# Patient Record
Sex: MALE | Race: BLACK OR AFRICAN AMERICAN | NOT HISPANIC OR LATINO | Employment: OTHER | URBAN - METROPOLITAN AREA
[De-identification: names, ages, dates, MRNs, and addresses within clinical notes are randomized per-mention and may not be internally consistent; named-entity substitution may affect disease eponyms.]

---

## 2017-01-04 ENCOUNTER — TELEPHONE (OUTPATIENT)
Dept: NEUROLOGY | Facility: CLINIC | Age: 52
End: 2017-01-04

## 2017-01-04 NOTE — TELEPHONE ENCOUNTER
----- Message from Wilmer Dubose sent at 1/4/2017  1:17 PM CST -----  Contact: Pt  Pt would like a call back from nurse or Dr. Finnegan    Pt is requesting information on infusion    Pt can be reached at [phone]

## 2017-01-04 NOTE — TELEPHONE ENCOUNTER
Mao is currently in inpatient rehab at [facility], but according to him will be moved to another inpatient rehab after he is discharged from [facility]. He would like to come in the office to be seen while he is rehab as a follow up.     Pt also informed me his [employer] Medicare is now active. I resubmitted Rituxan auth to be worked.

## 2017-01-09 ENCOUNTER — TELEPHONE (OUTPATIENT)
Dept: NEUROLOGY | Facility: CLINIC | Age: 52
End: 2017-01-09

## 2017-01-09 NOTE — TELEPHONE ENCOUNTER
Please assist patient in applying for co-pay assistance for his Rituxan infusions. He has [employer]NA[employer]ED MEDIC[employer] MEDICARE HMO, which has approved him to receive Rituxan, but has a large co-pay.

## 2017-01-10 NOTE — TELEPHONE ENCOUNTER
Per grey koenig/ Cyndi - In regards to MRN [id number], the therapy plan is in and signed. He is approved through [employer], but he still has a co-pay. Our pre-auth team worked the auth, so no need to do another PA. It usually goes through Medical. It's a specialty infusion.    My response: We only deal with prescription benefits, so i'm unsure if there is any further help for medical billing...my understanding is the [facility] can assist but the patient would have to meet some criteria and go online/call to submit an application. I was told they do assist with medical copays if the funding is available and patient meets criteria

## 2017-01-16 NOTE — TELEPHONE ENCOUNTER
I spoke with Mao and provided him with the number to Pt account customer service to apply for Rituxan co-pay assistance. Advised he call me back after he speaks with them. Pt verbalized understanding.

## 2017-01-18 ENCOUNTER — HOSPITAL ENCOUNTER (EMERGENCY)
Facility: HOSPITAL | Age: 52
Discharge: HOME | End: 2017-01-18
Attending: EMERGENCY MEDICINE
Payer: MEDICARE

## 2017-01-18 VITALS
WEIGHT: 175 LBS | HEIGHT: 71 IN | DIASTOLIC BLOOD PRESSURE: 84 MMHG | SYSTOLIC BLOOD PRESSURE: 140 MMHG | RESPIRATION RATE: 16 BRPM | BODY MASS INDEX: 24.5 KG/M2 | HEART RATE: 77 BPM

## 2017-01-18 DIAGNOSIS — S50.312A ABRASION OF LEFT ELBOW, INITIAL ENCOUNTER: ICD-10-CM

## 2017-01-18 DIAGNOSIS — G89.29 CHRONIC PAIN OF MULTIPLE SITES: Primary | ICD-10-CM

## 2017-01-18 DIAGNOSIS — Z79.01 ANTICOAGULANT LONG-TERM USE: ICD-10-CM

## 2017-01-18 DIAGNOSIS — R52 CHRONIC PAIN OF MULTIPLE SITES: Primary | ICD-10-CM

## 2017-01-18 PROCEDURE — 99285 EMERGENCY DEPT VISIT HI MDM: CPT | Mod: 25

## 2017-01-18 PROCEDURE — 25000003 PHARM REV CODE 250: Performed by: EMERGENCY MEDICINE

## 2017-01-18 PROCEDURE — 63600175 PHARM REV CODE 636 W HCPCS: Performed by: EMERGENCY MEDICINE

## 2017-01-18 PROCEDURE — 96374 THER/PROPH/DIAG INJ IV PUSH: CPT

## 2017-01-18 RX ORDER — HYDROMORPHONE HYDROCHLORIDE 1 MG/ML
1 INJECTION, SOLUTION INTRAMUSCULAR; INTRAVENOUS; SUBCUTANEOUS
Status: COMPLETED | OUTPATIENT
Start: 2017-01-18 | End: 2017-01-18

## 2017-01-18 RX ADMIN — HYDROMORPHONE HYDROCHLORIDE 1 MG: 1 INJECTION, SOLUTION INTRAMUSCULAR; INTRAVENOUS; SUBCUTANEOUS at 05:01

## 2017-01-18 RX ADMIN — BACITRACIN, NEOMYCIN, POLYMYXIN B 1 EACH: 400; 3.5; 5 OINTMENT TOPICAL at 05:01

## 2017-01-18 NOTE — ED NOTES
Pt arrived via EMS on stretcher from NH with c/o fall where he his is head. No hematoma noted, pt does report a headache PTA but denies on now. Pt reports pain to his left elbow where has an abrasion. Pt denies N/V, dizziness.   APPEARANCE: Alert, oriented and in no acute distress.  CARDIAC: Normal rate and rhythm, no murmur heard.   PERIPHERAL VASCULAR: peripheral pulses present. Normal cap refill. No edema. Warm to touch.    RESPIRATORY:Normal rate and effort, breath sounds clear bilaterally throughout chest. Respirations are equal and unlabored no obvious signs of distress.  GASTRO: soft, bowel sounds normal, no tenderness, no abdominal distention.  MUSC: Full ROM. No bony tenderness or soft tissue tenderness. No obvious deformity.  SKIN: Skin is warm and dry, normal skin turgor, mucous membranes moist.  NEURO: 5/5 strength major flexors/extensors bilaterally. Sensory intact to light touch bilaterally. [place] coma scale: eyes open spontaneously-4, oriented & converses-5, obeys commands-6. No neurological abnormalities.   MENTAL STATUS: awake, alert and aware of environment.  EYE: PERRL, both eyes: pupils brisk and reactive to light. Normal size.  ENT: EARS: no obvious drainage. NOSE: no active bleeding.   .

## 2017-01-18 NOTE — ED AVS SNAPSHOT
[facility]  [address]  [place], [place] [address]               Mao Levin   2017  4:34 PM   ED    Description:  Male : 1965   Department:  [facility]           Your Care was Coordinated By:     Provider Role From To    Maci Plata MD Attending Provider 17 0310 --      Reason for Visit     Fall           Diagnoses this Visit        Comments    Chronic pain of multiple sites    -  Primary     Anticoagulant long-term use         Abrasion of left elbow, initial encounter           ED Disposition     None           To Do List           Follow-up Information     Follow up with [facility].    Specialty:  Family Medicine    Contact information:    [address], [address]  [facility] [address] [phone]       These Medications        Disp Refills Start End    neomycin-bacitracnZn-polymyxnB 3.5-400-5,000 mg-unit-unit OiPk 25 each 0 2017    Apply 1 each topically 2 (two) times daily. - Topical (Top)    Pharmacy: [facility] - [place], [place] - [address]  Ph #: [phone]         [facility]     [facility] Nurse Care [facility] -  Assistance  Registered nurses in the [facility] provide clinical advisement, health education, appointment booking, and other advisory services.  Call for this free service at [phone].             Medications           Message regarding Medications     Verify the changes and/or additions to your medication regime listed below are the same as discussed with your clinician today.  If any of these changes or additions are incorrect, please notify your healthcare provider.        START taking these NEW medications        Refills    neomycin-bacitracnZn-polymyxnB 3.5-400-5,000 mg-unit-unit OiPk 0    Sig: Apply 1 each topically 2 (two) times daily.    Class: Print    Route: Topical (Top)      These medications were administered today         Dose Freq    hydromorphone (PF) injection 1 mg 1 mg ED 1 Time    Sig: Inject 1 mL (1 mg total) into the vein ED 1 Time.    Class: Normal    Route: Intravenous    neomycin-bacitracnZn-polymyxnB packet 1 each 1 packet ED 1 Time    Sig: Apply 1 each topically ED 1 Time.    Class: Normal    Route: Topical (Top)           Verify that the below list of medications is an accurate representation of the medications you are currently taking.  If none reported, the list may be blank. If incorrect, please contact your healthcare provider. Carry this list with you in case of emergency.           Current Medications     acetaminophen (TYLENOL) 325 MG tablet Take 2 tablets (650 mg total) by mouth every 6 (six) hours as needed.    baclofen (LIORESAL) 20 MG tablet Take 1 tablet (20 mg total) by mouth 4 (four) times daily.    butalbital-acetaminophen-caffeine -40 mg (FIORICET, ESGIC) -40 mg per tablet Take 1 tablet by mouth every 4 (four) hours as needed for Headaches.    carbamazepine (TEGRETOL XR) 400 MG Tb12 Take 1 tablet (400 mg total) by mouth 2 (two) times daily.    citalopram (CELEXA) 20 MG tablet Take 1 tablet (20 mg total) by mouth once daily.    DALFAMPRIDINE ORAL Take 1 tablet by mouth 2 (two) times daily.    dantrolene (DANTRIUM) 50 MG Cap Take 1 capsule (50 mg total) by mouth 4 (four) times daily.    diazePAM (VALIUM) 5 MG tablet Take 1 tablet (5 mg total) by mouth every 6 (six) hours as needed (muscle spasms).    ergocalciferol (VITAMIN D2) 50,000 unit Cap Take 1 capsule (50,000 Units total) by mouth every 7 days.    gabapentin (NEURONTIN) 600 MG tablet Take 1 Q AM plus 1 Q Day in the early afternoon plus 1.5 (900mg) Q HS    neomycin-bacitracnZn-polymyxnB 3.5-400-5,000 mg-unit-unit OiPk Apply 1 each topically 2 (two) times daily.    oxybutynin (DITROPAN-XL) 5 MG TR24 Take 5 mg by mouth once daily.    oxycodone (ROXICODONE) 15 MG Tab Take 1 tablet (15 mg total) by mouth 3 (three) times daily as needed.  "   polyethylene glycol (GLYCOLAX) 17 gram/dose powder Take 17 g by mouth once daily.    rivaroxaban (XARELTO) 20 mg Tab Take 1 tablet (20 mg total) by mouth daily with dinner or evening meal.    senna-docusate 8.6-50 mg (PERICOLACE) 8.6-50 mg per tablet Take 1 tablet by mouth 2 (two) times daily.    zolpidem (AMBIEN) 5 MG Tab Take 1 tablet (5 mg total) by mouth nightly as needed.           Clinical Reference Information           Your Vitals Were     BP Pulse Resp Height Weight BMI    132/83 (BP Location: Right arm, Patient Position: Sitting) 77 16 5' 11" (1.803 m) 79.4 kg (175 lb) 24.41 kg/m2      Allergies as of 1/18/2017     No Known Allergies      Immunizations Administered on Date of Encounter - 1/18/2017     None      ED Micro, Lab, POCT     None      ED Imaging Orders     Start Ordered       Status Ordering Provider    01/18/17 1705 01/18/17 1704  CT Head Without Contrast  1 time imaging      Final result         Discharge Instructions         Abrasions  Abrasions are skin scrapes. Their treatment depends on how large and deep the abrasion is.  Home care  You may be prescribed an antibiotic cream or ointment to apply to the wound. This helps prevent infection. Follow instructions when using this medication.  General care  · To care for the abrasion, do the following each day for as long as directed by your health care provider.  ¨ If you were given a bandage, change it once a day. If your bandage sticks to the wound, soak it in warm water until it loosens.  ¨ Wash the area with soap and warm water. You may do this in a sink or under a tub faucet or shower. Rinse off the soap. Then pat the area dry with a clean towel.  ¨ If antibiotic ointment or cream was prescribed, reapply it to the wound as directed. Cover the wound with a fresh non-stick bandage. If the bandage becomes wet or dirty, change it as soon as possible.  · You may use acetaminophen or ibuprofen to control pain unless another pain medication was " prescribed. Note: If you have chronic liver or kidney disease or ever had a stomach ulcer or GI bleeding, talk with your health care provider before using these medications. Do not use ibuprofen in children under six months of age.  · Most skin wounds heal within ten days. But an infection may occur despite treatment. Therefore, monitor the wound for signs of infection as listed below.  Follow-up care  Follow up with your health care provider, or as advised.  When to seek medical advice  Call your health care provider right away if any of these occur:  · Fever of 101ºF (38.3ºC) or higher, or as directed by your health care provider  · Increasing pain, redness, swelling, or drainage from the wound  · Bleeding from the wound that does not stop after a few minutes of steady, firm pressure  · Decreased ability to move any body part near wound  © 8937-7767 [employer]. [address] [place] [address]. All rights reserved. This information is not intended as a substitute for professional medical care. Always follow your healthcare professional's instructions.          Your Scheduled Appointments     Jan 30, 2017  8:00 AM CST   New Patient with BETH Charles - Urology 4th Floor ([facility] )    [address]  [place] [address]   [phone]            Feb 06, 2017  3:00 PM CST   Audiogram with AUDIOGRAM, AUDIO   Frederic Kelley - Audiology ([facility] )    [address]  [place] [address]   [phone]            Feb 06, 2017  3:30 PM CST   New Patient with MD Frederic Abdalla - Otorhinolaryngology ([facility] )    [address]  [place] [address]   [phone]            Mar 08, 2017 10:40 AM CST   Established Patient Visit with Fausto Leung MD   [facility] ([facility])    [address] [facility] [address]   [phone]            Mar 10, 2017 10:00 AM CST   Established Patient  with MD Frederic Smith - Endo/Diab/Metab ([facility] ) [address]  [facility] [address] [phone]              Smoking Cessation     If you would like to quit smoking:   You may be eligible for free services if you are a [place] resident and started smoking cigarettes before September 1, 1988.  Call the Smoking Cessation Trust (SCT) toll free at (686) 259-1905 or (699) 777-7409.   Call 1-800-QUIT-NOW if you do not meet the above criteria.             [facility] complies with applicable Federal civil rights laws and does not discriminate on the basis of race, color, national origin, age, disability, or sex.        Language Assistance Services     ATTENTION: Language assistance services are available, free of charge. Please call [phone].      ATENCIÓN: Si habla español, tiene a mcmahon disposición servicios gratuitos de asistencia lingüística. Llame al [phone].     CHÚ Ý: N?u b?n nói Ti?ng Vi?t, có các d?ch v? h? tr? ngôn ng? mi?n phí dành cho b?n. G?i s? [phone].

## 2017-01-18 NOTE — ED PROVIDER NOTES
Encounter Date: 1/18/2017       History     Chief Complaint   Patient presents with    Fall     Unwitnessed fall to ground at nursing home. Patient states that he fell while getting out of wheelchair and hit back of head. Presents awake, alert with c/o headache. No evidence trauma. Patient is on Xeralto. Skin tear to left elbow, left knee with dressings applied.      Review of patient's allergies indicates:  No Known Allergies  HPI Comments: 51-year-old male with a history of multiple sclerosis after a recent  Flare is at a short-term facility at [facility] and presents after a fall from the bed.  Patient takes xarelto.  Patient was trying to get to the bathroom, and was waiting on staff to calm cyst.  When he started getting frustrated because he had ago to the bathroom he attempted to get up by himself, and fell back striking his head and his left elbow.  Patient has a mild posterior headache.  Denies chest pain neck pain elbow pain.  He has a small abrasion over his lateral left elbow.  Patient reports he is due for his pain medication, but was told he was unable to get his pain medication prior to coming because he was going to be getting a head CT.    Patient is a 51 y.o. male presenting with the following complaint: fall. The history is provided by the patient.   Fall   The accident occurred just prior to arrival. The fall occurred from a bed. He fell from a height of 3 to 5 ft. He landed on a hard floor. The point of impact was the head and left elbow. The pain is at a severity of 3/10. He was not ambulatory at the scene. There was no entrapment after the fall. There was no drug use involved in the accident. There was no alcohol use involved in the accident. Associated symptoms include headaches. Pertinent negatives include no neck pain.     Past Medical History   Diagnosis Date    Multiple sclerosis      Past Medical History Pertinent Negatives   Diagnosis Date Noted    Anticoagulant long-term use 10/27/2016     Arthritis 10/27/2016    Asthma 10/27/2016    Cancer 10/27/2016    CHF (congestive heart failure) 10/27/2016    COPD (chronic obstructive pulmonary disease) 10/27/2016    Coronary artery disease 10/27/2016    Diabetes mellitus 10/27/2016    Encounter for blood transfusion 10/27/2016    Hypertension 10/27/2016    Seizures 10/27/2016    Stroke 10/27/2016    Thyroid disease 10/27/2016    Transfusion reaction 10/27/2016     No past surgical history on file.  Family History   Problem Relation Age of Onset    Arthritis Mother     No Known Problems Father      Social History   Substance Use Topics    Smoking status: Former Smoker     Packs/day: 0.50     Types: Cigarettes    Smokeless tobacco: None    Alcohol use No     Review of Systems   Constitutional: Negative.    Eyes: Negative.    Respiratory: Negative.    Genitourinary: Negative.    Musculoskeletal: Negative for neck pain.   Neurological: Positive for headaches.   All other systems reviewed and are negative.      Physical Exam   Initial Vitals   BP Pulse Resp Temp SpO2   01/18/17 1558 01/18/17 1558 01/18/17 1558 -- --   132/83 77 16       Physical Exam    Nursing note and vitals reviewed.  Constitutional: He appears well-developed and well-nourished.   HENT:   Head: Normocephalic and atraumatic.   Right Ear: External ear normal.   Left Ear: External ear normal.   Nose: Nose normal.   Mouth/Throat: Oropharynx is clear and moist.   Eyes: EOM are normal. Pupils are equal, round, and reactive to light.   Neck: Normal range of motion.   Cardiovascular: Normal rate.   Pulmonary/Chest: Breath sounds normal.   Abdominal: Soft. Bowel sounds are normal.   Musculoskeletal: Normal range of motion.   Neurological: He is alert and oriented to person, place, and time.   Skin: Skin is warm and dry.   Small abrasion over lateral elbow   Psychiatric: He has a normal mood and affect. Thought content normal.         ED Course   Procedures  Labs Reviewed - No data to  display          Medical Decision Making:   Initial Assessment:   50 yo M with MS here after closed head injury from mechanical fall  Differential Diagnosis:   Closed head injury (SDH, SAH) given patient's history of Xarelto use and blunt trauma  No C-spine tenderness or chest wall tenderness  Small abrasion over elbow- no concern for fracture    Clinical Tests:   Radiological Study: Ordered and Reviewed  ED Management:  HEAD CT- normal  Patient given his home pain medications  Discharged back to facility  Bacitracin to elbow                   ED Course     Clinical Impression:   The primary encounter diagnosis was Chronic pain of multiple sites. Diagnoses of Anticoagulant long-term use and Abrasion of left elbow, initial encounter were also pertinent to this visit.          Maci Plata MD  01/18/17 8935

## 2017-01-19 ENCOUNTER — TELEPHONE (OUTPATIENT)
Dept: NEUROLOGY | Facility: CLINIC | Age: 52
End: 2017-01-19

## 2017-01-19 DIAGNOSIS — G35 MULTIPLE SCLEROSIS: Primary | ICD-10-CM

## 2017-01-19 NOTE — DISCHARGE INSTRUCTIONS
Abrasions  Abrasions are skin scrapes. Their treatment depends on how large and deep the abrasion is.  Home care  You may be prescribed an antibiotic cream or ointment to apply to the wound. This helps prevent infection. Follow instructions when using this medication.  General care  · To care for the abrasion, do the following each day for as long as directed by your health care provider.  ¨ If you were given a bandage, change it once a day. If your bandage sticks to the wound, soak it in warm water until it loosens.  ¨ Wash the area with soap and warm water. You may do this in a sink or under a tub faucet or shower. Rinse off the soap. Then pat the area dry with a clean towel.  ¨ If antibiotic ointment or cream was prescribed, reapply it to the wound as directed. Cover the wound with a fresh non-stick bandage. If the bandage becomes wet or dirty, change it as soon as possible.  · You may use acetaminophen or ibuprofen to control pain unless another pain medication was prescribed. Note: If you have chronic liver or kidney disease or ever had a stomach ulcer or GI bleeding, talk with your health care provider before using these medications. Do not use ibuprofen in children under six months of age.  · Most skin wounds heal within ten days. But an infection may occur despite treatment. Therefore, monitor the wound for signs of infection as listed below.  Follow-up care  Follow up with your health care provider, or as advised.  When to seek medical advice  Call your health care provider right away if any of these occur:  · Fever of 101ºF (38.3ºC) or higher, or as directed by your health care provider  · Increasing pain, redness, swelling, or drainage from the wound  · Bleeding from the wound that does not stop after a few minutes of steady, firm pressure  · Decreased ability to move any body part near wound  © 1098-4328 [employer]. [address] [place] [address], [place] [address]. All rights reserved. This  information is not intended as a substitute for professional medical care. Always follow your healthcare professional's instructions.

## 2017-01-19 NOTE — TELEPHONE ENCOUNTER
Faxed Hep orders to [facility] at [phone]. Informed Mao we will wait to get results back prior to scheduling. Pt verbalized understanding.

## 2017-01-26 ENCOUNTER — TELEPHONE (OUTPATIENT)
Dept: NEUROLOGY | Facility: CLINIC | Age: 52
End: 2017-01-26

## 2017-01-26 NOTE — TELEPHONE ENCOUNTER
Left VM informing him we are still waiting on one lab to result and once this happens, we will call him back with all results and schedule his Rituxan.

## 2017-01-26 NOTE — TELEPHONE ENCOUNTER
----- Message from Ryan Zapien sent at 1/25/2017  4:35 PM CST -----  Contact: PT  Would like Cyndi to call him, regarding his lab results.     Call: [phone]

## 2017-01-27 ENCOUNTER — TELEPHONE (OUTPATIENT)
Dept: NEUROLOGY | Facility: CLINIC | Age: 52
End: 2017-01-27

## 2017-01-27 NOTE — TELEPHONE ENCOUNTER
Called and reviewed Hepatitis labs with Mao. Informed him we will call him Monday to schedule Rituxan. Pt verbalized understanding.

## 2017-01-27 NOTE — TELEPHONE ENCOUNTER
----- Message from Ryan Zapien sent at 1/27/2017  2:27 PM CST -----  Contact: PT  Would like Cyndi to call him, regarding his labs.    Please call: [phone]

## 2017-01-27 NOTE — TELEPHONE ENCOUNTER
Received Hepatitis labs from [facility]. Lawanda reviewed and confirmed okay to schedule Rituxan.

## 2017-01-30 NOTE — TELEPHONE ENCOUNTER
Patient currently scheduled for his first two Rituxan infusions on 2/7 and 2/21 @ 8am. [facility] at [facility] and will schedule transportation.    Approved   Primary Insurance:  [employer] MEDICARE/[employer] MEDICARE HMO   Authorization #:NPR

## 2017-01-31 ENCOUNTER — DOCUMENTATION ONLY (OUTPATIENT)
Dept: NEUROLOGY | Facility: CLINIC | Age: 52
End: 2017-01-31

## 2017-01-31 NOTE — PROGRESS NOTES
Fax received/reviewed form [facility]   Drawn on 1/23/2017  Hep A AB, IgM--Negative  HepA Ab, Total--Negative  Hep B surface Ab--Non-reactive  Hep B Core Ab, Total-Negative  Labs will be uploaded into EPIC for future reference

## 2017-02-06 ENCOUNTER — CLINICAL SUPPORT (OUTPATIENT)
Dept: AUDIOLOGY | Facility: CLINIC | Age: 52
End: 2017-02-06
Payer: COMMERCIAL

## 2017-02-06 ENCOUNTER — OFFICE VISIT (OUTPATIENT)
Dept: OTOLARYNGOLOGY | Facility: CLINIC | Age: 52
End: 2017-02-06
Payer: MEDICARE

## 2017-02-06 DIAGNOSIS — H60.311 CHRONIC DIFFUSE OTITIS EXTERNA OF RIGHT EAR: ICD-10-CM

## 2017-02-06 DIAGNOSIS — H92.01 OTALGIA OF RIGHT EAR: Primary | ICD-10-CM

## 2017-02-06 DIAGNOSIS — H92.01 RIGHT EAR PAIN: ICD-10-CM

## 2017-02-06 DIAGNOSIS — H61.23 BILATERAL IMPACTED CERUMEN: ICD-10-CM

## 2017-02-06 DIAGNOSIS — H60.8X1 CHRONIC ACTINIC OTITIS EXTERNA OF RIGHT EAR: Primary | ICD-10-CM

## 2017-02-06 DIAGNOSIS — G35 MS (MULTIPLE SCLEROSIS): ICD-10-CM

## 2017-02-06 PROCEDURE — 92504 EAR MICROSCOPY EXAMINATION: CPT | Mod: RT,GC,S$GLB, | Performed by: OTOLARYNGOLOGY

## 2017-02-06 PROCEDURE — 99999 PR PBB SHADOW E&M-EST. PATIENT-LVL III: CPT | Mod: PBBFAC,,, | Performed by: OTOLARYNGOLOGY

## 2017-02-06 PROCEDURE — 99203 OFFICE O/P NEW LOW 30 MIN: CPT | Mod: S$GLB,,, | Performed by: OTOLARYNGOLOGY

## 2017-02-06 PROCEDURE — 92557 COMPREHENSIVE HEARING TEST: CPT | Mod: S$GLB,,, | Performed by: AUDIOLOGIST-HEARING AID FITTER

## 2017-02-06 PROCEDURE — 92567 TYMPANOMETRY: CPT | Mod: S$GLB,,, | Performed by: AUDIOLOGIST-HEARING AID FITTER

## 2017-02-06 RX ORDER — TRAZODONE HYDROCHLORIDE 100 MG/1
100 TABLET ORAL NIGHTLY
Status: ON HOLD | COMMUNITY
End: 2017-06-08

## 2017-02-06 RX ORDER — BETAMETHASONE VALERATE 0.1 %
LOTION (ML) TOPICAL 2 TIMES DAILY
Qty: 60 ML | Refills: 2 | Status: SHIPPED | OUTPATIENT
Start: 2017-02-06 | End: 2017-02-12

## 2017-02-06 NOTE — PROGRESS NOTES
Mao Levin was seen in the clinic today for a hearing evaluation. Mr. Levin reported right ear pain.      Audiological testing revealed hearing within normal limits, bilaterally. A speech reception threshold was obtained at 5 dBHL in the right ear and 10 dBHL in the left ear. Speech discrimination was 100% in the right ear and 96% in the left ear.    Tympanometry revealed normal Type A tympanograms in both ears.    Recommendations:  1. Otologic evaluation  2. Annual hearing evaluation

## 2017-02-06 NOTE — MR AVS SNAPSHOT
[facility] - Otorhinolaryngology  [address]  [place] [address]  Phone: [phone]  Fax: [phone]                  Mao Levin   2017 3:45 PM   Office Visit    Description:  Male : 1965   Provider:  Marcello Judge MD   Department:  [facility] - Otorhinolaryngology           Reason for Visit     Otalgia           Diagnoses this Visit        Comments    Chronic actinic otitis externa of right ear    -  Primary            To Do List           Future Appointments        Provider Department Dept Phone    2017 8:00 AM [facility], [facility] [phone]    2017 8:00 AM [facility], [facility] [phone]    3/8/2017 10:40 AM Fausto Leung MD [place] - Physical Med & Rehab [phone]    3/10/2017 10:00 AM Sean Bowen MD [facility] - Endo/Diab/Metab [phone]      Goals (5 Years of Data)     None       These Medications        Disp Refills Start End    betamethasone valerate 0.1% (VALISONE) 0.1 % Lotn 60 mL 2 2017    Apply topically 2 (two) times daily. qtts 3 AD Bid. - Topical    Pharmacy: [facility] - [place], [place]  [address] #: [phone]         [facility]     [facility] -  Assistance  Registered nurses in the [facility] provide clinical advisement, health education, appointment booking, and other advisory services.  Call for this free service at [phone].             Medications           Message regarding Medications     Verify the changes and/or additions to your medication regime listed below are the same as discussed with your clinician today.  If any of these changes or additions are incorrect, please notify your healthcare provider.        START taking these NEW medications        Refills    betamethasone valerate 0.1% (VALISONE) 0.1 % Lotn 2    Sig: Apply topically 2 (two) times daily. qtts 3 AD Bid.    Class: Normal    Route:  Topical           Verify that the below list of medications is an accurate representation of the medications you are currently taking.  If none reported, the list may be blank. If incorrect, please contact your healthcare provider. Carry this list with you in case of emergency.           Current Medications     acetaminophen (TYLENOL) 325 MG tablet Take 2 tablets (650 mg total) by mouth every 6 (six) hours as needed.    baclofen (LIORESAL) 20 MG tablet Take 1 tablet (20 mg total) by mouth 4 (four) times daily.    betamethasone valerate 0.1% (VALISONE) 0.1 % Lotn Apply topically 2 (two) times daily. qtts 3 AD Bid.    butalbital-acetaminophen-caffeine -40 mg (FIORICET, ESGIC) -40 mg per tablet Take 1 tablet by mouth every 4 (four) hours as needed for Headaches.    carbamazepine (TEGRETOL XR) 400 MG Tb12 Take 1 tablet (400 mg total) by mouth 2 (two) times daily.    citalopram (CELEXA) 20 MG tablet Take 1 tablet (20 mg total) by mouth once daily.    DALFAMPRIDINE ORAL Take 1 tablet by mouth 2 (two) times daily.    dantrolene (DANTRIUM) 50 MG Cap Take 1 capsule (50 mg total) by mouth 4 (four) times daily.    diazePAM (VALIUM) 5 MG tablet Take 1 tablet (5 mg total) by mouth every 6 (six) hours as needed (muscle spasms).    ergocalciferol (VITAMIN D2) 50,000 unit Cap Take 1 capsule (50,000 Units total) by mouth every 7 days.    gabapentin (NEURONTIN) 600 MG tablet Take 1 Q AM plus 1 Q Day in the early afternoon plus 1.5 (900mg) Q HS    oxybutynin (DITROPAN-XL) 5 MG TR24 Take 5 mg by mouth once daily.    oxycodone (ROXICODONE) 15 MG Tab Take 1 tablet (15 mg total) by mouth 3 (three) times daily as needed.    polyethylene glycol (GLYCOLAX) 17 gram/dose powder Take 17 g by mouth once daily.    rivaroxaban (XARELTO) 20 mg Tab Take 1 tablet (20 mg total) by mouth daily with dinner or evening meal.    senna-docusate 8.6-50 mg (PERICOLACE) 8.6-50 mg per tablet Take 1 tablet by mouth 2 (two) times daily.    trazodone  (DESYREL) 100 MG tablet Take 100 mg by mouth every evening.    zolpidem (AMBIEN) 5 MG Tab Take 1 tablet (5 mg total) by mouth nightly as needed.           Clinical Reference Information           Allergies as of 2/6/2017     No Known Allergies      Immunizations Administered on Date of Encounter - 2/6/2017     None      Language Assistance Services     ATTENTION: Language assistance services are available, free of charge. Please call [phone].      ATENCIÓN: Si habla yonas, tiene a mcmahon disposición servicios gratuitos de asistencia lingüística. Llame al [phone].     CHÚ Ý: N?u b?n nói Ti?ng Vi?t, có các d?ch v? h? tr? ngôn ng? mi?n phí dành cho b?n. G?i s? [phone].         [facility] Otorhinol[facility] complies with applicable Federal civil rights laws and does not discriminate on the basis of race, color, national origin, age, disability, or sex.

## 2017-02-06 NOTE — PROGRESS NOTES
Otolaryngology - Head and Neck Surgery  Consultation Report        Chief Complaint: right otalgia    History of Present Illness: 51 y.o. male presents with right otalgia x 6 weeks following a recent hospitalization for MS flare. Since then, he has experienced nearly nightly episodes of otalgia that has awoken him from sleep. No otorrhea, hearing loss, tinnitus, hearing change, hearing loss, loud noise exposure, prior ear surgeries, or family history of hearing loss. He denies grinding his teeth at night. He has been placed on ear drops and PO abx for this without improvement in symptoms.     Review of Systems - Negative except as mentioned in HPI.   History obtained from chart review and the patient  General ROS: negative  Psychological ROS: negative  Ophthalmic ROS: negative  ENT ROS: positive for - otalgia  negative for - hearing change, tinnitus or vertigo  Allergy and Immunology ROS: negative  Hematological and Lymphatic ROS: negative  Endocrine ROS: negative  Respiratory ROS: no cough, shortness of breath, or wheezing  Cardiovascular ROS: no chest pain or dyspnea on exertion  Gastrointestinal ROS: no abdominal pain, change in bowel habits, or black or bloody stools  Genito-Urinary ROS: no dysuria, trouble voiding, or hematuria  Musculoskeletal ROS: negative  Neurological ROS: no TIA or stroke symptoms  Dermatological ROS: negative    Past Medical History: he  has a past medical history of Multiple sclerosis.    Past Surgical History: he  has no past surgical history on file.    Social History: he  reports that he has quit smoking. His smoking use included Cigarettes. He smoked 0.50 packs per day. He does not have any smokeless tobacco history on file. He reports that he does not drink alcohol or use illicit drugs.    Family History: family history includes Arthritis in his mother; No Known Problems in his father.    Medications:   Current Outpatient Prescriptions on File Prior to Visit   Medication Sig Dispense  Refill    acetaminophen (TYLENOL) 325 MG tablet Take 2 tablets (650 mg total) by mouth every 6 (six) hours as needed.  0    baclofen (LIORESAL) 20 MG tablet Take 1 tablet (20 mg total) by mouth 4 (four) times daily. 120 tablet 11    butalbital-acetaminophen-caffeine -40 mg (FIORICET, ESGIC) -40 mg per tablet Take 1 tablet by mouth every 4 (four) hours as needed for Headaches. 60 tablet 1    carbamazepine (TEGRETOL XR) 400 MG Tb12 Take 1 tablet (400 mg total) by mouth 2 (two) times daily. 60 tablet 3    citalopram (CELEXA) 20 MG tablet Take 1 tablet (20 mg total) by mouth once daily. 30 tablet 3    dantrolene (DANTRIUM) 50 MG Cap Take 1 capsule (50 mg total) by mouth 4 (four) times daily. 120 capsule 11    diazePAM (VALIUM) 5 MG tablet Take 1 tablet (5 mg total) by mouth every 6 (six) hours as needed (muscle spasms). 60 tablet 1    ergocalciferol (VITAMIN D2) 50,000 unit Cap Take 1 capsule (50,000 Units total) by mouth every 7 days. 4 capsule 11    gabapentin (NEURONTIN) 600 MG tablet Take 1 Q AM plus 1 Q Day in the early afternoon plus 1.5 (900mg) Q HS (Patient taking differently: Take 1 tablet by mouth every morning and in the early afternoon then take 1½ tablets (900 mg) at bedtime) 105 tablet 11    oxybutynin (DITROPAN-XL) 5 MG TR24 Take 5 mg by mouth once daily.      oxycodone (ROXICODONE) 15 MG Tab Take 1 tablet (15 mg total) by mouth 3 (three) times daily as needed. 90 tablet 0    polyethylene glycol (GLYCOLAX) 17 gram/dose powder Take 17 g by mouth once daily. 510 g 6    rivaroxaban (XARELTO) 20 mg Tab Take 1 tablet (20 mg total) by mouth daily with dinner or evening meal. 60 tablet 4    senna-docusate 8.6-50 mg (PERICOLACE) 8.6-50 mg per tablet Take 1 tablet by mouth 2 (two) times daily.      DALFAMPRIDINE ORAL Take 1 tablet by mouth 2 (two) times daily.      zolpidem (AMBIEN) 5 MG Tab Take 1 tablet (5 mg total) by mouth nightly as needed. 30 tablet 1     No current  facility-administered medications on file prior to visit.          Allergies: he has No Known Allergies.    Physical Exam:  afvss  General: nad, alert, oriented, evidence of MS, wheelchair bound, weak  Head: ncat  Eyes: eomi, perrl  Ears:   AS: auricle normal. Pinna, mastoid normal. EAC with CI, cleaned. TM intact. No TASIA.   AD: auricle normal. Pinna, mastoid normal. EAC with CI, cleaned. TM intact. No TASIA. +TMJ click  Nose: septum straight, no rhinorrhea or epistaxis  Mouth: some missing/carious teeth, MMM, tongue mobile  Neck: soft, supple, no LAD  Pulmonary: normal effort  Cardiovascular: RRR          AS: The patient was brought to the minor procedure room and placed under the operating microscope. Using a combination of suction, curettes and cup forceps the patient's cerumen impaction was removed. The tympanic membrane was evaluated and was unremarkable. The patient tolerated the procedure well. There were no complications.    AD: The patient was brought to the minor procedure room and placed under the operating microscope. Using a combination of suction, curettes and cup forceps the patient's cerumen impaction was removed. The tympanic membrane was evaluated and was unremarkable. The patient tolerated the procedure well. There were no complications.      Assessment: 51M with MS, CI, otalgia, mild chronic OE    Plan:   - Valisone bid. RTC PRN.

## 2017-02-07 ENCOUNTER — TELEPHONE (OUTPATIENT)
Dept: NEUROLOGY | Facility: CLINIC | Age: 52
End: 2017-02-07

## 2017-02-07 ENCOUNTER — INFUSION (OUTPATIENT)
Dept: INFUSION THERAPY | Facility: HOSPITAL | Age: 52
End: 2017-02-07
Attending: INTERNAL MEDICINE
Payer: MEDICARE

## 2017-02-07 VITALS
HEIGHT: 71 IN | RESPIRATION RATE: 18 BRPM | DIASTOLIC BLOOD PRESSURE: 79 MMHG | SYSTOLIC BLOOD PRESSURE: 135 MMHG | WEIGHT: 174.81 LBS | BODY MASS INDEX: 24.47 KG/M2 | HEART RATE: 71 BPM | TEMPERATURE: 98 F

## 2017-02-07 DIAGNOSIS — G35 MS (MULTIPLE SCLEROSIS): Primary | ICD-10-CM

## 2017-02-07 PROCEDURE — 96367 TX/PROPH/DG ADDL SEQ IV INF: CPT

## 2017-02-07 PROCEDURE — 63600175 PHARM REV CODE 636 W HCPCS: Performed by: PHYSICIAN ASSISTANT

## 2017-02-07 PROCEDURE — 25000003 PHARM REV CODE 250: Performed by: PHYSICIAN ASSISTANT

## 2017-02-07 PROCEDURE — 96413 CHEMO IV INFUSION 1 HR: CPT

## 2017-02-07 PROCEDURE — 96415 CHEMO IV INFUSION ADDL HR: CPT

## 2017-02-07 PROCEDURE — 96376 TX/PRO/DX INJ SAME DRUG ADON: CPT

## 2017-02-07 RX ORDER — HEPARIN 100 UNIT/ML
500 SYRINGE INTRAVENOUS
Status: CANCELLED | OUTPATIENT
Start: 2017-02-20

## 2017-02-07 RX ORDER — ACETAMINOPHEN 325 MG/1
650 TABLET ORAL
Status: COMPLETED | OUTPATIENT
Start: 2017-02-07 | End: 2017-02-07

## 2017-02-07 RX ORDER — SODIUM CHLORIDE 0.9 % (FLUSH) 0.9 %
10 SYRINGE (ML) INJECTION
Status: DISCONTINUED | OUTPATIENT
Start: 2017-02-07 | End: 2017-02-07 | Stop reason: HOSPADM

## 2017-02-07 RX ORDER — FAMOTIDINE 10 MG/ML
20 INJECTION INTRAVENOUS 2 TIMES DAILY
Status: DISCONTINUED | OUTPATIENT
Start: 2017-02-07 | End: 2017-02-07 | Stop reason: HOSPADM

## 2017-02-07 RX ORDER — HEPARIN 100 UNIT/ML
500 SYRINGE INTRAVENOUS
Status: DISCONTINUED | OUTPATIENT
Start: 2017-02-07 | End: 2017-02-07 | Stop reason: HOSPADM

## 2017-02-07 RX ORDER — SODIUM CHLORIDE 0.9 % (FLUSH) 0.9 %
10 SYRINGE (ML) INJECTION
Status: CANCELLED | OUTPATIENT
Start: 2017-02-20

## 2017-02-07 RX ORDER — ACETAMINOPHEN 325 MG/1
650 TABLET ORAL
Status: CANCELLED | OUTPATIENT
Start: 2017-02-20

## 2017-02-07 RX ORDER — FAMOTIDINE 10 MG/ML
20 INJECTION INTRAVENOUS 2 TIMES DAILY
Status: CANCELLED
Start: 2017-02-20

## 2017-02-07 RX ADMIN — DEXTROSE: 50 INJECTION, SOLUTION INTRAVENOUS at 09:02

## 2017-02-07 RX ADMIN — RITUXIMAB 1000 MG: 10 INJECTION, SOLUTION INTRAVENOUS at 10:02

## 2017-02-07 RX ADMIN — DIPHENHYDRAMINE HYDROCHLORIDE 50 MG: 50 INJECTION, SOLUTION INTRAMUSCULAR; INTRAVENOUS at 09:02

## 2017-02-07 RX ADMIN — ACETAMINOPHEN 650 MG: 325 TABLET ORAL at 09:02

## 2017-02-07 RX ADMIN — FAMOTIDINE 20 MG: 10 INJECTION INTRAVENOUS at 09:02

## 2017-02-07 NOTE — NURSING
Pt comes from [facility]. Info scanned into chart.pt. Patient got disconnected several times to go to bathroom Pt kept eating non stop.

## 2017-02-12 ENCOUNTER — HOSPITAL ENCOUNTER (EMERGENCY)
Facility: HOSPITAL | Age: 52
Discharge: HOME | End: 2017-02-12
Attending: EMERGENCY MEDICINE
Payer: MEDICARE

## 2017-02-12 VITALS
WEIGHT: 174.81 LBS | HEART RATE: 78 BPM | RESPIRATION RATE: 15 BRPM | DIASTOLIC BLOOD PRESSURE: 74 MMHG | OXYGEN SATURATION: 99 % | HEIGHT: 71 IN | SYSTOLIC BLOOD PRESSURE: 135 MMHG | BODY MASS INDEX: 24.47 KG/M2

## 2017-02-12 DIAGNOSIS — R52 CHRONIC PAIN OF MULTIPLE SITES: ICD-10-CM

## 2017-02-12 DIAGNOSIS — N39.0 URINARY TRACT INFECTION WITHOUT HEMATURIA, SITE UNSPECIFIED: Primary | ICD-10-CM
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
## 2017-02-12 DIAGNOSIS — R53.1 WEAKNESS GENERALIZED: ICD-10-CM

## 2017-02-12 DIAGNOSIS — G89.29 CHRONIC PAIN OF MULTIPLE SITES: ICD-10-CM

## 2017-02-12 LAB
ALBUMIN SERPL BCP-MCNC: 4 G/DL
ALP SERPL-CCNC: 86 U/L
ALT SERPL W/O P-5'-P-CCNC: 36 U/L
AMPHET+METHAMPHET UR QL: NEGATIVE
ANION GAP SERPL CALC-SCNC: 6 MMOL/L
AST SERPL-CCNC: 28 U/L
BACTERIA #/AREA URNS AUTO: ABNORMAL /HPF
BARBITURATES UR QL SCN>200 NG/ML: NORMAL
BASOPHILS # BLD AUTO: 0.03 K/UL
BASOPHILS NFR BLD: 0.3 %
BENZODIAZ UR QL SCN>200 NG/ML: NORMAL
BILIRUB SERPL-MCNC: 0.4 MG/DL
BILIRUB UR QL STRIP: NEGATIVE
BUN SERPL-MCNC: 8 MG/DL
BZE UR QL SCN: NEGATIVE
CALCIUM SERPL-MCNC: 9.3 MG/DL
CANNABINOIDS UR QL SCN: NEGATIVE
CHLORIDE SERPL-SCNC: 104 MMOL/L
CK SERPL-CCNC: 531 U/L
CLARITY UR REFRACT.AUTO: CLEAR
CO2 SERPL-SCNC: 29 MMOL/L
COLOR UR AUTO: YELLOW
CREAT SERPL-MCNC: 0.8 MG/DL
CREAT UR-MCNC: 97 MG/DL
DIFFERENTIAL METHOD: ABNORMAL
EOSINOPHIL # BLD AUTO: 0.2 K/UL
EOSINOPHIL NFR BLD: 2.3 %
ERYTHROCYTE [DISTWIDTH] IN BLOOD BY AUTOMATED COUNT: 14.6 %
EST. GFR  (AFRICAN AMERICAN): >60 ML/MIN/1.73 M^2
EST. GFR  (NON AFRICAN AMERICAN): >60 ML/MIN/1.73 M^2
ETHANOL SERPL-MCNC: <10 MG/DL
GLUCOSE SERPL-MCNC: 80 MG/DL
GLUCOSE UR QL STRIP: NEGATIVE
HCT VFR BLD AUTO: 38.3 %
HGB BLD-MCNC: 13.2 G/DL
HGB UR QL STRIP: NEGATIVE
KETONES UR QL STRIP: NEGATIVE
LEUKOCYTE ESTERASE UR QL STRIP: ABNORMAL
LYMPHOCYTES # BLD AUTO: 2.7 K/UL
LYMPHOCYTES NFR BLD: 26.4 %
MCH RBC QN AUTO: 29.9 PG
MCHC RBC AUTO-ENTMCNC: 34.5 %
MCV RBC AUTO: 87 FL
METHADONE UR QL SCN>300 NG/ML: NEGATIVE
MICROSCOPIC COMMENT: ABNORMAL
MONOCYTES # BLD AUTO: 0.8 K/UL
MONOCYTES NFR BLD: 8.3 %
NEUTROPHILS # BLD AUTO: 6.3 K/UL
NEUTROPHILS NFR BLD: 62.6 %
NITRITE UR QL STRIP: POSITIVE
OPIATES UR QL SCN: NORMAL
PCP UR QL SCN>25 NG/ML: NEGATIVE
PH UR STRIP: 6 [PH] (ref 5–8)
PLATELET # BLD AUTO: 215 K/UL
PMV BLD AUTO: 10.9 FL
POTASSIUM SERPL-SCNC: 3.7 MMOL/L
PROT SERPL-MCNC: 7.2 G/DL
PROT UR QL STRIP: NEGATIVE
RBC # BLD AUTO: 4.42 M/UL
RBC #/AREA URNS AUTO: 1 /HPF (ref 0–4)
SODIUM SERPL-SCNC: 139 MMOL/L
SP GR UR STRIP: 1.01 (ref 1–1.03)
TOXICOLOGY INFORMATION: NORMAL
URN SPEC COLLECT METH UR: ABNORMAL
UROBILINOGEN UR STRIP-ACNC: NEGATIVE EU/DL
WBC # BLD AUTO: 10.08 K/UL
WBC #/AREA URNS AUTO: 23 /HPF (ref 0–5)

## 2017-02-12 PROCEDURE — 99284 EMERGENCY DEPT VISIT MOD MDM: CPT | Mod: 25

## 2017-02-12 PROCEDURE — 82570 ASSAY OF URINE CREATININE: CPT

## 2017-02-12 PROCEDURE — 82550 ASSAY OF CK (CPK): CPT

## 2017-02-12 PROCEDURE — 96361 HYDRATE IV INFUSION ADD-ON: CPT

## 2017-02-12 PROCEDURE — 80320 DRUG SCREEN QUANTALCOHOLS: CPT

## 2017-02-12 PROCEDURE — 87186 SC STD MICRODIL/AGAR DIL: CPT

## 2017-02-12 PROCEDURE — 99284 EMERGENCY DEPT VISIT MOD MDM: CPT | Mod: ,,, | Performed by: EMERGENCY MEDICINE

## 2017-02-12 PROCEDURE — 63600175 PHARM REV CODE 636 W HCPCS: Performed by: EMERGENCY MEDICINE

## 2017-02-12 PROCEDURE — 96374 THER/PROPH/DIAG INJ IV PUSH: CPT

## 2017-02-12 PROCEDURE — 80053 COMPREHEN METABOLIC PANEL: CPT

## 2017-02-12 PROCEDURE — 81001 URINALYSIS AUTO W/SCOPE: CPT

## 2017-02-12 PROCEDURE — 25000003 PHARM REV CODE 250: Performed by: EMERGENCY MEDICINE

## 2017-02-12 PROCEDURE — 87077 CULTURE AEROBIC IDENTIFY: CPT

## 2017-02-12 PROCEDURE — 87088 URINE BACTERIA CULTURE: CPT

## 2017-02-12 PROCEDURE — 85025 COMPLETE CBC W/AUTO DIFF WBC: CPT

## 2017-02-12 PROCEDURE — 87086 URINE CULTURE/COLONY COUNT: CPT

## 2017-02-12 RX ORDER — OXYCODONE HYDROCHLORIDE 5 MG/1
15 TABLET ORAL
Status: COMPLETED | OUTPATIENT
Start: 2017-02-12 | End: 2017-02-12

## 2017-02-12 RX ORDER — KETOROLAC TROMETHAMINE 30 MG/ML
15 INJECTION, SOLUTION INTRAMUSCULAR; INTRAVENOUS
Status: COMPLETED | OUTPATIENT
Start: 2017-02-12 | End: 2017-02-12

## 2017-02-12 RX ORDER — CEPHALEXIN 500 MG/1
500 CAPSULE ORAL 4 TIMES DAILY
Qty: 20 CAPSULE | Refills: 0 | Status: SHIPPED | OUTPATIENT
Start: 2017-02-12 | End: 2017-02-17

## 2017-02-12 RX ADMIN — KETOROLAC TROMETHAMINE 15 MG: 30 INJECTION, SOLUTION INTRAMUSCULAR at 10:02

## 2017-02-12 RX ADMIN — OXYCODONE HYDROCHLORIDE 15 MG: 5 TABLET ORAL at 10:02

## 2017-02-12 RX ADMIN — SODIUM CHLORIDE 1000 ML: 0.9 INJECTION, SOLUTION INTRAVENOUS at 10:02

## 2017-02-12 NOTE — ED AVS SNAPSHOT
[facility]  [address]  [facility] [address]               Mao Levin   2017  9:55 PM   ED    Description:  Male : 1965   Department:  [facility]           Your Care was Coordinated By:     Provider Role From To    Fausto Costa MD Attending Provider [id number] --      Reason for Visit     welfare check           Diagnoses this Visit        Comments    Urinary tract infection without hematuria, site unspecified    -  Primary     Fall         Chronic pain of multiple sites         Weakness generalized           ED Disposition     None           To Do List            These Medications        Disp Refills Start End    cephALEXin (KEFLEX) 500 MG capsule 20 capsule 0 2017    Take 1 capsule (500 mg total) by mouth 4 (four) times daily. - Oral    Pharmacy: [facility] - [place], [place] - [address] [facility] #: [phone]         [facility] On Call     [facility] -  Assistance  Registered nurses in the [facility] provide clinical advisement, health education, appointment booking, and other advisory services.  Call for this free service at [phone].             Medications           Message regarding Medications     Verify the changes and/or additions to your medication regime listed below are the same as discussed with your clinician today.  If any of these changes or additions are incorrect, please notify your healthcare provider.        START taking these NEW medications        Refills    cephALEXin (KEFLEX) 500 MG capsule 0    Sig: Take 1 capsule (500 mg total) by mouth 4 (four) times daily.    Class: Print    Route: Oral      These medications were administered today        Dose Freq    sodium chloride 0.9% bolus 1,000 mL 1,000 mL ED 1 Time    Sig: Inject 1,000 mLs into the vein ED 1 Time.    Class: Normal    Route: Intravenous    ketorolac injection 15 mg 15 mg ED 1 Time     Sig: Inject 15 mg into the vein ED 1 Time.    Class: Normal    Route: Intravenous    oxycodone immediate release tablet 15 mg 15 mg ED 1 Time    Sig: Take 3 tablets (15 mg total) by mouth ED 1 Time.    Class: Normal    Route: Oral    cefTRIAXone (ROCEPHIN) 1 g in dextrose 5 % 50 mL IVPB 1 g ED 1 Time    Sig: Inject 50 mLs (1 g total) into the vein ED 1 Time.    Class: Normal    Route: Intravenous      STOP taking these medications     betamethasone valerate 0.1% (VALISONE) 0.1 % Lotn Apply topically 2 (two) times daily. qtts 3 AD Bid.    betamethasone valerate 0.1% (VALISONE) 0.1 % Lotn Apply topically 2 (two) times daily. qtts 3 AD Bid.    acetaminophen (TYLENOL) 325 MG tablet Take 2 tablets (650 mg total) by mouth every 6 (six) hours as needed.    zolpidem (AMBIEN) 5 MG Tab Take 1 tablet (5 mg total) by mouth nightly as needed.    DALFAMPRIDINE ORAL Take 1 tablet by mouth 2 (two) times daily.           Verify that the below list of medications is an accurate representation of the medications you are currently taking.  If none reported, the list may be blank. If incorrect, please contact your healthcare provider. Carry this list with you in case of emergency.           Current Medications     baclofen (LIORESAL) 20 MG tablet Take 1 tablet (20 mg total) by mouth 4 (four) times daily.    butalbital-acetaminophen-caffeine -40 mg (FIORICET, ESGIC) -40 mg per tablet Take 1 tablet by mouth every 4 (four) hours as needed for Headaches.    carbamazepine (TEGRETOL XR) 400 MG Tb12 Take 1 tablet (400 mg total) by mouth 2 (two) times daily.    citalopram (CELEXA) 20 MG tablet Take 1 tablet (20 mg total) by mouth once daily.    dantrolene (DANTRIUM) 50 MG Cap Take 1 capsule (50 mg total) by mouth 4 (four) times daily.    diazePAM (VALIUM) 5 MG tablet Take 1 tablet (5 mg total) by mouth every 6 (six) hours as needed (muscle spasms).    ergocalciferol (VITAMIN D2) 50,000 unit Cap Take 1 capsule (50,000 Units total) by  "mouth every 7 days.    gabapentin (NEURONTIN) 600 MG tablet Take 1 Q AM plus 1 Q Day in the early afternoon plus 1.5 (900mg) Q HS    oxybutynin (DITROPAN-XL) 5 MG TR24 Take 5 mg by mouth once daily.    oxycodone (ROXICODONE) 15 MG Tab Take 1 tablet (15 mg total) by mouth 3 (three) times daily as needed.    polyethylene glycol (GLYCOLAX) 17 gram/dose powder Take 17 g by mouth once daily.    rivaroxaban (XARELTO) 20 mg Tab Take 1 tablet (20 mg total) by mouth daily with dinner or evening meal.    senna-docusate 8.6-50 mg (PERICOLACE) 8.6-50 mg per tablet Take 1 tablet by mouth 2 (two) times daily.    trazodone (DESYREL) 100 MG tablet Take 100 mg by mouth every evening.    cefTRIAXone (ROCEPHIN) 1 g in dextrose 5 % 50 mL IVPB Inject 50 mLs (1 g total) into the vein ED 1 Time.    cephALEXin (KEFLEX) 500 MG capsule Take 1 capsule (500 mg total) by mouth 4 (four) times daily.           Clinical Reference Information           Your Vitals Were     BP Pulse Resp Height Weight SpO2    171/90 77 18 5' 11" (1.803 m) 79.3 kg (174 lb 13.2 oz) 99%    BMI                24.38 kg/m2          Allergies as of 2/12/2017     No Known Allergies      Immunizations Administered on Date of Encounter - 2/12/2017     None      ED Micro, Lab, POCT     Start Ordered       Status Ordering Provider    02/12/17 2331 02/12/17 2330  Urine culture **CANNOT BE ORDERED STAT**  Add-on      Completed     02/12/17 2208 02/12/17 2208  CBC auto differential  STAT      Final result     02/12/17 2208 02/12/17 2208  Comprehensive metabolic panel  STAT      Final result     02/12/17 2208 02/12/17 2208  CPK  STAT      Final result     02/12/17 2208 02/12/17 2208  Urinalysis Clean Catch  STAT      Final result     02/12/17 2208 02/12/17 2208  Ethanol  Once      Final result     02/12/17 2208 02/12/17 2208  Drug screen panel, emergency  STAT      In process     02/12/17 2208 02/12/17 2208  Urinalysis Microscopic  Once      Final result       ED Imaging Orders     " Start Ordered       Status Ordering Provider    02/12/17 2210 02/12/17 2209  X-Ray Pelvis Routine AP  1 time imaging      Final result     02/12/17 2210 02/12/17 2209  X-Ray Chest 1 View  1 time imaging      Final result         Discharge Instructions         Chronic Pain  Pain serves an important role. It lets you know something is wrong that needs your attention. When the body heals, pain normally goes away.  When pain lasts longer than six months, it is called chronic pain. This is pain that is present even after the body has healed. Chronic pain can cause mood problems and get in the way of your relationships and your daily life.  A number of conditions can cause chronic pain. Some of the more common include:  · Previous surgery  · An old injury  · Infection  · Diseases such as diabetes  · Nerve damage  · Back injury  · Arthritis  · Migraine or other headaches  · Fibromyalgia  · Cancer  Depression and stress can make chronic pain symptoms worse. In some cases, a cause for the pain cannot be found.   Treatment  Treatment can greatly reduce pain. In many cases, pain can become less severe, occur less often, and interfere less with your daily life. Chronic pain is often treated with a combination of medicines, therapies, and lifestyle changes. You will work closely with your healthcare provider to find a treatment plan that works best for you.  · Ask your healthcare provider for a referral to a pain management specialty center. These can provide the most recent and proven pain management strategies, along with emotional support and comprehensive services.  · Several different types of medicines may be prescribed for chronic pain. Work with your healthcare provider to develop a medicine plan that helps manage your pain.  · Physical therapy can be very effective in helping reduce certain types of chronic pain.  · Occupational therapy teaches you how to do routine tasks of daily living in ways that lessen your  discomfort.  · Psychological therapy can help you cope better with stress and pain.  · Other therapies such as meditation, yoga, biofeedback, massage, and acupuncture can also help manage chronic pain.  · Changing certain habits can help reduce chronic pain. They include:  ¨ Eating healthy  ¨ Developing an exercise routine  ¨ Getting enough sleep at night  ¨ Stopping smoking and limiting alcohol use  ¨ Losing excess weight  Follow-up care  Follow up with your healthcare provider as advised. Let your healthcare provider know if your current treatment plan is working or if changes are needed.  Resources  For more information, contact:  · [employer] [place] for Headache Society www.achenet.org  · American Chronic Pain Association www.theacpa.org 284-124-2205  Date Last Reviewed: 7/26/2015 © 2000-2016 [employer]. [address] [place] [address]. All rights reserved. This information is not intended as a substitute for professional medical care. Always follow your healthcare professional's instructions.          Bladder Infection, Male (Adult)    You have a bladder infection.  Urine is normally free of bacteria. But bacteria can get into the urinary tract from the skin around the rectum or it may travel in the blood from elsewhere in the body.  This is called a urinary tract infection (UTI). An infection can occur anywhere in the urinary tract. It could be in a kidney (pyelonephritis)or in the bladder (cystitis) and urethra (urethritis). The urethra is the tube that drains the urine from the bladder through the tip of the penis.  The most common place for a UTI is in the bladder. This is called a bladder infection. Most bladder infections are easily treated. They are not serious unless the infection spreads up to the kidney.  The terms bladder infection, UTI, and cystitis are often used to describe the same thing, but they arent always the same. Cystitis is an inflammation of the bladder. The  most common cause of cystitis is an infection.   Keep in mind:  · Infections in the urine are called UTIs.  · Cystitis is usually caused by a UTI.  · Not all UTIs and cases of cystitis are bladder infections.  · Bladder infections are the most common type of cystitis.  Symptoms of a bladder infection  The infection causes inflammation in the urethra and bladder. This inflammation causes many of the symptoms. The most common symptoms of a bladder infection are:  · Pain or burning when urinating  · Having to go more often than usual  · Feeling like you need to go right away  · Only a small amount comes out  · Blood in urine  · Discomfort in your belly (abdomen), usually in the lower abdomen, above the pubic bone  · Cloudy, strong, or bad smelling urine  · Unable to urinate (retention)  · Urinary incontinence  · Fever  · Loss of appetite  Older adults may also feel confused.  Causes of a bladder infection  Bladder infections are not contagious. You can't get one from someone else, from a toilet seat, or from sharing a bath.  The most common cause of bladder infections is bacteria from the bowels. The bacteria get onto the skin around the opening of the urethra. From there they can get into the urine and travel up to the bladder. This causes inflammation and an infection. This usually happens because of:  · An enlarged prostate  · Poor cleaning of the genitals  · Procedures that put a tube in your bladder, like a Hi catheter  · Bowel incontinence  · Older age  · Not emptying your bladder (The urine stays there, giving the bacteria a chance to grow.)  · Dehydration (This allows urine to stay in the bladder longer.)  · Constipation (This can cause the bowels to push on the bladder or urethra and keep the bladder from emptying.)  Treatment  Bladder infections are treated with antibiotics. They usually clear up quickly without complications. Treatment helps prevent a more serious kidney infection.  Medicines  Medicines  can help in the treatment of a bladder infection:  · You may have been given phenazopyridine to ease burning when you urinate. It will cause your urine to be bright orange. It can stain clothing.  · You may have been prescribed antibiotics. Take this medicine until you have finished it, even if you feel better. Taking all of the medicine will make sure the infection has cleared.  You can use acetaminophen or ibuprofen for pain, fever, or discomfort, unless another medicine was prescribed. You can also alternate them, or use both together. They work differently and are a different class of medicines, so taking them together is not an overdose. If you have chronic liver or kidney disease, talk with your healthcare provider before using these medicines. Also talk with your provider if youve had a stomach ulcer or GI bleeding or are taking blood thinner medicines.  Home care  Here are some guidelines to help you care for yourself at home:  · Drink plenty of fluids, unless your healthcare provider told you not to. Fluids will prevent dehydration and flush out your bladder.  · Use good personal hygiene. Wipe from front to back after using the toilet, and clean your penis regularly. If you arent circumcised, retract the foreskin when cleaning.  · Urinate more frequently, and dont try to hold it in for long periods of time, if possible.  · Wear loose-fitting clothes and cotton underwear. Avoid tight-fitting pants. This helps keep you clean and dry.  · Change your diet to prevent constipation. This means eating more fresh foods and more fiber, and less junk and fatty foods.  · Avoid sex until your symptoms are gone.  · Avoid caffeine, alcohol, and spicy foods. These can irritate the bladder.  Follow-up care  Follow up with your healthcare provider, or as advised if all symptoms have not cleared up within 5 days. It is important to keep your follow-up appointment. You can talk with your provider to see if you need more  tests of the urinary tract. This is especially important if you have infections that keep coming back.  If a culture was done, you will be told if your treatment needs to be changed. If directed, you can call to find out the results.  If X-rays were taken, you will be told of any findings that may affect your care.  Call 911  Call 911 if any of these occur:  · Trouble breathing  · Difficulty waking up  · Feeling confused  · Fainting or loss of consciousness  · Rapid heart rate  When to seek medical advice  Call your healthcare provider right away if any of these occur:  · Fever of 100.4ºF (38ºC) or higher, or as directed by your healthcare provider  · Your symptoms dont improve after 2 days of treatment  · Back or abdominal pain that gets worse  · Repeated vomiting, or you arent able to keep medicine down  · Weakness or dizziness  Date Last Reviewed: 10/1/2016  © 6913-6669 [employer]. [address] [place] [address]. All rights reserved. This information is not intended as a substitute for professional medical care. Always follow your healthcare professional's instructions.          Your Scheduled Appointments     Feb 21, 2017  8:00 AM CST   Infusion 300 Min with [facility], CHEMO   [facility] ([facility])    [address] [facility] [id number] [phone]            Mar 08, 2017 10:40 AM CST   Established Patient Visit with Fausto Leung MD   New Site - [facility] Rehab (New Site)    [address]  [facility] [address]   [phone]            Mar 10, 2017 10:00 AM CST   Established Patient with MD Frederic Smith jayesh - Endo/Diab/Metab ([facility] )    [address]  [place] [id number] [phone]              Smoking Cessation     If you would like to quit smoking:   You may be eligible for free services if you are a [place] resident and started smoking cigarettes before September 1, 1988.  Call the  Smoking Cessation Trust ([facility]) toll free at (544) 028-4137 or (626) 012-8333.   Call 1-800-QUIT-NOW if you do not meet the above criteria.             [facility] complies with applicable Federal civil rights laws and does not discriminate on the basis of race, color, national origin, age, disability, or sex.        Language Assistance Services     ATTENTION: Language assistance services are available, free of charge. Please call [phone].      ATENCIÓN: Si habla yonas, tiene a mcmahon disposición servicios gratuitos de asistencia lingüística. Llame al [phone].     CHÚ Ý: N?u b?n nói Ti?ng Vi?t, có các d?ch v? h? tr? ngôn ng? mi?n phí dành cho b?n. G?i s? [phone].

## 2017-02-13 NOTE — ED NOTES
"Patient here via EMS and states "my girlfriend who is out of town called the ambulance because I fell in the hotel room. I fell backwards and I hurt my lower back and legs." patient states he usually walks with a walker and a brace on his left ankle. Pt with hx of MS and slurred speech. Patient states his pain is chronic. Pt denies fever/chills.   "

## 2017-02-13 NOTE — ED PROVIDER NOTES
"Encounter Date: 2/12/2017    SCRIBE #1 NOTE: I, Gladis Abby, am scribing for, and in the presence of, Dr. Costa.       History     Chief Complaint   Patient presents with    welfare check     pt was found in a motel by himself. pt is contracted from MS and can not take care of himself. wife went out of town knowing of pt's condition. pt brought to this ED for further evaluation. Pt denies complaint     Review of patient's allergies indicates:  No Known Allergies  HPI Comments: Time seen by provider: 10:04 PM    This is a 51 y.o. male with a PMHx of MS who presents to the ED via EMS after a family member called them because the patient had fallen. The patient reports he usually uses a walker to get around and this evening he fell backwards hurting his back and legs. He denies recent fevers. He does not want to be placed in a SNF. He reports he usually lives with his fiance but has been in a motel by himself recently to "get a break". He states his back pain is chronic and requests IV pain medication.      The history is provided by the patient.     Past Medical History   Diagnosis Date    Multiple sclerosis      Past Medical History Pertinent Negatives   Diagnosis Date Noted    Anticoagulant long-term use 10/27/2016    Arthritis 10/27/2016    Asthma 10/27/2016    Cancer 10/27/2016    CHF (congestive heart failure) 10/27/2016    COPD (chronic obstructive pulmonary disease) 10/27/2016    Coronary artery disease 10/27/2016    Diabetes mellitus 10/27/2016    Encounter for blood transfusion 10/27/2016    Hypertension 10/27/2016    Seizures 10/27/2016    Stroke 10/27/2016    Thyroid disease 10/27/2016    Transfusion reaction 10/27/2016     History reviewed. No pertinent past surgical history.  Family History   Problem Relation Age of Onset    Arthritis Mother     No Known Problems Father      Social History   Substance Use Topics    Smoking status: Former Smoker     Packs/day: 0.50     Types: " Cigarettes    Smokeless tobacco: None    Alcohol use No     Review of Systems   Constitutional: Negative for chills and fever.   HENT: Negative for dental problem, drooling and mouth sores.    Eyes: Negative for visual disturbance.   Respiratory: Negative for cough and shortness of breath.    Cardiovascular: Negative for chest pain and palpitations.   Gastrointestinal: Negative for abdominal distention, abdominal pain, diarrhea, nausea and vomiting.   Genitourinary: Negative for dysuria, frequency and hematuria.   Musculoskeletal: Positive for back pain.        Bilateral leg pain   Skin: Negative for rash.   Neurological: Negative for seizures, syncope and speech difficulty.     All systems were reviewed and were negative except as noted in the HPI.    Physical Exam   Initial Vitals   BP Pulse Resp Temp SpO2   02/12/17 2048 02/12/17 2048 02/12/17 2048 -- 02/12/17 2048   176/72 80 18  99 %     Physical Exam    Nursing note and vitals reviewed.  Constitutional: He appears well-developed and well-nourished. No distress.   HENT:   Head: Normocephalic.   Mouth/Throat: Oropharynx is clear and moist.   Eyes: Conjunctivae are normal.   Neck: Normal range of motion. Neck supple.   Cardiovascular: Normal rate, regular rhythm and intact distal pulses.   Pulmonary/Chest: Breath sounds normal. No respiratory distress. He has no wheezes. He has no rhonchi. He has no rales.   Abdominal: Soft. There is no tenderness.   Musculoskeletal: Normal range of motion.   Neurological: He is alert and oriented to person, place, and time. He has normal strength.   Patient is slow to respond. He is moving all extremities.    Skin: Skin is warm and dry.   Psychiatric: Thought content normal.         ED Course   Procedures  Labs Reviewed   CBC W/ AUTO DIFFERENTIAL - Abnormal; Notable for the following:        Result Value    RBC 4.42 (*)     Hemoglobin 13.2 (*)     Hematocrit 38.3 (*)     RDW 14.6 (*)     All other components within normal  limits   COMPREHENSIVE METABOLIC PANEL - Abnormal; Notable for the following:     Anion Gap 6 (*)     All other components within normal limits   CK - Abnormal; Notable for the following:      (*)     All other components within normal limits   URINALYSIS - Abnormal; Notable for the following:     Nitrite, UA Positive (*)     Leukocytes, UA 3+ (*)     All other components within normal limits   URINALYSIS MICROSCOPIC - Abnormal; Notable for the following:     WBC, UA 23 (*)     Bacteria, UA Many (*)     All other components within normal limits   CULTURE, URINE   ALCOHOL,MEDICAL (ETHANOL)   DRUG SCREEN PANEL, URINE EMERGENCY          X-Rays:   Independently Interpreted Readings:   Chest X-Ray: No acute disease   Other Readings:  Pelvic x-ray: no acute disease    Medical Decision Making:    I reviewed and interpreted the ECG and any monitoring strips.  I reviewed radiology personally along with interpretations.  I reviewed and interpreted the laboratory results.    Osmany Costa MD, LIBBY, CPE, FACEP  Department of Emergency Medicine  , [facility]        Medical Decision Making:   History:   Old Medical Records: I decided to obtain old medical records.  Initial Assessment:   Patient was given IV fluids, IV Toradol, and his PO dose of Roxicodone. I offered him multiple times that if he felt unsafe alone in his motel room that he could be admitted to the hospital for placement to a nursing home. He did not want this and requested to be discharged. He received IV antibiotics for his UTI and a prescription for keflex to be discharged home with.   Independently Interpreted Test(s):   I have ordered and independently interpreted X-rays - see prior notes.  Clinical Tests:   Lab Tests: Ordered and Reviewed       <> Summary of Lab: Patient has a UA consistent with UTI and urine cultures were added on. Ethanol was negative. CPK was not markedly elevated at 531,  chemistry had no critical findings and cbc had no critical findings.   Radiological Study: Reviewed and Ordered            Scribe Attestation:   Scribe #1: I performed the above scribed service and the documentation accurately describes the services I performed. I attest to the accuracy of the note.    Attending Attestation:           Physician Attestation for Scribe:  Physician Attestation Statement for Scribe #1: I, Dr. Costa, reviewed documentation, as scribed by Gladis Mora in my presence, and it is both accurate and complete.                 ED Course     Clinical Impression:   The primary encounter diagnosis was Urinary tract infection without hematuria, site unspecified. Diagnoses of Fall, Chronic pain of multiple sites, and Weakness generalized were also pertinent to this visit.    Disposition:   Disposition: Discharged  Condition: Stable    Osmany Costa MD, LIBBY, CPE, FACEP  Department of Emergency Medicine  , [employer]       Fausto Costa MD  02/13/17 4049

## 2017-02-13 NOTE — ED NOTES
Sister at bedside. MD discussing options with patient and possible admission. Pt monitored and in NAD. Will continue to monitor patient.

## 2017-02-14 LAB — BACTERIA UR CULT: NORMAL

## 2017-02-15 ENCOUNTER — TELEPHONE (OUTPATIENT)
Dept: PHYSICAL MEDICINE AND REHAB | Facility: CLINIC | Age: 52
End: 2017-02-15

## 2017-02-15 RX ORDER — DIAZEPAM 5 MG/1
5 TABLET ORAL 2 TIMES DAILY PRN
Qty: 60 TABLET | Refills: 1 | Status: ON HOLD | OUTPATIENT
Start: 2017-02-15 | End: 2017-04-20

## 2017-02-15 RX ORDER — AMOXICILLIN AND CLAVULANATE POTASSIUM 875; 125 MG/1; MG/1
1 TABLET, FILM COATED ORAL 2 TIMES DAILY
Qty: 10 TABLET | Refills: 0 | Status: SHIPPED | OUTPATIENT
Start: 2017-02-15 | End: 2017-02-20

## 2017-02-15 RX ORDER — OXYCODONE HYDROCHLORIDE 15 MG/1
15 TABLET ORAL 3 TIMES DAILY PRN
Qty: 90 TABLET | Refills: 0 | Status: ON HOLD | OUTPATIENT
Start: 2017-02-15 | End: 2017-04-20

## 2017-02-15 NOTE — TELEPHONE ENCOUNTER
He was given a refill for the valium not sure if he got it or not. He is also requesting medication for a UTI. Please see other note.

## 2017-02-15 NOTE — TELEPHONE ENCOUNTER
----- Message from Brigette Batista sent at 2/15/2017 12:32 PM CST -----  Contact: Patient [phone]  Patient is calling to request a refill on his diazePAM (VALIUM) 5 MG tablet and oxycodone (ROXICODONE) 15 MG Tab. Patient has a UTI and would like to know if doctor can call in something for that. Please call into Pharmacy below    [id number] - [place] - [address] AT [facility] [address]  [address] [place] [address]  Phone: [phone] Fax: [phone]

## 2017-02-21 ENCOUNTER — INFUSION (OUTPATIENT)
Dept: INFUSION THERAPY | Facility: HOSPITAL | Age: 52
End: 2017-02-21
Attending: INTERNAL MEDICINE
Payer: MEDICARE

## 2017-02-21 VITALS
DIASTOLIC BLOOD PRESSURE: 68 MMHG | WEIGHT: 174.81 LBS | RESPIRATION RATE: 17 BRPM | BODY MASS INDEX: 24.47 KG/M2 | HEIGHT: 71 IN | HEART RATE: 93 BPM | TEMPERATURE: 98 F | SYSTOLIC BLOOD PRESSURE: 127 MMHG

## 2017-02-21 DIAGNOSIS — G35 MS (MULTIPLE SCLEROSIS): Primary | ICD-10-CM

## 2017-02-21 PROCEDURE — 63600175 PHARM REV CODE 636 W HCPCS: Performed by: PHYSICIAN ASSISTANT

## 2017-02-21 PROCEDURE — 25000003 PHARM REV CODE 250: Performed by: PHYSICIAN ASSISTANT

## 2017-02-21 PROCEDURE — 96375 TX/PRO/DX INJ NEW DRUG ADDON: CPT

## 2017-02-21 PROCEDURE — 96413 CHEMO IV INFUSION 1 HR: CPT

## 2017-02-21 PROCEDURE — 96367 TX/PROPH/DG ADDL SEQ IV INF: CPT

## 2017-02-21 PROCEDURE — 96415 CHEMO IV INFUSION ADDL HR: CPT

## 2017-02-21 RX ORDER — FAMOTIDINE 10 MG/ML
20 INJECTION INTRAVENOUS 2 TIMES DAILY
Status: DISCONTINUED | OUTPATIENT
Start: 2017-02-21 | End: 2017-02-21 | Stop reason: HOSPADM

## 2017-02-21 RX ORDER — ACETAMINOPHEN 325 MG/1
650 TABLET ORAL
Status: CANCELLED | OUTPATIENT
Start: 2017-03-06

## 2017-02-21 RX ORDER — ACETAMINOPHEN 325 MG/1
650 TABLET ORAL
Status: COMPLETED | OUTPATIENT
Start: 2017-02-21 | End: 2017-02-21

## 2017-02-21 RX ORDER — SODIUM CHLORIDE 0.9 % (FLUSH) 0.9 %
10 SYRINGE (ML) INJECTION
Status: CANCELLED | OUTPATIENT
Start: 2017-03-06

## 2017-02-21 RX ORDER — FAMOTIDINE 10 MG/ML
20 INJECTION INTRAVENOUS 2 TIMES DAILY
Status: CANCELLED
Start: 2017-03-06

## 2017-02-21 RX ORDER — SODIUM CHLORIDE 0.9 % (FLUSH) 0.9 %
10 SYRINGE (ML) INJECTION
Status: DISCONTINUED | OUTPATIENT
Start: 2017-02-21 | End: 2017-02-21 | Stop reason: HOSPADM

## 2017-02-21 RX ORDER — HEPARIN 100 UNIT/ML
500 SYRINGE INTRAVENOUS
Status: CANCELLED | OUTPATIENT
Start: 2017-03-06

## 2017-02-21 RX ADMIN — ACETAMINOPHEN 650 MG: 325 TABLET ORAL at 09:02

## 2017-02-21 RX ADMIN — DEXTROSE: 50 INJECTION, SOLUTION INTRAVENOUS at 09:02

## 2017-02-21 RX ADMIN — DIPHENHYDRAMINE HYDROCHLORIDE 50 MG: 50 INJECTION, SOLUTION INTRAMUSCULAR; INTRAVENOUS at 09:02

## 2017-02-21 RX ADMIN — FAMOTIDINE 20 MG: 10 INJECTION INTRAVENOUS at 09:02

## 2017-02-21 RX ADMIN — RITUXIMAB 1000 MG: 10 INJECTION, SOLUTION INTRAVENOUS at 09:02

## 2017-02-21 NOTE — PLAN OF CARE
Problem: Patient Care Overview (Adult)  Goal: Discharge Needs Assessment  Outcome: Ongoing (interventions implemented as appropriate)  Tolerated Rituxan well. VSS. No complaints.

## 2017-02-24 ENCOUNTER — TELEPHONE (OUTPATIENT)
Dept: NEUROLOGY | Facility: CLINIC | Age: 52
End: 2017-02-24

## 2017-02-24 NOTE — TELEPHONE ENCOUNTER
----- Message from Brigette Batista sent at 2/24/2017 11:10 AM CST -----  Contact: Patient [phone]  Patient is calling to see if he can an appt to see dr today, patient says his body is doing things it has never done before, after having infusions.

## 2017-02-24 NOTE — TELEPHONE ENCOUNTER
Call placed to pt. He states that he is having a worsening of symptoms: headaches, rt sided facial pain, too weak to walk well, left hand spasms shut, and overall weakness. He feels as though it has been worse since his infusion on 2/21/17.     Valentine is aware of all of the above and suggests lab work to screen for infection. Call placed to pt. He admits that he doesn't think his UTI has gone away from his ER visit on 2/12/17. He states he would rather just go to the ER rather than have labs done and wait for us to call something in. He feels that the ER would make him feel better faster. He thanked me for the call and states he will keep us informed.

## 2017-02-25 ENCOUNTER — HOSPITAL ENCOUNTER (EMERGENCY)
Facility: HOSPITAL | Age: 52
Discharge: HOME | End: 2017-02-25
Attending: EMERGENCY MEDICINE
Payer: MEDICARE

## 2017-02-25 VITALS
DIASTOLIC BLOOD PRESSURE: 74 MMHG | TEMPERATURE: 98 F | SYSTOLIC BLOOD PRESSURE: 106 MMHG | HEIGHT: 71 IN | OXYGEN SATURATION: 98 % | BODY MASS INDEX: 24.5 KG/M2 | HEART RATE: 68 BPM | WEIGHT: 175 LBS | RESPIRATION RATE: 15 BRPM

## 2017-02-25 DIAGNOSIS — G35 MS (MULTIPLE SCLEROSIS): ICD-10-CM

## 2017-02-25 DIAGNOSIS — R52 CHRONIC PAIN OF MULTIPLE SITES: Primary | ICD-10-CM

## 2017-02-25 DIAGNOSIS — R53.1 WEAKNESS GENERALIZED: ICD-10-CM

## 2017-02-25 DIAGNOSIS — M25.429 ELBOW SWELLING: ICD-10-CM

## 2017-02-25 DIAGNOSIS — G89.29 CHRONIC PAIN OF MULTIPLE SITES: Primary | ICD-10-CM

## 2017-02-25 LAB
ALBUMIN SERPL BCP-MCNC: 3.8 G/DL
ALP SERPL-CCNC: 72 U/L
ALT SERPL W/O P-5'-P-CCNC: 22 U/L
ANION GAP SERPL CALC-SCNC: 8 MMOL/L
AST SERPL-CCNC: 23 U/L
BASOPHILS # BLD AUTO: 0.02 K/UL
BASOPHILS NFR BLD: 0.3 %
BILIRUB SERPL-MCNC: 0.2 MG/DL
BILIRUB UR QL STRIP: NEGATIVE
BUN SERPL-MCNC: 8 MG/DL
CALCIUM SERPL-MCNC: 9 MG/DL
CHLORIDE SERPL-SCNC: 105 MMOL/L
CK SERPL-CCNC: 411 U/L
CLARITY UR REFRACT.AUTO: ABNORMAL
CO2 SERPL-SCNC: 27 MMOL/L
COLOR UR AUTO: YELLOW
CREAT SERPL-MCNC: 0.8 MG/DL
DIFFERENTIAL METHOD: ABNORMAL
EOSINOPHIL # BLD AUTO: 0.2 K/UL
EOSINOPHIL NFR BLD: 2.3 %
ERYTHROCYTE [DISTWIDTH] IN BLOOD BY AUTOMATED COUNT: 14.4 %
EST. GFR  (AFRICAN AMERICAN): >60 ML/MIN/1.73 M^2
EST. GFR  (NON AFRICAN AMERICAN): >60 ML/MIN/1.73 M^2
FLUAV AG SPEC QL IA: NEGATIVE
FLUBV AG SPEC QL IA: NEGATIVE
GLUCOSE SERPL-MCNC: 91 MG/DL
GLUCOSE UR QL STRIP: NEGATIVE
HCT VFR BLD AUTO: 40.2 %
HGB BLD-MCNC: 13.5 G/DL
HGB UR QL STRIP: NEGATIVE
KETONES UR QL STRIP: NEGATIVE
LEUKOCYTE ESTERASE UR QL STRIP: NEGATIVE
LYMPHOCYTES # BLD AUTO: 2.2 K/UL
LYMPHOCYTES NFR BLD: 31 %
MAGNESIUM SERPL-MCNC: 2.2 MG/DL
MCH RBC QN AUTO: 29 PG
MCHC RBC AUTO-ENTMCNC: 33.6 %
MCV RBC AUTO: 87 FL
MONOCYTES # BLD AUTO: 0.5 K/UL
MONOCYTES NFR BLD: 7 %
NEUTROPHILS # BLD AUTO: 4.2 K/UL
NEUTROPHILS NFR BLD: 59.4 %
NITRITE UR QL STRIP: NEGATIVE
PH UR STRIP: 8 [PH] (ref 5–8)
PHOSPHATE SERPL-MCNC: 3.1 MG/DL
PLATELET # BLD AUTO: 199 K/UL
PMV BLD AUTO: 11.1 FL
POTASSIUM SERPL-SCNC: 4.1 MMOL/L
PROT SERPL-MCNC: 7.4 G/DL
PROT UR QL STRIP: NEGATIVE
RBC # BLD AUTO: 4.65 M/UL
SODIUM SERPL-SCNC: 140 MMOL/L
SP GR UR STRIP: 1.01 (ref 1–1.03)
SPECIMEN SOURCE: NORMAL
URN SPEC COLLECT METH UR: ABNORMAL
UROBILINOGEN UR STRIP-ACNC: NEGATIVE EU/DL
WBC # BLD AUTO: 7.04 K/UL

## 2017-02-25 PROCEDURE — 99284 EMERGENCY DEPT VISIT MOD MDM: CPT | Mod: 25

## 2017-02-25 PROCEDURE — 63600175 PHARM REV CODE 636 W HCPCS: Performed by: PHYSICIAN ASSISTANT

## 2017-02-25 PROCEDURE — 83735 ASSAY OF MAGNESIUM: CPT

## 2017-02-25 PROCEDURE — 85025 COMPLETE CBC W/AUTO DIFF WBC: CPT

## 2017-02-25 PROCEDURE — 96374 THER/PROPH/DIAG INJ IV PUSH: CPT

## 2017-02-25 PROCEDURE — 81003 URINALYSIS AUTO W/O SCOPE: CPT

## 2017-02-25 PROCEDURE — 82550 ASSAY OF CK (CPK): CPT

## 2017-02-25 PROCEDURE — 25000003 PHARM REV CODE 250: Performed by: PHYSICIAN ASSISTANT

## 2017-02-25 PROCEDURE — 96376 TX/PRO/DX INJ SAME DRUG ADON: CPT

## 2017-02-25 PROCEDURE — 99285 EMERGENCY DEPT VISIT HI MDM: CPT | Mod: ,,, | Performed by: PHYSICIAN ASSISTANT

## 2017-02-25 PROCEDURE — 84100 ASSAY OF PHOSPHORUS: CPT

## 2017-02-25 PROCEDURE — 80053 COMPREHEN METABOLIC PANEL: CPT

## 2017-02-25 PROCEDURE — 87400 INFLUENZA A/B EACH AG IA: CPT

## 2017-02-25 RX ORDER — HYDROMORPHONE HYDROCHLORIDE 1 MG/ML
1 INJECTION, SOLUTION INTRAMUSCULAR; INTRAVENOUS; SUBCUTANEOUS
Status: COMPLETED | OUTPATIENT
Start: 2017-02-25 | End: 2017-02-25

## 2017-02-25 RX ORDER — BACITRACIN ZINC 500 UNIT/G
1 OINTMENT (GRAM) TOPICAL
Status: COMPLETED | OUTPATIENT
Start: 2017-02-25 | End: 2017-02-25

## 2017-02-25 RX ADMIN — HYDROMORPHONE HYDROCHLORIDE 1 MG: 1 INJECTION, SOLUTION INTRAMUSCULAR; INTRAVENOUS; SUBCUTANEOUS at 07:02

## 2017-02-25 RX ADMIN — HYDROMORPHONE HYDROCHLORIDE 1 MG: 1 INJECTION, SOLUTION INTRAMUSCULAR; INTRAVENOUS; SUBCUTANEOUS at 05:02

## 2017-02-25 RX ADMIN — BACITRACIN ZINC 1 TUBE: 500 OINTMENT TOPICAL at 05:02

## 2017-02-25 NOTE — ED PROVIDER NOTES
"Encounter Date: 2/25/2017    SCRIBE #1 NOTE: I, Ton Burnett, am scribing for, and in the presence of,  Jason Jimenes MD. I have scribed the following portions of the note - the APC attestation.       History     Chief Complaint   Patient presents with    Generalized Body Aches     "my entire body hurts, it's been like this for days and not getting better"     Review of patient's allergies indicates:  No Known Allergies  HPI Comments: This is a 51 year old male with a PMH of multiple sclerosis who presents to the ED with a chief complaint of myalgias. Patient has a long history of MS and recently relocated here in 2016. He is under the care of our neurologists and his MS is managed with dantrolene, baclofen, and rituxan infusions. Patient takes roxicodone 15mg TID, valium 5mg BID, and neurontin 600mg TID for symptom control. Patient reports following the rituxan infusion he began with muscle spasms and generalized "stiffness" 3 days ago. His symptoms are more severe than a typical MS flare. He reports his pain as poorly controlled on the current regimen despite compliance. Patient endorses dysuria and urinary frequency. He recently completed a course of Keflex for e.coli UTI. Patient denies fever, chills, URI symptoms, chest pain, SOB, vomiting, abdominal pain, diarrhea or constipation. He has no new weakness but endorses generalized fatigue.    The history is provided by the patient.     Past Medical History:   Diagnosis Date    Multiple sclerosis      History reviewed. No pertinent surgical history.  Family History   Problem Relation Age of Onset    Arthritis Mother     No Known Problems Father      Social History   Substance Use Topics    Smoking status: Former Smoker     Packs/day: 0.50     Types: Cigarettes    Smokeless tobacco: None    Alcohol use No     Review of Systems   Constitutional: Negative for chills and fever.   HENT: Negative for congestion, rhinorrhea and sore throat.    Eyes: Positive for " visual disturbance (intermittent blurry vision, diplopia).   Respiratory: Negative for shortness of breath.    Cardiovascular: Negative for chest pain.   Gastrointestinal: Negative for abdominal pain, nausea and vomiting.   Genitourinary: Negative for dysuria.   Musculoskeletal: Positive for arthralgias, back pain, joint swelling (left elbow) and myalgias.   Skin: Negative for rash.   Neurological: Negative for weakness and headaches.   Hematological: Does not bruise/bleed easily.       Physical Exam   Initial Vitals   BP Pulse Resp Temp SpO2   02/25/17 1348 02/25/17 1348 02/25/17 1348 02/25/17 1348 02/25/17 1348   156/105 78 15 98.2 °F (36.8 °C) 99 %     Physical Exam    Constitutional: He appears well-developed and well-nourished.   HENT:   Head: Normocephalic and atraumatic.   Right Ear: Tympanic membrane and ear canal normal.   Left Ear: Tympanic membrane and ear canal normal.   Nose: Nose normal.   Mouth/Throat: Uvula is midline, oropharynx is clear and moist and mucous membranes are normal.   Eyes: Conjunctivae and EOM are normal. Pupils are equal, round, and reactive to light.   Neck: Normal range of motion. Neck supple.   Cardiovascular: Normal rate, regular rhythm and normal heart sounds.   Pulmonary/Chest: Breath sounds normal. No respiratory distress. He has no wheezes. He has no rhonchi. He has no rales.   Abdominal: Soft. Bowel sounds are normal. There is no tenderness. There is no rebound and no guarding.   Musculoskeletal:        Left elbow: He exhibits swelling. He exhibits normal range of motion. Tenderness found. Olecranon process tenderness noted.   +left olecranon swelling, superficial abrasion   Neurological: He is alert and oriented to person, place, and time. No cranial nerve deficit or sensory deficit. He exhibits abnormal muscle tone.   Reflex Scores:       Patellar reflexes are 3+ on the right side and 3+ on the left side.  Skin: Skin is warm and dry. No rash noted.         ED Course    Procedures  Labs Reviewed   CBC W/ AUTO DIFFERENTIAL - Abnormal; Notable for the following:        Result Value    Hemoglobin 13.5 (*)     All other components within normal limits   CK - Abnormal; Notable for the following:      (*)     All other components within normal limits   URINALYSIS - Abnormal; Notable for the following:     Appearance, UA Hazy (*)     All other components within normal limits   COMPREHENSIVE METABOLIC PANEL   MAGNESIUM   PHOSPHORUS   INFLUENZA A AND B ANTIGEN   URINALYSIS     Imaging Results         X-Ray Elbow Complete Left (Final result) Result time:  02/25/17 19:07:17    Final result by Yamil Lang MD (02/25/17 19:07:17)    Impression:        Posterior left elbow nonspecific soft tissue swelling with superimposed small area of laceration versus ulceration, without acute displaced fracture or dislocation identified.      Electronically signed by: YAMIL LANG MD, MD  Date:     02/25/17  Time:    19:07     Narrative:    COMPARISON: None    FINDINGS: 3 views of the left elbow.        Bones are well mineralized. Overall alignment is within normal limits.  No acute displaced fracture, dislocation, or destructive osseous process identified.  The joint spaces appear relatively maintained.  There is prominence of the soft tissues overlying the posterior elbow suggesting nonspecific soft tissue swelling from contusion or edema in the setting of trauma versus cellulitis.  There is a superimposed small focus of overlying skin irregularity which may represent sequela laceration in the setting of trauma or ulceration.  No large joint effusion identified. No subcutaneous emphysema or radiodense retained foreign body.                   X-Rays:   Independently Interpreted Readings:   Other Readings:  X-Ray Elbow Complete Left: No acute process    Medical Decision Making:   History:   Old Medical Records: I decided to obtain old medical records.  Independently Interpreted Test(s):   I have  ordered and independently interpreted X-rays - see prior notes.  Clinical Tests:   Lab Tests: Ordered and Reviewed  Radiological Study: Ordered       APC / Resident Notes:   51 year old male with hx of MS presents with diffuse myalgias and muscle spasms.  On exam he is afebrile and nontoxic. HEENT exam is benign. Musculoskeletal exam with proximal muscle weakness of the shoulders and hips 4/5 strength. He is hyperreflexic with 3+ patellar reflexes. Otherwise nonfocal neuro exam. Patient has a left olecranon bursitis with superficial abrasion s/p fall.    DDX includes but is not limited to infectious etiology such as UTI, viral syndrome, influenza, renal failure, MS flare, chronic pain.    Labs demonstrate , otherwise without acute significant findings. Urinalysis benign.  Xray left elbow with no fracture.  Patient's pain was treated with IV pain medications with moderate improvement. Discussed outpatient f/u with neurology and PCP. Educated pt on left olecranon bursitis, avoidance of repeat trauma/pressure to elbow and use of ACE wrap for support. No indication for further workup at this time. He is stable for discharge. Patient and family agree with course of treatment. I discussed the care of this patient with my supervising MD.        Scribe Attestation:   Scribe #1: I performed the above scribed service and the documentation accurately describes the services I performed. I attest to the accuracy of the note.    Attending Attestation:     Physician Attestation Statement for NP/PA:   I discussed this assessment and plan of this patient with the NP/PA, but I did not personally examine the patient. The face to face encounter was performed by the NP/PA.    Other NP/PA Attestation Additions:    History of Present Illness: Body aches and spasms         Physician Attestation for Scribe:  Physician Attestation Statement for Scribe #1: I, Jason Jimenes MD, reviewed documentation, as scribed by Ton Burnett in my  presence, and it is both accurate and complete.                 ED Course     Clinical Impression:   The primary encounter diagnosis was Chronic pain of multiple sites. Diagnoses of Elbow swelling and Weakness generalized were also pertinent to this visit.    Disposition:   Disposition: Discharged  Condition: Stable       LOUISE Arrington  02/26/17 1848       Jason Jimenes III, MD  02/27/17 0830

## 2017-02-25 NOTE — ED NOTES
LOC: The patient is awake, alert and aware of environment with an appropriate affect, the patient is oriented x 3 and speaking appropriately.  APPEARANCE: Patient resting comfortably and in no acute distress, patient is clean and well groomed, patient's clothing is properly fastened.  MUSKULOSKELETAL: Muscle aches; generalized body aches; painful to ambulate

## 2017-02-25 NOTE — ED TRIAGE NOTES
"Pt states he had a chemo infusion for MS on 2/21, this is second infusion for MS. Pt states one day after the infusion it felt as if he was getting an "MS flare up;" pt states he was told it was a "pseudo flare up," pt states had same reaction after previous infusion. Pt states his entire body is in pain, hands are locked up, and he is unable to walk. Pt c/o 8/10 pain  "

## 2017-02-25 NOTE — ED AVS SNAPSHOT
[facility]  [address] [address]               Mao Levin   2017  4:01 PM   ED    Description:  Male : 1965   Department:  [facility]           Your Care was Coordinated By:     Provider Role From To    Jason Jimenes III, MD Attending Provider 17 3020 --    LOUISE Arrington Physician Assistant 17 9654 --      Reason for Visit     Generalized Body Aches           Diagnoses this Visit        Comments    Chronic pain of multiple sites    -  Primary     Elbow swelling         Weakness generalized           ED Disposition     None           To Do List           Follow-up Information     Schedule an appointment as soon as possible for a visit with [facility] - Internal Medicine.    Specialty:  Internal Medicine    Contact information:    [address] [facility] [id number] [phone]    Additional information:    [facility]        Schedule an appointment as soon as possible for a visit with Mattie Finnegan MD.    Specialty:  Neurology    Contact information:    [address] [facility] [address]  [phone]        [facility]     [facility] Nurse Care Line - 24/7 Assistance  Registered nurses in the [facility] provide clinical advisement, health education, appointment booking, and other advisory services.  Call for this free service at [phone].             Medications           Message regarding Medications     Verify the changes and/or additions to your medication regime listed below are the same as discussed with your clinician today.  If any of these changes or additions are incorrect, please notify your healthcare provider.        These medications were administered today        Dose Freq    hydromorphone (PF) injection 1 mg 1 mg ED 1 Time    Sig: Inject 1 mL (1 mg total) into the vein ED 1 Time.    Class: Normal    Route:  Intravenous    bacitracin ointment 1 Tube 1 Tube ED 1 Time    Sig: Apply 1 Tube topically ED 1 Time.    Class: Normal    Route: Topical (Top)    hydromorphone (PF) injection 1 mg 1 mg ED 1 Time    Sig: Inject 1 mL (1 mg total) into the vein ED 1 Time.    Class: Normal    Route: Intravenous           Verify that the below list of medications is an accurate representation of the medications you are currently taking.  If none reported, the list may be blank. If incorrect, please contact your healthcare provider. Carry this list with you in case of emergency.           Current Medications     baclofen (LIORESAL) 20 MG tablet Take 1 tablet (20 mg total) by mouth 4 (four) times daily.    carbamazepine (TEGRETOL XR) 400 MG Tb12 Take 1 tablet (400 mg total) by mouth 2 (two) times daily.    citalopram (CELEXA) 20 MG tablet Take 1 tablet (20 mg total) by mouth once daily.    dantrolene (DANTRIUM) 50 MG Cap Take 1 capsule (50 mg total) by mouth 4 (four) times daily.    diazePAM (VALIUM) 5 MG tablet Take 1 tablet (5 mg total) by mouth 2 (two) times daily as needed (muscle spasms).    ergocalciferol (VITAMIN D2) 50,000 unit Cap Take 1 capsule (50,000 Units total) by mouth every 7 days.    gabapentin (NEURONTIN) 600 MG tablet Take 1 Q AM plus 1 Q Day in the early afternoon plus 1.5 (900mg) Q HS    oxybutynin (DITROPAN-XL) 5 MG TR24 Take 5 mg by mouth once daily.    oxycodone (ROXICODONE) 15 MG Tab Take 1 tablet (15 mg total) by mouth 3 (three) times daily as needed.    polyethylene glycol (GLYCOLAX) 17 gram/dose powder Take 17 g by mouth once daily.    rivaroxaban (XARELTO) 20 mg Tab Take 1 tablet (20 mg total) by mouth daily with dinner or evening meal.    senna-docusate 8.6-50 mg (PERICOLACE) 8.6-50 mg per tablet Take 1 tablet by mouth 2 (two) times daily.    trazodone (DESYREL) 100 MG tablet Take 100 mg by mouth every evening.           Clinical Reference Information           Your Vitals Were     BP                   106/74            Allergies as of 2/25/2017     No Known Allergies      Immunizations Administered on Date of Encounter - 2/25/2017     None      ED Micro, Lab, POCT     Start Ordered       Status Ordering Provider    02/25/17 1726 02/25/17 1725  Urinalysis  STAT      Final result     02/25/17 1649 02/25/17 1649  CBC auto differential  STAT      Final result     02/25/17 1649 02/25/17 1649  Comprehensive metabolic panel  STAT      Final result     02/25/17 1649 02/25/17 1649  Magnesium  Once      Final result     02/25/17 1649 02/25/17 1649  Phosphorus  STAT      Final result     02/25/17 1649 02/25/17 1649  CPK  STAT      Final result     02/25/17 1649 02/25/17 1649  Influenza antigen Nasopharyngeal Swab  STAT      Final result     02/25/17 1638 02/25/17 1637  Urinalysis  STAT      In process       ED Imaging Orders     Start Ordered       Status Ordering Provider    02/25/17 1736 02/25/17 1736  X-Ray Elbow Complete Left  1 time imaging      Final result       Discharge References/Attachments     MULTIPLE SCLEROSIS (MS), UNDERSTANDING (ENGLISH)      Your Scheduled Appointments     Mar 06, 2017  2:45 PM CST   Established Patient Visit with BETH [facility]- Multiple Sclerosis ([facility] )    [address]  [place] [id number] [phone]            Mar 08, 2017 10:40 AM CST   Established Patient Visit with Fausto Leung MD   [facility] ([place]    [address]  [facility] [address]   [phone]            Mar 10, 2017 10:00 AM CST   Established Patient with MD Fred[facility] - Endo/Diab/Metab ([facility] j[facility] )    [address]  [place] [id number] [phone]               [facility] complies with applicable Federal civil rights laws and does not discriminate on the basis of race, color, national origin, age, disability, or sex.        Language Assistance Services     ATTENTION: Language assistance services  are available, free of charge. Please call [phone].      ATENCIÓN: Si habla español, tiene a mcmahon disposición servicios gratuitos de asistencia lingüística. Llame al [phone].     CHÚ Ý: N?u b?n nói Ti?ng Vi?t, có các d?ch v? h? tr? ngôn ng? mi?n phí dành cho b?n. G?i s? [phone].

## 2017-02-27 ENCOUNTER — TELEPHONE (OUTPATIENT)
Dept: PHYSICAL MEDICINE AND REHAB | Facility: CLINIC | Age: 52
End: 2017-02-27

## 2017-02-27 NOTE — TELEPHONE ENCOUNTER
----- Message from Brigette Batista sent at 2/27/2017  2:13 PM CST -----  Contact: Patient [phone]  Patient is calling to set up his physical therapy.

## 2017-02-28 ENCOUNTER — TELEPHONE (OUTPATIENT)
Dept: PHYSICAL MEDICINE AND REHAB | Facility: HOSPITAL | Age: 52
End: 2017-02-28

## 2017-02-28 DIAGNOSIS — G35 MS (MULTIPLE SCLEROSIS): Primary | ICD-10-CM

## 2017-03-06 ENCOUNTER — OFFICE VISIT (OUTPATIENT)
Dept: NEUROLOGY | Facility: CLINIC | Age: 52
End: 2017-03-06
Payer: MEDICARE

## 2017-03-06 VITALS
DIASTOLIC BLOOD PRESSURE: 86 MMHG | WEIGHT: 166 LBS | HEIGHT: 71 IN | SYSTOLIC BLOOD PRESSURE: 140 MMHG | BODY MASS INDEX: 23.24 KG/M2 | HEART RATE: 82 BPM

## 2017-03-06 DIAGNOSIS — Z78.9 IMPAIRED MOBILITY AND ADLS: ICD-10-CM

## 2017-03-06 DIAGNOSIS — R29.6 FREQUENT FALLS: ICD-10-CM

## 2017-03-06 DIAGNOSIS — G35 MS (MULTIPLE SCLEROSIS): Primary | ICD-10-CM

## 2017-03-06 DIAGNOSIS — Z74.09 IMPAIRED MOBILITY AND ADLS: ICD-10-CM

## 2017-03-06 DIAGNOSIS — Z71.89 COUNSELING REGARDING GOALS OF CARE: ICD-10-CM

## 2017-03-06 DIAGNOSIS — R25.2 SPASTICITY: ICD-10-CM

## 2017-03-06 DIAGNOSIS — Z79.899 ENCOUNTER FOR LONG-TERM (CURRENT) USE OF HIGH-RISK MEDICATION: ICD-10-CM

## 2017-03-06 DIAGNOSIS — N31.9 NEUROGENIC BLADDER: ICD-10-CM

## 2017-03-06 DIAGNOSIS — E55.9 VITAMIN D DEFICIENCY DISEASE: ICD-10-CM

## 2017-03-06 PROCEDURE — 99215 OFFICE O/P EST HI 40 MIN: CPT | Mod: S$GLB,,, | Performed by: PHYSICIAN ASSISTANT

## 2017-03-06 PROCEDURE — 99999 PR PBB SHADOW E&M-EST. PATIENT-LVL III: CPT | Mod: PBBFAC,,, | Performed by: PHYSICIAN ASSISTANT

## 2017-03-06 NOTE — MR AVS SNAPSHOT
[facility]  [address]  [facility] [address]  Phone: [phone]                  Mao Levin   3/6/2017 2:45 PM   Office Visit    Description:  Male : 1965   Provider:  Lawanda Boyce PA-C   Department:  [facility]           Reason for Visit     Neurologic Problem           Diagnoses this Visit        Comments    MS (multiple sclerosis)    -  Primary     Neurogenic bladder         Vitamin D deficiency disease         Impaired mobility and ADLs         Frequent falls         Spasticity                To Do List           Future Appointments        Provider Department Dept Phone    3/8/2017 9:30 AM LAB, [place] [facility] [phone]    3/8/2017 10:40 AM Fausto Leung MD [place] [facility] [phone]    3/10/2017 10:00 AM MD [facility] - Endo/Diab/Metab [phone]    2017 9:40 AM MD [facility] Hamm [facility]- Multiple Sclerosis [phone]      Goals (5 Years of Data)     None      Follow-Up and Disposition     Return in about 4 months (around 2017).      [facility]     [facility] -  Assistance  Registered nurses in the [facility] provide clinical advisement, health education, appointment booking, and other advisory services.  Call for this free service at [phone].             Medications           Message regarding Medications     Verify the changes and/or additions to your medication regime listed below are the same as discussed with your clinician today.  If any of these changes or additions are incorrect, please notify your healthcare provider.             Verify that the below list of medications is an accurate representation of the medications you are currently taking.  If none reported, the list may be blank. If incorrect, please contact your healthcare provider. Carry this list with you in case of emergency.           Current  "Medications     baclofen (LIORESAL) 20 MG tablet Take 1 tablet (20 mg total) by mouth 4 (four) times daily.    carbamazepine (TEGRETOL XR) 400 MG Tb12 Take 1 tablet (400 mg total) by mouth 2 (two) times daily.    citalopram (CELEXA) 20 MG tablet Take 1 tablet (20 mg total) by mouth once daily.    dantrolene (DANTRIUM) 50 MG Cap Take 1 capsule (50 mg total) by mouth 4 (four) times daily.    diazePAM (VALIUM) 5 MG tablet Take 1 tablet (5 mg total) by mouth 2 (two) times daily as needed (muscle spasms).    ergocalciferol (VITAMIN D2) 50,000 unit Cap Take 1 capsule (50,000 Units total) by mouth every 7 days.    gabapentin (NEURONTIN) 600 MG tablet Take 1 Q AM plus 1 Q Day in the early afternoon plus 1.5 (900mg) Q HS    oxybutynin (DITROPAN-XL) 5 MG TR24 Take 5 mg by mouth once daily.    oxycodone (ROXICODONE) 15 MG Tab Take 1 tablet (15 mg total) by mouth 3 (three) times daily as needed.    polyethylene glycol (GLYCOLAX) 17 gram/dose powder Take 17 g by mouth once daily.    rivaroxaban (XARELTO) 20 mg Tab Take 1 tablet (20 mg total) by mouth daily with dinner or evening meal.    senna-docusate 8.6-50 mg (PERICOLACE) 8.6-50 mg per tablet Take 1 tablet by mouth 2 (two) times daily.    trazodone (DESYREL) 100 MG tablet Take 100 mg by mouth every evening.           Clinical Reference Information           Your Vitals Were     BP Pulse Height Weight BMI    140/86 82 5' 11" (1.803 m) 75.3 kg (166 lb) 23.15 kg/m2      Blood Pressure          Most Recent Value    BP  (!)  140/86      Allergies as of 3/6/2017     No Known Allergies      Immunizations Administered on Date of Encounter - 3/6/2017     None      Orders Placed During Today's Visit      Normal Orders This Visit    Ambulatory Referral to Physical/Occupational Therapy     COMMODE FOR HOME USE     WALKER FOR HOME USE     Future Labs/Procedures Expected by Expires    CBC auto differential  3/6/2017 5/5/2018    Hepatitis A antibody, IgG  3/6/2017 5/5/2018    HEPATITIS B " CORE ANTIBODY, TOTAL  3/6/2017 5/5/2018    HEPATITIS B SURFACE ANTIBODY  3/6/2017 5/5/2018    HEPATITIS B SURFACE ANTIGEN  3/6/2017 5/5/2018    HEPATITIS C ANTIBODY  3/6/2017 5/5/2018    Rituxan Sensitivity  3/6/2017 5/5/2018      Language Assistance Services     ATTENTION: Language assistance services are available, free of charge. Please call [phone].      ATENCIÓN: Si habla español, tiene a mcmahon disposición servicios gratuitos de asistencia lingüística. Llame al [phone].     CHÚ Ý: N?u b?n nói Ti?ng Vi?t, có các d?ch v? h? tr? ngôn ng? mi?n phí dành cho b?n. G?i s? [phone].         [facility] complies with applicable Federal civil rights laws and does not discriminate on the basis of race, color, national origin, age, disability, or sex.

## 2017-03-06 NOTE — PROGRESS NOTES
"Subjective:       Patient ID: Mao Levin is a 51 y.o. male who presents today for a routine clinic visit for MS.    MS HPI:  · DMT: Rituxan(2/7/2017 & 2/21/2017)  · Side effects from DMT? No  · Taking vitamin D3 as recommended? Yes -  Dose: 50,000 IU weekly  · Just in ER---completed antibiotics for L elbow bursitis.   · Not currently in Physical therapy. He is walking in home with rollator.  ·     SOCIAL HISTORY  Social History   Substance Use Topics    Smoking status: Former Smoker     Packs/day: 0.50     Types: Cigarettes    Smokeless tobacco: None    Alcohol use No     Living arrangements - the patient lives alone.  Employment Disabled    MS ROS:  · Fatigue: Yes - does not take naps  · Sleep Disturbance: Yes - trazodone HS--doesn't feel it works well  · Bladder Dysfunction: Yes - urgency. Felt like Ditropan XL 5mg makes urgency worse  · Bowel Dysfunction: Yes - Pericolace, Glycolax PRN  · Spasticity: Yes - baclofen QID and dantrolene QID. Currently out of Valium  · Visual Symptoms: No--stable  · Cognitive: Yes - memory  · Mood Disorder: Yes - stable on Celexa  · Gait Disturbance: Yes - walks with rollator in home and short distances outside of home  · Falls: Yes - fallen 10 times in the last month  · Hand Dysfunction: Yes - most difficulty with L hand  · Pain: Yes - Gabapentin TID, oxycodone TID  · Sexual Dysfunction: Not Assessed  · Skin Breakdown: No  · Tremors: Yes, patient states L hand tremors when "very tight"  · Dysphagia:  No  · Dysarthria:  No  · Heat sensitivity:  Yes - increased muscle spasms  · Any un-met adaptive needs? Yes - BSC  · Copay Assist?  Yes   · Clinical Trial candidate? No        Objective:        1. 25 foot timed walk:  Timed 25 Foot Walk: 3/6/2017   Did patient wear an AFO? Yes   Was assistive device used? Yes   Assistive device used (felipe one): Bilateral Assistance   Bilateral device used Walker/Rollator   Time for 25 Foot Walk (seconds) 34.9     Neurologic Exam     Mental Status "   Oriented to person, place, and time.   Follows 3 step commands.   Speech: speech is normal   Level of consciousness: alert  Normal comprehension.     Cranial Nerves     CN II   Visual acuity: normal (20/25 OU with Snellen hand held chart at 6 ft)    CN III, IV, VI   Pupils are equal, round, and reactive to light.  Extraocular motions are normal.     CN V   Facial sensation intact.     CN VII   Facial expression full, symmetric.     CN VIII   Hearing: intact (finger rub)    CN IX, X   Palate: symmetric    CN XI   CN XI normal.     CN XII   Tongue deviation: none    Motor Exam   Muscle bulk: normal  Overall muscle tone: increased  Right arm tone: increased  Left arm tone: increased  Right leg tone: spastic  Left leg tone: spastic    Strength   Right deltoid: 5/5  Left deltoid: 5/5  Right triceps: 5/5  Left triceps: 4/5  Right wrist extension: 4/5  Right interossei: 3/5  Right iliopsoas: 3/5  Left iliopsoas: 3/5  Right hamstrin/5  Left hamstrin/5  Right anterior tibial: 5/5       MMT difficult to assess LE secondary to spasms  R UE AROM WNL  L UE AROM limited at shoulder to approx. 90 degrees.   Patient L hand/wrist held in flexor pattern-he is able to partially open hand. He primarily uses L hand as stabilizer. I can fully extend/open hand/wrist     Sensory Exam   Right arm vibration: decreased from wrist  Left arm vibration: decreased from fingers  Right leg vibration: decreased from ankle  Left leg vibration: decreased from ankle    Gait, Coordination, and Reflexes     Gait  Gait: spastic (narrow base, scissorring noted, unsteady; L AFO in place)    Coordination   Finger to nose coordination: abnormal (more difficult L UE than R UE)  Tandem walking coordination: abnormal (unable)    Tremor   Resting tremor: absent  Action tremor: absent    Reflexes   Right brachioradialis: 2+  Left brachioradialis: 2+  Right biceps: 2+  Left biceps: 2+  Right triceps: 2+  Left triceps: 2+  Right patellar: 4+  Left  patellar: 4+  Right achilles: 4+  Left achilles: 4+       Unable to heel/toe talk  Impaired RSM UE and LE's  L preponderance of reflexes         Imaging:     Results for orders placed during the hospital encounter of 12/15/16   MRI Brain W WO Contrast    Narrative Procedure: MRI the brain with andwithout contrast.    Technique: Sagittal and axial T1, axial T2, axial FLAIR, axial gradient, axial diffusion, and axial, sagittal, and coronal postcontrast T1 images of the whole brain. 8  ml of Gadavist injected intravenously.         Comparison: 12/2/16     Findings: There is continued multifocal scattered subcentimeter foci of T2/flair signal hyperintensity within the supratentorial parenchyma with continued small foci within the cerebellar hemispheres bilaterally more numerous on the left. There is very punctate focus also within the left brachium pontis and small focus in the right ventral alfonso. There is heterogeneous diffusion hyperintensity associated with a few of the supratentorial foci which is similar to prior. There is no corresponding diffusion restriction or enhancement with these foci. Ventricles stable without hydrocephalus. No midline shift. There is no several of the supratentorial foci lie in the margin of the body of the corpus callosum with punctate focus within the right posterior body of corpus callosum.    No abnormal parenchymal enhancement. No abnormal parenchymal susceptibility..    Impression  No significant change from prior. Continued scattered foci of T2/flair signal abnormality supra-and infratentorial parenchyma remains concerning for mild/moderate degree of prior demyelinating plaque burden.    No evidence for new lesion or enhancing lesion to suggest interval or active demyelination. correlation and continued followup advised.      Electronically signed by: RANDI ELISE DO  Date:     12/16/16  Time:    10:48      Results for orders placed during the hospital encounter of 12/02/16   MRI  Cervical Spine W WO Cont    Narrative Comparison made to prior imaging from September and October 2016.    This exam was performed with and without the use of intravenous contrast in conjunction with additional imaging today.    Findings: The vertebral bodies demonstrate a straightening and slight reversal of the normal cervical lordosis.  There is disc desiccation within the cervical spine with disc space narrowing at C4-5 and C5-6.  There is no marrow replacement process.  The paravertebral structures demonstrate no significant abnormality.  Note that there is some patient motion during acquisition of axial T2-weighted images through the upper cervical spine.    Once again, the spinal cord demonstrates multiple patchy T2 hyperintense lesions within the cervical and thoracic spine.  There is no significant interval change allowing for change in technique and patient motion.  There is no postcontrast enhancement.    C4-5 demonstrates posterior disc osteophyte complex with mild spinal canal stenosis and foraminal narrowing.  C5-6  demonstrates a mild posterior disc osteophyte complex with mild spinal canal narrowing and neural foraminal narrowing.    Thoracic vertebral bodies are normal in height, signal and alignment.  There is minor disc desiccation within the mid to lower spine.  There is no significant spinal canal narrowing.  Minor disc bulge at T7-8 noted.  Mild increased markings at the lung bases may reflect atelectasis in this patient with no significant abnormality demonstrated on recent radiograph.    Impression  There are patchy foci of T2 signal hyperintensity in the cervical and thoracic spinal cord overall similar to prior exam and likely relating to patient's history of multiple sclerosis.  There is no finding to indicate active demyelination and no significant change compared to prior examination.      Electronically signed by: Kelsie Farah MD  Date:     12/04/16  Time:    08:42      Results for  orders placed during the hospital encounter of 12/02/16   MRI Thoracic Spine W WO Cont    Narrative Comparison made to prior imaging from September and October 2016.    This exam was performed with and without the use of intravenous contrast in conjunction with additional imaging today.    Findings: The vertebral bodies demonstrate a straightening and slight reversal of the normal cervical lordosis.  There is disc desiccation within the cervical spine with disc space narrowing at C4-5 and C5-6.  There is no marrow replacement process.  The paravertebral structures demonstrate no significant abnormality.  Note that there is some patient motion during acquisition of axial T2-weighted images through the upper cervical spine.    Once again, the spinal cord demonstrates multiple patchy T2 hyperintense lesions within the cervical and thoracic spine.  There is no significant interval change allowing for change in technique and patient motion.  There is no postcontrast enhancement.    C4-5 demonstrates posterior disc osteophyte complex with mild spinal canal stenosis and foraminal narrowing.  C5-6  demonstrates a mild posterior disc osteophyte complex with mild spinal canal narrowing and neural foraminal narrowing.    Thoracic vertebral bodies are normal in height, signal and alignment.  There is minor disc desiccation within the mid to lower spine.  There is no significant spinal canal narrowing.  Minor disc bulge at T7-8 noted.  Mild increased markings at the lung bases may reflect atelectasis in this patient with no significant abnormality demonstrated on recent radiograph.    Impression  There are patchy foci of T2 signal hyperintensity in the cervical and thoracic spinal cord overall similar to prior exam and likely relating to patient's history of multiple sclerosis.  There is no finding to indicate active demyelination and no significant change compared to prior examination.      Electronically signed by: Kelsie  Sofi OSBORNE  Date:     12/04/16  Time:    08:42        Labs:   Rituxan Safety labs ordered today    Lab Results   Component Value Date    YRSLKXAM16LV 18 (L) 09/25/2016     Lab Results   Component Value Date    JCVINDEX 3.70 (A) 09/22/2016    JCVANTIBODY Positive (A) 09/22/2016     No results found for: PN5IZOFP, ABSOLUTECD3, TB2YKUTV, ABSOLUTECD8, LZ6FEIJW, ABSOLUTECD4, LABCD48    Diagnosis/Assessment/Plan:    1. Multiple Sclerosis  · Assessment: Patient has received first two Rituxan doses and tolerated well. He was able to ambulate in our clinic today and complete timed walk; however, he has significant spasticity.  · Imaging:Will plan for MRI in August of 2017(will schedule next visit)  · Disease Modifying Therapies:Continue Rituxan, will get Rituxan safety labs this week    2. MS Symptom Assessment / Management  · Sleep Disturbance: trazodone  · Spasticity: managed by Dr Leung,consider Baclofen pump(will discuss with Dr. Leung)  · Mood Disorder: continue Celexa  · Gait Disturbance: Ordered outpatient PT --[facility]   · Hand Dysfunction: Will consider OT, but wanted to start with OT   · Pain: managed by Dr. Leung  · Adaptive needs: ordered BSC and U-step today. I do feel U-step will provide him with increased support as opposed to his current rollator-I am hopeful this will decrease his fall frequency. He defers wheelchair.      Over 50% of this 50 minute visit was spent in direct face to face counseling of the patient regarding his current symptoms and management of same.  Follow up in 4 months with Dr. Finnegan  Patient agreed to POC today.    Attending, Dr. Finnegan, was available during today's encounter. Any change to plan along with cosign to appear in the EMR.     Lawanda Boyce PA-C  [facility]      MS (multiple sclerosis)  -     COMMODE FOR HOME USE  -     WALKER FOR HOME USE  -     Ambulatory Referral to Physical/Occupational Therapy  -     Rituxan Sensitivity; Future; Expected date: 3/6/17  -      CBC auto differential; Future; Expected date: 3/6/17  -     Hepatitis A antibody, IgG; Future; Expected date: 3/6/17  -     HEPATITIS B SURFACE ANTIBODY; Future; Expected date: 3/6/17  -     HEPATITIS B CORE ANTIBODY, TOTAL; Future; Expected date: 3/6/17  -     HEPATITIS C ANTIBODY; Future; Expected date: 3/6/17  -     HEPATITIS B SURFACE ANTIGEN; Future; Expected date: 3/6/17    Neurogenic bladder    Vitamin D deficiency disease    Impaired mobility and ADLs  -     COMMODE FOR HOME USE  -     WALKER FOR HOME USE  -     Ambulatory Referral to Physical/Occupational Therapy    Frequent falls  -     WALKER FOR HOME USE  -     Ambulatory Referral to Physical/Occupational Therapy    Spasticity  -     Ambulatory Referral to Physical/Occupational Therapy

## 2017-03-07 ENCOUNTER — DOCUMENTATION ONLY (OUTPATIENT)
Dept: NEUROLOGY | Facility: CLINIC | Age: 52
End: 2017-03-07

## 2017-03-07 PROBLEM — Z79.899 ENCOUNTER FOR LONG-TERM (CURRENT) USE OF HIGH-RISK MEDICATION: Status: ACTIVE | Noted: 2017-03-07

## 2017-03-07 PROBLEM — R29.6 FREQUENT FALLS: Status: ACTIVE | Noted: 2017-03-07

## 2017-03-07 PROBLEM — Z71.89 COUNSELING REGARDING GOALS OF CARE: Status: ACTIVE | Noted: 2017-03-07

## 2017-03-13 ENCOUNTER — TELEPHONE (OUTPATIENT)
Dept: NEUROLOGY | Facility: CLINIC | Age: 52
End: 2017-03-13

## 2017-03-13 ENCOUNTER — NURSE TRIAGE (OUTPATIENT)
Dept: ADMINISTRATIVE | Facility: CLINIC | Age: 52
End: 2017-03-13

## 2017-03-13 ENCOUNTER — HOSPITAL ENCOUNTER (OUTPATIENT)
Facility: HOSPITAL | Age: 52
Discharge: NURSING, REHAB OR LONG-TERM CARE | End: 2017-03-17
Attending: EMERGENCY MEDICINE | Admitting: EMERGENCY MEDICINE
Payer: MEDICARE

## 2017-03-13 DIAGNOSIS — G35 MULTIPLE SCLEROSIS: ICD-10-CM

## 2017-03-13 DIAGNOSIS — G35 MS (MULTIPLE SCLEROSIS): Chronic | ICD-10-CM

## 2017-03-13 DIAGNOSIS — R29.898 WEAKNESS OF BOTH LOWER EXTREMITIES: ICD-10-CM

## 2017-03-13 DIAGNOSIS — M79.604 BILATERAL LEG PAIN: Primary | ICD-10-CM

## 2017-03-13 DIAGNOSIS — M79.605 BILATERAL LEG PAIN: Primary | ICD-10-CM

## 2017-03-13 LAB
ALBUMIN SERPL BCP-MCNC: 3.4 G/DL
ALP SERPL-CCNC: 74 U/L
ALT SERPL W/O P-5'-P-CCNC: 18 U/L
ANION GAP SERPL CALC-SCNC: 6 MMOL/L
AST SERPL-CCNC: 22 U/L
BACTERIA #/AREA URNS AUTO: ABNORMAL /HPF
BASOPHILS # BLD AUTO: 0.03 K/UL
BASOPHILS NFR BLD: 0.4 %
BILIRUB SERPL-MCNC: 0.2 MG/DL
BILIRUB UR QL STRIP: NEGATIVE
BUN SERPL-MCNC: 11 MG/DL
CALCIUM SERPL-MCNC: 8.8 MG/DL
CHLORIDE SERPL-SCNC: 106 MMOL/L
CLARITY UR REFRACT.AUTO: ABNORMAL
CO2 SERPL-SCNC: 30 MMOL/L
COLOR UR AUTO: YELLOW
CREAT SERPL-MCNC: 0.9 MG/DL
DIFFERENTIAL METHOD: ABNORMAL
EOSINOPHIL # BLD AUTO: 0.2 K/UL
EOSINOPHIL NFR BLD: 2.5 %
ERYTHROCYTE [DISTWIDTH] IN BLOOD BY AUTOMATED COUNT: 14 %
EST. GFR  (AFRICAN AMERICAN): >60 ML/MIN/1.73 M^2
EST. GFR  (NON AFRICAN AMERICAN): >60 ML/MIN/1.73 M^2
GLUCOSE SERPL-MCNC: 113 MG/DL
GLUCOSE UR QL STRIP: NEGATIVE
HCT VFR BLD AUTO: 40.5 %
HGB BLD-MCNC: 13.6 G/DL
HGB UR QL STRIP: NEGATIVE
KETONES UR QL STRIP: NEGATIVE
LEUKOCYTE ESTERASE UR QL STRIP: ABNORMAL
LYMPHOCYTES # BLD AUTO: 2.5 K/UL
LYMPHOCYTES NFR BLD: 36.4 %
MCH RBC QN AUTO: 29.2 PG
MCHC RBC AUTO-ENTMCNC: 33.6 %
MCV RBC AUTO: 87 FL
MICROSCOPIC COMMENT: ABNORMAL
MONOCYTES # BLD AUTO: 0.3 K/UL
MONOCYTES NFR BLD: 4.7 %
NEUTROPHILS # BLD AUTO: 3.8 K/UL
NEUTROPHILS NFR BLD: 55.9 %
NITRITE UR QL STRIP: POSITIVE
PH UR STRIP: 7 [PH] (ref 5–8)
PLATELET # BLD AUTO: 211 K/UL
PMV BLD AUTO: 10.7 FL
POTASSIUM SERPL-SCNC: 4 MMOL/L
PROT SERPL-MCNC: 6.6 G/DL
PROT UR QL STRIP: NEGATIVE
RBC # BLD AUTO: 4.66 M/UL
RBC #/AREA URNS AUTO: 3 /HPF (ref 0–4)
SODIUM SERPL-SCNC: 142 MMOL/L
SP GR UR STRIP: 1.02 (ref 1–1.03)
URN SPEC COLLECT METH UR: ABNORMAL
UROBILINOGEN UR STRIP-ACNC: NEGATIVE EU/DL
WBC # BLD AUTO: 6.87 K/UL
WBC #/AREA URNS AUTO: 19 /HPF (ref 0–5)

## 2017-03-13 PROCEDURE — 80053 COMPREHEN METABOLIC PANEL: CPT

## 2017-03-13 PROCEDURE — 25000003 PHARM REV CODE 250: Performed by: NURSE PRACTITIONER

## 2017-03-13 PROCEDURE — 87186 SC STD MICRODIL/AGAR DIL: CPT

## 2017-03-13 PROCEDURE — 81001 URINALYSIS AUTO W/SCOPE: CPT

## 2017-03-13 PROCEDURE — 99285 EMERGENCY DEPT VISIT HI MDM: CPT | Mod: 25

## 2017-03-13 PROCEDURE — 99284 EMERGENCY DEPT VISIT MOD MDM: CPT | Mod: ,,, | Performed by: NURSE PRACTITIONER

## 2017-03-13 PROCEDURE — 85025 COMPLETE CBC W/AUTO DIFF WBC: CPT

## 2017-03-13 PROCEDURE — G0378 HOSPITAL OBSERVATION PER HR: HCPCS

## 2017-03-13 PROCEDURE — 87077 CULTURE AEROBIC IDENTIFY: CPT

## 2017-03-13 PROCEDURE — 63600175 PHARM REV CODE 636 W HCPCS: Performed by: NURSE PRACTITIONER

## 2017-03-13 PROCEDURE — 87088 URINE BACTERIA CULTURE: CPT

## 2017-03-13 PROCEDURE — 87086 URINE CULTURE/COLONY COUNT: CPT

## 2017-03-13 PROCEDURE — 99220 PR INITIAL OBSERVATION CARE,LEVL III: CPT | Mod: ,,, | Performed by: PHYSICIAN ASSISTANT

## 2017-03-13 PROCEDURE — 94761 N-INVAS EAR/PLS OXIMETRY MLT: CPT

## 2017-03-13 PROCEDURE — 96365 THER/PROPH/DIAG IV INF INIT: CPT

## 2017-03-13 PROCEDURE — 96375 TX/PRO/DX INJ NEW DRUG ADDON: CPT

## 2017-03-13 RX ORDER — ONDANSETRON 2 MG/ML
4 INJECTION INTRAMUSCULAR; INTRAVENOUS EVERY 12 HOURS PRN
Status: DISCONTINUED | OUTPATIENT
Start: 2017-03-13 | End: 2017-03-14

## 2017-03-13 RX ORDER — HYDROCODONE BITARTRATE AND ACETAMINOPHEN 5; 325 MG/1; MG/1
1 TABLET ORAL EVERY 4 HOURS PRN
Status: DISCONTINUED | OUTPATIENT
Start: 2017-03-13 | End: 2017-03-14

## 2017-03-13 RX ORDER — MORPHINE SULFATE 2 MG/ML
4 INJECTION, SOLUTION INTRAMUSCULAR; INTRAVENOUS EVERY 4 HOURS PRN
Status: DISCONTINUED | OUTPATIENT
Start: 2017-03-13 | End: 2017-03-14

## 2017-03-13 RX ORDER — OXYCODONE HYDROCHLORIDE 5 MG/1
5 TABLET ORAL
Status: COMPLETED | OUTPATIENT
Start: 2017-03-13 | End: 2017-03-13

## 2017-03-13 RX ADMIN — OXYCODONE HYDROCHLORIDE 5 MG: 5 TABLET ORAL at 08:03

## 2017-03-13 RX ADMIN — MORPHINE SULFATE 4 MG: 2 INJECTION, SOLUTION INTRAMUSCULAR; INTRAVENOUS at 11:03

## 2017-03-13 NOTE — TELEPHONE ENCOUNTER
Reason for Disposition   [1] Blurred vision or visual changes AND [2] present now AND [3] sudden onset or new (e.g., minutes, hours, days)  (Exception: previously diagnosed migraine headaches with same symptoms)    Protocols used: ST VISION LOSS OR CHANGE-A-AH    Pt calling with complaints of not feeling well, blurry vision and cannot walk.  Pt stated he had an infusion and since has not been feeling right.  Advised ED/Call EMS.

## 2017-03-13 NOTE — TELEPHONE ENCOUNTER
----- Message from Edelmira De Oliveira sent at 3/13/2017  1:11 PM CDT -----  Contact: self @ [phone]  Pt says he does not feel well and would like to know what to do.

## 2017-03-13 NOTE — ED TRIAGE NOTES
Pt states he has been taking a new MS injection that is causing him to have increased weakness in his legs. Normally walks with a walker but is unable to walk now  .

## 2017-03-13 NOTE — TELEPHONE ENCOUNTER
I spoke with Mao who said is can not walk, both of his legs are hurting, ears hurt and his vision is blurred. He said he is currently waiting on the ambulance he called at the advice of a triage nurse.

## 2017-03-14 PROBLEM — H60.311 CHRONIC DIFFUSE OTITIS EXTERNA OF RIGHT EAR: Status: RESOLVED | Noted: 2017-02-06 | Resolved: 2017-03-14

## 2017-03-14 PROBLEM — H61.23 BILATERAL IMPACTED CERUMEN: Status: RESOLVED | Noted: 2017-02-06 | Resolved: 2017-03-14

## 2017-03-14 PROBLEM — N39.0 URINARY TRACT INFECTION WITHOUT HEMATURIA: Status: ACTIVE | Noted: 2017-03-14

## 2017-03-14 PROBLEM — H92.01 RIGHT EAR PAIN: Status: RESOLVED | Noted: 2017-02-06 | Resolved: 2017-03-14

## 2017-03-14 PROBLEM — Z79.899 ENCOUNTER FOR LONG-TERM (CURRENT) USE OF HIGH-RISK MEDICATION: Status: RESOLVED | Noted: 2017-03-07 | Resolved: 2017-03-14

## 2017-03-14 PROBLEM — Z71.89 COUNSELING REGARDING GOALS OF CARE: Status: RESOLVED | Noted: 2017-03-07 | Resolved: 2017-03-14

## 2017-03-14 LAB
ANION GAP SERPL CALC-SCNC: 11 MMOL/L
BASOPHILS # BLD AUTO: 0.02 K/UL
BASOPHILS NFR BLD: 0.3 %
BUN SERPL-MCNC: 13 MG/DL
CALCIUM SERPL-MCNC: 8.4 MG/DL
CHLORIDE SERPL-SCNC: 105 MMOL/L
CO2 SERPL-SCNC: 26 MMOL/L
CREAT SERPL-MCNC: 0.8 MG/DL
DIFFERENTIAL METHOD: ABNORMAL
EOSINOPHIL # BLD AUTO: 0.2 K/UL
EOSINOPHIL NFR BLD: 3.4 %
ERYTHROCYTE [DISTWIDTH] IN BLOOD BY AUTOMATED COUNT: 14 %
EST. GFR  (AFRICAN AMERICAN): >60 ML/MIN/1.73 M^2
EST. GFR  (NON AFRICAN AMERICAN): >60 ML/MIN/1.73 M^2
GLUCOSE SERPL-MCNC: 121 MG/DL
HCT VFR BLD AUTO: 36.8 %
HGB BLD-MCNC: 12.1 G/DL
LYMPHOCYTES # BLD AUTO: 2.7 K/UL
LYMPHOCYTES NFR BLD: 42.2 %
MAGNESIUM SERPL-MCNC: 2 MG/DL
MCH RBC QN AUTO: 28.9 PG
MCHC RBC AUTO-ENTMCNC: 32.9 %
MCV RBC AUTO: 88 FL
MONOCYTES # BLD AUTO: 0.4 K/UL
MONOCYTES NFR BLD: 6 %
NEUTROPHILS # BLD AUTO: 3.1 K/UL
NEUTROPHILS NFR BLD: 47.9 %
PHOSPHATE SERPL-MCNC: 3.8 MG/DL
PLATELET # BLD AUTO: 224 K/UL
PMV BLD AUTO: 10.6 FL
POTASSIUM SERPL-SCNC: 4.1 MMOL/L
RBC # BLD AUTO: 4.18 M/UL
SODIUM SERPL-SCNC: 142 MMOL/L
WBC # BLD AUTO: 6.49 K/UL

## 2017-03-14 PROCEDURE — G0378 HOSPITAL OBSERVATION PER HR: HCPCS

## 2017-03-14 PROCEDURE — 83735 ASSAY OF MAGNESIUM: CPT

## 2017-03-14 PROCEDURE — 99226 PR SUBSEQUENT OBSERVATION CARE,LEVEL III: CPT | Mod: ,,, | Performed by: PHYSICIAN ASSISTANT

## 2017-03-14 PROCEDURE — 84100 ASSAY OF PHOSPHORUS: CPT

## 2017-03-14 PROCEDURE — 25000003 PHARM REV CODE 250: Performed by: PHYSICIAN ASSISTANT

## 2017-03-14 PROCEDURE — 63600175 PHARM REV CODE 636 W HCPCS: Performed by: PHYSICIAN ASSISTANT

## 2017-03-14 PROCEDURE — 99214 OFFICE O/P EST MOD 30 MIN: CPT | Mod: GC,,, | Performed by: PSYCHIATRY & NEUROLOGY

## 2017-03-14 PROCEDURE — 99406 BEHAV CHNG SMOKING 3-10 MIN: CPT

## 2017-03-14 PROCEDURE — G8978 MOBILITY CURRENT STATUS: HCPCS | Mod: CK

## 2017-03-14 PROCEDURE — 97162 PT EVAL MOD COMPLEX 30 MIN: CPT

## 2017-03-14 PROCEDURE — 36415 COLL VENOUS BLD VENIPUNCTURE: CPT

## 2017-03-14 PROCEDURE — G8979 MOBILITY GOAL STATUS: HCPCS | Mod: CK

## 2017-03-14 PROCEDURE — 80048 BASIC METABOLIC PNL TOTAL CA: CPT

## 2017-03-14 PROCEDURE — 85025 COMPLETE CBC W/AUTO DIFF WBC: CPT

## 2017-03-14 RX ORDER — ONDANSETRON 8 MG/1
8 TABLET, ORALLY DISINTEGRATING ORAL EVERY 8 HOURS PRN
Status: DISCONTINUED | OUTPATIENT
Start: 2017-03-14 | End: 2017-03-17 | Stop reason: HOSPADM

## 2017-03-14 RX ORDER — GABAPENTIN 300 MG/1
600 CAPSULE ORAL
Status: DISCONTINUED | OUTPATIENT
Start: 2017-03-14 | End: 2017-03-17 | Stop reason: HOSPADM

## 2017-03-14 RX ORDER — IBUPROFEN 200 MG
24 TABLET ORAL
Status: DISCONTINUED | OUTPATIENT
Start: 2017-03-14 | End: 2017-03-17 | Stop reason: HOSPADM

## 2017-03-14 RX ORDER — DIAZEPAM 5 MG/1
5 TABLET ORAL 2 TIMES DAILY PRN
Status: DISCONTINUED | OUTPATIENT
Start: 2017-03-14 | End: 2017-03-17 | Stop reason: HOSPADM

## 2017-03-14 RX ORDER — HYDROMORPHONE HYDROCHLORIDE 1 MG/ML
0.5 INJECTION, SOLUTION INTRAMUSCULAR; INTRAVENOUS; SUBCUTANEOUS EVERY 4 HOURS PRN
Status: DISCONTINUED | OUTPATIENT
Start: 2017-03-14 | End: 2017-03-14

## 2017-03-14 RX ORDER — RAMELTEON 8 MG/1
8 TABLET ORAL NIGHTLY PRN
Status: DISCONTINUED | OUTPATIENT
Start: 2017-03-14 | End: 2017-03-17 | Stop reason: HOSPADM

## 2017-03-14 RX ORDER — TRAZODONE HYDROCHLORIDE 50 MG/1
100 TABLET ORAL NIGHTLY
Status: DISCONTINUED | OUTPATIENT
Start: 2017-03-14 | End: 2017-03-17 | Stop reason: HOSPADM

## 2017-03-14 RX ORDER — POLYETHYLENE GLYCOL 3350 17 G/17G
17 POWDER, FOR SOLUTION ORAL DAILY
Status: DISCONTINUED | OUTPATIENT
Start: 2017-03-14 | End: 2017-03-17 | Stop reason: HOSPADM

## 2017-03-14 RX ORDER — IBUPROFEN 200 MG
1 TABLET ORAL DAILY
Status: DISCONTINUED | OUTPATIENT
Start: 2017-03-14 | End: 2017-03-17 | Stop reason: HOSPADM

## 2017-03-14 RX ORDER — PROMETHAZINE HYDROCHLORIDE 12.5 MG/1
25 TABLET ORAL EVERY 6 HOURS PRN
Status: DISCONTINUED | OUTPATIENT
Start: 2017-03-14 | End: 2017-03-17 | Stop reason: HOSPADM

## 2017-03-14 RX ORDER — CARBAMAZEPINE 200 MG/1
400 TABLET, EXTENDED RELEASE ORAL 2 TIMES DAILY
Status: DISCONTINUED | OUTPATIENT
Start: 2017-03-14 | End: 2017-03-17 | Stop reason: HOSPADM

## 2017-03-14 RX ORDER — OXYCODONE HYDROCHLORIDE 5 MG/1
15 TABLET ORAL 3 TIMES DAILY PRN
Status: DISCONTINUED | OUTPATIENT
Start: 2017-03-14 | End: 2017-03-14

## 2017-03-14 RX ORDER — DANTROLENE SODIUM 50 MG/1
50 CAPSULE ORAL 4 TIMES DAILY
Status: DISCONTINUED | OUTPATIENT
Start: 2017-03-14 | End: 2017-03-17 | Stop reason: HOSPADM

## 2017-03-14 RX ORDER — OXYBUTYNIN CHLORIDE 5 MG/1
5 TABLET, EXTENDED RELEASE ORAL DAILY
Status: DISCONTINUED | OUTPATIENT
Start: 2017-03-14 | End: 2017-03-17 | Stop reason: HOSPADM

## 2017-03-14 RX ORDER — AMOXICILLIN 250 MG
1 CAPSULE ORAL 2 TIMES DAILY
Status: DISCONTINUED | OUTPATIENT
Start: 2017-03-14 | End: 2017-03-17 | Stop reason: HOSPADM

## 2017-03-14 RX ORDER — GLUCAGON 1 MG
1 KIT INJECTION
Status: DISCONTINUED | OUTPATIENT
Start: 2017-03-14 | End: 2017-03-17 | Stop reason: HOSPADM

## 2017-03-14 RX ORDER — IPRATROPIUM BROMIDE AND ALBUTEROL SULFATE 2.5; .5 MG/3ML; MG/3ML
3 SOLUTION RESPIRATORY (INHALATION) EVERY 4 HOURS PRN
Status: DISCONTINUED | OUTPATIENT
Start: 2017-03-14 | End: 2017-03-17 | Stop reason: HOSPADM

## 2017-03-14 RX ORDER — OXYCODONE HYDROCHLORIDE 5 MG/1
15 TABLET ORAL EVERY 4 HOURS PRN
Status: DISCONTINUED | OUTPATIENT
Start: 2017-03-14 | End: 2017-03-17 | Stop reason: HOSPADM

## 2017-03-14 RX ORDER — GABAPENTIN 300 MG/1
900 CAPSULE ORAL NIGHTLY
Status: DISCONTINUED | OUTPATIENT
Start: 2017-03-14 | End: 2017-03-17 | Stop reason: HOSPADM

## 2017-03-14 RX ORDER — BACLOFEN 10 MG/1
20 TABLET ORAL 4 TIMES DAILY
Status: DISCONTINUED | OUTPATIENT
Start: 2017-03-14 | End: 2017-03-17 | Stop reason: HOSPADM

## 2017-03-14 RX ORDER — LOPERAMIDE HYDROCHLORIDE 2 MG/1
2 CAPSULE ORAL
Status: DISCONTINUED | OUTPATIENT
Start: 2017-03-14 | End: 2017-03-17 | Stop reason: HOSPADM

## 2017-03-14 RX ORDER — ACETAMINOPHEN 325 MG/1
650 TABLET ORAL EVERY 4 HOURS PRN
Status: DISCONTINUED | OUTPATIENT
Start: 2017-03-14 | End: 2017-03-17 | Stop reason: HOSPADM

## 2017-03-14 RX ORDER — IBUPROFEN 200 MG
16 TABLET ORAL
Status: DISCONTINUED | OUTPATIENT
Start: 2017-03-14 | End: 2017-03-17 | Stop reason: HOSPADM

## 2017-03-14 RX ORDER — CITALOPRAM 20 MG/1
20 TABLET, FILM COATED ORAL DAILY
Status: DISCONTINUED | OUTPATIENT
Start: 2017-03-14 | End: 2017-03-17 | Stop reason: HOSPADM

## 2017-03-14 RX ADMIN — GABAPENTIN 900 MG: 300 CAPSULE ORAL at 02:03

## 2017-03-14 RX ADMIN — BACLOFEN 20 MG: 10 TABLET ORAL at 11:03

## 2017-03-14 RX ADMIN — TRAZODONE HYDROCHLORIDE 100 MG: 50 TABLET ORAL at 09:03

## 2017-03-14 RX ADMIN — DIAZEPAM 5 MG: 5 TABLET ORAL at 06:03

## 2017-03-14 RX ADMIN — TRAZODONE HYDROCHLORIDE 100 MG: 50 TABLET ORAL at 02:03

## 2017-03-14 RX ADMIN — OXYBUTYNIN CHLORIDE 5 MG: 5 TABLET, EXTENDED RELEASE ORAL at 10:03

## 2017-03-14 RX ADMIN — GABAPENTIN 600 MG: 300 CAPSULE ORAL at 03:03

## 2017-03-14 RX ADMIN — BACLOFEN 20 MG: 10 TABLET ORAL at 06:03

## 2017-03-14 RX ADMIN — CEFTRIAXONE 1 G: 1 INJECTION, SOLUTION INTRAVENOUS at 02:03

## 2017-03-14 RX ADMIN — OXYCODONE HYDROCHLORIDE 15 MG: 5 TABLET ORAL at 04:03

## 2017-03-14 RX ADMIN — DANTROLENE SODIUM 50 MG: 50 CAPSULE ORAL at 06:03

## 2017-03-14 RX ADMIN — GABAPENTIN 900 MG: 300 CAPSULE ORAL at 09:03

## 2017-03-14 RX ADMIN — BACLOFEN 20 MG: 10 TABLET ORAL at 12:03

## 2017-03-14 RX ADMIN — CARBAMAZEPINE 400 MG: 200 TABLET, EXTENDED RELEASE ORAL at 10:03

## 2017-03-14 RX ADMIN — CARBAMAZEPINE 400 MG: 200 TABLET, EXTENDED RELEASE ORAL at 09:03

## 2017-03-14 RX ADMIN — OXYCODONE HYDROCHLORIDE 15 MG: 5 TABLET ORAL at 12:03

## 2017-03-14 RX ADMIN — CITALOPRAM HYDROBROMIDE 20 MG: 20 TABLET ORAL at 08:03

## 2017-03-14 RX ADMIN — RIVAROXABAN 20 MG: 20 TABLET, FILM COATED ORAL at 04:03

## 2017-03-14 RX ADMIN — DANTROLENE SODIUM 50 MG: 50 CAPSULE ORAL at 11:03

## 2017-03-14 RX ADMIN — OXYCODONE HYDROCHLORIDE 15 MG: 5 TABLET ORAL at 03:03

## 2017-03-14 RX ADMIN — STANDARDIZED SENNA CONCENTRATE AND DOCUSATE SODIUM 1 TABLET: 8.6; 5 TABLET, FILM COATED ORAL at 09:03

## 2017-03-14 RX ADMIN — OXYCODONE HYDROCHLORIDE 15 MG: 5 TABLET ORAL at 09:03

## 2017-03-14 NOTE — PROGRESS NOTES
Bladder scan showing 427 cc. Patient refusing straight cath at this time. JACQUE Ornelas, notified. Okay not to bladder scan patient at this time.

## 2017-03-14 NOTE — ED NOTES
"Pt became irate in [facility]. Pt reports he cannot stay back her because this is not the emergency room. Pt stets "you can call the police or let me leave." LUOISE Wolff notified.   "

## 2017-03-14 NOTE — SUBJECTIVE & OBJECTIVE
Past Medical History:   Diagnosis Date    Multiple sclerosis      History reviewed. No pertinent surgical history.    Review of patient's allergies indicates:  No Known Allergies    Current Neurological Medications: baclofen, celexa, dantrolene, valium, gabapentin, rituxan, trazodone    No current facility-administered medications on file prior to encounter.      Current Outpatient Prescriptions on File Prior to Encounter   Medication Sig    baclofen (LIORESAL) 20 MG tablet Take 1 tablet (20 mg total) by mouth 4 (four) times daily.    carbamazepine (TEGRETOL XR) 400 MG Tb12 Take 1 tablet (400 mg total) by mouth 2 (two) times daily.    citalopram (CELEXA) 20 MG tablet Take 1 tablet (20 mg total) by mouth once daily.    dantrolene (DANTRIUM) 50 MG Cap Take 1 capsule (50 mg total) by mouth 4 (four) times daily.    diazePAM (VALIUM) 5 MG tablet Take 1 tablet (5 mg total) by mouth 2 (two) times daily as needed (muscle spasms).    ergocalciferol (VITAMIN D2) 50,000 unit Cap Take 1 capsule (50,000 Units total) by mouth every 7 days.    gabapentin (NEURONTIN) 600 MG tablet Take 1 Q AM plus 1 Q Day in the early afternoon plus 1.5 (900mg) Q HS (Patient taking differently: Take 1 tablet by mouth every morning and in the early afternoon then take 1½ tablets (900 mg) at bedtime)    oxybutynin (DITROPAN-XL) 5 MG TR24 Take 5 mg by mouth once daily.    oxycodone (ROXICODONE) 15 MG Tab Take 1 tablet (15 mg total) by mouth 3 (three) times daily as needed.    polyethylene glycol (GLYCOLAX) 17 gram/dose powder Take 17 g by mouth once daily.    rivaroxaban (XARELTO) 20 mg Tab Take 1 tablet (20 mg total) by mouth daily with dinner or evening meal.    senna-docusate 8.6-50 mg (PERICOLACE) 8.6-50 mg per tablet Take 1 tablet by mouth 2 (two) times daily.    trazodone (DESYREL) 100 MG tablet Take 100 mg by mouth every evening.     Family History     Problem Relation (Age of Onset)    Arthritis Mother    No Known Problems Father         Social History Main Topics    Smoking status: Current Some Day Smoker     Packs/day: 0.50     Types: Cigarettes    Smokeless tobacco: Never Used    Alcohol use No    Drug use: No    Sexual activity: Not on file     Review of Systems   Constitutional: Positive for activity change and fatigue. Negative for chills and fever.   HENT: Negative for voice change.    Eyes: Positive for visual disturbance. Negative for photophobia and pain.   Respiratory: Negative for cough and shortness of breath.    Cardiovascular: Negative for chest pain.   Gastrointestinal: Negative for nausea and vomiting.   Genitourinary: Positive for difficulty urinating. Negative for dysuria and hematuria.   Musculoskeletal: Positive for arthralgias, back pain, gait problem and myalgias.   Neurological: Positive for weakness and numbness. Negative for dizziness, seizures, syncope, facial asymmetry and speech difficulty.     Objective:     Vital Signs (Most Recent):  Temp: 98.2 °F (36.8 °C) (03/14/17 0800)  Pulse: 62 (03/14/17 0800)  Resp: 17 (03/14/17 0800)  BP: 132/75 (03/14/17 0800)  SpO2: 97 % (03/14/17 0800) Vital Signs (24h Range):  Temp:  [96.5 °F (35.8 °C)-98.3 °F (36.8 °C)] 98.2 °F (36.8 °C)  Pulse:  [62-86] 62  Resp:  [16-18] 17  SpO2:  [97 %-100 %] 97 %  BP: (119-180)/(70-83) 132/75     Weight: 76.7 kg (169 lb)  Body mass index is 23.57 kg/(m^2).    Physical Exam   Constitutional: He appears well-developed and well-nourished.   HENT:   Head: Normocephalic and atraumatic.   Eyes: EOM are normal. Pupils are equal, round, and reactive to light.   Neck: Normal range of motion.   Pulmonary/Chest: Effort normal.   Neurological:   Reflex Scores:       Tricep reflexes are 2+ on the right side and 2+ on the left side.       Bicep reflexes are 2+ on the right side and 2+ on the left side.       Brachioradialis reflexes are 2+ on the right side and 2+ on the left side.       Patellar reflexes are 3+ on the right side and 3+ on the left  "side.  Psychiatric: His speech is normal.     NEUROLOGICAL EXAMINATION:     MENTAL STATUS   Oriented to city.   Disoriented to time. Disoriented to day. Oriented to year and month.   Follows 3 step commands.   Attention: normal. Concentration: normal.   Speech: speech is normal   Level of consciousness: alert    CRANIAL NERVES     CN III, IV, VI   Pupils are equal, round, and reactive to light.  Extraocular motions are normal.   CN III: no CN III palsy  CN VI: no CN VI palsy  Nystagmus: none     CN V   Right facial sensation deficit: right V1-V2 feels "heavier" than left     CN VII   Facial expression full, symmetric.   Right facial weakness: none  Left facial weakness: none    CN VIII   CN VIII normal.     CN XII   CN XII normal.     MOTOR EXAM   Muscle bulk: normal  Overall muscle tone: increased  Right arm pronator drift: absent  Left arm pronator drift: absent  Right leg tone: spastic  Left leg tone: spastic    Strength   Right deltoid: 4/5  Left deltoid: 4/5  Right biceps: 5/5  Left biceps: 5/5  Right triceps: 4/5  Left triceps: 4/5  Right iliopsoas: 4/5  Left iliopsoas: 3/5  Right quadriceps: 4/5  Left quadriceps: 4/5  Right hamstrin/5  Left hamstrin/5  Right anterior tibial: 4/5  Left anterior tibial: 4/5  Right peroneal: 4/5  Left peroneal: 4/5  Right gastroc: 4/5  Left gastroc: 4/5    REFLEXES     Reflexes   Right brachioradialis: 2+  Left brachioradialis: 2+  Right biceps: 2+  Left biceps: 2+  Right triceps: 2+  Left triceps: 2+  Right patellar: 3+  Left patellar: 3+    SENSORY EXAM   Right arm light touch: felt heavier than left   Right leg light touch: felt heavier than left   Right leg vibration: decreased from knee  Left leg vibration: decreased from knee    GAIT AND COORDINATION     Tremor   Resting tremor: absent       Gait not assessed     Significant Labs:   Hemoglobin A1c: No results for input(s): HGBA1C in the last 720 hours.  Blood Culture: No results for input(s): LABBLOO in the last 48 " hours.  CBC:   Recent Labs  Lab 03/13/17 2022 03/14/17  0435   WBC 6.87 6.49   HGB 13.6* 12.1*   HCT 40.5 36.8*    224     CMP:   Recent Labs  Lab 03/13/17 2022 03/14/17  0435   * 121*    142   K 4.0 4.1    105   CO2 30* 26   BUN 11 13   CREATININE 0.9 0.8   CALCIUM 8.8 8.4*   MG  --  2.0   PROT 6.6  --    ALBUMIN 3.4*  --    BILITOT 0.2  --    ALKPHOS 74  --    AST 22  --    ALT 18  --    ANIONGAP 6* 11   EGFRNONAA >60.0 >60.0     Inflammatory Markers: No results for input(s): SEDRATE, CRP, PROCAL in the last 48 hours.  Urine Culture: No results for input(s): LABURIN in the last 48 hours.  Urine Studies:   Recent Labs  Lab 03/13/17  3910   COLORU Yellow   APPEARANCEUA Hazy*   PHUR 7.0   SPECGRAV 1.020   PROTEINUA Negative   GLUCUA Negative   KETONESU Negative   BILIRUBINUA Negative   OCCULTUA Negative   NITRITE Positive*   UROBILINOGEN Negative   LEUKOCYTESUR 2+*   RBCUA 3   WBCUA 19*   BACTERIA Many*     All pertinent lab results from the past 24 hours have been reviewed.    Significant Imaging: I have reviewed and interpreted all pertinent imaging results/findings within the past 24 hours.     MRI Brain W WO contrast (12/16/16):   Continued scattered foci of T2/flair signal abnormality supra-and infratentorial parenchyma remains concerning for mild/moderate degree of prior demyelinating plaque burden. No evidence for new lesion or enhancing lesion to suggest interval or active demyelination.

## 2017-03-14 NOTE — ASSESSMENT & PLAN NOTE
- UA showed positive nitrites, 2+ leukocytes, 19 WBCs, and many bacteria.  Culture pending.  - Will start patient on Rocephin.

## 2017-03-14 NOTE — PROGRESS NOTES
Patient Consulted by CTTS:     The following was discussed by the Tobacco Treatment Specialist:  ? Relevance of Quitting  ? Risk to Health  ? Long Term Risk  ? Risk for Others  ? Rewards of Quitting  ? Motivation Intervention to Quit          Pt was given verbal information and pamphlets on the program. He stated that he doesn't need to enroll. Declined patch as well

## 2017-03-14 NOTE — ASSESSMENT & PLAN NOTE
51 y.o. 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 Known to Dr. Finnegan and Lawanda Boyce in MS center. Exam remarkable for worsening of baseline weakness and labs remarkable for UTI. Presentation consistent with pseudo- flare.     Recommendations:  -Continue treating UTI  started on rocephin, primary team continuing pending urine Cx results  -No need to repeat MRI at this time, case discussed with Lawanda Boyce  -daily PT/OT while inpatient  rec home health on discharge   -Follow-up with MS center

## 2017-03-14 NOTE — PROVIDER PROGRESS NOTES - EMERGENCY DEPT.
Encounter Date: 3/13/2017    ED Physician Progress Notes        Physician Note:   I initially evaluated this patient and ordered workup while in intake. The patient will receive a full evaluation in an ED pod when space is available. Results will be followed by the ED pod team.     Time seen by provider: 7:50 PM    51 y.o. male with PMH significant for multiple sclerosis, followed here by Neurology; he presents to the ED today complaining of 1 week history of worsened pain to bilateral lower extremities and problems ambulating secondary to pain and weakness in his legs. He states he has had this pain and weakness chronically but notes worsening this past week. He is concerned it may be secondary to a new medication he began 2 weeks ago. He states he discussed this with his neurologist but was told to wait a while for his new medication to work. He denies CP or SOB. He denies otherwise any new neurologic concerns. He reports that he gets pain and numbness to the face and right eye whenever he has a flare, but this is unchanged for him. He reports he lives at home and has difficulty caring for himself. Denies head trauma or acute injuries. He states his neurologist today advised him to come today to the ED for further evaluation.

## 2017-03-14 NOTE — SUBJECTIVE & OBJECTIVE
Interval History: Patient requesting pain medications. Family requesting pain addiction resources for patient. Psuedo flare in setting of UTI. Continue rocephin pending urine cultures.     Review of Systems   Constitutional: Negative for chills and fatigue.   Respiratory: Negative for cough and shortness of breath.    Cardiovascular: Negative for chest pain and palpitations.   Gastrointestinal: Negative for abdominal pain and nausea.   Genitourinary: Positive for dysuria.   Musculoskeletal: Positive for arthralgias and myalgias.   Neurological: Positive for weakness and headaches.     Objective:     Vital Signs (Most Recent):  Temp: 98.2 °F (36.8 °C) (03/14/17 0800)  Pulse: 62 (03/14/17 0800)  Resp: 17 (03/14/17 0800)  BP: 132/75 (03/14/17 0800)  SpO2: 97 % (03/14/17 0800) Vital Signs (24h Range):  Temp:  [96.5 °F (35.8 °C)-98.3 °F (36.8 °C)] 98.2 °F (36.8 °C)  Pulse:  [62-86] 62  Resp:  [16-18] 17  SpO2:  [97 %-100 %] 97 %  BP: (119-180)/(70-83) 132/75     Weight: 76.7 kg (169 lb)  Body mass index is 23.57 kg/(m^2).  No intake or output data in the 24 hours ending 03/14/17 1133   Physical Exam   Constitutional: He is oriented to person, place, and time. No distress.   Eyes: EOM are normal.   Cardiovascular: Normal rate and regular rhythm.    No murmur heard.  Pulmonary/Chest: Effort normal and breath sounds normal. No respiratory distress. He has no wheezes.   Abdominal: Soft. Bowel sounds are normal.   Musculoskeletal: He exhibits no edema.   Neurological: He is alert and oriented to person, place, and time.   BLE weakness   Skin: Skin is warm and dry.   Psychiatric: He has a normal mood and affect.   Slurred speech, slow responses   Nursing note and vitals reviewed.      Significant Labs:   BMP:   Recent Labs  Lab 03/14/17  0435   *      K 4.1      CO2 26   BUN 13   CREATININE 0.8   CALCIUM 8.4*   MG 2.0     CBC:   Recent Labs  Lab 03/13/17 2022 03/14/17  0435   WBC 6.87 6.49   HGB 13.6* 12.1*    HCT 40.5 36.8*    224     Urine Culture: No results for input(s): LABURIN in the last 48 hours.  All pertinent labs within the past 24 hours have been reviewed.    Significant Imaging: I have reviewed all pertinent imaging results/findings within the past 24 hours.

## 2017-03-14 NOTE — PT/OT/SLP EVAL
Physical Therapy  Evaluation    Mao Levin   MRN: [id number]   Admitting Diagnosis: Urinary tract infection without hematuria    PT Received On: 17  PT Start Time: 928     PT Stop Time: 957    PT Total Time (min): 29 min       Billable Minutes:  Evaluation 29    Diagnosis: Urinary tract infection without hematuria      Past Medical History:   Diagnosis Date    Multiple sclerosis       History reviewed. No pertinent surgical history.    Referring physician: Wes Looney MD  Date referred to PT: 3/14/2017    General Precautions: Standard, fall  Orthopedic Precautions:     Braces: AFO ((L) LE AFO)            Patient History:  Lives With: alone  Living Arrangements: house  Home Accessibility: stairs to enter home  Home Layout: Able to live on 1st floor  Number of Stairs to Enter Home: 3  Stair Railings at Home: outside, present on left side  Living Environment Comment: Unsure if pt. will have any assistance upon discharge  Equipment Currently Used at Home: bath bench, rollator      Previous Level of Function:  Ambulation Skills: needs device  Transfer Skills: needs device  ADL Skills: needs device  Work/Leisure Activity: needs device    Subjective:  Communicated with nursing prior to session.    Chief Complaint: (B) LE, lower back, and (R) side of his head/face pain  Patient goals: to go home    Pain Ratin/10   Location - Side: Bilateral     Location: leg (lower back and (R) side of head/face)  Pain Addressed: Pre-medicate for activity, Reposition, Cessation of Activity, Nurse notified  Pain Rating Post-Intervention: 8/10    Objective:   Patient found with: peripheral IV     Cognitive Exam:  Oriented to: Person, Place, Time and Situation    Follows Commands/attention: Follows multistep  commands  Communication: clear/fluent  Safety awareness/insight to disability: intact    Physical Exam:  Postural examination/scapula alignment: Abnormal trunk flexion    Skin integrity: Visible skin intact  Edema: None  noted     Sensation:   Intact    Upper Extremity Range of Motion:  Right Upper Extremity: WFL  Left Upper Extremity: WFL    Upper Extremity Strength:  Right Upper Extremity: WFL  Left Upper Extremity: WFL    Lower Extremity Range of Motion:  Right Lower Extremity: WFL  Left Lower Extremity: WFL    Lower Extremity Strength:  Right Lower Extremity: WFL  Left Lower Extremity: WFL except impaired DF (L) ankle     Fine motor coordination:  Intact    Gross motor coordination: WFL    Functional Mobility:  Bed Mobility:  Rolling/Turning Right: Minimum assistance  Scooting/Bridging: Minimum Assistance  Supine to Sit: Minimum Assistance    Transfers:  Sit <> Stand Assistance: Contact Guard Assistance  Sit <> Stand Assistive Device: 4 wheeled walker  Bed <> Chair Technique: Stand Pivot  Bed <> Chair Assistance: Contact Guard Assistance  Bed <> Chair Assistive Device: 4 wheeled walker    Gait:   Gait Distance: ~20'  Assistance 1: Contact Guard Assistance  Gait Assistive Device: Rollator    Stairs:      Balance:   Static Sit: FAIR-: Maintains without assist but inconsistent   Dynamic Sit: FAIR: Cannot move trunk without losing balance  Static Stand: FAIR: Maintains without assist but unable to take challenges  Dynamic stand: FAIR: Needs CONTACT GUARD during gait    Therapeutic Activities and Exercises:      AM-PAC 6 CLICK MOBILITY  How much help from another person does this patient currently need?   1 = Unable, Total/Dependent Assistance  2 = A lot, Maximum/Moderate Assistance  3 = A little, Minimum/Contact Guard/Supervision  4 = None, Modified [place]/Independent    Turning over in bed (including adjusting bedclothes, sheets and blankets)?: 3  Sitting down on and standing up from a chair with arms (e.g., wheelchair, bedside commode, etc.): 3  Moving from lying on back to sitting on the side of the bed?: 3  Moving to and from a bed to a chair (including a wheelchair)?: 3  Need to walk in hospital room?: 3  Climbing 3-5  steps with a railing?: 2  Total Score: 17     AM-PAC Raw Score CMS G-Code Modifier Level of Impairment Assistance   6 % Total / Unable   7 - 9 CM 80 - 100% Maximal Assist   10 - 14 CL 60 - 80% Moderate Assist   15 - 19 CK 40 - 60% Moderate Assist   20 - 22 CJ 20 - 40% Minimal Assist   23 CI 1-20% SBA / CGA   24 CH 0% Independent/ Mod I     Patient left seated on EOB with all lines intact and call button in reach.    Assessment:   Mao Levin is a 51 y.o. male with a medical diagnosis of Urinary tract infection without hematuria and presents with deconditioning. Pt. cooperative and tolerated treatment well. Pt. would benefit from continued PT to increase strength/endurance and improve functional mobility.    Rehab identified problem list/impairments: Rehab identified problem list/impairments: weakness, impaired endurance, impaired self care skills, impaired functional mobilty, gait instability, impaired balance, decreased lower extremity function    Rehab potential is good.    Activity tolerance: Fair    Discharge recommendations: Discharge Facility/Level Of Care Needs: home health PT     Barriers to discharge: Barriers to Discharge: Inaccessible home environment, Decreased caregiver support    Equipment recommendations: Equipment Needed After Discharge: none     GOALS:   Physical Therapy Goals        Problem: Physical Therapy Goal    Goal Priority Disciplines Outcome Goal Variances Interventions   Physical Therapy Goal     PT/OT, PT Ongoing (interventions implemented as appropriate)     Description:  Goals to be met by: 3/28/2017     Patient will increase functional independence with mobility by performin. Supine to sit with Set-up [place]  2. Sit to supine with Set-up [place]  3. Sit to stand transfer with Supervision  4. Bed to chair transfer with Supervision using Rolling Walker  5. Gait  x 100 feet with Supervision using Rolling Walker.   6. Ascend/descend 3 stair with left Handrails  Stand-by Assistance.                 PLAN:    Patient to be seen 4 x/week to address the above listed problems via gait training, therapeutic activities, therapeutic exercises  Plan of Care expires: 04/13/17  Plan of Care reviewed with: patient    Functional Assessment Tool Used: AM-PAC  Score: 17  Functional Limitation: Mobility: Walking and moving around  Mobility: Walking and Moving Around Current Status (): HUMPHREY  Mobility: Walking and Moving Around Goal Status (): HUMPHREY Genao, PT  03/14/2017

## 2017-03-14 NOTE — ASSESSMENT & PLAN NOTE
- UA showed positive nitrites, 2+ leukocytes, 19 WBCs, and many bacteria.  Culture pending.  - Continue rocephin pending cultures.

## 2017-03-14 NOTE — ASSESSMENT & PLAN NOTE
-Neurology consulted, psuedoflare in setting of UTI.  - Continue home dose oxycodone 15mg q4h PRN pain, gabapentin 600mg BID and 900mg qHS, dantrolene 75mg TID, Valium 5mg BID PRN muscle spasms, and Celexa 20mg daily.  Supportive care.  Will start bowel regimen.  - Fall precautions, ambulate with assistance, PT/OT suggest home health.  - [place]  reviewed.  -Family requesting addiction resources for outpatient.

## 2017-03-14 NOTE — CONSULTS
[facility]  Neurology  Consult Note    Patient Name: Mao Levin  MRN: [id number]  Admission Date: 3/13/2017  Hospital Length of Stay: 0 days  Code Status: Full Code   Attending Provider: Wes Looney MD   Consulting Provider: Osiris Saenz PA-C  Primary Care Physician: Primary Doctor No  Principal Problem:Urinary tract infection without hematuria    Inpatient consult to neurology  Consult performed by: OSIRIS SAENZ.  Consult ordered by: WES LOONEY         Subjective:     Chief Complaint:  MS     HPI:   51 y.o. 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 Patient follows with Dr. Finnegan and Lawanda Boyce PA-C in MS center. Last visit was with Lawanda 3/6/17 at which time plan was to repeat MRIs in August 2017, get Rituxan safety labs, consider baclofen pump for spasticity and outpatient PT at [facility].     At baseline, patient ambulates around the house with a rollator. Over the past week, ambulating has been more difficult due to pain, which patient believes may be related to recently started Rituxan. He reports difficulty with ADLs and some double vision. Upon presentation to ED, infectious workup +UTI (UA with nitrite, 2 leuks, 19 WBC, many bacteria).     Past Medical History:   Diagnosis Date    Multiple sclerosis      History reviewed. No pertinent surgical history.    Review of patient's allergies indicates:  No Known Allergies    Current Neurological Medications: baclofen, celexa, dantrolene, valium, gabapentin, rituxan, trazodone    No current facility-administered medications on file prior to encounter.      Current Outpatient Prescriptions on File Prior to Encounter   Medication Sig    baclofen (LIORESAL) 20 MG tablet Take 1 tablet (20 mg total) by mouth 4 (four) times daily.    carbamazepine (TEGRETOL XR) 400 MG Tb12 Take  1 tablet (400 mg total) by mouth 2 (two) times daily.    citalopram (CELEXA) 20 MG tablet Take 1 tablet (20 mg total) by mouth once daily.    dantrolene (DANTRIUM) 50 MG Cap Take 1 capsule (50 mg total) by mouth 4 (four) times daily.    diazePAM (VALIUM) 5 MG tablet Take 1 tablet (5 mg total) by mouth 2 (two) times daily as needed (muscle spasms).    ergocalciferol (VITAMIN D2) 50,000 unit Cap Take 1 capsule (50,000 Units total) by mouth every 7 days.    gabapentin (NEURONTIN) 600 MG tablet Take 1 Q AM plus 1 Q Day in the early afternoon plus 1.5 (900mg) Q HS (Patient taking differently: Take 1 tablet by mouth every morning and in the early afternoon then take 1½ tablets (900 mg) at bedtime)    oxybutynin (DITROPAN-XL) 5 MG TR24 Take 5 mg by mouth once daily.    oxycodone (ROXICODONE) 15 MG Tab Take 1 tablet (15 mg total) by mouth 3 (three) times daily as needed.    polyethylene glycol (GLYCOLAX) 17 gram/dose powder Take 17 g by mouth once daily.    rivaroxaban (XARELTO) 20 mg Tab Take 1 tablet (20 mg total) by mouth daily with dinner or evening meal.    senna-docusate 8.6-50 mg (PERICOLACE) 8.6-50 mg per tablet Take 1 tablet by mouth 2 (two) times daily.    trazodone (DESYREL) 100 MG tablet Take 100 mg by mouth every evening.     Family History     Problem Relation (Age of Onset)    Arthritis Mother    No Known Problems Father        Social History Main Topics    Smoking status: Current Some Day Smoker     Packs/day: 0.50     Types: Cigarettes    Smokeless tobacco: Never Used    Alcohol use No    Drug use: No    Sexual activity: Not on file     Review of Systems   Constitutional: Positive for activity change and fatigue. Negative for chills and fever.   HENT: Negative for voice change.    Eyes: Positive for visual disturbance. Negative for photophobia and pain.   Respiratory: Negative for cough and shortness of breath.    Cardiovascular: Negative for chest pain.   Gastrointestinal: Negative for  "nausea and vomiting.   Genitourinary: Positive for difficulty urinating. Negative for dysuria and hematuria.   Musculoskeletal: Positive for arthralgias, back pain, gait problem and myalgias.   Neurological: Positive for weakness and numbness. Negative for dizziness, seizures, syncope, facial asymmetry and speech difficulty.     Objective:     Vital Signs (Most Recent):  Temp: 98.2 °F (36.8 °C) (03/14/17 0800)  Pulse: 62 (03/14/17 0800)  Resp: 17 (03/14/17 0800)  BP: 132/75 (03/14/17 0800)  SpO2: 97 % (03/14/17 0800) Vital Signs (24h Range):  Temp:  [96.5 °F (35.8 °C)-98.3 °F (36.8 °C)] 98.2 °F (36.8 °C)  Pulse:  [62-86] 62  Resp:  [16-18] 17  SpO2:  [97 %-100 %] 97 %  BP: (119-180)/(70-83) 132/75     Weight: 76.7 kg (169 lb)  Body mass index is 23.57 kg/(m^2).    Physical Exam   Constitutional: He appears well-developed and well-nourished.   HENT:   Head: Normocephalic and atraumatic.   Eyes: EOM are normal. Pupils are equal, round, and reactive to light.   Neck: Normal range of motion.   Pulmonary/Chest: Effort normal.   Neurological:   Reflex Scores:       Tricep reflexes are 2+ on the right side and 2+ on the left side.       Bicep reflexes are 2+ on the right side and 2+ on the left side.       Brachioradialis reflexes are 2+ on the right side and 2+ on the left side.       Patellar reflexes are 3+ on the right side and 3+ on the left side.  Psychiatric: His speech is normal.     NEUROLOGICAL EXAMINATION:     MENTAL STATUS   Oriented to city.   Disoriented to time. Disoriented to day. Oriented to year and month.   Follows 3 step commands.   Attention: normal. Concentration: normal.   Speech: speech is normal   Level of consciousness: alert    CRANIAL NERVES     CN III, IV, VI   Pupils are equal, round, and reactive to light.  Extraocular motions are normal.   CN III: no CN III palsy  CN VI: no CN VI palsy  Nystagmus: none     CN V   Right facial sensation deficit: right V1-V2 feels "heavier" than left     CN " VII   Facial expression full, symmetric.   Right facial weakness: none  Left facial weakness: none    CN VIII   CN VIII normal.     CN XII   CN XII normal.     MOTOR EXAM   Muscle bulk: normal  Overall muscle tone: increased  Right arm pronator drift: absent  Left arm pronator drift: absent  Right leg tone: spastic  Left leg tone: spastic    Strength   Right deltoid: 4/5  Left deltoid: 4/5  Right biceps: 5/5  Left biceps: 5/5  Right triceps: 4/5  Left triceps: 4/5  Right iliopsoas: 4/5  Left iliopsoas: 3/5  Right quadriceps: 4/5  Left quadriceps: 4/5  Right hamstrin/5  Left hamstrin/5  Right anterior tibial: 4/5  Left anterior tibial: 4/5  Right peroneal: 4/5  Left peroneal: 4/5  Right gastroc: 4/5  Left gastroc: 4/5    REFLEXES     Reflexes   Right brachioradialis: 2+  Left brachioradialis: 2+  Right biceps: 2+  Left biceps: 2+  Right triceps: 2+  Left triceps: 2+  Right patellar: 3+  Left patellar: 3+    SENSORY EXAM   Right arm light touch: felt heavier than left   Right leg light touch: felt heavier than left   Right leg vibration: decreased from knee  Left leg vibration: decreased from knee    GAIT AND COORDINATION     Tremor   Resting tremor: absent       Gait not assessed     Significant Labs:   Hemoglobin A1c: No results for input(s): HGBA1C in the last 720 hours.  Blood Culture: No results for input(s): LABBLOO in the last 48 hours.  CBC:   Recent Labs  Lab 17  0435   WBC 6.87 6.49   HGB 13.6* 12.1*   HCT 40.5 36.8*    224     CMP:   Recent Labs  Lab 17  0435   * 121*    142   K 4.0 4.1    105   CO2 30* 26   BUN 11 13   CREATININE 0.9 0.8   CALCIUM 8.8 8.4*   MG  --  2.0   PROT 6.6  --    ALBUMIN 3.4*  --    BILITOT 0.2  --    ALKPHOS 74  --    AST 22  --    ALT 18  --    ANIONGAP 6* 11   EGFRNONAA >60.0 >60.0     Inflammatory Markers: No results for input(s): SEDRATE, CRP, PROCAL in the last 48 hours.  Urine Culture: No results for  input(s): LABURIN in the last 48 hours.  Urine Studies:   Recent Labs  Lab 03/13/17  2159   COLORU Yellow   APPEARANCEUA Hazy*   PHUR 7.0   SPECGRAV 1.020   PROTEINUA Negative   GLUCUA Negative   KETONESU Negative   BILIRUBINUA Negative   OCCULTUA Negative   NITRITE Positive*   UROBILINOGEN Negative   LEUKOCYTESUR 2+*   RBCUA 3   WBCUA 19*   BACTERIA Many*     All pertinent lab results from the past 24 hours have been reviewed.    Significant Imaging: I have reviewed and interpreted all pertinent imaging results/findings within the past 24 hours.     MRI Brain W WO contrast (12/16/16):   Continued scattered foci of T2/flair signal abnormality supra-and infratentorial parenchyma remains concerning for mild/moderate degree of prior demyelinating plaque burden. No evidence for new lesion or enhancing lesion to suggest interval or active demyelination.    Assessment and Plan:     MS (multiple sclerosis)  51 y.o. 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 Known to Dr. Finnegan and Lawanda Boyce in MS center. Exam remarkable for worsening of baseline weakness and labs remarkable for UTI. Presentation consistent with pseudo- flare.     Recommendations:  -Continue treating UTI  started on rocephin, primary team continuing pending urine Cx results  -No need to repeat MRI at this time, case discussed with Lawanda Boyce  -daily PT/OT while inpatient  rec home health on discharge   -Follow-up with MS center     VTE Risk Mitigation         Ordered     rivaroxaban tablet 20 mg  With dinner     Route:  Oral        03/14/17 0045     High Risk of VTE  Once      03/14/17 0045     Place sequential compression device  Until discontinued      03/14/17 0045     Reason for No Pharmacological VTE Prophylaxis  Once      03/14/17 0045     Place GRIFFIN hose  Until discontinued       03/14/17 0045        Dr. Lei attestation to follow   Thank you for your consult. Please call with any questions/clarifications    Osiris Saenz PA-C  General Neurology Consult  Neuro Consult Spectralink # [id number]    I have personally taken the history and examined the patient and agree with the PA's note as stated above.    Isac Lei MD, [place], FAAN  Department of Neurology   [facility] [place], [place]

## 2017-03-14 NOTE — SUBJECTIVE & OBJECTIVE
Past Medical History:   Diagnosis Date    Multiple sclerosis        History reviewed. No pertinent surgical history.    Review of patient's allergies indicates:  No Known Allergies    No current facility-administered medications on file prior to encounter.      Current Outpatient Prescriptions on File Prior to Encounter   Medication Sig    baclofen (LIORESAL) 20 MG tablet Take 1 tablet (20 mg total) by mouth 4 (four) times daily.    carbamazepine (TEGRETOL XR) 400 MG Tb12 Take 1 tablet (400 mg total) by mouth 2 (two) times daily.    citalopram (CELEXA) 20 MG tablet Take 1 tablet (20 mg total) by mouth once daily.    dantrolene (DANTRIUM) 50 MG Cap Take 1 capsule (50 mg total) by mouth 4 (four) times daily.    diazePAM (VALIUM) 5 MG tablet Take 1 tablet (5 mg total) by mouth 2 (two) times daily as needed (muscle spasms).    ergocalciferol (VITAMIN D2) 50,000 unit Cap Take 1 capsule (50,000 Units total) by mouth every 7 days.    gabapentin (NEURONTIN) 600 MG tablet Take 1 Q AM plus 1 Q Day in the early afternoon plus 1.5 (900mg) Q HS (Patient taking differently: Take 1 tablet by mouth every morning and in the early afternoon then take 1½ tablets (900 mg) at bedtime)    oxybutynin (DITROPAN-XL) 5 MG TR24 Take 5 mg by mouth once daily.    oxycodone (ROXICODONE) 15 MG Tab Take 1 tablet (15 mg total) by mouth 3 (three) times daily as needed.    polyethylene glycol (GLYCOLAX) 17 gram/dose powder Take 17 g by mouth once daily.    rivaroxaban (XARELTO) 20 mg Tab Take 1 tablet (20 mg total) by mouth daily with dinner or evening meal.    senna-docusate 8.6-50 mg (PERICOLACE) 8.6-50 mg per tablet Take 1 tablet by mouth 2 (two) times daily.    trazodone (DESYREL) 100 MG tablet Take 100 mg by mouth every evening.     Family History     Problem Relation (Age of Onset)    Arthritis Mother    No Known Problems Father        Social History Main Topics    Smoking status: Current Some Day Smoker     Packs/day: 0.50      Types: Cigarettes    Smokeless tobacco: Never Used    Alcohol use No    Drug use: No    Sexual activity: Not on file     Review of Systems   Constitutional: Negative for activity change, appetite change, chills, diaphoresis, fatigue, fever and unexpected weight change.   HENT: Negative for congestion, rhinorrhea, sore throat, trouble swallowing and voice change.    Eyes: Negative for visual disturbance.        Chronic intermittent blurred vision (unchanged)   Respiratory: Negative for cough, choking, chest tightness, shortness of breath and wheezing.    Cardiovascular: Negative for chest pain, palpitations and leg swelling.   Gastrointestinal: Negative for abdominal distention, abdominal pain, anal bleeding, blood in stool, constipation, diarrhea, nausea and vomiting.   Endocrine: Negative for cold intolerance, heat intolerance, polydipsia and polyuria.   Genitourinary: Positive for frequency. Negative for dysuria, flank pain, hematuria and urgency.   Musculoskeletal: Positive for arthralgias and myalgias. Negative for back pain and joint swelling.   Skin: Negative for color change and rash.   Neurological: Positive for weakness and headaches. Negative for dizziness, seizures, syncope, facial asymmetry, speech difficulty, light-headedness and numbness.   Hematological: Negative for adenopathy. Does not bruise/bleed easily.   Psychiatric/Behavioral: Negative for agitation, confusion, hallucinations and suicidal ideas.     Objective:     Vital Signs (Most Recent):  Temp: 98.3 °F (36.8 °C) (03/13/17 2337)  Pulse: 77 (03/13/17 2337)  Resp: 18 (03/13/17 2337)  BP: 119/75 (03/13/17 2337)  SpO2: 97 % (03/13/17 2337) Vital Signs (24h Range):  Temp:  [97.9 °F (36.6 °C)-98.3 °F (36.8 °C)] 98.3 °F (36.8 °C)  Pulse:  [66-86] 77  Resp:  [16-18] 18  SpO2:  [97 %-99 %] 97 %  BP: (119-180)/(75-83) 119/75     Weight: 76.7 kg (169 lb)  Body mass index is 23.57 kg/(m^2).    Physical Exam   Constitutional: He is oriented to person,  place, and time. He appears well-developed and well-nourished. No distress.   HENT:   Head: Normocephalic and atraumatic.   Eyes: Pupils are equal, round, and reactive to light.   Neck: Neck supple. Carotid bruit is not present. No thyromegaly present.   Cardiovascular: Normal rate and regular rhythm.  Exam reveals no gallop.    No murmur heard.  Pulmonary/Chest: Effort normal and breath sounds normal. No respiratory distress. He has no wheezes.   Abdominal: Bowel sounds are normal. He exhibits no distension. There is no splenomegaly or hepatomegaly. There is no tenderness.   Musculoskeletal: Normal range of motion. He exhibits no edema.   Neurological: He is alert and oriented to person, place, and time.   BLE weakness   Skin: Skin is warm and dry. No rash noted.   Psychiatric: He has a normal mood and affect. His behavior is normal.        Significant Labs: All pertinent labs within the past 24 hours have been reviewed.    Significant Imaging: I have reviewed all pertinent imaging results/findings within the past 24 hours.

## 2017-03-14 NOTE — ASSESSMENT & PLAN NOTE
- Will consult neurology.  - Continue home dose oxycodone 15mg q4h PRN pain, gabapentin 600mg BID and 900mg qHS, dantrolene 75mg TID, Valium 5mg BID PRN muscle spasms, and Celexa 20mg daily.  Supportive care.  Will start bowel regimen.  - Fall precautions, ambulate with assistance, PT/OT and SW consult.  - [place]  reviewed.

## 2017-03-14 NOTE — ED TRIAGE NOTES
Complains of difficulty ambulating, vision blurred, generalized pain, and fatigue x2wks since new medication for MS has been started.

## 2017-03-14 NOTE — PLAN OF CARE
Future Appointments  Date Time Provider Department Center   4/4/2017 11:15 AM Pura Solitario, PT [facility]   7/17/2017 9:40 AM Mattie Finnegan MD [facility] [employer] [address] [address] AT [facility]  [address]  [place] [address]  Phone: [phone] Fax: [phone]        Payor: [employer]ICARE / Plan: [employer] MEDICARE HMO / Product Type: Capitation /        03/14/17 1429   Discharge Assessment   Assessment Type Discharge Planning Assessment   Confirmed/corrected address and phone number on facesheet? Yes   Assessment information obtained from? Patient   Expected Length of Stay (days) 2   Prior to hospitilization cognitive status: Alert/Oriented   Prior to hospitalization functional status: Assistive Equipment   Current cognitive status: Alert/Oriented   Current Functional Status: Assistive Equipment   Arrived From home or self-care   Lives With alone   Able to Return to Prior Arrangements yes   Is patient able to care for self after discharge? Unable to determine at this time (comments)   Who are your caregiver(s) and their phone number(s)? Myesha Pack  significant other [phone]    Patient's perception of discharge disposition home health   Patient currently receives home health services? No   Patient currently receives any other outside agency services? No   Equipment Currently Used at Home bath bench;rollator   Does the patient have transportation to healthcare appointments? Yes   Transportation Available family or friend will provide   On Dialysis? No   Discharge Plan A Home Health   Patient/Family In Agreement With Plan yes     Attempted to give patient addiction resources requested by his mother however patient denies needing resource information.  Patient requesting not to include his mother in his medical decisions.

## 2017-03-14 NOTE — PLAN OF CARE
Problem: Physical Therapy Goal  Goal: Physical Therapy Goal  Goals to be met by: 3/28/2017     Patient will increase functional independence with mobility by performin. Supine to sit with Set-up Geary  2. Sit to supine with Set-up Geary  3. Sit to stand transfer with Supervision  4. Bed to chair transfer with Supervision using Rolling Walker  5. Gait x 100 feet with Supervision using Rolling Walker.   6. Ascend/descend 3 stair with left Handrails Stand-by Assistance.   Outcome: Ongoing (interventions implemented as appropriate)  Goals set

## 2017-03-14 NOTE — H&P
[facility] Hospital Medicine  History & Physical    Patient Name: Mao Levin  MRN: [id number]  Admission Date: 3/13/2017  Attending Physician: Wes Looney MD   Primary Care Provider: Primary Doctor [facility] Medicine Team: [facility] MED E Laisha Barrett PA-C     Patient information was obtained from patient, past medical records and ER records.     Subjective:     Principal Problem:Chronic pain of multiple sites    Chief Complaint:   Chief Complaint   Patient presents with    Pain     reports increased generalized pain x1 day which has lead him to be non-ambulatory. history of MS        HPI: Patient is a 51 year old gentleman with a h/o multiple sclerosis, saddle PE on chronic anticoagulation therapy, and chronic pain.  He presents with one week of worsening pain to his BLE.  He is having difficulty ambulating and states he has fallen at home multiple times.  He states that he has chronic pain and weakness, but that this is worse.  He notes that he began a new medications about two weeks ago (has had two doses) and this that this is causing his increase in pain.  He denies any pain and numbness to his face and right eye, which is what usually occurs when he has a flare.  He denies chest pain, SOB, dizziness, palpitations, fever/chills, N/V/D.  He lives at home alone and is having difficulty completing his ADLs.  He uses a walker at baseline.    Past Medical History:   Diagnosis Date    Multiple sclerosis        History reviewed. No pertinent surgical history.    Review of patient's allergies indicates:  No Known Allergies    No current facility-administered medications on file prior to encounter.      Current Outpatient Prescriptions on File Prior to Encounter   Medication Sig    baclofen (LIORESAL) 20 MG tablet Take 1 tablet (20 mg total) by mouth 4 (four) times daily.    carbamazepine (TEGRETOL XR) 400 MG Tb12 Take 1 tablet (400 mg total) by mouth 2 (two) times daily.     citalopram (CELEXA) 20 MG tablet Take 1 tablet (20 mg total) by mouth once daily.    dantrolene (DANTRIUM) 50 MG Cap Take 1 capsule (50 mg total) by mouth 4 (four) times daily.    diazePAM (VALIUM) 5 MG tablet Take 1 tablet (5 mg total) by mouth 2 (two) times daily as needed (muscle spasms).    ergocalciferol (VITAMIN D2) 50,000 unit Cap Take 1 capsule (50,000 Units total) by mouth every 7 days.    gabapentin (NEURONTIN) 600 MG tablet Take 1 Q AM plus 1 Q Day in the early afternoon plus 1.5 (900mg) Q HS (Patient taking differently: Take 1 tablet by mouth every morning and in the early afternoon then take 1½ tablets (900 mg) at bedtime)    oxybutynin (DITROPAN-XL) 5 MG TR24 Take 5 mg by mouth once daily.    oxycodone (ROXICODONE) 15 MG Tab Take 1 tablet (15 mg total) by mouth 3 (three) times daily as needed.    polyethylene glycol (GLYCOLAX) 17 gram/dose powder Take 17 g by mouth once daily.    rivaroxaban (XARELTO) 20 mg Tab Take 1 tablet (20 mg total) by mouth daily with dinner or evening meal.    senna-docusate 8.6-50 mg (PERICOLACE) 8.6-50 mg per tablet Take 1 tablet by mouth 2 (two) times daily.    trazodone (DESYREL) 100 MG tablet Take 100 mg by mouth every evening.     Family History     Problem Relation (Age of Onset)    Arthritis Mother    No Known Problems Father        Social History Main Topics    Smoking status: Current Some Day Smoker     Packs/day: 0.50     Types: Cigarettes    Smokeless tobacco: Never Used    Alcohol use No    Drug use: No    Sexual activity: Not on file     Review of Systems   Constitutional: Negative for activity change, appetite change, chills, diaphoresis, fatigue, fever and unexpected weight change.   HENT: Negative for congestion, rhinorrhea, sore throat, trouble swallowing and voice change.    Eyes: Negative for visual disturbance.        Chronic intermittent blurred vision (unchanged)   Respiratory: Negative for cough, choking, chest tightness, shortness of  breath and wheezing.    Cardiovascular: Negative for chest pain, palpitations and leg swelling.   Gastrointestinal: Negative for abdominal distention, abdominal pain, anal bleeding, blood in stool, constipation, diarrhea, nausea and vomiting.   Endocrine: Negative for cold intolerance, heat intolerance, polydipsia and polyuria.   Genitourinary: Positive for frequency. Negative for dysuria, flank pain, hematuria and urgency.   Musculoskeletal: Positive for arthralgias and myalgias. Negative for back pain and joint swelling.   Skin: Negative for color change and rash.   Neurological: Positive for weakness and headaches. Negative for dizziness, seizures, syncope, facial asymmetry, speech difficulty, light-headedness and numbness.   Hematological: Negative for adenopathy. Does not bruise/bleed easily.   Psychiatric/Behavioral: Negative for agitation, confusion, hallucinations and suicidal ideas.     Objective:     Vital Signs (Most Recent):  Temp: 98.3 °F (36.8 °C) (03/13/17 2337)  Pulse: 77 (03/13/17 2337)  Resp: 18 (03/13/17 2337)  BP: 119/75 (03/13/17 2337)  SpO2: 97 % (03/13/17 2337) Vital Signs (24h Range):  Temp:  [97.9 °F (36.6 °C)-98.3 °F (36.8 °C)] 98.3 °F (36.8 °C)  Pulse:  [66-86] 77  Resp:  [16-18] 18  SpO2:  [97 %-99 %] 97 %  BP: (119-180)/(75-83) 119/75     Weight: 76.7 kg (169 lb)  Body mass index is 23.57 kg/(m^2).    Physical Exam   Constitutional: He is oriented to person, place, and time. He appears well-developed and well-nourished. No distress.   HENT:   Head: Normocephalic and atraumatic.   Eyes: Pupils are equal, round, and reactive to light.   Neck: Neck supple. Carotid bruit is not present. No thyromegaly present.   Cardiovascular: Normal rate and regular rhythm.  Exam reveals no gallop.    No murmur heard.  Pulmonary/Chest: Effort normal and breath sounds normal. No respiratory distress. He has no wheezes.   Abdominal: Bowel sounds are normal. He exhibits no distension. There is no splenomegaly  or hepatomegaly. There is no tenderness.   Musculoskeletal: Normal range of motion. He exhibits no edema.   Neurological: He is alert and oriented to person, place, and time.   BLE weakness   Skin: Skin is warm and dry. No rash noted.   Psychiatric: He has a normal mood and affect. His behavior is normal.        Significant Labs: All pertinent labs within the past 24 hours have been reviewed.    Significant Imaging: I have reviewed all pertinent imaging results/findings within the past 24 hours.    Assessment/Plan:     * Chronic pain of multiple sites  - Will consult neurology.  - Continue home dose oxycodone 15mg q4h PRN pain, gabapentin 600mg BID and 900mg qHS, dantrolene 75mg TID, Valium 5mg BID PRN muscle spasms, and Celexa 20mg daily.  Supportive care.  Will start bowel regimen.  - Fall precautions, ambulate with assistance, PT/OT and SW consult.    Impaired mobility and ADLs  - Will consult PT/OT, SW.  - Fall precautions, ambulate with assistance.      Urinary tract infection without hematuria  - UA showed positive nitrites, 2+ leukocytes, 19 WBCs, and many bacteria.  Culture pending.  - Will start patient on Rocephin.      MS (multiple sclerosis)  - Will consult neurology and defer to them for further work-up.      Neurogenic bladder  - Continue Ditropan XL 5mg daily.      10/25/2016 Saddle PE  - Continue Xarelto 20mg qHS.      VTE Risk Mitigation         Ordered     rivaroxaban tablet 20 mg  With dinner     Route:  Oral        03/14/17 0045     High Risk of VTE  Once      03/14/17 0045     Place sequential compression device  Until discontinued      03/14/17 0045     Reason for No Pharmacological VTE Prophylaxis  Once      03/14/17 0045     Place GRIFFIN hose  Until discontinued      03/14/17 0045        Laisha Barrett PA-C  Department of Hospital Medicine   [facility]

## 2017-03-14 NOTE — NURSING TRANSFER
Nursing Transfer Note      3/14/2017     Patient arrives to OBS unit via wheelchair escorted by ED nurse with personal belongings. Patient ambulates with max assistance from wheelchair to hospital bed. patient oriented to call bell. Bed lowered with wheels locked.

## 2017-03-14 NOTE — ED PROVIDER NOTES
Encounter Date: 3/13/2017    SCRIBE #1 NOTE: I, Abhijaky Scott, am scribing for, and in the presence of, MATHEW Xiong.   SCRIBE #2 NOTE: I, Gladis Mora, am scribing for, and in the presence of,  Dr. Giles. I have scribed the following portions of the note - the APC attestation.     History     Chief Complaint   Patient presents with    Pain     reports increased generalized pain x1 day which has lead him to be non-ambulatory. history of MS     Review of patient's allergies indicates:  No Known Allergies  HPI   Time seen by provider: 7:50 PM    51 y.o. male with PMH significant for multiple sclerosis, followed here by Neurology; he presents to the ED today complaining of 1 week history of worsened pain to bilateral lower extremities and problems ambulating secondary to pain and weakness in his legs. He states he has had this pain and weakness chronically but notes worsening this past week. He is concerned it may be secondary to a new medication he began 2 weeks ago. He states he discussed this with his neurologist but was told to wait a while for his new medication to work. He denies CP or SOB. He denies otherwise any new neurologic concerns. He reports that he gets pain and numbness to the face and right eye whenever he has a flare, but this is unchanged for him. He reports he lives at home and has difficulty caring for himself. Denies head trauma or acute injuries. He states his neurologist today advised him to come today to the ED for further evaluation.    History is provided by the patient and medical records.     Past Medical History:   Diagnosis Date    Multiple sclerosis      History reviewed. No pertinent surgical history.  Family History   Problem Relation Age of Onset    Arthritis Mother     No Known Problems Father      Social History   Substance Use Topics    Smoking status: Current Some Day Smoker     Packs/day: 0.50     Types: Cigarettes    Smokeless tobacco: Never Used    Alcohol use No      Review of Systems  Constitutional: Negative for chills and fever.   HENT: Negative for congestion and sinus pressure.    Eyes: Positive for intermittent blurred vision which pt states is baseline.   Respiratory: Negative for cough, chest tightness and shortness of breath.    Cardiovascular: Negative for chest pain.    Gastrointestinal: Negative for abdominal pain, diarrhea, nausea and vomiting.   Genitourinary: Positive for urinary frequency (pt states this is chronic and unchanged). Negative for flank pain. Negative for dysuria and flank pain.   Musculoskeletal: Positive for arthralgias and myalgias.   Skin: Negative for rash and wound.   Neurological: Positive for headache. Positive for generalized weakness. Negative for dizziness. Negative for numbness.   Psychiatric/Behavioral: Negative for confusion.     Physical Exam   Initial Vitals   BP Pulse Resp Temp SpO2   03/13/17 1538 03/13/17 1538 03/13/17 1538 03/13/17 1538 03/13/17 1538   141/83 66 16 97.9 °F (36.6 °C) 99 %     Physical Exam  Nursing note and vitals reviewed.  Constitutional: Patient appears chronically ill, not diaphoretic. No distress.   Head: Normocephalic and atraumatic.   Eyes: Conjunctivae are normal. No scleral icterus.   Neck: Normal range of motion. Neck supple.   Cardiovascular: Normal rate, regular rhythm and normal heart sounds.   Pulmonary/Chest: Breath sounds normal. No respiratory distress.  Abdominal: Soft. There is no tenderness.   Musculoskeletal: Pt endorses generalized arthralgias to bilateral lower extremities. There is no joint deformity. There is no swelling, erythema, or warmth noted. No calf swelling or calf tenderness. No bony spinal tenderness. He has guarded ROM secondary to pain.  Neurological: Alert and oriented to person, place, and time.  Weakness is appreciated to bilateral lower extremities which is consistent with pt's complaint.   Skin: Skin is warm and dry. No rash noted. No erythema. No pallor.   Psychiatric:  Normal mood and affect. Thought content normal.     ED Course   Procedures  Labs Reviewed   CBC W/ AUTO DIFFERENTIAL - Abnormal; Notable for the following:        Result Value    Hemoglobin 13.6 (*)     All other components within normal limits   COMPREHENSIVE METABOLIC PANEL - Abnormal; Notable for the following:     CO2 30 (*)     Glucose 113 (*)     Albumin 3.4 (*)     Anion Gap 6 (*)     All other components within normal limits   URINALYSIS   URINALYSIS MICROSCOPIC             Medical Decision Making:   History:   Old Medical Records: I decided to obtain old medical records.  Initial Assessment:   51 y.o. male presenting to the ED today complaining of 1 week of worsening bilateral lower extremity pain and weakness. Pt lives alone and is having trouble performing his ADLs. He has been in contact with his neurologist's office who advised him to come to the ED for evaluation. He will be admitted to Internal Medicine for further evaluation and treatment. He was given a dose of Oxycodone IR in the ED. Labs are pending. He is in stable condition.   Clinical Tests:   Lab Tests: Reviewed and Ordered  Other:   I have discussed this case with another health care provider.            Scribe Attestation:   Scribe #1: I performed the above scribed service and the documentation accurately describes the services I performed. I attest to the accuracy of the note.    Attending Attestation:     Physician Attestation Statement for NP/PA:   I discussed this assessment and plan of this patient with the NP/PA, but I did not personally examine the patient. The face to face encounter was performed by the NP/PA.    Other NP/PA Attestation Additions:      Medical Decision Makin y.o. Male with a history of MS presents for evaluation of worsening gait instability and frequent falls as well as increased pain for the past several days duration.        Physician Attestation for Scribe:  Physician Attestation Statement for Scribe #1: I,  Mariella Proctor, GERTRUDIS-C, reviewed documentation, as scribed by Abhi Scott in my presence, and it is both accurate and complete.   Physician Attestation Statement for Scribe #2: I, Dr. Giles, reviewed documentation, as scribed by Gladis Mora in my presence, and it is both accurate and complete. I also acknowledge and confirm the content of the note done by Scribe #1.              ED Course     Clinical Impression:   The primary encounter diagnosis was Bilateral leg pain. Diagnoses of Weakness of both lower extremities and Multiple sclerosis were also pertinent to this visit.    Disposition:   Disposition: Placed in Observation       Mariella Proctor NP  03/13/17 0245

## 2017-03-14 NOTE — PROGRESS NOTES
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  He presents with one week of worsening pain to his BLE.  He is having difficulty ambulating and states he has fallen at home multiple times.  He states that he has chronic pain and weakness, but that this is worse.  He notes that he began a new medications about two weeks ago (has had two doses) and this that this is causing his increase in pain.  He denies any pain and numbness to his face and right eye, which is what usually occurs when he has a flare.  He denies chest pain, SOB, dizziness, palpitations, fever/chills, N/V/D.  He lives at home alone and is having difficulty completing his ADLs.  He uses a walker at baseline.    Hospital Course:  Patient placed into observation for evaluation of lower extremity weakness and pain. Patient found to have UTI on admission with no neurologic focal changes. Patient started on rocephin. PT/OT recommending home health. Neurology consulted, felt it was a MS psuedo flare in setting of UTI. Additional imaging not completed at this time per neurology.     Interval History: Patient requesting pain medications. Family requesting pain addiction resources for patient. Psuedo flare in setting of UTI. Continue rocephin pending urine cultures.     Review of Systems   Constitutional: Negative for chills and fatigue.   Respiratory: Negative for cough and shortness of breath.    Cardiovascular: Negative for chest pain and palpitations.    Gastrointestinal: Negative for abdominal pain and nausea.   Genitourinary: Positive for dysuria.   Musculoskeletal: Positive for arthralgias and myalgias.   Neurological: Positive for weakness and headaches.     Objective:     Vital Signs (Most Recent):  Temp: 98.2 °F (36.8 °C) (03/14/17 0800)  Pulse: 62 (03/14/17 0800)  Resp: 17 (03/14/17 0800)  BP: 132/75 (03/14/17 0800)  SpO2: 97 % (03/14/17 0800) Vital Signs (24h Range):  Temp:  [96.5 °F (35.8 °C)-98.3 °F (36.8 °C)] 98.2 °F (36.8 °C)  Pulse:  [62-86] 62  Resp:  [16-18] 17  SpO2:  [97 %-100 %] 97 %  BP: (119-180)/(70-83) 132/75     Weight: 76.7 kg (169 lb)  Body mass index is 23.57 kg/(m^2).  No intake or output data in the 24 hours ending 03/14/17 1133   Physical Exam   Constitutional: He is oriented to person, place, and time. No distress.   Eyes: EOM are normal.   Cardiovascular: Normal rate and regular rhythm.    No murmur heard.  Pulmonary/Chest: Effort normal and breath sounds normal. No respiratory distress. He has no wheezes.   Abdominal: Soft. Bowel sounds are normal.   Musculoskeletal: He exhibits no edema.   Neurological: He is alert and oriented to person, place, and time.   BLE weakness   Skin: Skin is warm and dry.   Psychiatric: He has a normal mood and affect.   Slurred speech, slow responses   Nursing note and vitals reviewed.      Significant Labs:   BMP:   Recent Labs  Lab 03/14/17  0435   *      K 4.1      CO2 26   BUN 13   CREATININE 0.8   CALCIUM 8.4*   MG 2.0     CBC:   Recent Labs  Lab 03/13/17 2022 03/14/17  0435   WBC 6.87 6.49   HGB 13.6* 12.1*   HCT 40.5 36.8*    224     Urine Culture: No results for input(s): LABURIN in the last 48 hours.  All pertinent labs within the past 24 hours have been reviewed.    Significant Imaging: I have reviewed all pertinent imaging results/findings within the past 24 hours.    Assessment/Plan:      * Urinary tract infection without hematuria  - UA showed positive nitrites,  2+ leukocytes, 19 WBCs, and many bacteria.  Culture pending.  - Continue rocephin pending cultures.      MS (multiple sclerosis)  -Neurology consulted, pseudoflare in setting of UTI.       Chronic pain of multiple sites  -Neurology consulted, psuedoflare in setting of UTI.  - Continue home dose oxycodone 15mg q4h PRN pain, gabapentin 600mg BID and 900mg qHS, dantrolene 75mg TID, Valium 5mg BID PRN muscle spasms, and Celexa 20mg daily.  Supportive care.  Will start bowel regimen.  - Fall precautions, ambulate with assistance, PT/OT suggest home health.  - LA  reviewed.  -Family requesting addiction resources for outpatient.    Neurogenic bladder  - Continue Ditropan XL 5mg daily.      10/25/2016 Saddle PE  -Continue Xarelto 20mg qHS.      VTE Risk Mitigation         Ordered     rivaroxaban tablet 20 mg  With dinner     Route:  Oral        03/14/17 0045     High Risk of VTE  Once      03/14/17 0045     Place sequential compression device  Until discontinued      03/14/17 0045     Reason for No Pharmacological VTE Prophylaxis  Once      03/14/17 0045     Place GRIFFIN hose  Until discontinued      03/14/17 0045          Jim Galarza PA-C  Department of Hospital Medicine   [facility]

## 2017-03-15 LAB
BASOPHILS # BLD AUTO: 0.05 K/UL
BASOPHILS NFR BLD: 0.8 %
DIFFERENTIAL METHOD: ABNORMAL
EOSINOPHIL # BLD AUTO: 0.2 K/UL
EOSINOPHIL NFR BLD: 3.3 %
ERYTHROCYTE [DISTWIDTH] IN BLOOD BY AUTOMATED COUNT: 14 %
HCT VFR BLD AUTO: 38 %
HGB BLD-MCNC: 12.9 G/DL
LYMPHOCYTES # BLD AUTO: 3.3 K/UL
LYMPHOCYTES NFR BLD: 51.7 %
MCH RBC QN AUTO: 29.7 PG
MCHC RBC AUTO-ENTMCNC: 33.9 %
MCV RBC AUTO: 87 FL
MONOCYTES # BLD AUTO: 0.4 K/UL
MONOCYTES NFR BLD: 6.7 %
NEUTROPHILS # BLD AUTO: 2.4 K/UL
NEUTROPHILS NFR BLD: 37.3 %
PLATELET # BLD AUTO: 203 K/UL
PMV BLD AUTO: 10.6 FL
RBC # BLD AUTO: 4.35 M/UL
WBC # BLD AUTO: 6.4 K/UL

## 2017-03-15 PROCEDURE — 36415 COLL VENOUS BLD VENIPUNCTURE: CPT

## 2017-03-15 PROCEDURE — 85025 COMPLETE CBC W/AUTO DIFF WBC: CPT

## 2017-03-15 PROCEDURE — 25000003 PHARM REV CODE 250: Performed by: PHYSICIAN ASSISTANT

## 2017-03-15 PROCEDURE — 63600175 PHARM REV CODE 636 W HCPCS: Performed by: PHYSICIAN ASSISTANT

## 2017-03-15 PROCEDURE — G8988 SELF CARE GOAL STATUS: HCPCS | Mod: CI

## 2017-03-15 PROCEDURE — 97535 SELF CARE MNGMENT TRAINING: CPT

## 2017-03-15 PROCEDURE — 97530 THERAPEUTIC ACTIVITIES: CPT

## 2017-03-15 PROCEDURE — 99226 PR SUBSEQUENT OBSERVATION CARE,LEVEL III: CPT | Mod: ,,, | Performed by: PHYSICIAN ASSISTANT

## 2017-03-15 PROCEDURE — G8987 SELF CARE CURRENT STATUS: HCPCS | Mod: CL

## 2017-03-15 PROCEDURE — G0378 HOSPITAL OBSERVATION PER HR: HCPCS

## 2017-03-15 PROCEDURE — 97167 OT EVAL HIGH COMPLEX 60 MIN: CPT

## 2017-03-15 RX ADMIN — STANDARDIZED SENNA CONCENTRATE AND DOCUSATE SODIUM 1 TABLET: 8.6; 5 TABLET, FILM COATED ORAL at 09:03

## 2017-03-15 RX ADMIN — OXYCODONE HYDROCHLORIDE 15 MG: 5 TABLET ORAL at 02:03

## 2017-03-15 RX ADMIN — OXYBUTYNIN CHLORIDE 5 MG: 5 TABLET, EXTENDED RELEASE ORAL at 08:03

## 2017-03-15 RX ADMIN — TRAZODONE HYDROCHLORIDE 100 MG: 50 TABLET ORAL at 08:03

## 2017-03-15 RX ADMIN — DANTROLENE SODIUM 50 MG: 50 CAPSULE ORAL at 05:03

## 2017-03-15 RX ADMIN — CARBAMAZEPINE 400 MG: 200 TABLET, EXTENDED RELEASE ORAL at 08:03

## 2017-03-15 RX ADMIN — GABAPENTIN 900 MG: 300 CAPSULE ORAL at 08:03

## 2017-03-15 RX ADMIN — DANTROLENE SODIUM 50 MG: 50 CAPSULE ORAL at 12:03

## 2017-03-15 RX ADMIN — STANDARDIZED SENNA CONCENTRATE AND DOCUSATE SODIUM 1 TABLET: 8.6; 5 TABLET, FILM COATED ORAL at 08:03

## 2017-03-15 RX ADMIN — OXYCODONE HYDROCHLORIDE 15 MG: 5 TABLET ORAL at 04:03

## 2017-03-15 RX ADMIN — DIAZEPAM 5 MG: 5 TABLET ORAL at 02:03

## 2017-03-15 RX ADMIN — BACLOFEN 20 MG: 10 TABLET ORAL at 05:03

## 2017-03-15 RX ADMIN — OXYCODONE HYDROCHLORIDE 15 MG: 5 TABLET ORAL at 06:03

## 2017-03-15 RX ADMIN — GABAPENTIN 600 MG: 300 CAPSULE ORAL at 05:03

## 2017-03-15 RX ADMIN — CITALOPRAM HYDROBROMIDE 20 MG: 20 TABLET ORAL at 08:03

## 2017-03-15 RX ADMIN — CEFTRIAXONE 1 G: 1 INJECTION, SOLUTION INTRAVENOUS at 02:03

## 2017-03-15 RX ADMIN — RIVAROXABAN 20 MG: 20 TABLET, FILM COATED ORAL at 05:03

## 2017-03-15 RX ADMIN — OXYCODONE HYDROCHLORIDE 15 MG: 5 TABLET ORAL at 08:03

## 2017-03-15 RX ADMIN — BACLOFEN 20 MG: 10 TABLET ORAL at 12:03

## 2017-03-15 NOTE — PLAN OF CARE
Problem: Occupational Therapy Goal  Goal: Occupational Therapy Goal  Goals to be met by 3/22/2017:    1. Feed self mod indep level, including all set up  2. Transfer bed-BSC with CG A  3. Toilet self with min a  4. LB dressing with mod a  5. Supine to sit with min a  Outcome: Ongoing (interventions implemented as appropriate)  eval completed and goals established'  GISEL Vasquez  3/15/2017  [phone]

## 2017-03-15 NOTE — SUBJECTIVE & OBJECTIVE
Interval History: No events overnight. PT/OT now recommending SNF. CM/SW assisting with placement. Patient's family yesterday asked for assistance with opioid addiction, patient has no interest in pursing addiction services at this time. He asks that no one discuss his POC with his mother.     Review of Systems   Constitutional: Positive for activity change. Negative for chills and fatigue.   Eyes: Positive for visual disturbance.   Respiratory: Negative for cough and shortness of breath.    Cardiovascular: Negative for chest pain and palpitations.   Gastrointestinal: Negative for abdominal pain and nausea.   Genitourinary: Positive for difficulty urinating. Negative for dysuria.   Musculoskeletal: Positive for arthralgias and myalgias.   Neurological: Positive for weakness and headaches.     Objective:     Vital Signs (Most Recent):  Temp: 96.3 °F (35.7 °C) (03/15/17 0823)  Pulse: 60 (03/15/17 0823)  Resp: 18 (03/15/17 0400)  BP: 117/70 (03/15/17 0823)  SpO2: 99 % (03/15/17 0823) Vital Signs (24h Range):  Temp:  [96.3 °F (35.7 °C)-98.7 °F (37.1 °C)] 96.3 °F (35.7 °C)  Pulse:  [60-83] 60  Resp:  [17-18] 18  SpO2:  [96 %-100 %] 99 %  BP: (110-135)/(58-82) 117/70     Weight: 76.7 kg (169 lb)  Body mass index is 23.57 kg/(m^2).    Intake/Output Summary (Last 24 hours) at 03/15/17 1129  Last data filed at 03/15/17 0400   Gross per 24 hour   Intake                0 ml   Output              400 ml   Net             -400 ml      Physical Exam   Constitutional: He is oriented to person, place, and time. No distress.   Eyes: EOM are normal.   Cardiovascular: Normal rate and regular rhythm.    No murmur heard.  Pulmonary/Chest: Effort normal and breath sounds normal. No respiratory distress. He has no wheezes.   Abdominal: Soft. Bowel sounds are normal.   Musculoskeletal: He exhibits no edema.   Neurological: He is alert and oriented to person, place, and time.   BLE weakness   Skin: Skin is warm and dry.   Psychiatric: He  has a normal mood and affect.   Slurred speech, slow responses   Nursing note and vitals reviewed.      Significant Labs:   CBC:   Recent Labs  Lab 03/13/17 2022 03/14/17  0435 03/15/17  0448   WBC 6.87 6.49 6.40   HGB 13.6* 12.1* 12.9*   HCT 40.5 36.8* 38.0*    224 203     CMP:   Recent Labs  Lab 03/13/17 2022 03/14/17  0435    142   K 4.0 4.1    105   CO2 30* 26   * 121*   BUN 11 13   CREATININE 0.9 0.8   CALCIUM 8.8 8.4*   PROT 6.6  --    ALBUMIN 3.4*  --    BILITOT 0.2  --    ALKPHOS 74  --    AST 22  --    ALT 18  --    ANIONGAP 6* 11   EGFRNONAA >60.0 >60.0     Urine Culture:   Recent Labs  Lab 03/13/17 2159   LABURIN GRAM NEGATIVE LAURA>100,000 cfu/mlIdentification and susceptibility pending     All pertinent labs within the past 24 hours have been reviewed.    Significant Imaging: I have reviewed all pertinent imaging results/findings within the past 24 hours.

## 2017-03-15 NOTE — PLAN OF CARE
Met with patient to discuss therapy recommendations and patient would like [facility] prior to going home with home health.  [facility] consult placed for review.

## 2017-03-15 NOTE — ASSESSMENT & PLAN NOTE
-Neurology consulted, psuedoflare in setting of UTI.  - Continue home dose oxycodone 15mg q4h PRN pain, gabapentin 600mg BID and 900mg qHS, dantrolene 75mg TID, Valium 5mg BID PRN muscle spasms, and Celexa 20mg daily.  Supportive care.  Will start bowel regimen.  - Fall precautions, ambulate with assistance, PT/OT recc SNF  - LA  reviewed.  - Family requesting addiction resources for outpatient. Patient refuses

## 2017-03-15 NOTE — PLAN OF CARE
Problem: Patient Care Overview  Goal: Plan of Care Review  Outcome: Ongoing (interventions implemented as appropriate)  Rounding every 2 hours to reduce risk of falls. Repositioning frequently to reduce risk of pressure ulcer. Proper hand hygiene performed to reduce risk of infection.

## 2017-03-15 NOTE — PT/OT/SLP EVAL
"Occupational Therapy  Evaluation    Mao Levin   MRN: [id number]   Admitting Diagnosis: Urinary tract infection without hematuria    OT Date of Treatment: 03/15/17   OT Start Time: 0840  OT Stop Time:  9:35  OT total time:  55 min  Billable Minutes:  Evaluation 25  Self Care/Home Management 10  Therapeutic Activity 15    Diagnosis: Urinary tract infection without hematuria   Pt admitted with increased pain BLEs, frequent falls; dx with pseudo-flare up of his MS.    Past Medical History:   Diagnosis Date    Multiple sclerosis       History reviewed. No pertinent surgical history.    Referring physician: Dr. Wes Looney  Date referred to OT: 3/15/2017    General Precautions: Standard, fall  Orthopedic Precautions: N/A  Braces: N/A    Do you have any cultural, spiritual, Restorationism conflicts, given your current situation?: no issues     Patient History:  Living Environment  Lives With:  (pt lives alone in 1 story house with 3 VASILIY with a rail )  Equipment Currently Used at Home: bath bench, rollator    Prior level of function:   Bed Mobility/Transfers: independent (Pt needs rollator for self-care; sleeps in regular bed.  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  "I just can't believe this" (how difficult it is to just to feed self).  "I get so angry.  Then I wonder why because its not really them (staff)"  Pt frustrated over inability to complete adls with ease.  Chief Complaint: as above  Patient/Family stated goals: " complete all self care independently and be able to continue living alone    Pain Rating:  (no c/o of pain during eval)                   Objective:       Cognitive Exam:  Oriented to: Person, Place, Time and Situation  Follows Commands/attention: Follows one-step commands  Communication: dysarthria  Memory:  Mildly impaired STM  Safety awareness/insight to disability: intact; good insight into current situation (ie understands need for SNF) though may be unrealistic for pt to continue to live alone  Coping skills/emotional control: Appropriate to situation    Visual/perceptual:  Intact    Physical Exam:  Postural examination/scapula alignment: Rounded shoulder and Head forward  Skin integrity: Visible skin intact  Edema: None noted     Sensation:   Intact RUE; touch sensation feels diminished LUE but pt can feel everything    Upper Extremity Range of Motion:  Right Upper Extremity: AROM WFL though mvmt is bradykinetic  Left Upper Extremity: PROM WFL; AROM sh fl 90 deg, finger ext 0 deg    Upper Extremity Strength:  Right Upper Extremity: 4/5  Left Upper Extremity: 2/5 shoulder; 3/5 elbow/wrist; 3/5 finger fl; 0/5 finger ext   Strength: R  4/5; L 3/5  Tone:  Moderately severe flexor tone LUE  Foot drop LLE with no active dorsiflexion  Fine motor coordination:   Max impaired LUE; no functional use of L hand.  Minimally impaired R hand    Gross motor coordination: bradykinesia x4 ext    Functional Mobility:  Bed Mobility:  Rolling/Turning to Left: Minimum assistance  Rolling/Turning Right: Minimum assistance  Supine to Sit: Moderate Assistance    Transfers:  Sit <> Stand Assistance: Minimum Assistance  Sit <> Stand Assistive Device:  (rollator)  Bed <> Chair Technique: Stand Pivot  Bed <> Chair Transfer Assistance: Minimum Assistance  Bed <> Chair Assistive Device:  (rollator; OT had to physically move pt's L foot over one time for transfer; Pt  unable to reach back for chair and plopped in)    Functional  Ambulation: 3 steps to chair with rollator with min a    Activities of Daily Living:  Feeding Level of Assistance: Minimum assistance (required assist to get yogurt from last 1/3 of container; needs all containers opened and meat cut up)    UE Dressing Level of Assistance: Minimum assistance (don gown)    LE Dressing Level of Assistance: Maximum assistance (doff R sock with sup; total a don/doff L sock and don R sock)       Grooming Level of Assistance: Minimum assistance  Toileting Where Assessed: Bed level  Toileting Level of Assistance: Moderate assistance (pt incontinent of urine)            Balance:   Static Sit: POOR+: Needs MINIMAL assist to maintain  Dynamic Sit: FAIR: Cannot move trunk without losing balance  Static Stand: POOR: Needs MODERATE assist to maintain  Dynamic stand: POOR: N/A    Therapeutic Activities and Exercises:  -Worked on sitting balance on eob.  Pt required mod a for first 1-2 min on eob, decreasing to min a.  Decreased to CG A only after OT positioned rollator in front of him to hold on to.  -Collaborated with PA (Bert) regarding pt's current level of function and SNF recommendation; called the PT that completed pt's eval yesterday (Timothy Genao) and collaborated regarding decrease in function since PT eval and SNF recommendation.  OT called PA back and PA stated that a PT will see pt again today to update recommendations.  -Worked on toileting with pt; he was unable to manage urinal without spilling.  Pt was found incontinent of urine.  Pt states he is having trouble knowing when he needs to go and holding it.  -Worked on self feeding.  OT placed yogurt in a different bowl and he was then able to feed self.  Feeding is slow and difficult.    AM-PAC 6 CLICK ADL  How much help from another person does this patient currently need?  1 = Unable, Total/Dependent Assistance  2 = A lot, Maximum/Moderate Assistance  3 = A little, Minimum/Contact Guard/Supervision  4 = None, Modified  "[place]/Independent    Putting on and taking off regular lower body clothing? : 2  Bathing (including washing, rinsing, drying)?: 2  Toileting, which includes using toilet, bedpan, or urinal? : 2  Putting on and taking off regular upper body clothing?: 2  Taking care of personal grooming such as brushing teeth?: 3  Eating meals?: 3  Total Score: 14    AM-PAC Raw Score CMS "G-Code Modifier Level of Impairment Assistance   6 % Total / Unable   7 - 9 CM 80 - 100% Maximal Assist   10 - 14 CL 60 - 80% Moderate Assist   15 - 19 CK 40 - 60% Moderate Assist   20 - 22 CJ 20 - 40% Minimal Assist   23 CI 1-20% SBA / CGA   24 CH 0% Independent/ Mod I       Patient left up in chair with all lines intact and call button in reach    Assessment:  Mao Levin is a 51 y.o. male with a medical diagnosis of Urinary tract infection without hematuria and flare up of MS.  The pt is currently at min a to max a level self-care 2' to abnormal tone LUE, impaired arom LUE/LLE, impaired strength BUE/BLEs, impaired sitting balance, impaired standing balance and posture, lack of active finger ext L hand, non-functional L hand, impaired sensation LUE/LLE, L foot drop.  The pt was previously mod indep self-care and lives alone.  Highly recommend SNF after discharge to achieve mod indep level self-care.  Pt to be seen by OT to increase self-care indep.    Rehab identified problem list/impairments: Rehab identified problem list/impairments: weakness, impaired endurance, impaired self care skills, impaired functional mobilty, gait instability, impaired balance, decreased upper extremity function, decreased lower extremity function, decreased coordination, decreased ROM, abnormal tone, impaired coordination, impaired fine motor, impaired joint extensibility    Rehab potential is good.    Activity tolerance: Good    Discharge recommendations: Discharge Facility/Level Of Care Needs: nursing facility, skilled     Barriers to discharge: Barriers " to Discharge: Inaccessible home environment, Decreased caregiver support    Equipment recommendations: wheelchair     GOALS:   Occupational Therapy Goals        Problem: Occupational Therapy Goal    Goal Priority Disciplines Outcome Interventions   Occupational Therapy Goal     OT, PT/OT Ongoing (interventions implemented as appropriate)    Description:  Goals to be met by 3/22/2017:    1. Feed self mod indep level, including all set up  2.  Transfer bed-BSC with CG A  3. Toilet self with min a  4.  LB dressing with mod a  5. Supine to sit with min a              PLAN:  Patient to be seen 5 x/week to address the above listed problems via self-care/home management, therapeutic activities, therapeutic exercises  Plan of Care expires: 04/15/17  Plan of Care reviewed with: patient         GISEL Quintana  03/15/2017

## 2017-03-15 NOTE — PROGRESS NOTES
[facility] Hospital Medicine  Progress Note    Patient Name: Mao Levin  MRN: [id number]  Patient Class: OP- Observation   Admission Date: 3/13/2017  Length of Stay: 0 days  Attending Physician: Wes Looney MD  Primary Care Provider: Primary Doctor [facility] Medicine Team: [facility] MED E Jim Galarza PA-C    Subjective:     Principal Problem:Urinary tract infection without hematuria    HPI:  Patient is a 51 year old gentleman with a h/o multiple sclerosis, saddle PE on chronic anticoagulation therapy, and chronic pain.  He presents with one week of worsening pain to his BLE.  He is having difficulty ambulating and states he has fallen at home multiple times.  He states that he has chronic pain and weakness, but that this is worse.  He notes that he began a new medications about two weeks ago (has had two doses) and this that this is causing his increase in pain.  He denies any pain and numbness to his face and right eye, which is what usually occurs when he has a flare.  He denies chest pain, SOB, dizziness, palpitations, fever/chills, N/V/D.  He lives at home alone and is having difficulty completing his ADLs.  He uses a walker at baseline.    Hospital Course:  Patient placed into observation for evaluation of lower extremity weakness and pain in setting of MS. Patient found to have UTI on admission . Patient started on rocephin. PT/OT recommending SNF. Neurology consulted, felt symptoms were a MS psuedo flare in setting of UTI. Additional imaging not completed at this time per neurology.     Interval History: No events overnight. PT/OT now recommending SNF. CM/SW assisting with placement. Patient's family yesterday asked for assistance with opioid addiction, patient has no interest in pursing addiction services at this time. He asks that no one discuss his POC with his mother.     Review of Systems   Constitutional: Positive for activity change. Negative for chills and fatigue.   Eyes:  Positive for visual disturbance.   Respiratory: Negative for cough and shortness of breath.    Cardiovascular: Negative for chest pain and palpitations.   Gastrointestinal: Negative for abdominal pain and nausea.   Genitourinary: Positive for difficulty urinating. Negative for dysuria.   Musculoskeletal: Positive for arthralgias and myalgias.   Neurological: Positive for weakness and headaches.     Objective:     Vital Signs (Most Recent):  Temp: 96.3 °F (35.7 °C) (03/15/17 0823)  Pulse: 60 (03/15/17 0823)  Resp: 18 (03/15/17 0400)  BP: 117/70 (03/15/17 0823)  SpO2: 99 % (03/15/17 0823) Vital Signs (24h Range):  Temp:  [96.3 °F (35.7 °C)-98.7 °F (37.1 °C)] 96.3 °F (35.7 °C)  Pulse:  [60-83] 60  Resp:  [17-18] 18  SpO2:  [96 %-100 %] 99 %  BP: (110-135)/(58-82) 117/70     Weight: 76.7 kg (169 lb)  Body mass index is 23.57 kg/(m^2).    Intake/Output Summary (Last 24 hours) at 03/15/17 1129  Last data filed at 03/15/17 0400   Gross per 24 hour   Intake                0 ml   Output              400 ml   Net             -400 ml      Physical Exam   Constitutional: He is oriented to person, place, and time. No distress.   Eyes: EOM are normal.   Cardiovascular: Normal rate and regular rhythm.    No murmur heard.  Pulmonary/Chest: Effort normal and breath sounds normal. No respiratory distress. He has no wheezes.   Abdominal: Soft. Bowel sounds are normal.   Musculoskeletal: He exhibits no edema.   Neurological: He is alert and oriented to person, place, and time.   BLE weakness   Skin: Skin is warm and dry.   Psychiatric: He has a normal mood and affect.   Slurred speech, slow responses   Nursing note and vitals reviewed.      Significant Labs:   CBC:   Recent Labs  Lab 03/13/17 2022 03/14/17  0435 03/15/17  0448   WBC 6.87 6.49 6.40   HGB 13.6* 12.1* 12.9*   HCT 40.5 36.8* 38.0*    224 203     CMP:   Recent Labs  Lab 03/13/17 2022 03/14/17  0435    142   K 4.0 4.1    105   CO2 30* 26   * 121*    BUN 11 13   CREATININE 0.9 0.8   CALCIUM 8.8 8.4*   PROT 6.6  --    ALBUMIN 3.4*  --    BILITOT 0.2  --    ALKPHOS 74  --    AST 22  --    ALT 18  --    ANIONGAP 6* 11   EGFRNONAA >60.0 >60.0     Urine Culture:   Recent Labs  Lab 03/13/17  2159   LABURIN GRAM NEGATIVE LAURA>100,000 cfu/mlIdentification and susceptibility pending     All pertinent labs within the past 24 hours have been reviewed.    Significant Imaging: I have reviewed all pertinent imaging results/findings within the past 24 hours.    Assessment/Plan:      * Urinary tract infection without hematuria  - UA showed positive nitrites, 2+ leukocytes, 19 WBCs, and many bacteria.  Culture pending.  - Continue rocephin pending cultures.    MS (multiple sclerosis)  -Neurology consulted, pseudoflare in setting of UTI.   - PT/OT recommending SNF    Chronic pain of multiple sites  -Neurology consulted, psuedoflare in setting of UTI.  - Continue home dose oxycodone 15mg q4h PRN pain, gabapentin 600mg BID and 900mg qHS, dantrolene 75mg TID, Valium 5mg BID PRN muscle spasms, and Celexa 20mg daily.  Supportive care.  Will start bowel regimen.  - Fall precautions, ambulate with assistance, PT/OT recc SNF  - LA  reviewed.  - Family requesting addiction resources for outpatient. Patient refuses    Neurogenic bladder  - Continue Ditropan XL 5mg daily.    Impaired mobility and ADLs  - Will consult PT/OT, .  - Fall precautions, ambulate with assistance.    10/25/2016 Saddle PE  -Continue Xarelto 20mg qHS.    VTE Risk Mitigation         Ordered     rivaroxaban tablet 20 mg  With dinner     Route:  Oral        03/14/17 0045     High Risk of VTE  Once      03/14/17 0045     Place sequential compression device  Until discontinued      03/14/17 0045     Reason for No Pharmacological VTE Prophylaxis  Once      03/14/17 0045     Place GRIFFIN hose  Until discontinued      03/14/17 0045          Jim Galarza PA-C  Department of Hospital Medicine   [facility]  [place]

## 2017-03-15 NOTE — PLAN OF CARE
Problem: Patient Care Overview  Goal: Plan of Care Review  Outcome: Ongoing (interventions implemented as appropriate)  Plan of care reviewed with patient and understanding verbalized. Fall reduction measures in place, bed in lowest position, wheels locked, upper side rails raised, call bell within reach. Patient assisted with repositioning. Pain assessment performed and PRN pain medication administered.

## 2017-03-15 NOTE — PT/OT/SLP PROGRESS
Physical Therapy  Treatment    Mao Levin   MRN: [id number]   Admitting Diagnosis: Urinary tract infection without hematuria    PT Received On: 03/15/17  PT Start Time: 1135     PT Stop Time: 1158    PT Total Time (min): 23 min       Billable Minutes:  Therapeutic Activity 23    Treatment Type: Treatment  PT/PTA: PT             General Precautions: Standard, fall  Orthopedic Precautions: N/A   Braces:           Subjective:  Communicated with nursing prior to session.      Pain Rating:  (pt. did not rate)                   Objective:   Patient found with: peripheral IV    Functional Mobility:  Bed Mobility:   Rolling/Turning Right: Stand by assistance, With side rail (required extra time)  Scooting/Bridging: Stand by Assistance, With side rail (required extra time)  Supine to Sit: Stand by Assistance, With side rail (required extra time)  Sit to Supine: Moderate Assistance, With leg lift    Transfers:  Sit <> Stand Assistance: Moderate Assistance  Sit <> Stand Assistive Device: No Assistive Device    Gait:   Gait Distance: static stand only    Stairs:      Balance:   Static Sit: FAIR-: Maintains without assist but inconsistent  (pt. had posterior LOB x1 and required assist to regain)  Dynamic Sit: FAIR+: Maintains balance through MINIMAL excursions of active trunk motion  Static Stand: POOR+: Needs MINIMAL assist to maintain      Therapeutic Activities and Exercises:  Pt. performed sitting/standing balance/tolerance along EOB. Discussed pt. discharge planning and need for SNF.    AM-PAC 6 CLICK MOBILITY  How much help from another person does this patient currently need?   1 = Unable, Total/Dependent Assistance  2 = A lot, Maximum/Moderate Assistance  3 = A little, Minimum/Contact Guard/Supervision  4 = None, Modified [place]/Independent    Turning over in bed (including adjusting bedclothes, sheets and blankets)?: 3  Sitting down on and standing up from a chair with arms (e.g., wheelchair, bedside commode, etc.):  2  Moving from lying on back to sitting on the side of the bed?: 3  Moving to and from a bed to a chair (including a wheelchair)?: 3  Need to walk in hospital room?: 3  Climbing 3-5 steps with a railing?: 1  Total Score: 15    AM-PAC Raw Score CMS G-Code Modifier Level of Impairment Assistance   6 % Total / Unable   7 - 9 CM 80 - 100% Maximal Assist   10 - 14 CL 60 - 80% Moderate Assist   15 - 19 CK 40 - 60% Moderate Assist   20 - 22 CJ 20 - 40% Minimal Assist   23 CI 1-20% SBA / CGA   24 CH 0% Independent/ Mod I     Patient left supine with all lines intact, call button in reach and nursing notified.    Assessment:  Mao Levin is a 51 y.o. male with a medical diagnosis of Urinary tract infection without hematuria and presents with incontinence and requiring increased assist with mobility with LOB x1 in sitting. Pt. cooperative and tolerated treatment well. Pt. would benefit from continued PT to increase strength/endurance and improve functional mobility.    Rehab identified problem list/impairments: Rehab identified problem list/impairments: weakness, impaired endurance, impaired self care skills, impaired functional mobilty, gait instability, impaired balance, decreased upper extremity function, decreased lower extremity function, decreased coordination, pain, impaired fine motor    Rehab potential is good.    Activity tolerance: Good    Discharge recommendations: Discharge Facility/Level Of Care Needs: nursing facility, skilled     Barriers to discharge: Barriers to Discharge: Inaccessible home environment, Decreased caregiver support    Equipment recommendations: Equipment Needed After Discharge: none     GOALS:   Physical Therapy Goals        Problem: Physical Therapy Goal    Goal Priority Disciplines Outcome Goal Variances Interventions   Physical Therapy Goal     PT/OT, PT Ongoing (interventions implemented as appropriate)     Description:  Goals to be met by: 3/28/2017     Patient will increase  functional independence with mobility by performin. Supine to sit with Set-up [place]  2. Sit to supine with Set-up [place]  3. Sit to stand transfer with Supervision  4. Bed to chair transfer with Supervision using Rolling Walker  5. Gait  x 100 feet with Supervision using Rolling Walker.   6. Ascend/descend 3 stair with left Handrails Stand-by Assistance.                 PLAN:    Patient to be seen 5 x/week  to address the above listed problems via gait training, therapeutic activities, therapeutic exercises, neuromuscular re-education  Plan of Care expires: 17  Plan of Care reviewed with: patient         Timothy WALTER Chadwick, PT  03/15/2017

## 2017-03-15 NOTE — PLAN OF CARE
Problem: Physical Therapy Goal  Goal: Physical Therapy Goal  Goals to be met by: 3/28/2017     Patient will increase functional independence with mobility by performin. Supine to sit with Set-up [place]  2. Sit to supine with Set-up [place]  3. Sit to stand transfer with Supervision  4. Bed to chair transfer with Supervision using Rolling Walker  5. Gait x 100 feet with Supervision using Rolling Walker.   6. Ascend/descend 3 stair with left Handrails Stand-by Assistance.    Outcome: Ongoing (interventions implemented as appropriate)  No goals met today

## 2017-03-16 ENCOUNTER — TELEPHONE (OUTPATIENT)
Dept: PHYSICAL MEDICINE AND REHAB | Facility: CLINIC | Age: 52
End: 2017-03-16

## 2017-03-16 LAB — BACTERIA UR CULT: NORMAL

## 2017-03-16 PROCEDURE — G0378 HOSPITAL OBSERVATION PER HR: HCPCS

## 2017-03-16 PROCEDURE — 97530 THERAPEUTIC ACTIVITIES: CPT

## 2017-03-16 PROCEDURE — 63600175 PHARM REV CODE 636 W HCPCS: Performed by: PHYSICIAN ASSISTANT

## 2017-03-16 PROCEDURE — 99226 PR SUBSEQUENT OBSERVATION CARE,LEVEL III: CPT | Mod: ,,, | Performed by: PHYSICIAN ASSISTANT

## 2017-03-16 PROCEDURE — G8979 MOBILITY GOAL STATUS: HCPCS | Mod: CK

## 2017-03-16 PROCEDURE — 25000003 PHARM REV CODE 250: Performed by: PHYSICIAN ASSISTANT

## 2017-03-16 PROCEDURE — 97116 GAIT TRAINING THERAPY: CPT

## 2017-03-16 PROCEDURE — 25500020 PHARM REV CODE 255: Performed by: HOSPITALIST

## 2017-03-16 PROCEDURE — G8980 MOBILITY D/C STATUS: HCPCS | Mod: CL

## 2017-03-16 PROCEDURE — A9585 GADOBUTROL INJECTION: HCPCS | Performed by: HOSPITALIST

## 2017-03-16 RX ORDER — CIPROFLOXACIN 500 MG/1
500 TABLET ORAL EVERY 12 HOURS
Status: DISCONTINUED | OUTPATIENT
Start: 2017-03-17 | End: 2017-03-17 | Stop reason: HOSPADM

## 2017-03-16 RX ORDER — GADOBUTROL 604.72 MG/ML
8 INJECTION INTRAVENOUS
Status: COMPLETED | OUTPATIENT
Start: 2017-03-16 | End: 2017-03-16

## 2017-03-16 RX ADMIN — DIAZEPAM 5 MG: 5 TABLET ORAL at 09:03

## 2017-03-16 RX ADMIN — STANDARDIZED SENNA CONCENTRATE AND DOCUSATE SODIUM 1 TABLET: 8.6; 5 TABLET, FILM COATED ORAL at 10:03

## 2017-03-16 RX ADMIN — DANTROLENE SODIUM 50 MG: 50 CAPSULE ORAL at 12:03

## 2017-03-16 RX ADMIN — STANDARDIZED SENNA CONCENTRATE AND DOCUSATE SODIUM 1 TABLET: 8.6; 5 TABLET, FILM COATED ORAL at 09:03

## 2017-03-16 RX ADMIN — DANTROLENE SODIUM 50 MG: 50 CAPSULE ORAL at 05:03

## 2017-03-16 RX ADMIN — POLYETHYLENE GLYCOL 3350 17 G: 17 POWDER, FOR SOLUTION ORAL at 09:03

## 2017-03-16 RX ADMIN — GABAPENTIN 900 MG: 300 CAPSULE ORAL at 10:03

## 2017-03-16 RX ADMIN — BACLOFEN 20 MG: 10 TABLET ORAL at 05:03

## 2017-03-16 RX ADMIN — GABAPENTIN 600 MG: 300 CAPSULE ORAL at 04:03

## 2017-03-16 RX ADMIN — OXYCODONE HYDROCHLORIDE 15 MG: 5 TABLET ORAL at 01:03

## 2017-03-16 RX ADMIN — CEFTRIAXONE 1 G: 1 INJECTION, SOLUTION INTRAVENOUS at 02:03

## 2017-03-16 RX ADMIN — GABAPENTIN 600 MG: 300 CAPSULE ORAL at 05:03

## 2017-03-16 RX ADMIN — GADOBUTROL 8 ML: 604.72 INJECTION INTRAVENOUS at 08:03

## 2017-03-16 RX ADMIN — CARBAMAZEPINE 400 MG: 200 TABLET, EXTENDED RELEASE ORAL at 10:03

## 2017-03-16 RX ADMIN — TRAZODONE HYDROCHLORIDE 100 MG: 50 TABLET ORAL at 10:03

## 2017-03-16 RX ADMIN — CITALOPRAM HYDROBROMIDE 20 MG: 20 TABLET ORAL at 09:03

## 2017-03-16 RX ADMIN — CARBAMAZEPINE 400 MG: 200 TABLET, EXTENDED RELEASE ORAL at 09:03

## 2017-03-16 RX ADMIN — OXYCODONE HYDROCHLORIDE 15 MG: 5 TABLET ORAL at 10:03

## 2017-03-16 RX ADMIN — BACLOFEN 20 MG: 10 TABLET ORAL at 12:03

## 2017-03-16 RX ADMIN — OXYBUTYNIN CHLORIDE 5 MG: 5 TABLET, EXTENDED RELEASE ORAL at 09:03

## 2017-03-16 RX ADMIN — OXYCODONE HYDROCHLORIDE 15 MG: 5 TABLET ORAL at 05:03

## 2017-03-16 RX ADMIN — RIVAROXABAN 20 MG: 20 TABLET, FILM COATED ORAL at 05:03

## 2017-03-16 RX ADMIN — OXYCODONE HYDROCHLORIDE 15 MG: 5 TABLET ORAL at 09:03

## 2017-03-16 NOTE — PLAN OF CARE
Call placed to Ila with [facility] to follow up on SNF referral.  Ila will discuss case with Dr. Mckeon and provide update shortly.

## 2017-03-16 NOTE — TELEPHONE ENCOUNTER
----- Message from Fausto Leung MD sent at 3/16/2017  2:56 PM CDT -----  Yes definitely.  Then want to see first if the discharging physician sent rxs to the pharmacy or gave paper ones.    ----- Message -----     From: Shu Corea MA     Sent: 3/16/2017   2:38 PM       To: Fausto Leung MD    He requested these refill earlier during the week. They are not due until tomorrow. Do you want to wait until he gets out of the hospital to refill.     ----- Message -----     From: Shu Corea MA     Sent: 3/9/2017  11:36 AM       To: Shu Corea MA    [facility] [place] [id number] [address] AT [facility]    ----- Message -----     From: Shu Corea MA     Sent: 3/9/2017  11:35 AM       To: Shu Corea MA    Patient is calling to request a refill on his diazePAM (VALIUM) 5 MG tablet, oxycodone (ROXICODONE) 15 MG Tab and trazodone (DESYREL) 100 MG tablet.

## 2017-03-16 NOTE — PROGRESS NOTES
[facility] Hospital Medicine  Progress Note    Patient Name: Mao Levin  MRN: [id number]  Patient Class: OP- Observation   Admission Date: 3/13/2017  Length of Stay: 0 days  Attending Physician: Wes Looney MD  Primary Care Provider: Primary Doctor [facility] Medicine Team: [facility]OSP MED E Jim Galarza PA-C    Subjective:     Principal Problem:Urinary tract infection without hematuria    HPI:  Patient is a 51 year old gentleman with a h/o multiple sclerosis, saddle PE on chronic anticoagulation therapy, and chronic pain.  He presents with one week of worsening pain to his BLE.  He is having difficulty ambulating and states he has fallen at home multiple times.  He states that he has chronic pain and weakness, but that this is worse.  He notes that he began a new medications about two weeks ago (has had two doses) and this that this is causing his increase in pain.  He denies any pain and numbness to his face and right eye, which is what usually occurs when he has a flare.  He denies chest pain, SOB, dizziness, palpitations, fever/chills, N/V/D.  He lives at home alone and is having difficulty completing his ADLs.  He uses a walker at baseline.    Hospital Course:  Patient placed into observation for evaluation of lower extremity weakness and pain in setting of MS. Patient found to have UTI on admission . Patient started on rocephin, transitioned to cipro per UCx. PT/OT recommending SNF. Neurology consulted, initially felt symptoms were a MS psuedo flare in setting of UTI, however, patient without much improvement to baseline during stay. MRI ordered to r/o flare.     Interval History: Patient continues to complain of weakness, does not feel he is at baseline, feels as though his weakness is worsening. Case discussed with neurology and MS team, will order MRI to r/o true MS flare.    Review of Systems   Constitutional: Positive for activity change. Negative for chills and fatigue.   Eyes:  Positive for visual disturbance.   Respiratory: Negative for cough and shortness of breath.    Cardiovascular: Negative for chest pain and palpitations.   Gastrointestinal: Negative for abdominal pain and nausea.   Genitourinary: Positive for difficulty urinating. Negative for dysuria.   Musculoskeletal: Positive for arthralgias and myalgias.   Neurological: Positive for weakness and headaches.     Objective:     Vital Signs (Most Recent):  Temp: 98.2 °F (36.8 °C) (03/16/17 1221)  Pulse: 71 (03/16/17 1221)  Resp: 18 (03/15/17 2030)  BP: (!) 118/56 (03/16/17 1221)  SpO2: 98 % (03/16/17 1221) Vital Signs (24h Range):  Temp:  [97.4 °F (36.3 °C)-98.4 °F (36.9 °C)] 98.2 °F (36.8 °C)  Pulse:  [67-79] 71  Resp:  [18] 18  SpO2:  [95 %-98 %] 98 %  BP: (110-131)/(56-69) 118/56     Weight: 76.7 kg (169 lb)  Body mass index is 23.57 kg/(m^2).  No intake or output data in the 24 hours ending 03/16/17 1332   Physical Exam   Constitutional: He is oriented to person, place, and time. No distress.   Eyes: EOM are normal.   Cardiovascular: Normal rate and regular rhythm.    No murmur heard.  Pulmonary/Chest: Effort normal and breath sounds normal. No respiratory distress. He has no wheezes.   Abdominal: Soft. Bowel sounds are normal.   Musculoskeletal: He exhibits no edema.   Neurological: He is alert and oriented to person, place, and time.   BLE weakness   Skin: Skin is warm and dry.   Psychiatric: He has a normal mood and affect.   Slow speech and responses   Nursing note and vitals reviewed.      Significant Labs: All pertinent labs within the past 24 hours have been reviewed.    Significant Imaging: I have reviewed all pertinent imaging results/findings within the past 24 hours.    Assessment/Plan:      * Urinary tract infection without hematuria  - Started on empiric rocephin, switch to cipro as cultures growing e.coli    MS (multiple sclerosis)  - Neurology consulted, pseudoflare in setting of UTI. Patient with continued  weakness, not much improvement since admission, case discussed with neurology and MRI ordered to r/o MS exacerbation   - PT/OT recommending SNF    Chronic pain of multiple sites  - Neurology consulted, psuedoflare in setting of UTI.  - Continue home dose oxycodone 15mg q4h PRN pain, gabapentin 600mg BID and 900mg qHS, dantrolene 75mg TID, Valium 5mg BID PRN muscle spasms, and Celexa 20mg daily.  Supportive care.  Will start bowel regimen.  - Fall precautions, ambulate with assistance, PT/OT recc SNF  - LA  reviewed.  - Family requesting addiction resources for outpatient. Patient refuses    Neurogenic bladder  - Continue Ditropan XL 5mg daily.    Impaired mobility and ADLs  - Will consult PT/OT, SW.  - Fall precautions, ambulate with assistance.    10/25/2016 Saddle PE  -Continue Xarelto 20mg qHS.    VTE Risk Mitigation         Ordered     rivaroxaban tablet 20 mg  With dinner     Route:  Oral        03/14/17 0045     High Risk of VTE  Once      03/14/17 0045     Place sequential compression device  Until discontinued      03/14/17 0045     Reason for No Pharmacological VTE Prophylaxis  Once      03/14/17 0045     Place GRIFFIN hose  Until discontinued      03/14/17 0045          Jim Galarza PA-C  Department of Hospital Medicine   [facility]

## 2017-03-16 NOTE — PLAN OF CARE
Problem: Physical Therapy Goal  Goal: Physical Therapy Goal  Goals to be met by: 3/28/2017     Patient will increase functional independence with mobility by performin. Supine to sit with Set-up [place]  2. Sit to supine with Set-up [place]  3. Sit to stand transfer with Supervision  4. Bed to chair transfer with Supervision using Rolling Walker  5. Gait x 100 feet with Supervision using Rolling Walker.   6. Ascend/descend 3 stair with left Handrails Stand-by Assistance.    Outcome: Ongoing (interventions implemented as appropriate)  Pt steff tx well with two sit <> stands and 6 steps.

## 2017-03-16 NOTE — PT/OT/SLP PROGRESS
Physical Therapy  Treatment    Mao Levin   MRN: [id number]   Admitting Diagnosis: Urinary tract infection without hematuria    PT Received On: 03/16/17  PT Start Time: 1420     PT Stop Time: 1513    PT Total Time (min): 53 min       Billable Minutes:  Gait [id number] min and Therapeutic Activity 23 min    Treatment Type: Treatment  PT/PTA: PTA     PTA Visit Number: 1       General Precautions: Standard, fall  Orthopedic Precautions: N/A   Braces: N/A         Subjective:  Communicated with Michelle lemus prior to session.  Pt agreeable, eager for therapy.    Pain Rating:  (pt did not rate)                   Objective:    Pt found supine in bed, soiled (urinated).     Functional Mobility:  Bed Mobility:   Rolling/Turning Right: Minimum assistance, With leg lift, With side rail  Scooting/Bridging: With side rail, Moderate Assistance (Trendelenberg position of bed, tcs for hand position, tcs for knee flexion)  Supine to Sit: With leg lift, With side rail, Minimum Assistance (trunk control)  Sit to Supine: With leg lift, With siderail, Minimum Assistance    Transfers:  Sit <> Stand Assistance: Moderate Assistance (x 2 )  Sit <> Stand Assistive Device: No Assistive Device (support of PTA)    Gait:   Gait Distance: 6 side steps after first sit to stand.   Assistance 1: Moderate assistance (blocking of knees, pt with hand on bed railing.)  Gait Assistive Device: No device (PTA support)    Balance:   Static Sit: FAIR-: Maintains without assist but inconsistent   Dynamic Sit: FAIR+: Maintains balance through MINIMAL excursions of active trunk motion  Static Stand: POOR: Needs MODERATE assist to maintain  Dynamic stand: POOR: N/A     Therapeutic Activities and Exercises:  White board updated.   Hygiene tasks performed to change bed pad and remove adult diaper, nsg notified to change adult underpants.  Pt with scooting along the side while sitting of the bed using bed rail, min A with knees blocked.    AM-PAC 6 CLICK MOBILITY  How  much help from another person does this patient currently need?   1 = Unable, Total/Dependent Assistance  2 = A lot, Maximum/Moderate Assistance  3 = A little, Minimum/Contact Guard/Supervision  4 = None, Modified [place]/Independent    Turning over in bed (including adjusting bedclothes, sheets and blankets)?: 3  Sitting down on and standing up from a chair with arms (e.g., wheelchair, bedside commode, etc.): 2  Moving from lying on back to sitting on the side of the bed?: 2  Moving to and from a bed to a chair (including a wheelchair)?: 2  Need to walk in hospital room?: 2  Climbing 3-5 steps with a railing?: 1  Total Score: 12    AM-PAC Raw Score CMS G-Code Modifier Level of Impairment Assistance   6 % Total / Unable   7 - 9 CM 80 - 100% Maximal Assist   10 - 14 CL 60 - 80% Moderate Assist   15 - 19 CK 40 - 60% Moderate Assist   20 - 22 CJ 20 - 40% Minimal Assist   23 CI 1-20% SBA / CGA   24 CH 0% Independent/ Mod I     Patient left with bed in chair position with call button in reach and nsg notified.    Assessment:  Mao Levin is a 51 y.o. male with a medical diagnosis of Urinary tract infection without hematuria and presents with the rehab identified problem list and impairments below. Pt tolerated tx fair and would continue to benefit from skilled PT to address the deficits in his plan of care.    Rehab identified problem list/impairments: Rehab identified problem list/impairments: weakness, impaired endurance, impaired self care skills, impaired functional mobilty, gait instability, impaired balance, decreased upper extremity function, decreased lower extremity function, decreased coordination, pain, impaired fine motor, impaired coordination    Rehab potential is good.    Activity tolerance: Fair    Discharge recommendations: Discharge Facility/Level Of Care Needs: nursing facility, skilled     Barriers to discharge: Barriers to Discharge: Inaccessible home environment, Decreased caregiver  support    Equipment recommendations: Equipment Needed After Discharge:  (wheelchair)     GOALS:   Physical Therapy Goals        Problem: Physical Therapy Goal    Goal Priority Disciplines Outcome Goal Variances Interventions   Physical Therapy Goal     PT/OT, PT Ongoing (interventions implemented as appropriate)     Description:  Goals to be met by: 3/28/2017     Patient will increase functional independence with mobility by performin. Supine to sit with Set-up [place]  2. Sit to supine with Set-up [place]  3. Sit to stand transfer with Supervision  4. Bed to chair transfer with Supervision using Rolling Walker  5. Gait  x 100 feet with Supervision using Rolling Walker.   6. Ascend/descend 3 stair with left Handrails Stand-by Assistance.                 PLAN:    Patient to be seen 5 x/week  to address the above listed problems via gait training, therapeutic activities, therapeutic exercises, neuromuscular re-education  Plan of Care expires: 17  Plan of Care reviewed with: patient         Aspenkaity HUSAIN Stephy, PTA  2017

## 2017-03-16 NOTE — PLAN OF CARE
Problem: Patient Care Overview  Goal: Plan of Care Review  Pt with no falls or injuries this shift. Pt afebrile, cont on antibiotics. No new skin breakdown.

## 2017-03-16 NOTE — SUBJECTIVE & OBJECTIVE
Interval History: Patient continues to complain of weakness, does not feel he is at baseline, feels as though his weakness is worsening. Case discussed with neurology and MS team, will order MRI to r/o true MS flare.    Review of Systems   Constitutional: Positive for activity change. Negative for chills and fatigue.   Eyes: Positive for visual disturbance.   Respiratory: Negative for cough and shortness of breath.    Cardiovascular: Negative for chest pain and palpitations.   Gastrointestinal: Negative for abdominal pain and nausea.   Genitourinary: Positive for difficulty urinating. Negative for dysuria.   Musculoskeletal: Positive for arthralgias and myalgias.   Neurological: Positive for weakness and headaches.     Objective:     Vital Signs (Most Recent):  Temp: 98.2 °F (36.8 °C) (03/16/17 1221)  Pulse: 71 (03/16/17 1221)  Resp: 18 (03/15/17 2030)  BP: (!) 118/56 (03/16/17 1221)  SpO2: 98 % (03/16/17 1221) Vital Signs (24h Range):  Temp:  [97.4 °F (36.3 °C)-98.4 °F (36.9 °C)] 98.2 °F (36.8 °C)  Pulse:  [67-79] 71  Resp:  [18] 18  SpO2:  [95 %-98 %] 98 %  BP: (110-131)/(56-69) 118/56     Weight: 76.7 kg (169 lb)  Body mass index is 23.57 kg/(m^2).  No intake or output data in the 24 hours ending 03/16/17 1332   Physical Exam   Constitutional: He is oriented to person, place, and time. No distress.   Eyes: EOM are normal.   Cardiovascular: Normal rate and regular rhythm.    No murmur heard.  Pulmonary/Chest: Effort normal and breath sounds normal. No respiratory distress. He has no wheezes.   Abdominal: Soft. Bowel sounds are normal.   Musculoskeletal: He exhibits no edema.   Neurological: He is alert and oriented to person, place, and time.   BLE weakness   Skin: Skin is warm and dry.   Psychiatric: He has a normal mood and affect.   Slow speech and responses   Nursing note and vitals reviewed.      Significant Labs: All pertinent labs within the past 24 hours have been reviewed.    Significant Imaging: I have  reviewed all pertinent imaging results/findings within the past 24 hours.

## 2017-03-16 NOTE — ASSESSMENT & PLAN NOTE
- Neurology consulted, pseudoflare in setting of UTI. Patient with continued weakness, not much improvement since admission, case discussed with neurology and MRI ordered to r/o MS exacerbation   - PT/OT recommending SNF

## 2017-03-16 NOTE — ASSESSMENT & PLAN NOTE
- Neurology consulted, psuedoflare in setting of UTI.  - Continue home dose oxycodone 15mg q4h PRN pain, gabapentin 600mg BID and 900mg qHS, dantrolene 75mg TID, Valium 5mg BID PRN muscle spasms, and Celexa 20mg daily.  Supportive care.  Will start bowel regimen.  - Fall precautions, ambulate with assistance, PT/OT recc SNF  - [place]  reviewed.  - Family requesting addiction resources for outpatient. Patient refuses

## 2017-03-17 ENCOUNTER — PATIENT OUTREACH (OUTPATIENT)
Dept: ADMINISTRATIVE | Facility: CLINIC | Age: 52
End: 2017-03-17
Payer: MEDICARE

## 2017-03-17 ENCOUNTER — HOSPITAL ENCOUNTER (INPATIENT)
Facility: HOSPITAL | Age: 52
LOS: 46 days | Discharge: HOME HEALTH | DRG: 058 | End: 2017-05-02
Attending: INTERNAL MEDICINE | Admitting: INTERNAL MEDICINE
Payer: MEDICARE

## 2017-03-17 VITALS
TEMPERATURE: 99 F | RESPIRATION RATE: 18 BRPM | BODY MASS INDEX: 23.66 KG/M2 | OXYGEN SATURATION: 95 % | HEART RATE: 78 BPM | HEIGHT: 71 IN | SYSTOLIC BLOOD PRESSURE: 127 MMHG | DIASTOLIC BLOOD PRESSURE: 72 MMHG | WEIGHT: 169 LBS

## 2017-03-17 DIAGNOSIS — B96.20 E. COLI URINARY TRACT INFECTION: ICD-10-CM

## 2017-03-17 DIAGNOSIS — Z78.9 IMPAIRED MOBILITY AND ADLS: Chronic | ICD-10-CM

## 2017-03-17 DIAGNOSIS — G89.29 CHRONIC PAIN OF MULTIPLE SITES: Chronic | ICD-10-CM

## 2017-03-17 DIAGNOSIS — N31.9 NEUROGENIC BLADDER: Chronic | ICD-10-CM

## 2017-03-17 DIAGNOSIS — G35 MS (MULTIPLE SCLEROSIS): Chronic | ICD-10-CM

## 2017-03-17 DIAGNOSIS — R26.81 GAIT INSTABILITY: Primary | ICD-10-CM

## 2017-03-17 DIAGNOSIS — N39.0 E. COLI URINARY TRACT INFECTION: ICD-10-CM

## 2017-03-17 DIAGNOSIS — M79.605 BILATERAL LEG PAIN: ICD-10-CM

## 2017-03-17 DIAGNOSIS — G62.9 POLYNEUROPATHY: ICD-10-CM

## 2017-03-17 DIAGNOSIS — M79.604 BILATERAL LEG PAIN: ICD-10-CM

## 2017-03-17 DIAGNOSIS — R52 CHRONIC PAIN OF MULTIPLE SITES: Chronic | ICD-10-CM

## 2017-03-17 DIAGNOSIS — I26.92 ACUTE SADDLE PULMONARY EMBOLISM WITHOUT ACUTE COR PULMONALE: Chronic | ICD-10-CM

## 2017-03-17 DIAGNOSIS — E55.9 VITAMIN D DEFICIENCY DISEASE: ICD-10-CM

## 2017-03-17 DIAGNOSIS — F32.A DEPRESSION, UNSPECIFIED DEPRESSION TYPE: ICD-10-CM

## 2017-03-17 DIAGNOSIS — G44.001 CLUSTER HEADACHE SYNDROME, UNSPECIFIED, INTRACTABLE: ICD-10-CM

## 2017-03-17 DIAGNOSIS — R53.1 WEAKNESS GENERALIZED: ICD-10-CM

## 2017-03-17 DIAGNOSIS — R25.2 SPASTICITY: ICD-10-CM

## 2017-03-17 DIAGNOSIS — F17.200 SMOKER: ICD-10-CM

## 2017-03-17 DIAGNOSIS — Z74.09 IMPAIRED MOBILITY AND ADLS: Chronic | ICD-10-CM

## 2017-03-17 DIAGNOSIS — G57.93 NEUROPATHIC PAIN, LEG, BILATERAL: ICD-10-CM

## 2017-03-17 DIAGNOSIS — R51.9 INTRACTABLE HEADACHE, UNSPECIFIED CHRONICITY PATTERN, UNSPECIFIED HEADACHE TYPE: ICD-10-CM

## 2017-03-17 PROCEDURE — G0378 HOSPITAL OBSERVATION PER HR: HCPCS

## 2017-03-17 PROCEDURE — G8987 SELF CARE CURRENT STATUS: HCPCS | Mod: CL

## 2017-03-17 PROCEDURE — G8988 SELF CARE GOAL STATUS: HCPCS | Mod: CK

## 2017-03-17 PROCEDURE — 99217 PR OBSERVATION CARE DISCHARGE: CPT | Mod: ,,, | Performed by: PHYSICIAN ASSISTANT

## 2017-03-17 PROCEDURE — 25000003 PHARM REV CODE 250: Performed by: PHYSICIAN ASSISTANT

## 2017-03-17 PROCEDURE — 97530 THERAPEUTIC ACTIVITIES: CPT

## 2017-03-17 PROCEDURE — G8989 SELF CARE D/C STATUS: HCPCS | Mod: CL

## 2017-03-17 PROCEDURE — 12000000 HC SNF SEMI-PRIVATE ROOM

## 2017-03-17 RX ORDER — MAG HYDROX/ALUMINUM HYD/SIMETH 200-200-20
15 SUSPENSION, ORAL (FINAL DOSE FORM) ORAL EVERY 6 HOURS PRN
Status: CANCELLED | OUTPATIENT
Start: 2017-03-17

## 2017-03-17 RX ORDER — CITALOPRAM 20 MG/1
20 TABLET, FILM COATED ORAL DAILY
Status: DISCONTINUED | OUTPATIENT
Start: 2017-03-18 | End: 2017-03-17

## 2017-03-17 RX ORDER — TRAZODONE HYDROCHLORIDE 50 MG/1
100 TABLET ORAL NIGHTLY
Status: DISCONTINUED | OUTPATIENT
Start: 2017-03-17 | End: 2017-03-17

## 2017-03-17 RX ORDER — DIAZEPAM 5 MG/1
5 TABLET ORAL 2 TIMES DAILY PRN
Status: DISCONTINUED | OUTPATIENT
Start: 2017-03-17 | End: 2017-05-02 | Stop reason: HOSPADM

## 2017-03-17 RX ORDER — OXYCODONE HYDROCHLORIDE 5 MG/1
15 TABLET ORAL EVERY 4 HOURS PRN
Status: DISCONTINUED | OUTPATIENT
Start: 2017-03-17 | End: 2017-03-18

## 2017-03-17 RX ORDER — GABAPENTIN 300 MG/1
600 CAPSULE ORAL
Status: DISCONTINUED | OUTPATIENT
Start: 2017-03-17 | End: 2017-04-09

## 2017-03-17 RX ORDER — BACLOFEN 10 MG/1
20 TABLET ORAL 4 TIMES DAILY
Status: DISCONTINUED | OUTPATIENT
Start: 2017-03-17 | End: 2017-03-17

## 2017-03-17 RX ORDER — GABAPENTIN 300 MG/1
900 CAPSULE ORAL NIGHTLY
Status: DISCONTINUED | OUTPATIENT
Start: 2017-03-17 | End: 2017-04-09

## 2017-03-17 RX ORDER — POLYETHYLENE GLYCOL 3350 17 G/17G
17 POWDER, FOR SOLUTION ORAL DAILY
Status: DISCONTINUED | OUTPATIENT
Start: 2017-03-17 | End: 2017-05-02 | Stop reason: HOSPADM

## 2017-03-17 RX ORDER — OXYBUTYNIN CHLORIDE 5 MG/1
5 TABLET, EXTENDED RELEASE ORAL DAILY
Status: DISCONTINUED | OUTPATIENT
Start: 2017-03-18 | End: 2017-03-17

## 2017-03-17 RX ORDER — GABAPENTIN 300 MG/1
600 CAPSULE ORAL 3 TIMES DAILY
Status: DISCONTINUED | OUTPATIENT
Start: 2017-03-17 | End: 2017-03-17

## 2017-03-17 RX ORDER — CIPROFLOXACIN 500 MG/1
500 TABLET ORAL EVERY 12 HOURS
Status: COMPLETED | OUTPATIENT
Start: 2017-03-17 | End: 2017-03-23

## 2017-03-17 RX ORDER — DANTROLENE SODIUM 50 MG/1
50 CAPSULE ORAL 4 TIMES DAILY
Status: CANCELLED | OUTPATIENT
Start: 2017-03-17

## 2017-03-17 RX ORDER — CARBAMAZEPINE 200 MG/1
400 TABLET, EXTENDED RELEASE ORAL 2 TIMES DAILY
Status: DISCONTINUED | OUTPATIENT
Start: 2017-03-17 | End: 2017-03-17

## 2017-03-17 RX ORDER — POLYETHYLENE GLYCOL 3350 17 G/17G
17 POWDER, FOR SOLUTION ORAL DAILY
Status: CANCELLED | OUTPATIENT
Start: 2017-03-17

## 2017-03-17 RX ORDER — ERGOCALCIFEROL 1.25 MG/1
50000 CAPSULE ORAL
Status: DISCONTINUED | OUTPATIENT
Start: 2017-03-18 | End: 2017-05-02 | Stop reason: HOSPADM

## 2017-03-17 RX ORDER — ACETAMINOPHEN 325 MG/1
650 TABLET ORAL EVERY 4 HOURS PRN
Status: DISCONTINUED | OUTPATIENT
Start: 2017-03-17 | End: 2017-05-02 | Stop reason: HOSPADM

## 2017-03-17 RX ORDER — GLUCAGON 1 MG
1 KIT INJECTION
Status: CANCELLED | OUTPATIENT
Start: 2017-03-17

## 2017-03-17 RX ORDER — IBUPROFEN 200 MG
16 TABLET ORAL
Status: CANCELLED | OUTPATIENT
Start: 2017-03-17

## 2017-03-17 RX ORDER — BACLOFEN 10 MG/1
20 TABLET ORAL 4 TIMES DAILY
Status: CANCELLED | OUTPATIENT
Start: 2017-03-17

## 2017-03-17 RX ORDER — CITALOPRAM 20 MG/1
20 TABLET, FILM COATED ORAL DAILY
Status: CANCELLED | OUTPATIENT
Start: 2017-03-18

## 2017-03-17 RX ORDER — CARBAMAZEPINE 200 MG/1
400 TABLET, EXTENDED RELEASE ORAL 2 TIMES DAILY
Status: CANCELLED | OUTPATIENT
Start: 2017-03-17

## 2017-03-17 RX ORDER — CITALOPRAM 20 MG/1
20 TABLET, FILM COATED ORAL DAILY
Status: CANCELLED | OUTPATIENT
Start: 2017-03-17

## 2017-03-17 RX ORDER — OXYBUTYNIN CHLORIDE 5 MG/1
5 TABLET, EXTENDED RELEASE ORAL DAILY
Status: CANCELLED | OUTPATIENT
Start: 2017-03-18

## 2017-03-17 RX ORDER — METOCLOPRAMIDE HYDROCHLORIDE 5 MG/5ML
5 SOLUTION ORAL EVERY 6 HOURS PRN
Status: DISCONTINUED | OUTPATIENT
Start: 2017-03-17 | End: 2017-05-02 | Stop reason: HOSPADM

## 2017-03-17 RX ORDER — IBUPROFEN 200 MG
1 TABLET ORAL DAILY
Status: CANCELLED | OUTPATIENT
Start: 2017-03-18

## 2017-03-17 RX ORDER — OXYCODONE HYDROCHLORIDE 5 MG/1
15 TABLET ORAL EVERY 4 HOURS PRN
Status: CANCELLED | OUTPATIENT
Start: 2017-03-17

## 2017-03-17 RX ORDER — OXYCODONE HYDROCHLORIDE 5 MG/1
15 TABLET ORAL 3 TIMES DAILY PRN
Status: CANCELLED | OUTPATIENT
Start: 2017-03-17

## 2017-03-17 RX ORDER — IBUPROFEN 200 MG
24 TABLET ORAL
Status: CANCELLED | OUTPATIENT
Start: 2017-03-17

## 2017-03-17 RX ORDER — GABAPENTIN 300 MG/1
600 CAPSULE ORAL 3 TIMES DAILY
Status: CANCELLED | OUTPATIENT
Start: 2017-03-17

## 2017-03-17 RX ORDER — BACLOFEN 10 MG/1
20 TABLET ORAL 4 TIMES DAILY
Status: DISCONTINUED | OUTPATIENT
Start: 2017-03-17 | End: 2017-05-02 | Stop reason: HOSPADM

## 2017-03-17 RX ORDER — MICONAZOLE NITRATE 2 %
POWDER (GRAM) TOPICAL 2 TIMES DAILY
Status: DISPENSED | OUTPATIENT
Start: 2017-03-17 | End: 2017-03-22

## 2017-03-17 RX ORDER — IBUPROFEN 200 MG
1 TABLET ORAL DAILY
Status: DISPENSED | OUTPATIENT
Start: 2017-03-18 | End: 2017-03-19

## 2017-03-17 RX ORDER — DANTROLENE SODIUM 50 MG/1
50 CAPSULE ORAL 4 TIMES DAILY
Status: DISCONTINUED | OUTPATIENT
Start: 2017-03-17 | End: 2017-05-02 | Stop reason: HOSPADM

## 2017-03-17 RX ORDER — ONDANSETRON 8 MG/1
8 TABLET, ORALLY DISINTEGRATING ORAL EVERY 8 HOURS PRN
Status: CANCELLED | OUTPATIENT
Start: 2017-03-17

## 2017-03-17 RX ORDER — IBUPROFEN 200 MG
24 TABLET ORAL
Status: DISCONTINUED | OUTPATIENT
Start: 2017-03-17 | End: 2017-05-02 | Stop reason: HOSPADM

## 2017-03-17 RX ORDER — CARBAMAZEPINE 200 MG/1
400 TABLET, EXTENDED RELEASE ORAL 2 TIMES DAILY
Status: DISCONTINUED | OUTPATIENT
Start: 2017-03-17 | End: 2017-04-06

## 2017-03-17 RX ORDER — PROMETHAZINE HYDROCHLORIDE 12.5 MG/1
25 TABLET ORAL EVERY 6 HOURS PRN
Status: CANCELLED | OUTPATIENT
Start: 2017-03-17

## 2017-03-17 RX ORDER — AMOXICILLIN 250 MG
2 CAPSULE ORAL 2 TIMES DAILY PRN
Status: DISCONTINUED | OUTPATIENT
Start: 2017-03-17 | End: 2017-03-20

## 2017-03-17 RX ORDER — OXYBUTYNIN CHLORIDE 5 MG/1
5 TABLET, EXTENDED RELEASE ORAL DAILY
Status: CANCELLED | OUTPATIENT
Start: 2017-03-17

## 2017-03-17 RX ORDER — MICONAZOLE NITRATE 2 %
POWDER (GRAM) TOPICAL 2 TIMES DAILY
Status: CANCELLED | OUTPATIENT
Start: 2017-03-17 | End: 2017-03-22

## 2017-03-17 RX ORDER — ERGOCALCIFEROL 1.25 MG/1
50000 CAPSULE ORAL
Status: CANCELLED | OUTPATIENT
Start: 2017-03-17

## 2017-03-17 RX ORDER — CIPROFLOXACIN 500 MG/1
500 TABLET ORAL EVERY 12 HOURS
Status: CANCELLED | OUTPATIENT
Start: 2017-03-17 | End: 2017-03-24

## 2017-03-17 RX ORDER — POLYETHYLENE GLYCOL 3350 17 G/17G
17 POWDER, FOR SOLUTION ORAL DAILY
Status: CANCELLED | OUTPATIENT
Start: 2017-03-18

## 2017-03-17 RX ORDER — CITALOPRAM 20 MG/1
20 TABLET, FILM COATED ORAL DAILY
Status: DISCONTINUED | OUTPATIENT
Start: 2017-03-18 | End: 2017-05-02 | Stop reason: HOSPADM

## 2017-03-17 RX ORDER — TRAZODONE HYDROCHLORIDE 50 MG/1
100 TABLET ORAL NIGHTLY
Status: CANCELLED | OUTPATIENT
Start: 2017-03-17

## 2017-03-17 RX ORDER — ONDANSETRON 8 MG/1
8 TABLET, ORALLY DISINTEGRATING ORAL EVERY 8 HOURS PRN
Status: DISCONTINUED | OUTPATIENT
Start: 2017-03-17 | End: 2017-05-02 | Stop reason: HOSPADM

## 2017-03-17 RX ORDER — OXYBUTYNIN CHLORIDE 5 MG/1
5 TABLET, EXTENDED RELEASE ORAL DAILY
Status: DISCONTINUED | OUTPATIENT
Start: 2017-03-18 | End: 2017-05-02 | Stop reason: HOSPADM

## 2017-03-17 RX ORDER — GABAPENTIN 300 MG/1
900 CAPSULE ORAL NIGHTLY
Status: CANCELLED | OUTPATIENT
Start: 2017-03-17

## 2017-03-17 RX ORDER — GABAPENTIN 300 MG/1
600 CAPSULE ORAL
Status: CANCELLED | OUTPATIENT
Start: 2017-03-17

## 2017-03-17 RX ORDER — PROMETHAZINE HYDROCHLORIDE 25 MG/1
25 TABLET ORAL EVERY 6 HOURS PRN
Status: DISCONTINUED | OUTPATIENT
Start: 2017-03-17 | End: 2017-05-02 | Stop reason: HOSPADM

## 2017-03-17 RX ORDER — DANTROLENE SODIUM 50 MG/1
50 CAPSULE ORAL 4 TIMES DAILY
Status: DISCONTINUED | OUTPATIENT
Start: 2017-03-17 | End: 2017-03-17

## 2017-03-17 RX ORDER — POLYETHYLENE GLYCOL 3350 17 G/17G
17 POWDER, FOR SOLUTION ORAL DAILY
Status: DISCONTINUED | OUTPATIENT
Start: 2017-03-18 | End: 2017-03-17

## 2017-03-17 RX ORDER — IPRATROPIUM BROMIDE AND ALBUTEROL SULFATE 2.5; .5 MG/3ML; MG/3ML
3 SOLUTION RESPIRATORY (INHALATION) EVERY 4 HOURS PRN
Status: DISCONTINUED | OUTPATIENT
Start: 2017-03-17 | End: 2017-05-02 | Stop reason: HOSPADM

## 2017-03-17 RX ORDER — IBUPROFEN 200 MG
16 TABLET ORAL
Status: DISCONTINUED | OUTPATIENT
Start: 2017-03-17 | End: 2017-05-02 | Stop reason: HOSPADM

## 2017-03-17 RX ORDER — RAMELTEON 8 MG/1
8 TABLET ORAL NIGHTLY PRN
Status: DISCONTINUED | OUTPATIENT
Start: 2017-03-17 | End: 2017-05-02 | Stop reason: HOSPADM

## 2017-03-17 RX ORDER — GLUCAGON 1 MG
1 KIT INJECTION
Status: DISCONTINUED | OUTPATIENT
Start: 2017-03-17 | End: 2017-05-02 | Stop reason: HOSPADM

## 2017-03-17 RX ORDER — MAG HYDROX/ALUMINUM HYD/SIMETH 200-200-20
15 SUSPENSION, ORAL (FINAL DOSE FORM) ORAL EVERY 6 HOURS PRN
Status: DISCONTINUED | OUTPATIENT
Start: 2017-03-17 | End: 2017-05-02 | Stop reason: HOSPADM

## 2017-03-17 RX ORDER — OXYCODONE HYDROCHLORIDE 5 MG/1
15 TABLET ORAL 3 TIMES DAILY PRN
Status: DISCONTINUED | OUTPATIENT
Start: 2017-03-17 | End: 2017-03-17

## 2017-03-17 RX ORDER — METOCLOPRAMIDE HYDROCHLORIDE 5 MG/5ML
5 SOLUTION ORAL EVERY 6 HOURS PRN
Status: CANCELLED | OUTPATIENT
Start: 2017-03-17

## 2017-03-17 RX ORDER — ACETAMINOPHEN 325 MG/1
650 TABLET ORAL EVERY 4 HOURS PRN
Status: CANCELLED | OUTPATIENT
Start: 2017-03-17

## 2017-03-17 RX ORDER — IPRATROPIUM BROMIDE AND ALBUTEROL SULFATE 2.5; .5 MG/3ML; MG/3ML
3 SOLUTION RESPIRATORY (INHALATION) EVERY 4 HOURS PRN
Status: CANCELLED | OUTPATIENT
Start: 2017-03-17

## 2017-03-17 RX ORDER — DIAZEPAM 5 MG/1
5 TABLET ORAL 2 TIMES DAILY PRN
Status: CANCELLED | OUTPATIENT
Start: 2017-03-17

## 2017-03-17 RX ORDER — RAMELTEON 8 MG/1
8 TABLET ORAL NIGHTLY PRN
Status: CANCELLED | OUTPATIENT
Start: 2017-03-17

## 2017-03-17 RX ORDER — DIAZEPAM 5 MG/1
5 TABLET ORAL 2 TIMES DAILY PRN
Status: DISCONTINUED | OUTPATIENT
Start: 2017-03-17 | End: 2017-03-17

## 2017-03-17 RX ORDER — TRAZODONE HYDROCHLORIDE 50 MG/1
100 TABLET ORAL NIGHTLY
Status: DISCONTINUED | OUTPATIENT
Start: 2017-03-17 | End: 2017-05-02 | Stop reason: HOSPADM

## 2017-03-17 RX ADMIN — BACLOFEN 20 MG: 10 TABLET ORAL at 12:03

## 2017-03-17 RX ADMIN — DANTROLENE SODIUM 50 MG: 50 CAPSULE ORAL at 12:03

## 2017-03-17 RX ADMIN — TRAZODONE HYDROCHLORIDE 100 MG: 50 TABLET ORAL at 11:03

## 2017-03-17 RX ADMIN — CIPROFLOXACIN HYDROCHLORIDE 500 MG: 500 TABLET, FILM COATED ORAL at 08:03

## 2017-03-17 RX ADMIN — GABAPENTIN 600 MG: 300 CAPSULE ORAL at 04:03

## 2017-03-17 RX ADMIN — BACLOFEN 20 MG: 10 TABLET ORAL at 05:03

## 2017-03-17 RX ADMIN — CITALOPRAM HYDROBROMIDE 20 MG: 20 TABLET ORAL at 08:03

## 2017-03-17 RX ADMIN — STANDARDIZED SENNA CONCENTRATE AND DOCUSATE SODIUM 1 TABLET: 8.6; 5 TABLET, FILM COATED ORAL at 08:03

## 2017-03-17 RX ADMIN — POLYETHYLENE GLYCOL 3350 17 G: 17 POWDER, FOR SOLUTION ORAL at 04:03

## 2017-03-17 RX ADMIN — OXYCODONE HYDROCHLORIDE 15 MG: 5 TABLET ORAL at 05:03

## 2017-03-17 RX ADMIN — OXYCODONE HYDROCHLORIDE 15 MG: 5 TABLET ORAL at 04:03

## 2017-03-17 RX ADMIN — GABAPENTIN 900 MG: 300 CAPSULE ORAL at 11:03

## 2017-03-17 RX ADMIN — GABAPENTIN 600 MG: 300 CAPSULE ORAL at 05:03

## 2017-03-17 RX ADMIN — POLYETHYLENE GLYCOL 3350 17 G: 17 POWDER, FOR SOLUTION ORAL at 10:03

## 2017-03-17 RX ADMIN — OXYBUTYNIN CHLORIDE 5 MG: 5 TABLET, EXTENDED RELEASE ORAL at 08:03

## 2017-03-17 RX ADMIN — CARBAMAZEPINE 400 MG: 200 TABLET, EXTENDED RELEASE ORAL at 08:03

## 2017-03-17 RX ADMIN — RIVAROXABAN 20 MG: 20 TABLET, FILM COATED ORAL at 04:03

## 2017-03-17 RX ADMIN — DANTROLENE SODIUM 50 MG: 50 CAPSULE ORAL at 05:03

## 2017-03-17 RX ADMIN — CIPROFLOXACIN HYDROCHLORIDE 500 MG: 500 TABLET, FILM COATED ORAL at 11:03

## 2017-03-17 RX ADMIN — OXYCODONE HYDROCHLORIDE 15 MG: 5 TABLET ORAL at 10:03

## 2017-03-17 RX ADMIN — OXYCODONE HYDROCHLORIDE 15 MG: 5 TABLET ORAL at 08:03

## 2017-03-17 RX ADMIN — MICONAZOLE NITRATE: 20 POWDER TOPICAL at 11:03

## 2017-03-17 RX ADMIN — DIAZEPAM 5 MG: 5 TABLET ORAL at 04:03

## 2017-03-17 RX ADMIN — BACLOFEN 20 MG: 10 TABLET ORAL at 11:03

## 2017-03-17 RX ADMIN — CARBAMAZEPINE 400 MG: 200 TABLET, EXTENDED RELEASE ORAL at 11:03

## 2017-03-17 NOTE — PLAN OF CARE
Transportation has been arranged w/[facility] for w/c van.  Pt is scheduled to be picked up for 2:00pm.  Pt will transfer to [facility].  Pt is agreeable to transfer.  Michelle,nurse [id number], has been given #[phone] for report; she will hand off to charge nurse.  Packet given to  for  to give to receiving facility.    Lizet Ortez, DONNA  Ext. [phone]

## 2017-03-17 NOTE — IP AVS SNAPSHOT
[facility]  [address]  [facility] [address]  Phone: [phone]           Patient Discharge Instructions   Our goal is to set you up for success. This packet includes information on your condition, medications, and your home care.  It will help you care for yourself to prevent having to return to the hospital.     Please ask your nurse if you have any questions.      There are many details to remember when preparing to leave the hospital. Here is what you will need to do:    1. Take your medicine. If you are prescribed medications, review your Medication List on the following pages. You may have new medications to  at the pharmacy and others that you'll need to stop taking. Review the instructions for how and when to take your medications. Talk with your doctor or nurses if you are unsure of what to do.     2. Go to your follow-up appointments. Specific follow-up information is listed in the following pages. Your may be contacted by a nurse or clinical provider about future appointments. Be sure we have all of the phone numbers to reach you. Please contact your provider's office if you are unable to make an appointment.     3. Watch for warning signs. Your doctor or nurse will give you detailed warning signs to watch for and when to call for assistance. These instructions may also include educational information about your condition. If you experience any of warning signs to your health, call your doctor.           [facility]  Unless otherwise directed by your provider, please   contact [facility], our nurse care line   that is available for 24/7 assistance.     [phone] (toll-free)     Registered nurses in the [facility]   provide: appointment scheduling, clinical advisement, health education, and other advisory services.                  ** Verify the list of medication(s) below is accurate and up to date. Carry this with you in case of  emergency. If your medications have changed, please notify your healthcare provider.             Medication List      START taking these medications        Additional Info                      gabapentin 300 MG capsule   Commonly known as:  NEURONTIN   Quantity:  270 capsule   Refills:  2   Dose:  900 mg    Last time this was given:  900 mg on 5/2/2017  1:02 PM   Instructions:  Take 3 capsules (900 mg total) by mouth 3 (three) times daily.     Begin Date    AM    Noon    PM    Bedtime       nicotine 14 mg/24 hr   Commonly known as:  NICODERM CQ   Refills:  0   Dose:  1 patch    Instructions:  Place 1 patch onto the skin once daily.     Begin Date    AM    Noon    PM    Bedtime       predniSONE 20 MG tablet   Commonly known as:  DELTASONE   Quantity:  24 tablet   Refills:  0    Instructions:  Take 4 tablets (80mg) by mouth once daily on 5/3, then 3 tabs (60mg) daily 5/4-5/5, 2.5 tabs (50mg) daily 5/6-5/7, 2 tabs (40mg) daily 5/8-5/9, 1.5 tabs (30mg) daily 5/10-5/11, 1 tab (20mg) daily 5/12-5/13, STOP 5/14     Begin Date    AM    Noon    PM    Bedtime         CHANGE how you take these medications        Additional Info                      * oxycodone 15 MG Tab   Commonly known as:  ROXICODONE   Quantity:  35 tablet   Refills:  0   Dose:  15 mg   What changed:  Another medication with the same name was changed. Make sure you understand how and when to take each.    Last time this was given:  15 mg on 5/2/2017 10:32 AM   Instructions:  Take 1 tablet (15 mg total) by mouth 3 (three) times daily as needed.     Begin Date    AM    Noon    PM    Bedtime       * oxycodone 15 MG Tab   Commonly known as:  ROXICODONE   Quantity:  31 tablet   Refills:  0   Dose:  15 mg   What changed:    - medication strength  - how much to take  - how to take this  - when to take this  - reasons to take this    Last time this was given:  15 mg on 5/2/2017 10:32 AM   Instructions:  Take 1 tablet (15 mg total) by mouth every 8 (eight) hours as  needed for Pain.     Begin Date    AM    Noon    PM    Bedtime       senna-docusate 8.6-50 mg 8.6-50 mg per tablet   Commonly known as:  PERICOLACE   Refills:  0   Dose:  1 tablet   What changed:  when to take this    Last time this was given:  1 tablet on 5/2/2017  8:40 AM   Instructions:  Take 1 tablet by mouth once daily.     Begin Date    AM    Noon    PM    Bedtime       * Notice:  This list has 2 medication(s) that are the same as other medications prescribed for you. Read the directions carefully, and ask your doctor or other care provider to review them with you.      CONTINUE taking these medications        Additional Info                      baclofen 20 MG tablet   Commonly known as:  LIORESAL   Quantity:  120 tablet   Refills:  2   Dose:  20 mg    Last time this was given:  20 mg on 5/2/2017 11:43 AM   Instructions:  Take 1 tablet (20 mg total) by mouth 4 (four) times daily.     Begin Date    AM    Noon    PM    Bedtime       carbamazepine 400 MG Tb12   Commonly known as:  TEGRETOL XR   Quantity:  60 tablet   Refills:  3   Dose:  400 mg    Last time this was given:  400 mg on 4/6/2017  8:50 AM   Instructions:  Take 1 tablet (400 mg total) by mouth 2 (two) times daily.     Begin Date    AM    Noon    PM    Bedtime       citalopram 20 MG tablet   Commonly known as:  CELEXA   Quantity:  30 tablet   Refills:  3   Dose:  20 mg    Last time this was given:  20 mg on 5/2/2017  8:40 AM   Instructions:  Take 1 tablet (20 mg total) by mouth once daily.     Begin Date    AM    Noon    PM    Bedtime       dantrolene 50 MG Cap   Commonly known as:  DANTRIUM   Quantity:  120 capsule   Refills:  2   Dose:  50 mg    Last time this was given:  50 mg on 5/2/2017 11:43 AM   Instructions:  Take 1 capsule (50 mg total) by mouth 4 (four) times daily.     Begin Date    AM    Noon    PM    Bedtime       diazePAM 5 MG tablet   Commonly known as:  VALIUM   Quantity:  60 tablet   Refills:  0   Dose:  5 mg    Last time this was  given:  5 mg on 5/1/2017  1:39 PM   Instructions:  Take 1 tablet (5 mg total) by mouth 2 (two) times daily as needed (muscle spasms).     Begin Date    AM    Noon    PM    Bedtime       ergocalciferol 50,000 unit Cap   Commonly known as:  VITAMIN D2   Quantity:  4 capsule   Refills:  11   Dose:  26033 Units    Last time this was given:  50,000 Units on 4/29/2017  9:43 AM   Instructions:  Take 1 capsule (50,000 Units total) by mouth every 7 days.     Begin Date    AM    Noon    PM    Bedtime       oxybutynin 5 MG Tr24   Commonly known as:  DITROPAN-XL   Refills:  0   Dose:  5 mg    Last time this was given:  5 mg on 5/2/2017  8:40 AM   Instructions:  Take 5 mg by mouth once daily.     Begin Date    AM    Noon    PM    Bedtime       polyethylene glycol 17 gram/dose powder   Commonly known as:  GLYCOLAX   Quantity:  510 g   Refills:  6   Dose:  17 g    Instructions:  Take 17 g by mouth once daily.     Begin Date    AM    Noon    PM    Bedtime       rivaroxaban 20 mg Tab   Commonly known as:  XARELTO   Quantity:  60 tablet   Refills:  4   Dose:  20 mg    Last time this was given:  20 mg on 5/1/2017  5:24 PM   Instructions:  Take 1 tablet (20 mg total) by mouth daily with dinner or evening meal.     Begin Date    AM    Noon    PM    Bedtime       trazodone 100 MG tablet   Commonly known as:  DESYREL   Refills:  0   Dose:  100 mg    Last time this was given:  100 mg on 5/1/2017  8:46 PM   Instructions:  Take 100 mg by mouth every evening.     Begin Date    AM    Noon    PM    Bedtime            Where to Get Your Medications      These medications were sent to [id number] - [place] [address] [facility] [address] [address]    Hours:  24-hours Phone:  [phone]     baclofen 20 MG tablet    carbamazepine 400 MG Tb12    citalopram 20 MG tablet    dantrolene 50 MG Cap    gabapentin 300 MG capsule         You can get these  "medications from any pharmacy     Bring a paper prescription for each of these medications     diazePAM 5 MG tablet    oxycodone 15 MG Tab    oxycodone 15 MG Tab    predniSONE 20 MG tablet       You don't need a prescription for these medications     nicotine 14 mg/24 hr    senna-docusate 8.6-50 mg 8.6-50 mg per tablet                  Please bring to all follow up appointments:    1. A copy of your discharge instructions.  2. All medicines you are currently taking in their original bottles.  3. Identification and insurance card.    Please arrive 15 minutes ahead of scheduled appointment time.    Please call 24 hours in advance if you must reschedule your appointment and/or time.        Your Scheduled Appointments     May 05, 2017 10:45 AM CDT   Established Patient Visit with BETH Merino- Multiple Sclerosis ([facility] )    [address]  [place] [id number] [phone]            Jul 17, 2017  9:40 AM CDT   Established Patient Visit with MD Frederic Hamm- Multiple Sclerosis ([facility] )    [address]  [place] [address] [phone]              Follow-up Information     Follow up with Lawanda Boyce PA-C. Go on 5/5/2017.    Specialty:  Neurology    Why:  MS clinic    Contact information:    [address] [facility] [id number] [phone]          Follow up with Fausto Leung MD In 2 weeks.    Specialty:  Physical Medicine and Rehabilitation    Why:  hospital follow-up    Contact information:    [address]  [facility] [id number] [phone]          Discharge Instructions     Future Orders    3 IN 1 COMMODE FOR HOME USE     Questions:    Type:  Drop arm    Height:  5' 11" (1.803 m)    Weight:  79.5 kg (175 lb 4.3 oz)    Does patient have medical equipment at home?:  bath bench    rollator    Length of need (1-99 months):  99    3 IN 1 COMMODE FOR HOME USE     Questions:    Type:  Drop arm    Height:  5' " "11" (1.803 m)    Weight:  79.5 kg (175 lb 4.3 oz)    Does patient have medical equipment at home?:  bath bench    rollator    Length of need (1-99 months):  99    Activity as tolerated     Activity as tolerated     Call MD for:  difficulty breathing or increased cough     Call MD for:  difficulty breathing or increased cough     Call MD for:  increased confusion or weakness     Call MD for:  increased confusion or weakness     Call MD for:  persistent dizziness, light-headedness, or visual disturbances     Call MD for:  persistent dizziness, light-headedness, or visual disturbances     Call MD for:  persistent nausea and vomiting or diarrhea     Call MD for:  severe persistent headache     Call MD for:  severe persistent headache     Call MD for:  severe uncontrolled pain     Call MD for:  severe uncontrolled pain     Call MD for:  temperature >100.4     Call MD for:  temperature >100.4     Diet general     Questions:    Total calories:      Fat restriction, if any:      Protein restriction, if any:      Na restriction, if any:      Fluid restriction:      Additional restrictions:      Diet general     Questions:    Total calories:      Fat restriction, if any:      Protein restriction, if any:      Na restriction, if any:      Fluid restriction:      Additional restrictions:      HOSPITAL BED FOR HOME USE     Questions:    Type:  Semi-electric    Length of need (1-99 months):  99    Does patient have medical equipment at home?:  bath bench    rollator    Height:  5' 11" (1.803 m)    Weight:  79.5 kg (175 lb 4.3 oz)    Accessories:  Gel overlay mattress    Please check all that apply:  Patient requires a bed height different than a fixed height hospital bed to permit transfers to chair, wheelchair, or standing.    HOSPITAL BED FOR HOME USE     Questions:    Type:  Semi-electric    Length of need (1-99 months):  99    Does patient have medical equipment at home?:  bath bench    rollator    Height:  5' 11" (1.803 m) " "   Weight:  79.5 kg (175 lb 4.3 oz)    Accessories:  Gel overlay mattress    Please check all that apply:  Patient requires a bed height different than a fixed height hospital bed to permit transfers to chair, wheelchair, or standing.    WALKER FOR HOME USE     Questions:    Type of Walker:  Adult (5'4"-6'6")    With wheels?:  Yes    Height:  5' 11" (1.803 m)    Weight:  79.5 kg (175 lb 4.3 oz)    Length of need (1-99 months):  99    Platform attachment:      Accessories/Other:      Assistance needed:      Does patient have medical equipment at home?:  bath bench    rollator    Please check all that apply:  Patient's condition impairs ambulation.    Walker will be used for gait training.    Patient is unable to safely ambulate without equipment.    WALKER FOR HOME USE     Questions:    Type of Walker:  Adult (5'4"-6'6")    With wheels?:  Yes    Height:  5' 11" (1.803 m)    Weight:  79.5 kg (175 lb 4.3 oz)    Length of need (1-99 months):  99    Platform attachment:      Accessories/Other:      Assistance needed:      Does patient have medical equipment at home?:  bath bench    rollator    Please check all that apply:  Patient's condition impairs ambulation.    Walker will be used for gait training.    Patient is unable to safely ambulate without equipment.    WHEELCHAIR FOR HOME USE     Questions:    Hours in W/C per day:  8    Type of Wheelchair:  Lightweight    Patient unable to propel in Standard wheelchair?:  Yes    Size(Width):  18"(STD adult)    Leg Support:  STD footrests    Arm Height:  Full length    Swing away    Desired seat depth:  18    Back height:  standard    Lower leg length:      Actual seat depth:      Lap Belt:  Velcro    Accessories:  Front brakes    Anti-tippers    Cushion:  Basic    Justification for cushion:  prevention of pressure sores    Height:  5' 11" (1.803 m)    Weight:  79.5 kg (175 lb 4.3 oz)    Does patient have medical equipment at home?:  bath bench    rollator    Length of need " "(1-99 months):  99    Please check all that apply:  Caregiver is capable and willing to operate wheelchair safely.    Patient's upper body strength is sufficient for propulsion.    Patient mobility limitations cannot be sufficiently resolved by the use of other ambulatory therapies.    WHEELCHAIR FOR HOME USE     Questions:    Hours in W/C per day:  8    Type of Wheelchair:  Lightweight    Patient unable to propel in Standard wheelchair?:  Yes    Size(Width):  18"(STD adult)    Leg Support:  STD footrests    Arm Height:  Full length    Swing away    Desired seat depth:  18    Back height:      Lower leg length:      Actual seat depth:      Lap Belt:  Velcro    Accessories:  Front brakes    Anti-tippers    Cushion:  Basic    Justification for cushion:  prevent pressure sores    Height:  5' 11" (1.803 m)    Weight:  79.5 kg (175 lb 4.3 oz)    Does patient have medical equipment at home?:  bath bench    rollator    Length of need (1-99 months):  99    Please check all that apply:  Caregiver is capable and willing to operate wheelchair safely.    Patient mobility limitations cannot be sufficiently resolved by the use of other ambulatory therapies.        Primary Diagnosis     Your primary diagnosis was:  Multiple Sclerosis      Admission Information     Date & Time Provider Department CSN    3/17/2017  2:44 PM Triny Horan MD [facility] [id number]      Care Providers     Provider Role Specialty Primary office phone    Trniy Horan MD Attending Provider Hospitalist [phone]      Important Medicare Message          Most Recent Value    Important Message from Medicare Regarding Discharge Appeal Rights  Given to patient/caregiver, Explained to patient/caregiver, Signed/date by patient/caregiver yes 04/17/2017 1648      Your Vitals Were     BP Pulse Temp Resp Height Weight    106/65 (BP Location: Left arm, Patient Position: Lying, BP Method: Automatic) 67 97.5 °F (36.4 °C) (Oral) 18 5' 11" (1.803 m) " 79.5 kg (175 lb 4.3 oz)    SpO2 BMI             100% 24.44 kg/m2         Recent Lab Values     No lab values to display.      Allergies as of 5/2/2017     No Known Allergies      Advance Directives     An advance directive is a document which, in the event you are no longer able to make decisions for yourself, tells your healthcare team what kind of treatment you do or do not want to receive, or who you would like to make those decisions for you.  If you do not currently have an advance directive, [facility] encourages you to create one.  For more information call:  [phone] ([phone]), [phone] ([phone]),  or log on to [email or web address].        Language Assistance Services     ATTENTION: Language assistance services are available, free of charge. Please call [phone].      ATENCIÓN: Si habla español, tiene a mcmahon disposición servicios gratuitos de asistencia lingüística. Llame al [phone].     CHÚ Ý: N?u b?n nói Ti?ng Vi?t, có các d?ch v? h? tr? ngôn ng? mi?n phí dành cho b?n. G?i s? [phone].        [employer] Information           [facility] Sign-Up     Activating your [facility] account is as easy as 1-2-3!     1) Visit [email or web address], select Sign Up Now, enter this activation code and your date of birth, then select Next.  Activation code not generated  Current Patient Portal Status: Account disabled      2) Create a username and password to use when you visit [facility] in the future and select a security question in case you lose your password and select Next.    3) Enter your e-mail address and click Sign Up!    Additional Information  If you have questions, please e-mail [email or web address] or call [phone] to talk to our [facility] staff. Remember, [facility] is NOT to be used for urgent needs. For medical emergencies, dial 911.          [facility] complies with applicable Federal civil rights laws and does not discriminate on the basis of race, color,  national origin, age, disability, or sex.

## 2017-03-17 NOTE — PLAN OF CARE
Problem: Patient Care Overview  Goal: Plan of Care Review  Pt with no falls or injuries this hospital stay. Pt with low grade temp 99.3 this shift. Cont on po antibiotics for UTI. Pt with no new skin breakdown this hospital stay.

## 2017-03-17 NOTE — NURSING
Order to d/c pt to [facility]. VSS. Pt discharged via w/c van. All personal belongings taken by pt. Attempted to call report to RN at [facility] left message to call back.

## 2017-03-17 NOTE — H&P
History & Physical  Skilled Nursing Facility      SUBJECTIVE:     Chief Complaint/Reason for Admission: MS (multiple sclerosis)    History of Present Illness:  Patient is a 51 y.o. male with multiple sclerosis and hx of saddle PE who presents to SNF after hospitalization for an MS pseudoflare (worsened pain and weakness in BLE) due to E. Coli UTI.  He was recently started on rituxan to treat MS. He was also recently admitted in 12/2016 for psuedoflare and he improved after inpatient rehab stay.  He continues with pain rating it as 9-10/10 to his bilateral LE and requests decreased timing between oxycodone. No radiation of pain and oxycodone has been effective.   He has left hand contraction which has been present since his last pseudoflare.  He occasionally smokes at home.      The patient has been admitted to SNF for ongoing PT/OT due to insufficient progress to go home safely from the hospital.    Events from last hospital admit reviewed.    Home Medication list:  PTA Medications   Medication Sig    baclofen (LIORESAL) 20 MG tablet Take 1 tablet (20 mg total) by mouth 4 (four) times daily.    carbamazepine (TEGRETOL XR) 400 MG Tb12 Take 1 tablet (400 mg total) by mouth 2 (two) times daily.    citalopram (CELEXA) 20 MG tablet Take 1 tablet (20 mg total) by mouth once daily.    dantrolene (DANTRIUM) 50 MG Cap Take 1 capsule (50 mg total) by mouth 4 (four) times daily.    diazePAM (VALIUM) 5 MG tablet Take 1 tablet (5 mg total) by mouth 2 (two) times daily as needed (muscle spasms).    ergocalciferol (VITAMIN D2) 50,000 unit Cap Take 1 capsule (50,000 Units total) by mouth every 7 days.    gabapentin (NEURONTIN) 600 MG tablet Take 1 Q AM plus 1 Q Day in the early afternoon plus 1.5 (900mg) Q HS (Patient taking differently: Take 1 tablet by mouth every morning and in the early afternoon then take 1½ tablets (900 mg) at bedtime)    oxybutynin (DITROPAN-XL) 5 MG TR24 Take 5 mg by mouth once daily.    oxycodone  (ROXICODONE) 15 MG Tab Take 1 tablet (15 mg total) by mouth 3 (three) times daily as needed.    polyethylene glycol (GLYCOLAX) 17 gram/dose powder Take 17 g by mouth once daily.    rivaroxaban (XARELTO) 20 mg Tab Take 1 tablet (20 mg total) by mouth daily with dinner or evening meal.    senna-docusate 8.6-50 mg (PERICOLACE) 8.6-50 mg per tablet Take 1 tablet by mouth 2 (two) times daily.    trazodone (DESYREL) 100 MG tablet Take 100 mg by mouth every evening.       Medications ordered by the discharge team:  Scheduled Meds:   baclofen  20 mg Oral QID    carbamazepine  400 mg Oral BID    ciprofloxacin HCl  500 mg Oral Q12H    [START ON 3/18/2017] citalopram  20 mg Oral Daily    dantrolene  50 mg Oral QID    [START ON 3/18/2017] ergocalciferol  50,000 Units Oral Q7 Days    gabapentin  600 mg Oral BID AC    gabapentin  900 mg Oral QHS    miconazole NITRATE 2 %   Topical BID    [START ON 3/18/2017] nicotine  1 patch Transdermal Daily    [START ON 3/18/2017] oxybutynin  5 mg Oral Daily    polyethylene glycol  17 g Oral Daily    rivaroxaban  20 mg Oral Daily with dinner    trazodone  100 mg Oral QHS     Continuous Infusions:   PRN Meds:.acetaminophen, albuterol-ipratropium 2.5mg-0.5mg/3mL, aluminum-magnesium hydroxide-simethicone, dextrose 50%, dextrose 50%, diazePAM, glucagon (human recombinant), glucose, glucose, metoclopramide HCl, ondansetron, oxycodone, promethazine, ramelteon    Review of patient's allergies indicates:  No Known Allergies     Past Medical History:   Diagnosis Date    Multiple sclerosis      History reviewed. No past surgeries.    Family History   Problem Relation Age of Onset    Arthritis Mother     No Known Problems Father      Social History   Substance Use Topics    Smoking status: Current Some Day Smoker     Packs/day: 0.50     Types: Cigarettes    Smokeless tobacco: Never Used    Alcohol use No       Review of Systems:  Constitutional: no fever or chills  Eyes: no visual  changes  ENT: no nasal congestion or sore throat  Respiratory: no cough or shortness of breath  Cardiovascular: no chest pain or palpitations  Gastrointestinal: no nausea or vomiting, no abdominal pain; last BM: none since admit  Genitourinary: no hematuria or dysuria  Integument/Breast: no rash or pruritis  Hematologic/Lymphatic: no easy bruising or lymphadenopathy  Allergy/Immunology: no postnasal drip  Musculoskeletal: + BLE pain  Neurological: no seizures or tremors; + numbness   Behavioral/Psych: no auditory or visual hallucinations  Endocrine: no heat or cold intolerance      OBJECTIVE:     Vital Signs (Most Recent)  Pulse: 88 (03/17/17 1500)  Resp: 18 (03/17/17 1500)  BP: 131/67 (03/17/17 1500)  SpO2: 98 % (03/17/17 1500)    Vital Signs Range (Last 24H):  Temp:  [97.4 °F (36.3 °C)-99.3 °F (37.4 °C)]   Pulse:  [67-88]   Resp:  [17-18]   BP: (114-131)/(56-72)   SpO2:  [92 %-99 %]     Physical Exam:  General: well developed, well nourished, no distress  HENT: Head:normocephalic, atraumatic. Ears:bilateral external ear canals normal. Nose: Nares normal. Septum midline. Mucosa normal. Throat: lips, mucosa, and tongue normal and no throat erythema.  Eyes: conjunctivae/corneas clear.    Neck: supple, symmetrical, trachea midline, no JVD and thyroid not enlarged.   Lungs:  clear to auscultation bilaterally and normal respiratory effort  Cardiovascular: Heart: regular rate and rhythm, S1, S2 normal, no murmur, click, rub or gallop. Chest Wall: no tenderness. Extremities: no cyanosis, no edema, no clubbing. Pulses: 2+ and symmetric.  Abdomen/Rectal: Abdomen: soft, non-tender non-distended; bowel sounds normal; no masses,  no organomegaly.   Skin: Skin color, texture, turgor normal. No rashes or lesions  Musculoskeletal:no clubbing, cyanosis  Lymph Nodes: No cervical or supraclavicular adenopathy  Neurologic: increased tone in BUE and contracture to left hand; + generalized weakness 4/5 in all  extremities  Psych/Behavioral:  Alert and oriented, appropriate affect.      Laboratory    Recent Labs  Lab 03/13/17 2022 03/14/17 0435 03/15/17  0448   WBC 6.87 6.49 6.40   HGB 13.6* 12.1* 12.9*   HCT 40.5 36.8* 38.0*    224 203       Recent Labs  Lab 03/13/17 2022 03/14/17  0435    142   K 4.0 4.1    105   CO2 30* 26   BUN 11 13   CREATININE 0.9 0.8   CALCIUM 8.8 8.4*   PROT 6.6  --    BILITOT 0.2  --    ALKPHOS 74  --    ALT 18  --    AST 22  --        Diagnostic Results:  Labs: Reviewed    ASSESSMENT/PLAN:       Active Hospital Problems    Diagnosis  POA    *MS (multiple sclerosis) [G35]  Yes     Chronic  -continue PT/OT to increase ambulation, ADL performance and endurance  -consult PMR when deemed appropriate by PT/OT  -continue rivaroxaban for DVT prophylaxis  -continue fall precautions  -continue miralax and senokot-s daily to prevent/treat constipation; hold for frequent or loose stooling  -due for repeat rituxan in 6 months  -expected to resume home health once discharged but may opt for outpatient therapy as previously ordered  -he will need to f/u with PMR in 2 weeks after discharge and Neurology 2 weeks after discharge    Spasticity [R25.2]  -chronic and due to MS  -continue baclofen and dantrolene to treat  Yes    Polyneuropathy [G62.9]  -chronic  -continue current medical therapy as listed below  Yes    Chronic pain of multiple sites [R52, G89.29]  Yes     Chronic  -continue oxycodone every 3 hours prn and valium prn spasms; decrease frequency of oxycodone during SNF stay to return to 15mg prn up to TID at home  -will continue to monitor and adjust regimen as necessary    Depression [F32.9]  -chronic  -continue current medical therapy as listed below  Yes    Smoker [F17.200]  -chronic  -continue current medical therapy as listed below  Yes    Urinary tract infection without hematuria [N39.0] E. coli  -complete cipro for 13 doses to treat  Yes    10/25/2016 Saddle PE  [I26.92]  Yes     Chronic  -continue rivaroxaban to prevent future thrombi    Vitamin D deficiency  -continue ergocalciferol to treat  Yes     Neurogenic bladder [N31.9]  Yes     Chronic  -continue current medical therapy as listed below  -bladder scan prn and I/O cath prn retention volume > 300cc                      Continue the following medications for treatment of the indicated conditions:  ·  Indication Medication Dose Route  Frequency       Muscle spasms baclofen  20 mg Oral QID    neuropathy carbamazepine  400 mg Oral BID    UTI ciprofloxacin HCl  500 mg Oral Q12H    depression [START ON 3/18/2017] citalopram  20 mg Oral Daily    spasticity dantrolene  50 mg Oral QID    Vit d def.   [START ON 3/18/2017] ergocalciferol  50,000 Units Oral Q7 Days    neuropathy gabapentin  600 mg Oral BID AC    neuropathy gabapentin  900 mg Oral QHS    Intertriginous candida miconazole NITRATE 2 %   Topical BID    smoker [START ON 3/18/2017] nicotine  1 patch Transdermal Daily    Neurogenic bladder [START ON 3/18/2017] oxybutynin  5 mg Oral Daily    constipation polyethylene glycol  17 g Oral Daily    Hx of PE rivaroxaban  20 mg Oral Daily with dinner    insomnia trazodone  100 mg Oral QHS       Future Appointments  Date Time Provider Department Center   4/4/2017 11:15 AM Pura Solitario PT [facility]   7/17/2017 9:40 AM [facility]       Patient's care plan and discharge planning will be discussed by the SNF team in IDT meeting. Medications to be reviewed and discussed with the SNF unit clinical pharmacist.

## 2017-03-17 NOTE — PT/OT/SLP PROGRESS
Occupational Therapy  Treatment    Mao Levin   MRN: [id number]   Admitting Diagnosis: Urinary tract infection without hematuria    OT Date of Treatment: 17   OT Start Time: 1011  OT Stop Time: 1051  OT Total Time (min): 40 min    Billable Minutes:  Therapeutic Activity 40 minutes     General Precautions: Standard, fall  Orthopedic Precautions: N/A  Braces: N/A    Do you have any cultural, spiritual, Confucianism conflicts, given your current situation?: no issues    Subjective:  Communicated with nurse prior to session.  Pt agreeable to skilled OT services.    Pain Ratin/10  Location - Side: Bilateral  Location - Orientation: generalized  Location: leg  Pain Addressed: Distraction  Pain Rating Post-Intervention: 9/10    Objective:  Pt received in bed side chair.     Functional Mobility:  Transfers:   Sit <> Stand Assistance: Moderate Assistance (pt required an excessive amount of time to scoot to edge of chair)    Sit <> Stand Assistive Device: No Assistive Device (hands on therapist )    Functional Ambulation: did not occur, only static stand      Balance:   Static Sit: GOOD-: Takes MODERATE challenges from all directions but inconsistently  Dynamic Sit: GOOD-: Maintains balance through MODERATE excursions of active trunk movement,     Static Stand: POOR: Needs MODERATE assist to maintain  Dynamic stand: POOR: N/A    Therapeutic Activities and Exercises:  PROM for gross grasp of LUE for 10 reps.   PROM for LUE elbow flx and ext for 10 reps.  PROM for LLE knee ext for 2x30 sec holds. (at request of pt)  Pt performed AROM for LUE elbow flx and ext for 10 reps. Pt displayed high tone in LUE elbow joint.   Pt performed sit<>stand for 2 reps from bedside chair w/ mod a w/ hands on therapist.  Pt performed modified sit ups from bedside chair for 10 reps at mod a.   Pt t/f from one bedside chair to another at max a w/ hands on therapist.  Pt performed sit and reach activity of hitting static targets at varying  heights while crossing midline for 3x10 reps. Pt displayed decreased dynamic sitting balance when leaning to the left. Pt would rely on chair handle to stop pt from falling over on L side.    Pt educated on POC and D/C planning.    AM-PAC 6 CLICK ADL   How much help from another person does this patient currently need?   1 = Unable, Total/Dependent Assistance  2 = A lot, Maximum/Moderate Assistance  3 = A little, Minimum/Contact Guard/Supervision  4 = None, Modified [place]/Independent    Putting on and taking off regular lower body clothing? : 2  Bathing (including washing, rinsing, drying)?: 2  Toileting, which includes using toilet, bedpan, or urinal? : 2  Putting on and taking off regular upper body clothing?: 2  Taking care of personal grooming such as brushing teeth?: 3  Eating meals?: 3  Total Score: 14     AM-PAC Raw Score CMS G-Code Modifier Level of Impairment Assistance   6 % Total / Unable   7 - 9 CM 80 - 100% Maximal Assist   10 - 14 CL 60 - 80% Moderate Assist   15 - 19 CK 40 - 60% Moderate Assist   20 - 22 CJ 20 - 40% Minimal Assist   23 CI 1-20% SBA / CGA   24 CH 0% Independent/ Mod I     Patient left up in chair with call button in reach    ASSESSMENT:  Mao Levin is a 51 y.o. male with a medical diagnosis of Urinary tract infection without hematuria and presents with impairments listed below. Pt displayed decreased functioning of LUE and LLE d/t increase in tone. Pt would benefit from skilled OT services to improve independence and overall occupational functioning.    Rehab identified problem list/impairments: Rehab identified problem list/impairments: weakness, impaired endurance, impaired self care skills, impaired functional mobilty, gait instability, impaired balance, decreased lower extremity function, decreased upper extremity function, decreased coordination, impaired fine motor    Rehab potential is good.    Activity tolerance: Good    Discharge recommendations: Discharge  Facility/Level Of Care Needs: nursing facility, skilled     Barriers to discharge: Barriers to Discharge: Inaccessible home environment, Decreased caregiver support    Equipment recommendations:  (tbd)     GOALS:   Occupational Therapy Goals     Not on file      Multidisciplinary Problems (Resolved)        Problem: Occupational Therapy Goal    Goal Priority Disciplines Outcome Interventions   Occupational Therapy Goal   (Resolved)     OT, PT/OT Outcome(s) achieved    Description:  Goals to be met by 3/22/2017:    1. Feed self mod indep level, including all set up  2.  Transfer bed-BSC with CG A  3. Toilet self with min a  4.  LB dressing with mod a  5. Supine to sit with min a              Plan:  Patient to be seen 5 x/week to address the above listed problems via self-care/home management, therapeutic activities, therapeutic exercises  Plan of Care expires: 04/15/17  Plan of Care reviewed with: patient    Tino Hughes, OT  03/17/2017

## 2017-03-17 NOTE — ASSESSMENT & PLAN NOTE
- Neurology consulted, pseudoflare in setting of UTI. Patient with continued weakness, not much improvement since admission, case discussed with neurology and MRI ordered to r/o MS exacerbation   - MRI without new lesions, patient's likely deconditioned and requires additional PT/OT  - PT/OT recommending SNF

## 2017-03-17 NOTE — ASSESSMENT & PLAN NOTE
- Started on empiric rocephin x 3, switch to cipro per UCx, end date 03/24/17 for total of 10 days abx therapy

## 2017-03-18 PROCEDURE — 97535 SELF CARE MNGMENT TRAINING: CPT

## 2017-03-18 PROCEDURE — 97166 OT EVAL MOD COMPLEX 45 MIN: CPT

## 2017-03-18 PROCEDURE — 97110 THERAPEUTIC EXERCISES: CPT

## 2017-03-18 PROCEDURE — 97530 THERAPEUTIC ACTIVITIES: CPT

## 2017-03-18 PROCEDURE — 25000003 PHARM REV CODE 250: Performed by: PHYSICIAN ASSISTANT

## 2017-03-18 PROCEDURE — 11000004 HC SNF PRIVATE

## 2017-03-18 PROCEDURE — 25000003 PHARM REV CODE 250: Performed by: INTERNAL MEDICINE

## 2017-03-18 PROCEDURE — 99306 1ST NF CARE HIGH MDM 50: CPT | Mod: ,,, | Performed by: INTERNAL MEDICINE

## 2017-03-18 PROCEDURE — 97116 GAIT TRAINING THERAPY: CPT

## 2017-03-18 PROCEDURE — 97163 PT EVAL HIGH COMPLEX 45 MIN: CPT

## 2017-03-18 PROCEDURE — 97112 NEUROMUSCULAR REEDUCATION: CPT

## 2017-03-18 RX ORDER — OXYCODONE HYDROCHLORIDE 5 MG/1
15 TABLET ORAL
Status: DISCONTINUED | OUTPATIENT
Start: 2017-03-18 | End: 2017-03-28

## 2017-03-18 RX ORDER — IBUPROFEN 200 MG
1 TABLET ORAL DAILY
Status: DISCONTINUED | OUTPATIENT
Start: 2017-03-19 | End: 2017-05-02 | Stop reason: HOSPADM

## 2017-03-18 RX ADMIN — CIPROFLOXACIN HYDROCHLORIDE 500 MG: 500 TABLET, FILM COATED ORAL at 09:03

## 2017-03-18 RX ADMIN — BACLOFEN 20 MG: 10 TABLET ORAL at 05:03

## 2017-03-18 RX ADMIN — CITALOPRAM HYDROBROMIDE 20 MG: 20 TABLET ORAL at 09:03

## 2017-03-18 RX ADMIN — OXYCODONE HYDROCHLORIDE 15 MG: 5 TABLET ORAL at 05:03

## 2017-03-18 RX ADMIN — BACLOFEN 20 MG: 10 TABLET ORAL at 11:03

## 2017-03-18 RX ADMIN — OXYBUTYNIN CHLORIDE 5 MG: 5 TABLET, EXTENDED RELEASE ORAL at 09:03

## 2017-03-18 RX ADMIN — TRAZODONE HYDROCHLORIDE 100 MG: 50 TABLET ORAL at 09:03

## 2017-03-18 RX ADMIN — BACLOFEN 20 MG: 10 TABLET ORAL at 07:03

## 2017-03-18 RX ADMIN — CARBAMAZEPINE 400 MG: 200 TABLET, EXTENDED RELEASE ORAL at 09:03

## 2017-03-18 RX ADMIN — OXYCODONE HYDROCHLORIDE 15 MG: 5 TABLET ORAL at 01:03

## 2017-03-18 RX ADMIN — DANTROLENE SODIUM 50 MG: 50 CAPSULE ORAL at 11:03

## 2017-03-18 RX ADMIN — POLYETHYLENE GLYCOL 3350 17 G: 17 POWDER, FOR SOLUTION ORAL at 09:03

## 2017-03-18 RX ADMIN — GABAPENTIN 600 MG: 300 CAPSULE ORAL at 07:03

## 2017-03-18 RX ADMIN — OXYCODONE HYDROCHLORIDE 15 MG: 5 TABLET ORAL at 02:03

## 2017-03-18 RX ADMIN — GABAPENTIN 600 MG: 300 CAPSULE ORAL at 05:03

## 2017-03-18 RX ADMIN — MICONAZOLE NITRATE: 20 POWDER TOPICAL at 09:03

## 2017-03-18 RX ADMIN — OXYCODONE HYDROCHLORIDE 15 MG: 5 TABLET ORAL at 09:03

## 2017-03-18 RX ADMIN — ERGOCALCIFEROL 50000 UNITS: 1.25 CAPSULE ORAL at 09:03

## 2017-03-18 RX ADMIN — RIVAROXABAN 20 MG: 20 TABLET, FILM COATED ORAL at 05:03

## 2017-03-18 RX ADMIN — DANTROLENE SODIUM 50 MG: 50 CAPSULE ORAL at 05:03

## 2017-03-18 RX ADMIN — DANTROLENE SODIUM 50 MG: 50 CAPSULE ORAL at 01:03

## 2017-03-18 RX ADMIN — DIAZEPAM 5 MG: 5 TABLET ORAL at 09:03

## 2017-03-18 RX ADMIN — ACETAMINOPHEN 650 MG: 325 TABLET, FILM COATED ORAL at 11:03

## 2017-03-18 RX ADMIN — DANTROLENE SODIUM 50 MG: 50 CAPSULE ORAL at 07:03

## 2017-03-18 RX ADMIN — GABAPENTIN 900 MG: 300 CAPSULE ORAL at 09:03

## 2017-03-18 NOTE — PLAN OF CARE
Pt A & O x 4 pt repositioned with pillow every q2 hrs, no skin breakdown noted . pulses palable +movement/sensation, cap refill WNL in all 4 extremities , monitored for pain and safety. Safety maintained. , pt remained afebrile temp 98.2.  Bed locked and lowered, call light within reach. Will continue to monitor.

## 2017-03-18 NOTE — PLAN OF CARE
Problem: Physical Therapy Goal  Goal: Physical Therapy Goal  Goals to be met by: 2 weeks     Patient will increase functional independence with mobility by performin. Supine to sit with Stand-by Assistance  2. Sit to supine with Stand-by Assistance  3. Sit to stand transfer with Minimal Assistance  4. Bed to chair transfer with Minimal Assistance using Rolling Walker  5. Gait x 20 feet with Moderate Assistance using Rolling Walker.   6. Wheelchair propulsion x75 feet with Stand-by Assistance using BUE/BLE  7. Ascend/descend 3 stair with left Handrails Moderate Assistance.   8. Stand for 3 minutes with Stand-by Assistance using Rolling Walker  9. Lower extremity exercise program x20 reps per handout, with assistance as needed  PT eval completed. Pt will begin PT POC.     Angelica Ribera, PT  3/18/2017

## 2017-03-18 NOTE — PT/OT/SLP EVAL
"Occupational Therapy  Evaluation    Mao Levin   MRN: [id number]   Admitting Diagnosis: MS (multiple sclerosis)     OT Date of Treatment: 03/18/17            Billable Minutes:  Evaluation 15  Self Care/Home Management 46  Neuromuscular Re-education 10    Diagnosis: MS (multiple sclerosis)  Pt presented with 1 week of worsening pain to B LE and difficulty ambulation.    Past Medical History:   Diagnosis Date    Multiple sclerosis       History reviewed. No pertinent surgical history.      General Precautions: Standard, fall  Orthopedic Precautions: N/A  Braces: N/A    Do you have any cultural, spiritual, Bahai conflicts, given your current situation?: no     Patient History:  Lives With: alone (girlfriend is there a lot but works)  Living Arrangements: house  Home Accessibility: stairs to enter home  Number of Stairs to Enter Home: 3  Stair Railings at Home: outside, present on left side  Transportation Available: family or friend will provide  Living Environment Comment: Pt lives alone but has a girlfriend that can help at times as she works. Pt performed self care using his rollator and has had falls in the past (most recent on bath bench). Pt used rollator for mobility and has no interest in a w/c.  Equipment Currently Used at Home: bath bench, rollator    Prior level of function:   Bed Mobility/Transfers: needs device  Grooming: needs assist  Bathing: needs device  Upper Body Dressing: independent  Lower Body Dressing: independent  Toileting: independent  Home Management Skills: needs assist  Driving License: No  Mode of Transportation: Family, Friends  Occupation: On disability  Type of Occupation: a andie in 2006  Leisure and Hobbies: watches TV, doing and seeing [place] things (out to eat, [place], [place]  IADL Comments: Mother takes care of housework and grocery shopping.     Dominant hand: right    Subjective:  "I will push myself."  Chief Complaint: chronic pain and decreased use of L arm " "and leg  Patient/Family stated goals: "To get my hands back right. I want to get to work. 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    Cognitive Exam:  Oriented to: Person, Place, Time and Situation  Follows Commands/attention: Follows multistep  commands  Communication: clear/fluent  Memory:  No Deficits noted  Safety awareness/insight to disability: intact  Coping skills/emotional control: Appropriate to situation    Visual/perceptual:  Intact    Physical Exam:  Postural examination/scapula alignment: Rounded shoulder, lateral lean to L  Skin integrity: Visible skin intact  Edema: Moderate L hand    Sensation:   Intact but slightly decreased on L    Upper Extremity Range of Motion:  Right Upper Extremity: WFL  Left Upper Extremity: WFL PROM    Upper Extremity Strength:  Right Upper Extremity: WFL  Left Upper Extremity: 2/5 shoulder, 3/5 elbow, 0/5 finger extension   Strength: 3/5 R, 2/5 L    Fine motor coordination:   Impaired  Left hand thumb/finger opposition skills and manipulation of objects, slightly decreased on R    Functional Status:  MDS G  Bed Mobility Functional Status: total(A)  Transfer Functional Status: total(A)  Dressing Functional Status: 4:total(A)  Toilet Use Functional Status: total(A)  Personal Hygiene Functional Status: CGA-Min (A)  Bathing Functional Status: mod(A)-Max(A)  Eval Only: Number of U/E limb <4/5 MMT: 2    GROOMING: Min A seated in w/c  BATHING: Max A sponge bath seated  UBD: Tot A  LBD: Tot A  TOILETING: Tot A    BED MOBILITY: Max A to L to initiate, Tot A to R, Tot A supine to sit from R  TRANSFERS:Tot A SQPT bed to w/c    SITTING BALANCE: SBA - Min A    OT Exercises: PROM L UE in all planes x15    Additional Treatment:  Educated on role of OT " and POC. ADL training and transfer training.    Patient left up in chair with PT present    Assessment:  Mao Levin is a 51 y.o. male with a medical diagnosis of MS (multiple sclerosis). Pt with decreased independence with functional transfers and ADLs and decreased use of R UE and LE hindering performance in functional tasks. Pt would benefit from OT to maximize independence in order to return to independent living.    Rehab identified problem list/impairments: weakness, impaired endurance, impaired self care skills, impaired functional mobilty, gait instability, impaired balance, decreased coordination, decreased upper extremity function, decreased lower extremity function, pain, decreased ROM, impaired coordination, impaired fine motor, edema, impaired joint extensibility    Rehab potential is good    Activity tolerance: Good    Discharge recommendations: rehabilitation facility     Barriers to discharge: Inaccessible home environment, Decreased caregiver support     Equipment recommendations: bedside commode, walker, rolling, wheelchair     GOALS:   Occupational Therapy Goals        Problem: Occupational Therapy Goal    Goal Priority Disciplines Outcome Interventions   Occupational Therapy Goal     OT, PT/OT Ongoing (interventions implemented as appropriate)    Description:  Goals to be met by: 3 weeks     Patient will increase functional independence with ADLs by performing:    Feeding with Set-up Assistance.  UE Dressing with Stand-by Assistance.  LE Dressing with Minimal Assistance.  Grooming while seated with Modified [place].  Toileting from bedside commode with Minimal Assistance for hygiene and clothing management.   Bathing from  shower chair/bench with Minimal Assistance.  Sitting at edge of bed x20 minutes with Supervision.  Rolling to Bilateral with Stand-by Assistance.   Supine to sit with Minimal Assistance.  Stand pivot transfers with Minimal Assistance.  Toilet transfer to bedside commode  with Minimal Assistance.  L Upper extremity self ROM exercise program x15 reps per handout, with independence.                PLAN: Patient to be seen 5 x/week to address the above listed problems via self-care/home management, therapeutic activities, therapeutic exercises, neuromuscular re-education, wheelchair management/training  Plan of Care expires: 04/18/17  Plan of Care reviewed with: patient    France Yoolakeshia, GISEL  03/18/2017

## 2017-03-18 NOTE — PT/OT/SLP EVAL
"PhysicalTherapy   Evaluation    Mao Levin   MRN: [id number]     PT Received On: 17  PT Start Time: 1010     PT Stop Time: 1100    PT Total Time (min): 50 min       Billable Minutes:  Evaluation 10, Gait [id number], Therapeutic Activity 15, Therapeutic Exercise 15 and Total Time 40    Diagnosis: MS (multiple sclerosis)  Past Medical History:   Diagnosis Date    Multiple sclerosis       History reviewed. No pertinent surgical history.      General Precautions: Standard, fall  Orthopedic Precautions: N/A   Braces: N/A    Do you have any cultural, spiritual, Gnosticism conflicts, given your current situation?: no    Patient History:  Lives With: alone  Living Arrangements: house  Home Accessibility: stairs to enter home  Home Layout: Able to live on 1st floor  Number of Stairs to Enter Home: 3  Stair Railings at Home: outside, present on left side  Transportation Available: family or friend will provide  Living Environment Comment: Pt lives alone in a 1SH w/ 3 VASILIY and (L) rail. Pt has a tub/shower combo. Pt does not have much available assistance upon D/C besides his girlfriend who works. He does not want his mom to assist him upon D/C.  Equipment Currently Used at Home: bath bench, rollator  DME owned (not currently used): none    Previous Level of Function: Pt PTA was Mindy using a rollator for ambulation at all times. He reports frequent falls at home.  Ambulation Skills: needs device  Transfer Skills: needs device  ADL Skills: needs device    Subjective:  "You're the boss! Let's do this."  Chief Complaint: weakness  Patient goals: to ambulate w/ rollator    Pain Ratin/10  Location - Side: Bilateral  Location - Orientation: generalized  Location: knee (ankles and hands)  Pain Addressed: Reposition     Objective:  Patient found sitting in w/c     Cognitive Exam:  Oriented to: Person, Place, Time and Situation  Follows Commands/attention: Follows multistep  commands  Communication: clear/fluent  Safety " awareness/insight to disability: intact    Physical Exam:  Postural examination/scapula alignment: Rounded shoulder and Head forward    Skin integrity: Visible skin intact  Edema: Mild LUE    Sensation:   Impaired  light/touch LUE/LLE>RUE/RLE    Upper Extremity Range of Motion:  Right Upper Extremity: WFL  Left Upper Extremity: WFL    Upper Extremity Strength:  Right Upper Extremity: grossly 3/5  Left Upper Extremity: grossly 2+/5    Lower Extremity Range of Motion:  Right Lower Extremity: WFL  Left Lower Extremity: WFL    Lower Extremity Strength:  Right Lower Extremity: grossly 2+/5  Left Lower Extremity: 0/5 throughout    Functional Status:  MDS G  Bed Mobility Functional Status: mod(A)-Max(A)  Transfer Functional Status: total(A)  Locomotion on Unit Functional Status: total(A)    Bed Mobility:  Sit>Supine:on mat w/ ModA for BLE  Supine>Sit: on mat w/ MaxA for trunk and LLE    Transfers:  Sit<>Stand: to/from w/c in // bars w/ ModA(x2) to rise  Squat pivot t/f w/c<>EOM w/ Mod/Miri(x2)  Cueing for forward lean, forward scoot, hand/foot placement    Gait:  Amb 5ft in // bars w/ ModA, assist to advance LLE and RLE, to prevent L>R knee buckling when in stance (happened toward end of trial), cueing/facilitation for upright posture     Therex:  Supine PROM hip/knee/ankles in all planes    Balance:  Static sit EOM w/ SBA for safety   Static  // bars w/ Miri for stability, cueing/facilitation for upright posture and hip extension    AM-PAC 6 CLICK MOBILITY  How much help from another person does this patient currently need?   1 = Unable, Total/Dependent Assistance  2 = A lot, Maximum/Moderate Assistance  3 = A little, Minimum/Contact Guard/Supervision  4 = None, Modified [place]/Independent     Turning over in bed (including adjusting bedclothes, sheets and blankets)?: 2  Sitting down on and standing up from a chair with arms (e.g., wheelchair, bedside commode, etc.): 1  Moving from lying on back to sitting  on the side of the bed?: 2  Moving to and from a bed to a chair (including a wheelchair)?:1   Need to walk in hospital room?: 1  Climbing 3-5 steps with a railing?:1   Total Score: 8     AM-PAC Raw Score CMS G-Code Modifier Level of Impairment Assistance   6 % Total / Unable   7 - 9 CM 80 - 100% Maximal Assist   10 - 14 CL 60 - 80% Moderate Assist   15 - 19 CK 40 - 60% Moderate Assist   20 - 22 CJ 20 - 40% Minimal Assist   23 CI 1-20% SBA / CGA   24 CH 0% Independent/ Mod I     Patient left up in chair with call button in reach.    Assessment:  Mao Levin is a 51 y.o. male with a medical diagnosis of MS (multiple sclerosis).  Pt presents w/ previous pertinent co-morbidities and personal factors including living alone, fall history, and MS. Pt currently presents w/ the below mentioned deficits requiring TotalA for transfers and ModA for amb in // bars. Pt's clinical presentation is unstable and unpredictable due to MS flare up. According to the [facility] pt requires MaxA for functional mobility. These factors classify pt as a high complexity evaluation. Pt is appropriate for skilled PT and will begin PT POC. Pt is very motivated to exercise and improve mobility. He has good potential and is recommended to D/C from short stay SNF to inpatient rehab in order to reach max potential.  .    Rehab identified problem list/impairments: weakness, impaired endurance, impaired sensation, impaired self care skills, impaired functional mobilty, gait instability, impaired balance, decreased upper extremity function, decreased lower extremity function, pain    Rehab potential is good.    Activity tolerance: Good    Discharge recommendations: rehabilitation facility     Barriers to discharge: Inaccessible home environment, Decreased caregiver support    Equipment recommendations: bedside commode, walker, rolling, wheelchair     GOALS:   Physical Therapy Goals        Problem: Physical Therapy Goal    Goal Priority Disciplines  Outcome Goal Variances Interventions   Physical Therapy Goal     PT/OT, PT      Description:  Goals to be met by: 2 weeks     Patient will increase functional independence with mobility by performin. Supine to sit with Stand-by Assistance  2. Sit to supine with Stand-by Assistance  3. Sit to stand transfer with Minimal Assistance  4. Bed to chair transfer with Minimal Assistance using Rolling Walker  5. Gait  x 20 feet with Moderate Assistance using Rolling Walker.   6. Wheelchair propulsion x75 feet with Stand-by Assistance using BUE/BLE  7. Ascend/descend 3 stair with left Handrails Moderate Assistance.   8. Stand for 3 minutes with Stand-by Assistance using Rolling Walker  9. Lower extremity exercise program x20 reps per handout, with assistance as needed                PLAN:    Patient to be seen 5 x/week  to address the above listed problems via gait training, therapeutic activities, therapeutic exercises, wheelchair management/training  Plan of Care Expires: 17    Angelica Ribera, PT 3/18/2017

## 2017-03-18 NOTE — PLAN OF CARE
Problem: Occupational Therapy Goal  Goal: Occupational Therapy Goal  Goals to be met by: 2 weeks     Patient will increase functional independence with ADLs by performing:    Feeding with Set-up Assistance.  UE Dressing with Stand-by Assistance.  LE Dressing with Minimal Assistance.  Grooming while seated with Modified [place].  Toileting from bedside commode with Minimal Assistance for hygiene and clothing management.   Bathing from shower chair/bench with Minimal Assistance.  Sitting at edge of bed x20 minutes with Supervision.  Rolling to Bilateral with Stand-by Assistance.   Supine to sit with Minimal Assistance.  Stand pivot transfers with Minimal Assistance.  Toilet transfer to bedside commode with Minimal Assistance.  L Upper extremity self ROM exercise program x15 reps per handout, with independence.  Outcome: Ongoing (interventions implemented as appropriate)  OT Eval complete. Pt would benefit from OT services to maximize independence with functional transfers and ADLs.

## 2017-03-19 PROCEDURE — 25000003 PHARM REV CODE 250: Performed by: PHYSICIAN ASSISTANT

## 2017-03-19 PROCEDURE — 25000003 PHARM REV CODE 250: Performed by: INTERNAL MEDICINE

## 2017-03-19 PROCEDURE — 11000004 HC SNF PRIVATE

## 2017-03-19 RX ADMIN — POLYETHYLENE GLYCOL 3350 17 G: 17 POWDER, FOR SOLUTION ORAL at 09:03

## 2017-03-19 RX ADMIN — GABAPENTIN 600 MG: 300 CAPSULE ORAL at 05:03

## 2017-03-19 RX ADMIN — OXYCODONE HYDROCHLORIDE 15 MG: 5 TABLET ORAL at 02:03

## 2017-03-19 RX ADMIN — BACLOFEN 20 MG: 10 TABLET ORAL at 05:03

## 2017-03-19 RX ADMIN — OXYCODONE HYDROCHLORIDE 15 MG: 5 TABLET ORAL at 09:03

## 2017-03-19 RX ADMIN — OXYBUTYNIN CHLORIDE 5 MG: 5 TABLET, EXTENDED RELEASE ORAL at 09:03

## 2017-03-19 RX ADMIN — BACLOFEN 20 MG: 10 TABLET ORAL at 12:03

## 2017-03-19 RX ADMIN — OXYCODONE HYDROCHLORIDE 15 MG: 5 TABLET ORAL at 06:03

## 2017-03-19 RX ADMIN — GABAPENTIN 900 MG: 300 CAPSULE ORAL at 10:03

## 2017-03-19 RX ADMIN — DANTROLENE SODIUM 50 MG: 50 CAPSULE ORAL at 12:03

## 2017-03-19 RX ADMIN — OXYCODONE HYDROCHLORIDE 15 MG: 5 TABLET ORAL at 03:03

## 2017-03-19 RX ADMIN — DANTROLENE SODIUM 50 MG: 50 CAPSULE ORAL at 05:03

## 2017-03-19 RX ADMIN — DIAZEPAM 5 MG: 5 TABLET ORAL at 10:03

## 2017-03-19 RX ADMIN — CARBAMAZEPINE 400 MG: 200 TABLET, EXTENDED RELEASE ORAL at 10:03

## 2017-03-19 RX ADMIN — RIVAROXABAN 20 MG: 20 TABLET, FILM COATED ORAL at 05:03

## 2017-03-19 RX ADMIN — DIAZEPAM 5 MG: 5 TABLET ORAL at 09:03

## 2017-03-19 RX ADMIN — CARBAMAZEPINE 400 MG: 200 TABLET, EXTENDED RELEASE ORAL at 09:03

## 2017-03-19 RX ADMIN — CIPROFLOXACIN HYDROCHLORIDE 500 MG: 500 TABLET, FILM COATED ORAL at 10:03

## 2017-03-19 RX ADMIN — CITALOPRAM HYDROBROMIDE 20 MG: 20 TABLET ORAL at 09:03

## 2017-03-19 RX ADMIN — CIPROFLOXACIN HYDROCHLORIDE 500 MG: 500 TABLET, FILM COATED ORAL at 09:03

## 2017-03-19 RX ADMIN — MICONAZOLE NITRATE: 20 POWDER TOPICAL at 09:03

## 2017-03-19 RX ADMIN — TRAZODONE HYDROCHLORIDE 100 MG: 50 TABLET ORAL at 10:03

## 2017-03-19 RX ADMIN — OXYCODONE HYDROCHLORIDE 15 MG: 5 TABLET ORAL at 10:03

## 2017-03-19 RX ADMIN — OXYCODONE HYDROCHLORIDE 15 MG: 5 TABLET ORAL at 12:03

## 2017-03-19 RX ADMIN — NICOTINE 1 PATCH: 14 PATCH, EXTENDED RELEASE TRANSDERMAL at 09:03

## 2017-03-19 RX ADMIN — MICONAZOLE NITRATE: 20 POWDER TOPICAL at 10:03

## 2017-03-19 NOTE — PLAN OF CARE
Pt A & O  pt repositioned with pillow every q2 hrs, no skin breakdown noted . pulses palable +movement/sensation, cap refill WNL in all 4 extremities , monitored for pain and safety. Safety maintained. , pt remained afebrile temp 97.6.  Bed locked and lowered, call light within reach. Will continue to monitor.

## 2017-03-19 NOTE — PROGRESS NOTES
Dr. Mckeon notified of pt with 6lb decrease in weight over last week. New orders received for boost and double portions to increase pt intake. Nurse notified. Will continue to monitor.

## 2017-03-20 LAB
ANION GAP SERPL CALC-SCNC: 9 MMOL/L
BASOPHILS # BLD AUTO: 0.04 K/UL
BASOPHILS NFR BLD: 0.6 %
BUN SERPL-MCNC: 21 MG/DL
CALCIUM SERPL-MCNC: 8.1 MG/DL
CHLORIDE SERPL-SCNC: 107 MMOL/L
CO2 SERPL-SCNC: 25 MMOL/L
CREAT SERPL-MCNC: 0.8 MG/DL
DIFFERENTIAL METHOD: ABNORMAL
EOSINOPHIL # BLD AUTO: 0.2 K/UL
EOSINOPHIL NFR BLD: 3 %
ERYTHROCYTE [DISTWIDTH] IN BLOOD BY AUTOMATED COUNT: 14.4 %
EST. GFR  (AFRICAN AMERICAN): >60 ML/MIN/1.73 M^2
EST. GFR  (NON AFRICAN AMERICAN): >60 ML/MIN/1.73 M^2
GLUCOSE SERPL-MCNC: 85 MG/DL
HCT VFR BLD AUTO: 36.8 %
HGB BLD-MCNC: 11.7 G/DL
LYMPHOCYTES # BLD AUTO: 2.7 K/UL
LYMPHOCYTES NFR BLD: 36.9 %
MAGNESIUM SERPL-MCNC: 2.1 MG/DL
MCH RBC QN AUTO: 28.8 PG
MCHC RBC AUTO-ENTMCNC: 31.8 %
MCV RBC AUTO: 91 FL
MONOCYTES # BLD AUTO: 0.6 K/UL
MONOCYTES NFR BLD: 8.1 %
NEUTROPHILS # BLD AUTO: 3.7 K/UL
NEUTROPHILS NFR BLD: 51.4 %
PHOSPHATE SERPL-MCNC: 3.9 MG/DL
PLATELET # BLD AUTO: 213 K/UL
PMV BLD AUTO: 11.1 FL
POTASSIUM SERPL-SCNC: 4.4 MMOL/L
RBC # BLD AUTO: 4.06 M/UL
SODIUM SERPL-SCNC: 141 MMOL/L
WBC # BLD AUTO: 7.24 K/UL

## 2017-03-20 PROCEDURE — 25000003 PHARM REV CODE 250: Performed by: INTERNAL MEDICINE

## 2017-03-20 PROCEDURE — 84100 ASSAY OF PHOSPHORUS: CPT

## 2017-03-20 PROCEDURE — 97110 THERAPEUTIC EXERCISES: CPT

## 2017-03-20 PROCEDURE — 11000004 HC SNF PRIVATE

## 2017-03-20 PROCEDURE — 85025 COMPLETE CBC W/AUTO DIFF WBC: CPT

## 2017-03-20 PROCEDURE — 99309 SBSQ NF CARE MODERATE MDM 30: CPT | Mod: ,,, | Performed by: NURSE PRACTITIONER

## 2017-03-20 PROCEDURE — 97530 THERAPEUTIC ACTIVITIES: CPT

## 2017-03-20 PROCEDURE — 97116 GAIT TRAINING THERAPY: CPT

## 2017-03-20 PROCEDURE — 83735 ASSAY OF MAGNESIUM: CPT

## 2017-03-20 PROCEDURE — 80048 BASIC METABOLIC PNL TOTAL CA: CPT

## 2017-03-20 PROCEDURE — 25000003 PHARM REV CODE 250: Performed by: PHYSICIAN ASSISTANT

## 2017-03-20 PROCEDURE — 36415 COLL VENOUS BLD VENIPUNCTURE: CPT

## 2017-03-20 PROCEDURE — 97535 SELF CARE MNGMENT TRAINING: CPT

## 2017-03-20 RX ORDER — BISACODYL 10 MG
10 SUPPOSITORY, RECTAL RECTAL DAILY PRN
Status: DISCONTINUED | OUTPATIENT
Start: 2017-03-20 | End: 2017-05-02 | Stop reason: HOSPADM

## 2017-03-20 RX ORDER — AMOXICILLIN 250 MG
1 CAPSULE ORAL DAILY
Status: DISCONTINUED | OUTPATIENT
Start: 2017-03-21 | End: 2017-05-02 | Stop reason: HOSPADM

## 2017-03-20 RX ADMIN — CIPROFLOXACIN HYDROCHLORIDE 500 MG: 500 TABLET, FILM COATED ORAL at 10:03

## 2017-03-20 RX ADMIN — CARBAMAZEPINE 400 MG: 200 TABLET, EXTENDED RELEASE ORAL at 10:03

## 2017-03-20 RX ADMIN — DANTROLENE SODIUM 50 MG: 50 CAPSULE ORAL at 12:03

## 2017-03-20 RX ADMIN — OXYCODONE HYDROCHLORIDE 15 MG: 5 TABLET ORAL at 01:03

## 2017-03-20 RX ADMIN — BACLOFEN 20 MG: 10 TABLET ORAL at 11:03

## 2017-03-20 RX ADMIN — CITALOPRAM HYDROBROMIDE 20 MG: 20 TABLET ORAL at 10:03

## 2017-03-20 RX ADMIN — MICONAZOLE NITRATE: 20 POWDER TOPICAL at 10:03

## 2017-03-20 RX ADMIN — OXYCODONE HYDROCHLORIDE 15 MG: 5 TABLET ORAL at 06:03

## 2017-03-20 RX ADMIN — BACLOFEN 20 MG: 10 TABLET ORAL at 07:03

## 2017-03-20 RX ADMIN — DANTROLENE SODIUM 50 MG: 50 CAPSULE ORAL at 11:03

## 2017-03-20 RX ADMIN — BACLOFEN 20 MG: 10 TABLET ORAL at 06:03

## 2017-03-20 RX ADMIN — OXYCODONE HYDROCHLORIDE 15 MG: 5 TABLET ORAL at 10:03

## 2017-03-20 RX ADMIN — GABAPENTIN 600 MG: 300 CAPSULE ORAL at 06:03

## 2017-03-20 RX ADMIN — POLYETHYLENE GLYCOL 3350 17 G: 17 POWDER, FOR SOLUTION ORAL at 10:03

## 2017-03-20 RX ADMIN — DIAZEPAM 5 MG: 5 TABLET ORAL at 06:03

## 2017-03-20 RX ADMIN — DANTROLENE SODIUM 50 MG: 50 CAPSULE ORAL at 07:03

## 2017-03-20 RX ADMIN — GABAPENTIN 900 MG: 300 CAPSULE ORAL at 10:03

## 2017-03-20 RX ADMIN — OXYBUTYNIN CHLORIDE 5 MG: 5 TABLET, EXTENDED RELEASE ORAL at 10:03

## 2017-03-20 RX ADMIN — GABAPENTIN 600 MG: 300 CAPSULE ORAL at 05:03

## 2017-03-20 RX ADMIN — MICONAZOLE NITRATE: 20 POWDER TOPICAL at 11:03

## 2017-03-20 RX ADMIN — DIAZEPAM 5 MG: 5 TABLET ORAL at 10:03

## 2017-03-20 RX ADMIN — NICOTINE 1 PATCH: 14 PATCH, EXTENDED RELEASE TRANSDERMAL at 10:03

## 2017-03-20 RX ADMIN — TRAZODONE HYDROCHLORIDE 100 MG: 50 TABLET ORAL at 10:03

## 2017-03-20 RX ADMIN — OXYCODONE HYDROCHLORIDE 15 MG: 5 TABLET ORAL at 02:03

## 2017-03-20 RX ADMIN — BACLOFEN 20 MG: 10 TABLET ORAL at 12:03

## 2017-03-20 RX ADMIN — DANTROLENE SODIUM 50 MG: 50 CAPSULE ORAL at 06:03

## 2017-03-20 RX ADMIN — OXYCODONE HYDROCHLORIDE 15 MG: 5 TABLET ORAL at 07:03

## 2017-03-20 RX ADMIN — RIVAROXABAN 20 MG: 20 TABLET, FILM COATED ORAL at 05:03

## 2017-03-20 NOTE — PROGRESS NOTES
Progress Note  Skilled Nursing Facility    Admit Date: 3/17/2017  Follow-up for  MS (multiple sclerosis)  Anticipated Discharge Date:  3/30/2017    SUBJECTIVE:     History of Present Illness:  Patient is a 51 y.o. male with multiple sclerosis and hx of saddle PE who presents to SNF after hospitalization for an MS pseudoflare (worsened pain and weakness in BLE) due to E. Coli UTI. He was recently started on rituxan to treat MS. He was also recently admitted in 12/2016 for psuedoflare and he improved after inpatient rehab stay. He continues with pain rating it as 9-10/10 to his bilateral LE and requests decreased timing between oxycodone. No radiation of pain and oxycodone has been effective. He has left hand contraction which has been present since his last pseudoflare. He occasionally smokes at home. The patient has been admitted to SNF for ongoing PT/OT due to insufficient progress to go home safely from the hospital.    Follow-up for MS       Interval History: Seen patient at bedside, c/o 7/10 pain to left had which has improved. States he has been participating in therapy and has no other complaints, No acute events overnight.     Review of Systems:  Constitutional: no fever or chills  Respiratory: no cough or shortness of breath  Cardiovascular: no chest pain or palpitations  Gastrointestinal: no nausea or vomiting, no abdominal pain or change in bowel habits  Genitourinary: no hematuria or dysuria  Integument/Breast: no rash or pruritis  Musculoskeletal: +7/10 pain to left hand, + for contracture to left hand, + for increased tone to upper extremities  Neurological: no seizures or tremors, + numbness  Behavioral/Psych: no auditory or visual hallucinations    Scheduled Meds:   baclofen  20 mg Oral QID    carbamazepine  400 mg Oral BID    ciprofloxacin HCl  500 mg Oral Q12H    citalopram  20 mg Oral Daily    dantrolene  50 mg Oral QID    ergocalciferol  50,000 Units Oral Q7 Days    gabapentin  600 mg Oral  BID AC    gabapentin  900 mg Oral QHS    miconazole NITRATE 2 %   Topical BID    nicotine  1 patch Transdermal Daily    oxybutynin  5 mg Oral Daily    polyethylene glycol  17 g Oral Daily    rivaroxaban  20 mg Oral Daily with dinner    trazodone  100 mg Oral QHS     Continuous Infusions:   PRN Meds:.acetaminophen, albuterol-ipratropium 2.5mg-0.5mg/3mL, aluminum-magnesium hydroxide-simethicone, dextrose 50%, dextrose 50%, diazePAM, glucagon (human recombinant), glucose, glucose, metoclopramide HCl, ondansetron, oxycodone, promethazine, ramelteon, senna-docusate 8.6-50 mg      OBJECTIVE:     Vital Signs Range (Last 24H):  Temp:  [97.6 °F (36.4 °C)-98.3 °F (36.8 °C)] 97.6 °F (36.4 °C)  Pulse:  [63-77] 63  Resp:  [18] 18  SpO2:  [97 %-99 %] 99 %  BP: (112-121)/(59-64) 112/59    Physical Exam:  General: well developed, well nourished, no distress  Lungs:  clear to auscultation bilaterally and normal respiratory effort  Cardiovascular: Heart: regular rate and rhythm, S1, S2 normal, no murmur, click, rub or gallop. Chest Wall: no tenderness.   Extremities: no cyanosis or edema, or clubbing. Pulses: 2+ and symmetric.  Abdomel: Abdomen: soft, non-tender non-distended; bowel sounds normal; no masses,  no organomegaly.   Skin: Skin color, texture, turgor normal. No rashes or lesions  Musculoskeletal:no clubbing, cyanosis  Neurologic: Abnormal strength and tone, increased tone to upper extremities with left hand contracture. Generalized weakness.  Psych/Behavioral:  Alert and oriented, appropriate affect.      Laboratory:    Recent Labs  Lab 03/14/17  0435 03/15/17  0448 03/20/17  0419   WBC 6.49 6.40 7.24   HGB 12.1* 12.9* 11.7*   HCT 36.8* 38.0* 36.8*    203 213         Recent Labs  Lab 03/13/17 2022 03/14/17  0435 03/20/17  0420    142 141   K 4.0 4.1 4.4    105 107   CO2 30* 26 25   BUN 11 13 21*   CREATININE 0.9 0.8 0.8   CALCIUM 8.8 8.4* 8.1*   PROT 6.6  --   --    BILITOT 0.2  --   --     ALKPHOS 74  --   --    ALT 18  --   --    AST 22  --   --      Lab Results   Component Value Date    CALCIUM 8.1 (L) 03/20/2017    PHOS 3.9 03/20/2017     Lab Results   Component Value Date    ALKPHOS 74 03/13/2017       Results for MAGALI ALCANTAR (MRN [id number]) as of 3/20/2017 16:05   Ref. Range 3/14/2017 04:35 3/20/2017 04:20   Magnesium Latest Ref Range: 1.6 - 2.6 mg/dL 2.0 2.1       ASSESSMENT/PLAN:     Active Hospital Problems    Diagnosis  POA    *MS (multiple sclerosis) [G35]  Yes     Chronic    Depression [F32.9]  Yes    Smoker [F17.200]  Yes    E. coli urinary tract infection [N39.0, B96.20]  Yes    Polyneuropathy [G62.9]  Yes    10/25/2016 Saddle PE [I26.92]  Yes     Chronic    Vitamin D deficiency disease [E55.9]  Yes    Neurogenic bladder [N31.9]  Yes     Chronic    Spasticity [R25.2]  Yes    Chronic pain of multiple sites [R52, G89.29]  Yes     Chronic      Resolved Hospital Problems    Diagnosis Date Resolved POA   No resolved problems to display.         NEW OR UNSTABLE PROBLEM(S) TREATED TODAY:  MS (multiple sclerosis) [G35]  Chronic  -PT/OT to increase ambulation, ADL performance and endurance  -consult PMR when deemed appropriate by PT/OT  -DVT ppx: rivaroxaban 20mg daily with dinner  -fall precautions  -bowel regimen: miralax and senokot-s1 tab daily to prevent/treat constipation; hold for frequent or loose stooling  -due for repeat rituxan in 6 months  -expected to resume home health once discharged but may opt for outpatient therapy as previously ordered  -he will need to f/u with PMR in 2 weeks after discharge and Neurology 2 weeks after discharge    Spasticity [R25.2]  -chronic and due to MS  -continue baclofen 20mg QID  -continue dantrolene 50mg QID    Polyneuropathy [G62.9]  -chronic  -continue gabapentin 600mg BID before meals and 900mg nightly  -continue carbamazepine 400mg BID    Chronic pain of multiple sites [R52, G89.29]  Chronic  -continue oxycodone every 3 hours prn and  valium prn spasms; decrease frequency of oxycodone during SNF stay to return to 15mg prn up to TID at home  -will continue to monitor and adjust regimen as necessary    Depression [F32.9]  -chronic  -continue celexa 20mg daily    Smoker [F17.200]  -chronic  -continue nicotine 14mg patch daily    Urinary tract infection without hematuria [N39.0] E. coli  -complete cipro for 13 doses to treat, end date on 3/24/17    10/25/2016 Saddle PE [I26.92]  Chronic  -continue rivaroxaban to prevent future thrombi    Vitamin D deficiency  -continue ergocalciferol 50,000 units every 7 days    Neurogenic bladder [N31.9]  Chronic  -continue oxybutynin 5mg daily  -bladder scan prn and I/O cath prn retention volume > 300cc       Future Appointments  Date Time Provider Department Center   4/4/2017 11:15 AM Pura Solitario PT [facility]   7/17/2017 9:40 AM [facility] Navarrete NP  Department of Hospital Medicine   [facility]

## 2017-03-20 NOTE — PT/OT/SLP PROGRESS
"Physical Therapy  Treatment    Mao Levin   MRN: [id number]   Admitting Diagnosis: MS (multiple sclerosis)    PT Received On: 17  Total Time (min): 53       Billable Minutes: 53    Gait [id number], Therapeutic Activity 25 and Therapeutic Exercise 13    Treatment Type: Treatment  PT/PTA: PTA     PTA Visit Number: 1       General Precautions: Standard, fall  Orthopedic Precautions: N/A   Braces: N/A    Do you have any cultural, spiritual, Sikhism conflicts, given your current situation?: no    Subjective:  Communicated with nsg prior to session. "its gonna be all right, I gotta beat this"      Pain Ratin/10 (dealing with this for years, never below 7)  Location - Side: Bilateral  Location - Orientation: generalized  Location: leg (leg/arms/lower back "everywhere")  Pain Addressed: Pre-medicate for activity, Reposition, Distraction, Nurse notified  Pain Rating Post-Intervention: 7/10    Objective:   Patient found with:  (in bed )       Functional Status:  MDS G  Bed Mobility Functional Status: total(A)  Transfer Functional Status: total(A)  Walk in Room Functional Status: total(A)          Bed Mobility:    Supine>Sit: max A/total  with BLE mgmt and trunk, HOB elev vc/tcs for tech    Transfers:  Sit<>Stand: mod A x 2 ppl in II bars   Sqt Pivot Transfer: mod A x 2 ppl  EOB>WC vcs for tech      Gait:  Amb in II bars x 3 trials 3-5 ft with seated rest breaks, wc follow, B knees blocked A to adv LEs L>R, L foot wrapped with ace wrap and blue shoe cover ( pt repts having L AFO, decided to wrap with ace bandage 2* to weakness and heaviness of shoes)    Therex:   BLE therex hip/knee/ankle - PROM/AA Small range SAQ and AP RLE    Balance:  Mod/min in II bars B knee blocked    Additional Treatment:  Leg press x 15 min    Patient left up in chair with call button in reach and belongings in reach.    Assessment:  Mao Levin is a 51 y.o. male with a medical diagnosis of MS (multiple sclerosis).  Pt tolerated well, pt is " pleasant, demo good effort, highly motivated, significant decrease functional strength, pt would continue to benefit from skilled PT services to improve overall functional mobility, strength and endurance.  .    Rehab identified problem list/impairments: weakness, impaired endurance, impaired sensation, impaired self care skills, impaired functional mobilty, gait instability, impaired balance, decreased upper extremity function, decreased lower extremity function, pain    Rehab potential is good.    Activity tolerance: Fair    Discharge recommendations: rehabilitation facility     Barriers to discharge: Inaccessible home environment, Decreased caregiver support    Equipment recommendations: bedside commode, walker, rolling, wheelchair     GOALS:   Physical Therapy Goals        Problem: Physical Therapy Goal    Goal Priority Disciplines Outcome Goal Variances Interventions   Physical Therapy Goal     PT/OT, PT Ongoing (interventions implemented as appropriate)     Description:  Goals to be met by: 2 weeks     Patient will increase functional independence with mobility by performin. Supine to sit with Stand-by Assistance  2. Sit to supine with Stand-by Assistance  3. Sit to stand transfer with Minimal Assistance  4. Bed to chair transfer with Minimal Assistance using Rolling Walker  5. Gait  x 20 feet with Moderate Assistance using Rolling Walker.   6. Wheelchair propulsion x75 feet with Stand-by Assistance using BUE/BLE  7. Ascend/descend 3 stair with left Handrails Moderate Assistance.   8. Stand for 3 minutes with Stand-by Assistance using Rolling Walker  9. Lower extremity exercise program x20 reps per handout, with assistance as needed                PLAN:    Patient to be seen 5 x/week  to address the above listed problems via gait training, therapeutic activities, therapeutic exercises, wheelchair management/training  Plan of Care expires: 17  Plan of Care reviewed with: patient    Chely Gudino,  PTA  03/20/2017

## 2017-03-20 NOTE — PLAN OF CARE
Problem: Occupational Therapy Goal  Goal: Occupational Therapy Goal  Goals to be met by: 3 weeks     Patient will increase functional independence with ADLs by performing:    Feeding with Set-up Assistance.  UE Dressing with Stand-by Assistance.  LE Dressing with Minimal Assistance.  Grooming while seated with Modified [place].  Toileting from bedside commode with Minimal Assistance for hygiene and clothing management.   Bathing from shower chair/bench with Minimal Assistance.  Sitting at edge of bed x20 minutes with Supervision.  Rolling to Bilateral with Stand-by Assistance.   Supine to sit with Minimal Assistance.  Stand pivot transfers with Minimal Assistance.  Toilet transfer to bedside commode with Minimal Assistance.  L Upper extremity self ROM exercise program x15 reps per handout, with independence.   Pt. Tolerated session well

## 2017-03-20 NOTE — PT/OT/SLP PROGRESS
Occupational Therapy  Treatment    Mao Levin   MRN: [id number]   Admitting Diagnosis: MS (multiple sclerosis)    OT Date of Treatment: 17  Total Time (min): 57 min    Billable Minutes:  Self Care/Home Management 23 and Therapeutic Exercise 24    General Precautions: Standard, fall  Orthopedic Precautions: N/A  Braces: N/A    Do you have any cultural, spiritual, Temple conflicts, given your current situation?: no    Subjective:  Communicated with nurse prior to session.  I want to shave if possible    Pain Ratin/10              Pain Rating Post-Intervention: 0/10    Objective:  Patient found with:  (seated in w/c)    Functional Status:  MDS G  Bed Mobility Functional Status: mod(A) sit to supine  Transfer Functional Status: Max(A) SQPT from the right   UBD:  Max A to don gown like robe seated in w/c  Eating : set up A  Grooming:  Mod A to shve with electric clippers at sink with increased time also required for task completion        OT Exercises: AROM /AAROM to BUE for all planes of BUE motion;  Gentle stretches also provided in all available planes of BUE motion;  Education provided on use of dowel in supine to assist with ROM to LUE for shoulder flexion and abduction    Additional Treatment:  Pt. Educated on safety with transfers and there ex     Patient left supine with call button in reach    ASSESSMENT:  Mao Levin is a 51 y.o. male with a medical diagnosis of MS (multiple sclerosis) and presents with deficits in self-care skills as well as functional mobility and would benefit from continued OT services to maximize safety and independence with ADL tasks .    Rehab identified problem list/impairments: impaired endurance, impaired self care skills, impaired functional mobilty, decreased upper extremity function, decreased lower extremity function, decreased coordination, decreased ROM, impaired coordination, impaired fine motor    Rehab potential is good    Activity tolerance: Good    Discharge  recommendations: rehabilitation facility     Barriers to discharge: Inaccessible home environment, Decreased caregiver support     Equipment recommendations: bedside commode, walker, rolling, wheelchair     GOALS:   Occupational Therapy Goals        Problem: Occupational Therapy Goal    Goal Priority Disciplines Outcome Interventions   Occupational Therapy Goal     OT, PT/OT Ongoing (interventions implemented as appropriate)    Description:  Goals to be met by: 3 weeks     Patient will increase functional independence with ADLs by performing:    Feeding with Set-up Assistance.  UE Dressing with Stand-by Assistance.  LE Dressing with Minimal Assistance.  Grooming while seated with Modified [place].  Toileting from bedside commode with Minimal Assistance for hygiene and clothing management.   Bathing from  shower chair/bench with Minimal Assistance.  Sitting at edge of bed x20 minutes with Supervision.  Rolling to Bilateral with Stand-by Assistance.   Supine to sit with Minimal Assistance.  Stand pivot transfers with Minimal Assistance.  Toilet transfer to bedside commode with Minimal Assistance.  L Upper extremity self ROM exercise program x15 reps per handout, with independence.                Plan:  Patient to be seen 5 x/week to address the above listed problems via self-care/home management, therapeutic exercises, therapeutic activities  Plan of Care expires: 04/18/17  Plan of Care reviewed with: patient    GISEL Dennis  03/20/2017

## 2017-03-20 NOTE — PROGRESS NOTES
03/20/2017  4:09 PM  Discharge Planning-Met with patient in room to inform of discharge date of 3/30/2017. Discharge date written on dry erase board in room. Patient stated understanding to discharge date.    Kelsie Renee RN, CM Skilled  [id number]

## 2017-03-20 NOTE — PLAN OF CARE
Problem: Physical Therapy Goal  Goal: Physical Therapy Goal  Goals to be met by: 2 weeks     Patient will increase functional independence with mobility by performin. Supine to sit with Stand-by Assistance  2. Sit to supine with Stand-by Assistance  3. Sit to stand transfer with Minimal Assistance  4. Bed to chair transfer with Minimal Assistance using Rolling Walker  5. Gait x 20 feet with Moderate Assistance using Rolling Walker.   6. Wheelchair propulsion x75 feet with Stand-by Assistance using BUE/BLE  7. Ascend/descend 3 stair with left Handrails Moderate Assistance.   8. Stand for 3 minutes with Stand-by Assistance using Rolling Walker  9. Lower extremity exercise program x20 reps per handout, with assistance as needed   Outcome: Ongoing (interventions implemented as appropriate)  Goals remain appropriate

## 2017-03-20 NOTE — PT/OT/SLP DISCHARGE
Physical Therapy Discharge Summary    Mao Levin  MRN: [id number]   E. coli urinary tract infection   Patient Discharged from acute Physical Therapy on 3/17/2017.  Please refer to prior PT noted date on 3/16/2017 for functional status.     Assessment:   Patient has not met goals.  GOALS:   Physical Therapy Goals     Not on file      Multidisciplinary Problems (Resolved)        Problem: Physical Therapy Goal    Goal Priority Disciplines Outcome Goal Variances Interventions   Physical Therapy Goal   (Resolved)     PT/OT, PT Outcome(s) achieved     Description:  Goals to be met by: 3/28/2017     Patient will increase functional independence with mobility by performin. Supine to sit with Set-up [place]  2. Sit to supine with Set-up [place]  3. Sit to stand transfer with Supervision  4. Bed to chair transfer with Supervision using Rolling Walker  5. Gait  x 100 feet with Supervision using Rolling Walker.   6. Ascend/descend 3 stair with left Handrails Stand-by Assistance.               Reasons for Discontinuation of Therapy Services  Transfer to alternate level of care.      Plan:  Patient Discharged to: Skilled Nursing Facility.    Timothy Genao, PT  3/17/2017

## 2017-03-21 PROCEDURE — 25000003 PHARM REV CODE 250: Performed by: NURSE PRACTITIONER

## 2017-03-21 PROCEDURE — 97530 THERAPEUTIC ACTIVITIES: CPT

## 2017-03-21 PROCEDURE — 97110 THERAPEUTIC EXERCISES: CPT

## 2017-03-21 PROCEDURE — 11000004 HC SNF PRIVATE

## 2017-03-21 PROCEDURE — 97116 GAIT TRAINING THERAPY: CPT

## 2017-03-21 PROCEDURE — 97535 SELF CARE MNGMENT TRAINING: CPT

## 2017-03-21 PROCEDURE — 25000003 PHARM REV CODE 250: Performed by: INTERNAL MEDICINE

## 2017-03-21 PROCEDURE — 25000003 PHARM REV CODE 250: Performed by: PHYSICIAN ASSISTANT

## 2017-03-21 RX ADMIN — MICONAZOLE NITRATE: 20 POWDER TOPICAL at 08:03

## 2017-03-21 RX ADMIN — BACLOFEN 20 MG: 10 TABLET ORAL at 05:03

## 2017-03-21 RX ADMIN — CARBAMAZEPINE 400 MG: 200 TABLET, EXTENDED RELEASE ORAL at 08:03

## 2017-03-21 RX ADMIN — OXYCODONE HYDROCHLORIDE 15 MG: 5 TABLET ORAL at 07:03

## 2017-03-21 RX ADMIN — DIAZEPAM 5 MG: 5 TABLET ORAL at 05:03

## 2017-03-21 RX ADMIN — MICONAZOLE NITRATE: 20 POWDER TOPICAL at 09:03

## 2017-03-21 RX ADMIN — GABAPENTIN 600 MG: 300 CAPSULE ORAL at 06:03

## 2017-03-21 RX ADMIN — BACLOFEN 20 MG: 10 TABLET ORAL at 12:03

## 2017-03-21 RX ADMIN — CIPROFLOXACIN HYDROCHLORIDE 500 MG: 500 TABLET, FILM COATED ORAL at 08:03

## 2017-03-21 RX ADMIN — DANTROLENE SODIUM 50 MG: 50 CAPSULE ORAL at 12:03

## 2017-03-21 RX ADMIN — OXYBUTYNIN CHLORIDE 5 MG: 5 TABLET, EXTENDED RELEASE ORAL at 08:03

## 2017-03-21 RX ADMIN — GABAPENTIN 900 MG: 300 CAPSULE ORAL at 08:03

## 2017-03-21 RX ADMIN — DANTROLENE SODIUM 50 MG: 50 CAPSULE ORAL at 05:03

## 2017-03-21 RX ADMIN — OXYCODONE HYDROCHLORIDE 15 MG: 5 TABLET ORAL at 09:03

## 2017-03-21 RX ADMIN — OXYCODONE HYDROCHLORIDE 15 MG: 5 TABLET ORAL at 06:03

## 2017-03-21 RX ADMIN — OXYCODONE HYDROCHLORIDE 15 MG: 5 TABLET ORAL at 04:03

## 2017-03-21 RX ADMIN — OXYCODONE HYDROCHLORIDE 15 MG: 5 TABLET ORAL at 11:03

## 2017-03-21 RX ADMIN — BACLOFEN 20 MG: 10 TABLET ORAL at 06:03

## 2017-03-21 RX ADMIN — STANDARDIZED SENNA CONCENTRATE AND DOCUSATE SODIUM 1 TABLET: 8.6; 5 TABLET, FILM COATED ORAL at 08:03

## 2017-03-21 RX ADMIN — POLYETHYLENE GLYCOL 3350 17 G: 17 POWDER, FOR SOLUTION ORAL at 08:03

## 2017-03-21 RX ADMIN — CITALOPRAM HYDROBROMIDE 20 MG: 20 TABLET ORAL at 08:03

## 2017-03-21 RX ADMIN — NICOTINE 1 PATCH: 14 PATCH, EXTENDED RELEASE TRANSDERMAL at 08:03

## 2017-03-21 RX ADMIN — OXYCODONE HYDROCHLORIDE 15 MG: 5 TABLET ORAL at 12:03

## 2017-03-21 RX ADMIN — TRAZODONE HYDROCHLORIDE 100 MG: 50 TABLET ORAL at 08:03

## 2017-03-21 RX ADMIN — GABAPENTIN 600 MG: 300 CAPSULE ORAL at 05:03

## 2017-03-21 RX ADMIN — DANTROLENE SODIUM 50 MG: 50 CAPSULE ORAL at 06:03

## 2017-03-21 RX ADMIN — RIVAROXABAN 20 MG: 20 TABLET, FILM COATED ORAL at 05:03

## 2017-03-21 NOTE — PT/OT/SLP PROGRESS
"Physical Therapy  Treatment    Mao Levin   MRN: [id number]   Admitting Diagnosis: MS (multiple sclerosis)    PT Received On: 03/21/17  Total Time (min): 48       Billable Minutes:  Gait [id number], Therapeutic Activity 18 and Therapeutic Exercise 15    Treatment Type: Treatment  PT/PTA: PTA     PTA Visit Number: 2 (cleared for PT with nsg)       General Precautions: Standard, fall  Orthopedic Precautions: N/A   Braces: N/A    Do you have any cultural, spiritual, Moravian conflicts, given your current situation?: no    Subjective:  Communicated with nsg prior to session."lets do it"    Pain Rating:  (did not rate)  Location - Side: Bilateral  Location - Orientation: generalized ("every where")     Pain Addressed: Pre-medicate for activity, Reposition, Distraction, Nurse notified       Objective:   Patient found with:  (in bed)       Functional Status:  MDS G  Bed Mobility Functional Status: mod(A)-Max(A)  Transfer Functional Status: mod(A)-Max(A)  Walk in Room Functional Status: total(A)          Bed Mobility:  Supine>sit :max A with vc/tcs for tech HOB elev  Sit > supine max A with BLE mgmt    Transfers:  Sit<>Stand: max/mod in II bar  Sqt Pivot Transfer: EOB<>WC towards R max A      Gait:  Amb length of II bars x 3 trials max A with LLE ace wrap into DF and blue shoe cover, A with blocking L knee and advancing foot    Therex:  Supine BLE L PROM, R AAROM hip/knee/ankle    Patient left supine with call button in reach and belongings in reach.    Assessment:  Mao Levin is a 51 y.o. male with a medical diagnosis of MS (multiple sclerosis).  Pt tolerated fairly well,  appeared little groggy during session, pt repts low BP this morning, BP taken during session 111/66 nsg notified/aware, pt would continue to benefit from skilled PT services to improve overall functional mobility, strength and endurance.  .    Rehab identified problem list/impairments: weakness, impaired endurance, impaired sensation, impaired self care " skills, impaired functional mobilty, gait instability, impaired balance, decreased upper extremity function, decreased lower extremity function, pain    Rehab potential is fair.    Activity tolerance: Fair    Discharge recommendations: rehabilitation facility     Barriers to discharge: Inaccessible home environment, Decreased caregiver support    Equipment recommendations: bedside commode, walker, rolling, wheelchair     GOALS:   Physical Therapy Goals        Problem: Physical Therapy Goal    Goal Priority Disciplines Outcome Goal Variances Interventions   Physical Therapy Goal     PT/OT, PT Ongoing (interventions implemented as appropriate)     Description:  Goals to be met by: 2 weeks     Patient will increase functional independence with mobility by performin. Supine to sit with Stand-by Assistance  2. Sit to supine with Stand-by Assistance  3. Sit to stand transfer with Minimal Assistance  4. Bed to chair transfer with Minimal Assistance using Rolling Walker  5. Gait  x 20 feet with Moderate Assistance using Rolling Walker.   6. Wheelchair propulsion x75 feet with Stand-by Assistance using BUE/BLE  7. Ascend/descend 3 stair with left Handrails Moderate Assistance.   8. Stand for 3 minutes with Stand-by Assistance using Rolling Walker  9. Lower extremity exercise program x20 reps per handout, with assistance as needed                PLAN:    Patient to be seen 5 x/week  to address the above listed problems via gait training, therapeutic activities, therapeutic exercises, wheelchair management/training  Plan of Care expires: 17  Plan of Care reviewed with: patient    Chelypopeye De Andapadmini, PTA  2017

## 2017-03-21 NOTE — CLINICAL REVIEW
Clinical Pharmacy Chart Review Note    Admit Date: 3/17/2017   LOS: 4 days     Mao Levin is a 51 y.o. male admitted to SNF for PT/OT after hospitalization for MS (multiple sclerosis).    Active Hospital Problems    Diagnosis  POA    *MS (multiple sclerosis) [G35]  Yes     Chronic    Depression [F32.9]  Yes    Smoker [F17.200]  Yes    E. coli urinary tract infection [N39.0, B96.20]  Yes    Polyneuropathy [G62.9]  Yes    10/25/2016 Saddle PE [I26.92]  Yes     Chronic    Vitamin D deficiency disease [E55.9]  Yes    Neurogenic bladder [N31.9]  Yes     Chronic    Spasticity [R25.2]  Yes    Chronic pain of multiple sites [R52, G89.29]  Yes     Chronic      Resolved Hospital Problems    Diagnosis Date Resolved POA   No resolved problems to display.     Review of patient's allergies indicates:  No Known Allergies  Patient Active Problem List    Diagnosis Date Noted    Bilateral leg pain 03/17/2017    Depression 03/17/2017    Smoker 03/17/2017    E. coli urinary tract infection 03/14/2017    Frequent falls 03/07/2017    MS (multiple sclerosis) 12/19/2016    Acute relapsing multiple sclerosis 12/16/2016    Long term (current) use of systemic steroids 12/16/2016    Constipation due to neurogenic bowel 12/16/2016    Abnormal thyroid blood test 12/16/2016    Neuropathic pain, leg, bilateral 12/08/2016    Polyneuropathy 10/27/2016    10/25/2016 Saddle PE 10/25/2016    Vitamin D deficiency disease 10/24/2016    Abnormal cortisol level 10/23/2016    Neurogenic bladder 10/06/2016    Spasticity 10/06/2016    Impaired mobility and ADLs 10/06/2016    Abnormal coordination 10/06/2016    Paraparesis 09/29/2016    Gait instability 09/26/2016    Adrenal insufficiency due to steroid withdrawal 09/24/2016    Chronic pain of multiple sites 09/22/2016    Weakness generalized 09/22/2016       Scheduled Meds:    baclofen  20 mg Oral QID    carbamazepine  400 mg Oral BID    ciprofloxacin HCl  500 mg Oral Q12H     citalopram  20 mg Oral Daily    dantrolene  50 mg Oral QID    ergocalciferol  50,000 Units Oral Q7 Days    gabapentin  600 mg Oral BID AC    gabapentin  900 mg Oral QHS    miconazole NITRATE 2 %   Topical BID    nicotine  1 patch Transdermal Daily    oxybutynin  5 mg Oral Daily    polyethylene glycol  17 g Oral Daily    rivaroxaban  20 mg Oral Daily with dinner    senna-docusate 8.6-50 mg  1 tablet Oral Daily    trazodone  100 mg Oral QHS     Continuous Infusions:    PRN Meds: acetaminophen, albuterol-ipratropium 2.5mg-0.5mg/3mL, aluminum-magnesium hydroxide-simethicone, bisacodyl, dextrose 50%, dextrose 50%, diazePAM, glucagon (human recombinant), glucose, glucose, metoclopramide HCl, ondansetron, oxycodone, promethazine, ramelteon    OBJECTIVE:     Vital Signs (Last 24H)  Temp:  [98 °F (36.7 °C)-98.8 °F (37.1 °C)]   Pulse:  [63-76]   Resp:  [18]   BP: (114-119)/(46-66)   SpO2:  [98 %]     Laboratory:  CBC:   Recent Labs  Lab 03/15/17  0448 03/20/17  0419   WBC 6.40 7.24   RBC 4.35* 4.06*   HGB 12.9* 11.7*   HCT 38.0* 36.8*    213   MCV 87 91   MCH 29.7 28.8   MCHC 33.9 31.8*     BMP:   Recent Labs  Lab 03/20/17  0420   GLU 85      K 4.4      CO2 25   BUN 21*   CREATININE 0.8   CALCIUM 8.1*   MG 2.1     CMP:   Recent Labs  Lab 03/20/17  0420   GLU 85   CALCIUM 8.1*      K 4.4   CO2 25      BUN 21*   CREATININE 0.8     LFTs: No results for input(s): ALT, AST, ALKPHOS, BILITOT, PROT, ALBUMIN in the last 168 hours.  Coagulation: No results for input(s): INR, APTT in the last 168 hours.    Invalid input(s): PT  Cardiac markers: No results for input(s): CKMB, TROPONINT, MYOGLOBIN in the last 168 hours.  ABGs: No results for input(s): PH, PCO2, PO2, HCO3, POCSATURATED, BE in the last 168 hours.  Microbiology Results (last 7 days)     ** No results found for the last 168 hours. **        Specimen     None        No results for input(s): COLORU, CLARITYU, SPECGRAV, PHUR,  PROTEINUA, GLUCOSEU, BILIRUBINCON, BLOODU, WBCU, RBCU, BACTERIA, MUCUS, NITRITE, LEUKOCYTESUR, UROBILINOGEN, HYALINECASTS in the last 168 hours.  Others: No results for input(s): [id number], TSH, T4FREE, LABLIPI in the last 168 hours.    Invalid input(s): A1C     ASSESSMENT/PLAN:      MS (multiple sclerosis)/Spasticity/Polyneuropathy/Chronic pain of multiple sites   --PT/OT  --pain management baclofen 20 mg QID, dantrolene 50 mg QID, carbamazepine  mg BID, diazepam 5 mg BID prn muscle spasms, gabapentin 600 mg qAM & afternoon and 900 mg qHS, oxycodone 15 mg q3h prn moderate-severe pain, acetaminophen 605 mg q4h prn mild pain    --bowel regimen for opioid induced constipation  --DVT PPX: xarelto 20 mg with dinner     Depression   --citalopram 20 mg daily, trazodone 100 mg qHS    Monitor: mental status for depression, suicide ideation, anxiety, serotonin syndrome, hyponatremia     Smoker   --nicotine 14mg/24hr patch daily     E. coli urinary tract infection   --ciprofloxacin 500 mg q12h day 8 of 10 (end 3/23)  UCx 3/13- Escherichia coli > 100,000 (cipro sensitive)    10/25/2016 Saddle PE   --xarelto 20 mg with dinner   Monitor CBC     Vitamin D deficiency disease   --ergocalciferol 50,000 units q7days   Vit D lvl 18 on 09/2016    Neurogenic bladder   --oxybutynin XR 5 mg daily       Monitor BMP/CBC/Vitals

## 2017-03-21 NOTE — PLAN OF CARE
Problem: Occupational Therapy Goal  Goal: Occupational Therapy Goal  Goals to be met by: 3 weeks     Patient will increase functional independence with ADLs by performing:    Feeding with Set-up Assistance.  UE Dressing with Stand-by Assistance.  LE Dressing with Minimal Assistance.  Grooming while seated with Modified [place].  Toileting from bedside commode with Minimal Assistance for hygiene and clothing management.   Bathing from shower chair/bench with Minimal Assistance.  Sitting at edge of bed x20 minutes with Supervision.  Rolling to Bilateral with Stand-by Assistance.   Supine to sit with Minimal Assistance.  Stand pivot transfers with Minimal Assistance.  Toilet transfer to bedside commode with Minimal Assistance.  L Upper extremity self ROM exercise program x15 reps per handout, with independence.   Pt. Tolerated session fairly

## 2017-03-21 NOTE — PT/OT/SLP PROGRESS
Occupational Therapy  Treatment    Mao Levin   MRN: [id number]   Admitting Diagnosis: MS (multiple sclerosis)    OT Date of Treatment: 03/21/17  Total Time (min): 60 min    Billable Minutes:  Self Care/Home Management 60    General Precautions: Standard, fall  Orthopedic Precautions: N/A  Braces: N/A    Do you have any cultural, spiritual, Gnosticist conflicts, given your current situation?: no    Subjective:  Communicated with nurse prior to session.  I need to get cleaned up    Pain Rating: 10/10        Location: back  Pain Addressed: Nurse notified  Pain Rating Post-Intervention:  (pain level same during session but  wanted to do therapy)    Objective:  Patient found with:  (supine in bed)    Functional Status:  MDS G  Bed Mobility Functional Status:Max(A) rolling left and right with rail and Total A supine to sit  Transfer Functional Status: Max(A) SQPT from bed to w/c;   Total A x 2 from w/c to BSC  Dressing Functional Status: 4:total(A) to don pants in supine;  Max A to don gown  Eating Functional Status: Set up A for coffee and sallie crackers seated in w/c  Toilet Use Functional Status: total(A) with use of BSC for bowel movement for all aspects of clothing management and cleaning  Personal Hygiene Functional Status: CGA seated EOB to brush teeth  Bathing Functional Status: max (A) with assist to wash buttocks region and below knees as well as under LUE            Additional Treatment:  Pt. Educated on safety with ADL tasks/postural control and safety with mobility    Patient left up in chair with call button in reach    ASSESSMENT:  Mao Levin is a 51 y.o. male with a medical diagnosis of MS (multiple sclerosis) and presents with significant deficits in self-care skills as well postural control, mobility and functional use of extremities and would .    Rehab identified problem list/impairments: impaired endurance, impaired self care skills, impaired functional mobilty, decreased upper extremity function,  decreased lower extremity function, pain, abnormal tone, impaired coordination, impaired fine motor    Rehab potential is good    Activity tolerance: Good    Discharge recommendations: rehabilitation facility     Barriers to discharge: Inaccessible home environment, Decreased caregiver support     Equipment recommendations: bedside commode, walker, rolling, wheelchair     GOALS:   Occupational Therapy Goals        Problem: Occupational Therapy Goal    Goal Priority Disciplines Outcome Interventions   Occupational Therapy Goal     OT, PT/OT Ongoing (interventions implemented as appropriate)    Description:  Goals to be met by: 3 weeks     Patient will increase functional independence with ADLs by performing:    Feeding with Set-up Assistance.  UE Dressing with Stand-by Assistance.  LE Dressing with Minimal Assistance.  Grooming while seated with Modified [place].  Toileting from bedside commode with Minimal Assistance for hygiene and clothing management.   Bathing from  shower chair/bench with Minimal Assistance.  Sitting at edge of bed x20 minutes with Supervision.  Rolling to Bilateral with Stand-by Assistance.   Supine to sit with Minimal Assistance.  Stand pivot transfers with Minimal Assistance.  Toilet transfer to bedside commode with Minimal Assistance.  L Upper extremity self ROM exercise program x15 reps per handout, with independence.                Plan:  Patient to be seen 5 x/week to address the above listed problems via self-care/home management, therapeutic activities, therapeutic exercises  Plan of Care expires: 04/18/17  Plan of Care reviewed with: patient    GISEL Dennis  03/21/2017

## 2017-03-21 NOTE — PROGRESS NOTES
Discharge Planning Assessment     Payor: [employer] [place] [employer]ARE / Plan: [employer] MEDICARE HMO / Product Type: Capitation /     Expected length of stay:  [] 7 days   [] 10 days  [x] 14 days   [] 21 days   [] > 30 days    Communicated expected length of stay with patient/caregiver:  [x] Yes   [] No    Anticipated discharge date:  3/30/2017    Assessment information obtained from:  [x] Patient   [] Caregiver     Arrived from:   [x] Home   [] Assisted Living    [] Nursing Home   [] SNF   [] Rehab  [] LTACH   [] Group Home   [] Foster Care   [] Psych   [] Shelter   [] Homeless   [] Transfer  [] Correctional Facility  [] Name of Facility:      Patient currently lives with:    [x] Alone   [] Spouse   [] Daughter   [] Son   [] Grandparents   []  Parents   [] Siblings   [] Friends   [] Domestic Partner   [] Facility Resident      [] Foster Home    [] Other:       Extended Emergency Contact Information  Primary Emergency Contact: Myesha Pack  Address: [address] [place] [address]  Home Phone: [phone]  Mobile Phone: [phone]  Relation: Significant other  Preferred language: English     Prior to hospitalization cognitive status:   [x] Alert/Oriented  [] No Deficits [] Risk of Harm to Self/Others   [] Not Oriented to Person   [] Not Oriented to Place   [] Not Oriented to Time   [] Coma/Sedated/Intubated  [] Judgement Impaired    []  Unable to Assess   [] Inappropriate Behavior  [] Infant/Toddler    Prior to hospitalization functional status:   [x] Independent   [x] Assistive Equipment   [] Assistive Person    [] Completely Dependent  [] Infant/Toddler/Child Appropriate   [] Infant/Toddler/Child Delayed    []  Adolescent     Current cognitive status:   [x] Alert/Oriented  [] No Deficits [] Risk of Harm to Self/Others   [] Not Oriented to Person   [] Not Oriented to Place   [] Not Oriented to Time   [] Coma/Sedated/Intubated  [] Judgement Impaired    []  Unable to  Assess   [] Inappropriate Behavior  [] Infant/Toddler    Current functional status:     [] Independent   [x] Assistive Equipment   [x] Assistive Person     [] Completely Dependent   [] Infant/Toddler/Child Appropriate   [] Infant/Toddler/Child Delayed     [] Adolescent     Capacity to Care for Self:   Is patient able to return to prior living arrangements after discharge: [x] Yes  [] No     Is patient able to care for self after discharge?   [] Yes   [x] No     [] Pediatric     Does the patient have family/friends to assist after discharge?:  [x] Yes   [] No    [] N/A   Comments:  girlfriend      Patient/caregiver perception of discharge disposition:   [] Home   [] Home with Family  [] Home Health   [] SNF   [x] Rehab   [] LTAC    []  New Nursing Home Placement  [] Return to Nursing Home    [] Shelter     [] Assisted Living  [] Foster Home   [] Other:      Readmit:   Has patient selene in the hospital in the last 30 days? [] Yes   [x] No     If YES, was patient admitted for the same reason?  [] Yes   [] No       Home Health:   Patient currently receives home health services?:   [] Yes   [x] No     Patient previously received home health services and would like to resume services if necessary   [] Yes   [x] No    DME:   Patient currently uses DME:   [x] Yes   [] No        List of equipment currently used:     [] Wheelchair   [] Standard Walker  [] Rolling Walker  [x]  Rollator    [] Oxygen    [] Portable oxygen   [] Nebulizer    [] Apnea Monitor    [] Crutches  [] Hospital Bed   []  Lift Device   [] Scooter [] Cane     [] Prosthesis   []  BSC   [x] Tub Bench   [] Catheter Supplies    [] Ostomy Supplies   [] Trach Supplies     [] Suction Machine        [] Home Vent    [] Bipap   [] Other:         Medications:    Can the patient afford all prescribed medications?  [x] Yes   [] No     If NO, what medication:       Is the patient taking medications as prescribed?    [x] Yes   [] No    Financial Concerns:   Does the patient  have any financial concerns?   [] Yes   [x] No      If YES, what are the concerns:      Transportation:   Does the patient have transportation to healthcare appointments?   [x] Yes   [] No     If YES, what means of transportation does the patient have?   [] Car   [x] Family/Friend  [] Bicycle   [] Motorcycle   [] Public Transportation [] Ambulance[] Wheelchair van   [] Name of Provider    Dialysis:   Does the patient currently receive dialysis?   [] Yes   [x] No      If YES, what is the name of the provider:        Fausto Leung MD   [address]  [place] [address]  [phone]         APS/CPS involved in the case:  [] Yes   [x] No   If YES, name of :     If YES, phone number of :        Discharge Plan A:  [] Home   [] Home with Family  [] Home Health   [] SNF   [x] Rehab   [] LTAC   []  New Nursing Home Placement  [] Return to Nursing Home    [] Assisted Living    [] Shelter     [] Private Duty Nursing   [] Foster Home    [] Psych    [] [facility]  [] WIC       [] Home Hospice   [] Inpatient Hospice   [] Other    Discharge Plan B:  [x] Home   [] Home with Family  [x] Home Health   [] SNF   [] Rehab   [] LTAC  []  New Nursing Home Placement   [] Return to  Nursing Home    [] Assisted Living   [] Shelter  [] Private Duty Nursing   [] Foster Home     [] Psych     [] Early [facility]  [] WIC    [] Home Hospice     [] Inpatient Hospice   [] Other     [x] Patient and family in agreement with discharge plan.    Met with patient in room to discuss discharge plan and date. Patient AAO, sitting up in wheelchair in room. Discharge plan A  is Rehab for further therapy. Discharge plan B is home alone with assist of girlfriend. Informed patient therapy recommends a 2 week SNF stay and that discharge would be 3/30/2017. Patient stated understanding to discharge date. CM and SW will continue to follow for any additional needs.    Kelsie Renee RN, CM Skilled  [id number]

## 2017-03-22 PROCEDURE — 97110 THERAPEUTIC EXERCISES: CPT

## 2017-03-22 PROCEDURE — 97537 COMMUNITY/WORK REINTEGRATION: CPT

## 2017-03-22 PROCEDURE — 97116 GAIT TRAINING THERAPY: CPT

## 2017-03-22 PROCEDURE — 11000004 HC SNF PRIVATE

## 2017-03-22 PROCEDURE — 97535 SELF CARE MNGMENT TRAINING: CPT

## 2017-03-22 PROCEDURE — 25000003 PHARM REV CODE 250: Performed by: INTERNAL MEDICINE

## 2017-03-22 PROCEDURE — 97530 THERAPEUTIC ACTIVITIES: CPT

## 2017-03-22 PROCEDURE — 25000003 PHARM REV CODE 250: Performed by: PHYSICIAN ASSISTANT

## 2017-03-22 RX ADMIN — GABAPENTIN 900 MG: 300 CAPSULE ORAL at 10:03

## 2017-03-22 RX ADMIN — BACLOFEN 20 MG: 10 TABLET ORAL at 06:03

## 2017-03-22 RX ADMIN — ACETAMINOPHEN 650 MG: 325 TABLET, FILM COATED ORAL at 12:03

## 2017-03-22 RX ADMIN — DANTROLENE SODIUM 50 MG: 50 CAPSULE ORAL at 05:03

## 2017-03-22 RX ADMIN — GABAPENTIN 600 MG: 300 CAPSULE ORAL at 04:03

## 2017-03-22 RX ADMIN — OXYCODONE HYDROCHLORIDE 15 MG: 5 TABLET ORAL at 04:03

## 2017-03-22 RX ADMIN — OXYCODONE HYDROCHLORIDE 15 MG: 5 TABLET ORAL at 06:03

## 2017-03-22 RX ADMIN — DANTROLENE SODIUM 50 MG: 50 CAPSULE ORAL at 12:03

## 2017-03-22 RX ADMIN — BACLOFEN 20 MG: 10 TABLET ORAL at 12:03

## 2017-03-22 RX ADMIN — CARBAMAZEPINE 400 MG: 200 TABLET, EXTENDED RELEASE ORAL at 09:03

## 2017-03-22 RX ADMIN — OXYCODONE HYDROCHLORIDE 15 MG: 5 TABLET ORAL at 01:03

## 2017-03-22 RX ADMIN — BACLOFEN 20 MG: 10 TABLET ORAL at 11:03

## 2017-03-22 RX ADMIN — OXYCODONE HYDROCHLORIDE 15 MG: 5 TABLET ORAL at 10:03

## 2017-03-22 RX ADMIN — OXYCODONE HYDROCHLORIDE 15 MG: 5 TABLET ORAL at 09:03

## 2017-03-22 RX ADMIN — DIAZEPAM 5 MG: 5 TABLET ORAL at 01:03

## 2017-03-22 RX ADMIN — RIVAROXABAN 20 MG: 20 TABLET, FILM COATED ORAL at 07:03

## 2017-03-22 RX ADMIN — OXYCODONE HYDROCHLORIDE 15 MG: 5 TABLET ORAL at 03:03

## 2017-03-22 RX ADMIN — DIAZEPAM 5 MG: 5 TABLET ORAL at 10:03

## 2017-03-22 RX ADMIN — CIPROFLOXACIN HYDROCHLORIDE 500 MG: 500 TABLET, FILM COATED ORAL at 10:03

## 2017-03-22 RX ADMIN — OXYBUTYNIN CHLORIDE 5 MG: 5 TABLET, EXTENDED RELEASE ORAL at 09:03

## 2017-03-22 RX ADMIN — DANTROLENE SODIUM 50 MG: 50 CAPSULE ORAL at 11:03

## 2017-03-22 RX ADMIN — CARBAMAZEPINE 400 MG: 200 TABLET, EXTENDED RELEASE ORAL at 10:03

## 2017-03-22 RX ADMIN — GABAPENTIN 600 MG: 300 CAPSULE ORAL at 06:03

## 2017-03-22 RX ADMIN — DANTROLENE SODIUM 50 MG: 50 CAPSULE ORAL at 06:03

## 2017-03-22 RX ADMIN — TRAZODONE HYDROCHLORIDE 100 MG: 50 TABLET ORAL at 10:03

## 2017-03-22 RX ADMIN — CITALOPRAM HYDROBROMIDE 20 MG: 20 TABLET ORAL at 09:03

## 2017-03-22 RX ADMIN — BACLOFEN 20 MG: 10 TABLET ORAL at 05:03

## 2017-03-22 RX ADMIN — CIPROFLOXACIN HYDROCHLORIDE 500 MG: 500 TABLET, FILM COATED ORAL at 09:03

## 2017-03-22 RX ADMIN — ACETAMINOPHEN 650 MG: 325 TABLET, FILM COATED ORAL at 09:03

## 2017-03-22 RX ADMIN — ACETAMINOPHEN 650 MG: 325 TABLET, FILM COATED ORAL at 07:03

## 2017-03-22 RX ADMIN — NICOTINE 1 PATCH: 14 PATCH, EXTENDED RELEASE TRANSDERMAL at 09:03

## 2017-03-22 NOTE — PT/OT/SLP PROGRESS
Occupational Therapy  Treatment    Mao Levin   MRN: [id number]   Admitting Diagnosis: MS (multiple sclerosis)    OT Date of Treatment: 17  Total Time (min): 54 min    Billable Minutes:  Self Care/Home Management 27 and Therapeutic Exercise 27    General Precautions: Standard, fall  Orthopedic Precautions: N/A  Braces: N/A    Do you have any cultural, spiritual, Restoration conflicts, given your current situation?: no    Subjective:  Communicated with nurse prior to session.  I am in pain this morning    Pain Ratin/10 (session moved back 2/2 needing pain meds)     Location - Orientation: generalized     Pain Addressed: Nurse notified, Pre-medicate for activity  Pain Rating Post-Intervention:  (no increases in pain noted)    Objective:       Functional Status:  MDS G  Bed Mobility Functional Status:-Max(A) for supine to sit  Transfer Functional Status: Max(A) SQPT from bed to w/c  Dressing Functional Status: 4:total(A) to don pants in supine  Toilet Use Functional Status: total(A) to use urinal supine in bed  Grooming: SBA seated EOB to brush teeth with use of RUE          OT Exercises: AROM with gentle stretches to LUE for all available planes of motion    Additional Treatment:  Dowel exercises with 1 # dowel  X 1 set 10 reps for shoulder flexion as wel as to assist with LUE for shoulder abduction   Pt. Performed bilateral demetris flat on table with 3# x 4 sets 25 reps;  Pt. Performed elbow flex/ext with 2 # dowel  X 2 sets 10 reps    OT requested nursing to order pt. Wedge to assist with positioning as well as boots to maintain skin integrity on heels.     Patient left up in chair with in gym awaiting PT session    ASSESSMENT:  Mao Levin is a 51 y.o. male with a medical diagnosis of MS (multiple sclerosis) and presents with significant limitations in functional mobility, self-care skills, as well as endurance and limitations in ROM particularly in LUE.  Pt. Also limited by pain.  Pt. Is cooperative and would  benefit from continued OT services to maximize independence and safety with self-care.    Rehab identified problem list/impairments: impaired endurance, impaired self care skills, impaired functional mobilty, decreased upper extremity function, decreased lower extremity function, pain, abnormal tone, impaired coordination, impaired fine motor    Rehab potential is good    Activity tolerance: Good    Discharge recommendations: rehabilitation facility     Barriers to discharge: Inaccessible home environment, Decreased caregiver support     Equipment recommendations: bedside commode, walker, rolling, wheelchair     GOALS:   Occupational Therapy Goals        Problem: Occupational Therapy Goal    Goal Priority Disciplines Outcome Interventions   Occupational Therapy Goal     OT, PT/OT Ongoing (interventions implemented as appropriate)    Description:  Goals to be met by: 3 weeks     Patient will increase functional independence with ADLs by performing:    Feeding with Set-up Assistance.  UE Dressing with Stand-by Assistance.  LE Dressing with Minimal Assistance.  Grooming while seated with Modified [place].  Toileting from bedside commode with Minimal Assistance for hygiene and clothing management.   Bathing from  shower chair/bench with Minimal Assistance.  Sitting at edge of bed x20 minutes with Supervision.  Rolling to Bilateral with Stand-by Assistance.   Supine to sit with Minimal Assistance.  Stand pivot transfers with Minimal Assistance.  Toilet transfer to bedside commode with Minimal Assistance.  L Upper extremity self ROM exercise program x15 reps per handout, with independence.                Plan:  Patient to be seen 5 x/week to address the above listed problems via self-care/home management, therapeutic exercises, therapeutic activities  Plan of Care expires: 04/18/17  Plan of Care reviewed with: patient    GISEL Dennis  03/22/2017

## 2017-03-22 NOTE — PLAN OF CARE
Problem: Patient Care Overview  Goal: Plan of Care Review  Outcome: Ongoing (interventions implemented as appropriate)    03/22/17 1548   Coping/Psychosocial   Plan Of Care Reviewed With patient         Problem: Infection, Risk/Actual (Adult)  Goal: Infection Prevention/Resolution  Patient will demonstrate the desired outcomes by discharge/transition of care.   Outcome: Ongoing (interventions implemented as appropriate)    03/22/17 1548   Infection, Risk/Actual (Adult)   Infection Prevention/Resolution making progress toward outcome         Problem: Fall Risk (Adult)  Goal: Absence of Falls  Patient will demonstrate the desired outcomes by discharge/transition of care.   Outcome: Ongoing (interventions implemented as appropriate)    03/22/17 1548   Fall Risk (Adult)   Absence of Falls making progress toward outcome         Problem: Pressure Ulcer Risk (Huy Scale) (Adult,Obstetrics,Pediatric)  Goal: Skin Integrity  Patient will demonstrate the desired outcomes by discharge/transition of care.   Outcome: Ongoing (interventions implemented as appropriate)    03/22/17 1548   Pressure Ulcer Risk (Huy Scale) (Adult,Obstetrics,Pediatric)   Skin Integrity making progress toward outcome         Comments:   Patient monitored every 1 to 2 hours for pain and safety.  Safety maintained.  Patient instructed to call for assistance.Call  Light and persoanl items in reach.

## 2017-03-22 NOTE — PT/OT/SLP PROGRESS
"Physical Therapy  Treatment    Mao Levin   MRN: [id number]   Admitting Diagnosis: MS (multiple sclerosis)    PT Received On: 17  Total Time (min): 40       Billable Minutes:  Gait [id number], Therapeutic Activity 15 and Therapeutic Exercise 10    Treatment Type: Treatment  PT/PTA: PTA     PTA Visit Number: 3       General Precautions: Standard, fall  Orthopedic Precautions: N/A   Braces: N/A    Do you have any cultural, spiritual, Hoahaoism conflicts, given your current situation?: no    Subjective:  Communicated with nsg prior to and post session.re pt repts of feeling dizzy some time, off and on  "let do it"    Pain Ratin/10  Location - Side: Bilateral  Location - Orientation: generalized  Location: knee (B knees/everywhere)  Pain Addressed: Pre-medicate for activity, Reposition, Cessation of Activity, Nurse notified  Pain Rating Post-Intervention:  ("about the same")    Objective:  Patient found with:  (in wc)       Functional Status:  MDS G  Transfer Functional Status: mod(A)-Max(A)  Walk in Room Functional Status: mod(A)-Max(A)        Transfers:  Sit<>Stand: mod A in II bars  Sqt Pivot Transfer: WC<>EOM towards R max/mod      Gait:  Amb length of II bars x 3 trials with seated rest break max/mod A with L foot ace wrap into DF and blue shoe cover, vcs for sequencing/wt shifting, A to adv LLE    Therex:  AA/PROM BLE hip/knee/ankle    Balance:  Static sitting EOB SBA without UE support ~ 15 minutes  Dynamic sitting balance at EOM tapping balloon with RUE min/CGA except with posterior LOB max to recover    Additional Treatment:  Attempted LBE unable at this time    Patient left up in chair with call button in reach, nsg notified, nsg present and belongings in reach.nsg notified of repts of dizziness BP taken 114/72    Assessment:  Mao Levin is a 51 y.o. male with a medical diagnosis of MS (multiple sclerosis).  Pt tolerated fairly well, continues to c/o generalized pain everywhere, however it does not " appear to limit pts effort nor motivation, pt would continue to benefit from skilled PT services to improve overall functional mobility, strength and endurance.      Rehab identified problem list/impairments: weakness, impaired endurance, impaired sensation, impaired self care skills, impaired functional mobilty, gait instability, impaired balance, decreased upper extremity function, decreased lower extremity function, pain    Rehab potential is good.    Activity tolerance: Fair    Discharge recommendations: rehabilitation facility     Barriers to discharge: Inaccessible home environment, Decreased caregiver support    Equipment recommendations: bedside commode, walker, rolling, wheelchair     GOALS:   Physical Therapy Goals        Problem: Physical Therapy Goal    Goal Priority Disciplines Outcome Goal Variances Interventions   Physical Therapy Goal     PT/OT, PT Ongoing (interventions implemented as appropriate)     Description:  Goals to be met by: 2 weeks     Patient will increase functional independence with mobility by performin. Supine to sit with Stand-by Assistance  2. Sit to supine with Stand-by Assistance  3. Sit to stand transfer with Minimal Assistance  4. Bed to chair transfer with Minimal Assistance using Rolling Walker  5. Gait  x 20 feet with Moderate Assistance using Rolling Walker.   6. Wheelchair propulsion x75 feet with Stand-by Assistance using BUE/BLE  7. Ascend/descend 3 stair with left Handrails Moderate Assistance.   8. Stand for 3 minutes with Stand-by Assistance using Rolling Walker  9. Lower extremity exercise program x20 reps per handout, with assistance as needed                PLAN:    Patient to be seen 5 x/week  to address the above listed problems via gait training, therapeutic activities, therapeutic exercises, wheelchair management/training  Plan of Care expires: 17  Plan of Care reviewed with: patient    Chely Gudino, PTA  2017

## 2017-03-22 NOTE — PROGRESS NOTES
03/22/2017  10:12 AM  Spoke with Leonor at [facility] via phone regarding patient. Leonor stated she would have MD look over patient's notes and see if patient would be a rehab candidate.  Kelsie Renee RN, CM Skilled  [phone]

## 2017-03-22 NOTE — PLAN OF CARE
Problem: Occupational Therapy Goal  Goal: Occupational Therapy Goal  Goals to be met by: 3 weeks     Patient will increase functional independence with ADLs by performing:    Feeding with Set-up Assistance.  UE Dressing with Stand-by Assistance.  LE Dressing with Minimal Assistance.  Grooming while seated with Modified [place].  Toileting from bedside commode with Minimal Assistance for hygiene and clothing management.   Bathing from shower chair/bench with Minimal Assistance.  Sitting at edge of bed x20 minutes with Supervision.  Rolling to Bilateral with Stand-by Assistance.   Supine to sit with Minimal Assistance.  Stand pivot transfers with Minimal Assistance.  Toilet transfer to bedside commode with Minimal Assistance.  L Upper extremity self ROM exercise program x15 reps per handout, with independence.   Pt. Tolerated session fairly

## 2017-03-23 LAB
ANION GAP SERPL CALC-SCNC: 9 MMOL/L
BASOPHILS # BLD AUTO: 0.05 K/UL
BASOPHILS NFR BLD: 0.8 %
BUN SERPL-MCNC: 23 MG/DL
CALCIUM SERPL-MCNC: 8.2 MG/DL
CHLORIDE SERPL-SCNC: 106 MMOL/L
CO2 SERPL-SCNC: 25 MMOL/L
CREAT SERPL-MCNC: 0.9 MG/DL
DIFFERENTIAL METHOD: ABNORMAL
EOSINOPHIL # BLD AUTO: 0.2 K/UL
EOSINOPHIL NFR BLD: 3.6 %
ERYTHROCYTE [DISTWIDTH] IN BLOOD BY AUTOMATED COUNT: 14.2 %
EST. GFR  (AFRICAN AMERICAN): >60 ML/MIN/1.73 M^2
EST. GFR  (NON AFRICAN AMERICAN): >60 ML/MIN/1.73 M^2
GLUCOSE SERPL-MCNC: 88 MG/DL
HCT VFR BLD AUTO: 36.1 %
HGB BLD-MCNC: 11.8 G/DL
LYMPHOCYTES # BLD AUTO: 2.4 K/UL
LYMPHOCYTES NFR BLD: 36.7 %
MAGNESIUM SERPL-MCNC: 2.1 MG/DL
MCH RBC QN AUTO: 29.4 PG
MCHC RBC AUTO-ENTMCNC: 32.7 %
MCV RBC AUTO: 90 FL
MONOCYTES # BLD AUTO: 0.4 K/UL
MONOCYTES NFR BLD: 6.6 %
NEUTROPHILS # BLD AUTO: 3.5 K/UL
NEUTROPHILS NFR BLD: 52.3 %
PHOSPHATE SERPL-MCNC: 3.7 MG/DL
PLATELET # BLD AUTO: 201 K/UL
PMV BLD AUTO: 11.2 FL
POTASSIUM SERPL-SCNC: 4.3 MMOL/L
RBC # BLD AUTO: 4.01 M/UL
SODIUM SERPL-SCNC: 140 MMOL/L
WBC # BLD AUTO: 6.64 K/UL

## 2017-03-23 PROCEDURE — 25000003 PHARM REV CODE 250: Performed by: PHYSICIAN ASSISTANT

## 2017-03-23 PROCEDURE — 25000003 PHARM REV CODE 250: Performed by: INTERNAL MEDICINE

## 2017-03-23 PROCEDURE — 25000003 PHARM REV CODE 250: Performed by: NURSE PRACTITIONER

## 2017-03-23 PROCEDURE — 97535 SELF CARE MNGMENT TRAINING: CPT

## 2017-03-23 PROCEDURE — 11000004 HC SNF PRIVATE

## 2017-03-23 PROCEDURE — 97112 NEUROMUSCULAR REEDUCATION: CPT

## 2017-03-23 PROCEDURE — 97110 THERAPEUTIC EXERCISES: CPT

## 2017-03-23 PROCEDURE — 80048 BASIC METABOLIC PNL TOTAL CA: CPT

## 2017-03-23 PROCEDURE — 84100 ASSAY OF PHOSPHORUS: CPT

## 2017-03-23 PROCEDURE — 97116 GAIT TRAINING THERAPY: CPT

## 2017-03-23 PROCEDURE — 85025 COMPLETE CBC W/AUTO DIFF WBC: CPT

## 2017-03-23 PROCEDURE — 36415 COLL VENOUS BLD VENIPUNCTURE: CPT

## 2017-03-23 PROCEDURE — 97530 THERAPEUTIC ACTIVITIES: CPT

## 2017-03-23 PROCEDURE — 83735 ASSAY OF MAGNESIUM: CPT

## 2017-03-23 RX ORDER — LACTULOSE 10 G/15ML
20 SOLUTION ORAL ONCE
Status: COMPLETED | OUTPATIENT
Start: 2017-03-23 | End: 2017-03-23

## 2017-03-23 RX ORDER — LACTULOSE 10 G/15ML
20 SOLUTION ORAL ONCE
Status: DISCONTINUED | OUTPATIENT
Start: 2017-03-23 | End: 2017-03-28

## 2017-03-23 RX ADMIN — CARBAMAZEPINE 400 MG: 200 TABLET, EXTENDED RELEASE ORAL at 09:03

## 2017-03-23 RX ADMIN — BACLOFEN 20 MG: 10 TABLET ORAL at 06:03

## 2017-03-23 RX ADMIN — OXYCODONE HYDROCHLORIDE 15 MG: 5 TABLET ORAL at 03:03

## 2017-03-23 RX ADMIN — TRAZODONE HYDROCHLORIDE 100 MG: 50 TABLET ORAL at 09:03

## 2017-03-23 RX ADMIN — DANTROLENE SODIUM 50 MG: 50 CAPSULE ORAL at 05:03

## 2017-03-23 RX ADMIN — GABAPENTIN 600 MG: 300 CAPSULE ORAL at 04:03

## 2017-03-23 RX ADMIN — OXYBUTYNIN CHLORIDE 5 MG: 5 TABLET, EXTENDED RELEASE ORAL at 08:03

## 2017-03-23 RX ADMIN — CIPROFLOXACIN HYDROCHLORIDE 500 MG: 500 TABLET, FILM COATED ORAL at 08:03

## 2017-03-23 RX ADMIN — CITALOPRAM HYDROBROMIDE 20 MG: 20 TABLET ORAL at 08:03

## 2017-03-23 RX ADMIN — NICOTINE 1 PATCH: 14 PATCH, EXTENDED RELEASE TRANSDERMAL at 08:03

## 2017-03-23 RX ADMIN — DIAZEPAM 5 MG: 5 TABLET ORAL at 08:03

## 2017-03-23 RX ADMIN — LACTULOSE 20 G: 10 SOLUTION ORAL at 12:03

## 2017-03-23 RX ADMIN — DANTROLENE SODIUM 50 MG: 50 CAPSULE ORAL at 12:03

## 2017-03-23 RX ADMIN — RIVAROXABAN 20 MG: 20 TABLET, FILM COATED ORAL at 06:03

## 2017-03-23 RX ADMIN — BACLOFEN 20 MG: 10 TABLET ORAL at 12:03

## 2017-03-23 RX ADMIN — GABAPENTIN 600 MG: 300 CAPSULE ORAL at 05:03

## 2017-03-23 RX ADMIN — OXYCODONE HYDROCHLORIDE 15 MG: 5 TABLET ORAL at 10:03

## 2017-03-23 RX ADMIN — OXYCODONE HYDROCHLORIDE 15 MG: 5 TABLET ORAL at 02:03

## 2017-03-23 RX ADMIN — OXYCODONE HYDROCHLORIDE 15 MG: 5 TABLET ORAL at 06:03

## 2017-03-23 RX ADMIN — GABAPENTIN 900 MG: 300 CAPSULE ORAL at 09:03

## 2017-03-23 RX ADMIN — OXYCODONE HYDROCHLORIDE 15 MG: 5 TABLET ORAL at 08:03

## 2017-03-23 RX ADMIN — STANDARDIZED SENNA CONCENTRATE AND DOCUSATE SODIUM 1 TABLET: 8.6; 5 TABLET, FILM COATED ORAL at 08:03

## 2017-03-23 RX ADMIN — BACLOFEN 20 MG: 10 TABLET ORAL at 05:03

## 2017-03-23 RX ADMIN — CIPROFLOXACIN HYDROCHLORIDE 500 MG: 500 TABLET, FILM COATED ORAL at 09:03

## 2017-03-23 RX ADMIN — RAMELTEON 8 MG: 8 TABLET, FILM COATED ORAL at 10:03

## 2017-03-23 RX ADMIN — CARBAMAZEPINE 400 MG: 200 TABLET, EXTENDED RELEASE ORAL at 08:03

## 2017-03-23 RX ADMIN — OXYCODONE HYDROCHLORIDE 15 MG: 5 TABLET ORAL at 12:03

## 2017-03-23 RX ADMIN — DANTROLENE SODIUM 50 MG: 50 CAPSULE ORAL at 06:03

## 2017-03-23 RX ADMIN — OXYCODONE HYDROCHLORIDE 15 MG: 5 TABLET ORAL at 05:03

## 2017-03-23 NOTE — PLAN OF CARE
Problem: Patient Care Overview  Goal: Plan of Care Review  Outcome: Ongoing (interventions implemented as appropriate)    03/23/17 0500   Coping/Psychosocial   Plan Of Care Reviewed With patient

## 2017-03-23 NOTE — PT/OT/SLP PROGRESS
Occupational Therapy  Treatment    Mao Levin   MRN: [id number]   Admitting Diagnosis: MS (multiple sclerosis)    OT Date of Treatment: 03/23/17  Total Time (min): 44 min    Billable Minutes:  Self Care/Home Management 29 there ex x 15 minutes     General Precautions: Standard, fall  Orthopedic Precautions: N/A  Braces: N/A    Do you have any cultural, spiritual, Yazdanism conflicts, given your current situation?: no    Subjective:  Communicated with nurse prior to session.  What time is it?  I think it helped that I had my medicine early this morning.     Pain Rating: other (see comments) (no complaints of pain)           Pain Addressed: Pre-medicate for activity  Pain Rating Post-Intervention:  (no c/o of pain noted during session)    Objective:  Patient found with:  (supine in bed)    Functional Status:  MDS G  Bed Mobility Functional Status: Max(A) supine to sit;  CGA rolling left and right with use of  bed rail   Transfer Functional Status: Max(A) SQPT to w/c from bed   Dressing Functional Status: 4:total(A) to don pants in supine;  Mod A UBD to don gown seated EOB  Eating Functional Status: Set up A   Toilet Use Functional Status: total(A) soiled on OT arrival   Personal Hygiene Functional Status: CGA to wash face seated EOB  Bathing Functional Status: mod(A) able to wash kim area and thighs as well as chest and BUE          OT Exercises: AROM /AAROM for gentle stretches for Left wrist/, finger extension, elbow extension and shoulder flexion;  wand exercises x 10 reps for shoulder flexion and LUE shoulder abduction    Additional Treatment:  Educated on safety with transfers/ther ex    Patient left up in chair with call button in reach    ASSESSMENT:  Mao Levin is a 51 y.o. male with a medical diagnosis of MS (multiple sclerosis) and presents with deficits with functional mobility, self-care skills, functional use of LUE/LLE.  Pt. Is cooperative and making improvements with mobility in bed and self-care skills.   Pt. Would benefit from continued OT services in inpatient rehab setting.    Rehab identified problem list/impairments: impaired endurance, impaired self care skills, impaired functional mobilty, impaired balance, decreased upper extremity function, decreased lower extremity function, decreased coordination, impaired coordination, impaired fine motor    Rehab potential is good    Activity tolerance: Good    Discharge recommendations: rehabilitation facility     Barriers to discharge: Inaccessible home environment, Decreased caregiver support     Equipment recommendations: bedside commode, walker, rolling, wheelchair     GOALS:   Occupational Therapy Goals        Problem: Occupational Therapy Goal    Goal Priority Disciplines Outcome Interventions   Occupational Therapy Goal     OT, PT/OT Ongoing (interventions implemented as appropriate)    Description:  Goals to be met by: 3 weeks     Patient will increase functional independence with ADLs by performing:    Feeding with Set-up Assistance.  UE Dressing with Stand-by Assistance.  LE Dressing with Minimal Assistance.  Grooming while seated with Modified [place].  Toileting from bedside commode with Minimal Assistance for hygiene and clothing management.   Bathing from  shower chair/bench with Minimal Assistance.  Sitting at edge of bed x20 minutes with Supervision.  Rolling to Bilateral with Stand-by Assistance.   Supine to sit with Minimal Assistance.  Stand pivot transfers with Minimal Assistance.  Toilet transfer to bedside commode with Minimal Assistance.  L Upper extremity self ROM exercise program x15 reps per handout, with independence.                Plan:  Patient to be seen 5 x/week to address the above listed problems via self-care/home management, therapeutic exercises, therapeutic activities  Plan of Care expires: 04/18/17  Plan of Care reviewed with: patient    GISEL Dennis  03/23/2017

## 2017-03-23 NOTE — PLAN OF CARE
Problem: Infection, Risk/Actual (Adult)  Goal: Identify Related Risk Factors and Signs and Symptoms  Related risk factors and signs and symptoms are identified upon initiation of Human Response Clinical Practice Guideline (CPG)   Outcome: Ongoing (interventions implemented as appropriate)    03/23/17 7918   Infection, Risk/Actual   Related Risk Factors (Infection, Risk/Actual) chronic illness/condition;prolonged hospitalization   Signs and Symptoms (Infection, Risk/Actual) pain;weakness

## 2017-03-23 NOTE — PLAN OF CARE
Problem: Occupational Therapy Goal  Goal: Occupational Therapy Goal  Goals to be met by: 3 weeks     Patient will increase functional independence with ADLs by performing:    Feeding with Set-up Assistance.  UE Dressing with Stand-by Assistance.  LE Dressing with Minimal Assistance.  Grooming while seated with Modified [place].  Toileting from bedside commode with Minimal Assistance for hygiene and clothing management.   Bathing from shower chair/bench with Minimal Assistance.  Sitting at edge of bed x20 minutes with Supervision.  Rolling to Bilateral with Stand-by Assistance.   Supine to sit with Minimal Assistance.  Stand pivot transfers with Minimal Assistance.  Toilet transfer to bedside commode with Minimal Assistance.  L Upper extremity self ROM exercise program x15 reps per handout, with independence.   Less pain noted on this date

## 2017-03-23 NOTE — PT/OT/SLP PROGRESS
"Physical Therapy  Treatment    Mao Levin   MRN: [id number]   Admitting Diagnosis: MS (multiple sclerosis)    PT Received On: 17  Total Time (min): 53       Billable Minutes:  Gait [id number], Therapeutic Activity 23 and Therapeutic Exercise 15    Treatment Type: Treatment  PT/PTA: PTA     PTA Visit Number: 4       General Precautions: Standard, fall  Orthopedic Precautions: N/A   Braces: N/A    Do you have any cultural, spiritual, Christianity conflicts, given your current situation?: no    Subjective:  "I'm fine."    Pain Ratin/10        Pain Rating Post-Intervention: 0/10    Objective:  Patient found seated in w/c    Functional Status:  MDS G  Bed Mobility Functional Status: mod(A)-Max(A)  Transfer Functional Status: mod(A)-Max(A)  Locomotion on Unit Functional Status: total(A)  Locomotion Off Unit Functional Status: total(A)          Bed Mobility:  Supine <> sit on mat with max A for trunk and LEs management.  VCs for technique    Transfers:  Sit <> stand from w/c in // bars with max/mod A x 5 trials  SQPT from w/c <> mat with max A x 2 trials    Gait:  Pt ambulated x 8 feet in // bars x 4 trials with max A for advancing LLE.  Dorsiflex with ace wrap and blue to cover (L)LE.  Vcs for upright posturing, sequencing.      Therex:  BLEs supine therex x 10 reps includings: APs, ADD/ABD, HS, bridges    Balance:  Dynamic standing balance-reaching tree x 8 minutes in // bars with mod A         Patient left up in chair with call button in reach.    Assessment:  Mao Levin is a 51 y.o. male with a medical diagnosis of MS (multiple sclerosis). Pt tolerated therapy well.  Pt ambulated x 8 feet x 4 trials in // bars with max A.  Pt will continue to benefit from PT services to improve his functional mobility.  Goals remain appropriate.    Rehab identified problem list/impairments: weakness, impaired endurance, impaired sensation, impaired self care skills, impaired functional mobilty, gait instability, impaired balance, " decreased upper extremity function, decreased lower extremity function, pain    Rehab potential is fair.    Activity tolerance: Fair    Discharge recommendations: rehabilitation facility     Barriers to discharge: Inaccessible home environment, Decreased caregiver support    Equipment recommendations: bedside commode, walker, rolling, wheelchair     GOALS:   Physical Therapy Goals        Problem: Physical Therapy Goal    Goal Priority Disciplines Outcome Goal Variances Interventions   Physical Therapy Goal     PT/OT, PT Ongoing (interventions implemented as appropriate)     Description:  Goals to be met by: 2 weeks     Patient will increase functional independence with mobility by performin. Supine to sit with Stand-by Assistance  2. Sit to supine with Stand-by Assistance  3. Sit to stand transfer with Minimal Assistance  4. Bed to chair transfer with Minimal Assistance using Rolling Walker  5. Gait  x 20 feet with Moderate Assistance using Rolling Walker.   6. Wheelchair propulsion x75 feet with Stand-by Assistance using BUE/BLE  7. Ascend/descend 3 stair with left Handrails Moderate Assistance.   8. Stand for 3 minutes with Stand-by Assistance using Rolling Walker  9. Lower extremity exercise program x20 reps per handout, with assistance as needed                PLAN:    Patient to be seen 5 x/week  to address the above listed problems via gait training, therapeutic activities, therapeutic exercises, wheelchair management/training  Plan of Care expires: 17  Plan of Care reviewed with: patient    Vandana Benitez, PTA  2017

## 2017-03-24 PROCEDURE — 11000004 HC SNF PRIVATE

## 2017-03-24 PROCEDURE — 97116 GAIT TRAINING THERAPY: CPT

## 2017-03-24 PROCEDURE — 99900058 HC 022 PAID UNDER SNF PPS

## 2017-03-24 PROCEDURE — 25000003 PHARM REV CODE 250: Performed by: NURSE PRACTITIONER

## 2017-03-24 PROCEDURE — 25000003 PHARM REV CODE 250: Performed by: PHYSICIAN ASSISTANT

## 2017-03-24 PROCEDURE — 97535 SELF CARE MNGMENT TRAINING: CPT

## 2017-03-24 PROCEDURE — 25000003 PHARM REV CODE 250: Performed by: INTERNAL MEDICINE

## 2017-03-24 PROCEDURE — 97530 THERAPEUTIC ACTIVITIES: CPT

## 2017-03-24 PROCEDURE — 97110 THERAPEUTIC EXERCISES: CPT

## 2017-03-24 RX ADMIN — OXYCODONE HYDROCHLORIDE 15 MG: 5 TABLET ORAL at 12:03

## 2017-03-24 RX ADMIN — NICOTINE 1 PATCH: 14 PATCH, EXTENDED RELEASE TRANSDERMAL at 08:03

## 2017-03-24 RX ADMIN — OXYCODONE HYDROCHLORIDE 15 MG: 5 TABLET ORAL at 09:03

## 2017-03-24 RX ADMIN — TRAZODONE HYDROCHLORIDE 100 MG: 50 TABLET ORAL at 09:03

## 2017-03-24 RX ADMIN — CITALOPRAM HYDROBROMIDE 20 MG: 20 TABLET ORAL at 08:03

## 2017-03-24 RX ADMIN — RAMELTEON 8 MG: 8 TABLET, FILM COATED ORAL at 09:03

## 2017-03-24 RX ADMIN — DANTROLENE SODIUM 50 MG: 50 CAPSULE ORAL at 06:03

## 2017-03-24 RX ADMIN — OXYCODONE HYDROCHLORIDE 15 MG: 5 TABLET ORAL at 01:03

## 2017-03-24 RX ADMIN — BACLOFEN 20 MG: 10 TABLET ORAL at 06:03

## 2017-03-24 RX ADMIN — RIVAROXABAN 20 MG: 20 TABLET, FILM COATED ORAL at 06:03

## 2017-03-24 RX ADMIN — OXYCODONE HYDROCHLORIDE 15 MG: 5 TABLET ORAL at 06:03

## 2017-03-24 RX ADMIN — GABAPENTIN 600 MG: 300 CAPSULE ORAL at 03:03

## 2017-03-24 RX ADMIN — DIAZEPAM 5 MG: 5 TABLET ORAL at 09:03

## 2017-03-24 RX ADMIN — STANDARDIZED SENNA CONCENTRATE AND DOCUSATE SODIUM 1 TABLET: 8.6; 5 TABLET, FILM COATED ORAL at 08:03

## 2017-03-24 RX ADMIN — BACLOFEN 20 MG: 10 TABLET ORAL at 12:03

## 2017-03-24 RX ADMIN — DIAZEPAM 5 MG: 5 TABLET ORAL at 08:03

## 2017-03-24 RX ADMIN — CARBAMAZEPINE 400 MG: 200 TABLET, EXTENDED RELEASE ORAL at 08:03

## 2017-03-24 RX ADMIN — BACLOFEN 20 MG: 10 TABLET ORAL at 01:03

## 2017-03-24 RX ADMIN — DANTROLENE SODIUM 50 MG: 50 CAPSULE ORAL at 01:03

## 2017-03-24 RX ADMIN — OXYCODONE HYDROCHLORIDE 15 MG: 5 TABLET ORAL at 03:03

## 2017-03-24 RX ADMIN — OXYCODONE HYDROCHLORIDE 15 MG: 5 TABLET ORAL at 08:03

## 2017-03-24 RX ADMIN — OXYBUTYNIN CHLORIDE 5 MG: 5 TABLET, EXTENDED RELEASE ORAL at 08:03

## 2017-03-24 RX ADMIN — DANTROLENE SODIUM 50 MG: 50 CAPSULE ORAL at 12:03

## 2017-03-24 RX ADMIN — CARBAMAZEPINE 400 MG: 200 TABLET, EXTENDED RELEASE ORAL at 09:03

## 2017-03-24 RX ADMIN — GABAPENTIN 600 MG: 300 CAPSULE ORAL at 06:03

## 2017-03-24 RX ADMIN — GABAPENTIN 900 MG: 300 CAPSULE ORAL at 09:03

## 2017-03-24 NOTE — PLAN OF CARE
Problem: Patient Care Overview  Goal: Plan of Care Review  Outcome: Ongoing (interventions implemented as appropriate)    03/24/17 0203   Coping/Psychosocial   Plan Of Care Reviewed With patient         Problem: Mobility, Physical Impaired (Adult)  Intervention: Monitor/Assist with Self Care    03/24/17 0203   Activity   Activity Assistance Provided assistance, 2 people   Daily Care Interventions   Self-Care Promotion BADL personal objects within reach;BADL personal routines maintained;meal setup provided   Functional Level Current   Ambulation 4 - completely dependent   Transferring 2 - assistive person   Toileting 2 - assistive person   Dressing 2 - assistive person   Eating 0 - independent   Communication 0 - understands/communicates without difficulty   Swallowing 0 - swallows foods/liquids without difficulty         Goal: Identify Related Risk Factors and Signs and Symptoms  Related risk factors and signs and symptoms are identified upon initiation of Human Response Clinical Practice Guideline (CPG)  Outcome: Ongoing (interventions implemented as appropriate)    03/24/17 0203   Mobility, Physical Impaired   Related Risk Factors (Physical Mobility, Impaired) activity intolerance;disease process;fatigue;functional decline;pain/discomfort   Signs and Symptoms (Physical Mobility Impaired) decreased balance;fear/anxiety related to mobility;holding on to furniture

## 2017-03-24 NOTE — PT/OT/SLP PROGRESS
Occupational Therapy  Treatment    Mao Levin   MRN: [id number]   Admitting Diagnosis: MS (multiple sclerosis)    OT Date of Treatment: 17  Total Time (min): 48 min    Billable Minutes:  Self Care/Home Management 48    General Precautions: Standard, fall  Orthopedic Precautions: N/A  Braces: N/A    Do you have any cultural, spiritual, Taoism conflicts, given your current situation?: no    Subjective:  Communicated with nurse prior to session.  I can't wait to take a real shower    Pain Ratin/10     Location - Orientation: generalized     Pain Addressed: Reposition, Distraction, Nurse notified  Pain Rating Post-Intervention:  (no increases in pain noted)    Objective:  Patient found with:  (supine in bed)    Functional Status:  MDS G  Bed Mobility Functional Status: Max(A) supine to sit from the right; CGA to roll to the left with rail  Transfer Functional Status: Max(A) SQPT from bed to w/c and w/c to TTB  Eating Functional Status: Set Up A  Toilet Use Functional Status: total(A) soiled diaper  Bathing Functional Status: Max(A) seated on TTB with assist for balance; buttocks region, right UE,             Additional Treatment:  Pt. Educated on safety with showering with use of TTB; Pt. Would benefit from splint for LUE (resting hand)    Patient left up in chair with call button in reach    ASSESSMENT:  Mao Levin is a 51 y.o. male with a medical diagnosis of MS (multiple sclerosis) and presents with increased tone in LUE/LLE, deficits with functional mobility as well as self-care skills.  Pt.  Tolerated session well and would benefit from continued OT services to maximize independence and safety with ADL tasks.     Rehab identified problem list/impairments: impaired endurance, impaired self care skills, impaired functional mobilty, impaired balance, decreased upper extremity function, decreased lower extremity function, abnormal tone, impaired fine motor, impaired coordination    Rehab potential is  good    Activity tolerance: Good    Discharge recommendations: rehabilitation facility     Barriers to discharge: Inaccessible home environment, Decreased caregiver support     Equipment recommendations: bedside commode, walker, rolling, wheelchair     GOALS:   Occupational Therapy Goals        Problem: Occupational Therapy Goal    Goal Priority Disciplines Outcome Interventions   Occupational Therapy Goal     OT, PT/OT Ongoing (interventions implemented as appropriate)    Description:  Goals to be met by: 3 weeks     Patient will increase functional independence with ADLs by performing:    Feeding with Set-up Assistance.  UE Dressing with Stand-by Assistance.  LE Dressing with Minimal Assistance.  Grooming while seated with Modified [place].  Toileting from bedside commode with Minimal Assistance for hygiene and clothing management.   Bathing from  shower chair/bench with Minimal Assistance.  Sitting at edge of bed x20 minutes with Supervision.  Rolling to Bilateral with Stand-by Assistance.   Supine to sit with Minimal Assistance.  Stand pivot transfers with Minimal Assistance.  Toilet transfer to bedside commode with Minimal Assistance.  L Upper extremity self ROM exercise program x15 reps per handout, with independence.                Plan:  Patient to be seen 5 x/week to address the above listed problems via self-care/home management, therapeutic activities, therapeutic exercises, neuromuscular re-education  Plan of Care expires: 04/18/17  Plan of Care reviewed with: patient    Isabel GISEL Villanueva  03/24/2017

## 2017-03-24 NOTE — PT/OT/SLP PROGRESS
"Physical Therapy  Treatment    Mao Levin   MRN: [id number]   Admitting Diagnosis: MS (multiple sclerosis)    PT Received On: 17  Total Time (min): 45       Billable Minutes:  Gait [id number], Therapeutic Activity 15 and Therapeutic Exercise 15    Treatment Type: Treatment  PT/PTA: PTA     PTA Visit Number: 5       General Precautions: Standard, fall  Orthopedic Precautions: N/A   Braces: N/A    Do you have any cultural, spiritual, Mormonism conflicts, given your current situation?: no  "I'm fine."    Pain Ratin/10        Pain Rating Post-Intervention: 0/10    Objective:  Patient found seated in w/c    Functional Status:  MDS G  Transfer Functional Status: mod(A)-Max(A)  Locomotion on Unit Functional Status: CGA-Min (A)  Locomotion Off Unit Functional Status: total(A)            Transfers:  Sit <> stand from w/c in // bars with max/mod A x 4 trials    Gait:  Pt ambulated x 8 feet x 4 trials in // bars with max A for balance and advancing LLE.  Ace wrap and blue shoe to LLE        Wheelchair Mobility:  Patient propels w/c 150 feet with BUEs and min/CGA for steering straight    Therex:  BLEs seated therex x 10 reps includings: APs, GS, ADD/ABD, LAQs, hip flexion. (L)LE required (A)       Balance:  Dynamic standing balance in standing frame x 7 minutes.  Pt tap balloon while standing in standing frame with (B) hands together      Patient left up in chair with call button in reach.    Assessment:  Mao Levin is a 51 y.o. male with a medical diagnosis of MS (multiple sclerosis). Pt is slowly progressing toward goals.  Pt remains and max A with gait and transfers in // bars.  Will continue to benefit from further PT services to improve his mobility.  Goals remain appropriate.    Rehab identified problem list/impairments: weakness, impaired endurance, impaired sensation, impaired self care skills, impaired functional mobilty, gait instability, impaired balance, decreased upper extremity function, decreased lower " extremity function, pain    Rehab potential is fair.    Activity tolerance: Fair    Discharge recommendations: rehabilitation facility     Barriers to discharge: Inaccessible home environment, Decreased caregiver support    Equipment recommendations: bedside commode, walker, rolling, wheelchair     GOALS:   Physical Therapy Goals        Problem: Physical Therapy Goal    Goal Priority Disciplines Outcome Goal Variances Interventions   Physical Therapy Goal     PT/OT, PT Ongoing (interventions implemented as appropriate)     Description:  Goals to be met by: 2 weeks     Patient will increase functional independence with mobility by performin. Supine to sit with Stand-by Assistance  2. Sit to supine with Stand-by Assistance  3. Sit to stand transfer with Minimal Assistance  4. Bed to chair transfer with Minimal Assistance using Rolling Walker  5. Gait  x 20 feet with Moderate Assistance using Rolling Walker.   6. Wheelchair propulsion x75 feet with Stand-by Assistance using BUE/BLE  7. Ascend/descend 3 stair with left Handrails Moderate Assistance.   8. Stand for 3 minutes with Stand-by Assistance using Rolling Walker  9. Lower extremity exercise program x20 reps per handout, with assistance as needed                PLAN:    Patient to be seen 5 x/week  to address the above listed problems via gait training, therapeutic activities, therapeutic exercises, wheelchair management/training  Plan of Care expires: 17  Plan of Care reviewed with: patient    Vandana RAMÍREZ Eli, PTA  2017

## 2017-03-24 NOTE — PLAN OF CARE
Problem: Occupational Therapy Goal  Goal: Occupational Therapy Goal  Goals to be met by: 3 weeks     Patient will increase functional independence with ADLs by performing:    Feeding with Set-up Assistance.  UE Dressing with Stand-by Assistance.  LE Dressing with Minimal Assistance.  Grooming while seated with Modified [place].  Toileting from bedside commode with Minimal Assistance for hygiene and clothing management.   Bathing from shower chair/bench with Minimal Assistance.  Sitting at edge of bed x20 minutes with Supervision.  Rolling to Bilateral with Stand-by Assistance.   Supine to sit with Minimal Assistance.  Stand pivot transfers with Minimal Assistance.  Toilet transfer to bedside commode with Minimal Assistance.  L Upper extremity self ROM exercise program x15 reps per handout, with independence.   Pt. Tolerated session well  .

## 2017-03-25 PROCEDURE — 25000003 PHARM REV CODE 250: Performed by: NURSE PRACTITIONER

## 2017-03-25 PROCEDURE — 11000004 HC SNF PRIVATE

## 2017-03-25 PROCEDURE — 25000003 PHARM REV CODE 250: Performed by: PHYSICIAN ASSISTANT

## 2017-03-25 PROCEDURE — 25000003 PHARM REV CODE 250: Performed by: INTERNAL MEDICINE

## 2017-03-25 RX ADMIN — BACLOFEN 20 MG: 10 TABLET ORAL at 11:03

## 2017-03-25 RX ADMIN — TRAZODONE HYDROCHLORIDE 100 MG: 50 TABLET ORAL at 09:03

## 2017-03-25 RX ADMIN — BACLOFEN 20 MG: 10 TABLET ORAL at 10:03

## 2017-03-25 RX ADMIN — BACLOFEN 20 MG: 10 TABLET ORAL at 05:03

## 2017-03-25 RX ADMIN — DIAZEPAM 5 MG: 5 TABLET ORAL at 01:03

## 2017-03-25 RX ADMIN — OXYCODONE HYDROCHLORIDE 15 MG: 5 TABLET ORAL at 08:03

## 2017-03-25 RX ADMIN — DANTROLENE SODIUM 50 MG: 50 CAPSULE ORAL at 06:03

## 2017-03-25 RX ADMIN — ACETAMINOPHEN 650 MG: 325 TABLET, FILM COATED ORAL at 02:03

## 2017-03-25 RX ADMIN — OXYBUTYNIN CHLORIDE 5 MG: 5 TABLET, EXTENDED RELEASE ORAL at 10:03

## 2017-03-25 RX ADMIN — CARBAMAZEPINE 400 MG: 200 TABLET, EXTENDED RELEASE ORAL at 09:03

## 2017-03-25 RX ADMIN — OXYCODONE HYDROCHLORIDE 15 MG: 5 TABLET ORAL at 01:03

## 2017-03-25 RX ADMIN — RAMELTEON 8 MG: 8 TABLET, FILM COATED ORAL at 11:03

## 2017-03-25 RX ADMIN — ERGOCALCIFEROL 50000 UNITS: 1.25 CAPSULE ORAL at 10:03

## 2017-03-25 RX ADMIN — OXYCODONE HYDROCHLORIDE 15 MG: 5 TABLET ORAL at 11:03

## 2017-03-25 RX ADMIN — DANTROLENE SODIUM 50 MG: 50 CAPSULE ORAL at 11:03

## 2017-03-25 RX ADMIN — POLYETHYLENE GLYCOL 3350 17 G: 17 POWDER, FOR SOLUTION ORAL at 10:03

## 2017-03-25 RX ADMIN — GABAPENTIN 600 MG: 300 CAPSULE ORAL at 06:03

## 2017-03-25 RX ADMIN — CARBAMAZEPINE 400 MG: 200 TABLET, EXTENDED RELEASE ORAL at 03:03

## 2017-03-25 RX ADMIN — OXYCODONE HYDROCHLORIDE 15 MG: 5 TABLET ORAL at 04:03

## 2017-03-25 RX ADMIN — CITALOPRAM HYDROBROMIDE 20 MG: 20 TABLET ORAL at 10:03

## 2017-03-25 RX ADMIN — BACLOFEN 20 MG: 10 TABLET ORAL at 06:03

## 2017-03-25 RX ADMIN — STANDARDIZED SENNA CONCENTRATE AND DOCUSATE SODIUM 1 TABLET: 8.6; 5 TABLET, FILM COATED ORAL at 10:03

## 2017-03-25 RX ADMIN — OXYCODONE HYDROCHLORIDE 15 MG: 5 TABLET ORAL at 05:03

## 2017-03-25 RX ADMIN — ACETAMINOPHEN 650 MG: 325 TABLET, FILM COATED ORAL at 11:03

## 2017-03-25 RX ADMIN — DANTROLENE SODIUM 50 MG: 50 CAPSULE ORAL at 03:03

## 2017-03-25 RX ADMIN — GABAPENTIN 900 MG: 300 CAPSULE ORAL at 09:03

## 2017-03-25 RX ADMIN — OXYCODONE HYDROCHLORIDE 15 MG: 5 TABLET ORAL at 10:03

## 2017-03-25 RX ADMIN — GABAPENTIN 600 MG: 300 CAPSULE ORAL at 05:03

## 2017-03-25 NOTE — PLAN OF CARE
Problem: Patient Care Overview  Goal: Plan of Care Review  Outcome: Ongoing (interventions implemented as appropriate)    03/25/17 0449   Coping/Psychosocial   Plan Of Care Reviewed With patient         Problem: Activity Intolerance (Adult)  Intervention: Promote Activity    03/25/17 0449   Activity   Activity Type activity adjusted per tolerance;activity clustered for rest period;bedrest;ROM, active encouraged   Daily Care Interventions   Self-Care Promotion BADL personal objects within reach;BADL personal routines maintained;safe use of adaptive equipment encouraged         Goal: Identify Related Risk Factors and Signs and Symptoms  Related risk factors and signs and symptoms are identified upon initiation of Human Response Clinical Practice Guideline (CPG)  Outcome: Ongoing (interventions implemented as appropriate)    03/25/17 0449   Activity Intolerance   Related Risk Factors (Activity Intolerance) deconditioned state;functional decline;generalized weakness;pain   Signs and Symptoms (Activity Intolerance) unable to complete BADL/IADL;pain/discomfort       Goal: Activity Tolerance  Patient will demonstrate the desired outcomes by discharge/transition of care.  Outcome: Ongoing (interventions implemented as appropriate)    03/25/17 0449   Activity Intolerance (Adult)   Activity Tolerance making progress toward outcome

## 2017-03-26 PROCEDURE — 97110 THERAPEUTIC EXERCISES: CPT

## 2017-03-26 PROCEDURE — 97116 GAIT TRAINING THERAPY: CPT

## 2017-03-26 PROCEDURE — 11000004 HC SNF PRIVATE

## 2017-03-26 PROCEDURE — 25000003 PHARM REV CODE 250: Performed by: NURSE PRACTITIONER

## 2017-03-26 PROCEDURE — 97535 SELF CARE MNGMENT TRAINING: CPT

## 2017-03-26 PROCEDURE — 25000003 PHARM REV CODE 250: Performed by: PHYSICIAN ASSISTANT

## 2017-03-26 PROCEDURE — 25000003 PHARM REV CODE 250: Performed by: INTERNAL MEDICINE

## 2017-03-26 PROCEDURE — 97530 THERAPEUTIC ACTIVITIES: CPT

## 2017-03-26 RX ADMIN — POLYETHYLENE GLYCOL 3350 17 G: 17 POWDER, FOR SOLUTION ORAL at 09:03

## 2017-03-26 RX ADMIN — BACLOFEN 20 MG: 10 TABLET ORAL at 11:03

## 2017-03-26 RX ADMIN — DANTROLENE SODIUM 50 MG: 50 CAPSULE ORAL at 11:03

## 2017-03-26 RX ADMIN — CARBAMAZEPINE 400 MG: 200 TABLET, EXTENDED RELEASE ORAL at 09:03

## 2017-03-26 RX ADMIN — TRAZODONE HYDROCHLORIDE 100 MG: 50 TABLET ORAL at 08:03

## 2017-03-26 RX ADMIN — OXYCODONE HYDROCHLORIDE 15 MG: 5 TABLET ORAL at 08:03

## 2017-03-26 RX ADMIN — RIVAROXABAN 20 MG: 20 TABLET, FILM COATED ORAL at 04:03

## 2017-03-26 RX ADMIN — OXYCODONE HYDROCHLORIDE 15 MG: 5 TABLET ORAL at 12:03

## 2017-03-26 RX ADMIN — BACLOFEN 20 MG: 10 TABLET ORAL at 12:03

## 2017-03-26 RX ADMIN — BACLOFEN 20 MG: 10 TABLET ORAL at 05:03

## 2017-03-26 RX ADMIN — DIAZEPAM 5 MG: 5 TABLET ORAL at 12:03

## 2017-03-26 RX ADMIN — OXYCODONE HYDROCHLORIDE 15 MG: 5 TABLET ORAL at 09:03

## 2017-03-26 RX ADMIN — CARBAMAZEPINE 400 MG: 200 TABLET, EXTENDED RELEASE ORAL at 08:03

## 2017-03-26 RX ADMIN — GABAPENTIN 600 MG: 300 CAPSULE ORAL at 05:03

## 2017-03-26 RX ADMIN — OXYCODONE HYDROCHLORIDE 15 MG: 5 TABLET ORAL at 06:03

## 2017-03-26 RX ADMIN — CITALOPRAM HYDROBROMIDE 20 MG: 20 TABLET ORAL at 08:03

## 2017-03-26 RX ADMIN — STANDARDIZED SENNA CONCENTRATE AND DOCUSATE SODIUM 1 TABLET: 8.6; 5 TABLET, FILM COATED ORAL at 09:03

## 2017-03-26 RX ADMIN — GABAPENTIN 600 MG: 300 CAPSULE ORAL at 04:03

## 2017-03-26 RX ADMIN — OXYBUTYNIN CHLORIDE 5 MG: 5 TABLET, EXTENDED RELEASE ORAL at 08:03

## 2017-03-26 RX ADMIN — GABAPENTIN 900 MG: 300 CAPSULE ORAL at 08:03

## 2017-03-26 RX ADMIN — OXYCODONE HYDROCHLORIDE 15 MG: 5 TABLET ORAL at 02:03

## 2017-03-26 RX ADMIN — OXYCODONE HYDROCHLORIDE 15 MG: 5 TABLET ORAL at 03:03

## 2017-03-26 RX ADMIN — OXYCODONE HYDROCHLORIDE 15 MG: 5 TABLET ORAL at 05:03

## 2017-03-26 RX ADMIN — DANTROLENE SODIUM 50 MG: 50 CAPSULE ORAL at 05:03

## 2017-03-26 RX ADMIN — DANTROLENE SODIUM 50 MG: 50 CAPSULE ORAL at 12:03

## 2017-03-26 NOTE — PT/OT/SLP PROGRESS
"Physical Therapy  Treatment    Mao Levin   MRN: [id number]   Admitting Diagnosis: MS (multiple sclerosis)    PT Received On: 03/26/17  Total Time (min): 45       Billable Minutes:  Gait [id number], Therapeutic Activity 15 and Therapeutic Exercise 15    Treatment Type: Treatment  PT/PTA: PT     PTA Visit Number: 0       General Precautions: Standard, fall  Orthopedic Precautions: N/A   Braces: N/A    Do you have any cultural, spiritual, Shinto conflicts, given your current situation?: no    Subjective:  Communicated with pt prior to session. Pt agreeable to PT services. "If I told you how much pain I was in, you'd make me stop. Don't worry. Let's just go."    Pain Rating:  (Did not quantify)  Location - Side: Bilateral  Location - Orientation: generalized  Location: knee  Pain Addressed: Cessation of Activity       Objective:  Patient found seated in wheelchair.          Functional Status:  MDS G  Transfer Functional Status: mod(A)-Max(A)  Walk in Room Functional Status: mod(A)-Max(A)  Walk in Corridor Functional Status: mod(A)-Max(A)    Transfers:  Sit<>Stand: Moderate assistance from wheelchair (multiple trials)    Gait:  Amb 8 feet with L foot ace wrapped into dorsiflexion (with blue booty) in parallel bars x 2 trials with moderate assistance at LLE due to decreased dorsiflexion and decreased knee and hip flexion.     Amb 37 feet with rolling walker and moderate assistance at LLE for forward mobilization. Verbal cues given for proper upright posture.    Therex:  Hip flexion, hip abduction, adduction isometrics, gluteal sets, long arc quads, ankle pumps- BLE x 10 reps (AAROM provided at LLE).    Balance:  Pt was able to stand for 1 minute with rolling walker and CGA. This was the prerequisite for ambulating outside of the parallel bars.    Additional Treatment:  Pt's lower extremities (focusing on the hamstrings and calves) were stretched- 3 trials x 20 second hold each.    Patient left up in chair with call " button in reach.    Assessment:  Mao Levin is a 51 y.o. male with a medical diagnosis of MS (multiple sclerosis).  Pt was able to ambulate overground today with use of a rolling walker, 37 feet, denoting great progress. He required moderate assistance for ambulation due to weakness in his L hip, knee, and ankle.  Mr. Levin is a great IP rehab candidate due to his high motivation and clear signs of improvement functionally.    Rehab identified problem list/impairments: weakness, impaired endurance, impaired sensation, impaired self care skills, impaired functional mobilty, gait instability, impaired balance, decreased upper extremity function, decreased lower extremity function, pain    Rehab potential is good.    Activity tolerance: Good    Discharge recommendations: rehabilitation facility     Barriers to discharge: Inaccessible home environment, Decreased caregiver support    Equipment recommendations: bedside commode, walker, rolling, wheelchair     GOALS:   Physical Therapy Goals     Not on file      Multidisciplinary Problems (Resolved)        Problem: Physical Therapy Goal    Goal Priority Disciplines Outcome Goal Variances Interventions   Physical Therapy Goal   (Resolved)     PT/OT, PT Outcome(s) achieved     Description:  Goals to be met by: 2 weeks     Patient will increase functional independence with mobility by performin. Supine to sit with Stand-by Assistance  2. Sit to supine with Stand-by Assistance  3. Sit to stand transfer with Minimal Assistance  4. Bed to chair transfer with Minimal Assistance using Rolling Walker  5. Gait  x 20 feet with Moderate Assistance using Rolling Walker. Met (3/26/2017)  6. Wheelchair propulsion x75 feet with Stand-by Assistance using BUE/BLE  7. Ascend/descend 3 stair with left Handrails Moderate Assistance.   8. Stand for 3 minutes with Stand-by Assistance using Rolling Walker  9. Lower extremity exercise program x20 reps per handout, with assistance as  needed  10. New Goal (3/26/2017): Gait  x 50 feet with Moderate Assistance using Rolling Walker.                 PLAN:    Patient to be seen 5 x/week  to address the above listed problems via gait training, therapeutic activities, therapeutic exercises, wheelchair management/training  Plan of Care expires: 04/17/17  Plan of Care reviewed with: patient    Inés LUGO Robert, PT  03/26/2017

## 2017-03-26 NOTE — PLAN OF CARE
Problem: Occupational Therapy Goal  Goal: Occupational Therapy Goal  Goals to be met by: 3 weeks     Patient will increase functional independence with ADLs by performing:    Feeding with Set-up Assistance.  UE Dressing with Stand-by Assistance.  LE Dressing with Minimal Assistance.  Grooming while seated with Modified [place].  Toileting from bedside commode with Minimal Assistance for hygiene and clothing management.   Bathing from shower chair/bench with Minimal Assistance.  Sitting at edge of bed x20 minutes with Supervision.  Rolling to Bilateral with Stand-by Assistance.   Supine to sit with Minimal Assistance.  Stand pivot transfers with Minimal Assistance.  Toilet transfer to bedside commode with Minimal Assistance.  L Upper extremity self ROM exercise program x15 reps per handout, with independence.   Tolerated session fairly

## 2017-03-26 NOTE — PLAN OF CARE
Problem: Patient Care Overview  Goal: Plan of Care Review  Outcome: Ongoing (interventions implemented as appropriate)  52y/o male with Multiple sclerosis x 11 years.  Has poly neuropathy and spasticity of all skeletal muscles.  Depression and history of smoking.  States he stopped smoking and no longer needs nicoderm patch.  Has a neurogenic bladder.  Alert and oriented x 4; incontinent of urine much of the day but continent of stool.  Las BM 3/23/2017;  Eats regular diet with boost supplement to enhance caloric management of needs.  Has double vision off and on, neuropathy of the feet.  Skin is intact.  Much of pain is back,neck and hands and feet.  Received pain medications nearly every three hours to meet control of his pain.  At 18:45 he indicated he felt better.

## 2017-03-26 NOTE — PT/OT/SLP PROGRESS
Occupational Therapy  Treatment    Mao Levin   MRN: [id number]   Admitting Diagnosis: MS (multiple sclerosis)    OT Date of Treatment: 03/26/17  Total Time (min): 54 min    Billable Minutes:  Self Care/Home Management 23, Therapeutic Activity 15 and Therapeutic Exercise 15    General Precautions: Standard, fall  Orthopedic Precautions: N/A  Braces: N/A    Do you have any cultural, spiritual, Islam conflicts, given your current situation?: no    Subjective:  Communicated with nurse prior to session.  Can you please help me change first?    Pain Rating:  (did not quantify)     Location - Orientation: generalized     Pain Addressed: Reposition, Distraction  Pain Rating Post-Intervention:  (no increases in pain noted duirng session)    Objective:  Patient found with:  (seated in w/c with LLE in ankle wrap to encourage flexion )    Functional Status:  MDS G  Bed Mobility Functional Status: Max(A) supine to sit and sit to supine;  Min A to roll left with bedrail   Transfer Functional Status: Max(A) sit to stand from w/c at counter;  Mod A SQPt from w/c to bed and bed to w/c  Dressing Functional Status: 4:total(A) to don pants in supine / Mod A UBD to don gown forward seated in w/c  Eating Functional Status: S-SBA  Toilet Use Functional Status: total(A) undergarment soiled and Total A required to remove in supine when rolling and don new undergarment          OT Exercises: Pt. Performed pulleys seated in w/c x 10 reps ;  Gentle stretches provided to BUE for all planes of BUE motion  Pt. Performed finger taps on table actively with RUE and AAROM for LUE  Finger abd/add with hand flat on table x 5 reps AROM for RUE and AAROM for LUE    Additional Treatment:  Finger to thumb opposition with RUE and AAROM for LUE;   Stand at counter with Max A for sit to stand and then level of assist decreased to Min A/ Mod A at counter once in stand with increased tone noted in LUE  Recommend resting hand splint to LUE    Patient left up  in chair with call button in reach and sister present    ASSESSMENT:  Mao Levin is a 51 y.o. male with a medical diagnosis of MS (multiple sclerosis) and presents with deficits in self-care skills, functional use of LUE/LLE, endurance, FMC, and functional mobility and would benefit from contineud OT services.     Rehab identified problem list/impairments: impaired endurance, impaired sensation, impaired self care skills, impaired functional mobilty, impaired balance, gait instability, visual deficits, decreased coordination, decreased upper extremity function, decreased lower extremity function, abnormal tone, pain, impaired fine motor, impaired coordination    Rehab potential is good    Activity tolerance: Good    Discharge recommendations: rehabilitation facility     Barriers to discharge: Inaccessible home environment, Decreased caregiver support     Equipment recommendations: wheelchair, bedside commode (reported BSC already ordered but recommend a drop arm )     GOALS:   Occupational Therapy Goals        Problem: Occupational Therapy Goal    Goal Priority Disciplines Outcome Interventions   Occupational Therapy Goal     OT, PT/OT Ongoing (interventions implemented as appropriate)    Description:  Goals to be met by: 3 weeks     Patient will increase functional independence with ADLs by performing:    Feeding with Set-up Assistance.  UE Dressing with Stand-by Assistance.  LE Dressing with Minimal Assistance.  Grooming while seated with Modified [place].  Toileting from bedside commode with Minimal Assistance for hygiene and clothing management.   Bathing from  shower chair/bench with Minimal Assistance.  Sitting at edge of bed x20 minutes with Supervision.  Rolling to Bilateral with Stand-by Assistance.   Supine to sit with Minimal Assistance.  Stand pivot transfers with Minimal Assistance.  Toilet transfer to bedside commode with Minimal Assistance.  L Upper extremity self ROM exercise program x15 reps  per handout, with independence.                Plan:  Patient to be seen 5 x/week to address the above listed problems via self-care/home management, therapeutic activities, therapeutic exercises  Plan of Care expires: 04/18/17  Plan of Care reviewed with: patient    GISEL Dennis  03/26/2017

## 2017-03-26 NOTE — PLAN OF CARE
Problem: Physical Therapy Goal  Goal: Physical Therapy Goal  Goals to be met by: 2 weeks     Patient will increase functional independence with mobility by performin. Supine to sit with Stand-by Assistance  2. Sit to supine with Stand-by Assistance  3. Sit to stand transfer with Minimal Assistance  4. Bed to chair transfer with Minimal Assistance using Rolling Walker  5. Gait x 20 feet with Moderate Assistance using Rolling Walker. Met (3/26/2017)  6. Wheelchair propulsion x75 feet with Stand-by Assistance using BUE/BLE  7. Ascend/descend 3 stair with left Handrails Moderate Assistance.   8. Stand for 3 minutes with Stand-by Assistance using Rolling Walker  9. Lower extremity exercise program x20 reps per handout, with assistance as needed  10. New Goal (3/26/2017): Gait x 50 feet with Moderate Assistance using Rolling Walker.   Outcome: Outcome(s) achieved Date Met:  17  Pt met one goal today.

## 2017-03-27 LAB
ANION GAP SERPL CALC-SCNC: 7 MMOL/L
BASOPHILS # BLD AUTO: 0.05 K/UL
BASOPHILS NFR BLD: 0.7 %
BUN SERPL-MCNC: 23 MG/DL
CALCIUM SERPL-MCNC: 8.3 MG/DL
CHLORIDE SERPL-SCNC: 107 MMOL/L
CO2 SERPL-SCNC: 27 MMOL/L
CREAT SERPL-MCNC: 0.8 MG/DL
DIFFERENTIAL METHOD: ABNORMAL
EOSINOPHIL # BLD AUTO: 0.3 K/UL
EOSINOPHIL NFR BLD: 3.9 %
ERYTHROCYTE [DISTWIDTH] IN BLOOD BY AUTOMATED COUNT: 14.5 %
EST. GFR  (AFRICAN AMERICAN): >60 ML/MIN/1.73 M^2
EST. GFR  (NON AFRICAN AMERICAN): >60 ML/MIN/1.73 M^2
GLUCOSE SERPL-MCNC: 78 MG/DL
HCT VFR BLD AUTO: 36.4 %
HGB BLD-MCNC: 12 G/DL
LYMPHOCYTES # BLD AUTO: 2.5 K/UL
LYMPHOCYTES NFR BLD: 35.5 %
MAGNESIUM SERPL-MCNC: 2.1 MG/DL
MCH RBC QN AUTO: 29.4 PG
MCHC RBC AUTO-ENTMCNC: 33 %
MCV RBC AUTO: 89 FL
MONOCYTES # BLD AUTO: 0.5 K/UL
MONOCYTES NFR BLD: 7.7 %
NEUTROPHILS # BLD AUTO: 3.6 K/UL
NEUTROPHILS NFR BLD: 52.2 %
PHOSPHATE SERPL-MCNC: 3.6 MG/DL
PLATELET # BLD AUTO: 203 K/UL
PMV BLD AUTO: 11.6 FL
POTASSIUM SERPL-SCNC: 4.2 MMOL/L
RBC # BLD AUTO: 4.08 M/UL
SODIUM SERPL-SCNC: 141 MMOL/L
WBC # BLD AUTO: 6.9 K/UL

## 2017-03-27 PROCEDURE — 80048 BASIC METABOLIC PNL TOTAL CA: CPT

## 2017-03-27 PROCEDURE — 25000003 PHARM REV CODE 250: Performed by: INTERNAL MEDICINE

## 2017-03-27 PROCEDURE — 84100 ASSAY OF PHOSPHORUS: CPT

## 2017-03-27 PROCEDURE — 97110 THERAPEUTIC EXERCISES: CPT

## 2017-03-27 PROCEDURE — 25000003 PHARM REV CODE 250: Performed by: NURSE PRACTITIONER

## 2017-03-27 PROCEDURE — 83735 ASSAY OF MAGNESIUM: CPT

## 2017-03-27 PROCEDURE — 97530 THERAPEUTIC ACTIVITIES: CPT

## 2017-03-27 PROCEDURE — 97116 GAIT TRAINING THERAPY: CPT

## 2017-03-27 PROCEDURE — 11000004 HC SNF PRIVATE

## 2017-03-27 PROCEDURE — 85025 COMPLETE CBC W/AUTO DIFF WBC: CPT

## 2017-03-27 PROCEDURE — 25000003 PHARM REV CODE 250: Performed by: PHYSICIAN ASSISTANT

## 2017-03-27 PROCEDURE — 36415 COLL VENOUS BLD VENIPUNCTURE: CPT

## 2017-03-27 RX ADMIN — GABAPENTIN 900 MG: 300 CAPSULE ORAL at 09:03

## 2017-03-27 RX ADMIN — BACLOFEN 20 MG: 10 TABLET ORAL at 06:03

## 2017-03-27 RX ADMIN — OXYCODONE HYDROCHLORIDE 15 MG: 5 TABLET ORAL at 12:03

## 2017-03-27 RX ADMIN — RIVAROXABAN 20 MG: 20 TABLET, FILM COATED ORAL at 06:03

## 2017-03-27 RX ADMIN — OXYCODONE HYDROCHLORIDE 15 MG: 5 TABLET ORAL at 09:03

## 2017-03-27 RX ADMIN — OXYCODONE HYDROCHLORIDE 15 MG: 5 TABLET ORAL at 03:03

## 2017-03-27 RX ADMIN — DANTROLENE SODIUM 50 MG: 50 CAPSULE ORAL at 06:03

## 2017-03-27 RX ADMIN — ACETAMINOPHEN 650 MG: 325 TABLET, FILM COATED ORAL at 03:03

## 2017-03-27 RX ADMIN — OXYCODONE HYDROCHLORIDE 15 MG: 5 TABLET ORAL at 06:03

## 2017-03-27 RX ADMIN — GABAPENTIN 600 MG: 300 CAPSULE ORAL at 03:03

## 2017-03-27 RX ADMIN — DANTROLENE SODIUM 50 MG: 50 CAPSULE ORAL at 05:03

## 2017-03-27 RX ADMIN — STANDARDIZED SENNA CONCENTRATE AND DOCUSATE SODIUM 1 TABLET: 8.6; 5 TABLET, FILM COATED ORAL at 08:03

## 2017-03-27 RX ADMIN — TRAZODONE HYDROCHLORIDE 100 MG: 50 TABLET ORAL at 09:03

## 2017-03-27 RX ADMIN — CARBAMAZEPINE 400 MG: 200 TABLET, EXTENDED RELEASE ORAL at 10:03

## 2017-03-27 RX ADMIN — DANTROLENE SODIUM 50 MG: 50 CAPSULE ORAL at 12:03

## 2017-03-27 RX ADMIN — DIAZEPAM 5 MG: 5 TABLET ORAL at 12:03

## 2017-03-27 RX ADMIN — CARBAMAZEPINE 400 MG: 200 TABLET, EXTENDED RELEASE ORAL at 09:03

## 2017-03-27 RX ADMIN — GABAPENTIN 600 MG: 300 CAPSULE ORAL at 05:03

## 2017-03-27 RX ADMIN — BACLOFEN 20 MG: 10 TABLET ORAL at 12:03

## 2017-03-27 RX ADMIN — DIAZEPAM 5 MG: 5 TABLET ORAL at 06:03

## 2017-03-27 RX ADMIN — BACLOFEN 20 MG: 10 TABLET ORAL at 05:03

## 2017-03-27 RX ADMIN — OXYCODONE HYDROCHLORIDE 15 MG: 5 TABLET ORAL at 08:03

## 2017-03-27 RX ADMIN — CITALOPRAM HYDROBROMIDE 20 MG: 20 TABLET ORAL at 08:03

## 2017-03-27 RX ADMIN — OXYBUTYNIN CHLORIDE 5 MG: 5 TABLET, EXTENDED RELEASE ORAL at 08:03

## 2017-03-27 NOTE — PT/OT/SLP PROGRESS
"Physical Therapy  Treatment    Mao Levin   MRN: [id number]   Admitting Diagnosis: MS (multiple sclerosis)    PT Received On: 03/27/17  Total Time (min): 84       Billable Minutes:  Gait [id number], Therapeutic Activity 23 and Therapeutic Exercise 45    Treatment Type: Treatment  PT/PTA: PTA     PTA Visit Number: 1       General Precautions: Standard, fall  Orthopedic Precautions: N/A   Braces: N/A    Do you have any cultural, spiritual, Hindu conflicts, given your current situation?: no    Subjective:  "I'm fine."    Objective:  Patient found seated in w/c     Functional Status:  MDS G  Bed Mobility Functional Status: mod(A)-Max(A)  Transfer Functional Status: mod(A)-Max(A)  Walk in Room Functional Status: mod(A)-Max(A)  Walk in Corridor Functional Status: mod(A)-Max(A)  Locomotion on Unit Functional Status: CGA-Min (A)  Locomotion Off Unit Functional Status: total(A)      Bed Mobility:  Supine <> sit on mat with mod/max A for trunk and LEs management    Transfers:  Sit <> stand from w/c with RW and mod/max A for hips elevation x 3 trials  SQPT from w/c <> mat with mod A    Gait:  Distance Walk: pt ambulated x 3 feet, 34 feet and 40 feet with RW and total A (mod A x 1 person and CGA/min A x second person) for balance and safety. Ace wrap and blue shoe to (L)LE  Assistive Device: rolling walker  Gait Deviation(s): decreased keyshawn;decreased toe-to-floor clearance;decreased weight-shifting ability;decreased velocity of limb motion;decreased step length;decreased stride length;decreased swing-to-stance ratio       Therex:  BLEs supine therex x 15 reps includings: APs, GS, QS, ADD/ABD, HS, SAQs, bridges, trunk rotation    HC stretch x 5 reps with 30 second holds      Additional Treatment:  LBE x 15 minutes to increase LEs strength, endurance and coordination    Patient left up in chair with call button in reach.    Assessment:  Mao Levin is a 51 y.o. male with a medical diagnosis of MS (multiple sclerosis).  Pt is " progressing well toward goals.  Pt ambulated x 40 feet with RW and total A (mod and CGA/min A) for balance and safety.  Pt would continue to benefit from PT services to improve his functional mobility.  Goals remain appropriate.      Rehab identified problem list/impairments: weakness, impaired endurance, impaired sensation, impaired self care skills, impaired functional mobilty, gait instability, impaired balance, decreased upper extremity function, decreased lower extremity function, pain    Rehab potential is fair.    Activity tolerance: Fair    Discharge recommendations: rehabilitation facility     Barriers to discharge: Inaccessible home environment, Decreased caregiver support    Equipment recommendations: bedside commode, walker, rolling, wheelchair     GOALS:   Physical Therapy Goals        Problem: Physical Therapy Goal    Goal Priority Disciplines Outcome Goal Variances Interventions   Physical Therapy Goal     PT/OT, PT Ongoing (interventions implemented as appropriate)     Description:  Goals to be met by: 2 weeks     Patient will increase functional independence with mobility by performin. Supine to sit with Stand-by Assistance  2. Sit to supine with Stand-by Assistance  3. Sit to stand transfer with Minimal Assistance  4. Bed to chair transfer with Minimal Assistance using Rolling Walker  5. Gait  x 20 feet with Moderate Assistance using Rolling Walker. Met (3/26/2017)  6. Wheelchair propulsion x75 feet with Stand-by Assistance using BUE/BLE  7. Ascend/descend 3 stair with left Handrails Moderate Assistance.   8. Stand for 3 minutes with Stand-by Assistance using Rolling Walker  9. Lower extremity exercise program x20 reps per handout, with assistance as needed  10. New Goal (3/26/2017): Gait  x 50 feet with Moderate Assistance using Rolling Walker.                  PLAN:    Patient to be seen 5 x/week  to address the above listed problems via gait training, therapeutic activities, therapeutic  exercises, wheelchair management/training  Plan of Care expires: 04/17/17  Plan of Care reviewed with: patient    Vandana RAMÍREZ Eli, PTA  03/27/2017

## 2017-03-27 NOTE — PLAN OF CARE
Problem: Occupational Therapy Goal  Goal: Occupational Therapy Goal  Goals to be met by: 3 weeks     Patient will increase functional independence with ADLs by performing:    Feeding with Set-up Assistance.  UE Dressing with Stand-by Assistance.  LE Dressing with Minimal Assistance.  Grooming while seated with Modified [place].  Toileting from bedside commode with Minimal Assistance for hygiene and clothing management.   Bathing from shower chair/bench with Minimal Assistance.  Sitting at edge of bed x20 minutes with Supervision.  Rolling to Bilateral with Stand-by Assistance.   Supine to sit with Minimal Assistance.  Stand pivot transfers with Minimal Assistance.  Toilet transfer to bedside commode with Minimal Assistance.  L Upper extremity self ROM exercise program x15 reps per handout, with independence.   Outcome: Ongoing (interventions implemented as appropriate)  .

## 2017-03-27 NOTE — PROGRESS NOTES
03/27/2017  8:31 AM  Spoke with Leonor at [facility] who states patient looks appropriate for Rehab. They will submit to [employer] for auth. GERTRUDIS jenkins.    Kelsie Renee RN, CM Skilled  [id number]

## 2017-03-27 NOTE — PLAN OF CARE
Problem: Physical Therapy Goal  Goal: Physical Therapy Goal  Goals to be met by: 2 weeks     Patient will increase functional independence with mobility by performin. Supine to sit with Stand-by Assistance  2. Sit to supine with Stand-by Assistance  3. Sit to stand transfer with Minimal Assistance  4. Bed to chair transfer with Minimal Assistance using Rolling Walker  5. Gait x 20 feet with Moderate Assistance using Rolling Walker. Met (3/26/2017)  6. Wheelchair propulsion x75 feet with Stand-by Assistance using BUE/BLE  7. Ascend/descend 3 stair with left Handrails Moderate Assistance.   8. Stand for 3 minutes with Stand-by Assistance using Rolling Walker  9. Lower extremity exercise program x20 reps per handout, with assistance as needed  10. New Goal (3/26/2017): Gait x 50 feet with Moderate Assistance using Rolling Walker.   Outcome: Ongoing (interventions implemented as appropriate)  Goals remain appropriate

## 2017-03-27 NOTE — PROGRESS NOTES
[facility]  Adult Nutrition  Progress Note    SUMMARY     Recommendations    Recommendation/Intervention: 1. Continue current diet order with double portions + ONS TID   Goals: Pt to consume >85% EEN and EPN  Nutrition Goal Status: new  Communication of RD Recs: discussed on rounds    Continuum of Care Plan  D/C planning: pt to consume > 85% EEN and EPN     Reason for Assessment    Reason for Assessment: length of stay  Diagnosis: other (see comments) (multiple sclerosis)  Relevent Medical History: CHF, cancer, COPD, DM2, HTN, stroke   Interdisciplinary Rounds: did not attend     General Information Comments: Pt with good appetite, consuming 100% of meals + Boost TID, LBM 3/24    Nutrition Prescription Ordered    Current Diet Order: Regular + Boost Plus TID  Nutrition Order Comments: double portions     Oral Nutrition Supplement: Boost Plus Chocolate      Nutrition Risk Screen     Nutrition Risk Screen: no indicators present      Labs/Tests/Procedures/Meds       Pertinent Labs Reviewed: reviewed, pertinent  Pertinent Labs Comments: BUN 23, Ca 8.3, glu 78, alb 3.4  Pertinent Medications Reviewed: reviewed, pertinent  Pertinent Medications Comments: lactulose, senna dosucate    Physical Findings    Overall Physical Appearance: other (see comments), loss of muscle mass (nourished)     Oral/Mouth Cavity: WDL  Skin: intact, Huy 16    Anthropometrics     Height (inches): 70.98 in  Weight Method: Standard Scale  Weight (kg): 78.9 kg  Ideal Body Weight (IBW), Male: 171.88 lb     % Ideal Body Weight, Male (lb): 101.2 lb     BMI (kg/m2): 24.27  BMI Grade: 18.5-24.9 - normal    Estimated/Assessed Needs    Weight Used For Calorie Calculations: 78.9 kg (173 lb 15.1 oz)   Height (cm): 180.3 cm     Energy Need Method: Vilas-St Jeor, other (see comments) (2003-2170kcal/day (1.2-1.3 PAL))     RMR (Vilas-St. Jeor Equation): 1669.17     Weight Used For Protein Calculations: 78.9 kg (173 lb 15.1 oz)  Protein  Requirements: 79-95g/day (1.0-1.2g/kg)  Fluid Need Method: RDA Method (1mL/kcal)       Monitor and Evaluation    Food and Nutrient Intake: energy intake, food and beverage intake  Food and Nutrient Adminstration: diet order  Knowledge/Beliefs/Attitudes: food and nutrition knowledge/skill     Anthropometric Measurements: weight change, weight, body mass index  Biochemical Data, Medical Tests and Procedures: electrolyte and renal panel, gastrointestinal profile, glucose/endocrine profile, inflammatory profile, lipid profile  Nutrition-Focused Physical Findings: overall appearance, skin    Nutrition Risk    Level of Risk: other (see comments) (1x/week)    Nutrition Follow-Up    RD Follow-up?: Yes    Increased energy needs related to physiological needs as evidenced by increased EEN and EPN 2/2 COPD

## 2017-03-27 NOTE — CLINICAL REVIEW
Occupational Therapy  Treatment    Mao Levin   MRN: [id number]   Admitting Diagnosis: MS (multiple sclerosis)    OT Date of Treatment: 03/27/17  Total Time (min): 45 min    Billable Minutes:  Therapeutic Activity 25 and Therapeutic Exercise 20    General Precautions: Standard, fall  Orthopedic Precautions: N/A  Braces: N/A    Do you have any cultural, spiritual, Roman Catholic conflicts, given your current situation?: no    Subjective:  Communicated with  prior to session.      Pain Rating:  (back and legs)  Location - Side: Bilateral                Objective:       Functional Status:  MDS G  Transfer Functional Status: mod(A)-Max(A) EOB to w/c with SPT          OT Exercises: UE Ergometer 10 min  Lat pull downs 10# x 60 reps with (A) at LUE to increase shd flex and extension pattern      Additional Treatment:  BUE pulleys x 20 reps  Pt. Also with AAROM x 15 reps with presses, abb/abd, pronation/supination,  wrist-finger flex/ ext    Patient left up in chair with all lines intact and call button in reach    ASSESSMENT:  Mao Levin is a 51 y.o. male with a medical diagnosis of MS (multiple sclerosis) Pt. participated well with session on this day. Pt.continue to present  with deficits listed below . Pt. Will continue to benefit from continued OT to progress towards goals    Rehab identified problem list/impairments: impaired endurance, impaired sensation, impaired self care skills, impaired functional mobilty, impaired balance, gait instability, visual deficits, decreased coordination, decreased upper extremity function, decreased lower extremity function, abnormal tone, pain, impaired fine motor, impaired coordination    Rehab potential is fair    Activity tolerance: Fair    Discharge recommendations: rehabilitation facility     Barriers to discharge: Inaccessible home environment, Decreased caregiver support     Equipment recommendations: wheelchair, bedside commode (reported BSC already ordered but recommend a drop arm )      GOALS:   Occupational Therapy Goals        Problem: Occupational Therapy Goal    Goal Priority Disciplines Outcome Interventions   Occupational Therapy Goal     OT, PT/OT Ongoing (interventions implemented as appropriate)    Description:  Goals to be met by: 3 weeks     Patient will increase functional independence with ADLs by performing:    Feeding with Set-up Assistance.  UE Dressing with Stand-by Assistance.  LE Dressing with Minimal Assistance.  Grooming while seated with Modified [place].  Toileting from bedside commode with Minimal Assistance for hygiene and clothing management.   Bathing from  shower chair/bench with Minimal Assistance.  Sitting at edge of bed x20 minutes with Supervision.  Rolling to Bilateral with Stand-by Assistance.   Supine to sit with Minimal Assistance.  Stand pivot transfers with Minimal Assistance.  Toilet transfer to bedside commode with Minimal Assistance.  L Upper extremity self ROM exercise program x15 reps per handout, with independence.            Plan:  Patient to be seen 5 x/week to address the above listed problems via self-care/home management, therapeutic activities, therapeutic exercises  Plan of Care expires: 04/18/17  Plan of Care reviewed with: patient   The GISEL and CHELA have collaborated and discussed the patient's status, treatment plan and progress toward established goals.  WENDY Mustafa 3/27/2017

## 2017-03-27 NOTE — PLAN OF CARE
Problem: Patient Care Overview  Goal: Individualization & Mutuality  Recommendations     Recommendation/Intervention: 1. Continue current diet order with double portions + ONS TID   Goals: Pt to consume >85% EEN and EPN  Nutrition Goal Status: new  Communication of RD Recs: discussed on rounds

## 2017-03-28 ENCOUNTER — TELEPHONE (OUTPATIENT)
Dept: NEUROLOGY | Facility: CLINIC | Age: 52
End: 2017-03-28

## 2017-03-28 PROCEDURE — 97530 THERAPEUTIC ACTIVITIES: CPT

## 2017-03-28 PROCEDURE — 97110 THERAPEUTIC EXERCISES: CPT

## 2017-03-28 PROCEDURE — 97116 GAIT TRAINING THERAPY: CPT

## 2017-03-28 PROCEDURE — 11000004 HC SNF PRIVATE

## 2017-03-28 PROCEDURE — 99309 SBSQ NF CARE MODERATE MDM 30: CPT | Mod: ,,, | Performed by: NURSE PRACTITIONER

## 2017-03-28 PROCEDURE — 25000003 PHARM REV CODE 250: Performed by: NURSE PRACTITIONER

## 2017-03-28 PROCEDURE — 97535 SELF CARE MNGMENT TRAINING: CPT

## 2017-03-28 PROCEDURE — 25000003 PHARM REV CODE 250: Performed by: PHYSICIAN ASSISTANT

## 2017-03-28 PROCEDURE — 25000003 PHARM REV CODE 250: Performed by: INTERNAL MEDICINE

## 2017-03-28 RX ORDER — OXYCODONE HYDROCHLORIDE 5 MG/1
15 TABLET ORAL EVERY 4 HOURS PRN
Status: DISCONTINUED | OUTPATIENT
Start: 2017-03-28 | End: 2017-04-01

## 2017-03-28 RX ADMIN — CARBAMAZEPINE 400 MG: 200 TABLET, EXTENDED RELEASE ORAL at 10:03

## 2017-03-28 RX ADMIN — DANTROLENE SODIUM 50 MG: 50 CAPSULE ORAL at 11:03

## 2017-03-28 RX ADMIN — GABAPENTIN 600 MG: 300 CAPSULE ORAL at 05:03

## 2017-03-28 RX ADMIN — GABAPENTIN 900 MG: 300 CAPSULE ORAL at 10:03

## 2017-03-28 RX ADMIN — RIVAROXABAN 20 MG: 20 TABLET, FILM COATED ORAL at 05:03

## 2017-03-28 RX ADMIN — OXYBUTYNIN CHLORIDE 5 MG: 5 TABLET, EXTENDED RELEASE ORAL at 08:03

## 2017-03-28 RX ADMIN — BACLOFEN 20 MG: 10 TABLET ORAL at 05:03

## 2017-03-28 RX ADMIN — CITALOPRAM HYDROBROMIDE 20 MG: 20 TABLET ORAL at 08:03

## 2017-03-28 RX ADMIN — OXYCODONE HYDROCHLORIDE 15 MG: 5 TABLET ORAL at 06:03

## 2017-03-28 RX ADMIN — CARBAMAZEPINE 400 MG: 200 TABLET, EXTENDED RELEASE ORAL at 08:03

## 2017-03-28 RX ADMIN — DIAZEPAM 5 MG: 5 TABLET ORAL at 10:03

## 2017-03-28 RX ADMIN — BACLOFEN 20 MG: 10 TABLET ORAL at 11:03

## 2017-03-28 RX ADMIN — TRAZODONE HYDROCHLORIDE 100 MG: 50 TABLET ORAL at 10:03

## 2017-03-28 RX ADMIN — OXYCODONE HYDROCHLORIDE 15 MG: 5 TABLET ORAL at 02:03

## 2017-03-28 RX ADMIN — DANTROLENE SODIUM 50 MG: 50 CAPSULE ORAL at 05:03

## 2017-03-28 RX ADMIN — OXYCODONE HYDROCHLORIDE 15 MG: 5 TABLET ORAL at 08:03

## 2017-03-28 RX ADMIN — DANTROLENE SODIUM 50 MG: 50 CAPSULE ORAL at 12:03

## 2017-03-28 RX ADMIN — OXYCODONE HYDROCHLORIDE 15 MG: 5 TABLET ORAL at 04:03

## 2017-03-28 RX ADMIN — ACETAMINOPHEN 650 MG: 325 TABLET, FILM COATED ORAL at 07:03

## 2017-03-28 RX ADMIN — BACLOFEN 20 MG: 10 TABLET ORAL at 12:03

## 2017-03-28 RX ADMIN — OXYCODONE HYDROCHLORIDE 15 MG: 5 TABLET ORAL at 11:03

## 2017-03-28 RX ADMIN — OXYCODONE HYDROCHLORIDE 15 MG: 5 TABLET ORAL at 12:03

## 2017-03-28 RX ADMIN — OXYCODONE HYDROCHLORIDE 15 MG: 5 TABLET ORAL at 10:03

## 2017-03-28 NOTE — PLAN OF CARE
Problem: Occupational Therapy Goal  Goal: Occupational Therapy Goal  Goals to be met by: 3 weeks     Patient will increase functional independence with ADLs by performing:    Feeding with Set-up Assistance.  UE Dressing with Stand-by Assistance.  LE Dressing with Minimal Assistance.  Grooming while seated with Modified [place].  Toileting from bedside commode with Minimal Assistance for hygiene and clothing management.   Bathing from shower chair/bench with Minimal Assistance.  Sitting at edge of bed x20 minutes with Supervision.  Rolling to Bilateral with Stand-by Assistance.   Supine to sit with Minimal Assistance.  Stand pivot transfers with Minimal Assistance.  Toilet transfer to bedside commode with Minimal Assistance.  L Upper extremity self ROM exercise program x15 reps per handout, with independence.   Tolerated session well

## 2017-03-28 NOTE — TELEPHONE ENCOUNTER
Call returned to pt. He would like to know if he can be given something for his MS between Rituxan doses. Discussed with pt that Rituxan will actually keep him protected from an MS standpoint for the entire 6 months between doses. Questions answered. Verbalizes understanding.

## 2017-03-28 NOTE — PROGRESS NOTES
Progress Note  Skilled Nursing Facility    Admit Date: 3/17/2017  Follow-up for  MS (multiple sclerosis)  Anticipated Discharge Date:  3/30/2017    SUBJECTIVE:     History of Present Illness:  Patient is a 51 y.o. male with multiple sclerosis and hx of saddle PE who presents to SNF after hospitalization for an MS pseudoflare (worsened pain and weakness in BLE) due to E. Coli UTI. He was recently started on rituxan to treat MS. He was also recently admitted in 12/2016 for psuedoflare and he improved after inpatient rehab stay. He continues with pain rating it as 9-10/10 to his bilateral LE and requests decreased timing between oxycodone. No radiation of pain and oxycodone has been effective. He has left hand contraction which has been present since his last pseudoflare. He occasionally smokes at home. The patient has been admitted to SNF for ongoing PT/OT due to insufficient progress to go home safely from the hospital.     Follow-up for MS        Interval History: Seen patient at bedside, c/o 6/10 pain to left had which has improved. States he has been participating in therapy and has no other complaints, No acute events overnight.      Review of Systems:  Constitutional: no fever or chills  Respiratory: no cough or shortness of breath  Cardiovascular: no chest pain or palpitations  Gastrointestinal: no nausea or vomiting, no abdominal pain or change in bowel habits  Genitourinary: no hematuria or dysuria  Integument/Breast: no rash or pruritis  Musculoskeletal: +7/10 pain to left hand, + for contracture to left hand, + for increased tone to upper extremities  Neurological: no seizures or tremors, + numbness  Behavioral/Psych: no auditory or visual hallucinations    Scheduled Meds:   baclofen  20 mg Oral QID    carbamazepine  400 mg Oral BID    citalopram  20 mg Oral Daily    dantrolene  50 mg Oral QID    ergocalciferol  50,000 Units Oral Q7 Days    gabapentin  600 mg Oral BID AC    gabapentin  900 mg Oral  QHS    nicotine  1 patch Transdermal Daily    oxybutynin  5 mg Oral Daily    polyethylene glycol  17 g Oral Daily    rivaroxaban  20 mg Oral Daily with dinner    senna-docusate 8.6-50 mg  1 tablet Oral Daily    trazodone  100 mg Oral QHS     Continuous Infusions:   PRN Meds:.acetaminophen, albuterol-ipratropium 2.5mg-0.5mg/3mL, aluminum-magnesium hydroxide-simethicone, bisacodyl, dextrose 50%, dextrose 50%, diazePAM, glucagon (human recombinant), glucose, glucose, metoclopramide HCl, ondansetron, oxycodone, promethazine, ramelteon      OBJECTIVE:     Vital Signs Range (Last 24H):  Temp:  [97.8 °F (36.6 °C)-98.2 °F (36.8 °C)] 97.8 °F (36.6 °C)  Pulse:  [65-70] 65  Resp:  [18-20] 20  SpO2:  [96 %-99 %] 99 %  BP: (120-122)/(62-80) 122/80    Physical Exam:  General: well developed, well nourished, no distress  Lungs: clear to auscultation bilaterally and normal respiratory effort  Cardiovascular: Heart: regular rate and rhythm, S1, S2 normal, no murmur, click, rub or gallop. Chest Wall: no tenderness.   Extremities: no cyanosis or edema, or clubbing. Pulses: 2+ and symmetric.  Abdomel: Abdomen: soft, non-tender non-distended; bowel sounds normal; no masses, no organomegaly.   Skin: Skin color, texture, turgor normal. No rashes or lesions  Musculoskeletal:no clubbing, cyanosis  Neurologic: Abnormal strength and tone, increased tone to upper extremities with left hand contracture. Generalized weakness.  Psych/Behavioral: Alert and oriented, appropriate affect.      Laboratory:    Recent Labs  Lab 03/23/17  0444 03/27/17  0540   WBC 6.64 6.90   HGB 11.8* 12.0*   HCT 36.1* 36.4*    203         Recent Labs  Lab 03/23/17  0444 03/27/17  0540    141   K 4.3 4.2    107   CO2 25 27   BUN 23* 23*   CREATININE 0.9 0.8   CALCIUM 8.2* 8.3*     Lab Results   Component Value Date    LABPROT 10.8 12/15/2016    ALBUMIN 3.4 (L) 03/13/2017     Lab Results   Component Value Date    CALCIUM 8.3 (L) 03/27/2017     PHOS 3.6 03/27/2017     Results for MAGALI ALCANTAR (MRN [id number]) as of 3/28/2017 13:48   Ref. Range 3/20/2017 04:20 3/23/2017 04:44 3/27/2017 05:40   Magnesium Latest Ref Range: 1.6 - 2.6 mg/dL 2.1 2.1 2.1         ASSESSMENT/PLAN:     Active Hospital Problems    Diagnosis  POA    *MS (multiple sclerosis) [G35]  Yes     Chronic    Depression [F32.9]  Yes    Smoker [F17.200]  Yes    E. coli urinary tract infection [N39.0, B96.20]  Yes    Polyneuropathy [G62.9]  Yes    10/25/2016 Saddle PE [I26.92]  Yes     Chronic    Vitamin D deficiency disease [E55.9]  Yes    Neurogenic bladder [N31.9]  Yes     Chronic    Spasticity [R25.2]  Yes    Chronic pain of multiple sites [R52, G89.29]  Yes     Chronic      Resolved Hospital Problems    Diagnosis Date Resolved POA   No resolved problems to display.         NEW OR UNSTABLE PROBLEM(S) TREATED TODAY:  MS (multiple sclerosis) [G35]  Chronic  -PT/OT to increase ambulation, ADL performance and endurance  -consult PMR when deemed appropriate by PT/OT  -DVT ppx: rivaroxaban 20mg daily with dinner  -fall precautions  -bowel regimen: miralax and senokot-s1 tab daily to prevent/treat constipation; hold for frequent or loose stooling  -due for repeat rituxan in 6 months  -expected to resume home health once discharged but may opt for outpatient therapy as previously ordered  -he will need to f/u with PMR in 2 weeks after discharge and Neurology 2 weeks after discharge     Spasticity [R25.2]  -chronic and due to MS  -continue baclofen 20mg QID  -continue dantrolene 50mg QID     Polyneuropathy [G62.9]  -chronic  -continue gabapentin 600mg BID before meals and 900mg nightly  -continue carbamazepine 400mg BID     Chronic pain of multiple sites [R52, G89.29]  Chronic  -continue oxycodone every 4 hours prn and valium prn spasms; decrease frequency of oxycodone during SNF stay to return to 15mg prn up to TID at home  -will continue to monitor and adjust regimen as  necessary     Depression [F32.9]  -chronic  -continue celexa 20mg daily     Smoker [F17.200]  -chronic  -continue nicotine 14mg patch daily     Urinary tract infection without hematuria [N39.0] E. coli  -completed cipro for 13 doses to treat, end date on 3/24/17     10/25/2016 Saddle PE [I26.92]  Chronic  -continue rivaroxaban to prevent future thrombi     Vitamin D deficiency  -continue ergocalciferol 50,000 units every 7 days     Neurogenic bladder [N31.9]  Chronic  -continue oxybutynin 5mg daily  -bladder scan prn and I/O cath prn retention volume > 300cc     Future Appointments  Date Time Provider Department Center   4/4/2017 11:15 AM Pura Solitario PT [facility] OP [facility]   7/17/2017 9:40 AM Mattie Finnegan MD [facility] Frederic Navarrete NP  Department of Hospital Medicine   [facility]

## 2017-03-28 NOTE — PLAN OF CARE
Problem: Patient Care Overview  Goal: Plan of Care Review  Pt free of fall and injury. Pain controlled with oxycodone. Pt refused nicotine patch. Pt up in chair, call light in reach, will continue to monitor pt.     Problem: Infection, Risk/Actual (Adult)  Goal: Identify Related Risk Factors and Signs and Symptoms  Related risk factors and signs and symptoms are identified upon initiation of Human [employer] Clinical Practice Guideline (CPG)     03/23/17 0507   Infection, Risk/Actual   Related Risk Factors (Infection, Risk/Actual) chronic illness/condition;prolonged hospitalization   Signs and Symptoms (Infection, Risk/Actual) pain;weakness         Problem: Pressure Ulcer Risk (Huy Scale) (Adult,Obstetrics,Pediatric)  Goal: Skin Integrity  Patient will demonstrate the desired outcomes by discharge/transition of care.     03/22/17 1548   Pressure Ulcer Risk (Huy Scale) (Adult,Obstetrics,Pediatric)   Skin Integrity making progress toward outcome

## 2017-03-28 NOTE — PT/OT/SLP PROGRESS
Occupational Therapy  Treatment    Mao Levin   MRN: [id number]   Admitting Diagnosis: MS (multiple sclerosis)    OT Date of Treatment: 17  Total Time (min): 38 min    Billable Minutes:  Self Care/Home Management 38    General Precautions: Standard, fall  Orthopedic Precautions: N/A  Braces: N/A    Do you have any cultural, spiritual, Pentecostalism conflicts, given your current situation?: no    Subjective:  Communicated with nurse prior to session.  They did not even put that diaper on me right.    Pain Ratin/10     Location - Orientation: generalized     Pain Addressed: Nurse notified  Pain Rating Post-Intervention:  (no increases in pain noted)    Objective:     Supine in bed  Functional Status:  MDS G  Bed Mobility Functional Status: Max(A) supine to sit;  Roll with CGA and use of bed rail; sit EOB with CGA  Dressing Functional Status: 3:Max(A) to don pants seated EOB; able to thread RLE assist required for left; able to pull to thighs but required assist over hips   Eating Functional Status: Set Up A seated EOB  Toilet Use Functional Status: total(A) soiled on OT arrival with urine  Personal Hygiene Functional Status: SBA to wash face seated EOB  Bathing Functional Status: mod(A) sponge bath seated EOB with assist to wash buttocks region and for balance when washing below knees        OT Exercises: AROM /AAROM gentle stretches to BUE for all planes of available motion    Additional Treatment:  Pt. Educated on safety with ADL tasks    Patient left seated AT eob with call button in reach and nurse and PCT notified    ASSESSMENT:  Mao Levin is a 51 y.o. male with a medical diagnosis of MS (multiple sclerosis) and presents with hypertonicity in LLE/LUE as well as deficits with self-care skills, FMC,and  functional mobility .Pt. would benefit from a restiong hand splint for LUE .  Pt. Is highly motivated and tolerates therapy sessions well.  Pt. Is a good inpatient rehab candidate.  Pt. Would benefit from  continued OT servcies to maxcimize level of safety and independence with ADL tasks.     Rehab identified problem list/impairments: impaired self care skills, impaired functional mobilty, impaired sensation, impaired balance, decreased coordination, decreased upper extremity function, decreased lower extremity function, impaired coordination, abnormal tone, decreased ROM, pain, impaired fine motor    Rehab potential is good    Activity tolerance: Good    Discharge recommendations: rehabilitation facility     Barriers to discharge: Inaccessible home environment, Decreased caregiver support     Equipment recommendations: wheelchair (possible drop arm BSC)     GOALS:   Occupational Therapy Goals        Problem: Occupational Therapy Goal    Goal Priority Disciplines Outcome Interventions   Occupational Therapy Goal     OT, PT/OT Ongoing (interventions implemented as appropriate)    Description:  Goals to be met by: 3 weeks     Patient will increase functional independence with ADLs by performing:    Feeding with Set-up Assistance.  UE Dressing with Stand-by Assistance.  LE Dressing with Minimal Assistance.  Grooming while seated with Modified [place].  Toileting from bedside commode with Minimal Assistance for hygiene and clothing management.   Bathing from  shower chair/bench with Minimal Assistance.  Sitting at edge of bed x20 minutes with Supervision.  Rolling to Bilateral with Stand-by Assistance.   Supine to sit with Minimal Assistance.  Stand pivot transfers with Minimal Assistance.  Toilet transfer to bedside commode with Minimal Assistance.  L Upper extremity self ROM exercise program x15 reps per handout, with independence.                Plan:  Patient to be seen 5 x/week to address the above listed problems via self-care/home management, therapeutic exercises, therapeutic activities, neuromuscular re-education  Plan of Care expires: 04/18/17  Plan of Care reviewed with: patient    Isabel uRiz,  LOTR  03/28/2017

## 2017-03-28 NOTE — TELEPHONE ENCOUNTER
----- Message from Brigette Batista sent at 3/28/2017 12:28 PM CDT -----  Contact: Patient [phone]  Patient is requesting a call back from Cyndi to talk to her about some symptoms with his MS.

## 2017-03-28 NOTE — PROGRESS NOTES
03/28/2017  3:28 PM  Received a call from Leonor at [facility] stating [facility] denied IP Rehab for patient. Call placed to Nathan at [facility] regarding more skilled days for patient. Will update patient, DONNA Galeas and GERTRUDIS Newell.    Kelsie Renee RN,  Skilled  [phone]

## 2017-03-29 PROCEDURE — 97116 GAIT TRAINING THERAPY: CPT

## 2017-03-29 PROCEDURE — 11000004 HC SNF PRIVATE

## 2017-03-29 PROCEDURE — 97110 THERAPEUTIC EXERCISES: CPT

## 2017-03-29 PROCEDURE — 97530 THERAPEUTIC ACTIVITIES: CPT

## 2017-03-29 PROCEDURE — 97535 SELF CARE MNGMENT TRAINING: CPT

## 2017-03-29 PROCEDURE — 25000003 PHARM REV CODE 250: Performed by: PHYSICIAN ASSISTANT

## 2017-03-29 PROCEDURE — 25000003 PHARM REV CODE 250: Performed by: NURSE PRACTITIONER

## 2017-03-29 RX ADMIN — BACLOFEN 20 MG: 10 TABLET ORAL at 06:03

## 2017-03-29 RX ADMIN — OXYCODONE HYDROCHLORIDE 15 MG: 5 TABLET ORAL at 09:03

## 2017-03-29 RX ADMIN — DANTROLENE SODIUM 50 MG: 50 CAPSULE ORAL at 05:03

## 2017-03-29 RX ADMIN — GABAPENTIN 600 MG: 300 CAPSULE ORAL at 03:03

## 2017-03-29 RX ADMIN — OXYBUTYNIN CHLORIDE 5 MG: 5 TABLET, EXTENDED RELEASE ORAL at 09:03

## 2017-03-29 RX ADMIN — OXYCODONE HYDROCHLORIDE 15 MG: 5 TABLET ORAL at 03:03

## 2017-03-29 RX ADMIN — DIAZEPAM 5 MG: 5 TABLET ORAL at 03:03

## 2017-03-29 RX ADMIN — CARBAMAZEPINE 400 MG: 200 TABLET, EXTENDED RELEASE ORAL at 09:03

## 2017-03-29 RX ADMIN — RIVAROXABAN 20 MG: 20 TABLET, FILM COATED ORAL at 11:03

## 2017-03-29 RX ADMIN — CITALOPRAM HYDROBROMIDE 20 MG: 20 TABLET ORAL at 09:03

## 2017-03-29 RX ADMIN — GABAPENTIN 600 MG: 300 CAPSULE ORAL at 06:03

## 2017-03-29 RX ADMIN — GABAPENTIN 900 MG: 300 CAPSULE ORAL at 11:03

## 2017-03-29 RX ADMIN — ACETAMINOPHEN 650 MG: 325 TABLET, FILM COATED ORAL at 11:03

## 2017-03-29 RX ADMIN — BACLOFEN 20 MG: 10 TABLET ORAL at 12:03

## 2017-03-29 RX ADMIN — OXYCODONE HYDROCHLORIDE 15 MG: 5 TABLET ORAL at 08:03

## 2017-03-29 RX ADMIN — BACLOFEN 20 MG: 10 TABLET ORAL at 05:03

## 2017-03-29 RX ADMIN — CARBAMAZEPINE 400 MG: 200 TABLET, EXTENDED RELEASE ORAL at 11:03

## 2017-03-29 RX ADMIN — DANTROLENE SODIUM 50 MG: 50 CAPSULE ORAL at 06:03

## 2017-03-29 RX ADMIN — TRAZODONE HYDROCHLORIDE 100 MG: 50 TABLET ORAL at 11:03

## 2017-03-29 RX ADMIN — DANTROLENE SODIUM 50 MG: 50 CAPSULE ORAL at 12:03

## 2017-03-29 NOTE — PLAN OF CARE
Problem: Patient Care Overview  Goal: Plan of Care Review  Outcome: Ongoing (interventions implemented as appropriate)    03/29/17 1842   Coping/Psychosocial   Plan Of Care Reviewed With patient         Problem: Pressure Ulcer Risk (Huy Scale) (Adult,Obstetrics,Pediatric)  Goal: Skin Integrity  Patient will demonstrate the desired outcomes by discharge/transition of care.   Outcome: Ongoing (interventions implemented as appropriate)    03/29/17 1842   Pressure Ulcer Risk (Huy Scale) (Adult,Obstetrics,Pediatric)   Skin Integrity making progress toward outcome         Problem: Mobility, Physical Impaired (Adult)  Goal: Enhanced Mobility Skills  Patient will demonstrate the desired outcomes by discharge/transition of care.   Outcome: Ongoing (interventions implemented as appropriate)    03/29/17 1842   Mobility, Physical Impaired (Adult)   Enhanced Mobility Skills making progress toward outcome         Comments:   Patient instructed to call for assistance.Call  Light and persoanl items in reach.

## 2017-03-29 NOTE — PLAN OF CARE
Problem: Fall Risk (Adult)  Goal: Identify Related Risk Factors and Signs and Symptoms  Related risk factors and signs and symptoms are identified upon initiation of Human Response Clinical Practice Guideline (CPG)   Outcome: Ongoing (interventions implemented as appropriate)    03/29/17 0400   Fall Risk   Related Risk Factors (Fall Risk) fatigue/slow reaction;gait/mobility problems

## 2017-03-29 NOTE — PLAN OF CARE
Problem: Occupational Therapy Goal  Goal: Occupational Therapy Goal  Goals to be met by: 3 weeks     Patient will increase functional independence with ADLs by performing:    Feeding with Set-up Assistance.  UE Dressing with Stand-by Assistance.  LE Dressing with Minimal Assistance.  Grooming while seated with Modified [place].  Toileting from bedside commode with Minimal Assistance for hygiene and clothing management.   Bathing from shower chair/bench with Minimal Assistance.  Sitting at edge of bed x20 minutes with Supervision.  Rolling to Bilateral with Stand-by Assistance.   Supine to sit with Minimal Assistance.  Stand pivot transfers with Minimal Assistance.  Toilet transfer to bedside commode with Minimal Assistance.  L Upper extremity self ROM exercise program x15 reps per handout, with independence.   Outcome: Ongoing (interventions implemented as appropriate)  .

## 2017-03-29 NOTE — PT/OT/SLP PROGRESS
Occupational Therapy  Treatment    aMo Levin   MRN: [id number]   Admitting Diagnosis: MS (multiple sclerosis)    OT Date of Treatment: 17  Total Time (min): 48 min    Billable Minutes:  Self Care/Home Management 48    General Precautions: Standard, fall  Orthopedic Precautions: N/A  Braces: N/A    Do you have any cultural, spiritual, Scientologist conflicts, given your current situation?: no    Subjective:  Communicated with nsg prior to session.      Pain Ratin/10                   Objective:   Pt. Supine on arrival    Functional Status:  MDS G  Bed Mobility Functional Status: mod(A)-Max(A) with sit to supine  Transfer Functional Status: mod(A) from EOB to w/c with cues for safety  Dressing Functional Status: 3:mod(A)-Max for pants management from EOB level and (A) with stand to manage over hips  Toilet Use Functional Status: total(A) - Pt incot with urine  Personal Hygiene Functional Status: S-SBA  Bathing Functional Status: mod(A)-Max(A) supine and EOB level        Patient left up in chair with all lines intact, call button in reach and nsg present    ASSESSMENT:  Mao Levin is a 51 y.o. male with a medical diagnosis of MS (multiple sclerosis) Pt. Tolerated session fair on this day. Pt. Upset due to fact he was laying in urine all night Pt. Asked why didn't he use his urinal Pt. Stated it to hard to use. Pt. Still continue to requires (A) with deficits addressed and listed below Pt. Will continue to benefit from continued OT to progress towards goals.    Rehab identified problem list/impairments: impaired self care skills, impaired functional mobilty, impaired sensation, impaired balance, decreased coordination, decreased upper extremity function, decreased lower extremity function, impaired coordination, abnormal tone, decreased ROM, pain, impaired fine motor    Rehab potential is fair    Activity tolerance: Fair    Discharge recommendations: rehabilitation facility     Barriers to discharge: Inaccessible  home environment, Decreased caregiver support     Equipment recommendations: wheelchair (possible drop arm BSC)     GOALS:   Occupational Therapy Goals        Problem: Occupational Therapy Goal    Goal Priority Disciplines Outcome Interventions   Occupational Therapy Goal     OT, PT/OT Ongoing (interventions implemented as appropriate)    Description:  Goals to be met by: 3 weeks     Patient will increase functional independence with ADLs by performing:    Feeding with Set-up Assistance.  UE Dressing with Stand-by Assistance.  LE Dressing with Minimal Assistance.  Grooming while seated with Modified [place].  Toileting from bedside commode with Minimal Assistance for hygiene and clothing management.   Bathing from  shower chair/bench with Minimal Assistance.  Sitting at edge of bed x20 minutes with Supervision.  Rolling to Bilateral with Stand-by Assistance.   Supine to sit with Minimal Assistance.  Stand pivot transfers with Minimal Assistance.  Toilet transfer to bedside commode with Minimal Assistance.  L Upper extremity self ROM exercise program x15 reps per handout, with independence.            Plan:  Patient to be seen 5 x/week to address the above listed problems via self-care/home management, therapeutic exercises, therapeutic activities, neuromuscular re-education  Plan of Care expires: 04/18/17  Plan of Care reviewed with: patient    WENDY Mustafa  03/29/2017

## 2017-03-29 NOTE — PT/OT/SLP PROGRESS
"Physical Therapy  Treatment    Mao Levin   MRN: [id number]   Admitting Diagnosis: MS (multiple sclerosis)    PT Received On: 03/29/17  Total Time (min): 53       Billable Minutes: 53    Gait [id number], Therapeutic Activity 23 and Therapeutic Exercise 15    Treatment Type: Treatment  PT/PTA: PTA     PTA Visit Number: 3       General Precautions: Standard, fall  Orthopedic Precautions: N/A   Braces: N/A    Do you have any cultural, spiritual, Gnosticism conflicts, given your current situation?: no    Subjective:  Communicated with nsg prior to session "oh God what time is it?" pt agreeable to therapy      Pain Rating:  (did not rate "all over" request meds at start of session)  Location - Side: Bilateral  Location - Orientation: generalized  Location:  ("all over")  Pain Addressed: Nurse notified  Pain Rating Post-Intervention:  (throughout session)    Objective:   Patient found with:  (in bed)       Functional Status:  MDS G  Bed Mobility Functional Status: mod(A)-Max(A)  Transfer Functional Status: mod(A)-Max(A)  Walk in Corridor Functional Status: total(A)          Bed Mobility:    Supine>Sit: mod A    Transfers:  Sit<>Stand: mod/min with RW  Sqt Pivot Transfer: EOB>WC mod/max      Gait:  Amb with RW ~ 140 ft  min/CGA x 2 ppl with WC follow LLE ace wrap into DF, inc BUE support on RW, hip hiking on L to A with clearance     Therex:  2x10 reps LAQ, mini squats with BUE support CGA, B hamstring/gastroc stetch x3:15 sec hold    Patient left up in chair with call button in reach, nsg notified and belonings in reach.    Assessment:  Mao Levin is a 51 y.o. male with a medical diagnosis of MS (multiple sclerosis).  Pt tolerated well, pt pleasant, demo good effort, highly motivated,  pt would continue to benefit from skilled PT services to improve overall functional mobility, strength and endurance.  .    Rehab identified problem list/impairments: weakness, impaired endurance, impaired sensation, impaired self care skills, " impaired functional mobilty, gait instability, impaired balance, decreased upper extremity function, decreased lower extremity function, pain    Rehab potential is good.    Activity tolerance: Fair    Discharge recommendations: rehabilitation facility     Barriers to discharge: Inaccessible home environment, Decreased caregiver support    Equipment recommendations: bedside commode, walker, rolling, wheelchair     GOALS:   Physical Therapy Goals        Problem: Physical Therapy Goal    Goal Priority Disciplines Outcome Goal Variances Interventions   Physical Therapy Goal     PT/OT, PT Ongoing (interventions implemented as appropriate)     Description:  Goals to be met by: 2 weeks     Patient will increase functional independence with mobility by performin. Supine to sit with Stand-by Assistance  2. Sit to supine with Stand-by Assistance  3. Sit to stand transfer with Minimal Assistance  4. Bed to chair transfer with Minimal Assistance using Rolling Walker  5. Gait  x 20 feet with Moderate Assistance using Rolling Walker. Met (3/26/2017)  6. Wheelchair propulsion x75 feet with Stand-by Assistance using BUE/BLE  7. Ascend/descend 3 stair with left Handrails Moderate Assistance.   8. Stand for 3 minutes with Stand-by Assistance using Rolling Walker  9. Lower extremity exercise program x20 reps per handout, with assistance as needed  10. New Goal (3/26/2017): Gait  x 50 feet with Moderate Assistance using Rolling Walker.                  PLAN:    Patient to be seen 5 x/week  to address the above listed problems via gait training, therapeutic activities, therapeutic exercises, wheelchair management/training  Plan of Care expires: 17  Plan of Care reviewed with: patient    Chely Gudino, PTA  2017

## 2017-03-30 LAB
ANION GAP SERPL CALC-SCNC: 10 MMOL/L
BASOPHILS # BLD AUTO: 0.04 K/UL
BASOPHILS NFR BLD: 0.7 %
BUN SERPL-MCNC: 17 MG/DL
CALCIUM SERPL-MCNC: 8.5 MG/DL
CHLORIDE SERPL-SCNC: 106 MMOL/L
CO2 SERPL-SCNC: 24 MMOL/L
CREAT SERPL-MCNC: 0.8 MG/DL
DIFFERENTIAL METHOD: ABNORMAL
EOSINOPHIL # BLD AUTO: 0.2 K/UL
EOSINOPHIL NFR BLD: 4.3 %
ERYTHROCYTE [DISTWIDTH] IN BLOOD BY AUTOMATED COUNT: 14.6 %
EST. GFR  (AFRICAN AMERICAN): >60 ML/MIN/1.73 M^2
EST. GFR  (NON AFRICAN AMERICAN): >60 ML/MIN/1.73 M^2
GLUCOSE SERPL-MCNC: 84 MG/DL
HCT VFR BLD AUTO: 37.1 %
HGB BLD-MCNC: 12 G/DL
LYMPHOCYTES # BLD AUTO: 2 K/UL
LYMPHOCYTES NFR BLD: 35.9 %
MAGNESIUM SERPL-MCNC: 2.1 MG/DL
MCH RBC QN AUTO: 28.9 PG
MCHC RBC AUTO-ENTMCNC: 32.3 %
MCV RBC AUTO: 89 FL
MONOCYTES # BLD AUTO: 0.4 K/UL
MONOCYTES NFR BLD: 7.9 %
NEUTROPHILS # BLD AUTO: 2.9 K/UL
NEUTROPHILS NFR BLD: 51.2 %
PHOSPHATE SERPL-MCNC: 3.7 MG/DL
PLATELET # BLD AUTO: 207 K/UL
PMV BLD AUTO: 11.9 FL
POTASSIUM SERPL-SCNC: 4.1 MMOL/L
RBC # BLD AUTO: 4.15 M/UL
SODIUM SERPL-SCNC: 140 MMOL/L
WBC # BLD AUTO: 5.57 K/UL

## 2017-03-30 PROCEDURE — 25000003 PHARM REV CODE 250: Performed by: PHYSICIAN ASSISTANT

## 2017-03-30 PROCEDURE — 97530 THERAPEUTIC ACTIVITIES: CPT

## 2017-03-30 PROCEDURE — 97535 SELF CARE MNGMENT TRAINING: CPT

## 2017-03-30 PROCEDURE — 11000004 HC SNF PRIVATE

## 2017-03-30 PROCEDURE — 25000003 PHARM REV CODE 250: Performed by: NURSE PRACTITIONER

## 2017-03-30 PROCEDURE — 85025 COMPLETE CBC W/AUTO DIFF WBC: CPT

## 2017-03-30 PROCEDURE — 84100 ASSAY OF PHOSPHORUS: CPT

## 2017-03-30 PROCEDURE — 97110 THERAPEUTIC EXERCISES: CPT

## 2017-03-30 PROCEDURE — 80048 BASIC METABOLIC PNL TOTAL CA: CPT

## 2017-03-30 PROCEDURE — 83735 ASSAY OF MAGNESIUM: CPT

## 2017-03-30 PROCEDURE — 25000003 PHARM REV CODE 250: Performed by: INTERNAL MEDICINE

## 2017-03-30 PROCEDURE — 97116 GAIT TRAINING THERAPY: CPT

## 2017-03-30 PROCEDURE — 36415 COLL VENOUS BLD VENIPUNCTURE: CPT

## 2017-03-30 PROCEDURE — 99900058 HC 022 PAID UNDER SNF PPS

## 2017-03-30 RX ADMIN — DIAZEPAM 5 MG: 5 TABLET ORAL at 06:03

## 2017-03-30 RX ADMIN — STANDARDIZED SENNA CONCENTRATE AND DOCUSATE SODIUM 1 TABLET: 8.6; 5 TABLET, FILM COATED ORAL at 08:03

## 2017-03-30 RX ADMIN — DANTROLENE SODIUM 50 MG: 50 CAPSULE ORAL at 05:03

## 2017-03-30 RX ADMIN — BACLOFEN 20 MG: 10 TABLET ORAL at 11:03

## 2017-03-30 RX ADMIN — DANTROLENE SODIUM 50 MG: 50 CAPSULE ORAL at 06:03

## 2017-03-30 RX ADMIN — GABAPENTIN 900 MG: 300 CAPSULE ORAL at 09:03

## 2017-03-30 RX ADMIN — CARBAMAZEPINE 400 MG: 200 TABLET, EXTENDED RELEASE ORAL at 09:03

## 2017-03-30 RX ADMIN — BACLOFEN 20 MG: 10 TABLET ORAL at 06:03

## 2017-03-30 RX ADMIN — DANTROLENE SODIUM 50 MG: 50 CAPSULE ORAL at 12:03

## 2017-03-30 RX ADMIN — TRAZODONE HYDROCHLORIDE 100 MG: 50 TABLET ORAL at 09:03

## 2017-03-30 RX ADMIN — OXYCODONE HYDROCHLORIDE 15 MG: 5 TABLET ORAL at 05:03

## 2017-03-30 RX ADMIN — CARBAMAZEPINE 400 MG: 200 TABLET, EXTENDED RELEASE ORAL at 08:03

## 2017-03-30 RX ADMIN — BACLOFEN 20 MG: 10 TABLET ORAL at 12:03

## 2017-03-30 RX ADMIN — OXYCODONE HYDROCHLORIDE 15 MG: 5 TABLET ORAL at 09:03

## 2017-03-30 RX ADMIN — DIAZEPAM 5 MG: 5 TABLET ORAL at 09:03

## 2017-03-30 RX ADMIN — DANTROLENE SODIUM 50 MG: 50 CAPSULE ORAL at 11:03

## 2017-03-30 RX ADMIN — GABAPENTIN 600 MG: 300 CAPSULE ORAL at 05:03

## 2017-03-30 RX ADMIN — GABAPENTIN 600 MG: 300 CAPSULE ORAL at 06:03

## 2017-03-30 RX ADMIN — RIVAROXABAN 20 MG: 20 TABLET, FILM COATED ORAL at 05:03

## 2017-03-30 RX ADMIN — OXYCODONE HYDROCHLORIDE 15 MG: 5 TABLET ORAL at 08:03

## 2017-03-30 RX ADMIN — OXYCODONE HYDROCHLORIDE 15 MG: 5 TABLET ORAL at 12:03

## 2017-03-30 RX ADMIN — OXYBUTYNIN CHLORIDE 5 MG: 5 TABLET, EXTENDED RELEASE ORAL at 09:03

## 2017-03-30 RX ADMIN — CITALOPRAM HYDROBROMIDE 20 MG: 20 TABLET ORAL at 08:03

## 2017-03-30 RX ADMIN — BACLOFEN 20 MG: 10 TABLET ORAL at 05:03

## 2017-03-30 NOTE — PLAN OF CARE
Problem: Occupational Therapy Goal  Goal: Occupational Therapy Goal  Goals to be met by: 3 weeks     Patient will increase functional independence with ADLs by performing:    Feeding with Set-up Assistance.  UE Dressing with Stand-by Assistance.  LE Dressing with Minimal Assistance.  Grooming while seated with Modified [place].  Toileting from bedside commode with Minimal Assistance for hygiene and clothing management.   Bathing from shower chair/bench with Minimal Assistance.  Sitting at edge of bed x20 minutes with Supervision.  Rolling to Bilateral with Stand-by Assistance.   Supine to sit with Minimal Assistance.  Stand pivot transfers with Minimal Assistance.  Toilet transfer to bedside commode with Minimal Assistance.  L Upper extremity self ROM exercise program x15 reps per handout, with independence.   Pt. Tolerated session well

## 2017-03-30 NOTE — PLAN OF CARE
Problem: Physical Therapy Goal  Goal: Physical Therapy Goal  Goals to be met by: 2 weeks     Patient will increase functional independence with mobility by performin. Supine to sit with Stand-by Assistance  2. Sit to supine with Stand-by Assistance  3. Sit to stand transfer with Minimal Assistance  4. Bed to chair transfer with Minimal Assistance using Rolling Walker  5. Gait x 20 feet with Moderate Assistance using Rolling Walker. Met (3/26/2017)  6. Wheelchair propulsion x75 feet with Stand-by Assistance using BUE/BLE  7. Ascend/descend 3 stair with left Handrails Moderate Assistance.   8. Stand for 3 minutes with Stand-by Assistance using Rolling Walker  9. Lower extremity exercise program x20 reps per handout, with assistance as needed  10. New Goal (3/26/2017): Gait x 50 feet with Moderate Assistance using Rolling Walker.   Outcome: Ongoing (interventions implemented as appropriate)  Goals remain appropriate.

## 2017-03-30 NOTE — PT/OT/SLP PROGRESS
"Physical Therapy  Treatment    Mao Levin   MRN: [id number]   Admitting Diagnosis: MS (multiple sclerosis)    PT Received On: 17  Total Time (min): 53       Billable Minutes:  Gait [id number], Therapeutic Activity 8 and Therapeutic Exercise 30    Treatment Type: Treatment  PT/PTA: PTA     PTA Visit Number: 4       General Precautions: Standard, fall  Orthopedic Precautions: N/A   Braces: N/A    Do you have any cultural, spiritual, Shinto conflicts, given your current situation?: no    Subjective:  "I want to do squats."    Pain Ratin/10  Location - Side: Left     Location: leg     Pain Rating Post-Intervention: 7/10    Objective:  Patient found seated in w/c     Functional Status:  MDS G  Transfer Functional Status: CGA-Min (A)  Walk in Room Functional Status: CGA-Min (A)  Walk in Corridor Functional Status: CGA-Min (A)  Locomotion on Unit Functional Status: CGA-Min (A)  Locomotion Off Unit Functional Status: total(A)          Transfers:  Sit <> stand from w/c with min A x multiple trials    Gait:  Distance Walk: pt ambulated x 50 feet x 2 trials with RW and min A for balance and safety. Ace wrap and blue shoe to LLE  Walk: Minimal Contact Assistance  Assistive Device: rolling walker  Gait Deviation(s): decreased keyshawn;decreased toe-to-floor clearance;decreased weight-shifting ability;decreased velocity of limb motion;decreased step length;decreased stride length;decreased swing-to-stance ratio       Wheelchair Mobility:  Patient propels w/c 150 feet with BUEs and SBA    Therex:  Ankle pumps 15  Quad sets 15  Glut sets 15  Hip flexion 15  Hip Extension 15  Long arc quads 15  Knee flex 15  ABduction 15  ADduction 15  (L)LE required (A)    Balance:  Standing squats x 25 reps    Additional Treatment:  LBE x 15 minutes to increase LEs strength, endurance and coordination    Patient left up in chair with call button in reach.    Assessment:  Mao Levin is a 51 y.o. male with a medical diagnosis of MS " (multiple sclerosis).  Pt tolerated session well today and is progressing towards goals.  Will continue to benefit from PT services and goals remain appropriate.    Rehab identified problem list/impairments: weakness, impaired endurance, impaired sensation, impaired self care skills, impaired functional mobilty, gait instability, impaired balance, decreased upper extremity function, decreased lower extremity function, pain    Rehab potential is fair.    Activity tolerance: Fair    Discharge recommendations: rehabilitation facility     Barriers to discharge: Inaccessible home environment, Decreased caregiver support    Equipment recommendations: bedside commode, walker, rolling, wheelchair     GOALS:   Physical Therapy Goals        Problem: Physical Therapy Goal    Goal Priority Disciplines Outcome Goal Variances Interventions   Physical Therapy Goal     PT/OT, PT Ongoing (interventions implemented as appropriate)     Description:  Goals to be met by: 2 weeks     Patient will increase functional independence with mobility by performin. Supine to sit with Stand-by Assistance  2. Sit to supine with Stand-by Assistance  3. Sit to stand transfer with Minimal Assistance  4. Bed to chair transfer with Minimal Assistance using Rolling Walker  5. Gait  x 20 feet with Moderate Assistance using Rolling Walker. Met (3/26/2017)  6. Wheelchair propulsion x75 feet with Stand-by Assistance using BUE/BLE  7. Ascend/descend 3 stair with left Handrails Moderate Assistance.   8. Stand for 3 minutes with Stand-by Assistance using Rolling Walker  9. Lower extremity exercise program x20 reps per handout, with assistance as needed  10. New Goal (3/26/2017): Gait  x 50 feet with Moderate Assistance using Rolling Walker.                  PLAN:    Patient to be seen 5 x/week  to address the above listed problems via gait training, therapeutic activities, therapeutic exercises, wheelchair management/training  Plan of Care expires:  04/17/17  Plan of Care reviewed with: patient    Vandana RAMÍREZ Benitez, PTA  03/30/2017

## 2017-03-30 NOTE — PT/OT/SLP PROGRESS
Occupational Therapy  Treatment    Mao Levin   MRN: [id number]   Admitting Diagnosis: MS (multiple sclerosis)    OT Date of Treatment: 17  Total Time (min): 53 min    Billable Minutes:  Self Care/Home Management 40 and Therapeutic Exercise 13    General Precautions: Standard, fall  Orthopedic Precautions: N/A  Braces: N/A    Do you have any cultural, spiritual, Worship conflicts, given your current situation?: no    Subjective:  Communicated with nurse prior to session.  Pt. Reporting needing to have a BM    Pain Ratin/10     Location - Orientation: generalized        Pain Rating Post-Intervention:  (no increases in pain noted)    Objective:  Patient found with:  (supine in bed)    Functional Status:  MDS G  Bed Mobility Functional Status: Max(A) supine to sit; able to sit EOB with CGA  Transfer Functional Status: mod(A) SQPT from bed to drop arm commode;  Sit to stand from BSC with use of bedrail for support with Min A and vc's for proper foot placement  Dressing Functional Status: 3:Mod (A) to don pants from BSC ; UBD Set Up A to don pull over shirt  Eating Functional Status: Set Up A  Toilet Use Functional Status: total(A) for clothing management using drop arm commode  Personal Hygiene Functional Status: Set up A seated  Bathing Functional Status: mod(A) sponge bath          OT Exercises:  Gentle stretches of LUE for wrist extension, finger extension, and shoulder flexion  Pulley exercises x ~ 5 minutes; 1 # dowel for shoulder flexion as well as to assist with abduction of LUE    Additional Treatment:  Pt. Educated on safety with use of drop arm commode with staff assist     Patient left up in chair with call button in reach    ASSESSMENT:  Mao Levin is a 51 y.o. male with a medical diagnosis of MS (multiple sclerosis) and presents with deficits with self-care skills, functional mobility, FMC as well as increased tone noted in LUE/LLE and would benefit from continued OT services to improve safety and  independence with ADL tasks.    Rehab identified problem list/impairments: impaired self care skills, impaired functional mobilty, impaired endurance, decreased upper extremity function, decreased lower extremity function, abnormal tone, pain, impaired balance, decreased coordination, impaired fine motor    Rehab potential is good    Activity tolerance: Good    Discharge recommendations: rehabilitation facility     Barriers to discharge: Decreased caregiver support     Equipment recommendations: wheelchair (drop arm commode if not ordered yet)     GOALS:   Occupational Therapy Goals        Problem: Occupational Therapy Goal    Goal Priority Disciplines Outcome Interventions   Occupational Therapy Goal     OT, PT/OT Ongoing (interventions implemented as appropriate)    Description:  Goals to be met by: 3 weeks     Patient will increase functional independence with ADLs by performing:    Feeding with Set-up Assistance.  UE Dressing with Stand-by Assistance.  LE Dressing with Minimal Assistance.  Grooming while seated with Modified [place].  Toileting from bedside commode with Minimal Assistance for hygiene and clothing management.   Bathing from  shower chair/bench with Minimal Assistance.  Sitting at edge of bed x20 minutes with Supervision.  Rolling to Bilateral with Stand-by Assistance.   Supine to sit with Minimal Assistance.  Stand pivot transfers with Minimal Assistance.  Toilet transfer to bedside commode with Minimal Assistance.  L Upper extremity self ROM exercise program x15 reps per handout, with independence.                Plan:  Patient to be seen 5 x/week to address the above listed problems via self-care/home management, therapeutic activities, therapeutic exercises, neuromuscular re-education  Plan of Care expires: 04/18/17  Plan of Care reviewed with: patient    GISEL Dennis  03/30/2017

## 2017-03-30 NOTE — PLAN OF CARE
Problem: Fall Risk (Adult)  Goal: Absence of Falls  Patient will demonstrate the desired outcomes by discharge/transition of care.   Outcome: Ongoing (interventions implemented as appropriate)  Patient instructed to use call light for assistance.

## 2017-03-31 PROCEDURE — 97530 THERAPEUTIC ACTIVITIES: CPT

## 2017-03-31 PROCEDURE — 25000003 PHARM REV CODE 250: Performed by: PHYSICIAN ASSISTANT

## 2017-03-31 PROCEDURE — 97110 THERAPEUTIC EXERCISES: CPT

## 2017-03-31 PROCEDURE — 97112 NEUROMUSCULAR REEDUCATION: CPT

## 2017-03-31 PROCEDURE — 97116 GAIT TRAINING THERAPY: CPT

## 2017-03-31 PROCEDURE — 25000003 PHARM REV CODE 250: Performed by: NURSE PRACTITIONER

## 2017-03-31 PROCEDURE — 11000004 HC SNF PRIVATE

## 2017-03-31 RX ADMIN — BACLOFEN 20 MG: 10 TABLET ORAL at 05:03

## 2017-03-31 RX ADMIN — OXYCODONE HYDROCHLORIDE 15 MG: 5 TABLET ORAL at 09:03

## 2017-03-31 RX ADMIN — CARBAMAZEPINE 400 MG: 200 TABLET, EXTENDED RELEASE ORAL at 09:03

## 2017-03-31 RX ADMIN — BACLOFEN 20 MG: 10 TABLET ORAL at 11:03

## 2017-03-31 RX ADMIN — GABAPENTIN 600 MG: 300 CAPSULE ORAL at 05:03

## 2017-03-31 RX ADMIN — DANTROLENE SODIUM 50 MG: 50 CAPSULE ORAL at 05:03

## 2017-03-31 RX ADMIN — RIVAROXABAN 20 MG: 20 TABLET, FILM COATED ORAL at 05:03

## 2017-03-31 RX ADMIN — DANTROLENE SODIUM 50 MG: 50 CAPSULE ORAL at 11:03

## 2017-03-31 RX ADMIN — OXYCODONE HYDROCHLORIDE 15 MG: 5 TABLET ORAL at 01:03

## 2017-03-31 RX ADMIN — DIAZEPAM 5 MG: 5 TABLET ORAL at 09:03

## 2017-03-31 RX ADMIN — OXYCODONE HYDROCHLORIDE 15 MG: 5 TABLET ORAL at 06:03

## 2017-03-31 RX ADMIN — OXYBUTYNIN CHLORIDE 5 MG: 5 TABLET, EXTENDED RELEASE ORAL at 09:03

## 2017-03-31 RX ADMIN — CITALOPRAM HYDROBROMIDE 20 MG: 20 TABLET ORAL at 09:03

## 2017-03-31 RX ADMIN — GABAPENTIN 900 MG: 300 CAPSULE ORAL at 09:03

## 2017-03-31 RX ADMIN — OXYCODONE HYDROCHLORIDE 15 MG: 5 TABLET ORAL at 04:03

## 2017-03-31 RX ADMIN — TRAZODONE HYDROCHLORIDE 100 MG: 50 TABLET ORAL at 09:03

## 2017-03-31 NOTE — PLAN OF CARE
Problem: Patient Care Overview  Goal: Plan of Care Review  Outcome: Ongoing (interventions implemented as appropriate)    03/31/17 1843   Coping/Psychosocial   Plan Of Care Reviewed With patient         Problem: Infection, Risk/Actual (Adult)  Goal: Infection Prevention/Resolution  Patient will demonstrate the desired outcomes by discharge/transition of care.   Outcome: Ongoing (interventions implemented as appropriate)    03/31/17 1843   Infection, Risk/Actual (Adult)   Infection Prevention/Resolution making progress toward outcome         Problem: Fall Risk (Adult)  Goal: Absence of Falls  Patient will demonstrate the desired outcomes by discharge/transition of care.   Outcome: Ongoing (interventions implemented as appropriate)    03/31/17 1843   Fall Risk (Adult)   Absence of Falls making progress toward outcome         Problem: Pressure Ulcer Risk (Huy Scale) (Adult,Obstetrics,Pediatric)  Goal: Skin Integrity  Patient will demonstrate the desired outcomes by discharge/transition of care.   Outcome: Ongoing (interventions implemented as appropriate)    03/31/17 1843   Pressure Ulcer Risk (Huy Scale) (Adult,Obstetrics,Pediatric)   Skin Integrity making progress toward outcome         Problem: Activity Intolerance (Adult)  Goal: Activity Tolerance  Patient will demonstrate the desired outcomes by discharge/transition of care.   Outcome: Outcome(s) achieved Date Met:  03/31/17 03/31/17 1843   Activity Intolerance (Adult)   Activity Tolerance making progress toward outcome         Comments:   Patient instructed to call for assistance.Call  Light and persoanl items in reach.

## 2017-03-31 NOTE — PLAN OF CARE
Problem: Occupational Therapy Goal  Goal: Occupational Therapy Goal  Goals to be met by: 3 weeks     Patient will increase functional independence with ADLs by performing:    Feeding with Set-up Assistance.  UE Dressing with Stand-by Assistance.  LE Dressing with Minimal Assistance.  Grooming while seated with Modified [place].  Toileting from bedside commode with Minimal Assistance for hygiene and clothing management.   Bathing from shower chair/bench with Minimal Assistance.  Sitting at edge of bed x20 minutes with Supervision.  Rolling to Bilateral with Stand-by Assistance.   Supine to sit with Minimal Assistance.  Stand pivot transfers with Minimal Assistance.  Toilet transfer to bedside commode with Minimal Assistance.  L Upper extremity self ROM exercise program x15 reps per handout, with independence.   Outcome: Ongoing (interventions implemented as appropriate)  Patient's goals are appropriate.   GISEL Flowers  3/31/2017

## 2017-03-31 NOTE — PT/OT/SLP PROGRESS
"Physical Therapy  Treatment    Mao Levni   MRN: [id number]   Admitting Diagnosis: MS (multiple sclerosis)    PT Received On: 17  Total Time (min): 68       Billable Minutes:  Gait [id number], Therapeutic Activity 8, Therapeutic Exercise 30 and Neuromuscular Re-education 15    Treatment Type: Treatment  PT/PTA: PTA     PTA Visit Number: 5       General Precautions: Standard, fall  Orthopedic Precautions: N/A   Braces: N/A    Do you have any cultural, spiritual, Jew conflicts, given your current situation?: no    Subjective:  "My leg hurts."    Pain Ratin/10  Location - Side: Left     Location: leg     Pain Rating Post-Intervention: 7/10    Objective:  Patient found seated in w/c    Functional Status:  MDS G  Transfer Functional Status: CGA-Min (A)  Walk in Room Functional Status: CGA-Min (A)  Walk in Corridor Functional Status: CGA-Min (A)  Locomotion on Unit Functional Status: CGA-Min (A)  Locomotion Off Unit Functional Status: total(A)          Bed Mobility:  Supine <> sit on mat with min A x 2 trials.  Educated on techniques and sequencing    Transfers:  Sit <> stand from w/c with min A and RW   SQPT from w/c <> mat with min A.  VCs for technique    Gait:    Distance Walk: pt ambulated x 150 feet with RW and min A for balance and safety.  Ace wrap and blue shoe to (L)LE  Walk: Minimal Contact Assistance  Assistive Device: rolling walker  Gait Deviation(s): decreased keyshawn;decreased toe-to-floor clearance;decreased weight-shifting ability;decreased velocity of limb motion;decreased step length;decreased stride length;decreased swing-to-stance ratio       Wheelchair Mobility:  Patient propels w/c 150 feet with BUEs and (S)    Therex:  BLEs supine therex x 25 reps includings: APs, GS, QS, ADD/ABD, HS, SAQs, bridges. (A) provided to (L)LE      Additional Treatment:  Standing squats x 15 reps    Lateral bending to (L)/(R) at EOM x 20 reps for core strengthening to improve bed mobility     Patient left " up in chair with call button in reach.    Assessment:  Mao Levin is a 51 y.o. male with a medical diagnosis of MS (multiple sclerosis).  Pt noted with increased endurance during gait training.  Pt is at a min A with transfers and gait training.  Will continue to benefit from PT services and goals remain appropriate.    Rehab identified problem list/impairments: weakness, impaired endurance, impaired sensation, impaired self care skills, impaired functional mobilty, gait instability, impaired balance, decreased upper extremity function, decreased lower extremity function, pain    Rehab potential is fair.    Activity tolerance: Fair    Discharge recommendations: rehabilitation facility     Barriers to discharge: Inaccessible home environment, Decreased caregiver support    Equipment recommendations: bedside commode, walker, rolling, wheelchair     GOALS:   Physical Therapy Goals        Problem: Physical Therapy Goal    Goal Priority Disciplines Outcome Goal Variances Interventions   Physical Therapy Goal     PT/OT, PT Ongoing (interventions implemented as appropriate)     Description:  Goals to be met by: 2 weeks     Patient will increase functional independence with mobility by performin. Supine to sit with Stand-by Assistance  2. Sit to supine with Stand-by Assistance  3. Sit to stand transfer with Minimal Assistance  4. Bed to chair transfer with Minimal Assistance using Rolling Walker  5. Gait  x 20 feet with Moderate Assistance using Rolling Walker. Met (3/26/2017)  6. Wheelchair propulsion x75 feet with Stand-by Assistance using BUE/BLE  7. Ascend/descend 3 stair with left Handrails Moderate Assistance.   8. Stand for 3 minutes with Stand-by Assistance using Rolling Walker  9. Lower extremity exercise program x20 reps per handout, with assistance as needed  10. New Goal (3/26/2017): Gait  x 50 feet with Moderate Assistance using Rolling Walker.                  PLAN:    Patient to be seen 5 x/week  to  address the above listed problems via gait training, therapeutic activities, therapeutic exercises, wheelchair management/training  Plan of Care expires: 04/17/17  Plan of Care reviewed with: patient    Vandana Benitez, PTA  03/31/2017

## 2017-03-31 NOTE — PLAN OF CARE
Problem: Patient Care Overview  Goal: Plan of Care Review  Outcome: Ongoing (interventions implemented as appropriate)    03/31/17 9428   Coping/Psychosocial   Plan Of Care Reviewed With patient         Comments:   Pt was assessed and VS taken recorded. Noted no active skin breakdown. Complained of pain and pain medication was given. Incontinent of urine x 2. Call bell within reached, 2 x side rails of bed elevated and bed in the lowest position. Pt turned every 2 hours and monitor for pain and safety.

## 2017-04-01 PROCEDURE — 11000004 HC SNF PRIVATE

## 2017-04-01 PROCEDURE — 97535 SELF CARE MNGMENT TRAINING: CPT

## 2017-04-01 PROCEDURE — 25000003 PHARM REV CODE 250: Performed by: NURSE PRACTITIONER

## 2017-04-01 PROCEDURE — 97110 THERAPEUTIC EXERCISES: CPT

## 2017-04-01 PROCEDURE — 25000003 PHARM REV CODE 250: Performed by: INTERNAL MEDICINE

## 2017-04-01 PROCEDURE — 25000003 PHARM REV CODE 250: Performed by: PHYSICIAN ASSISTANT

## 2017-04-01 RX ORDER — OXYCODONE HYDROCHLORIDE 5 MG/1
15 TABLET ORAL
Status: DISCONTINUED | OUTPATIENT
Start: 2017-04-01 | End: 2017-04-01

## 2017-04-01 RX ORDER — OXYCODONE HYDROCHLORIDE 5 MG/1
15 TABLET ORAL
Status: DISCONTINUED | OUTPATIENT
Start: 2017-04-01 | End: 2017-04-04

## 2017-04-01 RX ORDER — OXYCODONE HYDROCHLORIDE 5 MG/1
15 TABLET ORAL EVERY 4 HOURS PRN
Status: DISCONTINUED | OUTPATIENT
Start: 2017-04-08 | End: 2017-04-04

## 2017-04-01 RX ADMIN — CITALOPRAM HYDROBROMIDE 20 MG: 20 TABLET ORAL at 09:04

## 2017-04-01 RX ADMIN — BACLOFEN 20 MG: 10 TABLET ORAL at 06:04

## 2017-04-01 RX ADMIN — GABAPENTIN 900 MG: 300 CAPSULE ORAL at 09:04

## 2017-04-01 RX ADMIN — TRAZODONE HYDROCHLORIDE 100 MG: 50 TABLET ORAL at 09:04

## 2017-04-01 RX ADMIN — GABAPENTIN 600 MG: 300 CAPSULE ORAL at 05:04

## 2017-04-01 RX ADMIN — OXYCODONE HYDROCHLORIDE 15 MG: 5 TABLET ORAL at 10:04

## 2017-04-01 RX ADMIN — RIVAROXABAN 20 MG: 20 TABLET, FILM COATED ORAL at 05:04

## 2017-04-01 RX ADMIN — CARBAMAZEPINE 400 MG: 200 TABLET, EXTENDED RELEASE ORAL at 09:04

## 2017-04-01 RX ADMIN — DIAZEPAM 5 MG: 5 TABLET ORAL at 09:04

## 2017-04-01 RX ADMIN — BACLOFEN 20 MG: 10 TABLET ORAL at 05:04

## 2017-04-01 RX ADMIN — DANTROLENE SODIUM 50 MG: 50 CAPSULE ORAL at 12:04

## 2017-04-01 RX ADMIN — BACLOFEN 20 MG: 10 TABLET ORAL at 12:04

## 2017-04-01 RX ADMIN — DANTROLENE SODIUM 50 MG: 50 CAPSULE ORAL at 06:04

## 2017-04-01 RX ADMIN — OXYCODONE HYDROCHLORIDE 15 MG: 5 TABLET ORAL at 09:04

## 2017-04-01 RX ADMIN — ERGOCALCIFEROL 50000 UNITS: 1.25 CAPSULE ORAL at 09:04

## 2017-04-01 RX ADMIN — OXYCODONE HYDROCHLORIDE 15 MG: 5 TABLET ORAL at 01:04

## 2017-04-01 RX ADMIN — OXYCODONE HYDROCHLORIDE 15 MG: 5 TABLET ORAL at 06:04

## 2017-04-01 RX ADMIN — OXYBUTYNIN CHLORIDE 5 MG: 5 TABLET, EXTENDED RELEASE ORAL at 09:04

## 2017-04-01 RX ADMIN — OXYCODONE HYDROCHLORIDE 15 MG: 5 TABLET ORAL at 05:04

## 2017-04-01 RX ADMIN — RAMELTEON 8 MG: 8 TABLET, FILM COATED ORAL at 09:04

## 2017-04-01 RX ADMIN — GABAPENTIN 600 MG: 300 CAPSULE ORAL at 06:04

## 2017-04-01 RX ADMIN — DANTROLENE SODIUM 50 MG: 50 CAPSULE ORAL at 05:04

## 2017-04-01 NOTE — PLAN OF CARE
Problem: Infection, Risk/Actual (Adult)  Goal: Infection Prevention/Resolution  Patient will demonstrate the desired outcomes by discharge/transition of care.   Outcome: Ongoing (interventions implemented as appropriate)    04/01/17 1600   Infection, Risk/Actual (Adult)   Infection Prevention/Resolution making progress toward outcome         Problem: Fall Risk (Adult)  Goal: Absence of Falls  Patient will demonstrate the desired outcomes by discharge/transition of care.   Outcome: Ongoing (interventions implemented as appropriate)    04/01/17 1600   Fall Risk (Adult)   Absence of Falls making progress toward outcome         Problem: Pressure Ulcer Risk (Huy Scale) (Adult,Obstetrics,Pediatric)  Goal: Skin Integrity  Patient will demonstrate the desired outcomes by discharge/transition of care.   Outcome: Ongoing (interventions implemented as appropriate)    04/01/17 1600   Pressure Ulcer Risk (Huy Scale) (Adult,Obstetrics,Pediatric)   Skin Integrity making progress toward outcome         Problem: Mobility, Physical Impaired (Adult)  Goal: Enhanced Functionality Ability  Patient will demonstrate the desired outcomes by discharge/transition of care.   Outcome: Ongoing (interventions implemented as appropriate)    04/01/17 1600   Mobility, Physical Impaired (Adult)   Enhanced Functionality Ability making progress toward outcome         Comments:   Patient monitored every 1 to 2 hours for pain and safety.  Safety maintained.  Patient instructed to call for assistance.Call  Light and persoanl items in reach.

## 2017-04-01 NOTE — PLAN OF CARE
Problem: Occupational Therapy Goal  Goal: Occupational Therapy Goal  Goals to be met by: 3 weeks     Patient will increase functional independence with ADLs by performing:    Feeding with Set-up Assistance. Met  UE Dressing with Stand-by Assistance.  LE Dressing with Minimal Assistance.  Grooming while seated with Modified [place].  Toileting from bedside commode with Minimal Assistance for hygiene and clothing management.   Bathing from shower chair/bench with Minimal Assistance.  Sitting at edge of bed x20 minutes with Supervision.  Rolling to Bilateral with Stand-by Assistance.   Supine to sit with Minimal Assistance.  Stand pivot transfers with Minimal Assistance.  Toilet transfer to bedside commode with Minimal Assistance.  L Upper extremity self ROM exercise program x15 reps per handout, with independence.   Outcome: Ongoing (interventions implemented as appropriate)  Patient's goals are appropriate.   GISEL Flowers  4/1/2017

## 2017-04-01 NOTE — PT/OT/SLP PROGRESS
Occupational Therapy  Treatment    Mao Levin   MRN: [id number]   Admitting Diagnosis: MS (multiple sclerosis)    OT Date of Treatment: 17  Total Time (min): 40 min    Billable Minutes:  Self Care/Home Management 15 and Therapeutic Exercise 25    General Precautions: Standard, fall  Orthopedic Precautions: N/A  Braces: N/A    Do you have any cultural, spiritual, Anabaptism conflicts, given your current situation?: no    Subjective:  Communicated with patient prior to session.    Pain Ratin/10     Pain Rating Post-Intervention: 0/10    Objective:       Functional Status:  MDS G   Eating Functional Status: S-SBA setup  Personal Hygiene Functional Status: CGA-Min (A) shaving and washing face with min A       OT Exercises: Rickshaw 25 x 8 20# for improving strength to increase independence with functional mobility.   Lat pull downs 25 x 4 30# for improving strength to increase independence with daily skills.     Patient left up in chair with call button in reach and all needs met.     ASSESSMENT:  Mao Levni is a 51 y.o. male with a medical diagnosis of MS (multiple sclerosis) and presents with the deficits listed below. Patient tolerated treatment session and was motivated to participate in therapy to complete tasks. Patient continues to benefit from skilled OT services to achieve maximal independence.    Rehab identified problem list/impairments: weakness, impaired endurance, impaired self care skills, impaired functional mobilty, gait instability, impaired balance, decreased upper extremity function, decreased lower extremity function, abnormal tone, decreased coordination, impaired fine motor    Rehab potential is good    Activity tolerance: Good    Discharge recommendations: rehabilitation facility     Barriers to discharge: Decreased caregiver support     Equipment recommendations: wheelchair (Drop arm bedside commode)     GOALS:   Occupational Therapy Goals        Problem: Occupational Therapy Goal    Goal  Priority Disciplines Outcome Interventions   Occupational Therapy Goal     OT, PT/OT Ongoing (interventions implemented as appropriate)    Description:  Goals to be met by: 3 weeks     Patient will increase functional independence with ADLs by performing:    Feeding with Set-up Assistance. Met  UE Dressing with Stand-by Assistance.  LE Dressing with Minimal Assistance.  Grooming while seated with Modified [place].  Toileting from bedside commode with Minimal Assistance for hygiene and clothing management.   Bathing from  shower chair/bench with Minimal Assistance.  Sitting at edge of bed x20 minutes with Supervision.  Rolling to Bilateral with Stand-by Assistance.   Supine to sit with Minimal Assistance.  Stand pivot transfers with Minimal Assistance.  Toilet transfer to bedside commode with Minimal Assistance.  L Upper extremity self ROM exercise program x15 reps per handout, with independence.                 Plan:  Patient to be seen 5 x/week to address the above listed problems via self-care/home management, therapeutic activities, therapeutic exercises, neuromuscular re-education  Plan of Care expires: 04/18/17  Plan of Care reviewed with: patient    GISEL Flowers  04/01/2017

## 2017-04-01 NOTE — PLAN OF CARE
Problem: Patient Care Overview  Goal: Plan of Care Review  Outcome: Ongoing (interventions implemented as appropriate)    03/31/17 9449   Coping/Psychosocial   Plan Of Care Reviewed With patient         Comments:   Pt was noted be calm and cooperative with care. Alert oriented x 4 , takes medication whole, room air. Medications were given including pain and sleeping pills and pt tolerated. Call bell with in reached, 2 x side rails of bed elevated , urinal at bedside and bed in lowest position. Incontinent of urine x 3 and incontinent product changed. Pt turned every 2 hours and monitor for pain and safety.

## 2017-04-02 PROCEDURE — 25000003 PHARM REV CODE 250: Performed by: PHYSICIAN ASSISTANT

## 2017-04-02 PROCEDURE — 25000003 PHARM REV CODE 250: Performed by: INTERNAL MEDICINE

## 2017-04-02 PROCEDURE — 97535 SELF CARE MNGMENT TRAINING: CPT

## 2017-04-02 PROCEDURE — 11000004 HC SNF PRIVATE

## 2017-04-02 RX ADMIN — DIAZEPAM 5 MG: 5 TABLET ORAL at 09:04

## 2017-04-02 RX ADMIN — CARBAMAZEPINE 400 MG: 200 TABLET, EXTENDED RELEASE ORAL at 09:04

## 2017-04-02 RX ADMIN — BACLOFEN 20 MG: 10 TABLET ORAL at 12:04

## 2017-04-02 RX ADMIN — OXYCODONE HYDROCHLORIDE 15 MG: 5 TABLET ORAL at 09:04

## 2017-04-02 RX ADMIN — DIAZEPAM 5 MG: 5 TABLET ORAL at 08:04

## 2017-04-02 RX ADMIN — DANTROLENE SODIUM 50 MG: 50 CAPSULE ORAL at 05:04

## 2017-04-02 RX ADMIN — RIVAROXABAN 20 MG: 20 TABLET, FILM COATED ORAL at 05:04

## 2017-04-02 RX ADMIN — GABAPENTIN 900 MG: 300 CAPSULE ORAL at 08:04

## 2017-04-02 RX ADMIN — OXYCODONE HYDROCHLORIDE 15 MG: 5 TABLET ORAL at 12:04

## 2017-04-02 RX ADMIN — ACETAMINOPHEN 650 MG: 325 TABLET, FILM COATED ORAL at 09:04

## 2017-04-02 RX ADMIN — BACLOFEN 20 MG: 10 TABLET ORAL at 05:04

## 2017-04-02 RX ADMIN — CARBAMAZEPINE 400 MG: 200 TABLET, EXTENDED RELEASE ORAL at 08:04

## 2017-04-02 RX ADMIN — OXYBUTYNIN CHLORIDE 5 MG: 5 TABLET, EXTENDED RELEASE ORAL at 09:04

## 2017-04-02 RX ADMIN — OXYCODONE HYDROCHLORIDE 15 MG: 5 TABLET ORAL at 05:04

## 2017-04-02 RX ADMIN — DANTROLENE SODIUM 50 MG: 50 CAPSULE ORAL at 12:04

## 2017-04-02 RX ADMIN — GABAPENTIN 600 MG: 300 CAPSULE ORAL at 06:04

## 2017-04-02 RX ADMIN — OXYCODONE HYDROCHLORIDE 15 MG: 5 TABLET ORAL at 06:04

## 2017-04-02 RX ADMIN — RAMELTEON 8 MG: 8 TABLET, FILM COATED ORAL at 08:04

## 2017-04-02 RX ADMIN — OXYCODONE HYDROCHLORIDE 15 MG: 5 TABLET ORAL at 03:04

## 2017-04-02 RX ADMIN — CITALOPRAM HYDROBROMIDE 20 MG: 20 TABLET ORAL at 09:04

## 2017-04-02 RX ADMIN — GABAPENTIN 600 MG: 300 CAPSULE ORAL at 03:04

## 2017-04-02 RX ADMIN — TRAZODONE HYDROCHLORIDE 100 MG: 50 TABLET ORAL at 08:04

## 2017-04-02 NOTE — PT/OT/SLP PROGRESS
Occupational Therapy  Treatment    Mao Levin   MRN: [id number]   Admitting Diagnosis: MS (multiple sclerosis)    OT Date of Treatment: 17  Total Time (min): 8 min    Billable Minutes:  Self Care/Home Management 8    General Precautions: Standard, fall  Orthopedic Precautions: N/A  Braces: N/A    Do you have any cultural, spiritual, Jehovah's witness conflicts, given your current situation?: no    Subjective:  I want my rolling walker. Get it for me.  I need to go outside. (* OT explained not safe to go outside with rollator. OT agreed to get w/c to assist pt. OOB and do therapy outside with nursing approval.   Communicated with nurse prior to session.      Pain Ratin/10              Pain Rating Post-Intervention: 0/10    Objective:  Patient found with:  (seated EOB)  (OT asked charge nurse Radha if pt. Could be assisted to w/c to go outside where therapist would do therapy and ok'd)      Once pt. Got into elevator and was on 1st floor asked another visitor for a cigarette.  OT took cigarette from visitor and told pt. That she would bring him back upstairs and check with nursing to see if pt. Allowed for outside smoking.  Pt. Became very angry but agreed to come back in to building while therapist held cigarette.  OT spoke to Emily and pt. Denied cigarette and became extremely angry.  Charge nurse left with pt.  Nursing called security.    Functional Status:  MDS G  Bed Mobility Functional Status: mod(I) - (I)  Transfer Functional Status: CGA-Min (A)  Dressing Functional Status: 3:mod(A) to don pants  Toileting:  Diaper completely soiled but refused OT assisting pt. With changing.           Additional Treatment:      Patient left in w/c at nursing station with nurse present    ASSESSMENT:  Mao Levin is a 51 y.o. male with a medical diagnosis of MS (multiple sclerosis) and presents with deficits in self-care as well as functional mobility.  Pt. Very angry on this date that OT did not allow him outside alone or to  have cigarette.     Rehab identified problem list/impairments: impaired endurance, impaired self care skills, impaired functional mobilty, decreased upper extremity function, decreased safety awareness    Rehab potential is good    Activity tolerance: Fair    Discharge recommendations: rehabilitation facility     Barriers to discharge: Decreased caregiver support     Equipment recommendations: wheelchair (drop arm commode)     GOALS:   Occupational Therapy Goals        Problem: Occupational Therapy Goal    Goal Priority Disciplines Outcome Interventions   Occupational Therapy Goal     OT, PT/OT Ongoing (interventions implemented as appropriate)    Description:  Goals to be met by: 3 weeks     Patient will increase functional independence with ADLs by performing:    Feeding with Set-up Assistance. Met  UE Dressing with Stand-by Assistance.  LE Dressing with Minimal Assistance.  Grooming while seated with Modified [place].  Toileting from bedside commode with Minimal Assistance for hygiene and clothing management.   Bathing from  shower chair/bench with Minimal Assistance.  Sitting at edge of bed x20 minutes with Supervision.  Rolling to Bilateral with Stand-by Assistance.   Supine to sit with Minimal Assistance.  Stand pivot transfers with Minimal Assistance.  Toilet transfer to bedside commode with Minimal Assistance.  L Upper extremity self ROM exercise program x15 reps per handout, with independence.                 Plan:  Patient to be seen 5 x/week to address the above listed problems via self-care/home management, therapeutic exercises, therapeutic activities  Plan of Care expires: 04/18/17  Plan of Care reviewed with: patient    GISEL Dennis  04/02/2017

## 2017-04-02 NOTE — PLAN OF CARE
Problem: Patient Care Overview  Goal: Plan of Care Review  Outcome: Ongoing (interventions implemented as appropriate)    04/02/17 0218   Coping/Psychosocial   Plan Of Care Reviewed With patient         Problem: Pressure Ulcer Risk (Huy Scale) (Adult,Obstetrics,Pediatric)  Intervention: Promote/Optimize Nutrition    04/02/17 0218   Nutrition Interventions   Oral Nutrition Promotion calorie dense liquids provided;safe use of adaptive equipment encouraged;physical activity promoted       Intervention: Prevent/Manage Excess Moisture    04/02/17 0218   Hygiene Care   Perineal Care diaper changed;absorbent pad changed   Bathing/Skin Care incontinence care   Skin Interventions   Skin Protection incontinence pads utilized       Intervention: Prevent/Minimize Sheer/Friction Injuries    04/02/17 0218   Skin Interventions   Pressure Reduction Devices heel offloading device utilized;positioning supports utilized;pressure-redistributing mattress utilized   Pressure Reduction Techniques heels elevated off bed;frequent weight shift encouraged         Goal: Identify Related Risk Factors and Signs and Symptoms  Related risk factors and signs and symptoms are identified upon initiation of Human Response Clinical Practice Guideline (CPG)   Outcome: Ongoing (interventions implemented as appropriate)    04/02/17 0218   Pressure Ulcer Risk (Huy Scale)   Related Risk Factors (Pressure Ulcer Risk (Huy Scale)) excretions/secretions;hospitalization prolonged;mobility impaired       Goal: Skin Integrity  Patient will demonstrate the desired outcomes by discharge/transition of care.   Outcome: Ongoing (interventions implemented as appropriate)    04/02/17 0218   Pressure Ulcer Risk (Huy Scale) (Adult,Obstetrics,Pediatric)   Skin Integrity making progress toward outcome

## 2017-04-02 NOTE — PLAN OF CARE
Problem: Occupational Therapy Goal  Goal: Occupational Therapy Goal  Goals to be met by: 3 weeks     Patient will increase functional independence with ADLs by performing:    Feeding with Set-up Assistance. Met  UE Dressing with Stand-by Assistance.  LE Dressing with Minimal Assistance.  Grooming while seated with Modified [place].  Toileting from bedside commode with Minimal Assistance for hygiene and clothing management.   Bathing from shower chair/bench with Minimal Assistance.  Sitting at edge of bed x20 minutes with Supervision.  Rolling to Bilateral with Stand-by Assistance.   Supine to sit with Minimal Assistance.  Stand pivot transfers with Minimal Assistance.  Toilet transfer to bedside commode with Minimal Assistance.  L Upper extremity self ROM exercise program x15 reps per handout, with independence.   Pt. Highly agitated about not being able to have a cigarette or being able to be outside by himself.

## 2017-04-03 LAB
ANION GAP SERPL CALC-SCNC: 7 MMOL/L
BASOPHILS # BLD AUTO: 0.05 K/UL
BASOPHILS NFR BLD: 0.9 %
BUN SERPL-MCNC: 16 MG/DL
CALCIUM SERPL-MCNC: 8.3 MG/DL
CHLORIDE SERPL-SCNC: 105 MMOL/L
CO2 SERPL-SCNC: 29 MMOL/L
CREAT SERPL-MCNC: 0.8 MG/DL
DIFFERENTIAL METHOD: ABNORMAL
EOSINOPHIL # BLD AUTO: 0.2 K/UL
EOSINOPHIL NFR BLD: 3.9 %
ERYTHROCYTE [DISTWIDTH] IN BLOOD BY AUTOMATED COUNT: 14.6 %
EST. GFR  (AFRICAN AMERICAN): >60 ML/MIN/1.73 M^2
EST. GFR  (NON AFRICAN AMERICAN): >60 ML/MIN/1.73 M^2
GLUCOSE SERPL-MCNC: 65 MG/DL
HCT VFR BLD AUTO: 35.5 %
HGB BLD-MCNC: 11.4 G/DL
LYMPHOCYTES # BLD AUTO: 2.7 K/UL
LYMPHOCYTES NFR BLD: 49.8 %
MAGNESIUM SERPL-MCNC: 2.1 MG/DL
MCH RBC QN AUTO: 29 PG
MCHC RBC AUTO-ENTMCNC: 32.1 %
MCV RBC AUTO: 90 FL
MONOCYTES # BLD AUTO: 0.4 K/UL
MONOCYTES NFR BLD: 8 %
NEUTROPHILS # BLD AUTO: 2 K/UL
NEUTROPHILS NFR BLD: 37.4 %
PHOSPHATE SERPL-MCNC: 4.4 MG/DL
PLATELET # BLD AUTO: 204 K/UL
PMV BLD AUTO: 11.9 FL
POTASSIUM SERPL-SCNC: 3.9 MMOL/L
RBC # BLD AUTO: 3.93 M/UL
SODIUM SERPL-SCNC: 141 MMOL/L
WBC # BLD AUTO: 5.36 K/UL

## 2017-04-03 PROCEDURE — 36415 COLL VENOUS BLD VENIPUNCTURE: CPT

## 2017-04-03 PROCEDURE — 11000004 HC SNF PRIVATE

## 2017-04-03 PROCEDURE — 85025 COMPLETE CBC W/AUTO DIFF WBC: CPT

## 2017-04-03 PROCEDURE — 25000003 PHARM REV CODE 250: Performed by: INTERNAL MEDICINE

## 2017-04-03 PROCEDURE — 83735 ASSAY OF MAGNESIUM: CPT

## 2017-04-03 PROCEDURE — 97116 GAIT TRAINING THERAPY: CPT

## 2017-04-03 PROCEDURE — 97110 THERAPEUTIC EXERCISES: CPT

## 2017-04-03 PROCEDURE — 25000003 PHARM REV CODE 250: Performed by: PHYSICIAN ASSISTANT

## 2017-04-03 PROCEDURE — 97535 SELF CARE MNGMENT TRAINING: CPT

## 2017-04-03 PROCEDURE — 84100 ASSAY OF PHOSPHORUS: CPT

## 2017-04-03 PROCEDURE — 80048 BASIC METABOLIC PNL TOTAL CA: CPT

## 2017-04-03 PROCEDURE — 97530 THERAPEUTIC ACTIVITIES: CPT

## 2017-04-03 RX ADMIN — BACLOFEN 20 MG: 10 TABLET ORAL at 12:04

## 2017-04-03 RX ADMIN — GABAPENTIN 900 MG: 300 CAPSULE ORAL at 08:04

## 2017-04-03 RX ADMIN — BACLOFEN 20 MG: 10 TABLET ORAL at 05:04

## 2017-04-03 RX ADMIN — OXYCODONE HYDROCHLORIDE 15 MG: 5 TABLET ORAL at 05:04

## 2017-04-03 RX ADMIN — CARBAMAZEPINE 400 MG: 200 TABLET, EXTENDED RELEASE ORAL at 08:04

## 2017-04-03 RX ADMIN — OXYCODONE HYDROCHLORIDE 15 MG: 5 TABLET ORAL at 02:04

## 2017-04-03 RX ADMIN — DANTROLENE SODIUM 50 MG: 50 CAPSULE ORAL at 12:04

## 2017-04-03 RX ADMIN — CITALOPRAM HYDROBROMIDE 20 MG: 20 TABLET ORAL at 08:04

## 2017-04-03 RX ADMIN — OXYCODONE HYDROCHLORIDE 15 MG: 5 TABLET ORAL at 08:04

## 2017-04-03 RX ADMIN — OXYCODONE HYDROCHLORIDE 15 MG: 5 TABLET ORAL at 09:04

## 2017-04-03 RX ADMIN — RIVAROXABAN 20 MG: 20 TABLET, FILM COATED ORAL at 05:04

## 2017-04-03 RX ADMIN — GABAPENTIN 600 MG: 300 CAPSULE ORAL at 05:04

## 2017-04-03 RX ADMIN — DIAZEPAM 5 MG: 5 TABLET ORAL at 08:04

## 2017-04-03 RX ADMIN — DANTROLENE SODIUM 50 MG: 50 CAPSULE ORAL at 05:04

## 2017-04-03 RX ADMIN — TRAZODONE HYDROCHLORIDE 100 MG: 50 TABLET ORAL at 08:04

## 2017-04-03 RX ADMIN — OXYCODONE HYDROCHLORIDE 15 MG: 5 TABLET ORAL at 06:04

## 2017-04-03 RX ADMIN — OXYCODONE HYDROCHLORIDE 15 MG: 5 TABLET ORAL at 12:04

## 2017-04-03 RX ADMIN — BACLOFEN 20 MG: 10 TABLET ORAL at 11:04

## 2017-04-03 RX ADMIN — OXYCODONE HYDROCHLORIDE 15 MG: 5 TABLET ORAL at 11:04

## 2017-04-03 RX ADMIN — DIAZEPAM 5 MG: 5 TABLET ORAL at 09:04

## 2017-04-03 RX ADMIN — OXYBUTYNIN CHLORIDE 5 MG: 5 TABLET, EXTENDED RELEASE ORAL at 08:04

## 2017-04-03 NOTE — PLAN OF CARE
Problem: Occupational Therapy Goal  Goal: Occupational Therapy Goal  Goals to be met by: 3 weeks     Patient will increase functional independence with ADLs by performing:    Feeding with Set-up Assistance. Met  UE Dressing with Stand-by Assistance.  LE Dressing with Minimal Assistance.  Grooming while seated with Modified [place].  Toileting from bedside commode with Minimal Assistance for hygiene and clothing management.   Bathing from shower chair/bench with Minimal Assistance.  Sitting at edge of bed x20 minutes with Supervision.  Rolling to Bilateral with Stand-by Assistance.   Supine to sit with Minimal Assistance.  Stand pivot transfers with Minimal Assistance.  Toilet transfer to bedside commode with Minimal Assistance.  L Upper extremity self ROM exercise program x15 reps per handout, with independence.   Outcome: Ongoing (interventions implemented as appropriate)  .    Comments:   .

## 2017-04-03 NOTE — PT/OT/SLP PROGRESS
Occupational Therapy  Treatment    Mao Levin   MRN: [id number]   Admitting Diagnosis: MS (multiple sclerosis)    OT Date of Treatment: 17  Total Time (min): 53 min    Billable Minutes:  Self Care/Home Management 30 and Therapeutic Activity 23    General Precautions: Standard, fall  Orthopedic Precautions: N/A  Braces: N/A    Do you have any cultural, spiritual, Advent conflicts, given your current situation?: no    Subjective:  Communicated with nsg prior to session.  Can I have my coffee today    Pain Ratin/10                   Objective:   Pt. supine on arrival    Functional Status:  MDS G  Bed Mobility Functional Status: S-SBA increase time  Transfer Functional Status: CGA-Min (A)EOB to w/c from EOB to w/c with SPT  Dressing Functional Status: 2:CGA-Min (A) with pants management over hips instance  Toilet Use Functional Status: mod(A)-Max(A)Pt.incot required (A) with diaper cleaning        Additional Treatment:  Pt. With BUE pulley x 20 reps with cues for LUE management  Pt. Also with AAROM of LUE x 10 reps with stretch provided with shd flex, bicep curls presses and wrist/digit flex-ext and supination pronation     Patient left up in chair with all lines intact and call button in reach    ASSESSMENT:  Mao Levin is a 51 y.o. male with a medical diagnosis of MS (multiple sclerosis) Pt. participated well with session on this day. Pt  still continues to requires cues with aspects of safety and task management . Pt. Will continue to benefit from continued OT to progress towards goals    Rehab identified problem list/impairments: impaired endurance, impaired self care skills, impaired functional mobilty, decreased upper extremity function, decreased safety awareness    Rehab potential is fair    Activity tolerance: Fair    Discharge recommendations: rehabilitation facility     Barriers to discharge: Decreased caregiver support     Equipment recommendations: wheelchair (drop arm commode)     GOALS:    Occupational Therapy Goals        Problem: Occupational Therapy Goal    Goal Priority Disciplines Outcome Interventions   Occupational Therapy Goal     OT, PT/OT Ongoing (interventions implemented as appropriate)    Description:  Goals to be met by: 3 weeks     Patient will increase functional independence with ADLs by performing:    Feeding with Set-up Assistance. Met  UE Dressing with Stand-by Assistance.  LE Dressing with Minimal Assistance.  Grooming while seated with Modified [place].  Toileting from bedside commode with Minimal Assistance for hygiene and clothing management.   Bathing from  shower chair/bench with Minimal Assistance.  Sitting at edge of bed x20 minutes with Supervision.  Rolling to Bilateral with Stand-by Assistance.   Supine to sit with Minimal Assistance.  Stand pivot transfers with Minimal Assistance.  Toilet transfer to bedside commode with Minimal Assistance.  L Upper extremity self ROM exercise program x15 reps per handout, with independence.             Plan:  Patient to be seen 5 x/week to address the above listed problems via self-care/home management, therapeutic exercises, therapeutic activities  Plan of Care expires: 04/18/17  Plan of Care reviewed with: patient    WENDY Mustafa  04/03/2017

## 2017-04-03 NOTE — PLAN OF CARE
Problem: Patient Care Overview  Goal: Plan of Care Review  Outcome: Ongoing (interventions implemented as appropriate)    04/03/17 0237   Coping/Psychosocial   Plan Of Care Reviewed With patient         Problem: Activity Intolerance (Adult)  Intervention: Promote Activity    04/03/17 0237   Activity   Activity Type activity adjusted per tolerance;dorsiflexion, plantar flexion encouraged;bedrest with commode;sitting, edge of bed;up in chair   Daily Care Interventions   Self-Care Promotion BADL personal objects within reach;BADL personal routines maintained         Goal: Identify Related Risk Factors and Signs and Symptoms  Related risk factors and signs and symptoms are identified upon initiation of Human Response Clinical Practice Guideline (CPG)   Outcome: Ongoing (interventions implemented as appropriate)    04/03/17 0237   Activity Intolerance   Related Risk Factors (Activity Intolerance) deconditioned state;depression;medication side effects;generalized weakness;neuromuscular disease;pain   Signs and Symptoms (Activity Intolerance) pain/discomfort;unable to complete BADL/IADL

## 2017-04-03 NOTE — PLAN OF CARE
Problem: Physical Therapy Goal  Goal: Physical Therapy Goal  Goals to be met by: 2 weeks     Patient will increase functional independence with mobility by performin. Supine to sit with Stand-by Assistance  2. Sit to supine with Stand-by Assistance  3. Sit to stand transfer with Minimal Assistance  4. Bed to chair transfer with Minimal Assistance using Rolling Walker  5. Gait x 20 feet with Moderate Assistance using Rolling Walker. Met (3/26/2017)  6. Wheelchair propulsion x75 feet with Stand-by Assistance using BUE/BLE  7. Ascend/descend 3 stair with left Handrails Moderate Assistance.   8. Stand for 3 minutes with Stand-by Assistance using Rolling Walker  9. Lower extremity exercise program x20 reps per handout, with assistance as needed  10. New Goal (3/26/2017): Gait x 50 feet with Moderate Assistance using Rolling Walker.   LTGs remain appropriate. Pt will continue PT POC.     Angelica Ribera, PT  4/3/2017

## 2017-04-03 NOTE — PLAN OF CARE
Problem: Patient Care Overview  Goal: Plan of Care Review  Outcome: Ongoing (interventions implemented as appropriate)    04/03/17 1649   Coping/Psychosocial   Plan Of Care Reviewed With patient         Problem: Infection, Risk/Actual (Adult)  Goal: Infection Prevention/Resolution  Patient will demonstrate the desired outcomes by discharge/transition of care.   Outcome: Ongoing (interventions implemented as appropriate)    04/03/17 1649   Infection, Risk/Actual (Adult)   Infection Prevention/Resolution making progress toward outcome         Problem: Fall Risk (Adult)  Goal: Absence of Falls  Patient will demonstrate the desired outcomes by discharge/transition of care.   Outcome: Ongoing (interventions implemented as appropriate)    04/03/17 1649   Fall Risk (Adult)   Absence of Falls making progress toward outcome         Problem: Pressure Ulcer Risk (Huy Scale) (Adult,Obstetrics,Pediatric)  Goal: Skin Integrity  Patient will demonstrate the desired outcomes by discharge/transition of care.   Outcome: Ongoing (interventions implemented as appropriate)    04/03/17 1649   Pressure Ulcer Risk (Huy Scale) (Adult,Obstetrics,Pediatric)   Skin Integrity making progress toward outcome         Comments:   Patient monitored  Every 1 to 2 hours for pain and safety.  Safety maintained.  Patient instructed to call for assistance.Call  Light and persoanl items in reach.

## 2017-04-04 PROCEDURE — 25000003 PHARM REV CODE 250: Performed by: NURSE PRACTITIONER

## 2017-04-04 PROCEDURE — 11000004 HC SNF PRIVATE

## 2017-04-04 PROCEDURE — 25000003 PHARM REV CODE 250: Performed by: PHYSICIAN ASSISTANT

## 2017-04-04 PROCEDURE — 97110 THERAPEUTIC EXERCISES: CPT

## 2017-04-04 PROCEDURE — 97116 GAIT TRAINING THERAPY: CPT

## 2017-04-04 PROCEDURE — 99308 SBSQ NF CARE LOW MDM 20: CPT | Mod: ,,, | Performed by: NURSE PRACTITIONER

## 2017-04-04 PROCEDURE — 97530 THERAPEUTIC ACTIVITIES: CPT

## 2017-04-04 PROCEDURE — 25000003 PHARM REV CODE 250: Performed by: INTERNAL MEDICINE

## 2017-04-04 RX ORDER — OXYCODONE HYDROCHLORIDE 5 MG/1
15 TABLET ORAL EVERY 4 HOURS PRN
Status: DISCONTINUED | OUTPATIENT
Start: 2017-04-07 | End: 2017-04-24

## 2017-04-04 RX ORDER — OXYCODONE HYDROCHLORIDE 5 MG/1
15 TABLET ORAL
Status: DISCONTINUED | OUTPATIENT
Start: 2017-04-07 | End: 2017-04-04

## 2017-04-04 RX ORDER — OXYCODONE HYDROCHLORIDE 5 MG/1
15 TABLET ORAL
Status: DISPENSED | OUTPATIENT
Start: 2017-04-04 | End: 2017-04-07

## 2017-04-04 RX ADMIN — DANTROLENE SODIUM 50 MG: 50 CAPSULE ORAL at 11:04

## 2017-04-04 RX ADMIN — CARBAMAZEPINE 400 MG: 200 TABLET, EXTENDED RELEASE ORAL at 08:04

## 2017-04-04 RX ADMIN — OXYCODONE HYDROCHLORIDE 15 MG: 5 TABLET ORAL at 04:04

## 2017-04-04 RX ADMIN — OXYCODONE HYDROCHLORIDE 15 MG: 5 TABLET ORAL at 08:04

## 2017-04-04 RX ADMIN — CITALOPRAM HYDROBROMIDE 20 MG: 20 TABLET ORAL at 08:04

## 2017-04-04 RX ADMIN — BACLOFEN 20 MG: 10 TABLET ORAL at 01:04

## 2017-04-04 RX ADMIN — OXYCODONE HYDROCHLORIDE 15 MG: 5 TABLET ORAL at 01:04

## 2017-04-04 RX ADMIN — GABAPENTIN 900 MG: 300 CAPSULE ORAL at 08:04

## 2017-04-04 RX ADMIN — BACLOFEN 20 MG: 10 TABLET ORAL at 12:04

## 2017-04-04 RX ADMIN — GABAPENTIN 600 MG: 300 CAPSULE ORAL at 08:04

## 2017-04-04 RX ADMIN — DANTROLENE SODIUM 50 MG: 50 CAPSULE ORAL at 05:04

## 2017-04-04 RX ADMIN — DANTROLENE SODIUM 50 MG: 50 CAPSULE ORAL at 01:04

## 2017-04-04 RX ADMIN — GABAPENTIN 600 MG: 300 CAPSULE ORAL at 04:04

## 2017-04-04 RX ADMIN — TRAZODONE HYDROCHLORIDE 100 MG: 50 TABLET ORAL at 08:04

## 2017-04-04 RX ADMIN — DANTROLENE SODIUM 50 MG: 50 CAPSULE ORAL at 06:04

## 2017-04-04 RX ADMIN — DIAZEPAM 5 MG: 5 TABLET ORAL at 08:04

## 2017-04-04 RX ADMIN — BACLOFEN 20 MG: 10 TABLET ORAL at 11:04

## 2017-04-04 RX ADMIN — BACLOFEN 20 MG: 10 TABLET ORAL at 06:04

## 2017-04-04 RX ADMIN — RIVAROXABAN 20 MG: 20 TABLET, FILM COATED ORAL at 04:04

## 2017-04-04 RX ADMIN — OXYCODONE HYDROCHLORIDE 15 MG: 5 TABLET ORAL at 11:04

## 2017-04-04 RX ADMIN — OXYBUTYNIN CHLORIDE 5 MG: 5 TABLET, EXTENDED RELEASE ORAL at 08:04

## 2017-04-04 RX ADMIN — DANTROLENE SODIUM 50 MG: 50 CAPSULE ORAL at 12:04

## 2017-04-04 RX ADMIN — BACLOFEN 20 MG: 10 TABLET ORAL at 05:04

## 2017-04-04 RX ADMIN — RAMELTEON 8 MG: 8 TABLET, FILM COATED ORAL at 11:04

## 2017-04-04 NOTE — PROGRESS NOTES
[facility]  Adult Nutrition  Consult Note    SUMMARY     Recommendations  1. Continue current diet order with double portions + ONS TID   2. RD to monitor  Goals: Pt to consume >85% EEN and EPN  Nutrition Goal Status: goal met  Communication of RD Recs: discussed on rounds    Continuum of Care Plan  D/C planning: pt to consume > 85% EEN and EPN        Reason for Assessment    Reason for Assessment: RD follow-up  Diagnosis: other (see comments) (multiple sclerosis)  Relevent Medical History: CHF, cancer, COPD, DM2, HTN, stroke   Interdisciplinary Rounds: did not attend  General Information Comments: Pt with good appetite, consuming 100% of meals + Boost TID    Nutrition Prescription Ordered  Current Diet Order: Regular + Boost Plus TID  Nutrition Order Comments: double portions  Oral Nutrition Supplement: Boost Plus Chocolate     Evaluation of Received Nutrients/Fluid Intake  Pt consuming 100% meals   Energy Calories Required: meeting needs  Protein Required: meeting needs  Tolerance: tolerating     Nutrition Risk Screen   Nutrition Risk Screen: no indicators present    Nutrition/Diet History  Patient Reported Diet/Restrictions/Preferences: general    Labs/Tests/Procedures/Meds  Pertinent Labs Reviewed: reviewed, pertinent  Pertinent Labs Comments: BUN 16 (improved) , glu 65  Pertinent Medications Reviewed: reviewed, pertinent  Pertinent Medications Comments: lactulose, senna dosucate    Physical Findings  Overall Physical Appearance: other (see comments), loss of muscle mass (nourished)  Oral/Mouth Cavity: WDL  Skin: intact, other (see comments) (Huy 16)    Anthropometrics  Height (inches): 70.98 in  Weight Method: Standard Scale  Weight (kg): 79.2 kg  Ideal Body Weight (IBW), Male: 171.88 lb  % Ideal Body Weight, Male (lb): 101.59 lb  BMI (kg/m2): 24.36  BMI Grade: 18.5-24.9 - normal     Estimated/Assessed Needs  Weight Used For Calorie Calculations: 79.2 kg (174 lb 9.7 oz)   Height (cm):  180.3 cm  Energy Need Method: [place]ia-St Soto, other (see comments) (2003-2170kcal/day (1.2-1.3 PAL))  RMR (Gallia-St. Soto Equation): 1672.16  Weight Used For Protein Calculations: 79.2 kg (174 lb 9.7 oz)  Protein Requirements: 79-95g/day (1.0-1.2g/kg)  1.0 gm Protein (gm): 79.37 and 1.2 gm Protein (gm): 95.24  Fluid Need Method: RDA Method (1mL/kcal)     Monitor and Evaluation    Food and Nutrient Intake: energy intake, food and beverage intake  Food and Nutrient Adminstration: diet order  Knowledge/Beliefs/Attitudes: food and nutrition knowledge/skill  Anthropometric Measurements: weight change, weight, body mass index  Biochemical Data, Medical Tests and Procedures: electrolyte and renal panel, gastrointestinal profile, glucose/endocrine profile, inflammatory profile, lipid profile  Nutrition-Focused Physical Findings: overall appearance, skin    Nutrition Risk    Level of Risk: other (see comments) (1x/week)    Nutrition Follow-Up    RD Follow-up?: Yes    Assessment and Plan    Increased energy needs related to physiological needs as evidenced by increased EEN and EPN 2/2 COPD  continues

## 2017-04-04 NOTE — PT/OT/SLP PROGRESS
"Physical Therapy  Treatment    Mao Levin   MRN: [id number]   Admitting Diagnosis: MS (multiple sclerosis)    PT Received On: 17  Total Time (min): 45       Billable Minutes:  Gait [id number] and Therapeutic Exercise 30    Treatment Type: Treatment  PT/PTA: PTA     PTA Visit Number: 1       General Precautions: Standard, fall  Orthopedic Precautions: N/A   Braces: N/A    Do you have any cultural, spiritual, Taoist conflicts, given your current situation?: no    Subjective:  "I want a cup of coffee."    Pain Ratin/10        Pain Rating Post-Intervention: 0/10    Objective:  Patient found seated in w/c     Functional Status:  MDS G  Transfer Functional Status: CGA-Min (A)  Walk in Room Functional Status: CGA-Min (A)  Walk in Corridor Functional Status: CGA-Min (A)  Locomotion on Unit Functional Status: total(A)  Locomotion Off Unit Functional Status: total(A)        Transfers:  Sit <> stand from w/c with min A for hips elevation and controlled descending    Gait:  Distance Walk: pt ambulated x 55 feet and 20 feet with RW and min A for balance and safety. Ace wrapped and blue shoe to LLE  Walk: Minimal Contact Assistance  Assistive Device: rolling walker  Gait Deviation(s): decreased keyshawn;decreased toe-to-floor clearance;decreased weight-shifting ability;decreased velocity of limb motion;decreased step length;decreased stride length;decreased swing-to-stance ratio       Therex:  Ankle pumps 15  Quad sets 15  Glut sets 15  Hip flexion 15  Hip Extension 15  Long arc quads 15  Knee flex 15  ABduction 15  ADduction 15    (L)LE required (A)      Additional Treatment:  LBE x 15 minutes to increase LEs strength, endurance and coordination    Patient left up in chair with call button in reach.    Assessment:  Mao Levin is a 51 y.o. male with a medical diagnosis of MS (multiple sclerosis). Pt tolerated therapy fairly.  Pt continues to require min A with functional transfers and gait training.  Pt will continue to " benefit from further PT services to improve his mobility.  Goals remain appropriate.    Rehab identified problem list/impairments: weakness, impaired endurance, impaired sensation, impaired self care skills, impaired functional mobilty, gait instability, impaired balance, decreased upper extremity function, decreased lower extremity function, pain    Rehab potential is fair.    Activity tolerance: Fair    Discharge recommendations: rehabilitation facility     Barriers to discharge: Inaccessible home environment, Decreased caregiver support    Equipment recommendations: bedside commode, walker, rolling, wheelchair     GOALS:   Physical Therapy Goals        Problem: Physical Therapy Goal    Goal Priority Disciplines Outcome Goal Variances Interventions   Physical Therapy Goal     PT/OT, PT Ongoing (interventions implemented as appropriate)     Description:  Goals to be met by: 2 weeks     Patient will increase functional independence with mobility by performin. Supine to sit with Stand-by Assistance  2. Sit to supine with Stand-by Assistance  3. Sit to stand transfer with Minimal Assistance  4. Bed to chair transfer with Minimal Assistance using Rolling Walker  5. Gait  x 20 feet with Moderate Assistance using Rolling Walker. Met (3/26/2017)  6. Wheelchair propulsion x75 feet with Stand-by Assistance using BUE/BLE  7. Ascend/descend 3 stair with left Handrails Moderate Assistance.   8. Stand for 3 minutes with Stand-by Assistance using Rolling Walker  9. Lower extremity exercise program x20 reps per handout, with assistance as needed  10. New Goal (3/26/2017): Gait  x 50 feet with Moderate Assistance using Rolling Walker.                  PLAN:    Patient to be seen 5 x/week  to address the above listed problems via gait training, therapeutic activities, therapeutic exercises, wheelchair management/training  Plan of Care expires: 17  Plan of Care reviewed with: patient    Vandana Benitez,  PTA  04/04/2017

## 2017-04-04 NOTE — PT/OT/SLP PROGRESS
Occupational Therapy  Treatment    Mao Levin   MRN: [id number]   Admitting Diagnosis: MS (multiple sclerosis)    OT Date of Treatment: 17  Total Time (min): 45 min    Billable Minutes:  Therapeutic Activity 20 and Therapeutic Exercise 25    General Precautions: Standard, fall  Orthopedic Precautions: N/A  Braces: N/A    Do you have any cultural, spiritual, Moravian conflicts, given your current situation?: no    Subjective:  Communicated with nsg prior to session.  I am doing well   Pain Ratin/10                   Objective:   Pt. Seated in w/c    Functional Status:  MDS G  Transfer Functional Status: CGA-Min (A)  Eating Functional Status: S-SBA          OT Exercises: UE Ergometer 10 min  Lat pull downs 10# 4 x 25 reps  Rowing 10# 2 x 25 reps    Additional Treatment:  Pt. With instructed on self ROM on this day.with RUE to guide LUE  Pt. Also with AAROM of LUE on this day x 15 reps to increase ext and ROM and strength with LUE   Patient left up in chair with all lines intact and call button in reach    ASSESSMENT:  Mao Levin is a 51 y.o. male with a medical diagnosis of MS (multiple sclerosis) Pt. participated well with session on this day.  Pt. Will continue to benefit from continued OT to progress towards goals    Rehab identified problem list/impairments: impaired endurance, impaired self care skills, impaired functional mobilty, decreased upper extremity function, decreased safety awareness    Rehab potential is fair    Activity tolerance: Fair    Discharge recommendations: rehabilitation facility     Barriers to discharge: Decreased caregiver support     Equipment recommendations: wheelchair (drop arm commode)     GOALS:   Occupational Therapy Goals        Problem: Occupational Therapy Goal    Goal Priority Disciplines Outcome Interventions   Occupational Therapy Goal     OT, PT/OT Ongoing (interventions implemented as appropriate)    Description:  Goals to be met by: 3 weeks     Patient will  increase functional independence with ADLs by performing:    Feeding with Set-up Assistance. Met  UE Dressing with Stand-by Assistance.  LE Dressing with Minimal Assistance.  Grooming while seated with Modified [place].  Toileting from bedside commode with Minimal Assistance for hygiene and clothing management.   Bathing from  shower chair/bench with Minimal Assistance.  Sitting at edge of bed x20 minutes with Supervision.  Rolling to Bilateral with Stand-by Assistance.   Supine to sit with Minimal Assistance.  Stand pivot transfers with Minimal Assistance.  Toilet transfer to bedside commode with Minimal Assistance.  L Upper extremity self ROM exercise program x15 reps per handout, with independence.             Plan:  Patient to be seen 5 x/week to address the above listed problems via self-care/home management, therapeutic exercises, therapeutic activities  Plan of Care expires: 04/18/17  Plan of Care reviewed with: patient    WENDY Mustafa  04/04/2017

## 2017-04-04 NOTE — PLAN OF CARE
Problem: Occupational Therapy Goal  Goal: Occupational Therapy Goal  Goals to be met by: 3 weeks     Patient will increase functional independence with ADLs by performing:    Feeding with Set-up Assistance. Met  UE Dressing with Stand-by Assistance.  LE Dressing with Minimal Assistance.  Grooming while seated with Modified [place].  Toileting from bedside commode with Minimal Assistance for hygiene and clothing management.   Bathing from shower chair/bench with Minimal Assistance.  Sitting at edge of bed x20 minutes with Supervision.  Rolling to Bilateral with Stand-by Assistance.   Supine to sit with Minimal Assistance.  Stand pivot transfers with Minimal Assistance.  Toilet transfer to bedside commode with Minimal Assistance.  L Upper extremity self ROM exercise program x15 reps per handout, with independence.   Outcome: Ongoing (interventions implemented as appropriate)  .

## 2017-04-04 NOTE — PROGRESS NOTES
Progress Note  Skilled Nursing Facility    Admit Date: 3/17/2017  Follow-up for  MS (multiple sclerosis)  Anticipated Discharge Date:  4/13/2017    SUBJECTIVE:     History of Present Illness:  Patient is a 51 y.o. male with multiple sclerosis and hx of saddle PE who presents to SNF after hospitalization for an MS pseudoflare (worsened pain and weakness in BLE) due to E. Coli UTI. He was recently started on rituxan to treat MS. He was also recently admitted in 12/2016 for psuedoflare and he improved after inpatient rehab stay. He continues with pain rating it as 9-10/10 to his bilateral LE and requests decreased timing between oxycodone. No radiation of pain and oxycodone has been effective. He has left hand contraction which has been present since his last pseudoflare. He occasionally smokes at home. The patient has been admitted to SNF for ongoing PT/OT due to insufficient progress to go home safely from the hospital.      Follow-up for MS          Interval History: Seen patient at bedside, c/o 6/10 pain to left had which has improved. States he has been participating in therapy and has no other complaints, No acute events overnight.      Review of Systems:  Constitutional: no fever or chills  Respiratory: no cough or shortness of breath  Cardiovascular: no chest pain or palpitations  Gastrointestinal: no nausea or vomiting, no abdominal pain or change in bowel habits  Genitourinary: no hematuria or dysuria  Integument/Breast: no rash or pruritis  Musculoskeletal: +6/10 pain to left hand, + for contracture to left hand, + for increased tone to upper extremities  Neurological: no seizures or tremors, + numbness  Behavioral/Psych: no auditory or visual hallucinations    Scheduled Meds:   baclofen  20 mg Oral QID    carbamazepine  400 mg Oral BID    citalopram  20 mg Oral Daily    dantrolene  50 mg Oral QID    ergocalciferol  50,000 Units Oral Q7 Days    gabapentin  600 mg Oral BID AC    gabapentin  900 mg  Oral QHS    nicotine  1 patch Transdermal Daily    oxybutynin  5 mg Oral Daily    polyethylene glycol  17 g Oral Daily    rivaroxaban  20 mg Oral Daily with dinner    senna-docusate 8.6-50 mg  1 tablet Oral Daily    trazodone  100 mg Oral QHS     Continuous Infusions:   PRN Meds:.acetaminophen, albuterol-ipratropium 2.5mg-0.5mg/3mL, aluminum-magnesium hydroxide-simethicone, bisacodyl, dextrose 50%, dextrose 50%, diazePAM, glucagon (human recombinant), glucose, glucose, metoclopramide HCl, ondansetron, oxycodone, [START ON 4/8/2017] oxycodone, promethazine, ramelteon      OBJECTIVE:     Vital Signs Range (Last 24H):  Temp:  [98.2 °F (36.8 °C)] 98.2 °F (36.8 °C)  Pulse:  [56-64] 64  Resp:  [18-20] 18  SpO2:  [97 %] 97 %  BP: (122-154)/(76) 154/76    Physical Exam:  General: well developed, well nourished, no distress  Lungs: clear to auscultation bilaterally and normal respiratory effort  Cardiovascular: Heart: regular rate and rhythm, S1, S2 normal, no murmur, click, rub or gallop. Chest Wall: no tenderness.   Extremities: no cyanosis or edema, or clubbing. Pulses: 2+ and symmetric.  Abdomel: Abdomen: soft, non-tender non-distended; bowel sounds normal; no masses, no organomegaly.   Skin: Skin color, texture, turgor normal. No rashes or lesions  Musculoskeletal:no clubbing, cyanosis  Neurologic: Abnormal strength and tone, increased tone to upper extremities with left hand contracture. Generalized weakness.  Psych/Behavioral: Alert and oriented, appropriate affect.      Laboratory:    Recent Labs  Lab 03/30/17 0428 04/03/17 0427   WBC 5.57 5.36   HGB 12.0* 11.4*   HCT 37.1* 35.5*    204         Recent Labs  Lab 03/30/17 0428 04/03/17 0427    141   K 4.1 3.9    105   CO2 24 29   BUN 17 16   CREATININE 0.8 0.8   CALCIUM 8.5* 8.3*     Lab Results   Component Value Date    LABPROT 10.8 12/15/2016    ALBUMIN 3.4 (L) 03/13/2017     Lab Results   Component Value Date    CALCIUM 8.3 (L)  04/03/2017    PHOS 4.4 04/03/2017     Results for MAGALI ALCANTAR (MRN [id number]) as of 4/4/2017 12:33   Ref. Range 3/27/2017 05:40 3/30/2017 04:28 4/3/2017 04:27   Magnesium Latest Ref Range: 1.6 - 2.6 mg/dL 2.1 2.1 2.1         ASSESSMENT/PLAN:     Active Hospital Problems    Diagnosis  POA    *MS (multiple sclerosis) [G35]  Yes     Chronic    Depression [F32.9]  Yes    Smoker [F17.200]  Yes    E. coli urinary tract infection [N39.0, B96.20]  Yes    Polyneuropathy [G62.9]  Yes    10/25/2016 Saddle PE [I26.92]  Yes     Chronic    Vitamin D deficiency disease [E55.9]  Yes    Neurogenic bladder [N31.9]  Yes     Chronic    Spasticity [R25.2]  Yes    Chronic pain of multiple sites [R52, G89.29]  Yes     Chronic      Resolved Hospital Problems    Diagnosis Date Resolved POA   No resolved problems to display.         NEW OR UNSTABLE PROBLEM(S) TREATED TODAY:  MS (multiple sclerosis) [G35]  Chronic  -PT/OT to increase ambulation, ADL performance and endurance  -consult PMR when deemed appropriate by PT/OT  -DVT ppx: rivaroxaban 20mg daily with dinner  -fall precautions  -bowel regimen: miralax and senokot-s1 tab daily to prevent/treat constipation; hold for frequent or loose stooling  -due for repeat rituxan in 6 months  -expected to resume home health once discharged but may opt for outpatient therapy as previously ordered  -he will need to f/u with PMR in 2 weeks after discharge and Neurology 2 weeks after discharge      Spasticity [R25.2]  -chronic and due to MS  -continue baclofen 20mg QID  -continue dantrolene 50mg QID      Polyneuropathy [G62.9]  -chronic  -continue gabapentin 600mg BID before meals and 900mg nightly  -continue carbamazepine 400mg BID      Chronic pain of multiple sites [R52, G89.29]  Chronic  -continue oxycodone every 4 hours prn and valium prn spasms; decrease frequency of oxycodone during SNF stay to return to 15mg prn up to TID at home  -will continue to monitor and adjust regimen as  necessary      Depression [F32.9]  -chronic  -continue celexa 20mg daily      Smoker [F17.200]  -chronic  -continue nicotine 14mg patch daily      Urinary tract infection without hematuria [N39.0] E. coli  -completed cipro for 13 doses to treat, end date on 3/24/17      10/25/2016 Saddle PE [I26.92]  Chronic  -continue rivaroxaban to prevent future thrombi      Vitamin D deficiency  -continue ergocalciferol 50,000 units every 7 days      Neurogenic bladder [N31.9]  Chronic  -continue oxybutynin 5mg daily  -bladder scan prn and I/O cath prn retention volume > 300cc     Future Appointments  Date Time Provider Department Center   7/17/2017 9:40 AM [facility] Frederic Navarrete NP  Department of Hospital Medicine   [facility]

## 2017-04-04 NOTE — PLAN OF CARE
Problem: Fall Risk (Adult)  Goal: Absence of Falls  Patient will demonstrate the desired outcomes by discharge/transition of care.     04/04/17 0447   Fall Risk (Adult)   Absence of Falls making progress toward outcome   Pt remain free from falls/injury/trauma. Call light w/i reach. Bedside table w/i reach

## 2017-04-05 PROCEDURE — 11000004 HC SNF PRIVATE

## 2017-04-05 PROCEDURE — 97116 GAIT TRAINING THERAPY: CPT

## 2017-04-05 PROCEDURE — 97535 SELF CARE MNGMENT TRAINING: CPT

## 2017-04-05 PROCEDURE — 25000003 PHARM REV CODE 250: Performed by: NURSE PRACTITIONER

## 2017-04-05 PROCEDURE — 97530 THERAPEUTIC ACTIVITIES: CPT

## 2017-04-05 PROCEDURE — 25000003 PHARM REV CODE 250: Performed by: PHYSICIAN ASSISTANT

## 2017-04-05 PROCEDURE — 97110 THERAPEUTIC EXERCISES: CPT

## 2017-04-05 RX ADMIN — RAMELTEON 8 MG: 8 TABLET, FILM COATED ORAL at 10:04

## 2017-04-05 RX ADMIN — OXYCODONE HYDROCHLORIDE 15 MG: 5 TABLET ORAL at 07:04

## 2017-04-05 RX ADMIN — DIAZEPAM 5 MG: 5 TABLET ORAL at 09:04

## 2017-04-05 RX ADMIN — OXYCODONE HYDROCHLORIDE 15 MG: 5 TABLET ORAL at 04:04

## 2017-04-05 RX ADMIN — OXYCODONE HYDROCHLORIDE 15 MG: 5 TABLET ORAL at 12:04

## 2017-04-05 RX ADMIN — BACLOFEN 20 MG: 10 TABLET ORAL at 05:04

## 2017-04-05 RX ADMIN — GABAPENTIN 600 MG: 300 CAPSULE ORAL at 04:04

## 2017-04-05 RX ADMIN — DANTROLENE SODIUM 50 MG: 50 CAPSULE ORAL at 12:04

## 2017-04-05 RX ADMIN — CITALOPRAM HYDROBROMIDE 20 MG: 20 TABLET ORAL at 09:04

## 2017-04-05 RX ADMIN — DANTROLENE SODIUM 50 MG: 50 CAPSULE ORAL at 05:04

## 2017-04-05 RX ADMIN — GABAPENTIN 600 MG: 300 CAPSULE ORAL at 05:04

## 2017-04-05 RX ADMIN — OXYCODONE HYDROCHLORIDE 15 MG: 5 TABLET ORAL at 09:04

## 2017-04-05 RX ADMIN — CARBAMAZEPINE 400 MG: 200 TABLET, EXTENDED RELEASE ORAL at 10:04

## 2017-04-05 RX ADMIN — OXYBUTYNIN CHLORIDE 5 MG: 5 TABLET, EXTENDED RELEASE ORAL at 09:04

## 2017-04-05 RX ADMIN — CARBAMAZEPINE 400 MG: 200 TABLET, EXTENDED RELEASE ORAL at 09:04

## 2017-04-05 RX ADMIN — OXYCODONE HYDROCHLORIDE 15 MG: 5 TABLET ORAL at 10:04

## 2017-04-05 RX ADMIN — BACLOFEN 20 MG: 10 TABLET ORAL at 12:04

## 2017-04-05 RX ADMIN — OXYCODONE HYDROCHLORIDE 15 MG: 5 TABLET ORAL at 05:04

## 2017-04-05 RX ADMIN — RIVAROXABAN 20 MG: 20 TABLET, FILM COATED ORAL at 05:04

## 2017-04-05 RX ADMIN — TRAZODONE HYDROCHLORIDE 100 MG: 50 TABLET ORAL at 10:04

## 2017-04-05 RX ADMIN — GABAPENTIN 900 MG: 300 CAPSULE ORAL at 10:04

## 2017-04-05 NOTE — PT/OT/SLP PROGRESS
Occupational Therapy  Treatment    Mao Levin   MRN: [id number]   Admitting Diagnosis: MS (multiple sclerosis)    OT Date of Treatment: 17  Total Time (min): 53 min    Billable Minutes:  Self Care/Home Management 33  and Therapeutic Activity 20    General Precautions: Standard, fall  Orthopedic Precautions: N/A  Braces: N/A    Do you have any cultural, spiritual, Taoism conflicts, given your current situation?: no    Subjective:  Communicated with nsg prior to session.  I am doing well    Pain Ratin/10                   Objective:   Pt. supine on arrival    Functional Status:  MDS G  Bed Mobility Functional Status: S-SBA  Transfer Functional Status: CGA-Min (A) EOB to w/c with SPT  Dressing Functional Status: 2:CGA-Min (A) LE dressing from EOB with pants and Min A for UE dressing  Eating Functional Status: S-SBA   Toilet Use Functional Status: mod(A)-Max(A)Pt.incot of urine and wet in diaper  Bathing Functional Status: mod(A)-Max(A)  Moving from seated to standing position: Not steady, only able to stabilize with staff assistance        Additional Treatment:  Pt. With fM activity with strengthen aspects and to increase ROM with task  Pt.. With grasp/release of cones and small wooden pegs into slots with focus on applied neuro aspects with function due to weakness     Patient left up in chair with all lines intact and call button in reach    ASSESSMENT:  Mao Levin is a 51 y.o. male with a medical diagnosis of MS (multiple sclerosis) Pt. participated well with session on this day. Pt. Still continues to requires cues for aspects of safety with task and task management and increase time . Pt. Will continue to benefit from continued OT to progress towards goals    Rehab identified problem list/impairments: impaired endurance, impaired self care skills, impaired functional mobilty, decreased upper extremity function, decreased safety awareness    Rehab potential is fair    Activity tolerance:  Fair    Discharge recommendations: rehabilitation facility     Barriers to discharge: Decreased caregiver support     Equipment recommendations: wheelchair (drop arm commode)     GOALS:   Occupational Therapy Goals        Problem: Occupational Therapy Goal    Goal Priority Disciplines Outcome Interventions   Occupational Therapy Goal     OT, PT/OT Ongoing (interventions implemented as appropriate)    Description:  Goals to be met by: 3 weeks     Patient will increase functional independence with ADLs by performing:    Feeding with Set-up Assistance. Met  UE Dressing with Stand-by Assistance.  LE Dressing with Minimal Assistance.  Grooming while seated with Modified [place].  Toileting from bedside commode with Minimal Assistance for hygiene and clothing management.   Bathing from  shower chair/bench with Minimal Assistance.  Sitting at edge of bed x20 minutes with Supervision.  Rolling to Bilateral with Stand-by Assistance.   Supine to sit with Minimal Assistance.  Stand pivot transfers with Minimal Assistance.  Toilet transfer to bedside commode with Minimal Assistance.  L Upper extremity self ROM exercise program x15 reps per handout, with independence.             Plan:  Patient to be seen 5 x/week to address the above listed problems via self-care/home management, therapeutic exercises, therapeutic activities  Plan of Care expires: 04/18/17  Plan of Care reviewed with: patient    WENDY Mustafa  04/05/2017

## 2017-04-05 NOTE — PLAN OF CARE
Problem: Occupational Therapy Goal  Goal: Occupational Therapy Goal  Goals to be met by: 3 weeks     Patient will increase functional independence with ADLs by performing:    Feeding with Set-up Assistance. Met  UE Dressing with Stand-by Assistance.  LE Dressing with Minimal Assistance.  Grooming while seated with Modified [place].  Toileting from bedside commode with Minimal Assistance for hygiene and clothing management.   Bathing from shower chair/bench with Minimal Assistance.  Sitting at edge of bed x20 minutes with Supervision.  Rolling to Bilateral with Stand-by Assistance.   Supine to sit with Minimal Assistance.  Stand pivot transfers with Minimal Assistance.  Toilet transfer to bedside commode with Minimal Assistance.  L Upper extremity self ROM exercise program x15 reps per handout, with independence.   Outcome: Ongoing (interventions implemented as appropriate)  .

## 2017-04-05 NOTE — PLAN OF CARE
Problem: Patient Care Overview  Goal: Plan of Care Review  Outcome: Ongoing (interventions implemented as appropriate)    04/05/17 1848   Coping/Psychosocial   Plan Of Care Reviewed With patient         Problem: Fall Risk (Adult)  Goal: Absence of Falls  Patient will demonstrate the desired outcomes by discharge/transition of care.   Outcome: Ongoing (interventions implemented as appropriate)    04/05/17 1848   Fall Risk (Adult)   Absence of Falls making progress toward outcome         Problem: Pressure Ulcer Risk (Huy Scale) (Adult,Obstetrics,Pediatric)  Goal: Skin Integrity  Patient will demonstrate the desired outcomes by discharge/transition of care.   Outcome: Ongoing (interventions implemented as appropriate)    04/05/17 1848   Pressure Ulcer Risk (Huy Scale) (Adult,Obstetrics,Pediatric)   Skin Integrity making progress toward outcome         Problem: Mobility, Physical Impaired (Adult)  Goal: Enhanced Mobility Skills  Patient will demonstrate the desired outcomes by discharge/transition of care.   Outcome: Ongoing (interventions implemented as appropriate)    04/05/17 1848   Mobility, Physical Impaired (Adult)   Enhanced Mobility Skills making progress toward outcome         Comments:   Patient monitored every 1 to 2 hours for pain and safety,  Safety maintained.  Patient instructed to call for assistance.Call  Light and persoanl items in reach.

## 2017-04-05 NOTE — PLAN OF CARE
Problem: Physical Therapy Goal  Goal: Physical Therapy Goal  Goals to be met by: 2 weeks     Patient will increase functional independence with mobility by performin. Supine to sit with Stand-by Assistance  2. Sit to supine with Stand-by Assistance  3. Sit to stand transfer with Minimal Assistance  4. Bed to chair transfer with Minimal Assistance using Rolling Walker  5. Gait x 20 feet with Moderate Assistance using Rolling Walker. Met (3/26/2017)  6. Wheelchair propulsion x75 feet with Stand-by Assistance using BUE/BLE  7. Ascend/descend 3 stair with left Handrails Moderate Assistance.   8. Stand for 3 minutes with Stand-by Assistance using Rolling Walker  9. Lower extremity exercise program x20 reps per handout, with assistance as needed  10. New Goal (3/26/2017): Gait x 50 feet with Moderate Assistance using Rolling Walker.   Goals remain appropriate at time. Continue with PT POC as indicated.

## 2017-04-05 NOTE — PT/OT/SLP PROGRESS
Physical Therapy  Treatment    Mao Levin   MRN: [id number]   Admitting Diagnosis: MS (multiple sclerosis)    PT Received On: 17  Total Time (min): 43       Billable Minutes:  Gait Training8, Therapeutic Activity 15 and Therapeutic Exercise 20    Treatment Type: Treatment  PT/PTA: PTA     PTA Visit Number: 2       General Precautions: Standard, fall  Orthopedic Precautions: N/A   Braces: N/A    Do you have any cultural, spiritual, Mosque conflicts, given your current situation?: no    Subjective:  Communicated with nursing prior to session.  Pt agreed to work with therapy.     Pain Ratin/10  Pain Rating Post-Intervention: 0/10    Objective:  Patient found seated w/c.     Functional Status:    Bed Mobility:  Activity not performed on this date.     Transfers:  Sit<>Stand: to/from w/c Min A    Gait:  Amb 54 feet with RW and Min A. Ace wrapped and shoe cover to LLE     Therex:  Seated BLE Therex x15 reps. AP, LAQ, Hip Flexion, and GS    Additional Treatment:  Pt performed mini squats x20 reps with CGA/Min A for balance and safety.  UBE x10 min to improve overall endurance.     Patient left up in chair with call button in reach.    Assessment:  Mao Levin is a 51 y.o. male with a medical diagnosis of MS (multiple sclerosis).  Pt tolerated treatment well, and would continue to benefit from skilled PT intervention at this time. Continue with PT POC as indicated.    Rehab identified problem list/impairments: impaired endurance, impaired self care skills, impaired functional mobilty, decreased upper extremity function, decreased safety awareness    Rehab potential is fair.    Activity tolerance: Fair    Discharge recommendations: rehabilitation facility     Barriers to discharge: Decreased caregiver support    Equipment recommendations: wheelchair     GOALS:   Physical Therapy Goals        Problem: Physical Therapy Goal    Goal Priority Disciplines Outcome Goal Variances Interventions   Physical Therapy Goal      PT/OT, PT Ongoing (interventions implemented as appropriate)     Description:  Goals to be met by: 2 weeks     Patient will increase functional independence with mobility by performin. Supine to sit with Stand-by Assistance  2. Sit to supine with Stand-by Assistance  3. Sit to stand transfer with Minimal Assistance  4. Bed to chair transfer with Minimal Assistance using Rolling Walker  5. Gait  x 20 feet with Moderate Assistance using Rolling Walker. Met (3/26/2017)  6. Wheelchair propulsion x75 feet with Stand-by Assistance using BUE/BLE  7. Ascend/descend 3 stair with left Handrails Moderate Assistance.   8. Stand for 3 minutes with Stand-by Assistance using Rolling Walker  9. Lower extremity exercise program x20 reps per handout, with assistance as needed  10. New Goal (3/26/2017): Gait  x 50 feet with Moderate Assistance using Rolling Walker.                  PLAN:    Patient to be seen 5 x/week  to address the above listed problems via gait training, therapeutic activities, therapeutic exercises, wheelchair management/training  Plan of Care expires: 17  Plan of Care reviewed with: patient    Asiya Jordan, PTA  2017

## 2017-04-06 LAB
ANION GAP SERPL CALC-SCNC: 8 MMOL/L
BASOPHILS # BLD AUTO: 0.06 K/UL
BASOPHILS NFR BLD: 1 %
BUN SERPL-MCNC: 17 MG/DL
CALCIUM SERPL-MCNC: 8.6 MG/DL
CHLORIDE SERPL-SCNC: 104 MMOL/L
CO2 SERPL-SCNC: 28 MMOL/L
CREAT SERPL-MCNC: 0.8 MG/DL
DIFFERENTIAL METHOD: ABNORMAL
EOSINOPHIL # BLD AUTO: 0.2 K/UL
EOSINOPHIL NFR BLD: 3.6 %
ERYTHROCYTE [DISTWIDTH] IN BLOOD BY AUTOMATED COUNT: 14.5 %
EST. GFR  (AFRICAN AMERICAN): >60 ML/MIN/1.73 M^2
EST. GFR  (NON AFRICAN AMERICAN): >60 ML/MIN/1.73 M^2
GLUCOSE SERPL-MCNC: 84 MG/DL
HCT VFR BLD AUTO: 36.1 %
HGB BLD-MCNC: 11.7 G/DL
LYMPHOCYTES # BLD AUTO: 2.6 K/UL
LYMPHOCYTES NFR BLD: 41.3 %
MAGNESIUM SERPL-MCNC: 2.1 MG/DL
MCH RBC QN AUTO: 29.3 PG
MCHC RBC AUTO-ENTMCNC: 32.4 %
MCV RBC AUTO: 91 FL
MONOCYTES # BLD AUTO: 0.5 K/UL
MONOCYTES NFR BLD: 7.6 %
NEUTROPHILS # BLD AUTO: 2.9 K/UL
NEUTROPHILS NFR BLD: 46.5 %
PHOSPHATE SERPL-MCNC: 4.2 MG/DL
PLATELET # BLD AUTO: 203 K/UL
PMV BLD AUTO: 11.7 FL
POTASSIUM SERPL-SCNC: 4.1 MMOL/L
RBC # BLD AUTO: 3.99 M/UL
SODIUM SERPL-SCNC: 140 MMOL/L
WBC # BLD AUTO: 6.18 K/UL

## 2017-04-06 PROCEDURE — 25000003 PHARM REV CODE 250: Performed by: NURSE PRACTITIONER

## 2017-04-06 PROCEDURE — 11000004 HC SNF PRIVATE

## 2017-04-06 PROCEDURE — 85025 COMPLETE CBC W/AUTO DIFF WBC: CPT

## 2017-04-06 PROCEDURE — 36415 COLL VENOUS BLD VENIPUNCTURE: CPT

## 2017-04-06 PROCEDURE — 97530 THERAPEUTIC ACTIVITIES: CPT

## 2017-04-06 PROCEDURE — 80048 BASIC METABOLIC PNL TOTAL CA: CPT

## 2017-04-06 PROCEDURE — 25000003 PHARM REV CODE 250: Performed by: PHYSICIAN ASSISTANT

## 2017-04-06 PROCEDURE — 83735 ASSAY OF MAGNESIUM: CPT

## 2017-04-06 PROCEDURE — 84100 ASSAY OF PHOSPHORUS: CPT

## 2017-04-06 PROCEDURE — 97110 THERAPEUTIC EXERCISES: CPT

## 2017-04-06 RX ORDER — CARBAMAZEPINE 100 MG/1
400 CAPSULE, EXTENDED RELEASE ORAL 2 TIMES DAILY
Status: DISCONTINUED | OUTPATIENT
Start: 2017-04-06 | End: 2017-05-02 | Stop reason: HOSPADM

## 2017-04-06 RX ADMIN — GABAPENTIN 600 MG: 300 CAPSULE ORAL at 04:04

## 2017-04-06 RX ADMIN — OXYCODONE HYDROCHLORIDE 5 MG: 5 TABLET ORAL at 05:04

## 2017-04-06 RX ADMIN — TRAZODONE HYDROCHLORIDE 100 MG: 50 TABLET ORAL at 09:04

## 2017-04-06 RX ADMIN — RAMELTEON 8 MG: 8 TABLET, FILM COATED ORAL at 09:04

## 2017-04-06 RX ADMIN — DIAZEPAM 5 MG: 5 TABLET ORAL at 05:04

## 2017-04-06 RX ADMIN — BACLOFEN 20 MG: 10 TABLET ORAL at 12:04

## 2017-04-06 RX ADMIN — OXYCODONE HYDROCHLORIDE 15 MG: 5 TABLET ORAL at 10:04

## 2017-04-06 RX ADMIN — OXYCODONE HYDROCHLORIDE 15 MG: 5 TABLET ORAL at 12:04

## 2017-04-06 RX ADMIN — OXYCODONE HYDROCHLORIDE 10 MG: 5 TABLET ORAL at 04:04

## 2017-04-06 RX ADMIN — CARBAMAZEPINE 400 MG: 100 CAPSULE, EXTENDED RELEASE ORAL at 09:04

## 2017-04-06 RX ADMIN — DANTROLENE SODIUM 50 MG: 50 CAPSULE ORAL at 05:04

## 2017-04-06 RX ADMIN — DIAZEPAM 5 MG: 5 TABLET ORAL at 10:04

## 2017-04-06 RX ADMIN — OXYBUTYNIN CHLORIDE 5 MG: 5 TABLET, EXTENDED RELEASE ORAL at 08:04

## 2017-04-06 RX ADMIN — OXYCODONE HYDROCHLORIDE 15 MG: 5 TABLET ORAL at 08:04

## 2017-04-06 RX ADMIN — ACETAMINOPHEN 650 MG: 325 TABLET, FILM COATED ORAL at 09:04

## 2017-04-06 RX ADMIN — BACLOFEN 20 MG: 10 TABLET ORAL at 11:04

## 2017-04-06 RX ADMIN — DANTROLENE SODIUM 50 MG: 50 CAPSULE ORAL at 11:04

## 2017-04-06 RX ADMIN — DANTROLENE SODIUM 50 MG: 50 CAPSULE ORAL at 12:04

## 2017-04-06 RX ADMIN — CITALOPRAM HYDROBROMIDE 20 MG: 20 TABLET ORAL at 08:04

## 2017-04-06 RX ADMIN — GABAPENTIN 600 MG: 300 CAPSULE ORAL at 05:04

## 2017-04-06 RX ADMIN — CARBAMAZEPINE 400 MG: 200 TABLET, EXTENDED RELEASE ORAL at 08:04

## 2017-04-06 RX ADMIN — GABAPENTIN 900 MG: 300 CAPSULE ORAL at 09:04

## 2017-04-06 RX ADMIN — BACLOFEN 20 MG: 10 TABLET ORAL at 05:04

## 2017-04-06 RX ADMIN — RIVAROXABAN 20 MG: 20 TABLET, FILM COATED ORAL at 04:04

## 2017-04-06 RX ADMIN — OXYCODONE HYDROCHLORIDE 15 MG: 5 TABLET ORAL at 05:04

## 2017-04-06 RX ADMIN — OXYCODONE HYDROCHLORIDE 15 MG: 5 TABLET ORAL at 07:04

## 2017-04-06 NOTE — PT/OT/SLP PROGRESS
Occupational Therapy  Treatment    Mao Levin   MRN: [id number]   Admitting Diagnosis: MS (multiple sclerosis)    OT Date of Treatment: 17  Total Time (min): 45 min    Billable Minutes:  Therapeutic Activity 45    General Precautions: Standard, fall  Orthopedic Precautions: N/A  Braces: N/A    Do you have any cultural, spiritual, Adventism conflicts, given your current situation?: no    Subjective:  Communicated with nsg prior to session.  I am doing well    Pain Ratin/10                   Objective:    Pt. Seated EOB  Functional Status:  MDS G  Transfer Functional Status: CGA-with SPT from EOB to w/c  Eating Functional Status: S-SBA          OT Exercises: Rickshaw x20 reps    Additional Treatment:  Pt. With standing act on this day. Pt. With static stand with weigh bearing into LUE with standing to facilitate use and movement. Pt. Also with placement of pegs into slots with  Standing  task Pt. Able to stand x 6 min to complete with CGA/ Min and cues for posture with standing.  Pt. Also with AAROM provided with scapular glides  Pt.also with BUE pulley x 40 reps     Patient left up in chair with PT present    ASSESSMENT:  Mao Levin is a 51 y.o. male with a medical diagnosis of MS (multiple sclerosis) Pt. participated well with session on this day. Pt is progressing well with session on this day still continues to requires cues with aspects of safety . Pt. Will continue to benefit from continued OT to progress towards goals    Rehab identified problem list/impairments: impaired endurance, impaired self care skills, impaired functional mobilty, decreased upper extremity function, decreased safety awareness    Rehab potential is fair    Activity tolerance: Fair    Discharge recommendations: rehabilitation facility     Barriers to discharge: Decreased caregiver support     Equipment recommendations: wheelchair (drop arm commode)     GOALS:   Occupational Therapy Goals        Problem: Occupational Therapy Goal     Goal Priority Disciplines Outcome Interventions   Occupational Therapy Goal     OT, PT/OT Ongoing (interventions implemented as appropriate)    Description:  Goals to be met by: 3 weeks     Patient will increase functional independence with ADLs by performing:    Feeding with Set-up Assistance. Met  UE Dressing with Stand-by Assistance.  LE Dressing with Minimal Assistance.  Grooming while seated with Modified [place].  Toileting from bedside commode with Minimal Assistance for hygiene and clothing management.   Bathing from  shower chair/bench with Minimal Assistance.  Sitting at edge of bed x20 minutes with Supervision.  Rolling to Bilateral with Stand-by Assistance.   Supine to sit with Minimal Assistance.  Stand pivot transfers with Minimal Assistance.  Toilet transfer to bedside commode with Minimal Assistance.  L Upper extremity self ROM exercise program x15 reps per handout, with independence.             Plan:  Patient to be seen 5 x/week to address the above listed problems via self-care/home management, therapeutic exercises, therapeutic activities  Plan of Care expires: 04/18/17  Plan of Care reviewed with: patient    WENDY Mustafa  04/06/2017

## 2017-04-06 NOTE — PLAN OF CARE
Problem: Occupational Therapy Goal  Goal: Occupational Therapy Goal  Goals to be met by: 3 weeks     Patient will increase functional independence with ADLs by performing:    Feeding with Set-up Assistance. Met  UE Dressing with Stand-by Assistance.  LE Dressing with Minimal Assistance.  Grooming while seated with Modified [place].  Toileting from bedside commode with Minimal Assistance for hygiene and clothing management.   Bathing from shower chair/bench with Minimal Assistance.  Sitting at edge of bed x20 minutes with Supervision.  Rolling to Bilateral with Stand-by Assistance.   Supine to sit with Minimal Assistance.  Stand pivot transfers with Minimal Assistance.  Toilet transfer to bedside commode with Minimal Assistance.  L Upper extremity self ROM exercise program x15 reps per handout, with independence.   Outcome: Ongoing (interventions implemented as appropriate)  .

## 2017-04-06 NOTE — PLAN OF CARE
Problem: Patient Care Overview  Goal: Plan of Care Review  Outcome: Revised  Repositions independently, no new skin breakdowns noted. Left hand weakness. Monitored for pain and safety q 1- 2hrs this shift. Safety maintained. Pain meds effective.

## 2017-04-06 NOTE — PT/OT/SLP PROGRESS
Physical Therapy  Treatment    Mao Levin   MRN: [id number]   Admitting Diagnosis: MS (multiple sclerosis)    PT Received On: 17  Total Time (min): 53       Billable Minutes:  Therapeutic Activity 23 and Therapeutic Exercise 30    Treatment Type: Treatment  PT/PTA: PTA     PTA Visit Number: 3       General Precautions: Standard, fall  Orthopedic Precautions: N/A   Braces: N/A    Do you have any cultural, spiritual, Church conflicts, given your current situation?: no    Subjective:  Communicated with nursing prior to session.  Pt agreed to work with therapy.     Pain Ratin/10     Pain Rating Post-Intervention: 0/10    Objective:  Patient found seated w/c.     Functional Status:      Bed Mobility:  Not performed on this date.     Transfers:  Sit<>Stand: to/from w/c with RW and Min (A). Pt performed multiple trials.     Gait:  Pt refused performing on this date despite encouragement.     Wheelchair Mobility:  Patient propels w/c with (S) using BUEs     Therex:  BLE Therex 2x20 reps: AP, LAQ, Hip Flexion and GS.  Pt performed mini squats 2x20 reps    Additional Treatment:  Pt tolerated standing at counter for ~6 min while playing connect four. Pt fluctuated between CGA/Min A for balance.  Pt performed seated w/c leg press x15 min to improve overall BLE strength and endurance.     Patient left up in chair with call button in reach.    Assessment:  Mao Levin is a 51 y.o. male with a medical diagnosis of MS (multiple sclerosis). Pt tolerated treatment well, and would continue to benefit from skilled PT intervention at this time. Continue with PT POC as indicated.    Rehab identified problem list/impairments: impaired endurance, impaired self care skills, impaired functional mobilty, decreased upper extremity function, decreased safety awareness    Rehab potential is fair.    Activity tolerance: Fair    Discharge recommendations: rehabilitation facility     Barriers to discharge: Decreased caregiver  support    Equipment recommendations: wheelchair     GOALS:   Physical Therapy Goals        Problem: Physical Therapy Goal    Goal Priority Disciplines Outcome Goal Variances Interventions   Physical Therapy Goal     PT/OT, PT Ongoing (interventions implemented as appropriate)     Description:  Goals to be met by: 2 weeks     Patient will increase functional independence with mobility by performin. Supine to sit with Stand-by Assistance  2. Sit to supine with Stand-by Assistance  3. Sit to stand transfer with Minimal Assistance  4. Bed to chair transfer with Minimal Assistance using Rolling Walker  5. Gait  x 20 feet with Moderate Assistance using Rolling Walker. Met (3/26/2017)  6. Wheelchair propulsion x75 feet with Stand-by Assistance using BUE/BLE  7. Ascend/descend 3 stair with left Handrails Moderate Assistance.   8. Stand for 3 minutes with Stand-by Assistance using Rolling Walker  9. Lower extremity exercise program x20 reps per handout, with assistance as needed  10. New Goal (3/26/2017): Gait  x 50 feet with Moderate Assistance using Rolling Walker.                  PLAN:    Patient to be seen 5 x/week  to address the above listed problems via gait training, therapeutic activities, therapeutic exercises, wheelchair management/training  Plan of Care expires: 17  Plan of Care reviewed with: patient    Asiya Jordan, BERNARDO  2017

## 2017-04-07 PROCEDURE — 97110 THERAPEUTIC EXERCISES: CPT

## 2017-04-07 PROCEDURE — 97530 THERAPEUTIC ACTIVITIES: CPT

## 2017-04-07 PROCEDURE — 25000003 PHARM REV CODE 250: Performed by: PHYSICIAN ASSISTANT

## 2017-04-07 PROCEDURE — 25000003 PHARM REV CODE 250: Performed by: NURSE PRACTITIONER

## 2017-04-07 PROCEDURE — 97535 SELF CARE MNGMENT TRAINING: CPT

## 2017-04-07 PROCEDURE — 11000004 HC SNF PRIVATE

## 2017-04-07 PROCEDURE — 97116 GAIT TRAINING THERAPY: CPT

## 2017-04-07 RX ADMIN — GABAPENTIN 900 MG: 300 CAPSULE ORAL at 10:04

## 2017-04-07 RX ADMIN — DANTROLENE SODIUM 50 MG: 50 CAPSULE ORAL at 05:04

## 2017-04-07 RX ADMIN — GABAPENTIN 600 MG: 300 CAPSULE ORAL at 05:04

## 2017-04-07 RX ADMIN — OXYCODONE HYDROCHLORIDE 15 MG: 5 TABLET ORAL at 06:04

## 2017-04-07 RX ADMIN — TRAZODONE HYDROCHLORIDE 100 MG: 50 TABLET ORAL at 10:04

## 2017-04-07 RX ADMIN — GABAPENTIN 600 MG: 300 CAPSULE ORAL at 04:04

## 2017-04-07 RX ADMIN — OXYBUTYNIN CHLORIDE 5 MG: 5 TABLET, EXTENDED RELEASE ORAL at 10:04

## 2017-04-07 RX ADMIN — RIVAROXABAN 20 MG: 20 TABLET, FILM COATED ORAL at 05:04

## 2017-04-07 RX ADMIN — OXYCODONE HYDROCHLORIDE 15 MG: 5 TABLET ORAL at 07:04

## 2017-04-07 RX ADMIN — CARBAMAZEPINE 400 MG: 100 CAPSULE, EXTENDED RELEASE ORAL at 09:04

## 2017-04-07 RX ADMIN — BACLOFEN 20 MG: 10 TABLET ORAL at 12:04

## 2017-04-07 RX ADMIN — OXYCODONE HYDROCHLORIDE 15 MG: 5 TABLET ORAL at 02:04

## 2017-04-07 RX ADMIN — OXYCODONE HYDROCHLORIDE 15 MG: 5 TABLET ORAL at 10:04

## 2017-04-07 RX ADMIN — RAMELTEON 8 MG: 8 TABLET, FILM COATED ORAL at 09:04

## 2017-04-07 RX ADMIN — ACETAMINOPHEN 650 MG: 325 TABLET, FILM COATED ORAL at 10:04

## 2017-04-07 RX ADMIN — BACLOFEN 20 MG: 10 TABLET ORAL at 05:04

## 2017-04-07 RX ADMIN — DANTROLENE SODIUM 50 MG: 50 CAPSULE ORAL at 12:04

## 2017-04-07 RX ADMIN — CITALOPRAM HYDROBROMIDE 20 MG: 20 TABLET ORAL at 10:04

## 2017-04-07 RX ADMIN — CARBAMAZEPINE 400 MG: 100 CAPSULE, EXTENDED RELEASE ORAL at 10:04

## 2017-04-07 RX ADMIN — OXYCODONE HYDROCHLORIDE 15 MG: 5 TABLET ORAL at 09:04

## 2017-04-07 NOTE — PT/OT/SLP PROGRESS
"Physical Therapy  Treatment    Mao Levin   MRN: [id number]   Admitting Diagnosis: MS (multiple sclerosis)    PT Received On: 17  Total Time (min): 40       Billable Minutes:  Gait [id number] and Therapeutic Exercise 30    Treatment Type: Treatment  PT/PTA: PTA     PTA Visit Number: 4       General Precautions: Standard, fall  Orthopedic Precautions: N/A   Braces: N/A    Do you have any cultural, spiritual, Christianity conflicts, given your current situation?: no    Subjective:  "I'll do whatever."    Pain Ratin/10     Pain Rating Post-Intervention: 0/10    Objective:  Patient found seated in w/c     Functional Status:  MDS G  Transfer Functional Status: CGA-Min (A)  Walk in Room Functional Status: CGA-Min (A)  Walk in Corridor Functional Status: CGA-Min (A)  Locomotion on Unit Functional Status: S-SBA  Locomotion Off Unit Functional Status: total(A)      Transfers:  Sit <> stand from w/c with min A x 2 trials    Gait:  Distance Walk: pt ambulated x 40 feet with RW and CGA/min A for balance and safety. Ace wrapped LLE  Walk: Minimal Contact Assistance  Assistive Device: rolling walker  Gait Deviation(s): decreased keyshawn;decreased toe-to-floor clearance;decreased weight-shifting ability;decreased velocity of limb motion;decreased step length;decreased stride length;decreased swing-to-stance ratio     W/c mobility x 100 feet with BUEs and SBA    Therex:  Ankle pumps 15  Quad sets 15  Glut sets 15  Hip flexion 15  Long arc quads 15  ABduction 15  ADduction 15    Balance:  Dynamic sitting activity-tap balloon x 5 minutes      Patient left up in chair with call button in reach.    Assessment:  Mao Levin is a 51 y.o. male with a medical diagnosis of MS (multiple sclerosis).  Pt educated on the importance of safety awareness while ambulating. Pt was encouraged to concentrate on given tasks for safety.  Pt will continue to benefit from PT services to improve his mobility.  Goals remain appropriate.      Rehab " identified problem list/impairments: impaired endurance, impaired self care skills, impaired functional mobilty, decreased upper extremity function, decreased safety awareness    Rehab potential is fair.    Activity tolerance: Fair    Discharge recommendations: rehabilitation facility     Barriers to discharge: Decreased caregiver support    Equipment recommendations: wheelchair     GOALS:   Physical Therapy Goals        Problem: Physical Therapy Goal    Goal Priority Disciplines Outcome Goal Variances Interventions   Physical Therapy Goal     PT/OT, PT Ongoing (interventions implemented as appropriate)     Description:  Goals to be met by: 2 weeks     Patient will increase functional independence with mobility by performin. Supine to sit with Stand-by Assistance  2. Sit to supine with Stand-by Assistance  3. Sit to stand transfer with Minimal Assistance  4. Bed to chair transfer with Minimal Assistance using Rolling Walker  5. Gait  x 20 feet with Moderate Assistance using Rolling Walker. Met (3/26/2017)  6. Wheelchair propulsion x75 feet with Stand-by Assistance using BUE/BLE  7. Ascend/descend 3 stair with left Handrails Moderate Assistance.   8. Stand for 3 minutes with Stand-by Assistance using Rolling Walker  9. Lower extremity exercise program x20 reps per handout, with assistance as needed  10. New Goal (3/26/2017): Gait  x 50 feet with Moderate Assistance using Rolling Walker.                  PLAN:    Patient to be seen 5 x/week  to address the above listed problems via gait training, therapeutic activities, therapeutic exercises, wheelchair management/training  Plan of Care expires: 17  Plan of Care reviewed with: patient    Vandana RAMÍREZ Eli, PTA  2017

## 2017-04-07 NOTE — PT/OT/SLP PROGRESS
Occupational Therapy  Treatment    Mao Levin   MRN: [id number]   Admitting Diagnosis: MS (multiple sclerosis)    OT Date of Treatment: 17  Total Time (min): 48 min    Billable Minutes:  Self Care/Home Management 48    General Precautions: Standard, fall  Orthopedic Precautions: N/A  Braces: N/A    Do you have any cultural, spiritual, [employer] conflicts, given your current situation?: no    Subjective:  Communicated with NSG prior to session.  I am doing well  Pain Ratin/10                   Objective:   Pt. Supine on arrival    Functional Status:  MDS G  Bed Mobility Functional Status: CGA-Min (A)   Transfer Functional Status: CGA-Min (A)- from EOB to w/safety with t/f's and task management  Dressing Functional Status: 2:CGA-Min (A)- with pants management over hips in stance  Eating Functional Status: S-SBA  Toilet Use Functional Status: total(A) Pt. Saturated with urine in diaper   Personal Hygiene Functional Status: S-SBA at sink level  Bathing Functional Status: mod(A)-Max(A)        Patient left up in chair with all lines intact and call button in reach    ASSESSMENT:  Mao Levin is a 51 y.o. male with a medical diagnosis of MS (multiple sclerosis) Pt. participated well with session on this day. Pt making fair gains but still continue to requires (A) with selfcare task due to weakness in LUE. Pt. Will continue to benefit from continued OT to progress towards goals.    Rehab identified problem list/impairments: impaired endurance, impaired self care skills, impaired functional mobilty, decreased upper extremity function, decreased safety awareness    Rehab potential is fair    Activity tolerance: Fair    Discharge recommendations: rehabilitation facility     Barriers to discharge: Decreased caregiver support     Equipment recommendations: wheelchair (drop arm commode)     GOALS:   Occupational Therapy Goals        Problem: Occupational Therapy Goal    Goal Priority Disciplines Outcome Interventions    Occupational Therapy Goal     OT, PT/OT Ongoing (interventions implemented as appropriate)    Description:  Goals to be met by: 3 weeks     Patient will increase functional independence with ADLs by performing:    Feeding with Set-up Assistance. Met  UE Dressing with Stand-by Assistance.  LE Dressing with Minimal Assistance.  Grooming while seated with Modified [place].  Toileting from bedside commode with Minimal Assistance for hygiene and clothing management.   Bathing from  shower chair/bench with Minimal Assistance.  Sitting at edge of bed x20 minutes with Supervision.  Rolling to Bilateral with Stand-by Assistance.   Supine to sit with Minimal Assistance.  Stand pivot transfers with Minimal Assistance.  Toilet transfer to bedside commode with Minimal Assistance.  L Upper extremity self ROM exercise program x15 reps per handout, with independence.             Plan:  Patient to be seen 5 x/week to address the above listed problems via self-care/home management, therapeutic exercises, therapeutic activities  Plan of Care expires: 04/18/17  Plan of Care reviewed with: patient    WENDY Mustafa  04/07/2017

## 2017-04-07 NOTE — PLAN OF CARE
Problem: Occupational Therapy Goal  Goal: Occupational Therapy Goal  Goals to be met by: 3 weeks     Patient will increase functional independence with ADLs by performing:    Feeding with Set-up Assistance. Met  UE Dressing with Stand-by Assistance.  LE Dressing with Minimal Assistance.  Grooming while seated with Modified [place].  Toileting from bedside commode with Minimal Assistance for hygiene and clothing management.   Bathing from shower chair/bench with Minimal Assistance.  Sitting at edge of bed x20 minutes with Supervision.  Rolling to Bilateral with Stand-by Assistance.   Supine to sit with Minimal Assistance.  Stand pivot transfers with Minimal Assistance.  Toilet transfer to bedside commode with Minimal Assistance.  L Upper extremity self ROM exercise program x15 reps per handout, with independence.   Outcome: Ongoing (interventions implemented as appropriate)  .    Comments:   .

## 2017-04-08 PROCEDURE — 25000003 PHARM REV CODE 250: Performed by: INTERNAL MEDICINE

## 2017-04-08 PROCEDURE — 25000003 PHARM REV CODE 250: Performed by: PHYSICIAN ASSISTANT

## 2017-04-08 PROCEDURE — 11000004 HC SNF PRIVATE

## 2017-04-08 PROCEDURE — 25000003 PHARM REV CODE 250: Performed by: NURSE PRACTITIONER

## 2017-04-08 RX ADMIN — ERGOCALCIFEROL 50000 UNITS: 1.25 CAPSULE ORAL at 10:04

## 2017-04-08 RX ADMIN — DANTROLENE SODIUM 50 MG: 50 CAPSULE ORAL at 06:04

## 2017-04-08 RX ADMIN — OXYCODONE HYDROCHLORIDE 15 MG: 5 TABLET ORAL at 06:04

## 2017-04-08 RX ADMIN — CARBAMAZEPINE 400 MG: 100 CAPSULE, EXTENDED RELEASE ORAL at 09:04

## 2017-04-08 RX ADMIN — DIAZEPAM 5 MG: 5 TABLET ORAL at 02:04

## 2017-04-08 RX ADMIN — TRAZODONE HYDROCHLORIDE 100 MG: 50 TABLET ORAL at 08:04

## 2017-04-08 RX ADMIN — OXYCODONE HYDROCHLORIDE 15 MG: 5 TABLET ORAL at 02:04

## 2017-04-08 RX ADMIN — GABAPENTIN 600 MG: 300 CAPSULE ORAL at 05:04

## 2017-04-08 RX ADMIN — RIVAROXABAN 20 MG: 20 TABLET, FILM COATED ORAL at 06:04

## 2017-04-08 RX ADMIN — OXYCODONE HYDROCHLORIDE 15 MG: 5 TABLET ORAL at 10:04

## 2017-04-08 RX ADMIN — CITALOPRAM HYDROBROMIDE 20 MG: 20 TABLET ORAL at 09:04

## 2017-04-08 RX ADMIN — DANTROLENE SODIUM 50 MG: 50 CAPSULE ORAL at 11:04

## 2017-04-08 RX ADMIN — ACETAMINOPHEN 650 MG: 325 TABLET, FILM COATED ORAL at 08:04

## 2017-04-08 RX ADMIN — BACLOFEN 20 MG: 10 TABLET ORAL at 12:04

## 2017-04-08 RX ADMIN — GABAPENTIN 600 MG: 300 CAPSULE ORAL at 06:04

## 2017-04-08 RX ADMIN — BACLOFEN 20 MG: 10 TABLET ORAL at 06:04

## 2017-04-08 RX ADMIN — BACLOFEN 20 MG: 10 TABLET ORAL at 11:04

## 2017-04-08 RX ADMIN — OXYCODONE HYDROCHLORIDE 15 MG: 5 TABLET ORAL at 07:04

## 2017-04-08 RX ADMIN — GABAPENTIN 900 MG: 300 CAPSULE ORAL at 08:04

## 2017-04-08 RX ADMIN — DANTROLENE SODIUM 50 MG: 50 CAPSULE ORAL at 12:04

## 2017-04-08 RX ADMIN — OXYBUTYNIN CHLORIDE 5 MG: 5 TABLET, EXTENDED RELEASE ORAL at 09:04

## 2017-04-09 PROCEDURE — 97530 THERAPEUTIC ACTIVITIES: CPT

## 2017-04-09 PROCEDURE — 97112 NEUROMUSCULAR REEDUCATION: CPT

## 2017-04-09 PROCEDURE — 25000003 PHARM REV CODE 250: Performed by: NURSE PRACTITIONER

## 2017-04-09 PROCEDURE — 11000004 HC SNF PRIVATE

## 2017-04-09 PROCEDURE — 97116 GAIT TRAINING THERAPY: CPT

## 2017-04-09 PROCEDURE — 25000003 PHARM REV CODE 250: Performed by: PHYSICIAN ASSISTANT

## 2017-04-09 PROCEDURE — 25000003 PHARM REV CODE 250: Performed by: INTERNAL MEDICINE

## 2017-04-09 PROCEDURE — 97110 THERAPEUTIC EXERCISES: CPT

## 2017-04-09 RX ORDER — GABAPENTIN 300 MG/1
900 CAPSULE ORAL 3 TIMES DAILY
Status: DISCONTINUED | OUTPATIENT
Start: 2017-04-09 | End: 2017-05-02 | Stop reason: HOSPADM

## 2017-04-09 RX ADMIN — ACETAMINOPHEN 650 MG: 325 TABLET, FILM COATED ORAL at 09:04

## 2017-04-09 RX ADMIN — OXYCODONE HYDROCHLORIDE 15 MG: 5 TABLET ORAL at 12:04

## 2017-04-09 RX ADMIN — OXYCODONE HYDROCHLORIDE 15 MG: 5 TABLET ORAL at 03:04

## 2017-04-09 RX ADMIN — DANTROLENE SODIUM 50 MG: 50 CAPSULE ORAL at 12:04

## 2017-04-09 RX ADMIN — GABAPENTIN 600 MG: 300 CAPSULE ORAL at 06:04

## 2017-04-09 RX ADMIN — DANTROLENE SODIUM 50 MG: 50 CAPSULE ORAL at 05:04

## 2017-04-09 RX ADMIN — GABAPENTIN 900 MG: 300 CAPSULE ORAL at 01:04

## 2017-04-09 RX ADMIN — CARBAMAZEPINE 400 MG: 100 CAPSULE, EXTENDED RELEASE ORAL at 09:04

## 2017-04-09 RX ADMIN — CITALOPRAM HYDROBROMIDE 20 MG: 20 TABLET ORAL at 09:04

## 2017-04-09 RX ADMIN — OXYBUTYNIN CHLORIDE 5 MG: 5 TABLET, EXTENDED RELEASE ORAL at 09:04

## 2017-04-09 RX ADMIN — BACLOFEN 20 MG: 10 TABLET ORAL at 05:04

## 2017-04-09 RX ADMIN — OXYCODONE HYDROCHLORIDE 15 MG: 5 TABLET ORAL at 05:04

## 2017-04-09 RX ADMIN — OXYCODONE HYDROCHLORIDE 15 MG: 5 TABLET ORAL at 10:04

## 2017-04-09 RX ADMIN — BACLOFEN 20 MG: 10 TABLET ORAL at 11:04

## 2017-04-09 RX ADMIN — DIAZEPAM 5 MG: 5 TABLET ORAL at 10:04

## 2017-04-09 RX ADMIN — RIVAROXABAN 20 MG: 20 TABLET, FILM COATED ORAL at 05:04

## 2017-04-09 RX ADMIN — BACLOFEN 20 MG: 10 TABLET ORAL at 12:04

## 2017-04-09 RX ADMIN — DANTROLENE SODIUM 50 MG: 50 CAPSULE ORAL at 11:04

## 2017-04-09 RX ADMIN — OXYCODONE HYDROCHLORIDE 15 MG: 5 TABLET ORAL at 07:04

## 2017-04-09 RX ADMIN — GABAPENTIN 900 MG: 300 CAPSULE ORAL at 09:04

## 2017-04-09 RX ADMIN — CARBAMAZEPINE 400 MG: 100 CAPSULE, EXTENDED RELEASE ORAL at 12:04

## 2017-04-09 RX ADMIN — TRAZODONE HYDROCHLORIDE 100 MG: 50 TABLET ORAL at 09:04

## 2017-04-09 NOTE — PROGRESS NOTES
Called by the staff nurse to patient room, notice patient with unsteady gait, holding on to a walker. Nurse was notifying the charge nurse that patient was trying to work with the walker which is not safe for him, pt keep on saying time out, time out. Re-educate pt about calling for assistance so that the wheelchair can be brought to him, he was just assisted into bed on three time back to back, while the PCT was also trying to take care of other patients.

## 2017-04-09 NOTE — PLAN OF CARE
Problem: Physical Therapy Goal  Goal: Physical Therapy Goal  Goals to be met by: 2 weeks     Patient will increase functional independence with mobility by performin. Supine to sit with Stand-by Assistance  2. Sit to supine with Stand-by Assistance  3. Sit to stand transfer with Minimal Assistance  4. Bed to chair transfer with Minimal Assistance using Rolling Walker  5. Gait x 20 feet with Moderate Assistance using Rolling Walker. Met (3/26/2017)  6. Wheelchair propulsion x75 feet with Stand-by Assistance using BUE/BLE  7. Ascend/descend 3 stair with left Handrails Moderate Assistance.   8. Stand for 3 minutes with Stand-by Assistance using Rolling Walker  9. Lower extremity exercise program x20 reps per handout, with assistance as needed  10. New Goal (3/26/2017): Gait x 50 feet with Moderate Assistance using Rolling Walker.   Outcome: Ongoing (interventions implemented as appropriate)  Patient continues to progress, but limited strength continues to be noted on the L LE and UE.

## 2017-04-09 NOTE — PT/OT/SLP PROGRESS
"Physical Therapy  Treatment    Mao Levin   MRN: [id number]   Admitting Diagnosis: MS (multiple sclerosis)    PT Received On: 17  Total Time (min): 57       Billable Minutes:  Gait Training 12, Therapeutic Activity 15, Therapeutic Exercise 15 and Neuromuscular Re-education 15    Treatment Type: Treatment  PT/PTA: PTA     PTA Visit Number: 5       General Precautions: Standard, fall  Orthopedic Precautions: N/A   Braces: N/A    Do you have any cultural, spiritual, Caodaism conflicts, given your current situation?: no    Subjective:  Communicated with NSG prior to session.  Patient states " I have to do this today?, I thought I had the day off"    Pain Ratin/10              Pain Rating Post-Intervention: 0/10    Objective:  Patient found on wheelchair with Patient found with:  (Seated on wheelchair)       Functional Status:             Bed Mobility:  Sit>Supine:N/A  Supine>Sit: N/A    Transfers:  Sit<>Stand: Mon Assist  Stand Pivot Transfer: Min assist      Gait:  Amb 40 ft and 22 ft with chair follow and cues to remain inside the RW.      Advanced Gait:  Stairs: N/A  Curb Step: N/A    Wheelchair Mobility:  Patient propels w/c 80 ft with min assist due to limited use of the L UE     Therex:  QUAD SETS AND GLUTS SETS 2X10 REPS L LE only. LAQ, HIP FLEX AND HEEL/TOE RAISES 3X10 REPS. R LE    Balance:  Static standing to correct posture and to don a gown.    Additional Treatment:  L hams and gastroc stretches 3x30 secs.    Patient left up in chair with all lines intact and call button in reach.    Assessment:  Mao Levin is a 51 y.o. male with a medical diagnosis of MS (multiple sclerosis).  Patient appears very anxious, which limits his safety especially during gait training. Patient instructed on the need of PTA to hold on to the gait belt due to fall risk. Limits step length and hip internally rotated during gait, but patient showed excellent determination during his treatment. Patient would benefit from " continued P.T. To address deficits.    Rehab identified problem list/impairments: weakness, impaired endurance, impaired self care skills, impaired functional mobilty, impaired balance, decreased upper extremity function, decreased lower extremity function, decreased safety awareness, abnormal tone, decreased ROM, decreased coordination    Rehab potential is fair.    Activity tolerance: Fair    Discharge recommendations: rehabilitation facility     Barriers to discharge: Decreased caregiver support    Equipment recommendations: wheelchair     GOALS:   Physical Therapy Goals        Problem: Physical Therapy Goal    Goal Priority Disciplines Outcome Goal Variances Interventions   Physical Therapy Goal     PT/OT, PT Ongoing (interventions implemented as appropriate)     Description:  Goals to be met by: 2 weeks     Patient will increase functional independence with mobility by performin. Supine to sit with Stand-by Assistance  2. Sit to supine with Stand-by Assistance  3. Sit to stand transfer with Minimal Assistance  4. Bed to chair transfer with Minimal Assistance using Rolling Walker  5. Gait  x 20 feet with Moderate Assistance using Rolling Walker. Met (3/26/2017)  6. Wheelchair propulsion x75 feet with Stand-by Assistance using BUE/BLE  7. Ascend/descend 3 stair with left Handrails Moderate Assistance.   8. Stand for 3 minutes with Stand-by Assistance using Rolling Walker  9. Lower extremity exercise program x20 reps per handout, with assistance as needed  10. New Goal (3/26/2017): Gait  x 50 feet with Moderate Assistance using Rolling Walker.                  PLAN:    Patient to be seen 5 x/week  to address the above listed problems via gait training, therapeutic activities, therapeutic exercises, wheelchair management/training  Plan of Care expires: 17  Plan of Care reviewed with: patient    [place]  Ling, PTA  2017

## 2017-04-10 LAB
ANION GAP SERPL CALC-SCNC: 10 MMOL/L
BASOPHILS # BLD AUTO: 0.05 K/UL
BASOPHILS NFR BLD: 0.8 %
BUN SERPL-MCNC: 14 MG/DL
CALCIUM SERPL-MCNC: 8.5 MG/DL
CHLORIDE SERPL-SCNC: 103 MMOL/L
CO2 SERPL-SCNC: 27 MMOL/L
CREAT SERPL-MCNC: 0.8 MG/DL
DIFFERENTIAL METHOD: ABNORMAL
EOSINOPHIL # BLD AUTO: 0.2 K/UL
EOSINOPHIL NFR BLD: 3 %
ERYTHROCYTE [DISTWIDTH] IN BLOOD BY AUTOMATED COUNT: 14.2 %
EST. GFR  (AFRICAN AMERICAN): >60 ML/MIN/1.73 M^2
EST. GFR  (NON AFRICAN AMERICAN): >60 ML/MIN/1.73 M^2
GLUCOSE SERPL-MCNC: 77 MG/DL
HCT VFR BLD AUTO: 36.8 %
HGB BLD-MCNC: 12.2 G/DL
LYMPHOCYTES # BLD AUTO: 2.7 K/UL
LYMPHOCYTES NFR BLD: 45.2 %
MAGNESIUM SERPL-MCNC: 1.9 MG/DL
MCH RBC QN AUTO: 29.3 PG
MCHC RBC AUTO-ENTMCNC: 33.2 %
MCV RBC AUTO: 89 FL
MONOCYTES # BLD AUTO: 0.6 K/UL
MONOCYTES NFR BLD: 9.5 %
NEUTROPHILS # BLD AUTO: 2.5 K/UL
NEUTROPHILS NFR BLD: 41.3 %
PHOSPHATE SERPL-MCNC: 3.8 MG/DL
PLATELET # BLD AUTO: 182 K/UL
PMV BLD AUTO: 11.7 FL
POTASSIUM SERPL-SCNC: 3.8 MMOL/L
RBC # BLD AUTO: 4.16 M/UL
SODIUM SERPL-SCNC: 140 MMOL/L
WBC # BLD AUTO: 5.97 K/UL

## 2017-04-10 PROCEDURE — 11000004 HC SNF PRIVATE

## 2017-04-10 PROCEDURE — 25000003 PHARM REV CODE 250: Performed by: INTERNAL MEDICINE

## 2017-04-10 PROCEDURE — 25000003 PHARM REV CODE 250: Performed by: PHYSICIAN ASSISTANT

## 2017-04-10 PROCEDURE — 97116 GAIT TRAINING THERAPY: CPT

## 2017-04-10 PROCEDURE — 80048 BASIC METABOLIC PNL TOTAL CA: CPT

## 2017-04-10 PROCEDURE — 84100 ASSAY OF PHOSPHORUS: CPT

## 2017-04-10 PROCEDURE — 85025 COMPLETE CBC W/AUTO DIFF WBC: CPT

## 2017-04-10 PROCEDURE — 97110 THERAPEUTIC EXERCISES: CPT

## 2017-04-10 PROCEDURE — 97530 THERAPEUTIC ACTIVITIES: CPT

## 2017-04-10 PROCEDURE — 83735 ASSAY OF MAGNESIUM: CPT

## 2017-04-10 PROCEDURE — 25000003 PHARM REV CODE 250: Performed by: NURSE PRACTITIONER

## 2017-04-10 PROCEDURE — 36415 COLL VENOUS BLD VENIPUNCTURE: CPT

## 2017-04-10 RX ADMIN — OXYCODONE HYDROCHLORIDE 15 MG: 5 TABLET ORAL at 10:04

## 2017-04-10 RX ADMIN — GABAPENTIN 900 MG: 300 CAPSULE ORAL at 10:04

## 2017-04-10 RX ADMIN — DANTROLENE SODIUM 50 MG: 50 CAPSULE ORAL at 11:04

## 2017-04-10 RX ADMIN — ACETAMINOPHEN 650 MG: 325 TABLET, FILM COATED ORAL at 06:04

## 2017-04-10 RX ADMIN — BACLOFEN 20 MG: 10 TABLET ORAL at 12:04

## 2017-04-10 RX ADMIN — DANTROLENE SODIUM 50 MG: 50 CAPSULE ORAL at 06:04

## 2017-04-10 RX ADMIN — GABAPENTIN 900 MG: 300 CAPSULE ORAL at 02:04

## 2017-04-10 RX ADMIN — DIAZEPAM 5 MG: 5 TABLET ORAL at 10:04

## 2017-04-10 RX ADMIN — OXYBUTYNIN CHLORIDE 5 MG: 5 TABLET, EXTENDED RELEASE ORAL at 08:04

## 2017-04-10 RX ADMIN — DANTROLENE SODIUM 50 MG: 50 CAPSULE ORAL at 05:04

## 2017-04-10 RX ADMIN — STANDARDIZED SENNA CONCENTRATE AND DOCUSATE SODIUM 1 TABLET: 8.6; 5 TABLET, FILM COATED ORAL at 08:04

## 2017-04-10 RX ADMIN — BACLOFEN 20 MG: 10 TABLET ORAL at 05:04

## 2017-04-10 RX ADMIN — TRAZODONE HYDROCHLORIDE 100 MG: 50 TABLET ORAL at 08:04

## 2017-04-10 RX ADMIN — DANTROLENE SODIUM 50 MG: 50 CAPSULE ORAL at 12:04

## 2017-04-10 RX ADMIN — OXYCODONE HYDROCHLORIDE 15 MG: 5 TABLET ORAL at 02:04

## 2017-04-10 RX ADMIN — GABAPENTIN 900 MG: 300 CAPSULE ORAL at 05:04

## 2017-04-10 RX ADMIN — OXYCODONE HYDROCHLORIDE 15 MG: 5 TABLET ORAL at 05:04

## 2017-04-10 RX ADMIN — POLYETHYLENE GLYCOL 3350 17 G: 17 POWDER, FOR SOLUTION ORAL at 08:04

## 2017-04-10 RX ADMIN — RAMELTEON 8 MG: 8 TABLET, FILM COATED ORAL at 10:04

## 2017-04-10 RX ADMIN — RIVAROXABAN 20 MG: 20 TABLET, FILM COATED ORAL at 05:04

## 2017-04-10 RX ADMIN — CARBAMAZEPINE 400 MG: 100 CAPSULE, EXTENDED RELEASE ORAL at 10:04

## 2017-04-10 RX ADMIN — OXYCODONE HYDROCHLORIDE 15 MG: 5 TABLET ORAL at 12:04

## 2017-04-10 RX ADMIN — BACLOFEN 20 MG: 10 TABLET ORAL at 11:04

## 2017-04-10 RX ADMIN — CARBAMAZEPINE 400 MG: 100 CAPSULE, EXTENDED RELEASE ORAL at 08:04

## 2017-04-10 RX ADMIN — CITALOPRAM HYDROBROMIDE 20 MG: 20 TABLET ORAL at 08:04

## 2017-04-10 NOTE — PLAN OF CARE
Problem: Occupational Therapy Goal  Goal: Occupational Therapy Goal  Goals to be met by: 3 weeks     Patient will increase functional independence with ADLs by performing:    Feeding with Set-up Assistance. Met  UE Dressing with Stand-by Assistance.  LE Dressing with Minimal Assistance.  Grooming while seated with Modified [place].  Toileting from bedside commode with Minimal Assistance for hygiene and clothing management.   Bathing from shower chair/bench with Minimal Assistance.  Sitting at edge of bed x20 minutes with Supervision.--met  Rolling to Bilateral with Stand-by Assistance. --met  Supine to sit with Minimal Assistance.--met  Stand pivot transfers with Minimal Assistance.--met  Toilet transfer to bedside commode with Minimal Assistance.  L Upper extremity self ROM exercise program x15 reps per handout, with independence.   Outcome: Ongoing (interventions implemented as appropriate)  Progressing. GISEL Sanchez  4/10/2017

## 2017-04-10 NOTE — PLAN OF CARE
Problem: Patient Care Overview  Goal: Plan of Care Review  Outcome: Ongoing (interventions implemented as appropriate)    04/10/17 4426   Coping/Psychosocial   Plan Of Care Reviewed With patient         Comments:   Pt turned and repositioned q 1-2 hrs. No new skin breakdown noted at this time. Incontience care provided throughout shift.  Bed locked and in lowest position. Rails elevated x 3. Brakes on. Call light and personal belongings in reach. Will continue to monitor.

## 2017-04-10 NOTE — PT/OT/SLP PROGRESS
Occupational Therapy  Treatment    Mao Levin   MRN: [id number]   Admitting Diagnosis: MS (multiple sclerosis)    OT Date of Treatment: 04/10/17  Total Time (min): 45 min    Billable Minutes:  Therapeutic Activity 25 and Therapeutic Exercise 20    General Precautions: Standard, fall  Orthopedic Precautions: N/A  Braces: N/A    Do you have any cultural, spiritual, Caodaism conflicts, given your current situation?: no    Subjective:  Communicated with pt prior to session.  I do want a shower. Tomorrow is good!    Pain Ratin/10                   Objective:  Patient found with:  (supine in bed)    Functional Status:  MDS G  Bed Mobility Functional Status: SBA rolling and sup to sit  Transfer Functional Status: -Min (A) sit to stand from bed with RW and stand pivot no AD to w/c  Locomotion on Unit Functional Status: Sup w/c  Locomotion Off Unit Functional Status: total(A) w/c long distances  Dressing Functional Status: 3:mod(A)--UBD min A; pants mod A; brief total A  Toilet Use Functional Status: mod(A)--mod I with urinal; pt soiled with urine--pt assisted to clean groin area and required assist with brief and clothing mgt        OT Exercises: Idalmis 20# 25 x 5    Additional Treatment:  Provided PROM/stretch to LUE--elbow, shoulder, wrist and hand    Patient left up in chair with all lines intact and call button in reach    ASSESSMENT:  Pt tolerated tx and demonstrated increased indep with functional mobility and will cont to benefit from OT to increase indep with self care and safety    Rehab identified problem list/impairments: impaired endurance, impaired self care skills, impaired functional mobilty, decreased upper extremity function, decreased safety awareness    Rehab potential is good    Activity tolerance: Good    Discharge recommendations: rehabilitation facility     Barriers to discharge: Decreased caregiver support     Equipment recommendations: wheelchair (drop arm commode)     GOALS:   Occupational  Therapy Goals        Problem: Occupational Therapy Goal    Goal Priority Disciplines Outcome Interventions   Occupational Therapy Goal     OT, PT/OT Ongoing (interventions implemented as appropriate)    Description:  Goals to be met by: 3 weeks     Patient will increase functional independence with ADLs by performing:    Feeding with Set-up Assistance. Met  UE Dressing with Stand-by Assistance.  LE Dressing with Minimal Assistance.  Grooming while seated with Modified [place].  Toileting from bedside commode with Minimal Assistance for hygiene and clothing management.   Bathing from  shower chair/bench with Minimal Assistance.  Sitting at edge of bed x20 minutes with Supervision.--met  Rolling to Bilateral with Stand-by Assistance. --met  Supine to sit with Minimal Assistance.--met  Stand pivot transfers with Minimal Assistance.--met  Toilet transfer to bedside commode with Minimal Assistance.  L Upper extremity self ROM exercise program x15 reps per handout, with independence.                  Plan:  Patient to be seen 5 x/week to address the above listed problems via self-care/home management, therapeutic exercises, therapeutic activities  Plan of Care expires: 04/18/17  Plan of Care reviewed with: patient    GISEL Sanchez  04/10/2017

## 2017-04-10 NOTE — PT/OT/SLP PROGRESS
"Physical Therapy  Treatment    Mao Levin   MRN: [id number]   Admitting Diagnosis: MS (multiple sclerosis)    PT Received On: 04/10/17  Total Time (min): 46       Billable Minutes:  Gait [id number], Therapeutic Activity 20, Therapeutic Exercise 16 and Total Time 46    Treatment Type: Treatment  PT/PTA: PT     PTA Visit Number: 0       General Precautions: Standard, fall  Orthopedic Precautions: N/A   Braces: N/A    Do you have any cultural, spiritual, Christian conflicts, given your current situation?: no    Subjective:  "There isn't much I can do because I have to go to the bathroom."    Pain Rating: 10/10  Location - Side: Bilateral  Location - Orientation: generalized  Location: leg  Pain Addressed: Pre-medicate for activity, Reposition, Distraction       Objective:  Patient found sitting in w/c        Functional Status:  MDS G  Bed Mobility Functional Status: mod(A)-Max(A)  Transfer Functional Status: mod(A)-Max(A)  Walk in Room Functional Status: CGA-Min (A)  Walk in Corridor Functional Status: CGA-Min (A)  Locomotion on Unit Functional Status: S-SBA          Bed Mobility:  Sit>Supine:on mat w/ ModA for BLE  Supine>Sit: on mat w/ ModA for trunk    Transfers:  Sit<>Stand: to/from w/c (4 trials) w/ RW and Mod/Miri to rise  Stand Pivot Transfer: w/c<>EOM w/ RW and ModA to rise and for stability w/ pivot  vc's for technique, inc'd time required    Gait:  Amb 28ft w/ RW and Miri to advance LLE, LLE DF assist ace wrap applied     Therex:  Supine PROM/stretching all joints and planes  Mini squats w/ BUE support on toure rail and Min/CGA for stability, cueing for full hip and knee extension when in standing, 2x5 reps  Bicep curls 4x15 reps w/ 5# dowel and BUE    Balance:  Static stand w/ RW and Min/CGA for stability while PT and PT tech changed brief due to bladder accident    Patient left up in chair with call button in reach.    Assessment:  Mao Levin is a 51 y.o. male with a medical diagnosis of MS (multiple " sclerosis).  Pt not eager to participate in therapy due to need to use restroom. PT offered to assist pt to bathroom but pt declining help repeatedly. Pt then had bladder accident during amb trial- PT and tech assisted w/ donning new brief. Pt is making slow progress and has became slightly resistive to therapy participation. Pt will require assistance upon D/C home. Pt will continue PT POC.    Rehab identified problem list/impairments: weakness, impaired endurance, impaired self care skills, impaired functional mobilty, impaired balance, decreased upper extremity function, decreased lower extremity function, decreased safety awareness, abnormal tone, decreased ROM, decreased coordination    Rehab potential is fair.    Activity tolerance: Fair    Discharge recommendations:  (TBD- will need assistance upon D/C)     Barriers to discharge: Decreased caregiver support    Equipment recommendations: wheelchair     GOALS:   Physical Therapy Goals        Problem: Physical Therapy Goal    Goal Priority Disciplines Outcome Goal Variances Interventions   Physical Therapy Goal     PT/OT, PT Ongoing (interventions implemented as appropriate)     Description:  Goals to be met by: 2 weeks     Patient will increase functional independence with mobility by performin. Supine to sit with Stand-by Assistance  2. Sit to supine with Stand-by Assistance  3. Sit to stand transfer with Minimal Assistance  4. Bed to chair transfer with Minimal Assistance using Rolling Walker  5. Gait  x 20 feet with Moderate Assistance using Rolling Walker. Met (3/26/2017)  6. Wheelchair propulsion x75 feet with Stand-by Assistance using BUE/BLE  7. Ascend/descend 3 stair with left Handrails Moderate Assistance.   8. Stand for 3 minutes with Stand-by Assistance using Rolling Walker  9. Lower extremity exercise program x20 reps per handout, with assistance as needed  10. New Goal (3/26/2017): Gait  x 50 feet with Moderate Assistance using Rolling  Walker.                  PLAN:    Patient to be seen 5 x/week  to address the above listed problems via gait training, therapeutic activities, therapeutic exercises, wheelchair management/training  Plan of Care expires: 04/17/17  Plan of Care reviewed with: patient    Angelica NGUYEN Ribera, PT  04/10/2017

## 2017-04-10 NOTE — PROGRESS NOTES
04/10/2017  2:51 PM  Spoke with Leonor at [facility] ([id number]) to inform new consult placed. Leonor stated she would talk with Dr Leung and submit to [facility] if appropriate.  Kelsie Renee RN, CM Skilled  [phone]

## 2017-04-10 NOTE — PROGRESS NOTES
"Patient threaten to get out of bed without assistance with use of walker @ bedside. This nurse sitting near room and informed patient who had gotten back to bed @ approx 5pm with assistance of nurse assistance. When patient was tolded that he could not get up just yet because this nurse was completing rounds, patient became very belligerent and used in appropriate language, such as,'You not gonna tell me what to do,"I don;t give a damn what you say," "I'm going to get up." This nurse notified charge nurse who came to assist in the matter.  "

## 2017-04-10 NOTE — PLAN OF CARE
Problem: Physical Therapy Goal  Goal: Physical Therapy Goal  Goals to be met by: 2 weeks     Patient will increase functional independence with mobility by performin. Supine to sit with Stand-by Assistance  2. Sit to supine with Stand-by Assistance  3. Sit to stand transfer with Minimal Assistance  4. Bed to chair transfer with Minimal Assistance using Rolling Walker  5. Gait x 20 feet with Moderate Assistance using Rolling Walker. Met (3/26/2017)  6. Wheelchair propulsion x75 feet with Stand-by Assistance using BUE/BLE  7. Ascend/descend 3 stair with left Handrails Moderate Assistance.   8. Stand for 3 minutes with Stand-by Assistance using Rolling Walker  9. Lower extremity exercise program x20 reps per handout, with assistance as needed  10. New Goal (3/26/2017): Gait x 50 feet with Moderate Assistance using Rolling Walker.   LTGs remain appropriate. Pt will continue PT POC.     Angelica Ribera, PT  4/10/2017

## 2017-04-11 ENCOUNTER — TELEPHONE (OUTPATIENT)
Dept: NEUROLOGY | Facility: CLINIC | Age: 52
End: 2017-04-11

## 2017-04-11 PROCEDURE — 97530 THERAPEUTIC ACTIVITIES: CPT

## 2017-04-11 PROCEDURE — 11000004 HC SNF PRIVATE

## 2017-04-11 PROCEDURE — 25000003 PHARM REV CODE 250: Performed by: INTERNAL MEDICINE

## 2017-04-11 PROCEDURE — 97116 GAIT TRAINING THERAPY: CPT

## 2017-04-11 PROCEDURE — 25000003 PHARM REV CODE 250: Performed by: PHYSICIAN ASSISTANT

## 2017-04-11 PROCEDURE — 97535 SELF CARE MNGMENT TRAINING: CPT

## 2017-04-11 PROCEDURE — 25000003 PHARM REV CODE 250: Performed by: NURSE PRACTITIONER

## 2017-04-11 PROCEDURE — 97110 THERAPEUTIC EXERCISES: CPT

## 2017-04-11 RX ADMIN — TRAZODONE HYDROCHLORIDE 100 MG: 50 TABLET ORAL at 10:04

## 2017-04-11 RX ADMIN — BACLOFEN 20 MG: 10 TABLET ORAL at 06:04

## 2017-04-11 RX ADMIN — BACLOFEN 20 MG: 10 TABLET ORAL at 11:04

## 2017-04-11 RX ADMIN — GABAPENTIN 900 MG: 300 CAPSULE ORAL at 06:04

## 2017-04-11 RX ADMIN — ACETAMINOPHEN 650 MG: 325 TABLET, FILM COATED ORAL at 11:04

## 2017-04-11 RX ADMIN — OXYCODONE HYDROCHLORIDE 15 MG: 5 TABLET ORAL at 06:04

## 2017-04-11 RX ADMIN — BACLOFEN 20 MG: 10 TABLET ORAL at 05:04

## 2017-04-11 RX ADMIN — DIAZEPAM 5 MG: 5 TABLET ORAL at 06:04

## 2017-04-11 RX ADMIN — DANTROLENE SODIUM 50 MG: 50 CAPSULE ORAL at 05:04

## 2017-04-11 RX ADMIN — DANTROLENE SODIUM 50 MG: 50 CAPSULE ORAL at 11:04

## 2017-04-11 RX ADMIN — ACETAMINOPHEN 650 MG: 325 TABLET, FILM COATED ORAL at 06:04

## 2017-04-11 RX ADMIN — DANTROLENE SODIUM 50 MG: 50 CAPSULE ORAL at 06:04

## 2017-04-11 RX ADMIN — DIAZEPAM 5 MG: 5 TABLET ORAL at 10:04

## 2017-04-11 RX ADMIN — RIVAROXABAN 20 MG: 20 TABLET, FILM COATED ORAL at 06:04

## 2017-04-11 RX ADMIN — OXYBUTYNIN CHLORIDE 5 MG: 5 TABLET, EXTENDED RELEASE ORAL at 08:04

## 2017-04-11 RX ADMIN — STANDARDIZED SENNA CONCENTRATE AND DOCUSATE SODIUM 1 TABLET: 8.6; 5 TABLET, FILM COATED ORAL at 08:04

## 2017-04-11 RX ADMIN — GABAPENTIN 900 MG: 300 CAPSULE ORAL at 10:04

## 2017-04-11 RX ADMIN — CITALOPRAM HYDROBROMIDE 20 MG: 20 TABLET ORAL at 08:04

## 2017-04-11 RX ADMIN — OXYCODONE HYDROCHLORIDE 15 MG: 5 TABLET ORAL at 10:04

## 2017-04-11 RX ADMIN — OXYCODONE HYDROCHLORIDE 15 MG: 5 TABLET ORAL at 02:04

## 2017-04-11 RX ADMIN — CARBAMAZEPINE 400 MG: 100 CAPSULE, EXTENDED RELEASE ORAL at 01:04

## 2017-04-11 RX ADMIN — CARBAMAZEPINE 400 MG: 100 CAPSULE, EXTENDED RELEASE ORAL at 10:04

## 2017-04-11 RX ADMIN — GABAPENTIN 900 MG: 300 CAPSULE ORAL at 02:04

## 2017-04-11 NOTE — PLAN OF CARE
Problem: Occupational Therapy Goal  Goal: Occupational Therapy Goal  Goals to be met by: 3 weeks     Patient will increase functional independence with ADLs by performing:    Feeding with Set-up Assistance. Met  UE Dressing with Stand-by Assistance.--met   LE Dressing with Minimal Assistance.--met;however, occasional mod A depending on muscle tone  Grooming while seated with Modified [place].--met  Toileting from bedside commode with Minimal Assistance for hygiene and clothing management.   Bathing from shower chair/bench with Minimal Assistance.--met  Sitting at edge of bed x20 minutes with Supervision.--met  Rolling to Bilateral with Stand-by Assistance. --met  Supine to sit with Minimal Assistance.--met  Stand pivot transfers with Minimal Assistance.--met  Toilet transfer to bedside commode with Minimal Assistance.  L Upper extremity self ROM exercise program x15 reps per handout, with independence.   Outcome: Ongoing (interventions implemented as appropriate)  Progressing. Needs max encouragement. GISEL Sanchez  4/11/2017

## 2017-04-11 NOTE — PT/OT/SLP PROGRESS
Occupational Therapy  Treatment    Mao Levin   MRN: [id number]   Admitting Diagnosis: MS (multiple sclerosis)    OT Date of Treatment: 04/11/17  Total Time (min): 55 min    Billable Minutes:  Self Care/Home Management 30 and Therapeutic Activity 25    General Precautions: Standard, fall  Orthopedic Precautions: N/A  Braces: N/A    Do you have any cultural, spiritual, Religion conflicts, given your current situation?: no    Subjective:  Communicated with pt prior to session.  Pt agreeable to shower    Pain Rating:  (I just deal with the pain. It;s always there)                   Objective:  Patient found with:  (supine in bed with brief saturated)    Functional Status:  MDS G  Bed Mobility Functional Status: Sup supine to sit with rail and increased time  Transfer Functional Status: CGA-Min (A)--bed to w/c SBA no AD; w/c to and from TTB with grab bar CGA; sit to stand from TTB and w/c with grab bar CGA to min A  Locomotion on Unit Functional Status: Sup w/c mobility from room to and from shower room and in room to and from bathroom  Dressing Functional Status: Min (A)--sup with UBD; min A with pants; SBA with socks--increased time; mod A with brief standing--from w/c  Personal Hygiene Functional Status: mod(I)  Seated at sink to shave and oral hygiene  Bathing Functional Status: -Min (A) from TTB with grab bars --A with complete buttocks       Additional Treatment:  Safety during standing tasks    Patient left up in chair with call button in reach and shaving at sink    ASSESSMENT:  Pt tolerated tx and demonstrated increased indep with transfers, LBD, bathing and standing tasks.  Pt cont to benefit from OT to increase functional indep and safety.  Pt requires encouragement to perform difficult tasks himself as pt initially attempts to ask for assistance as though he is unable to perform tasks    Rehab identified problem list/impairments: impaired endurance, impaired self care skills, impaired functional mobilty,  decreased upper extremity function, decreased safety awareness    Rehab potential is good    Activity tolerance: Good    Discharge recommendations: rehabilitation facility     Barriers to discharge: Decreased caregiver support     Equipment recommendations: wheelchair (drop arm commode)     GOALS:   Occupational Therapy Goals        Problem: Occupational Therapy Goal    Goal Priority Disciplines Outcome Interventions   Occupational Therapy Goal     OT, PT/OT Ongoing (interventions implemented as appropriate)    Description:  Goals to be met by: 3 weeks     Patient will increase functional independence with ADLs by performing:    Feeding with Set-up Assistance. Met  UE Dressing with Stand-by Assistance.--met   LE Dressing with Minimal Assistance.--met;however, occasional mod A depending on muscle tone  Grooming while seated with Modified [place].--met  Toileting from bedside commode with Minimal Assistance for hygiene and clothing management.   Bathing from  shower chair/bench with Minimal Assistance.--met  Sitting at edge of bed x20 minutes with Supervision.--met  Rolling to Bilateral with Stand-by Assistance. --met  Supine to sit with Minimal Assistance.--met  Stand pivot transfers with Minimal Assistance.--met  Toilet transfer to bedside commode with Minimal Assistance.  L Upper extremity self ROM exercise program x15 reps per handout, with independence.                   Plan:  Patient to be seen 5 x/week to address the above listed problems via self-care/home management, therapeutic exercises, therapeutic activities  Plan of Care expires: 04/18/17  Plan of Care reviewed with: patient    GISEL Sanchez  04/11/2017

## 2017-04-11 NOTE — PLAN OF CARE
04/11/2017  9:31 AM     04/11/17 0931   Medicare Message   Important Message from Medicare regarding Discharge Appeal Rights Given to patient/caregiver;Explained to patient/caregiver;Signed/date by patient/caregiver   DONNA served NOMNC to patient notifying of discharge date of 4/14/17 and appeal right.  Patient expressed understanding and satisfaction regarding discharge date.  Patient signed NOMNC, and DONNA provided patient with copy.  [facility] [phone][facility] and placed original in patient's blue chart in nurse's station.    DONNA notified pt that, per ALMAZ Iglesias, [place] denied admission to inpatient rehabilitation services.  Pt expressed understanding regarding same.  Pt believes that he still needs inpatient rehab services and plans on calling [facility] to discuss same.  DONNA provided pt with [facility] phone number ([phone]) to call.  However, pt states plans of preparing for d/c home on 4/14/17.  Pt reports mother is in [place] right now but will be returning to [place] to assist when he is d/cd.  Pt also reports having a sister who can assist on the weekends and a girlfriend who can assist in the evenings since she works full-time during the day.  Pt denies having concerns about amount of assistance available at home upon SNF d/c.  Pt lives alone in 1  with 3 VASILIY with [facility].  Pt has a tub/shower combo and owns a rollator and TTB.  Pt denies having preference for home health placement.  Pt will try to have personal transportation arranged for d/c but states might having to use [facility] transportation services.  DONNA called [employer] ([phone]) to confirm pt can use this service at time of SNF d/c.  SW will f/u as needed.    Adal Beard, [facility]  [phone]

## 2017-04-11 NOTE — TREATMENT PLAN
"Rehab Services' DME recommendations    Mao Levin  MRN: [id number]    [x] Walker Adult (5'4"-6"6")    Accessories N/A    Wheels Yes    [x] Wheelchair  Number of hours up in a wheelchair per day 6-8    Style Light weight    Seat Width 18 (Standard adult)    Seat Depth 18    Back Height Standard    Leg Support Standard    Arm Height Full and Swing Away    Lap Belt none    Accessories Front Brakes and Anti-tippers    Cushion Basic    Justification for Cushion to prevent pressure sores    Justification for wheelchair order: (Please select all that apply) Caregiver is capable and willing to operate wheelchair safely, Patient's upper body strength is sufficient for propulsion and Patient mobility limitations cannot be sufficiently resolved by the use of other ambulatory therapies      [x] 3 in 1 commode Drop arm    [x] Hospital bed Semi Electric    Patient requires a bed height different than a fixed height hospital bed to permit transfers to chair, wheelchair or standing. Yes    Accessories Gel overlay mattress    [x] Home health PT, OT and Fe Ribera, PT 4/11/2017        "

## 2017-04-11 NOTE — PROGRESS NOTES
04/11/2017  11:40 AM  Received a call from Fausto at [facility] stating patient appealed discharge date of  4/14/2017. Copy of chart and signed NOMNC faxed to [facility] at [phone].    Your fax has been successfully sent to [phone] at [phone].  ------------------------------------------------------------  From: [phone]  ------------------------------------------------------------  Time: 4/11/2017 1:06:41 PM  Sent to [phone] with remote ID "  Result: (0/339;0/0) Successful Send  Page record: 1 - 64  Elapsed time: 23:27 on channel 56    Kelsie Renee RN,  Skilled  [phone]

## 2017-04-11 NOTE — PROGRESS NOTES
04/11/2017  9:01 AM  Discharge Planning- Message left with Dr. Boyce's office ( [phone]) for a follow up appt for patient.    Kelsie Renee RN, CM Skilled  [phone]

## 2017-04-11 NOTE — PROGRESS NOTES
Recommendations  1. Continue current diet order with double portions + oral nutrition supplement TID   2. RD to monitor  Goals: Pt to consume >85% EEN and EPN  Nutrition Goal Status: goal met  Communication of RD Recs: other     Continuum of Care Plan  D/C planning: pt to consume > 85% EEN and EPN         Reason for Assessment     Reason for Assessment: RD follow-up  Diagnosis: other (see comments) (multiple sclerosis)  Relevent Medical History: CHF, cancer, COPD, DM2, HTN, stroke   Interdisciplinary Rounds: did not attend  General Information Comments: Pt with good appetite, generally >75%     Nutrition Prescription Ordered  Current Diet Order: Regular + Boost Plus TID  Nutrition Order Comments: double portions  Oral Nutrition Supplement: Boost Plus Chocolate      Evaluation of Received Nutrients/Fluid Intake  Pt consuming 100% meals   Energy Calories Required: meeting needs  Protein Required: meeting needs  Tolerance: tolerating      Nutrition Risk Screen  Nutrition Risk Screen: no indicators present     Nutrition/Diet History  Patient Reported Diet/Restrictions/Preferences: general     Labs/Tests/Procedures/Meds  Pertinent Labs Reviewed: reviewed, pertinent  Pertinent Medications Reviewed: reviewed, pertinent  Pertinent Medications Comments:  senna dosucate     Physical Findings  Overall Physical Appearance: other (see comments), loss of muscle mass (nourished)  Oral/Mouth Cavity: WDL  Skin: intact, other (see comments) (Huy 16)     Anthropometrics  Height (inches): 70.98 in  Weight Method: Standard Scale  Weight (kg): 79.5 kg  Ideal Body Weight (IBW), Male: 175.88 lb  % Ideal Body Weight, Male (lb): 102. lb  BMI (kg/m2): 24.36  BMI Grade: 18.5-24.9 - normal     Estimated/Assessed Needs  Weight Used For Calorie Calculations: 79.5 kg (174 lb 9.7 oz)   Height (cm): 180.3 cm  Energy Need Method: [place]-St Vargheseor, other (see comments) (2003-2170kcal/day (1.2-1.3 PAL))  RMR (Adair-St. Jeor Equation):  1672.16  Weight Used For Protein Calculations: 79.5kg (174 lb 9.7 oz)  Protein Requirements: 79-95g/day (1.0-1.2g/kg)  1.0 gm Protein (gm): 79.37 and 1.2 gm Protein (gm): 95.24  Fluid Need Method: RDA Method (1mL/kcal)     Monitor and Evaluation     Food and Nutrient Intake: energy intake, food and beverage intake  Food and Nutrient Adminstration: diet order  Knowledge/Beliefs/Attitudes: food and nutrition knowledge/skill  Anthropometric Measurements: weight change, weight, body mass index  Biochemical Data, Medical Tests and Procedures: electrolyte and renal panel, gastrointestinal profile, glucose/endocrine profile, inflammatory profile, lipid profile  Nutrition-Focused Physical Findings: overall appearance, skin     Nutrition Risk     Level of Risk: other (see comments) (1x/week)     Nutrition Follow-Up     RD Follow-up?: Yes     Assessment and Plan     Increased energy needs related to physiological needs as evidenced by increased EEN and EPN 2/2 COPD  continues

## 2017-04-11 NOTE — PT/OT/SLP PROGRESS
"Physical Therapy  Treatment    Mao Levin   MRN: [id number]   Admitting Diagnosis: MS (multiple sclerosis)    PT Received On: 04/11/17  Total Time (min): 69       Billable Minutes: 69    Gait [id number], Therapeutic Activity 44 and Therapeutic Exercise 10    Treatment Type: Treatment  PT/PTA: PTA     PTA Visit Number: 1       General Precautions: Standard, fall  Orthopedic Precautions: N/A   Braces: N/A    Do you have any cultural, spiritual, Lutheran conflicts, given your current situation?: no    Subjective:  Communicated with nsg prior to session. "1st attempt pt sleeping and needing to be changed, 2nd attempt pt agreeable to therapy "I need my pain meds first ant to be stretched"      Pain Rating:  (did not rate "bad")  Location - Side: Bilateral  Location - Orientation: generalized  Location: leg  Pain Addressed: Pre-medicate for activity, Reposition, Distraction, Cessation of Activity, Nurse notified       Objective:   Patient found with:  (in bed)       Functional Status:  MDS G  Bed Mobility Functional Status: S-SBA  Transfer Functional Status: CGA-Min (A)  Transfer Level of (A): 3: Two+ persons physical (A)  Walk in Room Functional Status: CGA-Min (A)  Walk in Corridor Functional Status: CGA-Min (A)  Walk In Corridor Level of (A): 3: Two+ persons physical (A)          Bed Mobility:  Sit>Supine: SBA with bed rail  Supine>Sit: min A with LEs 1st trial, SBA 2nd trials with good effort, inc time    Transfers:  Sit<>Stand: mod/min x 1 trial, min x 2nd trial with RW  Stand and sqt Pivot Transfer: EOB<>WC min x 2 ppl 1st trial, min x 1 person 2nd trial      Gait:  1st attempt pt amb ~ 3 ft had a bladder accident, return pt back to bed for cleaning/changing total A  Amb with RW min A vcs for posture,RW mgmt/advancement/closeness ~70 ft no LOB, wc follow, LLE ace wrap into DF    Therex:  BLE gastroc/hamstring stretch x3:15 sec holds, ed pt how to perform, pt demonstrated/verbalized understanding  RLE AP and " LAQ    Patient left up in chair with call button in reach and belongings inreach.    Assessment:  Mao Levin is a 51 y.o. male with a medical diagnosis of MS (multiple sclerosis).  Pt tolerated well, pt would continue to benefit from skilled PT services to improve overall functional mobility, strength and endurance.  .    Rehab identified problem list/impairments: weakness, impaired endurance, impaired self care skills, impaired functional mobilty, impaired balance, decreased upper extremity function, decreased lower extremity function, decreased safety awareness, abnormal tone, decreased ROM, decreased coordination    Rehab potential is fair.    Activity tolerance: Fair    Discharge recommendations:  (TBD- will need assistance upon D/C)     Barriers to discharge: Decreased caregiver support    Equipment recommendations: wheelchair     GOALS:   Physical Therapy Goals        Problem: Physical Therapy Goal    Goal Priority Disciplines Outcome Goal Variances Interventions   Physical Therapy Goal     PT/OT, PT Ongoing (interventions implemented as appropriate)     Description:  Goals to be met by: 2 weeks     Patient will increase functional independence with mobility by performin. Supine to sit with Stand-by Assistance  2. Sit to supine with Stand-by Assistance  3. Sit to stand transfer with Minimal Assistance  4. Bed to chair transfer with Minimal Assistance using Rolling Walker  5. Gait  x 20 feet with Moderate Assistance using Rolling Walker. Met (3/26/2017)  6. Wheelchair propulsion x75 feet with Stand-by Assistance using BUE/BLE  7. Ascend/descend 3 stair with left Handrails Moderate Assistance.   8. Stand for 3 minutes with Stand-by Assistance using Rolling Walker  9. Lower extremity exercise program x20 reps per handout, with assistance as needed  10. New Goal (3/26/2017): Gait  x 50 feet with Moderate Assistance using Rolling Walker.                  PLAN:    Patient to be seen 5 x/week  to address the  above listed problems via gait training, therapeutic activities, therapeutic exercises, wheelchair management/training  Plan of Care expires: 04/17/17  Plan of Care reviewed with: patient    Chely Gudino, PTA  04/11/2017

## 2017-04-11 NOTE — PLAN OF CARE
Problem: Patient Care Overview  Goal: Plan of Care Review  Outcome: Ongoing (interventions implemented as appropriate)    04/10/17 2323   Coping/Psychosocial   Plan Of Care Reviewed With patient         Problem: Mobility, Physical Impaired (Adult)  Intervention: Monitor/Assist with Self Care    04/10/17 2323   Activity   Activity Assistance Provided assistance, 1 person;assistance, 2 people   Daily Care Interventions   Self-Care Promotion BADL personal objects within reach;BADL personal routines maintained;meal setup provided       Intervention: Optimize Mobility    04/10/17 2323   Activity   Activity Type activity adjusted per tolerance;bedrest with commode;dorsiflexion, plantar flexion encouraged;ROM, active encouraged;sitting, edge of bed;up in chair         Goal: Identify Related Risk Factors and Signs and Symptoms  Related risk factors and signs and symptoms are identified upon initiation of Human Response Clinical Practice Guideline (CPG)   Outcome: Ongoing (interventions implemented as appropriate)    04/10/17 2323   Mobility, Physical Impaired   Related Risk Factors (Physical Mobility, Impaired) activity intolerance;fatigue;pain/discomfort   Signs and Symptoms (Physical Mobility Impaired) decreased balance;holding on to furniture

## 2017-04-11 NOTE — TELEPHONE ENCOUNTER
----- Message from Edelmira De Oliveira sent at 4/11/2017  9:00 AM CDT -----  Contact: nadege    [facility] nursing     [id number]  Calling to schedule a f/u appt with sergey castaneda call.

## 2017-04-11 NOTE — PLAN OF CARE
Problem: Patient Care Overview  Goal: Plan of Care Review  Outcome: Ongoing (interventions implemented as appropriate)    04/11/17 3867   Coping/Psychosocial   Plan Of Care Reviewed With patient         Comments:   Pt turns and repositions independently. No new skin breakdown noted. Pt pain and safety monitored q 1-2 hrs this shift. Pt escorted downstairs by staff multiple times throughout shift. Bed locked and in lowest position. Rails elevated x 3. Brakes on. Call light and personal belongings in reach. Will continue to monitor.

## 2017-04-12 PROCEDURE — 97535 SELF CARE MNGMENT TRAINING: CPT

## 2017-04-12 PROCEDURE — 97530 THERAPEUTIC ACTIVITIES: CPT

## 2017-04-12 PROCEDURE — 97116 GAIT TRAINING THERAPY: CPT

## 2017-04-12 PROCEDURE — 25000003 PHARM REV CODE 250: Performed by: NURSE PRACTITIONER

## 2017-04-12 PROCEDURE — 25000003 PHARM REV CODE 250: Performed by: PHYSICIAN ASSISTANT

## 2017-04-12 PROCEDURE — 11000004 HC SNF PRIVATE

## 2017-04-12 PROCEDURE — 97110 THERAPEUTIC EXERCISES: CPT

## 2017-04-12 PROCEDURE — 25000003 PHARM REV CODE 250: Performed by: INTERNAL MEDICINE

## 2017-04-12 PROCEDURE — 99309 SBSQ NF CARE MODERATE MDM 30: CPT | Mod: ,,, | Performed by: NURSE PRACTITIONER

## 2017-04-12 RX ADMIN — ACETAMINOPHEN 650 MG: 325 TABLET, FILM COATED ORAL at 07:04

## 2017-04-12 RX ADMIN — BACLOFEN 20 MG: 10 TABLET ORAL at 11:04

## 2017-04-12 RX ADMIN — GABAPENTIN 900 MG: 300 CAPSULE ORAL at 06:04

## 2017-04-12 RX ADMIN — BACLOFEN 20 MG: 10 TABLET ORAL at 05:04

## 2017-04-12 RX ADMIN — DANTROLENE SODIUM 50 MG: 50 CAPSULE ORAL at 11:04

## 2017-04-12 RX ADMIN — OXYCODONE HYDROCHLORIDE 15 MG: 5 TABLET ORAL at 03:04

## 2017-04-12 RX ADMIN — CITALOPRAM HYDROBROMIDE 20 MG: 20 TABLET ORAL at 09:04

## 2017-04-12 RX ADMIN — OXYCODONE HYDROCHLORIDE 15 MG: 5 TABLET ORAL at 11:04

## 2017-04-12 RX ADMIN — OXYCODONE HYDROCHLORIDE 15 MG: 5 TABLET ORAL at 07:04

## 2017-04-12 RX ADMIN — CARBAMAZEPINE 400 MG: 100 CAPSULE, EXTENDED RELEASE ORAL at 09:04

## 2017-04-12 RX ADMIN — BACLOFEN 20 MG: 10 TABLET ORAL at 06:04

## 2017-04-12 RX ADMIN — OXYBUTYNIN CHLORIDE 5 MG: 5 TABLET, EXTENDED RELEASE ORAL at 09:04

## 2017-04-12 RX ADMIN — CARBAMAZEPINE 400 MG: 100 CAPSULE, EXTENDED RELEASE ORAL at 08:04

## 2017-04-12 RX ADMIN — GABAPENTIN 900 MG: 300 CAPSULE ORAL at 01:04

## 2017-04-12 RX ADMIN — TRAZODONE HYDROCHLORIDE 100 MG: 50 TABLET ORAL at 08:04

## 2017-04-12 RX ADMIN — DIAZEPAM 5 MG: 5 TABLET ORAL at 08:04

## 2017-04-12 RX ADMIN — DANTROLENE SODIUM 50 MG: 50 CAPSULE ORAL at 05:04

## 2017-04-12 RX ADMIN — GABAPENTIN 900 MG: 300 CAPSULE ORAL at 11:04

## 2017-04-12 RX ADMIN — DANTROLENE SODIUM 50 MG: 50 CAPSULE ORAL at 06:04

## 2017-04-12 RX ADMIN — DIAZEPAM 5 MG: 5 TABLET ORAL at 07:04

## 2017-04-12 RX ADMIN — RIVAROXABAN 20 MG: 20 TABLET, FILM COATED ORAL at 05:04

## 2017-04-12 NOTE — PLAN OF CARE
Problem: Physical Therapy Goal  Goal: Physical Therapy Goal  Goals to be met by: 2 weeks     Patient will increase functional independence with mobility by performin. Supine to sit with Stand-by Assistance  2. Sit to supine with Stand-by Assistance  3. Sit to stand transfer with Minimal Assistance MET  4. Bed to chair transfer with Minimal Assistance using Rolling Walker   5. Gait x 20 feet with Moderate Assistance using Rolling Walker. Met (3/26/2017)  6. Wheelchair propulsion x75 feet with Stand-by Assistance using BUE/BLE  7. Ascend/descend 3 stair with left Handrails Moderate Assistance.   8. Stand for 3 minutes with Stand-by Assistance using Rolling Walker  9. Lower extremity exercise program x20 reps per handout, with assistance as needed MET  10. New Goal (3/26/2017): Gait x 50 feet with Moderate Assistance using Rolling Walker.   Outcome: Ongoing (interventions implemented as appropriate)  Goals remain appropriate

## 2017-04-12 NOTE — PT/OT/SLP PROGRESS
"Physical Therapy  Treatment    Mao Levin   MRN: [id number]   Admitting Diagnosis: MS (multiple sclerosis)    PT Received On: 04/12/17  Total Time (min): 54       Billable Minutes: 54    Gait [id number], Therapeutic Activity 15 and Therapeutic Exercise 24    Treatment Type: Treatment  PT/PTA: PTA     PTA Visit Number: 2       General Precautions: Standard, fall  Orthopedic Precautions: N/A   Braces: N/A    Do you have any cultural, spiritual, Anabaptist conflicts, given your current situation?: no    Subjective:  Communicated with nsg prior to session. "lets go, we can do it"      Pain Rating:  (did not rate)  Location - Side: Bilateral  Location - Orientation: generalized  Location: leg  Pain Addressed: Pre-medicate for activity, Reposition, Distraction, Cessation of Activity, Nurse notified  Pain Rating Post-Intervention:  (throughout session)    Objective:   Patient found with:  (in bed)       Functional Status:  MDS G  Bed Mobility Functional Status: CGA-Min (A)  Transfer Functional Status: CGA-Min (A)  Transfer Level of (A): 3: Two+ persons physical (A) (for safety)  Walk in Corridor Functional Status: CGA-Min (A)  Walk In Corridor Level of (A): 3: Two+ persons physical (A)          Bed Mobility:  Supine>Sit: min A with trunk elev, vcs for tech, inc time difficulty coming up on L side  B rolling with rail SBA/S    Transfers:  Sit<>Stand: min/CGA with RW  Sqt Pivot Transfer: EOB>WC min A x 1 person, 2nd person SBA for safety      Gait:  Amb with RW min A, LLE ace wrap into DF ~ 85 ft to include negotiating turn, inc time, occ vcs for RW mgmt/ inc LLE step length    Wheelchair Mobility:  Patient propels w/c : pt refused "I can't, Im not gonna be in here long"    Therex:  2x10 reps A/AA as needed L>R , AP,LAQ hip flex,abd/add  B LE hamstring/gastroc stretch x3:15 sec hold    Patient left up in chair with call button in reach and belongings in reach.    Assessment:  Mao Levin is a 51 y.o. male with a medical " diagnosis of MS (multiple sclerosis).  Pt tolerated well, slowly progressing with overall functional mobility, continues to require vcs for inc safety awareness, pt not always receptive education/instructions , pt would continue to benefit from skilled PT services to improve overall functional mobility, strength and endurance.  .    Rehab identified problem list/impairments: weakness, impaired endurance, impaired self care skills, impaired functional mobilty, impaired balance, decreased upper extremity function, decreased lower extremity function, decreased safety awareness, abnormal tone, decreased ROM, decreased coordination    Rehab potential is good./fair    Activity tolerance: Good /fair    Discharge recommendations:  (TBD- will need assistance upon D/C)     Barriers to discharge: Decreased caregiver support    Equipment recommendations: wheelchair     GOALS:   Physical Therapy Goals        Problem: Physical Therapy Goal    Goal Priority Disciplines Outcome Goal Variances Interventions   Physical Therapy Goal     PT/OT, PT Ongoing (interventions implemented as appropriate)     Description:  Goals to be met by: 2 weeks     Patient will increase functional independence with mobility by performin. Supine to sit with Stand-by Assistance  2. Sit to supine with Stand-by Assistance  3. Sit to stand transfer with Minimal Assistance MET  4. Bed to chair transfer with Minimal Assistance using Rolling Walker   5. Gait  x 20 feet with Moderate Assistance using Rolling Walker. Met (3/26/2017)  6. Wheelchair propulsion x75 feet with Stand-by Assistance using BUE/BLE  7. Ascend/descend 3 stair with left Handrails Moderate Assistance.   8. Stand for 3 minutes with Stand-by Assistance using Rolling Walker  9. Lower extremity exercise program x20 reps per handout, with assistance as needed MET  10. New Goal (3/26/2017): Gait  x 50 feet with Moderate Assistance using Rolling Walker.                   PLAN:    Patient to be  seen 5 x/week  to address the above listed problems via gait training, therapeutic activities, therapeutic exercises, wheelchair management/training  Plan of Care expires: 04/17/17  Plan of Care reviewed with: patient    Chely Terese, PTA  04/12/2017

## 2017-04-12 NOTE — PT/OT/SLP PROGRESS
Occupational Therapy  Treatment    Mao Levin   MRN: [id number]   Admitting Diagnosis: MS (multiple sclerosis)    OT Date of Treatment: 17  Total Time (min): 54 min    Billable Minutes:  Self Care/Home Management 20, Therapeutic Activity 14 and Therapeutic Exercise 20    General Precautions: Standard, fall  Orthopedic Precautions: N/A  Braces: N/A    Do you have any cultural, spiritual, Caodaism conflicts, given your current situation?: no    Subjective:  Communicated with nsg prior to session.  I am wet     Pain Ratin/10  Location - Side: Bilateral     Location: leg          Objective:   Pt. Found seated in w/c    Functional Status:  MDS G  Bed Mobility Functional Status: mod(A)-Max(A) for sit to supine  Transfer Functional Status: mod(A)-Max(A) form w/c with holding on to bed rails Pt. With R lateral lean on this day cues for posture  Transfer Level of (A): 3: Two+ persons physical (A)  Dressing Functional Status: 3:mod(A)-Max(A) to doff/christopher pants and one to (A) with bal to maintain   Dressing Level of (A) : 3: Two+ persons physical (A)  Toilet Use Functional Status: total(A) Pt. Saturated in urine and flowing out of diaper        OT Exercises: UE Ergometer 10 min  Lat pull downs 25# 4 x 25 reps    Additional Treatment:  Pt. With AAROM with stretches of LUE to increase grasp and flex/ext pattern with overall ROM Pt. With difficulty with ext of digits . Pt also with wrist flex/ext and supination/pronation     Patient left supine with all lines intact and call button in reach    ASSESSMENT:  Mao Levin is a 51 y.o. male with a medical diagnosis of MS (multiple sclerosis) Pt. participated well with session on this day. Pt still continues to requires cues with aspects of safety Pt. Also is always found to be constantly saturated and incot of urine  Explained to Pt he needs to start using urinal more. Pt. Will continue to benefit from continued OT to progress towards goals.    Rehab identified problem  list/impairments: impaired endurance, impaired self care skills, impaired functional mobilty, decreased upper extremity function, decreased safety awareness    Rehab potential is good    Activity tolerance: Good    Discharge recommendations: rehabilitation facility     Barriers to discharge: Decreased caregiver support     Equipment recommendations: wheelchair (drop arm commode)     GOALS:   Occupational Therapy Goals        Problem: Occupational Therapy Goal    Goal Priority Disciplines Outcome Interventions   Occupational Therapy Goal     OT, PT/OT Ongoing (interventions implemented as appropriate)    Description:  Goals to be met by: 3 weeks     Patient will increase functional independence with ADLs by performing:    Feeding with Set-up Assistance. Met  UE Dressing with Stand-by Assistance.--met   LE Dressing with Minimal Assistance.--met;however, occasional mod A depending on muscle tone  Grooming while seated with Modified [place].--met  Toileting from bedside commode with Minimal Assistance for hygiene and clothing management.   Bathing from  shower chair/bench with Minimal Assistance.--met  Sitting at edge of bed x20 minutes with Supervision.--met  Rolling to Bilateral with Stand-by Assistance. --met  Supine to sit with Minimal Assistance.--met  Stand pivot transfers with Minimal Assistance.--met  Toilet transfer to bedside commode with Minimal Assistance.  L Upper extremity self ROM exercise program x15 reps per handout, with independence.               Plan:  Patient to be seen 5 x/week to address the above listed problems via self-care/home management, therapeutic exercises, therapeutic activities  Plan of Care expires: 04/18/17  Plan of Care reviewed with: patient    WENDY Mustafa  04/12/2017

## 2017-04-12 NOTE — PLAN OF CARE
Problem: Occupational Therapy Goal  Goal: Occupational Therapy Goal  Goals to be met by: 3 weeks     Patient will increase functional independence with ADLs by performing:    Feeding with Set-up Assistance. Met  UE Dressing with Stand-by Assistance.--met   LE Dressing with Minimal Assistance.--met;however, occasional mod A depending on muscle tone  Grooming while seated with Modified [place].--met  Toileting from bedside commode with Minimal Assistance for hygiene and clothing management.   Bathing from shower chair/bench with Minimal Assistance.--met  Sitting at edge of bed x20 minutes with Supervision.--met  Rolling to Bilateral with Stand-by Assistance. --met  Supine to sit with Minimal Assistance.--met  Stand pivot transfers with Minimal Assistance.--met  Toilet transfer to bedside commode with Minimal Assistance.  L Upper extremity self ROM exercise program x15 reps per handout, with independence.   Outcome: Ongoing (interventions implemented as appropriate)  .    Comments:   .

## 2017-04-12 NOTE — PROGRESS NOTES
Progress Note  Skilled Nursing Facility    Admit Date: 3/17/2017  Follow-up for  MS (multiple sclerosis)  Anticipated Discharge Date:  4/14/2017    SUBJECTIVE:     History of Present Illness:  Patient is a 51 y.o. male with multiple sclerosis and hx of saddle PE who presents to SNF after hospitalization for an MS pseudoflare (worsened pain and weakness in BLE) due to E. Coli UTI. He was recently started on rituxan to treat MS. He was also recently admitted in 12/2016 for psuedoflare and he improved after inpatient rehab stay. He continues with pain rating it as 9-10/10 to his bilateral LE and requests decreased timing between oxycodone. No radiation of pain and oxycodone has been effective. He has left hand contraction which has been present since his last pseudoflare. He occasionally smokes at home. The patient has been admitted to SNF for ongoing PT/OT due to insufficient progress to go home safely from the hospital.      Follow-up for MS      Interval History: Seen patient at bedside, c/o 7/10 pain to left. States he has been participating in therapy. Patient is upset that he wasn't excepted into rehab and feel that he would make progress there. Explained to the patient that since he has been here that his physical progress has plateau and according to insurance he is not a candidate for inpatient rehab. No acute events overnight.      Review of Systems:  Constitutional: no fever or chills  Respiratory: no cough or shortness of breath  Cardiovascular: no chest pain or palpitations  Gastrointestinal: no nausea or vomiting, no abdominal pain or change in bowel habits  Genitourinary: no hematuria or dysuria  Integument/Breast: no rash or pruritis  Musculoskeletal: +7/10 pain to left hand, + for contracture to left hand, + for increased tone to upper extremities  Neurological: no seizures or tremors, + numbness  Behavioral/Psych: no auditory or visual hallucinations    Scheduled Meds:   baclofen  20 mg Oral QID     carbamazepine  400 mg Oral BID    citalopram  20 mg Oral Daily    dantrolene  50 mg Oral QID    ergocalciferol  50,000 Units Oral Q7 Days    gabapentin  900 mg Oral TID    nicotine  1 patch Transdermal Daily    oxybutynin  5 mg Oral Daily    polyethylene glycol  17 g Oral Daily    rivaroxaban  20 mg Oral Daily with dinner    senna-docusate 8.6-50 mg  1 tablet Oral Daily    trazodone  100 mg Oral QHS     Continuous Infusions:   PRN Meds:.acetaminophen, albuterol-ipratropium 2.5mg-0.5mg/3mL, aluminum-magnesium hydroxide-simethicone, bisacodyl, dextrose 50%, dextrose 50%, diazePAM, glucagon (human recombinant), glucose, glucose, metoclopramide HCl, ondansetron, [] oxycodone **FOLLOWED BY** oxycodone, promethazine, ramelteon      OBJECTIVE:     Vital Signs Range (Last 24H):  Temp:  [97.6 °F (36.4 °C)-98.6 °F (37 °C)] 97.6 °F (36.4 °C)  Pulse:  [64-75] 64  Resp:  [18-20] 18  SpO2:  [98 %] 98 %  BP: (128-135)/(77-80) 128/80    Physical Exam:  General: well developed, well nourished, no distress  Lungs: clear to auscultation bilaterally and normal respiratory effort  Cardiovascular: Heart: regular rate and rhythm, S1, S2 normal, no murmur, click, rub or gallop. Chest Wall: no tenderness.   Extremities: no cyanosis or edema, or clubbing. Pulses: 2+ and symmetric.  Abdomel: Abdomen: soft, non-tender non-distended; bowel sounds normal; no masses, no organomegaly.   Skin: Skin color, texture, turgor normal. No rashes or lesions  Musculoskeletal:no clubbing, cyanosis  Neurologic: Abnormal strength and tone, increased tone to upper extremities with left hand contracture. Generalized weakness.  Psych/Behavioral: Alert and oriented, appropriate affect.      Laboratory:    Recent Labs  Lab 17  0453 04/10/17  0517   WBC 6.18 5.97   HGB 11.7* 12.2*   HCT 36.1* 36.8*    182         Recent Labs  Lab 17  0453 04/10/17  0517    140   K 4.1 3.8    103   CO2 28 27   BUN 17 14   CREATININE 0.8  0.8   CALCIUM 8.6* 8.5*     Lab Results   Component Value Date    LABPROT 10.8 12/15/2016    ALBUMIN 3.4 (L) 03/13/2017     Lab Results   Component Value Date    CALCIUM 8.5 (L) 04/10/2017    PHOS 3.8 04/10/2017     Results for MAGALI ALCANTAR (MRN [id number]) as of 4/12/2017 12:11   Ref. Range 4/3/2017 04:27 4/6/2017 04:53 4/10/2017 05:17   Magnesium Latest Ref Range: 1.6 - 2.6 mg/dL 2.1 2.1 1.9         ASSESSMENT/PLAN:     Active Hospital Problems    Diagnosis  POA    *MS (multiple sclerosis) [G35]  Yes     Chronic    Depression [F32.9]  Yes    Smoker [F17.200]  Yes    E. coli urinary tract infection [N39.0, B96.20]  Yes    Polyneuropathy [G62.9]  Yes    10/25/2016 Saddle PE [I26.92]  Yes     Chronic    Vitamin D deficiency disease [E55.9]  Yes    Neurogenic bladder [N31.9]  Yes     Chronic    Spasticity [R25.2]  Yes    Chronic pain of multiple sites [R52, G89.29]  Yes     Chronic      Resolved Hospital Problems    Diagnosis Date Resolved POA   No resolved problems to display.         NEW OR UNSTABLE PROBLEM(S) TREATED TODAY:  MS (multiple sclerosis) [G35]  Chronic  -PT/OT to increase ambulation, ADL performance and endurance  -DVT ppx: rivaroxaban 20mg daily with dinner  -fall precautions  -bowel regimen: miralax and senokot-s1 tab daily to prevent/treat constipation; hold for frequent or loose stooling  -due for repeat rituxan in 6 months  -expected to resume home health once discharged but may opt for outpatient therapy as previously ordered  -he will need to f/u with PMR in 2 weeks after discharge and Neurology 2 weeks after discharge      Spasticity [R25.2]  -chronic and due to MS  -continue baclofen 20mg QID  -continue dantrolene 50mg QID      Polyneuropathy [G62.9]  -chronic  -continue gabapentin 900mg TID--recently increased  -continue carbamazepine 400mg BID      Chronic pain of multiple sites [R52, G89.29]  Chronic  -continue oxycodone every 4 hours prn and valium prn spasms; decrease frequency of  oxycodone during SNF stay to return to 15mg prn up to TID at home  -will continue to monitor and adjust regimen as necessary      Depression [F32.9]  -chronic  -continue celexa 20mg daily      Smoker [F17.200]  -chronic  -continue nicotine 14mg patch daily      Urinary tract infection without hematuria [N39.0] E. coli  -completed cipro for 13 doses to treat, end date on 3/24/17      10/25/2016 Saddle PE [I26.92]  Chronic  -continue rivaroxaban to prevent future thrombi      Vitamin D deficiency  -continue ergocalciferol 50,000 units every 7 days      Neurogenic bladder [N31.9]  Chronic  -continue oxybutynin 5mg daily  -bladder scan prn and I/O cath prn retention volume > 300cc     Future Appointments  Date Time Provider Department Center   7/17/2017 9:40 AM [facility]arre[facility] NP  Department of Hospital Medicine   [facility]

## 2017-04-13 LAB
ANION GAP SERPL CALC-SCNC: 8 MMOL/L
BASOPHILS # BLD AUTO: 0.05 K/UL
BASOPHILS NFR BLD: 0.9 %
BUN SERPL-MCNC: 16 MG/DL
CALCIUM SERPL-MCNC: 8.4 MG/DL
CHLORIDE SERPL-SCNC: 105 MMOL/L
CO2 SERPL-SCNC: 27 MMOL/L
CREAT SERPL-MCNC: 0.9 MG/DL
DIFFERENTIAL METHOD: ABNORMAL
EOSINOPHIL # BLD AUTO: 0.2 K/UL
EOSINOPHIL NFR BLD: 3.3 %
ERYTHROCYTE [DISTWIDTH] IN BLOOD BY AUTOMATED COUNT: 14.4 %
EST. GFR  (AFRICAN AMERICAN): >60 ML/MIN/1.73 M^2
EST. GFR  (NON AFRICAN AMERICAN): >60 ML/MIN/1.73 M^2
GLUCOSE SERPL-MCNC: 83 MG/DL
HCT VFR BLD AUTO: 36.3 %
HGB BLD-MCNC: 12 G/DL
LYMPHOCYTES # BLD AUTO: 2.4 K/UL
LYMPHOCYTES NFR BLD: 43.5 %
MAGNESIUM SERPL-MCNC: 2.1 MG/DL
MCH RBC QN AUTO: 29.5 PG
MCHC RBC AUTO-ENTMCNC: 33.1 %
MCV RBC AUTO: 89 FL
MONOCYTES # BLD AUTO: 0.7 K/UL
MONOCYTES NFR BLD: 11.9 %
NEUTROPHILS # BLD AUTO: 2.2 K/UL
NEUTROPHILS NFR BLD: 40.4 %
PHOSPHATE SERPL-MCNC: 4 MG/DL
PLATELET # BLD AUTO: 189 K/UL
PMV BLD AUTO: 12.1 FL
POTASSIUM SERPL-SCNC: 4.2 MMOL/L
RBC # BLD AUTO: 4.07 M/UL
SODIUM SERPL-SCNC: 140 MMOL/L
WBC # BLD AUTO: 5.45 K/UL

## 2017-04-13 PROCEDURE — 97530 THERAPEUTIC ACTIVITIES: CPT

## 2017-04-13 PROCEDURE — 80048 BASIC METABOLIC PNL TOTAL CA: CPT

## 2017-04-13 PROCEDURE — 97110 THERAPEUTIC EXERCISES: CPT

## 2017-04-13 PROCEDURE — 25000003 PHARM REV CODE 250: Performed by: INTERNAL MEDICINE

## 2017-04-13 PROCEDURE — 84100 ASSAY OF PHOSPHORUS: CPT

## 2017-04-13 PROCEDURE — 83735 ASSAY OF MAGNESIUM: CPT

## 2017-04-13 PROCEDURE — 36415 COLL VENOUS BLD VENIPUNCTURE: CPT

## 2017-04-13 PROCEDURE — 97116 GAIT TRAINING THERAPY: CPT

## 2017-04-13 PROCEDURE — 11000004 HC SNF PRIVATE

## 2017-04-13 PROCEDURE — 25000003 PHARM REV CODE 250: Performed by: NURSE PRACTITIONER

## 2017-04-13 PROCEDURE — 25000003 PHARM REV CODE 250: Performed by: PHYSICIAN ASSISTANT

## 2017-04-13 PROCEDURE — 85025 COMPLETE CBC W/AUTO DIFF WBC: CPT

## 2017-04-13 RX ADMIN — GABAPENTIN 900 MG: 300 CAPSULE ORAL at 01:04

## 2017-04-13 RX ADMIN — BACLOFEN 20 MG: 10 TABLET ORAL at 05:04

## 2017-04-13 RX ADMIN — TRAZODONE HYDROCHLORIDE 100 MG: 50 TABLET ORAL at 09:04

## 2017-04-13 RX ADMIN — CARBAMAZEPINE 400 MG: 100 CAPSULE, EXTENDED RELEASE ORAL at 09:04

## 2017-04-13 RX ADMIN — RIVAROXABAN 20 MG: 20 TABLET, FILM COATED ORAL at 05:04

## 2017-04-13 RX ADMIN — OXYBUTYNIN CHLORIDE 5 MG: 5 TABLET, EXTENDED RELEASE ORAL at 09:04

## 2017-04-13 RX ADMIN — GABAPENTIN 900 MG: 300 CAPSULE ORAL at 05:04

## 2017-04-13 RX ADMIN — DIAZEPAM 5 MG: 5 TABLET ORAL at 10:04

## 2017-04-13 RX ADMIN — OXYCODONE HYDROCHLORIDE 15 MG: 5 TABLET ORAL at 01:04

## 2017-04-13 RX ADMIN — OXYCODONE HYDROCHLORIDE 15 MG: 5 TABLET ORAL at 05:04

## 2017-04-13 RX ADMIN — OXYCODONE HYDROCHLORIDE 15 MG: 5 TABLET ORAL at 09:04

## 2017-04-13 RX ADMIN — ACETAMINOPHEN 650 MG: 325 TABLET, FILM COATED ORAL at 09:04

## 2017-04-13 RX ADMIN — DANTROLENE SODIUM 50 MG: 50 CAPSULE ORAL at 05:04

## 2017-04-13 RX ADMIN — DANTROLENE SODIUM 50 MG: 50 CAPSULE ORAL at 11:04

## 2017-04-13 RX ADMIN — ACETAMINOPHEN 650 MG: 325 TABLET, FILM COATED ORAL at 04:04

## 2017-04-13 RX ADMIN — GABAPENTIN 900 MG: 300 CAPSULE ORAL at 09:04

## 2017-04-13 RX ADMIN — BACLOFEN 20 MG: 10 TABLET ORAL at 11:04

## 2017-04-13 RX ADMIN — CITALOPRAM HYDROBROMIDE 20 MG: 20 TABLET ORAL at 09:04

## 2017-04-13 RX ADMIN — OXYCODONE HYDROCHLORIDE 15 MG: 5 TABLET ORAL at 12:04

## 2017-04-13 RX ADMIN — ACETAMINOPHEN 650 MG: 325 TABLET, FILM COATED ORAL at 08:04

## 2017-04-13 RX ADMIN — DIAZEPAM 5 MG: 5 TABLET ORAL at 01:04

## 2017-04-13 NOTE — PROGRESS NOTES
04/13/2017  2:00 PM  Discharge Planning-Met with patient in room to inform of new discharge date of 4/20/2017. Discharge date written on dry erase board in room. Patient stated understanding to discharge date.    Kelsie Renee RN, CM Skilled  [id number]

## 2017-04-13 NOTE — PT/OT/SLP PROGRESS
Occupational Therapy  Treatment    Mao Levin   MRN: [id number]   Admitting Diagnosis: MS (multiple sclerosis)    OT Date of Treatment: 17  Total Time (min): 47 min    Billable Minutes:  Therapeutic Activity 27 and Therapeutic Exercise 20    General Precautions: Standard, fall  Orthopedic Precautions: N/A  Braces: N/A    Do you have any cultural, spiritual, Uatsdin conflicts, given your current situation?: no    Subjective:  Communicated with nsg prior to session.  I need this left arm to get straighter    Pain Ratin/10  Location - Side: Bilateral     Location: leg          Objective:   Pt. seated in w/c on arrival    Functional Status:  MDS G  Transfer Functional Status: mod(A)-Max(A) from w/c level and cues for stabilization and trunk  Moving from seated to standing position: Not steady, only able to stabilize with staff assistance          OT Exercises: UE Ergometer 10 min  Lat pull downs 25# 4 x 25 reps    Additional Treatment:  Pt. With AAROM with stretches of LUE to increase grasp and flex/ext with digits Pt. With difficulty with ext of digits . Pt also with wrist flex/ext and supination/pronation to increase ROM and coordination aspects   Pt, with standing act on this day. Pt. With BLE weakness with stand. Pt. able to stand from 6 min with BLE going into flex/ext with CGA/Min A for bal aspects.    Patient left up in chair with maneuvering  around unit    ASSESSMENT:  Mao Levin is a 51 y.o. male with a medical diagnosis of MS (multiple sclerosis) Pt. participated well with session on this day. Pt.is slowly progressing still exhibits overall weakness in extremities  Pt. Will continue to benefit from continued OT to progress towards goals.    Rehab identified problem list/impairments: impaired endurance, impaired self care skills, impaired functional mobilty, decreased upper extremity function, decreased safety awareness    Rehab potential is fair    Activity tolerance: Fair    Discharge  recommendations: rehabilitation facility     Barriers to discharge: Decreased caregiver support     Equipment recommendations: wheelchair (drop arm commode)     GOALS:   Occupational Therapy Goals        Problem: Occupational Therapy Goal    Goal Priority Disciplines Outcome Interventions   Occupational Therapy Goal     OT, PT/OT Ongoing (interventions implemented as appropriate)    Description:  Goals to be met by: 3 weeks     Patient will increase functional independence with ADLs by performing:    Feeding with Set-up Assistance. Met  UE Dressing with Stand-by Assistance.--met   LE Dressing with Minimal Assistance.--met;however, occasional mod A depending on muscle tone  Grooming while seated with Modified [place].--met  Toileting from bedside commode with Minimal Assistance for hygiene and clothing management.   Bathing from  shower chair/bench with Minimal Assistance.--met  Sitting at edge of bed x20 minutes with Supervision.--met  Rolling to Bilateral with Stand-by Assistance. --met  Supine to sit with Minimal Assistance.--met  Stand pivot transfers with Minimal Assistance.--met  Toilet transfer to bedside commode with Minimal Assistance.  L Upper extremity self ROM exercise program x15 reps per handout, with independence.               Plan:  Patient to be seen 5 x/week to address the above listed problems via self-care/home management, therapeutic exercises, therapeutic activities  Plan of Care expires: 04/18/17  Plan of Care reviewed with: patient    WENDY Mustaaf  04/13/2017

## 2017-04-13 NOTE — PLAN OF CARE
Problem: Fall Risk (Adult)  Goal: Absence of Falls  Patient will demonstrate the desired outcomes by discharge/transition of care.   Outcome: Ongoing (interventions implemented as appropriate)    04/13/17 0734   Fall Risk (Adult)   Absence of Falls making progress toward outcome         Problem: Mobility, Physical Impaired (Adult)  Goal: Identify Related Risk Factors and Signs and Symptoms  Related risk factors and signs and symptoms are identified upon initiation of Human Response Clinical Practice Guideline (CPG)   Outcome: Ongoing (interventions implemented as appropriate)    04/13/17 0734   Mobility, Physical Impaired   Related Risk Factors (Physical Mobility, Impaired) disease process;activity intolerance

## 2017-04-13 NOTE — PLAN OF CARE
Problem: Occupational Therapy Goal  Goal: Occupational Therapy Goal  Goals to be met by: 3 weeks     Patient will increase functional independence with ADLs by performing:    Feeding with Set-up Assistance. Met  UE Dressing with Stand-by Assistance.--met   LE Dressing with Minimal Assistance.--met;however, occasional mod A depending on muscle tone  Grooming while seated with Modified [place].--met  Toileting from bedside commode with Minimal Assistance for hygiene and clothing management.   Bathing from shower chair/bench with Minimal Assistance.--met  Sitting at edge of bed x20 minutes with Supervision.--met  Rolling to Bilateral with Stand-by Assistance. --met  Supine to sit with Minimal Assistance.--met  Stand pivot transfers with Minimal Assistance.--met  Toilet transfer to bedside commode with Minimal Assistance.  L Upper extremity self ROM exercise program x15 reps per handout, with independence.   Outcome: Ongoing (interventions implemented as appropriate)  .

## 2017-04-13 NOTE — PROGRESS NOTES
04/13/2017  8:04 AM  Per Nithya OSBORNE, patient's stay at SNF is to continue as MD disagreed with the [facility]. MD feels an extension will help patient to regain independence. Will inform GERTRUDIS Newell and DONNA Galeas.    Kelsie Renee RN,  Skilled  [id number]

## 2017-04-13 NOTE — PLAN OF CARE
Problem: Patient Care Overview  Goal: Plan of Care Review  Outcome: Revised  Repositions independently, no new skin breakdowns noted. Afebrile. WBC .47. Monitored for pain and safety q 1-2 hrs this shift. Safety maintained. Pain meds effective

## 2017-04-13 NOTE — PT/OT/SLP PROGRESS
"Physical Therapy  Treatment    Mao Levin   MRN: [id number]   Admitting Diagnosis: MS (multiple sclerosis)    PT Received On: 17  Total Time (min): 48       Billable Minutes: 48    Gait [id number], Therapeutic Activity 10 and Therapeutic Exercise 20    Treatment Type: Treatment  PT/PTA: PTA     PTA Visit Number: 3       General Precautions: Standard, fall  Orthopedic Precautions: N/A   Braces: N/A    Do you have any cultural, spiritual, Baptist conflicts, given your current situation?: no    Subjective:  Communicated with nsg prior to session. 1st attempt not ready, 2nd "just need my pants on"      Pain Ratin/10  Location - Side: Bilateral  Location - Orientation: generalized  Location: leg  Pain Addressed: Pre-medicate for activity, Reposition, Distraction, Cessation of Activity, Nurse notified  Pain Rating Post-Intervention:  (through out session)    Objective:  Patient found with:  (in bed)       Functional Status:  MDS G  Bed Mobility Functional Status: CGA-Min (A)  Transfer Functional Status: CGA-Min (A)  Transfer Level of (A): 3: Two+ persons physical (A) (for safety)  Walk in Corridor Functional Status: CGA-Min (A)  Walk In Corridor Level of (A): 3: Two+ persons physical (A) (for safety)          Bed Mobility:  Supine>Sit: min A with trunk elev, coming up on L side vcs for tech,inc time, good effort  B rolling with rail S/mod I    Transfers: x 2 ppl for safety  Sit<>Stand: min A with RW, inc time  Sqt Pivot Transfer: min A EOB>WC vcs for safety/tech/positioning      Gait: x 2 ppl for safety  Amb with RW min A LLE ace wrap into DF ~ 85 ft occ vcs for RW mgmt/closeness, advancing LLE fwd using quads vcs lateral using adductors (ER), and decrease trunk extension , safety negotiating turns    Therex:  Supine LLE hip/knee flex/extesion with some resistance, sitting 2x10 reps, A/AA/PROM L>R AP,LAQ,hip flex,abd/add, mini squats with BUE support x 15 reps, BLE gastroc/hamstring stetch x3:15 sec " holds    Patient left up in chair with call button in reach and belongings in reach.    Assessment:  Mao Levin is a 51 y.o. male with a medical diagnosis of MS (multiple sclerosis).  Pt tolerated well, pt would continue to benefit from skilled PT services to improve overall functional mobility, strength and endurance.  .    Rehab identified problem list/impairments: weakness, impaired endurance, impaired self care skills, impaired functional mobilty, impaired balance, decreased upper extremity function, decreased lower extremity function, decreased safety awareness, abnormal tone, decreased ROM, decreased coordination    Rehab potential is fair.    Activity tolerance: Fair    Discharge recommendations:  (TBD- will need assistance upon D/C)     Barriers to discharge: Decreased caregiver support    Equipment recommendations: wheelchair     GOALS:   Physical Therapy Goals        Problem: Physical Therapy Goal    Goal Priority Disciplines Outcome Goal Variances Interventions   Physical Therapy Goal     PT/OT, PT Ongoing (interventions implemented as appropriate)     Description:  Goals to be met by: 2 weeks     Patient will increase functional independence with mobility by performin. Supine to sit with Stand-by Assistance  2. Sit to supine with Stand-by Assistance  3. Sit to stand transfer with Minimal Assistance MET  4. Bed to chair transfer with Minimal Assistance using Rolling Walker   5. Gait  x 20 feet with Moderate Assistance using Rolling Walker. Met (3/26/2017)  6. Wheelchair propulsion x75 feet with Stand-by Assistance using BUE/BLE  7. Ascend/descend 3 stair with left Handrails Moderate Assistance.   8. Stand for 3 minutes with Stand-by Assistance using Rolling Walker  9. Lower extremity exercise program x20 reps per handout, with assistance as needed MET  10. New Goal (3/26/2017): Gait  x 50 feet with Moderate Assistance using Rolling Walker.                   PLAN:    Patient to be seen 5 x/week  to  address the above listed problems via gait training, therapeutic activities, therapeutic exercises, wheelchair management/training  Plan of Care expires: 04/17/17  Plan of Care reviewed with: patient    Chely Gudino, PTA  04/13/2017

## 2017-04-14 PROCEDURE — 11000004 HC SNF PRIVATE

## 2017-04-14 PROCEDURE — 25000003 PHARM REV CODE 250: Performed by: INTERNAL MEDICINE

## 2017-04-14 PROCEDURE — 25000003 PHARM REV CODE 250: Performed by: NURSE PRACTITIONER

## 2017-04-14 PROCEDURE — 97110 THERAPEUTIC EXERCISES: CPT

## 2017-04-14 PROCEDURE — 25000003 PHARM REV CODE 250: Performed by: PHYSICIAN ASSISTANT

## 2017-04-14 RX ADMIN — TRAZODONE HYDROCHLORIDE 100 MG: 50 TABLET ORAL at 09:04

## 2017-04-14 RX ADMIN — CARBAMAZEPINE 400 MG: 100 CAPSULE, EXTENDED RELEASE ORAL at 08:04

## 2017-04-14 RX ADMIN — BACLOFEN 20 MG: 10 TABLET ORAL at 05:04

## 2017-04-14 RX ADMIN — ACETAMINOPHEN 650 MG: 325 TABLET, FILM COATED ORAL at 08:04

## 2017-04-14 RX ADMIN — CITALOPRAM HYDROBROMIDE 20 MG: 20 TABLET ORAL at 08:04

## 2017-04-14 RX ADMIN — DIAZEPAM 5 MG: 5 TABLET ORAL at 11:04

## 2017-04-14 RX ADMIN — DANTROLENE SODIUM 50 MG: 50 CAPSULE ORAL at 05:04

## 2017-04-14 RX ADMIN — DANTROLENE SODIUM 50 MG: 50 CAPSULE ORAL at 12:04

## 2017-04-14 RX ADMIN — BACLOFEN 20 MG: 10 TABLET ORAL at 11:04

## 2017-04-14 RX ADMIN — GABAPENTIN 900 MG: 300 CAPSULE ORAL at 05:04

## 2017-04-14 RX ADMIN — GABAPENTIN 900 MG: 300 CAPSULE ORAL at 09:04

## 2017-04-14 RX ADMIN — OXYCODONE HYDROCHLORIDE 15 MG: 5 TABLET ORAL at 02:04

## 2017-04-14 RX ADMIN — OXYBUTYNIN CHLORIDE 5 MG: 5 TABLET, EXTENDED RELEASE ORAL at 08:04

## 2017-04-14 RX ADMIN — GABAPENTIN 900 MG: 300 CAPSULE ORAL at 01:04

## 2017-04-14 RX ADMIN — BACLOFEN 20 MG: 10 TABLET ORAL at 12:04

## 2017-04-14 RX ADMIN — DIAZEPAM 5 MG: 5 TABLET ORAL at 12:04

## 2017-04-14 RX ADMIN — OXYCODONE HYDROCHLORIDE 15 MG: 5 TABLET ORAL at 10:04

## 2017-04-14 RX ADMIN — OXYCODONE HYDROCHLORIDE 15 MG: 5 TABLET ORAL at 01:04

## 2017-04-14 RX ADMIN — RIVAROXABAN 20 MG: 20 TABLET, FILM COATED ORAL at 05:04

## 2017-04-14 RX ADMIN — OXYCODONE HYDROCHLORIDE 15 MG: 5 TABLET ORAL at 05:04

## 2017-04-14 RX ADMIN — DANTROLENE SODIUM 50 MG: 50 CAPSULE ORAL at 11:04

## 2017-04-14 RX ADMIN — OXYCODONE HYDROCHLORIDE 15 MG: 5 TABLET ORAL at 09:04

## 2017-04-14 NOTE — PLAN OF CARE
Problem: Patient Care Overview  Goal: Plan of Care Review  Outcome: Ongoing (interventions implemented as appropriate)    04/14/17 4356   Coping/Psychosocial   Plan Of Care Reviewed With patient         Comments:   Pt was assessed and VS taken  recorded. Noted no active skin breakdown. Incontinent of urine x 3 diaper changed. compalined of 9-10/10 pain scale pain medications were given. Call bell with in reached, 2 x side rails of bed elevated, urinal at bedside and be din the lowest position. Pt turned every 2 hours and monitor for pain and safety.

## 2017-04-14 NOTE — PLAN OF CARE
Problem: Occupational Therapy Goal  Goal: Occupational Therapy Goal  Goals to be met by: 3 weeks     Patient will increase functional independence with ADLs by performing:    Feeding with Set-up Assistance. Met  UE Dressing with Stand-by Assistance.--met   LE Dressing with Minimal Assistance.--met;however, occasional mod A depending on muscle tone  Grooming while seated with Modified [place].--met  Toileting from bedside commode with Minimal Assistance for hygiene and clothing management.   Bathing from shower chair/bench with Minimal Assistance.--met  Sitting at edge of bed x20 minutes with Supervision.--met  Rolling to Bilateral with Stand-by Assistance. --met  Supine to sit with Minimal Assistance.--met  Stand pivot transfers with Minimal Assistance.--met  Toilet transfer to bedside commode with Minimal Assistance.  L Upper extremity self ROM exercise program x15 reps per handout, with independence.   Outcome: Ongoing (interventions implemented as appropriate)  Patient's goals are appropriate.  GISEL Flowers  4/14/2017

## 2017-04-14 NOTE — PT/OT/SLP PROGRESS
Occupational Therapy  Treatment    Mao Levin   MRN: [id number]   Admitting Diagnosis: MS (multiple sclerosis)    OT Date of Treatment: 17  Total Time (min): 30 min    Billable Minutes:  Therapeutic Exercise 30    General Precautions: Standard, fall  Orthopedic Precautions: N/A  Braces: N/A    Do you have any cultural, spiritual, Rastafarian conflicts, given your current situation?: no    Subjective:  Communicated with patient prior to session.    Pain Ratin/10     Pain Rating Post-Intervention: 0/10    Objective:    OT Exercises:  Lat pull downs 40# 25 x 4 focusing to improve strength to increase independence with daily functioning.   Patient performed PROM/stretching to L UE in all planes and joints focusing to facilitate normal movement.     Additional Treatment:  Patient performed squats while standing with CGA 2 x 15 (holding onto stairs in rehab gym)    Patient left up in chair with maneuvering around unit.    ASSESSMENT:  Mao Levin is a 51 y.o. male with a medical diagnosis of MS (multiple sclerosis) and presents with the deficits listed below. Patient tolerated treatment session and was able to complete tasks. Patient continues to benefit from skilled OT services to achieve maximal independence.    Rehab identified problem list/impairments: impaired endurance, impaired self care skills, impaired functional mobilty, decreased upper extremity function, decreased safety awareness    Rehab potential is good    Activity tolerance: Fair    Discharge recommendations: rehabilitation facility     Barriers to discharge: Decreased caregiver support     Equipment recommendations: wheelchair (drop arm commode)     GOALS:   Occupational Therapy Goals        Problem: Occupational Therapy Goal    Goal Priority Disciplines Outcome Interventions   Occupational Therapy Goal     OT, PT/OT Ongoing (interventions implemented as appropriate)    Description:  Goals to be met by: 3 weeks     Patient will increase functional  independence with ADLs by performing:    Feeding with Set-up Assistance. Met  UE Dressing with Stand-by Assistance.--met   LE Dressing with Minimal Assistance.--met;however, occasional mod A depending on muscle tone  Grooming while seated with Modified [place].--met  Toileting from bedside commode with Minimal Assistance for hygiene and clothing management.   Bathing from  shower chair/bench with Minimal Assistance.--met  Sitting at edge of bed x20 minutes with Supervision.--met  Rolling to Bilateral with Stand-by Assistance. --met  Supine to sit with Minimal Assistance.--met  Stand pivot transfers with Minimal Assistance.--met  Toilet transfer to bedside commode with Minimal Assistance.  L Upper extremity self ROM exercise program x15 reps per handout, with independence.                   Plan:  Patient to be seen 5 x/week to address the above listed problems via self-care/home management, therapeutic exercises, therapeutic activities  Plan of Care expires: 04/18/17  Plan of Care reviewed with: patient    GISEL Flowers  04/14/2017

## 2017-04-15 PROCEDURE — 99900058 HC 022 PAID UNDER SNF PPS

## 2017-04-15 PROCEDURE — 25000003 PHARM REV CODE 250: Performed by: INTERNAL MEDICINE

## 2017-04-15 PROCEDURE — 11000004 HC SNF PRIVATE

## 2017-04-15 PROCEDURE — 25000003 PHARM REV CODE 250: Performed by: NURSE PRACTITIONER

## 2017-04-15 PROCEDURE — 97110 THERAPEUTIC EXERCISES: CPT

## 2017-04-15 PROCEDURE — 25000003 PHARM REV CODE 250: Performed by: PHYSICIAN ASSISTANT

## 2017-04-15 PROCEDURE — 97530 THERAPEUTIC ACTIVITIES: CPT

## 2017-04-15 RX ADMIN — OXYCODONE HYDROCHLORIDE 15 MG: 5 TABLET ORAL at 02:04

## 2017-04-15 RX ADMIN — STANDARDIZED SENNA CONCENTRATE AND DOCUSATE SODIUM 1 TABLET: 8.6; 5 TABLET, FILM COATED ORAL at 08:04

## 2017-04-15 RX ADMIN — GABAPENTIN 900 MG: 300 CAPSULE ORAL at 03:04

## 2017-04-15 RX ADMIN — CITALOPRAM HYDROBROMIDE 20 MG: 20 TABLET ORAL at 08:04

## 2017-04-15 RX ADMIN — ACETAMINOPHEN 650 MG: 325 TABLET, FILM COATED ORAL at 09:04

## 2017-04-15 RX ADMIN — OXYCODONE HYDROCHLORIDE 15 MG: 5 TABLET ORAL at 06:04

## 2017-04-15 RX ADMIN — OXYCODONE HYDROCHLORIDE 15 MG: 5 TABLET ORAL at 09:04

## 2017-04-15 RX ADMIN — DANTROLENE SODIUM 50 MG: 50 CAPSULE ORAL at 05:04

## 2017-04-15 RX ADMIN — ACETAMINOPHEN 650 MG: 325 TABLET, FILM COATED ORAL at 04:04

## 2017-04-15 RX ADMIN — OXYBUTYNIN CHLORIDE 5 MG: 5 TABLET, EXTENDED RELEASE ORAL at 08:04

## 2017-04-15 RX ADMIN — GABAPENTIN 900 MG: 300 CAPSULE ORAL at 09:04

## 2017-04-15 RX ADMIN — OXYCODONE HYDROCHLORIDE 15 MG: 5 TABLET ORAL at 04:04

## 2017-04-15 RX ADMIN — ACETAMINOPHEN 650 MG: 325 TABLET, FILM COATED ORAL at 08:04

## 2017-04-15 RX ADMIN — BACLOFEN 20 MG: 10 TABLET ORAL at 11:04

## 2017-04-15 RX ADMIN — DIAZEPAM 5 MG: 5 TABLET ORAL at 09:04

## 2017-04-15 RX ADMIN — DIAZEPAM 5 MG: 5 TABLET ORAL at 11:04

## 2017-04-15 RX ADMIN — OXYCODONE HYDROCHLORIDE 15 MG: 5 TABLET ORAL at 11:04

## 2017-04-15 RX ADMIN — BACLOFEN 20 MG: 10 TABLET ORAL at 05:04

## 2017-04-15 RX ADMIN — RAMELTEON 8 MG: 8 TABLET, FILM COATED ORAL at 09:04

## 2017-04-15 RX ADMIN — DANTROLENE SODIUM 50 MG: 50 CAPSULE ORAL at 11:04

## 2017-04-15 RX ADMIN — ERGOCALCIFEROL 50000 UNITS: 1.25 CAPSULE ORAL at 08:04

## 2017-04-15 RX ADMIN — RIVAROXABAN 20 MG: 20 TABLET, FILM COATED ORAL at 04:04

## 2017-04-15 RX ADMIN — GABAPENTIN 900 MG: 300 CAPSULE ORAL at 05:04

## 2017-04-15 RX ADMIN — TRAZODONE HYDROCHLORIDE 100 MG: 50 TABLET ORAL at 09:04

## 2017-04-15 NOTE — PLAN OF CARE
Problem: Physical Therapy Goal  Goal: Physical Therapy Goal  Goals to be met by: 2 weeks     Patient will increase functional independence with mobility by performin. Supine to sit with Stand-by Assistance  2. Sit to supine with Stand-by Assistance  3. Sit to stand transfer with Minimal Assistance MET  4. Bed to chair transfer with Minimal Assistance using Rolling Walker   5. Gait x 20 feet with Moderate Assistance using Rolling Walker. Met (3/26/2017)  6. Wheelchair propulsion x75 feet with Stand-by Assistance using BUE/BLE  7. Ascend/descend 3 stair with left Handrails Moderate Assistance.   8. Stand for 3 minutes with Stand-by Assistance using Rolling Walker  9. Lower extremity exercise program x20 reps per handout, with assistance as needed MET  10. New Goal (3/26/2017): Gait x 50 feet with Moderate Assistance using Rolling Walker.   Goals remain appropriate at time. Continue with PT POC as indicated.

## 2017-04-15 NOTE — PT/OT/SLP PROGRESS
Physical Therapy  Treatment    Mao Levin   MRN: [id number]   Admitting Diagnosis: MS (multiple sclerosis)    PT Received On: 04/15/17  Total Time (min): 38       Billable Minutes:  Therapeutic Activity 15 and Therapeutic Exercise 23    Treatment Type: Treatment  PT/PTA: PTA     PTA Visit Number: 4       General Precautions: Standard, fall  Orthopedic Precautions: N/A   Braces: N/A    Do you have any cultural, spiritual, Yazdanism conflicts, given your current situation?: no    Subjective:  Communicated with nursing prior to session.  Pt agreed to work with therapy.     Pain Ratin/10  Pain Rating Post-Intervention: 0/10    Objective:  Patient found seated w/c.         Functional Status:    Bed Mobility:  Activity not performed on this date.     Transfers:  Sit<>Stand: to/from w/c with RW and min A. x5 trials    Gait:  Pt declined performing on this date.     Therex:  BLE therex 2x20 reps with assistance to LLE.    Additional Treatment:  UBE x15 min to improve overall endurance.   Pt stood at counter while playing connect four. Pt required Min A for balance.   Pt performed mini squats x20 rep.     Patient left up in chair with call button in reach.    Assessment:  Mao Levin is a 51 y.o. male with a medical diagnosis of MS (multiple sclerosis).  Pt fair to treatment session, and would continue to benefit from skilled PT intervention at this time. Continue with PT POC as indicated. .    Rehab identified problem list/impairments: weakness, impaired endurance, impaired self care skills, impaired functional mobilty, impaired balance, decreased upper extremity function, decreased lower extremity function, gait instability, decreased safety awareness, decreased ROM, decreased coordination    Rehab potential is fair.    Activity tolerance: Fair    Discharge recommendations:  (TBD- will need assistance upon D/C)     Barriers to discharge: Decreased caregiver support    Equipment recommendations: wheelchair     GOALS:    Physical Therapy Goals        Problem: Physical Therapy Goal    Goal Priority Disciplines Outcome Goal Variances Interventions   Physical Therapy Goal     PT/OT, PT Ongoing (interventions implemented as appropriate)     Description:  Goals to be met by: 2 weeks     Patient will increase functional independence with mobility by performin. Supine to sit with Stand-by Assistance  2. Sit to supine with Stand-by Assistance  3. Sit to stand transfer with Minimal Assistance MET  4. Bed to chair transfer with Minimal Assistance using Rolling Walker   5. Gait  x 20 feet with Moderate Assistance using Rolling Walker. Met (3/26/2017)  6. Wheelchair propulsion x75 feet with Stand-by Assistance using BUE/BLE  7. Ascend/descend 3 stair with left Handrails Moderate Assistance.   8. Stand for 3 minutes with Stand-by Assistance using Rolling Walker  9. Lower extremity exercise program x20 reps per handout, with assistance as needed MET  10. New Goal (3/26/2017): Gait  x 50 feet with Moderate Assistance using Rolling Walker.                   PLAN:    Patient to be seen 5 x/week  to address the above listed problems via gait training, therapeutic activities, therapeutic exercises, wheelchair management/training  Plan of Care expires: 17  Plan of Care reviewed with: patient    Asiya Julian, PTA  04/15/2017

## 2017-04-15 NOTE — PLAN OF CARE
Problem: Patient Care Overview  Goal: Plan of Care Review  Outcome: Ongoing (interventions implemented as appropriate)    04/14/17 0492   Coping/Psychosocial   Plan Of Care Reviewed With patient         Comments:   Pt was incontinent of urine. Complaining of pain most of the shift pain medications was given claimed gives temporarily relief. Call bell within  Reached, 2 x side rails of bed elevated and bed in the lowest position. Pt was refusing his zazueta heel protector. Pt turned every 2 hours and monitor for pain and safety.

## 2017-04-16 PROCEDURE — 11000004 HC SNF PRIVATE

## 2017-04-16 PROCEDURE — 25000003 PHARM REV CODE 250: Performed by: INTERNAL MEDICINE

## 2017-04-16 PROCEDURE — 25000003 PHARM REV CODE 250: Performed by: NURSE PRACTITIONER

## 2017-04-16 PROCEDURE — 25000003 PHARM REV CODE 250: Performed by: PHYSICIAN ASSISTANT

## 2017-04-16 RX ADMIN — DANTROLENE SODIUM 50 MG: 50 CAPSULE ORAL at 01:04

## 2017-04-16 RX ADMIN — GABAPENTIN 900 MG: 300 CAPSULE ORAL at 06:04

## 2017-04-16 RX ADMIN — BACLOFEN 20 MG: 10 TABLET ORAL at 06:04

## 2017-04-16 RX ADMIN — BACLOFEN 20 MG: 10 TABLET ORAL at 05:04

## 2017-04-16 RX ADMIN — DANTROLENE SODIUM 50 MG: 50 CAPSULE ORAL at 12:04

## 2017-04-16 RX ADMIN — BACLOFEN 20 MG: 10 TABLET ORAL at 01:04

## 2017-04-16 RX ADMIN — TRAZODONE HYDROCHLORIDE 100 MG: 50 TABLET ORAL at 09:04

## 2017-04-16 RX ADMIN — DANTROLENE SODIUM 50 MG: 50 CAPSULE ORAL at 06:04

## 2017-04-16 RX ADMIN — DIAZEPAM 5 MG: 5 TABLET ORAL at 06:04

## 2017-04-16 RX ADMIN — OXYCODONE HYDROCHLORIDE 15 MG: 5 TABLET ORAL at 09:04

## 2017-04-16 RX ADMIN — GABAPENTIN 900 MG: 300 CAPSULE ORAL at 09:04

## 2017-04-16 RX ADMIN — RAMELTEON 8 MG: 8 TABLET, FILM COATED ORAL at 10:04

## 2017-04-16 RX ADMIN — POLYETHYLENE GLYCOL 3350 17 G: 17 POWDER, FOR SOLUTION ORAL at 08:04

## 2017-04-16 RX ADMIN — OXYCODONE HYDROCHLORIDE 15 MG: 5 TABLET ORAL at 02:04

## 2017-04-16 RX ADMIN — DANTROLENE SODIUM 50 MG: 50 CAPSULE ORAL at 05:04

## 2017-04-16 RX ADMIN — CITALOPRAM HYDROBROMIDE 20 MG: 20 TABLET ORAL at 08:04

## 2017-04-16 RX ADMIN — ACETAMINOPHEN 650 MG: 325 TABLET, FILM COATED ORAL at 10:04

## 2017-04-16 RX ADMIN — BACLOFEN 20 MG: 10 TABLET ORAL at 12:04

## 2017-04-16 RX ADMIN — ACETAMINOPHEN 650 MG: 325 TABLET, FILM COATED ORAL at 04:04

## 2017-04-16 RX ADMIN — OXYCODONE HYDROCHLORIDE 15 MG: 5 TABLET ORAL at 04:04

## 2017-04-16 RX ADMIN — DIAZEPAM 5 MG: 5 TABLET ORAL at 04:04

## 2017-04-16 RX ADMIN — OXYCODONE HYDROCHLORIDE 15 MG: 5 TABLET ORAL at 06:04

## 2017-04-16 RX ADMIN — ACETAMINOPHEN 650 MG: 325 TABLET, FILM COATED ORAL at 12:04

## 2017-04-16 RX ADMIN — GABAPENTIN 900 MG: 300 CAPSULE ORAL at 02:04

## 2017-04-16 RX ADMIN — OXYBUTYNIN CHLORIDE 5 MG: 5 TABLET, EXTENDED RELEASE ORAL at 08:04

## 2017-04-16 RX ADMIN — RIVAROXABAN 20 MG: 20 TABLET, FILM COATED ORAL at 04:04

## 2017-04-16 RX ADMIN — OXYCODONE HYDROCHLORIDE 15 MG: 5 TABLET ORAL at 12:04

## 2017-04-16 NOTE — PLAN OF CARE
Pt A & O pt repositioned with pillow every q2 hrs, no skin breakdown noted . pulses palable +movement/sensation, cap refill WNL in all 4 extremities , monitored for pain and safety. Safety maintained. , pt remained afebrile 97.5. Bed locked and lowered, call light within reach. Will continue to monitor.

## 2017-04-17 LAB
ANION GAP SERPL CALC-SCNC: 10 MMOL/L
BASOPHILS # BLD AUTO: 0.04 K/UL
BASOPHILS NFR BLD: 0.7 %
BUN SERPL-MCNC: 20 MG/DL
CALCIUM SERPL-MCNC: 8.8 MG/DL
CHLORIDE SERPL-SCNC: 105 MMOL/L
CO2 SERPL-SCNC: 25 MMOL/L
CREAT SERPL-MCNC: 0.8 MG/DL
DIFFERENTIAL METHOD: ABNORMAL
EOSINOPHIL # BLD AUTO: 0.2 K/UL
EOSINOPHIL NFR BLD: 3.6 %
ERYTHROCYTE [DISTWIDTH] IN BLOOD BY AUTOMATED COUNT: 14.1 %
EST. GFR  (AFRICAN AMERICAN): >60 ML/MIN/1.73 M^2
EST. GFR  (NON AFRICAN AMERICAN): >60 ML/MIN/1.73 M^2
GLUCOSE SERPL-MCNC: 79 MG/DL
HCT VFR BLD AUTO: 35.7 %
HGB BLD-MCNC: 12.4 G/DL
LYMPHOCYTES # BLD AUTO: 2.5 K/UL
LYMPHOCYTES NFR BLD: 42.8 %
MAGNESIUM SERPL-MCNC: 2.3 MG/DL
MCH RBC QN AUTO: 30 PG
MCHC RBC AUTO-ENTMCNC: 34.7 %
MCV RBC AUTO: 86 FL
MONOCYTES # BLD AUTO: 0.4 K/UL
MONOCYTES NFR BLD: 6.1 %
NEUTROPHILS # BLD AUTO: 2.7 K/UL
NEUTROPHILS NFR BLD: 46.6 %
PHOSPHATE SERPL-MCNC: 4 MG/DL
PLATELET # BLD AUTO: 155 K/UL
PMV BLD AUTO: 11.2 FL
POTASSIUM SERPL-SCNC: 4 MMOL/L
RBC # BLD AUTO: 4.13 M/UL
SODIUM SERPL-SCNC: 140 MMOL/L
WBC # BLD AUTO: 5.87 K/UL

## 2017-04-17 PROCEDURE — 97535 SELF CARE MNGMENT TRAINING: CPT

## 2017-04-17 PROCEDURE — 84100 ASSAY OF PHOSPHORUS: CPT

## 2017-04-17 PROCEDURE — 99305 1ST NF CARE MODERATE MDM 35: CPT | Mod: ,,, | Performed by: HOSPITALIST

## 2017-04-17 PROCEDURE — 80048 BASIC METABOLIC PNL TOTAL CA: CPT

## 2017-04-17 PROCEDURE — 36415 COLL VENOUS BLD VENIPUNCTURE: CPT

## 2017-04-17 PROCEDURE — 97530 THERAPEUTIC ACTIVITIES: CPT

## 2017-04-17 PROCEDURE — 97116 GAIT TRAINING THERAPY: CPT

## 2017-04-17 PROCEDURE — 25000003 PHARM REV CODE 250: Performed by: INTERNAL MEDICINE

## 2017-04-17 PROCEDURE — 25000003 PHARM REV CODE 250: Performed by: NURSE PRACTITIONER

## 2017-04-17 PROCEDURE — 11000004 HC SNF PRIVATE

## 2017-04-17 PROCEDURE — 97110 THERAPEUTIC EXERCISES: CPT

## 2017-04-17 PROCEDURE — 25000003 PHARM REV CODE 250: Performed by: PHYSICIAN ASSISTANT

## 2017-04-17 PROCEDURE — 83735 ASSAY OF MAGNESIUM: CPT

## 2017-04-17 PROCEDURE — 85025 COMPLETE CBC W/AUTO DIFF WBC: CPT

## 2017-04-17 RX ADMIN — DANTROLENE SODIUM 50 MG: 50 CAPSULE ORAL at 11:04

## 2017-04-17 RX ADMIN — BACLOFEN 20 MG: 10 TABLET ORAL at 06:04

## 2017-04-17 RX ADMIN — BACLOFEN 20 MG: 10 TABLET ORAL at 12:04

## 2017-04-17 RX ADMIN — ACETAMINOPHEN 650 MG: 325 TABLET, FILM COATED ORAL at 02:04

## 2017-04-17 RX ADMIN — DANTROLENE SODIUM 50 MG: 50 CAPSULE ORAL at 06:04

## 2017-04-17 RX ADMIN — GABAPENTIN 900 MG: 300 CAPSULE ORAL at 06:04

## 2017-04-17 RX ADMIN — OXYCODONE HYDROCHLORIDE 15 MG: 5 TABLET ORAL at 06:04

## 2017-04-17 RX ADMIN — CARBAMAZEPINE 400 MG: 100 CAPSULE, EXTENDED RELEASE ORAL at 11:04

## 2017-04-17 RX ADMIN — TRAZODONE HYDROCHLORIDE 100 MG: 50 TABLET ORAL at 11:04

## 2017-04-17 RX ADMIN — OXYCODONE HYDROCHLORIDE 15 MG: 5 TABLET ORAL at 10:04

## 2017-04-17 RX ADMIN — OXYCODONE HYDROCHLORIDE 15 MG: 5 TABLET ORAL at 02:04

## 2017-04-17 RX ADMIN — CITALOPRAM HYDROBROMIDE 20 MG: 20 TABLET ORAL at 10:04

## 2017-04-17 RX ADMIN — RIVAROXABAN 20 MG: 20 TABLET, FILM COATED ORAL at 06:04

## 2017-04-17 RX ADMIN — BACLOFEN 20 MG: 10 TABLET ORAL at 11:04

## 2017-04-17 RX ADMIN — OXYCODONE HYDROCHLORIDE 15 MG: 5 TABLET ORAL at 01:04

## 2017-04-17 RX ADMIN — GABAPENTIN 900 MG: 300 CAPSULE ORAL at 11:04

## 2017-04-17 RX ADMIN — ACETAMINOPHEN 650 MG: 325 TABLET, FILM COATED ORAL at 11:04

## 2017-04-17 RX ADMIN — DIAZEPAM 5 MG: 5 TABLET ORAL at 11:04

## 2017-04-17 RX ADMIN — DANTROLENE SODIUM 50 MG: 50 CAPSULE ORAL at 12:04

## 2017-04-17 RX ADMIN — OXYCODONE HYDROCHLORIDE 15 MG: 5 TABLET ORAL at 11:04

## 2017-04-17 RX ADMIN — DIAZEPAM 5 MG: 5 TABLET ORAL at 10:04

## 2017-04-17 RX ADMIN — GABAPENTIN 900 MG: 300 CAPSULE ORAL at 02:04

## 2017-04-17 RX ADMIN — ACETAMINOPHEN 650 MG: 325 TABLET, FILM COATED ORAL at 06:04

## 2017-04-17 RX ADMIN — OXYBUTYNIN CHLORIDE 5 MG: 5 TABLET, EXTENDED RELEASE ORAL at 10:04

## 2017-04-17 NOTE — PLAN OF CARE
04/17/2017  4:48 PM     04/17/17 1648   Medicare Message   Important Message from Medicare regarding Discharge Appeal Rights Given to patient/caregiver;Explained to patient/caregiver;Signed/date by patient/caregiver   DONNA served NOMNC to patient notifying of discharge date of 4/20/17 and appeal right.  Patient expressed understanding and regarding same.  Patient signed NOMNC, and DONNA provided patient with copy.  DONNA faxed copy of signed NOMNC to [employer] (fx [phone]) and gave original to ALMAZ Iglesias in anticipation of appeal being filed.  Original to be placed in patient's blue chart in nurse's station afterward.    Adal Beard, NADIRA  [id number]

## 2017-04-17 NOTE — PLAN OF CARE
Problem: Occupational Therapy Goal  Goal: Occupational Therapy Goal  Goals to be met by: 3 weeks     Patient will increase functional independence with ADLs by performing:    Feeding with Set-up Assistance. Met  UE Dressing with Stand-by Assistance.--met   LE Dressing with Minimal Assistance.--met;however, occasional mod A depending on muscle tone  Grooming while seated with Modified [place].--met  Toileting from bedside commode with Minimal Assistance for hygiene and clothing management.   Bathing from shower chair/bench with Minimal Assistance.--met  Sitting at edge of bed x20 minutes with Supervision.--met  Rolling to Bilateral with Stand-by Assistance. --met  Supine to sit with Minimal Assistance.--met  Stand pivot transfers with Minimal Assistance.--met  Toilet transfer to bedside commode with Minimal Assistance.  L Upper extremity self ROM exercise program x15 reps per handout, with independence.   Outcome: Ongoing (interventions implemented as appropriate)  Patient's goals are appropriate.   GISEL Flowers  4/17/2017

## 2017-04-17 NOTE — SUBJECTIVE & OBJECTIVE
Past Medical History:   Diagnosis Date    Multiple sclerosis        History reviewed. No pertinent surgical history.    Review of patient's allergies indicates:  No Known Allergies    No current facility-administered medications on file prior to encounter.      Current Outpatient Prescriptions on File Prior to Encounter   Medication Sig    baclofen (LIORESAL) 20 MG tablet Take 1 tablet (20 mg total) by mouth 4 (four) times daily.    citalopram (CELEXA) 20 MG tablet Take 1 tablet (20 mg total) by mouth once daily.    dantrolene (DANTRIUM) 50 MG Cap Take 1 capsule (50 mg total) by mouth 4 (four) times daily.    diazePAM (VALIUM) 5 MG tablet Take 1 tablet (5 mg total) by mouth 2 (two) times daily as needed (muscle spasms).    ergocalciferol (VITAMIN D2) 50,000 unit Cap Take 1 capsule (50,000 Units total) by mouth every 7 days.    gabapentin (NEURONTIN) 600 MG tablet Take 1 Q AM plus 1 Q Day in the early afternoon plus 1.5 (900mg) Q HS (Patient taking differently: Take 1 tablet by mouth every morning and in the early afternoon then take 1½ tablets (900 mg) at bedtime)    oxybutynin (DITROPAN-XL) 5 MG TR24 Take 5 mg by mouth once daily.    oxycodone (ROXICODONE) 15 MG Tab Take 1 tablet (15 mg total) by mouth 3 (three) times daily as needed.    rivaroxaban (XARELTO) 20 mg Tab Take 1 tablet (20 mg total) by mouth daily with dinner or evening meal.    trazodone (DESYREL) 100 MG tablet Take 100 mg by mouth every evening.    carbamazepine (TEGRETOL XR) 400 MG Tb12 Take 1 tablet (400 mg total) by mouth 2 (two) times daily.    polyethylene glycol (GLYCOLAX) 17 gram/dose powder Take 17 g by mouth once daily.    senna-docusate 8.6-50 mg (PERICOLACE) 8.6-50 mg per tablet Take 1 tablet by mouth 2 (two) times daily.     Family History     Problem Relation (Age of Onset)    Arthritis Mother    No Known Problems Father        Social History Main Topics    Smoking status: Current Some Day Smoker     Packs/day: 0.50      Types: Cigarettes    Smokeless tobacco: Never Used    Alcohol use No    Drug use: No    Sexual activity: Not on file     Review of Systems   Constitutional: Negative for chills, fatigue and fever.   HENT: Negative for congestion, nosebleeds and rhinorrhea.    Eyes: Negative for visual disturbance.   Respiratory: Negative for cough and shortness of breath.    Cardiovascular: Negative for chest pain and palpitations.   Gastrointestinal: Negative for constipation, diarrhea, nausea and vomiting.   Endocrine: Negative for cold intolerance and heat intolerance.   Genitourinary: Negative for dysuria and hematuria.   Musculoskeletal: Negative for arthralgias.   Skin: Negative for rash.   Neurological: Positive for headaches.        Per HPI   Hematological: Does not bruise/bleed easily.   Psychiatric/Behavioral: Negative for confusion.     Objective:     Vital Signs (Most Recent):  Temp: 98.3 °F (36.8 °C) (04/17/17 1012)  Pulse: 89 (04/17/17 1012)  Resp: 16 (04/17/17 1012)  BP: (!) 141/91 (04/17/17 1012)  SpO2: 98 % (04/17/17 1012) Vital Signs (24h Range):  Temp:  [98.1 °F (36.7 °C)-98.3 °F (36.8 °C)] 98.3 °F (36.8 °C)  Pulse:  [68-89] 89  Resp:  [16-18] 16  SpO2:  [98 %-99 %] 98 %  BP: (132-141)/(74-91) 141/91     Weight: 79.5 kg (175 lb 4.3 oz)  Body mass index is 24.44 kg/(m^2).    Physical Exam   Constitutional: He is oriented to person, place, and time. He appears well-developed and well-nourished.   HENT:   Head: Normocephalic and atraumatic.   Eyes: Conjunctivae are normal.   Neck: Normal range of motion.   Cardiovascular: Normal rate and regular rhythm.    Pulmonary/Chest: Effort normal and breath sounds normal.   Abdominal: Soft. Bowel sounds are normal.   Musculoskeletal: Normal range of motion.   Neurological: He is alert and oriented to person, place, and time.   No pain on palpation over right face    Skin: Skin is warm.   Psychiatric: He has a normal mood and affect.        Significant Labs: All pertinent  labs within the past 24 hours have been reviewed.    Significant Imaging: I have reviewed and interpreted all pertinent imaging results/findings within the past 24 hours.

## 2017-04-17 NOTE — ASSESSMENT & PLAN NOTE
- continue PTOT as scheduled  - patient denied rehab  - follow up with neurology as scheduled

## 2017-04-17 NOTE — PLAN OF CARE
Problem: Patient Care Overview  Goal: Plan of Care Review  Outcome: Ongoing (interventions implemented as appropriate)  Pt is a a o x 3, vss, resp effort even and unlabored. Pt incontinent of urine. Call bell within reach, side rails up x 3,pt has remained free from falls. Will continue to monitor.    04/17/17 0435   Coping/Psychosocial   Plan Of Care Reviewed With patient

## 2017-04-18 ENCOUNTER — TELEPHONE (OUTPATIENT)
Dept: NEUROLOGY | Facility: CLINIC | Age: 52
End: 2017-04-18

## 2017-04-18 PROCEDURE — 97803 MED NUTRITION INDIV SUBSEQ: CPT | Performed by: NUTRITIONIST

## 2017-04-18 PROCEDURE — 25000003 PHARM REV CODE 250: Performed by: PHYSICIAN ASSISTANT

## 2017-04-18 PROCEDURE — 25000003 PHARM REV CODE 250: Performed by: INTERNAL MEDICINE

## 2017-04-18 PROCEDURE — 97110 THERAPEUTIC EXERCISES: CPT

## 2017-04-18 PROCEDURE — 11000004 HC SNF PRIVATE

## 2017-04-18 PROCEDURE — 25000003 PHARM REV CODE 250: Performed by: NURSE PRACTITIONER

## 2017-04-18 RX ADMIN — OXYBUTYNIN CHLORIDE 5 MG: 5 TABLET, EXTENDED RELEASE ORAL at 08:04

## 2017-04-18 RX ADMIN — OXYCODONE HYDROCHLORIDE 15 MG: 5 TABLET ORAL at 03:04

## 2017-04-18 RX ADMIN — ACETAMINOPHEN 650 MG: 325 TABLET, FILM COATED ORAL at 01:04

## 2017-04-18 RX ADMIN — DANTROLENE SODIUM 50 MG: 50 CAPSULE ORAL at 07:04

## 2017-04-18 RX ADMIN — DANTROLENE SODIUM 50 MG: 50 CAPSULE ORAL at 12:04

## 2017-04-18 RX ADMIN — CARBAMAZEPINE 400 MG: 100 CAPSULE, EXTENDED RELEASE ORAL at 08:04

## 2017-04-18 RX ADMIN — OXYCODONE HYDROCHLORIDE 15 MG: 5 TABLET ORAL at 05:04

## 2017-04-18 RX ADMIN — ACETAMINOPHEN 650 MG: 325 TABLET, FILM COATED ORAL at 09:04

## 2017-04-18 RX ADMIN — GABAPENTIN 900 MG: 300 CAPSULE ORAL at 07:04

## 2017-04-18 RX ADMIN — BACLOFEN 20 MG: 10 TABLET ORAL at 12:04

## 2017-04-18 RX ADMIN — CITALOPRAM HYDROBROMIDE 20 MG: 20 TABLET ORAL at 08:04

## 2017-04-18 RX ADMIN — DANTROLENE SODIUM 50 MG: 50 CAPSULE ORAL at 05:04

## 2017-04-18 RX ADMIN — OXYCODONE HYDROCHLORIDE 15 MG: 5 TABLET ORAL at 01:04

## 2017-04-18 RX ADMIN — DIAZEPAM 5 MG: 5 TABLET ORAL at 09:04

## 2017-04-18 RX ADMIN — BACLOFEN 20 MG: 10 TABLET ORAL at 05:04

## 2017-04-18 RX ADMIN — OXYCODONE HYDROCHLORIDE 15 MG: 5 TABLET ORAL at 09:04

## 2017-04-18 RX ADMIN — DIAZEPAM 5 MG: 5 TABLET ORAL at 01:04

## 2017-04-18 RX ADMIN — CARBAMAZEPINE 400 MG: 100 CAPSULE, EXTENDED RELEASE ORAL at 09:04

## 2017-04-18 RX ADMIN — GABAPENTIN 900 MG: 300 CAPSULE ORAL at 09:04

## 2017-04-18 RX ADMIN — GABAPENTIN 900 MG: 300 CAPSULE ORAL at 02:04

## 2017-04-18 RX ADMIN — RIVAROXABAN 20 MG: 20 TABLET, FILM COATED ORAL at 05:04

## 2017-04-18 RX ADMIN — TRAZODONE HYDROCHLORIDE 100 MG: 50 TABLET ORAL at 09:04

## 2017-04-18 RX ADMIN — OXYCODONE HYDROCHLORIDE 15 MG: 5 TABLET ORAL at 08:04

## 2017-04-18 RX ADMIN — ACETAMINOPHEN 650 MG: 325 TABLET, FILM COATED ORAL at 03:04

## 2017-04-18 RX ADMIN — BACLOFEN 20 MG: 10 TABLET ORAL at 01:04

## 2017-04-18 NOTE — PLAN OF CARE
Problem: Physical Therapy Goal  Goal: Physical Therapy Goal  Goals to be met by: 2 weeks     Patient will increase functional independence with mobility by performin. Supine to sit with Stand-by Assistance.  2. Sit to supine with Stand-by Assistance. Met (2017)  3. Sit to stand transfer with Minimal Assistance MET  4. Bed to chair transfer with Minimal Assistance using Rolling Walker   5. Gait x 20 feet with Moderate Assistance using Rolling Walker. Met (3/26/2017)  6. Wheelchair propulsion x75 feet with Stand-by Assistance using BUE/BLE  7. Ascend/descend 3 stair with left Handrails Moderate Assistance.   8. Stand for 3 minutes with Stand-by Assistance using Rolling Walker.  9. Lower extremity exercise program x20 reps per handout, with assistance as needed MET  10. New Goal (3/26/2017): Gait x 50 feet with Moderate Assistance using Rolling Walker. Met (2017)     Outcome: Ongoing (interventions implemented as appropriate)  Pt met two goals today.

## 2017-04-18 NOTE — PLAN OF CARE
Problem: Patient Care Overview  Goal: Plan of Care Review  Outcome: Ongoing (interventions implemented as appropriate)  Recommendations  1. Continue current diet order with double portions + oral nutrition supplement TID   2. RD to monitor  Goals: Pt to consume >85% EEN and EPN  Nutrition Goal Status: goal met  Communication of RD Recs: Discussed in rounds       Continuum of Care Plan  D/C planning: pt to consume > 85% EEN and

## 2017-04-18 NOTE — PROGRESS NOTES
Recommendations  1. Continue current diet order with double portions + oral nutrition supplement TID   2. RD to monitor  Goals: Pt to consume >85% EEN and EPN  Nutrition Goal Status: goal met  Communication of RD Recs: Discussed in rounds       Continuum of Care Plan  D/C planning: pt to consume > 85% EEN and EPN           Reason for Assessment      Reason for Assessment: RD follow-up  Diagnosis: other (see comments) (multiple sclerosis)  Relevent Medical History: CHF, cancer, COPD, DM2, HTN, stroke   Interdisciplinary Rounds: did not attend  General Information Comments: Pt with good appetite, generally >75%; NP expressed concern that Pt may be drinking too much coffee in place of other beverages.       Nutrition Prescription Ordered  Current Diet Order: Regular + Boost Plus TID  Nutrition Order Comments: double portions  Oral Nutrition Supplement: Boost Plus Chocolate       Evaluation of Received Nutrients/Fluid Intake  Pt consuming 100% meals  Energy Calories Required: meeting needs  Protein Required: meeting needs  Tolerance: tolerating      Nutrition Risk Screen  Nutrition Risk Screen: no indicators present      Nutrition/Diet History  Patient Reported Diet/Restrictions/Preferences: general      Labs/Tests/Procedures/Meds  Pertinent Labs Reviewed: reviewed, pertinent  Pertinent Medications Reviewed: reviewed, pertinent  Pertinent Medications Comments: senna dosucate      Physical Findings  Overall Physical Appearance: other (see comments), loss of muscle mass (nourished)  Oral/Mouth Cavity: WDL  Skin: intact, other (see comments) (Huy 16)      Anthropometrics  Height (inches): 70.98 in  Weight Method: Standard Scale  Weight (kg): 79.5 kg  Ideal Body Weight (IBW), Male: 175.88 lb  % Ideal Body Weight, Male (lb): 102. lb  BMI (kg/m2): 24.36  BMI Grade: 18.5-24.9 - normal      Estimated/Assessed Needs  Weight Used For Calorie Calculations: 79.5 kg (174 lb 9.7 oz)   Height (cm): 180.3 cm  Energy Need Method:  Rich Soto, other (see comments) (2003-2170kcal/day (1.2-1.3 PAL))  RMR (More Soto Equation): 1672.16  Weight Used For Protein Calculations: 79.5kg (174 lb 9.7 oz)  Protein Requirements: 79-95g/day (1.0-1.2g/kg)  1.0 gm Protein (gm): 79.37 and 1.2 gm Protein (gm): 95.24  Fluid Need Method: RDA Method (1mL/kcal)      Monitor and Evaluation      Food and Nutrient Intake: energy intake, food and beverage intake  Food and Nutrient Adminstration: diet order  Knowledge/Beliefs/Attitudes: food and nutrition knowledge/skill  Anthropometric Measurements: weight change, weight, body mass index  Biochemical Data, Medical Tests and Procedures: electrolyte and renal panel, gastrointestinal profile, glucose/endocrine profile, inflammatory profile, lipid profile  Nutrition-Focused Physical Findings: overall appearance, skin      Nutrition Risk      Level of Risk: other (see comments) (1x/week)      Nutrition Follow-Up      RD Follow-up?: Yes      Assessment and Plan      Increased energy needs related to physiological needs as evidenced by increased EEN and EPN 2/2 COPD  continues

## 2017-04-18 NOTE — PLAN OF CARE
Problem: Occupational Therapy Goal  Goal: Occupational Therapy Goal  Goals to be met by: 3 weeks     Patient will increase functional independence with ADLs by performing:    Feeding with Set-up Assistance. Met  UE Dressing with Stand-by Assistance.--met   LE Dressing with Minimal Assistance.--met;however, occasional mod A depending on muscle tone  Grooming while seated with Modified [place].--met  Toileting from bedside commode with Minimal Assistance for hygiene and clothing management.   Bathing from shower chair/bench with Minimal Assistance.--met  Sitting at edge of bed x20 minutes with Supervision.--met  Rolling to Bilateral with Stand-by Assistance. --met  Supine to sit with Minimal Assistance.--met  Stand pivot transfers with Minimal Assistance.--met  Toilet transfer to bedside commode with Minimal Assistance.  L Upper extremity self ROM exercise program x15 reps per handout, with independence.   Outcome: Ongoing (interventions implemented as appropriate)  Patient's goals are appropriate.   GISEL Flowers  4/18/2017

## 2017-04-18 NOTE — H&P
"[facility] Hospital Medicine  History & Physical    Patient Name: Mao Levin  MRN: [id number]  Admission Date: 3/17/2017  Attending Physician: Mauri Horan MD   Primary Care Provider: Fausto Leung MD         Patient information was obtained from patient.     Subjective:     Principal Problem:MS (multiple sclerosis)    Chief Complaint: No chief complaint on file.       HPI: Patient is a 52 y/o male with PMH of MS and PE. He was admitted to [facility] after recent hospitalization for MS pseudoflare. He has been working with PTOT while at [facility]. His main complaint today is regarding a headache at night primarily located in the right side of his head with some pain felt down the right side of his face. He reports the pain as being like "someone stabbing me in the face." He states that increased doses of gabapentin has provided him with no pain relief.           Past Medical History:   Diagnosis Date    Multiple sclerosis        History reviewed. No pertinent surgical history.    Review of patient's allergies indicates:  No Known Allergies    No current facility-administered medications on file prior to encounter.      Current Outpatient Prescriptions on File Prior to Encounter   Medication Sig    baclofen (LIORESAL) 20 MG tablet Take 1 tablet (20 mg total) by mouth 4 (four) times daily.    citalopram (CELEXA) 20 MG tablet Take 1 tablet (20 mg total) by mouth once daily.    dantrolene (DANTRIUM) 50 MG Cap Take 1 capsule (50 mg total) by mouth 4 (four) times daily.    diazePAM (VALIUM) 5 MG tablet Take 1 tablet (5 mg total) by mouth 2 (two) times daily as needed (muscle spasms).    ergocalciferol (VITAMIN D2) 50,000 unit Cap Take 1 capsule (50,000 Units total) by mouth every 7 days.    gabapentin (NEURONTIN) 600 MG tablet Take 1 Q AM plus 1 Q Day in the early afternoon plus 1.5 (900mg) Q HS (Patient taking differently: Take 1 tablet by mouth every morning and in the early afternoon then take 1½ " tablets (900 mg) at bedtime)    oxybutynin (DITROPAN-XL) 5 MG TR24 Take 5 mg by mouth once daily.    oxycodone (ROXICODONE) 15 MG Tab Take 1 tablet (15 mg total) by mouth 3 (three) times daily as needed.    rivaroxaban (XARELTO) 20 mg Tab Take 1 tablet (20 mg total) by mouth daily with dinner or evening meal.    trazodone (DESYREL) 100 MG tablet Take 100 mg by mouth every evening.    carbamazepine (TEGRETOL XR) 400 MG Tb12 Take 1 tablet (400 mg total) by mouth 2 (two) times daily.    polyethylene glycol (GLYCOLAX) 17 gram/dose powder Take 17 g by mouth once daily.    senna-docusate 8.6-50 mg (PERICOLACE) 8.6-50 mg per tablet Take 1 tablet by mouth 2 (two) times daily.     Family History     Problem Relation (Age of Onset)    Arthritis Mother    No Known Problems Father        Social History Main Topics    Smoking status: Current Some Day Smoker     Packs/day: 0.50     Types: Cigarettes    Smokeless tobacco: Never Used    Alcohol use No    Drug use: No    Sexual activity: Not on file     Review of Systems   Constitutional: Negative for chills, fatigue and fever.   HENT: Negative for congestion, nosebleeds and rhinorrhea.    Eyes: Negative for visual disturbance.   Respiratory: Negative for cough and shortness of breath.    Cardiovascular: Negative for chest pain and palpitations.   Gastrointestinal: Negative for constipation, diarrhea, nausea and vomiting.   Endocrine: Negative for cold intolerance and heat intolerance.   Genitourinary: Negative for dysuria and hematuria.   Musculoskeletal: Negative for arthralgias.   Skin: Negative for rash.   Neurological: Positive for headaches.        Per HPI   Hematological: Does not bruise/bleed easily.   Psychiatric/Behavioral: Negative for confusion.     Objective:     Vital Signs (Most Recent):  Temp: 98.3 °F (36.8 °C) (04/17/17 1012)  Pulse: 89 (04/17/17 1012)  Resp: 16 (04/17/17 1012)  BP: (!) 141/91 (04/17/17 1012)  SpO2: 98 % (04/17/17 1012) Vital Signs (24h  Range):  Temp:  [98.1 °F (36.7 °C)-98.3 °F (36.8 °C)] 98.3 °F (36.8 °C)  Pulse:  [68-89] 89  Resp:  [16-18] 16  SpO2:  [98 %-99 %] 98 %  BP: (132-141)/(74-91) 141/91     Weight: 79.5 kg (175 lb 4.3 oz)  Body mass index is 24.44 kg/(m^2).    Physical Exam   Constitutional: He is oriented to person, place, and time. He appears well-developed and well-nourished.   HENT:   Head: Normocephalic and atraumatic.   Eyes: Conjunctivae are normal.   Neck: Normal range of motion.   Cardiovascular: Normal rate and regular rhythm.    Pulmonary/Chest: Effort normal and breath sounds normal.   Abdominal: Soft. Bowel sounds are normal.   Musculoskeletal: Normal range of motion.   Neurological: He is alert and oriented to person, place, and time.   No pain on palpation over right face    Skin: Skin is warm.   Psychiatric: He has a normal mood and affect.        Significant Labs: All pertinent labs within the past 24 hours have been reviewed.    Significant Imaging: I have reviewed and interpreted all pertinent imaging results/findings within the past 24 hours.    Assessment/Plan:     * MS (multiple sclerosis)  - continue PTOT as scheduled  - patient denied rehab  - follow up with neurology as scheduled         Chronic pain of multiple sites  - follow up with pain management upon dc  - continue current pain regimen  - unclear if new headache and right facial pain has a neuropathic component; may consider neurology clinic appointment for further evaluation       Spasticity  - continue baclofen and dantrolene      Vitamin D deficiency disease  - ergocalciferol       10/25/2016 [place] PE  - continue rivaroxaban      Polyneuropathy  - continue gabapentin      Depression  - continue citalopram    Neurogenic Bladder  - bladder scan prn  - oxygutynin      Tobacco Use  - continue nicotine patch      VTE Risk Mitigation         Ordered     rivaroxaban tablet 20 mg  With dinner     Route:  Oral        03/17/17 1517     High Risk of VTE  Once       03/17/17 1517     Reason for No Pharmacological VTE Prophylaxis  Once      03/17/17 1517        Mauri Horan MD  Department of Hospital Medicine   [facility]

## 2017-04-18 NOTE — PT/OT/SLP PROGRESS
Physical Therapy  Treatment    Mao Levin   MRN: [id number]   Admitting Diagnosis: MS (multiple sclerosis)    PT Received On: 04/17/17  Total Time (min): 53       Billable Minutes:  Gait [id number], Therapeutic Activity 20 and Therapeutic Exercise 23    Treatment Type: Treatment  PT/PTA: PTA     PTA Visit Number: 5       General Precautions: Standard, fall  Orthopedic Precautions: N/A   Braces: N/A    Do you have any cultural, spiritual, Islam conflicts, given your current situation?: no    Subjective:  Communicated with nursing prior to session.  Pt agreed to work with therapy.       Objective:  Patient found seated w/c.     Functional Status:    Bed Mobility:  Pt agreed to work with therapy.     Transfers:  Sit<>Stand: to/from w/c with RW and Min A   Stand Pivot Transfer: w/c<>mat with RW and Min A     Gait:  Amb 130 feet with RW and Min A      Wheelchair Mobility:  Patient propels w/c x50 feet with BUEs and (S)     Therex:  Seated/Supine therex 2x20 reps: AP, SAQ, GS, ABD/ADD, LAQ, and Hip Flexion. Assistance required to LLE.    Patient left up in chair with call button in reach.    Assessment:  Mao Levin is a 51 y.o. male with a medical diagnosis of MS (multiple sclerosis).  Pt tolerated treatment well, and would continue to benefit from skilled PT services at this time. Continue with PT POC as indicated.    Rehab identified problem list/impairments: weakness, impaired endurance, impaired self care skills, impaired functional mobilty, gait instability, impaired balance, decreased upper extremity function, decreased coordination, decreased safety awareness, decreased lower extremity function, decreased ROM    Rehab potential is fair.    Activity tolerance: Fair    Discharge recommendations:  (TBD- will need assistance upon D/C)     Barriers to discharge: Decreased caregiver support    Equipment recommendations: wheelchair     GOALS:   Physical Therapy Goals        Problem: Physical Therapy Goal    Goal  Priority Disciplines Outcome Goal Variances Interventions   Physical Therapy Goal     PT/OT, PT Ongoing (interventions implemented as appropriate)     Description:  Goals to be met by: 2 weeks     Patient will increase functional independence with mobility by performin. Supine to sit with Stand-by Assistance  2. Sit to supine with Stand-by Assistance  3. Sit to stand transfer with Minimal Assistance MET  4. Bed to chair transfer with Minimal Assistance using Rolling Walker   5. Gait  x 20 feet with Moderate Assistance using Rolling Walker. Met (3/26/2017)  6. Wheelchair propulsion x75 feet with Stand-by Assistance using BUE/BLE  7. Ascend/descend 3 stair with left Handrails Moderate Assistance.   8. Stand for 3 minutes with Stand-by Assistance using Rolling Walker  9. Lower extremity exercise program x20 reps per handout, with assistance as needed MET  10. New Goal (3/26/2017): Gait  x 50 feet with Moderate Assistance using Rolling Walker.                   PLAN:    Patient to be seen 5 x/week  to address the above listed problems via gait training, therapeutic activities, therapeutic exercises, wheelchair management/training  Plan of Care expires: 17  Plan of Care reviewed with: patient    Asiya Julian, BERNARDO  2017

## 2017-04-18 NOTE — PLAN OF CARE
Problem: Patient Care Overview  Goal: Plan of Care Review  Outcome: Revised  Repositions independently, no new skin breakdowns noted. Left side weakness, monitored for pain and safety q 1- 2hrs this shift. Safety maintained. Pain meds effective

## 2017-04-18 NOTE — ASSESSMENT & PLAN NOTE
- follow up with pain management upon dc  - continue current pain regimen  - unclear if new headache and right facial pain has a neuropathic component; may consider neurology clinic appointment for further evaluation

## 2017-04-18 NOTE — PT/OT/SLP PROGRESS
Occupational Therapy  Treatment    Mao Levin   MRN: [id number]   Admitting Diagnosis: MS (multiple sclerosis)    OT Date of Treatment: 17  Total Time (min): 45 min    Billable Minutes:  Therapeutic Exercise 45    General Precautions: Standard, fall  Orthopedic Precautions: N/A  Braces: N/A    Do you have any cultural, spiritual, Restorationism conflicts, given your current situation?: no    Subjective:  Communicated with patient prior to session.    Pain Ratin/10  Pain Rating Post-Intervention: 0/10    Objective:    OT Exercises:  PROM /stretching to L UE including digits and wrist focusing to facilitate normal movement.   UE Ergometer 15 min for improving endurance to increase independence with ADLs.   Rickshaw 25 x 8 30# focusing to improve strength to increase independence with functional mobility.   Lat pull downs 25 x 4 35# for improving strength to increase independence with daily skills.     Patient left up in chair with therapists in rehab gym.    ASSESSMENT:  Mao Levin is a 51 y.o. male with a medical diagnosis of MS (multiple sclerosis) and presents with the deficits listed below. Patient tolerated treatment session and was motivated to complete therapeutic exercises. Patient declined standing activities. Patient continues to benefit from skilled OT services to achieve maximal independence.     Rehab identified problem list/impairments: impaired endurance, impaired self care skills, impaired functional mobilty, decreased upper extremity function, decreased safety awareness    Rehab potential is good    Activity tolerance: Good    Discharge recommendations: rehabilitation facility     Barriers to discharge: Decreased caregiver support     Equipment recommendations: wheelchair (drop arm commode)     GOALS:   Occupational Therapy Goals        Problem: Occupational Therapy Goal    Goal Priority Disciplines Outcome Interventions   Occupational Therapy Goal     OT, PT/OT Ongoing (interventions implemented  as appropriate)    Description:  Goals to be met by: 3 weeks     Patient will increase functional independence with ADLs by performing:    Feeding with Set-up Assistance. Met  UE Dressing with Stand-by Assistance.--met   LE Dressing with Minimal Assistance.--met;however, occasional mod A depending on muscle tone  Grooming while seated with Modified [place].--met  Toileting from bedside commode with Minimal Assistance for hygiene and clothing management.   Bathing from  shower chair/bench with Minimal Assistance.--met  Sitting at edge of bed x20 minutes with Supervision.--met  Rolling to Bilateral with Stand-by Assistance. --met  Supine to sit with Minimal Assistance.--met  Stand pivot transfers with Minimal Assistance.--met  Toilet transfer to bedside commode with Minimal Assistance.  L Upper extremity self ROM exercise program x15 reps per handout, with independence.                   Plan:  Patient to be seen 5 x/week to address the above listed problems via self-care/home management, therapeutic exercises, therapeutic activities  Plan of Care expires: 04/18/17  Plan of Care reviewed with: patient    GISEL Flowers  04/18/2017

## 2017-04-18 NOTE — PROGRESS NOTES
04/18/2017  2:41 PM  Patient informed charge RN Nai he appealed discharge date of 4/20/2017. CM placed a call to [facility] ([phone]) to verify appeal. Copy of signed NOMNC and chart faxed to [facility] at [phone].    Your fax has been successfully sent to [phone] at [phone].  ------------------------------------------------------------  From: [id number]  ------------------------------------------------------------  Time: 4/18/2017 2:42:16 PM  Sent to [id number] with remote ID "  Result: (0/339;0/0) Successful Send  Page record: 1 - 54  Elapsed time: 19:41 on channel 27    Your fax has been successfully sent to [id number] at [id number].  ------------------------------------------------------------  From: [id number]  ------------------------------------------------------------  Time: 4/18/2017 2:45:42 PM  Sent to [id number] with remote ID "  Result: (0/339;0/0) Successful Send  Page record: 1 - 51  Elapsed time: 17:48 on channel 9    Your fax has been successfully sent to [phone] at [phone].  ------------------------------------------------------------  From: [id number]  ------------------------------------------------------------  Time: 4/18/2017 2:50:14 PM  Sent to [phone] with remote ID "  Result: (0/339;0/0) Successful Send  Page record: 1 - 54  Elapsed time: 18:19 on channel 2    Your fax has been successfully sent to [phone] at [id number].  ------------------------------------------------------------  From: [id number]  ------------------------------------------------------------  Time: 4/18/2017 2:52:44 PM  Sent to [phone] with remote ID "  Result: (0/339;0/0) Successful Send  Page record: 1 - 39  Elapsed time: 13:48 on channel 24    Kelsie Renee RN, [facility]illed  [id number]

## 2017-04-18 NOTE — PROGRESS NOTES
04/18/2017  11:28 AM  Discharge Planning- Message left with neurology ([id number]) for follow up appt for patient.    Kelsie Renee RN, [facility]illed  [id number]    04/18/2017  11:56 AM  Patient scheduled with neurology 5/5/2017 at 10:45am.  Kelsie Renee RN, CM Skilled  [id number]

## 2017-04-18 NOTE — PT/OT/SLP PROGRESS
Physical Therapy  Treatment    Mao Levin   MRN: [id number]   Admitting Diagnosis: MS (multiple sclerosis)    PT Received On: 17  Total Time (min): 53       Billable Minutes:  Gait [id number], Therapeutic Activity 23 and Therapeutic Exercise 15    Treatment Type: Treatment  PT/PTA: PT     PTA Visit Number: 0       General Precautions: Standard, fall  Orthopedic Precautions: N/A   Braces: N/A    Do you have any cultural, spiritual, Islam conflicts, given your current situation?: no    Subjective:  Communicated with pt prior to session. Pt was agreeable to PT services.  He reported that he wants to get stronger prior to discharging back home. He reported that after each exacerbation, he would return to full function. However, he reported that he did not return to full function after the exacerbation prior to this pseudoflare.    Pain Ratin/10    Objective:  Patient found seated in wheelchair.         Functional Status:  MDS G  Bed Mobility Functional Status: mod(A)-Max(A)  Transfer Functional Status: mod(A)-Max(A)  Walk in Room Functional Status: CGA-Min (A)  Walk in Corridor Functional Status: CGA-Min (A)  Locomotion on Unit Functional Status: S-SBA  Locomotion Off Unit Functional Status: S-SBA          Bed Mobility:  Sit>Supine:SBA  Supine>Sit: Mod A- pull to sit.    Transfers:  Sit<>Stand: CGA-Min A from wheelchair  Stand Pivot Transfer: Min A- Mod A (edge of mat<>WC, WC>edge of bed).    Gait:  Amb 150 feet with use of a rolling walker and contact guard assistance.  Presentation: Forward flexion of trunk, anterior pelvic tilt noted throughout gait cycle, decreased pelvic rotation, sustained knee flexion in both knees.     Therex:  LLE Heel slides AAROM x10 reps  LLE hip abduction AAROM x10 reps  BLE short arc quads x 20 reps  Gluteal sets x 20 reps  Quadricep sets x 10 reps  Standing heel raises x 20 reps  Standing hip flexion x 10 reps RLE, 10 AAROM on LLE    Pt was unable to lift his L foot onto the  step.    Balance:  Pt was able to stand with BUE support on the stairs with SBA (no loss of balance).    Patient left up in chair with call button in reach.    Assessment:  Mao Levin is a 51 y.o. male with a medical diagnosis of MS (multiple sclerosis).  Pt displays weakness in his LLE in his quads, hamstrings, dorsi- and plantarflexors.  Mr. Levin was able to ambulate 150 feet today with no loss of balance and contact guard assistance. He will benefit from further PT services to ensure safety upon return home.    Rehab identified problem list/impairments: weakness, impaired endurance, impaired self care skills, impaired functional mobilty, gait instability, impaired balance, decreased upper extremity function, decreased coordination, decreased safety awareness, decreased lower extremity function, decreased ROM    Rehab potential is fair.    Activity tolerance: fair.    Discharge recommendations:  (TBD- will need assistance upon D/C)     Barriers to discharge: Decreased caregiver support    Equipment recommendations: wheelchair     GOALS:   Physical Therapy Goals        Problem: Physical Therapy Goal    Goal Priority Disciplines Outcome Goal Variances Interventions   Physical Therapy Goal     PT/OT, PT Ongoing (interventions implemented as appropriate)     Description:  Goals to be met by: 2 weeks     Patient will increase functional independence with mobility by performin. Supine to sit with Stand-by Assistance.  2. Sit to supine with Stand-by Assistance. Met (2017)  3. Sit to stand transfer with Minimal Assistance MET  4. Bed to chair transfer with Minimal Assistance using Rolling Walker    5. Gait  x 20 feet with Moderate Assistance using Rolling Walker. Met (3/26/2017)  6. Wheelchair propulsion x75 feet with Stand-by Assistance using BUE/BLE  7. Ascend/descend 3 stair with left Handrails Moderate Assistance.   8. Stand for 3 minutes with Stand-by Assistance using Rolling Walker.  9. Lower extremity  exercise program x20 reps per handout, with assistance as needed MET  10. New Goal (3/26/2017): Gait  x 50 feet with Moderate Assistance using Rolling Walker. Met (4/18/2017)                      PLAN:    Patient to be seen 5 x/week  to address the above listed problems via gait training, therapeutic activities, therapeutic exercises, wheelchair management/training  Plan of Care expires: 04/17/17  Plan of Care reviewed with: patient    Inés Jones, PT  04/18/2017

## 2017-04-19 PROCEDURE — 97116 GAIT TRAINING THERAPY: CPT

## 2017-04-19 PROCEDURE — 25000003 PHARM REV CODE 250: Performed by: INTERNAL MEDICINE

## 2017-04-19 PROCEDURE — 97110 THERAPEUTIC EXERCISES: CPT

## 2017-04-19 PROCEDURE — 97530 THERAPEUTIC ACTIVITIES: CPT

## 2017-04-19 PROCEDURE — 11000004 HC SNF PRIVATE

## 2017-04-19 PROCEDURE — 25000003 PHARM REV CODE 250: Performed by: PHYSICIAN ASSISTANT

## 2017-04-19 PROCEDURE — 25000003 PHARM REV CODE 250: Performed by: NURSE PRACTITIONER

## 2017-04-19 PROCEDURE — 97535 SELF CARE MNGMENT TRAINING: CPT

## 2017-04-19 RX ADMIN — CITALOPRAM HYDROBROMIDE 20 MG: 20 TABLET ORAL at 09:04

## 2017-04-19 RX ADMIN — DIAZEPAM 5 MG: 5 TABLET ORAL at 12:04

## 2017-04-19 RX ADMIN — OXYCODONE HYDROCHLORIDE 15 MG: 5 TABLET ORAL at 10:04

## 2017-04-19 RX ADMIN — GABAPENTIN 900 MG: 300 CAPSULE ORAL at 02:04

## 2017-04-19 RX ADMIN — GABAPENTIN 900 MG: 300 CAPSULE ORAL at 06:04

## 2017-04-19 RX ADMIN — STANDARDIZED SENNA CONCENTRATE AND DOCUSATE SODIUM 1 TABLET: 8.6; 5 TABLET, FILM COATED ORAL at 09:04

## 2017-04-19 RX ADMIN — DANTROLENE SODIUM 50 MG: 50 CAPSULE ORAL at 06:04

## 2017-04-19 RX ADMIN — OXYCODONE HYDROCHLORIDE 15 MG: 5 TABLET ORAL at 01:04

## 2017-04-19 RX ADMIN — OXYCODONE HYDROCHLORIDE 15 MG: 5 TABLET ORAL at 08:04

## 2017-04-19 RX ADMIN — GABAPENTIN 900 MG: 300 CAPSULE ORAL at 09:04

## 2017-04-19 RX ADMIN — CARBAMAZEPINE 400 MG: 100 CAPSULE, EXTENDED RELEASE ORAL at 08:04

## 2017-04-19 RX ADMIN — OXYCODONE HYDROCHLORIDE 15 MG: 5 TABLET ORAL at 02:04

## 2017-04-19 RX ADMIN — ACETAMINOPHEN 650 MG: 325 TABLET, FILM COATED ORAL at 09:04

## 2017-04-19 RX ADMIN — BACLOFEN 20 MG: 10 TABLET ORAL at 05:04

## 2017-04-19 RX ADMIN — DANTROLENE SODIUM 50 MG: 50 CAPSULE ORAL at 01:04

## 2017-04-19 RX ADMIN — BACLOFEN 20 MG: 10 TABLET ORAL at 01:04

## 2017-04-19 RX ADMIN — DANTROLENE SODIUM 50 MG: 50 CAPSULE ORAL at 12:04

## 2017-04-19 RX ADMIN — POLYETHYLENE GLYCOL 3350 17 G: 17 POWDER, FOR SOLUTION ORAL at 02:04

## 2017-04-19 RX ADMIN — BACLOFEN 20 MG: 10 TABLET ORAL at 06:04

## 2017-04-19 RX ADMIN — RIVAROXABAN 20 MG: 20 TABLET, FILM COATED ORAL at 05:04

## 2017-04-19 RX ADMIN — TRAZODONE HYDROCHLORIDE 100 MG: 50 TABLET ORAL at 08:04

## 2017-04-19 RX ADMIN — OXYBUTYNIN CHLORIDE 5 MG: 5 TABLET, EXTENDED RELEASE ORAL at 09:04

## 2017-04-19 RX ADMIN — BACLOFEN 20 MG: 10 TABLET ORAL at 12:04

## 2017-04-19 RX ADMIN — DIAZEPAM 5 MG: 5 TABLET ORAL at 08:04

## 2017-04-19 RX ADMIN — CARBAMAZEPINE 400 MG: 100 CAPSULE, EXTENDED RELEASE ORAL at 09:04

## 2017-04-19 RX ADMIN — DANTROLENE SODIUM 50 MG: 50 CAPSULE ORAL at 05:04

## 2017-04-19 NOTE — PLAN OF CARE
Problem: Occupational Therapy Goal  Goal: Occupational Therapy Goal  Goals to be met by: 3 weeks     Patient will increase functional independence with ADLs by performing:    Feeding with Set-up Assistance. Met  UE Dressing with Stand-by Assistance.--met   LE Dressing with Minimal Assistance.--met;however, occasional mod A depending on muscle tone  Grooming while seated with Modified [place].--met  Toileting from bedside commode with Minimal Assistance for hygiene and clothing management.   Bathing from shower chair/bench with Minimal Assistance.--met  Sitting at edge of bed x20 minutes with Supervision.--met  Rolling to Bilateral with Stand-by Assistance. --met  Supine to sit with Minimal Assistance.--met  Stand pivot transfers with Minimal Assistance.--met  Toilet transfer to bedside commode with Minimal Assistance.  L Upper extremity self ROM exercise program x15 reps per handout, with independence.   Outcome: Ongoing (interventions implemented as appropriate)  OT goals remain appropriate.  GISEL Gupta  4/19/2017

## 2017-04-19 NOTE — PT/OT/SLP PROGRESS
"Physical Therapy  Treatment    Mao Levin   MRN: [id number]   Admitting Diagnosis: MS (multiple sclerosis)    PT Received On: 17  Total Time (min): 39       Billable Minutes:39    Gait [id number], Therapeutic Activity 9 and Therapeutic Exercise 17    Treatment Type: Treatment  PT/PTA: PTA     PTA Visit Number: 1       General Precautions: Standard, fall  Orthopedic Precautions: N/A   Braces: N/A    Do you have any cultural, spiritual, [employer] conflicts, given your current situation?: no    Subjective:  "I'm ready"    Pain Ratin/10  Location - Side: Bilateral  Location - Orientation: generalized  Location: leg  Pain Addressed: Pre-medicate for activity, Reposition, Distraction, Cessation of Activity, Nurse notified  Pain Rating Post-Intervention:  (throughout session)    Objective:  Patient found in bathroom awaiting PCT     Functional Status:  MDS G  Transfer Functional Status: CGA-Min (A)  Walk in Corridor Functional Status: CGA-Min (A)          Transfers:  Sit<>Stand: CGA with RW  Stand Pivot Transfer: BScommode>WC min A no AD      Gait:  Amb with RW min/CGA vcs for erect posture, LLE ace wrap into DF , WC follow ~ 155 ft no LOB, to include negotiating turn     Wheelchair Mobility  Patient propels w/c pt refuses to try "I can't"    Therex:  2x10 reps A/AA/PROM AP,GS,LAQ,hip flex,abd/add  Standing mini squats 2x10 reps with CGA BUE support      Additional Treatment:  BLE gastroc/hamstring stretch x3:15 sec holds  toileting total A with kim care and clothing mgmt    Patient left up in chair with with .    Assessment:  Mao Levin is a 51 y.o. male with a medical diagnosis of MS (multiple sclerosis).  Pt tolerated well, pt would continue to benefit from skilled PT services to improve overall functional mobility, strength and endurance.  .    Rehab identified problem list/impairments: weakness, impaired endurance, impaired self care skills, impaired functional mobilty, gait instability, " impaired balance, decreased upper extremity function, decreased coordination, decreased safety awareness, decreased lower extremity function, decreased ROM    Rehab potential is fair.    Activity tolerance: Fair    Discharge recommendations:  (TBD- will need assistance upon D/C)     Barriers to discharge: Decreased caregiver support    Equipment recommendations: wheelchair     GOALS:   Physical Therapy Goals        Problem: Physical Therapy Goal    Goal Priority Disciplines Outcome Goal Variances Interventions   Physical Therapy Goal     PT/OT, PT Ongoing (interventions implemented as appropriate)     Description:  Goals to be met by: 2 weeks     Patient will increase functional independence with mobility by performin. Supine to sit with Stand-by Assistance.  2. Sit to supine with Stand-by Assistance. Met (2017)  3. Sit to stand transfer with Minimal Assistance MET  4. Bed to chair transfer with Minimal Assistance using Rolling Walker    5. Gait  x 20 feet with Moderate Assistance using Rolling Walker. Met (3/26/2017)  6. Wheelchair propulsion x75 feet with Stand-by Assistance using BUE/BLE  7. Ascend/descend 3 stair with left Handrails Moderate Assistance.   8. Stand for 3 minutes with Stand-by Assistance using Rolling Walker.  9. Lower extremity exercise program x20 reps per handout, with assistance as needed MET  10. New Goal (3/26/2017): Gait  x 50 feet with Moderate Assistance using Rolling Walker. Met (2017)                      PLAN:    Patient to be seen 5 x/week  to address the above listed problems via gait training, therapeutic activities, therapeutic exercises, wheelchair management/training  Plan of Care expires: 17  Plan of Care reviewed with: patient    Chely Gudino, PTA  2017

## 2017-04-19 NOTE — PLAN OF CARE
Problem: Physical Therapy Goal  Goal: Physical Therapy Goal  Goals to be met by: 2 weeks     Patient will increase functional independence with mobility by performin. Supine to sit with Stand-by Assistance.  2. Sit to supine with Stand-by Assistance. Met (2017)  3. Sit to stand transfer with Minimal Assistance MET  4. Bed to chair transfer with Minimal Assistance using Rolling Walker   5. Gait x 20 feet with Moderate Assistance using Rolling Walker. Met (3/26/2017)  6. Wheelchair propulsion x75 feet with Stand-by Assistance using BUE/BLE  7. Ascend/descend 3 stair with left Handrails Moderate Assistance.   8. Stand for 3 minutes with Stand-by Assistance using Rolling Walker.  9. Lower extremity exercise program x20 reps per handout, with assistance as needed MET  10. New Goal (3/26/2017): Gait x 50 feet with Moderate Assistance using Rolling Walker. Met (2017)     Outcome: Ongoing (interventions implemented as appropriate)  Goals remain appropriate

## 2017-04-19 NOTE — PLAN OF CARE
Problem: Fall Risk (Adult)  Goal: Absence of Falls  Patient will demonstrate the desired outcomes by discharge/transition of care.   Outcome: Ongoing (interventions implemented as appropriate)    04/19/17 0760   Fall Risk (Adult)   Absence of Falls making progress toward outcome   Pt remian free from falls/injury/trauma.  Bedside table w/i reach. Call light w/i reach. Will monitor

## 2017-04-19 NOTE — PROGRESS NOTES
Doctor Linda called and informed that an order for Rituxan sensitivity(lab order) was aknowledge per this nurse ordered per dr Mattie Hyman ( Patient verbalize that Dr Phelps is his neurologist when asked per this nurse.) Dr. Mckeon instructed this nurse to rewrite the rituxan sensitivity order for tomorrow am. Order placed for tomorrow am as instructed , assisted per charge nurse.

## 2017-04-20 PROBLEM — R51.9 INTRACTABLE HEADACHE: Status: ACTIVE | Noted: 2017-04-20

## 2017-04-20 LAB
ANION GAP SERPL CALC-SCNC: 9 MMOL/L
BASOPHILS # BLD AUTO: 0.05 K/UL
BASOPHILS NFR BLD: 0.8 %
BUN SERPL-MCNC: 16 MG/DL
CALCIUM SERPL-MCNC: 8.7 MG/DL
CHLORIDE SERPL-SCNC: 104 MMOL/L
CO2 SERPL-SCNC: 26 MMOL/L
CREAT SERPL-MCNC: 0.8 MG/DL
DIFFERENTIAL METHOD: ABNORMAL
EOSINOPHIL # BLD AUTO: 0.2 K/UL
EOSINOPHIL NFR BLD: 2.9 %
ERYTHROCYTE [DISTWIDTH] IN BLOOD BY AUTOMATED COUNT: 14.6 %
ERYTHROCYTE [SEDIMENTATION RATE] IN BLOOD BY WESTERGREN METHOD: 0 MM/HR
EST. GFR  (AFRICAN AMERICAN): >60 ML/MIN/1.73 M^2
EST. GFR  (NON AFRICAN AMERICAN): >60 ML/MIN/1.73 M^2
GLUCOSE SERPL-MCNC: 80 MG/DL
HCT VFR BLD AUTO: 36.9 %
HGB BLD-MCNC: 12.1 G/DL
LYMPHOCYTES # BLD AUTO: 2.8 K/UL
LYMPHOCYTES NFR BLD: 42.2 %
MAGNESIUM SERPL-MCNC: 2.1 MG/DL
MCH RBC QN AUTO: 29.5 PG
MCHC RBC AUTO-ENTMCNC: 32.8 %
MCV RBC AUTO: 90 FL
MONOCYTES # BLD AUTO: 0.5 K/UL
MONOCYTES NFR BLD: 7 %
NEUTROPHILS # BLD AUTO: 3.1 K/UL
NEUTROPHILS NFR BLD: 47.1 %
PHOSPHATE SERPL-MCNC: 3.8 MG/DL
PLATELET # BLD AUTO: 152 K/UL
PMV BLD AUTO: 12 FL
POTASSIUM SERPL-SCNC: 3.9 MMOL/L
RBC # BLD AUTO: 4.1 M/UL
SODIUM SERPL-SCNC: 139 MMOL/L
WBC # BLD AUTO: 6.54 K/UL

## 2017-04-20 PROCEDURE — 25000003 PHARM REV CODE 250: Performed by: PHYSICIAN ASSISTANT

## 2017-04-20 PROCEDURE — 25000003 PHARM REV CODE 250: Performed by: NURSE PRACTITIONER

## 2017-04-20 PROCEDURE — 99309 SBSQ NF CARE MODERATE MDM 30: CPT | Mod: ,,, | Performed by: NURSE PRACTITIONER

## 2017-04-20 PROCEDURE — 86356 MONONUCLEAR CELL ANTIGEN: CPT

## 2017-04-20 PROCEDURE — 80048 BASIC METABOLIC PNL TOTAL CA: CPT

## 2017-04-20 PROCEDURE — 25000003 PHARM REV CODE 250: Performed by: INTERNAL MEDICINE

## 2017-04-20 PROCEDURE — 83735 ASSAY OF MAGNESIUM: CPT

## 2017-04-20 PROCEDURE — 36415 COLL VENOUS BLD VENIPUNCTURE: CPT

## 2017-04-20 PROCEDURE — 84100 ASSAY OF PHOSPHORUS: CPT

## 2017-04-20 PROCEDURE — 85025 COMPLETE CBC W/AUTO DIFF WBC: CPT

## 2017-04-20 PROCEDURE — 85651 RBC SED RATE NONAUTOMATED: CPT

## 2017-04-20 PROCEDURE — 11000004 HC SNF PRIVATE

## 2017-04-20 RX ORDER — DANTROLENE SODIUM 50 MG/1
50 CAPSULE ORAL 4 TIMES DAILY
Qty: 120 CAPSULE | Refills: 2 | Status: ON HOLD | OUTPATIENT
Start: 2017-04-20 | End: 2017-06-08

## 2017-04-20 RX ORDER — BACLOFEN 20 MG/1
20 TABLET ORAL 4 TIMES DAILY
Qty: 120 TABLET | Refills: 2 | Status: ON HOLD | OUTPATIENT
Start: 2017-04-20 | End: 2017-06-08

## 2017-04-20 RX ORDER — OXYCODONE HYDROCHLORIDE 15 MG/1
15 TABLET ORAL 3 TIMES DAILY PRN
Qty: 35 TABLET | Refills: 0 | Status: SHIPPED | OUTPATIENT
Start: 2017-04-20 | End: 2017-05-10

## 2017-04-20 RX ORDER — DIAZEPAM 5 MG/1
5 TABLET ORAL 2 TIMES DAILY PRN
Qty: 60 TABLET | Refills: 0 | Status: ON HOLD | OUTPATIENT
Start: 2017-04-20 | End: 2017-06-08

## 2017-04-20 RX ORDER — CARBAMAZEPINE 400 MG/1
400 TABLET, EXTENDED RELEASE ORAL 2 TIMES DAILY
Qty: 60 TABLET | Refills: 3 | Status: ON HOLD | OUTPATIENT
Start: 2017-04-20 | End: 2017-06-08

## 2017-04-20 RX ORDER — GABAPENTIN 300 MG/1
900 CAPSULE ORAL 3 TIMES DAILY
Qty: 270 CAPSULE | Refills: 2 | Status: ON HOLD | OUTPATIENT
Start: 2017-04-20 | End: 2017-06-08

## 2017-04-20 RX ORDER — CITALOPRAM 20 MG/1
20 TABLET, FILM COATED ORAL DAILY
Qty: 30 TABLET | Refills: 3 | Status: ON HOLD | OUTPATIENT
Start: 2017-04-20 | End: 2017-06-08

## 2017-04-20 RX ORDER — AMOXICILLIN 250 MG
1 CAPSULE ORAL DAILY
Status: ON HOLD | COMMUNITY
Start: 2017-04-20 | End: 2018-12-26 | Stop reason: HOSPADM

## 2017-04-20 RX ORDER — IBUPROFEN 200 MG
1 TABLET ORAL DAILY
Refills: 0 | Status: ON HOLD | COMMUNITY
Start: 2017-04-20 | End: 2017-05-26

## 2017-04-20 RX ADMIN — DANTROLENE SODIUM 50 MG: 50 CAPSULE ORAL at 11:04

## 2017-04-20 RX ADMIN — DANTROLENE SODIUM 50 MG: 50 CAPSULE ORAL at 06:04

## 2017-04-20 RX ADMIN — OXYCODONE HYDROCHLORIDE 15 MG: 5 TABLET ORAL at 09:04

## 2017-04-20 RX ADMIN — OXYCODONE HYDROCHLORIDE 15 MG: 5 TABLET ORAL at 02:04

## 2017-04-20 RX ADMIN — OXYCODONE HYDROCHLORIDE 15 MG: 5 TABLET ORAL at 10:04

## 2017-04-20 RX ADMIN — BACLOFEN 20 MG: 10 TABLET ORAL at 11:04

## 2017-04-20 RX ADMIN — OXYCODONE HYDROCHLORIDE 15 MG: 5 TABLET ORAL at 06:04

## 2017-04-20 RX ADMIN — GABAPENTIN 900 MG: 300 CAPSULE ORAL at 02:04

## 2017-04-20 RX ADMIN — CARBAMAZEPINE 400 MG: 100 CAPSULE, EXTENDED RELEASE ORAL at 10:04

## 2017-04-20 RX ADMIN — DANTROLENE SODIUM 50 MG: 50 CAPSULE ORAL at 01:04

## 2017-04-20 RX ADMIN — GABAPENTIN 900 MG: 300 CAPSULE ORAL at 10:04

## 2017-04-20 RX ADMIN — CITALOPRAM HYDROBROMIDE 20 MG: 20 TABLET ORAL at 08:04

## 2017-04-20 RX ADMIN — BACLOFEN 20 MG: 10 TABLET ORAL at 06:04

## 2017-04-20 RX ADMIN — BACLOFEN 20 MG: 10 TABLET ORAL at 12:04

## 2017-04-20 RX ADMIN — OXYBUTYNIN CHLORIDE 5 MG: 5 TABLET, EXTENDED RELEASE ORAL at 08:04

## 2017-04-20 RX ADMIN — DANTROLENE SODIUM 50 MG: 50 CAPSULE ORAL at 12:04

## 2017-04-20 RX ADMIN — GABAPENTIN 900 MG: 300 CAPSULE ORAL at 05:04

## 2017-04-20 RX ADMIN — BACLOFEN 20 MG: 10 TABLET ORAL at 01:04

## 2017-04-20 RX ADMIN — TRAZODONE HYDROCHLORIDE 100 MG: 50 TABLET ORAL at 10:04

## 2017-04-20 RX ADMIN — CARBAMAZEPINE 400 MG: 100 CAPSULE, EXTENDED RELEASE ORAL at 08:04

## 2017-04-20 RX ADMIN — OXYCODONE HYDROCHLORIDE 15 MG: 5 TABLET ORAL at 12:04

## 2017-04-20 RX ADMIN — RIVAROXABAN 20 MG: 20 TABLET, FILM COATED ORAL at 06:04

## 2017-04-20 RX ADMIN — ACETAMINOPHEN 650 MG: 325 TABLET, FILM COATED ORAL at 09:04

## 2017-04-20 RX ADMIN — DIAZEPAM 5 MG: 5 TABLET ORAL at 09:04

## 2017-04-20 RX ADMIN — DIAZEPAM 5 MG: 5 TABLET ORAL at 10:04

## 2017-04-20 RX ADMIN — OXYCODONE HYDROCHLORIDE 15 MG: 5 TABLET ORAL at 05:04

## 2017-04-20 RX ADMIN — DANTROLENE SODIUM 50 MG: 50 CAPSULE ORAL at 05:04

## 2017-04-20 NOTE — PROGRESS NOTES
04/20/2017  8:17 AM  Received a call from Carla at [facility] stating the MD agreed with the [facility]. Patient's last covered day was yesterday 4/19/2017 with a discharge of today 4/20/2017. Will inform GERTRUDIS Newell and DONNA Galeas.    Kelsie Renee RN, CM Skilled  [id number]

## 2017-04-20 NOTE — PROGRESS NOTES
04/20/2017  1:07 PM  Received a call from Fausto at [facility] stating patient filed for a reconsideration of appeal.    Kelsie Renee RN, CM Skilled  [id number]

## 2017-04-20 NOTE — PLAN OF CARE
[facility] HOME HEALTH ORDERS  FACE TO FACE ENCOUNTER    Patient Name: Mao Levin  YOB: 1965    PCP: Fausto Leung MD   PCP Address: [id number]  PCP Phone Number: [phone]  PCP Fax: [phone]    Encounter Date: 04/20/2017    Admit to Home Health    Diagnoses:  Active Hospital Problems    Diagnosis  POA    *MS (multiple sclerosis) [G35]  Yes     Chronic    Depression [F32.9]  Yes    Smoker [F17.200]  Yes    Polyneuropathy [G62.9]  Yes    10/25/2016 Saddle PE [I26.92]  Yes     Chronic    Vitamin D deficiency disease [E55.9]  Yes    Neurogenic bladder [N31.9]  Yes     Chronic    Spasticity [R25.2]  Yes    Chronic pain of multiple sites [R52, G89.29]  Yes     Chronic      Resolved Hospital Problems    Diagnosis Date Resolved POA   No resolved problems to display.       Future Appointments  Date Time Provider Department Center   5/5/2017 10:45 AM Lawanda Boyce PA-C [facility] [place]   7/17/2017 9:40 AM Mattie Finnegan MD [facility] [place]     Follow-up Information     Follow up with Lawanda Boyce PA-C. Go on 5/5/2017.    Specialty:  Neurology    Why:  MS clinic    Contact information:    [address]  [facility] [address]  [phone]          Follow up with Fausto Leung MD In 2 weeks.    Specialty:  Physical Medicine and Rehabilitation    Why:  hospital follow-up    Contact information:    [address] [place] [address]  [phone]              I have seen and examined this patient face to face today. My clinical findings that support the need for the home health skilled services and home bound status are the following:  Weakness/numbness causing balance and gait disturbance due to Weakness/Debility and MS making it taxing to leave home.    Allergies:Review of patient's allergies indicates:  No Known Allergies    Diet: regular diet    Activities: activity as tolerated and no lifting, Driving, or  Strenuous exercise     Nursing:   SN to complete comprehensive assessment including routine vital signs. Instruct on disease process and s/s of complications to report to MD. Review/verify medication list sent home with the patient at time of discharge  and instruct patient/caregiver as needed. Frequency may be adjusted depending on start of care date.    Notify MD if SBP > 160 or < 90; DBP > 90 or < 50; HR > 120 or < 50; Temp > 101      CONSULTS:    Physical Therapy to evaluate and treat. Evaluate for home safety and equipment needs; Establish/upgrade home exercise program. Perform / instruct on therapeutic exercises, gait training, transfer training, and Range of Motion.  Occupational Therapy to evaluate and treat. Evaluate home environment for safety and equipment needs. Perform/Instruct on transfers, ADL training, ROM, and therapeutic exercises.  Aide to provide assistance with personal care, ADLs, and vital signs.    MISCELLANEOUS CARE:  N/A    WOUND CARE ORDERS  n/a      Medications: Review discharge medications with patient and family and provide education.      Current Discharge Medication List      START taking these medications    Details   gabapentin (NEURONTIN) 300 MG capsule Take 3 capsules (900 mg total) by mouth 3 (three) times daily.  Qty: 270 capsule, Refills: 2      nicotine (NICODERM CQ) 14 mg/24 hr Place 1 patch onto the skin once daily.  Refills: 0         CONTINUE these medications which have CHANGED    Details   baclofen (LIORESAL) 20 MG tablet Take 1 tablet (20 mg total) by mouth 4 (four) times daily.  Qty: 120 tablet, Refills: 2      carbamazepine (TEGRETOL XR) 400 MG Tb12 Take 1 tablet (400 mg total) by mouth 2 (two) times daily.  Qty: 60 tablet, Refills: 3      citalopram (CELEXA) 20 MG tablet Take 1 tablet (20 mg total) by mouth once daily.  Qty: 30 tablet, Refills: 3      dantrolene (DANTRIUM) 50 MG Cap Take 1 capsule (50 mg total) by mouth 4 (four) times daily.  Qty: 120 capsule,  Refills: 2      diazePAM (VALIUM) 5 MG tablet Take 1 tablet (5 mg total) by mouth 2 (two) times daily as needed (muscle spasms).  Qty: 60 tablet, Refills: 0      oxycodone (ROXICODONE) 15 MG Tab Take 1 tablet (15 mg total) by mouth 3 (three) times daily as needed.  Qty: 35 tablet, Refills: 0      senna-docusate 8.6-50 mg (PERICOLACE) 8.6-50 mg per tablet Take 1 tablet by mouth once daily.         CONTINUE these medications which have NOT CHANGED    Details   ergocalciferol (VITAMIN D2) 50,000 unit Cap Take 1 capsule (50,000 Units total) by mouth every 7 days.  Qty: 4 capsule, Refills: 11      oxybutynin (DITROPAN-XL) 5 MG TR24 Take 5 mg by mouth once daily.      rivaroxaban (XARELTO) 20 mg Tab Take 1 tablet (20 mg total) by mouth daily with dinner or evening meal.  Qty: 60 tablet, Refills: 4      trazodone (DESYREL) 100 MG tablet Take 100 mg by mouth every evening.      polyethylene glycol (GLYCOLAX) 17 gram/dose powder Take 17 g by mouth once daily.  Qty: 510 g, Refills: 6             I certify that this patient is confined to his home and needs intermittent skilled nursing care, physical therapy and occupational therapy.

## 2017-04-20 NOTE — PLAN OF CARE
Problem: Fall Risk (Adult)  Goal: Identify Related Risk Factors and Signs and Symptoms  Related risk factors and signs and symptoms are identified upon initiation of Human Response Clinical Practice Guideline (CPG)   Outcome: Ongoing (interventions implemented as appropriate)    04/20/17 0311   Fall Risk   Related Risk Factors (Fall Risk) fatigue/slow reaction;gait/mobility problems

## 2017-04-20 NOTE — PLAN OF CARE
Problem: Fall Risk (Adult)  Goal: Absence of Falls  Patient will demonstrate the desired outcomes by discharge/transition of care.   Outcome: Ongoing (interventions implemented as appropriate)  Pt is freeof falls or injuries this shift. ndn

## 2017-04-20 NOTE — PROGRESS NOTES
Progress Note  Skilled Nursing Facility    Admit Date: 3/17/2017  Follow-up for  MS (multiple sclerosis)  Anticipated Discharge Date:  4/20/2017    SUBJECTIVE:     History of Present Illness:  Patient is a 51 y.o. male with multiple sclerosis and hx of saddle PE who presents to SNF after hospitalization for an MS pseudoflare (worsened pain and weakness in BLE) due to E. Coli UTI. He was recently started on rituxan to treat MS. He was also recently admitted in 12/2016 for psuedoflare and he improved after inpatient rehab stay. He continues with pain rating it as 9-10/10 to his bilateral LE and requests decreased timing between oxycodone. No radiation of pain and oxycodone has been effective. He has left hand contraction which has been present since his last pseudoflare. He occasionally smokes at home. The patient has been admitted to SNF for ongoing PT/OT due to insufficient progress to go home safely from the hospital.      Follow-up for MS      Interval History: Seen patient at bedside, c/o 5/10 pain to left hand but continues to have 8/10 to right side of head.       Review of Systems:  Constitutional: no fever or chills  Respiratory: no cough or shortness of breath  Cardiovascular: no chest pain or palpitations  Gastrointestinal: no nausea or vomiting, no abdominal pain or change in bowel habits  Genitourinary: no hematuria or dysuria  Integument/Breast: no rash or pruritis  Musculoskeletal: +5/10 pain to left hand, + for contracture to left hand, + for increased tone to upper extremities   (but improving)  Neurological: no seizures or tremors, + numbness  Behavioral/Psych: no auditory or visual hallucinations    Scheduled Meds:   baclofen  20 mg Oral QID    carbamazepine  400 mg Oral BID    citalopram  20 mg Oral Daily    dantrolene  50 mg Oral QID    ergocalciferol  50,000 Units Oral Q7 Days    gabapentin  900 mg Oral TID    nicotine  1 patch Transdermal Daily    oxybutynin  5 mg Oral Daily     polyethylene glycol  17 g Oral Daily    rivaroxaban  20 mg Oral Daily with dinner    senna-docusate 8.6-50 mg  1 tablet Oral Daily    trazodone  100 mg Oral QHS     Continuous Infusions:   PRN Meds:.acetaminophen, albuterol-ipratropium 2.5mg-0.5mg/3mL, aluminum-magnesium hydroxide-simethicone, bisacodyl, dextrose 50%, dextrose 50%, diazePAM, glucagon (human recombinant), glucose, glucose, metoclopramide HCl, ondansetron, [] oxycodone **FOLLOWED BY** oxycodone, promethazine, ramelteon      OBJECTIVE:     Vital Signs Range (Last 24H):  Temp:  [98.4 °F (36.9 °C)] 98.4 °F (36.9 °C)  Pulse:  [66-75] 75  Resp:  [18] 18  SpO2:  [98 %] 98 %  BP: (123-148)/(69-70) 148/70    Physical Exam:  General: well developed, well nourished, no distress  Lungs: clear to auscultation bilaterally and normal respiratory effort  Cardiovascular: Heart: regular rate and rhythm, S1, S2 normal, no murmur, click, rub or gallop. Chest Wall: no tenderness.   Extremities: no cyanosis or edema, or clubbing. Pulses: 2+ and symmetric.  Abdomel: Abdomen: soft, non-tender non-distended; bowel sounds normal; no masses, no organomegaly.   Skin: Skin color, texture, turgor normal. No rashes or lesions  Musculoskeletal:no clubbing, cyanosis  Neurologic: Abnormal strength and tone, increased tone to upper extremities with left hand contracture (improving). Generalized weakness.  Psych/Behavioral: Alert and oriented, appropriate affect.      Laboratory:    Recent Labs  Lab 17   WBC 5.87 6.54   HGB 12.4* 12.1*   HCT 35.7* 36.9*    152         Recent Labs  Lab 17    139   K 4.0 3.9    104   CO2 25 26   BUN 20 16   CREATININE 0.8 0.8   CALCIUM 8.8 8.7     Lab Results   Component Value Date    LABPROT 10.8 12/15/2016    ALBUMIN 3.4 (L) 2017     Lab Results   Component Value Date    CALCIUM 8.7 2017    PHOS 3.8 2017     Results for MAGALI ALCANTAR (MRN [id number]) as of  4/20/2017 15:27   Ref. Range 4/13/2017 05:40 4/17/2017 04:13 4/20/2017 04:17   Magnesium Latest Ref Range: 1.6 - 2.6 mg/dL 2.1 2.3 2.1     Results for MAGALI ALCANTAR (MRN [id number]) as of 4/20/2017 15:27   Ref. Range 4/20/2017 04:17   Sed Rate Latest Ref Range: 0 - 10 mm/Hr 0     ASSESSMENT/PLAN:     Active Hospital Problems    Diagnosis  POA    *MS (multiple sclerosis) [G35]  Yes     Chronic    Intractable headache [R51]  Unknown    Depression [F32.9]  Yes    Smoker [F17.200]  Yes    Polyneuropathy [G62.9]  Yes    10/25/2016 Saddle PE [I26.92]  Yes     Chronic    Vitamin D deficiency disease [E55.9]  Yes    Neurogenic bladder [N31.9]  Yes     Chronic    Spasticity [R25.2]  Yes    Chronic pain of multiple sites [R52, G89.29]  Yes     Chronic      Resolved Hospital Problems    Diagnosis Date Resolved POA   No resolved problems to display.         NEW OR UNSTABLE PROBLEM(S) TREATED TODAY:  MS (multiple sclerosis) [G35]  Chronic  -PT/OT to increase ambulation, ADL performance and endurance  -DVT ppx: rivaroxaban 20mg daily with dinner  -fall precautions  -bowel regimen: miralax and senokot-s1 tab daily to prevent/treat constipation; hold for frequent or loose stooling  -due for repeat rituxan in 6 months  -expected to resume home health once discharged but may opt for outpatient therapy as previously ordered  -he will need to f/u with PMR in 2 weeks after discharge and Neurology 2 weeks after discharge      Spasticity [R25.2]  -chronic and due to MS  -continue baclofen 20mg QID  -continue dantrolene 50mg QID      Polyneuropathy [G62.9]  -chronic  -continue gabapentin 900mg TID  -continue carbamazepine 400mg BID      Chronic pain of multiple sites [R52, G89.29]  Chronic  -continue oxycodone every 4 hours prn and valium prn spasms; decrease frequency of oxycodone during SNF stay to return to 15mg prn up to TID at home  -will continue to monitor and adjust regimen as necessary      Depression  [F32.9]  -chronic  -continue celexa 20mg daily      Smoker [F17.200]  -chronic  -continue nicotine 14mg patch daily      Urinary tract infection without hematuria [N39.0] E. coli  -completed cipro for 13 doses to treat, end date on 3/24/17      10/25/2016 Saddle PE [I26.92]  Chronic  -continue rivaroxaban to prevent future thrombi      Vitamin D deficiency  -continue ergocalciferol 50,000 units every 7 days      Neurogenic bladder [N31.9]  Chronic  -continue oxybutynin 5mg daily  -bladder scan prn and I/O cath prn retention volume > 300cc    Intractable headache  -occurs at home with periods of relief  -has not improved with increasing gabapentin  -contacted Dr. Finnegan for recommendations  -scheduled with first available Neuro Headache specialist  -sed rate 0    Future Appointments  Date Time Provider Department Center   5/1/2017 2:40 PM Fausto Ruby MD [facility] [place]   5/5/2017 10:45 AM Lawanda Boyce PA-C [facility] [place]   7/17/2017 9:40 AM Mattie Finnegan MD [facility] NP  Department of Hospital Medicine   [facility]

## 2017-04-20 NOTE — PT/OT/SLP PROGRESS
Occupational Therapy      Maojaky Levin  MRN: [id number]    Patient not seen today secondary to Unavailable (Comment). Pt having lengthy discussion with  with regards to appealing d/c.  Will follow up.    GISEL Gupta  4/20/2017

## 2017-04-21 PROCEDURE — 25000003 PHARM REV CODE 250: Performed by: INTERNAL MEDICINE

## 2017-04-21 PROCEDURE — 97116 GAIT TRAINING THERAPY: CPT

## 2017-04-21 PROCEDURE — 11000004 HC SNF PRIVATE

## 2017-04-21 PROCEDURE — 97110 THERAPEUTIC EXERCISES: CPT

## 2017-04-21 PROCEDURE — 25000003 PHARM REV CODE 250: Performed by: NURSE PRACTITIONER

## 2017-04-21 PROCEDURE — 97530 THERAPEUTIC ACTIVITIES: CPT

## 2017-04-21 PROCEDURE — 25000003 PHARM REV CODE 250: Performed by: PHYSICIAN ASSISTANT

## 2017-04-21 RX ADMIN — CARBAMAZEPINE 400 MG: 100 CAPSULE, EXTENDED RELEASE ORAL at 08:04

## 2017-04-21 RX ADMIN — TRAZODONE HYDROCHLORIDE 100 MG: 50 TABLET ORAL at 08:04

## 2017-04-21 RX ADMIN — BACLOFEN 20 MG: 10 TABLET ORAL at 05:04

## 2017-04-21 RX ADMIN — POLYETHYLENE GLYCOL 3350 17 G: 17 POWDER, FOR SOLUTION ORAL at 08:04

## 2017-04-21 RX ADMIN — ACETAMINOPHEN 650 MG: 325 TABLET, FILM COATED ORAL at 08:04

## 2017-04-21 RX ADMIN — STANDARDIZED SENNA CONCENTRATE AND DOCUSATE SODIUM 1 TABLET: 8.6; 5 TABLET, FILM COATED ORAL at 08:04

## 2017-04-21 RX ADMIN — DANTROLENE SODIUM 50 MG: 50 CAPSULE ORAL at 12:04

## 2017-04-21 RX ADMIN — OXYBUTYNIN CHLORIDE 5 MG: 5 TABLET, EXTENDED RELEASE ORAL at 08:04

## 2017-04-21 RX ADMIN — GABAPENTIN 900 MG: 300 CAPSULE ORAL at 10:04

## 2017-04-21 RX ADMIN — OXYCODONE HYDROCHLORIDE 15 MG: 5 TABLET ORAL at 09:04

## 2017-04-21 RX ADMIN — GABAPENTIN 900 MG: 300 CAPSULE ORAL at 01:04

## 2017-04-21 RX ADMIN — DIAZEPAM 5 MG: 5 TABLET ORAL at 10:04

## 2017-04-21 RX ADMIN — OXYCODONE HYDROCHLORIDE 15 MG: 5 TABLET ORAL at 03:04

## 2017-04-21 RX ADMIN — DANTROLENE SODIUM 50 MG: 50 CAPSULE ORAL at 05:04

## 2017-04-21 RX ADMIN — OXYCODONE HYDROCHLORIDE 15 MG: 5 TABLET ORAL at 05:04

## 2017-04-21 RX ADMIN — DIAZEPAM 5 MG: 5 TABLET ORAL at 08:04

## 2017-04-21 RX ADMIN — GABAPENTIN 900 MG: 300 CAPSULE ORAL at 05:04

## 2017-04-21 RX ADMIN — BACLOFEN 20 MG: 10 TABLET ORAL at 12:04

## 2017-04-21 RX ADMIN — RIVAROXABAN 20 MG: 20 TABLET, FILM COATED ORAL at 06:04

## 2017-04-21 RX ADMIN — CITALOPRAM HYDROBROMIDE 20 MG: 20 TABLET ORAL at 08:04

## 2017-04-21 RX ADMIN — RAMELTEON 8 MG: 8 TABLET, FILM COATED ORAL at 08:04

## 2017-04-21 RX ADMIN — DANTROLENE SODIUM 50 MG: 50 CAPSULE ORAL at 06:04

## 2017-04-21 RX ADMIN — OXYCODONE HYDROCHLORIDE 15 MG: 5 TABLET ORAL at 08:04

## 2017-04-21 NOTE — PLAN OF CARE
Problem: Fall Risk (Adult)  Goal: Absence of Falls  Patient will demonstrate the desired outcomes by discharge/transition of care.   Outcome: Ongoing (interventions implemented as appropriate)  Pt had no falls shift will continue to monitor pt.

## 2017-04-21 NOTE — PT/OT/SLP PROGRESS
"Physical Therapy  Treatment    Mao Levin   MRN: [id number]   Admitting Diagnosis: MS (multiple sclerosis)    PT Received On: 17  Total Time (min): 38       Billable Minutes:  Gait [id number], Therapeutic Activity 8 and Therapeutic Exercise 15    Treatment Type: Treatment  PT/PTA: PTA     PTA Visit Number: 2       General Precautions: Standard, fall  Orthopedic Precautions: N/A   Braces: N/A    Do you have any cultural, spiritual, Hindu conflicts, given your current situation?: no    Subjective:  "I want to do squats."    Pain Ratin/10         Pain Rating Post-Intervention: 0/10    Objective:  Patient found seated in w/c     Functional Status:  MDS G  Transfer Functional Status: CGA-Min (A)  Walk in Room Functional Status: CGA-Min (A)  Walk in Corridor Functional Status: CGA-Min (A)  Locomotion on Unit Functional Status: S-SBA  Locomotion Off Unit Functional Status: total(A)      Transfers:  Sit <> stand from w/c with min A      Gait:    Distance Walk: pt ambulated x 180 feet with RW and min A for balance and safety. Ace wrap to (L) foot  Walk: Minimal Contact Assistance  Assistive Device: rolling walker  Gait Deviation(s): decreased keyshawn;decreased toe-to-floor clearance;decreased weight-shifting ability;decreased velocity of limb motion;decreased step length;decreased stride length;decreased swing-to-stance ratio         Wheelchair Mobility:  Patient propels w/c 150 feet with BUEs and (S)    Therex:  Standing minisquat x 20 reps  BLEs seated therex x 15 reps includings: APs, GS, ADD/ABD, LAQs, hip flexion   HC stretch x 5 reps with 15 secs hold        Patient left up in chair with call button in reach.    Assessment:  Mao Levin is a 51 y.o. male with a medical diagnosis of MS (multiple sclerosis).  Pt tolerated treatment session fairly. Pt remains at a min A functional level and will continue to benefit from skilled PT services   to progress with mobility. Pt's goals remain appropriate       Rehab " identified problem list/impairments: weakness, impaired endurance, impaired self care skills, impaired functional mobilty, gait instability, impaired balance, decreased upper extremity function, decreased coordination, decreased safety awareness, decreased lower extremity function, decreased ROM    Rehab potential is fair.    Activity tolerance: Fair    Discharge recommendations:  (TBD- will need assistance upon D/C)     Barriers to discharge: Decreased caregiver support    Equipment recommendations: wheelchair     GOALS:   Physical Therapy Goals        Problem: Physical Therapy Goal    Goal Priority Disciplines Outcome Goal Variances Interventions   Physical Therapy Goal     PT/OT, PT Ongoing (interventions implemented as appropriate)     Description:  Goals to be met by: 2 weeks     Patient will increase functional independence with mobility by performin. Supine to sit with Stand-by Assistance.  2. Sit to supine with Stand-by Assistance. Met (2017)  3. Sit to stand transfer with Minimal Assistance MET  4. Bed to chair transfer with Minimal Assistance using Rolling Walker    5. Gait  x 20 feet with Moderate Assistance using Rolling Walker. Met (3/26/2017)  6. Wheelchair propulsion x75 feet with Stand-by Assistance using BUE/BLE  7. Ascend/descend 3 stair with left Handrails Moderate Assistance.   8. Stand for 3 minutes with Stand-by Assistance using Rolling Walker.  9. Lower extremity exercise program x20 reps per handout, with assistance as needed MET  10. New Goal (3/26/2017): Gait  x 50 feet with Moderate Assistance using Rolling Walker. Met (2017)                      PLAN:    Patient to be seen 5 x/week  to address the above listed problems via gait training, therapeutic activities, therapeutic exercises, wheelchair management/training  Plan of Care expires: 17  Plan of Care reviewed with: patient    Vandana RAMÍREZ Eli, PTA  2017

## 2017-04-21 NOTE — PLAN OF CARE
04/21/2017  5:19 PM    Pt denies having preference for home health placement.  SW will f/u on home health placement and DME orders once decision is made on pt's filing of reconsideration for SNF d/c appeal.    Adal Beard, NADIRA  [id number]

## 2017-04-21 NOTE — PLAN OF CARE
Problem: Fall Risk (Adult)  Goal: Identify Related Risk Factors and Signs and Symptoms  Related risk factors and signs and symptoms are identified upon initiation of [employer] Clinical Practice Guideline (CPG)   Outcome: Ongoing (interventions implemented as appropriate)    04/21/17 4444   Fall Risk   Related Risk Factors (Fall Risk) fatigue/slow reaction;gait/mobility problems

## 2017-04-22 PROCEDURE — 11000004 HC SNF PRIVATE

## 2017-04-22 PROCEDURE — 97530 THERAPEUTIC ACTIVITIES: CPT

## 2017-04-22 PROCEDURE — 25000003 PHARM REV CODE 250: Performed by: NURSE PRACTITIONER

## 2017-04-22 PROCEDURE — 25000003 PHARM REV CODE 250: Performed by: INTERNAL MEDICINE

## 2017-04-22 PROCEDURE — 25000003 PHARM REV CODE 250: Performed by: PHYSICIAN ASSISTANT

## 2017-04-22 PROCEDURE — 97116 GAIT TRAINING THERAPY: CPT

## 2017-04-22 RX ADMIN — BACLOFEN 20 MG: 10 TABLET ORAL at 05:04

## 2017-04-22 RX ADMIN — RIVAROXABAN 20 MG: 20 TABLET, FILM COATED ORAL at 05:04

## 2017-04-22 RX ADMIN — DANTROLENE SODIUM 50 MG: 50 CAPSULE ORAL at 05:04

## 2017-04-22 RX ADMIN — OXYCODONE HYDROCHLORIDE 15 MG: 5 TABLET ORAL at 10:04

## 2017-04-22 RX ADMIN — CITALOPRAM HYDROBROMIDE 20 MG: 20 TABLET ORAL at 09:04

## 2017-04-22 RX ADMIN — TRAZODONE HYDROCHLORIDE 100 MG: 50 TABLET ORAL at 10:04

## 2017-04-22 RX ADMIN — STANDARDIZED SENNA CONCENTRATE AND DOCUSATE SODIUM 1 TABLET: 8.6; 5 TABLET, FILM COATED ORAL at 09:04

## 2017-04-22 RX ADMIN — GABAPENTIN 900 MG: 300 CAPSULE ORAL at 05:04

## 2017-04-22 RX ADMIN — DANTROLENE SODIUM 50 MG: 50 CAPSULE ORAL at 12:04

## 2017-04-22 RX ADMIN — DIAZEPAM 5 MG: 5 TABLET ORAL at 10:04

## 2017-04-22 RX ADMIN — GABAPENTIN 900 MG: 300 CAPSULE ORAL at 10:04

## 2017-04-22 RX ADMIN — ERGOCALCIFEROL 50000 UNITS: 1.25 CAPSULE ORAL at 09:04

## 2017-04-22 RX ADMIN — OXYCODONE HYDROCHLORIDE 15 MG: 5 TABLET ORAL at 03:04

## 2017-04-22 RX ADMIN — OXYBUTYNIN CHLORIDE 5 MG: 5 TABLET, EXTENDED RELEASE ORAL at 09:04

## 2017-04-22 RX ADMIN — OXYCODONE HYDROCHLORIDE 15 MG: 5 TABLET ORAL at 05:04

## 2017-04-22 RX ADMIN — OXYCODONE HYDROCHLORIDE 15 MG: 5 TABLET ORAL at 09:04

## 2017-04-22 RX ADMIN — GABAPENTIN 900 MG: 300 CAPSULE ORAL at 02:04

## 2017-04-22 RX ADMIN — CARBAMAZEPINE 400 MG: 100 CAPSULE, EXTENDED RELEASE ORAL at 10:04

## 2017-04-22 RX ADMIN — ACETAMINOPHEN 650 MG: 325 TABLET, FILM COATED ORAL at 11:04

## 2017-04-22 RX ADMIN — OXYCODONE HYDROCHLORIDE 15 MG: 5 TABLET ORAL at 12:04

## 2017-04-22 RX ADMIN — CARBAMAZEPINE 400 MG: 100 CAPSULE, EXTENDED RELEASE ORAL at 09:04

## 2017-04-22 RX ADMIN — BACLOFEN 20 MG: 10 TABLET ORAL at 12:04

## 2017-04-22 RX ADMIN — ACETAMINOPHEN 650 MG: 325 TABLET, FILM COATED ORAL at 12:04

## 2017-04-22 RX ADMIN — ACETAMINOPHEN 650 MG: 325 TABLET, FILM COATED ORAL at 05:04

## 2017-04-22 NOTE — PLAN OF CARE
Problem: Physical Therapy Goal  Goal: Physical Therapy Goal  Goals to be met by: 2 weeks     Patient will increase functional independence with mobility by performin. Supine to sit with Stand-by Assistance. Met (17)  2. Sit to supine with Stand-by Assistance. Met (2017)  3. Sit to stand transfer with Minimal Assistance MET  4. Bed to chair transfer with Minimal Assistance using Rolling Walker   5. Gait x 20 feet with Moderate Assistance using Rolling Walker. Met (3/26/2017)  6. Wheelchair propulsion x75 feet with Stand-by Assistance using BUE/BLE  7. Ascend/descend 3 stair with left Handrails Moderate Assistance.   8. Stand for 3 minutes with Stand-by Assistance using Rolling Walker.  9. Lower extremity exercise program x20 reps per handout, with assistance as needed MET  10. New Goal (3/26/2017): Gait x 50 feet with Moderate Assistance using Rolling Walker. Met (2017)     Outcome: Ongoing (interventions implemented as appropriate)  Goals remain appropriate. Continue with plan of care.

## 2017-04-22 NOTE — PT/OT/SLP PROGRESS
"Physical Therapy  Treatment    Mao Levin   MRN: [id number]   Admitting Diagnosis: MS (multiple sclerosis)    PT Received On: 17  Total Time (min): 30       Billable Minutes:  Gait [id number] and Therapeutic Activity 15    Treatment Type: Treatment  PT/PTA: PTA     PTA Visit Number: 3       General Precautions: Standard, fall  Orthopedic Precautions: N/A   Braces: N/A    Do you have any cultural, spiritual, Orthodoxy conflicts, given your current situation?: no    Subjective:  Communicated with nsg prior to session.  Pt agreeable to physical therapy.   "I want some coffee" Nsg reported that this was okay.     Pain Ratin/10    Objective:  Patient found supine in bed.     Functional Status:  MDS G  Bed Mobility Functional Status: mod(I) - (I)  Transfer Functional Status: S-SBA  Transfer Level of (A): 0: No set up or physical (A) from staff  Walk in Corridor Functional Status: CGA-Min (A)    Bed Mobility:  Sit>Supine: not performed  Supine>Sit: mod I     Transfers:  Sit<>Stand: Supervision   Stand Pivot Transfer: Supervision from EOB > w/c and RW > EOB    Gait:  Amb 158 ft with w/c follow, RW, ace wrap around LLE with min A/CGA. Pt with one instance of falling back into the chair at the start of gait training 2/2 LE alignment with standing.     Wheelchair Mobility:  Patient propels w/c  X 10 feet in the therapy room with mod I.     Therex:  Pt performed BLE therex x 30 with SBA  -mini squats standing at the stairs  -hip add with the ball in w/c  -GS     Additional Treatment:  Donned ace wrap around LLE for gait training     Patient left sitting on EOB with lunch tray in front of him with call button in reach and nsg notified.    Assessment:  Mao Levin is a 51 y.o. male with a medical diagnosis of MS (multiple sclerosis).  The patient tolerated treatment well with no complaint of pain during therapy. The patient would continue to benefit from skilled PT to address the deficits in his plan of care.     Rehab " identified problem list/impairments: weakness, impaired endurance, impaired self care skills, impaired functional mobilty, gait instability, impaired balance, decreased upper extremity function, decreased coordination, decreased safety awareness, decreased lower extremity function, decreased ROM    Rehab potential is good.    Activity tolerance: Good    Discharge recommendations:  (TBD- will need assistance upon D/C)     Barriers to discharge: Decreased caregiver support    Equipment recommendations: wheelchair     GOALS:   Physical Therapy Goals        Problem: Physical Therapy Goal    Goal Priority Disciplines Outcome Goal Variances Interventions   Physical Therapy Goal     PT/OT, PT Ongoing (interventions implemented as appropriate)     Description:  Goals to be met by: 2 weeks     Patient will increase functional independence with mobility by performin. Supine to sit with Stand-by Assistance. Met (17)  2. Sit to supine with Stand-by Assistance. Met (2017)  3. Sit to stand transfer with Minimal Assistance MET  4. Bed to chair transfer with Minimal Assistance using Rolling Walker    5. Gait  x 20 feet with Moderate Assistance using Rolling Walker. Met (3/26/2017)  6. Wheelchair propulsion x75 feet with Stand-by Assistance using BUE/BLE  7. Ascend/descend 3 stair with left Handrails Moderate Assistance.   8. Stand for 3 minutes with Stand-by Assistance using Rolling Walker.  9. Lower extremity exercise program x20 reps per handout, with assistance as needed MET  10. New Goal (3/26/2017): Gait  x 50 feet with Moderate Assistance using Rolling Walker. Met (2017)                       PLAN:    Patient to be seen 5 x/week  to address the above listed problems via gait training, therapeutic activities, therapeutic exercises, wheelchair management/training  Plan of Care expires: 17  Plan of Care reviewed with: patient    Christiano RODRIGUE Stephy, PTA  2017

## 2017-04-23 PROCEDURE — 25000003 PHARM REV CODE 250: Performed by: NURSE PRACTITIONER

## 2017-04-23 PROCEDURE — 25000003 PHARM REV CODE 250: Performed by: PHYSICIAN ASSISTANT

## 2017-04-23 PROCEDURE — 25000003 PHARM REV CODE 250: Performed by: INTERNAL MEDICINE

## 2017-04-23 PROCEDURE — 11000004 HC SNF PRIVATE

## 2017-04-23 RX ADMIN — GABAPENTIN 900 MG: 300 CAPSULE ORAL at 01:04

## 2017-04-23 RX ADMIN — TRAZODONE HYDROCHLORIDE 100 MG: 50 TABLET ORAL at 10:04

## 2017-04-23 RX ADMIN — BACLOFEN 20 MG: 10 TABLET ORAL at 12:04

## 2017-04-23 RX ADMIN — DANTROLENE SODIUM 50 MG: 50 CAPSULE ORAL at 05:04

## 2017-04-23 RX ADMIN — DANTROLENE SODIUM 50 MG: 50 CAPSULE ORAL at 11:04

## 2017-04-23 RX ADMIN — DANTROLENE SODIUM 50 MG: 50 CAPSULE ORAL at 12:04

## 2017-04-23 RX ADMIN — POLYETHYLENE GLYCOL 3350 17 G: 17 POWDER, FOR SOLUTION ORAL at 10:04

## 2017-04-23 RX ADMIN — ACETAMINOPHEN 650 MG: 325 TABLET, FILM COATED ORAL at 10:04

## 2017-04-23 RX ADMIN — OXYCODONE HYDROCHLORIDE 15 MG: 5 TABLET ORAL at 10:04

## 2017-04-23 RX ADMIN — DIAZEPAM 5 MG: 5 TABLET ORAL at 04:04

## 2017-04-23 RX ADMIN — CARBAMAZEPINE 400 MG: 100 CAPSULE, EXTENDED RELEASE ORAL at 10:04

## 2017-04-23 RX ADMIN — GABAPENTIN 900 MG: 300 CAPSULE ORAL at 05:04

## 2017-04-23 RX ADMIN — OXYCODONE HYDROCHLORIDE 15 MG: 5 TABLET ORAL at 05:04

## 2017-04-23 RX ADMIN — CITALOPRAM HYDROBROMIDE 20 MG: 20 TABLET ORAL at 10:04

## 2017-04-23 RX ADMIN — OXYCODONE HYDROCHLORIDE 15 MG: 5 TABLET ORAL at 11:04

## 2017-04-23 RX ADMIN — BACLOFEN 20 MG: 10 TABLET ORAL at 05:04

## 2017-04-23 RX ADMIN — RIVAROXABAN 20 MG: 20 TABLET, FILM COATED ORAL at 05:04

## 2017-04-23 RX ADMIN — BACLOFEN 20 MG: 10 TABLET ORAL at 11:04

## 2017-04-23 RX ADMIN — ACETAMINOPHEN 650 MG: 325 TABLET, FILM COATED ORAL at 12:04

## 2017-04-23 RX ADMIN — GABAPENTIN 900 MG: 300 CAPSULE ORAL at 10:04

## 2017-04-23 RX ADMIN — OXYBUTYNIN CHLORIDE 5 MG: 5 TABLET, EXTENDED RELEASE ORAL at 10:04

## 2017-04-23 RX ADMIN — STANDARDIZED SENNA CONCENTRATE AND DOCUSATE SODIUM 1 TABLET: 8.6; 5 TABLET, FILM COATED ORAL at 10:04

## 2017-04-24 LAB
ANION GAP SERPL CALC-SCNC: 8 MMOL/L
BASOPHILS # BLD AUTO: 0.07 K/UL
BASOPHILS NFR BLD: 1.1 %
BUN SERPL-MCNC: 21 MG/DL
CALCIUM SERPL-MCNC: 8.6 MG/DL
CHLORIDE SERPL-SCNC: 107 MMOL/L
CO2 SERPL-SCNC: 26 MMOL/L
CREAT SERPL-MCNC: 0.8 MG/DL
DIFFERENTIAL METHOD: ABNORMAL
EOSINOPHIL # BLD AUTO: 0.2 K/UL
EOSINOPHIL NFR BLD: 2.9 %
ERYTHROCYTE [DISTWIDTH] IN BLOOD BY AUTOMATED COUNT: 14.5 %
EST. GFR  (AFRICAN AMERICAN): >60 ML/MIN/1.73 M^2
EST. GFR  (NON AFRICAN AMERICAN): >60 ML/MIN/1.73 M^2
GLUCOSE SERPL-MCNC: 85 MG/DL
HCT VFR BLD AUTO: 37.6 %
HGB BLD-MCNC: 12.3 G/DL
LYMPHOCYTES # BLD AUTO: 2.5 K/UL
LYMPHOCYTES NFR BLD: 39.3 %
MAGNESIUM SERPL-MCNC: 2.1 MG/DL
MCH RBC QN AUTO: 29.5 PG
MCHC RBC AUTO-ENTMCNC: 32.7 %
MCV RBC AUTO: 90 FL
MONOCYTES # BLD AUTO: 0.4 K/UL
MONOCYTES NFR BLD: 6.7 %
NEUTROPHILS # BLD AUTO: 3.2 K/UL
NEUTROPHILS NFR BLD: 50 %
PHOSPHATE SERPL-MCNC: 4.1 MG/DL
PLATELET # BLD AUTO: 158 K/UL
PMV BLD AUTO: 12 FL
POTASSIUM SERPL-SCNC: 3.9 MMOL/L
RBC # BLD AUTO: 4.17 M/UL
SODIUM SERPL-SCNC: 141 MMOL/L
WBC # BLD AUTO: 6.31 K/UL

## 2017-04-24 PROCEDURE — 85025 COMPLETE CBC W/AUTO DIFF WBC: CPT

## 2017-04-24 PROCEDURE — 84100 ASSAY OF PHOSPHORUS: CPT

## 2017-04-24 PROCEDURE — 25000003 PHARM REV CODE 250: Performed by: NURSE PRACTITIONER

## 2017-04-24 PROCEDURE — 97530 THERAPEUTIC ACTIVITIES: CPT

## 2017-04-24 PROCEDURE — 11000004 HC SNF PRIVATE

## 2017-04-24 PROCEDURE — 97116 GAIT TRAINING THERAPY: CPT

## 2017-04-24 PROCEDURE — 25000003 PHARM REV CODE 250: Performed by: PHYSICIAN ASSISTANT

## 2017-04-24 PROCEDURE — 97110 THERAPEUTIC EXERCISES: CPT

## 2017-04-24 PROCEDURE — 83735 ASSAY OF MAGNESIUM: CPT

## 2017-04-24 PROCEDURE — 80048 BASIC METABOLIC PNL TOTAL CA: CPT

## 2017-04-24 PROCEDURE — 36415 COLL VENOUS BLD VENIPUNCTURE: CPT

## 2017-04-24 PROCEDURE — 25000003 PHARM REV CODE 250: Performed by: INTERNAL MEDICINE

## 2017-04-24 RX ORDER — OXYCODONE HYDROCHLORIDE 5 MG/1
15 TABLET ORAL EVERY 6 HOURS PRN
Status: DISCONTINUED | OUTPATIENT
Start: 2017-04-24 | End: 2017-05-02 | Stop reason: HOSPADM

## 2017-04-24 RX ORDER — NORTRIPTYLINE HYDROCHLORIDE 10 MG/1
10 CAPSULE ORAL NIGHTLY
Status: DISCONTINUED | OUTPATIENT
Start: 2017-04-24 | End: 2017-05-02 | Stop reason: HOSPADM

## 2017-04-24 RX ADMIN — TRAZODONE HYDROCHLORIDE 100 MG: 50 TABLET ORAL at 09:04

## 2017-04-24 RX ADMIN — GABAPENTIN 900 MG: 300 CAPSULE ORAL at 01:04

## 2017-04-24 RX ADMIN — BACLOFEN 20 MG: 10 TABLET ORAL at 05:04

## 2017-04-24 RX ADMIN — OXYCODONE HYDROCHLORIDE 15 MG: 5 TABLET ORAL at 04:04

## 2017-04-24 RX ADMIN — GABAPENTIN 900 MG: 300 CAPSULE ORAL at 07:04

## 2017-04-24 RX ADMIN — BACLOFEN 20 MG: 10 TABLET ORAL at 07:04

## 2017-04-24 RX ADMIN — CITALOPRAM HYDROBROMIDE 20 MG: 20 TABLET ORAL at 09:04

## 2017-04-24 RX ADMIN — OXYCODONE HYDROCHLORIDE 15 MG: 5 TABLET ORAL at 09:04

## 2017-04-24 RX ADMIN — OXYCODONE HYDROCHLORIDE 15 MG: 5 TABLET ORAL at 11:04

## 2017-04-24 RX ADMIN — BACLOFEN 20 MG: 10 TABLET ORAL at 12:04

## 2017-04-24 RX ADMIN — CARBAMAZEPINE 400 MG: 100 CAPSULE, EXTENDED RELEASE ORAL at 09:04

## 2017-04-24 RX ADMIN — DIAZEPAM 5 MG: 5 TABLET ORAL at 09:04

## 2017-04-24 RX ADMIN — RIVAROXABAN 20 MG: 20 TABLET, FILM COATED ORAL at 05:04

## 2017-04-24 RX ADMIN — GABAPENTIN 900 MG: 300 CAPSULE ORAL at 09:04

## 2017-04-24 RX ADMIN — OXYBUTYNIN CHLORIDE 5 MG: 5 TABLET, EXTENDED RELEASE ORAL at 09:04

## 2017-04-24 RX ADMIN — RAMELTEON 8 MG: 8 TABLET, FILM COATED ORAL at 09:04

## 2017-04-24 RX ADMIN — DANTROLENE SODIUM 50 MG: 50 CAPSULE ORAL at 05:04

## 2017-04-24 RX ADMIN — DANTROLENE SODIUM 50 MG: 50 CAPSULE ORAL at 07:04

## 2017-04-24 RX ADMIN — DANTROLENE SODIUM 50 MG: 50 CAPSULE ORAL at 12:04

## 2017-04-24 RX ADMIN — OXYCODONE HYDROCHLORIDE 15 MG: 5 TABLET ORAL at 03:04

## 2017-04-24 RX ADMIN — ACETAMINOPHEN 650 MG: 325 TABLET, FILM COATED ORAL at 05:04

## 2017-04-24 NOTE — PT/OT/SLP PROGRESS
Physical Therapy  Treatment    Mao Levin   MRN: [id number]   Admitting Diagnosis: MS (multiple sclerosis)    PT Received On: 17  Total Time (min): 42       Billable Minutes:  Gait [id number], Therapeutic Activity 15 and Therapeutic Exercise 15    Treatment Type: Treatment  PT/PTA: PTA     PTA Visit Number: 4       General Precautions: Standard, fall  Orthopedic Precautions: N/A   Braces: N/A    Do you have any cultural, spiritual, Cheondoism conflicts, given your current situation?: no    Subjective:  Communicated with nursing prior to session.  Pt agreed to work with therapy.     Pain Ratin/10  Pain Rating Post-Intervention: 0/10    Objective:  Patient found seated w/c.         Functional Status:      Bed Mobility:  Sit>Supine:NT  Supine>Sit: NT    Transfers:  Sit<>Stand: to/from w/c with RW and CGA-Min(A). Pt performed multiple trials.   Stand Pivot Transfer: NT    Gait:  Amb ~28 feet with ace wrap around LLE with RW and Min(A).     Wheelchair Mobility:  Patient propels w/c ~60 feet with BUEs.      Therex:  Mini squats x30 reps  AP x30 reps  LAQ x30 reps  Hip Flexion x30 reps  GS x30 reps    Patient left up in chair with call button in reach.    Assessment:  Mao Levin is a 51 y.o. male with a medical diagnosis of MS (multiple sclerosis).  Pt with decreased gait distance on this date due to pain LLE with ambulation. Nursing was notified and aware. Pt left seated w/c with all needs within reach. .    Rehab identified problem list/impairments: weakness, impaired endurance, impaired self care skills, impaired functional mobilty, gait instability, decreased upper extremity function, decreased coordination, decreased safety awareness, decreased lower extremity function, decreased ROM    Rehab potential is good.    Activity tolerance: Good    Discharge recommendations:  (TBD- will need assistance upon D/C)     Barriers to discharge: Decreased caregiver support    Equipment recommendations: wheelchair     GOALS:    Physical Therapy Goals        Problem: Physical Therapy Goal    Goal Priority Disciplines Outcome Goal Variances Interventions   Physical Therapy Goal     PT/OT, PT Ongoing (interventions implemented as appropriate)     Description:  Goals to be met by: 2 weeks     Patient will increase functional independence with mobility by performin. Supine to sit with Stand-by Assistance. Met (17)  2. Sit to supine with Stand-by Assistance. Met (2017)  3. Sit to stand transfer with Minimal Assistance MET  4. Bed to chair transfer with Minimal Assistance using Rolling Walker    5. Gait  x 20 feet with Moderate Assistance using Rolling Walker. Met (3/26/2017)  6. Wheelchair propulsion x75 feet with Stand-by Assistance using BUE/BLE  7. Ascend/descend 3 stair with left Handrails Moderate Assistance.   8. Stand for 3 minutes with Stand-by Assistance using Rolling Walker.  9. Lower extremity exercise program x20 reps per handout, with assistance as needed MET  10. New Goal (3/26/2017): Gait  x 50 feet with Moderate Assistance using Rolling Walker. Met (2017)                       PLAN:    Patient to be seen 5 x/week  to address the above listed problems via gait training, therapeutic activities, therapeutic exercises, wheelchair management/training  Plan of Care expires: 17  Plan of Care reviewed with: patient    Asiya Jordan, PTA  2017

## 2017-04-24 NOTE — PT/OT/SLP PROGRESS
Occupational Therapy  Treatment    Mao Levin   MRN: [id number]   Admitting Diagnosis: MS (multiple sclerosis)    OT Date of Treatment: 17  Total Time (min): 45 min    Billable Minutes:  Therapeutic Activity 25 and Therapeutic Exercise 20    General Precautions: Standard, fall  Orthopedic Precautions: N/A  Braces: N/A    Do you have any cultural, spiritual, Caodaism conflicts, given your current situation?: no    Subjective:  Communicated with NSG prior to session.  I am doing well today    Pain Ratin/10                   Objective:   Pt. Supine on arrival    Functional Status:  MDS G  Bed Mobility Functional Status: CGA-Min (A)  Transfer Functional Status: CGA-Min (A) EOB to w/c with SPT  Surface-to-surface transfer (transfer between bed and chair or wheelchair): Not steady, only able to stabilize with staff assistance          OT Exercises: Rickshaw 20# 4 x 25 reps  Lat pull downs 30# 4 x 25 reps    Additional Treatment:  Pt. With FM activity with use of LUE/hand for manipulation aspects with grasp and release Pt. Reported mild soreness in L hand with task  Pt. Also with static standing on this day x 5 min with weight bearing through L hand while in extension while  Standing with CGA/Min A for bal aspects and cues for erect posture.    Patient left up in chair with nsg present    ASSESSMENT:  Mao Levin is a 51 y.o. male with a medical diagnosis of MS (multiple sclerosis) Pt. participated well with session on this day. Pt is progressing well with session on this day still continues to requires cues with aspects of safety . Pt. Will continue to benefit from continued OT to progress towards goals    Rehab identified problem list/impairments: impaired endurance, impaired self care skills, impaired functional mobilty, decreased upper extremity function, decreased safety awareness    Rehab potential is fair    Activity tolerance: Fair    Discharge recommendations: rehabilitation facility     Barriers to  discharge: Decreased caregiver support     Equipment recommendations: wheelchair (drop arm commode)     GOALS:   Occupational Therapy Goals        Problem: Occupational Therapy Goal    Goal Priority Disciplines Outcome Interventions   Occupational Therapy Goal     OT, PT/OT Ongoing (interventions implemented as appropriate)    Description:  Goals to be met by: 3 weeks     Patient will increase functional independence with ADLs by performing:    Feeding with Set-up Assistance. Met  UE Dressing with Stand-by Assistance.--met   LE Dressing with Minimal Assistance.--met;however, occasional mod A depending on muscle tone  Grooming while seated with Modified [place].--met  Toileting from bedside commode with Minimal Assistance for hygiene and clothing management.   Bathing from  shower chair/bench with Minimal Assistance.--met  Sitting at edge of bed x20 minutes with Supervision.--met  Rolling to Bilateral with Stand-by Assistance. --met  Supine to sit with Minimal Assistance.--met  Stand pivot transfers with Minimal Assistance.--met  Toilet transfer to bedside commode with Minimal Assistance.  L Upper extremity self ROM exercise program x15 reps per handout, with independence.               Plan:  Patient to be seen 5 x/week to address the above listed problems via self-care/home management, therapeutic exercises, therapeutic activities  Plan of Care expires: 04/18/17  Plan of Care reviewed with: patient    CHELA Mustafa/NILESH  04/24/2017

## 2017-04-24 NOTE — PLAN OF CARE
Problem: Occupational Therapy Goal  Goal: Occupational Therapy Goal  Goals to be met by: 3 weeks     Patient will increase functional independence with ADLs by performing:    Feeding with Set-up Assistance. Met  UE Dressing with Stand-by Assistance.--met   LE Dressing with Minimal Assistance.--met;however, occasional mod A depending on muscle tone  Grooming while seated with Modified [place].--met  Toileting from bedside commode with Minimal Assistance for hygiene and clothing management.   Bathing from shower chair/bench with Minimal Assistance.--met  Sitting at edge of bed x20 minutes with Supervision.--met  Rolling to Bilateral with Stand-by Assistance. --met  Supine to sit with Minimal Assistance.--met  Stand pivot transfers with Minimal Assistance.--met  Toilet transfer to bedside commode with Minimal Assistance.  L Upper extremity self ROM exercise program x15 reps per handout, with independence.   Outcome: Ongoing (interventions implemented as appropriate)  .

## 2017-04-24 NOTE — PLAN OF CARE
Problem: Physical Therapy Goal  Goal: Physical Therapy Goal  Goals to be met by: 2 weeks     Patient will increase functional independence with mobility by performin. Supine to sit with Stand-by Assistance. Met (17)  2. Sit to supine with Stand-by Assistance. Met (2017)  3. Sit to stand transfer with Minimal Assistance MET  4. Bed to chair transfer with Minimal Assistance using Rolling Walker   5. Gait x 20 feet with Moderate Assistance using Rolling Walker. Met (3/26/2017)  6. Wheelchair propulsion x75 feet with Stand-by Assistance using BUE/BLE  7. Ascend/descend 3 stair with left Handrails Moderate Assistance.   8. Stand for 3 minutes with Stand-by Assistance using Rolling Walker.  9. Lower extremity exercise program x20 reps per handout, with assistance as needed MET  10. New Goal (3/26/2017): Gait x 50 feet with Moderate Assistance using Rolling Walker. Met (2017)     Goals remain appropriate at time. Continue with PT POC as indicated.

## 2017-04-25 PROCEDURE — 97110 THERAPEUTIC EXERCISES: CPT

## 2017-04-25 PROCEDURE — 11000004 HC SNF PRIVATE

## 2017-04-25 PROCEDURE — 25000003 PHARM REV CODE 250: Performed by: INTERNAL MEDICINE

## 2017-04-25 PROCEDURE — 97116 GAIT TRAINING THERAPY: CPT

## 2017-04-25 PROCEDURE — 25000003 PHARM REV CODE 250: Performed by: PHYSICIAN ASSISTANT

## 2017-04-25 PROCEDURE — 97803 MED NUTRITION INDIV SUBSEQ: CPT | Performed by: NUTRITIONIST

## 2017-04-25 PROCEDURE — 25000003 PHARM REV CODE 250: Performed by: NURSE PRACTITIONER

## 2017-04-25 PROCEDURE — 97530 THERAPEUTIC ACTIVITIES: CPT

## 2017-04-25 RX ADMIN — BACLOFEN 20 MG: 10 TABLET ORAL at 01:04

## 2017-04-25 RX ADMIN — CARBAMAZEPINE 400 MG: 100 CAPSULE, EXTENDED RELEASE ORAL at 08:04

## 2017-04-25 RX ADMIN — DANTROLENE SODIUM 50 MG: 50 CAPSULE ORAL at 05:04

## 2017-04-25 RX ADMIN — DIAZEPAM 5 MG: 5 TABLET ORAL at 08:04

## 2017-04-25 RX ADMIN — DANTROLENE SODIUM 50 MG: 50 CAPSULE ORAL at 01:04

## 2017-04-25 RX ADMIN — GABAPENTIN 900 MG: 300 CAPSULE ORAL at 10:04

## 2017-04-25 RX ADMIN — TRAZODONE HYDROCHLORIDE 100 MG: 50 TABLET ORAL at 08:04

## 2017-04-25 RX ADMIN — OXYBUTYNIN CHLORIDE 5 MG: 5 TABLET, EXTENDED RELEASE ORAL at 08:04

## 2017-04-25 RX ADMIN — BACLOFEN 20 MG: 10 TABLET ORAL at 05:04

## 2017-04-25 RX ADMIN — NORTRIPTYLINE HYDROCHLORIDE 10 MG: 10 CAPSULE ORAL at 08:04

## 2017-04-25 RX ADMIN — DANTROLENE SODIUM 50 MG: 50 CAPSULE ORAL at 12:04

## 2017-04-25 RX ADMIN — OXYCODONE HYDROCHLORIDE 15 MG: 5 TABLET ORAL at 02:04

## 2017-04-25 RX ADMIN — CITALOPRAM HYDROBROMIDE 20 MG: 20 TABLET ORAL at 08:04

## 2017-04-25 RX ADMIN — DANTROLENE SODIUM 50 MG: 50 CAPSULE ORAL at 11:04

## 2017-04-25 RX ADMIN — BACLOFEN 20 MG: 10 TABLET ORAL at 11:04

## 2017-04-25 RX ADMIN — BACLOFEN 20 MG: 10 TABLET ORAL at 12:04

## 2017-04-25 RX ADMIN — RIVAROXABAN 20 MG: 20 TABLET, FILM COATED ORAL at 05:04

## 2017-04-25 RX ADMIN — GABAPENTIN 900 MG: 300 CAPSULE ORAL at 05:04

## 2017-04-25 RX ADMIN — OXYCODONE HYDROCHLORIDE 15 MG: 5 TABLET ORAL at 05:04

## 2017-04-25 RX ADMIN — GABAPENTIN 900 MG: 300 CAPSULE ORAL at 02:04

## 2017-04-25 RX ADMIN — ACETAMINOPHEN 650 MG: 325 TABLET, FILM COATED ORAL at 02:04

## 2017-04-25 RX ADMIN — ACETAMINOPHEN 650 MG: 325 TABLET, FILM COATED ORAL at 08:04

## 2017-04-25 RX ADMIN — OXYCODONE HYDROCHLORIDE 15 MG: 5 TABLET ORAL at 08:04

## 2017-04-25 RX ADMIN — RAMELTEON 8 MG: 8 TABLET, FILM COATED ORAL at 08:04

## 2017-04-25 NOTE — PLAN OF CARE
Problem: Occupational Therapy Goal  Goal: Occupational Therapy Goal  Goals to be met by: 3 weeks     Patient will increase functional independence with ADLs by performing:    Feeding with Set-up Assistance. Met  UE Dressing with Stand-by Assistance.--met   LE Dressing with Minimal Assistance.--met;however, occasional mod A depending on muscle tone  Grooming while seated with Modified [place].--met  Toileting from bedside commode with Minimal Assistance for hygiene and clothing management.   Bathing from shower chair/bench with Minimal Assistance.--met  Sitting at edge of bed x20 minutes with Supervision.--met  Rolling to Bilateral with Stand-by Assistance. --met  Supine to sit with Minimal Assistance.--met  Stand pivot transfers with Minimal Assistance.--met  Toilet transfer to bedside commode with Minimal Assistance.  L Upper extremity self ROM exercise program x15 reps per handout, with independence.   Outcome: Ongoing (interventions implemented as appropriate)  .

## 2017-04-25 NOTE — PLAN OF CARE
Problem: Occupational Therapy Goal  Goal: Occupational Therapy Goal  Goals to be met by: 3 weeks     Patient will increase functional independence with ADLs by performing:    Feeding with Set-up Assistance. Met  UE Dressing with Stand-by Assistance.--met   LE Dressing with Minimal Assistance.--met;however, occasional mod A depending on muscle tone  Grooming while seated with Modified [place].--met  Toileting from bedside commode with Minimal Assistance for hygiene and clothing management.   Bathing from shower chair/bench with Minimal Assistance.--met  Sitting at edge of bed x20 minutes with Supervision.--met  Rolling to Bilateral with Stand-by Assistance. --met  Supine to sit with Minimal Assistance.--met  Stand pivot transfers with Minimal Assistance.--met  Toilet transfer to bedside commode with Minimal Assistance.  L Upper extremity self ROM exercise program x15 reps per handout, with independence.   Outcome: Ongoing (interventions implemented as appropriate)  .

## 2017-04-25 NOTE — PROGRESS NOTES
Recommendations  1. Continue current diet order with double portions   2. RD to monitor  Goals: Pt to consume >85% EEN and EPN  Nutrition Goal Status: goal met  Communication of RD Recs: Discussed in rounds       Continuum of Care Plan  D/C planning: pt to consume > 85% EEN and EPN           Reason for Assessment      Reason for Assessment: RD follow-up  Diagnosis: other (see comments) (multiple sclerosis)  Relevent Medical History: CHF, cancer, COPD, DM2, HTN, stroke   Interdisciplinary Rounds: did not attend  General Information Comments: Pt with good appetite, consuming 100%;       Nutrition Prescription Ordered  Current Diet Order: Regular + Boost Plus TID  Nutrition Order Comments: double portions  Oral Nutrition Supplement: discontinued      Evaluation of Received Nutrients/Fluid Intake  Pt consuming 100% meals  Energy Calories Required: meeting needs  Protein Required: meeting needs  Tolerance: tolerating      Nutrition Risk Screen  Nutrition Risk Screen: no indicators present      Nutrition/Diet History  Patient Reported Diet/Restrictions/Preferences: general      Labs/Tests/Procedures/Meds  Pertinent Labs Reviewed: reviewed, pertinent  Pertinent Medications Reviewed: reviewed, pertinent  Pertinent Medications Comments: senna dosucate      Physical Findings  Overall Physical Appearance: other (see comments), loss of muscle mass (nourished)  Oral/Mouth Cavity: WDL  Skin: intact, other (see comments) (Huy 16)      Anthropometrics  Height (inches): 70.98 in  Weight Method: Standard Scale  Weight (kg): 79.5 kg  Ideal Body Weight (IBW), Male: 175.88 lb  % Ideal Body Weight, Male (lb): 102. lb  BMI (kg/m2): 24.36  BMI Grade: 18.5-24.9 - normal      Estimated/Assessed Needs  Weight Used For Calorie Calculations: 79.5 kg (174 lb 9.7 oz)   Height (cm): 180.3 cm  Energy Need Method: ESKYSt [place], other (see comments) (2003-2170kcal/day (1.2-1.3 PAL))  RMR ([place]-St[place]erman Jeor Equation): 1672.16  Weight Used For  Protein Calculations: 79.5kg (174 lb 9.7 oz)  Protein Requirements: 79-95g/day (1.0-1.2g/kg)  1.0 gm Protein (gm): 79.37 and 1.2 gm Protein (gm): 95.24  Fluid Need Method: RDA Method (1mL/kcal)      Monitor and Evaluation      Food and Nutrient Intake: energy intake, food and beverage intake  Food and Nutrient Adminstration: diet order  Knowledge/Beliefs/Attitudes: food and nutrition knowledge/skill  Anthropometric Measurements: weight change, weight, body mass index  Biochemical Data, Medical Tests and Procedures: electrolyte and renal panel, gastrointestinal profile, glucose/endocrine profile, inflammatory profile, lipid profile  Nutrition-Focused Physical Findings: overall appearance, skin      Nutrition Risk      Level of Risk: other (see comments) (1x/week)      Nutrition Follow-Up      RD Follow-up?: Yes      Assessment and Plan      Increased energy needs related to physiological needs as evidenced by increased EEN and EPN 2/2 COPD  continues

## 2017-04-25 NOTE — PT/OT/SLP PROGRESS
Physical Therapy  Treatment    Mao Levin   MRN: [id number]   Admitting Diagnosis: MS (multiple sclerosis)    PT Received On: 17  Total Time (min): 38       Billable Minutes:  Gait [id number], Therapeutic Activity 10, Therapeutic Exercise 18 and Total Time 38    Treatment Type: Treatment  PT/PTA: PT     PTA Visit Number: 0       General Precautions: Standard, fall  Orthopedic Precautions: N/A   Braces: N/A    Do you have any cultural, spiritual, Christianity conflicts, given your current situation?: no    Subjective:  Pt agreeable to session.    Pain Ratin/10    Objective:  Patient found sitting in w/c       Functional Status:  MDS G  Bed Mobility Functional Status: mod(A)-Max(A)  Transfer Functional Status: CGA-Min (A)  Walk in Room Functional Status: CGA-Min (A)  Walk in Corridor Functional Status: CGA-Min (A)  Locomotion on Unit Functional Status: S-SBA          Bed Mobility: vc's and inc'd time required  Sit>Supine:on mat w/ ModA for trunk  Supine>Sit: on mat w/ ModA for BLE    Transfers:  Sit<>Stand: to/from w/c w/ RW and Miri to rise  Scoot pivot w/c<>EOM w/ CGA for safety and to hold w/c in place  vc's and inc'd time required to complete transfers    Gait:  Amb 60ft w/ RW and Miri for stability and RW management, vc's for upright posture and to remain inside RW, inc'd difficulty w/ LLE advancement, LLE DF assist ace wrap applied due to foot drop     Therex:  Supine therex 2x15 reps (GS, SAQ, AP, Abd/Add) w/ AAROM as needed  Supine PROM all joints/planes of motion x15-20 reps each    Patient left up in chair with call button in reach.    Assessment:  Mao Levin is a 51 y.o. male with a medical diagnosis of MS (multiple sclerosis).  Pt steff session well w/ good participation. Due to fatigue pt requires inc'd time to complete all functional tasks. He also continues to require cueing for safety w/ transfers and ambulation. Pt will require 24hour care upon D/C for safety.    Rehab identified problem  list/impairments: weakness, impaired endurance, impaired self care skills, impaired functional mobilty, gait instability, decreased upper extremity function, decreased coordination, decreased safety awareness, decreased lower extremity function, decreased ROM    Rehab potential is fair.    Activity tolerance: Fair    Discharge recommendations:  (TBD- will need assistance upon D/C)     Barriers to discharge: Decreased caregiver support    Equipment recommendations: wheelchair     GOALS:   Physical Therapy Goals        Problem: Physical Therapy Goal    Goal Priority Disciplines Outcome Goal Variances Interventions   Physical Therapy Goal     PT/OT, PT Ongoing (interventions implemented as appropriate)     Description:  Goals to be met by: 2 weeks     Patient will increase functional independence with mobility by performin. Supine to sit with Stand-by Assistance. Not Met  2. Sit to supine with Stand-by Assistance. Not Met  3. Sit to stand transfer with Minimal Assistance MET  4. Bed to chair transfer with Minimal Assistance using Rolling Walker    5. Gait  x 20 feet with Moderate Assistance using Rolling Walker. Met (3/26/2017)  6. Wheelchair propulsion x75 feet with Stand-by Assistance using BUE/BLE- Met  7. Ascend/descend 3 stair with left Handrails Moderate Assistance.   8. Stand for 3 minutes with Stand-by Assistance using Rolling Walker.  9. Lower extremity exercise program x20 reps per handout, with assistance as needed MET  10. New Goal (3/26/2017): Gait  x 50 feet with Moderate Assistance using Rolling Walker. Met (2017)                        PLAN:    Patient to be seen 5 x/week  to address the above listed problems via gait training, therapeutic activities, therapeutic exercises, wheelchair management/training  Plan of Care expires: 17  Plan of Care reviewed with: patient    Angelica Ribera, PT  2017

## 2017-04-25 NOTE — PROGRESS NOTES
04/25/2017  11:08 AM  Spoke with Nithya ([phone]) to check on patient's status of reconsideration (case # [id number]), no decision has been made. Reconsideration can take up to 14 days.    Kelsie Renee RN, CM Skilled  [id number]

## 2017-04-25 NOTE — PLAN OF CARE
Problem: Fall Risk (Adult)  Goal: Absence of Falls  Patient will demonstrate the desired outcomes by discharge/transition of care.   Outcome: Ongoing (interventions implemented as appropriate)  No falls or injuries this shift. Call light in reach, ndn

## 2017-04-25 NOTE — PLAN OF CARE
Problem: Patient Care Overview  Goal: Plan of Care Review  Outcome: Ongoing (interventions implemented as appropriate)  Pt remained free from falls, injury and trauma throughout shift. Pt AAO x 4. Bed in lowest position and wheels locked. Pt instructed to call for assistance. Hourly rounds to monitor pt for safety and comfort. Personal items and call bell within reach. Pt medicated for pain per provider's orders. No signs or symptoms of distress.  Will continue to monitor pt.

## 2017-04-25 NOTE — PLAN OF CARE
Problem: Physical Therapy Goal  Goal: Physical Therapy Goal  Goals to be met by: 2 weeks     Patient will increase functional independence with mobility by performin. Supine to sit with Stand-by Assistance. Not Met  2. Sit to supine with Stand-by Assistance. Not Met  3. Sit to stand transfer with Minimal Assistance MET  4. Bed to chair transfer with Minimal Assistance using Rolling Walker   5. Gait x 20 feet with Moderate Assistance using Rolling Walker. Met (3/26/2017)  6. Wheelchair propulsion x75 feet with Stand-by Assistance using BUE/BLE- Met  7. Ascend/descend 3 stair with left Handrails Moderate Assistance.   8. Stand for 3 minutes with Stand-by Assistance using Rolling Walker.  9. Lower extremity exercise program x20 reps per handout, with assistance as needed MET  10. New Goal (3/26/2017): Gait x 50 feet with Moderate Assistance using Rolling Walker. Met (2017)     LTGs remain appropriate. Pt will continue PT POC.     Angelica Ribera, PT  2017

## 2017-04-26 PROCEDURE — 99309 SBSQ NF CARE MODERATE MDM 30: CPT | Mod: ,,, | Performed by: NURSE PRACTITIONER

## 2017-04-26 PROCEDURE — 11000004 HC SNF PRIVATE

## 2017-04-26 PROCEDURE — 25000003 PHARM REV CODE 250: Performed by: PHYSICIAN ASSISTANT

## 2017-04-26 PROCEDURE — 25000003 PHARM REV CODE 250: Performed by: INTERNAL MEDICINE

## 2017-04-26 PROCEDURE — 97535 SELF CARE MNGMENT TRAINING: CPT

## 2017-04-26 PROCEDURE — 25000003 PHARM REV CODE 250: Performed by: NURSE PRACTITIONER

## 2017-04-26 RX ADMIN — DANTROLENE SODIUM 50 MG: 50 CAPSULE ORAL at 12:04

## 2017-04-26 RX ADMIN — RAMELTEON 8 MG: 8 TABLET, FILM COATED ORAL at 09:04

## 2017-04-26 RX ADMIN — NORTRIPTYLINE HYDROCHLORIDE 10 MG: 10 CAPSULE ORAL at 09:04

## 2017-04-26 RX ADMIN — OXYBUTYNIN CHLORIDE 5 MG: 5 TABLET, EXTENDED RELEASE ORAL at 08:04

## 2017-04-26 RX ADMIN — OXYCODONE HYDROCHLORIDE 15 MG: 5 TABLET ORAL at 09:04

## 2017-04-26 RX ADMIN — BACLOFEN 20 MG: 10 TABLET ORAL at 05:04

## 2017-04-26 RX ADMIN — RIVAROXABAN 20 MG: 20 TABLET, FILM COATED ORAL at 05:04

## 2017-04-26 RX ADMIN — ACETAMINOPHEN 650 MG: 325 TABLET, FILM COATED ORAL at 09:04

## 2017-04-26 RX ADMIN — BACLOFEN 20 MG: 10 TABLET ORAL at 06:04

## 2017-04-26 RX ADMIN — CARBAMAZEPINE 400 MG: 100 CAPSULE, EXTENDED RELEASE ORAL at 09:04

## 2017-04-26 RX ADMIN — GABAPENTIN 900 MG: 300 CAPSULE ORAL at 06:04

## 2017-04-26 RX ADMIN — CITALOPRAM HYDROBROMIDE 20 MG: 20 TABLET ORAL at 08:04

## 2017-04-26 RX ADMIN — DIAZEPAM 5 MG: 5 TABLET ORAL at 08:04

## 2017-04-26 RX ADMIN — CARBAMAZEPINE 400 MG: 100 CAPSULE, EXTENDED RELEASE ORAL at 08:04

## 2017-04-26 RX ADMIN — DANTROLENE SODIUM 50 MG: 50 CAPSULE ORAL at 05:04

## 2017-04-26 RX ADMIN — OXYCODONE HYDROCHLORIDE 15 MG: 5 TABLET ORAL at 06:04

## 2017-04-26 RX ADMIN — DANTROLENE SODIUM 50 MG: 50 CAPSULE ORAL at 06:04

## 2017-04-26 RX ADMIN — GABAPENTIN 900 MG: 300 CAPSULE ORAL at 01:04

## 2017-04-26 RX ADMIN — BACLOFEN 20 MG: 10 TABLET ORAL at 12:04

## 2017-04-26 RX ADMIN — GABAPENTIN 900 MG: 300 CAPSULE ORAL at 09:04

## 2017-04-26 RX ADMIN — TRAZODONE HYDROCHLORIDE 100 MG: 50 TABLET ORAL at 09:04

## 2017-04-26 RX ADMIN — OXYCODONE HYDROCHLORIDE 15 MG: 5 TABLET ORAL at 12:04

## 2017-04-26 NOTE — PLAN OF CARE
Problem: Occupational Therapy Goal  Goal: Occupational Therapy Goal  Goals to be met by: 3 weeks     Patient will increase functional independence with ADLs by performing:    Feeding with Set-up Assistance. Met  UE Dressing with Stand-by Assistance.--met   LE Dressing with Minimal Assistance.--met;however, occasional mod A depending on muscle tone  Grooming while seated with Modified [place].--met  Toileting from bedside commode with Minimal Assistance for hygiene and clothing management.   Bathing from shower chair/bench with Minimal Assistance.--met  Sitting at edge of bed x20 minutes with Supervision.--met  Rolling to Bilateral with Stand-by Assistance. --met  Supine to sit with Minimal Assistance.--met  Stand pivot transfers with Minimal Assistance.--met  Toilet transfer to bedside commode with Minimal Assistance.  L Upper extremity self ROM exercise program x15 reps per handout, with independence.   Outcome: Ongoing (interventions implemented as appropriate)  .

## 2017-04-26 NOTE — PT/OT/SLP PROGRESS
"Physical Therapy      Mao Levin  MRN: [id number]    Patient not seen today secondary to needing to be cleaned/changed and washed up awaiting for PCT also having HA requesting tylenol,"will do it tomorrow" PCT/nsg notified  . Will follow-up next PT session    Chely Gudino PTA    "

## 2017-04-26 NOTE — PLAN OF CARE
Problem: Patient Care Overview  Goal: Plan of Care Review  Outcome: Ongoing (interventions implemented as appropriate)  Pt remained free from falls, injury and trauma throughout shift. Pt AAO x 4. Bed in lowest position and wheels locked. Pt instructed to call for assistance. Hourly rounds to monitor pt for safety and comfort. Personal items and call bell within reach. Pt medicated for pain. No signs or symptoms of distress.  Will continue to monitor pt.

## 2017-04-26 NOTE — PROGRESS NOTES
Progress Note  Skilled Nursing Facility    Admit Date: 3/17/2017  Follow-up for  MS (multiple sclerosis)  Anticipated Discharge Date:      SUBJECTIVE:     History of Present Illness:  Patient is a 51 y.o. male with multiple sclerosis and hx of saddle PE who presents to SNF after hospitalization for an MS pseudoflare (worsened pain and weakness in BLE) due to E. Coli UTI. He was recently started on rituxan to treat MS. He was also recently admitted in 12/2016 for psuedoflare and he improved after inpatient rehab stay. He continues with pain rating it as 9-10/10 to his bilateral LE and requests decreased timing between oxycodone. No radiation of pain and oxycodone has been effective. He has left hand contraction which has been present since his last pseudoflare. He occasionally smokes at home. The patient has been admitted to SNF for ongoing PT/OT due to insufficient progress to go home safely from the hospital.      Follow-up for MS      Interval History: Seen patient at bedside, c/o 5/10 pain to left hand but continues to have 8/10 to right side of head intermittently that last only 45mins at a time.       Review of Systems:  Constitutional: no fever or chills, +H/A  Respiratory: no cough or shortness of breath  Cardiovascular: no chest pain or palpitations  Gastrointestinal: no nausea or vomiting, no abdominal pain or change in bowel habits  Genitourinary: no hematuria or dysuria  Integument/Breast: no rash or pruritis  Musculoskeletal: +5/10 pain to left hand, + for contracture to left hand, + for increased tone to upper extremities (but improving)  Neurological: no seizures or tremors, + numbness  Behavioral/Psych: no auditory or visual hallucinations    Scheduled Meds:   baclofen  20 mg Oral QID    carbamazepine  400 mg Oral BID    citalopram  20 mg Oral Daily    dantrolene  50 mg Oral QID    ergocalciferol  50,000 Units Oral Q7 Days    gabapentin  900 mg Oral TID    nicotine  1 patch Transdermal Daily     nortriptyline  10 mg Oral QHS    oxybutynin  5 mg Oral Daily    polyethylene glycol  17 g Oral Daily    rivaroxaban  20 mg Oral Daily with dinner    senna-docusate 8.6-50 mg  1 tablet Oral Daily    trazodone  100 mg Oral QHS     Continuous Infusions:   PRN Meds:.acetaminophen, albuterol-ipratropium 2.5mg-0.5mg/3mL, aluminum-magnesium hydroxide-simethicone, bisacodyl, dextrose 50%, dextrose 50%, diazePAM, glucagon (human recombinant), glucose, glucose, metoclopramide HCl, ondansetron, [] oxycodone **FOLLOWED BY** oxycodone, promethazine, ramelteon      OBJECTIVE:     Vital Signs Range (Last 24H):  Temp:  [97.7 °F (36.5 °C)-98 °F (36.7 °C)] 97.7 °F (36.5 °C)  Pulse:  [59-76] 71  Resp:  [18-19] 18  SpO2:  [98 %-99 %] 98 %  BP: (115-138)/(64-85) 126/85    Physical Exam:  General: well developed, well nourished, no distress  Lungs: clear to auscultation bilaterally and normal respiratory effort  Cardiovascular: Heart: regular rate and rhythm, S1, S2 normal, no murmur, click, rub or gallop. Chest Wall: no tenderness.   Extremities: no cyanosis or edema, or clubbing. Pulses: 2+ and symmetric.  Abdomel: Abdomen: soft, non-tender non-distended; bowel sounds normal; no masses, no organomegaly.   Skin: Skin color, texture, turgor normal. No rashes or lesions  Musculoskeletal:no clubbing, cyanosis  Neurologic: Abnormal strength and tone, increased tone to upper extremities with left hand contracture (improving). Generalized weakness.  Psych/Behavioral: Alert and oriented, appropriate affect.    Laboratory:    Recent Labs  Lab 17   WBC 6.54 6.31   HGB 12.1* 12.3*   HCT 36.9* 37.6*    158         Recent Labs  Lab 17  0443    141   K 3.9 3.9    107   CO2 26 26   BUN 16 21*   CREATININE 0.8 0.8   CALCIUM 8.7 8.6*     Lab Results   Component Value Date    LABPROT 10.8 12/15/2016    ALBUMIN 3.4 (L) 2017     Lab Results   Component Value Date     CALCIUM 8.6 (L) 04/24/2017    PHOS 4.1 04/24/2017     Results for MAGALI ALCANTAR (MRN [id number]) as of 4/26/2017 12:01   Ref. Range 4/17/2017 04:13 4/20/2017 04:17 4/24/2017 04:43   Magnesium Latest Ref Range: 1.6 - 2.6 mg/dL 2.3 2.1 2.1       ASSESSMENT/PLAN:     Active Hospital Problems    Diagnosis  POA    *MS (multiple sclerosis) [G35]  Yes     Chronic    Intractable headache [R51]  Yes    Depression [F32.9]  Yes    Smoker [F17.200]  Yes    Polyneuropathy [G62.9]  Yes    10/25/2016 Saddle PE [I26.92]  Yes     Chronic    Vitamin D deficiency disease [E55.9]  Yes    Neurogenic bladder [N31.9]  Yes     Chronic    Spasticity [R25.2]  Yes    Chronic pain of multiple sites [R52, G89.29]  Yes     Chronic      Resolved Hospital Problems    Diagnosis Date Resolved POA   No resolved problems to display.         NEW OR UNSTABLE PROBLEM(S) TREATED TODAY:  MS (multiple sclerosis) [G35]  Chronic  -PT/OT to increase ambulation, ADL performance and endurance  -DVT ppx: rivaroxaban 20mg daily with dinner  -fall precautions  -bowel regimen: miralax and senokot-s1 tab daily to prevent/treat constipation; hold for frequent or loose stooling  -due for repeat rituxan in 6 months  -expected to resume home health once discharged but may opt for outpatient therapy as previously ordered  -he will need to f/u with PMR in 2 weeks after discharge and Neurology 2 weeks after discharge      Spasticity [R25.2]  -chronic and due to MS  -continue baclofen 20mg QID  -continue dantrolene 50mg QID      Polyneuropathy [G62.9]  -chronic  -continue gabapentin 900mg TID  -continue carbamazepine 400mg BID      Chronic pain of multiple sites [R52, G89.29]  Chronic  -continue oxycodone every 6 hours prn and valium prn spasms; decrease frequency of oxycodone during SNF stay to return to 15mg prn up to TID at home  -will continue to monitor and adjust regimen as necessary      Depression [F32.9]  -chronic  -continue celexa 20mg daily      Smoker  [F17.200]  -chronic  -continue nicotine 14mg patch daily      Urinary tract infection without hematuria [N39.0] E. coli  -resolved  -completed cipro for 13 doses to treat, end date on 3/24/17      10/25/2016 Saddle PE [I26.92]  Chronic  -continue rivaroxaban to prevent future thrombi      Vitamin D deficiency  -continue ergocalciferol 50,000 units every 7 days      Neurogenic bladder [N31.9]  Chronic  -continue oxybutynin 5mg daily  -bladder scan prn and I/O cath prn retention volume > 300cc     Intractable headache  -occurs at home with periods of relief  -has not improved with increasing gabapentin  -last 45mins a time with relief from Tylenol  -contacted Dr. Finnegan for recommendations--started on nortriptyline 10mg nightly, patient reported he absent from HA before last night and woke without a HA  -scheduled with first available Neuro Headache specialist  -sed rate 0     Future Appointments  Date Time Provider Department Center   5/1/2017 2:40 PM Fausto Ruby MD [facility] NEURO [place]   5/5/2017 10:45 AM Lawanda Boyce PA-C [facility] [place]   7/17/2017 9:40 AM Mattie Finnegan MD [facility] Navarrete NP  Department of Hospital Medicine   [facility]

## 2017-04-26 NOTE — PT/OT/SLP PROGRESS
Occupational Therapy  Treatment    Mao Levin   MRN: [id number]   Admitting Diagnosis: MS (multiple sclerosis)    OT Date of Treatment: 17  Total Time (min): 53 min    Billable Minutes:  Self Care/Home Management 53    General Precautions: Standard, fall  Orthopedic Precautions: N/A  Braces: N/A    Do you have any cultural, spiritual, Mormonism conflicts, given your current situation?: no    Subjective:  Communicated with nsg prior to session.      Pain Ratin/10                   Objective:   Pt. supine on arrival nsg present     Functional Status:  MDS G  Bed Mobility Functional Status: CGA-Min (A)  Transfer Functional Status: CGA-Min (A) from EOB to w/c  Then to 3n1<> w/c and use of grab bars  Dressing Functional Status: 3:mod(A)-Max(A)-LB dressing Mod A with BLE into pants and Pt. (A) to manage over hips. And Max A for socks and Min A for UE dressing  Personal Hygiene Functional Status: S-SBA  Bathing Functional Status: CGA-Min (A) from TTB and steadying (A) for safety and bal and post kim and RUE  Surface-to-surface transfer (transfer between bed and chair or wheelchair): Not steady, only able to stabilize with staff assistance        Patient left up in chair with all lines intact and call button in reach    ASSESSMENT:  Mao Levin is a 51 y.o. male with a medical diagnosis of MS (multiple sclerosis) Pt. participated well with session on this day. Pt is progressing slow still continues to requires cues with aspects of safety and (A) for LB dressing aspects and still had difficulty with grasping L hand towards objects . Pt. Will continue to benefit from continued OT to progress towards goals.    Rehab identified problem list/impairments: impaired endurance, impaired self care skills, impaired functional mobilty, decreased upper extremity function, decreased safety awareness    Rehab potential is fair    Activity tolerance: Fair    Discharge recommendations: rehabilitation facility     Barriers to  discharge: Decreased caregiver support     Equipment recommendations: wheelchair (drop arm commode)     GOALS:   Occupational Therapy Goals        Problem: Occupational Therapy Goal    Goal Priority Disciplines Outcome Interventions   Occupational Therapy Goal     OT, PT/OT Ongoing (interventions implemented as appropriate)    Description:  Goals to be met by: 3 weeks     Patient will increase functional independence with ADLs by performing:    Feeding with Set-up Assistance. Met  UE Dressing with Stand-by Assistance.--met   LE Dressing with Minimal Assistance.--met;however, occasional mod A depending on muscle tone  Grooming while seated with Modified [place].--met  Toileting from bedside commode with Minimal Assistance for hygiene and clothing management.   Bathing from  shower chair/bench with Minimal Assistance.--met  Sitting at edge of bed x20 minutes with Supervision.--met  Rolling to Bilateral with Stand-by Assistance. --met  Supine to sit with Minimal Assistance.--met  Stand pivot transfers with Minimal Assistance.--met  Toilet transfer to bedside commode with Minimal Assistance.  L Upper extremity self ROM exercise program x15 reps per handout, with independence.               Plan:  Patient to be seen 5 x/week to address the above listed problems via self-care/home management, therapeutic exercises, therapeutic activities  Plan of Care expires: 04/18/17  Plan of Care reviewed with: patient    CHELA Mustafa/NILESH  04/26/2017

## 2017-04-27 LAB
ANION GAP SERPL CALC-SCNC: 10 MMOL/L
BASOPHILS # BLD AUTO: 0.05 K/UL
BASOPHILS NFR BLD: 0.8 %
BUN SERPL-MCNC: 15 MG/DL
CALCIUM SERPL-MCNC: 8.9 MG/DL
CD20 CELLS NFR SPEC: NORMAL %
CHLORIDE SERPL-SCNC: 105 MMOL/L
CO2 SERPL-SCNC: 25 MMOL/L
CREAT SERPL-MCNC: 0.8 MG/DL
DIFFERENTIAL METHOD: ABNORMAL
EOSINOPHIL # BLD AUTO: 0.2 K/UL
EOSINOPHIL NFR BLD: 3.6 %
ERYTHROCYTE [DISTWIDTH] IN BLOOD BY AUTOMATED COUNT: 14.4 %
EST. GFR  (AFRICAN AMERICAN): >60 ML/MIN/1.73 M^2
EST. GFR  (NON AFRICAN AMERICAN): >60 ML/MIN/1.73 M^2
GLUCOSE SERPL-MCNC: 75 MG/DL
HCT VFR BLD AUTO: 38.7 %
HGB BLD-MCNC: 12.9 G/DL
LYMPHOCYTES # BLD AUTO: 2.7 K/UL
LYMPHOCYTES NFR BLD: 44.1 %
MAGNESIUM SERPL-MCNC: 2.1 MG/DL
MCH RBC QN AUTO: 30 PG
MCHC RBC AUTO-ENTMCNC: 33.3 %
MCV RBC AUTO: 90 FL
MONOCYTES # BLD AUTO: 0.5 K/UL
MONOCYTES NFR BLD: 7.3 %
NEUTROPHILS # BLD AUTO: 2.7 K/UL
NEUTROPHILS NFR BLD: 44.2 %
PHOSPHATE SERPL-MCNC: 3.9 MG/DL
PLATELET # BLD AUTO: 167 K/UL
PMV BLD AUTO: 12.1 FL
POTASSIUM SERPL-SCNC: 3.9 MMOL/L
RBC # BLD AUTO: 4.3 M/UL
SODIUM SERPL-SCNC: 140 MMOL/L
WBC # BLD AUTO: 6.15 K/UL

## 2017-04-27 PROCEDURE — 36415 COLL VENOUS BLD VENIPUNCTURE: CPT

## 2017-04-27 PROCEDURE — 84100 ASSAY OF PHOSPHORUS: CPT

## 2017-04-27 PROCEDURE — 97530 THERAPEUTIC ACTIVITIES: CPT

## 2017-04-27 PROCEDURE — 25000003 PHARM REV CODE 250: Performed by: NURSE PRACTITIONER

## 2017-04-27 PROCEDURE — 97110 THERAPEUTIC EXERCISES: CPT

## 2017-04-27 PROCEDURE — 11000004 HC SNF PRIVATE

## 2017-04-27 PROCEDURE — 80048 BASIC METABOLIC PNL TOTAL CA: CPT

## 2017-04-27 PROCEDURE — 25000003 PHARM REV CODE 250: Performed by: PHYSICIAN ASSISTANT

## 2017-04-27 PROCEDURE — 83735 ASSAY OF MAGNESIUM: CPT

## 2017-04-27 PROCEDURE — 97116 GAIT TRAINING THERAPY: CPT

## 2017-04-27 PROCEDURE — 85025 COMPLETE CBC W/AUTO DIFF WBC: CPT

## 2017-04-27 PROCEDURE — 25000003 PHARM REV CODE 250: Performed by: INTERNAL MEDICINE

## 2017-04-27 RX ADMIN — OXYBUTYNIN CHLORIDE 5 MG: 5 TABLET, EXTENDED RELEASE ORAL at 08:04

## 2017-04-27 RX ADMIN — ACETAMINOPHEN 650 MG: 325 TABLET, FILM COATED ORAL at 05:04

## 2017-04-27 RX ADMIN — ACETAMINOPHEN 650 MG: 325 TABLET, FILM COATED ORAL at 11:04

## 2017-04-27 RX ADMIN — CARBAMAZEPINE 400 MG: 100 CAPSULE, EXTENDED RELEASE ORAL at 08:04

## 2017-04-27 RX ADMIN — DIAZEPAM 5 MG: 5 TABLET ORAL at 05:04

## 2017-04-27 RX ADMIN — GABAPENTIN 900 MG: 300 CAPSULE ORAL at 05:04

## 2017-04-27 RX ADMIN — DANTROLENE SODIUM 50 MG: 50 CAPSULE ORAL at 05:04

## 2017-04-27 RX ADMIN — OXYCODONE HYDROCHLORIDE 15 MG: 5 TABLET ORAL at 05:04

## 2017-04-27 RX ADMIN — DANTROLENE SODIUM 50 MG: 50 CAPSULE ORAL at 01:04

## 2017-04-27 RX ADMIN — OXYCODONE HYDROCHLORIDE 15 MG: 5 TABLET ORAL at 11:04

## 2017-04-27 RX ADMIN — BACLOFEN 20 MG: 10 TABLET ORAL at 11:04

## 2017-04-27 RX ADMIN — ACETAMINOPHEN 650 MG: 325 TABLET, FILM COATED ORAL at 09:04

## 2017-04-27 RX ADMIN — GABAPENTIN 900 MG: 300 CAPSULE ORAL at 01:04

## 2017-04-27 RX ADMIN — DANTROLENE SODIUM 50 MG: 50 CAPSULE ORAL at 11:04

## 2017-04-27 RX ADMIN — DIAZEPAM 5 MG: 5 TABLET ORAL at 11:04

## 2017-04-27 RX ADMIN — CITALOPRAM HYDROBROMIDE 20 MG: 20 TABLET ORAL at 08:04

## 2017-04-27 RX ADMIN — BACLOFEN 20 MG: 10 TABLET ORAL at 05:04

## 2017-04-27 RX ADMIN — GABAPENTIN 900 MG: 300 CAPSULE ORAL at 09:04

## 2017-04-27 RX ADMIN — TRAZODONE HYDROCHLORIDE 100 MG: 50 TABLET ORAL at 09:04

## 2017-04-27 RX ADMIN — NORTRIPTYLINE HYDROCHLORIDE 10 MG: 10 CAPSULE ORAL at 09:04

## 2017-04-27 RX ADMIN — BACLOFEN 20 MG: 10 TABLET ORAL at 01:04

## 2017-04-27 RX ADMIN — RIVAROXABAN 20 MG: 20 TABLET, FILM COATED ORAL at 05:04

## 2017-04-27 RX ADMIN — CARBAMAZEPINE 400 MG: 100 CAPSULE, EXTENDED RELEASE ORAL at 09:04

## 2017-04-27 NOTE — PT/OT/SLP PROGRESS
Occupational Therapy  Treatment    Mao Levin   MRN: [id number]   Admitting Diagnosis: MS (multiple sclerosis)    OT Date of Treatment: 17  Total Time (min): 45 min    Billable Minutes:  Therapeutic Activity 30 and Therapeutic Exercise 15    General Precautions: Standard, fall  Orthopedic Precautions: N/A  Braces: N/A    Do you have any cultural, spiritual, Roman Catholic conflicts, given your current situation?: no    Subjective:  Communicated with nsg prior to session.      Pain Ratin/10                   Objective:   Pt. Found seated in w/c    Functional Status:  MDS G  Transfer Functional Status: CGA-Min (A) from w/c level           OT Exercises: UE Ergometer 5 min  Lat pull downs 30# 4x25 reps    Additional Treatment:  Pt. With FM activity/coordination aspects with L hand for graph release with cones with bringing from L/R. Pt still with difficulty with ext pattern  Pt. Also with scapular glides x 10 reps and abb/abd ROM      Patient left up in chair with PT present     ASSESSMENT:  Mao Levin is a 51 y.o. male with a medical diagnosis of MS (multiple sclerosis) Pt. Tolerated session fair on this day Pt. With mild lethargic during session. But able to sustain attention during task. Pt. Will continue to benefit from continued OT to progress towards goals.    Rehab identified problem list/impairments: impaired endurance, impaired self care skills, impaired functional mobilty, decreased upper extremity function, decreased safety awareness    Rehab potential is fair    Activity tolerance: Fair    Discharge recommendations: rehabilitation facility     Barriers to discharge: Decreased caregiver support     Equipment recommendations: wheelchair (drop arm commode)     GOALS:   Occupational Therapy Goals        Problem: Occupational Therapy Goal    Goal Priority Disciplines Outcome Interventions   Occupational Therapy Goal     OT, PT/OT Ongoing (interventions implemented as appropriate)    Description:  Goals to be  met by: 3 weeks     Patient will increase functional independence with ADLs by performing:    Feeding with Set-up Assistance. Met  UE Dressing with Stand-by Assistance.--met   LE Dressing with Minimal Assistance.--met;however, occasional mod A depending on muscle tone  Grooming while seated with Modified [place].--met  Toileting from bedside commode with Minimal Assistance for hygiene and clothing management.   Bathing from  shower chair/bench with Minimal Assistance.--met  Sitting at edge of bed x20 minutes with Supervision.--met  Rolling to Bilateral with Stand-by Assistance. --met  Supine to sit with Minimal Assistance.--met  Stand pivot transfers with Minimal Assistance.--met  Toilet transfer to bedside commode with Minimal Assistance.  L Upper extremity self ROM exercise program x15 reps per handout, with independence.               Plan:  Patient to be seen 5 x/week to address the above listed problems via self-care/home management, therapeutic exercises, therapeutic activities  Plan of Care expires: 04/18/17  Plan of Care reviewed with: patient    WENDY Mustafa  04/27/2017

## 2017-04-27 NOTE — PLAN OF CARE
Problem: Fall Risk (Adult)  Goal: Absence of Falls  Patient will demonstrate the desired outcomes by discharge/transition of care.   Outcome: Ongoing (interventions implemented as appropriate)  Pt remain free fall/injury/trauma. Call light w/i reach. BSC w/i reach. Will monitor

## 2017-04-27 NOTE — PT/OT/SLP PROGRESS
Physical Therapy  Treatment    Mao Levin   MRN: [id number]   Admitting Diagnosis: MS (multiple sclerosis)    PT Received On: 17  Total Time (min): 54       Billable Minutes:  Gait [id number], Therapeutic Activity 14, Therapeutic Exercise 30 and Total Time 54    Treatment Type: Treatment  PT/PTA: PT     PTA Visit Number: 0       General Precautions: Standard, fall  Orthopedic Precautions: N/A   Braces: N/A    Do you have any cultural, spiritual, Jewish conflicts, given your current situation?: no    Subjective:  Pt agreeable to session.    Pain Ratin/10  Location - Side: Bilateral  Location - Orientation: generalized  Location: knee  Pain Addressed: Pre-medicate for activity       Objective:  Patient found sitting in w/c         Functional Status:  MDS G  Bed Mobility Functional Status: mod(A)-Max(A)  Transfer Functional Status: CGA-Min (A)  Walk in Room Functional Status: CGA-Min (A)  Walk in Corridor Functional Status: CGA-Min (A)  Locomotion on Unit Functional Status: S-SBA          Bed Mobility:  Sit>Supine:on mat w/ Miri for BLE, pt able to partially lift BLE to mat  Supine>Sit: on mat w/ ModA for trunk    Transfers:  Sit<>Stand: to/from w/c (2 trials) w/ RW and Miri to rise and stabilize RW  Scoot pivot w/c<>EOM w/o AD, close SBA for safety    Gait:  Amb 84ft w/ RW and Miri for stability and to stabilize RW, cueing for upright posture and to remain inside RW, limited by fatigue, inc'd difficulty w/ LLE advancment, no foot clearance BLE     Wheelchair Mobility:  Patient propels w/c on unit t/o day Mindy     Therex:  Supine therex 2x15 reps (GS, SAQ, AP) w/ AAROM as needed  Supine PROM BLE in all planes of motion (~15min)    Balance:  Standing card game [id number] w/ RW and CGA for safety, vc's for upright posture and knee extension    Patient left up in chair with call button in reach.    Assessment:  Mao Levin is a 51 y.o. male with a medical diagnosis of MS (multiple sclerosis).  Pt steff session well  w/ good participation. Pt continues to require Miri for stability w/ transfers and ambulation using a RW. Pt is recommended to have 24hour care upon D/C for safety.    Rehab identified problem list/impairments: weakness, impaired endurance, impaired self care skills, impaired functional mobilty, gait instability, decreased upper extremity function, decreased coordination, decreased safety awareness, decreased lower extremity function, decreased ROM    Rehab potential is fair.    Activity tolerance: Fair    Discharge recommendations:  (TBD- will need assistance upon D/C)     Barriers to discharge: Decreased caregiver support    Equipment recommendations: wheelchair     GOALS:   Physical Therapy Goals        Problem: Physical Therapy Goal    Goal Priority Disciplines Outcome Goal Variances Interventions   Physical Therapy Goal     PT/OT, PT Ongoing (interventions implemented as appropriate)     Description:  Goals to be met by: 2 weeks     Patient will increase functional independence with mobility by performin. Supine to sit with Stand-by Assistance. Not Met  2. Sit to supine with Stand-by Assistance. Not Met  3. Sit to stand transfer with Minimal Assistance MET  4. Bed to chair transfer with Minimal Assistance using Rolling Walker    5. Gait  x 20 feet with Moderate Assistance using Rolling Walker. Met (3/26/2017)  6. Wheelchair propulsion x75 feet with Stand-by Assistance using BUE/BLE- Met  7. Ascend/descend 3 stair with left Handrails Moderate Assistance.   8. Stand for 3 minutes with Stand-by Assistance using Rolling Walker.  9. Lower extremity exercise program x20 reps per handout, with assistance as needed MET  10. New Goal (3/26/2017): Gait  x 50 feet with Moderate Assistance using Rolling Walker. Met (2017)                        PLAN:    Patient to be seen 5 x/week  to address the above listed problems via gait training, therapeutic activities, therapeutic exercises, wheelchair  management/training  Plan of Care expires: 04/17/17  Plan of Care reviewed with: patient    Angelica CEDILLO Bacilio, PT  04/27/2017

## 2017-04-27 NOTE — PLAN OF CARE
Problem: Occupational Therapy Goal  Goal: Occupational Therapy Goal  Goals to be met by: 3 weeks     Patient will increase functional independence with ADLs by performing:    Feeding with Set-up Assistance. Met  UE Dressing with Stand-by Assistance.--met   LE Dressing with Minimal Assistance.--met;however, occasional mod A depending on muscle tone  Grooming while seated with Modified [place].--met  Toileting from bedside commode with Minimal Assistance for hygiene and clothing management.   Bathing from shower chair/bench with Minimal Assistance.--met  Sitting at edge of bed x20 minutes with Supervision.--met  Rolling to Bilateral with Stand-by Assistance. --met  Supine to sit with Minimal Assistance.--met  Stand pivot transfers with Minimal Assistance.--met  Toilet transfer to bedside commode with Minimal Assistance.  L Upper extremity self ROM exercise program x15 reps per handout, with independence.   Outcome: Ongoing (interventions implemented as appropriate)  .

## 2017-04-27 NOTE — PROGRESS NOTES
04/27/2017  9:46 AM  Per Nithya, case is still in review and a decision has not been made.    Kelsie Renee RN, CM Skilled  [id number]

## 2017-04-27 NOTE — PLAN OF CARE
Pt A & O pt repositioned with pillow every q2 hrs, no skin breakdown noted . pulses palable +movement/sensation, cap refill WNL in all 4 extremities , monitored for pain and safety. Safety maintained. , pt remained afebrile 97.8. Bed locked and lowered, call light within reach. Will continue to monitor

## 2017-04-28 PROCEDURE — 97116 GAIT TRAINING THERAPY: CPT

## 2017-04-28 PROCEDURE — 97535 SELF CARE MNGMENT TRAINING: CPT

## 2017-04-28 PROCEDURE — 25000003 PHARM REV CODE 250: Performed by: INTERNAL MEDICINE

## 2017-04-28 PROCEDURE — 97530 THERAPEUTIC ACTIVITIES: CPT

## 2017-04-28 PROCEDURE — 11000004 HC SNF PRIVATE

## 2017-04-28 PROCEDURE — 97110 THERAPEUTIC EXERCISES: CPT

## 2017-04-28 PROCEDURE — 25000003 PHARM REV CODE 250: Performed by: NURSE PRACTITIONER

## 2017-04-28 PROCEDURE — 25000003 PHARM REV CODE 250: Performed by: PHYSICIAN ASSISTANT

## 2017-04-28 RX ADMIN — DANTROLENE SODIUM 50 MG: 50 CAPSULE ORAL at 05:04

## 2017-04-28 RX ADMIN — TRAZODONE HYDROCHLORIDE 100 MG: 50 TABLET ORAL at 09:04

## 2017-04-28 RX ADMIN — CITALOPRAM HYDROBROMIDE 20 MG: 20 TABLET ORAL at 08:04

## 2017-04-28 RX ADMIN — ACETAMINOPHEN 650 MG: 325 TABLET, FILM COATED ORAL at 06:04

## 2017-04-28 RX ADMIN — BACLOFEN 20 MG: 10 TABLET ORAL at 12:04

## 2017-04-28 RX ADMIN — OXYBUTYNIN CHLORIDE 5 MG: 5 TABLET, EXTENDED RELEASE ORAL at 08:04

## 2017-04-28 RX ADMIN — GABAPENTIN 900 MG: 300 CAPSULE ORAL at 05:04

## 2017-04-28 RX ADMIN — CARBAMAZEPINE 400 MG: 100 CAPSULE, EXTENDED RELEASE ORAL at 09:04

## 2017-04-28 RX ADMIN — ACETAMINOPHEN 650 MG: 325 TABLET, FILM COATED ORAL at 12:04

## 2017-04-28 RX ADMIN — RIVAROXABAN 20 MG: 20 TABLET, FILM COATED ORAL at 05:04

## 2017-04-28 RX ADMIN — OXYCODONE HYDROCHLORIDE 15 MG: 5 TABLET ORAL at 05:04

## 2017-04-28 RX ADMIN — CARBAMAZEPINE 400 MG: 100 CAPSULE, EXTENDED RELEASE ORAL at 08:04

## 2017-04-28 RX ADMIN — GABAPENTIN 900 MG: 300 CAPSULE ORAL at 01:04

## 2017-04-28 RX ADMIN — DIAZEPAM 5 MG: 5 TABLET ORAL at 06:04

## 2017-04-28 RX ADMIN — BACLOFEN 20 MG: 10 TABLET ORAL at 05:04

## 2017-04-28 RX ADMIN — OXYCODONE HYDROCHLORIDE 15 MG: 5 TABLET ORAL at 12:04

## 2017-04-28 RX ADMIN — DANTROLENE SODIUM 50 MG: 50 CAPSULE ORAL at 12:04

## 2017-04-28 RX ADMIN — DIAZEPAM 5 MG: 5 TABLET ORAL at 12:04

## 2017-04-28 RX ADMIN — OXYCODONE HYDROCHLORIDE 15 MG: 5 TABLET ORAL at 06:04

## 2017-04-28 RX ADMIN — NORTRIPTYLINE HYDROCHLORIDE 10 MG: 10 CAPSULE ORAL at 09:04

## 2017-04-28 RX ADMIN — GABAPENTIN 900 MG: 300 CAPSULE ORAL at 09:04

## 2017-04-28 NOTE — PT/OT/SLP PROGRESS
"Occupational Therapy  Treatment    Moa Levin   MRN: [id number]   Admitting Diagnosis: MS (multiple sclerosis)    OT Date of Treatment: 17  Total Time (min): 55 min    Billable Minutes:  Self Care/Home Management 55    General Precautions: Standard, fall  Orthopedic Precautions: N/A  Braces: N/A    Do you have any cultural, spiritual, Anabaptism conflicts, given your current situation?: no    Subjective:  "Come on hand. Open."    Pain Ratin/10   Pain Rating Post-Intervention: 0/10    Objective:  Patient found seated EOB.    Functional Status:  MDS G  Transfer Functional Status: Min (A) for sit to stand from EOB during ADL and squat pivot from EOB to WC  Dressing Functional Status: UBD - min (A), LBD - mod(A)  Eating Functional Status: Set up  Toilet Use Functional Status: mod(A) (simulated)  Personal Hygiene Functional Status: Set up seated in WC  Bathing Functional Status: min (A) seated EOB    Patient left up in chair with call button in reach    ASSESSMENT:  Mao Levin is a 51 y.o. male with a medical diagnosis of MS (multiple sclerosis). Pt req'd (A) w/ sit to stand t/f's during standing ADL.    Rehab identified problem list/impairments: impaired endurance, impaired self care skills, impaired functional mobilty, decreased upper extremity function, decreased safety awareness    Rehab potential is fair    Activity tolerance: Good    Discharge recommendations: rehabilitation facility     Barriers to discharge: Decreased caregiver support     Equipment recommendations: wheelchair (drop arm commode)     GOALS:   Occupational Therapy Goals        Problem: Occupational Therapy Goal    Goal Priority Disciplines Outcome Interventions   Occupational Therapy Goal     OT, PT/OT Ongoing (interventions implemented as appropriate)    Description:  Goals to be met by: 3 weeks     Patient will increase functional independence with ADLs by performing:    Feeding with Set-up Assistance. Met  UE Dressing with Stand-by " Assistance.--met   LE Dressing with Minimal Assistance.--met;however, occasional mod A depending on muscle tone  Grooming while seated with Modified [place].--met  Toileting from bedside commode with Minimal Assistance for hygiene and clothing management.   Bathing from  shower chair/bench with Minimal Assistance.--met  Sitting at edge of bed x20 minutes with Supervision.--met  Rolling to Bilateral with Stand-by Assistance. --met  Supine to sit with Minimal Assistance.--met  Stand pivot transfers with Minimal Assistance.--met  Toilet transfer to bedside commode with Minimal Assistance.  L Upper extremity self ROM exercise program x15 reps per handout, with independence.               Plan:  Patient to be seen 5 x/week to address the above listed problems via self-care/home management, therapeutic activities, therapeutic exercises  Plan of Care expires: 04/18/17  Plan of Care reviewed with: patient    Latasha GISEL Mathur  04/28/2017

## 2017-04-28 NOTE — PLAN OF CARE
Problem: Physical Therapy Goal  Goal: Physical Therapy Goal  Goals to be met by: 2 weeks     Patient will increase functional independence with mobility by performin. Supine to sit with Stand-by Assistance. Not Met  2. Sit to supine with Stand-by Assistance. Not Met  3. Sit to stand transfer with Minimal Assistance MET  4. Bed to chair transfer with Minimal Assistance using Rolling Walker   5. Gait x 20 feet with Moderate Assistance using Rolling Walker. Met (3/26/2017)  6. Wheelchair propulsion x75 feet with Stand-by Assistance using BUE/BLE- Met  7. Ascend/descend 3 stair with left Handrails Moderate Assistance.   8. Stand for 3 minutes with Stand-by Assistance using Rolling Walker.  9. Lower extremity exercise program x20 reps per handout, with assistance as needed MET  10. New Goal (3/26/2017): Gait x 50 feet with Moderate Assistance using Rolling Walker. Met (2017)  11. New Goal 2017 : Gait x50 feet w/ rolling walker and CGA = not met     New gait goal. Lisa Victoria, PT 2017

## 2017-04-28 NOTE — CLINICAL REVIEW
Clinical Pharmacy Chart Review Note    Admit Date: 3/17/2017   LOS: 42 days     Mao Levin is a 51 y.o. male admitted to SNF for PT/OT after hospitalization for MS (multiple sclerosis).    Active Hospital Problems    Diagnosis  POA    *MS (multiple sclerosis) [G35]  Yes     Chronic    Intractable headache [R51]  Yes    Depression [F32.9]  Yes    Smoker [F17.200]  Yes    Polyneuropathy [G62.9]  Yes    10/25/2016 Saddle PE [I26.92]  Yes     Chronic    Vitamin D deficiency disease [E55.9]  Yes    Neurogenic bladder [N31.9]  Yes     Chronic    Spasticity [R25.2]  Yes    Chronic pain of multiple sites [R52, G89.29]  Yes     Chronic      Resolved Hospital Problems    Diagnosis Date Resolved POA   No resolved problems to display.     Review of patient's allergies indicates:  No Known Allergies  Patient Active Problem List    Diagnosis Date Noted    Intractable headache 04/20/2017    Bilateral leg pain 03/17/2017    Depression 03/17/2017    Smoker 03/17/2017    E. coli urinary tract infection 03/14/2017    Frequent falls 03/07/2017    MS (multiple sclerosis) 12/19/2016    Acute relapsing multiple sclerosis 12/16/2016    Long term (current) use of systemic steroids 12/16/2016    Constipation due to neurogenic bowel 12/16/2016    Abnormal thyroid blood test 12/16/2016    Neuropathic pain, leg, bilateral 12/08/2016    Polyneuropathy 10/27/2016    10/25/2016 Saddle PE 10/25/2016    Vitamin D deficiency disease 10/24/2016    Abnormal cortisol level 10/23/2016    Neurogenic bladder 10/06/2016    Spasticity 10/06/2016    Impaired mobility and ADLs 10/06/2016    Abnormal coordination 10/06/2016    Paraparesis 09/29/2016    Gait instability 09/26/2016    Adrenal insufficiency due to steroid withdrawal 09/24/2016    Chronic pain of multiple sites 09/22/2016    Weakness generalized 09/22/2016       Scheduled Meds:    baclofen  20 mg Oral QID    carbamazepine  400 mg Oral BID    citalopram  20 mg  Oral Daily    dantrolene  50 mg Oral QID    ergocalciferol  50,000 Units Oral Q7 Days    gabapentin  900 mg Oral TID    nicotine  1 patch Transdermal Daily    nortriptyline  10 mg Oral QHS    oxybutynin  5 mg Oral Daily    polyethylene glycol  17 g Oral Daily    rivaroxaban  20 mg Oral Daily with dinner    senna-docusate 8.6-50 mg  1 tablet Oral Daily    trazodone  100 mg Oral QHS     Continuous Infusions:    PRN Meds: acetaminophen, albuterol-ipratropium 2.5mg-0.5mg/3mL, aluminum-magnesium hydroxide-simethicone, bisacodyl, dextrose 50%, dextrose 50%, diazePAM, glucagon (human recombinant), glucose, glucose, metoclopramide HCl, ondansetron, [] oxycodone **FOLLOWED BY** oxycodone, promethazine, ramelteon    OBJECTIVE:     Vital Signs (Last 24H)  Temp:  [98.2 °F (36.8 °C)]   Pulse:  [61-77]   Resp:  [18]   BP: (123-126)/(74-79)   SpO2:  [95 %]     Laboratory:  CBC:   Recent Labs  Lab 17  0443 17   WBC 6.31 6.15   RBC 4.17* 4.30*   HGB 12.3* 12.9*   HCT 37.6* 38.7*    167   MCV 90 90   MCH 29.5 30.0   MCHC 32.7 33.3     BMP:   Recent Labs  Lab 173 17  0414   GLU 85 75    140   K 3.9 3.9    105   CO2 26 25   BUN 21* 15   CREATININE 0.8 0.8   CALCIUM 8.6* 8.9   MG 2.1 2.1     CMP:   Recent Labs  Lab 173 17  0414   GLU 85 75   CALCIUM 8.6* 8.9    140   K 3.9 3.9   CO2 26 25    105   BUN 21* 15   CREATININE 0.8 0.8     LFTs: No results for input(s): ALT, AST, ALKPHOS, BILITOT, PROT, ALBUMIN in the last 168 hours.  Coagulation: No results for input(s): INR, APTT in the last 168 hours.    Invalid input(s): PT  Cardiac markers: No results for input(s): CKMB, TROPONINT, MYOGLOBIN in the last 168 hours.  ABGs: No results for input(s): PH, PCO2, PO2, HCO3, POCSATURATED, BE in the last 168 hours.  Microbiology Results (last 7 days)     ** No results found for the last 168 hours. **        Specimen     None        No results for  input(s): COLORU, CLARITYU, SPECGRAV, PHUR, PROTEINUA, GLUCOSEU, BILIRUBINCON, BLOODU, WBCU, RBCU, BACTERIA, MUCUS, NITRITE, LEUKOCYTESUR, UROBILINOGEN, HYALINECASTS in the last 168 hours.  Others: No results for input(s): [id number], TSH, T4FREE, LABLIPI in the last 168 hours.    Invalid input(s): A1C    ASSESSMENT/PLAN:      MS (multiple sclerosis)/Spasticity/Polyneuropathy/Chronic pain of multiple sites   --PT/OT  --pain management: baclofen 20 mg QID, dantrolene 50 mg QID, carbamazepine  mg BID, diazepam 5 mg BID prn muscle spasms, gabapentin 900 mg TID, oxycodone 15 mg q6h prn moderate-severe pain, acetaminophen 650 mg q4h prn mild pain   --bowel regimen for opioid induced constipation  --DVT PPX: xarelto 20 mg with dinner     Intractable headache  --nortriptyline 10 mg qHS, acetaminophen 650 mg q4h prn headaches      Depression   --citalopram 20 mg daily, trazodone 100 mg qHS   Monitor: mental status for depression, suicide ideation, anxiety, serotonin syndrome, hyponatremia     Smoker   --nicotine 14mg/24hr patch daily     10/25/2016 Saddle PE   --xarelto 20 mg with dinner   Monitor CBC     Vitamin D deficiency disease   --ergocalciferol 50,000 units q7days   Vit D lvl 18 on 09/2016    Neurogenic bladder   --oxybutynin XR 5 mg daily        Monitor BMP/CBC/Vitals

## 2017-04-28 NOTE — PT/OT/SLP PROGRESS
Physical Therapy  Treatment    Mao Levin   MRN: [id number]   Admitting Diagnosis: MS (multiple sclerosis)    PT Received On: 17          Billable Minutes:  Gait [id number], Therapeutic Activity 10 and Therapeutic Exercise 22=47    Treatment Type: Treatment  PT/PTA: PT     PTA Visit Number: 0       General Precautions: Standard, fall  Orthopedic Precautions: N/A   Braces: N/A    Do you have any cultural, spiritual, Buddhism conflicts, given your current situation?: no    Subjective:  Communicated with patient prior to session.  Patient agreeable to session. Pt reports understanding of PT's recommendation for assist at home or stay w/ a different family member; reports understanding of recommendation for assist w/ steps, recommend ramp or have 2 strong people who can assist w/ carrying patient up steps in w/c . Pt reports he plans to return home and does not want a w/c.    Pain Ratin/10      Pain Rating Post-Intervention: 0/10    Objective:  Patient found seated in w/c with         Functional Status:  MDS G  Bed Mobility Functional Status: S-SBA  Transfer Functional Status: CGA-Min (A)  Walk in Corridor Functional Status: CGA-Min (A)  Locomotion on Unit Functional Status: mod(A)-Max(A)  Locomotion Off Unit Functional Status: total(A)  Moving from seated to standing position: Not steady, only able to stabilize with staff assistance  Walking (with assistive device if used): Not steady, only able to stabilize with staff assistance  Turning around and facing the opposite direction while walking: Not steady, only able to stabilize with staff assistance  Surface-to-surface transfer (transfer between bed and chair or wheelchair): Not steady, only able to stabilize with staff assistance          Bed Mobility:  Sit>Supine:SBA on mat w/ extra time for LEs onto mat  Supine>Sit: SBA on mat w/ extra time for LEs off mat and for trunk elevation    Transfers:  Sit<>Stand: w/ RW and min assist x2 trials   SQPT w/c<>mat w/  min assist and cues/assist for w/c management    Gait:  Amb 60 feet w/ RW and min assist w/ w/c follow. Left foot wrapped for dorsiflexion assist. Cues and occ assist for RW management. Pt w/ dificulty advancing LLE most steps. Small steps, Decreased weight accetpatnce LLE in stance phase.      Advanced Gait:  Stairs: up/down 4 steps w/ BHR and min assist of 1 and 2 additional providing overall CGA for safety. Pt w/ difficulty descending w/ maintaining L grasp on rail 2/2 tone. Cues for sequence and technique. Left foot strongly supinating even w/ wrap for DF assist.       Wheelchair Mobility:    Therex:  BLE stretching on mat w/ one minute stretch each: hamstrings, heelcords, Adductors. Pt performs LTR w/ mod/max assist x20 reps.    Balance:  Sits EOB w/o LOB    Patient left up in chair with patient in hallways visiting ..    Assessment:  Mao Levin is a 51 y.o. male with a medical diagnosis of MS (multiple sclerosis).  He presents w/ deficits as noted, worse on Left w/ weakness and tone limiting. Pt appears unrealistic about discharge as he reports he plans to return home alone w/ occ assist and does not want a w/c. Pt did go up and down 4 steps today w/ BHR and assist of 3 for safety, but pt has more steps at home and only one HR. Recommend ramp to patient but patient does not feel he needs this. Patient will benefit from continued physical therapy to address deficits and improve safety and functional mobility. Continue with physical therapy plan of care. .    Rehab identified problem list/impairments: weakness, impaired endurance, impaired self care skills, impaired functional mobilty, gait instability, decreased upper extremity function, decreased coordination, decreased safety awareness, decreased lower extremity function, decreased ROM    Rehab potential is fair.    Activity tolerance: Good    Discharge recommendations:  (TBD- will need assistance upon D/C)     Barriers to discharge: Decreased caregiver  support    Equipment recommendations: wheelchair     GOALS:   Physical Therapy Goals        Problem: Physical Therapy Goal    Goal Priority Disciplines Outcome Goal Variances Interventions   Physical Therapy Goal     PT/OT, PT Ongoing (interventions implemented as appropriate)     Description:  Goals to be met by: 2 weeks     Patient will increase functional independence with mobility by performin. Supine to sit with Stand-by Assistance. Not Met  2. Sit to supine with Stand-by Assistance. Not Met  3. Sit to stand transfer with Minimal Assistance MET  4. Bed to chair transfer with Minimal Assistance using Rolling Walker    5. Gait  x 20 feet with Moderate Assistance using Rolling Walker. Met (3/26/2017)  6. Wheelchair propulsion x75 feet with Stand-by Assistance using BUE/BLE- Met  7. Ascend/descend 3 stair with left Handrails Moderate Assistance.   8. Stand for 3 minutes with Stand-by Assistance using Rolling Walker.  9. Lower extremity exercise program x20 reps per handout, with assistance as needed MET  10. New Goal (3/26/2017): Gait  x 50 feet with Moderate Assistance using Rolling Walker. Met (2017)  11. New Goal 2017 : Gait x50 feet w/ rolling walker and CGA = not met                         PLAN:    Patient to be seen 5 x/week  to address the above listed problems via gait training, therapeutic activities, therapeutic exercises, wheelchair management/training  Plan of Care expires: 17  Plan of Care reviewed with: patient    Lisa Victoria, PT  2017

## 2017-04-28 NOTE — PLAN OF CARE
Problem: Occupational Therapy Goal  Goal: Occupational Therapy Goal  Goals to be met by: 3 weeks     Patient will increase functional independence with ADLs by performing:    Feeding with Set-up Assistance. Met  UE Dressing with Stand-by Assistance.--met   LE Dressing with Minimal Assistance.--met;however, occasional mod A depending on muscle tone  Grooming while seated with Modified [place].--met  Toileting from bedside commode with Minimal Assistance for hygiene and clothing management.   Bathing from shower chair/bench with Minimal Assistance.--met  Sitting at edge of bed x20 minutes with Supervision.--met  Rolling to Bilateral with Stand-by Assistance. --met  Supine to sit with Minimal Assistance.--met  Stand pivot transfers with Minimal Assistance.--met  Toilet transfer to bedside commode with Minimal Assistance.  L Upper extremity self ROM exercise program x15 reps per handout, with independence.   Outcome: Ongoing (interventions implemented as appropriate)  Goals remain appropriate.

## 2017-04-28 NOTE — PLAN OF CARE
Problem: Patient Care Overview  Goal: Plan of Care Review  Outcome: Ongoing (interventions implemented as appropriate)    04/27/17 2232   Coping/Psychosocial   Plan Of Care Reviewed With patient         Problem: Fall Risk (Adult)  Goal: Absence of Falls  Patient will demonstrate the desired outcomes by discharge/transition of care.   Outcome: Ongoing (interventions implemented as appropriate)    04/27/17 2232   Fall Risk (Adult)   Absence of Falls making progress toward outcome         Problem: Mobility, Physical Impaired (Adult)  Goal: Enhanced Mobility Skills  Patient will demonstrate the desired outcomes by discharge/transition of care.   Outcome: Ongoing (interventions implemented as appropriate)    04/27/17 2232   Mobility, Physical Impaired (Adult)   Enhanced Mobility Skills making progress toward outcome         Problem: Activity Intolerance (Adult)  Goal: Effective Energy Conservation Techniques  Patient will demonstrate the desired outcomes by discharge/transition of care.   Outcome: Ongoing (interventions implemented as appropriate)    04/27/17 2232   Activity Intolerance (Adult)   Effective Energy Conservation Techniques making progress toward outcome         Comments:   Pt was assessed and VS taken recorded. Noted no active skin breakdown. Complaining of pain and regular scheduled medications were given. Incontinent of urine and diaper changed. Call bell within reached, 2 x side rails of bed elevated and be din the lowest position. Pt turned every 2 hours and monitor for pain and safety

## 2017-04-28 NOTE — PLAN OF CARE
Pt A & O pt repositioned with pillow every q2 hrs, no skin breakdown noted . pulses palable +movement/sensation, cap refill WNL in all 4 extremities , monitored for pain and safety. Safety maintained. , pt remained afebrile 98.2. Bed locked and lowered, call light within reach. Will continue to monitor

## 2017-04-29 PROCEDURE — 25000003 PHARM REV CODE 250: Performed by: INTERNAL MEDICINE

## 2017-04-29 PROCEDURE — 25000003 PHARM REV CODE 250: Performed by: NURSE PRACTITIONER

## 2017-04-29 PROCEDURE — 25000003 PHARM REV CODE 250: Performed by: PHYSICIAN ASSISTANT

## 2017-04-29 PROCEDURE — 11000004 HC SNF PRIVATE

## 2017-04-29 RX ADMIN — OXYCODONE HYDROCHLORIDE 15 MG: 5 TABLET ORAL at 11:04

## 2017-04-29 RX ADMIN — BACLOFEN 20 MG: 10 TABLET ORAL at 11:04

## 2017-04-29 RX ADMIN — TRAZODONE HYDROCHLORIDE 100 MG: 50 TABLET ORAL at 09:04

## 2017-04-29 RX ADMIN — OXYCODONE HYDROCHLORIDE 15 MG: 5 TABLET ORAL at 01:04

## 2017-04-29 RX ADMIN — CARBAMAZEPINE 400 MG: 100 CAPSULE, EXTENDED RELEASE ORAL at 09:04

## 2017-04-29 RX ADMIN — DIAZEPAM 5 MG: 5 TABLET ORAL at 09:04

## 2017-04-29 RX ADMIN — OXYCODONE HYDROCHLORIDE 15 MG: 5 TABLET ORAL at 04:04

## 2017-04-29 RX ADMIN — DANTROLENE SODIUM 50 MG: 50 CAPSULE ORAL at 05:04

## 2017-04-29 RX ADMIN — DANTROLENE SODIUM 50 MG: 50 CAPSULE ORAL at 11:04

## 2017-04-29 RX ADMIN — GABAPENTIN 900 MG: 300 CAPSULE ORAL at 09:04

## 2017-04-29 RX ADMIN — BACLOFEN 20 MG: 10 TABLET ORAL at 05:04

## 2017-04-29 RX ADMIN — DANTROLENE SODIUM 50 MG: 50 CAPSULE ORAL at 12:04

## 2017-04-29 RX ADMIN — OXYCODONE HYDROCHLORIDE 15 MG: 5 TABLET ORAL at 09:04

## 2017-04-29 RX ADMIN — GABAPENTIN 900 MG: 300 CAPSULE ORAL at 02:04

## 2017-04-29 RX ADMIN — NORTRIPTYLINE HYDROCHLORIDE 10 MG: 10 CAPSULE ORAL at 09:04

## 2017-04-29 RX ADMIN — GABAPENTIN 900 MG: 300 CAPSULE ORAL at 05:04

## 2017-04-29 RX ADMIN — BACLOFEN 20 MG: 10 TABLET ORAL at 12:04

## 2017-04-29 RX ADMIN — ACETAMINOPHEN 650 MG: 325 TABLET, FILM COATED ORAL at 01:04

## 2017-04-29 RX ADMIN — RAMELTEON 8 MG: 8 TABLET, FILM COATED ORAL at 09:04

## 2017-04-29 RX ADMIN — CITALOPRAM HYDROBROMIDE 20 MG: 20 TABLET ORAL at 09:04

## 2017-04-29 RX ADMIN — ERGOCALCIFEROL 50000 UNITS: 1.25 CAPSULE ORAL at 09:04

## 2017-04-29 RX ADMIN — OXYBUTYNIN CHLORIDE 5 MG: 5 TABLET, EXTENDED RELEASE ORAL at 09:04

## 2017-04-29 RX ADMIN — DIAZEPAM 5 MG: 5 TABLET ORAL at 10:04

## 2017-04-29 RX ADMIN — RIVAROXABAN 20 MG: 20 TABLET, FILM COATED ORAL at 04:04

## 2017-04-29 NOTE — PLAN OF CARE
Problem: Patient Care Overview  Goal: Plan of Care Review  Outcome: Ongoing (interventions implemented as appropriate)    04/29/17 0101   Coping/Psychosocial   Plan Of Care Reviewed With patient         Problem: Fall Risk (Adult)  Goal: Absence of Falls  Patient will demonstrate the desired outcomes by discharge/transition of care.   Outcome: Ongoing (interventions implemented as appropriate)    04/29/17 0101   Fall Risk (Adult)   Absence of Falls making progress toward outcome         Comments:   Pt was assessed and VS taken recorded. Noted no active skin breakdown. Complaining of pain and pain medication were given and pt tolerated. Call bell within reached, 2 x side rails of bed elevated and bed in the lowest position.Pt turned every 2 hours and monitor for pain and safety.

## 2017-04-30 PROCEDURE — 25000003 PHARM REV CODE 250: Performed by: PHYSICIAN ASSISTANT

## 2017-04-30 PROCEDURE — 11000004 HC SNF PRIVATE

## 2017-04-30 PROCEDURE — 25000003 PHARM REV CODE 250: Performed by: INTERNAL MEDICINE

## 2017-04-30 PROCEDURE — 97116 GAIT TRAINING THERAPY: CPT

## 2017-04-30 PROCEDURE — 25000003 PHARM REV CODE 250: Performed by: NURSE PRACTITIONER

## 2017-04-30 PROCEDURE — 97110 THERAPEUTIC EXERCISES: CPT

## 2017-04-30 PROCEDURE — 97530 THERAPEUTIC ACTIVITIES: CPT

## 2017-04-30 RX ADMIN — OXYBUTYNIN CHLORIDE 5 MG: 5 TABLET, EXTENDED RELEASE ORAL at 10:04

## 2017-04-30 RX ADMIN — CITALOPRAM HYDROBROMIDE 20 MG: 20 TABLET ORAL at 10:04

## 2017-04-30 RX ADMIN — GABAPENTIN 900 MG: 300 CAPSULE ORAL at 09:04

## 2017-04-30 RX ADMIN — OXYCODONE HYDROCHLORIDE 15 MG: 5 TABLET ORAL at 08:04

## 2017-04-30 RX ADMIN — DANTROLENE SODIUM 50 MG: 50 CAPSULE ORAL at 05:04

## 2017-04-30 RX ADMIN — ACETAMINOPHEN 650 MG: 325 TABLET, FILM COATED ORAL at 09:04

## 2017-04-30 RX ADMIN — DIAZEPAM 5 MG: 5 TABLET ORAL at 08:04

## 2017-04-30 RX ADMIN — DIAZEPAM 5 MG: 5 TABLET ORAL at 12:04

## 2017-04-30 RX ADMIN — GABAPENTIN 900 MG: 300 CAPSULE ORAL at 06:04

## 2017-04-30 RX ADMIN — STANDARDIZED SENNA CONCENTRATE AND DOCUSATE SODIUM 1 TABLET: 8.6; 5 TABLET, FILM COATED ORAL at 10:04

## 2017-04-30 RX ADMIN — OXYCODONE HYDROCHLORIDE 15 MG: 5 TABLET ORAL at 06:04

## 2017-04-30 RX ADMIN — DANTROLENE SODIUM 50 MG: 50 CAPSULE ORAL at 12:04

## 2017-04-30 RX ADMIN — BACLOFEN 20 MG: 10 TABLET ORAL at 12:04

## 2017-04-30 RX ADMIN — GABAPENTIN 900 MG: 300 CAPSULE ORAL at 01:04

## 2017-04-30 RX ADMIN — DANTROLENE SODIUM 50 MG: 50 CAPSULE ORAL at 11:04

## 2017-04-30 RX ADMIN — RIVAROXABAN 20 MG: 20 TABLET, FILM COATED ORAL at 05:04

## 2017-04-30 RX ADMIN — RAMELTEON 8 MG: 8 TABLET, FILM COATED ORAL at 11:04

## 2017-04-30 RX ADMIN — BACLOFEN 20 MG: 10 TABLET ORAL at 06:04

## 2017-04-30 RX ADMIN — BACLOFEN 20 MG: 10 TABLET ORAL at 11:04

## 2017-04-30 RX ADMIN — OXYCODONE HYDROCHLORIDE 15 MG: 5 TABLET ORAL at 12:04

## 2017-04-30 RX ADMIN — CARBAMAZEPINE 400 MG: 100 CAPSULE, EXTENDED RELEASE ORAL at 08:04

## 2017-04-30 RX ADMIN — NORTRIPTYLINE HYDROCHLORIDE 10 MG: 10 CAPSULE ORAL at 08:04

## 2017-04-30 RX ADMIN — BACLOFEN 20 MG: 10 TABLET ORAL at 05:04

## 2017-04-30 RX ADMIN — CARBAMAZEPINE 400 MG: 100 CAPSULE, EXTENDED RELEASE ORAL at 10:04

## 2017-04-30 RX ADMIN — TRAZODONE HYDROCHLORIDE 100 MG: 50 TABLET ORAL at 08:04

## 2017-04-30 RX ADMIN — DANTROLENE SODIUM 50 MG: 50 CAPSULE ORAL at 06:04

## 2017-04-30 NOTE — PLAN OF CARE
Problem: Physical Therapy Goal  Goal: Physical Therapy Goal  Goals to be met by: 2 weeks     Patient will increase functional independence with mobility by performin. Supine to sit with Stand-by Assistance. Not Met  2. Sit to supine with Stand-by Assistance. Not Met  3. Sit to stand transfer with Minimal Assistance MET  4. Bed to chair transfer with Minimal Assistance using Rolling Walker   5. Gait x 20 feet with Moderate Assistance using Rolling Walker. Met (3/26/2017)  6. Wheelchair propulsion x75 feet with Stand-by Assistance using BUE/BLE- Met  7. Ascend/descend 3 stair with left Handrails Moderate Assistance.   8. Stand for 3 minutes with Stand-by Assistance using Rolling Walker.  9. Lower extremity exercise program x20 reps per handout, with assistance as needed MET  10. New Goal (3/26/2017): Gait x 50 feet with Moderate Assistance using Rolling Walker. Met (2017)  11. New Goal 2017 : Gait x50 feet w/ rolling walker and CGA = not met     Outcome: Ongoing (interventions implemented as appropriate)  Goals remain appropriate

## 2017-04-30 NOTE — PT/OT/SLP PROGRESS
"Physical Therapy  Treatment    Mao Levin   MRN: [id number]   Admitting Diagnosis: MS (multiple sclerosis)    PT Received On: 17  Total Time (min): 40       Billable Minutes:40    Gait [id number], Therapeutic Activity 10 and Therapeutic Exercise 15    Treatment Type: Treatment  PT/PTA: PTA     PTA Visit Number: 1       General Precautions: Standard, fall  Orthopedic Precautions: N/A   Braces: N/A    Do you have any cultural, spiritual, Muslim conflicts, given your current situation?: no    Subjective:  "alright"      Pain Ratin/10  Location - Side: Bilateral (L>R)  Location - Orientation: generalized  Location: leg  Pain Addressed: Pre-medicate for activity, Reposition, Distraction, Cessation of Activity, Nurse notified  Pain Rating Post-Intervention: 7/10    Objective:   Patient found with:  (in wc)       Functional Status:  MDS G  Transfer Functional Status: CGA-Min (A)  Walk in Corridor Functional Status: CGA-Min (A)  Locomotion on Unit Functional Status: S-SBA          Transfers:  Sit<>Stand: with RW min/CGA    Gait:  Amb with RW min/CGA ~ 145 ft wc follow, L foot ace wrap into DF to A with clearance, occ vcs for RW mgmt/closeness,erect posture, inc LLE step length and keeping neutral alignment not ER, vcs for safety/tech negotiating turn    Wheelchair Mobility:  Patient propels w/c ~ 50 ft with BUE SBA     Therex:  2x10 reps LAQ,hip flex,add with ball, standing mini squats with BUE CGA, B gastroc/hamstring stretch x3:15 sec      Additional Treatment:  Attempted LBE unable at this time 2* to LLE weakness    Patient left up in chair with at nsg station.    Assessment:  Mao Levin is a 51 y.o. male with a medical diagnosis of MS (multiple sclerosis).  Pt tolerated well, pt would continue to benefit from skilled PT services to improve overall functional mobility, strength and endurance.  .    Rehab identified problem list/impairments: weakness, impaired endurance, impaired self care skills, impaired " functional mobilty, gait instability, decreased upper extremity function, decreased coordination, decreased safety awareness, decreased lower extremity function, decreased ROM    Rehab potential is fair.    Activity tolerance: Fair    Discharge recommendations:  (TBD- will need assistance upon D/C)     Barriers to discharge: Decreased caregiver support    Equipment recommendations: wheelchair     GOALS:   Physical Therapy Goals        Problem: Physical Therapy Goal    Goal Priority Disciplines Outcome Goal Variances Interventions   Physical Therapy Goal     PT/OT, PT Ongoing (interventions implemented as appropriate)     Description:  Goals to be met by: 2 weeks     Patient will increase functional independence with mobility by performin. Supine to sit with Stand-by Assistance. Not Met  2. Sit to supine with Stand-by Assistance. Not Met  3. Sit to stand transfer with Minimal Assistance MET  4. Bed to chair transfer with Minimal Assistance using Rolling Walker    5. Gait  x 20 feet with Moderate Assistance using Rolling Walker. Met (3/26/2017)  6. Wheelchair propulsion x75 feet with Stand-by Assistance using BUE/BLE- Met  7. Ascend/descend 3 stair with left Handrails Moderate Assistance.   8. Stand for 3 minutes with Stand-by Assistance using Rolling Walker.  9. Lower extremity exercise program x20 reps per handout, with assistance as needed MET  10. New Goal (3/26/2017): Gait  x 50 feet with Moderate Assistance using Rolling Walker. Met (2017)  11. New Goal 2017 : Gait x50 feet w/ rolling walker and CGA = not met                         PLAN:    Patient to be seen 5 x/week  to address the above listed problems via gait training, therapeutic activities, therapeutic exercises, wheelchair management/training  Plan of Care expires: 17  Plan of Care reviewed with: patient    Chely Gudino, PTA  2017

## 2017-05-01 ENCOUNTER — OFFICE VISIT (OUTPATIENT)
Dept: NEUROLOGY | Facility: CLINIC | Age: 52
End: 2017-05-01
Payer: MEDICARE

## 2017-05-01 DIAGNOSIS — G44.001 CLUSTER HEADACHE SYNDROME, UNSPECIFIED, INTRACTABLE: ICD-10-CM

## 2017-05-01 DIAGNOSIS — G35 MULTIPLE SCLEROSIS: Primary | ICD-10-CM

## 2017-05-01 LAB
ANION GAP SERPL CALC-SCNC: 10 MMOL/L
BASOPHILS # BLD AUTO: 0.06 K/UL
BASOPHILS NFR BLD: 1 %
BUN SERPL-MCNC: 18 MG/DL
CALCIUM SERPL-MCNC: 9.1 MG/DL
CHLORIDE SERPL-SCNC: 104 MMOL/L
CO2 SERPL-SCNC: 27 MMOL/L
CREAT SERPL-MCNC: 0.9 MG/DL
DIFFERENTIAL METHOD: ABNORMAL
EOSINOPHIL # BLD AUTO: 0.2 K/UL
EOSINOPHIL NFR BLD: 3.6 %
ERYTHROCYTE [DISTWIDTH] IN BLOOD BY AUTOMATED COUNT: 14.3 %
EST. GFR  (AFRICAN AMERICAN): >60 ML/MIN/1.73 M^2
EST. GFR  (NON AFRICAN AMERICAN): >60 ML/MIN/1.73 M^2
GLUCOSE SERPL-MCNC: 81 MG/DL
HCT VFR BLD AUTO: 38 %
HGB BLD-MCNC: 12.5 G/DL
LYMPHOCYTES # BLD AUTO: 2.4 K/UL
LYMPHOCYTES NFR BLD: 40.2 %
MAGNESIUM SERPL-MCNC: 2.1 MG/DL
MCH RBC QN AUTO: 29.7 PG
MCHC RBC AUTO-ENTMCNC: 32.9 %
MCV RBC AUTO: 90 FL
MONOCYTES # BLD AUTO: 0.6 K/UL
MONOCYTES NFR BLD: 10.1 %
NEUTROPHILS # BLD AUTO: 2.6 K/UL
NEUTROPHILS NFR BLD: 45.1 %
PHOSPHATE SERPL-MCNC: 4.3 MG/DL
PLATELET # BLD AUTO: 189 K/UL
PMV BLD AUTO: 11.8 FL
POTASSIUM SERPL-SCNC: 4.1 MMOL/L
RBC # BLD AUTO: 4.21 M/UL
SODIUM SERPL-SCNC: 141 MMOL/L
WBC # BLD AUTO: 5.85 K/UL

## 2017-05-01 PROCEDURE — 84100 ASSAY OF PHOSPHORUS: CPT

## 2017-05-01 PROCEDURE — 97110 THERAPEUTIC EXERCISES: CPT

## 2017-05-01 PROCEDURE — 25000003 PHARM REV CODE 250: Performed by: INTERNAL MEDICINE

## 2017-05-01 PROCEDURE — 80048 BASIC METABOLIC PNL TOTAL CA: CPT

## 2017-05-01 PROCEDURE — 99999 PR PBB SHADOW E&M-EST. PATIENT-LVL II: CPT | Mod: PBBFAC,,, | Performed by: NEUROMUSCULOSKELETAL MEDICINE & OMM

## 2017-05-01 PROCEDURE — 99215 OFFICE O/P EST HI 40 MIN: CPT | Mod: S$GLB,,, | Performed by: NEUROMUSCULOSKELETAL MEDICINE & OMM

## 2017-05-01 PROCEDURE — 85025 COMPLETE CBC W/AUTO DIFF WBC: CPT

## 2017-05-01 PROCEDURE — 83735 ASSAY OF MAGNESIUM: CPT

## 2017-05-01 PROCEDURE — 11000004 HC SNF PRIVATE

## 2017-05-01 PROCEDURE — 99499 UNLISTED E&M SERVICE: CPT | Mod: S$GLB,,, | Performed by: NEUROMUSCULOSKELETAL MEDICINE & OMM

## 2017-05-01 PROCEDURE — 36415 COLL VENOUS BLD VENIPUNCTURE: CPT

## 2017-05-01 PROCEDURE — 25000003 PHARM REV CODE 250: Performed by: PHYSICIAN ASSISTANT

## 2017-05-01 PROCEDURE — 25000003 PHARM REV CODE 250: Performed by: NURSE PRACTITIONER

## 2017-05-01 PROCEDURE — 1160F RVW MEDS BY RX/DR IN RCRD: CPT | Mod: S$GLB,,, | Performed by: NEUROMUSCULOSKELETAL MEDICINE & OMM

## 2017-05-01 RX ORDER — OXYCODONE HYDROCHLORIDE 10 MG/1
TABLET ORAL
Status: ON HOLD | COMMUNITY
Start: 2017-02-08 | End: 2017-05-02 | Stop reason: HOSPADM

## 2017-05-01 RX ADMIN — RIVAROXABAN 20 MG: 20 TABLET, FILM COATED ORAL at 05:05

## 2017-05-01 RX ADMIN — BACLOFEN 20 MG: 10 TABLET ORAL at 05:05

## 2017-05-01 RX ADMIN — DIAZEPAM 5 MG: 5 TABLET ORAL at 01:05

## 2017-05-01 RX ADMIN — GABAPENTIN 900 MG: 300 CAPSULE ORAL at 01:05

## 2017-05-01 RX ADMIN — DANTROLENE SODIUM 50 MG: 50 CAPSULE ORAL at 05:05

## 2017-05-01 RX ADMIN — CARBAMAZEPINE 400 MG: 100 CAPSULE, EXTENDED RELEASE ORAL at 08:05

## 2017-05-01 RX ADMIN — CARBAMAZEPINE 400 MG: 100 CAPSULE, EXTENDED RELEASE ORAL at 09:05

## 2017-05-01 RX ADMIN — TRAZODONE HYDROCHLORIDE 100 MG: 50 TABLET ORAL at 08:05

## 2017-05-01 RX ADMIN — OXYCODONE HYDROCHLORIDE 15 MG: 5 TABLET ORAL at 08:05

## 2017-05-01 RX ADMIN — ACETAMINOPHEN 650 MG: 325 TABLET, FILM COATED ORAL at 08:05

## 2017-05-01 RX ADMIN — BACLOFEN 20 MG: 10 TABLET ORAL at 12:05

## 2017-05-01 RX ADMIN — DANTROLENE SODIUM 50 MG: 50 CAPSULE ORAL at 12:05

## 2017-05-01 RX ADMIN — OXYBUTYNIN CHLORIDE 5 MG: 5 TABLET, EXTENDED RELEASE ORAL at 09:05

## 2017-05-01 RX ADMIN — RAMELTEON 8 MG: 8 TABLET, FILM COATED ORAL at 10:05

## 2017-05-01 RX ADMIN — NORTRIPTYLINE HYDROCHLORIDE 10 MG: 10 CAPSULE ORAL at 08:05

## 2017-05-01 RX ADMIN — CITALOPRAM HYDROBROMIDE 20 MG: 20 TABLET ORAL at 09:05

## 2017-05-01 RX ADMIN — OXYCODONE HYDROCHLORIDE 15 MG: 5 TABLET ORAL at 06:05

## 2017-05-01 RX ADMIN — GABAPENTIN 900 MG: 300 CAPSULE ORAL at 10:05

## 2017-05-01 RX ADMIN — GABAPENTIN 900 MG: 300 CAPSULE ORAL at 06:05

## 2017-05-01 RX ADMIN — OXYCODONE HYDROCHLORIDE 15 MG: 5 TABLET ORAL at 01:05

## 2017-05-01 NOTE — PROGRESS NOTES
05/01/2017  8:56 AM  Per Nithya, no decision has been made, reconsiderations can take up to 2 weeks.    Kelsie Renee RN, CM Skilled  [id number]

## 2017-05-01 NOTE — PROGRESS NOTES
Mao Petersond  1965  Review of patient's allergies indicates:  No Known Allergies  [unfilled]    Past Medical History:   Diagnosis Date    Multiple sclerosis      Social History     Social History    Marital status: Single     Spouse name: N/A    Number of children: N/A    Years of education: N/A     Occupational History    Not on file.     Social History Main Topics    Smoking status: Current Some Day Smoker     Packs/day: 0.50     Types: Cigarettes    Smokeless tobacco: Never Used    Alcohol use No    Drug use: No    Sexual activity: Not on file     Other Topics Concern    Not on file     Social History Narrative     Family History   Problem Relation Age of Onset    Arthritis Mother     No Known Problems Father        Review of systems:  Constitutional-negative  Eyes-negative  ENT, mouth-negative  Cardiovascular-negative  Respiratory-negative  GI-negative  - negative  Musculoskeletal-negative  Skin-negative  Neurologic-negative  Psychiatric-negative  Endocrine-negative  Hematology/lymph nodes-negative  Allergies/immunology-negative  Mao Petersond  1965  Review of patient's allergies indicates:  No Known Allergies  [unfilled]    Past Medical History:   Diagnosis Date    Multiple sclerosis      Social History     Social History    Marital status: Single     Spouse name: N/A    Number of children: N/A    Years of education: N/A     Occupational History    Not on file.     Social History Main Topics    Smoking status: Current Some Day Smoker     Packs/day: 0.50     Types: Cigarettes    Smokeless tobacco: Never Used    Alcohol use No    Drug use: No    Sexual activity: Not on file     Other Topics Concern    Not on file     Social History Narrative     Family History   Problem Relation Age of Onset    Arthritis Mother     No Known Problems Father        Review of systems:  Constitutional-negative  Eyes-negative  ENT,  mouth-negative  Cardiovascular-negative  Respiratory-negative  GI-negative  - negative  Musculoskeletal-negative  Skin-negative  Neurologic-negative  Psychiatric-negative  Endocrine-negative  Hematology/lymph nodes-negative  Allergies/immunology-negative  Gen. Appearance: Well-developed with no obvious deformities  Carotid arteries symmetrical pulses  Peripheral vascular shows symmetrical pulses with no obvious edema or tenderness  Social History : Patient is from [facility].  He moved down here 8 months ago to be with family.  He worked as a [employer] up until 2007.  He was diagnosed with multiple sclerosis in 2006.  He has been on multiple immunomodulatory drugs including Rituxan.  He presently took his first dosages of Ocrevus(Ocrelizumab).  Patient presents from the inpatient rehabilitation unit to the [place] clinic for consultation relative to his headaches.  Present history:   This is a 51-year-old -American male who presents in a wheelchair from the inpatient rehabilitation unit for neurology consult for his headaches.  I'm not sure why he is here since he is inpatient at [facility] with access to inpatient neurology consult.  Details of his hospitalization and multiple sclerosis course are in [facility] where he is followed by Dr. Mattie Finnegan.  Patient describes new onset headaches over the last couple months.  Headaches started prior to his hospitalization and rehabilitation.  He describes a severe right sided parietal stabbing pain 10/10 intensity occurring every night around 9 PM.  Headaches typically last about 1 hour.  Occasionally he will skip a day.  Headaches do not wake him up in the night and they do not occur earlier in the day.  He has never had these headaches before.  They do not seem to be related to starting any new medications.  He denies any tearing, rhinitis, or nausea with the headaches.  He is already on narcotics for multiple sclerosis-related pain, however he states this does  not help the headache at all.  He is taking multiple symptomatic treatment medications for his multiple sclerosis.  He relates that he was tried on amitriptyline for the headaches however this did not help.    Neurological Exam: See detailed neurologic exam for his multiple sclerosis in Epic  Mental status-alert and oriented to person, place, and time; attention span and concentration is good. Fund of knowledge-patient is aware of current events and able to give detailed history of the current problem.recent and remote memory seems intact. Language function is normal with no evidence of aphasia  Cranial nerves:Visual acuity -deferred; visual fields to confrontation normal;pupils were equal and reactive to light ;no evidence of ptosis ;  funduscopic examination deferred. external ocular movements were full with bilateral lateral gaze nystagmus. Facial sensation to pinprick : normal ; corneal reflexes intact; Facial muscles were symmetrical. Hearing is unimpaired symmetrical finger rub; Tongue movements - normal ; palate movements - normal ;Swallowing unimpaired. Shoulder shrug was intact with good strength Speech was normal  Motor examination: Upper : Bilateral weakness with poor handgrip left greater than right                                  Lower extremities - bilateral lower extremity weakness with proximal left greater than right weakness;muscle tone was increased;                  Right-handed  Sensory examination:   Upper; variable decreased pinprick and soft touch ;   Lower extremities - variable decreased  Vibration sense decreased @ toes  Deep tendon reflexes: upper extremities :1-2+      lower extremities KJ- 3 +; AJ - 1-2+ Both plantar responses were flexor  Cerebellar examination upper: Poor coordination with weakness  Gait: Unable to ambulate without assistance                   Romberg test: Reportedly positive  Involuntary movements: Negative  TMJ - no tenderness  Cervical examination: Full range of  motion with no pain Cervical tenderness :negative  Lumbar examination: Low back tenderness-negative                  Sciatic notchtenderness-negative            Straight leg raising : negative    Impression: New onset cluster migraine headaches; multiple sclerosis-primary progressive?    Recommendations/Plan : Long discussion with the patient in terms of differential diagnosis of headaches.  His clinical pattern seems to fit cluster headaches very clearly.  Differential would include trigeminal neuralgia however the duration of the headache, the type of pain, and the pattern is more consistent with cluster.  In terms of treatment would suggest Zomig or Imitrex nasal sprays for acute headache.  For prevention or stopping the cluster would suggest 80 mg of prednisone tapering dose over 12 days (80×2 days; 60 ×2 days, 50, 40, 30, 20×2 days each level) alternative or additional treatment for prevention would include Triavil 25 mg at bedtime.  Would discuss with Dr. Finnegan if it is okay to take prednisone with the Ocrelizumab.  Follow-up as needed

## 2017-05-01 NOTE — PLAN OF CARE
Problem: Patient Care Overview  Goal: Plan of Care Review  Outcome: Ongoing (interventions implemented as appropriate)    05/01/17 0116   Coping/Psychosocial   Plan Of Care Reviewed With patient         Problem: Fall Risk (Adult)  Intervention: Monitor/Assist with Self Care    05/01/17 0116   Functional Level Current   Ambulation 3 - assistive equipment and person   Transferring 3 - assistive equipment and person   Toileting 3 - assistive equipment and person   Bathing 2 - assistive person   Dressing 2 - assistive person   Eating 0 - independent   Communication 0 - understands/communicates without difficulty   Swallowing 0 - swallows foods/liquids without difficulty       Intervention: Patient Rounds    05/01/17 0116   Safety Interventions   Patient Rounds bed in low position;bed wheels locked;clutter free environment maintained;call light in reach;ID band on;placement of personal items at bedside;toileting offered;visualized patient         Goal: Identify Related Risk Factors and Signs and Symptoms  Related risk factors and signs and symptoms are identified upon initiation of Human Response Clinical Practice Guideline (CPG)   Outcome: Ongoing (interventions implemented as appropriate)    05/01/17 0116   Fall Risk   Related Risk Factors (Fall Risk) fatigue/slow reaction;gait/mobility problems;history of falls;neuro disease/injury   Signs and Symptoms (Fall Risk) presence of risk factors         Problem: Mobility, Physical Impaired (Adult)  Goal: Identify Related Risk Factors and Signs and Symptoms  Related risk factors and signs and symptoms are identified upon initiation of Human Response Clinical Practice Guideline (CPG)   Outcome: Ongoing (interventions implemented as appropriate)    05/01/17 0116   Mobility, Physical Impaired   Related Risk Factors (Physical Mobility, Impaired) disease process;fatigue;pain/discomfort   Signs and Symptoms (Physical Mobility Impaired) decreased balance;limited ability to perform  gross/fine motor skills;unsafe transfers/ambulation

## 2017-05-01 NOTE — PT/OT/SLP PROGRESS
Occupational Therapy  Treatment    Mao Levin   MRN: [id number]   Admitting Diagnosis: MS (multiple sclerosis)    OT Date of Treatment: 17  Total Time (min): 40 min    Billable Minutes:  Therapeutic Exercise 40    General Precautions: Standard, fall  Orthopedic Precautions: N/A  Braces: N/A    Do you have any cultural, spiritual, Faith conflicts, given your current situation?: no    Subjective:  Communicated with patient prior to session.    Pain Ratin/10     Pain Rating Post-Intervention: 0/10    Objective:    OT Exercises: UE Ergometer 10 min for improving endurance and ROM to increase independence with ADLs.  Lat pull downs 40# 25 x 4 for improving strength to increase independence with daily skills.   Rowing 25 x 4 35# for improving strength to increase independence with daily skills.   PROM to L UE in all planes and joints including wrists and digits focusing to facilitate normal movement.     Patient left up in chair with all needs met.    ASSESSMENT:  Mao Levin is a 51 y.o. male with a medical diagnosis of MS (multiple sclerosis) and presents with the deficits listed below. Patient tolerated treatment session and was motivate to complete therapeutic exercises. Patient adamantly declined ADLs after multiple attempts during OT session. Patient continues to benefit from skilled OT services to achieve maximal independence.    Rehab identified problem list/impairments: impaired endurance, impaired self care skills, impaired functional mobilty, decreased upper extremity function, decreased safety awareness    Rehab potential is good    Activity tolerance: Good    Discharge recommendations: rehabilitation facility     Barriers to discharge: Decreased caregiver support     Equipment recommendations: wheelchair (drop arm commode)     GOALS:   Occupational Therapy Goals        Problem: Occupational Therapy Goal    Goal Priority Disciplines Outcome Interventions   Occupational Therapy Goal     OT, PT/OT  Ongoing (interventions implemented as appropriate)    Description:  Goals to be met by: 3 weeks     Patient will increase functional independence with ADLs by performing:    Feeding with Set-up Assistance. Met  UE Dressing with Stand-by Assistance.--met   LE Dressing with Minimal Assistance.--met;however, occasional mod A depending on muscle tone  Grooming while seated with Modified [place].--met  Toileting from bedside commode with Minimal Assistance for hygiene and clothing management.   Bathing from  shower chair/bench with Minimal Assistance.--met  Sitting at edge of bed x20 minutes with Supervision.--met  Rolling to Bilateral with Stand-by Assistance. --met  Supine to sit with Minimal Assistance.--met  Stand pivot transfers with Minimal Assistance.--met  Toilet transfer to bedside commode with Minimal Assistance.  L Upper extremity self ROM exercise program x15 reps per handout, with independence.                   Plan:  Patient to be seen 5 x/week to address the above listed problems via self-care/home management, therapeutic activities, therapeutic exercises  Plan of Care expires: 04/18/17  Plan of Care reviewed with: patient    GISEL Flowers  05/01/2017

## 2017-05-01 NOTE — PLAN OF CARE
Problem: Occupational Therapy Goal  Goal: Occupational Therapy Goal  Goals to be met by: 3 weeks     Patient will increase functional independence with ADLs by performing:    Feeding with Set-up Assistance. Met  UE Dressing with Stand-by Assistance.--met   LE Dressing with Minimal Assistance.--met;however, occasional mod A depending on muscle tone  Grooming while seated with Modified [place].--met  Toileting from bedside commode with Minimal Assistance for hygiene and clothing management.   Bathing from shower chair/bench with Minimal Assistance.--met  Sitting at edge of bed x20 minutes with Supervision.--met  Rolling to Bilateral with Stand-by Assistance. --met  Supine to sit with Minimal Assistance.--met  Stand pivot transfers with Minimal Assistance.--met  Toilet transfer to bedside commode with Minimal Assistance.  L Upper extremity self ROM exercise program x15 reps per handout, with independence.   Outcome: Ongoing (interventions implemented as appropriate)  Patient's goals are appropriate.   GISEL Flowers  5/1/2017

## 2017-05-01 NOTE — PLAN OF CARE
Problem: Patient Care Overview  Goal: Plan of Care Review  Outcome: Revised  Repositions independently, requires  1 person pivot transfer bed to w/c. No new skin breakdowns noted. Monitored for pain and safety q 1-2 hrs this shift. Safety maintained. Pain meds effective.

## 2017-05-01 NOTE — PROGRESS NOTES
Back from appt. Cleaned pt of incontinent urine. Remains up in w/c in room. Copy of progress note recommendations from Neurology placed in D.GERTRUDIS Navarrete.

## 2017-05-01 NOTE — PLAN OF CARE
Problem: Physical Therapy Goal  Goal: Physical Therapy Goal  Goals to be met by: 2 weeks     Patient will increase functional independence with mobility by performin. Supine to sit with Stand-by Assistance. Met (2017)  2. Sit to supine with Stand-by Assistance. Not Met  3. Sit to stand transfer with Minimal Assistance MET  4. Bed to chair transfer with Minimal Assistance using Rolling Walker   5. Gait x 20 feet with Moderate Assistance using Rolling Walker. Met (3/26/2017)  6. Wheelchair propulsion x75 feet with Stand-by Assistance using BUE/BLE- Met  7. Ascend/descend 3 stair with left Handrails Moderate Assistance.   8. Stand for 3 minutes with Stand-by Assistance using Rolling Walker.  9. Lower extremity exercise program x20 reps per handout, with assistance as needed MET  10. New Goal (3/26/2017): Gait x 50 feet with Moderate Assistance using Rolling Walker. Met (2017)  11. New Goal 2017 : Gait x50 feet w/ rolling walker and CGA = not met     Outcome: Ongoing (interventions implemented as appropriate)  Met one goal today.

## 2017-05-01 NOTE — PT/OT/SLP PROGRESS
Physical Therapy  Treatment    Mao Levin   MRN: [id number]   Admitting Diagnosis: MS (multiple sclerosis)    PT Received On: 17  Total Time (min): 45       Billable Minutes:  Gait [id number] and Therapeutic Activity 22    Treatment Type: Treatment  PT/PTA: PT     PTA Visit Number: 0       General Precautions: Standard, fall  Orthopedic Precautions: N/A   Braces: N/A    Do you have any cultural, spiritual, Yazidism conflicts, given your current situation?: no    Subjective:  Communicated with pt prior to session. Pt reported he tried to get someone to help him get dressed this morning.     Pain Ratin/10    Objective:  Patient found seated at edge of bed.    Pt was assisted with getting dressed. Once the gown was on his R distal arm, he used his L arm to pull it up.  Required Total A to christopher pants and pull them up in standing.  Pt needed Total A for donning his shoes with AFO inserted on L foot.       Functional Status:  MDS G  Bed Mobility Functional Status: S-SBA  Transfer Functional Status: CGA-Min (A)  Walk in Room Functional Status: mod(A)-Max(A)  Walk in Corridor Functional Status: mod(A)-Max(A)  Locomotion on Unit Functional Status: S-SBA          Bed Mobility:  Sit>Supine:SBA    Transfers:  Sit<>Stand: CGA from wheelchair and edge of bed.  Stand Pivot Transfer: Min A from edge of bed due to inability to stand upright and required UE support to remain standing.    Gait:  Amb 36, 39 feet with use of a rolling walker.   Facilitation provided at hip and assisted with weight-shifting toward his LLE. Pt had difficulty mobilizing his LLE forward due to lack of dorsiflexion, hence use of his hinged-AFO.     Wheelchair Mobility:  Patient propels w/c 60 feet with BUEs.    Therex:  Standing squats with LLE biased toward dorsiflexion on wedge to stretch pt's gastrocnemius.    Balance:  Pt requires UE support for balance and CGA for standing balance with use of rolling walker.       Patient left up in chair  At  nurses' station.    Assessment:  Mao Levin is a 51 y.o. male with a medical diagnosis of MS (multiple sclerosis).  Pt will benefit greatly from use of his L ankle hinged-AFO to assist him with ambulation.  He was able to ambulate shorter distances today but was assisted with weight-shifting properly onto his LLE during the gait cycle. Pt to continue therapy services to improve his balance, gait mechanics, and overall functional mobility.    Rehab identified problem list/impairments: weakness, impaired endurance, impaired self care skills, impaired functional mobilty, gait instability, decreased upper extremity function, decreased coordination, decreased safety awareness, decreased lower extremity function, decreased ROM    Rehab potential is fair.    Activity tolerance: Fair    Discharge recommendations: HHPT with 24 hr care to transition to outpatient neuro therapy.    Barriers to discharge: Decreased caregiver support    Equipment recommendations: wheelchair     GOALS:   Physical Therapy Goals        Problem: Physical Therapy Goal    Goal Priority Disciplines Outcome Goal Variances Interventions   Physical Therapy Goal     PT/OT, PT Ongoing (interventions implemented as appropriate)     Description:  Goals to be met by: 2 weeks     Patient will increase functional independence with mobility by performin. Supine to sit with Stand-by Assistance. Met (2017)  2. Sit to supine with Stand-by Assistance. Not Met  3. Sit to stand transfer with Minimal Assistance MET  4. Bed to chair transfer with Minimal Assistance using Rolling Walker    5. Gait  x 20 feet with Moderate Assistance using Rolling Walker. Met (3/26/2017)  6. Wheelchair propulsion x75 feet with Stand-by Assistance using BUE/BLE- Met  7. Ascend/descend 3 stair with left Handrails Moderate Assistance.   8. Stand for 3 minutes with Stand-by Assistance using Rolling Walker.  9. Lower extremity exercise program x20 reps per handout, with assistance as  needed MET  10. New Goal (3/26/2017): Gait  x 50 feet with Moderate Assistance using Rolling Walker. Met (4/18/2017)  11. New Goal 4/28/2017 : Gait x50 feet w/ rolling walker and CGA = not met                          PLAN:    Patient to be seen 5 x/week  to address the above listed problems via gait training, therapeutic activities, therapeutic exercises, wheelchair management/training  Plan of Care expires: 04/17/17  Plan of Care reviewed with: patient    Inés Jones, PT  05/01/2017

## 2017-05-02 VITALS
WEIGHT: 175.25 LBS | HEART RATE: 67 BPM | SYSTOLIC BLOOD PRESSURE: 106 MMHG | HEIGHT: 71 IN | BODY MASS INDEX: 24.53 KG/M2 | TEMPERATURE: 98 F | RESPIRATION RATE: 18 BRPM | OXYGEN SATURATION: 100 % | DIASTOLIC BLOOD PRESSURE: 65 MMHG

## 2017-05-02 PROCEDURE — 99316 NF DSCHRG MGMT 30 MIN+: CPT | Mod: ,,, | Performed by: HOSPITALIST

## 2017-05-02 PROCEDURE — 63600175 PHARM REV CODE 636 W HCPCS: Performed by: NURSE PRACTITIONER

## 2017-05-02 PROCEDURE — 97535 SELF CARE MNGMENT TRAINING: CPT

## 2017-05-02 PROCEDURE — 25000003 PHARM REV CODE 250: Performed by: PHYSICIAN ASSISTANT

## 2017-05-02 PROCEDURE — 25000003 PHARM REV CODE 250: Performed by: INTERNAL MEDICINE

## 2017-05-02 PROCEDURE — 97803 MED NUTRITION INDIV SUBSEQ: CPT

## 2017-05-02 PROCEDURE — 25000003 PHARM REV CODE 250: Performed by: NURSE PRACTITIONER

## 2017-05-02 RX ORDER — PREDNISONE 20 MG/1
TABLET ORAL
Qty: 24 TABLET | Refills: 0 | Status: ON HOLD | OUTPATIENT
Start: 2017-05-02 | End: 2017-05-26

## 2017-05-02 RX ORDER — PREDNISONE 20 MG/1
80 TABLET ORAL ONCE
Status: COMPLETED | OUTPATIENT
Start: 2017-05-02 | End: 2017-05-02

## 2017-05-02 RX ORDER — OXYCODONE HYDROCHLORIDE 15 MG/1
15 TABLET ORAL EVERY 8 HOURS PRN
Qty: 31 TABLET | Refills: 0 | Status: SHIPPED | OUTPATIENT
Start: 2017-05-02 | End: 2017-05-10 | Stop reason: SDUPTHER

## 2017-05-02 RX ADMIN — CARBAMAZEPINE 400 MG: 100 CAPSULE, EXTENDED RELEASE ORAL at 08:05

## 2017-05-02 RX ADMIN — DANTROLENE SODIUM 50 MG: 50 CAPSULE ORAL at 06:05

## 2017-05-02 RX ADMIN — RIVAROXABAN 20 MG: 20 TABLET, FILM COATED ORAL at 05:05

## 2017-05-02 RX ADMIN — DANTROLENE SODIUM 50 MG: 50 CAPSULE ORAL at 11:05

## 2017-05-02 RX ADMIN — OXYCODONE HYDROCHLORIDE 15 MG: 5 TABLET ORAL at 03:05

## 2017-05-02 RX ADMIN — BACLOFEN 20 MG: 10 TABLET ORAL at 06:05

## 2017-05-02 RX ADMIN — POLYETHYLENE GLYCOL 3350 17 G: 17 POWDER, FOR SOLUTION ORAL at 08:05

## 2017-05-02 RX ADMIN — DANTROLENE SODIUM 50 MG: 50 CAPSULE ORAL at 12:05

## 2017-05-02 RX ADMIN — ACETAMINOPHEN 650 MG: 325 TABLET, FILM COATED ORAL at 10:05

## 2017-05-02 RX ADMIN — BACLOFEN 20 MG: 10 TABLET ORAL at 11:05

## 2017-05-02 RX ADMIN — GABAPENTIN 900 MG: 300 CAPSULE ORAL at 01:05

## 2017-05-02 RX ADMIN — OXYCODONE HYDROCHLORIDE 15 MG: 5 TABLET ORAL at 10:05

## 2017-05-02 RX ADMIN — BACLOFEN 20 MG: 10 TABLET ORAL at 12:05

## 2017-05-02 RX ADMIN — STANDARDIZED SENNA CONCENTRATE AND DOCUSATE SODIUM 1 TABLET: 8.6; 5 TABLET, FILM COATED ORAL at 08:05

## 2017-05-02 RX ADMIN — GABAPENTIN 900 MG: 300 CAPSULE ORAL at 06:05

## 2017-05-02 RX ADMIN — OXYBUTYNIN CHLORIDE 5 MG: 5 TABLET, EXTENDED RELEASE ORAL at 08:05

## 2017-05-02 RX ADMIN — CITALOPRAM HYDROBROMIDE 20 MG: 20 TABLET ORAL at 08:05

## 2017-05-02 RX ADMIN — PREDNISONE 80 MG: 20 TABLET ORAL at 01:05

## 2017-05-02 NOTE — PLAN OF CARE
[facility] HOME HEALTH ORDERS  FACE TO FACE ENCOUNTER    Patient Name: Mao Levin  YOB: 1965    PCP: Fausto Leung MD   PCP Address: [id number]  PCP Phone Number: [phone]  PCP Fax: [phone]    Encounter Date: 05/02/2017    Admit to Home Health    Diagnoses:  Active Hospital Problems    Diagnosis  POA    *MS (multiple sclerosis) [G35]  Yes     Chronic    Intractable headache [R51]  Yes    Depression [F32.9]  Yes    Smoker [F17.200]  Yes    Polyneuropathy [G62.9]  Yes    10/25/2016 Saddle PE [I26.92]  Yes     Chronic    Vitamin D deficiency disease [E55.9]  Yes    Neurogenic bladder [N31.9]  Yes     Chronic    Spasticity [R25.2]  Yes    Chronic pain of multiple sites [R52, G89.29]  Yes     Chronic      Resolved Hospital Problems    Diagnosis Date Resolved POA   No resolved problems to display.       Future Appointments  Date Time Provider Department Center   5/5/2017 10:45 AM Lawanda Boyce PA-C [facility]   7/17/2017 9:40 AM Mattie Finnegan MD [facility]     Follow-up Information     Follow up with Lawanda Boyce PA-C. Go on 5/5/2017.    Specialty:  Neurology    Why:  MS clinic    Contact information:    [address]  [place] [address]  [phone]          Follow up with Fausto Leung MD In 2 weeks.    Specialty:  Physical Medicine and Rehabilitation    Why:  hospital follow-up    Contact information:    [address] [id number]  [phone]              I have seen and examined this patient face to face today. My clinical findings that support the need for the home health skilled services and home bound status are the following:  Weakness/numbness causing balance and gait disturbance due to Weakness/Debility making it taxing to leave home.    Allergies:Review of patient's allergies indicates:  No Known Allergies    Diet: regular diet    Activities: activity as tolerated  and no lifting, Driving, or Strenuous exercise     Nursing:   SN to complete comprehensive assessment including routine vital signs. Instruct on disease process and s/s of complications to report to MD. Review/verify medication list sent home with the patient at time of discharge  and instruct patient/caregiver as needed. Frequency may be adjusted depending on start of care date.    Notify MD if SBP > 160 or < 90; DBP > 90 or < 50; HR > 120 or < 50; Temp > 101      CONSULTS:    Physical Therapy to evaluate and treat. Evaluate for home safety and equipment needs; Establish/upgrade home exercise program. Perform / instruct on therapeutic exercises, gait training, transfer training, and Range of Motion.  Occupational Therapy to evaluate and treat. Evaluate home environment for safety and equipment needs. Perform/Instruct on transfers, ADL training, ROM, and therapeutic exercises.  Aide to provide assistance with personal care, ADLs, and vital signs.    MISCELLANEOUS CARE:  N/A    WOUND CARE ORDERS  n/a      Medications: Review discharge medications with patient and family and provide education.      Current Discharge Medication List      START taking these medications    Details   gabapentin (NEURONTIN) 300 MG capsule Take 3 capsules (900 mg total) by mouth 3 (three) times daily.  Qty: 270 capsule, Refills: 2      nicotine (NICODERM CQ) 14 mg/24 hr Place 1 patch onto the skin once daily.  Refills: 0      predniSONE (DELTASONE) 20 MG tablet Take 4 tablets (80mg) by mouth once daily on 5/3, then 3 tabs (60mg) daily 5/4-5/5, 2.5 tabs (50mg) daily 5/6-5/7, 2 tabs (40mg) daily 5/8-5/9, 1.5 tabs (30mg) daily 5/10-5/11, 1 tab (20mg) daily 5/12-5/13, STOP 5/14  Qty: 24 tablet, Refills: 0         CONTINUE these medications which have CHANGED    Details   baclofen (LIORESAL) 20 MG tablet Take 1 tablet (20 mg total) by mouth 4 (four) times daily.  Qty: 120 tablet, Refills: 2      carbamazepine (TEGRETOL XR) 400 MG Tb12 Take 1  tablet (400 mg total) by mouth 2 (two) times daily.  Qty: 60 tablet, Refills: 3      citalopram (CELEXA) 20 MG tablet Take 1 tablet (20 mg total) by mouth once daily.  Qty: 30 tablet, Refills: 3      dantrolene (DANTRIUM) 50 MG Cap Take 1 capsule (50 mg total) by mouth 4 (four) times daily.  Qty: 120 capsule, Refills: 2      diazePAM (VALIUM) 5 MG tablet Take 1 tablet (5 mg total) by mouth 2 (two) times daily as needed (muscle spasms).  Qty: 60 tablet, Refills: 0      !! oxycodone (ROXICODONE) 15 MG Tab Take 1 tablet (15 mg total) by mouth 3 (three) times daily as needed.  Qty: 35 tablet, Refills: 0      !! oxycodone (ROXICODONE) 15 MG Tab Take 1 tablet (15 mg total) by mouth every 8 (eight) hours as needed for Pain.  Qty: 31 tablet, Refills: 0      senna-docusate 8.6-50 mg (PERICOLACE) 8.6-50 mg per tablet Take 1 tablet by mouth once daily.       !! - Potential duplicate medications found. Please discuss with provider.      CONTINUE these medications which have NOT CHANGED    Details   ergocalciferol (VITAMIN D2) 50,000 unit Cap Take 1 capsule (50,000 Units total) by mouth every 7 days.  Qty: 4 capsule, Refills: 11      oxybutynin (DITROPAN-XL) 5 MG TR24 Take 5 mg by mouth once daily.      rivaroxaban (XARELTO) 20 mg Tab Take 1 tablet (20 mg total) by mouth daily with dinner or evening meal.  Qty: 60 tablet, Refills: 4      trazodone (DESYREL) 100 MG tablet Take 100 mg by mouth every evening.      polyethylene glycol (GLYCOLAX) 17 gram/dose powder Take 17 g by mouth once daily.  Qty: 510 g, Refills: 6             I certify that this patient is confined to his home and needs intermittent skilled nursing care, physical therapy and occupational therapy.

## 2017-05-02 NOTE — PLAN OF CARE
05/02/2017  3:55 PM    Pt to be placed with [facility] upon SNF d/c per Mary with [facility] as pt denies having preference for home health placement.  DONNA distributed rolling walker to pt from SNF DME d/c depot.  Per pt's request, pt to have wheelchair delivered to home following SNF d/c.  DONNA confirmed wheelchair delivery with Amber with [facility].  Pt reports he does not need a wheelchair with him prior to SNF d/c as he can walk using his rollator or rolling walker.  Pt refusing all other DME orders.  Per pt's request, DONNA called [employer] services (ph [phone]) to schedule transportation home today from SNF.   within 1 to 3 hours.  Reservation # for transport is [phone].  Pt ready for d/c home.    Adal Beard, NADIRA  [id number]

## 2017-05-02 NOTE — PLAN OF CARE
Problem: Patient Care Overview  Goal: Plan of Care Review  Outcome: Revised  Repositions independently, no new skin breakdowns noted. Left side weakness. Afebrile. Monitored for pain and safety q 1- 2 hrs this shift. Safety maintained. Pain meds effective.

## 2017-05-02 NOTE — PROGRESS NOTES
Admin ordered prednisone. Instructed pt on home discharge medication orders and importance of steroid tapering. Handed pt prescription for prednisone and pain med. Pt verbalized understanding. Waiting on Adal  for information on transport and equipment

## 2017-05-02 NOTE — PT/OT/SLP PROGRESS
Occupational Therapy  Treatment    Mao Levin   MRN: [id number]   Admitting Diagnosis: MS (multiple sclerosis)    OT Date of Treatment: 17  Total Time (min): 48 min    Billable Minutes:  Self Care/Home Management 48    General Precautions: Standard, fall  Orthopedic Precautions: N/A  Braces: N/A    Do you have any cultural, spiritual, Sabianism conflicts, given your current situation?: no    Subjective:  Communicated with patient prior to session.      Pain Ratin/10   Patient received pain meds at beginning of session. No complaint of pain when asked by therapist.                   Objective:    Patient in bed supine pleasantly greeted therapist. OT treatment included oral hygiene seated at sink with set up. Able to tolerate standing for  kim-hygiene with CGA for safety in standing secondary to decreased dynamic standing balance and LE weakness.  Assist for clothing manipulation to pull pants over waist and type drawstring secondary to impaired left hand fine motor control. Education and encouragement provided for completing tasks at maximum potential. Patient is able to participate in self-care at a higher level than he is requesting help for. Participated without complaint after education and encouragement provided. Poor safety awareness demonstrated during standing tasks including transfers with poor body positioning noted. Fall prevention training provided with emphasis on LE placement prior to transfer or sit to stand and during transitional movements. Further education required in order to increase carryover and follow through.    Functional Status:  MDS G  Bed Mobility Functional Status: S-SBA  Transfer Functional Status: S-SBA  Dressing Functional Status: 3:mod(A)-Max(A)  Personal Hygiene Functional Status: mod(A)-Max(A)         Patient left up in chair in activity room in wheelchair nursing informed.    ASSESSMENT:  Mao Levin is a 51 y.o. male with a medical diagnosis of MS (multiple sclerosis)  limited by impaired UE and LE strength and coordination, standing tolerance and functional standing balance.  Patient continues to be at high risk for falls and requires min mod assistant for overall self-care.    Rehab identified problem list/impairments: impaired endurance, impaired self care skills, impaired functional mobilty, decreased upper extremity function, decreased safety awareness    Rehab potential is good    Activity tolerance: Good    Discharge recommendations: rehabilitation facility     Barriers to discharge: Decreased caregiver support     Equipment recommendations: wheelchair (drop arm commode)     GOALS:   Occupational Therapy Goals        Problem: Occupational Therapy Goal    Goal Priority Disciplines Outcome Interventions   Occupational Therapy Goal     OT, PT/OT Ongoing (interventions implemented as appropriate)    Description:  Goals to be met by: 3 weeks     Patient will increase functional independence with ADLs by performing:    Feeding with Set-up Assistance. Met  UE Dressing with Stand-by Assistance.--met   LE Dressing with Minimal Assistance.--met;however, occasional mod A depending on muscle tone  Grooming while seated with Modified [place].--met  Toileting from bedside commode with Minimal Assistance for hygiene and clothing management.   Bathing from  shower chair/bench with Minimal Assistance.--met  Sitting at edge of bed x20 minutes with Supervision.--met  Rolling to Bilateral with Stand-by Assistance. --met  Supine to sit with Minimal Assistance.--met  Stand pivot transfers with Minimal Assistance.--met  Toilet transfer to bedside commode with Minimal Assistance.  L Upper extremity self ROM exercise program x15 reps per handout, with independence.                      Plan:  Patient to be seen 5 x/week to address the above listed problems via self-care/home management, therapeutic activities, therapeutic exercises  Plan of Care expires: 04/18/17  Plan of Care reviewed with:  patient    Chely Soto, LOTR  05/02/2017

## 2017-05-02 NOTE — PROGRESS NOTES
[facility]  Adult Nutrition  Consult Note    SUMMARY     Recommendations   1. Continue current diet order with double portions + oral supplement with all meals   2. RD to monitor  Goals: Pt to consume >85% EEN and EPN  Nutrition Goal Status: goal met  Communication of RD Recs: other    Continuum of Care Plan  Nutrition Discharge Planning: Pt to consume > 85% EEN and EPN     Reason for Assessment  Reason for Assessment: RD follow-up  Diagnosis: other (see comments) (multiple sclerosis)  Relevent Medical History: CHF, cancer, COPD, DM2, HTN, stroke   Interdisciplinary Rounds: did not attend  General Information Comments: Pt with good appetite, consuming 100% of meals + Boost       Nutrition Prescription Ordered  Current Diet Order: Regular + Boost Plus TID  Nutrition Order Comments: double portions  Oral Nutrition Supplement: Boost Plus Chocolate     Evaluation of Received Nutrients/Fluid Intake    Tolerance: tolerating     Nutrition Risk Screen   Nutrition Risk Screen: no indicators present    Nutrition/Diet History  Patient Reported Diet/Restrictions/Preferences: general    Labs/Tests/Procedures/Meds  Pertinent Labs Reviewed: reviewed, pertinent   Lab Results   Component Value Date    CREATININE 0.9 05/01/2017    BUN 18 05/01/2017     05/01/2017    K 4.1 05/01/2017     05/01/2017    CO2 27 05/01/2017     Pertinent Medications Reviewed: reviewed, pertinent  Pertinent Medications Comments: lactulose, senna dosucate    Physical Findings  Overall Physical Appearance: other (see comments), loss of muscle mass (nourished)  Oral/Mouth Cavity: WDL  Skin: intact, other (see comments) (Huy 16)    Anthropometrics  Height (inches): 70.98 in  Weight Method: Standard Scale  Weight (kg): 79.2 kg  Ideal Body Weight (IBW), Male: 171.88 lb  % Ideal Body Weight, Male (lb): 101.59 lb  BMI (kg/m2): 24.36  BMI Grade: 18.5-24.9 - normal        Estimated/Assessed Needs  Weight Used For Calorie  Calculations: 79.2 kg (174 lb 9.7 oz)   Height (cm): 180.3 cm  Energy Need Method: [place]-St Jeor, other (see comments) (2003-2170kcal/day (1.2-1.3 PAL))  RMR ([place]-St. Jeor Equation): 1672.16  Weight Used For Protein Calculations: 79.2 kg (174 lb 9.7 oz)  Protein Requirements: 79-95g/day (1.0-1.2g/kg)  1.0 gm Protein (gm): 79.37 and 1.2 gm Protein (gm): 95.24  Fluid Need Method: RDA Method (1mL/kcal)        Assessment and Plan  Increased energy needs related to physiological needs as evidenced by increased EEN and EPN 2/2 COPD  continues      Monitor and Evaluation  Food and Nutrient Intake: energy intake, food and beverage intake  Food and Nutrient Adminstration: diet order  Knowledge/Beliefs/Attitudes: food and nutrition knowledge/skill  Anthropometric Measurements: weight change, weight, body mass index  Biochemical Data, Medical Tests and Procedures: electrolyte and renal panel, gastrointestinal profile, glucose/endocrine profile, inflammatory profile, lipid profile  Nutrition-Focused Physical Findings: overall appearance, skin  % Intake of Estimated Energy Needs: 75 - 100 %  % Meal Intake: 100%    Nutrition Risk    Level of Risk: other (see comments) (1x/week)    Nutrition Follow-Up    RD Follow-up?: Yes

## 2017-05-02 NOTE — PROGRESS NOTES
05/02/2017  12:10 PM  Received a call from Lilia at [facility] stating MD agreed with [facility]. Patient is to discharge today with financial responsibility starting on 4/20/2017 date of reconsideration. GERTRUDIS Newell and DONNA Galeas made aware.    Kelsie Renee RN, CM Skilled  [id number]

## 2017-05-02 NOTE — PROGRESS NOTES
Physical Therapy   not seen    Mao Levin   MRN: [id number]         Pt refused PT services today 2/2 discharging today.     Inés Jones, PT  5/2/2017

## 2017-05-02 NOTE — PLAN OF CARE
Patient to discharge home today with assist of family. Patient set up with [facility] per DONNA Galeas.     Future Appointments  Date Time Provider Department Center   5/5/2017 10:45 AM Lawanda Boyce PA-C [facility]   7/17/2017 9:40 AM Mattie Finnegan MD [facility]        05/02/17 1438   Final Note   Assessment Type Final Discharge Note   Discharge Disposition Home   Discharge planning education complete? Yes   What phone number can be called within the next 1-3 days to see how you are doing after discharge? [phone]   Hospital Follow Up  Appt(s) scheduled? Yes   Discharge plans and expectations educations in teach back method with documentation complete? Yes   Referral to / orders for Home Health Complete? Yes   Any social issues identified prior to discharge? No   Did you assess the readiness or willingness of the family or caregiver to support self management of care? Yes     Kelsie Renee RN, CM Skilled  [id number]

## 2017-05-02 NOTE — PLAN OF CARE
Problem: Occupational Therapy Goal  Goal: Occupational Therapy Goal  Goals to be met by: 3 weeks     Patient will increase functional independence with ADLs by performing:    Feeding with Set-up Assistance. Met  UE Dressing with Stand-by Assistance.--met   LE Dressing with Minimal Assistance.--met;however, occasional mod A depending on muscle tone  Grooming while seated with Modified [place].--met  Toileting from bedside commode with Minimal Assistance for hygiene and clothing management.   Bathing from shower chair/bench with Minimal Assistance.--met  Sitting at edge of bed x20 minutes with Supervision.--met  Rolling to Bilateral with Stand-by Assistance. --met  Supine to sit with Minimal Assistance.--met  Stand pivot transfers with Minimal Assistance.--met  Toilet transfer to bedside commode with Minimal Assistance.  L Upper extremity self ROM exercise program x15 reps per handout, with independence.     Outcome: Ongoing (interventions implemented as appropriate)  Continue with current OT goals as stated in plan of care.  GISEL Em  5/2/2017

## 2017-05-02 NOTE — PLAN OF CARE
Problem: Patient Care Overview  Goal: Plan of Care Review  Outcome: Ongoing (interventions implemented as appropriate)  Patient with complaints of headaches relieved only by pain medications. Required one person assist with transfer from w/c to bed.

## 2017-05-03 ENCOUNTER — TELEPHONE (OUTPATIENT)
Dept: ADMINISTRATIVE | Facility: CLINIC | Age: 52
End: 2017-05-03

## 2017-05-03 NOTE — DISCHARGE SUMMARY
"  [facility] Hospital Medicine  Discharge Summary      Patient Name: Mao Levin  MRN: [id number]  Admission Date: 3/17/2017  Hospital Length of Stay: 46 days  Discharge Date and Time: 5/2/2017  5:20 PM  Attending Physician: No att. providers found   Discharging Provider: Osiris Navarrete NP  Primary Care Provider: Fausto Leung MD        HPI: Patient is a 51 y.o. male with multiple sclerosis and hx of saddle PE who presents to SNF after hospitalization for an MS pseudoflare (worsened pain and weakness in BLE) due to E. Coli UTI. He was recently started on rituxan to treat MS. He was also recently admitted in 12/2016 for psuedoflare and he improved after inpatient rehab stay. He continues with pain rating it as 9-10/10 to his bilateral LE and requests decreased timing between oxycodone. No radiation of pain and oxycodone has been effective. He has left hand contraction which has been present since his last pseudoflare.His had recent complaints regarding a headache at night primarily located in the right side of his head with some pain felt down the right side of his face. He reports the pain as being like "someone stabbing me in the face." He states that increased doses of gabapentin has provided him with no pain relief.  He occasionally smokes at home. The patient has been admitted to SNF for ongoing PT/OT due to insufficient progress to go home safely from the hospital.       Indwelling Lines/Drains at time of discharge:   Lines/Drains/Airways          No matching active lines, drains, or airways        Hospital Course:   MS (multiple sclerosis) [G35]  Chronic  -PT/OT to increase ambulation, ADL performance and endurance, therapy  feels that patient is safe to return home with home health services, additional assistance provided by family and girlfriend  -DVT ppx: rivaroxaban 20mg daily with dinner, continue at home  -fall precautions  -bowel regimen: miralax and senokot-s1 tab daily to " prevent/treat constipation; hold for frequent or loose stooling  -due for repeat rituxan in 6 months  -expected to resume home health once discharged but may opt for outpatient therapy as previously ordered, wants home health therapy, denied inpatient rehab  -he will need to f/u with PMR in 2 weeks after discharge and Neurology 2 weeks after discharge      Spasticity [R25.2]  -chronic and due to MS  -continue baclofen 20mg QID at home  -continue dantrolene 50mg QID at home      Polyneuropathy [G62.9]  -chronic  -continue gabapentin 900mg TID at home  -continue carbamazepine 400mg BID at home      Chronic pain of multiple sites [R52, G89.29]  Chronic  -continue oxycodone every 4 hours prn and valium prn spasms; decrease frequency of oxycodone during SNF stay to return to 15mg prn up to TID at home  -will continue to monitor and adjust regimen as necessary      Depression [F32.9]  -chronic  -continue celexa 20mg daily at home      Smoker [F17.200]  -chronic  -continue nicotine 14mg patch daily      Urinary tract infection without hematuria [N39.0] E. coli  -completed cipro for 13 doses to treat, end date on 3/24/17      10/25/2016 Saddle PE [I26.92]  Chronic  -continue rivaroxaban to prevent future thrombi      Vitamin D deficiency  -continue ergocalciferol 50,000 units every 7 days at home      Neurogenic bladder [N31.9]  Chronic  -continue oxybutynin 5mg daily at home  -bladder scan prn and I/O cath prn retention volume > 300cc     Intractable headache  -occurs at home with periods of relief  -has not improved with increasing gabapentin  -contacted Dr. Finnegan and received approval to use prednisone taper over 12 days as recommended by neuro headache specialist  -scheduled with first available Neuro Headache specialist  -sed rate 0    PT note, ANABEL Jones, PT 5/1/17  Functional Status:  MDS G  Bed Mobility Functional Status: S-SBA  Transfer Functional Status: CGA-Min (A)  Walk in Room Functional Status:  mod(A)-Max(A)  Walk in Corridor Functional Status: mod(A)-Max(A)  Locomotion on Unit Functional Status: S-SBA  Bed Mobility:  Sit>Supine:SBA  Transfers:  Sit<>Stand: CGA from wheelchair and edge of bed.  Stand Pivot Transfer: Min A from edge of bed due to inability to stand upright and required UE support to remain standing.  Gait:  Amb 36, 39 feet with use of a rolling walker.   Facilitation provided at hip and assisted with weight-shifting toward his LLE. Pt had difficulty mobilizing his LLE forward due to lack of dorsiflexion, hence use of his hinged-AFO.   Wheelchair Mobility:  Patient propels w/c 60 feet with BUEs.    OT note, GISEL Johnson 5/2/17  Functional Status:  MDS G  Bed Mobility Functional Status: S-SBA  Transfer Functional Status: S-SBA  Dressing Functional Status: 3:mod(A)-Max(A)  Personal Hygiene Functional Status: mod(A)-Max(A)    Consults: PT/OT    Significant Diagnostic Studies:     Recent Labs  Lab 04/27/17  0414 05/01/17  0443   WBC 6.15 5.85   HGB 12.9* 12.5*   HCT 38.7* 38.0*    189         Recent Labs  Lab 04/27/17  0414 05/01/17  0443    141   K 3.9 4.1    104   CO2 25 27   BUN 15 18   CREATININE 0.8 0.9   CALCIUM 8.9 9.1     Lab Results   Component Value Date    LABPROT 10.8 12/15/2016    ALBUMIN 3.4 (L) 03/13/2017     Lab Results   Component Value Date    CALCIUM 9.1 05/01/2017    PHOS 4.3 05/01/2017     Results for MAGALI ALCANTAR (MRN [id number]) as of 5/2/2017 22:02   Ref. Range 4/24/2017 04:43 4/27/2017 04:14 5/1/2017 04:43   Magnesium Latest Ref Range: 1.6 - 2.6 mg/dL 2.1 2.1 2.1       Final Active Diagnoses:    Diagnosis Date Noted POA    PRINCIPAL PROBLEM:  MS (multiple sclerosis) [G35] 12/19/2016 Yes     Chronic    Intractable headache [R51] 04/20/2017 Yes    Depression [F32.9] 03/17/2017 Yes    Smoker [F17.200] 03/17/2017 Yes    Polyneuropathy [G62.9] 10/27/2016 Yes    10/25/2016 Saddle PE [I26.92] 10/25/2016 Yes     Chronic    Vitamin D deficiency disease  "[E55.9] 10/24/2016 Yes    Neurogenic bladder [N31.9] 10/06/2016 Yes     Chronic    Spasticity [R25.2] 10/06/2016 Yes    Chronic pain of multiple sites [R52, G89.29] 09/22/2016 Yes     Chronic      Problems Resolved During this Admission:    Diagnosis Date Noted Date Resolved POA      Discharged Condition: stable    Disposition: Home-Health Care [facility]    Follow Up:  Follow-up Information     Follow up with Lawanda Boyce PA-C. Go on 5/5/2017.    Specialty:  Neurology    Why:  MS clinic    Contact information:    [address]  [facility] [address]  [phone]          Follow up with Fausto Leung MD In 2 weeks.    Specialty:  Physical Medicine and Rehabilitation    Why:  hospital follow-up    Contact information:    [address] [facility] [place] [facility] [address]  [phone]          Follow up with [facility].    Specialty:  Home Health Services    Why:  Home Health Questions/Concerns    Contact information:    [address]  [facility] [address]  [phone]          Follow up with [facility].    Specialty:  DME Provider    Why:  Home Medical Equipment Questions/Concerns    Contact information:    [address]  [facility] [address]  [phone]          Future Appointments  Date Time Provider Department Center   5/5/2017 10:45 AM Lawanda Boyce PA-C [facility]   7/17/2017 9:40 AM Mattie Finnegan MD [facility]         Patient Instructions:     WALKER FOR HOME USE   Order Specific Question Answer Comments   Type of Walker: Adult (5'4"-6'6")    With wheels? Yes    Height: 5' 11" (1.803 m)    Weight: 79.5 kg (175 lb 4.3 oz)    Length of need (1-99 months): 99    Does patient have medical equipment at home? bath bench    Does patient have medical equipment at home? rollator    Please check all that apply: Patient's condition impairs ambulation.    Please check all that apply: Walker will be used for gait training.    Please check all that " "apply: Patient is unable to safely ambulate without equipment.    Vendor: [facility]    Expected Date of Delivery: 5/2/2017      WHEELCHAIR FOR HOME USE   Order Specific Question Answer Comments   Hours in W/C per day: 8    Type of Wheelchair: Lightweight    Patient unable to propel in Standard wheelchair? Yes    Size(Width): 18"(STD adult)    Leg Support: STD footrests    Arm Height: Full length    Arm Height: Swing away    Desired seat depth: 18    Back height: standard    Lap Belt: Velcro    Accessories: Front brakes    Accessories: Anti-tippers    Cushion: Basic    Justification for cushion: prevention of pressure sores    Height: 5' 11" (1.803 m)    Weight: 79.5 kg (175 lb 4.3 oz)    Does patient have medical equipment at home? bath bench    Does patient have medical equipment at home? rollator    Length of need (1-99 months): 99    Please check all that apply: Caregiver is capable and willing to operate wheelchair safely.    Please check all that apply: Patient's upper body strength is sufficient for propulsion.    Please check all that apply: Patient mobility limitations cannot be sufficiently resolved by the use of other ambulatory therapies.    Vendor: Other (use comments) Patient doesn't want equipmen   Expected Date of Delivery: 5/2/2017      3 IN 1 COMMODE FOR HOME USE   Order Specific Question Answer Comments   Type: Drop arm    Height: 5' 11" (1.803 m)    Weight: 79.5 kg (175 lb 4.3 oz)    Does patient have medical equipment at home? bath bench    Does patient have medical equipment at home? rollator    Length of need (1-99 months): 99    Vendor: Other (use comments) Patient doesn't want equipment   Expected Date of Delivery: 5/2/2017      HOSPITAL BED FOR HOME USE   Order Specific Question Answer Comments   Type: Semi-electric    Length of need (1-99 months): 99    Does patient have medical equipment at home? bath bench    Does patient have medical equipment at home? rollator    Height: 5' 11" (1.803 " "m)    Weight: 79.5 kg (175 lb 4.3 oz)    Accessories: Gel overlay mattress    Please check all that apply: Patient requires a bed height different than a fixed height hospital bed to permit transfers to chair, wheelchair, or standing.    Vendor: Other (use comments) Patient doesn't want equipmen   Expected Date of Delivery: 5/2/2017      WALKER FOR HOME USE   Order Specific Question Answer Comments   Type of Walker: Adult (5'4"-6'6")    With wheels? Yes    Height: 5' 11" (1.803 m)    Weight: 79.5 kg (175 lb 4.3 oz)    Length of need (1-99 months): 99    Does patient have medical equipment at home? bath bench    Does patient have medical equipment at home? rollator    Please check all that apply: Patient's condition impairs ambulation.    Please check all that apply: Walker will be used for gait training.    Please check all that apply: Patient is unable to safely ambulate without equipment.    Vendor: Other (use comments) Duplicate   Expected Date of Delivery: 5/2/2017      WHEELCHAIR FOR HOME USE   Order Specific Question Answer Comments   Hours in W/C per day: 8    Type of Wheelchair: Lightweight    Patient unable to propel in Standard wheelchair? Yes    Size(Width): 18"(STD adult)    Leg Support: STD footrests    Arm Height: Full length    Arm Height: Swing away    Desired seat depth: 18    Lap Belt: Velcro    Accessories: Front brakes    Accessories: Anti-tippers    Cushion: Basic    Justification for cushion: prevent pressure sores    Height: 5' 11" (1.803 m)    Weight: 79.5 kg (175 lb 4.3 oz)    Does patient have medical equipment at home? bath bench    Does patient have medical equipment at home? rollator    Length of need (1-99 months): 99    Please check all that apply: Caregiver is capable and willing to operate wheelchair safely.    Please check all that apply: Patient mobility limitations cannot be sufficiently resolved by the use of other ambulatory therapies.    Vendor: Other (use comments) Patient " "doesn't want equipmen   Expected Date of Delivery: 5/2/2017      3 IN 1 COMMODE FOR HOME USE   Order Specific Question Answer Comments   Type: Drop arm    Height: 5' 11" (1.803 m)    Weight: 79.5 kg (175 lb 4.3 oz)    Does patient have medical equipment at home? bath bench    Does patient have medical equipment at home? rollator    Length of need (1-99 months): 99    Vendor: Other (use comments) Patient doesn't want equipmen   Expected Date of Delivery: 5/2/2017      HOSPITAL BED FOR HOME USE   Order Specific Question Answer Comments   Type: Semi-electric    Length of need (1-99 months): 99    Does patient have medical equipment at home? bath bench    Does patient have medical equipment at home? rollator    Height: 5' 11" (1.803 m)    Weight: 79.5 kg (175 lb 4.3 oz)    Accessories: Gel overlay mattress    Please check all that apply: Patient requires a bed height different than a fixed height hospital bed to permit transfers to chair, wheelchair, or standing.    Vendor: Other (use comments) Patient doesn't want equipmen   Expected Date of Delivery: 5/2/2017      Diet general     Diet general     Activity as tolerated     Call MD for:  temperature >100.4     Call MD for:  severe uncontrolled pain     Call MD for:  difficulty breathing or increased cough     Call MD for:  severe persistent headache     Call MD for:  persistent dizziness, light-headedness, or visual disturbances     Call MD for:  increased confusion or weakness     Activity as tolerated     Call MD for:  temperature >100.4     Call MD for:  persistent nausea and vomiting or diarrhea     Call MD for:  increased confusion or weakness     Call MD for:  persistent dizziness, light-headedness, or visual disturbances     Call MD for:  severe persistent headache     Call MD for:  difficulty breathing or increased cough     Call MD for:  severe uncontrolled pain       Medications:  Reconciled Home Medications:   Discharge Medication List as of 5/2/2017  1:33 " PM      START taking these medications    Details   gabapentin (NEURONTIN) 300 MG capsule Take 3 capsules (900 mg total) by mouth 3 (three) times daily., Starting 4/20/2017, Until Discontinued, Normal      nicotine (NICODERM CQ) 14 mg/24 hr Place 1 patch onto the skin once daily., Starting 4/20/2017, Until Discontinued, OTC      predniSONE (DELTASONE) 20 MG tablet Take 4 tablets (80mg) by mouth once daily on 5/3, then 3 tabs (60mg) daily 5/4-5/5, 2.5 tabs (50mg) daily 5/6-5/7, 2 tabs (40mg) daily 5/8-5/9, 1.5 tabs (30mg) daily 5/10-5/11, 1 tab (20mg) daily 5/12-5/13, STOP 5/14, Print         CONTINUE these medications which have CHANGED    Details   baclofen (LIORESAL) 20 MG tablet Take 1 tablet (20 mg total) by mouth 4 (four) times daily., Starting 4/20/2017, Until Fri 4/20/18, Normal      carbamazepine (TEGRETOL XR) 400 MG Tb12 Take 1 tablet (400 mg total) by mouth 2 (two) times daily., Starting 4/20/2017, Until Fri 4/20/18, Normal      citalopram (CELEXA) 20 MG tablet Take 1 tablet (20 mg total) by mouth once daily., Starting 4/20/2017, Until Fri 4/20/18, Normal      dantrolene (DANTRIUM) 50 MG Cap Take 1 capsule (50 mg total) by mouth 4 (four) times daily., Starting 4/20/2017, Until Fri 4/20/18, Normal      diazePAM (VALIUM) 5 MG tablet Take 1 tablet (5 mg total) by mouth 2 (two) times daily as needed (muscle spasms)., Starting 4/20/2017, Until Discontinued, Print      !! oxycodone (ROXICODONE) 15 MG Tab Take 1 tablet (15 mg total) by mouth 3 (three) times daily as needed., Starting 4/20/2017, Until Discontinued, Print      !! oxycodone (ROXICODONE) 15 MG Tab Take 1 tablet (15 mg total) by mouth every 8 (eight) hours as needed for Pain., Starting 5/2/2017, Until Discontinued, Print      senna-docusate 8.6-50 mg (PERICOLACE) 8.6-50 mg per tablet Take 1 tablet by mouth once daily., Starting 4/20/2017, Until Discontinued, OTC       !! - Potential duplicate medications found. Please discuss with provider.       CONTINUE these medications which have NOT CHANGED    Details   ergocalciferol (VITAMIN D2) 50,000 unit Cap Take 1 capsule (50,000 Units total) by mouth every 7 days., Starting 12/14/2016, Until Discontinued, Normal      oxybutynin (DITROPAN-XL) 5 MG TR24 Take 5 mg by mouth once daily., Until Discontinued, Historical Med      rivaroxaban (XARELTO) 20 mg Tab Take 1 tablet (20 mg total) by mouth daily with dinner or evening meal., Starting 12/8/2016, Until Discontinued, Normal      trazodone (DESYREL) 100 MG tablet Take 100 mg by mouth every evening., Until Discontinued, Historical Med      polyethylene glycol (GLYCOLAX) 17 gram/dose powder Take 17 g by mouth once daily., Starting 11/10/2016, Until Discontinued, Normal           Time spent on the discharge of patient: 35 minutes    Osiris Navarrete NP  Department of Hospital Medicine  [facility]

## 2017-05-03 NOTE — TELEPHONE ENCOUNTER
Message routed to Dr. Marline Agosto. Patient admit to home care to be postponed per patient request. For questions Call Russ [phone]

## 2017-05-04 ENCOUNTER — TELEPHONE (OUTPATIENT)
Dept: NEUROLOGY | Facility: CLINIC | Age: 52
End: 2017-05-04

## 2017-05-04 ENCOUNTER — TELEPHONE (OUTPATIENT)
Dept: ADMINISTRATIVE | Facility: CLINIC | Age: 52
End: 2017-05-04

## 2017-05-04 NOTE — TELEPHONE ENCOUNTER
Ariane,   Called patient to confirm his appointment with Lawanda on 5/5/2017.  Patient states that we are to call [employer] to set up transportation for him.  I stated to patient that I was not aware of the transportation process.  Mr. Cavanaugh would like for you to give him a call about this matter.    Thanks,   Carla

## 2017-05-10 ENCOUNTER — TELEPHONE (OUTPATIENT)
Dept: PHYSICAL MEDICINE AND REHAB | Facility: CLINIC | Age: 52
End: 2017-05-10

## 2017-05-10 RX ORDER — OXYCODONE HYDROCHLORIDE 15 MG/1
15 TABLET ORAL 2 TIMES DAILY PRN
Qty: 60 TABLET | Refills: 0 | Status: SHIPPED | OUTPATIENT
Start: 2017-05-10 | End: 2017-05-10 | Stop reason: SDUPTHER

## 2017-05-10 RX ORDER — OXYCODONE HYDROCHLORIDE 15 MG/1
15 TABLET ORAL 2 TIMES DAILY PRN
Qty: 60 TABLET | Refills: 0 | Status: ON HOLD | OUTPATIENT
Start: 2017-05-10 | End: 2017-06-08

## 2017-05-10 NOTE — TELEPHONE ENCOUNTER
----- Message from Shu Corea MA sent at 5/10/2017 10:35 AM CDT -----  Contact: self @ [phone]  Refill oxycodone 15 mg. He got 2 refills from Osiris Navarrete NP  4/20 #25 and 5/2 # 31. Do you want to refill this medication.     ----- Message -----     From: Edelmira De Oliveira     Sent: 5/10/2017  10:15 AM       To: Xochitl HUSAIN Staff    Pt is req a refill for oxycodone (ROXICODONE) 15 MG Tab.  [facility] pharmacy [address] [place] [address].  pls call pt to let him know when this has been sent in.

## 2017-05-10 NOTE — TELEPHONE ENCOUNTER
----- Message from Brigette Batista sent at 5/10/2017 12:35 PM CDT -----  Contact: Patient [phone]  Patient is calling to see if he can get his medication called into the original pharmacy below oxycodone (ROXICODONE) 15 MG Tab. Other [facility] it was sent to does not have the medication.         [facility] [address] [place] [address] & [address] [address]  Phone: [phone] Fax: [phone]

## 2017-05-15 NOTE — PROGRESS NOTES
Physical Therapy Discharge Summary    Mao Levin  MRN: [id number]   MS (multiple sclerosis)   Patient Discharged from acute Physical Therapy on 17.  Please refer to prior PT noted date on 17 for functional status.     Assessment:   Patient appropriate for care in another setting.  GOALS:   Physical Therapy Goals        Problem: Physical Therapy Goal    Goal Priority Disciplines Outcome Goal Variances Interventions   Physical Therapy Goal     PT/OT, PT Ongoing (interventions implemented as appropriate)     Description:  Goals to be met by: 2 weeks     Patient will increase functional independence with mobility by performin. Supine to sit with Stand-by Assistance. Met (2017)  2. Sit to supine with Stand-by Assistance. Not Met  3. Sit to stand transfer with Minimal Assistance MET  4. Bed to chair transfer with Minimal Assistance using Rolling Walker    5. Gait  x 20 feet with Moderate Assistance using Rolling Walker. Met (3/26/2017)  6. Wheelchair propulsion x75 feet with Stand-by Assistance using BUE/BLE- Met  7. Ascend/descend 3 stair with left Handrails Moderate Assistance.   8. Stand for 3 minutes with Stand-by Assistance using Rolling Walker.  9. Lower extremity exercise program x20 reps per handout, with assistance as needed MET  10. New Goal (3/26/2017): Gait  x 50 feet with Moderate Assistance using Rolling Walker. Met (2017)  11. New Goal 2017 : Gait x50 feet w/ rolling walker and CGA = not met                        Reasons for Discontinuation of Therapy Services  Transfer to alternate level of care.      Plan:  Patient Discharged to: Home with Home Health Service.    Inés Jones, PT  5/15/2017

## 2017-05-18 ENCOUNTER — OFFICE VISIT (OUTPATIENT)
Dept: INTERNAL MEDICINE | Facility: CLINIC | Age: 52
End: 2017-05-18
Payer: MEDICARE

## 2017-05-18 ENCOUNTER — TELEPHONE (OUTPATIENT)
Dept: PHYSICAL MEDICINE AND REHAB | Facility: CLINIC | Age: 52
End: 2017-05-18

## 2017-05-18 VITALS
BODY MASS INDEX: 25.2 KG/M2 | DIASTOLIC BLOOD PRESSURE: 90 MMHG | RESPIRATION RATE: 16 BRPM | HEART RATE: 76 BPM | OXYGEN SATURATION: 98 % | SYSTOLIC BLOOD PRESSURE: 140 MMHG | WEIGHT: 180 LBS | HEIGHT: 71 IN

## 2017-05-18 DIAGNOSIS — G89.29 OTHER CHRONIC PAIN: ICD-10-CM

## 2017-05-18 DIAGNOSIS — G35 MS (MULTIPLE SCLEROSIS): Primary | ICD-10-CM

## 2017-05-18 PROCEDURE — 1160F RVW MEDS BY RX/DR IN RCRD: CPT | Mod: S$GLB,,, | Performed by: INTERNAL MEDICINE

## 2017-05-18 PROCEDURE — 99499 UNLISTED E&M SERVICE: CPT | Mod: S$GLB,,, | Performed by: INTERNAL MEDICINE

## 2017-05-18 PROCEDURE — 99213 OFFICE O/P EST LOW 20 MIN: CPT | Mod: S$GLB,,, | Performed by: INTERNAL MEDICINE

## 2017-05-18 PROCEDURE — 99999 PR PBB SHADOW E&M-EST. PATIENT-LVL III: CPT | Mod: PBBFAC,,, | Performed by: INTERNAL MEDICINE

## 2017-05-18 RX ORDER — PREDNISONE 20 MG/1
TABLET ORAL
Qty: 20 TABLET | Refills: 0 | Status: ON HOLD | OUTPATIENT
Start: 2017-05-18 | End: 2017-06-08 | Stop reason: HOSPADM

## 2017-05-18 NOTE — TELEPHONE ENCOUNTER
----- Message from Fausto Leung MD sent at 5/18/2017 10:50 AM CDT -----  Please tell the pt that I want to help but these problems (headaches, weakness) are out of my area of expertise.  He needs to see one of his Neurologists.  He saw Dr. Ruby most recently for the headaches -- he should see him.     ----- Message -----     From: Shu Corea MA     Sent: 5/18/2017   9:48 AM       To: Fausto Leung MD    Trying to do rehab. In pain can't walk, headaches. The same thing that was going on while in the hospital. Body pain. Would like to be seen today.

## 2017-05-18 NOTE — MR AVS SNAPSHOT
[facility]  [address]  [facility] [address]  Phone: [phone]  Fax: [phone]                  Mao Levin   2017 6:40 PM   Office Visit    Description:  Male : 1965   Provider:  Catherine Rebolledo MD   Department:  [facility]           Reason for Visit     Generalized Body Aches     Headache                To Do List           Future Appointments        Provider Department Dept Phone    2017 9:40 AM MD [facility]- Multiple Sclerosis [phone]      Goals (5 Years of Data)     None       These Medications        Disp Refills Start End    oxyMORphone (OPANA ER) 7.5 mg TR12 60 each 0 2017     Take 7.5 mg by mouth 2 (two) times daily. - Oral    Pharmacy: [facility] [id number] [place] [address] AT [facility] Ph #: [phone]         [facility]     [facility] On Call Nurse Care Line -  Assistance  Unless otherwise directed by your provider, please contact [facility], our nurse care line that is available for  assistance.     Registered nurses in the [facility] provide: appointment scheduling, clinical advisement, health education, and other advisory services.  Call: [phone] (toll free)               Medications           Message regarding Medications     Verify the changes and/or additions to your medication regime listed below are the same as discussed with your clinician today.  If any of these changes or additions are incorrect, please notify your healthcare provider.        START taking these NEW medications        Refills    oxyMORphone (OPANA ER) 7.5 mg TR12 0    Sig: Take 7.5 mg by mouth 2 (two) times daily.    Class: Print    Route: Oral           Verify that the below list of medications is an accurate representation of the medications you are currently taking.  If none reported, the list may be blank. If incorrect,  please contact your healthcare provider. Carry this list with you in case of emergency.           Current Medications     baclofen (LIORESAL) 20 MG tablet Take 1 tablet (20 mg total) by mouth 4 (four) times daily.    carbamazepine (TEGRETOL XR) 400 MG Tb12 Take 1 tablet (400 mg total) by mouth 2 (two) times daily.    citalopram (CELEXA) 20 MG tablet Take 1 tablet (20 mg total) by mouth once daily.    dantrolene (DANTRIUM) 50 MG Cap Take 1 capsule (50 mg total) by mouth 4 (four) times daily.    diazePAM (VALIUM) 5 MG tablet Take 1 tablet (5 mg total) by mouth 2 (two) times daily as needed (muscle spasms).    ergocalciferol (VITAMIN D2) 50,000 unit Cap Take 1 capsule (50,000 Units total) by mouth every 7 days.    gabapentin (NEURONTIN) 300 MG capsule Take 3 capsules (900 mg total) by mouth 3 (three) times daily.    nicotine (NICODERM CQ) 14 mg/24 hr Place 1 patch onto the skin once daily.    oxybutynin (DITROPAN-XL) 5 MG TR24 Take 5 mg by mouth once daily.    oxycodone (ROXICODONE) 15 MG Tab Take 1 tablet (15 mg total) by mouth 2 (two) times daily as needed for Pain.    oxyMORphone (OPANA ER) 7.5 mg TR12 Take 7.5 mg by mouth 2 (two) times daily.    polyethylene glycol (GLYCOLAX) 17 gram/dose powder Take 17 g by mouth once daily.    predniSONE (DELTASONE) 20 MG tablet Take 4 tablets (80mg) by mouth once daily on 5/3, then 3 tabs (60mg) daily 5/4-5/5, 2.5 tabs (50mg) daily 5/6-5/7, 2 tabs (40mg) daily 5/8-5/9, 1.5 tabs (30mg) daily 5/10-5/11, 1 tab (20mg) daily 5/12-5/13, STOP 5/14    rivaroxaban (XARELTO) 20 mg Tab Take 1 tablet (20 mg total) by mouth daily with dinner or evening meal.    senna-docusate 8.6-50 mg (PERICOLACE) 8.6-50 mg per tablet Take 1 tablet by mouth once daily.    trazodone (DESYREL) 100 MG tablet Take 100 mg by mouth every evening.           Clinical Reference Information           Your Vitals Were     BP Pulse Resp Height Weight SpO2    140/90 (BP Location: Left arm, Patient Position: Sitting, BP  "Method: Manual) 76 16 5' 11" (1.803 m) 81.6 kg (180 lb) 98%    BMI                25.1 kg/m2          Blood Pressure          Most Recent Value    BP  (!)  140/90      Allergies as of 5/18/2017     No Known Allergies      Immunizations Administered on Date of Encounter - 5/18/2017     None      [facility] Sign-Up     Activating your [facility] account is as easy as 1-2-3!     1) Visit [email or web address], select Sign Up Now, enter this activation code and your date of birth, then select Next.  Activation code not generated  Current Patient Portal Status: Account disabled      2) Create a username and password to use when you visit [facility] in the future and select a security question in case you lose your password and select Next.    3) Enter your e-mail address and click Sign Up!    Additional Information  If you have questions, please e-mail [email or web address] or call [phone] to talk to our [facility] staff. Remember, [facility] is NOT to be used for urgent needs. For medical emergencies, dial 911.         Instructions    Follow up with Dr Leung or Dr Kamara       Smoking Cessation     If you would like to quit smoking:   You may be eligible for free services if you are a [place] resident and started smoking cigarettes before September 1, 1988.  Call the Smoking Cessation Trust ([facility]) toll free at [phone] [phone] or [phone].   Call 1-800-QUIT-NOW if you do not meet the above criteria.   Contact us via email: [email or web address]   View our website for more information: [email or web address]        Language Assistance Services     ATTENTION: Language assistance services are available, free of charge. Please call [phone].      ATENCIÓN: Si habla español, tiene a mcmahon disposición servicios gratuitos de asistencia lingüística. Llame al [phone].     TERE Ý: N?u b?n nói Ti?ng Vi?t, có các d?ch v? h? tr? ngôn ng? mi?n phí dành cho b?n. G?i s? [phone].         [facility] complies with applicable Federal civil rights laws and does not discriminate on the basis of race, color, national origin, age, disability, or sex.

## 2017-05-19 ENCOUNTER — TELEPHONE (OUTPATIENT)
Dept: INTERNAL MEDICINE | Facility: CLINIC | Age: 52
End: 2017-05-19

## 2017-05-19 ENCOUNTER — NURSE TRIAGE (OUTPATIENT)
Dept: ADMINISTRATIVE | Facility: CLINIC | Age: 52
End: 2017-05-19

## 2017-05-19 NOTE — PROGRESS NOTES
Subjective:       Patient ID: Mao Levin is a 51 y.o. male.    Chief Complaint: Generalized Body Aches and Headache    HPI Comments: Has bad headache and pain everywhere    Headache    This is a chronic problem. The current episode started more than 1 month ago. The problem occurs daily. The problem has been unchanged. The pain is located in the bilateral region. The pain does not radiate. The pain quality is similar to prior headaches. The quality of the pain is described as aching. Pertinent negatives include no abdominal pain, coughing, dizziness, fever, nausea, sore throat, vomiting or weakness. Nothing aggravates the symptoms. Treatments tried: steroids. Improvement on treatment: good relief with steroids but as he tapered, the pain came back. His past medical history is significant for cluster headaches. (Ms)     Review of Systems   Constitutional: Negative for activity change, appetite change and fever.   HENT: Negative for congestion, postnasal drip and sore throat.    Respiratory: Negative for cough, shortness of breath and wheezing.    Cardiovascular: Negative for chest pain and palpitations.   Gastrointestinal: Negative for abdominal pain, blood in stool, constipation, diarrhea, nausea and vomiting.   Genitourinary: Negative for decreased urine volume, difficulty urinating, flank pain and frequency.   Musculoskeletal: Negative for arthralgias.   Neurological: Positive for headaches. Negative for dizziness and weakness.       Objective:      Physical Exam   Constitutional:           Assessment:       1. MS (multiple sclerosis)    2. Other chronic pain        Plan:   Mao was seen today for generalized body aches and headache.    Diagnoses and all orders for this visit:    MS (multiple sclerosis)    Other chronic pain    Other orders  -     oxyMORphone (OPANA ER) 7.5 mg TR12; Take 7.5 mg by mouth 2 (two) times daily.  -     predniSONE (DELTASONE) 20 MG tablet; Take 3 pills daily for 3 days, then 2 pills  daily for 3 days, then 1 pill daily for 3 days, then 1/2 pill daily for 4 days.

## 2017-05-20 NOTE — TELEPHONE ENCOUNTER
----- Message from Nohemy Levin sent at 5/19/2017  4:13 PM CDT -----  Contact: Self/[phone]  Pt said that she is calling in regards to needing to get the doctor to change his rx for oxyMORphone (OPANA ER) 7.5 mg TR12 to something else pt stated that his insurance will not cover this medication and he needs something else sent to the [facility] at [address]  [phone], pt stated that he was told by his insurance that they will cover the oxycodone (ROXICODONE) 15 MG Tab, pt is asking if maybe he can get a stronger dosage of this medication. Please call and advise            Thank you

## 2017-05-20 NOTE — TELEPHONE ENCOUNTER
Reason for Disposition   Caller requesting a NON-URGENT new prescription or refill and triager unable to refill per unit policy    Protocols used: ST MEDICATION QUESTION CALL-A-    Patient called because he has a problem with affording the oxymorphone prescribed by Dr. Rebolledo. Patient was advised to call his neurologist on Monday to see if the prescription could be changed because communication with Dr. Rebolledo's office indicates she would be out of town for 1 week. Patient verbalized understanding.

## 2017-05-20 NOTE — TELEPHONE ENCOUNTER
Spoke with [facility] to inform her that Dr Rebolledo will be out of the office for a week and that the patient should follow up with is PCP

## 2017-05-20 NOTE — TELEPHONE ENCOUNTER
----- Message from Petra Cosby sent at 5/19/2017  3:00 PM CDT -----  Contact: Russ - [facility] at [phone]  Russ has questions/concerns regarding pt's scripts that was written on 5/18. Please call/advise.

## 2017-05-22 RX ORDER — OXYCODONE HYDROCHLORIDE 15 MG/1
15 TABLET ORAL 2 TIMES DAILY PRN
Qty: 60 TABLET | Refills: 0 | Status: CANCELLED | OUTPATIENT
Start: 2017-05-22

## 2017-05-22 NOTE — TELEPHONE ENCOUNTER
----- Message from Yuliya Ellison sent at 5/22/2017  1:40 PM CDT -----  Contact: call  [phone]  Patient is calling to let you know that the medication that he recently had called in is not covered on his insurance, he needs to get something else called in for him that is covered

## 2017-05-22 NOTE — TELEPHONE ENCOUNTER
Pt would like to inform you that the rx you sent to his pharmacy is not covered by his insurance. Pt would like an alternative medication. Please advise.

## 2017-05-22 NOTE — TELEPHONE ENCOUNTER
----- Message from Holly Zelaya sent at 5/22/2017  4:03 PM CDT -----  Contact: self/[phone]  Pt called in regards to changing his Rx for oxyMORphone (OPANA ER) 7.5 mg TR12 because his insurance will not pay for it.      [facility]  Please advise

## 2017-05-23 ENCOUNTER — TELEPHONE (OUTPATIENT)
Dept: NEUROLOGY | Facility: CLINIC | Age: 52
End: 2017-05-23

## 2017-05-23 NOTE — TELEPHONE ENCOUNTER
"Call placed to [facility]. She states that pt was seen by urgent care for a "flare up" of his neuropathic pain. The rx prescribed (oxymorphone) is not covered by his insurance. Dr Rebolledo is out of the office this week. Pt is in need of rescue pain meds. Informed her that I will forward this information to Dr Leung for advice.  "

## 2017-05-23 NOTE — TELEPHONE ENCOUNTER
----- Message from Maia Chow sent at 5/23/2017  1:47 PM CDT -----  Contact: Ms Moise/  [facility] nurse [phone]  Patient went to Urgent Care on 5/18/2017 was written out prescription.  Pt was advise medication Oxy Morphine 7.5mg too expensive.  Patient advised insurance does not cover medication.   Please call in medication to [facility] [address]   Patient experiencing a lot of pain     Please call Ms Moise/  [facility] nurse [phone] if any questions

## 2017-05-26 ENCOUNTER — TELEPHONE (OUTPATIENT)
Dept: PHYSICAL MEDICINE AND REHAB | Facility: CLINIC | Age: 52
End: 2017-05-26

## 2017-05-26 ENCOUNTER — HOSPITAL ENCOUNTER (OUTPATIENT)
Facility: HOSPITAL | Age: 52
Discharge: NURSING, REHAB OR LONG-TERM CARE | End: 2017-06-08
Attending: EMERGENCY MEDICINE | Admitting: EMERGENCY MEDICINE
Payer: MEDICARE

## 2017-05-26 DIAGNOSIS — R53.83 FATIGUE: ICD-10-CM

## 2017-05-26 DIAGNOSIS — R79.89 ABNORMAL CORTISOL LEVEL: ICD-10-CM

## 2017-05-26 DIAGNOSIS — R53.1 WEAKNESS GENERALIZED: ICD-10-CM

## 2017-05-26 DIAGNOSIS — N31.9 NEUROGENIC BLADDER: Chronic | ICD-10-CM

## 2017-05-26 DIAGNOSIS — G35 MS (MULTIPLE SCLEROSIS): Primary | Chronic | ICD-10-CM

## 2017-05-26 DIAGNOSIS — G89.29 CHRONIC PAIN OF MULTIPLE SITES: Chronic | ICD-10-CM

## 2017-05-26 DIAGNOSIS — R52 CHRONIC PAIN OF MULTIPLE SITES: Chronic | ICD-10-CM

## 2017-05-26 LAB
ALBUMIN SERPL BCP-MCNC: 3.9 G/DL
ALP SERPL-CCNC: 82 U/L
ALT SERPL W/O P-5'-P-CCNC: 25 U/L
ANION GAP SERPL CALC-SCNC: 10 MMOL/L
AST SERPL-CCNC: 9 U/L
BASOPHILS # BLD AUTO: 0.01 K/UL
BASOPHILS NFR BLD: 0.1 %
BILIRUB SERPL-MCNC: 0.3 MG/DL
BUN SERPL-MCNC: 8 MG/DL
CALCIUM SERPL-MCNC: 8.7 MG/DL
CHLORIDE SERPL-SCNC: 106 MMOL/L
CK SERPL-CCNC: 34 U/L
CO2 SERPL-SCNC: 23 MMOL/L
CREAT SERPL-MCNC: 0.8 MG/DL
DIFFERENTIAL METHOD: ABNORMAL
EOSINOPHIL # BLD AUTO: 0 K/UL
EOSINOPHIL NFR BLD: 0 %
ERYTHROCYTE [DISTWIDTH] IN BLOOD BY AUTOMATED COUNT: 13.5 %
EST. GFR  (AFRICAN AMERICAN): >60 ML/MIN/1.73 M^2
EST. GFR  (NON AFRICAN AMERICAN): >60 ML/MIN/1.73 M^2
GLUCOSE SERPL-MCNC: 100 MG/DL
HCT VFR BLD AUTO: 38.5 %
HGB BLD-MCNC: 13.3 G/DL
LYMPHOCYTES # BLD AUTO: 0.8 K/UL
LYMPHOCYTES NFR BLD: 8.5 %
MCH RBC QN AUTO: 30.2 PG
MCHC RBC AUTO-ENTMCNC: 34.5 %
MCV RBC AUTO: 87 FL
MONOCYTES # BLD AUTO: 0.1 K/UL
MONOCYTES NFR BLD: 0.8 %
NEUTROPHILS # BLD AUTO: 8.6 K/UL
NEUTROPHILS NFR BLD: 90.4 %
PLATELET # BLD AUTO: 208 K/UL
PMV BLD AUTO: 10.4 FL
POTASSIUM SERPL-SCNC: 3.7 MMOL/L
PROT SERPL-MCNC: 6.9 G/DL
RBC # BLD AUTO: 4.41 M/UL
SODIUM SERPL-SCNC: 139 MMOL/L
WBC # BLD AUTO: 9.51 K/UL

## 2017-05-26 PROCEDURE — 80053 COMPREHEN METABOLIC PANEL: CPT

## 2017-05-26 PROCEDURE — G0378 HOSPITAL OBSERVATION PER HR: HCPCS

## 2017-05-26 PROCEDURE — 99220 PR INITIAL OBSERVATION CARE,LEVL III: CPT | Mod: ,,, | Performed by: PHYSICIAN ASSISTANT

## 2017-05-26 PROCEDURE — 85025 COMPLETE CBC W/AUTO DIFF WBC: CPT

## 2017-05-26 PROCEDURE — 25000003 PHARM REV CODE 250: Performed by: STUDENT IN AN ORGANIZED HEALTH CARE EDUCATION/TRAINING PROGRAM

## 2017-05-26 PROCEDURE — 99285 EMERGENCY DEPT VISIT HI MDM: CPT | Mod: ,,, | Performed by: EMERGENCY MEDICINE

## 2017-05-26 PROCEDURE — 99285 EMERGENCY DEPT VISIT HI MDM: CPT | Mod: 25

## 2017-05-26 PROCEDURE — 82550 ASSAY OF CK (CPK): CPT

## 2017-05-26 PROCEDURE — 93010 ELECTROCARDIOGRAM REPORT: CPT | Mod: ,,, | Performed by: INTERNAL MEDICINE

## 2017-05-26 PROCEDURE — 25000003 PHARM REV CODE 250: Performed by: PHYSICIAN ASSISTANT

## 2017-05-26 PROCEDURE — 25500020 PHARM REV CODE 255: Performed by: EMERGENCY MEDICINE

## 2017-05-26 RX ORDER — NALOXONE HCL 0.4 MG/ML
0.4 VIAL (ML) INJECTION
Status: DISCONTINUED | OUTPATIENT
Start: 2017-05-26 | End: 2017-06-08 | Stop reason: HOSPADM

## 2017-05-26 RX ORDER — BACLOFEN 10 MG/1
20 TABLET ORAL 4 TIMES DAILY
Status: DISCONTINUED | OUTPATIENT
Start: 2017-05-27 | End: 2017-06-08 | Stop reason: HOSPADM

## 2017-05-26 RX ORDER — AMOXICILLIN 250 MG
1 CAPSULE ORAL DAILY
Status: DISCONTINUED | OUTPATIENT
Start: 2017-05-27 | End: 2017-06-08 | Stop reason: HOSPADM

## 2017-05-26 RX ORDER — HYDROCODONE BITARTRATE AND ACETAMINOPHEN 5; 325 MG/1; MG/1
1 TABLET ORAL EVERY 4 HOURS PRN
Status: DISCONTINUED | OUTPATIENT
Start: 2017-05-26 | End: 2017-06-08 | Stop reason: HOSPADM

## 2017-05-26 RX ORDER — DIAZEPAM 5 MG/1
5 TABLET ORAL 2 TIMES DAILY PRN
Status: DISCONTINUED | OUTPATIENT
Start: 2017-05-26 | End: 2017-06-08 | Stop reason: HOSPADM

## 2017-05-26 RX ORDER — GLUCAGON 1 MG
1 KIT INJECTION
Status: DISCONTINUED | OUTPATIENT
Start: 2017-05-26 | End: 2017-06-08 | Stop reason: HOSPADM

## 2017-05-26 RX ORDER — OXYBUTYNIN CHLORIDE 5 MG/1
5 TABLET, EXTENDED RELEASE ORAL DAILY
Status: DISCONTINUED | OUTPATIENT
Start: 2017-05-27 | End: 2017-06-08 | Stop reason: HOSPADM

## 2017-05-26 RX ORDER — OXYCODONE HYDROCHLORIDE 5 MG/1
10 TABLET ORAL
Status: COMPLETED | OUTPATIENT
Start: 2017-05-26 | End: 2017-05-26

## 2017-05-26 RX ORDER — ONDANSETRON 8 MG/1
8 TABLET, ORALLY DISINTEGRATING ORAL EVERY 8 HOURS PRN
Status: DISCONTINUED | OUTPATIENT
Start: 2017-05-26 | End: 2017-06-08 | Stop reason: HOSPADM

## 2017-05-26 RX ORDER — CITALOPRAM 10 MG/1
20 TABLET ORAL DAILY
Status: DISCONTINUED | OUTPATIENT
Start: 2017-05-27 | End: 2017-06-08 | Stop reason: HOSPADM

## 2017-05-26 RX ORDER — TRAZODONE HYDROCHLORIDE 50 MG/1
100 TABLET ORAL NIGHTLY
Status: DISCONTINUED | OUTPATIENT
Start: 2017-05-26 | End: 2017-06-08 | Stop reason: HOSPADM

## 2017-05-26 RX ORDER — CARBAMAZEPINE 200 MG/1
400 TABLET, EXTENDED RELEASE ORAL 2 TIMES DAILY
Status: DISCONTINUED | OUTPATIENT
Start: 2017-05-26 | End: 2017-06-08 | Stop reason: HOSPADM

## 2017-05-26 RX ORDER — ACETAMINOPHEN 325 MG/1
650 TABLET ORAL EVERY 4 HOURS PRN
Status: DISCONTINUED | OUTPATIENT
Start: 2017-05-26 | End: 2017-06-08 | Stop reason: HOSPADM

## 2017-05-26 RX ORDER — DANTROLENE SODIUM 50 MG/1
50 CAPSULE ORAL 4 TIMES DAILY
Status: DISCONTINUED | OUTPATIENT
Start: 2017-05-27 | End: 2017-06-08 | Stop reason: HOSPADM

## 2017-05-26 RX ORDER — OXYCODONE HYDROCHLORIDE 5 MG/1
15 TABLET ORAL 2 TIMES DAILY PRN
Status: DISCONTINUED | OUTPATIENT
Start: 2017-05-26 | End: 2017-06-08 | Stop reason: HOSPADM

## 2017-05-26 RX ORDER — IBUPROFEN 200 MG
24 TABLET ORAL
Status: DISCONTINUED | OUTPATIENT
Start: 2017-05-26 | End: 2017-06-08 | Stop reason: HOSPADM

## 2017-05-26 RX ORDER — IBUPROFEN 200 MG
16 TABLET ORAL
Status: DISCONTINUED | OUTPATIENT
Start: 2017-05-26 | End: 2017-06-08 | Stop reason: HOSPADM

## 2017-05-26 RX ORDER — IPRATROPIUM BROMIDE AND ALBUTEROL SULFATE 2.5; .5 MG/3ML; MG/3ML
3 SOLUTION RESPIRATORY (INHALATION) EVERY 4 HOURS PRN
Status: DISCONTINUED | OUTPATIENT
Start: 2017-05-26 | End: 2017-06-08 | Stop reason: HOSPADM

## 2017-05-26 RX ORDER — ONDANSETRON 2 MG/ML
4 INJECTION INTRAMUSCULAR; INTRAVENOUS EVERY 12 HOURS PRN
Status: DISCONTINUED | OUTPATIENT
Start: 2017-05-26 | End: 2017-06-08 | Stop reason: HOSPADM

## 2017-05-26 RX ORDER — GABAPENTIN 300 MG/1
900 CAPSULE ORAL 3 TIMES DAILY
Status: DISCONTINUED | OUTPATIENT
Start: 2017-05-27 | End: 2017-06-08 | Stop reason: HOSPADM

## 2017-05-26 RX ADMIN — OXYCODONE HYDROCHLORIDE 15 MG: 5 TABLET ORAL at 11:05

## 2017-05-26 RX ADMIN — TRAZODONE HYDROCHLORIDE 100 MG: 50 TABLET ORAL at 11:05

## 2017-05-26 RX ADMIN — IOHEXOL 100 ML: 350 INJECTION, SOLUTION INTRAVENOUS at 08:05

## 2017-05-26 RX ADMIN — CARBAMAZEPINE 400 MG: 200 TABLET, EXTENDED RELEASE ORAL at 11:05

## 2017-05-26 RX ADMIN — BACLOFEN 20 MG: 10 TABLET ORAL at 11:05

## 2017-05-26 RX ADMIN — OXYCODONE HYDROCHLORIDE 10 MG: 5 TABLET ORAL at 06:05

## 2017-05-26 RX ADMIN — DANTROLENE SODIUM 50 MG: 50 CAPSULE ORAL at 11:05

## 2017-05-26 NOTE — ED TRIAGE NOTES
Pt states he can not walk, and has hx of multiple sclerosis  Today his aide came to his house, found him on the floor and could not get him up from the floor.  Pt also complains of all over body pain, blurred vision, grace ear pain.

## 2017-05-26 NOTE — TELEPHONE ENCOUNTER
Left these messages on the patient's v/mail.     He should see his new pain mgmt physician.  I think the Opana ER that doctor prescribed is not covered by his insurance.  He will need to discuss with his doctor.

## 2017-05-26 NOTE — ED PROVIDER NOTES
Encounter Date: 5/26/2017       History     Chief Complaint   Patient presents with    Fatigue     General fatigue, weakness with pain, and history of diagnosed MS - EMS found patient pain/controlled medications quite empty for date of fill.     Review of patient's allergies indicates:  No Known Allergies    HPI   51M with h/o multiple sclerosis brought in by EMS after found down by his occupational therapist at home due to progressively worsening difficulty walking in the last week. He has been found incontinent of stool and urine at home multiple times and slept on the floor last night due to his inability to pick himself back up. Pt reports that has been unable to receive his medications from the pharmacy due to his condition and requested a new site for the prescriptions to be sent. Currently he denies any chest pain, shortness of breath, abdominal pain, difficulty urinating or passing bowel movements.     Past Medical History:   Diagnosis Date    Multiple sclerosis      History reviewed. No pertinent surgical history.  Family History   Problem Relation Age of Onset    Arthritis Mother     No Known Problems Father      Social History   Substance Use Topics    Smoking status: Current Some Day Smoker     Packs/day: 0.50     Types: Cigarettes    Smokeless tobacco: Never Used    Alcohol use No     Review of Systems   Constitutional: Negative for fatigue and fever.   HENT: Negative for sneezing and sore throat.    Respiratory: Negative for cough and shortness of breath.    Cardiovascular: Negative for chest pain and palpitations.   Gastrointestinal: Negative for diarrhea and nausea.   Genitourinary: Negative for dysuria and flank pain.   Musculoskeletal: Positive for gait problem. Negative for arthralgias and back pain.   Skin: Negative for pallor and rash.   Neurological: Negative for tremors and weakness.   Hematological: Does not bruise/bleed easily.   Psychiatric/Behavioral: Negative for agitation and  behavioral problems.       Physical Exam     Initial Vitals [05/26/17 1422]   BP Pulse Resp Temp SpO2   (!) 138/99 72 14 98 °F (36.7 °C) 99 %     Physical Exam    Nursing note and vitals reviewed.  Constitutional: He appears well-developed and well-nourished. He is not diaphoretic. No distress.   HENT:   Head: Normocephalic and atraumatic.   Nose: Nose normal.   Mouth/Throat: Oropharynx is clear and moist. No oropharyngeal exudate.   Eyes: Conjunctivae and EOM are normal. Pupils are equal, round, and reactive to light. Right eye exhibits no discharge. Left eye exhibits no discharge. No scleral icterus.   Neck: Normal range of motion. Neck supple. No thyromegaly present. No tracheal deviation present. No JVD present.   Cardiovascular: Normal rate, regular rhythm, normal heart sounds and intact distal pulses. Exam reveals no gallop and no friction rub.    No murmur heard.  Pulmonary/Chest: Breath sounds normal. No stridor. No respiratory distress. He has no wheezes. He has no rhonchi. He has no rales. He exhibits no tenderness.   Abdominal: Soft. Bowel sounds are normal. He exhibits no distension and no mass. There is no tenderness. There is no rebound and no guarding.   Musculoskeletal: Normal range of motion. He exhibits no edema or tenderness.   Lymphadenopathy:     He has no cervical adenopathy.   Neurological: He is alert and oriented to person, place, and time. He has normal strength. No cranial nerve deficit or sensory deficit.   Baseline L-sided paralysis with inability to range LLE. L arm flexed and rigid likely due to spasticity. RUE and RLE with 3/5 strength.   Skin: Skin is warm and dry. No rash and no abscess noted. No erythema. No pallor.   Psychiatric: He has a normal mood and affect. Thought content normal.         ED Course   Procedures  Labs Reviewed   CBC W/ AUTO DIFFERENTIAL - Abnormal; Notable for the following:        Result Value    RBC 4.41 (*)     Hemoglobin 13.3 (*)     Hematocrit 38.5 (*)      Gran # 8.6 (*)     Lymph # 0.8 (*)     Mono # 0.1 (*)     Gran% 90.4 (*)     Lymph% 8.5 (*)     Mono% 0.8 (*)     All other components within normal limits   COMPREHENSIVE METABOLIC PANEL - Abnormal; Notable for the following:     AST 9 (*)     All other components within normal limits   CK                   APC / Resident Notes:   MDM: 51M with h/o MS presenting for decline in conditions x 1w, no longer able to ambulate   Initial ddx included but was not limited to: deconditioning in the setting suboptimal social placement; lower suspicion of acute flare of MS, metabolic encephalopathy, TIA/CVA, SAH, sepsis, UTI  CBC wnl   CMP wnl  CXR with no e/o consolidation, effusion or edema  CT head with no e/o acute intracranial   Pending urine sample as patient was unable to provide sample thus far.   D/w Medicine and case management, given patient's inability to return to current environment, pt placed in observation for optimization of social situation and possible placement in rehab.   Sanford Gilliland MD  PGY-1 [employer] EM           Attending Attestation:   Physician Attestation Statement for Resident:  As the supervising MD   Physician Attestation Statement: I have personally seen and examined this patient.   I agree with the above history. -: Patient presents with weakness and heavy difficulty managing his MS at home.     As the supervising MD I agree with the above PE.   -: On exam he is alert and conversant. He has weakness to his legs and left arm. He is able to his move right arm well.    As the supervising MD I agree with the above treatment, course, plan, and disposition.   -: Will look for signs of infection. Will do head CT due to falls and look for subdural hematoma. He does not sound like he is succeeding at home. May need admission for neuro eval and therapy evaluation                       ED Course     Clinical Impression:   Diagnoses of Fatigue, MS (multiple sclerosis), Chronic pain of multiple sites, and  Neurogenic bladder were pertinent to this visit.          Zion Joy MD  06/03/17 8652

## 2017-05-26 NOTE — ED NOTES
Pt identifiers Mao Levin were checked and are correct  LOC: The patient is awake, alert, aware of environment with an appropriate affect. Oriented x3, speaking appropriately  APPEARANCE: Pt rates all over body pain a 10/10 , in no acute distress, pt is clean and well groomed, clothing properly fastened  SKIN: Skin warm, dry and intact, normal skin turgor, moist mucus membranes  RESPIRATORY: Airway is open and patent, respirations are spontaneous, even and unlabored, normal effort and rate Breath sounds clear bill to all lung fields on auscultaiton  CARDIAC: Normal rate and rhythm, no peripheral edema noted, capillary refill < 3 seconds, bilateral radial pulses 2+  ABDOMEN: Soft, nontender, nondistended. Bowel sounds present to all four quad of abd on auscultation  NEUROLOGIC: PERRL, facial expression is symmetrical, patient moves upper extremities,   States he does not move his left leg, and small amount movement with right leg   Follows all commands appropriately  MUSCULOSKELETAL: No obvious deformities.

## 2017-05-27 LAB
BILIRUB UR QL STRIP: NEGATIVE
CLARITY UR REFRACT.AUTO: CLEAR
COLOR UR AUTO: YELLOW
GLUCOSE UR QL STRIP: NEGATIVE
HGB UR QL STRIP: NEGATIVE
KETONES UR QL STRIP: NEGATIVE
LEUKOCYTE ESTERASE UR QL STRIP: NEGATIVE
NITRITE UR QL STRIP: NEGATIVE
PH UR STRIP: 6 [PH] (ref 5–8)
PROT UR QL STRIP: NEGATIVE
SP GR UR STRIP: 1.02 (ref 1–1.03)
URN SPEC COLLECT METH UR: NORMAL
UROBILINOGEN UR STRIP-ACNC: NEGATIVE EU/DL

## 2017-05-27 PROCEDURE — 25000003 PHARM REV CODE 250: Performed by: PHYSICIAN ASSISTANT

## 2017-05-27 PROCEDURE — 99226 PR SUBSEQUENT OBSERVATION CARE,LEVEL III: CPT | Mod: ,,, | Performed by: PHYSICIAN ASSISTANT

## 2017-05-27 PROCEDURE — 81003 URINALYSIS AUTO W/O SCOPE: CPT

## 2017-05-27 PROCEDURE — G0378 HOSPITAL OBSERVATION PER HR: HCPCS

## 2017-05-27 RX ADMIN — DANTROLENE SODIUM 50 MG: 50 CAPSULE ORAL at 06:05

## 2017-05-27 RX ADMIN — CITALOPRAM HYDROBROMIDE 20 MG: 10 TABLET ORAL at 08:05

## 2017-05-27 RX ADMIN — CARBAMAZEPINE 400 MG: 200 TABLET, EXTENDED RELEASE ORAL at 08:05

## 2017-05-27 RX ADMIN — OXYBUTYNIN CHLORIDE 5 MG: 5 TABLET, EXTENDED RELEASE ORAL at 08:05

## 2017-05-27 RX ADMIN — STANDARDIZED SENNA CONCENTRATE AND DOCUSATE SODIUM 1 TABLET: 8.6; 5 TABLET, FILM COATED ORAL at 08:05

## 2017-05-27 RX ADMIN — HYDROCODONE BITARTRATE AND ACETAMINOPHEN 1 TABLET: 5; 325 TABLET ORAL at 02:05

## 2017-05-27 RX ADMIN — DANTROLENE SODIUM 50 MG: 50 CAPSULE ORAL at 11:05

## 2017-05-27 RX ADMIN — DANTROLENE SODIUM 50 MG: 50 CAPSULE ORAL at 05:05

## 2017-05-27 RX ADMIN — BACLOFEN 20 MG: 10 TABLET ORAL at 06:05

## 2017-05-27 RX ADMIN — TRAZODONE HYDROCHLORIDE 100 MG: 50 TABLET ORAL at 09:05

## 2017-05-27 RX ADMIN — CARBAMAZEPINE 400 MG: 200 TABLET, EXTENDED RELEASE ORAL at 09:05

## 2017-05-27 RX ADMIN — HYDROCODONE BITARTRATE AND ACETAMINOPHEN 1 TABLET: 5; 325 TABLET ORAL at 06:05

## 2017-05-27 RX ADMIN — BACLOFEN 20 MG: 10 TABLET ORAL at 11:05

## 2017-05-27 RX ADMIN — GABAPENTIN 900 MG: 300 CAPSULE ORAL at 02:05

## 2017-05-27 RX ADMIN — OXYCODONE HYDROCHLORIDE 15 MG: 5 TABLET ORAL at 11:05

## 2017-05-27 RX ADMIN — GABAPENTIN 900 MG: 300 CAPSULE ORAL at 06:05

## 2017-05-27 RX ADMIN — GABAPENTIN 900 MG: 300 CAPSULE ORAL at 09:05

## 2017-05-27 RX ADMIN — RIVAROXABAN 20 MG: 20 TABLET, FILM COATED ORAL at 05:05

## 2017-05-27 RX ADMIN — BACLOFEN 20 MG: 10 TABLET ORAL at 05:05

## 2017-05-27 RX ADMIN — HYDROCODONE BITARTRATE AND ACETAMINOPHEN 1 TABLET: 5; 325 TABLET ORAL at 09:05

## 2017-05-27 NOTE — ASSESSMENT & PLAN NOTE
- Patient recently discharged from SNF after 45 day stay. Was admitted before that with increased weakness and pain and found to have UTI, MRI at that time was negative for true MS flare. Patient is known to team and he appears stronger than his last admission. Unlikely a true MS flare at this time. The patient agrees with that assessment.   - While he may improve his strength at inpatient rehab, a larger discussion regarding his long term plan of care is necessary. His best interests may be with a [facility] nursing home however at this time her refuses and says if he can't get into rehab he will just return home.   - Case discussed with case management weekend team but limited resources available at this time. Await PT/OT and PMR assessments. Patient does not meet inpatient criteria at this time.   - Hold on consulting neurology as patient does not manifest symptoms of MS flare or psuedoflare.  - UA clear

## 2017-05-27 NOTE — HPI
"51M with a PMHx of multiple sclerosis on rotuxin therapy, saddle PE on chronic anticoagulation therapy, and chronic pain presents after being found on the ground and unable to get up by his home health PT/OT. Patient reports progressively worsening ability to ambulate since being discharged after a >45 day stay at SNF. The patient is known to me from his previous admission before going to SNF. The patient lives alone and states that he has been found down multiple times and will "roll" to the door to answer HH. The patient is requesting inpatient rehabilitation. He states previous stays in the past have helped him gain strength. He believes that SNF did not provide him with enough hours of PT/OT per day. He denies any pain and numbness to his face and right eye or vision changes, which is what usually occurs when he has a flare. He denies chest pain, SOB, dizziness, palpitations, fever/chills, N/V/D. His chronic pain is unchanged.  "

## 2017-05-27 NOTE — ASSESSMENT & PLAN NOTE
- Patient recently discharged from SNF after 45 day stay. Was admitted before that with increased weakness and pain and found to have UTI, MRI at that time was negative for true MS flare. Patient is known to admitting team and he appears stronger than his last admission. I do not suspect a true MS flare at this time. The patient agrees with that assessment.   - While he may improve his strength at inpatient rehab, a larger discussion regarding his long term plan of care is necessary. His best interests may be with a [facility] nursing home.  - will consult PT/OT, IP rehab, will hold on consulting neurology as patient does not manifest symptoms of MS flare or psuedoflare.  - UA not resulted on admission, check in AM

## 2017-05-27 NOTE — HOSPITAL COURSE
Placed in observation for multiple sclerosis and weakness requesting inpatient rehab placement. PT/OT consulted and PMR consulted, patient not currently appropriate for inpatient rehab admission, recommend SNF placement. CM/SW involved. SNF consult placed, patient reportedly more appropriate for long term skilled. Therapist who worked with patient over the weekend recommending [facility], consult placed and patient medically approved but denied by insurance. CM/SW assisted in finding SNF/NH placement however patient has no further SNF days and would require group home NH placement which he initially refused. Meeting with patient and care team on 6/6 and patient is now agreeable to group home nursing home as he is unable to care for self alone. SW arranged nursing home placement and patient discharged in stable condition.

## 2017-05-27 NOTE — ED NOTES
Pt resting comfortably and independently repositioned in stretcher with bed locked in lowest position for safety. NAD noted at this time. Respirations even and unlabored and visible chest rise noted.  Patient offered bathroom assistance and denies need at this time. Pt instructed to call if assistance is needed. Pt on continuous cardiac, BP, and O2 monitoring. Call light within reach.  No needs at this time. Will continue to monitor.

## 2017-05-27 NOTE — ED NOTES
Report received from vijay anthony rn. Pt care assumed. Pt currently in xray awaiting return to ed

## 2017-05-27 NOTE — SUBJECTIVE & OBJECTIVE
Interval History: No acute events overnight. Long discussion with patient who says he lives alone but mother checks on him often but unable to do much to help him. After his previous stay at rehab he was strong enough to walk and care for himself but more recently after being at SNF he did not get the therapy he needed.     Review of Systems   Constitutional: Positive for activity change. Negative for chills, fatigue and fever.   Eyes: Negative for photophobia, pain and visual disturbance.   Respiratory: Negative for cough and shortness of breath.    Cardiovascular: Negative for chest pain, palpitations and leg swelling.   Gastrointestinal: Negative for abdominal pain, nausea and vomiting.   Musculoskeletal: Positive for arthralgias, back pain, gait problem and myalgias.   Skin: Negative for rash and wound.   Neurological: Positive for weakness. Negative for dizziness, seizures, syncope, numbness and headaches.     Objective:     Vital Signs (Most Recent):  Temp: 98 °F (36.7 °C) (05/27/17 1529)  Pulse: 78 (05/27/17 1529)  Resp: 19 (05/27/17 1529)  BP: 118/73 (05/27/17 1529)  SpO2: (!) 10 % (05/27/17 1529) Vital Signs (24h Range):  Temp:  [97.1 °F (36.2 °C)-98.6 °F (37 °C)] 98 °F (36.7 °C)  Pulse:  [56-78] 78  Resp:  [12-20] 19  SpO2:  [10 %-100 %] 10 %  BP: (110-139)/(58-91) 118/73     Weight: 78.6 kg (173 lb 4.5 oz)  Body mass index is 24.18 kg/m².    Intake/Output Summary (Last 24 hours) at 05/27/17 1542  Last data filed at 05/27/17 0800   Gross per 24 hour   Intake              350 ml   Output              100 ml   Net              250 ml      Physical Exam   Constitutional: He is oriented to person, place, and time. He appears well-nourished. No distress.   Cardiovascular: Normal rate and regular rhythm.    No murmur heard.  Pulmonary/Chest: Effort normal and breath sounds normal. No respiratory distress. He has no wheezes.   Abdominal: Soft. Bowel sounds are normal. He exhibits no distension. There is no  tenderness. There is no guarding.   Musculoskeletal: He exhibits no edema, tenderness or deformity.   Neurological: He is alert and oriented to person, place, and time. No cranial nerve deficit.   Baseline L-sided paralysis, L arm contracted, LLE with little to no strength or movement. RUE and RLE with 3/5 strength. All near baseline per patient.    Skin: He is not diaphoretic.   Nursing note and vitals reviewed.    Significant Labs:   BMP:   Recent Labs  Lab 05/26/17  1832         K 3.7      CO2 23   BUN 8   CREATININE 0.8   CALCIUM 8.7     CBC:   Recent Labs  Lab 05/26/17  1832   WBC 9.51   HGB 13.3*   HCT 38.5*        All pertinent labs within the past 24 hours have been reviewed.    Significant Imaging: I have reviewed all pertinent imaging results/findings within the past 24 hours.

## 2017-05-27 NOTE — ASSESSMENT & PLAN NOTE
- baclofen 20 mg QID, carbamazepine  mg BID, celexa 20 mg daily, dantrolene 50 mg QID, gabapentin 900 mg TID, oxycodone 15mg BID PRN, norco PRN,   - during last admission his mother asked me to consider addiction specialists as she felt patient was addicted to pain pills. I offered psych services at that time to the patient and he declined.   - EMS noted that his pain pills were more empty than should have been when they arrived. No current signs of overdose or withdrawal.

## 2017-05-27 NOTE — H&P
"[facility] Hospital Medicine  History & Physical    Patient Name: Mao Levin  MRN: [id number]  Admission Date: 5/26/2017  Attending Physician: Wes Looney MD   Primary Care Provider: Fausto Leung MD    [facility] Medicine Team: Networked reference to record PCT  Jim Galarza PA-C     Patient information was obtained from patient, past medical records and ER records.     Subjective:     Principal Problem:MS (multiple sclerosis)    Chief Complaint:   Chief Complaint   Patient presents with    Fatigue     General fatigue, weakness with pain, and history of diagnosed MS - EMS found patient pain/controlled medications quite empty for date of fill.        HPI: 51M with a PMHx of multiple sclerosis on rotuxin therapy, saddle PE on chronic anticoagulation therapy, and chronic pain presents after being found on the ground and unable to get up by his home health PT/OT. Patient reports progressively worsening ability to ambulate since being discharged after a >45 day stay at SNF. The patient is known to me from his previous admission before going to SNF. The patient lives alone and states that he has been found down multiple times and will "roll" to the door to answer HH. The patient is requesting inpatient rehabilitation. He states previous stays in the past have helped him gain strength. He believes that SNF did not provide him with enough hours of PT/OT per day. He denies any pain and numbness to his face and right eye or vision changes, which is what usually occurs when he has a flare. He denies chest pain, SOB, dizziness, palpitations, fever/chills, N/V/D. His chronic pain is unchanged.    Past Medical History:   Diagnosis Date    Multiple sclerosis        History reviewed. No pertinent surgical history.    Review of patient's allergies indicates:  No Known Allergies    No current facility-administered medications on file prior to encounter.      Current Outpatient Prescriptions on File Prior " to Encounter   Medication Sig    baclofen (LIORESAL) 20 MG tablet Take 1 tablet (20 mg total) by mouth 4 (four) times daily.    carbamazepine (TEGRETOL XR) 400 MG Tb12 Take 1 tablet (400 mg total) by mouth 2 (two) times daily.    citalopram (CELEXA) 20 MG tablet Take 1 tablet (20 mg total) by mouth once daily.    dantrolene (DANTRIUM) 50 MG Cap Take 1 capsule (50 mg total) by mouth 4 (four) times daily.    ergocalciferol (VITAMIN D2) 50,000 unit Cap Take 1 capsule (50,000 Units total) by mouth every 7 days.    gabapentin (NEURONTIN) 300 MG capsule Take 3 capsules (900 mg total) by mouth 3 (three) times daily.    oxybutynin (DITROPAN-XL) 5 MG TR24 Take 5 mg by mouth once daily.    predniSONE (DELTASONE) 20 MG tablet Take 3 pills daily for 3 days, then 2 pills daily for 3 days, then 1 pill daily for 3 days, then 1/2 pill daily for 4 days.    trazodone (DESYREL) 100 MG tablet Take 100 mg by mouth every evening.    diazePAM (VALIUM) 5 MG tablet Take 1 tablet (5 mg total) by mouth 2 (two) times daily as needed (muscle spasms).    oxycodone (ROXICODONE) 15 MG Tab Take 1 tablet (15 mg total) by mouth 2 (two) times daily as needed for Pain.    oxyMORphone (OPANA ER) 7.5 mg TR12 Take 7.5 mg by mouth 2 (two) times daily.    polyethylene glycol (GLYCOLAX) 17 gram/dose powder Take 17 g by mouth once daily.    rivaroxaban (XARELTO) 20 mg Tab Take 1 tablet (20 mg total) by mouth daily with dinner or evening meal.    senna-docusate 8.6-50 mg (PERICOLACE) 8.6-50 mg per tablet Take 1 tablet by mouth once daily.     Family History     Problem Relation (Age of Onset)    Arthritis Mother    No Known Problems Father        Social History Main Topics    Smoking status: Current Some Day Smoker     Packs/day: 0.50     Types: Cigarettes    Smokeless tobacco: Never Used    Alcohol use No    Drug use: No    Sexual activity: No     Review of Systems   Constitutional: Positive for activity change. Negative for chills,  fatigue and fever.   HENT: Negative for trouble swallowing and voice change.    Eyes: Negative for photophobia, pain and visual disturbance.   Respiratory: Negative for cough and shortness of breath.    Cardiovascular: Negative for chest pain, palpitations and leg swelling.   Gastrointestinal: Negative for abdominal pain, nausea and vomiting.   Endocrine: Negative for cold intolerance and heat intolerance.   Genitourinary: Negative for difficulty urinating and dysuria.   Musculoskeletal: Positive for arthralgias, back pain, gait problem and myalgias.   Skin: Negative for rash and wound.   Neurological: Positive for weakness. Negative for dizziness, seizures, syncope, numbness and headaches.   Psychiatric/Behavioral: Negative for agitation and confusion.     Objective:     Vital Signs (Most Recent):  Temp: 98.6 °F (37 °C) (05/26/17 2314)  Pulse: 71 (05/26/17 2314)  Resp: 20 (05/26/17 2314)  BP: 128/85 (05/26/17 2314)  SpO2: 100 % (05/26/17 2314) Vital Signs (24h Range):  Temp:  [97.8 °F (36.6 °C)-98.6 °F (37 °C)] 98.6 °F (37 °C)  Pulse:  [56-78] 71  Resp:  [12-20] 20  SpO2:  [99 %-100 %] 100 %  BP: (128-139)/(85-99) 128/85     Weight: 78.6 kg (173 lb 4.5 oz)  Body mass index is 24.18 kg/m².    Physical Exam   Constitutional: He is oriented to person, place, and time. No distress.   HENT:   Head: Normocephalic and atraumatic.   Eyes: EOM are normal. Pupils are equal, round, and reactive to light.   Cardiovascular: Normal rate and regular rhythm.    No murmur heard.  Pulmonary/Chest: Effort normal and breath sounds normal. No respiratory distress. He has no wheezes.   Abdominal: Soft. Bowel sounds are normal. He exhibits no distension. There is no tenderness. There is no guarding.   Musculoskeletal: He exhibits no edema, tenderness or deformity.   Neurological: He is alert and oriented to person, place, and time. No cranial nerve deficit.   Baseline L-sided paralysis, L arm contracted, LLE with little to no strength or  movement. RUE and RLE with 3/5 strength. All near baseline per patient.    Skin: He is not diaphoretic.   Nursing note and vitals reviewed.       Significant Labs:   CBC:   Recent Labs  Lab 05/26/17  1832   WBC 9.51   HGB 13.3*   HCT 38.5*        CMP:   Recent Labs  Lab 05/26/17  1832      K 3.7      CO2 23      BUN 8   CREATININE 0.8   CALCIUM 8.7   PROT 6.9   ALBUMIN 3.9   BILITOT 0.3   ALKPHOS 82   AST 9*   ALT 25   ANIONGAP 10   EGFRNONAA >60.0     Cardiac Markers: No results for input(s): CKMB, MYOGLOBIN, BNP, TROPISTAT in the last 48 hours.  Urine Studies: No results for input(s): COLORU, APPEARANCEUA, PHUR, SPECGRAV, PROTEINUA, GLUCUA, KETONESU, BILIRUBINUA, OCCULTUA, NITRITE, UROBILINOGEN, LEUKOCYTESUR, RBCUA, WBCUA, BACTERIA, SQUAMEPITHEL, HYALINECASTS in the last 48 hours.    Invalid input(s): WRIGHTSUR  All pertinent labs within the past 24 hours have been reviewed.    Significant Imaging: CT: I have reviewed all pertinent results/findings within the past 24 hours and my personal findings are:  no acute findings  I have reviewed all pertinent imaging results/findings within the past 24 hours.    Assessment/Plan:     * MS (multiple sclerosis)    - Patient recently discharged from SNF after 45 day stay. Was admitted before that with increased weakness and pain and found to have UTI, MRI at that time was negative for true MS flare. Patient is known to admitting team and he appears stronger than his last admission. I do not suspect a true MS flare at this time. The patient agrees with that assessment.   - While he may improve his strength at inpatient rehab, a larger discussion regarding his long term plan of care is necessary. His best interests may be with a senior living nursing home.  - will consult PT/OT, IP rehab, will hold on consulting neurology as patient does not manifest symptoms of MS flare or psuedoflare.  - UA not resulted on admission, check in AM        Chronic pain of  multiple sites    - baclofen 20 mg QID, carbamazepine  mg BID, celexa 20 mg daily, dantrolene 50 mg QID, gabapentin 900 mg TID, oxycodone 15mg BID PRN, norco PRN,   - during last admission his mother asked me to consider addiction specialists as she felt patient was addicted to pain pills. I offered psych services at that time to the patient and he declined.   - EMS noted that his pain pills were more empty than should have been when they arrived. No current signs of overdose or withdrawal.         10/25/2016 Saddle PE    - continue xarelto        Neurogenic bladder    - continue oxybutynin, bladder scan and cath PRN          VTE Risk Mitigation         Ordered     rivaroxaban tablet 20 mg  With dinner     Route:  Oral        05/26/17 2235     Reason for No Pharmacological VTE Prophylaxis  Once      05/26/17 2235     Medium Risk of VTE  Once      05/26/17 2235        Jim Galarza PA-C  Department of Hospital Medicine   [facility]

## 2017-05-27 NOTE — PROGRESS NOTES
"[facility] Hospital Medicine  Progress Note    Patient Name: Mao Levin  MRN: [id number]  Patient Class: OP- Observation   Admission Date: 5/26/2017  Length of Stay: 0 days  Attending Physician: Wes Looney MD  Primary Care Provider: Fausto Leung MD    [facility] Medicine Team: Networked reference to record PCT  Seema Reyes PA-C    Subjective:     Principal Problem:MS (multiple sclerosis)    HPI:  51M with a PMHx of multiple sclerosis on rotuxin therapy, saddle PE on chronic anticoagulation therapy, and chronic pain presents after being found on the ground and unable to get up by his home health PT/OT. Patient reports progressively worsening ability to ambulate since being discharged after a >45 day stay at SNF. The patient is known to me from his previous admission before going to SNF. The patient lives alone and states that he has been found down multiple times and will "roll" to the door to answer HH. The patient is requesting inpatient rehabilitation. He states previous stays in the past have helped him gain strength. He believes that SNF did not provide him with enough hours of PT/OT per day. He denies any pain and numbness to his face and right eye or vision changes, which is what usually occurs when he has a flare. He denies chest pain, SOB, dizziness, palpitations, fever/chills, N/V/D. His chronic pain is unchanged.    Hospital Course:  Placed in observation for multiple sclerosis and weakness requesting inpatient rehab placement. PT/OT consulted and PMR consult pending. CM/SW involved.     Interval History: No acute events overnight. Long discussion with patient who says he lives alone but mother checks on him often but unable to do much to help him. After his previous stay at rehab he was strong enough to walk and care for himself but more recently after being at SNF he did not get the therapy he needed.     Review of Systems   Constitutional: Positive for activity change. " Negative for chills, fatigue and fever.   Eyes: Negative for photophobia, pain and visual disturbance.   Respiratory: Negative for cough and shortness of breath.    Cardiovascular: Negative for chest pain, palpitations and leg swelling.   Gastrointestinal: Negative for abdominal pain, nausea and vomiting.   Musculoskeletal: Positive for arthralgias, back pain, gait problem and myalgias.   Skin: Negative for rash and wound.   Neurological: Positive for weakness. Negative for dizziness, seizures, syncope, numbness and headaches.     Objective:     Vital Signs (Most Recent):  Temp: 98 °F (36.7 °C) (05/27/17 1529)  Pulse: 78 (05/27/17 1529)  Resp: 19 (05/27/17 1529)  BP: 118/73 (05/27/17 1529)  SpO2: (!) 10 % (05/27/17 1529) Vital Signs (24h Range):  Temp:  [97.1 °F (36.2 °C)-98.6 °F (37 °C)] 98 °F (36.7 °C)  Pulse:  [56-78] 78  Resp:  [12-20] 19  SpO2:  [10 %-100 %] 10 %  BP: (110-139)/(58-91) 118/73     Weight: 78.6 kg (173 lb 4.5 oz)  Body mass index is 24.18 kg/m².    Intake/Output Summary (Last 24 hours) at 05/27/17 1542  Last data filed at 05/27/17 0800   Gross per 24 hour   Intake              350 ml   Output              100 ml   Net              250 ml      Physical Exam   Constitutional: He is oriented to person, place, and time. He appears well-nourished. No distress.   Cardiovascular: Normal rate and regular rhythm.    No murmur heard.  Pulmonary/Chest: Effort normal and breath sounds normal. No respiratory distress. He has no wheezes.   Abdominal: Soft. Bowel sounds are normal. He exhibits no distension. There is no tenderness. There is no guarding.   Musculoskeletal: He exhibits no edema, tenderness or deformity.   Neurological: He is alert and oriented to person, place, and time. No cranial nerve deficit.   Baseline L-sided paralysis, L arm contracted, LLE with little to no strength or movement. RUE and RLE with 3/5 strength. All near baseline per patient.    Skin: He is not diaphoretic.   Nursing note  and vitals reviewed.    Significant Labs:   BMP:   Recent Labs  Lab 05/26/17  1832         K 3.7      CO2 23   BUN 8   CREATININE 0.8   CALCIUM 8.7     CBC:   Recent Labs  Lab 05/26/17  1832   WBC 9.51   HGB 13.3*   HCT 38.5*        All pertinent labs within the past 24 hours have been reviewed.    Significant Imaging: I have reviewed all pertinent imaging results/findings within the past 24 hours.    Assessment/Plan:      10/25/2016 Saddle PE    - continue xarelto        Neurogenic bladder    - continue oxybutynin, bladder scan and cath PRN        Chronic pain of multiple sites    - Continue baclofen 20 mg QID, carbamazepine  mg BID, celexa 20 mg daily, dantrolene 50 mg QID, gabapentin 900 mg TID, oxycodone 15mg BID PRN, norco PRN,   - during last admission his mother asked me to consider addiction specialists as she felt patient was addicted to pain pills. I offered psych services at that time to the patient and he declined.   - EMS noted that his pain pills were more empty than should have been when they arrived. No current signs of overdose or withdrawal.         * MS (multiple sclerosis)    - Patient recently discharged from SNF after 45 day stay. Was admitted before that with increased weakness and pain and found to have UTI, MRI at that time was negative for true MS flare. Patient is known to team and he appears stronger than his last admission. Unlikely a true MS flare at this time. The patient agrees with that assessment.   - While he may improve his strength at inpatient rehab, a larger discussion regarding his long term plan of care is necessary. His best interests may be with a prison nursing home however at this time her refuses and says if he can't get into rehab he will just return home.   - Case discussed with case management weekend team but limited resources available at this time. Await PT/OT and PMR assessments. Patient does not meet inpatient criteria at this  time.   - Hold on consulting neurology as patient does not manifest symptoms of MS flare or psuedoflare.  - UA clear          VTE Risk Mitigation         Ordered     rivaroxaban tablet 20 mg  With dinner     Route:  Oral        05/26/17 2235     Reason for No Pharmacological VTE Prophylaxis  Once      05/26/17 2235     Medium Risk of VTE  Once      05/26/17 2235          Seema Reyes PA-C  Department of Hospital Medicine   [facility]

## 2017-05-27 NOTE — PLAN OF CARE
Problem: Patient Care Overview  Goal: Plan of Care Review  Outcome: Ongoing (interventions implemented as appropriate)  Plan of care reviewed with pt.  Understanding verbalized.  Pt alert and oriented x 4.  Afebrile.  VSS.  Pain relieved with prn pain medication.  No acute events this shift.  Numbness and tingling noted to all extremities except LLE.  Pt moderately impaired on left side.  Urinary and fecal incontinent.  Assistance with weight shifting provided by nurse and tech.  Fall precautions in place.  Bed alarm on.  Bed in lowest position.  Yellow fall band on.  Call light and bedside table within reach.  Safety precautions maintained throughout shift.

## 2017-05-27 NOTE — SUBJECTIVE & OBJECTIVE
Past Medical History:   Diagnosis Date    Multiple sclerosis        History reviewed. No pertinent surgical history.    Review of patient's allergies indicates:  No Known Allergies    No current facility-administered medications on file prior to encounter.      Current Outpatient Prescriptions on File Prior to Encounter   Medication Sig    baclofen (LIORESAL) 20 MG tablet Take 1 tablet (20 mg total) by mouth 4 (four) times daily.    carbamazepine (TEGRETOL XR) 400 MG Tb12 Take 1 tablet (400 mg total) by mouth 2 (two) times daily.    citalopram (CELEXA) 20 MG tablet Take 1 tablet (20 mg total) by mouth once daily.    dantrolene (DANTRIUM) 50 MG Cap Take 1 capsule (50 mg total) by mouth 4 (four) times daily.    ergocalciferol (VITAMIN D2) 50,000 unit Cap Take 1 capsule (50,000 Units total) by mouth every 7 days.    gabapentin (NEURONTIN) 300 MG capsule Take 3 capsules (900 mg total) by mouth 3 (three) times daily.    oxybutynin (DITROPAN-XL) 5 MG TR24 Take 5 mg by mouth once daily.    predniSONE (DELTASONE) 20 MG tablet Take 3 pills daily for 3 days, then 2 pills daily for 3 days, then 1 pill daily for 3 days, then 1/2 pill daily for 4 days.    trazodone (DESYREL) 100 MG tablet Take 100 mg by mouth every evening.    diazePAM (VALIUM) 5 MG tablet Take 1 tablet (5 mg total) by mouth 2 (two) times daily as needed (muscle spasms).    oxycodone (ROXICODONE) 15 MG Tab Take 1 tablet (15 mg total) by mouth 2 (two) times daily as needed for Pain.    oxyMORphone (OPANA ER) 7.5 mg TR12 Take 7.5 mg by mouth 2 (two) times daily.    polyethylene glycol (GLYCOLAX) 17 gram/dose powder Take 17 g by mouth once daily.    rivaroxaban (XARELTO) 20 mg Tab Take 1 tablet (20 mg total) by mouth daily with dinner or evening meal.    senna-docusate 8.6-50 mg (PERICOLACE) 8.6-50 mg per tablet Take 1 tablet by mouth once daily.     Family History     Problem Relation (Age of Onset)    Arthritis Mother    No Known Problems Father         Social History Main Topics    Smoking status: Current Some Day Smoker     Packs/day: 0.50     Types: Cigarettes    Smokeless tobacco: Never Used    Alcohol use No    Drug use: No    Sexual activity: No     Review of Systems   Constitutional: Positive for activity change. Negative for chills, fatigue and fever.   HENT: Negative for trouble swallowing and voice change.    Eyes: Negative for photophobia, pain and visual disturbance.   Respiratory: Negative for cough and shortness of breath.    Cardiovascular: Negative for chest pain, palpitations and leg swelling.   Gastrointestinal: Negative for abdominal pain, nausea and vomiting.   Endocrine: Negative for cold intolerance and heat intolerance.   Genitourinary: Negative for difficulty urinating and dysuria.   Musculoskeletal: Positive for arthralgias, back pain, gait problem and myalgias.   Skin: Negative for rash and wound.   Neurological: Positive for weakness. Negative for dizziness, seizures, syncope, numbness and headaches.   Psychiatric/Behavioral: Negative for agitation and confusion.     Objective:     Vital Signs (Most Recent):  Temp: 98.6 °F (37 °C) (05/26/17 2314)  Pulse: 71 (05/26/17 2314)  Resp: 20 (05/26/17 2314)  BP: 128/85 (05/26/17 2314)  SpO2: 100 % (05/26/17 2314) Vital Signs (24h Range):  Temp:  [97.8 °F (36.6 °C)-98.6 °F (37 °C)] 98.6 °F (37 °C)  Pulse:  [56-78] 71  Resp:  [12-20] 20  SpO2:  [99 %-100 %] 100 %  BP: (128-139)/(85-99) 128/85     Weight: 78.6 kg (173 lb 4.5 oz)  Body mass index is 24.18 kg/m².    Physical Exam   Constitutional: He is oriented to person, place, and time. No distress.   HENT:   Head: Normocephalic and atraumatic.   Eyes: EOM are normal. Pupils are equal, round, and reactive to light.   Cardiovascular: Normal rate and regular rhythm.    No murmur heard.  Pulmonary/Chest: Effort normal and breath sounds normal. No respiratory distress. He has no wheezes.   Abdominal: Soft. Bowel sounds are normal. He  exhibits no distension. There is no tenderness. There is no guarding.   Musculoskeletal: He exhibits no edema, tenderness or deformity.   Neurological: He is alert and oriented to person, place, and time. No cranial nerve deficit.   Baseline L-sided paralysis, L arm contracted, LLE with little to no strength or movement. RUE and RLE with 3/5 strength. All near baseline per patient.    Skin: He is not diaphoretic.   Nursing note and vitals reviewed.       Significant Labs:   CBC:   Recent Labs  Lab 05/26/17  1832   WBC 9.51   HGB 13.3*   HCT 38.5*        CMP:   Recent Labs  Lab 05/26/17  1832      K 3.7      CO2 23      BUN 8   CREATININE 0.8   CALCIUM 8.7   PROT 6.9   ALBUMIN 3.9   BILITOT 0.3   ALKPHOS 82   AST 9*   ALT 25   ANIONGAP 10   EGFRNONAA >60.0     Cardiac Markers: No results for input(s): CKMB, MYOGLOBIN, BNP, TROPISTAT in the last 48 hours.  Urine Studies: No results for input(s): COLORU, APPEARANCEUA, PHUR, SPECGRAV, PROTEINUA, GLUCUA, KETONESU, BILIRUBINUA, OCCULTUA, NITRITE, UROBILINOGEN, LEUKOCYTESUR, RBCUA, WBCUA, BACTERIA, SQUAMEPITHEL, HYALINECASTS in the last 48 hours.    Invalid input(s): WRIGHTSUR  All pertinent labs within the past 24 hours have been reviewed.    Significant Imaging: CT: I have reviewed all pertinent results/findings within the past 24 hours and my personal findings are:  no acute findings  I have reviewed all pertinent imaging results/findings within the past 24 hours.

## 2017-05-27 NOTE — ASSESSMENT & PLAN NOTE
- Continue baclofen 20 mg QID, carbamazepine  mg BID, celexa 20 mg daily, dantrolene 50 mg QID, gabapentin 900 mg TID, oxycodone 15mg BID PRN, norco PRN,   - during last admission his mother asked me to consider addiction specialists as she felt patient was addicted to pain pills. I offered psych services at that time to the patient and he declined.   - EMS noted that his pain pills were more empty than should have been when they arrived. No current signs of overdose or withdrawal.

## 2017-05-28 LAB
ANION GAP SERPL CALC-SCNC: 6 MMOL/L
BASOPHILS # BLD AUTO: 0.04 K/UL
BASOPHILS NFR BLD: 0.5 %
BUN SERPL-MCNC: 10 MG/DL
CALCIUM SERPL-MCNC: 8.2 MG/DL
CHLORIDE SERPL-SCNC: 108 MMOL/L
CO2 SERPL-SCNC: 26 MMOL/L
CREAT SERPL-MCNC: 0.8 MG/DL
DIFFERENTIAL METHOD: ABNORMAL
EOSINOPHIL # BLD AUTO: 0.2 K/UL
EOSINOPHIL NFR BLD: 2 %
ERYTHROCYTE [DISTWIDTH] IN BLOOD BY AUTOMATED COUNT: 13.8 %
EST. GFR  (AFRICAN AMERICAN): >60 ML/MIN/1.73 M^2
EST. GFR  (NON AFRICAN AMERICAN): >60 ML/MIN/1.73 M^2
GLUCOSE SERPL-MCNC: 89 MG/DL
HCT VFR BLD AUTO: 35.9 %
HGB BLD-MCNC: 11.9 G/DL
LYMPHOCYTES # BLD AUTO: 3 K/UL
LYMPHOCYTES NFR BLD: 40.7 %
MAGNESIUM SERPL-MCNC: 2.1 MG/DL
MCH RBC QN AUTO: 30 PG
MCHC RBC AUTO-ENTMCNC: 33.1 %
MCV RBC AUTO: 90 FL
MONOCYTES # BLD AUTO: 0.4 K/UL
MONOCYTES NFR BLD: 5.3 %
NEUTROPHILS # BLD AUTO: 3.8 K/UL
NEUTROPHILS NFR BLD: 51.2 %
PLATELET # BLD AUTO: 184 K/UL
PMV BLD AUTO: 10.9 FL
POTASSIUM SERPL-SCNC: 3.9 MMOL/L
RBC # BLD AUTO: 3.97 M/UL
SODIUM SERPL-SCNC: 140 MMOL/L
WBC # BLD AUTO: 7.39 K/UL

## 2017-05-28 PROCEDURE — 97162 PT EVAL MOD COMPLEX 30 MIN: CPT

## 2017-05-28 PROCEDURE — G8979 MOBILITY GOAL STATUS: HCPCS | Mod: CK

## 2017-05-28 PROCEDURE — 25000003 PHARM REV CODE 250: Performed by: PHYSICIAN ASSISTANT

## 2017-05-28 PROCEDURE — 97166 OT EVAL MOD COMPLEX 45 MIN: CPT

## 2017-05-28 PROCEDURE — 83735 ASSAY OF MAGNESIUM: CPT

## 2017-05-28 PROCEDURE — 99225 PR SUBSEQUENT OBSERVATION CARE,LEVEL II: CPT | Mod: ,,, | Performed by: PHYSICIAN ASSISTANT

## 2017-05-28 PROCEDURE — 85025 COMPLETE CBC W/AUTO DIFF WBC: CPT

## 2017-05-28 PROCEDURE — G8987 SELF CARE CURRENT STATUS: HCPCS | Mod: CL

## 2017-05-28 PROCEDURE — G8978 MOBILITY CURRENT STATUS: HCPCS | Mod: CL

## 2017-05-28 PROCEDURE — 97112 NEUROMUSCULAR REEDUCATION: CPT

## 2017-05-28 PROCEDURE — 36415 COLL VENOUS BLD VENIPUNCTURE: CPT

## 2017-05-28 PROCEDURE — G8988 SELF CARE GOAL STATUS: HCPCS | Mod: CJ

## 2017-05-28 PROCEDURE — 80048 BASIC METABOLIC PNL TOTAL CA: CPT

## 2017-05-28 PROCEDURE — G0378 HOSPITAL OBSERVATION PER HR: HCPCS

## 2017-05-28 RX ADMIN — GABAPENTIN 900 MG: 300 CAPSULE ORAL at 09:05

## 2017-05-28 RX ADMIN — ACETAMINOPHEN 650 MG: 325 TABLET ORAL at 11:05

## 2017-05-28 RX ADMIN — BACLOFEN 20 MG: 10 TABLET ORAL at 05:05

## 2017-05-28 RX ADMIN — DANTROLENE SODIUM 50 MG: 50 CAPSULE ORAL at 05:05

## 2017-05-28 RX ADMIN — OXYCODONE HYDROCHLORIDE 15 MG: 5 TABLET ORAL at 11:05

## 2017-05-28 RX ADMIN — GABAPENTIN 900 MG: 300 CAPSULE ORAL at 05:05

## 2017-05-28 RX ADMIN — OXYBUTYNIN CHLORIDE 5 MG: 5 TABLET, EXTENDED RELEASE ORAL at 09:05

## 2017-05-28 RX ADMIN — RIVAROXABAN 20 MG: 20 TABLET, FILM COATED ORAL at 05:05

## 2017-05-28 RX ADMIN — CARBAMAZEPINE 400 MG: 200 TABLET, EXTENDED RELEASE ORAL at 09:05

## 2017-05-28 RX ADMIN — HYDROCODONE BITARTRATE AND ACETAMINOPHEN 1 TABLET: 5; 325 TABLET ORAL at 09:05

## 2017-05-28 RX ADMIN — CITALOPRAM HYDROBROMIDE 20 MG: 10 TABLET ORAL at 09:05

## 2017-05-28 RX ADMIN — HYDROCODONE BITARTRATE AND ACETAMINOPHEN 1 TABLET: 5; 325 TABLET ORAL at 03:05

## 2017-05-28 RX ADMIN — BACLOFEN 20 MG: 10 TABLET ORAL at 11:05

## 2017-05-28 RX ADMIN — DANTROLENE SODIUM 50 MG: 50 CAPSULE ORAL at 11:05

## 2017-05-28 RX ADMIN — TRAZODONE HYDROCHLORIDE 100 MG: 50 TABLET ORAL at 09:05

## 2017-05-28 RX ADMIN — STANDARDIZED SENNA CONCENTRATE AND DOCUSATE SODIUM 1 TABLET: 8.6; 5 TABLET, FILM COATED ORAL at 09:05

## 2017-05-28 RX ADMIN — HYDROCODONE BITARTRATE AND ACETAMINOPHEN 1 TABLET: 5; 325 TABLET ORAL at 06:05

## 2017-05-28 RX ADMIN — GABAPENTIN 900 MG: 300 CAPSULE ORAL at 03:05

## 2017-05-28 NOTE — PT/OT/SLP EVAL
"Occupational Therapy  Evaluation    Mao Levin   MRN: [id number]   Admitting Diagnosis: MS (multiple sclerosis)    OT Date of Treatment: 05/28/17   OT Start Time: 1324  OT Stop Time: 1353  OT Total Time (min): 29 min    Billable Minutes:  Evaluation 29    Diagnosis: MS (multiple sclerosis) exacerbation      Past Medical History:   Diagnosis Date    Multiple sclerosis       History reviewed. No pertinent surgical history.    Referring physician: Aayush  Date referred to OT: 5-27-17    General Precautions: Standard, fall  Orthopedic Precautions: N/A  Braces: N/A    Do you have any cultural, spiritual, Restoration conflicts, given your current situation?: none     Patient History:  Living Environment  Lives With: alone  Living Arrangements: house  Home Accessibility: stairs to enter home  Home Layout: Able to live on 1st floor  Number of Stairs to Enter Home: 3  Stair Railings at Home: outside, present on left side  Transportation Available: agency transportation, van, wheelchair accessible, family or friend will provide  Living Environment Comment:  (pt lives alone, was recently discharged from SNF: per pt report he has had multiple falls since returning home. HH as well. non ambulatory since home.)  Equipment Currently Used at Home: bedside commode, bath bench, wheelchair, walker, rolling, rollator    Prior level of function:   Bed Mobility/Transfers: needs device  Grooming: independent  Bathing: needs device and assist  Upper Body Dressing: needs assist  Lower Body Dressing: needs assist  Toileting: needs device and assist  Home Management Skills: unable to perform  Homemaking Responsibilities:  (has been unable since discharge from [facility] to home)  Driving License: No  Mode of Transportation: Other (Comment), Van     Dominant hand: right    Subjective:  Communicated with nurse prior to session.    Chief Complaint: "Skilled wasn't good at all, I know I can do better in rehab."  Patient/Family stated goals: pt wants to " go in patient rehab, if not he states that he will go home alone. Pt has poor insight into situation and his disabilities.    Pain/Comfort  Pain Rating 1:  (did not quantify)  Location - Side 1: Bilateral  Location - Orientation 1: generalized  Location 1:  (all over body pain)  Pain Addressed 1: Reposition    Objective:  Patient found with: peripheral IV    Cognitive Exam:  Oriented to: Person, Place and Time  Follows Commands/attention: Easily distracted and Follows one-step commands  Communication: clear/fluent  Memory:  No Deficits noted  Safety awareness/insight to disability: impaired, extremely poor insight into disability. Poor support system.  Coping skills/emotional control: Appropriate to situation    Visual/perceptual:  Intact    Physical Exam:  Postural examination/scapula alignment: Rounded shoulder, Posterior pelvic tilt and Lateral weight shift of hips  Skin integrity: Visible skin intact  Edema: None noted     Sensation:   Intact    Upper Extremity Range of Motion:  Right Upper Extremity: WFL  Left Upper Extremity: PROM WFL no AROM noted    Upper Extremity Strength:  Right Upper Extremity: WFL    Fine motor coordination:   L UE no functional use. R UE: able to use feeding utensils with moderate spillage, difficulty with finger foods    Gross motor coordination: poor, unable to position self to midline in bed.    Functional Mobility:  Bed Mobility:  Rolling/Turning to Left: Maximum assistance  Rolling/Turning Right: Total assistance  Scooting/Bridging: With assist of 2, Dependent  Supine to Sit: Maximum Assistance  Sit to Supine: Maximum Assistance    Transfers:  Sit <> Stand Assistance: Activity did not occur    Functional Ambulation: NA    Activities of Daily Living:  Feeding Level of Assistance: Minimum assistance    UE Dressing Level of Assistance: Moderate assistance    LE Dressing Level of Assistance: Total assistance      Balance:   Static Sit: POOR: Needs MODERATE assist to maintain  Dynamic  "Sit: POOR: N/A    AM-PAC 6 CLICK ADL  How much help from another person does this patient currently need?  1 = Unable, Total/Dependent Assistance  2 = A lot, Maximum/Moderate Assistance  3 = A little, Minimum/Contact Guard/Supervision  4 = None, Modified [place]/Independent    Putting on and taking off regular lower body clothing? : 1  Bathing (including washing, rinsing, drying)?: 1  Toileting, which includes using toilet, bedpan, or urinal? : 1  Putting on and taking off regular upper body clothing?: 2  Taking care of personal grooming such as brushing teeth?: 2  Eating meals?: 2  Total Score: 9    AM-PAC Raw Score CMS "G-Code Modifier Level of Impairment Assistance   6 % Total / Unable   7 - 9 CM 80 - 100% Maximal Assist   10-14 CL 60 - 80% Moderate Assist   15 - 19 CK 40 - 60% Moderate Assist   20 - 22 CJ 20 - 40% Minimal Assist   23 CI 1-20% SBA / CGA   24 CH 0% Independent/ Mod I       Patient left with bed in chair position with all lines intact and call button in reach    Assessment:  Mao Levin is a 51 y.o. male with a medical diagnosis of MS (multiple sclerosis) .    Rehab identified problem list/impairments: Rehab identified problem list/impairments: weakness, impaired endurance, impaired self care skills, impaired functional mobilty, gait instability, impaired sensation, impaired balance, impaired cognition, decreased coordination, decreased lower extremity function, decreased upper extremity function, pain, decreased safety awareness    Rehab potential is fair.    Activity tolerance: Fair    Discharge recommendations: Discharge Facility/Level Of Care Needs: rehabilitation facility. Pt may have benefited from inpatient rehab but was  In skilled for over 20 days. Functional decline significant, multiple falls at home. May need [facility] care.    Barriers to discharge: Barriers to Discharge: Decreased caregiver support, Inaccessible home environment    Equipment recommendations: none "     GOALS:    Occupational Therapy Goals        Problem: Occupational Therapy Goal    Goal Priority Disciplines Outcome Interventions   Occupational Therapy Goal     OT, PT/OT Ongoing (interventions implemented as appropriate)    Description:  Goals to be met by: 6-4-17     Patient will increase functional independence with ADLs by performing:    UE Dressing with Stand-by Assistance.  LE Dressing with Moderate Assistance.  Feeding with set up.  Modified pivot tranfers bed to chair mod assist.                      PLAN:  Patient to be seen 4 x/week to address the above listed problems via self-care/home management, community/work re-entry, therapeutic activities, therapeutic exercises, cognitive retraining  Plan of Care expires: 06/28/17  Plan of Care reviewed with: patient         GISEL Smith  05/28/2017

## 2017-05-28 NOTE — PLAN OF CARE
Problem: Occupational Therapy Goal  Goal: Occupational Therapy Goal  Goals to be met by: 6-4-17     Patient will increase functional independence with ADLs by performing:    UE Dressing with Stand-by Assistance.  LE Dressing with Moderate Assistance.  Feeding with set up.  Modified pivot tranfers bed to chair mod assist.    Outcome: Ongoing (interventions implemented as appropriate)  OT Evaluation completed and poc established.  GISEL Smith

## 2017-05-28 NOTE — SUBJECTIVE & OBJECTIVE
Interval History: No acute events overnight. Patient working with PT/OT today. C/O difficulty sleeping.     Review of Systems   Constitutional: Positive for activity change. Negative for chills, fatigue and fever.   Eyes: Negative for photophobia, pain and visual disturbance.   Respiratory: Negative for cough and shortness of breath.    Cardiovascular: Negative for chest pain, palpitations and leg swelling.   Gastrointestinal: Negative for abdominal pain, nausea and vomiting.   Musculoskeletal: Positive for arthralgias, back pain, gait problem and myalgias.   Skin: Negative for rash and wound.   Neurological: Positive for weakness. Negative for dizziness, seizures, syncope, numbness and headaches.     Objective:     Vital Signs (Most Recent):  Temp: 98.7 °F (37.1 °C) (05/28/17 1516)  Pulse: 79 (05/28/17 1516)  Resp: 16 (05/28/17 1516)  BP: 116/73 (05/28/17 1516)  SpO2: 97 % (05/28/17 1516) Vital Signs (24h Range):  Temp:  [97.6 °F (36.4 °C)-98.7 °F (37.1 °C)] 98.7 °F (37.1 °C)  Pulse:  [60-79] 79  Resp:  [16-18] 16  SpO2:  [97 %-99 %] 97 %  BP: (102-127)/(58-73) 116/73     Weight: 78.6 kg (173 lb 4.5 oz)  Body mass index is 24.18 kg/m².    Intake/Output Summary (Last 24 hours) at 05/28/17 1850  Last data filed at 05/28/17 1700   Gross per 24 hour   Intake             1190 ml   Output                0 ml   Net             1190 ml      Physical Exam   Constitutional: He is oriented to person, place, and time. He appears well-nourished. No distress.   Cardiovascular: Normal rate and regular rhythm.    No murmur heard.  Pulmonary/Chest: Effort normal and breath sounds normal. No respiratory distress. He has no wheezes.   Abdominal: Soft. Bowel sounds are normal. He exhibits no distension. There is no tenderness. There is no guarding.   Musculoskeletal: He exhibits no edema, tenderness or deformity.   Neurological: He is alert and oriented to person, place, and time. No cranial nerve deficit.   Baseline L-sided  paralysis, L arm contracted, LLE with little to no strength or movement. RUE and RLE with 3/5 strength. All near baseline per patient.    Skin: He is not diaphoretic.   Nursing note and vitals reviewed.    Significant Labs:   BMP:     Recent Labs  Lab 05/28/17  0501   GLU 89      K 3.9      CO2 26   BUN 10   CREATININE 0.8   CALCIUM 8.2*   MG 2.1     CBC:     Recent Labs  Lab 05/28/17  0501   WBC 7.39   HGB 11.9*   HCT 35.9*        All pertinent labs within the past 24 hours have been reviewed.    Significant Imaging: I have reviewed all pertinent imaging results/findings within the past 24 hours.

## 2017-05-28 NOTE — PROGRESS NOTES
"[facility] Hospital Medicine  Progress Note    Patient Name: Mao Levin  MRN: [id number]  Patient Class: OP- Observation   Admission Date: 5/26/2017  Length of Stay: 0 days  Attending Physician: Wes Looney MD  Primary Care Provider: Fausto Leung MD    [facility] Medicine Team: [facility] MED E Seema Reyes PA-C    Subjective:     Principal Problem:MS (multiple sclerosis)    HPI:  51M with a PMHx of multiple sclerosis on rotuxin therapy, saddle PE on chronic anticoagulation therapy, and chronic pain presents after being found on the ground and unable to get up by his home health PT/OT. Patient reports progressively worsening ability to ambulate since being discharged after a >45 day stay at SNF. The patient is known to me from his previous admission before going to SNF. The patient lives alone and states that he has been found down multiple times and will "roll" to the door to answer HH. The patient is requesting inpatient rehabilitation. He states previous stays in the past have helped him gain strength. He believes that [facility] did not provide him with enough hours of PT/OT per day. He denies any pain and numbness to his face and right eye or vision changes, which is what usually occurs when he has a flare. He denies chest pain, SOB, dizziness, palpitations, fever/chills, N/V/D. His chronic pain is unchanged.    Hospital Course:  Placed in observation for multiple sclerosis and weakness requesting inpatient rehab placement. PT/OT consulted and PMR consult pending. CM/SW involved.     Interval History: No acute events overnight. Patient working with PT/OT today. C/O difficulty sleeping.     Review of Systems   Constitutional: Positive for activity change. Negative for chills, fatigue and fever.   Eyes: Negative for photophobia, pain and visual disturbance.   Respiratory: Negative for cough and shortness of breath.    Cardiovascular: Negative for chest pain, palpitations and leg swelling. "   Gastrointestinal: Negative for abdominal pain, nausea and vomiting.   Musculoskeletal: Positive for arthralgias, back pain, gait problem and myalgias.   Skin: Negative for rash and wound.   Neurological: Positive for weakness. Negative for dizziness, seizures, syncope, numbness and headaches.     Objective:     Vital Signs (Most Recent):  Temp: 98.7 °F (37.1 °C) (05/28/17 1516)  Pulse: 79 (05/28/17 1516)  Resp: 16 (05/28/17 1516)  BP: 116/73 (05/28/17 1516)  SpO2: 97 % (05/28/17 1516) Vital Signs (24h Range):  Temp:  [97.6 °F (36.4 °C)-98.7 °F (37.1 °C)] 98.7 °F (37.1 °C)  Pulse:  [60-79] 79  Resp:  [16-18] 16  SpO2:  [97 %-99 %] 97 %  BP: (102-127)/(58-73) 116/73     Weight: 78.6 kg (173 lb 4.5 oz)  Body mass index is 24.18 kg/m².    Intake/Output Summary (Last 24 hours) at 05/28/17 1850  Last data filed at 05/28/17 1700   Gross per 24 hour   Intake             1190 ml   Output                0 ml   Net             1190 ml      Physical Exam   Constitutional: He is oriented to person, place, and time. He appears well-nourished. No distress.   Cardiovascular: Normal rate and regular rhythm.    No murmur heard.  Pulmonary/Chest: Effort normal and breath sounds normal. No respiratory distress. He has no wheezes.   Abdominal: Soft. Bowel sounds are normal. He exhibits no distension. There is no tenderness. There is no guarding.   Musculoskeletal: He exhibits no edema, tenderness or deformity.   Neurological: He is alert and oriented to person, place, and time. No cranial nerve deficit.   Baseline L-sided paralysis, L arm contracted, LLE with little to no strength or movement. RUE and RLE with 3/5 strength. All near baseline per patient.    Skin: He is not diaphoretic.   Nursing note and vitals reviewed.    Significant Labs:   BMP:     Recent Labs  Lab 05/28/17  0501   GLU 89      K 3.9      CO2 26   BUN 10   CREATININE 0.8   CALCIUM 8.2*   MG 2.1     CBC:     Recent Labs  Lab 05/28/17  0501   WBC 7.39    HGB 11.9*   HCT 35.9*        All pertinent labs within the past 24 hours have been reviewed.    Significant Imaging: I have reviewed all pertinent imaging results/findings within the past 24 hours.    Assessment/Plan:      * MS (multiple sclerosis)    - Patient recently discharged from SNF after 45 day stay. Was admitted before that with increased weakness and pain and found to have UTI, MRI at that time was negative for true MS flare. Patient is known to team and he appears stronger than his last admission. Unlikely a true MS flare at this time. The patient agrees with that assessment.   - While he may improve his strength at inpatient rehab, a larger discussion regarding his long term plan of care is necessary. His best interests may be with a care home nursing home however at this time her refuses and says if he can't get into rehab he will just return home.   - Case discussed with case management weekend team but limited resources available at this time. Await PT/OT and PMR assessments. Patient does not meet inpatient criteria at this time.   - Hold on consulting neurology as patient does not manifest symptoms of MS flare or psuedoflare.        10/25/2016 Saddle PE    - continue xarelto        Neurogenic bladder    - continue oxybutynin, bladder scan and cath PRN        Chronic pain of multiple sites    - Continue baclofen 20 mg QID, carbamazepine  mg BID, celexa 20 mg daily, dantrolene 50 mg QID, gabapentin 900 mg TID, oxycodone 15mg BID PRN, norco PRN,   - during last admission his mother asked me to consider addiction specialists as she felt patient was addicted to pain pills. He was offered psych services at that time to the patient and he declined.   - EMS noted that his pain pills were more empty than should have been when they arrived. No current signs of overdose or withdrawal.           VTE Risk Mitigation         Ordered     rivaroxaban tablet 20 mg  With dinner     Route:  Oral         05/26/17 2235     Reason for No Pharmacological VTE Prophylaxis  Once      05/26/17 2235     Medium Risk of VTE  Once      05/26/17 2235          Seema Reyes PA-C  Department of Hospital Medicine   [facility]

## 2017-05-28 NOTE — ASSESSMENT & PLAN NOTE
- Continue baclofen 20 mg QID, carbamazepine  mg BID, celexa 20 mg daily, dantrolene 50 mg QID, gabapentin 900 mg TID, oxycodone 15mg BID PRN, norco PRN,   - during last admission his mother asked me to consider addiction specialists as she felt patient was addicted to pain pills. He was offered psych services at that time to the patient and he declined.   - EMS noted that his pain pills were more empty than should have been when they arrived. No current signs of overdose or withdrawal.

## 2017-05-28 NOTE — PT/OT/SLP EVAL
Physical Therapy  Evaluation    Mao Levin   MRN: [id number]   Admitting Diagnosis: MS (multiple sclerosis)    PT Received On: 05/28/17  PT Start Time: 0824     PT Stop Time: 0900    PT Total Time (min): 36 min       Billable Minutes:  Evaluation 26 and Neuromuscular Re-education 10    Diagnosis: MS (multiple sclerosis)      Past Medical History:   Diagnosis Date    Multiple sclerosis       History reviewed. No pertinent surgical history.    Referring physician: NURIA Looney  Date referred to PT: 05/26/2017    General Precautions: Standard, fall  Orthopedic Precautions: N/A   Braces: N/A            Patient History:  Lives With: alone  Living Arrangements: house  Home Accessibility: stairs to enter home  Home Layout: Able to live on 1st floor  Number of Stairs to Enter Home: 3  Stair Railings at Home: outside, present on left side  Living Environment Comment: Pt lives alone in 1-story house with 3 VASILIY. Pt reports amb with rollator and (I) with ADLs. Pt reports mother, sister and neighbors are able to provide assist if needed but not 24hr A.   Equipment Currently Used at Home: bath bench, walker, rolling, rollator, wheelchair  DME owned (not currently used): none    Previous Level of Function:  Ambulation Skills: needs device  Transfer Skills: needs device  ADL Skills: independent    Subjective:  Communicated with RN prior to session.  Pt agreeable to therapy session.  Chief Complaint: weakness and falls at home  Patient goals: return to PLOF.    Pain/Comfort  Pain Rating 1: other (see comments) (pt did not rate pain)  Location - Side 1: Bilateral  Location - Orientation 1: generalized  Location 1:  (B LE and low back)  Pain Addressed 1: Reposition, Distraction  Pain Rating Post-Intervention 1: other (see comments) (pt did not rate pain)      Objective:         Cognitive Exam:  Oriented to: Person, Place, Time and Situation    Follows Commands/attention: Follows multistep  commands  Communication: clear/fluent  Safety  awareness/insight to disability: impaired    Physical Exam:  Postural examination/scapula alignment: Rounded shoulder    Skin integrity: Visible skin intact  Edema: None noted B LE    Sensation:   Intact  light/touch B LE    Lower Extremity Range of Motion:  Right Lower Extremity: WFL  Left Lower Extremity: WFL    Lower Extremity Strength:  Right Lower Extremity: gross 3+/5  Left Lower Extremity: gross 1/5     Gross motor coordination: impaired    Functional Mobility:  Bed Mobility:  Supine to Sit: Moderate Assistance    Transfers:  Sit <> Stand Assistance: Maximum Assistance  Sit <> Stand Assistive Device: No Assistive Device  Bed <> Chair Technique: Stand Pivot  Bed <> Chair Assistance: Maximum Assistance  Bed <> Chair Assistive Device: No Assistive Device    Gait:   Gait Distance: able to WS in stance with assist but unable to clear B LE for floor    Balance:   Static Sit: FAIR+: Able to take MINIMAL challenges from all directions  Dynamic Sit: FAIR+: Maintains balance through MINIMAL excursions of active trunk motion  Static Stand: 0: Needs MAXIMAL assist to maintain   Dynamic stand: 0: N/A    Therapeutic Activities and Exercises:  Pt educated on role of PT/POC.  Pt sat EOB with SBA/CGA.  PT applied static stretch and inhibition of L UE tone.  Pt perform L lat lean to elbow for WB through UE with min A to ext L UE.  Pt required assist with donning shoes and L AFO.  Pt stood EOB with max A without AD for ~1.5min, able to WS in stance.  Pt safe to perform transfers with RN staff.     AM-PAC 6 CLICK MOBILITY  How much help from another person does this patient currently need?   1 = Unable, Total/Dependent Assistance  2 = A lot, Maximum/Moderate Assistance  3 = A little, Minimum/Contact Guard/Supervision  4 = None, Modified Orient/Independent    Turning over in bed (including adjusting bedclothes, sheets and blankets)?: 3  Sitting down on and standing up from a chair with arms (e.g., wheelchair, bedside  commode, etc.): 2  Moving from lying on back to sitting on the side of the bed?: 2  Moving to and from a bed to a chair (including a wheelchair)?: 2  Need to walk in hospital room?: 1  Climbing 3-5 steps with a railing?: 1  Total Score: 11     AM-PAC Raw Score CMS G-Code Modifier Level of Impairment Assistance   6 % Total / Unable   7 - 9 CM 80 - 100% Maximal Assist   10 - 14 CL 60 - 80% Moderate Assist   15 - 19 CK 40 - 60% Moderate Assist   20 - 22 CJ 20 - 40% Minimal Assist   23 CI 1-20% SBA / CGA   24 CH 0% Independent/ Mod I     Patient left up in chair with all lines intact, call button in reach and RN present.    Assessment:   Mao Levin is a 51 y.o. male with a medical diagnosis of MS (multiple sclerosis) and presents with decreased strength, endurance, balance and overall functional mobility. Pt performed bed mobility mod A and sat EOB with CGA/SBA. Pt performed transfers max A without AD and stood EOB with max A for ~1.5min. Pt able to WS in stance but unable to clear B LE from floor for amb. Pt will benefit from skilled PT to improve deficits and increase overall functional mobility. Pt highly motivated and will benefit from IP Rehab to gain functional (I) and ensure safety upon d/c.     Rehab identified problem list/impairments: Rehab identified problem list/impairments: weakness, gait instability, impaired endurance, impaired balance, impaired functional mobilty, decreased coordination, decreased safety awareness, decreased lower extremity function, abnormal tone    Rehab potential is good.    Activity tolerance: Good    Discharge recommendations: Discharge Facility/Level Of Care Needs: rehabilitation facility     Barriers to discharge: Barriers to Discharge: Decreased caregiver support, Inaccessible home environment (pt requires increased assist at this time)    Equipment recommendations: Equipment Needed After Discharge: none     GOALS:    Physical Therapy Goals        Problem: Physical  Therapy Goal    Goal Priority Disciplines Outcome Goal Variances Interventions   Physical Therapy Goal     PT/OT, PT Ongoing (interventions implemented as appropriate)     Description:  Goals to be met by: 2017     Patient will increase functional independence with mobility by performin. Supine to sit with Contact Guard Assistance  2. Sit to supine with Contact Guard Assistance  3. Sit to stand transfer with Minimal Assistance  4. Gait  x 20 feet with Moderate Assistance using appropriate AD.   5. Lower extremity exercise program x15 reps per handout, with assistance as needed                      PLAN:    Patient to be seen 5 x/week to address the above listed problems via gait training, therapeutic activities, therapeutic exercises, neuromuscular re-education  Plan of Care expires: 17  Plan of Care reviewed with: patient          MESHA SHANE, PT  2017

## 2017-05-28 NOTE — PLAN OF CARE
Problem: Patient Care Overview  Goal: Plan of Care Review  Outcome: Ongoing (interventions implemented as appropriate)  Pt aaox4, VSS. Pt rested comfortably in bed. No acute changes. Admin PRn pain med x2. Neuro checks maintained. No bm this shift. Pt remained free from injuries/falls. Reviewed fall risk with pt and instructed to call when needing assistance.  Will continue to monitor.

## 2017-05-28 NOTE — ASSESSMENT & PLAN NOTE
- Patient recently discharged from SNF after 45 day stay. Was admitted before that with increased weakness and pain and found to have UTI, MRI at that time was negative for true MS flare. Patient is known to team and he appears stronger than his last admission. Unlikely a true MS flare at this time. The patient agrees with that assessment.   - While he may improve his strength at inpatient rehab, a larger discussion regarding his long term plan of care is necessary. His best interests may be with a [facility] nursing home however at this time her refuses and says if he can't get into rehab he will just return home.   - Case discussed with case management weekend team but limited resources available at this time. Await PT/OT and PMR assessments. Patient does not meet inpatient criteria at this time.   - Hold on consulting neurology as patient does not manifest symptoms of MS flare or psuedoflare.

## 2017-05-28 NOTE — PLAN OF CARE
Problem: Physical Therapy Goal  Goal: Physical Therapy Goal  Goals to be met by: 2017     Patient will increase functional independence with mobility by performin. Supine to sit with Contact Guard Assistance  2. Sit to supine with Contact Guard Assistance  3. Sit to stand transfer with Minimal Assistance  4. Gait  x 20 feet with Moderate Assistance using appropriate AD.   5. Lower extremity exercise program x15 reps per handout, with assistance as needed    Outcome: Ongoing (interventions implemented as appropriate)  Pt evaluation complete.    MESHA LYNN, PT  2017

## 2017-05-29 PROCEDURE — G0378 HOSPITAL OBSERVATION PER HR: HCPCS

## 2017-05-29 PROCEDURE — 99222 1ST HOSP IP/OBS MODERATE 55: CPT | Mod: ,,, | Performed by: NURSE PRACTITIONER

## 2017-05-29 PROCEDURE — 97112 NEUROMUSCULAR REEDUCATION: CPT

## 2017-05-29 PROCEDURE — 97535 SELF CARE MNGMENT TRAINING: CPT

## 2017-05-29 PROCEDURE — 25000003 PHARM REV CODE 250: Performed by: PHYSICIAN ASSISTANT

## 2017-05-29 PROCEDURE — 99225 PR SUBSEQUENT OBSERVATION CARE,LEVEL II: CPT | Mod: ,,, | Performed by: PHYSICIAN ASSISTANT

## 2017-05-29 RX ADMIN — BACLOFEN 20 MG: 10 TABLET ORAL at 04:05

## 2017-05-29 RX ADMIN — GABAPENTIN 900 MG: 300 CAPSULE ORAL at 12:05

## 2017-05-29 RX ADMIN — BACLOFEN 20 MG: 10 TABLET ORAL at 11:05

## 2017-05-29 RX ADMIN — HYDROCODONE BITARTRATE AND ACETAMINOPHEN 1 TABLET: 5; 325 TABLET ORAL at 09:05

## 2017-05-29 RX ADMIN — GABAPENTIN 900 MG: 300 CAPSULE ORAL at 05:05

## 2017-05-29 RX ADMIN — BACLOFEN 20 MG: 10 TABLET ORAL at 05:05

## 2017-05-29 RX ADMIN — RIVAROXABAN 20 MG: 20 TABLET, FILM COATED ORAL at 04:05

## 2017-05-29 RX ADMIN — CITALOPRAM HYDROBROMIDE 20 MG: 10 TABLET ORAL at 09:05

## 2017-05-29 RX ADMIN — DANTROLENE SODIUM 50 MG: 50 CAPSULE ORAL at 11:05

## 2017-05-29 RX ADMIN — STANDARDIZED SENNA CONCENTRATE AND DOCUSATE SODIUM 1 TABLET: 8.6; 5 TABLET, FILM COATED ORAL at 09:05

## 2017-05-29 RX ADMIN — TRAZODONE HYDROCHLORIDE 100 MG: 50 TABLET ORAL at 09:05

## 2017-05-29 RX ADMIN — CARBAMAZEPINE 400 MG: 200 TABLET, EXTENDED RELEASE ORAL at 09:05

## 2017-05-29 RX ADMIN — GABAPENTIN 900 MG: 300 CAPSULE ORAL at 09:05

## 2017-05-29 RX ADMIN — HYDROCODONE BITARTRATE AND ACETAMINOPHEN 1 TABLET: 5; 325 TABLET ORAL at 04:05

## 2017-05-29 RX ADMIN — OXYCODONE HYDROCHLORIDE 15 MG: 5 TABLET ORAL at 01:05

## 2017-05-29 RX ADMIN — DANTROLENE SODIUM 50 MG: 50 CAPSULE ORAL at 05:05

## 2017-05-29 RX ADMIN — OXYBUTYNIN CHLORIDE 5 MG: 5 TABLET, EXTENDED RELEASE ORAL at 09:05

## 2017-05-29 RX ADMIN — OXYCODONE HYDROCHLORIDE 15 MG: 5 TABLET ORAL at 12:05

## 2017-05-29 RX ADMIN — DANTROLENE SODIUM 50 MG: 50 CAPSULE ORAL at 04:05

## 2017-05-29 RX ADMIN — OXYCODONE HYDROCHLORIDE 15 MG: 5 TABLET ORAL at 09:05

## 2017-05-29 NOTE — CONSULTS
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  Patient [place] [facility] [place] with progressively worsening BLE weakness with the inability to ambulate with associated falls at home.  Patient placed in observation for weakness; presentation unlikely true MS flare or pseudoflare.       Functional History:  Patient lives in [place], alone.  Patient is poor historian.  Prior to admission, he reports discharging home from SNF and participating in home health nursing and therapy.  DME: sasha [facility].    [facility] (12/19/16-1/13/17)- At discharge, "Sit-->stand SBA, T/Fs SBA, gait 150' SBA RW, UB dressing SUP, LB dressing MNA, toileting MDA.  Discharged to SNF facility prior to going home with his girlfriend."    Hospital Course:   Participating with PT and OT.   Functional status: Bed mobility max-totalA.  Sit to stand maxA and transfers maxA.  No gait.  LBD totalA.  Static sitting CGA-Sayda and dynamic sitting maxA 2/2 poor postural control.     Past Medical History:   Diagnosis Date    Multiple sclerosis      History reviewed. No pertinent surgical history.  Review of patient's allergies indicates:  No Known Allergies    Scheduled Medications:    baclofen  20 mg Oral QID    carbamazepine  400 mg Oral BID    citalopram  20 mg Oral Daily    dantrolene  50 mg Oral QID    gabapentin  900 mg Oral TID    oxybutynin  5 mg Oral Daily    rivaroxaban  20 mg Oral Daily with dinner    senna-docusate 8.6-50 mg  1 tablet Oral Daily    " trazodone  100 mg Oral QHS       PRN Medications: acetaminophen, albuterol-ipratropium 2.5mg-0.5mg/3mL, dextrose 50%, dextrose 50%, diazePAM, glucagon (human recombinant), glucose, glucose, hydrocodone-acetaminophen 5-325mg, naloxone, ondansetron, ondansetron, oxycodone    Family History     Problem Relation (Age of Onset)    Arthritis Mother    No Known Problems Father        Social History Main Topics    Smoking status: Current Some Day Smoker     Packs/day: 0.50     Types: Cigarettes    Smokeless tobacco: Never Used    Alcohol use No    Drug use: No    Sexual activity: No     Review of Systems   Constitutional: Negative for chills, fatigue and fever.   HENT: Negative for ear discharge, ear pain, trouble swallowing and voice change.    Eyes: Negative for photophobia and visual disturbance.   Respiratory: Negative for shortness of breath and wheezing.    Cardiovascular: Negative for chest pain and palpitations.   Gastrointestinal: Negative for abdominal pain, nausea and vomiting.   Genitourinary: Negative for difficulty urinating and flank pain.   Musculoskeletal: Positive for arthralgias, back pain, gait problem and myalgias. Negative for neck pain.   Skin: Negative for rash and wound.   Neurological: Positive for weakness and numbness. Negative for dizziness and headaches.   Psychiatric/Behavioral: Negative for agitation, sleep disturbance and suicidal ideas.     Objective:     Vital Signs (Most Recent):  Temp: 98.4 °F (36.9 °C) (05/29/17 1207)  Pulse: 80 (05/29/17 1207)  Resp: 18 (05/29/17 1207)  BP: 129/69 (05/29/17 1207)  SpO2: 99 % (05/29/17 1207)    Vital Signs (24h Range):  Temp:  [97.6 °F (36.4 °C)-99 °F (37.2 °C)] 98.4 °F (36.9 °C)  Pulse:  [63-80] 80  Resp:  [16-18] 18  SpO2:  [97 %-99 %] 99 %  BP: ()/(60-75) 129/69     Body mass index is 24.18 kg/m².    Physical Exam   Constitutional: He is oriented to person, place, and time. He appears well-developed. No distress.   HENT:   Head:  Normocephalic and atraumatic.   Eyes: Right eye exhibits no discharge. Left eye exhibits no discharge.   Neck: Normal range of motion.   Cardiovascular: Normal rate and regular rhythm.    Pulmonary/Chest: Effort normal. No respiratory distress. He has no wheezes.   Abdominal: Soft. He exhibits no distension. There is no tenderness.   Musculoskeletal:        Right hip: He exhibits decreased range of motion and decreased strength.        Left hip: He exhibits decreased range of motion and decreased strength.        Left ankle: He exhibits decreased range of motion (foot drop).   Neurological: He is alert and oriented to person, place, and time.   Motor:  No pronator drift. RUE: 4/5.  LUE: 4/5.  RLE: 4/5.  LLE: 3/5.  Tone:  LLE decreased, L foot drop.    Skin: Skin is warm and dry. No rash noted.   Psychiatric: He has a normal mood and affect. His behavior is normal.     Diagnostic Results: Labs: Reviewed  CT: Reviewed    Assessment/Plan:     Impaired mobility and ADLs    See MS assessment and plan        Neurogenic bladder    Continue oxybutynin and cath PRN per primary team        Chronic pain of multiple sites    Continue medication management per primary team        * MS (multiple sclerosis)    -  Encourage mobility.  · OOB in chair at least 3 hours per day  · Early ambulation as appropriate   -  PT/OT evaluate and treat.  -  Pain management.   -  Monitor for skin breakdown and pressure ulcers.  · Early mobility   · Repositioning/weight shifting every 20-30 minutes when sitting  · Turn patient every 2 hours  · Proper mattress/overlay and chair cushioning   · Pressure relief/heel protector boots  -  DVT prophylaxis:  On xarelto.  -  Encourage participation with therapy.  -  Reviewed discharge options with patient (inpatient rehab, SNF, home health therapy, and outpatient therapy).          Patient with several IPR admissions.  Currently not appropriate for inpatient rehab admission.  Recommend SNF.  Will sign off.   Please call with questions/concerns or re-consult if situation changes.    Thank you for your consult.    ELAN Orlando  Department of Physical Medicine & Rehab  [facility]

## 2017-05-29 NOTE — PROGRESS NOTES
"[facility] Hospital Medicine  Progress Note    Patient Name: Mao Levin  MRN: [id number]  Patient Class: OP- Observation   Admission Date: 5/26/2017  Length of Stay: 0 days  Attending Physician: Wes Looney MD  Primary Care Provider: Fausto Leung MD    [facility] Medicine Team: [facility] MED E Seema Reyes PA-C    Subjective:     Principal Problem:MS (multiple sclerosis)    HPI:  51M with a PMHx of multiple sclerosis on rotuxin therapy, saddle PE on chronic anticoagulation therapy, and chronic pain presents after being found on the ground and unable to get up by his home health PT/OT. Patient reports progressively worsening ability to ambulate since being discharged after a >45 day stay at SNF. The patient is known to me from his previous admission before going to SNF. The patient lives alone and states that he has been found down multiple times and will "roll" to the door to answer HH. The patient is requesting inpatient rehabilitation. He states previous stays in the past have helped him gain strength. He believes that SNF did not provide him with enough hours of PT/OT per day. He denies any pain and numbness to his face and right eye or vision changes, which is what usually occurs when he has a flare. He denies chest pain, SOB, dizziness, palpitations, fever/chills, N/V/D. His chronic pain is unchanged.    Hospital Course:  Placed in observation for multiple sclerosis and weakness requesting inpatient rehab placement. PT/OT consulted and PMR consult pending. CM/SW involved.     Interval History: No acute events overnight but patient continues to report 'difficulty getting comfortable' and difficulty sleeping. Patient now states he is open to the idea of a nursing home with rehab/SNF but first choice is [facility]. D/W  who is familiar with case.      Review of Systems   Constitutional: Positive for activity change. Negative for chills, fatigue and fever.   Eyes: " Negative for photophobia, pain and visual disturbance.   Respiratory: Negative for cough and shortness of breath.    Cardiovascular: Negative for chest pain, palpitations and leg swelling.   Gastrointestinal: Negative for abdominal pain, nausea and vomiting.   Musculoskeletal: Positive for arthralgias, back pain, gait problem and myalgias.   Skin: Negative for rash and wound.   Neurological: Positive for weakness. Negative for dizziness, seizures, syncope, numbness and headaches.     Objective:     Vital Signs (Most Recent):  Temp: 98.4 °F (36.9 °C) (05/29/17 1207)  Pulse: 80 (05/29/17 1207)  Resp: 18 (05/29/17 1207)  BP: 129/69 (05/29/17 1207)  SpO2: 99 % (05/29/17 1207) Vital Signs (24h Range):  Temp:  [97.6 °F (36.4 °C)-99 °F (37.2 °C)] 98.4 °F (36.9 °C)  Pulse:  [63-80] 80  Resp:  [16-18] 18  SpO2:  [97 %-99 %] 99 %  BP: ()/(60-75) 129/69     Weight: 78.6 kg (173 lb 4.5 oz)  Body mass index is 24.18 kg/m².    Intake/Output Summary (Last 24 hours) at 05/29/17 1511  Last data filed at 05/29/17 0556   Gross per 24 hour   Intake             1550 ml   Output                0 ml   Net             1550 ml      Physical Exam   Constitutional: He is oriented to person, place, and time. He appears well-nourished. No distress.   Cardiovascular: Normal rate and regular rhythm.    No murmur heard.  Pulmonary/Chest: Effort normal and breath sounds normal. No respiratory distress. He has no wheezes.   Abdominal: Soft. Bowel sounds are normal. He exhibits no distension. There is no tenderness. There is no guarding.   Musculoskeletal: He exhibits no edema, tenderness or deformity.   Neurological: He is alert and oriented to person, place, and time. No cranial nerve deficit.   Baseline L-sided paralysis, L arm contracted, LLE with little to no strength or movement. RUE and RLE with 3/5 strength. All near baseline per patient.    Skin: He is not diaphoretic.   Nursing note and vitals reviewed.    Significant Labs:   BMP:      Recent Labs  Lab 05/28/17  0501   GLU 89      K 3.9      CO2 26   BUN 10   CREATININE 0.8   CALCIUM 8.2*   MG 2.1     CBC:     Recent Labs  Lab 05/28/17  0501   WBC 7.39   HGB 11.9*   HCT 35.9*        All pertinent labs within the past 24 hours have been reviewed.    Significant Imaging: I have reviewed all pertinent imaging results/findings within the past 24 hours.    Assessment/Plan:      * MS (multiple sclerosis)    - Patient recently discharged from SNF after 45 day stay. Was admitted before that with increased weakness and pain and found to have UTI, MRI at that time was negative for true MS flare. Patient is known to team and he appears stronger than his last admission. Unlikely a true MS flare at this time.  - While he may improve his strength at inpatient rehab, a larger discussion regarding his long term plan is underway. He initially refused the idea of NHP but now agrees to NHP with rehab/SNF if he is denies. CM/SW assisting with plan. Patient otherwise plans to return home where he has very limited resources and is unable to care for himself safely   -  PT/OT recommend rehab and PMR consult pending. Patient does not meet inpatient criteria at this time.   - Held off on consulting neurology as patient does not manifest symptoms of MS flare or psuedoflare.        10/25/2016 Saddle PE    - continue xarelto        Neurogenic bladder    - continue oxybutynin, bladder scan and cath PRN        Chronic pain of multiple sites    - Continue baclofen 20 mg QID, carbamazepine  mg BID, celexa 20 mg daily, dantrolene 50 mg QID, gabapentin 900 mg TID, oxycodone 15mg BID PRN, norco PRN,   - during last admission his mother asked team to consider addiction specialists as she felt patient was addicted to pain pills. He was offered psych services at that time to the patient and he declined.   - EMS noted that his pain pills were more empty than should have been when they arrived. No current  signs of overdose or withdrawal.           VTE Risk Mitigation         Ordered     rivaroxaban tablet 20 mg  With dinner     Route:  Oral        05/26/17 2235     Reason for No Pharmacological VTE Prophylaxis  Once      05/26/17 2235     Medium Risk of VTE  Once      05/26/17 2235          Seema Reyes PA-C  Department of Hospital Medicine   [facility]

## 2017-05-29 NOTE — PLAN OF CARE
Problem: Patient Care Overview  Goal: Plan of Care Review  Outcome: Ongoing (interventions implemented as appropriate)  Pt aaox4, VSS, no acute changes. Pt is incontinent, left leg immobile.  Pt moderately impaired on left side. PRN pain med admin x2.   weight shift assistance provided.  Fall precautions in place. Neuro checks maintained. Bed alarm on.  Bed in lowest position.  Yellow fall band on, skid socks on.  Call light and bedside table within reach.  Safety precautions maintained throughout shift. WCTM.

## 2017-05-29 NOTE — PLAN OF CARE
Problem: Occupational Therapy Goal  Goal: Occupational Therapy Goal  Goals to be met by: 6-4-17 (revisioned made to goals 5/29)    Patient will increase functional independence with ADLs by performing:    UE Dressing in sitting with Stand-by Assistance.  LE Dressing with Moderate Assistance and AD as needed.  Feeding with set up.  Modified pivot tranfers to chair/bed side commode with mod(A).   Dynamic task EOB ~10 min with CGA for postural control.   Outcome: Ongoing (interventions implemented as appropriate)  Mild goal revision completed on this date. GISEL Hunt 5/29/2017

## 2017-05-29 NOTE — SUBJECTIVE & OBJECTIVE
Interval History: No acute events overnight but patient continues to report 'difficulty getting comfortable' and difficulty sleeping. Patient now states he is open to the idea of a nursing home with rehab/SNF but first choice is [facility]. D/W  who is familiar with case.      Review of Systems   Constitutional: Positive for activity change. Negative for chills, fatigue and fever.   Eyes: Negative for photophobia, pain and visual disturbance.   Respiratory: Negative for cough and shortness of breath.    Cardiovascular: Negative for chest pain, palpitations and leg swelling.   Gastrointestinal: Negative for abdominal pain, nausea and vomiting.   Musculoskeletal: Positive for arthralgias, back pain, gait problem and myalgias.   Skin: Negative for rash and wound.   Neurological: Positive for weakness. Negative for dizziness, seizures, syncope, numbness and headaches.     Objective:     Vital Signs (Most Recent):  Temp: 98.4 °F (36.9 °C) (05/29/17 1207)  Pulse: 80 (05/29/17 1207)  Resp: 18 (05/29/17 1207)  BP: 129/69 (05/29/17 1207)  SpO2: 99 % (05/29/17 1207) Vital Signs (24h Range):  Temp:  [97.6 °F (36.4 °C)-99 °F (37.2 °C)] 98.4 °F (36.9 °C)  Pulse:  [63-80] 80  Resp:  [16-18] 18  SpO2:  [97 %-99 %] 99 %  BP: ()/(60-75) 129/69     Weight: 78.6 kg (173 lb 4.5 oz)  Body mass index is 24.18 kg/m².    Intake/Output Summary (Last 24 hours) at 05/29/17 1511  Last data filed at 05/29/17 0556   Gross per 24 hour   Intake             1550 ml   Output                0 ml   Net             1550 ml      Physical Exam   Constitutional: He is oriented to person, place, and time. He appears well-nourished. No distress.   Cardiovascular: Normal rate and regular rhythm.    No murmur heard.  Pulmonary/Chest: Effort normal and breath sounds normal. No respiratory distress. He has no wheezes.   Abdominal: Soft. Bowel sounds are normal. He exhibits no distension. There is no tenderness. There is no guarding.    Musculoskeletal: He exhibits no edema, tenderness or deformity.   Neurological: He is alert and oriented to person, place, and time. No cranial nerve deficit.   Baseline L-sided paralysis, L arm contracted, LLE with little to no strength or movement. RUE and RLE with 3/5 strength. All near baseline per patient.    Skin: He is not diaphoretic.   Nursing note and vitals reviewed.    Significant Labs:   BMP:     Recent Labs  Lab 05/28/17  0501   GLU 89      K 3.9      CO2 26   BUN 10   CREATININE 0.8   CALCIUM 8.2*   MG 2.1     CBC:     Recent Labs  Lab 05/28/17  0501   WBC 7.39   HGB 11.9*   HCT 35.9*        All pertinent labs within the past 24 hours have been reviewed.    Significant Imaging: I have reviewed all pertinent imaging results/findings within the past 24 hours.

## 2017-05-29 NOTE — ASSESSMENT & PLAN NOTE
- Patient recently discharged from SNF after 45 day stay. Was admitted before that with increased weakness and pain and found to have UTI, MRI at that time was negative for true MS flare. Patient is known to team and he appears stronger than his last admission. Unlikely a true MS flare at this time.  - While he may improve his strength at inpatient rehab, a larger discussion regarding his long term plan is underway. He initially refused the idea of NHP but now agrees to NHP with rehab/SNF if he is denies. CM/SW assisting with plan. Patient otherwise plans to return home where he has very limited resources and is unable to care for himself safely   -  PT/OT recommend rehab and PMR consult pending. Patient does not meet inpatient criteria at this time.   - Held off on consulting neurology as patient does not manifest symptoms of MS flare or psuedoflare.

## 2017-05-29 NOTE — HOSPITAL COURSE
Participating with PT and OT.   Functional status: Bed mobility max-totalA.  Sit to stand maxA and transfers maxA.  No gait.  LBD totalA.  Static sitting CGA-Sayda and dynamic sitting maxA 2/2 poor postural control.

## 2017-05-29 NOTE — PLAN OF CARE
Problem: Pressure Ulcer Risk (Huy Scale) (Adult,Obstetrics,Pediatric)  Goal: Identify Related Risk Factors and Signs and Symptoms  Related risk factors and signs and symptoms are identified upon initiation of Human Response Clinical Practice Guideline (CPG)   Pt admitted on 5/26 for MS flare. Pt repositions independently in bed throughout shift. Pt positioned with wedge and pillows. Pt free from falls and injury throughout shift. Pain medication given per order throughout shift.

## 2017-05-29 NOTE — CONSULTS
PM&R consult received.    Reason for consult:  MS, frequent falls    Reviewed patient history and current admission.  Full consult to follow.    ED Orlando, FNP-C  Physical Medicine & Rehabilitation   05/29/2017  Spectralink: [id number]

## 2017-05-29 NOTE — PT/OT/SLP PROGRESS
"Occupational Therapy  Treatment    Mao Levin   MRN: [id number]   Admitting Diagnosis: MS (multiple sclerosis)    OT Date of Treatment: 05/29/17   OT Start Time: 1347  OT Stop Time: 1418  OT Total Time (min): 31 min    Billable Minutes:  Self Care/Home Management 10 and Neuromuscular Re-education 20    General Precautions: Standard, fall, aspiration  Orthopedic Precautions: N/A  Braces: N/A    Do you have any cultural, spiritual, Yarsanism conflicts, given your current situation?: none    Subjective:  Communicated with RN prior to session.  Pt reported "I'm not telling you how to do your job, but I know how to use my momentum. It might be safe and it might not be safe"  Pain/Comfort  Pain Rating 1:  ("I always have pain all over")  Pain Addressed 1: Pre-medicate for activity, Reposition (pains meds given ~1 per patient)  Pain Rating Post-Intervention 1:  (remained)    Objective:  Patient found with: telemetry, peripheral IV     Functional Mobility:  Bed Mobility:  Rolling/Turning to Left: Maximum assistance, With side rail  Rolling/Turning Right: Maximum assistance, With side rail  Scooting/Bridging: Total Assistance (for forward lateral weightshift to EOB; Mod(A) for lateral weight shift to scoot to L)  Supine to Sit: Total Assistance, WIth side rail (from L EOB)  Sit to Supine: Maximum Assistance (verbal cues for safety and best technique for joint integreity)    Transfers:   Sit <> Stand Assistance: Maximum Assistance (Pt with poor control and limited weight bearing through L LE)  Sit <> Stand Assistive Device: No Assistive Device    Activities of Daily Living:  Feeding Level of Assistance: Minimum assistance, Set-up Assistance  LE Dressing Level of Assistance: Total assistance  Grooming Position: Seated, EOB  Grooming Level of Assistance: Minimum assistance    Balance:   Static Sit: CGA-min(A) 2/2 posterior lean  Dynamic Sit: Max(A) for forward leaning 2/2 poor postural control    Additional:  -Pt alert and " "oriented x4 upon arrival ; agreeable for therapy session  -Pt noted to be hyperverbal and circumferential in conversation and requiring mod v/c for re-direction to tasks  -Completed EOB activity ~20 min for postural control and ADL prep  -Completed facilitation (max) for anterior pelvic tilt and thoracic extension  -facilitation for L UE extension and weightbearing with wrist extension; edu on use of L UE as stability and support  -Edu pt on safe handling of L UE with bed mobility and for self stretching digits and wrist to not injury tendons  - Communication board updated; no family present for education    AM-PAC 6 CLICK ADL   How much help from another person does this patient currently need?   1 = Unable, Total/Dependent Assistance  2 = A lot, Maximum/Moderate Assistance  3 = A little, Minimum/Contact Guard/Supervision  4 = None, Modified [place]/Independent    Putting on and taking off regular lower body clothing? : 1  Bathing (including washing, rinsing, drying)?: 1  Toileting, which includes using toilet, bedpan, or urinal? : 1  Putting on and taking off regular upper body clothing?: 2  Taking care of personal grooming such as brushing teeth?: 2  Eating meals?: 3  Total Score: 10     AM-PAC Raw Score CMS "G-Code Modifier Level of Impairment Assistance   6 % Total / Unable   7 - 8 CM 80 - 100% Maximal Assist   9-13 CL 60 - 80% Moderate Assist   14 - 19 CK 40 - 60% Moderate Assist   20 - 22 CJ 20 - 40% Minimal Assist   23 CI 1-20% SBA / CGA   24 CH 0% Independent/ Mod I       Patient left with bed in chair position with all lines intact, call button in reach and bed alarm on    ASSESSMENT:  Mao Levin is a 51 y.o. male with a medical diagnosis of MS (multiple sclerosis) and presents with overall debility and decline in functional mobility and completion of daily activities and tasks. Pt with chronic pain and spasticity in L UE. Pt with impaired postural control and impaired sustained grasp (B). He " demo impaired safety awareness with reported 4 falls per day prior to admit. He will cont to benefit from skilled OT services at this time for best functional outcome and safety for d/c planning.    Rehab identified problem list/impairments: Rehab identified problem list/impairments: weakness, impaired endurance, impaired self care skills, impaired functional mobilty, gait instability, impaired balance, pain, decreased safety awareness, impaired coordination, impaired fine motor, impaired skin, impaired joint extensibility, decreased upper extremity function, decreased lower extremity function, impaired muscle length, abnormal tone    Rehab potential is good.    Activity tolerance: Fair+    Discharge recommendations: Discharge Facility/Level Of Care Needs: rehabilitation facility     Barriers to discharge: Barriers to Discharge: Inaccessible home environment, Decreased caregiver support    Equipment recommendations:  (TBD at next level of care)     GOALS:    Occupational Therapy Goals        Problem: Occupational Therapy Goal    Goal Priority Disciplines Outcome Interventions   Occupational Therapy Goal     OT, PT/OT Ongoing (interventions implemented as appropriate)    Description:  Goals to be met by: 6-4-17 (revisioned made to goals 5/29)    Patient will increase functional independence with ADLs by performing:    UE Dressing in sitting with Stand-by Assistance.  LE Dressing with Moderate Assistance and AD as needed.  Feeding with set up.  Modified pivot tranfers to chair/bed side commode with mod(A).   Dynamic task EOB ~10 min with CGA for postural control.                     Plan:  Patient to be seen 4 x/week to address the above listed problems via self-care/home management, therapeutic activities, therapeutic exercises, neuromuscular re-education, cognitive retraining  Plan of Care expires: 06/28/17  Plan of Care reviewed with: patient    GISEL Hunt  05/29/2017

## 2017-05-29 NOTE — ASSESSMENT & PLAN NOTE
- Continue baclofen 20 mg QID, carbamazepine  mg BID, celexa 20 mg daily, dantrolene 50 mg QID, gabapentin 900 mg TID, oxycodone 15mg BID PRN, norco PRN,   - during last admission his mother asked team to consider addiction specialists as she felt patient was addicted to pain pills. He was offered psych services at that time to the patient and he declined.   - EMS noted that his pain pills were more empty than should have been when they arrived. No current signs of overdose or withdrawal.

## 2017-05-29 NOTE — PLAN OF CARE
Fausto Leung MD  [address] / [place] [address]    Future Appointments  Date Time Provider Department Center   7/17/2017 9:40 AM Mattie Finnegan MD [facility] [place] [facility] [place]   7/18/2017 10:30 AM Mendy Alarcon MD [facility]  [place]   8/8/2017 2:00 PM Mendy Alarcon MD [facility]  [id number] - [place], [place] - [address] AT [address] [facility] [address] [facility]  [address]  [place] [address]  Phone: [phone] Fax: [phone]    [facility] - [place], [place] - [address] AT [place] [address]  [address] [address]  Phone: [phone] Fax: [phone]      Payor: "[employer] MANAGED MEDICARE / Plan: "[employer] TOTAL CARE ADVANTAGE / Product Type: Medicare Advantage /     Patient recently discharged from [facility] and states that he needs inpatient rehab placement.  PMR   Consult placed and CM to continue to follow.        05/29/17 1054   Discharge Assessment   Assessment Type Discharge Planning Assessment   Confirmed/corrected address and phone number on facesheet? Yes   Assessment information obtained from? Patient   Expected Length of Stay (days) 5   Communicated expected length of stay with patient/caregiver yes   Prior to hospitilization cognitive status: Alert/Oriented   Prior to hospitalization functional status: Assistive Equipment;Needs Assistance   Current cognitive status: Alert/Oriented   Current Functional Status: Assistive Equipment;Needs Assistance   Arrived From home health   Lives With alone   Able to Return to Prior Arrangements unable to determine at this time (comments)   Is patient able to care for self after discharge? Unable to determine at this time (comments)   Who are your caregiver(s) and their phone number(s)? Myesha Pack  significant other [phone]   Patient's perception of discharge disposition rehab facility   Patient  currently receives home health services? Yes   Patient previously received home health services and would like to resume services if necessary? Yes   If yes, name of home health provider: [facility]    Equipment Currently Used at Home bedside commode;bath bench;walker, rolling;wheelchair;rollator   Does the patient have transportation to healthcare appointments? Yes   Transportation Available van, wheelchair accessible   On Dialysis? No   Discharge Plan A Rehab   Discharge Plan B (Nursing home skilled)   Patient/Family In Agreement With Plan yes

## 2017-05-29 NOTE — SUBJECTIVE & OBJECTIVE
Past Medical History:   Diagnosis Date    Multiple sclerosis      History reviewed. No pertinent surgical history.  Review of patient's allergies indicates:  No Known Allergies    Scheduled Medications:    baclofen  20 mg Oral QID    carbamazepine  400 mg Oral BID    citalopram  20 mg Oral Daily    dantrolene  50 mg Oral QID    gabapentin  900 mg Oral TID    oxybutynin  5 mg Oral Daily    rivaroxaban  20 mg Oral Daily with dinner    senna-docusate 8.6-50 mg  1 tablet Oral Daily    trazodone  100 mg Oral QHS       PRN Medications: acetaminophen, albuterol-ipratropium 2.5mg-0.5mg/3mL, dextrose 50%, dextrose 50%, diazePAM, glucagon (human recombinant), glucose, glucose, hydrocodone-acetaminophen 5-325mg, naloxone, ondansetron, ondansetron, oxycodone    Family History     Problem Relation (Age of Onset)    Arthritis Mother    No Known Problems Father        Social History Main Topics    Smoking status: Current Some Day Smoker     Packs/day: 0.50     Types: Cigarettes    Smokeless tobacco: Never Used    Alcohol use No    Drug use: No    Sexual activity: No     Review of Systems   Constitutional: Negative for chills, fatigue and fever.   HENT: Negative for ear discharge, ear pain, trouble swallowing and voice change.    Eyes: Negative for photophobia and visual disturbance.   Respiratory: Negative for shortness of breath and wheezing.    Cardiovascular: Negative for chest pain and palpitations.   Gastrointestinal: Negative for abdominal pain, nausea and vomiting.   Genitourinary: Negative for difficulty urinating and flank pain.   Musculoskeletal: Positive for arthralgias, back pain, gait problem and myalgias. Negative for neck pain.   Skin: Negative for rash and wound.   Neurological: Positive for weakness and numbness. Negative for dizziness and headaches.   Psychiatric/Behavioral: Negative for agitation, sleep disturbance and suicidal ideas.     Objective:     Vital Signs (Most Recent):  Temp: 98.4 °F  (36.9 °C) (05/29/17 1207)  Pulse: 80 (05/29/17 1207)  Resp: 18 (05/29/17 1207)  BP: 129/69 (05/29/17 1207)  SpO2: 99 % (05/29/17 1207)    Vital Signs (24h Range):  Temp:  [97.6 °F (36.4 °C)-99 °F (37.2 °C)] 98.4 °F (36.9 °C)  Pulse:  [63-80] 80  Resp:  [16-18] 18  SpO2:  [97 %-99 %] 99 %  BP: ()/(60-75) 129/69     Body mass index is 24.18 kg/m².    Physical Exam   Constitutional: He is oriented to person, place, and time. He appears well-developed. No distress.   HENT:   Head: Normocephalic and atraumatic.   Eyes: Right eye exhibits no discharge. Left eye exhibits no discharge.   Neck: Normal range of motion.   Cardiovascular: Normal rate and regular rhythm.    Pulmonary/Chest: Effort normal. No respiratory distress. He has no wheezes.   Abdominal: Soft. He exhibits no distension. There is no tenderness.   Musculoskeletal:        Right hip: He exhibits decreased range of motion and decreased strength.        Left hip: He exhibits decreased range of motion and decreased strength.        Left ankle: He exhibits decreased range of motion (foot drop).   Neurological: He is alert and oriented to person, place, and time.   Motor:  No pronator drift. RUE: 4/5.  LUE: 4/5.  RLE: 4/5.  LLE: 3/5.  Tone:  LLE decreased, L foot drop.    Skin: Skin is warm and dry. No rash noted.   Psychiatric: He has a normal mood and affect. His behavior is normal.     Diagnostic Results: Labs: Reviewed  CT: Reviewed

## 2017-05-29 NOTE — ASSESSMENT & PLAN NOTE
-  Encourage mobility.  · OOB in chair at least 3 hours per day  · Early ambulation as appropriate   -  PT/OT evaluate and treat.  -  Pain management.   -  Monitor for skin breakdown and pressure ulcers.  · Early mobility   · Repositioning/weight shifting every 20-30 minutes when sitting  · Turn patient every 2 hours  · Proper mattress/overlay and chair cushioning   · Pressure relief/heel protector boots  -  DVT prophylaxis:  On xarelto.  -  Encourage participation with therapy.  -  Reviewed discharge options with patient (inpatient rehab, SNF, home health therapy, and outpatient therapy).

## 2017-05-29 NOTE — HPI
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  Patient presented to [facility] on 5/26/17 with progressively worsening BLE weakness with the inability to ambulate with associated falls at home.  Patient placed in observation for weakness; presentation unlikely true MS flare or pseudoflare.       Functional History:  Patient lives in [place], alone.  Patient is poor historian.  Prior to admission, he reports discharging home from SNF and participating in home health nursing and therapy.  DME: s[facility].    [facility] (12/19/16-1/13/17)- At discharge, "Sit-->stand SBA, T/Fs SBA, gait 150' SBA RW, UB dressing SUP, LB dressing MNA, toileting MDA.  Discharged to SNF facility prior to going home with his girlfriend."  "

## 2017-05-30 LAB
ANION GAP SERPL CALC-SCNC: 8 MMOL/L
BASOPHILS # BLD AUTO: 0.03 K/UL
BASOPHILS NFR BLD: 0.4 %
BUN SERPL-MCNC: 10 MG/DL
CALCIUM SERPL-MCNC: 8.8 MG/DL
CHLORIDE SERPL-SCNC: 104 MMOL/L
CO2 SERPL-SCNC: 28 MMOL/L
CREAT SERPL-MCNC: 0.8 MG/DL
DIFFERENTIAL METHOD: ABNORMAL
EOSINOPHIL # BLD AUTO: 0.2 K/UL
EOSINOPHIL NFR BLD: 2.5 %
ERYTHROCYTE [DISTWIDTH] IN BLOOD BY AUTOMATED COUNT: 14.1 %
EST. GFR  (AFRICAN AMERICAN): >60 ML/MIN/1.73 M^2
EST. GFR  (NON AFRICAN AMERICAN): >60 ML/MIN/1.73 M^2
GLUCOSE SERPL-MCNC: 109 MG/DL
HCT VFR BLD AUTO: 40.6 %
HGB BLD-MCNC: 13.4 G/DL
LYMPHOCYTES # BLD AUTO: 2.2 K/UL
LYMPHOCYTES NFR BLD: 29 %
MCH RBC QN AUTO: 30.1 PG
MCHC RBC AUTO-ENTMCNC: 33 %
MCV RBC AUTO: 91 FL
MONOCYTES # BLD AUTO: 0.4 K/UL
MONOCYTES NFR BLD: 4.7 %
NEUTROPHILS # BLD AUTO: 4.9 K/UL
NEUTROPHILS NFR BLD: 63 %
PLATELET # BLD AUTO: 189 K/UL
PMV BLD AUTO: 10.7 FL
POTASSIUM SERPL-SCNC: 4.1 MMOL/L
RBC # BLD AUTO: 4.45 M/UL
SODIUM SERPL-SCNC: 140 MMOL/L
WBC # BLD AUTO: 7.72 K/UL

## 2017-05-30 PROCEDURE — 63600175 PHARM REV CODE 636 W HCPCS: Performed by: HOSPITALIST

## 2017-05-30 PROCEDURE — G0378 HOSPITAL OBSERVATION PER HR: HCPCS

## 2017-05-30 PROCEDURE — 80048 BASIC METABOLIC PNL TOTAL CA: CPT

## 2017-05-30 PROCEDURE — 36415 COLL VENOUS BLD VENIPUNCTURE: CPT

## 2017-05-30 PROCEDURE — 86580 TB INTRADERMAL TEST: CPT | Performed by: HOSPITALIST

## 2017-05-30 PROCEDURE — 25000003 PHARM REV CODE 250: Performed by: PHYSICIAN ASSISTANT

## 2017-05-30 PROCEDURE — 99225 PR SUBSEQUENT OBSERVATION CARE,LEVEL II: CPT | Mod: ,,, | Performed by: PHYSICIAN ASSISTANT

## 2017-05-30 PROCEDURE — 85025 COMPLETE CBC W/AUTO DIFF WBC: CPT

## 2017-05-30 RX ADMIN — CARBAMAZEPINE 400 MG: 200 TABLET, EXTENDED RELEASE ORAL at 09:05

## 2017-05-30 RX ADMIN — BACLOFEN 20 MG: 10 TABLET ORAL at 11:05

## 2017-05-30 RX ADMIN — DANTROLENE SODIUM 50 MG: 50 CAPSULE ORAL at 12:05

## 2017-05-30 RX ADMIN — GABAPENTIN 900 MG: 300 CAPSULE ORAL at 06:05

## 2017-05-30 RX ADMIN — CITALOPRAM HYDROBROMIDE 20 MG: 10 TABLET ORAL at 09:05

## 2017-05-30 RX ADMIN — DANTROLENE SODIUM 50 MG: 50 CAPSULE ORAL at 06:05

## 2017-05-30 RX ADMIN — BACLOFEN 20 MG: 10 TABLET ORAL at 05:05

## 2017-05-30 RX ADMIN — RIVAROXABAN 20 MG: 20 TABLET, FILM COATED ORAL at 05:05

## 2017-05-30 RX ADMIN — GABAPENTIN 900 MG: 300 CAPSULE ORAL at 02:05

## 2017-05-30 RX ADMIN — GABAPENTIN 900 MG: 300 CAPSULE ORAL at 09:05

## 2017-05-30 RX ADMIN — OXYBUTYNIN CHLORIDE 5 MG: 5 TABLET, EXTENDED RELEASE ORAL at 09:05

## 2017-05-30 RX ADMIN — OXYCODONE HYDROCHLORIDE 15 MG: 5 TABLET ORAL at 09:05

## 2017-05-30 RX ADMIN — DIAZEPAM 5 MG: 5 TABLET ORAL at 09:05

## 2017-05-30 RX ADMIN — Medication 5 UNITS: at 03:05

## 2017-05-30 RX ADMIN — BACLOFEN 20 MG: 10 TABLET ORAL at 12:05

## 2017-05-30 RX ADMIN — HYDROCODONE BITARTRATE AND ACETAMINOPHEN 1 TABLET: 5; 325 TABLET ORAL at 01:05

## 2017-05-30 RX ADMIN — HYDROCODONE BITARTRATE AND ACETAMINOPHEN 1 TABLET: 5; 325 TABLET ORAL at 12:05

## 2017-05-30 RX ADMIN — OXYCODONE HYDROCHLORIDE 15 MG: 5 TABLET ORAL at 07:05

## 2017-05-30 RX ADMIN — DANTROLENE SODIUM 50 MG: 50 CAPSULE ORAL at 11:05

## 2017-05-30 RX ADMIN — TRAZODONE HYDROCHLORIDE 100 MG: 50 TABLET ORAL at 09:05

## 2017-05-30 RX ADMIN — STANDARDIZED SENNA CONCENTRATE AND DOCUSATE SODIUM 1 TABLET: 8.6; 5 TABLET, FILM COATED ORAL at 09:05

## 2017-05-30 RX ADMIN — HYDROCODONE BITARTRATE AND ACETAMINOPHEN 1 TABLET: 5; 325 TABLET ORAL at 06:05

## 2017-05-30 RX ADMIN — HYDROCODONE BITARTRATE AND ACETAMINOPHEN 1 TABLET: 5; 325 TABLET ORAL at 04:05

## 2017-05-30 RX ADMIN — BACLOFEN 20 MG: 10 TABLET ORAL at 06:05

## 2017-05-30 RX ADMIN — DANTROLENE SODIUM 50 MG: 50 CAPSULE ORAL at 05:05

## 2017-05-30 NOTE — SUBJECTIVE & OBJECTIVE
Interval History: No acute events overnight. Reports intermittent right-sided headache this morning. Endorses continued lower back and lower extremity pain.     Review of Systems   Constitutional: Negative for chills and fever.   Respiratory: Negative for shortness of breath and wheezing.    Cardiovascular: Negative for chest pain and palpitations.   Gastrointestinal: Negative for abdominal pain, nausea and vomiting.   Musculoskeletal: Positive for back pain, gait problem and myalgias.   Neurological: Positive for weakness and headaches. Negative for speech difficulty.     Objective:     Vital Signs (Most Recent):  Temp: 97.7 °F (36.5 °C) (05/30/17 0800)  Pulse: 60 (05/30/17 0800)  Resp: 16 (05/30/17 0800)  BP: 133/77 (05/30/17 0800)  SpO2: 99 % (05/30/17 0800) Vital Signs (24h Range):  Temp:  [97.7 °F (36.5 °C)-98.4 °F (36.9 °C)] 97.7 °F (36.5 °C)  Pulse:  [60-80] 60  Resp:  [16-20] 16  SpO2:  [97 %-99 %] 99 %  BP: (107-133)/(59-77) 133/77     Weight: 78.6 kg (173 lb 4.5 oz)  Body mass index is 24.18 kg/m².    Intake/Output Summary (Last 24 hours) at 05/30/17 1131  Last data filed at 05/30/17 0700   Gross per 24 hour   Intake              480 ml   Output                0 ml   Net              480 ml      Physical Exam   Constitutional: He is oriented to person, place, and time. He appears well-nourished. No distress.   Cardiovascular: Normal rate and regular rhythm.    No murmur heard.  Pulmonary/Chest: Effort normal and breath sounds normal. No respiratory distress. He has no wheezes.   Abdominal: Soft. Bowel sounds are normal. He exhibits no distension. There is no tenderness. There is no guarding.   Musculoskeletal: He exhibits no edema, tenderness or deformity.   Neurological: He is alert and oriented to person, place, and time. No cranial nerve deficit.   Baseline L-sided paralysis, L arm contracted, LLE with little to no strength or movement. RUE and RLE with 3/5 strength. All near baseline per patient.         Significant Labs: All pertinent labs within the past 24 hours have been reviewed.    Significant Imaging: I have reviewed all pertinent imaging results/findings within the past 24 hours.

## 2017-05-30 NOTE — PLAN OF CARE
Discussed case with Ila Lema at [facility] and currently recommending long term skilled placement.  CM to continue to follow.

## 2017-05-30 NOTE — ASSESSMENT & PLAN NOTE
- Patient recently discharged from SNF after 45 day stay. Was admitted before that with increased weakness and pain and found to have UTI, MRI at that time was negative for true MS flare. Patient is known to team and he appears stronger than his last admission. Unlikely a true MS flare at this time.  - While he may improve his strength at inpatient rehab, a larger discussion regarding his long term plan is underway. He initially refused the idea of NHP but now agrees to NHP with rehab/SNF if he is denied. CM/SW assisting with plan. Patient otherwise plans to return home where he has very limited resources and is unable to care for himself safely   -  PT/OT recommend rehab and PMR consulted, but after evaluation PMR does not feel patient appropriate for inpatient rehab currently, recommend SNF placement. Patient does not meet inpatient criteria at this time.   - Held off on consulting neurology as patient does not manifest symptoms of MS flare or psuedoflare.  - SNF consult placed, discussed other long-term care options with patient again today, seems agreeable to possibly transitioning to NH placement from SNF.

## 2017-05-30 NOTE — PROGRESS NOTES
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 Patient reports progressively worsening ability to ambulate since being discharged after a >45 day stay at SNF. The patient is known to me from his previous admission before going to SNF. The patient lives alone and states that he has been found down multiple times and will "roll" to the door to answer HH. The patient is requesting inpatient rehabilitation. He states previous stays in the past have helped him gain strength. He believes that SNF did not provide him with enough hours of PT/OT per day. He denies any pain and numbness to his face and right eye or vision changes, which is what usually occurs when he has a flare. He denies chest pain, SOB, dizziness, palpitations, fever/chills, N/V/D. His chronic pain is unchanged.    Hospital Course:  Placed in observation for multiple sclerosis and weakness requesting inpatient rehab placement. PT/OT consulted and PMR consulted, patient not currently appropriate for inpatient rehab admission, recommend SNF placement. CM/SW involved. SNF consult placed.    Interval History: No acute events overnight. Reports intermittent right-sided headache this morning. Endorses continued lower back and lower extremity pain.     Review of Systems   Constitutional: Negative for chills and fever.   Respiratory: Negative for shortness of breath and wheezing.    Cardiovascular: " Negative for chest pain and palpitations.   Gastrointestinal: Negative for abdominal pain, nausea and vomiting.   Musculoskeletal: Positive for back pain, gait problem and myalgias.   Neurological: Positive for weakness and headaches. Negative for speech difficulty.     Objective:     Vital Signs (Most Recent):  Temp: 97.7 °F (36.5 °C) (05/30/17 0800)  Pulse: 60 (05/30/17 0800)  Resp: 16 (05/30/17 0800)  BP: 133/77 (05/30/17 0800)  SpO2: 99 % (05/30/17 0800) Vital Signs (24h Range):  Temp:  [97.7 °F (36.5 °C)-98.4 °F (36.9 °C)] 97.7 °F (36.5 °C)  Pulse:  [60-80] 60  Resp:  [16-20] 16  SpO2:  [97 %-99 %] 99 %  BP: (107-133)/(59-77) 133/77     Weight: 78.6 kg (173 lb 4.5 oz)  Body mass index is 24.18 kg/m².    Intake/Output Summary (Last 24 hours) at 05/30/17 1131  Last data filed at 05/30/17 0700   Gross per 24 hour   Intake              480 ml   Output                0 ml   Net              480 ml      Physical Exam   Constitutional: He is oriented to person, place, and time. He appears well-nourished. No distress.   Cardiovascular: Normal rate and regular rhythm.    No murmur heard.  Pulmonary/Chest: Effort normal and breath sounds normal. No respiratory distress. He has no wheezes.   Abdominal: Soft. Bowel sounds are normal. He exhibits no distension. There is no tenderness. There is no guarding.   Musculoskeletal: He exhibits no edema, tenderness or deformity.   Neurological: He is alert and oriented to person, place, and time. No cranial nerve deficit.   Baseline L-sided paralysis, L arm contracted, LLE with little to no strength or movement. RUE and RLE with 3/5 strength. All near baseline per patient.        Significant Labs: All pertinent labs within the past 24 hours have been reviewed.    Significant Imaging: I have reviewed all pertinent imaging results/findings within the past 24 hours.    Assessment/Plan:      * MS (multiple sclerosis)    - Patient recently discharged from SNF after 45 day stay. Was  admitted before that with increased weakness and pain and found to have UTI, MRI at that time was negative for true MS flare. Patient is known to team and he appears stronger than his last admission. Unlikely a true MS flare at this time.  - While he may improve his strength at inpatient rehab, a larger discussion regarding his long term plan is underway. He initially refused the idea of NHP but now agrees to NHP with rehab/SNF if he is denied. CM/SW assisting with plan. Patient otherwise plans to return home where he has very limited resources and is unable to care for himself safely   -  PT/OT recommend rehab and PMR consulted, but after evaluation PMR does not feel patient appropriate for inpatient rehab currently, recommend SNF placement. Patient does not meet inpatient criteria at this time.   - Held off on consulting neurology as patient does not manifest symptoms of MS flare or psuedoflare.  - SNF consult placed, discussed other long-term care options with patient again today, seems agreeable to possibly transitioning to NH placement from SNF.        10/25/2016 Saddle PE    - continue xarelto        Neurogenic bladder    - continue oxybutynin, bladder scan and cath PRN        Chronic pain of multiple sites    - Continue baclofen 20 mg QID, carbamazepine  mg BID, celexa 20 mg daily, dantrolene 50 mg QID, gabapentin 900 mg TID, oxycodone 15mg BID PRN, norco PRN  - during last admission his mother asked team to consider addiction specialists as she felt patient was addicted to pain pills. He was offered psych services at that time to the patient and he declined.   - EMS noted that his pain pills were more empty than should have been when they arrived. No current signs of overdose or withdrawal.           VTE Risk Mitigation         Ordered     rivaroxaban tablet 20 mg  With dinner     Route:  Oral        05/26/17 2235     Reason for No Pharmacological VTE Prophylaxis  Once      05/26/17 2235     Medium  Risk of VTE  Once      05/26/17 223          Brenda Ivan PA-C  Department of Hospital Medicine   [facility]  Staff: Dr. Cadet

## 2017-05-30 NOTE — PLAN OF CARE
Problem: Patient Care Overview  Goal: Plan of Care Review  Outcome: Ongoing (interventions implemented as appropriate)  No acute events this shift. POC discussed with patient; he verbalized understanding. All questions answered, all concerns addressed. Continuous pain to back and legs, eased with medications and positioning, but always constant. Up to bedside commode for BM. Great appetite. VS stable, no distress noted.

## 2017-05-30 NOTE — PLAN OF CARE
SW met w/pt who states that he doesn't want [facility].  Pt has consented to [facility], [facility], and [facility].    Form 142 received.    SW has send referrals to [facility].    Lizet Ortez DONNA  [id number]

## 2017-05-30 NOTE — ASSESSMENT & PLAN NOTE
- Continue baclofen 20 mg QID, carbamazepine  mg BID, celexa 20 mg daily, dantrolene 50 mg QID, gabapentin 900 mg TID, oxycodone 15mg BID PRN, norco PRN  - during last admission his mother asked team to consider addiction specialists as she felt patient was addicted to pain pills. He was offered psych services at that time to the patient and he declined.   - EMS noted that his pain pills were more empty than should have been when they arrived. No current signs of overdose or withdrawal.

## 2017-05-31 PROCEDURE — 97530 THERAPEUTIC ACTIVITIES: CPT

## 2017-05-31 PROCEDURE — 25000003 PHARM REV CODE 250: Performed by: PHYSICIAN ASSISTANT

## 2017-05-31 PROCEDURE — 97112 NEUROMUSCULAR REEDUCATION: CPT

## 2017-05-31 PROCEDURE — 97116 GAIT TRAINING THERAPY: CPT

## 2017-05-31 PROCEDURE — G0378 HOSPITAL OBSERVATION PER HR: HCPCS

## 2017-05-31 PROCEDURE — G8978 MOBILITY CURRENT STATUS: HCPCS | Mod: CL

## 2017-05-31 PROCEDURE — 99225 PR SUBSEQUENT OBSERVATION CARE,LEVEL II: CPT | Mod: ,,, | Performed by: PHYSICIAN ASSISTANT

## 2017-05-31 RX ADMIN — RIVAROXABAN 20 MG: 20 TABLET, FILM COATED ORAL at 05:05

## 2017-05-31 RX ADMIN — OXYBUTYNIN CHLORIDE 5 MG: 5 TABLET, EXTENDED RELEASE ORAL at 09:05

## 2017-05-31 RX ADMIN — BACLOFEN 20 MG: 10 TABLET ORAL at 12:05

## 2017-05-31 RX ADMIN — CITALOPRAM HYDROBROMIDE 20 MG: 10 TABLET ORAL at 09:05

## 2017-05-31 RX ADMIN — GABAPENTIN 900 MG: 300 CAPSULE ORAL at 06:05

## 2017-05-31 RX ADMIN — DIAZEPAM 5 MG: 5 TABLET ORAL at 12:05

## 2017-05-31 RX ADMIN — OXYCODONE HYDROCHLORIDE 15 MG: 5 TABLET ORAL at 12:05

## 2017-05-31 RX ADMIN — CARBAMAZEPINE 400 MG: 200 TABLET, EXTENDED RELEASE ORAL at 09:05

## 2017-05-31 RX ADMIN — DIAZEPAM 5 MG: 5 TABLET ORAL at 05:05

## 2017-05-31 RX ADMIN — GABAPENTIN 900 MG: 300 CAPSULE ORAL at 09:05

## 2017-05-31 RX ADMIN — DANTROLENE SODIUM 50 MG: 50 CAPSULE ORAL at 05:05

## 2017-05-31 RX ADMIN — TRAZODONE HYDROCHLORIDE 100 MG: 50 TABLET ORAL at 09:05

## 2017-05-31 RX ADMIN — DANTROLENE SODIUM 50 MG: 50 CAPSULE ORAL at 12:05

## 2017-05-31 RX ADMIN — GABAPENTIN 900 MG: 300 CAPSULE ORAL at 01:05

## 2017-05-31 RX ADMIN — HYDROCODONE BITARTRATE AND ACETAMINOPHEN 1 TABLET: 5; 325 TABLET ORAL at 05:05

## 2017-05-31 RX ADMIN — OXYCODONE HYDROCHLORIDE 15 MG: 5 TABLET ORAL at 09:05

## 2017-05-31 RX ADMIN — STANDARDIZED SENNA CONCENTRATE AND DOCUSATE SODIUM 1 TABLET: 8.6; 5 TABLET, FILM COATED ORAL at 09:05

## 2017-05-31 RX ADMIN — HYDROCODONE BITARTRATE AND ACETAMINOPHEN 1 TABLET: 5; 325 TABLET ORAL at 04:05

## 2017-05-31 RX ADMIN — BACLOFEN 20 MG: 10 TABLET ORAL at 06:05

## 2017-05-31 RX ADMIN — ACETAMINOPHEN 650 MG: 325 TABLET ORAL at 03:05

## 2017-05-31 RX ADMIN — BACLOFEN 20 MG: 10 TABLET ORAL at 05:05

## 2017-05-31 RX ADMIN — DANTROLENE SODIUM 50 MG: 50 CAPSULE ORAL at 06:05

## 2017-05-31 RX ADMIN — HYDROCODONE BITARTRATE AND ACETAMINOPHEN 1 TABLET: 5; 325 TABLET ORAL at 09:05

## 2017-05-31 RX ADMIN — HYDROCODONE BITARTRATE AND ACETAMINOPHEN 1 TABLET: 5; 325 TABLET ORAL at 12:05

## 2017-05-31 NOTE — PLAN OF CARE
Problem: Fall Risk (Adult)  Intervention: Safety Promotion/Fall Prevention  Patient continually educated that he is a fall risk. Patient up in chair today with PT. Patient call light in reach to call staff when assistance is needed. Bed alarm set while patient is in bed. Bed in lowest position with wheels locked.       Problem: Patient Care Overview  Goal: Plan of Care Review  Outcome: Ongoing (interventions implemented as appropriate)  Patient AAOx4. VSS. Patient seen by PT today. No acute changes. Patient remains free of falls. PRN pain medications administered.

## 2017-05-31 NOTE — PLAN OF CARE
Problem: Physical Therapy Goal  Goal: Physical Therapy Goal  Goals to be met by: 2017     Patient will increase functional independence with mobility by performin. Supine to sit with Contact Guard Assistance  2. Sit to supine with Contact Guard Assistance  3. Sit to stand transfer with Minimal Assistance  4. Gait  x 20 feet with Moderate Assistance using appropriate AD.   5. Lower extremity exercise program x15 reps per handout, with assistance as needed     Outcome: Ongoing (interventions implemented as appropriate)  Pt progressing towards goals.     MESHA LYNN, PT  2017

## 2017-05-31 NOTE — PLAN OF CARE
Problem: Patient Care Overview  Goal: Plan of Care Review  Outcome: Ongoing (interventions implemented as appropriate)  AAO x 4.  Makes frequent position changes/weight shift changes while in bed.  Left arm lacking ability to perform fine motor movements.  Left leg with moderate to severe ROM restriction.  Generalized weakness/muscle pain and stiffness.  Skin is free of breakdown.  Incontinent of urine.  Received oxycodone, hydrocodone, and valium PRN for pain/muscle tightness.  Awaiting SNF placement.

## 2017-05-31 NOTE — PROGRESS NOTES
"[facility] Hospital Medicine  Progress Note    Patient Name: Mao Levin  MRN: [id number]  Patient Class: OP- Observation   Admission Date: 5/26/2017  Length of Stay: 0 days  Attending Physician: Angelo Cadet MD  Primary Care Provider: Fausto Leung MD    [facility] Medicine Team: [facility] HOSP MED E Brenda Ivan PA-C    Subjective:     Principal Problem:MS (multiple sclerosis)    HPI:  51M with a PMHx of multiple sclerosis on rotuxin therapy, saddle PE on chronic anticoagulation therapy, and chronic pain presents after being found on the ground and unable to get up by his home health PT/OT. Patient reports progressively worsening ability to ambulate since being discharged after a >45 day stay at SNF. The patient is known to me from his previous admission before going to SNF. The patient lives alone and states that he has been found down multiple times and will "roll" to the door to answer HH. The patient is requesting inpatient rehabilitation. He states previous stays in the past have helped him gain strength. He believes that SNF did not provide him with enough hours of PT/OT per day. He denies any pain and numbness to his face and right eye or vision changes, which is what usually occurs when he has a flare. He denies chest pain, SOB, dizziness, palpitations, fever/chills, N/V/D. His chronic pain is unchanged.    Hospital Course:  Placed in observation for multiple sclerosis and weakness requesting inpatient rehab placement. PT/OT consulted and PMR consulted, patient not currently appropriate for inpatient rehab admission, recommend SNF placement. CM/SW involved. SNF consult placed, patient reportedly more appropriate for long term skilled. Therapist who worked with patient over the weekend recommending [facility], consult placed. Stable for discharge pending placement.    Interval History: No acute events overnight, patient sitting up in chair at bedside eating breakfast this " morning. Reports outpatient pain regimen recently decreased by Dr. Leung and that he feels it should be increased again, discussed importance of continuing outpatient PM&R and neuro follow up for long-term management of this. Patient appears comfortable sitting up in chair.     Review of Systems   Constitutional: Negative for chills and fever.   Respiratory: Negative for shortness of breath and wheezing.    Cardiovascular: Negative for chest pain and palpitations.   Gastrointestinal: Negative for abdominal pain, nausea and vomiting.   Musculoskeletal: Positive for back pain, gait problem and myalgias.   Neurological: Positive for weakness and headaches. Negative for speech difficulty.     Objective:     Vital Signs (Most Recent):  Temp: 97.5 °F (36.4 °C) (05/31/17 1500)  Pulse: 68 (05/31/17 1500)  Resp: 18 (05/31/17 1500)  BP: 112/66 (05/31/17 1500)  SpO2: 98 % (05/31/17 1500) Vital Signs (24h Range):  Temp:  [97.5 °F (36.4 °C)-98.6 °F (37 °C)] 97.5 °F (36.4 °C)  Pulse:  [54-72] 68  Resp:  [15-19] 18  SpO2:  [97 %-100 %] 98 %  BP: (112-138)/(65-80) 112/66     Weight: 78.6 kg (173 lb 4.5 oz)  Body mass index is 24.18 kg/m².    Intake/Output Summary (Last 24 hours) at 05/31/17 1601  Last data filed at 05/31/17 0600   Gross per 24 hour   Intake              460 ml   Output                0 ml   Net              460 ml      Physical Exam   Constitutional: He is oriented to person, place, and time. He appears well-nourished. No distress.   Cardiovascular: Normal rate and regular rhythm.    No murmur heard.  Pulmonary/Chest: Effort normal and breath sounds normal. No respiratory distress. He has no wheezes.   Abdominal: Soft. Bowel sounds are normal. He exhibits no distension. There is no tenderness. There is no guarding.   Musculoskeletal: He exhibits no edema, tenderness or deformity.   Neurological: He is alert and oriented to person, place, and time. No cranial nerve deficit.   Baseline L-sided paralysis, L arm  contracted, LLE with little to no strength or movement. RUE and RLE with 3/5 strength. All near baseline per patient.        Significant Labs: All pertinent labs within the past 24 hours have been reviewed.    Significant Imaging: I have reviewed all pertinent imaging results/findings within the past 24 hours.    Assessment/Plan:      * MS (multiple sclerosis)    - Patient recently discharged from SNF after 45 day stay. Was admitted before that with increased weakness and pain and found to have UTI, MRI at that time was negative for true MS flare. Patient is known to team and he appears stronger than his last admission. Unlikely a true MS flare at this time.  - While he may improve his strength at inpatient rehab, a larger discussion regarding his long term plan is underway. He initially refused the idea of NHP but now agrees to NHP with rehab/SNF if he is denied. CM/SW assisting with plan. Patient otherwise plans to return home where he has very limited resources and is unable to care for himself safely   -  PT/OT recommend rehab and PMR consulted, but after evaluation PMR does not feel patient appropriate for inpatient rehab currently, recommend SNF placement. Patient does not meet inpatient criteria at this time.   - Held off on consulting neurology as patient does not manifest symptoms of MS flare or psuedoflare.  - SNF consult placed but recommending long term skilled, discussed other long-term care options with patient again today, seems agreeable to possibly transitioning to NH placement from SNF.  - Consult to [facility] placed, awaiting further information        10/25/2016 Saddle PE    - continue xarelto        Neurogenic bladder    - continue oxybutynin, bladder scan and cath PRN        Chronic pain of multiple sites    - Continue baclofen 20 mg QID, carbamazepine  mg BID, celexa 20 mg daily, dantrolene 50 mg QID, gabapentin 900 mg TID, oxycodone 15mg BID PRN, norco PRN  - during last  admission his mother asked team to consider addiction specialists as she felt patient was addicted to pain pills. He was offered psych services at that time to the patient and he declined.   - EMS noted that his pain pills were more empty than should have been when they arrived. No current signs of overdose or withdrawal.           VTE Risk Mitigation         Ordered     rivaroxaban tablet 20 mg  With dinner     Route:  Oral        05/26/17 2235     Reason for No Pharmacological VTE Prophylaxis  Once      05/26/17 2235     Medium Risk of VTE  Once      05/26/17 2235          Brenda Ivan PA-C  Department of Hospital Medicine   [facility]  Staff: Dr. Cadet

## 2017-05-31 NOTE — PLAN OF CARE
SW spoke w/Arlette, nurse liaison for [facility], who reports that pt has been accepted medically but [facility]      Lizet Ortez, [facility]  [id number]

## 2017-05-31 NOTE — ASSESSMENT & PLAN NOTE
- Patient recently discharged from SNF after 45 day stay. Was admitted before that with increased weakness and pain and found to have UTI, MRI at that time was negative for true MS flare. Patient is known to team and he appears stronger than his last admission. Unlikely a true MS flare at this time.  - While he may improve his strength at inpatient rehab, a larger discussion regarding his long term plan is underway. He initially refused the idea of NHP but now agrees to NHP with rehab/SNF if he is denied. CM/SW assisting with plan. Patient otherwise plans to return home where he has very limited resources and is unable to care for himself safely   -  PT/OT recommend rehab and PMR consulted, but after evaluation PMR does not feel patient appropriate for inpatient rehab currently, recommend SNF placement. Patient does not meet inpatient criteria at this time.   - Held off on consulting neurology as patient does not manifest symptoms of MS flare or psuedoflare.  - SNF consult placed but recommending long term skilled, discussed other long-term care options with patient again today, seems agreeable to possibly transitioning to NH placement from SNF.  - Consult to [facility] placed, awaiting further information

## 2017-05-31 NOTE — CONSULTS
PM&R consult received.    Reason for consult:  Consult placed for [facility] evaluation.  Will sign off.     ED Orlando, FNP-C  Physical Medicine & Rehabilitation   05/31/2017  Spectralink: [phone]

## 2017-05-31 NOTE — PLAN OF CARE
[facility] to [employer] and they are not in network w/this insurance co.  Per [facility], pt has used 72 skilled days and are now in his co pay days.  Pt has not had a 60 day wellness period; therefore, no skilled days have regenerated.    Lizet Ortez, DONNA  [id number]

## 2017-05-31 NOTE — SUBJECTIVE & OBJECTIVE
Interval History: No acute events overnight, patient sitting up in chair at bedside eating breakfast this morning. Reports outpatient pain regimen recently decreased by Dr. Leung and that he feels it should be increased again, discussed importance of continuing outpatient PM&R and neuro follow up for long-term management of this. Patient appears comfortable sitting up in chair.     Review of Systems   Constitutional: Negative for chills and fever.   Respiratory: Negative for shortness of breath and wheezing.    Cardiovascular: Negative for chest pain and palpitations.   Gastrointestinal: Negative for abdominal pain, nausea and vomiting.   Musculoskeletal: Positive for back pain, gait problem and myalgias.   Neurological: Positive for weakness and headaches. Negative for speech difficulty.     Objective:     Vital Signs (Most Recent):  Temp: 97.5 °F (36.4 °C) (05/31/17 1500)  Pulse: 68 (05/31/17 1500)  Resp: 18 (05/31/17 1500)  BP: 112/66 (05/31/17 1500)  SpO2: 98 % (05/31/17 1500) Vital Signs (24h Range):  Temp:  [97.5 °F (36.4 °C)-98.6 °F (37 °C)] 97.5 °F (36.4 °C)  Pulse:  [54-72] 68  Resp:  [15-19] 18  SpO2:  [97 %-100 %] 98 %  BP: (112-138)/(65-80) 112/66     Weight: 78.6 kg (173 lb 4.5 oz)  Body mass index is 24.18 kg/m².    Intake/Output Summary (Last 24 hours) at 05/31/17 1601  Last data filed at 05/31/17 0600   Gross per 24 hour   Intake              460 ml   Output                0 ml   Net              460 ml      Physical Exam   Constitutional: He is oriented to person, place, and time. He appears well-nourished. No distress.   Cardiovascular: Normal rate and regular rhythm.    No murmur heard.  Pulmonary/Chest: Effort normal and breath sounds normal. No respiratory distress. He has no wheezes.   Abdominal: Soft. Bowel sounds are normal. He exhibits no distension. There is no tenderness. There is no guarding.   Musculoskeletal: He exhibits no edema, tenderness or deformity.   Neurological: He is alert  and oriented to person, place, and time. No cranial nerve deficit.   Baseline L-sided paralysis, L arm contracted, LLE with little to no strength or movement. RUE and RLE with 3/5 strength. All near baseline per patient.        Significant Labs: All pertinent labs within the past 24 hours have been reviewed.    Significant Imaging: I have reviewed all pertinent imaging results/findings within the past 24 hours.

## 2017-05-31 NOTE — PLAN OF CARE
DONNA spoke w/Ashwini, DONNA for [facility], and provided her w/referral info.  Ashwini stated that Arlette, nurse liaison ph# [phone], will f/u and notify DONNA of decision.    Lizet Ortez, [facility]  [phone]

## 2017-05-31 NOTE — NURSING
Patient became irate with PCT and raising voice and cursing. Primary nurse and charge nurse went in to speak with patient. Patient continually raising voice and being verbally aggressive with staff. Patient not compliant with recommendations placed by PT. Patient demanding to get up to commode. Refusing bed pan. Srinath lift offered as alternative to get to commode. Patient continues to be interruptive, aggressive, and cursing at staff even after exiting room.

## 2017-05-31 NOTE — PT/OT/SLP PROGRESS
"Physical Therapy  Treatment    Mao Levin   MRN: [id number]   Admitting Diagnosis: MS (multiple sclerosis)    PT Received On: 05/31/17  PT Start Time: 0821     PT Stop Time: 0905    PT Total Time (min): 44 min       Billable Minutes:  Gait [id number], Therapeutic Activity 20 and Neuromuscular Re-education 14    Treatment Type: Treatment  PT/PTA: PT     PTA Visit Number: 0       General Precautions: Standard, fall, aspiration  Orthopedic Precautions: N/A   Braces: N/A         Subjective:  Communicated with RN prior to session.  Pt agreeable to therapy session.     Pain/Comfort  Pain Rating 1: other (see comments) (reports "always in pain")  Location - Side 1: Bilateral  Location - Orientation 1: generalized  Location 1:  ("all over")  Pain Addressed 1: Reposition, Distraction, Pre-medicate for activity  Pain Rating Post-Intervention 1: other (see comments) (did not rate pain)    Objective:   Patient found with: telemetry, peripheral IV    Functional Mobility:  Bed Mobility:   Supine to Sit: Moderate Assistance    Transfers:  Sit <> Stand Assistance: Maximum Assistance  Sit <> Stand Assistive Device: No Assistive Device, Hemiwalker (x1 trials without AD, x2 trials with hemiwalker)  Bed <> Chair Technique: Stand Pivot  Bed <> Chair Assistance: Maximum Assistance  Bed <> Chair Assistive Device: No Assistive Device    Gait:   Gait Distance: 1 side step EOB, assist with L LE advancement  Assistance 1: Maximum assistance  Gait Assistive Device: Andrew Walker  Gait Deviation(s): decreased velocity of limb motion, decreased toe-to-floor clearance, decreased weight-shifting ability    Balance:   Static Sit: FAIR+: Able to take MINIMAL challenges from all directions  Dynamic Sit: FAIR+: Maintains balance through MINIMAL excursions of active trunk motion  Static Stand: 0: Needs MAXIMAL assist to maintain   Dynamic stand: 0: N/A     Therapeutic Activities and Exercises:  Pt educated on safety with transfers and amb with " hemiwalker.  Pt sat EOB with SBA and required assist with donning shoes and L AFO.  PT applied static stretch and inhibited tone in L UE.   Pt educated on importance of energy conservation and avoiding heat with MS.  Pt stood EOB for ~1min max A without AD x1 trial and with hemiwalker x2 trails; WS in stance.  PT discussed d/c rec of rehab with pt and SW.      AM-PAC 6 CLICK MOBILITY  How much help from another person does this patient currently need?   1 = Unable, Total/Dependent Assistance  2 = A lot, Maximum/Moderate Assistance  3 = A little, Minimum/Contact Guard/Supervision  4 = None, Modified McKenzie/Independent    Turning over in bed (including adjusting bedclothes, sheets and blankets)?: 3  Sitting down on and standing up from a chair with arms (e.g., wheelchair, bedside commode, etc.): 2  Moving from lying on back to sitting on the side of the bed?: 2  Moving to and from a bed to a chair (including a wheelchair)?: 2  Need to walk in hospital room?: 1  Climbing 3-5 steps with a railing?: 1  Total Score: 11    AM-PAC Raw Score CMS G-Code Modifier Level of Impairment Assistance   6 % Total / Unable   7 - 9 CM 80 - 100% Maximal Assist   10 - 14 CL 60 - 80% Moderate Assist   15 - 19 CK 40 - 60% Moderate Assist   20 - 22 CJ 20 - 40% Minimal Assist   23 CI 1-20% SBA / CGA   24 CH 0% Independent/ Mod I     Patient left up in chair with call button in reach and RN notified.    Assessment:  Mao Levin is a 51 y.o. male with a medical diagnosis of MS (multiple sclerosis) and presents with decreased strength, endurance, balance and overall functional mobility. Pt performed bed mobility mod A and transfers max A. Pt took 1 side step EOB max A with hemiwalker, assist with L LE advancement. Pt will continue to benefit from skilled PT to improve deficits and increase overall functional mobility. Pt highly motivated and will benefit from IP rehab to increase functional (I), provide further pt education on MS  diagnosis and to ensure safety upon d/c.     Rehab identified problem list/impairments: Rehab identified problem list/impairments: weakness, impaired functional mobilty, gait instability, impaired endurance, impaired sensation, impaired balance, decreased lower extremity function, decreased coordination, decreased safety awareness, impaired cognition, abnormal tone, decreased ROM    Rehab potential is good.    Activity tolerance: Good    Discharge recommendations: Discharge Facility/Level Of Care Needs: rehabilitation facility     Barriers to discharge: Barriers to Discharge: Inaccessible home environment, Decreased caregiver support    Equipment recommendations: Equipment Needed After Discharge: other (see comments) (TBD)     GOALS:    Physical Therapy Goals        Problem: Physical Therapy Goal    Goal Priority Disciplines Outcome Goal Variances Interventions   Physical Therapy Goal     PT/OT, PT Ongoing (interventions implemented as appropriate)     Description:  Goals to be met by: 2017     Patient will increase functional independence with mobility by performin. Supine to sit with Contact Guard Assistance  2. Sit to supine with Contact Guard Assistance  3. Sit to stand transfer with Minimal Assistance  4. Gait  x 20 feet with Moderate Assistance using appropriate AD.   5. Lower extremity exercise program x15 reps per handout, with assistance as needed                      PLAN:    Patient to be seen 5 x/week  to address the above listed problems via gait training, therapeutic activities, therapeutic exercises, neuromuscular re-education  Plan of Care expires: 17  Plan of Care reviewed with: patient         MESHA SHANE, PT  2017

## 2017-05-31 NOTE — PLAN OF CARE
Per Samia, [facility] admission coordinator, states that pt's insurance ([employer]) term tomorrow and will go straight medicare or another managed medicare.    Lizet Ortez, DONNA  [id number]

## 2017-05-31 NOTE — PLAN OF CARE
Referral sent to   1. [facility]   2. Nola Griffin at [facility] [phone]  3. [facility]  Mary is f/u

## 2017-05-31 NOTE — PLAN OF CARE
[facility]  connected    [place] [phone] [facility] did not received referral it was sent twice and per right care referral went through.    [facility] connected

## 2017-06-01 LAB
ANION GAP SERPL CALC-SCNC: 9 MMOL/L
BASOPHILS # BLD AUTO: 0.05 K/UL
BASOPHILS NFR BLD: 0.8 %
BUN SERPL-MCNC: 15 MG/DL
CALCIUM SERPL-MCNC: 8.3 MG/DL
CHLORIDE SERPL-SCNC: 103 MMOL/L
CO2 SERPL-SCNC: 26 MMOL/L
CREAT SERPL-MCNC: 0.8 MG/DL
DIFFERENTIAL METHOD: ABNORMAL
EOSINOPHIL # BLD AUTO: 0.2 K/UL
EOSINOPHIL NFR BLD: 2.7 %
ERYTHROCYTE [DISTWIDTH] IN BLOOD BY AUTOMATED COUNT: 14.1 %
EST. GFR  (AFRICAN AMERICAN): >60 ML/MIN/1.73 M^2
EST. GFR  (NON AFRICAN AMERICAN): >60 ML/MIN/1.73 M^2
GLUCOSE SERPL-MCNC: 96 MG/DL
HCT VFR BLD AUTO: 39 %
HGB BLD-MCNC: 13 G/DL
LYMPHOCYTES # BLD AUTO: 1.9 K/UL
LYMPHOCYTES NFR BLD: 30.6 %
MCH RBC QN AUTO: 30.3 PG
MCHC RBC AUTO-ENTMCNC: 33.3 %
MCV RBC AUTO: 91 FL
MONOCYTES # BLD AUTO: 0.3 K/UL
MONOCYTES NFR BLD: 5 %
NEUTROPHILS # BLD AUTO: 3.7 K/UL
NEUTROPHILS NFR BLD: 60.3 %
PLATELET # BLD AUTO: 224 K/UL
PMV BLD AUTO: 10.6 FL
POTASSIUM SERPL-SCNC: 4.1 MMOL/L
RBC # BLD AUTO: 4.29 M/UL
SODIUM SERPL-SCNC: 138 MMOL/L
TB INDURATION 48 - 72 HR READ: 0 MM
WBC # BLD AUTO: 6.21 K/UL

## 2017-06-01 PROCEDURE — G0378 HOSPITAL OBSERVATION PER HR: HCPCS

## 2017-06-01 PROCEDURE — 99225 PR SUBSEQUENT OBSERVATION CARE,LEVEL II: CPT | Mod: ,,, | Performed by: PHYSICIAN ASSISTANT

## 2017-06-01 PROCEDURE — 97535 SELF CARE MNGMENT TRAINING: CPT

## 2017-06-01 PROCEDURE — 97112 NEUROMUSCULAR REEDUCATION: CPT

## 2017-06-01 PROCEDURE — 36415 COLL VENOUS BLD VENIPUNCTURE: CPT

## 2017-06-01 PROCEDURE — 25000003 PHARM REV CODE 250: Performed by: PHYSICIAN ASSISTANT

## 2017-06-01 PROCEDURE — 85025 COMPLETE CBC W/AUTO DIFF WBC: CPT

## 2017-06-01 PROCEDURE — 80048 BASIC METABOLIC PNL TOTAL CA: CPT

## 2017-06-01 RX ADMIN — HYDROCODONE BITARTRATE AND ACETAMINOPHEN 1 TABLET: 5; 325 TABLET ORAL at 02:06

## 2017-06-01 RX ADMIN — GABAPENTIN 900 MG: 300 CAPSULE ORAL at 05:06

## 2017-06-01 RX ADMIN — BACLOFEN 20 MG: 10 TABLET ORAL at 05:06

## 2017-06-01 RX ADMIN — GABAPENTIN 900 MG: 300 CAPSULE ORAL at 02:06

## 2017-06-01 RX ADMIN — HYDROCODONE BITARTRATE AND ACETAMINOPHEN 1 TABLET: 5; 325 TABLET ORAL at 03:06

## 2017-06-01 RX ADMIN — CARBAMAZEPINE 400 MG: 200 TABLET, EXTENDED RELEASE ORAL at 08:06

## 2017-06-01 RX ADMIN — OXYCODONE HYDROCHLORIDE 15 MG: 5 TABLET ORAL at 09:06

## 2017-06-01 RX ADMIN — DANTROLENE SODIUM 50 MG: 50 CAPSULE ORAL at 12:06

## 2017-06-01 RX ADMIN — CARBAMAZEPINE 400 MG: 200 TABLET, EXTENDED RELEASE ORAL at 09:06

## 2017-06-01 RX ADMIN — STANDARDIZED SENNA CONCENTRATE AND DOCUSATE SODIUM 1 TABLET: 8.6; 5 TABLET, FILM COATED ORAL at 08:06

## 2017-06-01 RX ADMIN — RIVAROXABAN 20 MG: 20 TABLET, FILM COATED ORAL at 05:06

## 2017-06-01 RX ADMIN — CITALOPRAM HYDROBROMIDE 20 MG: 10 TABLET ORAL at 08:06

## 2017-06-01 RX ADMIN — DANTROLENE SODIUM 50 MG: 50 CAPSULE ORAL at 05:06

## 2017-06-01 RX ADMIN — DIAZEPAM 5 MG: 5 TABLET ORAL at 03:06

## 2017-06-01 RX ADMIN — GABAPENTIN 900 MG: 300 CAPSULE ORAL at 09:06

## 2017-06-01 RX ADMIN — OXYBUTYNIN CHLORIDE 5 MG: 5 TABLET, EXTENDED RELEASE ORAL at 08:06

## 2017-06-01 RX ADMIN — OXYCODONE HYDROCHLORIDE 15 MG: 5 TABLET ORAL at 08:06

## 2017-06-01 RX ADMIN — HYDROCODONE BITARTRATE AND ACETAMINOPHEN 1 TABLET: 5; 325 TABLET ORAL at 07:06

## 2017-06-01 RX ADMIN — TRAZODONE HYDROCHLORIDE 100 MG: 50 TABLET ORAL at 09:06

## 2017-06-01 RX ADMIN — BACLOFEN 20 MG: 10 TABLET ORAL at 12:06

## 2017-06-01 RX ADMIN — DIAZEPAM 5 MG: 5 TABLET ORAL at 09:06

## 2017-06-01 NOTE — PT/OT/SLP PROGRESS
"Occupational Therapy  Treatment    Mao Levin   MRN: [id number]   Admitting Diagnosis: MS (multiple sclerosis)    OT Date of Treatment: 06/01/17   OT Start Time: 1102  OT Stop Time: 1132  OT Total Time (min): 30 min    Billable Minutes:  Self Care/Home Management 15 and Neuromuscular Re-education 15    General Precautions: Standard, aspiration, fall  Orthopedic Precautions: N/A  Braces: N/A    Do you have any cultural, spiritual, Confucianist conflicts, given your current situation?: none    Subjective:  Communicated with RN prior to session.  Pt reported "I'm ready to do whatever you wanna do. Let's do it"  Pain/Comfort  Pain Rating 1: 0/10  Pain Rating Post-Intervention 1: 0/10    Objective:  Patient found with: bed alarm, peripheral IV (condom catheter)     Functional Mobility:  Bed Mobility:  Rolling/Turning to Left: Moderate assistance, With side rail  Scooting/Bridging: Moderate Assistance (for assistance for B LE with bridge to scoot self to HOB; min(A) for lateral scooting to EOB)  Supine to Sit: Maximum Assistance, WIth side rail  Sit to Supine: Maximum Assistance    Transfers:   Sit <> Stand Assistance: Maximum Assistance  Sit <> Stand Assistive Device: No Assistive Device  Bed <> Chair Technique: Stand Pivot (to L to chair/to R to EOB)  Bed <> Chair Transfer Assistance: Maximum Assistance (to and from chair)  Bed <> Chair Assistive Device: No Assistive Device    Activities of Daily Living:  UE Dressing Level of Assistance: Moderate assistance (seated)  LE Dressing Level of Assistance: Total assistance  Grooming Position: Seated, bedside chair  Grooming Level of Assistance: Minimum assistance (2/2 L lateral lean; set up-- completed oral care and washing face)    Balance:   Dynamic Sit: Min(A); Pt with L lateral lean with increased challenge. Requires assistance for L UE extended arm weightbearing    Additional:  -Pt alert and oriented x4; agreeable to therapy session  -EOB: completed thoracic extension stretch " "as tolerated; L scapular mobilization for adduction and depression  -metacarpal release for L hand and wrist stretching followed by extended arm weightbearing  -Completed postural control and weight shifting task while in seated position for ADL prep  -Communication board updated; no family present for education     AM-PAC 6 CLICK ADL   How much help from another person does this patient currently need?   1 = Unable, Total/Dependent Assistance  2 = A lot, Maximum/Moderate Assistance  3 = A little, Minimum/Contact Guard/Supervision  4 = None, Modified Banco/Independent    Putting on and taking off regular lower body clothing? : 1  Bathing (including washing, rinsing, drying)?: 1  Toileting, which includes using toilet, bedpan, or urinal? : 2  Putting on and taking off regular upper body clothing?: 2  Taking care of personal grooming such as brushing teeth?: 2  Eating meals?: 3  Total Score: 11     AM-PAC Raw Score CMS "G-Code Modifier Level of Impairment Assistance   6 % Total / Unable   7 - 8 CM 80 - 100% Maximal Assist   9-13 CL 60 - 80% Moderate Assist   14 - 19 CK 40 - 60% Moderate Assist   20 - 22 CJ 20 - 40% Minimal Assist   23 CI 1-20% SBA / CGA   24 CH 0% Independent/ Mod I       Patient left HOB elevated with all lines intact, call button in reach, bed alarm on and RN notified    ASSESSMENT:  Mao Levin is a 51 y.o. male with a medical diagnosis of MS (multiple sclerosis) and presents with overall decline in functional indep with all daily tasks and activities. Pt with impaired postural control, L UE tone and high hx of falls at home. He demo impulsivity and impaired safety awareness. He demo good participation this session and will cont to benefit from OT services at this time.    Rehab identified problem list/impairments: Rehab identified problem list/impairments: weakness, impaired self care skills, impaired functional mobilty, gait instability, impaired balance, decreased safety awareness, " abnormal tone, impaired fine motor, impaired coordination, impaired endurance    Rehab potential is good.    Activity tolerance: Good    Discharge recommendations: Discharge Facility/Level Of Care Needs: rehabilitation facility     Barriers to discharge: Barriers to Discharge: Decreased caregiver support, Inaccessible home environment    Equipment recommendations:  (TBD at next level)     GOALS:    Occupational Therapy Goals        Problem: Occupational Therapy Goal    Goal Priority Disciplines Outcome Interventions   Occupational Therapy Goal     OT, PT/OT Ongoing (interventions implemented as appropriate)    Description:  Goals to be met by: 6/8 (revision completed 6/1)    Patient will increase functional independence with ADLs by performing:    UE Dressing in sitting with Set up and min(A).  LE Dressing with Moderate Assistance and AD as needed.  Modified pivot tranfers to chair/bed side commode with mod(A).   Dynamic task EOB ~10 min with CGA for postural control.    Functional seated task with CGA for postural control.                    Plan:  Patient to be seen 4 x/week to address the above listed problems via self-care/home management, therapeutic activities, therapeutic exercises, neuromuscular re-education, cognitive retraining  Plan of Care expires: 06/28/17  Plan of Care reviewed with: patient    GISEL Hunt  06/01/2017

## 2017-06-01 NOTE — ASSESSMENT & PLAN NOTE
- Patient recently discharged from SNF after 45 day stay. Was admitted before that with increased weakness and pain and found to have UTI, MRI at that time was negative for true MS flare. Patient is known to team and he appears stronger than his last admission. Unlikely a true MS flare at this time.  - While he may improve his strength at inpatient rehab, a larger discussion regarding his long term plan is underway. He initially refused the idea of NHP but now agrees to NHP with rehab/SNF if he is denied. CM/SW assisting with plan. Patient otherwise plans to return home where he has very limited resources and is unable to care for himself safely   -  PT/OT recommend rehab and PMR consulted, but after evaluation PMR does not feel patient appropriate for inpatient rehab currently, recommend SNF placement. Patient does not meet inpatient criteria at this time.   - Held off on consulting neurology as patient does not manifest symptoms of MS flare or psuedoflare.  - SNF consult placed but recommending long term skilled, discussed other long-term care options with patient again today, seems agreeable to possibly transitioning to NH placement from SNF.  - Consult to [facility] placed, patient reportedly medically appropriate for the facility but awaiting further insurance information.

## 2017-06-01 NOTE — PROGRESS NOTES
"[facility] Hospital Medicine  Progress Note    Patient Name: Mao Levin  MRN: [id number]  Patient Class: OP- Observation   Admission Date: 5/26/2017  Length of Stay: 0 days  Attending Physician: Angelo Cadet MD  Primary Care Provider: Fausto Leung MD    [facility] Medicine Team: [facility]OSP MED E Brenda Ivan PA-C    Subjective:     Principal Problem:MS (multiple sclerosis)    HPI:  51M with a PMHx of multiple sclerosis on rotuxin therapy, saddle PE on chronic anticoagulation therapy, and chronic pain presents after being found on the ground and unable to get up by his home health PT/OT. Patient reports progressively worsening ability to ambulate since being discharged after a >45 day stay at SNF. The patient is known to me from his previous admission before going to SNF. The patient lives alone and states that he has been found down multiple times and will "roll" to the door to answer HH. The patient is requesting inpatient rehabilitation. He states previous stays in the past have helped him gain strength. He believes that SNF did not provide him with enough hours of PT/OT per day. He denies any pain and numbness to his face and right eye or vision changes, which is what usually occurs when he has a flare. He denies chest pain, SOB, dizziness, palpitations, fever/chills, N/V/D. His chronic pain is unchanged.    Hospital Course:  Placed in observation for multiple sclerosis and weakness requesting inpatient rehab placement. PT/OT consulted and PMR consulted, patient not currently appropriate for inpatient rehab admission, recommend SNF placement. CM/SW involved. SNF consult placed, patient reportedly more appropriate for long term skilled. Therapist who worked with patient over the weekend recommending [facility], consult placed. Stable for discharge pending placement.    Interval History: No acute events overnight, reports pain is well controlled this am but still with some " intermittent headaches which he reports have responded to prednisone in the past. Per nursing, some issues with agitation yesterday afternoon but calm and pleasant this am.     Review of Systems   Constitutional: Negative for chills and fever.   Respiratory: Negative for shortness of breath and wheezing.    Cardiovascular: Negative for chest pain and palpitations.   Gastrointestinal: Negative for abdominal pain, nausea and vomiting.   Musculoskeletal: Positive for back pain, gait problem and myalgias.   Neurological: Positive for weakness and headaches. Negative for speech difficulty.     Objective:     Vital Signs (Most Recent):  Temp: 97.9 °F (36.6 °C) (06/01/17 0710)  Pulse: (!) 58 (06/01/17 0710)  Resp: 16 (06/01/17 0710)  BP: 109/60 (06/01/17 0710)  SpO2: 97 % (06/01/17 0710) Vital Signs (24h Range):  Temp:  [97.5 °F (36.4 °C)-98.5 °F (36.9 °C)] 97.9 °F (36.6 °C)  Pulse:  [58-68] 58  Resp:  [16-18] 16  SpO2:  [95 %-99 %] 97 %  BP: (106-145)/(60-80) 109/60     Weight: 78.6 kg (173 lb 4.5 oz)  Body mass index is 24.18 kg/m².    Intake/Output Summary (Last 24 hours) at 06/01/17 1111  Last data filed at 06/01/17 0600   Gross per 24 hour   Intake              400 ml   Output                0 ml   Net              400 ml      Physical Exam   Constitutional: He is oriented to person, place, and time. He appears well-nourished. No distress.   Cardiovascular: Normal rate and regular rhythm.    No murmur heard.  Pulmonary/Chest: Effort normal and breath sounds normal. No respiratory distress. He has no wheezes.   Abdominal: Soft. Bowel sounds are normal. He exhibits no distension. There is no tenderness. There is no guarding.   Musculoskeletal: He exhibits no edema, tenderness or deformity.   Neurological: He is alert and oriented to person, place, and time. No cranial nerve deficit.   Baseline L-sided paralysis, L arm contracted, LLE with little to no strength or movement. RUE and RLE with 3/5 strength. All near  baseline per patient.        Significant Labs:   CBC:   Recent Labs  Lab 05/30/17  1145   WBC 7.72   HGB 13.4*   HCT 40.6        CMP:   Recent Labs  Lab 05/30/17  1144      K 4.1      CO2 28      BUN 10   CREATININE 0.8   CALCIUM 8.8   ANIONGAP 8   EGFRNONAA >60.0     All pertinent labs within the past 24 hours have been reviewed.    Significant Imaging: I have reviewed all pertinent imaging results/findings within the past 24 hours.    Assessment/Plan:      * MS (multiple sclerosis)    - Patient recently discharged from SNF after 45 day stay. Was admitted before that with increased weakness and pain and found to have UTI, MRI at that time was negative for true MS flare. Patient is known to team and he appears stronger than his last admission. Unlikely a true MS flare at this time.  - While he may improve his strength at inpatient rehab, a larger discussion regarding his long term plan is underway. He initially refused the idea of NHP but now agrees to NHP with rehab/SNF if he is denied. CM/SW assisting with plan. Patient otherwise plans to return home where he has very limited resources and is unable to care for himself safely   -  PT/OT recommend rehab and PMR consulted, but after evaluation PMR does not feel patient appropriate for inpatient rehab currently, recommend SNF placement. Patient does not meet inpatient criteria at this time.   - Held off on consulting neurology as patient does not manifest symptoms of MS flare or psuedoflare.  - SNF consult placed but recommending long term skilled, discussed other long-term care options with patient again today, seems agreeable to possibly transitioning to NH placement from SNF.  - Consult to [facility] placed, patient reportedly medically appropriate for the facility but awaiting further insurance information.        10/25/2016 Saddle PE    - continue xarelto        Neurogenic bladder    - continue oxybutynin, bladder scan and  cath PRN        Chronic pain of multiple sites    - Continue baclofen 20 mg QID, carbamazepine  mg BID, celexa 20 mg daily, dantrolene 50 mg QID, gabapentin 900 mg TID, oxycodone 15mg BID PRN, norco PRN  - during last admission his mother asked team to consider addiction specialists as she felt patient was addicted to pain pills. He was offered psych services at that timeand he declined.   - EMS noted that his pain pills were more empty than should have been when they arrived. No current signs of overdose or withdrawal.           VTE Risk Mitigation         Ordered     rivaroxaban tablet 20 mg  With dinner     Route:  Oral        05/26/17 2235     Reason for No Pharmacological VTE Prophylaxis  Once      05/26/17 2235     Medium Risk of VTE  Once      05/26/17 2235          Brenda Ivan PA-C  Department of Hospital Medicine   [facility]  Staff: Dr. Cadet

## 2017-06-01 NOTE — PLAN OF CARE
Problem: Patient Care Overview  Goal: Plan of Care Review  Outcome: Ongoing (interventions implemented as appropriate)  AAO x 4.  2 person assist up to BSC.  1 large BM.  Incontinent of urine.  Pain and spasms controlled with scheduled and PRN medications.  No cluster HA's reported.  Shifts weight independently in bed.  Calm and appropriate behavior for entire shift.  TB test to LFA may be read today after 1315.

## 2017-06-01 NOTE — PLAN OF CARE
Problem: Fall Risk (Adult)  Intervention: Safety Promotion/Fall Prevention  Patient educated about causes of falls and how to prevent them in the hospital setting. Patient call light in reach. Bed alarm set. Bed in lowest position with wheels locked.       Problem: Patient Care Overview  Goal: Plan of Care Review  Outcome: Ongoing (interventions implemented as appropriate)  Patient AAOx4. VSS. Patient calm and cooperative. PRN pain medication administered to the patient. Patient remains free of falls. No acute events. Patient has condom catheter in place. Encouraged to call staff for assistance. TB test read and was negative

## 2017-06-01 NOTE — PLAN OF CARE
DONNA contacted MICHEAL Whitfield-patient billing dept, states that per system [employer] remains active @ this time.    Lizet Ortez, NADIRA  [phone]

## 2017-06-01 NOTE — PLAN OF CARE
Problem: Spiritual Distress, Risk/Actual (Adult,Obstetrics,Pediatric)  Intervention: Facilitate Personal Exploration/Expression of Spirituality  F - Luh and Belief: Pt of Gnosticist luh, and expressed reliance on prayer and Bible reading for support.     I - Importance: Luh is an important source of strength for pt.     C - Community: Pt recently moved down to [place] to be with his family. Currently not connected to a L[employer].     A - Address in Care: Introduced and offered pastoral support with reflective listening and prayer. Pt made aware of 's presence as needed.

## 2017-06-01 NOTE — PLAN OF CARE
UM discussed case with admitting staff and facesheet updated with correct insurance information.  Patient's [employer] HMO termed 05/31/17 and [employer] PPO initiated on 06/01/17.  [employer] currently reviewing [facility] request for admission authorization.  CM to continue to follow.

## 2017-06-01 NOTE — SUBJECTIVE & OBJECTIVE
Interval History: No acute events overnight, reports pain is well controlled this am but still with some intermittent headaches which he reports have responded to prednisone in the past. Per nursing, some issues with agitation yesterday afternoon but calm and pleasant this am.     Review of Systems   Constitutional: Negative for chills and fever.   Respiratory: Negative for shortness of breath and wheezing.    Cardiovascular: Negative for chest pain and palpitations.   Gastrointestinal: Negative for abdominal pain, nausea and vomiting.   Musculoskeletal: Positive for back pain, gait problem and myalgias.   Neurological: Positive for weakness and headaches. Negative for speech difficulty.     Objective:     Vital Signs (Most Recent):  Temp: 97.9 °F (36.6 °C) (06/01/17 0710)  Pulse: (!) 58 (06/01/17 0710)  Resp: 16 (06/01/17 0710)  BP: 109/60 (06/01/17 0710)  SpO2: 97 % (06/01/17 0710) Vital Signs (24h Range):  Temp:  [97.5 °F (36.4 °C)-98.5 °F (36.9 °C)] 97.9 °F (36.6 °C)  Pulse:  [58-68] 58  Resp:  [16-18] 16  SpO2:  [95 %-99 %] 97 %  BP: (106-145)/(60-80) 109/60     Weight: 78.6 kg (173 lb 4.5 oz)  Body mass index is 24.18 kg/m².    Intake/Output Summary (Last 24 hours) at 06/01/17 1111  Last data filed at 06/01/17 0600   Gross per 24 hour   Intake              400 ml   Output                0 ml   Net              400 ml      Physical Exam   Constitutional: He is oriented to person, place, and time. He appears well-nourished. No distress.   Cardiovascular: Normal rate and regular rhythm.    No murmur heard.  Pulmonary/Chest: Effort normal and breath sounds normal. No respiratory distress. He has no wheezes.   Abdominal: Soft. Bowel sounds are normal. He exhibits no distension. There is no tenderness. There is no guarding.   Musculoskeletal: He exhibits no edema, tenderness or deformity.   Neurological: He is alert and oriented to person, place, and time. No cranial nerve deficit.   Baseline L-sided paralysis, L  arm contracted, LLE with little to no strength or movement. RUE and RLE with 3/5 strength. All near baseline per patient.        Significant Labs:   CBC:   Recent Labs  Lab 05/30/17  1145   WBC 7.72   HGB 13.4*   HCT 40.6        CMP:   Recent Labs  Lab 05/30/17  1144      K 4.1      CO2 28      BUN 10   CREATININE 0.8   CALCIUM 8.8   ANIONGAP 8   EGFRNONAA >60.0     All pertinent labs within the past 24 hours have been reviewed.    Significant Imaging: I have reviewed all pertinent imaging results/findings within the past 24 hours.

## 2017-06-01 NOTE — ASSESSMENT & PLAN NOTE
- Continue baclofen 20 mg QID, carbamazepine  mg BID, celexa 20 mg daily, dantrolene 50 mg QID, gabapentin 900 mg TID, oxycodone 15mg BID PRN, norco PRN  - during last admission his mother asked team to consider addiction specialists as she felt patient was addicted to pain pills. He was offered psych services at that timeand he declined.   - EMS noted that his pain pills were more empty than should have been when they arrived. No current signs of overdose or withdrawal.

## 2017-06-01 NOTE — PLAN OF CARE
Problem: Occupational Therapy Goal  Goal: Occupational Therapy Goal  Goals to be met by: 6/8 (revision completed 6/1)    Patient will increase functional independence with ADLs by performing:    UE Dressing in sitting with Set up and min(A).  LE Dressing with Moderate Assistance and AD as needed.  Modified pivot tranfers to chair/bed side commode with mod(A).   Dynamic task EOB ~10 min with CGA for postural control.    Functional seated task with CGA for postural control.  Outcome: Ongoing (interventions implemented as appropriate)  Goals remain appropriate. GISEL Hunt 6/1/2017

## 2017-06-02 PROCEDURE — 99225 PR SUBSEQUENT OBSERVATION CARE,LEVEL II: CPT | Mod: ,,, | Performed by: PHYSICIAN ASSISTANT

## 2017-06-02 PROCEDURE — G0378 HOSPITAL OBSERVATION PER HR: HCPCS

## 2017-06-02 PROCEDURE — 97112 NEUROMUSCULAR REEDUCATION: CPT

## 2017-06-02 PROCEDURE — 97530 THERAPEUTIC ACTIVITIES: CPT

## 2017-06-02 PROCEDURE — 25000003 PHARM REV CODE 250: Performed by: PHYSICIAN ASSISTANT

## 2017-06-02 RX ADMIN — DANTROLENE SODIUM 50 MG: 50 CAPSULE ORAL at 11:06

## 2017-06-02 RX ADMIN — HYDROCODONE BITARTRATE AND ACETAMINOPHEN 1 TABLET: 5; 325 TABLET ORAL at 09:06

## 2017-06-02 RX ADMIN — DANTROLENE SODIUM 50 MG: 50 CAPSULE ORAL at 04:06

## 2017-06-02 RX ADMIN — OXYBUTYNIN CHLORIDE 5 MG: 5 TABLET, EXTENDED RELEASE ORAL at 10:06

## 2017-06-02 RX ADMIN — HYDROCODONE BITARTRATE AND ACETAMINOPHEN 1 TABLET: 5; 325 TABLET ORAL at 04:06

## 2017-06-02 RX ADMIN — HYDROCODONE BITARTRATE AND ACETAMINOPHEN 1 TABLET: 5; 325 TABLET ORAL at 06:06

## 2017-06-02 RX ADMIN — GABAPENTIN 900 MG: 300 CAPSULE ORAL at 06:06

## 2017-06-02 RX ADMIN — TRAZODONE HYDROCHLORIDE 100 MG: 50 TABLET ORAL at 09:06

## 2017-06-02 RX ADMIN — HYDROCODONE BITARTRATE AND ACETAMINOPHEN 1 TABLET: 5; 325 TABLET ORAL at 01:06

## 2017-06-02 RX ADMIN — DANTROLENE SODIUM 50 MG: 50 CAPSULE ORAL at 06:06

## 2017-06-02 RX ADMIN — BACLOFEN 20 MG: 10 TABLET ORAL at 12:06

## 2017-06-02 RX ADMIN — CARBAMAZEPINE 400 MG: 200 TABLET, EXTENDED RELEASE ORAL at 09:06

## 2017-06-02 RX ADMIN — BACLOFEN 20 MG: 10 TABLET ORAL at 04:06

## 2017-06-02 RX ADMIN — BACLOFEN 20 MG: 10 TABLET ORAL at 06:06

## 2017-06-02 RX ADMIN — CARBAMAZEPINE 400 MG: 200 TABLET, EXTENDED RELEASE ORAL at 10:06

## 2017-06-02 RX ADMIN — BACLOFEN 20 MG: 10 TABLET ORAL at 11:06

## 2017-06-02 RX ADMIN — GABAPENTIN 900 MG: 300 CAPSULE ORAL at 09:06

## 2017-06-02 RX ADMIN — OXYCODONE HYDROCHLORIDE 15 MG: 5 TABLET ORAL at 10:06

## 2017-06-02 RX ADMIN — DANTROLENE SODIUM 50 MG: 50 CAPSULE ORAL at 12:06

## 2017-06-02 RX ADMIN — RIVAROXABAN 20 MG: 20 TABLET, FILM COATED ORAL at 04:06

## 2017-06-02 RX ADMIN — OXYCODONE HYDROCHLORIDE 15 MG: 5 TABLET ORAL at 11:06

## 2017-06-02 RX ADMIN — DIAZEPAM 5 MG: 5 TABLET ORAL at 10:06

## 2017-06-02 RX ADMIN — CITALOPRAM HYDROBROMIDE 20 MG: 10 TABLET ORAL at 10:06

## 2017-06-02 RX ADMIN — STANDARDIZED SENNA CONCENTRATE AND DOCUSATE SODIUM 1 TABLET: 8.6; 5 TABLET, FILM COATED ORAL at 10:06

## 2017-06-02 RX ADMIN — GABAPENTIN 900 MG: 300 CAPSULE ORAL at 04:06

## 2017-06-02 NOTE — ASSESSMENT & PLAN NOTE
- Patient recently discharged from SNF after 45 day stay. Was admitted before that with increased weakness and pain and found to have UTI, MRI at that time was negative for true MS flare. Patient is known to team and he appears stronger than his last admission. Unlikely a true MS flare at this time.  - While he may improve his strength at inpatient rehab, a larger discussion regarding his long term plan is underway. He initially refused the idea of NHP but now agrees to NHP with rehab/SNF if he is denied. CM/SW assisting with plan. Patient otherwise plans to return home where he has very limited resources and is unable to care for himself safely   -  PT/OT recommend rehab and PMR consulted, but after evaluation PMR does not feel patient appropriate for inpatient rehab currently, recommend SNF placement. Patient does not meet inpatient criteria at this time.   - Held off on consulting neurology as patient does not manifest symptoms of MS flare or psuedoflare.  - SNF consult placed but recommending long term skilled, discussed other long-term care options with patient again today, seems agreeable to possibly transitioning to NH placement from SNF.  - Consult to [facility] placed, patient reportedly medically appropriate for the facility but denied by insurance.  - CM/SW assisting in next option of SNF/NH placement.

## 2017-06-02 NOTE — PROGRESS NOTES
"[facility] Hospital Medicine  Progress Note    Patient Name: Mao Levin  MRN: [id number]  Patient Class: OP- Observation   Admission Date: 5/26/2017  Length of Stay: 0 days  Attending Physician: Angelo Cadet MD  Primary Care Provider: Fausto Leung MD    [facility] Medicine Team: [facility] E Brenda Ivan PA-C    Subjective:     Principal Problem:MS (multiple sclerosis)    HPI:  51M with a PMHx of multiple sclerosis on rotuxin therapy, saddle PE on chronic anticoagulation therapy, and chronic pain presents after being found on the ground and unable to get up by his home health PT/OT. Patient reports progressively worsening ability to ambulate since being discharged after a >45 day stay at SNF. The patient is known to me from his previous admission before going to SNF. The patient lives alone and states that he has been found down multiple times and will "roll" to the door to answer HH. The patient is requesting inpatient rehabilitation. He states previous stays in the past have helped him gain strength. He believes that SNF did not provide him with enough hours of PT/OT per day. He denies any pain and numbness to his face and right eye or vision changes, which is what usually occurs when he has a flare. He denies chest pain, SOB, dizziness, palpitations, fever/chills, N/V/D. His chronic pain is unchanged.    Hospital Course:  Placed in observation for multiple sclerosis and weakness requesting inpatient rehab placement. PT/OT consulted and PMR consulted, patient not currently appropriate for inpatient rehab admission, recommend SNF placement. CM/SW involved. SNF consult placed, patient reportedly more appropriate for long term skilled. Therapist who worked with patient over the weekend recommending [facility], consult placed and patient medically approved but denied by insurance. CM/SW assisting in finding SNF/NH placement as next option. Stable for discharge pending " placement.    Interval History: No acute events overnight, reports intermittent headache but none this morning. Denied by [employer] for rehab placement, CM/SW assisting in next option of SNF/NH placement.     Review of Systems   Constitutional: Negative for chills and fever.   Respiratory: Negative for shortness of breath and wheezing.    Cardiovascular: Negative for chest pain and palpitations.   Gastrointestinal: Negative for abdominal pain, nausea and vomiting.   Musculoskeletal: Positive for back pain, gait problem and myalgias.   Neurological: Positive for weakness and headaches. Negative for speech difficulty.     Objective:     Vital Signs (Most Recent):  Temp: 97.6 °F (36.4 °C) (06/02/17 0738)  Pulse: (!) 56 (06/02/17 0738)  Resp: 16 (06/02/17 0738)  BP: 114/69 (06/02/17 0738)  SpO2: 100 % (06/02/17 0738) Vital Signs (24h Range):  Temp:  [96.9 °F (36.1 °C)-98.4 °F (36.9 °C)] 97.6 °F (36.4 °C)  Pulse:  [53-68] 56  Resp:  [16] 16  SpO2:  [97 %-100 %] 100 %  BP: (112-134)/(58-78) 114/69     Weight: 78.6 kg (173 lb 4.5 oz)  Body mass index is 24.18 kg/m².    Intake/Output Summary (Last 24 hours) at 06/02/17 1049  Last data filed at 06/02/17 1000   Gross per 24 hour   Intake             1685 ml   Output             3500 ml   Net            -1815 ml      Physical Exam   Constitutional: He is oriented to person, place, and time. He appears well-nourished. No distress.   Cardiovascular: Normal rate and regular rhythm.    No murmur heard.  Pulmonary/Chest: Effort normal and breath sounds normal. No respiratory distress. He has no wheezes.   Abdominal: Soft. Bowel sounds are normal. He exhibits no distension. There is no tenderness. There is no guarding.   Musculoskeletal: He exhibits no edema, tenderness or deformity.   Neurological: He is alert and oriented to person, place, and time. No cranial nerve deficit.   Baseline L-sided paralysis, L arm contracted, LLE with little to no strength or movement. RUE and RLE with  3/5 strength. All near baseline per patient.        Significant Labs:   CBC:   Recent Labs  Lab 06/01/17  1030   WBC 6.21   HGB 13.0*   HCT 39.0*        CMP:   Recent Labs  Lab 06/01/17  1030      K 4.1      CO2 26   GLU 96   BUN 15   CREATININE 0.8   CALCIUM 8.3*   ANIONGAP 9   EGFRNONAA >60.0     All pertinent labs within the past 24 hours have been reviewed.    Significant Imaging: I have reviewed all pertinent imaging results/findings within the past 24 hours.    Assessment/Plan:      * MS (multiple sclerosis)    - Patient recently discharged from SNF after 45 day stay. Was admitted before that with increased weakness and pain and found to have UTI, MRI at that time was negative for true MS flare. Patient is known to team and he appears stronger than his last admission. Unlikely a true MS flare at this time.  - While he may improve his strength at inpatient rehab, a larger discussion regarding his long term plan is underway. He initially refused the idea of NHP but now agrees to NHP with rehab/SNF if he is denied. CM/SW assisting with plan. Patient otherwise plans to return home where he has very limited resources and is unable to care for himself safely   -  PT/OT recommend rehab and PMR consulted, but after evaluation PMR does not feel patient appropriate for inpatient rehab currently, recommend SNF placement. Patient does not meet inpatient criteria at this time.   - Held off on consulting neurology as patient does not manifest symptoms of MS flare or psuedoflare.  - SNF consult placed but recommending long term skilled, discussed other long-term care options with patient again today, seems agreeable to possibly transitioning to NH placement from SNF.  - Consult to [facility] re[facility] placed, patient reportedly medically appropriate for the facility but denied by insurance.  - CM/SW assisting in next option of SNF/NH placement.        10/25/2016 Saddle PE    - continue xarelto         Neurogenic bladder    - continue oxybutynin, bladder scan and cath PRN        Chronic pain of multiple sites    - Continue baclofen 20 mg QID, carbamazepine  mg BID, celexa 20 mg daily, dantrolene 50 mg QID, gabapentin 900 mg TID, oxycodone 15mg BID PRN, norco PRN  - during last admission his mother asked team to consider addiction specialists as she felt patient was addicted to pain pills. He was offered psych services at that timeand he declined.   - EMS noted that his pain pills were more empty than should have been when they arrived. No current signs of overdose or withdrawal.           VTE Risk Mitigation         Ordered     rivaroxaban tablet 20 mg  With dinner     Route:  Oral        05/26/17 2235     Reason for No Pharmacological VTE Prophylaxis  Once      05/26/17 2235     Medium Risk of VTE  Once      05/26/17 2235          Brenda Ivan PA-C  Department of Hospital Medicine   [facility]  Staff: Dr. Cadet

## 2017-06-02 NOTE — PLAN OF CARE
Problem: Patient Care Overview  Goal: Plan of Care Review  Outcome: Ongoing (interventions implemented as appropriate)  AOx4. AVSS on RA. Up w/ 2 assist to chair w/ boots & shoes on. No acute changes. Pain controlled w/ PRN meds. Valium admin PRN. No BM today. PIV patent CDI. Condom cath replaced, now intact. Inpatient rehab denied. Pending SNF placement. RUBÉN Monday. No acute changes noted. Hourly rounding maintained. Call bell w/in reach, bed in lowest position, bed alarm on. Fall risk precautions in place.

## 2017-06-02 NOTE — PLAN OF CARE
Problem: Physical Therapy Goal  Goal: Physical Therapy Goal  Goals to be met by: 2017     Patient will increase functional independence with mobility by performin. Supine to sit with Contact Guard Assistance  2. Sit to supine with Contact Guard Assistance  3. Sit to stand transfer with Minimal Assistance  4. Gait  x 20 feet with Moderate Assistance using appropriate AD.   5. Lower extremity exercise program x15 reps per handout, with assistance as needed     Outcome: Ongoing (interventions implemented as appropriate)  Pt goals remain appropriate.     MESHA LYNN, PT  2017

## 2017-06-02 NOTE — PLAN OF CARE
06/02/17 1354   Discharge Reassessment   Assessment Type Discharge Planning Reassessment   Can the patient answer the patient profile reliably? Yes, cognitively intact   How does the patient rate their overall health at the present time? Fair   Describe the patient's ability to walk at the present time. Major restrictions/daily assistance from another person   How often would a person be available to care for the patient? Never   Number of comorbid conditions (as recorded on the chart) One   During the past month, has the patient often been bothered by feeling down, depressed or hopeless? Yes   During the past month, has the patient often been bothered by little interest or pleasure in doing things? Yes   Discharge plan remains the same: No   Provided patient/caregiver education on the expected discharge date and the discharge plan Yes   Discharge Plan A Skilled Nursing Facility   Discharge Plan B Home Health   Change in patient condition or support system No     Patient denied by [facility] for Inpatient Rehab authorization.  Will seek nursing home skilled placement for patient.  Referrals sent and awaiting accepting facility and admission pending insurance authorization for skilled placement.  CM to continue to follow.

## 2017-06-02 NOTE — SUBJECTIVE & OBJECTIVE
Interval History: No acute events overnight, reports intermittent headache but none this morning. Denied by [employer] for rehab placement, CM/SW assisting in next option of SNF/NH placement.     Review of Systems   Constitutional: Negative for chills and fever.   Respiratory: Negative for shortness of breath and wheezing.    Cardiovascular: Negative for chest pain and palpitations.   Gastrointestinal: Negative for abdominal pain, nausea and vomiting.   Musculoskeletal: Positive for back pain, gait problem and myalgias.   Neurological: Positive for weakness and headaches. Negative for speech difficulty.     Objective:     Vital Signs (Most Recent):  Temp: 97.6 °F (36.4 °C) (06/02/17 0738)  Pulse: (!) 56 (06/02/17 0738)  Resp: 16 (06/02/17 0738)  BP: 114/69 (06/02/17 0738)  SpO2: 100 % (06/02/17 0738) Vital Signs (24h Range):  Temp:  [96.9 °F (36.1 °C)-98.4 °F (36.9 °C)] 97.6 °F (36.4 °C)  Pulse:  [53-68] 56  Resp:  [16] 16  SpO2:  [97 %-100 %] 100 %  BP: (112-134)/(58-78) 114/69     Weight: 78.6 kg (173 lb 4.5 oz)  Body mass index is 24.18 kg/m².    Intake/Output Summary (Last 24 hours) at 06/02/17 1049  Last data filed at 06/02/17 1000   Gross per 24 hour   Intake             1685 ml   Output             3500 ml   Net            -1815 ml      Physical Exam   Constitutional: He is oriented to person, place, and time. He appears well-nourished. No distress.   Cardiovascular: Normal rate and regular rhythm.    No murmur heard.  Pulmonary/Chest: Effort normal and breath sounds normal. No respiratory distress. He has no wheezes.   Abdominal: Soft. Bowel sounds are normal. He exhibits no distension. There is no tenderness. There is no guarding.   Musculoskeletal: He exhibits no edema, tenderness or deformity.   Neurological: He is alert and oriented to person, place, and time. No cranial nerve deficit.   Baseline L-sided paralysis, L arm contracted, LLE with little to no strength or movement. RUE and RLE with 3/5 strength.  All near baseline per patient.        Significant Labs:   CBC:   Recent Labs  Lab 06/01/17  1030   WBC 6.21   HGB 13.0*   HCT 39.0*        CMP:   Recent Labs  Lab 06/01/17  1030      K 4.1      CO2 26   GLU 96   BUN 15   CREATININE 0.8   CALCIUM 8.3*   ANIONGAP 9   EGFRNONAA >60.0     All pertinent labs within the past 24 hours have been reviewed.    Significant Imaging: I have reviewed all pertinent imaging results/findings within the past 24 hours.

## 2017-06-02 NOTE — PT/OT/SLP PROGRESS
Physical Therapy  Treatment    Mao Levin   MRN: [id number]   Admitting Diagnosis: MS (multiple sclerosis)    PT Received On: 06/02/17  PT Start Time: 0930     PT Stop Time: 1000    PT Total Time (min): 30 min       Billable Minutes:  Therapeutic Activity 15 and Neuromuscular Re-education 15    Treatment Type: Treatment  PT/PTA: PT     PTA Visit Number: 0       General Precautions: Standard, fall, aspiration  Orthopedic Precautions: N/A   Braces: N/A         Subjective:  Communicated with RN prior to session.  Pt agreeable to therapy session.     Pain/Comfort  Pain Rating 1: 0/10  Pain Rating Post-Intervention 1: 0/10    Objective:   Patient found with: agee catheter    Functional Mobility:  Bed Mobility:   Supine to Sit: Minimum Assistance    Transfers:  Sit <> Stand Assistance: Maximum Assistance (x5 trials)  Sit <> Stand Assistive Device: Hemiwalker  Bed <> Chair Technique: Stand Pivot  Bed <> Chair Assistance: Maximum Assistance  Bed <> Chair Assistive Device: No Assistive Device    Balance:   Static Sit: GOOD-: Takes MODERATE challenges from all directions but inconsistently  Dynamic Sit: GOOD-: Maintains balance through MODERATE excursions of active trunk movement,     Static Stand: POOR: Needs MODERATE assist to maintain  Dynamic stand: POOR: N/A     Therapeutic Activities and Exercises:  Pt educated on safety with tranfers.  Pt required assist with donning shoes and L AFO.  PT applied static stretch, inhibited tone and WB through L UE.   Pt stood EOB with max A with hemiwalker, WS in stance x5 trials.   Pt safe to perform transfers with RN staff (assist x2).     AM-PAC 6 CLICK MOBILITY  How much help from another person does this patient currently need?   1 = Unable, Total/Dependent Assistance  2 = A lot, Maximum/Moderate Assistance  3 = A little, Minimum/Contact Guard/Supervision  4 = None, Modified [place]/Independent    Turning over in bed (including adjusting bedclothes, sheets and blankets)?:  3  Sitting down on and standing up from a chair with arms (e.g., wheelchair, bedside commode, etc.): 2  Moving from lying on back to sitting on the side of the bed?: 2  Moving to and from a bed to a chair (including a wheelchair)?: 2  Need to walk in hospital room?: 1  Climbing 3-5 steps with a railing?: 1  Total Score: 11    AM-PAC Raw Score CMS G-Code Modifier Level of Impairment Assistance   6 % Total / Unable   7 - 9 CM 80 - 100% Maximal Assist   10 - 14 CL 60 - 80% Moderate Assist   15 - 19 CK 40 - 60% Moderate Assist   20 - 22 CJ 20 - 40% Minimal Assist   23 CI 1-20% SBA / CGA   24 CH 0% Independent/ Mod I     Patient left up in chair with all lines intact, call button in reach and RN notified.    Assessment:  Mao Levin is a 51 y.o. male with a medical diagnosis of MS (multiple sclerosis) and presents with decreased strength, endurance, balance and overall functional mobility. Pt performed bed mobility min A and transfers max A; x5 trials. Pt will continue to benefit from skilled PT to improve deficits and increase overall functional mobility.     Rehab identified problem list/impairments: Rehab identified problem list/impairments: weakness, gait instability, decreased lower extremity function, impaired balance, impaired endurance, impaired functional mobilty    Rehab potential is good.    Activity tolerance: Good    Discharge recommendations: Discharge Facility/Level Of Care Needs: rehabilitation facility     Barriers to discharge: Barriers to Discharge: Inaccessible home environment, Decreased caregiver support    Equipment recommendations: Equipment Needed After Discharge: other (see comments) (TBD)     GOALS:    Physical Therapy Goals        Problem: Physical Therapy Goal    Goal Priority Disciplines Outcome Goal Variances Interventions   Physical Therapy Goal     PT/OT, PT Ongoing (interventions implemented as appropriate)     Description:  Goals to be met by: 06/07/2017     Patient will increase  functional independence with mobility by performin. Supine to sit with Contact Guard Assistance  2. Sit to supine with Contact Guard Assistance  3. Sit to stand transfer with Minimal Assistance  4. Gait  x 20 feet with Moderate Assistance using appropriate AD.   5. Lower extremity exercise program x15 reps per handout, with assistance as needed                      PLAN:    Patient to be seen 5 x/week  to address the above listed problems via gait training, therapeutic activities, therapeutic exercises, neuromuscular re-education  Plan of Care expires: 17  Plan of Care reviewed with: patient         MESHA SHANE, PT  2017

## 2017-06-03 LAB
ANION GAP SERPL CALC-SCNC: 8 MMOL/L
BASOPHILS # BLD AUTO: 0.02 K/UL
BASOPHILS NFR BLD: 0.3 %
BUN SERPL-MCNC: 15 MG/DL
CALCIUM SERPL-MCNC: 8.4 MG/DL
CHLORIDE SERPL-SCNC: 104 MMOL/L
CO2 SERPL-SCNC: 27 MMOL/L
CREAT SERPL-MCNC: 0.8 MG/DL
DIFFERENTIAL METHOD: ABNORMAL
EOSINOPHIL # BLD AUTO: 0.2 K/UL
EOSINOPHIL NFR BLD: 2.2 %
ERYTHROCYTE [DISTWIDTH] IN BLOOD BY AUTOMATED COUNT: 13.7 %
EST. GFR  (AFRICAN AMERICAN): >60 ML/MIN/1.73 M^2
EST. GFR  (NON AFRICAN AMERICAN): >60 ML/MIN/1.73 M^2
GLUCOSE SERPL-MCNC: 74 MG/DL
HCT VFR BLD AUTO: 37.4 %
HGB BLD-MCNC: 12.8 G/DL
LYMPHOCYTES # BLD AUTO: 1.7 K/UL
LYMPHOCYTES NFR BLD: 24.4 %
MCH RBC QN AUTO: 30.7 PG
MCHC RBC AUTO-ENTMCNC: 34.2 %
MCV RBC AUTO: 90 FL
MONOCYTES # BLD AUTO: 0.4 K/UL
MONOCYTES NFR BLD: 6.4 %
NEUTROPHILS # BLD AUTO: 4.5 K/UL
NEUTROPHILS NFR BLD: 66.4 %
PLATELET # BLD AUTO: 225 K/UL
PMV BLD AUTO: 10.5 FL
POTASSIUM SERPL-SCNC: 4.2 MMOL/L
RBC # BLD AUTO: 4.17 M/UL
SODIUM SERPL-SCNC: 139 MMOL/L
WBC # BLD AUTO: 6.76 K/UL

## 2017-06-03 PROCEDURE — 85025 COMPLETE CBC W/AUTO DIFF WBC: CPT

## 2017-06-03 PROCEDURE — 99225 PR SUBSEQUENT OBSERVATION CARE,LEVEL II: CPT | Mod: ,,, | Performed by: PHYSICIAN ASSISTANT

## 2017-06-03 PROCEDURE — G0378 HOSPITAL OBSERVATION PER HR: HCPCS

## 2017-06-03 PROCEDURE — 80048 BASIC METABOLIC PNL TOTAL CA: CPT

## 2017-06-03 PROCEDURE — 36415 COLL VENOUS BLD VENIPUNCTURE: CPT

## 2017-06-03 PROCEDURE — 25000003 PHARM REV CODE 250: Performed by: PHYSICIAN ASSISTANT

## 2017-06-03 RX ADMIN — DIAZEPAM 5 MG: 5 TABLET ORAL at 09:06

## 2017-06-03 RX ADMIN — DANTROLENE SODIUM 50 MG: 50 CAPSULE ORAL at 05:06

## 2017-06-03 RX ADMIN — OXYCODONE HYDROCHLORIDE 15 MG: 5 TABLET ORAL at 12:06

## 2017-06-03 RX ADMIN — CARBAMAZEPINE 400 MG: 200 TABLET, EXTENDED RELEASE ORAL at 09:06

## 2017-06-03 RX ADMIN — TRAZODONE HYDROCHLORIDE 100 MG: 50 TABLET ORAL at 09:06

## 2017-06-03 RX ADMIN — BACLOFEN 20 MG: 10 TABLET ORAL at 11:06

## 2017-06-03 RX ADMIN — DIAZEPAM 5 MG: 5 TABLET ORAL at 12:06

## 2017-06-03 RX ADMIN — STANDARDIZED SENNA CONCENTRATE AND DOCUSATE SODIUM 1 TABLET: 8.6; 5 TABLET, FILM COATED ORAL at 08:06

## 2017-06-03 RX ADMIN — HYDROCODONE BITARTRATE AND ACETAMINOPHEN 1 TABLET: 5; 325 TABLET ORAL at 05:06

## 2017-06-03 RX ADMIN — GABAPENTIN 900 MG: 300 CAPSULE ORAL at 02:06

## 2017-06-03 RX ADMIN — DANTROLENE SODIUM 50 MG: 50 CAPSULE ORAL at 11:06

## 2017-06-03 RX ADMIN — BACLOFEN 20 MG: 10 TABLET ORAL at 05:06

## 2017-06-03 RX ADMIN — GABAPENTIN 900 MG: 300 CAPSULE ORAL at 09:06

## 2017-06-03 RX ADMIN — CARBAMAZEPINE 400 MG: 200 TABLET, EXTENDED RELEASE ORAL at 08:06

## 2017-06-03 RX ADMIN — OXYCODONE HYDROCHLORIDE 15 MG: 5 TABLET ORAL at 09:06

## 2017-06-03 RX ADMIN — HYDROCODONE BITARTRATE AND ACETAMINOPHEN 1 TABLET: 5; 325 TABLET ORAL at 04:06

## 2017-06-03 RX ADMIN — RIVAROXABAN 20 MG: 20 TABLET, FILM COATED ORAL at 05:06

## 2017-06-03 RX ADMIN — OXYBUTYNIN CHLORIDE 5 MG: 5 TABLET, EXTENDED RELEASE ORAL at 08:06

## 2017-06-03 RX ADMIN — CITALOPRAM HYDROBROMIDE 20 MG: 10 TABLET ORAL at 08:06

## 2017-06-03 RX ADMIN — HYDROCODONE BITARTRATE AND ACETAMINOPHEN 1 TABLET: 5; 325 TABLET ORAL at 10:06

## 2017-06-03 RX ADMIN — GABAPENTIN 900 MG: 300 CAPSULE ORAL at 05:06

## 2017-06-03 RX ADMIN — ACETAMINOPHEN 650 MG: 325 TABLET ORAL at 02:06

## 2017-06-03 NOTE — SUBJECTIVE & OBJECTIVE
Interval History: No acute events overnight. Endorses headache earlier this morning, now resolved, as well as continued chronic pain but fairly well controlled. Will likely not know more on placement until Monday.    Review of Systems   Constitutional: Negative for chills and fever.   Respiratory: Negative for shortness of breath and wheezing.    Cardiovascular: Negative for chest pain and palpitations.   Gastrointestinal: Negative for abdominal pain, nausea and vomiting.   Musculoskeletal: Positive for back pain, gait problem and myalgias.   Neurological: Positive for weakness and headaches (intermittent). Negative for speech difficulty.     Objective:     Vital Signs (Most Recent):  Temp: 97.8 °F (36.6 °C) (06/03/17 1100)  Pulse: 60 (06/03/17 1100)  Resp: 18 (06/03/17 1100)  BP: 130/79 (06/03/17 1100)  SpO2: 96 % (06/03/17 1100) Vital Signs (24h Range):  Temp:  [97.3 °F (36.3 °C)-98.4 °F (36.9 °C)] 97.8 °F (36.6 °C)  Pulse:  [58-71] 60  Resp:  [14-18] 18  SpO2:  [95 %-99 %] 96 %  BP: (104-152)/(56-83) 130/79     Weight: 78.6 kg (173 lb 4.5 oz)  Body mass index is 24.18 kg/m².    Intake/Output Summary (Last 24 hours) at 06/03/17 1437  Last data filed at 06/03/17 1021   Gross per 24 hour   Intake             1220 ml   Output             2150 ml   Net             -930 ml      Physical Exam   Constitutional: He is oriented to person, place, and time. He appears well-nourished. No distress.   Cardiovascular: Normal rate and regular rhythm.    No murmur heard.  Pulmonary/Chest: Effort normal and breath sounds normal. No respiratory distress. He has no wheezes.   Abdominal: Soft. Bowel sounds are normal. He exhibits no distension. There is no tenderness. There is no guarding.   Musculoskeletal: He exhibits no edema, tenderness or deformity.   Neurological: He is alert and oriented to person, place, and time. No cranial nerve deficit.   Baseline L-sided paralysis, L arm contracted, LLE with little to no strength or  movement. RUE and RLE with 3/5 strength. All near baseline per patient.        Significant Labs: All pertinent labs within the past 24 hours have been reviewed.    Significant Imaging: I have reviewed all pertinent imaging results/findings within the past 24 hours.

## 2017-06-03 NOTE — PLAN OF CARE
Problem: Patient Care Overview  Goal: Plan of Care Review  Outcome: Ongoing (interventions implemented as appropriate)  AOx4. AVSS on RA. Up w/ 2 assist to chair w/ boots & shoes on. 1 BM today. No acute changes. Pain controlled w/ PRN meds. Valium admin PRN. No BM today. PIV patent CDI. . No acute changes noted. Hourly rounding maintained. Call bell w/in reach, bed in lowest position, bed alarm on. Fall risk precautions in place.

## 2017-06-03 NOTE — PROGRESS NOTES
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 Patient reports progressively worsening ability to ambulate since being discharged after a >45 day stay at SNF. The patient is known to me from his previous admission before going to SNF. The patient lives alone and states that he has been found down multiple times and will "roll" to the door to answer HH. The patient is requesting inpatient rehabilitation. He states previous stays in the past have helped him gain strength. He believes that SNF did not provide him with enough hours of PT/OT per day. He denies any pain and numbness to his face and right eye or vision changes, which is what usually occurs when he has a flare. He denies chest pain, SOB, dizziness, palpitations, fever/chills, N/V/D. His chronic pain is unchanged.    Hospital Course:  Placed in observation for multiple sclerosis and weakness requesting inpatient rehab placement. PT/OT consulted and PMR consulted, patient not currently appropriate for inpatient rehab admission, recommend SNF placement. CM/SW involved. SNF consult placed, patient reportedly more appropriate for long term skilled. Therapist who worked with patient over the weekend recommending [facility], consult placed and patient medically approved but denied by insurance. CM/SW assisting in finding SNF/NH placement as next option. Stable for discharge pending " placement.     Interval History: No acute events overnight. Endorses headache earlier this morning, now resolved, as well as continued chronic pain but fairly well controlled. Will likely not know more on placement until Monday.    Review of Systems   Constitutional: Negative for chills and fever.   Respiratory: Negative for shortness of breath and wheezing.    Cardiovascular: Negative for chest pain and palpitations.   Gastrointestinal: Negative for abdominal pain, nausea and vomiting.   Musculoskeletal: Positive for back pain, gait problem and myalgias.   Neurological: Positive for weakness and headaches (intermittent). Negative for speech difficulty.     Objective:     Vital Signs (Most Recent):  Temp: 97.8 °F (36.6 °C) (06/03/17 1100)  Pulse: 60 (06/03/17 1100)  Resp: 18 (06/03/17 1100)  BP: 130/79 (06/03/17 1100)  SpO2: 96 % (06/03/17 1100) Vital Signs (24h Range):  Temp:  [97.3 °F (36.3 °C)-98.4 °F (36.9 °C)] 97.8 °F (36.6 °C)  Pulse:  [58-71] 60  Resp:  [14-18] 18  SpO2:  [95 %-99 %] 96 %  BP: (104-152)/(56-83) 130/79     Weight: 78.6 kg (173 lb 4.5 oz)  Body mass index is 24.18 kg/m².    Intake/Output Summary (Last 24 hours) at 06/03/17 1437  Last data filed at 06/03/17 1021   Gross per 24 hour   Intake             1220 ml   Output             2150 ml   Net             -930 ml      Physical Exam   Constitutional: He is oriented to person, place, and time. He appears well-nourished. No distress.   Cardiovascular: Normal rate and regular rhythm.    No murmur heard.  Pulmonary/Chest: Effort normal and breath sounds normal. No respiratory distress. He has no wheezes.   Abdominal: Soft. Bowel sounds are normal. He exhibits no distension. There is no tenderness. There is no guarding.   Musculoskeletal: He exhibits no edema, tenderness or deformity.   Neurological: He is alert and oriented to person, place, and time. No cranial nerve deficit.   Baseline L-sided paralysis, L arm contracted, LLE with little to no  strength or movement. RUE and RLE with 3/5 strength. All near baseline per patient.        Significant Labs: All pertinent labs within the past 24 hours have been reviewed.    Significant Imaging: I have reviewed all pertinent imaging results/findings within the past 24 hours.    Assessment/Plan:      * MS (multiple sclerosis)    - Patient recently discharged from SNF after 45 day stay. Was admitted before that with increased weakness and pain and found to have UTI, MRI at that time was negative for true MS flare. Patient is known to team and he appears stronger than his last admission. Unlikely a true MS flare at this time.  - While he may improve his strength at inpatient rehab, a larger discussion regarding his long term plan is underway. He initially refused the idea of NHP but now agrees to NHP with rehab/SNF if he is denied. CM/SW assisting with plan. Patient otherwise plans to return home where he has very limited resources and is unable to care for himself safely   -  PT/OT recommend rehab and PMR consulted, but after evaluation PMR does not feel patient appropriate for inpatient rehab currently, recommend SNF placement. Patient does not meet inpatient criteria at this time.   - Held off on consulting neurology as patient does not manifest symptoms of MS flare or psuedoflare.  - SNF consult placed but recommending long term skilled, discussed other long-term care options with patient again today, seems agreeable to possibly transitioning to NH placement from SNF.  - Consult to [facility] placed, patient reportedly medically appropriate for the facility but denied by insurance.  - CM/SW assisting in next option of SNF/NH placement.        10/25/2016 Saddle PE    - continue xarelto        Neurogenic bladder    - continue oxybutynin, bladder scan and cath PRN        Chronic pain of multiple sites    - Continue baclofen 20 mg QID, carbamazepine  mg BID, celexa 20 mg daily, dantrolene 50 mg QID,  gabapentin 900 mg TID, oxycodone 15mg BID PRN, norco PRN  - during last admission his mother asked team to consider addiction specialists as she felt patient was addicted to pain pills. He was offered psych services at that timeand he declined.   - EMS noted that his pain pills were more empty than should have been when they arrived. No current signs of overdose or withdrawal.           VTE Risk Mitigation         Ordered     rivaroxaban tablet 20 mg  With dinner     Route:  Oral        05/26/17 2235     Reason for No Pharmacological VTE Prophylaxis  Once      05/26/17 2235     Medium Risk of VTE  Once      05/26/17 2235          Brenda Ivan PA-C  Department of Hospital Medicine   [facility]  Staff: Dr. Cadet

## 2017-06-03 NOTE — PLAN OF CARE
Problem: Patient Care Overview  Goal: Plan of Care Review  Outcome: Ongoing (interventions implemented as appropriate)  POC reviewed with pt, acknowledged understanding. Pt remains free from falls/injuries, VSS. Condom cath in place for incontinence. Pain managed with PRN meds. Up to bedside commode with 1 assist. Bed alarm on for safety. WCTM.

## 2017-06-04 PROCEDURE — 25000003 PHARM REV CODE 250: Performed by: PHYSICIAN ASSISTANT

## 2017-06-04 PROCEDURE — G0378 HOSPITAL OBSERVATION PER HR: HCPCS

## 2017-06-04 PROCEDURE — 99224 PR SUBSEQUENT OBSERVATION CARE,LEVEL I: CPT | Mod: ,,, | Performed by: PHYSICIAN ASSISTANT

## 2017-06-04 RX ADMIN — DANTROLENE SODIUM 50 MG: 50 CAPSULE ORAL at 12:06

## 2017-06-04 RX ADMIN — OXYCODONE HYDROCHLORIDE 15 MG: 5 TABLET ORAL at 05:06

## 2017-06-04 RX ADMIN — HYDROCODONE BITARTRATE AND ACETAMINOPHEN 1 TABLET: 5; 325 TABLET ORAL at 04:06

## 2017-06-04 RX ADMIN — HYDROCODONE BITARTRATE AND ACETAMINOPHEN 1 TABLET: 5; 325 TABLET ORAL at 12:06

## 2017-06-04 RX ADMIN — DANTROLENE SODIUM 50 MG: 50 CAPSULE ORAL at 05:06

## 2017-06-04 RX ADMIN — CARBAMAZEPINE 400 MG: 200 TABLET, EXTENDED RELEASE ORAL at 09:06

## 2017-06-04 RX ADMIN — OXYCODONE HYDROCHLORIDE 15 MG: 5 TABLET ORAL at 09:06

## 2017-06-04 RX ADMIN — RIVAROXABAN 20 MG: 20 TABLET, FILM COATED ORAL at 04:06

## 2017-06-04 RX ADMIN — GABAPENTIN 900 MG: 300 CAPSULE ORAL at 05:06

## 2017-06-04 RX ADMIN — GABAPENTIN 900 MG: 300 CAPSULE ORAL at 09:06

## 2017-06-04 RX ADMIN — BACLOFEN 20 MG: 10 TABLET ORAL at 11:06

## 2017-06-04 RX ADMIN — TRAZODONE HYDROCHLORIDE 100 MG: 50 TABLET ORAL at 09:06

## 2017-06-04 RX ADMIN — ACETAMINOPHEN 650 MG: 325 TABLET ORAL at 11:06

## 2017-06-04 RX ADMIN — STANDARDIZED SENNA CONCENTRATE AND DOCUSATE SODIUM 1 TABLET: 8.6; 5 TABLET, FILM COATED ORAL at 09:06

## 2017-06-04 RX ADMIN — GABAPENTIN 900 MG: 300 CAPSULE ORAL at 01:06

## 2017-06-04 RX ADMIN — DIAZEPAM 5 MG: 5 TABLET ORAL at 12:06

## 2017-06-04 RX ADMIN — BACLOFEN 20 MG: 10 TABLET ORAL at 05:06

## 2017-06-04 RX ADMIN — DIAZEPAM 5 MG: 5 TABLET ORAL at 09:06

## 2017-06-04 RX ADMIN — HYDROCODONE BITARTRATE AND ACETAMINOPHEN 1 TABLET: 5; 325 TABLET ORAL at 09:06

## 2017-06-04 RX ADMIN — CITALOPRAM HYDROBROMIDE 20 MG: 10 TABLET ORAL at 09:06

## 2017-06-04 RX ADMIN — BACLOFEN 20 MG: 10 TABLET ORAL at 12:06

## 2017-06-04 RX ADMIN — OXYBUTYNIN CHLORIDE 5 MG: 5 TABLET, EXTENDED RELEASE ORAL at 09:06

## 2017-06-04 RX ADMIN — OXYCODONE HYDROCHLORIDE 15 MG: 5 TABLET ORAL at 01:06

## 2017-06-04 RX ADMIN — DANTROLENE SODIUM 50 MG: 50 CAPSULE ORAL at 11:06

## 2017-06-04 NOTE — ASSESSMENT & PLAN NOTE
- Patient recently discharged from SNF after 45 day stay. Was admitted before that with increased weakness and pain and found to have UTI, MRI at that time was negative for true MS flare. Patient is known to team and he appears stronger than his last admission. Unlikely a true MS flare at this time.  - While he may improve his strength at inpatient rehab, a larger discussion regarding his long term plan is underway. He initially refused the idea of NHP but now agrees to NHP with rehab/SNF if he is denied. CM/SW assisting with plan. Patient otherwise plans to return home where he has very limited resources and is unable to care for himself safely   -  PT/OT recommend rehab and PMR consulted, but after evaluation PMR does not feel patient appropriate for inpatient rehab currently, recommend SNF placement. Patient does not meet inpatient criteria at this time.   - Held off on consulting neurology as patient does not manifest symptoms of MS flare or psuedoflare.  - SNF consult placed but recommending long term skilled, discussed other long-term care options with patient again today, seems agreeable to possibly transitioning to NH placement from SNF.  - Consult to [facility] placed, patient reportedly medically appropriate for the facility but denied by insurance.  - CM/SW assisting in next option of SNF/NH placement. Hopefully will have further information after the weekend.

## 2017-06-04 NOTE — PLAN OF CARE
Problem: Patient Care Overview  Goal: Plan of Care Review  Pt resting in bed no needs at this time, medicated for pain, VSS A&O X4, Had large bm on BSC hourly rounding cont throughout night

## 2017-06-04 NOTE — PROGRESS NOTES
"[facility] Hospital Medicine  Progress Note    Patient Name: Mao Levin  MRN: [id number]  Patient Class: OP- Observation   Admission Date: 5/26/2017  Length of Stay: 0 days  Attending Physician: Angelo Cadet MD  Primary Care Provider: Fausto Leung MD    [facility] Medicine Team: [facility] E Brenda Ivan PA-C    Subjective:     Principal Problem:MS (multiple sclerosis)    HPI:  51M with a PMHx of multiple sclerosis on rotuxin therapy, saddle PE on chronic anticoagulation therapy, and chronic pain presents after being found on the ground and unable to get up by his home health PT/OT. Patient reports progressively worsening ability to ambulate since being discharged after a >45 day stay at SNF. The patient is known to me from his previous admission before going to SNF. The patient lives alone and states that he has been found down multiple times and will "roll" to the door to answer HH. The patient is requesting inpatient rehabilitation. He states previous stays in the past have helped him gain strength. He believes that SNF did not provide him with enough hours of PT/OT per day. He denies any pain and numbness to his face and right eye or vision changes, which is what usually occurs when he has a flare. He denies chest pain, SOB, dizziness, palpitations, fever/chills, N/V/D. His chronic pain is unchanged.    Hospital Course:  Placed in observation for multiple sclerosis and weakness requesting inpatient rehab placement. PT/OT consulted and PMR consulted, patient not currently appropriate for inpatient rehab admission, recommend SNF placement. CM/SW involved. SNF consult placed, patient reportedly more appropriate for long term skilled. Therapist who worked with patient over the weekend recommending [facility], consult placed and patient medically approved but denied by insurance. CM/SW assisting in finding SNF/NH placement as next option. Stable for discharge pending " placement.     Interval History: No acute events overnight. Endorses headache this morning. Headaches have been relieved with tylenol. Hopefully will have further information on possible placement tomorrow.     Review of Systems   Constitutional: Negative for chills and fever.   Respiratory: Negative for shortness of breath and wheezing.    Cardiovascular: Negative for chest pain and palpitations.   Gastrointestinal: Negative for abdominal pain, nausea and vomiting.   Musculoskeletal: Positive for back pain, gait problem and myalgias.   Neurological: Positive for weakness and headaches (intermittent). Negative for speech difficulty.     Objective:     Vital Signs (Most Recent):  Temp: 97.7 °F (36.5 °C) (06/04/17 1200)  Pulse: 65 (06/04/17 1200)  Resp: 18 (06/04/17 1200)  BP: 100/67 (06/04/17 1200)  SpO2: 100 % (06/04/17 1200) Vital Signs (24h Range):  Temp:  [97.1 °F (36.2 °C)-98.1 °F (36.7 °C)] 97.7 °F (36.5 °C)  Pulse:  [52-66] 65  Resp:  [16-18] 18  SpO2:  [94 %-100 %] 100 %  BP: (100-130)/(67-80) 100/67     Weight: 78.6 kg (173 lb 4.5 oz)  Body mass index is 24.18 kg/m².    Intake/Output Summary (Last 24 hours) at 06/04/17 1420  Last data filed at 06/04/17 1333   Gross per 24 hour   Intake              980 ml   Output             2151 ml   Net            -1171 ml      Physical Exam   Constitutional: He is oriented to person, place, and time. He appears well-nourished. No distress.   Cardiovascular: Normal rate and regular rhythm.    No murmur heard.  Pulmonary/Chest: Effort normal and breath sounds normal. No respiratory distress. He has no wheezes.   Abdominal: Soft. Bowel sounds are normal. He exhibits no distension. There is no tenderness. There is no guarding.   Musculoskeletal: He exhibits no edema, tenderness or deformity.   Neurological: He is alert and oriented to person, place, and time. No cranial nerve deficit.   Baseline L-sided paralysis, L arm contracted, LLE with little to no strength or movement.  RUE and RLE with 3/5 strength. All near baseline per patient.        Significant Labs:   CBC:   Recent Labs  Lab 06/03/17  1151   WBC 6.76   HGB 12.8*   HCT 37.4*        CMP:   Recent Labs  Lab 06/03/17  1151      K 4.2      CO2 27   GLU 74   BUN 15   CREATININE 0.8   CALCIUM 8.4*   ANIONGAP 8   EGFRNONAA >60.0     All pertinent labs within the past 24 hours have been reviewed.    Significant Imaging: I have reviewed all pertinent imaging results/findings within the past 24 hours.    Assessment/Plan:      * MS (multiple sclerosis)    - Patient recently discharged from SNF after 45 day stay. Was admitted before that with increased weakness and pain and found to have UTI, MRI at that time was negative for true MS flare. Patient is known to team and he appears stronger than his last admission. Unlikely a true MS flare at this time.  - While he may improve his strength at inpatient rehab, a larger discussion regarding his long term plan is underway. He initially refused the idea of NHP but now agrees to NHP with rehab/SNF if he is denied. CM/SW assisting with plan. Patient otherwise plans to return home where he has very limited resources and is unable to care for himself safely   -  PT/OT recommend rehab and PMR consulted, but after evaluation PMR does not feel patient appropriate for inpatient rehab currently, recommend SNF placement. Patient does not meet inpatient criteria at this time.   - Held off on consulting neurology as patient does not manifest symptoms of MS flare or psuedoflare.  - SNF consult placed but recommending long term skilled, discussed other long-term care options with patient again today, seems agreeable to possibly transitioning to NH placement from SNF.  - Consult to [facility] placed, patient reportedly medically appropriate for the facility but denied by insurance.  - CM/SW assisting in next option of SNF/NH placement. Hopefully will have further information  after the weekend.         10/25/2016 Saddle PE    - continue xarelto        Neurogenic bladder    - continue oxybutynin, bladder scan and cath PRN        Chronic pain of multiple sites    - Continue baclofen 20 mg QID, carbamazepine  mg BID, celexa 20 mg daily, dantrolene 50 mg QID, gabapentin 900 mg TID, oxycodone 15mg BID PRN, norco PRN  - during last admission his mother asked team to consider addiction specialists as she felt patient was addicted to pain pills. He was offered psych services at that timeand he declined.   - EMS noted that his pain pills were more empty than should have been when they arrived. No current signs of overdose or withdrawal.           VTE Risk Mitigation         Ordered     rivaroxaban tablet 20 mg  With dinner     Route:  Oral        05/26/17 2235     Reason for No Pharmacological VTE Prophylaxis  Once      05/26/17 2235     Medium Risk of VTE  Once      05/26/17 2235          Brenda Ivan PA-C  Department of Hospital Medicine   [facility]  Staff: Dr. Cadet

## 2017-06-04 NOTE — SUBJECTIVE & OBJECTIVE
Interval History: No acute events overnight. Endorses headache this morning. Headaches have been relieved with tylenol. Hopefully will have further information on possible placement tomorrow.     Review of Systems   Constitutional: Negative for chills and fever.   Respiratory: Negative for shortness of breath and wheezing.    Cardiovascular: Negative for chest pain and palpitations.   Gastrointestinal: Negative for abdominal pain, nausea and vomiting.   Musculoskeletal: Positive for back pain, gait problem and myalgias.   Neurological: Positive for weakness and headaches (intermittent). Negative for speech difficulty.     Objective:     Vital Signs (Most Recent):  Temp: 97.7 °F (36.5 °C) (06/04/17 1200)  Pulse: 65 (06/04/17 1200)  Resp: 18 (06/04/17 1200)  BP: 100/67 (06/04/17 1200)  SpO2: 100 % (06/04/17 1200) Vital Signs (24h Range):  Temp:  [97.1 °F (36.2 °C)-98.1 °F (36.7 °C)] 97.7 °F (36.5 °C)  Pulse:  [52-66] 65  Resp:  [16-18] 18  SpO2:  [94 %-100 %] 100 %  BP: (100-130)/(67-80) 100/67     Weight: 78.6 kg (173 lb 4.5 oz)  Body mass index is 24.18 kg/m².    Intake/Output Summary (Last 24 hours) at 06/04/17 1420  Last data filed at 06/04/17 1333   Gross per 24 hour   Intake              980 ml   Output             2151 ml   Net            -1171 ml      Physical Exam   Constitutional: He is oriented to person, place, and time. He appears well-nourished. No distress.   Cardiovascular: Normal rate and regular rhythm.    No murmur heard.  Pulmonary/Chest: Effort normal and breath sounds normal. No respiratory distress. He has no wheezes.   Abdominal: Soft. Bowel sounds are normal. He exhibits no distension. There is no tenderness. There is no guarding.   Musculoskeletal: He exhibits no edema, tenderness or deformity.   Neurological: He is alert and oriented to person, place, and time. No cranial nerve deficit.   Baseline L-sided paralysis, L arm contracted, LLE with little to no strength or movement. RUE and RLE  with 3/5 strength. All near baseline per patient.        Significant Labs:   CBC:   Recent Labs  Lab 06/03/17  1151   WBC 6.76   HGB 12.8*   HCT 37.4*        CMP:   Recent Labs  Lab 06/03/17  1151      K 4.2      CO2 27   GLU 74   BUN 15   CREATININE 0.8   CALCIUM 8.4*   ANIONGAP 8   EGFRNONAA >60.0     All pertinent labs within the past 24 hours have been reviewed.    Significant Imaging: I have reviewed all pertinent imaging results/findings within the past 24 hours.

## 2017-06-04 NOTE — PLAN OF CARE
Problem: Fall Risk (Adult)  Intervention: Review Medications/Identify Contributors to Fall Risk   06/04/17 1741   Safety Interventions   Medication Review/Management medications reviewed;high risk medications identified     Intervention: Safety Promotion/Fall Prevention   06/04/17 1659   Safety Interventions   Safety Promotion/Fall Prevention assistive device/personal item within reach;nonskid shoes/socks when out of bed;Fall Risk reviewed with patient/family;commode/urinal/bedpan at bedside;side rails raised x 2       Goal: Identify Related Risk Factors and Signs and Symptoms  Related risk factors and signs and symptoms are identified upon initiation of Human Response Clinical Practice Guideline (CPG)   Outcome: Ongoing (interventions implemented as appropriate)   06/04/17 1741   Fall Risk   Related Risk Factors (Fall Risk) confusion/agitation;gait/mobility problems;depression/anxiety;history of falls;fatigue/slow reaction     Goal: Absence of Falls  Patient will demonstrate the desired outcomes by discharge/transition of care.   Outcome: Ongoing (interventions implemented as appropriate)   06/04/17 1741   Fall Risk (Adult)   Absence of Falls making progress toward outcome       Problem: Patient Care Overview  Goal: Plan of Care Review   06/04/17 1741   Coping/Psychosocial   Plan Of Care Reviewed With Patient; Safety: call light in reach, patient oriented to room & instructed how to notify nurse if assistance is needed, current questions/concerns addressed, bed in lowest position with wheels locked & side rails up X 3. Pt and family were educated regarding fall precaution and taking appropriate action. Activity: is up with 1 assist oob to chair or bsc.  Neurological: Oriented x4 Respiratory: On RA, o2 Sat WNL  Cardiac: BP stable. HR stable. Afebrile this shift. Intake/Output: No bm today, no problem with urination, patient has condom cath due to neurogenic bladder. Pain: controlled with prn medication Skin: bruised  and dry, but intact.  Plan:  To d/c to rehab for further PT treatment. All questions and concerns were addressed. Plan of care reviewed with the patient. Denies any concerns. Will continue to monitor.        Problem: Infection, Risk/Actual (Adult)  Intervention: Manage Suspected/Actual Infection   06/04/17 1741   Safety Interventions   Isolation Precautions environmental surveillance     Intervention: Prevent Infection/Maximize Resistance   06/04/17 1741   Respiratory Interventions   Airway/Ventilation Management airway patency maintained   Pain/Comfort/Sleep Interventions   Sleep/Rest Enhancement regular sleep/rest pattern promoted;relaxation techniques promoted;noise level reduced;family presence promoted       Goal: Identify Related Risk Factors and Signs and Symptoms  Related risk factors and signs and symptoms are identified upon initiation of Human Response Clinical Practice Guideline (CPG)    06/04/17 1741   Infection, Risk/Actual   Related Risk Factors (Infection, Risk/Actual) chronic illness/condition;prolonged hospitalization;medication effects;treatment plan     Goal: Infection Prevention/Resolution  Patient will demonstrate the desired outcomes by discharge/transition of care.   Outcome: Ongoing (interventions implemented as appropriate)   06/04/17 1741   Infection, Risk/Actual (Adult)   Infection Prevention/Resolution making progress toward outcome       Problem: Pressure Ulcer Risk (Huy Scale) (Adult,Obstetrics,Pediatric)  Intervention: Prevent/Minimize Sheer/Friction Injuries   06/04/17 1741   Skin Interventions   Pressure Reduction Devices positioning supports utilized   Pressure Reduction Techniques frequent weight shift encouraged;heels elevated off bed   Positioning   Positioning/Transfer Devices abduction splint /pillow     Intervention: Turn/Reposition Often   06/04/17 1741   Skin Interventions   Pressure Reduction Techniques frequent weight shift encouraged;heels elevated off bed    Positioning   Body Position foot of bed elevated;positioned/repositioned independently;weight shift assistance provided;supine, head elevated       Goal: Skin Integrity  Patient will demonstrate the desired outcomes by discharge/transition of care.   Outcome: Ongoing (interventions implemented as appropriate)   06/04/17 6391   Pressure Ulcer Risk (Huy Scale) (Adult,Obstetrics,Pediatric)   Skin Integrity making progress toward outcome

## 2017-06-05 LAB
ANION GAP SERPL CALC-SCNC: 7 MMOL/L
BASOPHILS # BLD AUTO: 0.05 K/UL
BASOPHILS NFR BLD: 0.8 %
BUN SERPL-MCNC: 14 MG/DL
CALCIUM SERPL-MCNC: 8.6 MG/DL
CHLORIDE SERPL-SCNC: 103 MMOL/L
CO2 SERPL-SCNC: 30 MMOL/L
CREAT SERPL-MCNC: 0.8 MG/DL
DIFFERENTIAL METHOD: ABNORMAL
EOSINOPHIL # BLD AUTO: 0.2 K/UL
EOSINOPHIL NFR BLD: 2.7 %
ERYTHROCYTE [DISTWIDTH] IN BLOOD BY AUTOMATED COUNT: 14 %
EST. GFR  (AFRICAN AMERICAN): >60 ML/MIN/1.73 M^2
EST. GFR  (NON AFRICAN AMERICAN): >60 ML/MIN/1.73 M^2
GLUCOSE SERPL-MCNC: 95 MG/DL
HCT VFR BLD AUTO: 38.9 %
HGB BLD-MCNC: 12.9 G/DL
LYMPHOCYTES # BLD AUTO: 2.2 K/UL
LYMPHOCYTES NFR BLD: 35.6 %
MCH RBC QN AUTO: 30.1 PG
MCHC RBC AUTO-ENTMCNC: 33.2 %
MCV RBC AUTO: 91 FL
MONOCYTES # BLD AUTO: 0.3 K/UL
MONOCYTES NFR BLD: 5.5 %
NEUTROPHILS # BLD AUTO: 3.4 K/UL
NEUTROPHILS NFR BLD: 54.8 %
PLATELET # BLD AUTO: 231 K/UL
PMV BLD AUTO: 10.5 FL
POTASSIUM SERPL-SCNC: 4.3 MMOL/L
RBC # BLD AUTO: 4.28 M/UL
SODIUM SERPL-SCNC: 140 MMOL/L
WBC # BLD AUTO: 6.21 K/UL

## 2017-06-05 PROCEDURE — 25000003 PHARM REV CODE 250: Performed by: PHYSICIAN ASSISTANT

## 2017-06-05 PROCEDURE — 97112 NEUROMUSCULAR REEDUCATION: CPT

## 2017-06-05 PROCEDURE — 36415 COLL VENOUS BLD VENIPUNCTURE: CPT

## 2017-06-05 PROCEDURE — 97110 THERAPEUTIC EXERCISES: CPT

## 2017-06-05 PROCEDURE — 99225 PR SUBSEQUENT OBSERVATION CARE,LEVEL II: CPT | Mod: ,,, | Performed by: PHYSICIAN ASSISTANT

## 2017-06-05 PROCEDURE — 80048 BASIC METABOLIC PNL TOTAL CA: CPT

## 2017-06-05 PROCEDURE — 85025 COMPLETE CBC W/AUTO DIFF WBC: CPT

## 2017-06-05 PROCEDURE — G0378 HOSPITAL OBSERVATION PER HR: HCPCS

## 2017-06-05 RX ADMIN — DIAZEPAM 5 MG: 5 TABLET ORAL at 04:06

## 2017-06-05 RX ADMIN — TRAZODONE HYDROCHLORIDE 100 MG: 50 TABLET ORAL at 08:06

## 2017-06-05 RX ADMIN — STANDARDIZED SENNA CONCENTRATE AND DOCUSATE SODIUM 1 TABLET: 8.6; 5 TABLET, FILM COATED ORAL at 09:06

## 2017-06-05 RX ADMIN — OXYBUTYNIN CHLORIDE 5 MG: 5 TABLET, EXTENDED RELEASE ORAL at 09:06

## 2017-06-05 RX ADMIN — DANTROLENE SODIUM 50 MG: 50 CAPSULE ORAL at 05:06

## 2017-06-05 RX ADMIN — BACLOFEN 20 MG: 10 TABLET ORAL at 12:06

## 2017-06-05 RX ADMIN — DANTROLENE SODIUM 50 MG: 50 CAPSULE ORAL at 10:06

## 2017-06-05 RX ADMIN — DANTROLENE SODIUM 50 MG: 50 CAPSULE ORAL at 11:06

## 2017-06-05 RX ADMIN — CARBAMAZEPINE 400 MG: 200 TABLET, EXTENDED RELEASE ORAL at 09:06

## 2017-06-05 RX ADMIN — ACETAMINOPHEN 650 MG: 325 TABLET ORAL at 09:06

## 2017-06-05 RX ADMIN — HYDROCODONE BITARTRATE AND ACETAMINOPHEN 1 TABLET: 5; 325 TABLET ORAL at 12:06

## 2017-06-05 RX ADMIN — RIVAROXABAN 20 MG: 20 TABLET, FILM COATED ORAL at 04:06

## 2017-06-05 RX ADMIN — BACLOFEN 20 MG: 10 TABLET ORAL at 05:06

## 2017-06-05 RX ADMIN — BACLOFEN 20 MG: 10 TABLET ORAL at 10:06

## 2017-06-05 RX ADMIN — DANTROLENE SODIUM 50 MG: 50 CAPSULE ORAL at 12:06

## 2017-06-05 RX ADMIN — OXYCODONE HYDROCHLORIDE 15 MG: 5 TABLET ORAL at 09:06

## 2017-06-05 RX ADMIN — HYDROCODONE BITARTRATE AND ACETAMINOPHEN 1 TABLET: 5; 325 TABLET ORAL at 04:06

## 2017-06-05 RX ADMIN — BACLOFEN 20 MG: 10 TABLET ORAL at 11:06

## 2017-06-05 RX ADMIN — CARBAMAZEPINE 400 MG: 200 TABLET, EXTENDED RELEASE ORAL at 10:06

## 2017-06-05 RX ADMIN — GABAPENTIN 900 MG: 300 CAPSULE ORAL at 02:06

## 2017-06-05 RX ADMIN — HYDROCODONE BITARTRATE AND ACETAMINOPHEN 1 TABLET: 5; 325 TABLET ORAL at 11:06

## 2017-06-05 RX ADMIN — GABAPENTIN 900 MG: 300 CAPSULE ORAL at 08:06

## 2017-06-05 RX ADMIN — HYDROCODONE BITARTRATE AND ACETAMINOPHEN 1 TABLET: 5; 325 TABLET ORAL at 10:06

## 2017-06-05 RX ADMIN — GABAPENTIN 900 MG: 300 CAPSULE ORAL at 05:06

## 2017-06-05 RX ADMIN — CITALOPRAM HYDROBROMIDE 20 MG: 10 TABLET ORAL at 09:06

## 2017-06-05 RX ADMIN — OXYCODONE HYDROCHLORIDE 15 MG: 5 TABLET ORAL at 10:06

## 2017-06-05 NOTE — PT/OT/SLP PROGRESS
"Physical Therapy  Treatment    Mao Levin   MRN: [id number]   Admitting Diagnosis: MS (multiple sclerosis)    PT Received On: 06/05/17  PT Start Time: 1122     PT Stop Time: 1148    PT Total Time (min): 26 min       Billable Minutes:  Therapeutic Exercise  8 and Neuromuscular Re-education 18    Treatment Type: Treatment  PT/PTA: PTA     PTA Visit Number: 1       General Precautions: Standard, fall, aspiration  Orthopedic Precautions: N/A   Braces: N/A         Subjective:  Communicated with NSG prior to session.  Patient states " I want to get up and get out of this bed"    Pain/Comfort  Pain Rating 1: 0/10  Pain Rating Post-Intervention 1: 0/10    Objective:   Patient found with: agee catheter    Functional Mobility:  Bed Mobility:   Rolling/Turning to Left: Minimum assistance, With side rail  Scooting/Bridging: Minimum Assistance  Supine to Sit: Minimum Assistance, With side rail    Transfers:  Sit <> Stand Assistance: Maximum Assistance  Sit <> Stand Assistive Device: Rolling Walker  Bed <> Chair Technique: Stand Pivot  Bed <> Chair Assistance: Maximum Assistance  Bed <> Chair Assistive Device: No Assistive Device    Gait:   Gait Distance: 2 side steps to bedside chair  Assistance 1: Maximum assistance  Gait Assistive Device: No device  Gait Pattern: swing-to gait  Gait Deviation(s): decreased keyshawn, increased time in double stance, decreased velocity of limb motion, decreased step length, decreased stride length, decreased swing-to-stance ratio, decreased toe-to-floor clearance, decreased weight-shifting ability, foot flat    Stairs:      Balance:   Static Sit: FAIR+: Able to take MINIMAL challenges from all directions  Dynamic Sit: FAIR+: Maintains balance through MINIMAL excursions of active trunk motion  Static Stand: POOR+: Needs MINIMAL assist to maintain  Dynamic stand: POOR: N/A     Therapeutic Activities and Exercises:  Static sitting balance at EOB x 15 minutes with SBA. Donned a second gown. Static " standing balance using RW for support 3x90 secs with min assist and cues to maintain postural control and awareness. Stand pivot transfer from bed to chair with PTA for support and max assistance.  R LE AROM: LAQ, HIP FLEX AND AP 2X15 REPS.    AM-PAC 6 CLICK MOBILITY  How much help from another person does this patient currently need?   1 = Unable, Total/Dependent Assistance  2 = A lot, Maximum/Moderate Assistance  3 = A little, Minimum/Contact Guard/Supervision  4 = None, Modified [place]/Independent    Turning over in bed (including adjusting bedclothes, sheets and blankets)?: 4  Sitting down on and standing up from a chair with arms (e.g., wheelchair, bedside commode, etc.): 2  Moving from lying on back to sitting on the side of the bed?: 3  Moving to and from a bed to a chair (including a wheelchair)?: 2  Need to walk in hospital room?: 1  Climbing 3-5 steps with a railing?: 1  Total Score: 13    AM-PAC Raw Score CMS G-Code Modifier Level of Impairment Assistance   6 % Total / Unable   7 - 9 CM 80 - 100% Maximal Assist   10 - 14 CL 60 - 80% Moderate Assist   15 - 19 CK 40 - 60% Moderate Assist   20 - 22 CJ 20 - 40% Minimal Assist   23 CI 1-20% SBA / CGA   24 CH 0% Independent/ Mod I     Patient left up in chair with all lines intact, call button in reach and RN notified.    Assessment:  Mao Levin is a 51 y.o. male with a medical diagnosis of MS (multiple sclerosis) and presents with decreased functional mobility. Patient with a tendency to lean back or forward during standing activity and limited active ROM of the L LE, which limited his ability to attempt gait training. Patient with good participation. Patient would benefit from continued P.T. To address deficits .    Rehab identified problem list/impairments: Rehab identified problem list/impairments: weakness, impaired endurance, impaired self care skills, impaired functional mobilty, gait instability, impaired balance, decreased coordination,  decreased upper extremity function, decreased lower extremity function, decreased safety awareness, abnormal tone, decreased ROM    Rehab potential is fair.    Activity tolerance: Good    Discharge recommendations: Discharge Facility/Level Of Care Needs: rehabilitation facility     Barriers to discharge: Barriers to Discharge: Inaccessible home environment, Decreased caregiver support    Equipment recommendations: Equipment Needed After Discharge:  (TBD pending progress)     GOALS:    Physical Therapy Goals        Problem: Physical Therapy Goal    Goal Priority Disciplines Outcome Goal Variances Interventions   Physical Therapy Goal     PT/OT, PT Ongoing (interventions implemented as appropriate)     Description:  Goals to be met by: 2017     Patient will increase functional independence with mobility by performin. Supine to sit with Contact Guard Assistance  2. Sit to supine with Contact Guard Assistance  3. Sit to stand transfer with Minimal Assistance  4. Gait  x 20 feet with Moderate Assistance using appropriate AD.   5. Lower extremity exercise program x15 reps per handout, with assistance as needed                      PLAN:    Patient to be seen 5 x/week  to address the above listed problems via gait training, therapeutic activities, therapeutic exercises, neuromuscular re-education  Plan of Care expires: 17  Plan of Care reviewed with: patient         [place]  Ling, PTA  2017

## 2017-06-05 NOTE — PLAN OF CARE
Problem: Patient Care Overview  Goal: Plan of Care Review  Problem: Fall Risk (Adult)  Intervention: Review Medications/Identify Contributors to Fall Risk   06/04/17 1741   Safety Interventions   Medication Review/Management medications reviewed;high risk medications identified     Intervention: Safety Promotion/Fall Prevention   06/04/17 1659   Safety Interventions   Safety Promotion/Fall Prevention assistive device/personal item within reach;nonskid shoes/socks when out of bed;Fall Risk reviewed with patient/family;commode/urinal/bedpan at bedside;side rails raised x 2       Goal: Identify Related Risk Factors and Signs and Symptoms  Related risk factors and signs and symptoms are identified upon initiation of Human Response Clinical Practice Guideline (CPG)   Outcome: Ongoing (interventions implemented as appropriate)   06/04/17 1741   Fall Risk   Related Risk Factors (Fall Risk) confusion/agitation;gait/mobility problems;depression/anxiety;history of falls;fatigue/slow reaction     Goal: Absence of Falls  Patient will demonstrate the desired outcomes by discharge/transition of care.   Outcome: Ongoing (interventions implemented as appropriate)   06/04/17 1741   Fall Risk (Adult)   Absence of Falls making progress toward outcome       Problem: Patient Care Overview  Goal: Plan of Care Review   06/04/17 1741   Coping/Psychosocial   Plan Of Care Reviewed With Patient; Safety: call light in reach, patient oriented to room & instructed how to notify nurse if assistance is needed, current questions/concerns addressed, bed in lowest position with wheels locked & side rails up X 3. Pt and family were educated regarding fall precaution and taking appropriate action. Activity: is up with 1 assist oob to chair or bsc.  Neurological: Oriented x4 Respiratory: On RA, o2 Sat WNL  Cardiac: BP stable. HR stable. Afebrile this shift. Intake/Output: No bm today, no problem with urination, patient has condom cath due to neurogenic  bladder. Pain: controlled with prn medication Skin: bruised and dry, but intact.  Plan:  To d/c to rehab for further PT treatment. All questions and concerns were addressed. Plan of care reviewed with the patient. Denies any concerns. Will continue to monitor.        Problem: Infection, Risk/Actual (Adult)  Intervention: Manage Suspected/Actual Infection   06/04/17 1741   Safety Interventions   Isolation Precautions environmental surveillance     Intervention: Prevent Infection/Maximize Resistance   06/04/17 1741   Respiratory Interventions   Airway/Ventilation Management airway patency maintained   Pain/Comfort/Sleep Interventions   Sleep/Rest Enhancement regular sleep/rest pattern promoted;relaxation techniques promoted;noise level reduced;family presence promoted       Goal: Identify Related Risk Factors and Signs and Symptoms  Related risk factors and signs and symptoms are identified upon initiation of Human Response Clinical Practice Guideline (CPG)    06/04/17 1741   Infection, Risk/Actual   Related Risk Factors (Infection, Risk/Actual) chronic illness/condition;prolonged hospitalization;medication effects;treatment plan     Goal: Infection Prevention/Resolution  Patient will demonstrate the desired outcomes by discharge/transition of care.   Outcome: Ongoing (interventions implemented as appropriate)   06/04/17 1741   Infection, Risk/Actual (Adult)   Infection Prevention/Resolution making progress toward outcome       Problem: Pressure Ulcer Risk (Huy Scale) (Adult,Obstetrics,Pediatric)  Intervention: Prevent/Minimize Sheer/Friction Injuries   06/04/17 1741   Skin Interventions   Pressure Reduction Devices positioning supports utilized   Pressure Reduction Techniques frequent weight shift encouraged;heels elevated off bed   Positioning   Positioning/Transfer Devices abduction splint /pillow     Intervention: Turn/Reposition Often   06/04/17 1741   Skin Interventions   Pressure Reduction Techniques  frequent weight shift encouraged;heels elevated off bed   Positioning   Body Position foot of bed elevated;positioned/repositioned independently;weight shift assistance provided;supine, head elevated       Goal: Skin Integrity  Patient will demonstrate the desired outcomes by discharge/transition of care.   Outcome: Ongoing (interventions implemented as appropriate)   06/04/17 1741   Pressure Ulcer Risk (Huy Scale) (Adult,Obstetrics,Pediatric)   Skin Integrity making progress toward outcome

## 2017-06-05 NOTE — PLAN OF CARE
Problem: Patient Care Overview  Goal: Plan of Care Review  Pt resting in bed n/o pain at this time, condom cath in place draining yellow fluid, vss no s/s of distress will cont to monitor pt

## 2017-06-05 NOTE — PLAN OF CARE
Problem: Physical Therapy Goal  Goal: Physical Therapy Goal  Goals to be met by: 2017     Patient will increase functional independence with mobility by performin. Supine to sit with Contact Guard Assistance  2. Sit to supine with Contact Guard Assistance  3. Sit to stand transfer with Minimal Assistance  4. Gait  x 20 feet with Moderate Assistance using appropriate AD.   5. Lower extremity exercise program x15 reps per handout, with assistance as needed     Outcome: Ongoing (interventions implemented as appropriate)  Inconsistent postural control limits patient ability to transfer and to ambulate.

## 2017-06-06 PROCEDURE — 97535 SELF CARE MNGMENT TRAINING: CPT | Mod: 59

## 2017-06-06 PROCEDURE — 97112 NEUROMUSCULAR REEDUCATION: CPT

## 2017-06-06 PROCEDURE — 97530 THERAPEUTIC ACTIVITIES: CPT

## 2017-06-06 PROCEDURE — 99225 PR SUBSEQUENT OBSERVATION CARE,LEVEL II: CPT | Mod: ,,, | Performed by: PHYSICIAN ASSISTANT

## 2017-06-06 PROCEDURE — 25000003 PHARM REV CODE 250: Performed by: PHYSICIAN ASSISTANT

## 2017-06-06 PROCEDURE — G0378 HOSPITAL OBSERVATION PER HR: HCPCS

## 2017-06-06 RX ADMIN — GABAPENTIN 900 MG: 300 CAPSULE ORAL at 06:06

## 2017-06-06 RX ADMIN — BACLOFEN 20 MG: 10 TABLET ORAL at 12:06

## 2017-06-06 RX ADMIN — BACLOFEN 20 MG: 10 TABLET ORAL at 10:06

## 2017-06-06 RX ADMIN — OXYCODONE HYDROCHLORIDE 15 MG: 5 TABLET ORAL at 10:06

## 2017-06-06 RX ADMIN — STANDARDIZED SENNA CONCENTRATE AND DOCUSATE SODIUM 1 TABLET: 8.6; 5 TABLET, FILM COATED ORAL at 08:06

## 2017-06-06 RX ADMIN — DANTROLENE SODIUM 50 MG: 50 CAPSULE ORAL at 10:06

## 2017-06-06 RX ADMIN — RIVAROXABAN 20 MG: 20 TABLET, FILM COATED ORAL at 05:06

## 2017-06-06 RX ADMIN — DIAZEPAM 5 MG: 5 TABLET ORAL at 10:06

## 2017-06-06 RX ADMIN — HYDROCODONE BITARTRATE AND ACETAMINOPHEN 1 TABLET: 5; 325 TABLET ORAL at 08:06

## 2017-06-06 RX ADMIN — HYDROCODONE BITARTRATE AND ACETAMINOPHEN 1 TABLET: 5; 325 TABLET ORAL at 10:06

## 2017-06-06 RX ADMIN — BACLOFEN 20 MG: 10 TABLET ORAL at 05:06

## 2017-06-06 RX ADMIN — CARBAMAZEPINE 400 MG: 200 TABLET, EXTENDED RELEASE ORAL at 08:06

## 2017-06-06 RX ADMIN — HYDROCODONE BITARTRATE AND ACETAMINOPHEN 1 TABLET: 5; 325 TABLET ORAL at 03:06

## 2017-06-06 RX ADMIN — DANTROLENE SODIUM 50 MG: 50 CAPSULE ORAL at 05:06

## 2017-06-06 RX ADMIN — ACETAMINOPHEN 650 MG: 325 TABLET ORAL at 03:06

## 2017-06-06 RX ADMIN — TRAZODONE HYDROCHLORIDE 100 MG: 50 TABLET ORAL at 08:06

## 2017-06-06 RX ADMIN — ACETAMINOPHEN 650 MG: 325 TABLET ORAL at 08:06

## 2017-06-06 RX ADMIN — OXYBUTYNIN CHLORIDE 5 MG: 5 TABLET, EXTENDED RELEASE ORAL at 08:06

## 2017-06-06 RX ADMIN — DANTROLENE SODIUM 50 MG: 50 CAPSULE ORAL at 12:06

## 2017-06-06 RX ADMIN — CITALOPRAM HYDROBROMIDE 20 MG: 10 TABLET ORAL at 08:06

## 2017-06-06 RX ADMIN — DANTROLENE SODIUM 50 MG: 50 CAPSULE ORAL at 06:06

## 2017-06-06 RX ADMIN — BACLOFEN 20 MG: 10 TABLET ORAL at 06:06

## 2017-06-06 RX ADMIN — HYDROCODONE BITARTRATE AND ACETAMINOPHEN 1 TABLET: 5; 325 TABLET ORAL at 06:06

## 2017-06-06 RX ADMIN — GABAPENTIN 900 MG: 300 CAPSULE ORAL at 08:06

## 2017-06-06 RX ADMIN — GABAPENTIN 900 MG: 300 CAPSULE ORAL at 03:06

## 2017-06-06 NOTE — PLAN OF CARE
DONNA spoke with Hina Malhotra, admission coordinator for [facility], who reported that they have submitted to insurance for SNF auth and later may consider pt for [facility] placement.  Hina reports she will notified DONNA of insurance decision on tomorrow.    DONNA to f/u.    Lizet Ortez, [facility]  [phone]

## 2017-06-06 NOTE — PLAN OF CARE
06/06/17 1559   Discharge Reassessment   Assessment Type Discharge Planning Reassessment   Can the patient answer the patient profile reliably? Yes, cognitively intact   How does the patient rate their overall health at the present time? Fair   Describe the patient's ability to walk at the present time. Major restrictions/daily assistance from another person   How often would a person be available to care for the patient? Never   Number of comorbid conditions (as recorded on the chart) One   During the past month, has the patient often been bothered by feeling down, depressed or hopeless? Yes   During the past month, has the patient often been bothered by little interest or pleasure in doing things? Yes   Discharge plan remains the same: No   Provided patient/caregiver education on the expected discharge date and the discharge plan Yes   Discharge Plan A New Nursing Home placement - detention care facility   Change in patient condition or support system No

## 2017-06-06 NOTE — PLAN OF CARE
Problem: Patient Care Overview  Goal: Plan of Care Review  Problem: Patient Care Overview  Goal: Plan of Care Review  Problem: Fall Risk (Adult)  Intervention: Review Medications/Identify Contributors to Fall Risk   06/04/17 1741   Safety Interventions   Medication Review/Management medications reviewed;high risk medications identified     Intervention: Safety Promotion/Fall Prevention   06/04/17 1659   Safety Interventions   Safety Promotion/Fall Prevention assistive device/personal item within reach;nonskid shoes/socks when out of bed;Fall Risk reviewed with patient/family;commode/urinal/bedpan at bedside;side rails raised x 2       Goal: Identify Related Risk Factors and Signs and Symptoms  Related risk factors and signs and symptoms are identified upon initiation of Human Response Clinical Practice Guideline (CPG)   Outcome: Ongoing (interventions implemented as appropriate)   06/04/17 1741   Fall Risk   Related Risk Factors (Fall Risk) confusion/agitation;gait/mobility problems;depression/anxiety;history of falls;fatigue/slow reaction     Goal: Absence of Falls  Patient will demonstrate the desired outcomes by discharge/transition of care.   Outcome: Ongoing (interventions implemented as appropriate)   06/04/17 1741   Fall Risk (Adult)   Absence of Falls making progress toward outcome       Problem: Patient Care Overview  Goal: Plan of Care Review   06/04/17 1741   Coping/Psychosocial   Plan Of Care Reviewed With Patient; Safety: call light in reach, patient oriented to room & instructed how to notify nurse if assistance is needed, current questions/concerns addressed, bed in lowest position with wheels locked & side rails up X 3. Pt and family were educated regarding fall precaution and taking appropriate action. Activity: is up with 1 assist oob to chair or bsc.  Neurological: Oriented x4 Respiratory: On RA, o2 Sat WNL  Cardiac: BP stable. HR stable. Afebrile this shift. Intake/Output: x1 bm today, no problem  with urination, patient has condom cath in place. Pain: controlled with prn medication Skin: bruised and dry, but intact.  Plan:  To d/c to nursing home, pending placement. All questions and concerns were addressed. Plan of care reviewed with the patient. Denies any concerns. Will continue to monitor.        Problem: Infection, Risk/Actual (Adult)  Intervention: Manage Suspected/Actual Infection   06/04/17 1741   Safety Interventions   Isolation Precautions environmental surveillance     Intervention: Prevent Infection/Maximize Resistance   06/04/17 1741   Respiratory Interventions   Airway/Ventilation Management airway patency maintained   Pain/Comfort/Sleep Interventions   Sleep/Rest Enhancement regular sleep/rest pattern promoted;relaxation techniques promoted;noise level reduced;family presence promoted       Goal: Identify Related Risk Factors and Signs and Symptoms  Related risk factors and signs and symptoms are identified upon initiation of Human Response Clinical Practice Guideline (CPG)    06/04/17 1741   Infection, Risk/Actual   Related Risk Factors (Infection, Risk/Actual) chronic illness/condition;prolonged hospitalization;medication effects;treatment plan     Goal: Infection Prevention/Resolution  Patient will demonstrate the desired outcomes by discharge/transition of care.   Outcome: Ongoing (interventions implemented as appropriate)   06/04/17 1741   Infection, Risk/Actual (Adult)   Infection Prevention/Resolution making progress toward outcome       Problem: Pressure Ulcer Risk (Huy Scale) (Adult,Obstetrics,Pediatric)  Intervention: Prevent/Minimize Sheer/Friction Injuries   06/04/17 1741   Skin Interventions   Pressure Reduction Devices positioning supports utilized   Pressure Reduction Techniques frequent weight shift encouraged;heels elevated off bed   Positioning   Positioning/Transfer Devices abduction splint /pillow     Intervention: Turn/Reposition Often   06/04/17 1741   Skin  Interventions   Pressure Reduction Techniques frequent weight shift encouraged;heels elevated off bed   Positioning   Body Position foot of bed elevated;positioned/repositioned independently;weight shift assistance provided;supine, head elevated       Goal: Skin Integrity  Patient will demonstrate the desired outcomes by discharge/transition of care.   Outcome: Ongoing (interventions implemented as appropriate)   06/04/17 1741   Pressure Ulcer Risk (Huy Scale) (Adult,Obstetrics,Pediatric)   Skin Integrity making progress toward outcome

## 2017-06-06 NOTE — PROGRESS NOTES
"[facility] Hospital Medicine  Progress Note    Patient Name: Mao Levin  MRN: [id number]  Patient Class: OP- Observation   Admission Date: 5/26/2017  Length of Stay: 0 days  Attending Physician: Angelo Cadet MD  Primary Care Provider: Fausto Leung MD    [facility] Medicine Team: [facility]OSP MED E Seema Reyes PA-C    Subjective:     Principal Problem:MS (multiple sclerosis)    HPI:  51M with a PMHx of multiple sclerosis on rotuxin therapy, saddle PE on chronic anticoagulation therapy, and chronic pain presents after being found on the ground and unable to get up by his home health PT/OT. Patient reports progressively worsening ability to ambulate since being discharged after a >45 day stay at SNF. The patient is known to me from his previous admission before going to SNF. The patient lives alone and states that he has been found down multiple times and will "roll" to the door to answer HH. The patient is requesting inpatient rehabilitation. He states previous stays in the past have helped him gain strength. He believes that SNF did not provide him with enough hours of PT/OT per day. He denies any pain and numbness to his face and right eye or vision changes, which is what usually occurs when he has a flare. He denies chest pain, SOB, dizziness, palpitations, fever/chills, N/V/D. His chronic pain is unchanged.    Hospital Course:  Placed in observation for multiple sclerosis and weakness requesting inpatient rehab placement. PT/OT consulted and PMR consulted, patient not currently appropriate for inpatient rehab admission, recommend SNF placement. CM/SW involved. SNF consult placed, patient reportedly more appropriate for long term skilled. Therapist who worked with patient over the weekend recommending [facility], consult placed and patient medically approved but denied by insurance. CM/SW assisted in finding SNF/NH placement however patient has no further SNF days and would " require group home NH placement which he refuses. Patient requesting discharge home with home health.      Interval History: No acute events overnight and patient resting in bed eating breakfast. He is willing to go to NH SNF but if he is unable to get PT/OT prefers to return home with home health services. CM/SW assisting with plan.     Review of Systems   Constitutional: Negative for chills and fever.   Respiratory: Negative for shortness of breath and wheezing.    Cardiovascular: Negative for chest pain and palpitations.   Gastrointestinal: Negative for abdominal pain, nausea and vomiting.   Musculoskeletal: Positive for back pain, gait problem and myalgias.   Neurological: Positive for weakness and headaches (intermittent). Negative for speech difficulty.     Objective:     Vital Signs (Most Recent):  Temp: 98 °F (36.7 °C) (06/05/17 1100)  Pulse: 66 (06/05/17 1623)  Resp: 17 (06/05/17 1623)  BP: 127/68 (06/05/17 1623)  SpO2: 99 % (06/05/17 1623) Vital Signs (24h Range):  Temp:  [97.2 °F (36.2 °C)-98 °F (36.7 °C)] 98 °F (36.7 °C)  Pulse:  [53-72] 66  Resp:  [16-18] 17  SpO2:  [97 %-99 %] 99 %  BP: ()/(60-68) 127/68     Weight: 78.6 kg (173 lb 4.5 oz)  Body mass index is 24.18 kg/m².    Intake/Output Summary (Last 24 hours) at 06/05/17 1915  Last data filed at 06/05/17 1141   Gross per 24 hour   Intake              600 ml   Output             1000 ml   Net             -400 ml      Physical Exam   Constitutional: He is oriented to person, place, and time. He appears well-nourished. No distress.   Cardiovascular: Normal rate and regular rhythm.    No murmur heard.  Pulmonary/Chest: Effort normal and breath sounds normal. No respiratory distress. He has no wheezes.   Abdominal: Soft. Bowel sounds are normal. He exhibits no distension. There is no tenderness. There is no guarding.   Musculoskeletal: He exhibits no edema, tenderness or deformity.   Neurological: He is alert and oriented to person, place, and time.  No cranial nerve deficit.   Baseline L-sided paralysis, L arm contracted, LLE with little to no strength or movement. RUE and RLE with 3/5 strength. All near baseline per patient.        Significant Labs:   CBC:     Recent Labs  Lab 06/05/17  1021   WBC 6.21   HGB 12.9*   HCT 38.9*        CMP:     Recent Labs  Lab 06/05/17  1021      K 4.3      CO2 30*   GLU 95   BUN 14   CREATININE 0.8   CALCIUM 8.6*   ANIONGAP 7*   EGFRNONAA >60.0     All pertinent labs within the past 24 hours have been reviewed.    Assessment/Plan:      * MS (multiple sclerosis)    - Patient recently discharged from SNF after 45 day stay. Was admitted before that with increased weakness and pain and found to have UTI, MRI at that time was negative for true MS flare. Patient is known to team and he appears stronger than his last admission. Unlikely a true MS flare at this time.  - While he may improve his strength at inpatient rehab, a larger discussion regarding his long term plan is underway.   -  PT/OT recommend rehab and PMR consulted, but after evaluation PMR does not feel patient appropriate for inpatient rehab currently, recommend SNF placement. Patient does not meet inpatient criteria at this time.  - SNF consult placed but recommending long term skilled. Patient is agreeable to NH SNF however he has no available SNF days and therefore would require group home care which he refuses.    - Patient requests to return home with home health services and plan for discharge in AM        10/25/2016 Saddle PE    - continue xarelto        Neurogenic bladder    - continue oxybutynin, bladder scan and cath PRN        Chronic pain of multiple sites    - Continue baclofen 20 mg QID, carbamazepine  mg BID, celexa 20 mg daily, dantrolene 50 mg QID, gabapentin 900 mg TID, oxycodone 15mg BID PRN, norco PRN  - during last admission his mother asked team to consider addiction specialists as she felt patient was addicted to pain pills.  He was offered psych services at that timeand he declined.   - EMS noted that his pain pills were more empty than should have been when they arrived. No current signs of overdose or withdrawal.           VTE Risk Mitigation         Ordered     rivaroxaban tablet 20 mg  With dinner     Route:  Oral        05/26/17 2235     Reason for No Pharmacological VTE Prophylaxis  Once      05/26/17 2235     Medium Risk of VTE  Once      05/26/17 2235          Seema Reyes PA-C  Department of Hospital Medicine   [facility]

## 2017-06-06 NOTE — ASSESSMENT & PLAN NOTE
- Patient recently discharged from SNF after 45 day stay. Was admitted before that with increased weakness and pain and found to have UTI, MRI at that time was negative for true MS flare. Patient is known to team and he appears stronger than his last admission. Unlikely a true MS flare at this time.  - While he may improve his strength at inpatient rehab, a larger discussion regarding his long term plan discussed  -  PT/OT recommend rehab and PMR consulted, but after evaluation PMR does not feel patient appropriate for inpatient rehab currently, recommend SNF placement. [facility] denied patient given need for long term placement  - CM/SW met with patient and he is agreeable to NHP and will be provided choices with goal to discharge to NH .

## 2017-06-06 NOTE — SUBJECTIVE & OBJECTIVE
Interval History: No acute events overnight and patient resting in bed eating breakfast. He is willing to go to NH SNF but if he is unable to get PT/OT prefers to return home with home health services. CM/SW assisting with plan.     Review of Systems   Constitutional: Negative for chills and fever.   Respiratory: Negative for shortness of breath and wheezing.    Cardiovascular: Negative for chest pain and palpitations.   Gastrointestinal: Negative for abdominal pain, nausea and vomiting.   Musculoskeletal: Positive for back pain, gait problem and myalgias.   Neurological: Positive for weakness and headaches (intermittent). Negative for speech difficulty.     Objective:     Vital Signs (Most Recent):  Temp: 98 °F (36.7 °C) (06/05/17 1100)  Pulse: 66 (06/05/17 1623)  Resp: 17 (06/05/17 1623)  BP: 127/68 (06/05/17 1623)  SpO2: 99 % (06/05/17 1623) Vital Signs (24h Range):  Temp:  [97.2 °F (36.2 °C)-98 °F (36.7 °C)] 98 °F (36.7 °C)  Pulse:  [53-72] 66  Resp:  [16-18] 17  SpO2:  [97 %-99 %] 99 %  BP: ()/(60-68) 127/68     Weight: 78.6 kg (173 lb 4.5 oz)  Body mass index is 24.18 kg/m².    Intake/Output Summary (Last 24 hours) at 06/05/17 1915  Last data filed at 06/05/17 1141   Gross per 24 hour   Intake              600 ml   Output             1000 ml   Net             -400 ml      Physical Exam   Constitutional: He is oriented to person, place, and time. He appears well-nourished. No distress.   Cardiovascular: Normal rate and regular rhythm.    No murmur heard.  Pulmonary/Chest: Effort normal and breath sounds normal. No respiratory distress. He has no wheezes.   Abdominal: Soft. Bowel sounds are normal. He exhibits no distension. There is no tenderness. There is no guarding.   Musculoskeletal: He exhibits no edema, tenderness or deformity.   Neurological: He is alert and oriented to person, place, and time. No cranial nerve deficit.   Baseline L-sided paralysis, L arm contracted, LLE with little to no strength  or movement. RUE and RLE with 3/5 strength. All near baseline per patient.        Significant Labs:   CBC:     Recent Labs  Lab 06/05/17  1021   WBC 6.21   HGB 12.9*   HCT 38.9*        CMP:     Recent Labs  Lab 06/05/17  1021      K 4.3      CO2 30*   GLU 95   BUN 14   CREATININE 0.8   CALCIUM 8.6*   ANIONGAP 7*   EGFRNONAA >60.0     All pertinent labs within the past 24 hours have been reviewed.

## 2017-06-06 NOTE — PLAN OF CARE
10:15cheri - ALMAZ Rodrigues, BETH Stephenson, and DONNA Hinds met w/pt to discuss a definite dc plan.  Pt has been going back and forth w/SNF vs retirement vs home health.      SNF - SW informed pt that he is now in his copay days and will be responsible for 20% of the bill as he has used 70 SNF days and only has 30 days left. Initially, Pt states that he would prefer to go home w/h/h because he couldn't afford to pay for his apt plus the 20% for ip snf.      Per medical team, pt is not capable of self care and would need ip services or someone living in the home 24 hrs.  Pt reiterated that he has no one to take care of him daily.      Pt is now in agreement with retirement placement.  CM&DONNA explained to pt that his check would go straight to the nursing home and pt was agreeable.      SW would continue w/the n/h placement w/stipulations that no referrals would be made to [facility] or [facility].  SW will start the process for retirement placement.    Lizet Ortez, NADIRA  [id number]

## 2017-06-06 NOTE — PT/OT/SLP PROGRESS
Physical Therapy  Treatment    Mao Levin   MRN: [id number]   Admitting Diagnosis: MS (multiple sclerosis)    PT Received On: 06/06/17  PT Start Time: 1351     PT Stop Time: 1419    PT Total Time (min): 28 min       Billable Minutes:  Therapeutic Activity 28    Treatment Type: Treatment  PT/PTA: PT     PTA Visit Number: 0       General Precautions: Standard, aspiration, fall  Orthopedic Precautions: N/A   Braces: N/A         Subjective:  Communicated with RN prior to session.  Pt agreeable to therapy session; pt reported increased fatigue today.     Pain/Comfort  Pain Rating 1: 0/10  Pain Rating Post-Intervention 1: 0/10    Objective:   Patient found with:  (condom cath)    Functional Mobility:  Bed Mobility:   Sit to Supine: Minimum Assistance    Transfers:  Sit <> Stand Assistance: Maximum Assistance  Sit <> Stand Assistive Device: Hemiwalker  Bed <> Chair Technique: Stand Pivot  Bed <> Chair Assistance: Maximum Assistance  Bed <> Chair Assistive Device: No Assistive Device    Balance:   Static Sit: GOOD-: Takes MODERATE challenges from all directions but inconsistently  Dynamic Sit: GOOD-: Maintains balance through MODERATE excursions of active trunk movement,     Static Stand: 0: Needs MAXIMAL assist to maintain   Dynamic stand: 0: N/A     Therapeutic Activities and Exercises:  Pt educated on safety with transfers.  Pt stood at bedside chair max A with hemiwalker for ~1min; WS in stance.  Pt safe to perform transfers with RN staff.     AM-PAC 6 CLICK MOBILITY  How much help from another person does this patient currently need?   1 = Unable, Total/Dependent Assistance  2 = A lot, Maximum/Moderate Assistance  3 = A little, Minimum/Contact Guard/Supervision  4 = None, Modified [place]/Independent    Turning over in bed (including adjusting bedclothes, sheets and blankets)?: 3  Sitting down on and standing up from a chair with arms (e.g., wheelchair, bedside commode, etc.): 2  Moving from lying on back to sitting  on the side of the bed?: 3  Moving to and from a bed to a chair (including a wheelchair)?: 2  Need to walk in hospital room?: 1  Climbing 3-5 steps with a railing?: 1  Total Score: 12    AM-PAC Raw Score CMS G-Code Modifier Level of Impairment Assistance   6 % Total / Unable   7 - 9 CM 80 - 100% Maximal Assist   10 - 14 CL 60 - 80% Moderate Assist   15 - 19 CK 40 - 60% Moderate Assist   20 - 22 CJ 20 - 40% Minimal Assist   23 CI 1-20% SBA / CGA   24 CH 0% Independent/ Mod I     Patient left supine with all lines intact, call button in reach and RN notified.    Assessment:  Mao Levin is a 51 y.o. male with a medical diagnosis of MS (multiple sclerosis) and presents with decreased strength, coordination, balance, endurance and overall functional mobility. Pt performed bed mobility min A and transfers max A. Pt will continue to benefit from skilled PT to improve deficits and increase overall functional mobility.     Rehab identified problem list/impairments: Rehab identified problem list/impairments: weakness, gait instability, impaired balance, impaired endurance, decreased lower extremity function, decreased coordination, impaired functional mobilty    Rehab potential is good.    Activity tolerance: Good    Discharge recommendations: Discharge Facility/Level Of Care Needs: rehabilitation facility     Barriers to discharge: Barriers to Discharge: Inaccessible home environment, Decreased caregiver support    Equipment recommendations: Equipment Needed After Discharge: other (see comments) (TBD)     GOALS:    Physical Therapy Goals        Problem: Physical Therapy Goal    Goal Priority Disciplines Outcome Goal Variances Interventions   Physical Therapy Goal     PT/OT, PT Ongoing (interventions implemented as appropriate)     Description:  Goals to be met by: 2017     Patient will increase functional independence with mobility by performin. Supine to sit with Contact Guard Assistance  2. Sit to  supine with Contact Guard Assistance  3. Sit to stand transfer with Minimal Assistance  4. Gait  x 20 feet with Moderate Assistance using appropriate AD.   5. Lower extremity exercise program x15 reps per handout, with assistance as needed                       PLAN:    Patient to be seen 5 x/week  to address the above listed problems via gait training, therapeutic activities, therapeutic exercises, neuromuscular re-education  Plan of Care expires: 06/28/17  Plan of Care reviewed with: patient         MESHA SHANE, PT  06/06/2017

## 2017-06-06 NOTE — PLAN OF CARE
Problem: Patient Care Overview  Goal: Plan of Care Review  Outcome: Ongoing (interventions implemented as appropriate)  Plan of care discussed with patient . AAOX4 Vital signs stable afebrile. Patient had episode of spasm and pain prn given as needed condom cath in place patient free from fall slept throughout the night. Plan is patient stable for d/c just waiting for placement. Will cont to monitor

## 2017-06-06 NOTE — PT/OT/SLP DISCHARGE
Occupational Therapy Discharge Summary    Mao Levin  MRN: [id number]   E. coli urinary tract infection   Patient Discharged from acute Occupational Therapy on 3/17/2017.  Please refer to prior OT note dated on 3/17/2017 for functional status.     Assessment:   Patient was discharge unexpectedly.  Information required to complete and accurate discharge summary is unknown.  Refer to therapy initial evaluation and last progress note for initial and most recent functional status and goal achievement.  Recommendations made may be found in medical record.  GOALS:    Occupational Therapy Goals     Not on file          Multidisciplinary Problems (Resolved)        Problem: Occupational Therapy Goal    Goal Priority Disciplines Outcome Interventions   Occupational Therapy Goal   (Resolved)     OT, PT/OT Outcome(s) achieved    Description:  Goals to be met by 3/22/2017:    1. Feed self mod indep level, including all set up  2.  Transfer bed-BSC with CG A  3. Toilet self with min a  4.  LB dressing with mod a  5. Supine to sit with min a                  Reasons for Discontinuation of Therapy Services  Transfer to alternate level of care.      Plan:  Patient Discharged to: Skilled Nursing Facility.    Tino Hughes OTR/L  6/6/2017

## 2017-06-06 NOTE — PROGRESS NOTES
"[facility] Hospital Medicine  Progress Note    Patient Name: Mao Levin  MRN: [id number]  Patient Class: OP- Observation   Admission Date: 5/26/2017  Length of Stay: 0 days  Attending Physician: Wes Looney MD  Primary Care Provider: Fausto Leung MD    [facility] Medicine Team: [facility] MED E Seema Reyes PA-C    Subjective:     Principal Problem:MS (multiple sclerosis)    HPI:  51M with a PMHx of multiple sclerosis on rotuxin therapy, saddle PE on chronic anticoagulation therapy, and chronic pain presents after being found on the ground and unable to get up by his home health PT/OT. Patient reports progressively worsening ability to ambulate since being discharged after a >45 day stay at SNF. The patient is known to me from his previous admission before going to SNF. The patient lives alone and states that he has been found down multiple times and will "roll" to the door to answer HH. The patient is requesting inpatient rehabilitation. He states previous stays in the past have helped him gain strength. He believes that SNF did not provide him with enough hours of PT/OT per day. He denies any pain and numbness to his face and right eye or vision changes, which is what usually occurs when he has a flare. He denies chest pain, SOB, dizziness, palpitations, fever/chills, N/V/D. His chronic pain is unchanged.    Hospital Course:  Placed in observation for multiple sclerosis and weakness requesting inpatient rehab placement. PT/OT consulted and PMR consulted, patient not currently appropriate for inpatient rehab admission, recommend SNF placement. CM/SW involved. SNF consult placed, patient reportedly more appropriate for long term skilled. Therapist who worked with patient over the weekend recommending [facility], consult placed and patient medically approved but denied by insurance. CM/SW assisted in finding SNF/NH placement however patient has no further SNF days and would " require [facility] which he initially refused. Meeting with patient and care team on 6/6 and patient is now agreeable to [facility] nursing home as he is unable to care for self alone. SW is working to arrange NHP for discharge.     Interval History: No acute events overnight. No new complaints. , , and myself met with patient to confirm plan moving forward given inconsistent messages between care team. Explained to patient we are recommending NHP given his inability to care for self but this will require for him to give up check/money to the facility. Patient verbalizes understanding and agreement with plan for nursing home placement. SW to follow up with choices.     Review of Systems   Constitutional: Negative for chills and fever.   Respiratory: Negative for shortness of breath and wheezing.    Cardiovascular: Negative for chest pain and palpitations.   Gastrointestinal: Negative for abdominal pain, nausea and vomiting.   Musculoskeletal: Positive for back pain, gait problem and myalgias.   Neurological: Positive for weakness and headaches (intermittent). Negative for speech difficulty.     Objective:     Vital Signs (Most Recent):  Temp: 97.8 °F (36.6 °C) (06/06/17 0739)  Pulse: (!) 56 (06/06/17 0739)  Resp: 19 (06/06/17 0739)  BP: 118/74 (06/06/17 0739)  SpO2: 99 % (06/06/17 0739) Vital Signs (24h Range):  Temp:  [97.1 °F (36.2 °C)-97.9 °F (36.6 °C)] 97.8 °F (36.6 °C)  Pulse:  [56-72] 56  Resp:  [16-19] 19  SpO2:  [97 %-99 %] 99 %  BP: (111-139)/(64-81) 118/74     Weight: 78.6 kg (173 lb 4.5 oz)  Body mass index is 24.18 kg/m².    Intake/Output Summary (Last 24 hours) at 06/06/17 1154  Last data filed at 06/06/17 0600   Gross per 24 hour   Intake                0 ml   Output             3300 ml   Net            -3300 ml      Physical Exam   Constitutional: He is oriented to person, place, and time. He appears well-nourished. No distress.   Cardiovascular: Normal rate and  regular rhythm.    No murmur heard.  Pulmonary/Chest: Effort normal and breath sounds normal. No respiratory distress. He has no wheezes.   Abdominal: Soft. Bowel sounds are normal. He exhibits no distension. There is no tenderness. There is no guarding.   Musculoskeletal: He exhibits no edema, tenderness or deformity.   Neurological: He is alert and oriented to person, place, and time. No cranial nerve deficit.   Baseline L-sided paralysis, L arm contracted, LLE with little to no strength or movement. RUE and RLE with 3/5 strength. All near baseline per patient.        Significant Labs:   CBC:     Recent Labs  Lab 06/05/17  1021   WBC 6.21   HGB 12.9*   HCT 38.9*        CMP:     Recent Labs  Lab 06/05/17  1021      K 4.3      CO2 30*   GLU 95   BUN 14   CREATININE 0.8   CALCIUM 8.6*   ANIONGAP 7*   EGFRNONAA >60.0     All pertinent labs within the past 24 hours have been reviewed.    Assessment/Plan:      * MS (multiple sclerosis)    - Patient recently discharged from SNF after 45 day stay. Was admitted before that with increased weakness and pain and found to have UTI, MRI at that time was negative for true MS flare. Patient is known to team and he appears stronger than his last admission. Unlikely a true MS flare at this time.  - While he may improve his strength at inpatient rehab, a larger discussion regarding his long term plan discussed  -  PT/OT recommend rehab and PMR consulted, but after evaluation PMR does not feel patient appropriate for inpatient rehab currently, recommend SNF placement. [facility] denied patient given need for long term placement  - CM/SW met with patient and he is agreeable to NHP and will be provided choices with goal to discharge to NH .         10/25/2016 Saddle PE    - continue xarelto        Neurogenic bladder    - continue oxybutynin, bladder scan and cath PRN        Chronic pain of multiple sites    - Continue baclofen 20 mg QID, carbamazepine  mg BID,  celexa 20 mg daily, dantrolene 50 mg QID, gabapentin 900 mg TID, oxycodone 15mg BID PRN, norco PRN  - during last admission his mother asked team to consider addiction specialists as she felt patient was addicted to pain pills. He was offered psych services at that timeand he declined.   - EMS noted that his pain pills were more empty than should have been when they arrived. No current signs of overdose or withdrawal.           VTE Risk Mitigation         Ordered     rivaroxaban tablet 20 mg  With dinner     Route:  Oral        05/26/17 2235     Reason for No Pharmacological VTE Prophylaxis  Once      05/26/17 2235     Medium Risk of VTE  Once      05/26/17 2235          Seema Reyes PA-C  Department of Hospital Medicine   [facility]

## 2017-06-06 NOTE — PLAN OF CARE
Problem: Occupational Therapy Goal  Goal: Occupational Therapy Goal  Goals to be met by: 6/8 (revision completed 6/1)    Patient will increase functional independence with ADLs by performing:    UE Dressing in sitting with Set up and min(A).  LE Dressing with Moderate Assistance and AD as needed.  Modified pivot tranfers to chair/bed side commode with mod(A).   Dynamic task EOB ~10 min with CGA for postural control.    Functional seated task with CGA for postural control.   Outcome: Ongoing (interventions implemented as appropriate)  Goals remain appropriate. GISEL Hunt 6/6/2017

## 2017-06-06 NOTE — PT/OT/SLP PROGRESS
"Occupational Therapy  Treatment    Mao Levin   MRN: [id number]   Admitting Diagnosis: MS (multiple sclerosis)    OT Date of Treatment: 06/06/17   OT Start Time: 1256  OT Stop Time: 1325  OT Total Time (min): 29 min    Billable Minutes:  Self Care/Home Management 10 and Neuromuscular Re-education 19    General Precautions: Standard, aspiration, fall  Orthopedic Precautions: N/A  Braces: N/A    Do you have any cultural, spiritual, Tenriism conflicts, given your current situation?: none    Subjective:  Communicated with RN prior to session.  Pt reported "I'm so sick of having this disease"   Pain/Comfort  Pain Rating 1:  ("Always in pain; nothing too much")  Pain Addressed 1: Reposition, Distraction  Pain Rating Post-Intervention 1: 0/10    Objective:  Patient found with:  (condom catheter)     Functional Mobility:  Bed Mobility: Not completed; Pt sitting up in bedside chair upon arrival    Transfers:   Sit <> Stand Assistance: Maximum Assistance (x2 trials from EOB)  Sit <> Stand Assistive Device: No Assistive Device  Bed <> Chair Technique: Stand Pivot  Bed <> Chair Transfer Assistance: Maximum Assistance (x2 trials; to/from chair to EOB)  Bed <> Chair Assistive Device: No Assistive Device    Functional Ambulation: Total(A) for weight shift for 3 steps to chair (added facilitation for L LE)    Activities of Daily Living:  Feeding Level of Assistance: Set-up Assistance, Minimum assistance (to open small containers; complete bilateral coordination (fine motor))  UE Dressing Level of Assistance: Moderate assistance (seated)  LE Dressing Level of Assistance: Total assistance (B socks)  Grooming Position: Seated, EOB  Grooming Level of Assistance: Minimum assistance (to wash face/facial grooming)    Balance:   Static Sit: Min(A); L UE in extended arm weightbearing; tactile cues for upright posture and to facilitate neutral and anterior pelvic tilt   Dynamic Sit: Min(A); Pt with posterior lean with increased challenge " "  Static Stand: Max(A)    Additional:  -Pt alert and oriented x4; agreeable to therapy session; OT to open shades and turn on light in room  -Completed EOB activity ~17 min for postural control and ADL prep  *Completed posterior propping for scapular protraction and abduction with depression  *Completed thoracic extension stretching as tolerated  *Completed trunk rotation with B UE weightbearing to R/L direction for item location   *Forward weightshift for standing prep and even weight bearing through B LE x3 reps  *scapular mobilization for L UE as tolerated to inhibit tone for weightbearing   -Discussed time of day and fatiguing task   -Communication board updated; no family present for education     AM-PAC 6 CLICK ADL   How much help from another person does this patient currently need?   1 = Unable, Total/Dependent Assistance  2 = A lot, Maximum/Moderate Assistance  3 = A little, Minimum/Contact Guard/Supervision  4 = None, Modified [place]/Independent    Putting on and taking off regular lower body clothing? : 1  Bathing (including washing, rinsing, drying)?: 1  Toileting, which includes using toilet, bedpan, or urinal? : 2  Putting on and taking off regular upper body clothing?: 2  Taking care of personal grooming such as brushing teeth?: 2  Eating meals?: 3  Total Score: 11     AM-PAC Raw Score CMS "G-Code Modifier Level of Impairment Assistance   6 % Total / Unable   7 - 8 CM 80 - 100% Maximal Assist   9-13 CL 60 - 80% Moderate Assist   14 - 19 CK 40 - 60% Moderate Assist   20 - 22 CJ 20 - 40% Minimal Assist   23 CI 1-20% SBA / CGA   24 CH 0% Independent/ Mod I       Patient left up in chair with all lines intact, call button in reach and RN notified    ASSESSMENT:  Mao Levin is a 51 y.o. male with a medical diagnosis of MS (multiple sclerosis) and presents with overall debility and decreased safety with functional daily tasks and activities. Pt cont to demo good motivation and participation " with therapy sessions at this time. He demo improvement with pivot t/f to chair at this time. He cont to require assistance with all tasks and activities.    Rehab identified problem list/impairments: Rehab identified problem list/impairments: weakness, impaired endurance, impaired self care skills, impaired functional mobilty, gait instability, impaired balance, decreased safety awareness, pain, impaired skin, decreased ROM, impaired coordination, decreased upper extremity function, decreased lower extremity function    Rehab potential is good.    Activity tolerance: Good    Discharge recommendations: Discharge Facility/Level Of Care Needs: rehabilitation facility     Barriers to discharge: Barriers to Discharge: Inaccessible home environment, Decreased caregiver support    Equipment recommendations:  (TBD at next level of care)     GOALS:    Occupational Therapy Goals        Problem: Occupational Therapy Goal    Goal Priority Disciplines Outcome Interventions   Occupational Therapy Goal     OT, PT/OT Ongoing (interventions implemented as appropriate)    Description:  Goals to be met by: 6/8 (revision completed 6/1)    Patient will increase functional independence with ADLs by performing:    UE Dressing in sitting with Set up and min(A).  LE Dressing with Moderate Assistance and AD as needed.  Modified pivot tranfers to chair/bed side commode with mod(A).   Dynamic task EOB ~10 min with CGA for postural control.    Functional seated task with CGA for postural control.                    Plan:  Patient to be seen 4 x/week to address the above listed problems via self-care/home management, therapeutic activities, therapeutic exercises, neuromuscular re-education  Plan of Care expires: 06/28/17  Plan of Care reviewed with: patient    GISEL Hunt  06/06/2017

## 2017-06-06 NOTE — SUBJECTIVE & OBJECTIVE
Interval History: No acute events overnight. No new complaints. , , and myself met with patient to confirm plan moving forward given inconsistent messages between care team. Explained to patient we are recommending NHP given his inability to care for self but this will require for him to give up check/money to the facility. Patient verbalizes understanding and agreement with plan for nursing home placement. SW to follow up with choices.     Review of Systems   Constitutional: Negative for chills and fever.   Respiratory: Negative for shortness of breath and wheezing.    Cardiovascular: Negative for chest pain and palpitations.   Gastrointestinal: Negative for abdominal pain, nausea and vomiting.   Musculoskeletal: Positive for back pain, gait problem and myalgias.   Neurological: Positive for weakness and headaches (intermittent). Negative for speech difficulty.     Objective:     Vital Signs (Most Recent):  Temp: 97.8 °F (36.6 °C) (06/06/17 0739)  Pulse: (!) 56 (06/06/17 0739)  Resp: 19 (06/06/17 0739)  BP: 118/74 (06/06/17 0739)  SpO2: 99 % (06/06/17 0739) Vital Signs (24h Range):  Temp:  [97.1 °F (36.2 °C)-97.9 °F (36.6 °C)] 97.8 °F (36.6 °C)  Pulse:  [56-72] 56  Resp:  [16-19] 19  SpO2:  [97 %-99 %] 99 %  BP: (111-139)/(64-81) 118/74     Weight: 78.6 kg (173 lb 4.5 oz)  Body mass index is 24.18 kg/m².    Intake/Output Summary (Last 24 hours) at 06/06/17 1154  Last data filed at 06/06/17 0600   Gross per 24 hour   Intake                0 ml   Output             3300 ml   Net            -3300 ml      Physical Exam   Constitutional: He is oriented to person, place, and time. He appears well-nourished. No distress.   Cardiovascular: Normal rate and regular rhythm.    No murmur heard.  Pulmonary/Chest: Effort normal and breath sounds normal. No respiratory distress. He has no wheezes.   Abdominal: Soft. Bowel sounds are normal. He exhibits no distension. There is no tenderness. There is no  guarding.   Musculoskeletal: He exhibits no edema, tenderness or deformity.   Neurological: He is alert and oriented to person, place, and time. No cranial nerve deficit.   Baseline L-sided paralysis, L arm contracted, LLE with little to no strength or movement. RUE and RLE with 3/5 strength. All near baseline per patient.        Significant Labs:   CBC:     Recent Labs  Lab 06/05/17  1021   WBC 6.21   HGB 12.9*   HCT 38.9*        CMP:     Recent Labs  Lab 06/05/17  1021      K 4.3      CO2 30*   GLU 95   BUN 14   CREATININE 0.8   CALCIUM 8.6*   ANIONGAP 7*   EGFRNONAA >60.0     All pertinent labs within the past 24 hours have been reviewed.

## 2017-06-06 NOTE — ASSESSMENT & PLAN NOTE
- Patient recently discharged from SNF after 45 day stay. Was admitted before that with increased weakness and pain and found to have UTI, MRI at that time was negative for true MS flare. Patient is known to team and he appears stronger than his last admission. Unlikely a true MS flare at this time.  - While he may improve his strength at inpatient rehab, a larger discussion regarding his long term plan is underway.   -  PT/OT recommend rehab and PMR consulted, but after evaluation PMR does not feel patient appropriate for inpatient rehab currently, recommend SNF placement. Patient does not meet inpatient criteria at this time.  - SNF consult placed but recommending long term skilled. Patient is agreeable to NH SNF however he has no available SNF days and therefore would require senior care care which he refuses.    - Patient requests to return home with home health services and plan for discharge in AM

## 2017-06-07 PROCEDURE — G0378 HOSPITAL OBSERVATION PER HR: HCPCS

## 2017-06-07 PROCEDURE — 97110 THERAPEUTIC EXERCISES: CPT

## 2017-06-07 PROCEDURE — 25000003 PHARM REV CODE 250: Performed by: PHYSICIAN ASSISTANT

## 2017-06-07 PROCEDURE — 97116 GAIT TRAINING THERAPY: CPT | Mod: 59

## 2017-06-07 PROCEDURE — 97530 THERAPEUTIC ACTIVITIES: CPT

## 2017-06-07 PROCEDURE — 99224 PR SUBSEQUENT OBSERVATION CARE,LEVEL I: CPT | Mod: ,,, | Performed by: PHYSICIAN ASSISTANT

## 2017-06-07 RX ADMIN — OXYBUTYNIN CHLORIDE 5 MG: 5 TABLET, EXTENDED RELEASE ORAL at 09:06

## 2017-06-07 RX ADMIN — DIAZEPAM 5 MG: 5 TABLET ORAL at 09:06

## 2017-06-07 RX ADMIN — ACETAMINOPHEN 650 MG: 325 TABLET ORAL at 01:06

## 2017-06-07 RX ADMIN — GABAPENTIN 900 MG: 300 CAPSULE ORAL at 05:06

## 2017-06-07 RX ADMIN — DANTROLENE SODIUM 50 MG: 50 CAPSULE ORAL at 12:06

## 2017-06-07 RX ADMIN — BACLOFEN 20 MG: 10 TABLET ORAL at 05:06

## 2017-06-07 RX ADMIN — HYDROCODONE BITARTRATE AND ACETAMINOPHEN 1 TABLET: 5; 325 TABLET ORAL at 01:06

## 2017-06-07 RX ADMIN — CARBAMAZEPINE 400 MG: 200 TABLET, EXTENDED RELEASE ORAL at 09:06

## 2017-06-07 RX ADMIN — HYDROCODONE BITARTRATE AND ACETAMINOPHEN 1 TABLET: 5; 325 TABLET ORAL at 03:06

## 2017-06-07 RX ADMIN — CARBAMAZEPINE 400 MG: 200 TABLET, EXTENDED RELEASE ORAL at 10:06

## 2017-06-07 RX ADMIN — TRAZODONE HYDROCHLORIDE 100 MG: 50 TABLET ORAL at 10:06

## 2017-06-07 RX ADMIN — OXYCODONE HYDROCHLORIDE 15 MG: 5 TABLET ORAL at 09:06

## 2017-06-07 RX ADMIN — STANDARDIZED SENNA CONCENTRATE AND DOCUSATE SODIUM 1 TABLET: 8.6; 5 TABLET, FILM COATED ORAL at 09:06

## 2017-06-07 RX ADMIN — GABAPENTIN 900 MG: 300 CAPSULE ORAL at 10:06

## 2017-06-07 RX ADMIN — RIVAROXABAN 20 MG: 20 TABLET, FILM COATED ORAL at 06:06

## 2017-06-07 RX ADMIN — DIAZEPAM 5 MG: 5 TABLET ORAL at 10:06

## 2017-06-07 RX ADMIN — DANTROLENE SODIUM 50 MG: 50 CAPSULE ORAL at 05:06

## 2017-06-07 RX ADMIN — GABAPENTIN 900 MG: 300 CAPSULE ORAL at 01:06

## 2017-06-07 RX ADMIN — CITALOPRAM HYDROBROMIDE 20 MG: 10 TABLET ORAL at 09:06

## 2017-06-07 RX ADMIN — HYDROCODONE BITARTRATE AND ACETAMINOPHEN 1 TABLET: 5; 325 TABLET ORAL at 07:06

## 2017-06-07 RX ADMIN — BACLOFEN 20 MG: 10 TABLET ORAL at 12:06

## 2017-06-07 RX ADMIN — OXYCODONE HYDROCHLORIDE 15 MG: 5 TABLET ORAL at 10:06

## 2017-06-07 NOTE — PLAN OF CARE
Problem: Patient Care Overview  Goal: Plan of Care Review  Insurance authorization for discharge to SNF still pending. Pt OOB 1-2 strong assist. Condom cath in place. C/O pain frequent - headache and neuropathy to lower extremities. Pt AOx4. VSS. No acute changes.

## 2017-06-07 NOTE — PLAN OF CARE
Problem: Patient Care Overview  Goal: Plan of Care Review  Outcome: Ongoing (interventions implemented as appropriate)  Plan of care discussed with patient AAOX4 vital signs stable afebrile. Replaced condom cath. Pain management atc patient states headaches are getting worse. Pone person assistance with turning up with walker with PT OT during the day. Plan is for NH placement temporarilly . No other complaints during the night will cont to monitor.

## 2017-06-07 NOTE — ASSESSMENT & PLAN NOTE
- Patient recently discharged from SNF after 45 day stay. Was admitted before that with increased weakness and pain and found to have UTI, MRI at that time was negative for true MS flare. Patient is known to team and he appears stronger than his last admission. Unlikely a true MS flare at this time.  - While he may improve his strength at inpatient rehab, a larger discussion regarding his long term plan discussed  -  PT/OT recommend rehab and PMR consulted, but after evaluation PMR does not feel patient appropriate for inpatient rehab currently, recommend SNF placement. [facility] denied patient given need for long term placement  - CM/SW met with patient and he is agreeable to NHP and will be provided choices with goal to discharge to NH.

## 2017-06-07 NOTE — PLAN OF CARE
Problem: Physical Therapy Goal  Goal: Physical Therapy Goal  Goals to be met by: 2017     Patient will increase functional independence with mobility by performin. Supine to sit with Contact Guard Assistance  2. Sit to supine with Contact Guard Assistance  3. Sit to stand transfer with Minimal Assistance  4. Gait  x 20 feet with Moderate Assistance using appropriate AD.   5. Lower extremity exercise program x15 reps per handout, with assistance as needed met (2017)      Outcome: Ongoing (interventions implemented as appropriate)  Pt met one goal today.

## 2017-06-07 NOTE — NURSING
Spoke with LYNDA re: pt's pain regimen. Notified primary team that pt has met his tylenol max dosage for 24hr period and is not due for oxy or valium until later today. Will follow-up with pt to address better timing and spacing of pain medications. MD also ordered pt is not to leave floor unaccompanied.

## 2017-06-07 NOTE — PLAN OF CARE
DONNA spoke w/Hina Malhotra, admission coordinator for [facility], ph#[phone], who stated that insurance auth is still pending.    DONNA will f/u.    Lizet Ortez [facility]  [id number]

## 2017-06-07 NOTE — PROGRESS NOTES
"[facility] Hospital Medicine  Progress Note    Patient Name: Mao Levin  MRN: [id number]  Patient Class: OP- Observation   Admission Date: 5/26/2017  Length of Stay: 0 days  Attending Physician: Wes Looney MD  Primary Care Provider: Fausto Leung MD    [facility] Medicine Team: [facility] MED E Seema Reyes PA-C    Subjective:     Principal Problem:MS (multiple sclerosis)    HPI:  51M with a PMHx of multiple sclerosis on rotuxin therapy, saddle PE on chronic anticoagulation therapy, and chronic pain presents after being found on the ground and unable to get up by his home health PT/OT. Patient reports progressively worsening ability to ambulate since being discharged after a >45 day stay at SNF. The patient is known to me from his previous admission before going to SNF. The patient lives alone and states that he has been found down multiple times and will "roll" to the door to answer HH. The patient is requesting inpatient rehabilitation. He states previous stays in the past have helped him gain strength. He believes that SNF did not provide him with enough hours of PT/OT per day. He denies any pain and numbness to his face and right eye or vision changes, which is what usually occurs when he has a flare. He denies chest pain, SOB, dizziness, palpitations, fever/chills, N/V/D. His chronic pain is unchanged.    Hospital Course:  Placed in observation for multiple sclerosis and weakness requesting inpatient rehab placement. PT/OT consulted and PMR consulted, patient not currently appropriate for inpatient rehab admission, recommend SNF placement. CM/SW involved. SNF consult placed, patient reportedly more appropriate for long term skilled. Therapist who worked with patient over the weekend recommending [facility], consult placed and patient medically approved but denied by insurance. CM/SW assisted in finding SNF/NH placement however patient has no further SNF days and would " require alf NH placement which he initially refused. Meeting with patient and care team on 6/6 and patient is now agreeable to alf nursing home as he is unable to care for self alone. SW is working to arrange NHP for discharge.     Interval History: No acute events overnight. Sitting up in chair. Awiating NHP     Review of Systems   Constitutional: Negative for chills and fever.   Respiratory: Negative for shortness of breath and wheezing.    Cardiovascular: Negative for chest pain and palpitations.   Gastrointestinal: Negative for abdominal pain, nausea and vomiting.   Musculoskeletal: Positive for back pain, gait problem and myalgias.   Neurological: Positive for weakness and headaches (intermittent). Negative for speech difficulty.     Objective:     Vital Signs (Most Recent):  Temp: 97.4 °F (36.3 °C) (06/07/17 1500)  Pulse: 61 (06/07/17 1500)  Resp: 18 (06/07/17 1500)  BP: (!) 140/77 (06/07/17 1500)  SpO2: 98 % (06/07/17 1500) Vital Signs (24h Range):  Temp:  [97.2 °F (36.2 °C)-97.9 °F (36.6 °C)] 97.4 °F (36.3 °C)  Pulse:  [50-66] 61  Resp:  [16-18] 18  SpO2:  [98 %-100 %] 98 %  BP: (126-152)/(77-91) 140/77     Weight: 78.6 kg (173 lb 4.5 oz)  Body mass index is 24.18 kg/m².    Intake/Output Summary (Last 24 hours) at 06/07/17 1741  Last data filed at 06/07/17 1200   Gross per 24 hour   Intake              740 ml   Output             1900 ml   Net            -1160 ml      Physical Exam   Constitutional: He is oriented to person, place, and time. He appears well-nourished. No distress.   Cardiovascular: Normal rate and regular rhythm.    No murmur heard.  Pulmonary/Chest: Effort normal and breath sounds normal. No respiratory distress. He has no wheezes.   Abdominal: Soft. Bowel sounds are normal. He exhibits no distension. There is no tenderness. There is no guarding.   Musculoskeletal: He exhibits no edema, tenderness or deformity.   Neurological: He is alert and oriented to person, place, and time.  No cranial nerve deficit.   Baseline L-sided paralysis, L arm contracted, LLE with little to no strength or movement. RUE and RLE with 3/5 strength. All near baseline per patient.        Assessment/Plan:      * MS (multiple sclerosis)    - Patient recently discharged from SNF after 45 day stay. Was admitted before that with increased weakness and pain and found to have UTI, MRI at that time was negative for true MS flare. Patient is known to team and he appears stronger than his last admission. Unlikely a true MS flare at this time.  - While he may improve his strength at inpatient rehab, a larger discussion regarding his long term plan discussed  -  PT/OT recommend rehab and PMR consulted, but after evaluation PMR does not feel patient appropriate for inpatient rehab currently, recommend SNF placement. [facility] denied patient given need for long term placement  - CM/SW met with patient and he is agreeable to NHP and will be provided choices with goal to discharge to NH.         10/25/2016 Saddle PE    - continue xarelto        Neurogenic bladder    - continue oxybutynin, bladder scan and cath PRN        Chronic pain of multiple sites    - Continue baclofen 20 mg QID, carbamazepine  mg BID, celexa 20 mg daily, dantrolene 50 mg QID, gabapentin 900 mg TID, oxycodone 15mg BID PRN, norco PRN  - during last admission his mother asked team to consider addiction specialists as she felt patient was addicted to pain pills. He was offered psych services at that timeand he declined.   - EMS noted that his pain pills were more empty than should have been when they arrived. No current signs of overdose or withdrawal.   - Avoid addition of more narcotics unless patient has change in status or unstable vital signs.           VTE Risk Mitigation         Ordered     rivaroxaban tablet 20 mg  With dinner     Route:  Oral        05/26/17 2235     Reason for No Pharmacological VTE Prophylaxis  Once      05/26/17 2235     Medium  Risk of VTE  Once      05/26/17 5094          Seema Reyes PA-C  Department of Hospital Medicine   [facility]

## 2017-06-07 NOTE — PT/OT/SLP PROGRESS
Physical Therapy  Treatment    Mao Levin   MRN: [id number]   Admitting Diagnosis: MS (multiple sclerosis)    PT Received On: 06/07/17  PT Start Time: 1056     PT Stop Time: 1134    PT Total Time (min): 38 min       Billable Minutes:  Gait [id number], Therapeutic Activity 8 and Therapeutic Exercise 15       PT/PTA: PT     PTA Visit Number: 0       General Precautions: Standard, aspiration, fall  Orthopedic Precautions: N/A   Braces: N/A         Subjective:  Communicated with pt prior to session. Pt reported that when he went home, he fell a couple of times. After calling the EMS, they decided he needed to come to the hospital. He reported that he had another flare up.     Pain/Comfort  Pain Rating 1: 0/10    Objective:   Donned pt's shoes and L AFO prior to performing any transfers or ambulation.  Patient found with:  (n/a)    Functional Mobility:  Bed Mobility:   Rolling/Turning to Left:  (n/a)  Scooting/Bridging:  (n/a)  Supine to Sit:  (n/a)  Sit to Supine:  (n/a)    Transfers:  Sit <> Stand Assistance: Maximum Assistance  Sit <> Stand Assistive Device: Rolling Walker (Did need assistance with grasp on L hand (increased flexor tone in fingers))  Bed <> Chair Technique: Stand Pivot  Bed <> Chair Assistance: Maximum Assistance (WC>bedside chair set up perpendicular to each other. Stand pivot)  Bed <> Chair Assistive Device: No Assistive Device    Gait:   Gait Distance: 7 feet in hallway with wheelchair follow, Mod assistance provided at LLE for mobility, but pt was able to maintain upright position with use of rolling walker. Pt grew tired thereby terminating gait trial.  Assistance 1: Moderate assistance  Gait Assistive Device: Rolling walker (Pt needed assistance with LUE grasp due to increased flexor tone in fingers.)  Gait Pattern: swing-to gait (Difficulty mobilizing LLE 2/2 weakness in hip flexors, quad, and dorsiflexors.)  Gait deficits: Impaired ability to perform swing phase of LLE due to deficits noted  above.    Balance:   Static Sit: FAIR+: Able to take MINIMAL challenges from all directions  Dynamic Sit: FAIR+: Maintains balance through MINIMAL excursions of active trunk motion  Static Stand: FAIR: Maintains without assist but unable to take challenges  Dynamic stand: FAIR: Needs CONTACT GUARD during gait     Therapeutic Activities and Exercises:  Pt was able to perform the following exercises while seated: Long arc quads (required active assistance for LLE), gluteal sets- 2x10 reps BLE  Standing- Attempted a pre-gait activity to allow for total weight acceptance onto each lower extremity, but pt lost control and slowly lowered onto his wheelchair.    AM-PAC 6 CLICK MOBILITY  How much help from another person does this patient currently need?   1 = Unable, Total/Dependent Assistance  2 = A lot, Maximum/Moderate Assistance  3 = A little, Minimum/Contact Guard/Supervision  4 = None, Modified [place]/Independent    Turning over in bed (including adjusting bedclothes, sheets and blankets)?: 3  Sitting down on and standing up from a chair with arms (e.g., wheelchair, bedside commode, etc.): 2  Moving from lying on back to sitting on the side of the bed?: 3  Moving to and from a bed to a chair (including a wheelchair)?: 2  Need to walk in hospital room?: 2  Climbing 3-5 steps with a railing?: 1  Total Score: 13    AM-PAC Raw Score CMS G-Code Modifier Level of Impairment Assistance   6 % Total / Unable   7 - 9 CM 80 - 100% Maximal Assist   10 - 14 CL 60 - 80% Moderate Assist   15 - 19 CK 40 - 60% Moderate Assist   20 - 22 CJ 20 - 40% Minimal Assist   23 CI 1-20% SBA / CGA   24 CH 0% Independent/ Mod I     Patient left up in chair with call button in reach with mother and PCT present.    Assessment:  Mao Levin is a 51 y.o. male with a medical diagnosis of MS (multiple sclerosis) and presents with significant weakness in his left lower extremity, which makes transfers and ambulation difficult for him. In  addition, he has limited use of his hands with flexion contractures noted in his L fingers.  He needs maximal assistance to stand from a seated position and perform a stand pivot transfer from one surface to another.  He will benefit from further PT services to continue utilizing his lower extremities and improve his functional mobility.    Rehab identified problem list/impairments: Rehab identified problem list/impairments: weakness, impaired endurance, gait instability, impaired functional mobilty, impaired balance, decreased lower extremity function, decreased upper extremity function, abnormal tone, impaired fine motor    Rehab potential is fair.    Activity tolerance: Fair    Discharge recommendations: Discharge Facility/Level Of Care Needs: rehabilitation facility (Notes in chart report they're looking for placement in NH.)     Barriers to discharge: Barriers to Discharge: Inaccessible home environment, Decreased caregiver support    Equipment recommendations: Equipment Needed After Discharge:  (TBD)     GOALS:    Physical Therapy Goals        Problem: Physical Therapy Goal    Goal Priority Disciplines Outcome Goal Variances Interventions   Physical Therapy Goal     PT/OT, PT Ongoing (interventions implemented as appropriate)     Description:  Goals to be met by: 2017     Patient will increase functional independence with mobility by performin. Supine to sit with Contact Guard Assistance  2. Sit to supine with Contact Guard Assistance  3. Sit to stand transfer with Minimal Assistance  4. Gait  x 20 feet with Moderate Assistance using appropriate AD.   5. Lower extremity exercise program x15 reps per handout, with assistance as needed met (2017)                        PLAN:    Patient to be seen 5 x/week  to address the above listed problems via gait training, therapeutic activities, therapeutic exercises, neuromuscular re-education, wheelchair management/training.  *RECOMMEND USE OF SHOE COVER  FOR EASE OF SLIDING LLE IN FUTURE GAIT ATTEMPTS. *  Plan of Care expires: 06/28/17  Plan of Care reviewed with: patient         Inés Jones, PT  06/07/2017

## 2017-06-07 NOTE — SUBJECTIVE & OBJECTIVE
Interval History: No acute events overnight. Sitting up in chair. Awiating NHP     Review of Systems   Constitutional: Negative for chills and fever.   Respiratory: Negative for shortness of breath and wheezing.    Cardiovascular: Negative for chest pain and palpitations.   Gastrointestinal: Negative for abdominal pain, nausea and vomiting.   Musculoskeletal: Positive for back pain, gait problem and myalgias.   Neurological: Positive for weakness and headaches (intermittent). Negative for speech difficulty.     Objective:     Vital Signs (Most Recent):  Temp: 97.4 °F (36.3 °C) (06/07/17 1500)  Pulse: 61 (06/07/17 1500)  Resp: 18 (06/07/17 1500)  BP: (!) 140/77 (06/07/17 1500)  SpO2: 98 % (06/07/17 1500) Vital Signs (24h Range):  Temp:  [97.2 °F (36.2 °C)-97.9 °F (36.6 °C)] 97.4 °F (36.3 °C)  Pulse:  [50-66] 61  Resp:  [16-18] 18  SpO2:  [98 %-100 %] 98 %  BP: (126-152)/(77-91) 140/77     Weight: 78.6 kg (173 lb 4.5 oz)  Body mass index is 24.18 kg/m².    Intake/Output Summary (Last 24 hours) at 06/07/17 1741  Last data filed at 06/07/17 1200   Gross per 24 hour   Intake              740 ml   Output             1900 ml   Net            -1160 ml      Physical Exam   Constitutional: He is oriented to person, place, and time. He appears well-nourished. No distress.   Cardiovascular: Normal rate and regular rhythm.    No murmur heard.  Pulmonary/Chest: Effort normal and breath sounds normal. No respiratory distress. He has no wheezes.   Abdominal: Soft. Bowel sounds are normal. He exhibits no distension. There is no tenderness. There is no guarding.   Musculoskeletal: He exhibits no edema, tenderness or deformity.   Neurological: He is alert and oriented to person, place, and time. No cranial nerve deficit.   Baseline L-sided paralysis, L arm contracted, LLE with little to no strength or movement. RUE and RLE with 3/5 strength. All near baseline per patient.

## 2017-06-07 NOTE — ASSESSMENT & PLAN NOTE
- Continue baclofen 20 mg QID, carbamazepine  mg BID, celexa 20 mg daily, dantrolene 50 mg QID, gabapentin 900 mg TID, oxycodone 15mg BID PRN, norco PRN  - during last admission his mother asked team to consider addiction specialists as she felt patient was addicted to pain pills. He was offered psych services at that timeand he declined.   - EMS noted that his pain pills were more empty than should have been when they arrived. No current signs of overdose or withdrawal.   - Avoid addition of more narcotics unless patient has change in status or unstable vital signs.

## 2017-06-07 NOTE — PLAN OF CARE
DONNA spoke w/Tyrone Forman [phone], who reports that pt is under review.    Hina Malhotra,admission director for [facility], states that pt has been accepted medically still awaiting insurance auth.    Lizet Ortez, [facility]  [phone]

## 2017-06-08 VITALS
TEMPERATURE: 97 F | BODY MASS INDEX: 24.26 KG/M2 | SYSTOLIC BLOOD PRESSURE: 110 MMHG | HEART RATE: 62 BPM | DIASTOLIC BLOOD PRESSURE: 66 MMHG | HEIGHT: 71 IN | RESPIRATION RATE: 18 BRPM | OXYGEN SATURATION: 100 % | WEIGHT: 173.31 LBS

## 2017-06-08 PROCEDURE — G8987 SELF CARE CURRENT STATUS: HCPCS | Mod: CL

## 2017-06-08 PROCEDURE — G8989 SELF CARE D/C STATUS: HCPCS | Mod: CL

## 2017-06-08 PROCEDURE — 25000003 PHARM REV CODE 250: Performed by: PHYSICIAN ASSISTANT

## 2017-06-08 PROCEDURE — G8988 SELF CARE GOAL STATUS: HCPCS | Mod: CJ

## 2017-06-08 PROCEDURE — G8980 MOBILITY D/C STATUS: HCPCS | Mod: CL

## 2017-06-08 PROCEDURE — G0378 HOSPITAL OBSERVATION PER HR: HCPCS

## 2017-06-08 PROCEDURE — 99217 PR OBSERVATION CARE DISCHARGE: CPT | Mod: ,,, | Performed by: PHYSICIAN ASSISTANT

## 2017-06-08 RX ORDER — OXYCODONE HYDROCHLORIDE 15 MG/1
15 TABLET ORAL 2 TIMES DAILY PRN
Qty: 30 TABLET | Refills: 0 | Status: SHIPPED | OUTPATIENT
Start: 2017-06-08 | End: 2017-07-18 | Stop reason: SDUPTHER

## 2017-06-08 RX ORDER — GABAPENTIN 300 MG/1
900 CAPSULE ORAL 3 TIMES DAILY
Qty: 270 CAPSULE | Refills: 0 | Status: ON HOLD | OUTPATIENT
Start: 2017-06-08 | End: 2017-08-16

## 2017-06-08 RX ORDER — TRAZODONE HYDROCHLORIDE 100 MG/1
100 TABLET ORAL NIGHTLY
Qty: 30 TABLET | Refills: 0 | Status: SHIPPED | OUTPATIENT
Start: 2017-06-08 | End: 2017-07-18 | Stop reason: SDUPTHER

## 2017-06-08 RX ORDER — BUTALBITAL, ACETAMINOPHEN AND CAFFEINE 50; 325; 40 MG/1; MG/1; MG/1
1 TABLET ORAL EVERY 4 HOURS PRN
Status: DISCONTINUED | OUTPATIENT
Start: 2017-06-08 | End: 2017-06-08 | Stop reason: HOSPADM

## 2017-06-08 RX ORDER — DIAZEPAM 5 MG/1
5 TABLET ORAL 2 TIMES DAILY PRN
Qty: 30 TABLET | Refills: 0 | Status: SHIPPED | OUTPATIENT
Start: 2017-06-08 | End: 2017-07-18 | Stop reason: SDUPTHER

## 2017-06-08 RX ORDER — DANTROLENE SODIUM 50 MG/1
50 CAPSULE ORAL 4 TIMES DAILY
Qty: 120 CAPSULE | Refills: 0 | Status: SHIPPED | OUTPATIENT
Start: 2017-06-08 | End: 2018-06-27

## 2017-06-08 RX ORDER — CITALOPRAM 20 MG/1
20 TABLET, FILM COATED ORAL DAILY
Qty: 30 TABLET | Refills: 0 | Status: SHIPPED | OUTPATIENT
Start: 2017-06-08 | End: 2018-08-16

## 2017-06-08 RX ORDER — DIAZEPAM 5 MG/1
5 TABLET ORAL 2 TIMES DAILY PRN
Qty: 30 TABLET | Refills: 0 | Status: SHIPPED | OUTPATIENT
Start: 2017-06-08 | End: 2017-06-08

## 2017-06-08 RX ORDER — BACLOFEN 20 MG/1
20 TABLET ORAL 4 TIMES DAILY
Qty: 90 TABLET | Refills: 0 | Status: SHIPPED | OUTPATIENT
Start: 2017-06-08 | End: 2018-01-03 | Stop reason: SDUPTHER

## 2017-06-08 RX ORDER — CARBAMAZEPINE 400 MG/1
400 TABLET, EXTENDED RELEASE ORAL 2 TIMES DAILY
Qty: 60 TABLET | Refills: 0 | Status: SHIPPED | OUTPATIENT
Start: 2017-06-08 | End: 2017-07-18

## 2017-06-08 RX ORDER — OXYCODONE HYDROCHLORIDE 15 MG/1
15 TABLET ORAL 2 TIMES DAILY PRN
Qty: 30 TABLET | Refills: 0 | Status: SHIPPED | OUTPATIENT
Start: 2017-06-08 | End: 2017-06-08

## 2017-06-08 RX ADMIN — STANDARDIZED SENNA CONCENTRATE AND DOCUSATE SODIUM 1 TABLET: 8.6; 5 TABLET, FILM COATED ORAL at 09:06

## 2017-06-08 RX ADMIN — GABAPENTIN 900 MG: 300 CAPSULE ORAL at 05:06

## 2017-06-08 RX ADMIN — OXYBUTYNIN CHLORIDE 5 MG: 5 TABLET, EXTENDED RELEASE ORAL at 09:06

## 2017-06-08 RX ADMIN — BACLOFEN 20 MG: 10 TABLET ORAL at 05:06

## 2017-06-08 RX ADMIN — BACLOFEN 20 MG: 10 TABLET ORAL at 11:06

## 2017-06-08 RX ADMIN — CARBAMAZEPINE 400 MG: 200 TABLET, EXTENDED RELEASE ORAL at 09:06

## 2017-06-08 RX ADMIN — GABAPENTIN 900 MG: 300 CAPSULE ORAL at 01:06

## 2017-06-08 RX ADMIN — BUTALBITAL, ACETAMINOPHEN AND CAFFEINE 1 TABLET: 50; 325; 40 TABLET ORAL at 01:06

## 2017-06-08 RX ADMIN — OXYCODONE HYDROCHLORIDE 15 MG: 5 TABLET ORAL at 09:06

## 2017-06-08 RX ADMIN — CITALOPRAM HYDROBROMIDE 20 MG: 10 TABLET ORAL at 09:06

## 2017-06-08 RX ADMIN — HYDROCODONE BITARTRATE AND ACETAMINOPHEN 1 TABLET: 5; 325 TABLET ORAL at 12:06

## 2017-06-08 RX ADMIN — DANTROLENE SODIUM 50 MG: 50 CAPSULE ORAL at 05:06

## 2017-06-08 RX ADMIN — BACLOFEN 20 MG: 10 TABLET ORAL at 12:06

## 2017-06-08 RX ADMIN — HYDROCODONE BITARTRATE AND ACETAMINOPHEN 1 TABLET: 5; 325 TABLET ORAL at 02:06

## 2017-06-08 RX ADMIN — DIAZEPAM 5 MG: 5 TABLET ORAL at 09:06

## 2017-06-08 RX ADMIN — ACETAMINOPHEN 650 MG: 325 TABLET ORAL at 12:06

## 2017-06-08 RX ADMIN — DANTROLENE SODIUM 50 MG: 50 CAPSULE ORAL at 11:06

## 2017-06-08 RX ADMIN — DANTROLENE SODIUM 50 MG: 50 CAPSULE ORAL at 12:06

## 2017-06-08 NOTE — PLAN OF CARE
Received a vmsg from Hina Malhotra, admission director for [facility], notifying SW that pt has been approved by insurance for snf placement.  DONNA has notified BETH Magallon of approval.  Orders requested.    Lizet Ortez LMSW  [id number]

## 2017-06-08 NOTE — NURSING
Hina from  notified me pt scheduled to leave by wheelchair van between [id number] today. Will call report to receiving nurse. Pt going to Room 8B.

## 2017-06-08 NOTE — NURSING
Called [facility], spoke to Erica, supervisor, explained trip was set up for 3:00pm and its 5:00pm. Erica called the  and he states he arrived to [facility] @ 5:02, yet has not reached the unit, she then called him again he states he is on the first floor waiting for the elevator.    Erica, will investigate the breakdown in communication and get back to us.     Elis Foote RN

## 2017-06-08 NOTE — NURSING
[facility] called to give report. Receiving nurse unavailable. [facility] link phone number given to [facility]  per her request. Receiving nurse to call when ready for report. Will attempt to call again in 15 mins.

## 2017-06-08 NOTE — PLAN OF CARE
[facility]     Department of Hospital Medicine     [address] [place] [address]     [phone] after hours  [phone] fax       NURSING HOME ORDERS    06/08/2017    Admit to Nursing Home: Skilled Bed                                                  Diagnoses:  Active Hospital Problems    Diagnosis  POA    *MS (multiple sclerosis) [G35]  Yes     Priority: 1 - High     Chronic    10/25/2016 Saddle PE [I26.92]  Yes     Chronic    Neurogenic bladder [N31.9]  Yes     Chronic    Chronic pain of multiple sites [R52, G89.29]  Yes     Chronic      Resolved Hospital Problems    Diagnosis Date Resolved POA   No resolved problems to display.       Patient is homebound due to:  MS (multiple sclerosis)    Allergies:Review of patient's allergies indicates:  No Known Allergies    Vitals:       Every shift (Skilled Nursing patients)    Diet: Regular Diet       Acitivities:     - Up in a chair each morning as tolerated   - Ambulate with assistance to bathroom   - Scheduled walks once each shift (every 8 hours)   - May use walker, cane, or self-propelled wheelchair    LABS:  Per facility protocol   CMP, CBC each month for 3 months   Pre-albumin each month for 3 months   TSH every year   Tegretol level in 1 month and every 3 months  Nursing Precautions:     - Aspiration precautions:             -  Upright 90 degrees befor during and after meals      - Fall precautions per nursing home protocol   - Decubitus precautions:        -  for positioning    CONSULTS:     Physical Therapy to evaluate and treat     Occupational Therapy to evaluate and treat     Nutrition to evaluate and recommend diet     Psychiatry to evaluate and follow patients for delirium      Medications: Discontinue all previous medication orders, if any. See new list below.     Mao Levin   Home Medication Instructions RICCO:[id number]    Printed on:06/08/17 1036   Medication Information                       baclofen (LIORESAL) 20 MG tablet  Take 1 tablet (20 mg total) by mouth 4 (four) times daily.             carbamazepine (TEGRETOL XR) 400 MG Tb12  Take 1 tablet (400 mg total) by mouth 2 (two) times daily.             citalopram (CELEXA) 20 MG tablet  Take 1 tablet (20 mg total) by mouth once daily.             dantrolene (DANTRIUM) 50 MG Cap  Take 1 capsule (50 mg total) by mouth 4 (four) times daily.             diazePAM (VALIUM) 5 MG tablet  Take 1 tablet (5 mg total) by mouth 2 (two) times daily as needed (muscle spasms).             ergocalciferol (VITAMIN D2) 50,000 unit Cap  Take 1 capsule (50,000 Units total) by mouth every 7 days.             gabapentin (NEURONTIN) 300 MG capsule  Take 3 capsules (900 mg total) by mouth 3 (three) times daily.             oxybutynin (DITROPAN-XL) 5 MG TR24  Take 5 mg by mouth once daily.             oxycodone (ROXICODONE) 15 MG Tab  Take 1 tablet (15 mg total) by mouth 2 (two) times daily as needed for Pain.             oxyMORphone (OPANA ER) 7.5 mg TR12  Take 7.5 mg by mouth 2 (two) times daily.             polyethylene glycol (GLYCOLAX) 17 gram/dose powder  Take 17 g by mouth once daily.             rivaroxaban (XARELTO) 20 mg Tab  Take 1 tablet (20 mg total) by mouth daily with dinner or evening meal.             senna-docusate 8.6-50 mg (PERICOLACE) 8.6-50 mg per tablet  Take 1 tablet by mouth once daily.             trazodone (DESYREL) 100 MG tablet  Take 1 tablet (100 mg total) by mouth every evening.                 _________________________________  Seema Reyes PA-C  06/08/2017

## 2017-06-08 NOTE — PLAN OF CARE
Problem: Patient Care Overview  Goal: Plan of Care Review  Outcome: Ongoing (interventions implemented as appropriate)  AAO x 4.  At start of shift he was OOB in chair.  Requires 2 person assist to get back in bed.  1 XL BM on BSC.  Voiding clear, yellow urine.  Condom cath in place, leaking at times.  Skin is intact.  Shifts weight and positions self independently in bed, will occasionally ask for help with major position changes.  Pt often c/o HA, previously noted dx of cluster headaches.  Paged on-call to see if we could try another medication besides hydrocodone/acetaminophin and tylenol.  Pt quickly reaches his daily allowance when taking both for his HA.  No new orders written.

## 2017-06-08 NOTE — PLAN OF CARE
Transportation has been arranged w/SPD for w/c transport to [facility].  Pt is assigned room 8-B.  Pt has been notified of transfer and is agreeable to transfer.  Chelita,nurse [id number], has been given #[phone] for report and will hand off to Linda.  Packet given to  for  to give to receiving facility.    Services completed.    Lizet Ortez, DONNA  [phone]

## 2017-06-08 NOTE — NURSING
Pt left via wheel chair van. Discharge packet with . Paper prescriptions accounted for. All belongings with patient. IV and an condom cath removed. [facility] notified of pt's departure.

## 2017-06-08 NOTE — NURSING
Pt discharging to Health system, awaiting SW to update notes on who to call for report and transportation.

## 2017-06-09 NOTE — PLAN OF CARE
06/09/17 1121   Final Note   Assessment Type Final Discharge Note   Discharge Disposition SNF   Did you assess the readiness or willingness of the family or caregiver to support self management of care? Yes   Right Care Referral Info   Post Acute Recommendation SNF   Referral Type skilled placement   Facility Name [facility] skilled placement     Future Appointments  Date Time Provider Department Center   7/17/2017 9:40 AM Mattie Finnegan MD [facility] [address]   7/18/2017 10:30 AM Mendy Alarcon MD [facility]  [place]   8/8/2017 2:00 PM Mendy Alarcon MD [address]

## 2017-06-09 NOTE — PLAN OF CARE
Problem: Occupational Therapy Goal  Goal: Occupational Therapy Goal  Goals to be met by: 6/8 (revision completed 6/1)    Patient will increase functional independence with ADLs by performing:    UE Dressing in sitting with Set up and min(A).  LE Dressing with Moderate Assistance and AD as needed.  Modified pivot tranfers to chair/bed side commode with mod(A).   Dynamic task EOB ~10 min with CGA for postural control.    Functional seated task with CGA for postural control.   Outcome: Outcome(s) achieved Date Met: 06/09/17  Goals not met. Pt d/c from acute care at this time. GISEL Hunt 6/9/2017

## 2017-06-09 NOTE — PT/OT/SLP DISCHARGE
Occupational Therapy Discharge Summary    Mao Levin  MRN: [id number]   MS (multiple sclerosis)   Patient Discharged from acute Occupational Therapy on 6/8/2017  Please refer to prior OT note dated on 6/6/2017 for functional status.     Assessment:   Patient was discharge unexpectedly.  Information required to complete and accurate discharge summary is unknown.  Refer to therapy initial evaluation and last progress note for initial and most recent functional status and goal achievement.  Recommendations made may be found in medical record.     GOALS:    Occupational Therapy Goals     Not on file          Multidisciplinary Problems (Resolved)        Problem: Occupational Therapy Goal    Goal Priority Disciplines Outcome Interventions   Occupational Therapy Goal   (Resolved)     OT, PT/OT Outcome(s) achieved    Description:  Goals to be met by: 6/8 (revision completed 6/1)    Patient will increase functional independence with ADLs by performing:    UE Dressing in sitting with Set up and min(A).  LE Dressing with Moderate Assistance and AD as needed.  Modified pivot tranfers to chair/bed side commode with mod(A).   Dynamic task EOB ~10 min with CGA for postural control.    Functional seated task with CGA for postural control.                  Reasons for Discontinuation of Therapy Services  Transfer to alternate level of care.      Plan:  Patient Discharged to: [facility] (Therapy rec was Rehab).    GISEL Hunt 6/9/2017

## 2017-06-12 NOTE — ASSESSMENT & PLAN NOTE
- Continue baclofen 20 mg QID, carbamazepine  mg BID, celexa 20 mg daily, dantrolene 50 mg QID, gabapentin 900 mg TID, oxycodone 15mg BID PRN, norco PRN  - during last admission his mother asked team to consider addiction specialists as she felt patient was addicted to pain pills. He was offered psych services at that timeand he declined.   - EMS noted that his pain pills were more empty than should have been when they arrived. No current signs of overdose or withdrawal.   - Addressed with patient and kept on home regimen. Avoid adding additional narcotics

## 2017-06-12 NOTE — PROGRESS NOTES
Physical Therapy Discharge Summary    Mao Levin  MRN: [id number]   MS (multiple sclerosis)   Patient Discharged from acute Physical Therapy on 17.  Please refer to prior PT noted date on 17 for functional status.     Assessment:   Patient appropriate for care in another setting.  GOALS:    Physical Therapy Goals        Problem: Physical Therapy Goal    Goal Priority Disciplines Outcome Goal Variances Interventions   Physical Therapy Goal     PT/OT, PT Ongoing (interventions implemented as appropriate)     Description:  Goals to be met by: 2017     Patient will increase functional independence with mobility by performin. Supine to sit with Contact Guard Assistance  2. Sit to supine with Contact Guard Assistance  3. Sit to stand transfer with Minimal Assistance  4. Gait  x 20 feet with Moderate Assistance using appropriate AD.   5. Lower extremity exercise program x15 reps per handout, with assistance as needed met (2017)                      Reasons for Discontinuation of Therapy Services  Transfer to alternate level of care.      Plan:  Patient Discharged to: Home with Home Health Service.    Inés Jones, PT  2017

## 2017-06-12 NOTE — DISCHARGE SUMMARY
"[facility] Hospital Medicine  Discharge Summary      Patient Name: Mao Levin  MRN: [id number]  Admission Date: 5/26/2017  Discharge Date: 6/8/2017  Attending Physician: Dr. Looney  Discharging Provider: Seema Reyes PA-C  Primary Care Provider: Fausto Leung MD  [facility] Medicine Team: [facility] MED E Seema Reyes PA-C    HPI:   51M with a PMHx of multiple sclerosis on rotuxin therapy, saddle PE on chronic anticoagulation therapy, and chronic pain presents after being found on the ground and unable to get up by his home health PT/OT. Patient reports progressively worsening ability to ambulate since being discharged after a >45 day stay at SNF. The patient is known to me from his previous admission before going to SNF. The patient lives alone and states that he has been found down multiple times and will "roll" to the door to answer HH. The patient is requesting inpatient rehabilitation. He states previous stays in the past have helped him gain strength. He believes that SNF did not provide him with enough hours of PT/OT per day. He denies any pain and numbness to his face and right eye or vision changes, which is what usually occurs when he has a flare. He denies chest pain, SOB, dizziness, palpitations, fever/chills, N/V/D. His chronic pain is unchanged.    * No surgery found *      Indwelling Lines/Drains at time of discharge:   Lines/Drains/Airways     Drain            Male External Urinary Catheter 05/31/17 12 days              Hospital Course:   Placed in observation for multiple sclerosis and weakness requesting inpatient rehab placement. PT/OT consulted and PMR consulted, patient not currently appropriate for inpatient rehab admission, recommend SNF placement. CM/SW involved. SNF consult placed, patient reportedly more appropriate for long term skilled. Therapist who worked with patient over the weekend recommending [facility], consult placed and patient " medically approved but denied by insurance. CM/SW assisted in finding SNF/NH placement however patient has no further SNF days and would require care home NH placement which he initially refused. Meeting with patient and care team on 6/6 and patient is now agreeable to care home nursing home as he is unable to care for self alone. SW arranged nursing home placement and patient discharged in stable condition.      Consults:   Consults         Status Ordering Provider     Inpatient consult to Physical Medicine Rehab  Once     Provider:  (Not yet assigned)    Completed RHETT WOODRUFF     Inpatient consult to Physical Medicine Rehab  Once     Provider:  (Not yet assigned)    Completed TIFF MCKINNEY     Inpatient consult to Physical Medicine Rehab  Once     Provider:  (Not yet assigned)    Completed TIFF MCKINNEY     Inpatient consult to SNF [facility]  Once     Provider:  (Not yet assigned)    Completed REAGAN JEREZ          Significant Diagnostic Studies: Labs: All labs within the past 24 hours have been reviewed    Pending Diagnostic Studies:     None        Final Active Diagnoses:    Diagnosis Date Noted POA    PRINCIPAL PROBLEM:  MS (multiple sclerosis) [G35] 12/19/2016 Yes     Chronic    10/25/2016 Saddle PE [I26.92] 10/25/2016 Yes     Chronic    Neurogenic bladder [N31.9] 10/06/2016 Yes     Chronic    Chronic pain of multiple sites [R52, G89.29] 09/22/2016 Yes     Chronic      Problems Resolved During this Admission:    Diagnosis Date Noted Date Resolved POA      * MS (multiple sclerosis)    - Patient recently discharged from SNF after 45 day stay. Was admitted before that with increased weakness and pain and found to have UTI, MRI at that time was negative for true MS flare. Patient is known to team and he appears stronger than his last admission. Unlikely a true MS flare at this time.  -  PT/OT recommend rehab and PMR consulted, but after evaluation PMR does not feel patient appropriate for inpatient  rehab currently, recommend SNF placement. [facility] SNF denied patient given need for long term placement  - While he may improve his strength at inpatient rehab, a larger discussion regarding his long term plan was discussed and he was ultimately denies SNF/Rehab placement  - Patient discharge to Nursing Home for 24 hour care  - Follow up general neurology as needed        10/25/2016 Saddle PE    - continue xarelto        Neurogenic bladder    - continue oxybutynin, bladder scan and cath PRN        Chronic pain of multiple sites    - Continue baclofen 20 mg QID, carbamazepine  mg BID, celexa 20 mg daily, dantrolene 50 mg QID, gabapentin 900 mg TID, oxycodone 15mg BID PRN, norco PRN  - during last admission his mother asked team to consider addiction specialists as she felt patient was addicted to pain pills. He was offered psych services at that timeand he declined.   - EMS noted that his pain pills were more empty than should have been when they arrived. No current signs of overdose or withdrawal.   - Addressed with patient and kept on home regimen. Avoid adding additional narcotics            Discharged Condition: good    Disposition: Skilled Nursing Facility/Long term nursing home    Follow Up:  Follow-up Information     Fausto Leung MD In 1 week.    Specialty:  Physical Medicine and Rehabilitation  Contact information:  [address] [place] [id number]  [phone]             [facility].    Specialties:  Nursing Home Agency, SNF Agency  Contact information:  [address] [facility] [id number] [phone]                 Patient Instructions:     Diet general     Activity as tolerated     Call MD for:  temperature >100.4     Call MD for:  persistent nausea and vomiting or diarrhea     Call MD for:  severe uncontrolled pain     Call MD for:  increased confusion or weakness     Call MD for:  persistent dizziness, light-headedness, or visual disturbances       Medications:  Reconciled  Home Medications:   Discharge Medication List as of 6/8/2017  5:44 PM      CONTINUE these medications which have CHANGED    Details   baclofen (LIORESAL) 20 MG tablet Take 1 tablet (20 mg total) by mouth 4 (four) times daily., Starting Thu 6/8/2017, Until Fri 6/8/2018, Print      carbamazepine (TEGRETOL XR) 400 MG Tb12 Take 1 tablet (400 mg total) by mouth 2 (two) times daily., Starting Thu 6/8/2017, Until Fri 6/8/2018, Print      citalopram (CELEXA) 20 MG tablet Take 1 tablet (20 mg total) by mouth once daily., Starting Thu 6/8/2017, Until Fri 6/8/2018, Print      dantrolene (DANTRIUM) 50 MG Cap Take 1 capsule (50 mg total) by mouth 4 (four) times daily., Starting Thu 6/8/2017, Until Fri 6/8/2018, Print      diazePAM (VALIUM) 5 MG tablet Take 1 tablet (5 mg total) by mouth 2 (two) times daily as needed (muscle spasms)., Starting Thu 6/8/2017, Print      gabapentin (NEURONTIN) 300 MG capsule Take 3 capsules (900 mg total) by mouth 3 (three) times daily., Starting Thu 6/8/2017, Print      oxycodone (ROXICODONE) 15 MG Tab Take 1 tablet (15 mg total) by mouth 2 (two) times daily as needed for Pain., Starting Thu 6/8/2017, Print      oxyMORphone (OPANA ER) 7.5 mg TR12 Take 7.5 mg by mouth 2 (two) times daily., Starting Thu 6/8/2017, Print      trazodone (DESYREL) 100 MG tablet Take 1 tablet (100 mg total) by mouth every evening., Starting Thu 6/8/2017, Print         CONTINUE these medications which have NOT CHANGED    Details   ergocalciferol (VITAMIN D2) 50,000 unit Cap Take 1 capsule (50,000 Units total) by mouth every 7 days., Starting 12/14/2016, Until Discontinued, Normal      oxybutynin (DITROPAN-XL) 5 MG TR24 Take 5 mg by mouth once daily., Until Discontinued, Historical Med      polyethylene glycol (GLYCOLAX) 17 gram/dose powder Take 17 g by mouth once daily., Starting 11/10/2016, Until Discontinued, Normal      rivaroxaban (XARELTO) 20 mg Tab Take 1 tablet (20 mg total) by mouth daily with dinner or evening  meal., Starting 12/8/2016, Until Discontinued, Normal      senna-docusate 8.6-50 mg (PERICOLACE) 8.6-50 mg per tablet Take 1 tablet by mouth once daily., Starting 4/20/2017, Until Discontinued, OTC         STOP taking these medications       nicotine (NICODERM CQ) 14 mg/24 hr Comments:   Reason for Stopping:         predniSONE (DELTASONE) 20 MG tablet Comments:   Reason for Stopping:         predniSONE (DELTASONE) 20 MG tablet Comments:   Reason for Stopping:             Time spent on the discharge of patient: 38 minutes    Seema Reyes PA-C  Department of Hospital Medicine  [facility]

## 2017-06-12 NOTE — ASSESSMENT & PLAN NOTE
- Patient recently discharged from SNF after 45 day stay. Was admitted before that with increased weakness and pain and found to have UTI, MRI at that time was negative for true MS flare. Patient is known to team and he appears stronger than his last admission. Unlikely a true MS flare at this time.  -  PT/OT recommend rehab and PMR consulted, but after evaluation PMR does not feel patient appropriate for inpatient rehab currently, recommend SNF placement. [facility] denied patient given need for long term placement  - While he may improve his strength at inpatient rehab, a larger discussion regarding his long term plan was discussed and he was ultimately denies SNF/Rehab placement  - Patient discharge to Nursing Home for 24 hour care  - Follow up general neurology as needed

## 2017-07-10 ENCOUNTER — TELEPHONE (OUTPATIENT)
Dept: ADMINISTRATIVE | Facility: CLINIC | Age: 52
End: 2017-07-10

## 2017-07-13 ENCOUNTER — TELEPHONE (OUTPATIENT)
Dept: INTERNAL MEDICINE | Facility: CLINIC | Age: 52
End: 2017-07-13

## 2017-07-13 ENCOUNTER — OFFICE VISIT (OUTPATIENT)
Dept: INTERNAL MEDICINE | Facility: CLINIC | Age: 52
End: 2017-07-13
Payer: MEDICARE

## 2017-07-13 VITALS
BODY MASS INDEX: 25.2 KG/M2 | SYSTOLIC BLOOD PRESSURE: 130 MMHG | WEIGHT: 180 LBS | DIASTOLIC BLOOD PRESSURE: 90 MMHG | OXYGEN SATURATION: 99 % | HEART RATE: 60 BPM | HEIGHT: 71 IN | TEMPERATURE: 98 F

## 2017-07-13 DIAGNOSIS — G82.20 PARAPARESIS: ICD-10-CM

## 2017-07-13 DIAGNOSIS — G35 MS (MULTIPLE SCLEROSIS): Primary | ICD-10-CM

## 2017-07-13 PROCEDURE — 99211 OFF/OP EST MAY X REQ PHY/QHP: CPT | Mod: S$GLB,,, | Performed by: INTERNAL MEDICINE

## 2017-07-13 PROCEDURE — 99999 PR PBB SHADOW E&M-EST. PATIENT-LVL III: CPT | Mod: PBBFAC,,, | Performed by: INTERNAL MEDICINE

## 2017-07-13 PROCEDURE — 99499 UNLISTED E&M SERVICE: CPT | Mod: S$GLB,,, | Performed by: INTERNAL MEDICINE

## 2017-07-13 NOTE — PROGRESS NOTES
Subjective:       Patient ID: Mao Levin is a 51 y.o. male.    Chief Complaint: Multiple Sclerosis    MS patinet here for follow up of hospitalization and rehab stay.  Says he was never able to fill his pain med rx and seeks more pain med.. I declined      Review of Systems    Objective:      Physical Exam    Assessment:       1. MS (multiple sclerosis)    2. Paraparesis        Plan:   Mao was seen today for multiple sclerosis.    Diagnoses and all orders for this visit:    MS (multiple sclerosis)    Paraparesis

## 2017-07-17 ENCOUNTER — TELEPHONE (OUTPATIENT)
Dept: NEUROLOGY | Facility: CLINIC | Age: 52
End: 2017-07-17

## 2017-07-18 ENCOUNTER — LAB VISIT (OUTPATIENT)
Dept: LAB | Facility: HOSPITAL | Age: 52
End: 2017-07-18
Attending: INTERNAL MEDICINE
Payer: MEDICARE

## 2017-07-18 ENCOUNTER — OFFICE VISIT (OUTPATIENT)
Dept: INTERNAL MEDICINE | Facility: CLINIC | Age: 52
End: 2017-07-18
Payer: MEDICARE

## 2017-07-18 VITALS
DIASTOLIC BLOOD PRESSURE: 79 MMHG | WEIGHT: 180 LBS | SYSTOLIC BLOOD PRESSURE: 120 MMHG | HEIGHT: 71 IN | HEART RATE: 75 BPM | BODY MASS INDEX: 25.2 KG/M2

## 2017-07-18 DIAGNOSIS — Z12.5 SCREENING FOR PROSTATE CANCER: ICD-10-CM

## 2017-07-18 DIAGNOSIS — E78.2 HYPERLIPIDEMIA, MIXED: ICD-10-CM

## 2017-07-18 DIAGNOSIS — E55.9 VITAMIN D DEFICIENCY: ICD-10-CM

## 2017-07-18 DIAGNOSIS — F32.A DEPRESSION, UNSPECIFIED DEPRESSION TYPE: ICD-10-CM

## 2017-07-18 DIAGNOSIS — G35 MS (MULTIPLE SCLEROSIS): Chronic | ICD-10-CM

## 2017-07-18 DIAGNOSIS — G35 MS (MULTIPLE SCLEROSIS): Primary | Chronic | ICD-10-CM

## 2017-07-18 DIAGNOSIS — R73.9 HYPERGLYCEMIA: ICD-10-CM

## 2017-07-18 DIAGNOSIS — N39.0 URINARY TRACT INFECTION WITHOUT HEMATURIA, SITE UNSPECIFIED: ICD-10-CM

## 2017-07-18 DIAGNOSIS — G89.4 CHRONIC PAIN SYNDROME: ICD-10-CM

## 2017-07-18 DIAGNOSIS — I26.92 ACUTE SADDLE PULMONARY EMBOLISM WITHOUT ACUTE COR PULMONALE: Chronic | ICD-10-CM

## 2017-07-18 LAB
ALBUMIN SERPL BCP-MCNC: 4.1 G/DL
ALP SERPL-CCNC: 85 U/L
ALT SERPL W/O P-5'-P-CCNC: 16 U/L
ANION GAP SERPL CALC-SCNC: 7 MMOL/L
AST SERPL-CCNC: 16 U/L
BASOPHILS # BLD AUTO: 0.03 K/UL
BASOPHILS NFR BLD: 0.4 %
BILIRUB SERPL-MCNC: 0.4 MG/DL
BUN SERPL-MCNC: 7 MG/DL
CALCIUM SERPL-MCNC: 9.4 MG/DL
CARBAMAZEPINE SERPL-MCNC: <1.9 UG/ML
CHLORIDE SERPL-SCNC: 105 MMOL/L
CHOLEST/HDLC SERPL: 3.7 {RATIO}
CO2 SERPL-SCNC: 29 MMOL/L
COMPLEXED PSA SERPL-MCNC: 0.21 NG/ML
CREAT SERPL-MCNC: 0.8 MG/DL
DIFFERENTIAL METHOD: NORMAL
EOSINOPHIL # BLD AUTO: 0.1 K/UL
EOSINOPHIL NFR BLD: 1.3 %
ERYTHROCYTE [DISTWIDTH] IN BLOOD BY AUTOMATED COUNT: 13.6 %
EST. GFR  (AFRICAN AMERICAN): >60 ML/MIN/1.73 M^2
EST. GFR  (NON AFRICAN AMERICAN): >60 ML/MIN/1.73 M^2
GLUCOSE SERPL-MCNC: 91 MG/DL
HCT VFR BLD AUTO: 40.6 %
HDL/CHOLESTEROL RATIO: 26.9 %
HDLC SERPL-MCNC: 186 MG/DL
HDLC SERPL-MCNC: 50 MG/DL
HGB BLD-MCNC: 14.2 G/DL
LDLC SERPL CALC-MCNC: 123.6 MG/DL
LYMPHOCYTES # BLD AUTO: 2 K/UL
LYMPHOCYTES NFR BLD: 23.8 %
MCH RBC QN AUTO: 30.7 PG
MCHC RBC AUTO-ENTMCNC: 35 %
MCV RBC AUTO: 88 FL
MONOCYTES # BLD AUTO: 0.4 K/UL
MONOCYTES NFR BLD: 5.2 %
NEUTROPHILS # BLD AUTO: 5.8 K/UL
NEUTROPHILS NFR BLD: 69.2 %
NONHDLC SERPL-MCNC: 136 MG/DL
PLATELET # BLD AUTO: 203 K/UL
PMV BLD AUTO: 11.2 FL
POTASSIUM SERPL-SCNC: 4.2 MMOL/L
PROT SERPL-MCNC: 7.3 G/DL
RBC # BLD AUTO: 4.63 M/UL
SODIUM SERPL-SCNC: 141 MMOL/L
TRIGL SERPL-MCNC: 62 MG/DL
TSH SERPL DL<=0.005 MIU/L-ACNC: 0.56 UIU/ML
WBC # BLD AUTO: 8.39 K/UL

## 2017-07-18 PROCEDURE — 99499 UNLISTED E&M SERVICE: CPT | Mod: S$GLB,,, | Performed by: INTERNAL MEDICINE

## 2017-07-18 PROCEDURE — 85025 COMPLETE CBC W/AUTO DIFF WBC: CPT

## 2017-07-18 PROCEDURE — 99214 OFFICE O/P EST MOD 30 MIN: CPT | Mod: S$GLB,,, | Performed by: INTERNAL MEDICINE

## 2017-07-18 PROCEDURE — 84443 ASSAY THYROID STIM HORMONE: CPT

## 2017-07-18 PROCEDURE — 99999 PR PBB SHADOW E&M-EST. PATIENT-LVL IV: CPT | Mod: PBBFAC,,, | Performed by: INTERNAL MEDICINE

## 2017-07-18 PROCEDURE — 82306 VITAMIN D 25 HYDROXY: CPT

## 2017-07-18 PROCEDURE — 80053 COMPREHEN METABOLIC PANEL: CPT

## 2017-07-18 PROCEDURE — 83036 HEMOGLOBIN GLYCOSYLATED A1C: CPT

## 2017-07-18 PROCEDURE — 36415 COLL VENOUS BLD VENIPUNCTURE: CPT | Mod: PO

## 2017-07-18 PROCEDURE — 84153 ASSAY OF PSA TOTAL: CPT

## 2017-07-18 PROCEDURE — 80061 LIPID PANEL: CPT

## 2017-07-18 PROCEDURE — 80156 ASSAY CARBAMAZEPINE TOTAL: CPT

## 2017-07-18 RX ORDER — DIAZEPAM 5 MG/1
5 TABLET ORAL 2 TIMES DAILY PRN
Qty: 30 TABLET | Refills: 0 | Status: SHIPPED | OUTPATIENT
Start: 2017-07-18 | End: 2017-09-11

## 2017-07-18 RX ORDER — OXYCODONE HYDROCHLORIDE 15 MG/1
15 TABLET ORAL 2 TIMES DAILY PRN
Qty: 30 TABLET | Refills: 0 | Status: ON HOLD | OUTPATIENT
Start: 2017-07-18 | End: 2017-08-16 | Stop reason: HOSPADM

## 2017-07-18 RX ORDER — OXYBUTYNIN CHLORIDE 5 MG/1
5 TABLET, EXTENDED RELEASE ORAL DAILY
Qty: 30 TABLET | Refills: 3 | Status: SHIPPED | OUTPATIENT
Start: 2017-07-18 | End: 2017-07-18 | Stop reason: SDUPTHER

## 2017-07-18 RX ORDER — TRAZODONE HYDROCHLORIDE 100 MG/1
100 TABLET ORAL NIGHTLY
Qty: 30 TABLET | Refills: 3 | Status: SHIPPED | OUTPATIENT
Start: 2017-07-18 | End: 2019-03-17

## 2017-07-18 RX ORDER — IPRATROPIUM BROMIDE AND ALBUTEROL SULFATE 2.5; .5 MG/3ML; MG/3ML
SOLUTION RESPIRATORY (INHALATION)
COMMUNITY
Start: 2017-05-31 | End: 2017-08-04

## 2017-07-18 RX ORDER — TRAZODONE HYDROCHLORIDE 100 MG/1
100 TABLET ORAL NIGHTLY
Qty: 30 TABLET | Refills: 3 | Status: SHIPPED | OUTPATIENT
Start: 2017-07-18 | End: 2017-07-18 | Stop reason: SDUPTHER

## 2017-07-18 RX ORDER — OXYBUTYNIN CHLORIDE 5 MG/1
5 TABLET, EXTENDED RELEASE ORAL DAILY
Qty: 30 TABLET | Refills: 3 | Status: SHIPPED | OUTPATIENT
Start: 2017-07-18 | End: 2017-10-10 | Stop reason: ALTCHOICE

## 2017-07-18 RX ORDER — ONDANSETRON HYDROCHLORIDE 8 MG/1
TABLET, FILM COATED ORAL
COMMUNITY
Start: 2017-05-31 | End: 2017-07-18

## 2017-07-19 LAB
25(OH)D3+25(OH)D2 SERPL-MCNC: 27 NG/ML
ESTIMATED AVG GLUCOSE: 100 MG/DL
HBA1C MFR BLD HPLC: 5.1 %

## 2017-07-26 ENCOUNTER — TELEPHONE (OUTPATIENT)
Dept: INTERNAL MEDICINE | Facility: CLINIC | Age: 52
End: 2017-07-26

## 2017-07-26 NOTE — TELEPHONE ENCOUNTER
----- Message from Carla Sanderson sent at 7/25/2017  4:16 PM CDT -----  Contact: [facility]- [phone]  Patient needs order for home health  to provide community resources.

## 2017-07-27 DIAGNOSIS — Z12.11 COLON CANCER SCREENING: ICD-10-CM

## 2017-07-27 NOTE — PROGRESS NOTES
Subjective:       Patient ID: Mao Levin is a 51 y.o. male.    Chief Complaint: Establish Care    HPIPt with a 10 year history of MS - lived in [place] moved here 9/2016.  Has had worsening of MS symptoms.  He is in a wheelchair now. Had a PE 10/2016.  Was hospitalized for 2 weeks end of May and into June  After being found down at his home where he lived alone - now staying with his mother. On chronic pain meds for unclear reasons.  Review of Systems   Respiratory: Negative for shortness of breath.    Cardiovascular: Negative for chest pain.   Gastrointestinal: Negative for abdominal pain, diarrhea, nausea and vomiting.   Genitourinary: Negative for dysuria.   Neurological: Negative for seizures, syncope and headaches.       Objective:      Physical Exam   Constitutional: He is oriented to person, place, and time. He appears well-developed and well-nourished. No distress.   HENT:   Mouth/Throat: Oropharynx is clear and moist.   Eyes: EOM are normal. Pupils are equal, round, and reactive to light.   Neck: Neck supple. No JVD present. No thyromegaly present.   Cardiovascular: Normal rate, regular rhythm, normal heart sounds and intact distal pulses.  Exam reveals no gallop and no friction rub.    No murmur heard.  Pulmonary/Chest: Effort normal and breath sounds normal. He has no wheezes. He has no rales.   Abdominal: Soft. Bowel sounds are normal. He exhibits no distension and no mass. There is no tenderness. There is no rebound and no guarding.   Musculoskeletal: He exhibits no edema.   Lymphadenopathy:     He has no cervical adenopathy.   Neurological: He is alert and oriented to person, place, and time.   Skin: Skin is warm and dry.   Psychiatric: He has a normal mood and affect. His behavior is normal. Judgment and thought content normal.       Assessment:       1. MS (multiple sclerosis)    2. Depression, unspecified depression type    3. 10/25/2016 [place] PE    4. Hyperlipidemia, mixed    5. Hyperglycemia    6.  Vitamin D deficiency    7. Screening for prostate cancer    8. Urinary tract infection without hematuria, site unspecified    9. Chronic pain syndrome        Plan:   MS (multiple sclerosis)  -     Ambulatory consult to Neurology - Dr. Finnegan only  -     Carbamazepine level, total; Future; Expected date: 07/18/2017    Depression, unspecified depression type  -     CBC auto differential; Future; Expected date: 07/18/2017  -     Comprehensive metabolic panel; Future; Expected date: 07/18/2017  -     TSH; Future; Expected date: 07/18/2017  Declines antidepressants - he is not taking celexa  10/25/2016 Saddle PE  Continue on xarelto  Hyperlipidemia, mixed  -     Lipid panel; Future; Expected date: 07/18/2017    Hyperglycemia  -     Hemoglobin A1c; Future; Expected date: 07/18/2017    Vitamin D deficiency  -     Vitamin D; Future; Expected date: 07/18/2017    Screening for prostate cancer  -     PSA, Screening; Future; Expected date: 07/18/2017    Urinary tract infection without hematuria, site unspecified  -     Urinalysis; Future; Expected date: 07/18/2017  -     Urine culture; Future; Expected date: 07/18/2017    Chronic pain syndrome  -     Ambulatory consult to Pain Clinic    Other orders  -     Discontinue: trazodone (DESYREL) 100 MG tablet; Take 1 tablet (100 mg total) by mouth every evening.  Dispense: 30 tablet; Refill: 3  -     Discontinue: rivaroxaban (XARELTO) 20 mg Tab; Take 1 tablet (20 mg total) by mouth daily with dinner or evening meal.  Dispense: 60 tablet; Refill: 4  -     Discontinue: oxybutynin (DITROPAN-XL) 5 MG TR24; Take 1 tablet (5 mg total) by mouth once daily.  Dispense: 30 tablet; Refill: 3  -     diazePAM (VALIUM) 5 MG tablet; Take 1 tablet (5 mg total) by mouth 2 (two) times daily as needed (muscle spasms).  Dispense: 30 tablet; Refill: 0  -     oxycodone (ROXICODONE) 15 MG Tab; Take 1 tablet (15 mg total) by mouth 2 (two) times daily as needed for Pain.  Dispense: 30 tablet; Refill: 0  -      oxybutynin (DITROPAN-XL) 5 MG TR24; Take 1 tablet (5 mg total) by mouth once daily.  Dispense: 30 tablet; Refill: 3  -     rivaroxaban (XARELTO) 20 mg Tab; Take 1 tablet (20 mg total) by mouth daily with dinner or evening meal.  Dispense: 60 tablet; Refill: 4  -     trazodone (DESYREL) 100 MG tablet; Take 1 tablet (100 mg total) by mouth every evening.  Dispense: 30 tablet; Refill: 3    One time only fill of oxycodone and valium - needs to see pain management

## 2017-07-29 ENCOUNTER — HOSPITAL ENCOUNTER (EMERGENCY)
Facility: HOSPITAL | Age: 52
Discharge: HOME | End: 2017-07-29
Attending: FAMILY MEDICINE
Payer: MEDICARE

## 2017-07-29 VITALS
WEIGHT: 180 LBS | HEIGHT: 74 IN | DIASTOLIC BLOOD PRESSURE: 79 MMHG | SYSTOLIC BLOOD PRESSURE: 139 MMHG | BODY MASS INDEX: 23.1 KG/M2 | TEMPERATURE: 98 F | HEART RATE: 80 BPM | RESPIRATION RATE: 16 BRPM

## 2017-07-29 DIAGNOSIS — G89.4 CHRONIC PAIN DISORDER: ICD-10-CM

## 2017-07-29 DIAGNOSIS — G35 MULTIPLE SCLEROSIS: Primary | ICD-10-CM

## 2017-07-29 LAB
ALBUMIN SERPL BCP-MCNC: 3.6 G/DL
ALP SERPL-CCNC: 77 U/L
ALT SERPL W/O P-5'-P-CCNC: 9 U/L
ANION GAP SERPL CALC-SCNC: 11 MMOL/L
AST SERPL-CCNC: 13 U/L
BASOPHILS # BLD AUTO: 0.03 K/UL
BASOPHILS NFR BLD: 0.4 %
BILIRUB SERPL-MCNC: 0.4 MG/DL
BUN SERPL-MCNC: 7 MG/DL
CALCIUM SERPL-MCNC: 9 MG/DL
CHLORIDE SERPL-SCNC: 106 MMOL/L
CK SERPL-CCNC: 161 U/L
CO2 SERPL-SCNC: 23 MMOL/L
CREAT SERPL-MCNC: 0.9 MG/DL
DIFFERENTIAL METHOD: ABNORMAL
EOSINOPHIL # BLD AUTO: 0.1 K/UL
EOSINOPHIL NFR BLD: 1.1 %
ERYTHROCYTE [DISTWIDTH] IN BLOOD BY AUTOMATED COUNT: 13.1 %
EST. GFR  (AFRICAN AMERICAN): >60 ML/MIN/1.73 M^2
EST. GFR  (NON AFRICAN AMERICAN): >60 ML/MIN/1.73 M^2
GLUCOSE SERPL-MCNC: 86 MG/DL
HCT VFR BLD AUTO: 39.5 %
HGB BLD-MCNC: 13.8 G/DL
LYMPHOCYTES # BLD AUTO: 2.4 K/UL
LYMPHOCYTES NFR BLD: 28.7 %
MCH RBC QN AUTO: 30.6 PG
MCHC RBC AUTO-ENTMCNC: 34.9 G/DL
MCV RBC AUTO: 88 FL
MONOCYTES # BLD AUTO: 0.6 K/UL
MONOCYTES NFR BLD: 6.7 %
NEUTROPHILS # BLD AUTO: 5.3 K/UL
NEUTROPHILS NFR BLD: 63 %
PLATELET # BLD AUTO: 174 K/UL
PMV BLD AUTO: 10.3 FL
POTASSIUM SERPL-SCNC: 3.7 MMOL/L
PROT SERPL-MCNC: 6.9 G/DL
RBC # BLD AUTO: 4.51 M/UL
SODIUM SERPL-SCNC: 140 MMOL/L
WBC # BLD AUTO: 8.36 K/UL

## 2017-07-29 PROCEDURE — 96372 THER/PROPH/DIAG INJ SC/IM: CPT

## 2017-07-29 PROCEDURE — 99284 EMERGENCY DEPT VISIT MOD MDM: CPT | Mod: 25

## 2017-07-29 PROCEDURE — 63600175 PHARM REV CODE 636 W HCPCS: Performed by: FAMILY MEDICINE

## 2017-07-29 PROCEDURE — 82550 ASSAY OF CK (CPK): CPT

## 2017-07-29 PROCEDURE — 80053 COMPREHEN METABOLIC PANEL: CPT

## 2017-07-29 PROCEDURE — 85025 COMPLETE CBC W/AUTO DIFF WBC: CPT

## 2017-07-29 PROCEDURE — 99284 EMERGENCY DEPT VISIT MOD MDM: CPT | Mod: ,,, | Performed by: PHYSICIAN ASSISTANT

## 2017-07-29 RX ORDER — KETOROLAC TROMETHAMINE 30 MG/ML
30 INJECTION, SOLUTION INTRAMUSCULAR; INTRAVENOUS
Status: COMPLETED | OUTPATIENT
Start: 2017-07-29 | End: 2017-07-29

## 2017-07-29 RX ADMIN — KETOROLAC TROMETHAMINE 30 MG: 30 INJECTION, SOLUTION INTRAMUSCULAR at 01:07

## 2017-07-29 NOTE — ED NOTES
LOC: The patient is awake and alert; oriented x 3 and speaking appropriately.  APPEARANCE: Patient resting comfortably, patient is clean and well groomed  SKIN: warm and dry, normal skin turgor & moist mucus membranes, skin intact, no breakdown noted.  MUSCULOSKELETAL: Patient moving all extremities well, no obvious swelling or deformities noted, feels very weak, having difficulty walking and caring for himself  RESPIRATORY: Airway is open and patent, breath sounds clear throughout all lung fields; respirations are spontaneous, normal effort and rate  CARDIAC: Patient has a normal rate, no peripheral edema noted, capillary refill < 3 seconds; No complaints of chest pain   ABDOMEN: Soft and non tender to palpation, no distention noted. Bowel sounds present x 4, has been having diarrhea the past few days.

## 2017-07-29 NOTE — ED TRIAGE NOTES
Generalized body aches over the last week- hx MS , taking all his meds.   Felt too weak to do anything w/ his Physical Therapist today. Has been having diarrhea thepast few days.  Denies blood in stool.

## 2017-07-29 NOTE — DISCHARGE INSTRUCTIONS
Your symptoms and exam today are not significantly different than previous vists to our ED and clinics.    Your last two MRIs w/ similar symptoms did not indicate an MS flare.   Your blood tests are essentially normal and unchanged from your baseline.     There does not appear to be a need for admission or acute steroid therapy at this time.  You should follow up w/ Neurology and pain management for ongoing treatment of your chronic health issues.

## 2017-07-29 NOTE — ED PROVIDER NOTES
Encounter Date: 7/29/2017    SCRIBE #1 NOTE: I, Fausto Xiao, am scribing for, and in the presence of,  Dr. Aleman . I have scribed the following portions of the note - Other sections scribed: HPI, ROS, PE.       History     Chief Complaint   Patient presents with    Generalized Body Aches     History of MS.      Time patient was seen by the provider: 1:00 PM      The patient is a 51 y.o. male with hx of: MS that presents to the ED with a complaint of his MS sx worsening that began a few days ago. Pt complains of falling, blurry vision, diarrhea (not currently), and difficulty standing. He denies fevers, productive cough, and emesis. Pt states he does not know the cause for the worsening of his MS. He mentions that at baseline, mainly his left leg is what gives him trouble, but now, his right leg has worsened and is giving him difficulty as well. Pt adds that has a walker at home.         The history is provided by the patient and medical records.     Review of patient's allergies indicates:  No Known Allergies  Past Medical History:   Diagnosis Date    Multiple sclerosis      History reviewed. No pertinent surgical history.  Family History   Problem Relation Age of Onset    Arthritis Mother     No Known Problems Father      Social History   Substance Use Topics    Smoking status: Current Some Day Smoker     Packs/day: 0.50     Types: Cigarettes    Smokeless tobacco: Never Used    Alcohol use No     Review of Systems   Constitutional: Negative for fever.        + falls     HENT: Negative for sore throat.    Eyes: Positive for visual disturbance (blurry vision).   Respiratory: Negative for cough and shortness of breath.    Cardiovascular: Negative for chest pain.   Gastrointestinal: Positive for diarrhea (not currently). Negative for nausea and vomiting.   Genitourinary: Negative for dysuria.   Musculoskeletal: Negative for back pain.        + difficulty standing   Skin: Negative for rash.   Hematological: Does  not bruise/bleed easily.   Psychiatric/Behavioral: Negative for hallucinations.       Physical Exam     Initial Vitals [07/29/17 1220]   BP Pulse Resp Temp SpO2   139/79 80 16 98.1 °F (36.7 °C) --      MAP       99         Physical Exam    Nursing note and vitals reviewed.  Constitutional: No distress (NAD).   HENT:   Head: Normocephalic and atraumatic.   Mouth/Throat: Oropharynx is clear and moist. No oropharyngeal exudate.   Eyes: Conjunctivae and EOM are normal. Pupils are equal, round, and reactive to light. No scleral icterus.   Neck: Normal range of motion. Neck supple. No tracheal deviation present. No JVD present.   Cardiovascular: Normal rate, regular rhythm, normal heart sounds and intact distal pulses. Exam reveals no gallop and no friction rub.    No murmur heard.  Pulmonary/Chest: Breath sounds normal. No stridor. No respiratory distress.   Abdominal: Soft. Bowel sounds are normal.   Musculoskeletal: Normal range of motion. He exhibits no edema.   No acute circulatory compromise.    Neurological: He is alert and oriented to person, place, and time.   Pt has decreased strength in his left leg, which has been previously documented.  Pt also has decreased strength and coordination of both his arms.  I do no see any acute focal neurological changes when compared to prior neurological exams of this pt.          ED Course   Procedures  Labs Reviewed   CBC W/ AUTO DIFFERENTIAL - Abnormal; Notable for the following:        Result Value    RBC 4.51 (*)     Hemoglobin 13.8 (*)     Hematocrit 39.5 (*)     All other components within normal limits   COMPREHENSIVE METABOLIC PANEL - Abnormal; Notable for the following:     ALT 9 (*)     All other components within normal limits   CK             Medical Decision Making:   History:   Old Medical Records: I decided to obtain old medical records.  Clinical Tests:   Lab Tests: Ordered and Reviewed  ED Management:  Patient discharge instructions:  Your symptoms and exam  today are not significantly different than previous vists to our ED and clinics.    Your last two MRIs w/ similar symptoms did not indicate an MS flare.   Your blood tests are essentially normal and unchanged from your baseline.     There does not appear to be a need for admission or acute steroid therapy at this time.  You should follow up w/ Neurology and pain management for ongoing treatment of your chronic health issues.              Scribe Attestation:   Scribe #1: I performed the above scribed service and the documentation accurately describes the services I performed. I attest to the accuracy of the note.    Attending Attestation:           Physician Attestation for Scribe:  Physician Attestation Statement for Scribe #1: I, Dr. Aleman , reviewed documentation, as scribed by Fausto Xiao in my presence, and it is both accurate and complete.                 ED Course     Clinical Impression:   The primary encounter diagnosis was Multiple sclerosis. A diagnosis of Chronic pain disorder was also pertinent to this visit.    Disposition:   Disposition: Discharged  Condition: Stable                        Jim Aleman MD  07/29/17 8270

## 2017-07-29 NOTE — PROVIDER PROGRESS NOTES - EMERGENCY DEPT.
Encounter Date: 7/29/2017    ED Physician Progress Notes        Physician Note:    Reviewed data from the [place], [place] and [facility] controlled drug databases.  Regarding prescriptions.  Multiple opiate and benzodiazepine scripts.  Multiple providers

## 2017-07-31 ENCOUNTER — TELEPHONE (OUTPATIENT)
Dept: FAMILY MEDICINE | Facility: CLINIC | Age: 52
End: 2017-07-31

## 2017-07-31 NOTE — TELEPHONE ENCOUNTER
Spoke with Chase in regards to his phone call to verify if he needed to elaborate on the patient's fall. Chase states he just wanted inform Dr. Nolan that the patient fell over the weekend.

## 2017-07-31 NOTE — TELEPHONE ENCOUNTER
----- Message from Mary Oswald sent at 7/31/2017  9:39 AM CDT -----  _  1st Request  _  2nd Request  _  3rd Request        Who: Chase Francois from [facility]    Why: he is calling to report that the pt fell out of his bed on Saturday and went to the ED. Not a hard fall but was sore.     What Number to Call Back:[phone]    When to Expect a call back: (With in 24 hours)

## 2017-08-04 ENCOUNTER — OFFICE VISIT (OUTPATIENT)
Dept: INTERNAL MEDICINE | Facility: CLINIC | Age: 52
End: 2017-08-04
Payer: MEDICARE

## 2017-08-04 ENCOUNTER — OFFICE VISIT (OUTPATIENT)
Dept: PAIN MEDICINE | Facility: CLINIC | Age: 52
End: 2017-08-04
Attending: ANESTHESIOLOGY
Payer: MEDICARE

## 2017-08-04 ENCOUNTER — TELEPHONE (OUTPATIENT)
Dept: PAIN MEDICINE | Facility: CLINIC | Age: 52
End: 2017-08-04

## 2017-08-04 VITALS
DIASTOLIC BLOOD PRESSURE: 82 MMHG | OXYGEN SATURATION: 100 % | SYSTOLIC BLOOD PRESSURE: 124 MMHG | WEIGHT: 178 LBS | BODY MASS INDEX: 24.92 KG/M2 | HEIGHT: 71 IN | HEART RATE: 55 BPM

## 2017-08-04 VITALS
DIASTOLIC BLOOD PRESSURE: 82 MMHG | WEIGHT: 178.56 LBS | SYSTOLIC BLOOD PRESSURE: 128 MMHG | RESPIRATION RATE: 20 BRPM | HEIGHT: 74 IN | BODY MASS INDEX: 22.91 KG/M2 | HEART RATE: 76 BPM

## 2017-08-04 DIAGNOSIS — G35 MULTIPLE SCLEROSIS: Primary | ICD-10-CM

## 2017-08-04 DIAGNOSIS — G89.4 CHRONIC PAIN SYNDROME: Primary | ICD-10-CM

## 2017-08-04 PROCEDURE — 99499 UNLISTED E&M SERVICE: CPT | Mod: S$GLB,,, | Performed by: ANESTHESIOLOGY

## 2017-08-04 PROCEDURE — 99213 OFFICE O/P EST LOW 20 MIN: CPT | Mod: S$GLB,,, | Performed by: INTERNAL MEDICINE

## 2017-08-04 PROCEDURE — 99499 UNLISTED E&M SERVICE: CPT | Mod: S$PBB,,, | Performed by: INTERNAL MEDICINE

## 2017-08-04 PROCEDURE — 3008F BODY MASS INDEX DOCD: CPT | Mod: S$GLB,,, | Performed by: INTERNAL MEDICINE

## 2017-08-04 PROCEDURE — 99999 PR PBB SHADOW E&M-EST. PATIENT-LVL III: CPT | Mod: PBBFAC,,, | Performed by: INTERNAL MEDICINE

## 2017-08-04 RX ORDER — NAPROXEN 500 MG/1
500 TABLET ORAL 2 TIMES DAILY PRN
Qty: 28 TABLET | Refills: 0 | Status: SHIPPED | OUTPATIENT
Start: 2017-08-04 | End: 2017-08-18

## 2017-08-04 NOTE — TELEPHONE ENCOUNTER
"----- Message from Luis Eduardo Trevizo III, MD sent at 8/4/2017  3:59 PM CDT -----  Contact: Marj Smith  H/H      Please inform Marj that Mr. Cavanaugh left before I could complete my evaluation today.      ----- Message -----  From: Yanni Urbano LPN  Sent: 8/4/2017  12:52 PM  To: Luis Eduardo Trevizo III, MD        ----- Message -----  From: Candido Weber  Sent: 8/4/2017  11:12 AM  To: Joseline ESCAMILLA Staff    "Can you please send a referral to PT for Mao to have a wheelchair accessment"    "

## 2017-08-04 NOTE — PROGRESS NOTES
"Subjective:       Patient ID: Mao Levin is a 51 y.o. male.    Chief Complaint: Medication Refill    HPI     Patient is a 51 year old male that says he is here for medication refills.  He says he has seen numerous MD's at [facility] who refuse to provide him narcotic pain medications and that this was never a problem prior to coming to [place].  Patient has MS. He reports chronic problems with pain, diffusely but more around the neck, the hands and the legs BL below the knee. He describes pain as sharp, stiff, throbbing, "all of the above". Nothing besides his Oxycodone helps him. He says that no one will fill his pain medications and he saw pain management MD today and says they also refused. Current pain has been going on for months per patient; worse over past 2 weeks.    When discussing this case and reviewing his chart, patient has tried to have his narcotics filled by multiple MD's who have all refused.    Review of Systems   Constitutional: Negative for chills and fever.   Respiratory: Negative for shortness of breath.    Musculoskeletal: Positive for arthralgias, back pain, gait problem, myalgias, neck pain and neck stiffness.   Neurological: Negative for headaches.       Objective:      Physical Exam   Constitutional: He is oriented to person, place, and time. He appears well-developed and well-nourished. No distress.   HENT:   Head: Normocephalic and atraumatic.   Musculoskeletal:   +TTP cervical paraspinal muscle  +TTP to hands bilaterally  +TTP to knees bilaterally  No joint effusion  No joint swelling     Neurological: He is alert and oriented to person, place, and time.   Skin: Skin is warm and dry. He is not diaphoretic.   Nursing note and vitals reviewed.      Assessment:       1. Chronic pain syndrome        Plan:       Patient with chronic pain syndrome and MS  Discussed that I will not be able to fill his narcotic pain medication  I recommend he continue Baclofen as he is previously prescribed for muscle " tension  Regarding chronic pain, discussed we can try Naprosyn  mg BID PRN for short period of time however he is on blood thinner and should avoid chronic NSAID use  Regarding narcotic pain medication, should discuss this with pain management clinic

## 2017-08-04 NOTE — PROGRESS NOTES
Chronic Pain - Established    Referring Physician: Mendy Alarcon MD      Chief Complaint   Patient presents with    Leg Pain        SUBJECTIVE:    Mao Levin is a 52 y/o male with hx of MS (diagnosed in 2006) who presents to the clinic for the evaluation of bilateral leg pain. He is an established patient of Dr. Hawkins but new to me. The pain started approximately 12 years ago and symptoms have been worsening.The pain is located in the bilateral lower extremities starting in the knee area and radiating into the feet.  The pain is described as aching, burning, crushing, dull, numbing, sharp, stabbing, throbbing, tight band and tingling and is rated as 10/10. The pain is rated with a score of  7/10 on the BEST day and a score of 10/10 on the WORST day.  Symptoms interfere with daily activity and sleeping. The pain is exacerbated by Sitting, Standing, Laying, Bending, Walking, Night Time, Morning and Getting out of bed/chair.  The pain is mitigated by laying down, medications and physical therapy. The patient reports 3-4 hours of uninterrupted sleep per night.    I discussed with patient that I will try to treat his pain utilizing a multimodal approach with non-narocotic pain medications and physical therapy. I explained to the patient it is not my plan to treat his pain with opioid pain medication. At this point, the patient decided to leave before I could perform a ROS or physical exam.    Pain Disability Index Review:  Last 3 PDI Scores 8/4/2017 11/18/2016 10/18/2016   Pain Disability Index (PDI) 70 23 40       Pain Medications:  Oxycodone 15mg, 1 tablet twice a day  Gabapentin 300mg, 3 tablets three times daily  Valium 5mg, 1 tablet twice daily  Baclofen 20 mg QID  Xarelto 20mg, 1 tablet daily     report: Reviewed. Shows multiple prescriptions for opiates and benzos from multiple different prescribers.    Pain Procedures: None       Past Medical History:   Diagnosis Date    Multiple sclerosis       History reviewed. No pertinent surgical history.  Social History     Social History    Marital status: Single     Spouse name: N/A    Number of children: N/A    Years of education: N/A     Occupational History    Not on file.     Social History Main Topics    Smoking status: Current Some Day Smoker     Packs/day: 0.50     Types: Cigarettes    Smokeless tobacco: Never Used    Alcohol use No    Drug use: No    Sexual activity: No     Other Topics Concern    Not on file     Social History Narrative    No narrative on file     Family History   Problem Relation Age of Onset    Arthritis Mother     No Known Problems Father        Review of patient's allergies indicates:  No Known Allergies    Current Outpatient Prescriptions   Medication Sig    baclofen (LIORESAL) 20 MG tablet Take 1 tablet (20 mg total) by mouth 4 (four) times daily.    citalopram (CELEXA) 20 MG tablet Take 1 tablet (20 mg total) by mouth once daily.    dantrolene (DANTRIUM) 50 MG Cap Take 1 capsule (50 mg total) by mouth 4 (four) times daily.    diazePAM (VALIUM) 5 MG tablet Take 1 tablet (5 mg total) by mouth 2 (two) times daily as needed (muscle spasms).    ergocalciferol (VITAMIN D2) 50,000 unit Cap Take 1 capsule (50,000 Units total) by mouth every 7 days.    gabapentin (NEURONTIN) 300 MG capsule Take 3 capsules (900 mg total) by mouth 3 (three) times daily.    oxybutynin (DITROPAN-XL) 5 MG TR24 Take 1 tablet (5 mg total) by mouth once daily.    oxycodone (ROXICODONE) 15 MG Tab Take 1 tablet (15 mg total) by mouth 2 (two) times daily as needed for Pain.    polyethylene glycol (GLYCOLAX) 17 gram/dose powder Take 17 g by mouth once daily.    rivaroxaban (XARELTO) 20 mg Tab Take 1 tablet (20 mg total) by mouth daily with dinner or evening meal.    senna-docusate 8.6-50 mg (PERICOLACE) 8.6-50 mg per tablet Take 1 tablet by mouth once daily.    trazodone (DESYREL) 100 MG tablet Take 1 tablet (100 mg total) by mouth every  "evening.     No current facility-administered medications for this visit.        OBJECTIVE:    /82 (BP Location: Left arm, Patient Position: Sitting, BP Method: Manual)   Pulse 76   Resp 20   Ht 6' 2" (1.88 m)   Wt 81 kg (178 lb 9.2 oz)   BMI 22.93 kg/m²         Lacey Trevizo III  08/04/2017  "

## 2017-08-04 NOTE — LETTER
August 4, 2017      Mendy Alarcon MD  [address]  [facility] [address]           [facility]  [address]  [facility] [address]  Phone: [phone]  Fax: [phone]          Patient: Mao Levin   MR Number: [id number]   YOB: 1965   Date of Visit: 8/4/2017       Dear Dr. Mendy Alarcon:    Thank you for referring Mao Levin to me for evaluation. Attached you will find relevant portions of my assessment and plan of care.    If you have questions, please do not hesitate to call me. I look forward to following Mao Levin along with you.    Sincerely,    Luis Eduardo Trevizo III, MD    Enclosure  CC:  No Recipients    If you would like to receive this communication electronically, please contact [email or web address] or [phone] to request more information on [employer] access.    For providers and/or their staff who would like to refer a patient to [facility], please contact us through our one-stop-shop provider referral line, [facility], at [phone].    If you feel you have received this communication in error or would no longer like to receive these types of communications, please e-mail [email or web address]

## 2017-08-07 ENCOUNTER — TELEPHONE (OUTPATIENT)
Dept: NEUROLOGY | Facility: CLINIC | Age: 52
End: 2017-08-07

## 2017-08-07 ENCOUNTER — HOSPITAL ENCOUNTER (EMERGENCY)
Facility: OTHER | Age: 52
Discharge: HOME | End: 2017-08-07
Attending: EMERGENCY MEDICINE
Payer: MEDICARE

## 2017-08-07 ENCOUNTER — TELEPHONE (OUTPATIENT)
Dept: INTERNAL MEDICINE | Facility: CLINIC | Age: 52
End: 2017-08-07

## 2017-08-07 VITALS
BODY MASS INDEX: 24.92 KG/M2 | HEIGHT: 71 IN | HEART RATE: 46 BPM | OXYGEN SATURATION: 100 % | RESPIRATION RATE: 16 BRPM | WEIGHT: 178 LBS | DIASTOLIC BLOOD PRESSURE: 59 MMHG | SYSTOLIC BLOOD PRESSURE: 128 MMHG | TEMPERATURE: 98 F

## 2017-08-07 DIAGNOSIS — G89.29 OTHER CHRONIC PAIN: Primary | ICD-10-CM

## 2017-08-07 LAB
ALBUMIN SERPL BCP-MCNC: 3.9 G/DL
ALP SERPL-CCNC: 67 U/L
ALT SERPL W/O P-5'-P-CCNC: 7 U/L
ANION GAP SERPL CALC-SCNC: 12 MMOL/L
AST SERPL-CCNC: 12 U/L
BASOPHILS # BLD AUTO: 0.03 K/UL
BASOPHILS NFR BLD: 0.3 %
BILIRUB SERPL-MCNC: 0.6 MG/DL
BUN SERPL-MCNC: 13 MG/DL
CALCIUM SERPL-MCNC: 9.2 MG/DL
CHLORIDE SERPL-SCNC: 107 MMOL/L
CO2 SERPL-SCNC: 23 MMOL/L
CREAT SERPL-MCNC: 1 MG/DL
DIFFERENTIAL METHOD: NORMAL
EOSINOPHIL # BLD AUTO: 0.1 K/UL
EOSINOPHIL NFR BLD: 0.8 %
ERYTHROCYTE [DISTWIDTH] IN BLOOD BY AUTOMATED COUNT: 13.7 %
EST. GFR  (AFRICAN AMERICAN): >60 ML/MIN/1.73 M^2
EST. GFR  (NON AFRICAN AMERICAN): >60 ML/MIN/1.73 M^2
GLUCOSE SERPL-MCNC: 84 MG/DL
HCT VFR BLD AUTO: 40.4 %
HGB BLD-MCNC: 14.1 G/DL
LYMPHOCYTES # BLD AUTO: 2.5 K/UL
LYMPHOCYTES NFR BLD: 24.4 %
MCH RBC QN AUTO: 30.5 PG
MCHC RBC AUTO-ENTMCNC: 34.9 G/DL
MCV RBC AUTO: 87 FL
MONOCYTES # BLD AUTO: 0.6 K/UL
MONOCYTES NFR BLD: 5.7 %
NEUTROPHILS # BLD AUTO: 7 K/UL
NEUTROPHILS NFR BLD: 68.5 %
PLATELET # BLD AUTO: 203 K/UL
PMV BLD AUTO: 10.5 FL
POTASSIUM SERPL-SCNC: 3.9 MMOL/L
PROT SERPL-MCNC: 7.1 G/DL
RBC # BLD AUTO: 4.62 M/UL
SODIUM SERPL-SCNC: 142 MMOL/L
WBC # BLD AUTO: 10.28 K/UL

## 2017-08-07 PROCEDURE — 85025 COMPLETE CBC W/AUTO DIFF WBC: CPT

## 2017-08-07 PROCEDURE — 63600175 PHARM REV CODE 636 W HCPCS: Performed by: PHYSICIAN ASSISTANT

## 2017-08-07 PROCEDURE — 96374 THER/PROPH/DIAG INJ IV PUSH: CPT

## 2017-08-07 PROCEDURE — 80053 COMPREHEN METABOLIC PANEL: CPT

## 2017-08-07 PROCEDURE — 99283 EMERGENCY DEPT VISIT LOW MDM: CPT | Mod: 25

## 2017-08-07 RX ORDER — KETOROLAC TROMETHAMINE 30 MG/ML
10 INJECTION, SOLUTION INTRAMUSCULAR; INTRAVENOUS
Status: COMPLETED | OUTPATIENT
Start: 2017-08-07 | End: 2017-08-07

## 2017-08-07 RX ADMIN — KETOROLAC TROMETHAMINE 10 MG: 30 INJECTION, SOLUTION INTRAMUSCULAR at 04:08

## 2017-08-07 NOTE — ED TRIAGE NOTES
"Mao Levin, a 51 y.o. male presents to the ED with complaints of generalized body pain. Pt states he has MS and could not get an appt with doc until September. Pt states he ran out of his oxy a few days ago.       Chief Complaint   Patient presents with    generalized body pain     + increasing generalized body pains since yesterday. " I couldn't make an appointment with my doctor until September". Denies chest pain, SON, dizziness or weakness.     Review of patient's allergies indicates:  No Known Allergies  Past Medical History:   Diagnosis Date    Multiple sclerosis        "

## 2017-08-07 NOTE — TELEPHONE ENCOUNTER
----- Message from Looney Washington sent at 8/7/2017  8:18 AM CDT -----  Contact: self/ [phone] cell  Type: Sooner appointment than  is able to schedule    When is the first available appointment? 09/08/2017    What is the nature of the appointment?  Body pains/ complications of MS    What appointment type: Urgent    Comments: Pt states that he is having a lot of body pains and was told by the home health nurse to see his PCP as soon as possible.  He would like access to an appt today, If possible.  Please call and advise    Thank you

## 2017-08-07 NOTE — TELEPHONE ENCOUNTER
----- Message from Brigette Batista sent at 8/7/2017  9:11 AM CDT -----  Contact: Patient [phone]  Patient is calling to make an EP appt for MS follow up. Please call

## 2017-08-07 NOTE — TELEPHONE ENCOUNTER
Scheduled patient for labs at 9:15 tomorrow and with Lawanda at 9:45. Pt aware of appt dates and times.

## 2017-08-08 ENCOUNTER — DOCUMENTATION ONLY (OUTPATIENT)
Dept: INTERNAL MEDICINE | Facility: CLINIC | Age: 52
End: 2017-08-08

## 2017-08-08 ENCOUNTER — OUTPATIENT CASE MANAGEMENT (OUTPATIENT)
Dept: ADMINISTRATIVE | Facility: OTHER | Age: 52
End: 2017-08-08

## 2017-08-08 ENCOUNTER — LAB VISIT (OUTPATIENT)
Dept: LAB | Facility: HOSPITAL | Age: 52
End: 2017-08-08
Attending: PSYCHIATRY & NEUROLOGY
Payer: MEDICARE

## 2017-08-08 ENCOUNTER — OFFICE VISIT (OUTPATIENT)
Dept: NEUROLOGY | Facility: CLINIC | Age: 52
End: 2017-08-08
Payer: MEDICARE

## 2017-08-08 VITALS
HEART RATE: 50 BPM | WEIGHT: 178 LBS | SYSTOLIC BLOOD PRESSURE: 134 MMHG | DIASTOLIC BLOOD PRESSURE: 79 MMHG | HEIGHT: 71 IN | BODY MASS INDEX: 24.92 KG/M2

## 2017-08-08 DIAGNOSIS — N31.9 NEUROGENIC BLADDER: Chronic | ICD-10-CM

## 2017-08-08 DIAGNOSIS — R53.82 CHRONIC FATIGUE: ICD-10-CM

## 2017-08-08 DIAGNOSIS — E55.9 VITAMIN D DEFICIENCY DISEASE: ICD-10-CM

## 2017-08-08 DIAGNOSIS — Z78.9 IMPAIRED MOBILITY AND ADLS: Chronic | ICD-10-CM

## 2017-08-08 DIAGNOSIS — G35 MS (MULTIPLE SCLEROSIS): Primary | Chronic | ICD-10-CM

## 2017-08-08 DIAGNOSIS — G35 MS (MULTIPLE SCLEROSIS): ICD-10-CM

## 2017-08-08 DIAGNOSIS — Z74.09 IMPAIRED MOBILITY AND ADLS: Chronic | ICD-10-CM

## 2017-08-08 DIAGNOSIS — R25.2 SPASTICITY: ICD-10-CM

## 2017-08-08 LAB
BASOPHILS # BLD AUTO: 0.03 K/UL
BASOPHILS NFR BLD: 0.4 %
DIFFERENTIAL METHOD: ABNORMAL
EOSINOPHIL # BLD AUTO: 0.1 K/UL
EOSINOPHIL NFR BLD: 1.6 %
ERYTHROCYTE [DISTWIDTH] IN BLOOD BY AUTOMATED COUNT: 13.7 %
HBV CORE AB SERPL QL IA: NEGATIVE
HBV SURFACE AB SER-ACNC: NEGATIVE M[IU]/ML
HBV SURFACE AG SERPL QL IA: NEGATIVE
HCT VFR BLD AUTO: 39.6 %
HCV AB SERPL QL IA: NEGATIVE
HEPATITIS A ANTIBODY, IGG: NEGATIVE
HGB BLD-MCNC: 13.8 G/DL
LYMPHOCYTES # BLD AUTO: 2 K/UL
LYMPHOCYTES NFR BLD: 27.1 %
MCH RBC QN AUTO: 30.7 PG
MCHC RBC AUTO-ENTMCNC: 34.8 G/DL
MCV RBC AUTO: 88 FL
MONOCYTES # BLD AUTO: 0.5 K/UL
MONOCYTES NFR BLD: 6.1 %
NEUTROPHILS # BLD AUTO: 4.9 K/UL
NEUTROPHILS NFR BLD: 64.5 %
PLATELET # BLD AUTO: 209 K/UL
PMV BLD AUTO: 10.9 FL
RBC # BLD AUTO: 4.5 M/UL
WBC # BLD AUTO: 7.53 K/UL

## 2017-08-08 PROCEDURE — 3008F BODY MASS INDEX DOCD: CPT | Mod: S$GLB,,, | Performed by: PHYSICIAN ASSISTANT

## 2017-08-08 PROCEDURE — 99499 UNLISTED E&M SERVICE: CPT | Mod: S$GLB,,, | Performed by: PHYSICIAN ASSISTANT

## 2017-08-08 PROCEDURE — 99215 OFFICE O/P EST HI 40 MIN: CPT | Mod: S$GLB,,, | Performed by: PHYSICIAN ASSISTANT

## 2017-08-08 PROCEDURE — 99999 PR PBB SHADOW E&M-EST. PATIENT-LVL IV: CPT | Mod: PBBFAC,,, | Performed by: PHYSICIAN ASSISTANT

## 2017-08-08 NOTE — PROGRESS NOTES
First attempt to perform initial assessment for OPCM.  Patient stated that at this time he was in the MD office.  This RN will call patient back at a later date and time.   BABAK Lee, OPCM-RN

## 2017-08-08 NOTE — Clinical Note
I believe he is being seen by [facility] --please call them to investigate his current services--he needs Aide, OT and PT. Not sure what he's getting or how consistently he is receiving visits Thank you, and patient has agreed to allow you to assist in his care. Feel free to reach out to him if needed.

## 2017-08-08 NOTE — PROGRESS NOTES
For your information:    The following patient has been assigned to Nata Lee RN with Outpatient Complex [facility] for high risk screening.    Reason: High Risk    Please contact [facility] at [phone] with any questions.    Thank you,  Antionette Garza

## 2017-08-08 NOTE — ED PROVIDER NOTES
"Encounter Date: 8/7/2017       History     Chief Complaint   Patient presents with    generalized body pain     + increasing generalized body pains since yesterday. " I couldn't make an appointment with my doctor until September". Denies chest pain, SON, dizziness or weakness.     Patient is a 51-year-old male with history of chronic pain syndrome and multiple sclerosis who presents to the emergency department with body aches.  Patient states he has chronic pain all over his body.  Patient states he ran out of his Percocet 2 weeks ago.  Patient states he would like a new prescription for Percocet.  Patient states he would like IV pain medication.  Patient denies any new weakness or vision changes.  Patient denies any new speech problems.  Patient denies any other new neurological deficits.      The history is provided by the patient.     Review of patient's allergies indicates:  No Known Allergies  Past Medical History:   Diagnosis Date    Multiple sclerosis      History reviewed. No pertinent surgical history.  Family History   Problem Relation Age of Onset    Arthritis Mother     No Known Problems Father      Social History   Substance Use Topics    Smoking status: Current Some Day Smoker     Packs/day: 0.50     Types: Cigarettes    Smokeless tobacco: Never Used    Alcohol use No     Review of Systems   Constitutional: Negative for activity change, appetite change, chills, fatigue and fever.   HENT: Negative for congestion, ear pain, postnasal drip, rhinorrhea, sore throat and trouble swallowing.    Respiratory: Negative for cough and shortness of breath.    Cardiovascular: Negative for chest pain.   Gastrointestinal: Negative for abdominal pain, blood in stool, constipation, diarrhea, nausea and vomiting.   Genitourinary: Negative for dysuria, flank pain and hematuria.   Musculoskeletal: Positive for arthralgias and myalgias. Negative for back pain, neck pain and neck stiffness.   Skin: Negative for rash " and wound.   Neurological: Positive for weakness (baseline). Negative for dizziness, syncope, speech difficulty, light-headedness and headaches.       Physical Exam     Initial Vitals [08/07/17 1443]   BP Pulse Resp Temp SpO2   125/77 76 16 97.8 °F (36.6 °C) 99 %      MAP       93         Physical Exam    Nursing note and vitals reviewed.  Constitutional: He appears well-developed and well-nourished. He is not diaphoretic.  Non-toxic appearance. No distress.   HENT:   Head: Normocephalic.   Right Ear: External ear normal.   Left Ear: External ear normal.   Nose: Nose normal.   Mouth/Throat: Oropharynx is clear and moist. No oropharyngeal exudate.   Eyes: Conjunctivae are normal. Pupils are equal, round, and reactive to light.   Neck: Normal range of motion.   Cardiovascular: Normal rate and regular rhythm.   Pulmonary/Chest: Breath sounds normal. No respiratory distress. He has no wheezes. He has no rhonchi. He has no rales. He exhibits no tenderness.   Abdominal: Soft. Bowel sounds are normal. He exhibits no distension and no mass. There is no tenderness. There is no rebound and no guarding.   Musculoskeletal:   Right lower extremity: brace for foot drop       Lymphadenopathy:     He has no cervical adenopathy.   Neurological: He is alert and oriented to person, place, and time.   leftside baseline weakness   Skin: Skin is warm and dry.   Psychiatric: He has a normal mood and affect.         ED Course   Procedures  Labs Reviewed   COMPREHENSIVE METABOLIC PANEL - Abnormal; Notable for the following:        Result Value    ALT 7 (*)     All other components within normal limits   CBC W/ AUTO DIFFERENTIAL             Medical Decision Making:   Initial Assessment:   Urgent evaluation a 51-year-old male with history of chronic pain syndrome and multiple sclerosis who presents to the emergency department with body pain.  Patient is afebrile, nontoxic appearing, and hemodynamically stable.  Patient has no new neurological  deficits.  Questioning prescription for Percocet.  He is also requesting IV pain control.  Case is discussed with neurology Dr. Gilman.  He suggested follow-up in neuro clinic tomorrow.  Lab work reveals no significant abnormality here.  Case is discussed in depth with supervising physician who agrees with plan.  Other:   I have discussed this case with another health care provider.       <> Summary of the Discussion: Dr. Gilman and Dr. Walker                   ED Course     Clinical Impression:   The encounter diagnosis was Other chronic pain.                           Stephie Haney PA-C  08/07/17 2046

## 2017-08-08 NOTE — PROGRESS NOTES
51. Y.o. Male with PMH of MS diagnosed in 2006, chronic pain syndrome, anxiety and depression presents to [facility] clinic for an urgent care visit. Patient reports having a fall in the shower this morning prior to coming in for his neurology clinic visit. As per patient chart and ED note, patient was seen 8/7/17 in the ED for a fall, requesting pain medication refill and IV pain medication. Patient was seen in pain management clinic via referral from PCP. Pain management specialist reported that after informing the patient he would not be doing opioid pain medication and preferred a more multidisciplinary approach to pain management including PT and non-opioid medications, that the patient left the office before able to complete a ROS or Physical Exam.     After discussing the case with Staff, we informed the patient that the opioid medications he is requesting to refill require a pain contract and can not be refilled in resident teaching clinic. Additionally, the patient gave contradictory history in regards to his pain medication history and physician encounters.     E-mails were sent to the PCP in [place], Dr. Alarcon, regarding today's encounter and decision making.     This information is not to be shared with the patient and is intended for medical personnel only.    Plan discussed with attending Dr. López, further recommendations as per attending addendum. Please feel free to call with any questions or concerns.        Linda Minor MD  Resident Physician - PGY1  Pager: [phone]

## 2017-08-08 NOTE — PROGRESS NOTES
"Subjective:       Patient ID: Mao Levin is a 51 y.o. male who presents today for a routine clinic visit for MS. Last seen in clinic 3/2017.    MS HPI:  · DMT: Rituxan(2/7/2017 & 2/21/2017)  · Side effects from DMT? No  · Taking vitamin D3 as recommended? Yes -  Dose: 50,000 IU   · C/o Pain "allover"  · Home health service [facility] )--states that he believes they are stopping soon- and they "are not doing anything for me"  · Patient states that he does not feel that Baclofen, Dantrolene and Gabapentin are helpful at all.   · Trazodone is helpful HS "sometimes"  · Valium-helpful taking either two pills QD or one pill BID  · Ditropan XL- not sure if this is helpful--is not waking up in the night to urinate  · Oxycodone-is helpful  · Has seen pain management physician, but did not complete visit after physician stated his plan to treat with multimodal approach with non-narocotic pain medications and physical therapy.   · Patient states that he is living in a house with a friend, but that this friend does not provide assistance.     SOCIAL HISTORY  Social History   Substance Use Topics    Smoking status: Current Some Day Smoker     Packs/day: 0.50     Years: 23.00     Types: Cigarettes    Smokeless tobacco: Never Used    Alcohol use No     Living arrangements - the patient lives in a house with friend  Employment Disabled    MS ROS:       Objective:        1. 25 foot timed walk: patient refused to perform timed walk today    Timed 25 Foot Walk: 3/6/2017   Did patient wear an AFO? Yes   Was assistive device used? Yes   Assistive device used (felipe one): Bilateral Assistance   Bilateral device used Walker/Rollator   Time for 25 Foot Walk (seconds) 34.9       Neurologic Exam      Mental Status   Oriented to person, place, and time.   Follows 3 step commands.   Speech: speech is normal   Level of consciousness: alert     Cranial Nerves      CN II   Visual acuity: normal (20/30 OS 20/50 OD with Snellen hand held chart at " 6 ft)     CN III, IV, VI   Pupils are equal, round, and reactive to light.  Extraocular motions are normal.      CN V   Facial sensation intact.      CN VII   Facial expression full, symmetric.      CN VIII   Hearing: intact (finger rub)     CN IX, X   Palate: symmetric     CN XI   CN XI normal.      CN XII   Tongue deviation: none     Motor Exam   Muscle bulk: normal  Overall muscle tone: increased  Right arm tone: increased  Left arm tone: increased  Right leg tone: spastic  Left leg tone: spastic     Strength   Right deltoid: 5/5  Left deltoid: 5/5  Right triceps: 5/5  Left triceps: 4/5  Right wrist extension: 4/5  Right interossei: 3/5  Right iliopsoas: 2/5  Left iliopsoas: 1/5  Right hamstrin/5  Left hamstring: 3/5  Right anterior tibial: 3/5     R UE AROM WNL  L UE AROM limited at shoulder to approx. 90 degrees.   Patient L hand/wrist held in flexor pattern-he is able to partially open hand. He primarily uses L hand as stabilizer. I can fully extend/open hand/wrist      Sensory Exam   Right arm vibration: decreased from fingers  Left arm vibration: decreased from fingers  Right leg vibration: decreased from ankle  Left leg vibration: decreased from ankle     Gait, Coordination, and Reflexes      Gait  Gait: spastic (narrow base, unsteady; L AFO in place). While patient refused to perform timed walk, he did ambulate in clinic room and demonstrated unsafe use of walker-vc's required     Coordination   Finger to nose coordination: abnormal (more difficult L UE than R UE)  Tandem walking coordination: abnormal (unable)     Tremor   Resting tremor: absent  Action tremor: absent     Reflexes   Right brachioradialis: 2+  Left brachioradialis: 3+  Right biceps: 2+  Left biceps: 3+  Right triceps: 2+  Left triceps: 3+  Right patellar: 3+  Left patellar: 3+       Unable to heel/toe talk  Impaired RSM UE and LE's  L preponderance of reflexes       Imaging:     Results for orders placed during the hospital encounter  of 03/13/17   MRI Brain W WO Contrast    Narrative Technique: MRI evaluation of the brain performed according to the demyelinating protocol using 8 cc of Gadavist IV contrast.    Comparison: MRI 12/16/2016    Findings:    Within the cerebral white matter, there are numerous patchy and focal punctate areas of T2 and FLAIR signal hyperintensity, largely situated in the periventricular zones along the margins of the corpus callosum and lateral ventricles radiating outward. While this pattern is nonspecific, it is quite typical for the clinically suspected diagnosis of multiple sclerosis with a moderate plaque burden. There is mild white matter volume loss. Following contrast administration, there is no evidence of abnormal enhancement to suggest active demyelinating plaques.    No mass, hemorrhage, infarction, subdural collection, or hydrocephalus is seen on this cranial MR examination.  No diffusion restriction is seen to suggest acute infarct.    Impression Findings in the cerebral white matter which are typical for multiple sclerosis.  Compared to the prior examination of 12/16/2016, the overall number and signal intensity of the white matter lesions is stable.  No new enhancing plaques are seen to suggest active disease.      Electronically signed by: VALENTINO BALL MD  Date:     03/17/17  Time:    00:55      Results for orders placed during the hospital encounter of 03/13/17   MRI Cervical Spine W WO Cont    Narrative Technique: Routine cervical spine MRI performed with and without the use of 8 cc of Gadavist IV contrast.    Comparison: MRI 12/04/2016.    Findings:    Cervical spine alignment demonstrates 1-2 mm of retrolisthesis of C4 on C5 and C5 on C6, unchanged when compared to the previous exam.  Vertebral body heights are well-maintained without evidence for fracture.  There is mild suspected marrow edema at the right C3-C4 facet joint.  Remaining visualized osseous structures demonstrate intact marrow  "signal intensity without findings to suggest an infiltrative process.    Again identified are several scattered areas of signal abnormalities throughout the visualized cervical spinal cord and brainstem, unchanged when compared to the previous exam.  Postcontrast images demonstrate no abnormal enhancement.    Multilevel degenerative changes are again identified, not significantly changed when compared to the previous exam and most pronounced at C4-C5 and C5-C6.    Prevertebral soft tissues are unremarkable.  Vertebral artery flow voids are present.  Paraspinal musculature demonstrate normal bulk and signal intensity.    Impression Persistent multifocal areas of cord signal abnormality, unchanged when compared to the MRI dated 12/04/2016 and most likely representing sequela of demyelinating disease given patient's history of multiple sclerosis.  No enhancing lesions to suggest active demyelination.      Electronically signed by: VALENTINO BALL MD  Date:     03/17/17  Time:    03:51          Labs:   Labs today: Rituxan safety labs    Lab Results   Component Value Date    VVCRKCFY54VU 27 (L) 07/18/2017    ZBIFAAFY22PK 18 (L) 09/25/2016     Lab Results   Component Value Date    JCVINDEX 3.70 (A) 09/22/2016    JCVANTIBODY Positive (A) 09/22/2016     No results found for: DR4DXBRD, ABSOLUTECD3, EZ6JCYVI, ABSOLUTECD8, HP8WMXNX, ABSOLUTECD4, LABCD48    Diagnosis/Assessment/Plan:    1. Multiple Sclerosis  · Assessment: I do not appreciate a significant change in his physical status; however, again patient refused to perform timed walk today. He was very focused on his pain medications, and belligerent when discussing importance of collaboration with pain management in this regard.   · Imaging: Stable MRI's in March 2017, will plan for annual MRI in March 2017 as patient preferred not to do 6 month interval MRI -"what's the point"  · Disease Modifying Therapies: Patient is due for next Rituxan dosing, will get safety labs " today and submit for insurance approval     2. MS Symptom Assessment / Management  · Bladder Dysfunction: Advised that he may trial stopping Ditropan XL if he does not feel it is beneficial for his urgency to fully evaluate effectiveness.  · Spasticity: I advised that if he feels his baclofen and dantrolene are not helpful he may discontinue. I suspect once discontinued he will have worsening spasms.  · Gait Disturbance: He does require continued HH PT, I am concerned about his safety with walker and his reports of frequent falls  · Hand Dysfunction: He does require continued HH OT to maximize his functional independence as he is essentially living alone   · Pain:  Discussed that we will not prescribe narcotic pain meds and that he needs to follow up with pain management to be managed appropriately with pain contract        3.  : I have asked and he has agreed to speak with our  and allow her to contact the HH agency in regards to current services that he is receiving.     Over 50% of this 40 minute visit was spent in direct face to face counseling of the patient regarding his current symptoms and management of same, importance of appropriate pain management supervision.  Follow up in 4 months with Dr. Finnegan  Patient agreed to POC today.    Attending, Dr. Finnegan, was available during today's encounter. Any change to plan along with cosign to appear in the EMR.     Lawanda Boyce PA-C  [facility]        MS (multiple sclerosis)    Neurogenic bladder    Vitamin D deficiency disease    Impaired mobility and ADLs    Spasticity    Chronic fatigue

## 2017-08-09 ENCOUNTER — OUTPATIENT CASE MANAGEMENT (OUTPATIENT)
Dept: ADMINISTRATIVE | Facility: OTHER | Age: 52
End: 2017-08-09

## 2017-08-09 NOTE — PROGRESS NOTES
"Patient was seen and medical record was reviewed as detailed in the resident's note. Patient displaying "doctor shopping" behavior to obtain opioids from chronic pain syndrome having been recently seen by Pain specialists and the ED within a few days prior to this visit. He was seen by Neurology prior to this visit and was referred to resident clinic for pain management. He declined non-opioid options. Given that patient is taking valium, concurrent opioid therapy is contraindicated. Moreover, explained the importance of provider continuing in prescribing and monitoring therapy with controlled substances. Patient advised to follow up with his PCP.  "

## 2017-08-11 ENCOUNTER — TELEPHONE (OUTPATIENT)
Dept: NEUROLOGY | Facility: CLINIC | Age: 52
End: 2017-08-11

## 2017-08-11 RX ORDER — HEPARIN 100 UNIT/ML
500 SYRINGE INTRAVENOUS
Status: CANCELLED | OUTPATIENT
Start: 2017-08-11

## 2017-08-11 RX ORDER — FAMOTIDINE 10 MG/ML
20 INJECTION INTRAVENOUS
Status: CANCELLED | OUTPATIENT
Start: 2017-08-11

## 2017-08-11 RX ORDER — ACETAMINOPHEN 325 MG/1
650 TABLET ORAL
Status: CANCELLED | OUTPATIENT
Start: 2017-08-11

## 2017-08-11 RX ORDER — SODIUM CHLORIDE 0.9 % (FLUSH) 0.9 %
10 SYRINGE (ML) INJECTION
Status: CANCELLED | OUTPATIENT
Start: 2017-08-11

## 2017-08-11 NOTE — TELEPHONE ENCOUNTER
----- Message from Brigette Batista sent at 8/11/2017 12:23 PM CDT -----  Contact: Patient [phone]  Patient is calling to speak with Ms. Webber about seeing a Pain Management doctor. Please call

## 2017-08-14 ENCOUNTER — TELEPHONE (OUTPATIENT)
Dept: NEUROLOGY | Facility: CLINIC | Age: 52
End: 2017-08-14

## 2017-08-14 ENCOUNTER — HOSPITAL ENCOUNTER (OUTPATIENT)
Facility: HOSPITAL | Age: 52
Discharge: HOME | End: 2017-08-16
Attending: EMERGENCY MEDICINE | Admitting: INTERNAL MEDICINE
Payer: MEDICARE

## 2017-08-14 DIAGNOSIS — R52 CHRONIC PAIN OF MULTIPLE SITES: Primary | Chronic | ICD-10-CM

## 2017-08-14 DIAGNOSIS — G89.29 CHRONIC PAIN OF MULTIPLE SITES: Primary | Chronic | ICD-10-CM

## 2017-08-14 DIAGNOSIS — N39.0 URINARY TRACT INFECTION WITHOUT HEMATURIA, SITE UNSPECIFIED: ICD-10-CM

## 2017-08-14 DIAGNOSIS — N39.0 URINARY TRACT INFECTION: ICD-10-CM

## 2017-08-14 LAB
ALBUMIN SERPL BCP-MCNC: 3.7 G/DL
ALP SERPL-CCNC: 70 U/L
ALT SERPL W/O P-5'-P-CCNC: 8 U/L
ANION GAP SERPL CALC-SCNC: 9 MMOL/L
AST SERPL-CCNC: 11 U/L
BACTERIA #/AREA URNS AUTO: ABNORMAL /HPF
BASOPHILS # BLD AUTO: 0.03 K/UL
BASOPHILS NFR BLD: 0.4 %
BILIRUB SERPL-MCNC: 0.5 MG/DL
BILIRUB UR QL STRIP: NEGATIVE
BUN SERPL-MCNC: 8 MG/DL
CALCIUM SERPL-MCNC: 9 MG/DL
CHLORIDE SERPL-SCNC: 107 MMOL/L
CK SERPL-CCNC: 67 U/L
CLARITY UR REFRACT.AUTO: ABNORMAL
CO2 SERPL-SCNC: 25 MMOL/L
COLOR UR AUTO: YELLOW
CREAT SERPL-MCNC: 0.8 MG/DL
CRP SERPL-MCNC: 1.4 MG/L
DIFFERENTIAL METHOD: NORMAL
EOSINOPHIL # BLD AUTO: 0.1 K/UL
EOSINOPHIL NFR BLD: 1 %
ERYTHROCYTE [DISTWIDTH] IN BLOOD BY AUTOMATED COUNT: 13.6 %
EST. GFR  (AFRICAN AMERICAN): >60 ML/MIN/1.73 M^2
EST. GFR  (NON AFRICAN AMERICAN): >60 ML/MIN/1.73 M^2
GLUCOSE SERPL-MCNC: 85 MG/DL
GLUCOSE UR QL STRIP: NEGATIVE
HCT VFR BLD AUTO: 40.8 %
HGB BLD-MCNC: 14.4 G/DL
HGB UR QL STRIP: ABNORMAL
KETONES UR QL STRIP: NEGATIVE
LEUKOCYTE ESTERASE UR QL STRIP: ABNORMAL
LYMPHOCYTES # BLD AUTO: 1.8 K/UL
LYMPHOCYTES NFR BLD: 22.2 %
MAGNESIUM SERPL-MCNC: 1.7 MG/DL
MCH RBC QN AUTO: 30.6 PG
MCHC RBC AUTO-ENTMCNC: 35.3 G/DL
MCV RBC AUTO: 87 FL
MICROSCOPIC COMMENT: ABNORMAL
MONOCYTES # BLD AUTO: 0.5 K/UL
MONOCYTES NFR BLD: 6.6 %
NEUTROPHILS # BLD AUTO: 5.6 K/UL
NEUTROPHILS NFR BLD: 69.7 %
NITRITE UR QL STRIP: POSITIVE
PH UR STRIP: 6 [PH] (ref 5–8)
PHOSPHATE SERPL-MCNC: 2.2 MG/DL
PLATELET # BLD AUTO: 200 K/UL
PMV BLD AUTO: 11.1 FL
POTASSIUM SERPL-SCNC: 4 MMOL/L
PROT SERPL-MCNC: 6.8 G/DL
PROT UR QL STRIP: NEGATIVE
RBC # BLD AUTO: 4.7 M/UL
RBC #/AREA URNS AUTO: 20 /HPF (ref 0–4)
SODIUM SERPL-SCNC: 141 MMOL/L
SP GR UR STRIP: 1.01 (ref 1–1.03)
SQUAMOUS #/AREA URNS AUTO: 0 /HPF
URN SPEC COLLECT METH UR: ABNORMAL
UROBILINOGEN UR STRIP-ACNC: NEGATIVE EU/DL
WBC # BLD AUTO: 7.97 K/UL
WBC #/AREA URNS AUTO: >100 /HPF (ref 0–5)

## 2017-08-14 PROCEDURE — 83735 ASSAY OF MAGNESIUM: CPT

## 2017-08-14 PROCEDURE — 99285 EMERGENCY DEPT VISIT HI MDM: CPT | Mod: ,,, | Performed by: EMERGENCY MEDICINE

## 2017-08-14 PROCEDURE — 99223 1ST HOSP IP/OBS HIGH 75: CPT | Mod: GC,,, | Performed by: PSYCHIATRY & NEUROLOGY

## 2017-08-14 PROCEDURE — 81001 URINALYSIS AUTO W/SCOPE: CPT

## 2017-08-14 PROCEDURE — 87086 URINE CULTURE/COLONY COUNT: CPT

## 2017-08-14 PROCEDURE — 86140 C-REACTIVE PROTEIN: CPT

## 2017-08-14 PROCEDURE — 87186 SC STD MICRODIL/AGAR DIL: CPT

## 2017-08-14 PROCEDURE — 94761 N-INVAS EAR/PLS OXIMETRY MLT: CPT

## 2017-08-14 PROCEDURE — 99220 PR INITIAL OBSERVATION CARE,LEVL III: CPT | Mod: ,,, | Performed by: PHYSICIAN ASSISTANT

## 2017-08-14 PROCEDURE — 96376 TX/PRO/DX INJ SAME DRUG ADON: CPT

## 2017-08-14 PROCEDURE — 80053 COMPREHEN METABOLIC PANEL: CPT

## 2017-08-14 PROCEDURE — 87077 CULTURE AEROBIC IDENTIFY: CPT

## 2017-08-14 PROCEDURE — 87088 URINE BACTERIA CULTURE: CPT

## 2017-08-14 PROCEDURE — 99285 EMERGENCY DEPT VISIT HI MDM: CPT | Mod: 25

## 2017-08-14 PROCEDURE — 96365 THER/PROPH/DIAG IV INF INIT: CPT

## 2017-08-14 PROCEDURE — G0378 HOSPITAL OBSERVATION PER HR: HCPCS

## 2017-08-14 PROCEDURE — 85025 COMPLETE CBC W/AUTO DIFF WBC: CPT

## 2017-08-14 PROCEDURE — 63600175 PHARM REV CODE 636 W HCPCS: Performed by: STUDENT IN AN ORGANIZED HEALTH CARE EDUCATION/TRAINING PROGRAM

## 2017-08-14 PROCEDURE — 82550 ASSAY OF CK (CPK): CPT

## 2017-08-14 PROCEDURE — 96375 TX/PRO/DX INJ NEW DRUG ADDON: CPT

## 2017-08-14 PROCEDURE — 84100 ASSAY OF PHOSPHORUS: CPT

## 2017-08-14 PROCEDURE — 96366 THER/PROPH/DIAG IV INF ADDON: CPT

## 2017-08-14 PROCEDURE — 63600175 PHARM REV CODE 636 W HCPCS: Performed by: EMERGENCY MEDICINE

## 2017-08-14 PROCEDURE — 25000003 PHARM REV CODE 250: Performed by: PHYSICIAN ASSISTANT

## 2017-08-14 RX ORDER — TRAZODONE HYDROCHLORIDE 50 MG/1
100 TABLET ORAL NIGHTLY
Status: DISCONTINUED | OUTPATIENT
Start: 2017-08-14 | End: 2017-08-16 | Stop reason: HOSPADM

## 2017-08-14 RX ORDER — RAMELTEON 8 MG/1
8 TABLET ORAL NIGHTLY PRN
Status: DISCONTINUED | OUTPATIENT
Start: 2017-08-14 | End: 2017-08-16 | Stop reason: HOSPADM

## 2017-08-14 RX ORDER — DANTROLENE SODIUM 50 MG/1
50 CAPSULE ORAL 4 TIMES DAILY
Status: DISCONTINUED | OUTPATIENT
Start: 2017-08-15 | End: 2017-08-16 | Stop reason: HOSPADM

## 2017-08-14 RX ORDER — AMOXICILLIN 250 MG
1 CAPSULE ORAL 2 TIMES DAILY
Status: DISCONTINUED | OUTPATIENT
Start: 2017-08-14 | End: 2017-08-16 | Stop reason: HOSPADM

## 2017-08-14 RX ORDER — GLUCAGON 1 MG
1 KIT INJECTION
Status: DISCONTINUED | OUTPATIENT
Start: 2017-08-14 | End: 2017-08-16 | Stop reason: HOSPADM

## 2017-08-14 RX ORDER — ACETAMINOPHEN 325 MG/1
650 TABLET ORAL EVERY 4 HOURS PRN
Status: DISCONTINUED | OUTPATIENT
Start: 2017-08-14 | End: 2017-08-16 | Stop reason: HOSPADM

## 2017-08-14 RX ORDER — BACLOFEN 10 MG/1
20 TABLET ORAL 4 TIMES DAILY
Status: DISCONTINUED | OUTPATIENT
Start: 2017-08-15 | End: 2017-08-15

## 2017-08-14 RX ORDER — MORPHINE SULFATE 2 MG/ML
8 INJECTION, SOLUTION INTRAMUSCULAR; INTRAVENOUS
Status: COMPLETED | OUTPATIENT
Start: 2017-08-14 | End: 2017-08-14

## 2017-08-14 RX ORDER — IBUPROFEN 200 MG
24 TABLET ORAL
Status: DISCONTINUED | OUTPATIENT
Start: 2017-08-14 | End: 2017-08-16 | Stop reason: HOSPADM

## 2017-08-14 RX ORDER — IBUPROFEN 200 MG
16 TABLET ORAL
Status: DISCONTINUED | OUTPATIENT
Start: 2017-08-14 | End: 2017-08-16 | Stop reason: HOSPADM

## 2017-08-14 RX ORDER — ONDANSETRON 2 MG/ML
4 INJECTION INTRAMUSCULAR; INTRAVENOUS EVERY 12 HOURS PRN
Status: DISCONTINUED | OUTPATIENT
Start: 2017-08-14 | End: 2017-08-16 | Stop reason: HOSPADM

## 2017-08-14 RX ORDER — BISACODYL 10 MG
10 SUPPOSITORY, RECTAL RECTAL DAILY PRN
Status: DISCONTINUED | OUTPATIENT
Start: 2017-08-14 | End: 2017-08-16 | Stop reason: HOSPADM

## 2017-08-14 RX ORDER — PANTOPRAZOLE SODIUM 40 MG/1
40 TABLET, DELAYED RELEASE ORAL DAILY
Status: DISCONTINUED | OUTPATIENT
Start: 2017-08-15 | End: 2017-08-16 | Stop reason: HOSPADM

## 2017-08-14 RX ORDER — CIPROFLOXACIN 500 MG/1
500 TABLET ORAL EVERY 12 HOURS
Status: DISCONTINUED | OUTPATIENT
Start: 2017-08-15 | End: 2017-08-16

## 2017-08-14 RX ORDER — KETOROLAC TROMETHAMINE 30 MG/ML
15 INJECTION, SOLUTION INTRAMUSCULAR; INTRAVENOUS EVERY 6 HOURS PRN
Status: DISCONTINUED | OUTPATIENT
Start: 2017-08-14 | End: 2017-08-16 | Stop reason: HOSPADM

## 2017-08-14 RX ORDER — CITALOPRAM 10 MG/1
20 TABLET ORAL DAILY
Status: DISCONTINUED | OUTPATIENT
Start: 2017-08-15 | End: 2017-08-16 | Stop reason: HOSPADM

## 2017-08-14 RX ORDER — ONDANSETRON 8 MG/1
8 TABLET, ORALLY DISINTEGRATING ORAL EVERY 8 HOURS PRN
Status: DISCONTINUED | OUTPATIENT
Start: 2017-08-14 | End: 2017-08-16 | Stop reason: HOSPADM

## 2017-08-14 RX ORDER — OXYBUTYNIN CHLORIDE 5 MG/1
5 TABLET, EXTENDED RELEASE ORAL DAILY
Status: DISCONTINUED | OUTPATIENT
Start: 2017-08-15 | End: 2017-08-16 | Stop reason: HOSPADM

## 2017-08-14 RX ORDER — NAPROXEN 500 MG/1
500 TABLET ORAL 2 TIMES DAILY PRN
Status: DISCONTINUED | OUTPATIENT
Start: 2017-08-14 | End: 2017-08-16 | Stop reason: HOSPADM

## 2017-08-14 RX ORDER — MORPHINE SULFATE 2 MG/ML
1 INJECTION, SOLUTION INTRAMUSCULAR; INTRAVENOUS
Status: COMPLETED | OUTPATIENT
Start: 2017-08-14 | End: 2017-08-14

## 2017-08-14 RX ORDER — DIAZEPAM 5 MG/1
5 TABLET ORAL 2 TIMES DAILY PRN
Status: DISCONTINUED | OUTPATIENT
Start: 2017-08-14 | End: 2017-08-15

## 2017-08-14 RX ORDER — GABAPENTIN 300 MG/1
900 CAPSULE ORAL 3 TIMES DAILY
Status: DISCONTINUED | OUTPATIENT
Start: 2017-08-14 | End: 2017-08-16 | Stop reason: HOSPADM

## 2017-08-14 RX ADMIN — RIVAROXABAN 20 MG: 20 TABLET, FILM COATED ORAL at 10:08

## 2017-08-14 RX ADMIN — GABAPENTIN 900 MG: 300 CAPSULE ORAL at 10:08

## 2017-08-14 RX ADMIN — MORPHINE SULFATE 8 MG: 2 INJECTION, SOLUTION INTRAMUSCULAR; INTRAVENOUS at 01:08

## 2017-08-14 RX ADMIN — CEFTRIAXONE 1 G: 1 INJECTION, SOLUTION INTRAVENOUS at 04:08

## 2017-08-14 RX ADMIN — DIAZEPAM 5 MG: 5 TABLET ORAL at 10:08

## 2017-08-14 RX ADMIN — STANDARDIZED SENNA CONCENTRATE AND DOCUSATE SODIUM 1 TABLET: 8.6; 5 TABLET, FILM COATED ORAL at 10:08

## 2017-08-14 RX ADMIN — DANTROLENE SODIUM 50 MG: 50 CAPSULE ORAL at 10:08

## 2017-08-14 RX ADMIN — BACLOFEN 20 MG: 10 TABLET ORAL at 10:08

## 2017-08-14 RX ADMIN — TRAZODONE HYDROCHLORIDE 100 MG: 50 TABLET ORAL at 10:08

## 2017-08-14 RX ADMIN — MORPHINE SULFATE 1 MG: 2 INJECTION, SOLUTION INTRAMUSCULAR; INTRAVENOUS at 12:08

## 2017-08-14 NOTE — ED PROVIDER NOTES
"Encounter Date: 8/14/2017    SCRIBE #1 NOTE: I, Gladis Mora, am scribing for, and in the presence of,  Dr. Manning. I have scribed the following portions of the note - the Resident attestation.       History     Chief Complaint   Patient presents with    Muscle Pain     Pt presented to the ED c/o muscle weakness and pain. Pt arrived to the ED via EMS. Pt has a hx of MS.      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 Patient describes pain as 10/10 pain for 1 week now. The pain is Sharp, stabbing and throbbing. Pain interferes with sleep. Pt is falling more frequently, and feels as though he is losing use of his left leg. Pain is exacerbated by "just about everything:" sitting, moving, laying down, bending, sleeping, etc. Pt states that wishes he could cut off his legs at the knees to relieve the pain.     Pt states that he was ambulating with walker assistance, however, this past week since he has been out of pain medication he has not been able to walk. Pt was BIBA today. States his left leg is weaker than usual. Pt seems frustrated that he is unable to acquire a steady prescription of pain medication, since moving here from [place] 10 months ago.             Review of patient's allergies indicates:  No Known Allergies  Past Medical History:   Diagnosis Date    Anxiety     Deep vein thrombosis     Depression     Multiple sclerosis     Multiple sclerosis     Pulmonary embolism      History reviewed. No pertinent surgical history.  Family History   Problem Relation Age of Onset    Arthritis Mother     No Known Problems Father     Cancer Maternal Grandfather      Social History   Substance Use Topics    Smoking status: Current Some Day Smoker     Packs/day: 0.50     Years: 23.00     Types: Cigarettes    Smokeless tobacco: Never Used    Alcohol use No     Review of Systems "   Constitutional: Negative for fever.   Eyes: Negative for visual disturbance.   Respiratory: Negative for shortness of breath.    Cardiovascular: Negative for chest pain.   Gastrointestinal: Negative for abdominal distention and abdominal pain.   Genitourinary: Positive for dysuria and urgency. Negative for difficulty urinating.        Pt frequently unable to reach bathroom in time to defecate or urinate. Pt wears adult diapers at home.    Musculoskeletal: Positive for back pain, myalgias and neck pain.   Skin: Negative for color change.   Neurological: Negative for dizziness and facial asymmetry.   Psychiatric/Behavioral: Negative for confusion.       Physical Exam     Initial Vitals [08/14/17 1055]   BP Pulse Resp Temp SpO2   (!) 147/98 92 18 98.1 °F (36.7 °C) 99 %      MAP       114.33         Physical Exam    Vitals reviewed.  Constitutional: He appears well-developed and well-nourished. He is not diaphoretic.   HENT:   Head: Atraumatic.   Eyes: Pupils are equal, round, and reactive to light.   Neck: No thyromegaly present. No tracheal deviation present. No JVD present.   Cardiovascular: Normal rate and regular rhythm.   No murmur heard.  Pulmonary/Chest: Breath sounds normal. He has no wheezes.   Abdominal: Soft. He exhibits no distension.   Neurological: He is alert and oriented to person, place, and time. No cranial nerve deficit.   Pt clenches jaw constantly. Able to open.     Motor:    RUE: 4/5.  LUE: 4/5.  RLE: 4/5.  LLE: 3/5.  +for spacticity in all limbs, worse on left side.    Tone:    LLE decreased, L foot drop.      Sensation:  Grossly intact throughout.    Reflexes:  Knee: brisk +3  Biceps: brisk +3  Ach reflex: not appreciated    Cranial nerves:  -Visual acuity grossly intact  -Visual fields to confrontation normal  -Pupils were equal and reactive to light  -No evidence of ptosis  -External ocular movements were full with no nystagmus  -Facial muscles were symmetrical  -Facial sensation intact, but  reduced on L side  -Hearing is unimpaired  -Palate movements normal  -Swallowing unimpaired  -Shoulder shrug was intact with good strength   -Tongue movements normal  -Speech was normal   Skin: Skin is warm and dry.   Psychiatric: He has a normal mood and affect. Thought content normal.         ED Course   Procedures  Labs Reviewed   URINALYSIS, REFLEX TO URINE CULTURE - Abnormal; Notable for the following:        Result Value    Appearance, UA Hazy (*)     Occult Blood UA 1+ (*)     Nitrite, UA Positive (*)     Leukocytes, UA 3+ (*)     All other components within normal limits    Narrative:     Preferred Collection Type->Urine, Clean Catch   COMPREHENSIVE METABOLIC PANEL - Abnormal; Notable for the following:     ALT 8 (*)     All other components within normal limits   PHOSPHORUS - Abnormal; Notable for the following:     Phosphorus 2.2 (*)     All other components within normal limits   URINALYSIS MICROSCOPIC - Abnormal; Notable for the following:     RBC, UA 20 (*)     WBC, UA >100 (*)     Bacteria, UA Many (*)     All other components within normal limits    Narrative:     Preferred Collection Type->Urine, Clean Catch   CBC W/ AUTO DIFFERENTIAL   MAGNESIUM   CK   C-REACTIVE PROTEIN             Medical Decision Making:   History:   Old Medical Records: I decided to obtain old medical records.  Initial Assessment:   Multiple sclerosis flair   Differential Diagnosis:   Opioid addiction   UTI  Spinal stenosis   Spinal neoplasm   Clinical Tests:   Lab Tests: Reviewed and Ordered  ED Management:  Pain management  General neurology consult  UTI treatment (Rocephin 1  Restart previous anti-spasctic drugs  Monitor improvement  ?outpt MRI (last MRI in 3/2017)      Patient signed out to me by previous team, patient has UTI, s/p gram rocephin, given co-morbities, will recommend treatment in observation with possible upgrade if no improvement    Davin Espinoza MD HO - II  5:31 PM 8/14/2017    Other:   I have discussed  this case with another health care provider.  Rocephin 1 g given in ED, please give 10 day Keflex 500 mg BID (or other) if patient is d/c today.   Please make sure patient is restarted and has access to his anti-spastic medications.             Scribe Attestation:   Scribe #1: I performed the above scribed service and the documentation accurately describes the services I performed. I attest to the accuracy of the note.    Attending Attestation:   Physician Attestation Statement for Resident:  As the supervising MD   Physician Attestation Statement: I have personally seen and examined this patient.   I agree with the above history. -: Patient with leg pain. The patient believes he is having an MS flare. He is followed for is MS by neurology here. Will control pain and consult neurology. Dispo pending their recommendations.    As the supervising MD I agree with the above PE.    As the supervising MD I agree with the above treatment, course, plan, and disposition.          Physician Attestation for Scribe:  Physician Attestation Statement for Scribe #1: I, Dr. Manning, reviewed documentation, as scribed by Gladis Mora in my presence, and it is both accurate and complete.                 ED Course     Clinical Impression:   The primary encounter diagnosis was Chronic pain of multiple sites. Diagnoses of Urinary tract infection and Urinary tract infection without hematuria, site unspecified were also pertinent to this visit.                           Davin Espinoza MD  Resident  08/14/17 1693       Timothy Manning MD  08/24/17 4881

## 2017-08-14 NOTE — ED NOTES
Patient identifiers verified and correct for Mr Levin  C/C: Weakness gen pain  APPEARANCE: awake and alert in NAD.  SKIN: warm, dry and intact. No breakdown or bruising.  MUSCULOSKELETAL: Patient moving all extremities spontaneously, no obvious swelling or deformities noted. Ambulates with walker  RESPIRATORY: Denies shortness of breath.Respirations unlabored. No cough  CARDIAC: Denies CP, 2+ distal pulses; no peripheral edema  ABDOMEN: S/ND/NT, Denies nausea  : voids spontaneously, wears brief  Neurologic: AAO x 4; follows commands equal strength in all extremities; denies numbness/tingling. Denies dizziness

## 2017-08-14 NOTE — TELEPHONE ENCOUNTER
----- Message from Brigette Batista sent at 8/14/2017  9:01 AM CDT -----  Contact: Patient [phone]  Patient states he can not function and he wanted to know what is could do, patient was told that if his condition is that bad to please go to the Emergency Room, patient stated he will do so, please call to follow up, patient was informed that Lawanda and Cyndi are working on getting him in to see a pain management doctor, Please call

## 2017-08-14 NOTE — TELEPHONE ENCOUNTER
"Phoned pt as he had left a voicemail last week.  Pt stated, "i'm not doing well" and advised that he was waiting for an ambulance to show up.  I agreed to phone him in a few hours to check on him.  Called [facility] in [place] [phone] and left voicemail for team member to call about pt's HH services.   "

## 2017-08-14 NOTE — ASSESSMENT & PLAN NOTE
Follows with Dr. Finnegan and Lawanda Boyce in MS center on Rituximab for DMT (last given 2/21/17) due for next outpatient infusion pending safety labs and insurance approval. Presented to ED 8/14 complaining of generalized pain and worsening muscle cramps after recently discontinuing muscle relaxants. Labs in ED remarkable for UTI and Neuro exam overall stable from clinic visit 8/8/17 besides worsening of baseline left sided weakness.    >>MS pseudo flair in setting of UTI    -Abx for UTI per primary team  No indication for IV Solumedrol in setting of infection and no repeat imaging at this time-last MRI Brain/C spine 3/2017 with stable plaque burden  -Resume home antispasmodics: baclofen 20 mg QID, dantrolene 50 mg QID and valium 5 mg BID prn  -outpatient pain management referral to Dr. Aceves for pain contract for narcotics  Has received morphine 9 mg in ED today  -PT/OT, was receiving [facility] PT/OT services and ambulates with walker at baseline

## 2017-08-14 NOTE — HPI
"52 yo AA male with hx of MS known to Dr. Finnegan and Lawanda Boyce in MS clinic on Rituximab for DMT last received 2/7/2017 & 2/21/2017 due for another infusion now pending safely labs and insurance approval presents to ED 8/14 with pain from muscle spasms "all over". Doesn't feel like antispasmodics (baclofen and dantrolene) are helpful. Has run out of diazepam & baclofen. Also found to have cystitis on UA in ED. Last MRI in March 2017 >> stable disease burden. Poor social situation, mobilizes with walker and mother brings him meals. Has seen pain management previously and was unhappy with multimodal approach, so MS team is trying to coordinate referral to new pain management specialist, Dr. Aceves for pain contract for narcotics. He reports he presented today mainly due to generalized pain after running out of pain medication and feels he has not had adequate pain control since moving from [place]   "

## 2017-08-14 NOTE — ED TRIAGE NOTES
Patient in ED today for c/o gen pain and weakness from MS. States since he moved to [place], home pain meds have been changed and decreased. States he has been out of pain meds x 1 week, has appointment 18th for rx States he fell today due to losing balance and falling back.

## 2017-08-14 NOTE — TELEPHONE ENCOUNTER
----- Message from Lawanda Boyce PA-C sent at 8/11/2017 12:37 PM CDT -----  I believe he is being seen by [facility]--please call them to investigate his current services--he needs Aide, OT and PT. Not sure what he's getting or how consistently he is receiving visits  Thank you, and patient has agreed to allow you to assist in his care. Feel free to reach out to him if needed.

## 2017-08-14 NOTE — ED NOTES
Patient out of Oxycodone, Valium, Celexa, and Ditropan States he needs an IV and IV pain meds, DIlaudid or Morphine

## 2017-08-14 NOTE — SUBJECTIVE & OBJECTIVE
Past Medical History:   Diagnosis Date    Anxiety     Deep vein thrombosis     Depression     Multiple sclerosis     Pulmonary embolism      History reviewed. No pertinent surgical history.    Review of patient's allergies indicates:  No Known Allergies    Current Neurological Medications: rituximab, gabapentin 300mg TID, baclofen 20mg QID    No current facility-administered medications on file prior to encounter.      Current Outpatient Prescriptions on File Prior to Encounter   Medication Sig    baclofen (LIORESAL) 20 MG tablet Take 1 tablet (20 mg total) by mouth 4 (four) times daily.    gabapentin (NEURONTIN) 300 MG capsule Take 3 capsules (900 mg total) by mouth 3 (three) times daily.    rivaroxaban (XARELTO) 20 mg Tab Take 1 tablet (20 mg total) by mouth daily with dinner or evening meal.    citalopram (CELEXA) 20 MG tablet Take 1 tablet (20 mg total) by mouth once daily.    dantrolene (DANTRIUM) 50 MG Cap Take 1 capsule (50 mg total) by mouth 4 (four) times daily.    diazePAM (VALIUM) 5 MG tablet Take 1 tablet (5 mg total) by mouth 2 (two) times daily as needed (muscle spasms).    ergocalciferol (VITAMIN D2) 50,000 unit Cap Take 1 capsule (50,000 Units total) by mouth every 7 days. (Patient taking differently: Take 50,000 Units by mouth every 7 days. on monday)    naproxen (NAPROSYN) 500 MG tablet Take 1 tablet (500 mg total) by mouth 2 (two) times daily as needed (pain).    oxybutynin (DITROPAN-XL) 5 MG TR24 Take 1 tablet (5 mg total) by mouth once daily.    oxycodone (ROXICODONE) 15 MG Tab Take 1 tablet (15 mg total) by mouth 2 (two) times daily as needed for Pain.    polyethylene glycol (GLYCOLAX) 17 gram/dose powder Take 17 g by mouth once daily.    senna-docusate 8.6-50 mg (PERICOLACE) 8.6-50 mg per tablet Take 1 tablet by mouth once daily.    trazodone (DESYREL) 100 MG tablet Take 1 tablet (100 mg total) by mouth every evening.     Family History     Problem Relation (Age of Onset)     Arthritis Mother    Cancer Maternal Grandfather    No Known Problems Father        Social History Main Topics    Smoking status: Current Some Day Smoker     Packs/day: 0.50     Years: 23.00     Types: Cigarettes    Smokeless tobacco: Never Used    Alcohol use No    Drug use: No    Sexual activity: Yes     Partners: Female     Review of Systems   Constitutional: Positive for activity change and fatigue. Negative for fever.   Eyes: Positive for photophobia and visual disturbance. Negative for pain.   Respiratory: Negative for shortness of breath.    Cardiovascular: Negative for chest pain.   Endocrine: Positive for cold intolerance, heat intolerance and polyuria.   Genitourinary: Positive for dysuria. Negative for hematuria.   Musculoskeletal: Positive for gait problem and myalgias.   Neurological: Positive for speech difficulty, weakness and numbness. Negative for dizziness, facial asymmetry, light-headedness and headaches.     Objective:     Vital Signs (Most Recent):  Temp: 98.1 °F (36.7 °C) (08/14/17 1055)  Pulse: 92 (08/14/17 1055)  Resp: 18 (08/14/17 1055)  BP: (!) 147/98 (08/14/17 1055)  SpO2: 99 % (08/14/17 1055) Vital Signs (24h Range):  Temp:  [98.1 °F (36.7 °C)] 98.1 °F (36.7 °C)  Pulse:  [92] 92  Resp:  [18] 18  SpO2:  [99 %] 99 %  BP: (147)/(98) 147/98     Weight: 80.7 kg (178 lb)  Body mass index is 24.83 kg/m².    Physical Exam   Constitutional: He is oriented to person, place, and time. No distress.   HENT:   Head: Normocephalic and atraumatic.   Eyes: Conjunctivae and EOM are normal. Pupils are equal, round, and reactive to light.   Neck: Normal range of motion. Neck supple.   Pulmonary/Chest: Effort normal.   Neurological: He is oriented to person, place, and time. Finger-nose-finger test: difficult to assess on left 2/2 weakness.   Skin: He is not diaphoretic.   Psychiatric: His speech is slurred.     NEUROLOGICAL EXAMINATION:     MENTAL STATUS   Oriented to person, place, and time.   Follows 2  step commands.   Attention: normal. Concentration: normal.   Speech: slurred   Level of consciousness: alert  Knowledge: good.   Normal comprehension.     CRANIAL NERVES     CN III, IV, VI   Pupils are equal, round, and reactive to light.  Extraocular motions are normal.   Right pupil: Shape: regular. Reactivity: brisk.   Left pupil: Shape: regular. Reactivity: brisk.   Nystagmus: none   Diplopia: bilateral  Ophthalmoparesis: none    CN V   Right facial sensation deficit: more sensitive to light touch right V1-V3.    CN VII   Facial expression full, symmetric.     CN VIII   CN VIII normal.     CN XI   CN XI normal.     CN XII   CN XII normal.     MOTOR EXAM   Muscle bulk: normal  Overall muscle tone: increased    Strength   Right deltoid: 5/5  Left deltoid: 4/5  Right biceps: 5/5  Left biceps: 4/5  Right triceps: 5/5  Left triceps: 4/5  Right iliopsoas: 4/5  Left iliopsoas: 1/5  Right quadriceps: 4/5  Left quadriceps: 1/5  Right anterior tibial: 5/5  Left anterior tibial: 1/5  Right peroneal: 5/5  Left peroneal: 1/5       Flexed fingers on left hand able to relax himself     REFLEXES     Reflexes   Right ankle clonus: absent  Left ankle clonus: absent       Brisk reflexes throughout     SENSORY EXAM   Right leg vibration: decreased from knee  Left leg vibration: decreased from knee       Left sided paresthesia     GAIT AND COORDINATION      Coordination   Finger-nose-finger test: difficult to assess on left 2/2 weakness.    Tremor   Resting tremor: absent       Gait not assessed  Ambulates with walker at baseline     Significant Labs:   CBC:   Recent Labs  Lab 08/14/17  1300   WBC 7.97   HGB 14.4   HCT 40.8        CMP:   Recent Labs  Lab 08/14/17  1300   GLU 85      K 4.0      CO2 25   BUN 8   CREATININE 0.8   CALCIUM 9.0   MG 1.7   PROT 6.8   ALBUMIN 3.7   BILITOT 0.5   ALKPHOS 70   AST 11   ALT 8*   ANIONGAP 9   EGFRNONAA >60.0     Inflammatory Markers:   Recent Labs  Lab 08/14/17  1300   CRP  1.4     Urine Studies:   Recent Labs  Lab 08/14/17  1215   COLORU Yellow   APPEARANCEUA Hazy*   PHUR 6.0   SPECGRAV 1.015   PROTEINUA Negative   GLUCUA Negative   KETONESU Negative   BILIRUBINUA Negative   OCCULTUA 1+*   NITRITE Positive*   UROBILINOGEN Negative   LEUKOCYTESUR 3+*   RBCUA 20*   WBCUA >100*   BACTERIA Many*   SQUAMEPITHEL 0     All pertinent lab results from the past 24 hours have been reviewed.    Significant Imaging:     MRI Brain and Cervical spine W WO contrast (3/16/17):  Findings in the cerebral white matter which are typical for multiple sclerosis.  Compared to the prior examination of 12/16/2016, the overall number and signal intensity of the white matter lesions is stable.  No new enhancing plaques are seen to suggest active disease.    Persistent multifocal areas of cord signal abnormality, unchanged when compared to the MRI dated 12/04/2016 and most likely representing sequela of demyelinating disease given patient's history of multiple sclerosis.  No enhancing lesions to suggest active demyelination.

## 2017-08-14 NOTE — CONSULTS
"[facility]  Neurology  Consult Note    Patient Name: Mao Levin  MRN: [id number]  Admission Date: 8/14/2017  Hospital Length of Stay: 0 days  Code Status: Prior   Attending Provider: Timothy Manning MD   Consulting Provider: Osiris Saenz PA-C  Primary Care Physician: Mendy Alarcon MD  Principal Problem:<principal problem not specified>    Inpatient consult to neurology  Consult performed by: OSIRIS SAENZ  Consult ordered by: MEMO VAN         Subjective:     Chief Complaint:  MS Flair?     HPI:   50 yo AA male with hx of MS known to Dr. Finnegan and Lawanda Boyce in MS clinic on Rituximab for DMT last received 2/7/2017 & 2/21/2017 due for another infusion now pending safely labs and insurance approval presents to ED 8/14 with pain from muscle spasms "all over". Doesn't feel like antispasmodics (baclofen and dantrolene) are helpful. Has run out of diazepam & baclofen. Also found to have cystitis on UA in ED. Last MRI in March 2017 >> stable disease burden. Poor social situation, mobilizes with walker and mother brings him meals. Has seen pain management previously and was unhappy with multimodal approach, so MS team is trying to coordinate referral to new pain management specialist, Dr. Aceves for pain contract for narcotics. He reports he presented today mainly due to generalized pain after running out of pain medication and feels he has not had adequate pain control since moving from [facility].      Past Medical History:   Diagnosis Date    Anxiety     Deep vein thrombosis     Depression     Multiple sclerosis     Pulmonary embolism      History reviewed. No pertinent surgical history.    Review of patient's allergies indicates:  No Known Allergies    Current Neurological Medications: rituximab, gabapentin 300mg TID, baclofen 20mg QID    No current facility-administered medications on file prior to encounter.      Current Outpatient Prescriptions on File Prior to Encounter "   Medication Sig    baclofen (LIORESAL) 20 MG tablet Take 1 tablet (20 mg total) by mouth 4 (four) times daily.    gabapentin (NEURONTIN) 300 MG capsule Take 3 capsules (900 mg total) by mouth 3 (three) times daily.    rivaroxaban (XARELTO) 20 mg Tab Take 1 tablet (20 mg total) by mouth daily with dinner or evening meal.    citalopram (CELEXA) 20 MG tablet Take 1 tablet (20 mg total) by mouth once daily.    dantrolene (DANTRIUM) 50 MG Cap Take 1 capsule (50 mg total) by mouth 4 (four) times daily.    diazePAM (VALIUM) 5 MG tablet Take 1 tablet (5 mg total) by mouth 2 (two) times daily as needed (muscle spasms).    ergocalciferol (VITAMIN D2) 50,000 unit Cap Take 1 capsule (50,000 Units total) by mouth every 7 days. (Patient taking differently: Take 50,000 Units by mouth every 7 days. on monday)    naproxen (NAPROSYN) 500 MG tablet Take 1 tablet (500 mg total) by mouth 2 (two) times daily as needed (pain).    oxybutynin (DITROPAN-XL) 5 MG TR24 Take 1 tablet (5 mg total) by mouth once daily.    oxycodone (ROXICODONE) 15 MG Tab Take 1 tablet (15 mg total) by mouth 2 (two) times daily as needed for Pain.    polyethylene glycol (GLYCOLAX) 17 gram/dose powder Take 17 g by mouth once daily.    senna-docusate 8.6-50 mg (PERICOLACE) 8.6-50 mg per tablet Take 1 tablet by mouth once daily.    trazodone (DESYREL) 100 MG tablet Take 1 tablet (100 mg total) by mouth every evening.     Family History     Problem Relation (Age of Onset)    Arthritis Mother    Cancer Maternal Grandfather    No Known Problems Father        Social History Main Topics    Smoking status: Current Some Day Smoker     Packs/day: 0.50     Years: 23.00     Types: Cigarettes    Smokeless tobacco: Never Used    Alcohol use No    Drug use: No    Sexual activity: Yes     Partners: Female     Review of Systems   Constitutional: Positive for activity change and fatigue. Negative for fever.   Eyes: Positive for photophobia and visual disturbance.  Negative for pain.   Respiratory: Negative for shortness of breath.    Cardiovascular: Negative for chest pain.   Endocrine: Positive for cold intolerance, heat intolerance and polyuria.   Genitourinary: Positive for dysuria. Negative for hematuria.   Musculoskeletal: Positive for gait problem and myalgias.   Neurological: Positive for speech difficulty, weakness and numbness. Negative for dizziness, facial asymmetry, light-headedness and headaches.     Objective:     Vital Signs (Most Recent):  Temp: 98.1 °F (36.7 °C) (08/14/17 1055)  Pulse: 92 (08/14/17 1055)  Resp: 18 (08/14/17 1055)  BP: (!) 147/98 (08/14/17 1055)  SpO2: 99 % (08/14/17 1055) Vital Signs (24h Range):  Temp:  [98.1 °F (36.7 °C)] 98.1 °F (36.7 °C)  Pulse:  [92] 92  Resp:  [18] 18  SpO2:  [99 %] 99 %  BP: (147)/(98) 147/98     Weight: 80.7 kg (178 lb)  Body mass index is 24.83 kg/m².    Physical Exam   Constitutional: He is oriented to person, place, and time. No distress.   HENT:   Head: Normocephalic and atraumatic.   Eyes: Conjunctivae and EOM are normal. Pupils are equal, round, and reactive to light.   Neck: Normal range of motion. Neck supple.   Pulmonary/Chest: Effort normal.   Neurological: He is oriented to person, place, and time. Finger-nose-finger test: difficult to assess on left 2/2 weakness.   Skin: He is not diaphoretic.   Psychiatric: His speech is slurred.     NEUROLOGICAL EXAMINATION:     MENTAL STATUS   Oriented to person, place, and time.   Follows 2 step commands.   Attention: normal. Concentration: normal.   Speech: slurred   Level of consciousness: alert  Knowledge: good.   Normal comprehension.     CRANIAL NERVES     CN III, IV, VI   Pupils are equal, round, and reactive to light.  Extraocular motions are normal.   Right pupil: Shape: regular. Reactivity: brisk.   Left pupil: Shape: regular. Reactivity: brisk.   Nystagmus: none   Diplopia: bilateral  Ophthalmoparesis: none    CN V   Right facial sensation deficit: more  sensitive to light touch right V1-V3.    CN VII   Facial expression full, symmetric.     CN VIII   CN VIII normal.     CN XI   CN XI normal.     CN XII   CN XII normal.     MOTOR EXAM   Muscle bulk: normal  Overall muscle tone: increased    Strength   Right deltoid: 5/5  Left deltoid: 4/5  Right biceps: 5/5  Left biceps: 4/5  Right triceps: 5/5  Left triceps: 4/5  Right iliopsoas: 4/5  Left iliopsoas: 1/5  Right quadriceps: 4/5  Left quadriceps: 1/5  Right anterior tibial: 5/5  Left anterior tibial: 1/5  Right peroneal: 5/5  Left peroneal: 1/5       Flexed fingers on left hand able to relax himself     REFLEXES     Reflexes   Right ankle clonus: absent  Left ankle clonus: absent       Brisk reflexes throughout     SENSORY EXAM   Right leg vibration: decreased from knee  Left leg vibration: decreased from knee       Left sided paresthesia     GAIT AND COORDINATION      Coordination   Finger-nose-finger test: difficult to assess on left 2/2 weakness.    Tremor   Resting tremor: absent       Gait not assessed  Ambulates with walker at baseline     Significant Labs:   CBC:   Recent Labs  Lab 08/14/17  1300   WBC 7.97   HGB 14.4   HCT 40.8        CMP:   Recent Labs  Lab 08/14/17  1300   GLU 85      K 4.0      CO2 25   BUN 8   CREATININE 0.8   CALCIUM 9.0   MG 1.7   PROT 6.8   ALBUMIN 3.7   BILITOT 0.5   ALKPHOS 70   AST 11   ALT 8*   ANIONGAP 9   EGFRNONAA >60.0     Inflammatory Markers:   Recent Labs  Lab 08/14/17  1300   CRP 1.4     Urine Studies:   Recent Labs  Lab 08/14/17  1215   COLORU Yellow   APPEARANCEUA Hazy*   PHUR 6.0   SPECGRAV 1.015   PROTEINUA Negative   GLUCUA Negative   KETONESU Negative   BILIRUBINUA Negative   OCCULTUA 1+*   NITRITE Positive*   UROBILINOGEN Negative   LEUKOCYTESUR 3+*   RBCUA 20*   WBCUA >100*   BACTERIA Many*   SQUAMEPITHEL 0     All pertinent lab results from the past 24 hours have been reviewed.    Significant Imaging:     MRI Brain and Cervical spine W WO  contrast (3/16/17):  Findings in the cerebral white matter which are typical for multiple sclerosis.  Compared to the prior examination of 12/16/2016, the overall number and signal intensity of the white matter lesions is stable.  No new enhancing plaques are seen to suggest active disease.    Persistent multifocal areas of cord signal abnormality, unchanged when compared to the MRI dated 12/04/2016 and most likely representing sequela of demyelinating disease given patient's history of multiple sclerosis.  No enhancing lesions to suggest active demyelination.    Assessment and Plan:     MS (multiple sclerosis)    Follows with Dr. Finnegan and Lawanda Boyce in MS center on Rituximab for DMT (last given 2/21/17) due for next outpatient infusion pending safety labs and insurance approval. Presented to ED 8/14 complaining of generalized pain and worsening muscle cramps after recently discontinuing muscle relaxants. Labs in ED remarkable for UTI and Neuro exam overall stable from clinic visit 8/8/17 besides worsening of baseline left sided weakness.    >>MS pseudo flair in setting of UTI with increased muscle spasms after recently stopping dantrolene, valium and baclofen    -Abx for UTI per primary team  No indication for IV Solumedrol in setting of infection and no repeat imaging at this time-last MRI Brain/C spine 3/2017 with stable plaque burden  -Resume home antispasmodics: baclofen 20 mg QID, dantrolene 50 mg QID and valium 5 mg BID prn  -outpatient pain management referral to Dr. Aceves for pain contract for narcotics  Has received morphine 9 mg in ED today  -PT/OT, was receiving [facility] PT/OT services and ambulates with walker at baseline     VTE Risk Mitigation     None        Dr. Reyes attestation to follow  Thank you for your consult. I will follow-up with patient. Please contact us if you have any additional questions.    Osiris Saenz PA-C  General Neurology Consult  Neuro Consult Spectralink # [phone]

## 2017-08-14 NOTE — TELEPHONE ENCOUNTER
Received call back from Isabel at [facility] in [place].  Pt remains in their care and he's receiving physical and occupational therapy, nursing services, aide and has had SW visit.  He's approaching 60 days so they were considering closing his case.  I advocated for continued services to maintain functioning and prevent further decline and Isabel said she would discuss this with the therapists.  Will send update to ANABEL Boyce PA-C.

## 2017-08-15 ENCOUNTER — OUTPATIENT CASE MANAGEMENT (OUTPATIENT)
Dept: ADMINISTRATIVE | Facility: OTHER | Age: 52
End: 2017-08-15

## 2017-08-15 LAB
ANION GAP SERPL CALC-SCNC: 10 MMOL/L
BASOPHILS # BLD AUTO: 0.05 K/UL
BASOPHILS NFR BLD: 0.7 %
BUN SERPL-MCNC: 8 MG/DL
CALCIUM SERPL-MCNC: 8.9 MG/DL
CD20 CELLS NFR SPEC: NORMAL %
CHLORIDE SERPL-SCNC: 108 MMOL/L
CO2 SERPL-SCNC: 24 MMOL/L
CREAT SERPL-MCNC: 0.9 MG/DL
DIFFERENTIAL METHOD: ABNORMAL
EOSINOPHIL # BLD AUTO: 0.1 K/UL
EOSINOPHIL NFR BLD: 1.5 %
ERYTHROCYTE [DISTWIDTH] IN BLOOD BY AUTOMATED COUNT: 13.8 %
EST. GFR  (AFRICAN AMERICAN): >60 ML/MIN/1.73 M^2
EST. GFR  (NON AFRICAN AMERICAN): >60 ML/MIN/1.73 M^2
GLUCOSE SERPL-MCNC: 93 MG/DL
HCT VFR BLD AUTO: 39.8 %
HGB BLD-MCNC: 14 G/DL
LYMPHOCYTES # BLD AUTO: 2.6 K/UL
LYMPHOCYTES NFR BLD: 34.3 %
MCH RBC QN AUTO: 30.2 PG
MCHC RBC AUTO-ENTMCNC: 35.2 G/DL
MCV RBC AUTO: 86 FL
MONOCYTES # BLD AUTO: 0.5 K/UL
MONOCYTES NFR BLD: 6.3 %
NEUTROPHILS # BLD AUTO: 4.3 K/UL
NEUTROPHILS NFR BLD: 57.1 %
PLATELET # BLD AUTO: 194 K/UL
PMV BLD AUTO: 11 FL
POTASSIUM SERPL-SCNC: 4.3 MMOL/L
RBC # BLD AUTO: 4.63 M/UL
SODIUM SERPL-SCNC: 142 MMOL/L
WBC # BLD AUTO: 7.5 K/UL

## 2017-08-15 PROCEDURE — 25000003 PHARM REV CODE 250: Performed by: PHYSICIAN ASSISTANT

## 2017-08-15 PROCEDURE — 36415 COLL VENOUS BLD VENIPUNCTURE: CPT

## 2017-08-15 PROCEDURE — G0378 HOSPITAL OBSERVATION PER HR: HCPCS

## 2017-08-15 PROCEDURE — 94761 N-INVAS EAR/PLS OXIMETRY MLT: CPT

## 2017-08-15 PROCEDURE — 99226 PR SUBSEQUENT OBSERVATION CARE,LEVEL III: CPT | Mod: ,,, | Performed by: PHYSICIAN ASSISTANT

## 2017-08-15 PROCEDURE — 85025 COMPLETE CBC W/AUTO DIFF WBC: CPT

## 2017-08-15 PROCEDURE — 63600175 PHARM REV CODE 636 W HCPCS: Performed by: PHYSICIAN ASSISTANT

## 2017-08-15 PROCEDURE — 80048 BASIC METABOLIC PNL TOTAL CA: CPT

## 2017-08-15 RX ORDER — HYDROCODONE BITARTRATE AND ACETAMINOPHEN 5; 325 MG/1; MG/1
1 TABLET ORAL EVERY 8 HOURS PRN
Status: DISCONTINUED | OUTPATIENT
Start: 2017-08-15 | End: 2017-08-16 | Stop reason: HOSPADM

## 2017-08-15 RX ORDER — DIAZEPAM 5 MG/1
5 TABLET ORAL EVERY 6 HOURS PRN
Status: DISCONTINUED | OUTPATIENT
Start: 2017-08-15 | End: 2017-08-16 | Stop reason: HOSPADM

## 2017-08-15 RX ORDER — BACLOFEN 10 MG/1
20 TABLET ORAL 3 TIMES DAILY
Status: DISCONTINUED | OUTPATIENT
Start: 2017-08-15 | End: 2017-08-16 | Stop reason: HOSPADM

## 2017-08-15 RX ADMIN — DIAZEPAM 5 MG: 5 TABLET ORAL at 04:08

## 2017-08-15 RX ADMIN — GABAPENTIN 900 MG: 300 CAPSULE ORAL at 05:08

## 2017-08-15 RX ADMIN — CEFTRIAXONE 1 G: 1 INJECTION, SOLUTION INTRAVENOUS at 05:08

## 2017-08-15 RX ADMIN — DANTROLENE SODIUM 50 MG: 50 CAPSULE ORAL at 12:08

## 2017-08-15 RX ADMIN — BACLOFEN 20 MG: 10 TABLET ORAL at 05:08

## 2017-08-15 RX ADMIN — DANTROLENE SODIUM 50 MG: 50 CAPSULE ORAL at 05:08

## 2017-08-15 RX ADMIN — KETOROLAC TROMETHAMINE 15 MG: 30 INJECTION, SOLUTION INTRAMUSCULAR at 10:08

## 2017-08-15 RX ADMIN — OXYBUTYNIN CHLORIDE 5 MG: 5 TABLET, EXTENDED RELEASE ORAL at 08:08

## 2017-08-15 RX ADMIN — BACLOFEN 20 MG: 10 TABLET ORAL at 09:08

## 2017-08-15 RX ADMIN — HYDROCODONE BITARTRATE AND ACETAMINOPHEN 1 TABLET: 5; 325 TABLET ORAL at 03:08

## 2017-08-15 RX ADMIN — CIPROFLOXACIN HYDROCHLORIDE 500 MG: 500 TABLET, FILM COATED ORAL at 09:08

## 2017-08-15 RX ADMIN — PANTOPRAZOLE SODIUM 40 MG: 40 TABLET, DELAYED RELEASE ORAL at 08:08

## 2017-08-15 RX ADMIN — DANTROLENE SODIUM 50 MG: 50 CAPSULE ORAL at 08:08

## 2017-08-15 RX ADMIN — RIVAROXABAN 20 MG: 20 TABLET, FILM COATED ORAL at 05:08

## 2017-08-15 RX ADMIN — STANDARDIZED SENNA CONCENTRATE AND DOCUSATE SODIUM 1 TABLET: 8.6; 5 TABLET, FILM COATED ORAL at 08:08

## 2017-08-15 RX ADMIN — TRAZODONE HYDROCHLORIDE 100 MG: 50 TABLET ORAL at 09:08

## 2017-08-15 RX ADMIN — CIPROFLOXACIN HYDROCHLORIDE 500 MG: 500 TABLET, FILM COATED ORAL at 08:08

## 2017-08-15 RX ADMIN — CITALOPRAM HYDROBROMIDE 20 MG: 10 TABLET ORAL at 08:08

## 2017-08-15 RX ADMIN — GABAPENTIN 900 MG: 300 CAPSULE ORAL at 09:08

## 2017-08-15 RX ADMIN — BACLOFEN 20 MG: 10 TABLET ORAL at 12:08

## 2017-08-15 NOTE — ASSESSMENT & PLAN NOTE
- given rocephin in ED x1  - switched to cipro BID x 7 days based on sensitivities of last 2 UCx from 02/2017 and 03/2017

## 2017-08-15 NOTE — PLAN OF CARE
08/15/17 1518   Discharge Assessment   Assessment Type Discharge Planning Assessment   Confirmed/corrected address and phone number on facesheet? Yes   Assessment information obtained from? Patient   Expected Length of Stay (days) 2   Communicated expected length of stay with patient/caregiver yes   Prior to hospitilization cognitive status: Alert/Oriented   Prior to hospitalization functional status: Assistive Equipment   Current cognitive status: Alert/Oriented   Current Functional Status: Assistive Equipment   Arrived From home health   Lives With alone   Able to Return to Prior Arrangements yes   Is patient able to care for self after discharge? Yes   Patient's perception of discharge disposition home health   Readmission Within The Last 30 Days no previous admission in last 30 days   Patient currently being followed by outpatient case management? Yes   If yes, name of outpatient case management following: [facility] outpatient case management   Equipment Currently Used at Home bedside commode;bath bench;walker, rolling;wheelchair;rollator   Do you have any problems affording any of your prescribed medications? No   Transportation Available van, wheelchair accessible   On Dialysis? No   Discharge Plan A Home Health   Discharge Plan B New Nursing Home placement - intermediate care facility   Patient/Family In Agreement With Plan yes   Pt has had [facility]  for services. He would like to continue at discharge.

## 2017-08-15 NOTE — ASSESSMENT & PLAN NOTE
- given rocephin in ED x1  - switch to cipro BID x 7 days based on sensitivities of last 2 UCx from 02/2017 and 03/2017

## 2017-08-15 NOTE — HPI
"Mao Levin is a 52 yo AA male with hx of MS known to Dr. Finnegan and Lawanda Boyce in MS clinic on Rituximab for DMT last received 2/7/2017 & 2/21/2017 due for another infusion now pending safely labs and insurance approval presents for evaluation of muscle spasms and pain "all over". Doesn't feel like antispasmodics (baclofen and dantrolene) are helpful. Has run out of diazepam & baclofen and his oxycodone. Has been bouncing between MDs for chronic pain management unsuccessfully. Last MRI in March 2017 with no acute findings. He reports he presented today mainly due to generalized pain after running out of pain medication and feels he has not had adequate pain control since moving from [place] No other complaints today.     No acute neuro findings on exam, UTI+. Neurology consulted in ED.   "

## 2017-08-15 NOTE — SUBJECTIVE & OBJECTIVE
"Interval History: no acute events overnight.  Pt reports no relief after restarting dantrolene, baclofen or diazepam.  Pt reports pain is b/l in lower extremity "knee down" and describes as "burning, numbness, and tingling."    Review of Systems   Constitutional: Positive for fatigue. Negative for chills and fever.   HENT: Negative for trouble swallowing.    Respiratory: Negative for cough, shortness of breath and wheezing.    Cardiovascular: Negative for chest pain, palpitations and leg swelling.   Gastrointestinal: Negative for abdominal pain, constipation, diarrhea, nausea and vomiting.   Genitourinary: Negative for difficulty urinating and dysuria.   Musculoskeletal: Positive for arthralgias, back pain, gait problem, myalgias and neck pain.   Skin: Negative for rash and wound.   Neurological: Positive for weakness and numbness. Negative for dizziness and headaches.   Psychiatric/Behavioral: Negative for agitation and confusion.     Objective:     Vital Signs (Most Recent):  Temp: 97.6 °F (36.4 °C) (08/15/17 1200)  Pulse: 64 (08/15/17 1200)  Resp: 18 (08/15/17 1200)  BP: (!) 151/92 (08/15/17 1200)  SpO2: 100 % (08/15/17 1200) Vital Signs (24h Range):  Temp:  [97 °F (36.1 °C)-98.2 °F (36.8 °C)] 97.6 °F (36.4 °C)  Pulse:  [55-64] 64  Resp:  [18] 18  SpO2:  [96 %-100 %] 100 %  BP: (101-151)/(58-92) 151/92     Weight: 80.7 kg (178 lb)  Body mass index is 24.83 kg/m².    Intake/Output Summary (Last 24 hours) at 08/15/17 1403  Last data filed at 08/15/17 0400   Gross per 24 hour   Intake                0 ml   Output              300 ml   Net             -300 ml      Physical Exam   Constitutional: He is oriented to person, place, and time. No distress.   HENT:   Head: Normocephalic and atraumatic.   Eyes: EOM are normal. Pupils are equal, round, and reactive to light.   Cardiovascular: Normal rate and regular rhythm.    No murmur heard.  Pulmonary/Chest: Effort normal and breath sounds normal. No respiratory distress. " He has no wheezes.   Abdominal: Soft. Bowel sounds are normal. He exhibits no distension. There is no tenderness. There is no guarding.   Musculoskeletal: He exhibits no edema, tenderness or deformity.   Neurological: He is alert and oriented to person, place, and time. No cranial nerve deficit.   Baseline L-sided paralysis, L arm contracted, LLE with little to no strength or movement. RUE and RLE with 3/5 strength. All near baseline per patient.    Skin: He is not diaphoretic.   Nursing note and vitals reviewed.      Significant Labs: All pertinent labs within the past 24 hours have been reviewed.    Significant Imaging: I have reviewed all pertinent imaging results/findings within the past 24 hours.

## 2017-08-15 NOTE — ASSESSMENT & PLAN NOTE
- avoid opiates, need pain management outpatient. Discuss with neurology his long her pain pain until he can be seen by pain medicine.   - recent MD shopping per chart review, drug seeking behavior  - Seen by pain management, Dr. Trevizo : I discussed with patient that I will try to treat his pain utilizing a multimodal approach with non-narocotic pain medications and physical therapy. I explained to the patient it is not my plan to treat his pain with opioid pain medication. At this point, the patient decided to leave before I could perform a ROS or physical exam.  -outpatient pain management referral to Dr. Aceves per MS clinic for pain contract for narcotics  Pt received morphine 9 mg in ED

## 2017-08-15 NOTE — ASSESSMENT & PLAN NOTE
Now with possible psuedo-flair 2/2 UTI  - Muscle spasms: restart dantrolene 50mg QID, baclofen 20mg TID, diazepam 5mg  - PT/OT while in house, recent d/c to [facility] NH, now back at home. Denied from IPR and patient insurance denied SNF during last admission (no more SNF days available)  - discuss with neuro re: infusion while in house vs outpatient?

## 2017-08-15 NOTE — PLAN OF CARE
Problem: Patient Care Overview  Goal: Plan of Care Review  Outcome: Ongoing (interventions implemented as appropriate)  Pt aaox3, vss, no s/s of distress noted.  Pt c/o generalized pain.  Safety precautions maintained, pt remains free of falls.  Call light within reach.

## 2017-08-15 NOTE — PLAN OF CARE
Problem: Patient Care Overview  Goal: Plan of Care Review  Outcome: Ongoing (interventions implemented as appropriate)  Fall precaution maintained thru out shift; pt pain managed with medication; skin dry and intact; no sign of distress noted; pt encourage to shift weight;

## 2017-08-15 NOTE — SUBJECTIVE & OBJECTIVE
Past Medical History:   Diagnosis Date    Anxiety     Deep vein thrombosis     Depression     Multiple sclerosis     Multiple sclerosis     Pulmonary embolism        History reviewed. No pertinent surgical history.    Review of patient's allergies indicates:  No Known Allergies    No current facility-administered medications on file prior to encounter.      Current Outpatient Prescriptions on File Prior to Encounter   Medication Sig    baclofen (LIORESAL) 20 MG tablet Take 1 tablet (20 mg total) by mouth 4 (four) times daily.    gabapentin (NEURONTIN) 300 MG capsule Take 3 capsules (900 mg total) by mouth 3 (three) times daily.    rivaroxaban (XARELTO) 20 mg Tab Take 1 tablet (20 mg total) by mouth daily with dinner or evening meal.    citalopram (CELEXA) 20 MG tablet Take 1 tablet (20 mg total) by mouth once daily.    dantrolene (DANTRIUM) 50 MG Cap Take 1 capsule (50 mg total) by mouth 4 (four) times daily.    diazePAM (VALIUM) 5 MG tablet Take 1 tablet (5 mg total) by mouth 2 (two) times daily as needed (muscle spasms).    ergocalciferol (VITAMIN D2) 50,000 unit Cap Take 1 capsule (50,000 Units total) by mouth every 7 days. (Patient taking differently: Take 50,000 Units by mouth every 7 days. on monday)    naproxen (NAPROSYN) 500 MG tablet Take 1 tablet (500 mg total) by mouth 2 (two) times daily as needed (pain).    oxybutynin (DITROPAN-XL) 5 MG TR24 Take 1 tablet (5 mg total) by mouth once daily.    oxycodone (ROXICODONE) 15 MG Tab Take 1 tablet (15 mg total) by mouth 2 (two) times daily as needed for Pain.    polyethylene glycol (GLYCOLAX) 17 gram/dose powder Take 17 g by mouth once daily.    senna-docusate 8.6-50 mg (PERICOLACE) 8.6-50 mg per tablet Take 1 tablet by mouth once daily.    trazodone (DESYREL) 100 MG tablet Take 1 tablet (100 mg total) by mouth every evening.     Family History     Problem Relation (Age of Onset)    Arthritis Mother    Cancer Maternal Grandfather    No Known  Problems Father        Social History Main Topics    Smoking status: Current Some Day Smoker     Packs/day: 0.50     Years: 23.00     Types: Cigarettes    Smokeless tobacco: Never Used    Alcohol use No    Drug use: No    Sexual activity: Yes     Partners: Female     Review of Systems   Constitutional: Positive for fatigue. Negative for chills and fever.   HENT: Negative for trouble swallowing.    Respiratory: Negative for cough, shortness of breath and wheezing.    Cardiovascular: Negative for chest pain, palpitations and leg swelling.   Gastrointestinal: Negative for abdominal pain, constipation, diarrhea, nausea and vomiting.   Genitourinary: Negative for difficulty urinating and dysuria.   Musculoskeletal: Positive for arthralgias, back pain, gait problem, myalgias and neck pain.   Skin: Negative for rash and wound.   Neurological: Positive for weakness and numbness. Negative for dizziness and headaches.   Psychiatric/Behavioral: Negative for agitation and confusion.     Objective:     Vital Signs (Most Recent):  Temp: 99.1 °F (37.3 °C) (08/14/17 1642)  Pulse: (!) 55 (08/14/17 1642)  Resp: 18 (08/14/17 1642)  BP: (!) 144/72 (08/14/17 1943)  SpO2: 99 % (08/14/17 1642) Vital Signs (24h Range):  Temp:  [98.1 °F (36.7 °C)-99.1 °F (37.3 °C)] 99.1 °F (37.3 °C)  Pulse:  [55-92] 55  Resp:  [18-23] 18  SpO2:  [95 %-100 %] 99 %  BP: (126-167)/(72-98) 144/72     Weight: 80.7 kg (178 lb)  Body mass index is 24.83 kg/m².    Physical Exam   Constitutional: He is oriented to person, place, and time. No distress.   HENT:   Head: Normocephalic and atraumatic.   Eyes: EOM are normal. Pupils are equal, round, and reactive to light.   Cardiovascular: Normal rate and regular rhythm.    No murmur heard.  Pulmonary/Chest: Effort normal and breath sounds normal. No respiratory distress. He has no wheezes.   Abdominal: Soft. Bowel sounds are normal. He exhibits no distension. There is no tenderness. There is no guarding.    Musculoskeletal: He exhibits no edema, tenderness or deformity.   Neurological: He is alert and oriented to person, place, and time. No cranial nerve deficit.   Baseline L-sided paralysis, L arm contracted, LLE with little to no strength or movement. RUE and RLE with 3/5 strength. All near baseline per patient.    Skin: He is not diaphoretic.   Nursing note and vitals reviewed.       Significant Labs:   CBC:   Recent Labs  Lab 08/14/17  1300   WBC 7.97   HGB 14.4   HCT 40.8        CMP:   Recent Labs  Lab 08/14/17  1300      K 4.0      CO2 25   GLU 85   BUN 8   CREATININE 0.8   CALCIUM 9.0   PROT 6.8   ALBUMIN 3.7   BILITOT 0.5   ALKPHOS 70   AST 11   ALT 8*   ANIONGAP 9   EGFRNONAA >60.0     Lactic Acid: No results for input(s): LACTATE in the last 48 hours.  TSH:   Recent Labs  Lab 07/18/17  1321   TSH 0.556     Urine Studies:   Recent Labs  Lab 08/14/17  1215   COLORU Yellow   APPEARANCEUA Hazy*   PHUR 6.0   SPECGRAV 1.015   PROTEINUA Negative   GLUCUA Negative   KETONESU Negative   BILIRUBINUA Negative   OCCULTUA 1+*   NITRITE Positive*   UROBILINOGEN Negative   LEUKOCYTESUR 3+*   RBCUA 20*   WBCUA >100*   BACTERIA Many*   SQUAMEPITHEL 0       Significant Imaging: none

## 2017-08-15 NOTE — LETTER
August 15, 2017    Mao Levin  [address] [address]             [facility] [address] [place] [address] Dear Mao,    I work with [facility] Outpatient Case Management Department. I have been unsuccessful at reaching you to follow-up to see how you have been doing. If you require any future assistance or if any new concerns or problems arise, please do not hesitate to call.     The Outpatient Case Management Department can be reached at [phone] from 8:00AM to 4:30 PM on Monday thru Friday. [facility] also has a program where a nurse is available 24/7 to answer questions or provide medical advice, their number is [phone].    Thanks,      Nata Lee,  RN  Outpatient Case Management

## 2017-08-15 NOTE — PROGRESS NOTES
Attempted to perform initial assessment for OPCM; patient is currently an inpatient.  Will dis-enroll patient.  Letter sent.  Case closed.  BABAK Lee, OPCM-RN

## 2017-08-15 NOTE — HOSPITAL COURSE
Pt with MS admitted to observation for generalized weakness and pain.  Neurology consulted and workup notable for UTI.  Pt's presentation is concerning for psuedo-flair in setting of UTI and worsening spasms off of anti-spasmodics.  It was recommended to restart dantrolene 50mg QID, baclofen 20mg QID, diazepam 5mg PRN (pt ran out of these medications and did not refill as he did not find to be helpful).  UC reveals providencia stuartii pt given ceftriaxone in the ED however was changed to cipro based on previous I&S.  UC this admission resistant to cipro so this was discontinued and pt received 3 doses of ceftriaxone at time of discharge.  Pt to complete 7 day course of bactrim to complete total 10 day course of treatment.  Case discussed with Neurology and MS clinic and will increase home gabapentin to 1100 mg in the afternoon and pt to continue 900 mg morning and evening.  Pt to follow up with Dr. Finnegan as scheduled on 12/11/17.  Referral placed to Dr. Aceves in pain management.  Pt also discharged with [facility] pt declined NH placement and does not qualify for SNF placement.

## 2017-08-15 NOTE — PROGRESS NOTES
Transferred from ER to OBS- via stretcher by escort . . Patient id verified, fall and allergy band in place. Patient AAOx4. Patient oriented to room and call bell system. Patient able to voice use of call bell system. Patient call bell placed within reach of patient. Assessed patient and vitals, respirations even and unlabored. Patient assessed for pain using pain scale located in patient room. Monitor patient for facial grimacing and or any guarding. Will provide patient with PRN pain medication as needed. HOB elevated to 45 degrees for lung expansion. Bed locked and in lowest possible position, condition stable, will continue to monitor patient.

## 2017-08-15 NOTE — ASSESSMENT & PLAN NOTE
Now with possible psuedo-flair 2/2 UTI  Neurology consulted and recommended for muscle spasms: restart dantrolene 50mg QID, baclofen 20mg TID, diazepam 5mg prn  - PT/OT while in house, recent d/c to residential NH, now back at home. Denied from IPR and patient insurance denied SNF during last admission (no more SNF days available)  - discuss with neuro re: infusion while in house vs outpatient?  Messaged MS clinic regarding insurance approval

## 2017-08-15 NOTE — H&P
"[facility] Hospital Medicine  History & Physical    Patient Name: Mao Levin  MRN: [id number]  Admission Date: 8/14/2017  Attending Physician: Jonna Le MD   Primary Care Provider: Mendy Alarcon MD    [facility] Medicine Team: [facility] F Jim Galarza PA-C     Patient information was obtained from patient, past medical records and ER records.     Subjective:     Principal Problem:Urinary tract infection    Chief Complaint:   Chief Complaint   Patient presents with    Muscle Pain     Pt presented to the ED c/o muscle weakness and pain. Pt arrived to the ED via EMS. Pt has a hx of MS.         HPI: Mao Levin is a 52 yo AA male with hx of MS known to Dr. Finnegan and Lawanda Boyce in MS clinic on Rituximab for DMT last received 2/7/2017 & 2/21/2017 due for another infusion now pending safely labs and insurance approval presents for evaluation of muscle spasms and pain "all over". Doesn't feel like antispasmodics (baclofen and dantrolene) are helpful. Has run out of diazepam & baclofen  and his oxycodone. Has been bouncing between MDs for chronic pain management unsuccessfully. Last MRI in March 2017 with no acute findings. He reports he presented today mainly due to generalized pain after running out of pain medication and feels he has not had adequate pain control since moving from [facility]. No other complaints today.     No acute neuro findings on exam, UTI+. Neurology consulted in ED.     Past Medical History:   Diagnosis Date    Anxiety     Deep vein thrombosis     Depression     Multiple sclerosis     Multiple sclerosis     Pulmonary embolism        History reviewed. No pertinent surgical history.    Review of patient's allergies indicates:  No Known Allergies    No current facility-administered medications on file prior to encounter.      Current Outpatient Prescriptions on File Prior to Encounter   Medication Sig    baclofen (LIORESAL) 20 MG tablet Take 1 tablet (20 mg " total) by mouth 4 (four) times daily.    gabapentin (NEURONTIN) 300 MG capsule Take 3 capsules (900 mg total) by mouth 3 (three) times daily.    rivaroxaban (XARELTO) 20 mg Tab Take 1 tablet (20 mg total) by mouth daily with dinner or evening meal.    citalopram (CELEXA) 20 MG tablet Take 1 tablet (20 mg total) by mouth once daily.    dantrolene (DANTRIUM) 50 MG Cap Take 1 capsule (50 mg total) by mouth 4 (four) times daily.    diazePAM (VALIUM) 5 MG tablet Take 1 tablet (5 mg total) by mouth 2 (two) times daily as needed (muscle spasms).    ergocalciferol (VITAMIN D2) 50,000 unit Cap Take 1 capsule (50,000 Units total) by mouth every 7 days. (Patient taking differently: Take 50,000 Units by mouth every 7 days. on monday)    naproxen (NAPROSYN) 500 MG tablet Take 1 tablet (500 mg total) by mouth 2 (two) times daily as needed (pain).    oxybutynin (DITROPAN-XL) 5 MG TR24 Take 1 tablet (5 mg total) by mouth once daily.    oxycodone (ROXICODONE) 15 MG Tab Take 1 tablet (15 mg total) by mouth 2 (two) times daily as needed for Pain.    polyethylene glycol (GLYCOLAX) 17 gram/dose powder Take 17 g by mouth once daily.    senna-docusate 8.6-50 mg (PERICOLACE) 8.6-50 mg per tablet Take 1 tablet by mouth once daily.    trazodone (DESYREL) 100 MG tablet Take 1 tablet (100 mg total) by mouth every evening.     Family History     Problem Relation (Age of Onset)    Arthritis Mother    Cancer Maternal Grandfather    No Known Problems Father        Social History Main Topics    Smoking status: Current Some Day Smoker     Packs/day: 0.50     Years: 23.00     Types: Cigarettes    Smokeless tobacco: Never Used    Alcohol use No    Drug use: No    Sexual activity: Yes     Partners: Female     Review of Systems   Constitutional: Positive for fatigue. Negative for chills and fever.   HENT: Negative for trouble swallowing.    Respiratory: Negative for cough, shortness of breath and wheezing.    Cardiovascular: Negative  for chest pain, palpitations and leg swelling.   Gastrointestinal: Negative for abdominal pain, constipation, diarrhea, nausea and vomiting.   Genitourinary: Negative for difficulty urinating and dysuria.   Musculoskeletal: Positive for arthralgias, back pain, gait problem, myalgias and neck pain.   Skin: Negative for rash and wound.   Neurological: Positive for weakness and numbness. Negative for dizziness and headaches.   Psychiatric/Behavioral: Negative for agitation and confusion.     Objective:     Vital Signs (Most Recent):  Temp: 99.1 °F (37.3 °C) (08/14/17 1642)  Pulse: (!) 55 (08/14/17 1642)  Resp: 18 (08/14/17 1642)  BP: (!) 144/72 (08/14/17 1943)  SpO2: 99 % (08/14/17 1642) Vital Signs (24h Range):  Temp:  [98.1 °F (36.7 °C)-99.1 °F (37.3 °C)] 99.1 °F (37.3 °C)  Pulse:  [55-92] 55  Resp:  [18-23] 18  SpO2:  [95 %-100 %] 99 %  BP: (126-167)/(72-98) 144/72     Weight: 80.7 kg (178 lb)  Body mass index is 24.83 kg/m².    Physical Exam   Constitutional: He is oriented to person, place, and time. No distress.   HENT:   Head: Normocephalic and atraumatic.   Eyes: EOM are normal. Pupils are equal, round, and reactive to light.   Cardiovascular: Normal rate and regular rhythm.    No murmur heard.  Pulmonary/Chest: Effort normal and breath sounds normal. No respiratory distress. He has no wheezes.   Abdominal: Soft. Bowel sounds are normal. He exhibits no distension. There is no tenderness. There is no guarding.   Musculoskeletal: He exhibits no edema, tenderness or deformity.   Neurological: He is alert and oriented to person, place, and time. No cranial nerve deficit.   Baseline L-sided paralysis, L arm contracted, LLE with little to no strength or movement. RUE and RLE with 3/5 strength. All near baseline per patient.    Skin: He is not diaphoretic.   Nursing note and vitals reviewed.       Significant Labs:   CBC:   Recent Labs  Lab 08/14/17  1300   WBC 7.97   HGB 14.4   HCT 40.8        CMP:   Recent  Labs  Lab 08/14/17  1300      K 4.0      CO2 25   GLU 85   BUN 8   CREATININE 0.8   CALCIUM 9.0   PROT 6.8   ALBUMIN 3.7   BILITOT 0.5   ALKPHOS 70   AST 11   ALT 8*   ANIONGAP 9   EGFRNONAA >60.0     Lactic Acid: No results for input(s): LACTATE in the last 48 hours.  TSH:   Recent Labs  Lab 07/18/17  1321   TSH 0.556     Urine Studies:   Recent Labs  Lab 08/14/17  1215   COLORU Yellow   APPEARANCEUA Hazy*   PHUR 6.0   SPECGRAV 1.015   PROTEINUA Negative   GLUCUA Negative   KETONESU Negative   BILIRUBINUA Negative   OCCULTUA 1+*   NITRITE Positive*   UROBILINOGEN Negative   LEUKOCYTESUR 3+*   RBCUA 20*   WBCUA >100*   BACTERIA Many*   SQUAMEPITHEL 0       Significant Imaging: none    Assessment/Plan:     * Urinary tract infection    - given rocephin in ED x1  - switch to cipro BID x 7 days based on sensitivities of last 2 UCx from 02/2017 and 03/2017        MS (multiple sclerosis)    Now with possible psuedo-flair 2/2 UTI  - Muscle spasms: restart dantrolene 50mg QID, baclofen 20mg TID, diazepam 5mg  - PT/OT while in house, recent d/c to shelter [facility], now back at home. Denied from IPR and patient insurance denied SNF during last admission (no more SNF days available)  - discuss with neuro re: infusion while in house vs outpatient?        Chronic pain of multiple sites    - avoid opiates, need pain management outpatient. Discuss with neurology his long her pain pain until he can be seen by pain medicine.   - recent MD shopping per chart review, drug seeking behavior  - Seen by pain management, Dr. Trevizo : I discussed with patient that I will try to treat his pain utilizing a multimodal approach with non-narocotic pain medications and physical therapy. I explained to the patient it is not my plan to treat his pain with opioid pain medication. At this point, the patient decided to leave before I could perform a ROS or physical exam.        10/25/2016 [facility]    - xarelto          VTE Risk Mitigation          Ordered     rivaroxaban tablet 20 mg  With dinner     Route:  Oral        08/14/17 2029     High Risk of VTE  Once      08/14/17 2029     Reason for No Pharmacological VTE Prophylaxis  Once      08/14/17 2029             Jim Galarza PA-C  Department of Hospital Medicine   [facility]

## 2017-08-15 NOTE — PROGRESS NOTES
"[facility] Hospital Medicine  Progress Note    Patient Name: Mao Levin  MRN: [id number]  Patient Class: OP- Observation   Admission Date: 8/14/2017  Length of Stay: 0 days  Attending Physician: Xochitl Lang MD  Primary Care Provider: Mendy Alarcno MD    [facility] Medicine Team: [facility] F Francoise Funk PA-C    Subjective:     Principal Problem:Urinary tract infection    HPI:  Mao Levin is a 52 yo AA male with hx of MS known to Dr. Finnegan and Lawanda Boyce in MS clinic on Rituximab for DMT last received 2/7/2017 & 2/21/2017 due for another infusion now pending safely labs and insurance approval presents for evaluation of muscle spasms and pain "all over". Doesn't feel like antispasmodics (baclofen and dantrolene) are helpful. Has run out of diazepam & baclofen  and his oxycodone. Has been bouncing between MDs for chronic pain management unsuccessfully. Last MRI in March 2017 with no acute findings. He reports he presented today mainly due to generalized pain after running out of pain medication and feels he has not had adequate pain control since moving from [facility]. No other complaints today.     No acute neuro findings on exam, UTI+. Neurology consulted in ED.     Hospital Course:  Pt with MS admitted to observation for generalized weakness and pain.  Neurology consulted and workup notable for UTI.  Pt's presentation is concerning for psuedo-flair in setting of UTI and worsening spasms off of anti-spasmodics.  It was recommended to restart dantrolene 50mg QID, baclofen 20mg TID, diazepam 5mg PRN.  UC reveals providencia stuartii and pt started on cipro based on previous I&S.    Interval History: no acute events overnight.  Pt reports no relief after restarting dantrolene, baclofen or diazepam.  Pt reports pain is b/l in lower extremity "knee down" and describes as "burning, numbness, and tingling."    Review of Systems   Constitutional: Positive for fatigue. Negative for chills and " fever.   HENT: Negative for trouble swallowing.    Respiratory: Negative for cough, shortness of breath and wheezing.    Cardiovascular: Negative for chest pain, palpitations and leg swelling.   Gastrointestinal: Negative for abdominal pain, constipation, diarrhea, nausea and vomiting.   Genitourinary: Negative for difficulty urinating and dysuria.   Musculoskeletal: Positive for arthralgias, back pain, gait problem, myalgias and neck pain.   Skin: Negative for rash and wound.   Neurological: Positive for weakness and numbness. Negative for dizziness and headaches.   Psychiatric/Behavioral: Negative for agitation and confusion.     Objective:     Vital Signs (Most Recent):  Temp: 97.6 °F (36.4 °C) (08/15/17 1200)  Pulse: 64 (08/15/17 1200)  Resp: 18 (08/15/17 1200)  BP: (!) 151/92 (08/15/17 1200)  SpO2: 100 % (08/15/17 1200) Vital Signs (24h Range):  Temp:  [97 °F (36.1 °C)-98.2 °F (36.8 °C)] 97.6 °F (36.4 °C)  Pulse:  [55-64] 64  Resp:  [18] 18  SpO2:  [96 %-100 %] 100 %  BP: (101-151)/(58-92) 151/92     Weight: 80.7 kg (178 lb)  Body mass index is 24.83 kg/m².    Intake/Output Summary (Last 24 hours) at 08/15/17 1743  Last data filed at 08/15/17 0400   Gross per 24 hour   Intake                0 ml   Output              300 ml   Net             -300 ml      Physical Exam   Constitutional: He is oriented to person, place, and time. No distress.   HENT:   Head: Normocephalic and atraumatic.   Eyes: EOM are normal. Pupils are equal, round, and reactive to light.   Cardiovascular: Normal rate and regular rhythm.    No murmur heard.  Pulmonary/Chest: Effort normal and breath sounds normal. No respiratory distress. He has no wheezes.   Abdominal: Soft. Bowel sounds are normal. He exhibits no distension. There is no tenderness. There is no guarding.   Musculoskeletal: He exhibits no edema, tenderness or deformity.   Neurological: He is alert and oriented to person, place, and time. No cranial nerve deficit.   Baseline  L-sided paralysis, L arm contracted, LLE with little to no strength or movement. RUE and RLE with 3/5 strength. All near baseline per patient.    Skin: He is not diaphoretic.   Nursing note and vitals reviewed.      Significant Labs: All pertinent labs within the past 24 hours have been reviewed.    Significant Imaging: I have reviewed all pertinent imaging results/findings within the past 24 hours.    Assessment/Plan:      * Urinary tract infection    - given rocephin in ED x1  - switched to cipro BID x 7 days based on sensitivities of last 2 UCx from 02/2017 and 03/2017        MS (multiple sclerosis)    Now with possible psuedo-flair 2/2 UTI  Neurology consulted and recommended for muscle spasms: restart dantrolene 50mg QID, baclofen 20mg TID, diazepam 5mg prn  - PT/OT while in house, recent d/c to [facility], now back at home. Denied from IPR and patient insurance denied SNF during last admission (no more SNF days available)  - discuss with neuro re: infusion while in house vs outpatient?  Messaged MS clinic regarding insurance approval        Chronic pain of multiple sites    - avoid opiates, need pain management outpatient. Discuss with neurology his long her pain pain until he can be seen by pain medicine.   - recent MD shopping per chart review, drug seeking behavior  - Seen by pain management, Dr. Trevizo : I discussed with patient that I will try to treat his pain utilizing a multimodal approach with non-narocotic pain medications and physical therapy. I explained to the patient it is not my plan to treat his pain with opioid pain medication. At this point, the patient decided to leave before I could perform a ROS or physical exam.  -outpatient pain management referral to Dr. Aceves per MS clinic for pain contract for narcotics  Pt received morphine 9 mg in ED        10/25/2016 Saddle PE    - xarelto          VTE Risk Mitigation         Ordered     rivaroxaban tablet 20 mg  With dinner     Route:  Oral         08/14/17 2029     High Risk of VTE  Once      08/14/17 2029     Reason for No Pharmacological VTE Prophylaxis  Once      08/14/17 2029              Francoise Funk PA-C  Department of Hospital Medicine   [facility]

## 2017-08-16 ENCOUNTER — TELEPHONE (OUTPATIENT)
Dept: NEUROLOGY | Facility: CLINIC | Age: 52
End: 2017-08-16

## 2017-08-16 ENCOUNTER — DOCUMENTATION ONLY (OUTPATIENT)
Dept: NEUROLOGY | Facility: CLINIC | Age: 52
End: 2017-08-16

## 2017-08-16 VITALS
RESPIRATION RATE: 18 BRPM | SYSTOLIC BLOOD PRESSURE: 101 MMHG | WEIGHT: 178 LBS | HEIGHT: 71 IN | TEMPERATURE: 98 F | DIASTOLIC BLOOD PRESSURE: 58 MMHG | BODY MASS INDEX: 24.92 KG/M2 | HEART RATE: 58 BPM | OXYGEN SATURATION: 97 %

## 2017-08-16 LAB
ANION GAP SERPL CALC-SCNC: 7 MMOL/L
BACTERIA UR CULT: NORMAL
BASOPHILS # BLD AUTO: 0.03 K/UL
BASOPHILS NFR BLD: 0.4 %
BUN SERPL-MCNC: 10 MG/DL
CALCIUM SERPL-MCNC: 8.7 MG/DL
CHLORIDE SERPL-SCNC: 106 MMOL/L
CO2 SERPL-SCNC: 25 MMOL/L
CREAT SERPL-MCNC: 0.9 MG/DL
DIFFERENTIAL METHOD: ABNORMAL
EOSINOPHIL # BLD AUTO: 0.2 K/UL
EOSINOPHIL NFR BLD: 2.5 %
ERYTHROCYTE [DISTWIDTH] IN BLOOD BY AUTOMATED COUNT: 13.5 %
EST. GFR  (AFRICAN AMERICAN): >60 ML/MIN/1.73 M^2
EST. GFR  (NON AFRICAN AMERICAN): >60 ML/MIN/1.73 M^2
GLUCOSE SERPL-MCNC: 94 MG/DL
HCT VFR BLD AUTO: 40 %
HGB BLD-MCNC: 13.6 G/DL
LYMPHOCYTES # BLD AUTO: 2.6 K/UL
LYMPHOCYTES NFR BLD: 32.1 %
MCH RBC QN AUTO: 30 PG
MCHC RBC AUTO-ENTMCNC: 34 G/DL
MCV RBC AUTO: 88 FL
MONOCYTES # BLD AUTO: 0.7 K/UL
MONOCYTES NFR BLD: 8.9 %
NEUTROPHILS # BLD AUTO: 4.5 K/UL
NEUTROPHILS NFR BLD: 56 %
PLATELET # BLD AUTO: 205 K/UL
PMV BLD AUTO: 11 FL
POTASSIUM SERPL-SCNC: 4 MMOL/L
RBC # BLD AUTO: 4.54 M/UL
SODIUM SERPL-SCNC: 138 MMOL/L
WBC # BLD AUTO: 7.95 K/UL

## 2017-08-16 PROCEDURE — G8979 MOBILITY GOAL STATUS: HCPCS | Mod: CJ

## 2017-08-16 PROCEDURE — 80048 BASIC METABOLIC PNL TOTAL CA: CPT

## 2017-08-16 PROCEDURE — G8989 SELF CARE D/C STATUS: HCPCS | Mod: CL

## 2017-08-16 PROCEDURE — G8988 SELF CARE GOAL STATUS: HCPCS | Mod: CI

## 2017-08-16 PROCEDURE — 25000003 PHARM REV CODE 250: Performed by: PHYSICIAN ASSISTANT

## 2017-08-16 PROCEDURE — 85025 COMPLETE CBC W/AUTO DIFF WBC: CPT

## 2017-08-16 PROCEDURE — 97535 SELF CARE MNGMENT TRAINING: CPT

## 2017-08-16 PROCEDURE — 99217 PR OBSERVATION CARE DISCHARGE: CPT | Mod: ,,, | Performed by: PHYSICIAN ASSISTANT

## 2017-08-16 PROCEDURE — G8980 MOBILITY D/C STATUS: HCPCS | Mod: CK

## 2017-08-16 PROCEDURE — G8978 MOBILITY CURRENT STATUS: HCPCS | Mod: CK

## 2017-08-16 PROCEDURE — 97530 THERAPEUTIC ACTIVITIES: CPT

## 2017-08-16 PROCEDURE — 97162 PT EVAL MOD COMPLEX 30 MIN: CPT

## 2017-08-16 PROCEDURE — 63600175 PHARM REV CODE 636 W HCPCS: Performed by: PHYSICIAN ASSISTANT

## 2017-08-16 PROCEDURE — G8987 SELF CARE CURRENT STATUS: HCPCS | Mod: CL

## 2017-08-16 PROCEDURE — G0378 HOSPITAL OBSERVATION PER HR: HCPCS

## 2017-08-16 PROCEDURE — 97166 OT EVAL MOD COMPLEX 45 MIN: CPT

## 2017-08-16 PROCEDURE — 36415 COLL VENOUS BLD VENIPUNCTURE: CPT

## 2017-08-16 RX ORDER — GABAPENTIN 100 MG/1
CAPSULE ORAL
Qty: 60 CAPSULE | Refills: 1 | Status: SHIPPED | OUTPATIENT
Start: 2017-08-16 | End: 2017-12-21 | Stop reason: DRUGHIGH

## 2017-08-16 RX ORDER — SULFAMETHOXAZOLE AND TRIMETHOPRIM 800; 160 MG/1; MG/1
1 TABLET ORAL 2 TIMES DAILY
Qty: 14 TABLET | Refills: 0 | Status: SHIPPED | OUTPATIENT
Start: 2017-08-17 | End: 2017-08-24

## 2017-08-16 RX ORDER — GABAPENTIN 300 MG/1
CAPSULE ORAL
Qty: 270 CAPSULE | Refills: 0 | Status: SHIPPED | OUTPATIENT
Start: 2017-08-16 | End: 2018-11-26 | Stop reason: SDUPTHER

## 2017-08-16 RX ADMIN — DANTROLENE SODIUM 50 MG: 50 CAPSULE ORAL at 12:08

## 2017-08-16 RX ADMIN — HYDROCODONE BITARTRATE AND ACETAMINOPHEN 1 TABLET: 5; 325 TABLET ORAL at 04:08

## 2017-08-16 RX ADMIN — CEFTRIAXONE 1 G: 1 INJECTION, SOLUTION INTRAVENOUS at 02:08

## 2017-08-16 RX ADMIN — OXYBUTYNIN CHLORIDE 5 MG: 5 TABLET, EXTENDED RELEASE ORAL at 08:08

## 2017-08-16 RX ADMIN — PANTOPRAZOLE SODIUM 40 MG: 40 TABLET, DELAYED RELEASE ORAL at 08:08

## 2017-08-16 RX ADMIN — NAPROXEN 500 MG: 500 TABLET ORAL at 08:08

## 2017-08-16 RX ADMIN — CITALOPRAM HYDROBROMIDE 20 MG: 10 TABLET ORAL at 08:08

## 2017-08-16 RX ADMIN — GABAPENTIN 900 MG: 300 CAPSULE ORAL at 05:08

## 2017-08-16 RX ADMIN — BACLOFEN 20 MG: 10 TABLET ORAL at 04:08

## 2017-08-16 RX ADMIN — BACLOFEN 20 MG: 10 TABLET ORAL at 02:08

## 2017-08-16 RX ADMIN — DANTROLENE SODIUM 50 MG: 50 CAPSULE ORAL at 04:08

## 2017-08-16 RX ADMIN — GABAPENTIN 900 MG: 300 CAPSULE ORAL at 02:08

## 2017-08-16 NOTE — PLAN OF CARE
[facility]    HOME HEALTH ORDERS  FACE TO FACE ENCOUNTER    Patient Name: Mao Levin  YOB: 1965    PCP: Mendy Alarcon MD   PCP Address: [address] / [place] [address]  PCP Phone Number: [phone]  PCP Fax: [phone]    Encounter Date: 08/16/2017    Admit to Home Health    Diagnoses:  Active Hospital Problems    Diagnosis  POA    *Urinary tract infection [N39.0]  Yes     Priority: 1 - High    MS (multiple sclerosis) [G35]  Yes     Priority: 2      Chronic    Chronic pain of multiple sites [R52, G89.29]  Yes     Priority: 3      Chronic    10/25/2016 Saddle PE [I26.92]  Yes     Priority: 4      Chronic      Resolved Hospital Problems    Diagnosis Date Resolved POA   No resolved problems to display.       Future Appointments  Date Time Provider Department Center   12/11/2017 1:40 PM Mattie Finnegan MD [facility]     Follow-up Information     Mattie Finnegan MD On 12/11/2017.    Specialty:  Neurology  Contact information:  [address]  [facility] [address]  [phone]                     I have seen and examined this patient face to face today. My clinical findings that support the need for the home health skilled services and home bound status are the following:  Weakness/numbness causing balance and gait disturbance due to Weakness/Debility making it taxing to leave home.    Allergies:Review of patient's allergies indicates:  No Known Allergies    Diet: regular diet    Activities: activity as tolerated    Nursing:   SN to complete comprehensive assessment including routine vital signs. Instruct on disease process and s/s of complications to report to MD. Review/verify medication list sent home with the patient at time of discharge  and instruct patient/caregiver as needed. Frequency may be adjusted depending on start of care date.    Notify MD if SBP > 160 or < 90; DBP > 90 or < 50; HR > 120 or < 50; Temp > 101; Other:          CONSULTS:    Physical Therapy to evaluate and treat. Evaluate for home safety and equipment needs; Establish/upgrade home exercise program. Perform / instruct on therapeutic exercises, gait training, transfer training, and Range of Motion.  Occupational Therapy to evaluate and treat. Evaluate home environment for safety and equipment needs. Perform/Instruct on transfers, ADL training, ROM, and therapeutic exercises.   to evaluate for community resources/long-range planning.  Aide to provide assistance with personal care, ADLs, and vital signs.    MISCELLANEOUS CARE:  N/A    WOUND CARE ORDERS  n/a      Medications: Review discharge medications with patient and family and provide education.      Current Discharge Medication List      START taking these medications    Details   sulfamethoxazole-trimethoprim 800-160mg (BACTRIM DS) 800-160 mg Tab Take 1 tablet by mouth 2 (two) times daily. Complete all doses  Qty: 14 tablet, Refills: 0         CONTINUE these medications which have CHANGED    Details   !! gabapentin (NEURONTIN) 100 MG capsule Take 1100 mg total every afternoon (three 300 mg tablets and two 200 mg tablets)  Qty: 60 capsule, Refills: 1      !! gabapentin (NEURONTIN) 300 MG capsule Take 900 mg morning and evening.  Take 1100 mg total in the afternoon.  Qty: 270 capsule, Refills: 0       !! - Potential duplicate medications found. Please discuss with provider.      CONTINUE these medications which have NOT CHANGED    Details   baclofen (LIORESAL) 20 MG tablet Take 1 tablet (20 mg total) by mouth 4 (four) times daily.  Qty: 90 tablet, Refills: 0      rivaroxaban (XARELTO) 20 mg Tab Take 1 tablet (20 mg total) by mouth daily with dinner or evening meal.  Qty: 60 tablet, Refills: 4      citalopram (CELEXA) 20 MG tablet Take 1 tablet (20 mg total) by mouth once daily.  Qty: 30 tablet, Refills: 0      dantrolene (DANTRIUM) 50 MG Cap Take 1 capsule (50 mg total) by mouth 4 (four) times  daily.  Qty: 120 capsule, Refills: 0      diazePAM (VALIUM) 5 MG tablet Take 1 tablet (5 mg total) by mouth 2 (two) times daily as needed (muscle spasms).  Qty: 30 tablet, Refills: 0      ergocalciferol (VITAMIN D2) 50,000 unit Cap Take 1 capsule (50,000 Units total) by mouth every 7 days.  Qty: 4 capsule, Refills: 11      naproxen (NAPROSYN) 500 MG tablet Take 1 tablet (500 mg total) by mouth 2 (two) times daily as needed (pain).  Qty: 28 tablet, Refills: 0      oxybutynin (DITROPAN-XL) 5 MG TR24 Take 1 tablet (5 mg total) by mouth once daily.  Qty: 30 tablet, Refills: 3      polyethylene glycol (GLYCOLAX) 17 gram/dose powder Take 17 g by mouth once daily.  Qty: 510 g, Refills: 6      senna-docusate 8.6-50 mg (PERICOLACE) 8.6-50 mg per tablet Take 1 tablet by mouth once daily.      trazodone (DESYREL) 100 MG tablet Take 1 tablet (100 mg total) by mouth every evening.  Qty: 30 tablet, Refills: 3         STOP taking these medications       oxycodone (ROXICODONE) 15 MG Tab Comments:   Reason for Stopping:               I certify that this patient is confined to his home and needs physical therapy and occupational therapy.  Therapy five times per week.

## 2017-08-16 NOTE — PLAN OF CARE
Problem: Physical Therapy Goal  Goal: Physical Therapy Goal  Goals to be met by: 2017     Patient will increase functional independence with mobility by performin. Supine to sit with Modified [place]  2. Sit to supine with Modified [place]  3. Sit to stand transfer with Stand-by Assistance  4. Bed to chair transfer with Stand-by Assistance using LRAD  5. Gait  x 50 feet with Contact Guard Assistance using LRAD  6. Ascend/descend 5 stair with right Handrails Moderate Assistance using LRAD  7. Lower extremity exercise program x 15 reps per handout, with assistance as needed    Outcome: Ongoing (interventions implemented as appropriate)  PT evaluation complete and goals established.    Jeanne Carranza DPT, PT  2017

## 2017-08-16 NOTE — DISCHARGE SUMMARY
"[facility] Hospital Medicine  Discharge Summary      Patient Name: Mao Levin  MRN: [id number]  Admission Date: 8/14/2017  Hospital Length of Stay: 0 days  Discharge Date and Time:  08/16/2017 12:42 PM  Attending Physician: Xochitl Lang MD   Discharging Provider: Francoise Funk PA-C  Primary Care Provider: Mendy Alarcon MD  Hospital Medicine Team: [facility] MED F Francoise Funk PA-C    HPI:   Mao Levin is a 52 yo AA male with hx of MS known to Dr. Finnegan and Lawanda Boyce in MS clinic on Rituximab for DMT last received 2/7/2017 & 2/21/2017 due for another infusion now pending safely labs and insurance approval presents for evaluation of muscle spasms and pain "all over". Doesn't feel like antispasmodics (baclofen and dantrolene) are helpful. Has run out of diazepam & baclofen  and his oxycodone. Has been bouncing between MDs for chronic pain management unsuccessfully. Last MRI in March 2017 with no acute findings. He reports he presented today mainly due to generalized pain after running out of pain medication and feels he has not had adequate pain control since moving from [facility]. No other complaints today.     No acute neuro findings on exam, UTI+. Neurology consulted in ED.     * No surgery found *      Indwelling Lines/Drains at time of discharge:   Lines/Drains/Airways          No matching active lines, drains, or airways        Hospital Course:   Pt with MS admitted to observation for generalized weakness and pain.  Neurology consulted and workup notable for UTI.  Pt's presentation is concerning for psuedo-flair in setting of UTI and worsening spasms off of anti-spasmodics.  It was recommended to restart dantrolene 50mg QID, baclofen 20mg QID, diazepam 5mg PRN (pt ran out of these medications and did not refill as he did not find to be helpful).  UC reveals providencia stuartii pt given ceftriaxone in the ED however was changed to cipro based on previous I&S.  UC this admission " resistant to cipro so this was discontinued and pt received 3 doses of ceftriaxone at time of discharge.  Pt to complete 7 day course of bactrim to complete total 10 day course of treatment.  Case discussed with Neurology and MS clinic and will increase home gabapentin to 1100 mg in the afternoon and pt to continue 900 mg morning and evening.  Pt to follow up with Dr. Finnegan as scheduled on 12/11/17.  Referral placed to Dr. Aceves in pain management.  Pt also discharged with [facility] pt declined NH placement and does not qualify for SNF placement.     Consults:   Consults         Status Ordering Provider     Inpatient consult to neurology  Once     Provider:  (Not yet assigned)    Completed MEMO VAN          Significant Diagnostic Studies: Labs: All labs within the past 24 hours have been reviewed    Pending Diagnostic Studies:     None        Final Active Diagnoses:    Diagnosis Date Noted POA    PRINCIPAL PROBLEM:  Urinary tract infection [N39.0] 08/14/2017 Yes    MS (multiple sclerosis) [G35] 12/19/2016 Yes     Chronic    Chronic pain of multiple sites [R52, G89.29] 09/22/2016 Yes     Chronic    10/25/2016 Saddle PE [I26.92] 10/25/2016 Yes     Chronic      Problems Resolved During this Admission:    Diagnosis Date Noted Date Resolved POA      No new Assessment & Plan notes have been filed under this hospital service since the last note was generated.  Service: Hospital Medicine      Discharged Condition: good    Disposition: Home-Health Care [facility]    Follow Up:  Follow-up Information     Mattie Finnegan MD On 12/11/2017.    Specialty:  Neurology  Contact information:  [address] [place] [address]  [phone]                 Patient Instructions:     Ambulatory referral to Outpatient Case Management   Referral Priority: Routine Referral Type: Consultation   Referral Reason: Specialty Services Required    Number of Visits Requested: 1      Ambulatory Referral to Pain Clinic   Referral Priority:  Routine Referral Type: Consultation   Referral Reason: Specialty Services Required    Referred to Provider: CARLOS ROCKWELL Requested Specialty: Pain Medicine   Number of Visits Requested: 1      Diet general     Activity as tolerated       Medications:  Reconciled Home Medications:   Current Discharge Medication List      START taking these medications    Details   sulfamethoxazole-trimethoprim 800-160mg (BACTRIM DS) 800-160 mg Tab Take 1 tablet by mouth 2 (two) times daily. Complete all doses  Qty: 14 tablet, Refills: 0         CONTINUE these medications which have CHANGED    Details   !! gabapentin (NEURONTIN) 100 MG capsule Take 1100 mg total every afternoon (three 300 mg tablets and two 200 mg tablets)  Qty: 60 capsule, Refills: 1      !! gabapentin (NEURONTIN) 300 MG capsule Take 900 mg morning and evening.  Take 1100 mg total in the afternoon.  Qty: 270 capsule, Refills: 0       !! - Potential duplicate medications found. Please discuss with provider.      CONTINUE these medications which have NOT CHANGED    Details   baclofen (LIORESAL) 20 MG tablet Take 1 tablet (20 mg total) by mouth 4 (four) times daily.  Qty: 90 tablet, Refills: 0      rivaroxaban (XARELTO) 20 mg Tab Take 1 tablet (20 mg total) by mouth daily with dinner or evening meal.  Qty: 60 tablet, Refills: 4      citalopram (CELEXA) 20 MG tablet Take 1 tablet (20 mg total) by mouth once daily.  Qty: 30 tablet, Refills: 0      dantrolene (DANTRIUM) 50 MG Cap Take 1 capsule (50 mg total) by mouth 4 (four) times daily.  Qty: 120 capsule, Refills: 0      diazePAM (VALIUM) 5 MG tablet Take 1 tablet (5 mg total) by mouth 2 (two) times daily as needed (muscle spasms).  Qty: 30 tablet, Refills: 0      ergocalciferol (VITAMIN D2) 50,000 unit Cap Take 1 capsule (50,000 Units total) by mouth every 7 days.  Qty: 4 capsule, Refills: 11      naproxen (NAPROSYN) 500 MG tablet Take 1 tablet (500 mg total) by mouth 2 (two) times daily as needed (pain).  Qty: 28  tablet, Refills: 0      oxybutynin (DITROPAN-XL) 5 MG TR24 Take 1 tablet (5 mg total) by mouth once daily.  Qty: 30 tablet, Refills: 3      polyethylene glycol (GLYCOLAX) 17 gram/dose powder Take 17 g by mouth once daily.  Qty: 510 g, Refills: 6      senna-docusate 8.6-50 mg (PERICOLACE) 8.6-50 mg per tablet Take 1 tablet by mouth once daily.      trazodone (DESYREL) 100 MG tablet Take 1 tablet (100 mg total) by mouth every evening.  Qty: 30 tablet, Refills: 3         STOP taking these medications       oxycodone (ROXICODONE) 15 MG Tab Comments:   Reason for Stopping:             Time spent on the discharge of patient: >30 minutes    Pt discharged home with C.  Pt refused NH.    HOS POC IP DISCHARGE SUMMARY    Francoise Funk PA-C  Department of Hospital Medicine  [facility]

## 2017-08-16 NOTE — PLAN OF CARE
Problem: Patient Care Overview  Goal: Plan of Care Review  Outcome: Ongoing (interventions implemented as appropriate)  Pt aaox3, vss, no s/s of distress noted.  Pt c/o generalized pain.  Safety precautions maintained, bed alarm activated.  PT/OT.  Call light within reach.  Discharge today.

## 2017-08-16 NOTE — PROGRESS NOTES
Pt discharged from unit.  Pt aaox3, vss, no s/s of distress noted.  Pt c/o generalized pain.  Discharge instructions given to pt and he verbalized understanding.  Home meds and f/u appts reviewed as well.  IV x 2 removed from lac and r hand w/ both catheters intact, no redness or swelling noted to areas.  Pt left unit via w/c and was accompanied by mother.  No complications with discharge noted.

## 2017-08-16 NOTE — PT/OT/SLP EVAL
"Physical Therapy  Evaluation    Mao Levin   MRN: [id number]   Admitting Diagnosis: Urinary tract infection    PT Received On: 08/16/17  PT Start Time: 0920   PT returned at 1048- 1054  PT Stop Time: 0946    PT Total Time (min): 26 min   + 6 minutes = 32 minutes    Billable Minutes:  Evaluation 20 minutes  and Therapeutic Activity 12 minutes    Diagnosis: Urinary tract infection    History: personal factors and/or comorbidities that impact the plan of care: MS causing weakness in LUE/LLE, decreased safety awareness, lives alone with 5 VASILIY; decreased caregiver support; anxiety with decreased focus on task  Evaluation of Body Systems: cardiovascular/pulmonary, integumentary, musculoskeletal, neuromuscular, cognition/communication  Functional Outcome Tools: AMPAC, ROM, MMT  Clinical Presentation: Evolving    Evaluation Complexity Level: Moderate      Past Medical History:   Diagnosis Date    Anxiety     Deep vein thrombosis     Depression     Multiple sclerosis     Multiple sclerosis     Pulmonary embolism       History reviewed. No pertinent surgical history.    Referring physician: Xochitl Lang MD  Date referred to PT: 8/15/17    General Precautions: Standard, fall  Orthopedic Precautions: N/A   Braces: N/A       Do you have any cultural, spiritual, Holiness conflicts, given your current situation?: None stated     Patient History:  Lives With: alone  Living Arrangements: house  Home Accessibility: stairs to enter home  Home Layout: Able to live on 1st floor  Number of Stairs to Enter Home: 5  Stair Railings at Home: outside, present on right side  Transportation Available: van, wheelchair accessible  Living Environment Comment: Pt lives alone in [facility] with 5 VASILIY; R handrail. Bathroom has tub/shower combo with shower chair. PTA, pt was mod (I) using rollator for mobility although he states "I'm an expert at falling. I've probably fallen 15x in the last month." Pt was mod (I) for ADLs. Pt does not drive. Pt's " "family and friends provide assistance but unable to provide 24/7 assistance.   Equipment Currently Used at Home: walker, rolling, shower chair, bedside commode, rollator, hospital bed  DME owned (not currently used): wheelchair    Previous Level of Function:  Ambulation Skills: needs device  Transfer Skills: needs device  ADL Skills: independent  Work/Leisure Activity: needs assist    Subjective:  Communicated with RN prior to session.  Pt agreeable to PT/OT evaluation. Pt states "Can you get me some coffee? I am an expert at falling."   Chief Complaint: weakness, imbalance  Patient goals: improve mobility     Pain/Comfort  Pain Rating 1: 0/10  Pain Rating Post-Intervention 1: 0/10      Objective:   Patient found with: telemetry, bed alarm, peripheral IV     Cognitive Exam:  Oriented to: Person, Place, Time and Situation    Follows Commands/attention: Inattentive, Easily distracted and Follows one-step commands  Communication: clear/fluent  Safety awareness/insight to disability: impaired    Physical Exam:  Postural examination/scapula alignment: Rounded shoulder and Head forward    Skin integrity: Visible skin intact  Edema: None noted in BLE    Sensation:   Impaired  light/touch on LLE    Lower Extremity Range of Motion:  Right Lower Extremity: WFL  Left Lower Extremity: WFL    Lower Extremity Strength:  Right Lower Extremity: Deficits: grossly 3+/5  Left Lower Extremity: Deficits: grossly 1/5     Fine motor coordination:  Intact    Gross motor coordination: Deficits     Functional Mobility:  Bed Mobility:  Rolling/Turning Right: Stand by assistance, With side rail  Scooting/Bridging: Stand by Assistance  Supine to Sit: Stand by Assistance, With side rail  Sit to Supine: Moderate Assistance, With leg lift    Transfers:  Bed <> Chair Technique: Squat Pivot  Bed <> Chair Assistance: Contact Guard Assistance  Bed <> Chair Assistive Device: No Assistive Device    Gait:   Gait Distance: Unable on this date     Balance: "   Static Sit: FAIR-: Maintains without assist but inconsistent   Dynamic Sit: POOR: N/A    Therapeutic Activities and Exercises:  Therapeutic activities aimed to increase pt's independence, safety, and efficiency with bed mobility and functional transfers. See above for assistance levels.   · Sit balance with varying assistance from Mod A to CGA x 15 minutes. PT providing verbal cues for postural awareness and safety   · Verbal cueing throughout session on safety awareness  · PT returned to transfer patient from chair-> bed: Mod A via support of therapist  · Pt educated on role of PT and POC/goals for therapy as well as safety with mobility. Pt verbalized understanding. Pt expressed no further concerns/questions.     AM-PAC 6 CLICK MOBILITY  How much help from another person does this patient currently need?   1 = Unable, Total/Dependent Assistance  2 = A lot, Maximum/Moderate Assistance  3 = A little, Minimum/Contact Guard/Supervision  4 = None, Modified Kay/Independent    Turning over in bed (including adjusting bedclothes, sheets and blankets)?: 3  Sitting down on and standing up from a chair with arms (e.g., wheelchair, bedside commode, etc.): 2  Moving from lying on back to sitting on the side of the bed?: 3  Moving to and from a bed to a chair (including a wheelchair)?: 3  Need to walk in hospital room?: 2  Climbing 3-5 steps with a railing?: 2  Total Score: 15     AM-PAC Raw Score CMS G-Code Modifier Level of Impairment Assistance   6 % Total / Unable   7 - 9 CM 80 - 100% Maximal Assist   10 - 14 CL 60 - 80% Moderate Assist   15 - 19 CK 40 - 60% Moderate Assist   20 - 22 CJ 20 - 40% Minimal Assist   23 CI 1-20% SBA / CGA   24 CH 0% Independent/ Mod I     Patient left HOB elevated with all lines intact, call button in reach and RN notified.    Assessment:   Mao Levin is a 51 y.o. male with a medical diagnosis of Urinary tract infection. Upon initial PT evaluation, pt presents with weakness,  impaired endurance, impaired self care skills, gait instability, impaired functional mobility, impaired balance, decreased coordination, decreased upper extremity function, decreased lower extremity function, decreased safety awareness, decreased ROM, impaired fine motor. Pt demo'd decreased safety awareness throughout therapy session. Pt completed bed mobility with SBA and transfers with varying assistance from CGA to Mod A. PTA, pt was mod (I) for functional mobility using rollator but reports 15 falls within the last month. Pt lives alone with limited support upon DC. Pt would benefit from skilled PT services to address these deficits and improve return to PLOF. Anticipate d/c to rehab.     Rehab identified problem list/impairments:  weakness, impaired endurance, impaired self care skills, gait instability, impaired functional mobilty, impaired balance, decreased coordination, decreased upper extremity function, decreased lower extremity function, decreased safety awareness, decreased ROM, impaired fine motor    Rehab potential is good.    Activity tolerance: Fair    Discharge recommendations: Discharge Facility/Level Of Care Needs: rehabilitation facility     Barriers to discharge: Barriers to Discharge: Inaccessible home environment, Decreased caregiver support    Equipment recommendations: Equipment Needed After Discharge: walker, rolling, bedside commode     GOALS:    Physical Therapy Goals        Problem: Physical Therapy Goal    Goal Priority Disciplines Outcome Goal Variances Interventions   Physical Therapy Goal     PT/OT, PT Ongoing (interventions implemented as appropriate)     Description:  Goals to be met by: 2017     Patient will increase functional independence with mobility by performin. Supine to sit with Modified [place]  2. Sit to supine with Modified [place]  3. Sit to stand transfer with Stand-by Assistance  4. Bed to chair transfer with Stand-by Assistance using LRAD  5.  Gait  x 50 feet with Contact Guard Assistance using LRAD  6. Ascend/descend 5 stair with right Handrails Moderate Assistance using LRAD  7. Lower extremity exercise program x 15 reps per handout, with assistance as needed                      PLAN:    Patient to be seen 4 x/week to address the above listed problems via gait training, therapeutic activities, therapeutic exercises, neuromuscular re-education  Plan of Care expires: 09/15/17  Plan of Care reviewed with: patient    Jeanne Carranza, PT  08/16/2017

## 2017-08-16 NOTE — TELEPHONE ENCOUNTER
----- Message from Ryan Zapien sent at 8/15/2017  3:16 PM CDT -----  Contact: PT  PT has a few questions regarding his stay at the hospital.    Please call [phone]

## 2017-08-16 NOTE — DISCHARGE INSTRUCTIONS
Neurology is recommending patient to take gabapentin 900 mg in the morning, 1100 mg in the afternoon, and 900 mg in the evening.  Pt to follow up with Dr. Finnegan as scheduled on 12/11/17.  Referral placed to Dr. Aceves (recommended by [facility] clinic) in pain mangement for appointment.

## 2017-08-16 NOTE — PLAN OF CARE
Problem: Occupational Therapy Goal  Goal: Occupational Therapy Goal  Goals to be met by: 8/26/17     Patient will increase functional independence with ADLs by performing:    UE Dressing with Contact Guard Assistance.  LE Dressing with Minimal Assistance.  Grooming while seated with Contact Guard Assistance.  Supine to sit with Supervision.  Toilet transfer to bedside commode with Contact Guard Assistance.    Outcome: Ongoing (interventions implemented as appropriate)  Initial eval completed  POC implemented, goals set     Kirti Torrez  8/16/2017

## 2017-08-16 NOTE — PT/OT/SLP EVAL
"Occupational Therapy  Evaluation    Mao Levin   MRN: [id number]   Admitting Diagnosis: Urinary tract infection    OT Date of Treatment: 08/16/17   OT Start Time: 0919  OT Stop Time: 0947  OT Total Time (min): 28 min    Billable Minutes:  Evaluation 20  Self Care/Home Management 8    Diagnosis: Urinary tract infection       Past Medical History:   Diagnosis Date    Anxiety     Deep vein thrombosis     Depression     Multiple sclerosis     Multiple sclerosis     Pulmonary embolism       History reviewed. No pertinent surgical history.    Referring physician: Xochitl Lang MD  Date referred to OT: 8/15/17    General Precautions: Standard, fall  Orthopedic Precautions: N/A  Braces: N/A    Do you have any cultural, spiritual, Jew conflicts, given your current situation?: None stated      Patient History:  Living Environment  Lives With: alone  Living Arrangements: house  Home Accessibility: stairs to enter home  Home Layout: Able to live on 1st floor  Number of Stairs to Enter Home: 5  Stair Railings at Home: outside, present on right side  Transportation Available: van, wheelchair accessible  Living Environment Comment: Pt reports he lives alone in a [facility] with 5 VASILIY and R handrail present outside. Pt has a tub/shower combo with shower chair.  Pt reports he was mod (I) for mobility using rollator and is mod (I) for ADLs. Pt reports he is able to use bathroom (I) but has difficulty with hygiene and washes off sometimes in shower if he didn't feel "clean" after toileting. Pt reports he has ~15 falls in the past month. Pt reports family and friends are able to provide assistance upon d/c but cannot provide 24 hr care/assistance. Pt does not drive and is unable to prepare meals (I)'d.   Equipment Currently Used at Home: walker, rolling, shower chair, bedside commode, hospital bed, rollator (owns a w/c but does not use )    Prior level of function:   Bed Mobility/Transfers: needs device  Grooming: " "independent  Bathing: needs device  Upper Body Dressing: independent  Lower Body Dressing: independent  Toileting: needs assist  Mode of Transportation: Car, Family     Dominant hand: right    Subjective:  Communicated with RN prior to session.  " I can get dressed on my own but I mean its really hard but I do it", "I fall a lot", " I'm laughing at myself to keep myself from crying"   Chief Complaint: weakness  Patient/Family stated goals: to go home, improve functional mobility    Pain/Comfort  Pain Rating 1: 0/10  Pain Rating Post-Intervention 1: 0/10    Objective:  Patient found with: telemetry, bed alarm, peripheral IV   Pt found supine in bed. Pt agreed to participate in OT/PT evaluation    Cognitive Exam:  Oriented to: Person, Place and Situation  Follows Commands/attention: Inattentive, Easily distracted and Follows one-step commands  Communication: clear/fluent  Memory:  No Deficits noted  Safety awareness/insight to disability: impaired  Coping skills/emotional control: Inappropriate laughter, Anger and Pt demo'd fluctuating emotions throughout session.    Visual/perceptual:  Not formally assessed. During evaluation pt stated to OT " you're way too close to me right now, I have double vision"     Physical Exam:  Postural examination/scapula alignment: Rounded shoulder and Head forward  Skin integrity: Visible skin intact  Edema: None noted BUEs    Sensation:   Impaired  light/touch on L UE    Upper Extremity Range of Motion:  Right Upper Extremity: Deficits: L sided paralysis with L UE contracture   Left Upper Extremity: WFL except impaired internal/external rotation, shld flexion, scapula abduction     Upper Extremity Strength:  Right Upper Extremity: Deficits: grossly 3+/5  Left Upper Extremity: Deficits: 1/5    Strength: L  1/5. R  3+/5    Fine motor coordination:   Intact  Right hand thumb/finger opposition skills and Impaired  Left hand thumb/finger opposition skills 1/5  strength "     Gross motor coordination: deficits     Functional Mobility:  Bed Mobility:  Rolling/Turning to Left: Stand by assistance, With side rail  Scooting/Bridging: Stand by Assistance, Contact Guard Assistance  Supine to Sit: Moderate Assistance, With leg lift  Sit to Supine:  (Pt left UIC )    Transfers:  Sit <> Stand Assistance: Activity did not occur (Pt unable to stand at this time )  Bed <> Chair Technique: Squat Pivot  Bed <> Chair Transfer Assistance: Contact Guard Assistance  Bed <> Chair Assistive Device: No Assistive Device  Toilet Transfer Assistance: Activity Did not Occur    Functional Ambulation: unable to perform on this date.    Activities of Daily Living:  Feeding Level of Assistance: Minimum assistance <> Moderate assistance. ( Sitting supported in bedside chair, pt required assistance for set-up breakfast. Pt unable to hold utensil in L hand for cutting task. Pt able to cut food using side of fork and able to stab food bring to mouth. Pt dropped one food item into lap due to poor coordination. Pt required assistance to open small salt and pepper packages. Pt open syrup package with R hand using mouth to open package. Pt required v/c's for coordination and directional cueing     UE dressing level of assistance: Moderate assistance to don gown like jacket sitting EOB. Required therapist intervention multiple times for R/L lateral lean due to poor sitting balance.     LE Dressing Level of Assistance: Maximum assistance (Pt performed LB dressing with Max A. Sitting supported pt able to reach down to bring L UE in figure 4 postion. Poor trunk control requiring mod A from falling forward. Pt unable to maintain L LE in figure 4 postion requiring multiple attempts to hold L LE. Pt required min A to don sock over toe, pt able to pull sock over ankle. Pt required total A to don R sock. Therapist provided recommendation for LB dressing techniques.)    Grooming Position:  (Ax did not occur. Pt recieved breakfast  "during session )     Toileting Where Assessed: Bed level  Toileting Level of Assistance:  (Pt wearing briefs )     Balance:   Static Sit: POOR: Needs MODERATE assist to maintain  Dynamic Sit: POOR: N/A    Therapeutic Activities and Exercises:  Pt educated by therapist on:   - Pt educated on role of OT, POC, and goals for therapy.     - Importance of OOB ax's with staff member assistance   - Pt completed ADLs and functional mobility for treatment session as noted above   - Pt educated on safety throughout functional transfer training and hand placement when completing functional transfers.   -Pt verbalized understanding. Pt expressed no further concerns/questions.  -whiteboard updated    AM-PAC 6 CLICK ADL  How much help from another person does this patient currently need?  1 = Unable, Total/Dependent Assistance  2 = A lot, Maximum/Moderate Assistance  3 = A little, Minimum/Contact Guard/Supervision  4 = None, Modified [place]ullinger/Independent    Putting on and taking off regular lower body clothing? : 2  Bathing (including washing, rinsing, drying)?: 2  Toileting, which includes using toilet, bedpan, or urinal? : 2  Putting on and taking off regular upper body clothing?: 2  Taking care of personal grooming such as brushing teeth?: 3  Eating meals?: 3  Total Score: 14    AM-PAC Raw Score CMS "G-Code Modifier Level of Impairment Assistance   6 % Total / Unable   7 - 9 CM 80 - 100% Maximal Assist   10-14 CL 60 - 80% Moderate Assist   15 - 19 CK 40 - 60% Moderate Assist   20 - 22 CJ 20 - 40% Minimal Assist   23 CI 1-20% SBA / CGA   24 CH 0% Independent/ Mod I       Patient left up in chair with all lines intact, call button in reach and RN notified    Assessment:  Mao Levin is a 51 y.o. male with a medical diagnosis of Urinary tract infection. Pt presents with overall weakness, decreased functional mobility, decreased self-care skills, impaired safety awareness, and decreased UE/LE function. Pt performed bed " "mobility with varying levels of assistance from SBA <> Mod A. Pt demo'd poor sitting balance when sitting EOB requiring mod A from therapist for fall prevention. Pt use humor at times to disregard pt's physical deficits and stated that he has "gotten good at falling". Pt required redirection to session multiple times. Pt will continue to benefit from continued skilled OT in order to address the below stated deficits and to maximize pt's safety and (I) and return to PLOF. Pt stated that he has family members than can check on pt upon d/c but will not have 24 hr assistance in the home environment. At this time, pt requires increased assistance for all functional mobility and self-care skills. Upon d/c pt would benefit from IP rehab in order to maximize pt's safety and (I) with all functional mobility and self-care skills.     Rehab identified problem list/impairments: Rehab identified problem list/impairments: weakness, impaired endurance, impaired self care skills, impaired functional mobilty, impaired sensation, gait instability, impaired balance, decreased coordination, decreased upper extremity function, decreased lower extremity function, decreased safety awareness, abnormal tone, impaired fine motor, decreased ROM, impaired coordination    Rehab potential is good.    Activity tolerance: Fair    Discharge recommendations: Discharge Facility/Level Of Care Needs: rehabilitation facility     Barriers to discharge: Barriers to Discharge: Inaccessible home environment, Decreased caregiver support    Equipment recommendations: bedside commode, walker, rolling     GOALS:    Occupational Therapy Goals        Problem: Occupational Therapy Goal    Goal Priority Disciplines Outcome Interventions   Occupational Therapy Goal     OT, PT/OT Ongoing (interventions implemented as appropriate)    Description:  Goals to be met by: 8/26/17     Patient will increase functional independence with ADLs by performing:    UE Dressing with " Contact Guard Assistance.  LE Dressing with Minimal Assistance.  Grooming while seated with Contact Guard Assistance.  Supine to sit with Supervision.  Toilet transfer to bedside commode with Contact Guard Assistance.                      PLAN:  Patient to be seen 4 x/week to address the above listed problems via self-care/home management, therapeutic activities, therapeutic exercises, neuromuscular re-education  Plan of Care expires: 09/15/17  Plan of Care reviewed with: patient         Kirti NGUYEN Torrez, OT  08/16/2017

## 2017-08-16 NOTE — PROGRESS NOTES
Received Physician Orders from [facility] on 8/16/2017. Placed in BB folder for review and signature

## 2017-08-16 NOTE — PLAN OF CARE
Problem: Patient Care Overview  Goal: Plan of Care Review  Outcome: Ongoing (interventions implemented as appropriate)  Rounding every 2 hours, call light within reach and pt in room near nurses station to reduce risk of falls. Proper hand hygiene and wearing of gloves to reduce risk of infection. Encourage pt to reposition at least every 2 hours to reduce risk of pressure ulcers.

## 2017-08-17 ENCOUNTER — TELEPHONE (OUTPATIENT)
Dept: NEUROLOGY | Facility: CLINIC | Age: 52
End: 2017-08-17

## 2017-08-17 ENCOUNTER — OUTPATIENT CASE MANAGEMENT (OUTPATIENT)
Dept: ADMINISTRATIVE | Facility: OTHER | Age: 52
End: 2017-08-17

## 2017-08-17 NOTE — PT/OT/SLP DISCHARGE
Physical Therapy Discharge Summary    Mao Levin  MRN: [id number]   Urinary tract infection   Patient Discharged from acute Physical Therapy on 17.  Please refer to prior PT noted date on 17 for functional status.     Assessment:   Patient was discharge unexpectedly.  Information required to complete and accurate discharge summary is unknown.  Refer to therapy initial evaluation and last progress note for initial and most recent functional status and goal achievement.  Recommendations made may be found in medical record.  GOALS:    Physical Therapy Goals     Not on file          Multidisciplinary Problems (Resolved)        Problem: Physical Therapy Goal    Goal Priority Disciplines Outcome Goal Variances Interventions   Physical Therapy Goal   (Resolved)     PT/OT, PT Outcome(s) achieved     Description:  Goals to be met by: 2017     Patient will increase functional independence with mobility by performin. Supine to sit with Modified [place]  2. Sit to supine with Modified [place]  3. Sit to stand transfer with Stand-by Assistance  4. Bed to chair transfer with Stand-by Assistance using LRAD  5. Gait  x 50 feet with Contact Guard Assistance using LRAD  6. Ascend/descend 5 stair with right Handrails Moderate Assistance using LRAD  7. Lower extremity exercise program x 15 reps per handout, with assistance as needed                    Reasons for Discontinuation of Therapy Services  Transfer to alternate level of care.      Plan:  Patient Discharged to: Home with Home Health Service. PT recommended rehab    Jeanne Carranza DPT, PT  2017

## 2017-08-17 NOTE — PROGRESS NOTES
Thank you for the referral.   For your information:    The following patient has been assigned to Nata Lee RN with Outpatient Complex Care Management for high risk screening.    Reason: Urinary tract infection    Please contact [facility] at [phone] with any questions.    Thank you,  Antionette Garza

## 2017-08-17 NOTE — TELEPHONE ENCOUNTER
----- Message from William HUSAIN Fozia sent at 8/17/2017  8:25 AM CDT -----  Contact: Self @ [phone]  Pt said he was discharged from the hospital yesterday and Lawanda/Dr. Finnegan were going to recommend a pain management doctor. Pls call.

## 2017-08-18 ENCOUNTER — TELEPHONE (OUTPATIENT)
Dept: INTERNAL MEDICINE | Facility: CLINIC | Age: 52
End: 2017-08-18

## 2017-08-18 NOTE — TELEPHONE ENCOUNTER
----- Message from Nohemy Levin sent at 8/18/2017 11:07 AM CDT -----  Contact: Self/[phone]  RX request - refill or new RX.  Is this a refill or new RX:  Refill   RX name and strength: oxycodone (ROXICODONE) 15 MG Tab   Directions: Take 1 tablet (15 mg total) by mouth 3 (three) times daily as needed  Is this a 30 day or 90 day RX:    Pharmacy name and phone #: [facility]     [phone]   Comments:  Please advise      Thanks!!!

## 2017-08-18 NOTE — TELEPHONE ENCOUNTER
Tried to call patient - all narcotic pain meds stopped in the hospital.  They increased gabapentin, baclofen, treated UTI.  He has a follow up with pain management.  No oxycodone will be prescribed.

## 2017-08-18 NOTE — PT/OT/SLP DISCHARGE
Occupational Therapy Discharge Summary    Mao Levin  MRN: [id number]   Urinary tract infection   Patient Discharged from acute Occupational Therapy on 8/16/17.  Please refer to prior OT note dated on 8/16/17 for functional status.     Assessment:   Patient was discharge unexpectedly.  Information required to complete and accurate discharge summary is unknown.  Refer to therapy initial evaluation and last progress note for initial and most recent functional status and goal achievement.  Recommendations made may be found in medical record.  GOALS:    Occupational Therapy Goals     Not on file          Multidisciplinary Problems (Resolved)        Problem: Occupational Therapy Goal    Goal Priority Disciplines Outcome Interventions   Occupational Therapy Goal   (Resolved)     OT, PT/OT Outcome(s) achieved    Description:  Goals to be met by: 8/26/17     Patient will increase functional independence with ADLs by performing:    UE Dressing with Contact Guard Assistance.  LE Dressing with Minimal Assistance.  Grooming while seated with Contact Guard Assistance.  Supine to sit with Supervision.  Toilet transfer to bedside commode with Contact Guard Assistance.                    Reasons for Discontinuation of Therapy Services  Transfer to alternate level of care.      Plan:  Patient Discharged to: Home with Home Health Service.

## 2017-08-22 ENCOUNTER — TELEPHONE (OUTPATIENT)
Dept: NEUROLOGY | Facility: CLINIC | Age: 52
End: 2017-08-22

## 2017-08-22 ENCOUNTER — OUTPATIENT CASE MANAGEMENT (OUTPATIENT)
Dept: ADMINISTRATIVE | Facility: OTHER | Age: 52
End: 2017-08-22

## 2017-08-22 NOTE — TELEPHONE ENCOUNTER
"I spoke what Mao to let him know his Rituxan is approved and ready to be scheduled. He said he would talk with his mom and call me back with a date on which he can infuse.     Also, in regards to Dr. Aceves, pain specialist recommended, he said, "I called there an was told I can't see a doctor at this clinic b/c I already saw one there." He is asking a referral be sent to [facility] at [phone] (Fax), [phone] (Phone).   "

## 2017-08-22 NOTE — PATIENT INSTRUCTIONS
Bladder Infection, Male (Adult)    You have a bladder infection.  Urine is normally free of bacteria. But bacteria can get into the urinary tract from the skin around the rectum or it may travel in the blood from elsewhere in the body.  This is called a urinary tract infection (UTI). An infection can occur anywhere in the urinary tract. It could be in a kidney (pyelonephritis)or in the bladder (cystitis) and urethra (urethritis). The urethra is the tube that drains the urine from the bladder through the tip of the penis.  The most common place for a UTI is in the bladder. This is called a bladder infection. Most bladder infections are easily treated. They are not serious unless the infection spreads up to the kidney.  The terms bladder infection, UTI, and cystitis are often used to describe the same thing, but they arent always the same. Cystitis is an inflammation of the bladder. The most common cause of cystitis is an infection.   Keep in mind:  · Infections in the urine are called UTIs.  · Cystitis is usually caused by a UTI.  · Not all UTIs and cases of cystitis are bladder infections.  · Bladder infections are the most common type of cystitis.  Symptoms of a bladder infection  The infection causes inflammation in the urethra and bladder. This inflammation causes many of the symptoms. The most common symptoms of a bladder infection are:  · Pain or burning when urinating  · Having to go more often than usual  · Feeling like you need to go right away  · Only a small amount comes out  · Blood in urine  · Discomfort in your belly (abdomen), usually in the lower abdomen, above the pubic bone  · Cloudy, strong, or bad smelling urine  · Unable to urinate (retention)  · Urinary incontinence  · Fever  · Loss of appetite  Older adults may also feel confused.  Causes of a bladder infection  Bladder infections are not contagious. You can't get one from someone else, from a toilet seat, or from sharing a bath.  The most  common cause of bladder infections is bacteria from the bowels. The bacteria get onto the skin around the opening of the urethra. From there they can get into the urine and travel up to the bladder. This causes inflammation and an infection. This usually happens because of:  · An enlarged prostate  · Poor cleaning of the genitals  · Procedures that put a tube in your bladder, like a Hi catheter  · Bowel incontinence  · Older age  · Not emptying your bladder (The urine stays there, giving the bacteria a chance to grow.)  · Dehydration (This allows urine to stay in the bladder longer.)  · Constipation (This can cause the bowels to push on the bladder or urethra and keep the bladder from emptying.)  Treatment  Bladder infections are treated with antibiotics. They usually clear up quickly without complications. Treatment helps prevent a more serious kidney infection.  Medicines  Medicines can help in the treatment of a bladder infection:  · You may have been given phenazopyridine to ease burning when you urinate. It will cause your urine to be bright orange. It can stain clothing.  · You may have been prescribed antibiotics. Take this medicine until you have finished it, even if you feel better. Taking all of the medicine will make sure the infection has cleared.  You can use acetaminophen or ibuprofen for pain, fever, or discomfort, unless another medicine was prescribed. You can also alternate them, or use both together. They work differently and are a different class of medicines, so taking them together is not an overdose. If you have chronic liver or kidney disease, talk with your healthcare provider before using these medicines. Also talk with your provider if youve had a stomach ulcer or GI bleeding or are taking blood thinner medicines.  Home care  Here are some guidelines to help you care for yourself at home:  · Drink plenty of fluids, unless your healthcare provider told you not to. Fluids will prevent  dehydration and flush out your bladder.  · Use good personal hygiene. Wipe from front to back after using the toilet, and clean your penis regularly. If you arent circumcised, retract the foreskin when cleaning.  · Urinate more frequently, and dont try to hold it in for long periods of time, if possible.  · Wear loose-fitting clothes and cotton underwear. Avoid tight-fitting pants. This helps keep you clean and dry.  · Change your diet to prevent constipation. This means eating more fresh foods and more fiber, and less junk and fatty foods.  · Avoid sex until your symptoms are gone.  · Avoid caffeine, alcohol, and spicy foods. These can irritate the bladder.  Follow-up care  Follow up with your healthcare provider, or as advised if all symptoms have not cleared up within 5 days. It is important to keep your follow-up appointment. You can talk with your provider to see if you need more tests of the urinary tract. This is especially important if you have infections that keep coming back.  If a culture was done, you will be told if your treatment needs to be changed. If directed, you can call to find out the results.  If X-rays were taken, you will be told of any findings that may affect your care.  Call 911  Call 911 if any of these occur:  · Trouble breathing  · Difficulty waking up  · Feeling confused  · Fainting or loss of consciousness  · Rapid heart rate  When to seek medical advice  Call your healthcare provider right away if any of these occur:  · Fever of 100.4ºF (38ºC) or higher, or as directed by your healthcare provider  · Your symptoms dont improve after 2 days of treatment  · Back or abdominal pain that gets worse  · Repeated vomiting, or you arent able to keep medicine down  · Weakness or dizziness  Date Last Reviewed: 10/1/2016  © 2517-2829 [employer]. [address] [place] [address], [place] [address]. All rights reserved. This information is not intended as a substitute for professional  medical care. Always follow your healthcare professional's instructions.        Depression: Tips to Help Yourself  As your health care providers help treat your depression, you can also help yourself. Keep in mind that your illness affects you emotionally, physically, mentally, and socially. So full recovery will take time. Take care of your body and your soul, and be patient with yourself as you get better.    Be with others  Dont isolate yourself--youll only feel worse. Try to be with other people. And take part in fun activities when you can. Go to a movie, [employer], [employer] service, or social event. Talk openly with people you can trust. And accept help when its offered.  Keep your perspective  · Depression can cloud your judgment. So wait until you feel better before making major life decisions, such as changing jobs, moving, or getting  or .  · This illness is not your fault. Dont blame yourself for your depression.  · Recovering from depression is a process. Dont be discouraged if it takes some time to feel better.  · Depression saps your energy and concentration. So you wont be able to do all the things you used to do. Set small goals and do what you can.  Take care of your body  People with depression often lose the desire to take care of themselves. That only makes their problems worse. During treatment and afterward, make a point to:  · Exercise. Its a great way to take care of your body. And studies have shown that exercise helps fight depression.  · Avoid drugs and alcohol. These may ease the pain in the short term. But theyll only make your problems worse in the long run.  · Get relief from stress. Ask your healthcare provider for relaxation exercises and techniques to help relieve stress.  · Eat right. A balanced and healthy diet helps keep your body healthy.  Date Last Reviewed: 1/19/2015  © 5955-7558 [employer]. [address] [place] [address], [place] [address]. All  rights reserved. This information is not intended as a substitute for professional medical care. Always follow your healthcare professional's instructions.        Fall Prevention  Falls often occur due to slipping, tripping or losing your balance. Millions of people fall every year and injure themselves. Here are ways to reduce your risk of falling again.  · Think about your fall, was there anything that caused your fall that can be fixed, removed, or replaced?  · Make your home safe by keeping walkways clear of objects you may trip over.  · Use non-slip pads under rugs. Do not use area rugs or small throw rugs.  · Use non-slip mats in bathtubs and showers.  · Install handrails and lights on staircases.  · Do not walk in poorly lit areas.  · Do not stand on chairs or wobbly ladders.  · Use caution when reaching overhead or looking upward. This position can cause a loss of balance.  · Be sure your shoes fit properly, have non-slip bottoms and are in good condition.   · Wear shoes both inside and out. Avoid going barefoot or wearing slippers.  · Be cautious when going up and down stairs, curbs, and when walking on uneven sidewalks.  · If your balance is poor, consider using a cane or walker.  · If your fall was related to alcohol use, stop or limit alcohol intake.   · If your fall was related to use of sleeping medicines, talk to your doctor about this. You may need to reduce your dosage at bedtime if you awaken during the night to go to the bathroom.    · To reduce the need for nighttime bathroom trips:  ¨ Avoid drinking fluids for several hours before going to bed  ¨ Empty your bladder before going to bed  ¨ Men can keep a urinal at the bedside  · Stay as active as you can. Balance, flexibility, strength, and endurance all come from exercise. They all play a role in preventing falls. Ask your healthcare provider which types of activity are right for you.  · Get your vision checked on a regular basis.  · If you have  pets, know where they are before you stand up or walk so you don't trip over them.  · Use night lights.  Date Last Reviewed: 11/5/2015 © 2000-2016 [employer]. [address] [place] [address], [place] [address]. All rights reserved. This information is not intended as a substitute for professional medical care. Always follow your healthcare professional's instructions.        Preventing Falls: Are You At Risk of Falling?  As you get older, you're not as steady on your feet as you once were. And you may have health problems you didn't have when you were younger. So, it's not surprising that older people are more likely to trip and fall. Falling can be very serious. It can change your overall health and quality of life. That's why it's important to be aware of your own risk of falling.    The dangers of falling  Falls are one of the main causes of injury in people over age 65. An older person who falls may take longer to get better than a younger person. And, after a fall, an older person is more likely to have problems that don't go away. So, preventing falls can help you avoid serious health problems.  Are you at risk of falling?  Answer these questions to rate your level of risk.  · Are you a woman?  · Have you fallen or stumbled in the last year?  · Are you over age 65?  · Are you ever dizzy or lightheaded with standing?  · Do you have a hard time getting in and out of the bathtub or on and off the toilet?  · Do you lean on objects to help you get around? Or do you use a cane or walker?  · Do you have vision or hearing problems? For example, do you need new glasses or hearing aids?  · Do you have 2 or more long-lasting (chronic) medical conditions?  · Do you take 3 or more medicines?  · Have you felt depressed recently?  · Have you had more trouble with your memory in recent months?  · Are there hazards in your home that might cause you to fall, such as loose rugs or poor lighting?  · Do you have a pet that  "jumps on you or might trip you?  · Have you stopped getting regular exercise?  · Do you have diabetes?   · Do you have a neurologic disease, such as Parkinson or Alzheimer disease?   · Do you drink alcohol?  · Do you wear athletic shoes or slippers, or go barefoot at home?  You can help prevent falls  If you answered "yes" to any of the above questions, you should take steps to reduce your risk of a fall. Monitoring health conditions and keeping walkways in your home free of clutter are just 2 ways. Changing is sometimes easier said than done. But keep in mind that even small changes can make you less likely to fall.  The fear of falling  It's normal to be scared of falling, especially if you've fallen before. But being afraid can actually make you more likely to fall. This is because:  · Fear might cause you to become less active. Being less active can lead to a loss of strength and balance.  · Fear can lead to isolation from others, depression, or the use of more medicines or alcohol. And all these things make falling even more likely.  To break the cycle, learn more about ways to avoid falling. As you take control, you may find yourself feeling less afraid.   Date Last Reviewed: 6/12/2015  © 5666-4376 [employer]. [address] [place] [address], [place] [address]. All rights reserved. This information is not intended as a substitute for professional medical care. Always follow your healthcare professional's instructions.        Preventing Falls: Make Your Health a Priority  Having a health problem can make you more likely to fall. Taking certain kinds of medicines may also increase your risk of falls. So, improving your health can help you avoid a fall. Work with your healthcare provider to manage health problems and to review your medicines. If you have your health under control, your risk of falling is lessened.    How chronic conditions increase your fall risk  Health problems like diabetes, high or low " blood pressure, and arthritis are called chronic health conditions. They can be managed, but they don't go away. Chronic health problems put you at greater risk of a fall. This is because they can affect many parts of your body. They may cause problems with movement, balance, or vision. And certain medicines you take for them may have side effects, such as dizziness or drowsiness. These side effects also increase your risk.  Work with your healthcare provider  Your healthcare provider can work with you to help prevent a fall. See your healthcare provider for a medical exam each year. Go more often if you have symptoms, such as leg numbness or dizziness, that could raise your risk of falling. If you have a history of falls, let your provider know. Bring a list of your medicines to review with your healthcare provider. Discuss your nutrition and exercise routine. And ask whether you need any tests to assess your risk of falling.    Review your medicines  Medicines (even ones you buy over the counter) can cause side effects that lead to a fall. Common medicines that cause these kinds of side effects are blood pressure, heart, or pain medicines, medicines for sleep, and antidepressants. Store pain medicine in a secure area, such as an upper cabinet in your kitchen or bathroom. Don't mix different pills in the same bottle. Always store and travel with medicines in their original containers with clear labels. This will reduce the chance of taking the wrong medicine or too much of a medicine. Taking too much of a medicine or taking the wrong medicine may cause side effects that can increase your risk of falling.  Also, the way your body reacts to medicines can change as you age. So, certain medicines that were fine in the past may cause side effects now. Your healthcare provider (such as your doctor or pharmacist) can help review your medicines and make changes if needed.  Get your eyes and ears checked  Problems with  vision or hearing can lead to falls:  · Get your eyes checked at least once a year. Take time to adjust to new glasses.  · Get your hearing checked at least every other year.  · Have your doctor check your inner ear for problems that may affect your balance.  Get the right nutrition  If you don't get enough to eat or drink, you can become dizzy and fall:  · Your sense of thirst decreases with age. Drink water throughout the day.  · Eat breakfast. Plan regular meals.  · Ask your healthcare provider whether you need supplements. These can help strengthen your bones and muscles to help prevent falls. They can also help prevent fractures if you do fall.  Stay as active as you can  Staying active is one of the best things you can do to prevent falls. Keep in mind that doing too little can be as risky as doing too much. That's because not being active can make you weaker and more likely to fall. Balance, flexibility, strength, and endurance all come from exercise. They all play a role in preventing falls. Ask your healthcare provider which types of activity are right for you.  When to call your healthcare provider  Be sure to call your healthcare provider if you fall. Also call if you have any of these signs or symptoms (someone else may need to point them out to you):  · Feeling lightheaded or dizzy more than once a day  · Losing your balance often or feeling unsteady on your feet  · Feeling numbness in your legs or feet, or noticing a change in the way you walk  · Having a steady decline in your memory or mental sharpness   Date Last Reviewed: 6/13/2015  © 1544-5978 [employer]. [address] [place] [address], [place] [address]. All rights reserved. This information is not intended as a substitute for professional medical care. Always follow your healthcare professional's instructions.        Understanding Multiple Sclerosis (MS)  Multiple sclerosis (MS) is a disease of the brain and spinal cord. Unfortunately,  there is no cure for MS. But there are many treatments, and many people with MS can manage their symptoms and lead active, healthy lives. Read on to learn more about MS and its treatments.    What is MS?  The brain is the bodys control center. Each part of the brain controls specific functions. These include movement, balance, sensation, and reasoning. The brain controls these functions by sending and receiving messages through nerves. Nerves have a protective covering (myelin). With MS, the myelin on nerves in the brain and spinal cord is damaged. The loss of this covering causes messages traveling along affected nerves to slow or stop. This results in MS symptoms.  Causes and risk factors for MS  The cause of MS is unknown. But most research suggests that MS is an immune-mediated disease. This means the bodys immune system is activated by some unknown agent that leads it to attack and destroy myelin by mistake. MS most often begins in adults between the ages of 20 and 40. It happens in women more often than men. It also is more likely to occur if a person has a family history of MS. Smoking does not cause MS, but can make it worse.    Types of MS  There are three main types of MS. These are:  · Relapsing-remitting. This type of MS is the most common. It is marked by isolated episodes of symptoms (also called attacks or flare-ups). Periods of partial or complete recovery follow these episodes. Each attack may be worse than the one before it.  · Primary-progressive. This type of MS is marked by a slow onset of symptoms that gradually worsen over time. There are no periods of recovery.  · Secondary-progressive. This type of MS begins as relapsing-remitting MS. After a period of stability, the disease steadily worsens. About half the patients with relapsing-remitting MS have secondary-progressive MS within 10 years of their first attack. About all of them have it within 25 years.  · Progressive-relapsing. This type  of MS includes both slowly progressive symptoms and episodic flare-ups together.   Symptoms of MS  The symptoms of MS vary from person to person. The type of symptoms a person has depends on the location and extent of damage to the nerves within the brain and spinal cord. A person can also have different symptoms during the course of the disease. Symptoms can include:  · Fatigue (tiredness)  · Numbness, tingling, or loss of sensation  · Pain  · Muscle spasms or weakness in the arms, legs, or both  · Vision problems, such as rapid eye movements, double vision, or vision loss  · Balance and coordination problems  · Problems walking or moving the arms, legs, or both  · Bowel and bladder control problems  · Problems with sexual function  · Dizziness  · Trouble concentrating, focusing, or remembering things  · Trouble reasoning and solving problems  · Trouble speaking or swallowing  · Depression  Diagnosing MS  MS can be hard to diagnose. Symptoms come and go. They also may be similar to those of other health problems. A diagnosis of MS is not made unless a person has had at least two or more separate episodes of MS symptoms. To confirm a diagnosis of MS, your healthcare provider will take a detailed history of symptoms. Your healthcare provider will also perform a neurologic exam to check your muscle strength, balance, coordination, and reflexes. Skills such as thinking, memory, vision, hearing, and talking are also checked. In addition, healthcare providers may also give you the following tests:  · Magnetic resonance imaging (MRI) scan. This test provides detailed pictures of the brain and spinal cord. It helps check for areas of damaged nerves. These are often referred to as lesions or plaques.  · Evoked potentials. These tests check how fast and well your bodys nerves respond to specific types of sensory stimulation, such as flashing lights, loud sounds, or little electric shocks.  · Spinal tap (also called lumbar  puncture). This test checks the health of the fluid around your brain and spinal cord for signs of nerve sheath damage (demyelination).  · Blood tests. These help rule out other causes of the symptoms.  Treating MS  The goal of treatment is to manage your symptoms and to slow the rate at which the disease worsens. You can manage your symptoms in one or more of the following ways:  · Medicines. Some medicines help keep your bodys immune system from attacking the myelin. This may reduce the frequency and severity of attacks. Other medicines help control symptoms or relieve pain when attacks happen.  · Rehabilitation (rehab). Symptoms or problems due to MS can interfere with daily living. Rehab, which includes physical, occupational, or speech therapy, can help you maintain strength and function. If needed, your healthcare provider will prescribe aids such as canes, walkers, or wheelchairs. You can also make changes in your work or living space to improve your safety.  · Supportive services. These include counseling and support groups to help you cope with the challenges of living with MS. Family members and friends may also benefit from these services.  · Lifestyle changes. Making certain changes in your lifestyle and daily routine may help you manage symptoms. This includes getting enough rest and regular exercise. It also includes eating healthy foods and reducing stress. Identifying and avoiding triggers of MS episodes can also help.  · Other treatments. Researchers are exploring new treatments for MS. Many of these are in clinical trials. This means they are being tested for safety and effectiveness. Your healthcare providers can give you more information about any treatments that might be an option for you.  Long-term concerns  The course of MS is unpredictable, and your experience will be different from others' experiences. In general, if you have MS, you should have regular visits with your healthcare  provider. Your healthcare provider will monitor your symptoms and review the effectiveness of your medicines and other treatments. MS symptoms may get worse as your disease progresses. If this happens, you may need more aggressive care and treatments. Visit the resources below to learn more about MS and what advances researches are making to find a cure.  Date Last Reviewed: 9/13/2015  © 1237-5135 [employer]. [address] [place] [address]. All rights reserved. This information is not intended as a substitute for professional medical care. Always follow your healthcare professional's instructions.        Using a Walker  To use your walker, you will need to learn a new gait, or way to walk. Your health care provider will tell you to use either a non-weight-bearing gait (which puts no weight on one leg and foot) or a weight-bearing gait (which puts weight on both legs and feet).  Non-weight-bearing gait       1. Hold the affected (injured or weaker) foot off the floor. 2. Lift the walker (roll it if youre using a wheeled walker). Move it forward about 12 inches. 3. Support your weight on your hands. Swing the unaffected (uninjured or stronger) foot forward to the center of the walker.   Weight-bearing gait       1. Roll the walker (lift it if youre using an unwheeled walker). Move it forward about 12 inches. 2. If you have an injured leg, a new joint, or a weaker side, step forward with that foot first. Use the walker to help you keep your balance as you take the step. 3. Bring your other foot forward to the center of the walker.   Date Last Reviewed: 9/3/2015  © 6876-9657 [employer]. [address] [place] [address]. All rights reserved. This information is not intended as a substitute for professional medical care. Always follow your healthcare professional's instructions.

## 2017-08-22 NOTE — PROGRESS NOTES
"Talked to patient to perform initial assessment for OPCM.  Patient is a 51 y/o male with dx of Ms, Paraparesis, Abnormal Coordination, Depression, Bilat Leg Pain, UTI.  Patient stated that he is primary caregiver and that he does live with three roommates. Patient stated that his mother takes him to and from MD appointments.   PHQ-9 performed and score of 20 obtained which warrants MD should be notified and use clinical judgement about treatment, based on patient's duration of symptoms and functional impairment.  Medication reconciliation performed and stated that "the only medicine that I am taking is my Xarelto and Bactrim." Also asked patient if he is needing assistance in getting his medications, patient stated "I don't need assistance. I can afford them.  I just don't have them."   I will in-basket message both Dr. Finnegan and Dr. Alarcon of both PHQ-9 score and also about patient not being compliant with his medications. I will also in-basket message Dr. Finnegan for the M.S./S.W. To contact patient with any possible assistance to help patient.  When asked patient about fall history within the past year, patient stated that "I have fallen over 50 times within the past year." Patient stated that he does have a walker and wheelchair to assist with ambulation.   Patient stated that "I am about to leave [facility] because none of the doctors are giving me pain medicines.  The only doctor that returns my calls is Dr. Finnegan."  Patient stated that he does not have a living will, MPOA or FPOA.  Encouraged patient to call this RN if having any questions/concerns.  I will mail my contact information should any new problems/concerns arise in the future.  I will mail literature about [facility].  I will mail patient educational materials regarding:  M.S., UTI, Advance Directives and Fall Prevention.  Will consult RISSA Sales-DONNA for coordination of care and for any possible community resources that would be " available for patient.  Will admit to OPCM.  Will continue to follow.  BABAK Lee, OPCM-RN

## 2017-08-22 NOTE — PROGRESS NOTES
Called Pain Management department and spoke to Marie ([phone]). Patient is wanting to see a different doctor in same pain management group.  Marie stated that patient would need to call to make an appointment with a nurse practitioner within the pain management group and the NP will change physicians.  Patient is requesting to see Dr. Aceves for his pain.  Patient given instructions to call pain management department and patient will make appointment to accomodate his schedule. Patient verbalized understanding. Will continue to follow.  BRIANA Loyola-ANGELA

## 2017-08-22 NOTE — TELEPHONE ENCOUNTER
"----- Message from Nata Lee RN sent at 8/22/2017  1:22 PM CDT -----  Hi. This is Nata Lee RN. I am with the Outpatient case management team with [facility]. I received a referral on the above patient. I spoke with patient today on the telephone.    PHQ-9 performed and score of 20 obtained  which warrants MD should be notified and use clinical judgement about treatment, based on patient's duration of symptoms and functional impairment.  Please advise.    Medication reconciliation performed and stated that "the only medicine that I am taking is my Xarelto and Bactrim." Also asked patient if he is needing assistance in getting his medications, patient stated "I don't need assistance. I can afford them.  I just don't have them."   Please advise.      Please notify patient of your recommendations.     Thank you,  Nata Lee R.N.  Outpatient Complex Case Manager  "

## 2017-08-24 ENCOUNTER — TELEPHONE (OUTPATIENT)
Dept: NEUROLOGY | Facility: CLINIC | Age: 52
End: 2017-08-24

## 2017-08-24 NOTE — TELEPHONE ENCOUNTER
Followed up with pt after his hospitalization and spoke to his [facility] , Marj [phone].  Pt missed his appointment with pain clinic today, advising he didn't have a ride.  He will call to reschedule.  Pt relies on his mother, friend or cab for transportation and hasn't used RTA or applied for Lift.  I assisted pt by phone and will mail the completed application to his home for review and signature.  I will include a stamped enveloped addressed to the RTA.  Pt denies other needs at this time.  Spoke to Marj, who has worked with pt for some time.  She shared that he is residing in a boarding home, renting a room.  His mother lives nearby and brings him meals, daily, cleans his room and does his laundry.  Pt could live with his mother but does not as he wants to maintain his independence.  Marj has attempted to help pt with housing, RTA Lift applications and other needs but explained pt does not follow through. She has also already added him to the Medicaid Community Choices Waiver wait list.  She has encouraged pt to consider obtaining a power wheelchair to increase his mobility and she was hopeful that a pain specialist would assist with that referral.  I agreed to speak with MS provider about power wheel chair and possible referral and advised on my efforts to assist with RTA Lift application.

## 2017-08-24 NOTE — TELEPHONE ENCOUNTER
----- Message from Randolph Dowell sent at 8/24/2017  3:05 PM CDT -----  Contact: Zenobia with [facility] @ [phone]  Caller is calling to get a verbal to re-certify pt, pls call

## 2017-08-28 ENCOUNTER — OUTPATIENT CASE MANAGEMENT (OUTPATIENT)
Dept: ADMINISTRATIVE | Facility: OTHER | Age: 52
End: 2017-08-28

## 2017-08-28 ENCOUNTER — TELEPHONE (OUTPATIENT)
Dept: FAMILY MEDICINE | Facility: CLINIC | Age: 52
End: 2017-08-28

## 2017-08-28 NOTE — PROGRESS NOTES
Talked to patient to update plan of care for OPCM.  Patient stated that he does not have a disaster plan at this time.  Instructed patient to call [facility] Office of Emergency Preparedness at [phone] and verbalized understanding.  Patient stated that [facility] is starting services today but no RN has come to his home thus far.  I will call [facility] at [phone] and talked to Catherine.  Catherine stated that patient will have [facility]-RN visit once a week and PT twice a week.  Patient stated that he was interested in other HH options.  I will mail patient a Senior Resource Guide and he verbalized understanding.  Patient stated that DONNA Thakkar (MS) did contact patient and she is assisting with trying to arrange transportation to and from MD appointments and he verbalized understanding.  Also patient stated that no one from Dr. Davis office called to set up an evaluation appointment for a power wheelchair.  I will in-basket message Dr. Davis's staff to arrange for a power wheelchair evaluation. Patient stated that he has not had any falls, SOB or cough since last conversation.  Patient stated that he did receive all educational materials in the mail but has not had a chance to review at this time.  Encouraged patient to call this RN if having any questions/concerns. Will continue to follow.  BABAK Lee, OPCM-RN

## 2017-08-28 NOTE — TELEPHONE ENCOUNTER
----- Message from Nata Lee RN sent at 8/28/2017  2:39 PM CDT -----  Hi. This is Nata Lee RN. I am with the Outpatient case management team with [facility]. I received a referral on the above patient. I spoke with patient today on the telephone.    Patient is requesting for a appointment with Dr. Whitman on 8/31 since he has an appointment with Dr. Martin at 0745 on 8/31.   Please assist since patient has limited transportation.  Please advise.        Please notify patient of your recommendations.     Thank you,  Nata Lee R.N.

## 2017-08-29 NOTE — TELEPHONE ENCOUNTER
Follow up with Isabel of [facility].  We discussed his home health services and Isabel advised pt is refusing visits from HH aide and is not following through on resources provided by , therefore these services will terminate.  We discussed importance of PT/OT and she will continue as pt does participate and seems to be making progress.

## 2017-09-05 ENCOUNTER — OUTPATIENT CASE MANAGEMENT (OUTPATIENT)
Dept: ADMINISTRATIVE | Facility: OTHER | Age: 52
End: 2017-09-05

## 2017-09-05 NOTE — PROGRESS NOTES
"Attempted to update plan of care for OPCM; patient stated that this is not a good time since "I am going to the bank."  Patient asked this RN to call at a later date and time.  BABAK Lee, OPCM-RN  "

## 2017-09-06 ENCOUNTER — TELEPHONE (OUTPATIENT)
Dept: NEUROLOGY | Facility: CLINIC | Age: 52
End: 2017-09-06

## 2017-09-06 ENCOUNTER — OUTPATIENT CASE MANAGEMENT (OUTPATIENT)
Dept: ADMINISTRATIVE | Facility: OTHER | Age: 52
End: 2017-09-06

## 2017-09-06 NOTE — TELEPHONE ENCOUNTER
----- Message from Brigette Batista sent at 9/6/2017 12:39 PM CDT -----  Contact: Patient [phone]  Patient is requesting a call from Cyndi to discuss his MS. Please call

## 2017-09-06 NOTE — PROGRESS NOTES
Patient was referred by OPCM RN Nata Lee for psychosocial support. Attempted to contact patient to complete OPCM SW Assessment.  Left voice mail message requesting return call. Will try again at a later date.

## 2017-09-07 ENCOUNTER — TELEPHONE (OUTPATIENT)
Dept: INTERNAL MEDICINE | Facility: CLINIC | Age: 52
End: 2017-09-07

## 2017-09-07 NOTE — TELEPHONE ENCOUNTER
----- Message from Holly Zelaya sent at 9/7/2017 10:19 AM CDT -----  Contact: [facility]/isidro/[phone]  [facility] called in regards to getting  orders so the pt can talk to them and get admitted into a nursing home.        Please advise

## 2017-09-08 ENCOUNTER — OUTPATIENT CASE MANAGEMENT (OUTPATIENT)
Dept: ADMINISTRATIVE | Facility: OTHER | Age: 52
End: 2017-09-08

## 2017-09-08 ENCOUNTER — TELEPHONE (OUTPATIENT)
Dept: NEUROLOGY | Facility: CLINIC | Age: 52
End: 2017-09-08

## 2017-09-08 NOTE — TELEPHONE ENCOUNTER
Phone call to pt's [facility]  Marj [phone] to discuss pt's request for assisted living as she has spoken with him about this issue, previously.  Awaiting her return call.

## 2017-09-08 NOTE — TELEPHONE ENCOUNTER
----- Message from Christina Angelo LCSW sent at 9/8/2017  2:15 PM CDT -----  Regarding: RE: Care coordination  Thank you, Ariane.  I spoke with Mr. Levin today and he is requesting assistance with mental health counseling (PHQ9 = 20; conducted by OPCM RN on 8/22/17; patient denies S/I/H/I today); nursing home placement; assistance with completing paratransit application which he has; and assistance with paying his medical bills.  Mr. Levin also mentioned that someone from [facility] visits him every 7-10 days but he was not sure of her discipline, but thought she may be a .   Her name is Solange ([phone]).  He did not mention  social work involvement,but said he does receive OT.  Mr. Levin also expressed dissatisfaction with his [facility] providers and said no one does anything for his medical needs except his MS doctor.  He described a breach in care collaboration.  I have asked the the [facility] RN, Nata Lee, to contact him to coordinate his medical care.     Please let me know if there is anything further I can do.  I will close his case since you are following him.    Best regards,    Christina  ----- Message -----  From: Ariane Sinha LCSW  Sent: 9/8/2017   1:37 PM  To: Christina Angelo LCSW  Subject: RE: Care coordination                            Yes, I'm still working with him and with his [facility] . If other [facility] physicians refer him to your program please advise that they can contact me directly.  Thanks, Christina!    ----- Message -----  From: Christina Angelo LCSW  Sent: 9/8/2017  12:19 PM  To: Ariane Sinha LCSW  Subject: Care coordination                                Nacho Thakkar,    This patient was referred to [facility] for social service support.  However, I noticed in the chart that you have been following Mr. Levin.  Are you still working with him?    Kind regards,    Christina

## 2017-09-08 NOTE — TELEPHONE ENCOUNTER
----- Message from William Marceol sent at 9/7/2017  4:08 PM CDT -----  Contact: Self @ [phone]  Pt is requesting to speak with the nurse in regards to experiencing MS symptoms due to not taking medication because he's waiting to receive it. Pls call.

## 2017-09-08 NOTE — PROGRESS NOTES
Received message from VEE Sales to call patient in regards to his feelings regarding [facility].  VEE Sales stated that the patient did tell her that Solange with [facility] is also working with him.  This RN called Solange Ramsey[facility]ADLY [facility] [phone].  She is the Care Manager that does make house visits with him about every 7-10 days through [facility].  Solange stated that she has had him for a month and that she is working on finding him a PCP that he likes and that she stated that his Human plan does not have the transportation portion therefore he has no transportation benefits.  She had no idea about the appointment for the power wheelchair that I set up on 9/25 with Dr. Davis.   She also didnt know that he is seeing Dr. Finnegan as well for his MS.  And he told her that only one  is working with him to assist him.  [place] than what they have here in [facility]. Solange stated that she will call me next week when she is due to visit with him and give an updated plan of care.      Called patient in regards to his care.  Patient stated that he would be willing to attend a scheduled appointment with Dr. COMFORT Miller.  This RN spoke to YAO Costa MA in regards to patient's history and patient needing to get established with a PCP.   I will in-basket message Dr. Miller in regards for patient to get established [facility].  Will continue to coordinate care with Vee Sales.  Will continue to follow.  BRIANA Loyola-RN

## 2017-09-08 NOTE — PROGRESS NOTES
Patient referred by OPCM RN Nata Lee for psychosocial support.  Chart reviewed.  [facility] SW Assessment completed with patient today telephonically with patient.  Patient is a 52 year-old male with hx of MS, Paraparesis, UTI, and other co-morbid conditions. Patient reports that he has difficulty performing ADL/IDALs.  He states that he cannot write.  Patient said his mother and sister live locally but provide little support except to call him.  Patient is requesting assistance with mental health counseling (PHQ9 = 20; conducted by OPCM RN on 8/22/17; patient denies S/I/H/I today); nursing home placement; assistance with completing paratransit application which he has; and assistance with paying his medical bills.  Mr. Levin also mentioned that someone from [facility] visits him every 7-10 days but he was not sure of her discipline, but thought she may be a .   Her name is Solange ([phone]). Patient said he receives HH OT.  Patient also expressed dissatisfaction with his [facility] providers and said no one does anything for his medical needs except his MS doctor.  He described a breach in care collaboration.  LCSW requested follow-up by [facility] RN, Nata Lee, to assist with coordinating his medical care. LCSW also collaborated with RADHA JohnW, [facility] MS [place], who said she is following patient.  Ms. Sinha was informed of patient's social and mental health needs for follow-up.  Ms. Sinha said she has also been collaborating with patient's  .  Patient did not mention to this LCSW that he has a   involved in his care. This LCSW will continue collaboration with OPCM RN and LCSW Ariane Sinha as needed.    Interventions:  Per POC    Follow-up plan:  Monitor for case collaboration and follow-up by RN and [facility] LCSW as appropriate.

## 2017-09-08 NOTE — TELEPHONE ENCOUNTER
"Spoke with pt by phone regarding multiple needs as outlined in note from [facility] , Christina Angelo.  Pt states he is now considering nursing home placement as he cannot care for himself, however he wanted to make sure he doesn't have to stay there forever if it's not needed.  He's completed rehab in [facility] and [facility] and would prefer a long-term placement in [facility] or "another place that's even nicer".  I explained that I would talk with his [facility]  Marj about this issue as she's previously worked with pt on this issue.  Followed up with pt about transportation, as I had mailed a fully completed (minus signature) RTA Lift application.  Pt stated he didn't complete the application because "it's hard for me to write".  When I explained that he simply needs to sign the form and complete one or two additional fields he said he'd take care of it, today.  I explained that having transportation would be needed if he wants to follow through with outpatient counseling, otherwise he may need a referral to an [facility] Program (this may be an appropriate referral anyway as pt has multiple needs, frequent hospitalizations, etc).      Regarding the coordination of pt's medical care, specifically his referral to pain medicine, pt was initially seen by Dr. Hawkins and ELAN Strickland at [facility] (2016), he cancelled his last scheduled appointment with them on 12/19/16, and was then referred back to pain medicine and met with Dr. Trevizo on 8/4/17 but walked out when the provider explained that he did not intend to treat pt's pain with opioids.  Pt had appointment schedule for 8/24/17 with ELAN Strickland but cancelled due to lack of transportation.  I contacted him and assisted with the RTA application, which he has yet to complete, and advised that he should call to reschedule his pain management appointment when he has a ride.  Pt does have " the support of his mother for transportation and other needs, though she may not always be able to assist him to appointments.     I am awaiting a return call from [place] at [facility] to coordinate care.  Pt is aware and in agreement with this plan.

## 2017-09-11 ENCOUNTER — TELEPHONE (OUTPATIENT)
Dept: INTERNAL MEDICINE | Facility: CLINIC | Age: 52
End: 2017-09-11

## 2017-09-11 ENCOUNTER — OUTPATIENT CASE MANAGEMENT (OUTPATIENT)
Dept: ADMINISTRATIVE | Facility: OTHER | Age: 52
End: 2017-09-11

## 2017-09-11 ENCOUNTER — TELEPHONE (OUTPATIENT)
Dept: NEUROLOGY | Facility: CLINIC | Age: 52
End: 2017-09-11

## 2017-09-11 ENCOUNTER — HOSPITAL ENCOUNTER (EMERGENCY)
Facility: HOSPITAL | Age: 52
Discharge: HOME | End: 2017-09-11
Attending: EMERGENCY MEDICINE
Payer: MEDICARE

## 2017-09-11 VITALS
BODY MASS INDEX: 27.47 KG/M2 | RESPIRATION RATE: 16 BRPM | HEART RATE: 62 BPM | DIASTOLIC BLOOD PRESSURE: 79 MMHG | WEIGHT: 175 LBS | HEIGHT: 67 IN | TEMPERATURE: 98 F | SYSTOLIC BLOOD PRESSURE: 133 MMHG | OXYGEN SATURATION: 100 %

## 2017-09-11 DIAGNOSIS — G35 MULTIPLE SCLEROSIS: ICD-10-CM

## 2017-09-11 DIAGNOSIS — Z76.0 ENCOUNTER FOR MEDICATION REFILL: Primary | ICD-10-CM

## 2017-09-11 DIAGNOSIS — G35 MS (MULTIPLE SCLEROSIS): Chronic | ICD-10-CM

## 2017-09-11 PROCEDURE — 99284 EMERGENCY DEPT VISIT MOD MDM: CPT | Mod: ,,, | Performed by: EMERGENCY MEDICINE

## 2017-09-11 PROCEDURE — 25000003 PHARM REV CODE 250: Performed by: STUDENT IN AN ORGANIZED HEALTH CARE EDUCATION/TRAINING PROGRAM

## 2017-09-11 PROCEDURE — 99284 EMERGENCY DEPT VISIT MOD MDM: CPT

## 2017-09-11 RX ORDER — METHOCARBAMOL 500 MG/1
500 TABLET, FILM COATED ORAL
Status: COMPLETED | OUTPATIENT
Start: 2017-09-11 | End: 2017-09-11

## 2017-09-11 RX ORDER — DIAZEPAM 5 MG/1
5 TABLET ORAL 2 TIMES DAILY PRN
Qty: 6 TABLET | Refills: 0 | Status: SHIPPED | OUTPATIENT
Start: 2017-09-11 | End: 2017-12-21 | Stop reason: SDUPTHER

## 2017-09-11 RX ORDER — NAPROXEN 500 MG/1
500 TABLET ORAL
Status: COMPLETED | OUTPATIENT
Start: 2017-09-11 | End: 2017-09-11

## 2017-09-11 RX ADMIN — METHOCARBAMOL 500 MG: 500 TABLET ORAL at 12:09

## 2017-09-11 RX ADMIN — NAPROXEN 500 MG: 500 TABLET ORAL at 12:09

## 2017-09-11 NOTE — TELEPHONE ENCOUNTER
Please advise patient to come to clinic to establish care. His conditions are better addressed as an outpatient.    Thanks,  KJ

## 2017-09-11 NOTE — TELEPHONE ENCOUNTER
----- Message from Petra Cosby sent at 9/11/2017  3:50 PM CDT -----  Contact: Ramakrishna - [facility] at [phone]  Ramakrishna requesting a call back to discuss pt ASAP. Pt is falling all the time and she is concerned.

## 2017-09-11 NOTE — PROGRESS NOTES
Noted that patient is currently admitted in the hospital.  Will follow up with patient/caregiver once discharged from the hospital.  Will continue to coordinate care with LIA Sales for coordination of care.  BRIANA Loyola-ANGELA

## 2017-09-11 NOTE — ED TRIAGE NOTES
C/o pain to his entire body x 2 weeks, hx of multiple sclerosis, states he has been out of his routine prescription medication x 1 month

## 2017-09-11 NOTE — TELEPHONE ENCOUNTER
Pt phoned.  He's going to call 911 for ambulance because he can't walk.  Per phone call with pt last week he is now open to long-term placement in a nursing home, stating he cannot care for himself.  I will follow to see if he's admitted and then coordinate with inpatient CM and SW.  Phoned Marj, outpatient SW with update and will send message to [facility] and Christina Angelo LCSW Outpatient .

## 2017-09-11 NOTE — TELEPHONE ENCOUNTER
----- Message from Sasha Miller MD sent at 9/11/2017  8:21 AM CDT -----  Please get patient in to establish care, and explain the clinic.    Thanks!  KJ  ----- Message -----  From: Mirian Costa MA  Sent: 9/8/2017   4:17 PM  To: Sasha Miller MD        ----- Message -----  From: Nata Lee RN  Sent: 9/8/2017   4:02 PM  To: Angela Kruger    Hi. This is Nata Lee RN. I am with the Outpatient case management team with [facility]. I received a referral on the above patient. I spoke with patient today on the telephone.  Also did talk at length to Mirian about patient's hx.      Patient is needing a PCP to establish care.  Patient has hx of M.S., Paraparesis, Depression, Neurogenic Bladder, Bilat Leg Pain and had past saddle PE from 10/25/2016.  Also being seen by Dr. Finnegan for his Ms.  PHQ-9 was performed on 8/22 and score of 20 obtained.  Dr. Finnegan was made aware on 8/22/2017.     Also does have an appointment for a evaluation for a power wheelchair with Dr. Davis on 9/25 at 1300.       Please notify patient of your recommendations.     Thank you,  Nata Lee R.N.  Outpatient Complex Case Manager

## 2017-09-11 NOTE — ED PROVIDER NOTES
"Encounter Date: 9/11/2017    SCRIBE #1 NOTE: I, Pura Murphy, am scribing for, and in the presence of,  Dr. Lucas. I have scribed the following portions of the note - the Resident attestation.       History     Chief Complaint   Patient presents with    Pain     Pt presented to the ED via EMS. Pt c/o pain all over x 2 weeks. Pt has a hx of MS. Pt states he is out of his medications.      Pt is a 53 yo male who presents to the ED complaining of back pain and leg pain bilaterally. Pt states that he recently ran out of his pain medication a month ago and has not been receiving anything to manage his symptoms since. He is currently in conversation to be placed in a nursing home for an increased level of care however states he is working with the  to find a place that he likes. Pt was seen by pain management however states that he left because they "were not going to address his pain and just weren't treating him right." Prior to coming addison the ED he was on the phone with a nurse from his Neurology physician clinic and she was in process to schedlue him an appointment but said that the oain was too bad and elected to come into the ED instead. He states the pain is mainly in his buttocks and his legs B/L and has a sharp radiating character to it. Pt denies any urinary symptoms, CP, SOB, abdominal pain, or visual disturbances.      The history is provided by the patient.     Review of patient's allergies indicates:  No Known Allergies  Past Medical History:   Diagnosis Date    Anxiety     Deep vein thrombosis     Depression     Multiple sclerosis     Multiple sclerosis     Pulmonary embolism      History reviewed. No pertinent surgical history.  Family History   Problem Relation Age of Onset    Arthritis Mother     No Known Problems Father     Cancer Maternal Grandfather      Social History   Substance Use Topics    Smoking status: Current Some Day Smoker     Packs/day: 0.50     Years: 23.00    "  Types: Cigarettes    Smokeless tobacco: Never Used    Alcohol use No     Review of Systems   Constitutional: Negative for fever.   HENT: Negative for drooling.    Eyes: Negative for visual disturbance.   Respiratory: Negative for chest tightness.    Cardiovascular: Negative for chest pain.   Gastrointestinal: Negative for abdominal distention.   Endocrine: Negative for polyuria.   Genitourinary: Negative for dysuria.   Musculoskeletal: Positive for back pain and myalgias.   Skin: Negative for wound.   Neurological: Negative for light-headedness and headaches.       Physical Exam     Initial Vitals [09/11/17 1126]   BP Pulse Resp Temp SpO2   136/89 72 16 98.1 °F (36.7 °C) 97 %      MAP       104.67         Physical Exam    Nursing note and vitals reviewed.  Constitutional: He appears well-developed and well-nourished.   HENT:   Head: Normocephalic and atraumatic.   Eyes: Conjunctivae and EOM are normal. Pupils are equal, round, and reactive to light.   Neck: Normal range of motion. Neck supple. No JVD present.   Cardiovascular: Normal rate, regular rhythm and normal heart sounds. Exam reveals no friction rub.    No murmur heard.  Pulmonary/Chest: Breath sounds normal. No respiratory distress. He has no wheezes. He has no rales.   Abdominal: Soft. Bowel sounds are normal. He exhibits no distension. There is no tenderness.   Musculoskeletal: He exhibits tenderness. He exhibits no edema.   Pt active ROM limited 2/2 to MS. Tenderness to palpation on back B/L in lumbar region. Pt also endorses shooting pains radiating down his legs when I actively flexed or extended his legs.    Neurological: He is alert and oriented to person, place, and time.   Skin: Skin is warm and dry. Capillary refill takes less than 2 seconds. No rash noted. No erythema.         ED Course   Procedures  Labs Reviewed - No data to display          Medical Decision Making:   History:   Old Medical Records: I decided to obtain old medical  records.  Initial Assessment:   Pt is a 53 yo M who presents complaining of back pain and B/L leg pain.  Pt has MS and is actively being followed by Dr. Finnegan and has a home care nurse that comes to visit him. Pt has an appointment scheduled on 9/25 for a power wheelchair that was set up w/ Dr. Davis per social work and I gave pt a referral to follow up with Dr. Gallo in 2 days. He is currently working with a  to find him placement in a long term nursing home facility. Will treat the patient's pain here in the ED with robaxin and naproxen and refill his medication of diazepam for 2 days here in the ED until his next follow up appointment. Likely etiology of the pts pain is more than likely due to his MS and inconsistent continued follow up care. Pt was made aware that without consistent proper followup it would be difficult to manage his symptoms going forward. Pt does not require any labs here in the ED and I believe is stable for discharge.             Scribe Attestation:   Scribe #1: I performed the above scribed service and the documentation accurately describes the services I performed. I attest to the accuracy of the note.    Attending Attestation:   Physician Attestation Statement for Resident:  As the supervising MD   Physician Attestation Statement: I have personally seen and examined this patient.   I agree with the above history. -: 53 yo M with hx of MS and poor compliance presents for refill of pain medication. Pt takes hydrocodone and valium on an outpatient basis. He reports pain in bilateral hips and buttocks, unchanged from previous but states that he is off his home medications. No new sx. No point tenderness throughout, but generalized tenderness. Non-ill appearing. Informed the pt that we will not refill his chronic opioids but gave pt 3 day course of home valium and advised pt on the importance of close f/u with PCP.   As the supervising MD I agree with the above PE.    As  the supervising MD I agree with the above treatment, course, plan, and disposition.          Physician Attestation for Scribe:  Physician Attestation Statement for Scribe #1: I, Dr. Lucas, reviewed documentation, as scribed by Pura Murphy in my presence, and it is both accurate and complete.                 ED Course      Clinical Impression:   The primary encounter diagnosis was Encounter for medication refill. A diagnosis of Multiple sclerosis was also pertinent to this visit.                           Ryan Lucas MD  09/11/17 193

## 2017-09-11 NOTE — TELEPHONE ENCOUNTER
Patient informed me that he is unable to walk and that today he is going to call EMS to bring him to the hospital so that he can get his Transfusion and medication for muscle spasms .

## 2017-09-12 ENCOUNTER — TELEPHONE (OUTPATIENT)
Dept: NEUROLOGY | Facility: CLINIC | Age: 52
End: 2017-09-12

## 2017-09-12 NOTE — TELEPHONE ENCOUNTER
"Spoke with home health nurse about patient and she informed me that patient has had multiple falls , and patient informed her that "Something else is going on / I feel off" .    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      *Patient has been scheduled for this Saturday     "

## 2017-09-12 NOTE — TELEPHONE ENCOUNTER
Patient needs to come to clinic to establish care. Please advise him to agree to and attend an appointment. I can not address his needs through phone messages, and the emergency room is not an appropriate place to address chronic issues.    Thanks,  KJ

## 2017-09-12 NOTE — TELEPHONE ENCOUNTER
Phoned pt to follow up on ER visit and housing needs.  Pt was not admitted to [facility].  He advised his [facility] , [facility]j, sent some information on him to [facility] [place], today.  I phoned Marj to offer assistance but she advised nothing to be done at this time and she will call with update on her referral.

## 2017-09-18 ENCOUNTER — TELEPHONE (OUTPATIENT)
Dept: NEUROLOGY | Facility: CLINIC | Age: 52
End: 2017-09-18

## 2017-09-18 ENCOUNTER — OUTPATIENT CASE MANAGEMENT (OUTPATIENT)
Dept: ADMINISTRATIVE | Facility: OTHER | Age: 52
End: 2017-09-18

## 2017-09-18 NOTE — TELEPHONE ENCOUNTER
Spoke with pt by phone, today.  He is still interested in nursing home placement and does not have an update from [facility]  , [facility], about the status of any referrals she's made to nursing facilities.  So, with pt's permission, I contacted Hina at [facility] and will fax referral packet to her at [phone] after completing forms with provider.

## 2017-09-18 NOTE — LETTER
September 18, 2017    aMo Levin  [address] [facility] [address]             Outpatient Case Management  [address]  [facility] [address] Dear Mao Levin:    We understand that receiving many services from different doctors and healthcare providers is overwhelming. There are appointments to make, transportation to arrange, dietary instructions to understand, and new medications to obtain.    This is where [facility] can help.     You are eligible to receive Outpatient Case Management services when you have healthcare needs that require the coordination of many providers, treatments, and services. You also qualify if you need assistance with a new treatment plan.     There is no charge for this support. You may have been referred to this program from your doctor(s), hospital staff member(s), or insurance company but you always have a choice to participate or not participate. To participate, you must give us your permission to be enrolled.     When you are enrolled in the [facility] program, the  who is assigned to you is    Nata Lee RN  [phone]    Depending on your needs and wishes, your  may speak with you by phone, visit you at your place of living (for example your home, skilled nursing facility, or rehabilitation facility), or meet you at your doctors office.     Your  will tell you why you have been selected to participate in the program and will complete an assessment of your needs. Then a personalized plan of care will be developed with you and or your caregiver.             Here are examples of the services your [facility]  provides.     Coordinate communication among multiple providers.   Arrange for transportation, doctors visits, durable medical equipment, home care services, and special clinics.    Provide coaching on how to manage your health condition.    Answer  questions about your health condition.   Help you understand your doctors treatment plan.    Provide additional instruction about your health condition, treatments, and medications.    Help you obtain information about your insurance coverage.    Advocate for your individual needs.    Request a Licensed Clinical  (LCSW) to visit you if you need their services. LCSWs help with long term planning (discussing placement options, advanced planning directives), financial planning, and assistance (for example rent, utilities, medication funding).     Your  will coordinate their activities with other outpatient services you are receiving. All services provided by [facility]  are coordinated with and communicated to your primary care physician.    Our goal is to help you manage your health condition(s) safely within your living environment, whether that is your home or a medical facility. We want to help you function at the healthiest and highest level possible.     Sincerely,      Brenden Ma MD  Medical Director    Enclosures:    Frequently Asked Questions  Patient Rights and Responsibilities   Reporting a Grievance or Complaint  Consent/Release of Information  Stamped Addressed Envelope                  Frequently Asked Questions about [facility]    What is [facility]?  Outpatient Case management is not Home healthcare services. [facility]  do not provide hands-on care. [facility]  will work with your doctor to arrange for home health services, if needed. Home health services have a limited duration and there are some restrictions as to who can get these services. There is no prescribed limit to the amount of time you receive [facility] services. [facility]  are not agents of your insurance company. However, [facility] Case  Managers can help you obtain information from your insurance company.     Who are the [facility] ?  [facility]  are Registered Nurses and Social Workers. It is important to remember that you and your  are a team that works together with your primary care physician to create your individualized plan of care. The ultimate goal of your care plan is to help you implement your doctors treatment plan and to help you function at the highest level of health possible.     What are my rights as a patient?  It is important for you to know and understand your rights and responsibilities while receiving services from the [facility] program. We have enclosed a complete description of your rights and responsibilities. You can help to make your care more effective when you understand your right and responsibilities.     What is needed to be enrolled in the program?  You are only enrolled in the [facility] Program when you give us your consent to participate. You will find enclosed a consent form. You are receiving this letter because you or your caregivers have given us a verbal consent to enroll you in [facility]s Outpatient Case Management Program. We ask that you sign and return the enclosed written consent in the stamped self-addressed envelope.                           Patient Rights and Responsibilities    We consider you a partner in your care. When you are well informed, participate in treatment decisions and communicate openly with your doctor and other healthcare professionals, you help make your care more effective.     While you are in the Outpatient Case Management Program, your rights include the following:     You have a right to be provided services in a non-discriminatory manner in accordance with the provisions of Title VI of the Civil Rights Act of 1964, Section 504 of the Rehabilitation Act of 1973, the Age  Discrimination Act of 1975, the Americans with Disabilities Act as well as any other applicable Federal and State laws and regulations.     You have the right to a reasonable, timely response to your request or need for care, as well as the right to considerate and respectful care including an environment that preserves dignity and contributes to a positive self-image. You are responsible for being considerate and respectful of our staff.     You have a right to information regarding patient rights, advocacy services and complaint mechanisms, and the right to prompt resolution of any complaint. You or a designee has the right to participate in the resolution of ethical issues surrounding your care. You have a right to file a complaint if you feel that your rights have been infringed, without fear or penalty from [facility] or the federal government. You may file a complaint with the Director of Outpatient Case Management by calling [phone]. At any time, you may lodge a grievance with the [facility] and [facility] by calling [phone], or the Joint Commission on Accreditation of Healthcare Organizations at [phone].     You, or someone acting on your behalf, have the right to understandable information on your health status, treatment and progress in order to make decisions. You have the right to know the nature, risks and alternatives to treatment. You have the right to be informed, when appropriate, regarding the outcome of the care that has been provided. You have the right to refuse treatment to the extent permitted by law, and the right to be informed of the alternatives and consequences of refusing treatment.     You, in collaboration with your physician, have the right to make decisions regarding care and the right to participate in the development and implementation of the plan of care and effective pain management. You have the right to know the name and professional status of  those responsible for the delivery of your care and treatment.       You have a right, within legal guidelines, to have a guardian, next-of-kin or legal designee exercises your patient rights when you are unable to do so. You have the right for your wishes regarding end-of-life decisions to be addressed by the healthcare team through advance directives. You have the right to personal privacy and confidentiality and to expect confidentiality of all records and communications pertaining to your care.      You have the right to receive communications about your health information confidentially. You have the right to request restrictions on the uses and disclosures of your health information. You have the right to inspect, copy, request amendments and receive an accounting of to whom we have disclosed your health information.     You have the right to be provided with interpretation services if you do not speak English; to alternative communication techniques if you are hearing or vision impaired; and to have any other resources needed on your behalf to ensure effective communication. These services are provided free of charge.     You have a right to personal safety (free from mental, physical, sexual and verbal abuse, neglect and exploitation). You have the right to access protective and advocacy services.     Advance Directives  A Patient Advocate is available to meet with patients to answer questions regarding advance directives.    Living Will  A document that outlines what medical treatment the patient does or does not want in the event the patient becomes unable to make those decisions at the appropriate time.    Durable Medical Power of   A document by which the patient designates an individual to be responsible for making medical decisions in the event the patient becomes unable to do so.    HIPAA Notice of Privacy Practices  Your medical information is governed by federal privacy laws. HIPAA  protects private medical information and how that information is disclosed. If you have a question regarding the HIPAA Notice of Privacy Practices, or if you believe your privacy rights have been violated, you may call our designated hotline at [phone].            Quality Improvement  Because we consistently strive to improve the care and service provided to our patients, we welcome your feedback. Your comments are an important part of our quality improvement process, as we like to know what we are doing right and which areas are in need of improvement. Our policy is to listen, be responsive and provide you with an appropriate and timely follow-up to your questions or concerns. Our goal is active patient and family involvement in all aspects of the care process.        Reporting a Grievance or Complaint    During your time with the [facility] team you may have a grievance or complaint with our services. Your Patients Bill of Rights gives patients, families, and caregivers the right to express concerns and grievances and the right to expect a reasonable and timely response.     Your presentation of your concerns is not viewed negatively. It is an opportunity for us to improve the quality of our care and services we provide to you.     You may report your concerns directly to your , or you can phone in a complaint to:     Director of Outpatient Case Management  [phone]    You may also send a complaint letter to:    Director of Outpatient Case Management Services  [address] [place] [id number]    Tell us the details of your complaint and provide us with a contact phone number so we can contact you to obtain additional information. We will return a call to you within two business days of our receipt of your complaint, and to request additional information as needed. If you choose to mail a letter, your complaint may take a few days longer to reach us.      All grievances will be addressed as quickly as possible. A grievance or complaint that involves situations or practices which place patients in immediate danger will be addressed as an urgent matter. We will work to resolve all other complaints within seven days of receipt. By that time, you will receive a phone call with either the resolution of your complaint, or a plan for corrective action. A formal written response will be sent to you within 30 days of receipt of your grievance.     If a resolution cannot be completed within 30 days, a letter will be sent to you or your family member with an estimated time for the final response.    Additionally, all patients have the right to file complaints with external agencies, without exception. Complaints/grievances can be addressed to the following agencies:            Patient Safety or Quality of Care Concerns  Office of Quality Monitoring   [facility] [place] [address]  [phone] Toll Free    HIPPA Privacy/Security Concerns  Office for Civil Rights Region IV  [place] Department of Health & Human Services  [address], [address]  [place] [address] [phone] Phone  [phone] TDD  [phone] Toll Free    Medicare/Medicaid Billing Concerns  [place] for Medicare & Medicaid Services  Region 6  [address], [address]  [place] [address] [phone] Phone  [phone] Toll Free    General Concerns  [place] [facility] (Pending sale to [facility])  [phone] Toll Free Complaint Hotline                                          Consent Form/Release of Information    By signing--     (1) I agree I have read the Outpatient Case Management information provided to me;     (2) I agree to voluntarily participate in the Outpatient Case Management program;     (3) I understand I must consent to participation in the Outpatient Case Management program during my first interview with my Case  Manager;    (4) I consent to the discussion and release of my personal health information to my healthcare team (including my personal physician, my medical home care team, any specialty physician(s), and my [facility] team);     (5) I agree my consent is valid for the length of time I am receiving Outpatient Case Management;    (6) I agree to referrals to community resources which my Case Management team recommends for me. I agree to the release of my personal information and personal health information as necessary to referral sources.    ___________________________________________________________________  Patients Printed Name     ___________________________________________________________________  Patients Signature       Date    If patient is in being cared for, please complete this section:     ___________________________________________________________________  Printed Name of Person Caring For Patient   Relationship To Patient    ___________________________________________________________________   Signature of Person Caring For Patient     Date    PLEASE SIGN AND RETURN IN THE ENCLOSED PRE-ADDRESSED ENVELOPE.

## 2017-09-18 NOTE — PROGRESS NOTES
Please note an Outpatient Complex Care Management welcome packet and consent form was created and mailed to the patient on 9/18/17.    Please contact \[facility] [place] [facility]\"" at hhp. [phone] with any questions.    Thank you,    Vicki Dos Santos, SSC

## 2017-09-19 ENCOUNTER — OUTPATIENT CASE MANAGEMENT (OUTPATIENT)
Dept: ADMINISTRATIVE | Facility: OTHER | Age: 52
End: 2017-09-19

## 2017-09-19 NOTE — TELEPHONE ENCOUNTER
Pt phoned this morning asking for update on referral to [facility].  Update provided that form is pending review of physician.  Later in the morning, Isabel from [facility] called and explained that TB test could be completed at home through their agency, if this would help with placement.  Later same day[facility] [phone] requesting update on placement.  She was present with pt and he gave permission for us to talk.  Updated Mattie and pt, again.  Pt reports he fell this AM while showering.  He denies having anyone in his home to help him in these circumstances.  I'm concerned for his safety and openly spoke with patient about possible referral to [facility]PS for help.  At end of day, received signed PASRR and faxed to [phone], awaiting response from State.  Will continue with referral, tomorrow.

## 2017-09-19 NOTE — PROGRESS NOTES
Attempted to update plan of care for OPCM; unable to reach and unable to leave voice message.  BABAK Lee, OPCM-RN

## 2017-09-20 NOTE — TELEPHONE ENCOUNTER
Pt has been accepted to [facility], pending negative TB test.  Phoned Isabel at [facility] to request home TB test.  Awaiting her call back.  Phoned pt, who is happy about his acceptance and understands need for TB test.  Awaiting admit order template from Hina at [facility].

## 2017-09-20 NOTE — TELEPHONE ENCOUNTER
Phoned in pt's LOCET to [facility] this morning and faxed PASRR last night.  Awaiting emailed 142.  Phoned Hina at [place] and [phone] and advised that I'm faxing a referral now.  During LOCET call I learned that pt has Medicaid, so this was reported to pt billing and provided to nursing [place] and [facility].  Medicaid will provided transportation for pt and may open up other opportunities for in home support. Left voicemail for Marj in-home  from [facility] [phone].  Provided update to [facility] RN Mattie [phone].  Spoke with pt by phone and provided update.  Pt clearly stated he wants to continue with nursing home placement.  He also wants help rescheduling is PCP appointment and setting up transportation.  If this is successful, we can then work on rescheduling Rituxan infusion and set up transportation for 9/25/17 appointment with Dr. Davis.

## 2017-09-25 ENCOUNTER — OUTPATIENT CASE MANAGEMENT (OUTPATIENT)
Dept: ADMINISTRATIVE | Facility: OTHER | Age: 52
End: 2017-09-25

## 2017-09-25 NOTE — PROGRESS NOTES
"9-25-17--. Follow-up completed. Patient reports that he is currently at [facility] for PT. Patient reports that his plan is to :"Get straight with his MS medications and start working with PT to build back up his strength". I will place in monitoring as [facility] SW is still on case. I will send message to [facility] to alert that patient is currently at [facility]. Yesica MILLER CCM    "

## 2017-09-28 ENCOUNTER — TELEPHONE (OUTPATIENT)
Dept: NEUROLOGY | Facility: CLINIC | Age: 52
End: 2017-09-28

## 2017-09-28 ENCOUNTER — DOCUMENTATION ONLY (OUTPATIENT)
Dept: NEUROLOGY | Facility: CLINIC | Age: 52
End: 2017-09-28

## 2017-09-28 NOTE — TELEPHONE ENCOUNTER
----- Message from William Marcelo sent at 9/28/2017 11:32 AM CDT -----  Contact: Self @ [phone]  Pt is calling to schedule infusion. Pls call.

## 2017-09-29 ENCOUNTER — TELEPHONE (OUTPATIENT)
Dept: NEUROLOGY | Facility: CLINIC | Age: 52
End: 2017-09-29

## 2017-09-29 NOTE — TELEPHONE ENCOUNTER
----- Message from William Marcelo sent at 9/29/2017 10:17 AM CDT -----  Contact: Self @ [phone]  Pt is returning a call to Cyndi about scheduling an infusion. Pls call.

## 2017-10-02 ENCOUNTER — TELEPHONE (OUTPATIENT)
Dept: NEUROLOGY | Facility: CLINIC | Age: 52
End: 2017-10-02

## 2017-10-02 DIAGNOSIS — G35 MULTIPLE SCLEROSIS: Primary | ICD-10-CM

## 2017-10-02 NOTE — TELEPHONE ENCOUNTER
Mao called wanting to get scheduled for his Rituxan infusion, which was due for in August. CBC done in 8/16 - WBC 7.95, ALC 2600. Therapy plan signed. Auth approved.

## 2017-10-02 NOTE — TELEPHONE ENCOUNTER
----- Message from Edelmira De Oliveira sent at 10/2/2017  2:51 PM CDT -----  Contact: self @ [phone]  Pt says he is returning Cyndi's call.

## 2017-10-04 ENCOUNTER — TELEPHONE (OUTPATIENT)
Dept: NEUROLOGY | Facility: CLINIC | Age: 52
End: 2017-10-04

## 2017-10-04 ENCOUNTER — OUTPATIENT CASE MANAGEMENT (OUTPATIENT)
Dept: ADMINISTRATIVE | Facility: OTHER | Age: 52
End: 2017-10-04

## 2017-10-04 NOTE — TELEPHONE ENCOUNTER
Mao is scheduled for his next Rituxan infusion on Monday, 10/9. His last infusion was on 2/21/17. Therapy plan signed by Dr. Finnegan. Auth approved. CBC order to be faxed to [facility].    Approved   Primary Insurance:  [employer] MANAGED MEDICARE/[employer] MEDICARE PPO   Authorization #: [id number] - 08/15/2017- 08/15/2018 - [facility]

## 2017-10-04 NOTE — PROGRESS NOTES
[id number]----Received message from OPCM SW that SW closed patient's case. OPCM SW noted patient currently in [facility].  This RN CCM will dis-enroll patient from OPCM services at this time. Yesica MILLER CCM

## 2017-10-04 NOTE — TELEPHONE ENCOUNTER
----- Message from Leslie Carrasco sent at 10/4/2017  1:16 PM CDT -----  Contact: Pt. [phone]  The patient called to speak to Cyndi regarding his Infusion appointment.Please contact the patient to discuss further.

## 2017-10-04 NOTE — TELEPHONE ENCOUNTER
Phoned Mac at [facility] to discuss possible need for aide to remain with pt during infusion.  According to Mac pt may transfer independently and do this support may not be needed.  Awaiting return call from Mac.  Faxed appointment reminder and STAT lab request to the attention of Jo-Ann at [phone].

## 2017-10-06 NOTE — TELEPHONE ENCOUNTER
CBC, collected on 10/5/17, received from [facility].    WBC 8.2  ALC 2800    Lawanda has reviewed and advised okay to proceed with infusion.

## 2017-10-09 ENCOUNTER — TELEPHONE (OUTPATIENT)
Dept: INFUSION THERAPY | Facility: HOSPITAL | Age: 52
End: 2017-10-09

## 2017-10-09 ENCOUNTER — INFUSION (OUTPATIENT)
Dept: INFUSION THERAPY | Facility: HOSPITAL | Age: 52
End: 2017-10-09
Attending: PSYCHIATRY & NEUROLOGY
Payer: MEDICARE

## 2017-10-09 VITALS
OXYGEN SATURATION: 97 % | WEIGHT: 175 LBS | HEIGHT: 67 IN | SYSTOLIC BLOOD PRESSURE: 135 MMHG | BODY MASS INDEX: 27.47 KG/M2 | RESPIRATION RATE: 18 BRPM | DIASTOLIC BLOOD PRESSURE: 69 MMHG | TEMPERATURE: 98 F | HEART RATE: 64 BPM

## 2017-10-09 DIAGNOSIS — G35 MS (MULTIPLE SCLEROSIS): Primary | ICD-10-CM

## 2017-10-09 PROCEDURE — 63600175 PHARM REV CODE 636 W HCPCS: Performed by: PSYCHIATRY & NEUROLOGY

## 2017-10-09 PROCEDURE — 96413 CHEMO IV INFUSION 1 HR: CPT

## 2017-10-09 PROCEDURE — 96375 TX/PRO/DX INJ NEW DRUG ADDON: CPT

## 2017-10-09 PROCEDURE — S0028 INJECTION, FAMOTIDINE, 20 MG: HCPCS | Performed by: PSYCHIATRY & NEUROLOGY

## 2017-10-09 PROCEDURE — 96415 CHEMO IV INFUSION ADDL HR: CPT

## 2017-10-09 PROCEDURE — 96367 TX/PROPH/DG ADDL SEQ IV INF: CPT

## 2017-10-09 PROCEDURE — 25000003 PHARM REV CODE 250: Performed by: PSYCHIATRY & NEUROLOGY

## 2017-10-09 RX ORDER — HEPARIN 100 UNIT/ML
500 SYRINGE INTRAVENOUS
Status: CANCELLED | OUTPATIENT
Start: 2017-10-09

## 2017-10-09 RX ORDER — ACETAMINOPHEN 325 MG/1
650 TABLET ORAL
Status: COMPLETED | OUTPATIENT
Start: 2017-10-09 | End: 2017-10-09

## 2017-10-09 RX ORDER — ACETAMINOPHEN 325 MG/1
650 TABLET ORAL
Status: CANCELLED | OUTPATIENT
Start: 2017-10-09

## 2017-10-09 RX ORDER — FAMOTIDINE 10 MG/ML
20 INJECTION INTRAVENOUS
Status: COMPLETED | OUTPATIENT
Start: 2017-10-09 | End: 2017-10-09

## 2017-10-09 RX ORDER — SODIUM CHLORIDE 0.9 % (FLUSH) 0.9 %
10 SYRINGE (ML) INJECTION
Status: CANCELLED | OUTPATIENT
Start: 2017-10-09

## 2017-10-09 RX ORDER — FAMOTIDINE 10 MG/ML
20 INJECTION INTRAVENOUS
Status: CANCELLED | OUTPATIENT
Start: 2017-10-09

## 2017-10-09 RX ADMIN — DEXTROSE: 50 INJECTION, SOLUTION INTRAVENOUS at 01:10

## 2017-10-09 RX ADMIN — RITUXIMAB 1000 MG: 10 INJECTION, SOLUTION INTRAVENOUS at 01:10

## 2017-10-09 RX ADMIN — ACETAMINOPHEN 650 MG: 325 TABLET ORAL at 12:10

## 2017-10-09 RX ADMIN — DIPHENHYDRAMINE HYDROCHLORIDE 50 MG: 50 INJECTION, SOLUTION INTRAMUSCULAR; INTRAVENOUS at 12:10

## 2017-10-09 RX ADMIN — FAMOTIDINE 20 MG: 10 INJECTION INTRAVENOUS at 12:10

## 2017-10-09 NOTE — PLAN OF CARE
Problem: Patient Care Overview (Adult)  Goal: Plan of Care Review  Outcome: Outcome(s) achieved Date Met: 10/09/17  Pt arrived for Rituxan MS maintenance chemo infusion. #24g PIV X 1 attempt to LH, + blood return noted, flushes easily. Pt tolerated treatment very well. PIV D/C'd w/o difficulty. Pt D/C back to care of NH staff with instructions via use of W/C

## 2017-10-10 ENCOUNTER — OFFICE VISIT (OUTPATIENT)
Dept: UROLOGY | Facility: CLINIC | Age: 52
End: 2017-10-10
Payer: MEDICARE

## 2017-10-10 VITALS
WEIGHT: 175 LBS | HEART RATE: 59 BPM | HEIGHT: 71 IN | BODY MASS INDEX: 24.5 KG/M2 | SYSTOLIC BLOOD PRESSURE: 150 MMHG | DIASTOLIC BLOOD PRESSURE: 90 MMHG

## 2017-10-10 DIAGNOSIS — N39.0 RECURRENT UTI: Primary | ICD-10-CM

## 2017-10-10 DIAGNOSIS — R35.0 URINARY FREQUENCY: ICD-10-CM

## 2017-10-10 DIAGNOSIS — R39.15 URGENCY OF URINATION: ICD-10-CM

## 2017-10-10 PROCEDURE — 51798 US URINE CAPACITY MEASURE: CPT | Mod: PBBFAC | Performed by: NURSE PRACTITIONER

## 2017-10-10 PROCEDURE — 99214 OFFICE O/P EST MOD 30 MIN: CPT | Mod: 25,S$GLB,, | Performed by: NURSE PRACTITIONER

## 2017-10-10 PROCEDURE — 99214 OFFICE O/P EST MOD 30 MIN: CPT | Mod: PBBFAC,25 | Performed by: NURSE PRACTITIONER

## 2017-10-10 PROCEDURE — 81002 URINALYSIS NONAUTO W/O SCOPE: CPT | Mod: S$GLB,,, | Performed by: NURSE PRACTITIONER

## 2017-10-10 PROCEDURE — 99999 PR PBB SHADOW E&M-EST. PATIENT-LVL IV: CPT | Mod: PBBFAC,,, | Performed by: NURSE PRACTITIONER

## 2017-10-10 RX ORDER — DULOXETIN HYDROCHLORIDE 30 MG/1
30 CAPSULE, DELAYED RELEASE ORAL DAILY
Status: ON HOLD | COMMUNITY
End: 2018-12-26 | Stop reason: HOSPADM

## 2017-10-10 RX ORDER — SOLIFENACIN SUCCINATE 10 MG/1
10 TABLET, FILM COATED ORAL DAILY
Qty: 30 TABLET | Refills: 3 | Status: SHIPPED | OUTPATIENT
Start: 2017-10-10 | End: 2018-02-01 | Stop reason: SDUPTHER

## 2017-10-10 RX ORDER — IBUPROFEN 200 MG
1 TABLET ORAL
COMMUNITY
End: 2018-06-27

## 2017-10-10 NOTE — PROGRESS NOTES
CHIEF COMPLAINT:    Mr. Levin is a 52 y.o. male presenting for frequency and urgency.    PRESENTING ILLNESS:    Mao Levin is a 52 y.o. male with a PMH of MS who presents for frequency, urgency, and recurrent UTIs  Initial visit to the clinic.   He lives in a NH.    He presents today for increased urinary frequency, nocturia, urgency, and urge incontinence in the last 6 months. He states he urinates every 30 minutes during the day and night. He cannot always make it to the restroom in time because his urgency comes on quickly without warning. He also presents for recurrent UTIs.    UTI symptoms? Dysuria and MS flairs. No f/c or n/v.  Severity of incontinence with # of depends?  He reports changing his depend with every urination d/t his urgency and urge incontinence. He believes he uses 1 pack per week.   Voiding symptoms? Reports urgency, frequency, urge incontinence, nocturia, hesitancy, intermittency, incomplete bladder emptying, and decreased urine volume. Denies difficulty urinating, dysuria, and hematuria.    hx? No  testing for his neurogenic bladder  Hx of pelvic surgery? No   Fluid consumption? Daily- Coffee 2 cups, water with meds, soda 0-1 cups, and juice 6 cups  Medications? Oxybutynin 10 mg daily; taking daily for 1 yr and has not seen improvement with his symptoms.  He reports a good urinary stream.  Constipation sometimes    CT abd/pel 10/2016- Kidneys concentrate and excrete contrast appropriately.  No nephrolithiasis.  No hydronephrosis or hydroureter.  No solid renal masses.  Bladder is fluid-filled and unremarkable.  The prostate is normal in size.    Urine cultures:  8/14/17 PROVIDENCIA STUARTII> 100,000 cfu/ml   3/13/17 ESCHERICHIA COLI> 100,000 cfu/ml     REVIEW OF SYSTEMS:    Review of Systems    Constitutional: Negative for fever and chills.   HENT: Negative for hearing loss.   Eyes: Negative for visual disturbance.   Respiratory: Negative for shortness of breath.   Cardiovascular:  Negative for chest pain.   Gastrointestinal: Negative for nausea and vomiting.  Genitourinary:  See above  Neurological: Negative for dizziness.   Hematological: Does not bruise/bleed easily.   Psychiatric/Behavioral: Negative for confusion.       PATIENT HISTORY:    Past Medical History:   Diagnosis Date    Anxiety     Deep vein thrombosis     Depression     Multiple sclerosis     Multiple sclerosis     Pulmonary embolism        History reviewed. No pertinent surgical history.    Family History   Problem Relation Age of Onset    Arthritis Mother     No Known Problems Father     Cancer Maternal Grandfather        Social History     Social History    Marital status: Single     Spouse name: N/A    Number of children: N/A    Years of education: N/A     Occupational History    Not on file.     Social History Main Topics    Smoking status: Current Some Day Smoker     Packs/day: 0.50     Years: 23.00     Types: Cigarettes    Smokeless tobacco: Never Used    Alcohol use No    Drug use: No    Sexual activity: Yes     Partners: Female     Other Topics Concern    Not on file     Social History Narrative    No narrative on file       Allergies:  Review of patient's allergies indicates no known allergies.    Medications:    Current Outpatient Prescriptions:     baclofen (LIORESAL) 20 MG tablet, Take 1 tablet (20 mg total) by mouth 4 (four) times daily., Disp: 90 tablet, Rfl: 0    citalopram (CELEXA) 20 MG tablet, Take 1 tablet (20 mg total) by mouth once daily., Disp: 30 tablet, Rfl: 0    dantrolene (DANTRIUM) 50 MG Cap, Take 1 capsule (50 mg total) by mouth 4 (four) times daily., Disp: 120 capsule, Rfl: 0    duloxetine (CYMBALTA) 30 MG capsule, Take 30 mg by mouth once daily., Disp: , Rfl:     ergocalciferol (VITAMIN D2) 50,000 unit Cap, Take 1 capsule (50,000 Units total) by mouth every 7 days. (Patient taking differently: Take 50,000 Units by mouth every 7 days. on monday), Disp: 4 capsule, Rfl:  11    gabapentin (NEURONTIN) 100 MG capsule, Take 1100 mg total every afternoon (three 300 mg tablets and two 200 mg tablets), Disp: 60 capsule, Rfl: 1    gabapentin (NEURONTIN) 300 MG capsule, Take 900 mg morning and evening.  Take 1100 mg total in the afternoon., Disp: 270 capsule, Rfl: 0    nicotine (NICODERM CQ) 14 mg/24 hr, Place 1 patch onto the skin every 24 hours., Disp: , Rfl:     polyethylene glycol (GLYCOLAX) 17 gram/dose powder, Take 17 g by mouth once daily., Disp: 510 g, Rfl: 6    rivaroxaban (XARELTO) 20 mg Tab, Take 1 tablet (20 mg total) by mouth daily with dinner or evening meal., Disp: 60 tablet, Rfl: 4    senna-docusate 8.6-50 mg (PERICOLACE) 8.6-50 mg per tablet, Take 1 tablet by mouth once daily., Disp: , Rfl:     trazodone (DESYREL) 100 MG tablet, Take 1 tablet (100 mg total) by mouth every evening., Disp: 30 tablet, Rfl: 3    diazePAM (VALIUM) 5 MG tablet, Take 1 tablet (5 mg total) by mouth 2 (two) times daily as needed (muscle spasms)., Disp: 6 tablet, Rfl: 0    solifenacin (VESICARE) 10 MG tablet, Take 1 tablet (10 mg total) by mouth once daily., Disp: 30 tablet, Rfl: 3  No current facility-administered medications for this visit.     PHYSICAL EXAMINATION:    Constitutional: He is oriented to person, place, and time. He appears well-developed and well-nourished.  He is in no apparent distress.    Neck: No tracheal deviation present.     Cardiovascular: Normal rate.      Pulmonary/Chest: Effort normal. No respiratory distress.     Abdominal:  He exhibits no distension.     Lymphadenopathy:        Right: No supraclavicular adenopathy present.        Left: No supraclavicular adenopathy present.     Neurological: He is alert and oriented to person, place, and time.     Skin: Skin is warm and dry.     Extremities: No pitting edema noted in lower extremities bilaterally    Psych: Cooperative with normal affect.    Genitourinary: Refused  exam. He states he did not mentally prepare for  it and refuses it.     Physical Exam      LABS:  PVR with bladder scanner showed 239 cc. (Refused catheter today)  U/a today shows no bacteria or blood. Spec grav 1.020 and ph 6.     Lab Results   Component Value Date    PSA 0.21 07/18/2017       IMPRESSION:    Encounter Diagnoses   Name Primary?    Recurrent UTI Yes    Urinary frequency     Urgency of urination          PLAN:  -Reassured pt,  -Avoid Bladder Irritants: Tea, coffee, caffeine, alcohol, artificial sweeteners, citrus, spicy foods, acidic foods,chocolate, tomato-based foods, and smoking  -Discussed behavioral modifications: Timed voids every 2-3 hours, constipation, void before bed, and bladder diary for 3 days.  -Stop ditropan. Discussed side effects, indications, and MOA for vesicare. Prescription handed to him. Pt verbalized understanding.  -Discussed urodynamics w/ cysto for neurogenic bladder and urgency and frequency. Will discuss with collaborating physician.  -Discussed PSA and will continue to monitor yearly.   -Discussed UTI in men. Explained the w/u with cysto and renal US. I reviewed CT scan.   -Discussed UTI precautions and printed worksheet:  Wipe front to back and avoid constipation.  Avoid caffeine.  Drink lots of water  Void soon after urge arises  Cranberry supplement  Probiotic  -RTC for urodynamics with cysto. Will do UC prior to testing.     I encouraged him or any of his family members to call or email me with questions and/or concerns.    BIJAL Ryder

## 2017-10-10 NOTE — PATIENT INSTRUCTIONS
Avoid Bladder Irritants: Tea, coffee, caffeine, alcohol, artificial sweeteners, citrus, spicy foods, acidic foods,chocolate, tomato-based foods, smoking      Urinary Tract Infections in Men    Urinary tract infections (UTIs) are most often caused by bacteria that invade the urinary tract. The bacteria may come from outside the body. Or they may travel from the skin outside of rectum into the urethra. Pain in or around the urinary tract is a common symptom for most UTIs. But the only way to know for sure if you have a UTI is to have a urinalysis and urine culture.   Types of UTIs  · Cystitis: This is a bladder infection and is often linked to a blockage from an enlarged prostate. You may have an urgent or frequent need to urinate, and bloody urine. Treatment includes antibiotics and medicine to relax or shrink the prostate. Sometimes, surgery is needed.  · Urethritis: This is an infection of the urethra. You may have a discharge from the urethra or burning when you urinate. You may also have pain in the urethra or penis. It is treated with antibiotics.  · Prostatitis: This is an inflammation or infection of the prostate. You may have an urgent or frequent need to urinate, fever, or burning when you urinate. Or you may have a tender prostate, or a vague feeling of pressure. Prostatitis is treated with a range of medicines, depending on the cause.  · Pyelonephritis: This is a kidney infection. If not treated, it can be serious and damage your kidneys. In severe cases you may be hospitalized. You may have a fever and upper back pain.  Treating a UTI  · Medicine: Most UTIs are treated with antibiotics. These kill the bacteria. The length of time you need to take them depends on the type of infection. Take antibiotics exactly as directed until all of the medicine is gone. If you don't, the infection may not go away and may become harder to treat. For certain types of UTIs, you may be given other medicine to help treat  your symptoms.  · Lifestyle changes: The lifestyle changes below will help get rid of your current infection. They may also help prevent future UTIs.  ¨ Drink plenty of fluids such as water, juice, or other caffeine-free drinks. This helps flush bacteria out of your system.  ¨ Empty your bladder when you feel the urge to urinate and before going to sleep. Urine that stays in your bladder promotes infection.  ¨ Use condoms during sex. These help prevent UTIs caused by sexually transmitted bacteria.  ¨ Keep follow-up appointments with your healthcare provider. He or she can may do tests to make sure the infection has cleared. If needed, more treatment can be started.  · Other treatment: Most UTIs respond to medicine. But sometimes a procedure or surgery is needed. This can treat an enlarged prostate, or remove a kidney stone or other blockage. Surgery may also treat problems caused by scarring or long-term infections.  Date Last Reviewed: 1/1/2017 © 2000-2017 [employer]. [address] [place] [address]. All rights reserved. This information is not intended as a substitute for professional medical care. Always follow your healthcare professional's instructions.      Overactive Bladder Syndrome (OAB)     Normally, urine stays in the bladder until a person decides to release it. With OAB, the bladder muscles contract involuntarily, causing a sudden urge to urinate and even urine leakage.   When the bladder muscle contract or squeeze involuntarily, it is called overactive bladder syndrome. This causes an intense urge to urinate, known as urgency. Urgency can occur many times during the day and night. If urine leaks with the urgency, it is called urge incontinence.  A disease that affects the bladder nerves, such as multiple sclerosis, can cause overactive bladder syndrome. Other conditions, such as urinary tract infection (UTI) or prostate problems in men, can also lead to OAB. But the exact  cause is often not known.  How is overactive bladder syndrome diagnosed?  Your healthcare provider will examine you and ask about your symptoms and health history. You may also have one or more of the following:  · Urine test to take samples of urine and have them checked for problems.  · Urinary diary to record how much fluid you take in and urinate out in a 3 day period.  · Bladder ultrasound to study the bladder as it empties. Ultrasound uses sound waves to create detailed images of the inside of the body.  · Cystoscopy to allow the healthcare provider to look for problems in the urinary tract. The test uses a thin, flexible scope called a cystoscope with a light and camera on the end. The scope is inserted into the urethra (the tube that carries urine out of the body).  · Urodynamic studies, a battery of tests designed to measure and record many aspects of urinary bladder function, including pressures, volume, and urine flow.  How is overactive bladder syndrome treated?  Treatment depends on the cause and severity of your OAB. Treatments may include the following:  · Changing urination habits may be suggested. For instance, your healthcare provider may suggest that you urinate as soon as you feel the urge. You may also need to limit how much fluid you have during the day.  · Exercising your pelvic muscles can help strengthen muscles used during urination. These exercises are called Kegels. They involve elaina as if you were stopping your urine stream and tightening your rectum as if trying not to pass gas. Your healthcare provider can help you learn how to do Kegels.  · Biofeedback to help you learn to control the movement of your bladder muscles. Sensors are placed on your abdomen. They turn signals given off by your muscles into lines on a computer screen.  · Medicine may be given to relax the bladder muscle. Medicine can also help ease bladder contractions, which reduces the urge to  urinate.  · Neuromodulation may be done if medicine and behavioral changes dont work. Electrical pulses are sent to the sacral nerves (nerves that affect the pelvic area). These pulses help relieve OAB and urge incontinence.  · Surgery to make the bladder larger may be done in severe cases.  With treatment, OAB can be managed. A condition, such as UTI, that has caused you to have OAB will be treated. Treatment may involve taking medicine for months or years. You may also need to make changes in your daily routine. This may include going to the bathroom more often than you think you need to. Or, you may need to cut back on caffeine and alcohol because these can make symptoms worse. Your healthcare provider can tell you more.     When to call your healthcare provider  Call the healthcare provider right away if you have any of the following:  · Fever of 101.4°F (38.0 °C) or higher   · No improvement with treatment  · Trouble urinating because of pain  · Back or abdominal pain   Date Last Reviewed: 1/1/2017 © 2000-2017 [employer]. [address] [place] [address], [place] [address]. All rights reserved. This information is not intended as a substitute for professional medical care. Always follow your healthcare professional's instructions.

## 2017-10-11 ENCOUNTER — TELEPHONE (OUTPATIENT)
Dept: NEUROLOGY | Facility: CLINIC | Age: 52
End: 2017-10-11

## 2017-10-11 NOTE — TELEPHONE ENCOUNTER
----- Message from Edelmira De Oliveira sent at 10/11/2017 11:12 AM CDT -----  Contact: self @ [phone]  Pt says he would like to speak with Cyndi concerning his MS treatment and Infusion.  Pls call.

## 2017-10-11 NOTE — TELEPHONE ENCOUNTER
"Provided pt number to Dr. Alarcon and Dr. Aceves. He says he had called Dr. Aceves's office in the past and was told he could not see him "b/c he had seen another doctor."     He also mentioned he is having tightness in his left leg and left hand. He is taking Baclofen 20 mg QID, but does not feel it's helping. He says he would like to see pain mgnt.    "

## 2017-10-16 ENCOUNTER — OFFICE VISIT (OUTPATIENT)
Dept: PAIN MEDICINE | Facility: CLINIC | Age: 52
End: 2017-10-16
Payer: MEDICARE

## 2017-10-16 VITALS
TEMPERATURE: 97 F | HEART RATE: 69 BPM | HEIGHT: 71 IN | WEIGHT: 175 LBS | BODY MASS INDEX: 24.5 KG/M2 | DIASTOLIC BLOOD PRESSURE: 83 MMHG | SYSTOLIC BLOOD PRESSURE: 137 MMHG

## 2017-10-16 DIAGNOSIS — M79.2 NEUROPATHIC PAIN: ICD-10-CM

## 2017-10-16 DIAGNOSIS — G89.4 CHRONIC PAIN SYNDROME: ICD-10-CM

## 2017-10-16 DIAGNOSIS — G35 MULTIPLE SCLEROSIS: Primary | ICD-10-CM

## 2017-10-16 PROCEDURE — 99999 PR PBB SHADOW E&M-EST. PATIENT-LVL III: CPT | Mod: PBBFAC,,, | Performed by: NURSE PRACTITIONER

## 2017-10-16 PROCEDURE — 99499 UNLISTED E&M SERVICE: CPT | Mod: S$GLB,,, | Performed by: NURSE PRACTITIONER

## 2017-10-16 PROCEDURE — 99213 OFFICE O/P EST LOW 20 MIN: CPT | Mod: S$GLB,,, | Performed by: NURSE PRACTITIONER

## 2017-10-16 RX ORDER — TRAMADOL HYDROCHLORIDE 50 MG/1
TABLET ORAL
COMMUNITY
Start: 2017-10-10 | End: 2017-11-20

## 2017-10-16 NOTE — PROGRESS NOTES
Chronic Pain - Established Visit    Referring Physician: No ref. provider found    Chief Complaint:   Chief Complaint   Patient presents with    Leg Pain     MS leg pain        SUBJECTIVE: Disclaimer: This note has been generated using voice-recognition software. There may be typographical errors that have been missed during proof-reading    Interval History 10/16/2017:  The patient presents today for follow up.  He has a history of all over pain secondary to MS.  He would like to discuss pain medications.  He was previously taking oxycodone from Dr. Leung.  However, he states that he has snot yet been switched over to another physician.  I also see documentation in the chart about weaning the medication at some point.  He does not have any appointments scheduled in the future.  His pain today is 10/10.    Interval History 11/18/2016:  The patient returns today for follow up of all over pain secondary to MS.  Since his last visit, he was admitted into the hospital for a PE.  He reports that he had a fever and warmth to his legs, which caused him to go to the ED for evaluation.  He is still followed by Dr. Leung for rehab, as well as the MS clinic.  He did start the Gabapentin and was increased to 600 mg TID (1800 mg daily) with limited benefit.  He continues to take Oxycodone from Dr. Leung.  He was previously also taking OxyContin but had issues with his insurance.  He would like to restart because the pharmacy informed him that it can be covered now.  He is not interested in any interventional treatments at this time.  His pain today is 8/10.    Initial encounter:    Mao Levin presents to the clinic for the evaluation of back, neck,shoulder, knee, and hand pain. The pain started in 2006 following MS diagnosis and symptoms have been worsening.    Brief history: Patient is a 52 yo male with MS diagnosed in 2006 as well as chronic, generalized pain since that time that presents for initial evaluation. He  "recently was discharged from inpatient rehab 10/15/16 and feels that he has become more functional but still is mostly wheel chair bound. He occasionally will use a rolling walker for ambulation. Pain occurs from the neck down and involves the entire body except the head. No specific area is worse. Pain is "achy" and "tingling". It is constant at 7/10 but can worsen with any movement to 10/10. He takes oxycodone 15mg q6h which will decrease the pain to 5/10 for 2 hours. In IR he was also taking oxycontin 40mg TWICE DAILY but does not feel that this was any more helpful. Tried gabapentin in the past which was tolerated but didn't help. He also takes diazepam 5mg TWICE DAILY and baclofen 10mg TID which helps his spasticity. He is awaiting PT/OT outpatient which is set to resume at the beginning of November.     Pain Description:    The pain is located in the neck, back shoulders, hands  and knees    At BEST  5/10     At WORST  10/10 on the WORST day.      On average pain is rated as 7/10.     Today the pain is rated as 7/10    The pain is described as aching and tingling      Symptoms interfere with daily activity, sleeping and work.     Exacerbating factors: Morning, Extension and Flexing.      Mitigating factors medications and physical therapy.     Patient denies .  Patient denies any suicidal or homicidal ideations    Pain Medications:  Current:  Oxycodone  Baclofen  Diazepam  Celexa  Gabapentin    Tried in Past:  NSAIDs -Never  TCA -Never  SNRI - Celexa  Anti-convulsants -gabapentin   Muscle Relaxants - baclofen and Valium  Opioids-oxycodone and Tramadol    Physical Therapy/Home Exercise: yes       report:  Reviewed and consistent with medication use as prescribed.    Pain Procedures: none    Chiropractor -never  Acupuncture - never  TENS unit -never  Spinal decompression -never  Joint replacement -never    Imaging: reviewed cspine and tspine MRI which showed demylenating changes as well as fairly significant " stenosis throughout the cervical spine    Past Medical History:   Diagnosis Date    Anxiety     Deep vein thrombosis     Depression     Multiple sclerosis     Multiple sclerosis     Pulmonary embolism      History reviewed. No pertinent surgical history.  Social History     Social History    Marital status: Single     Spouse name: N/A    Number of children: N/A    Years of education: N/A     Occupational History    Not on file.     Social History Main Topics    Smoking status: Former Smoker     Packs/day: 0.50     Years: 23.00     Types: Cigarettes     Quit date: 10/1/2017    Smokeless tobacco: Never Used      Comment: Using patch    Alcohol use No    Drug use: No    Sexual activity: Yes     Partners: Female     Other Topics Concern    Not on file     Social History Narrative    No narrative on file     Family History   Problem Relation Age of Onset    Arthritis Mother     No Known Problems Father     Cancer Maternal Grandfather        No Known Allergies    Current Outpatient Prescriptions   Medication Sig    baclofen (LIORESAL) 20 MG tablet Take 1 tablet (20 mg total) by mouth 4 (four) times daily.    citalopram (CELEXA) 20 MG tablet Take 1 tablet (20 mg total) by mouth once daily.    dantrolene (DANTRIUM) 50 MG Cap Take 1 capsule (50 mg total) by mouth 4 (four) times daily.    diazePAM (VALIUM) 5 MG tablet Take 1 tablet (5 mg total) by mouth 2 (two) times daily as needed (muscle spasms).    duloxetine (CYMBALTA) 30 MG capsule Take 30 mg by mouth once daily.    ergocalciferol (VITAMIN D2) 50,000 unit Cap Take 1 capsule (50,000 Units total) by mouth every 7 days. (Patient taking differently: Take 50,000 Units by mouth every 7 days. on monday)    gabapentin (NEURONTIN) 100 MG capsule Take 1100 mg total every afternoon (three 300 mg tablets and two 200 mg tablets)    gabapentin (NEURONTIN) 300 MG capsule Take 900 mg morning and evening.  Take 1100 mg total in the afternoon.    nicotine  "(NICODERM CQ) 14 mg/24 hr Place 1 patch onto the skin every 24 hours.    polyethylene glycol (GLYCOLAX) 17 gram/dose powder Take 17 g by mouth once daily.    rivaroxaban (XARELTO) 20 mg Tab Take 1 tablet (20 mg total) by mouth daily with dinner or evening meal.    senna-docusate 8.6-50 mg (PERICOLACE) 8.6-50 mg per tablet Take 1 tablet by mouth once daily.    solifenacin (VESICARE) 10 MG tablet Take 1 tablet (10 mg total) by mouth once daily.    tramadol (ULTRAM) 50 mg tablet     trazodone (DESYREL) 100 MG tablet Take 1 tablet (100 mg total) by mouth every evening.     No current facility-administered medications for this visit.        REVIEW OF SYSTEMS:    GENERAL:  No weight loss, malaise or fevers.  HEENT:   No recent changes in vision or hearing  NECK:  Negative for lumps, no difficulty with swallowing.  RESPIRATORY:  Negative for cough, wheezing or shortness of breath, patient denies any recent URI.  CARDIOVASCULAR:  Negative for chest pain, leg swelling or palpitations.  GI:  Negative for abdominal discomfort, blood in stools or black stools or change in bowel habits.   MUSCULOSKELETAL:  See HPI. MS.  SKIN:  Negative for lesions, rash, and itching.  PSYCH:  No mood disorder or recent psychosocial stressors.  Patients sleep is not disturbed secondary to pain.  HEMATOLOGY/LYMPHOLOGY:  Negative for prolonged bleeding, bruising easily or swollen nodes.  H/O DVT and PE.  ENDO: No history of diabetes or thyroid dysfunction  NEURO:   No history of headaches, syncope, paralysis, seizures or tremors.  All other reviewed and negative other than HPI.    OBJECTIVE:    /83   Pulse 69   Temp 97.1 °F (36.2 °C) (Oral)   Ht 5' 11" (1.803 m)   Wt 79.4 kg (175 lb)   BMI 24.41 kg/m²     PHYSICAL EXAMINATION:    GENERAL: Well appearing, in no acute distress, in manual wc, alert and oriented x3.  PSYCH:  Mood and affect appropriate.  SKIN: Skin color, texture, turgor normal, no rashes or lesions.  HEAD/FACE:  " Normocephalic, atraumatic. Cranial nerves grossly intact.  NECK: TTP over cervical paraspinals and trapezius muscles. Spurling Negative.  There is pain with neck flexion, extension, and lateral flexion.   CV: RRR with palpation of the radial artery.  PULM: No evidence of respiratory difficulty, symmetric chest rise.  GI:  Soft and non-tender.  EXTREMITIES: Peripheral joint ROM is full and pain free without obvious instability or laxity in all four extremities. No deformities, edema, or skin discoloration. Good capillary refill.  MUSCULOSKELETAL: Patient with increased spasticity in biceps. There is  pain with palpation over the sacroiliac joints bilaterally. He is also diffusely TTP over trapezius, b/l shoulders, elbows, knees, thoracic paraspinals, GTBs.  Patient in general with 4/5 strength noted mostly throughout the right upper and lower extremities but with 2/5 left triceps and wrist flexion.  NEURO: Bilateral upper and lower extremity coordination and muscle stretch reflexes are physiologic and symmetric. Patient with positive Liang's bilaterally.  GAIT: Antalgic- presents in wheelchair.    ASSESSMENT: 52 y.o. year old male with pain, consistent with     1. Multiple sclerosis     2. Neuropathic pain     3. Chronic pain syndrome         PLAN:     - Previous imaging was reviewed and discussed with the patient today.     - Regarding opioid pain medications, I informed the patient that he needs to follow up with physical medicine and rehabilitation.  His medication has been prescribed by Dr. Leung, so he will need to obtain another doctor.  Dr. Leung was previously weaning the medication.  We will make him an appointment today.    - Continue other medications as prescribed.    - He will continue with therapy regimen in his living facility.    - RTC as needed.      The above plan and management options were discussed at length with patient. Patient is in agreement with the above and verbalized understanding.      Isabel Strickland  10/16/2017

## 2017-10-31 ENCOUNTER — TELEPHONE (OUTPATIENT)
Dept: NEUROLOGY | Facility: CLINIC | Age: 52
End: 2017-10-31

## 2017-10-31 DIAGNOSIS — G35 MULTIPLE SCLEROSIS: Primary | ICD-10-CM

## 2017-11-01 ENCOUNTER — DOCUMENTATION ONLY (OUTPATIENT)
Dept: NEUROLOGY | Facility: CLINIC | Age: 52
End: 2017-11-01

## 2017-11-07 ENCOUNTER — TELEPHONE (OUTPATIENT)
Dept: UROLOGY | Facility: CLINIC | Age: 52
End: 2017-11-07

## 2017-11-07 NOTE — TELEPHONE ENCOUNTER
Called to discuss his urinary symptoms and f/u appt. Unable to leave a message with him but left his mother a VM to call back to schedule.

## 2017-11-14 ENCOUNTER — TELEPHONE (OUTPATIENT)
Dept: NEUROLOGY | Facility: CLINIC | Age: 52
End: 2017-11-14

## 2017-11-14 NOTE — TELEPHONE ENCOUNTER
----- Message from Randolph Dowell sent at 11/14/2017  1:34 PM CST -----  Contact: Patient @ [phone]  Patient is requesting a return call, when returning call ask for Rebecca France pls call

## 2017-11-14 NOTE — TELEPHONE ENCOUNTER
"Returned pt's call at [facility] and spoke with pt and Rebecca, together.  Pt is having a difficult time at the nursing home, stating "it's really nice but I don't fit in here".  He describes an older population and many residents struggling with dementia.  He'd like to return to the community and reside in a group home.  Rebecca shared that she and Marguerite have looked for options for Ms. Levin and have found only housing for individuals with mental illness or unlicensed group homes.  Spoke with pt about the advantages and disadvantages of staying in the nursing home, with the main advantage being access to the Medicaid Community Choices Waiver in approximately 1 year vs the 8+ years it would take if he were in the community.  Pt shared that he is open to staying at [facility] but would still like to explore group home/supported housing options.  I will look into options and follow up with him in the last week of November.  "

## 2017-11-20 ENCOUNTER — OFFICE VISIT (OUTPATIENT)
Dept: PHYSICAL MEDICINE AND REHAB | Facility: CLINIC | Age: 52
End: 2017-11-20
Payer: MEDICARE

## 2017-11-20 VITALS
HEIGHT: 71 IN | WEIGHT: 185.19 LBS | HEART RATE: 58 BPM | BODY MASS INDEX: 25.93 KG/M2 | SYSTOLIC BLOOD PRESSURE: 145 MMHG | DIASTOLIC BLOOD PRESSURE: 95 MMHG

## 2017-11-20 DIAGNOSIS — M54.42 CHRONIC BILATERAL LOW BACK PAIN WITH BILATERAL SCIATICA: ICD-10-CM

## 2017-11-20 DIAGNOSIS — M62.838 MUSCLE SPASTICITY: ICD-10-CM

## 2017-11-20 DIAGNOSIS — G89.29 CHRONIC BILATERAL LOW BACK PAIN WITH BILATERAL SCIATICA: ICD-10-CM

## 2017-11-20 DIAGNOSIS — G35 MS (MULTIPLE SCLEROSIS): Chronic | ICD-10-CM

## 2017-11-20 DIAGNOSIS — G57.93 NEUROPATHIC PAIN, LEG, BILATERAL: ICD-10-CM

## 2017-11-20 DIAGNOSIS — M54.41 CHRONIC BILATERAL LOW BACK PAIN WITH BILATERAL SCIATICA: ICD-10-CM

## 2017-11-20 DIAGNOSIS — M79.605 BILATERAL LEG PAIN: Primary | ICD-10-CM

## 2017-11-20 DIAGNOSIS — R53.1 WEAKNESS GENERALIZED: ICD-10-CM

## 2017-11-20 DIAGNOSIS — M79.2 NEUROPATHIC PAIN: ICD-10-CM

## 2017-11-20 DIAGNOSIS — R25.2 SPASTICITY: ICD-10-CM

## 2017-11-20 DIAGNOSIS — R29.6 FREQUENT FALLS: ICD-10-CM

## 2017-11-20 DIAGNOSIS — M79.604 BILATERAL LEG PAIN: Primary | ICD-10-CM

## 2017-11-20 DIAGNOSIS — G89.4 CHRONIC PAIN SYNDROME: ICD-10-CM

## 2017-11-20 PROCEDURE — 99215 OFFICE O/P EST HI 40 MIN: CPT | Mod: S$GLB,,, | Performed by: PHYSICAL MEDICINE & REHABILITATION

## 2017-11-20 PROCEDURE — 99999 PR PBB SHADOW E&M-EST. PATIENT-LVL III: CPT | Mod: PBBFAC,,, | Performed by: PHYSICAL MEDICINE & REHABILITATION

## 2017-11-20 PROCEDURE — 99499 UNLISTED E&M SERVICE: CPT | Mod: S$GLB,,, | Performed by: PHYSICAL MEDICINE & REHABILITATION

## 2017-11-20 RX ORDER — OXYCODONE AND ACETAMINOPHEN 10; 325 MG/1; MG/1
1 TABLET ORAL EVERY 8 HOURS PRN
Qty: 90 TABLET | Refills: 0 | Status: SHIPPED | OUTPATIENT
Start: 2017-11-20 | End: 2017-12-21 | Stop reason: SDUPTHER

## 2017-11-20 NOTE — PROGRESS NOTES
"Subjective:       Patient ID: Mao Levin is a 52 y.o. male.    Chief Complaint: Neck Pain; Back Pain (bilateral); Hand Pain (bilateral); and Knee Pain (bilateral)    HPI   Mao Levin is 51 y/o male who is coming first time to clinic for chronic  back, neck,shoulder, knee, and hand pain.   He is referred by dr. Leung , who retired.  Patient is currently in NH, and he is bringing his MAR from NH, for me to review his medications.   The pain started in 2006 following MS diagnosis and symptoms have been worsening.   Brief history: Patient is a 51 yo male with MS diagnosed in 2006 as well as chronic, generalized pain since that time that presents for initial evaluation. He was hospitalized in inpatient rehab in October /2016 and feels that he has become more functional but still is mostly wheel chair bound. He occasionally will use a rolling walker for ambulation.   Pain occurs from the neck down and involves the entire body except the head. No specific area is worse. Pain is "achy" and "tingling".   It is constant at 7/10 but can worsen with any movement to 10/10.   Today he c/o pain in both arm/hands in all fingers, they feel sore and tight.   Back pain is running down to both legs, back of thighs, and pain in leg is worst bellow the knee level, in calves that are tight, and weak.  His Left leg is weaker than Rt leg.   Has foot b/l foot weakness and drop, wears left AFO.  He has been a prolong time on chronic pain management with oxycodone 15mg q6h and oxycontin 40mg TWICE DAILY ( while in IP Rehab) but did not feel that this was any more helpful. Tried gabapentin in the past which was tolerated but didn't help.   He also takes diazepam 5mg TWICE DAILY and baclofen 10mg TID which helps his spasticity.    IN NH, he now takes Tramadol ad Oxycodone 5 mg Q8 hrs prn pain.  He states that Tramadol is ineffective, and would like to get appropriate medications to treat his pain.      Pain Description:   The pain is located " in the neck, back shoulders, hands  and knees  At BEST  5/10   At WORST  10/10 on the WORST day.    On average pain is rated as 7/10.   Today the pain is rated as 7/10  The pain is described as aching and tingling  Symptoms interfere with daily activity, sleeping and work.   Exacerbating factors: Morning, Extension and Flexing.    Mitigating factors medications and physical therapy.   Patient denies .  Patient denies any suicidal or homicidal ideations     Pain Medications:  Current:  Tramadol ad Oxycodone 5 mg Q8 hrs prn pain.  Baclofen  Diazepam  Celexa  Gabapentin     Physical Therapy/Home Exercise: yes     report:  Reviewed and consistent with medication use as prescribed.  Pain Procedures: none      Imaging:   I reviewed C-spine and T-spine MRI which showed demylenating changes as well as fairly significant stenosis throughout the cervical spine    Past Medical History:   Diagnosis Date    Anxiety     Deep vein thrombosis     Depression     Multiple sclerosis     Multiple sclerosis     Pulmonary embolism        No past surgical history on file.    Family History   Problem Relation Age of Onset    Arthritis Mother     No Known Problems Father     Cancer Maternal Grandfather        Social History     Social History    Marital status: Single     Spouse name: N/A    Number of children: N/A    Years of education: N/A     Social History Main Topics    Smoking status: Former Smoker     Packs/day: 0.50     Years: 23.00     Types: Cigarettes     Quit date: 10/1/2017    Smokeless tobacco: Never Used      Comment: Using patch    Alcohol use No    Drug use: No    Sexual activity: Yes     Partners: Female     Other Topics Concern    None     Social History Narrative    None       Current Outpatient Prescriptions   Medication Sig Dispense Refill    baclofen (LIORESAL) 20 MG tablet Take 1 tablet (20 mg total) by mouth 4 (four) times daily. 90 tablet 0    citalopram (CELEXA) 20 MG tablet Take 1 tablet  (20 mg total) by mouth once daily. 30 tablet 0    dantrolene (DANTRIUM) 50 MG Cap Take 1 capsule (50 mg total) by mouth 4 (four) times daily. 120 capsule 0    diazePAM (VALIUM) 5 MG tablet Take 1 tablet (5 mg total) by mouth 2 (two) times daily as needed (muscle spasms). 6 tablet 0    duloxetine (CYMBALTA) 30 MG capsule Take 30 mg by mouth once daily.      ergocalciferol (VITAMIN D2) 50,000 unit Cap Take 1 capsule (50,000 Units total) by mouth every 7 days. (Patient taking differently: Take 50,000 Units by mouth every 7 days. on monday) 4 capsule 11    gabapentin (NEURONTIN) 100 MG capsule Take 1100 mg total every afternoon (three 300 mg tablets and two 200 mg tablets) 60 capsule 1    gabapentin (NEURONTIN) 300 MG capsule Take 900 mg morning and evening.  Take 1100 mg total in the afternoon. 270 capsule 0    nicotine (NICODERM CQ) 14 mg/24 hr Place 1 patch onto the skin every 24 hours.      oxyCODONE-acetaminophen (PERCOCET)  mg per tablet Take 1 tablet by mouth every 8 (eight) hours as needed for Pain. 90 tablet 0    polyethylene glycol (GLYCOLAX) 17 gram/dose powder Take 17 g by mouth once daily. 510 g 6    rivaroxaban (XARELTO) 20 mg Tab Take 1 tablet (20 mg total) by mouth daily with dinner or evening meal. 60 tablet 4    senna-docusate 8.6-50 mg (PERICOLACE) 8.6-50 mg per tablet Take 1 tablet by mouth once daily.      solifenacin (VESICARE) 10 MG tablet Take 1 tablet (10 mg total) by mouth once daily. 30 tablet 3    trazodone (DESYREL) 100 MG tablet Take 1 tablet (100 mg total) by mouth every evening. 30 tablet 3     No current facility-administered medications for this visit.      Review of patient's allergies indicates:  No Known Allergies    Review of Systems   Constitutional: Negative for appetite change and fatigue.   Eyes: Negative for visual disturbance.   Respiratory: Negative for shortness of breath.    Cardiovascular: Negative for chest pain.   Gastrointestinal: Negative for  constipation and diarrhea.   Genitourinary: Negative for dysuria, frequency and urgency.   Musculoskeletal: Positive for arthralgias, back pain, gait problem and myalgias. Negative for joint swelling, neck pain and neck stiffness.   Neurological: Negative for dizziness, tremors, weakness, numbness and headaches.   Psychiatric/Behavioral: Negative for dysphoric mood.   All other systems reviewed and are negative.        Objective:      Physical Exam      Constitutional: He is oriented to person, place, and time.   He appears well-nourished. In manual WC.  Eyes: EOM are normal. Pupils are equal, round, and reactive to light.   Neck: Normal range of motion. Neck supple.   Cardiovascular: Normal rhythm  and rate.    Pulmonary/Chest: Effort normal.   Abdominal: Soft.   Musculoskeletal:   BUEs AROM WNL  BLEs AROM is diminished   Neurological: He is oriented to person, place, and time.   BUEs 5/5  RLE 3/5 HF, 4-/5 HE, 3+/5 KE/KF/DF  LLE 3-/5 HF, 3+/5 HE, 3-/5 KE, 2/5 KF, 0/5 DF .  wears left AFO.  Skin: No rash noted.   Psychiatric: He has a normal mood and affect.       Assessment:           1. Bilateral leg pain    2. Chronic pain syndrome    3. Muscle spasticity    4. Spasticity    5. Neuropathic pain    6. Chronic bilateral low back pain with bilateral sciatica    7. Weakness generalized    8. Frequent falls    9. Neuropathic pain, leg, bilateral    10. MS (multiple sclerosis)            Plan:        Bilateral leg pain    Chronic pain syndrome    Muscle spasticity    Spasticity    Neuropathic pain    Chronic bilateral low back pain with bilateral sciatica    Weakness generalized    Frequent falls    Neuropathic pain, leg, bilateral    MS (multiple sclerosis)    Other orders  -     oxyCODONE-acetaminophen (PERCOCET)  mg per tablet; Take 1 tablet by mouth every 8 (eight) hours as needed for Pain.  Dispense: 90 tablet; Refill: 0    Patient with MS, weakness in UE/LE, and neuropathic pain, weakness and spasticity    In LE> UE,    He also has chronic pain syndrome, chronic back and neck pain, with B/l LE weakness, Lt>> Rt, with impaired mobility, and ADLs.    -- he is at present in NH.   -- pain management: recommend to stop Tramadol 50 mg QID, and Oxycodone 5 mg q12 hrs prn pain, and give patient Percocet 10/325 mg po q8 hrs regularly, w/o breakthrough pain. He will resume all his medications, including gabapentin, Baclofen, and Diazepam for spasticity treatment.  Also recommend to keep appointments with , for treatment of MS.  At this time he does not need any additional DME.  All plan was discussed with patient and he agrees.     RTC in 4 weeks.     Total time spent face to face with patient was 45 minutes.   More than 50% of that time was spent in counseling on diagnosis , prognosis and treatment options.   I also  patient  on common and most usual side effect of prescribed medications. Risk and benefits of opiates, possible risk of developing opiate dependence and tolerance, need of strict compliance with prescribed medications.  Pain contract, rules and obligations were discussed with patient in details.  He is aware that I would be the only doctor prescribing himpain medications and ED in a case of emergency.  I reviewed Primary care , and other specialty's notes to better coordinate patient's  care.   All questions were answered, and patient voiced understanding.

## 2017-11-21 ENCOUNTER — TELEPHONE (OUTPATIENT)
Dept: NEUROLOGY | Facility: CLINIC | Age: 52
End: 2017-11-21

## 2017-11-21 NOTE — TELEPHONE ENCOUNTER
Received faxed request for  orders: MS Maintenance Program for pt (PT/OT 1x/week).  Returned completed order sheet to [facility] -[phone].

## 2017-11-22 ENCOUNTER — TELEPHONE (OUTPATIENT)
Dept: UROLOGY | Facility: CLINIC | Age: 52
End: 2017-11-22

## 2017-11-22 NOTE — TELEPHONE ENCOUNTER
Called pt to discuss urination. Spoke with his nurse at [facility] [phone]. She will get him to call me about his urination.

## 2017-12-01 ENCOUNTER — TELEPHONE (OUTPATIENT)
Dept: NEUROLOGY | Facility: CLINIC | Age: 52
End: 2017-12-01

## 2017-12-01 NOTE — TELEPHONE ENCOUNTER
----- Message from William Marcelo sent at 12/1/2017 10:35 AM CST -----  Contact: Self @ [phone] (ask to speak w/ Rebecca the )  Pt is calling to speak with you.

## 2017-12-11 ENCOUNTER — TELEPHONE (OUTPATIENT)
Dept: NEUROLOGY | Facility: CLINIC | Age: 52
End: 2017-12-11

## 2017-12-20 NOTE — TELEPHONE ENCOUNTER
LATE ENTRY: Spoke with pt and Rebecca on 12/1/17 and provided them with a list of group home/assisted living facilities from the State website (all licensed facilities).  Instructed Rebecca on where to locate this web site so she can continue to assist pt in search for assisted living.

## 2017-12-21 ENCOUNTER — TELEPHONE (OUTPATIENT)
Dept: UROLOGY | Facility: CLINIC | Age: 52
End: 2017-12-21

## 2017-12-21 ENCOUNTER — OFFICE VISIT (OUTPATIENT)
Dept: PHYSICAL MEDICINE AND REHAB | Facility: CLINIC | Age: 52
End: 2017-12-21
Payer: MEDICARE

## 2017-12-21 DIAGNOSIS — R52 CHRONIC PAIN OF MULTIPLE SITES: Chronic | ICD-10-CM

## 2017-12-21 DIAGNOSIS — Z79.891 LONG-TERM CURRENT USE OF OPIATE ANALGESIC: ICD-10-CM

## 2017-12-21 DIAGNOSIS — G89.4 CHRONIC PAIN SYNDROME: ICD-10-CM

## 2017-12-21 DIAGNOSIS — Z74.09 IMPAIRED MOBILITY AND ADLS: Chronic | ICD-10-CM

## 2017-12-21 DIAGNOSIS — R25.2 SPASTICITY: ICD-10-CM

## 2017-12-21 DIAGNOSIS — M54.41 CHRONIC BILATERAL LOW BACK PAIN WITH BILATERAL SCIATICA: ICD-10-CM

## 2017-12-21 DIAGNOSIS — G57.93 NEUROPATHIC PAIN, LEG, BILATERAL: ICD-10-CM

## 2017-12-21 DIAGNOSIS — Z78.9 IMPAIRED MOBILITY AND ADLS: Chronic | ICD-10-CM

## 2017-12-21 DIAGNOSIS — G89.29 CHRONIC BILATERAL LOW BACK PAIN WITH BILATERAL SCIATICA: ICD-10-CM

## 2017-12-21 DIAGNOSIS — G82.20 PARAPARESIS: Primary | ICD-10-CM

## 2017-12-21 DIAGNOSIS — G89.29 CHRONIC PAIN OF MULTIPLE SITES: Chronic | ICD-10-CM

## 2017-12-21 DIAGNOSIS — R26.81 GAIT INSTABILITY: ICD-10-CM

## 2017-12-21 DIAGNOSIS — G35 MS (MULTIPLE SCLEROSIS): Chronic | ICD-10-CM

## 2017-12-21 DIAGNOSIS — M54.42 CHRONIC BILATERAL LOW BACK PAIN WITH BILATERAL SCIATICA: ICD-10-CM

## 2017-12-21 PROCEDURE — 99999 PR PBB SHADOW E&M-EST. PATIENT-LVL II: CPT | Mod: PBBFAC,,, | Performed by: PHYSICAL MEDICINE & REHABILITATION

## 2017-12-21 PROCEDURE — 99214 OFFICE O/P EST MOD 30 MIN: CPT | Mod: S$GLB,,, | Performed by: PHYSICAL MEDICINE & REHABILITATION

## 2017-12-21 PROCEDURE — 99499 UNLISTED E&M SERVICE: CPT | Mod: S$GLB,,, | Performed by: PHYSICAL MEDICINE & REHABILITATION

## 2017-12-21 RX ORDER — OXYCODONE AND ACETAMINOPHEN 10; 325 MG/1; MG/1
1 TABLET ORAL EVERY 8 HOURS
Qty: 90 TABLET | Refills: 0 | Status: SHIPPED | OUTPATIENT
Start: 2018-02-28 | End: 2018-03-28 | Stop reason: SDUPTHER

## 2017-12-21 RX ORDER — DIAZEPAM 5 MG/1
5 TABLET ORAL 2 TIMES DAILY
Qty: 60 TABLET | Refills: 0 | Status: SHIPPED | OUTPATIENT
Start: 2018-02-01 | End: 2018-03-03

## 2017-12-21 RX ORDER — OXYCODONE AND ACETAMINOPHEN 10; 325 MG/1; MG/1
1 TABLET ORAL EVERY 8 HOURS PRN
Qty: 90 TABLET | Refills: 0 | Status: SHIPPED | OUTPATIENT
Start: 2017-12-29 | End: 2018-01-17 | Stop reason: SDUPTHER

## 2017-12-21 RX ORDER — DIAZEPAM 5 MG/1
5 TABLET ORAL 2 TIMES DAILY
Qty: 60 TABLET | Refills: 0 | Status: SHIPPED | OUTPATIENT
Start: 2018-01-02 | End: 2018-02-01

## 2017-12-21 RX ORDER — DIAZEPAM 5 MG/1
5 TABLET ORAL 2 TIMES DAILY
Qty: 60 TABLET | Refills: 0 | Status: SHIPPED | OUTPATIENT
Start: 2018-03-04 | End: 2018-03-28 | Stop reason: SDUPTHER

## 2017-12-21 RX ORDER — OXYCODONE AND ACETAMINOPHEN 10; 325 MG/1; MG/1
1 TABLET ORAL EVERY 8 HOURS
Qty: 90 TABLET | Refills: 0 | Status: SHIPPED | OUTPATIENT
Start: 2018-01-29 | End: 2018-02-28

## 2017-12-21 NOTE — TELEPHONE ENCOUNTER
Called pt to discuss urination. Called [facility] at [phone] to discuss his urination and f/u appt. I was on hold twice for 5 minutes and unable to talk to staff.     Statement Selected

## 2017-12-21 NOTE — PROGRESS NOTES
"Subjective:       Patient ID: Mao Levin is a 52 y.o. male.    Chief Complaint: Neck Pain and Leg Pain    Neck Pain    Associated symptoms include leg pain. Pertinent negatives include no chest pain, headaches, numbness or weakness.   Leg Pain    Pertinent negatives include no numbness.      Mao Levin is 51 y/o male who is coming first time to clinic for chronic  back, neck,shoulder, knee, and hand pain.   LCV 11/20/17.  Patient is currently in NH, and he is bringing his MAR from NH, for me to review his medications.   The pain started in 2006 following MS diagnosis and symptoms have been worsening.   Brief history: Patient is a 53 yo male with MS diagnosed in 2006 as well as chronic, generalized pain since that time that presents for initial evaluation. He was hospitalized in inpatient rehab in October /2016 and feels that he has become more functional but still is mostly wheel chair bound. He occasionally will use a rolling walker for ambulation.   Pain occurs from the neck down and involves the entire body except the head. No specific area is worse. Pain is "achy" and "tingling".   It is constant at 7/10 but can worsen with any movement to 10/10.   Today he c/o pain in both arm/hands in all fingers, they feel sore and tight.   Back pain is running down to both legs, back of thighs, and pain in leg is worst bellow the knee level, in calves that are tight, and weak.  His Left leg is weaker than Rt leg.   Has foot b/l foot weakness and drop, wears left AFO.  He has been a prolong time on chronic pain management with oxycodone 15mg q6h and oxycontin 40mg TWICE DAILY ( while in IP Rehab) but did not feel that this was any more helpful. Tried gabapentin in the past which was tolerated but didn't help.   He also takes diazepam 5mg TWICE DAILY and baclofen 10mg TID which helps his spasticity.    IN NH, he now takes Tramadol ad Oxycodone 5 mg Q8 hrs prn pain.  He states that Tramadol is ineffective, and would like to get " appropriate medications to treat his pain.   He is coming today, w/o papers, medical documentation from NH.   Pain Medications:  Percocet 10/325mg, 1 tablet 3x a day  Gabapentin 300mg, 3 tablets three times daily  Valium 5mg, 1 tablet twice daily  Baclofen 20 mg QID  Xarelto 20mg, 1 tablet daily    Pain Description:   The pain is located in the neck, back shoulders, hands  and knees.  Today the current  pain is rated as 7/10  At BEST  5/10   At WORST  10/10 on the WORST day.    On average pain is rated as 7/10.   The pain is described as aching and tingling  Symptoms interfere with daily activity, sleeping and work.   Exacerbating factors: Morning, Extension and Flexing.    Mitigating factors medications and physical therapy.   Patient denies .  Patient denies any suicidal or homicidal ideations    Physical Therapy/Home Exercise: yes     report:  Reviewed and consistent with medication use as prescribed.  Pain Procedures: none      Imaging:   I reviewed C-spine and T-spine MRI which showed demylenating changes as well as fairly significant stenosis throughout the cervical spine.   He is here for follow up, and treatment.    Past Medical History:   Diagnosis Date    Anxiety     Deep vein thrombosis     Depression     Multiple sclerosis     Multiple sclerosis     Pulmonary embolism        No past surgical history on file.    Family History   Problem Relation Age of Onset    Arthritis Mother     No Known Problems Father     Cancer Maternal Grandfather        Social History     Social History    Marital status: Single     Spouse name: N/A    Number of children: N/A    Years of education: N/A     Social History Main Topics    Smoking status: Former Smoker     Packs/day: 0.50     Years: 23.00     Types: Cigarettes     Quit date: 10/1/2017    Smokeless tobacco: Never Used      Comment: Using patch    Alcohol use No    Drug use: No    Sexual activity: Yes     Partners: Female     Other Topics Concern     Not on file     Social History Narrative    No narrative on file       Current Outpatient Prescriptions   Medication Sig Dispense Refill    baclofen (LIORESAL) 20 MG tablet Take 1 tablet (20 mg total) by mouth 4 (four) times daily. 90 tablet 0    citalopram (CELEXA) 20 MG tablet Take 1 tablet (20 mg total) by mouth once daily. 30 tablet 0    dantrolene (DANTRIUM) 50 MG Cap Take 1 capsule (50 mg total) by mouth 4 (four) times daily. 120 capsule 0    [START ON 1/2/2018] diazePAM (VALIUM) 5 MG tablet Take 1 tablet (5 mg total) by mouth 2 (two) times daily. 60 tablet 0    [START ON 2/1/2018] diazePAM (VALIUM) 5 MG tablet Take 1 tablet (5 mg total) by mouth 2 (two) times daily. 60 tablet 0    [START ON 3/4/2018] diazePAM (VALIUM) 5 MG tablet Take 1 tablet (5 mg total) by mouth 2 (two) times daily. 60 tablet 0    duloxetine (CYMBALTA) 30 MG capsule Take 30 mg by mouth once daily.      ergocalciferol (VITAMIN D2) 50,000 unit Cap Take 1 capsule (50,000 Units total) by mouth every 7 days. (Patient taking differently: Take 50,000 Units by mouth every 7 days. on monday) 4 capsule 11    gabapentin (NEURONTIN) 300 MG capsule Take 900 mg morning and evening.  Take 1100 mg total in the afternoon. 270 capsule 0    nicotine (NICODERM CQ) 14 mg/24 hr Place 1 patch onto the skin every 24 hours.      [START ON 12/29/2017] oxyCODONE-acetaminophen (PERCOCET)  mg per tablet Take 1 tablet by mouth every 8 (eight) hours as needed for Pain. 90 tablet 0    [START ON 1/29/2018] oxyCODONE-acetaminophen (PERCOCET)  mg per tablet Take 1 tablet by mouth every 8 (eight) hours. 90 tablet 0    [START ON 2/28/2018] oxyCODONE-acetaminophen (PERCOCET)  mg per tablet Take 1 tablet by mouth every 8 (eight) hours. 90 tablet 0    polyethylene glycol (GLYCOLAX) 17 gram/dose powder Take 17 g by mouth once daily. 510 g 6    rivaroxaban (XARELTO) 20 mg Tab Take 1 tablet (20 mg total) by mouth daily with dinner or evening meal.  60 tablet 4    senna-docusate 8.6-50 mg (PERICOLACE) 8.6-50 mg per tablet Take 1 tablet by mouth once daily.      solifenacin (VESICARE) 10 MG tablet Take 1 tablet (10 mg total) by mouth once daily. 30 tablet 3    trazodone (DESYREL) 100 MG tablet Take 1 tablet (100 mg total) by mouth every evening. 30 tablet 3     No current facility-administered medications for this visit.      Review of patient's allergies indicates:  No Known Allergies    Review of Systems   Constitutional: Negative for appetite change and fatigue.   Eyes: Negative for visual disturbance.   Respiratory: Negative for shortness of breath.    Cardiovascular: Negative for chest pain.   Gastrointestinal: Negative for constipation and diarrhea.   Genitourinary: Negative for dysuria, frequency and urgency.   Musculoskeletal: Positive for arthralgias, back pain, gait problem, myalgias and neck pain. Negative for joint swelling and neck stiffness.   Neurological: Negative for dizziness, tremors, weakness, numbness and headaches.   Psychiatric/Behavioral: Negative for dysphoric mood.   All other systems reviewed and are negative.        Objective:      Physical Exam      Constitutional: He is oriented to person, place, and time.   He appears well-nourished. In manual WC.  Eyes: EOM are normal. Pupils are equal, round, and reactive to light.   Neck: Normal range of motion. Neck supple.   Cardiovascular: Normal rhythm  and rate.    Pulmonary/Chest: Effort normal.   Abdominal: Soft.   Musculoskeletal:   BUEs AROM WNL  BLEs AROM is diminished   Neurological: He is oriented to person, place, and time.   BUEs 5/5  RLE 3/5 HF, 4-/5 HE, 3+/5 KE/KF/DF  LLE 3-/5 HF, 3+/5 HE, 3-/5 KE, 2/5 KF, 0/5 DF .  wears left AFO.  Skin: No rash noted.   Psychiatric: He has a normal mood and affect.       Assessment:           1. Paraparesis    2. Chronic bilateral low back pain with bilateral sciatica    3. Spasticity    4. Chronic pain of multiple sites    5. Impaired  mobility and ADLs    6. Gait instability    7. Neuropathic pain, leg, bilateral    8. MS (multiple sclerosis)    9. Chronic pain syndrome    10. Long-term current use of opiate analgesic            Plan:        Paraparesis  -     diazePAM (VALIUM) 5 MG tablet; Take 1 tablet (5 mg total) by mouth 2 (two) times daily.  Dispense: 60 tablet; Refill: 0  -     diazePAM (VALIUM) 5 MG tablet; Take 1 tablet (5 mg total) by mouth 2 (two) times daily.  Dispense: 60 tablet; Refill: 0  -     diazePAM (VALIUM) 5 MG tablet; Take 1 tablet (5 mg total) by mouth 2 (two) times daily.  Dispense: 60 tablet; Refill: 0    Chronic bilateral low back pain with bilateral sciatica  -     diazePAM (VALIUM) 5 MG tablet; Take 1 tablet (5 mg total) by mouth 2 (two) times daily.  Dispense: 60 tablet; Refill: 0  -     diazePAM (VALIUM) 5 MG tablet; Take 1 tablet (5 mg total) by mouth 2 (two) times daily.  Dispense: 60 tablet; Refill: 0  -     diazePAM (VALIUM) 5 MG tablet; Take 1 tablet (5 mg total) by mouth 2 (two) times daily.  Dispense: 60 tablet; Refill: 0    Spasticity  -     diazePAM (VALIUM) 5 MG tablet; Take 1 tablet (5 mg total) by mouth 2 (two) times daily.  Dispense: 60 tablet; Refill: 0  -     diazePAM (VALIUM) 5 MG tablet; Take 1 tablet (5 mg total) by mouth 2 (two) times daily.  Dispense: 60 tablet; Refill: 0  -     diazePAM (VALIUM) 5 MG tablet; Take 1 tablet (5 mg total) by mouth 2 (two) times daily.  Dispense: 60 tablet; Refill: 0    Chronic pain of multiple sites  -     diazePAM (VALIUM) 5 MG tablet; Take 1 tablet (5 mg total) by mouth 2 (two) times daily.  Dispense: 60 tablet; Refill: 0  -     diazePAM (VALIUM) 5 MG tablet; Take 1 tablet (5 mg total) by mouth 2 (two) times daily.  Dispense: 60 tablet; Refill: 0  -     diazePAM (VALIUM) 5 MG tablet; Take 1 tablet (5 mg total) by mouth 2 (two) times daily.  Dispense: 60 tablet; Refill: 0    Impaired mobility and ADLs  -     diazePAM (VALIUM) 5 MG tablet; Take 1 tablet (5 mg total)  by mouth 2 (two) times daily.  Dispense: 60 tablet; Refill: 0  -     diazePAM (VALIUM) 5 MG tablet; Take 1 tablet (5 mg total) by mouth 2 (two) times daily.  Dispense: 60 tablet; Refill: 0  -     diazePAM (VALIUM) 5 MG tablet; Take 1 tablet (5 mg total) by mouth 2 (two) times daily.  Dispense: 60 tablet; Refill: 0    Gait instability  -     diazePAM (VALIUM) 5 MG tablet; Take 1 tablet (5 mg total) by mouth 2 (two) times daily.  Dispense: 60 tablet; Refill: 0  -     diazePAM (VALIUM) 5 MG tablet; Take 1 tablet (5 mg total) by mouth 2 (two) times daily.  Dispense: 60 tablet; Refill: 0  -     diazePAM (VALIUM) 5 MG tablet; Take 1 tablet (5 mg total) by mouth 2 (two) times daily.  Dispense: 60 tablet; Refill: 0    Neuropathic pain, leg, bilateral  -     diazePAM (VALIUM) 5 MG tablet; Take 1 tablet (5 mg total) by mouth 2 (two) times daily.  Dispense: 60 tablet; Refill: 0  -     diazePAM (VALIUM) 5 MG tablet; Take 1 tablet (5 mg total) by mouth 2 (two) times daily.  Dispense: 60 tablet; Refill: 0  -     diazePAM (VALIUM) 5 MG tablet; Take 1 tablet (5 mg total) by mouth 2 (two) times daily.  Dispense: 60 tablet; Refill: 0    MS (multiple sclerosis)  -     diazePAM (VALIUM) 5 MG tablet; Take 1 tablet (5 mg total) by mouth 2 (two) times daily.  Dispense: 60 tablet; Refill: 0  -     diazePAM (VALIUM) 5 MG tablet; Take 1 tablet (5 mg total) by mouth 2 (two) times daily.  Dispense: 60 tablet; Refill: 0  -     diazePAM (VALIUM) 5 MG tablet; Take 1 tablet (5 mg total) by mouth 2 (two) times daily.  Dispense: 60 tablet; Refill: 0    Chronic pain syndrome  -     diazePAM (VALIUM) 5 MG tablet; Take 1 tablet (5 mg total) by mouth 2 (two) times daily.  Dispense: 60 tablet; Refill: 0  -     diazePAM (VALIUM) 5 MG tablet; Take 1 tablet (5 mg total) by mouth 2 (two) times daily.  Dispense: 60 tablet; Refill: 0  -     diazePAM (VALIUM) 5 MG tablet; Take 1 tablet (5 mg total) by mouth 2 (two) times daily.  Dispense: 60 tablet; Refill:  0    Long-term current use of opiate analgesic  -     diazePAM (VALIUM) 5 MG tablet; Take 1 tablet (5 mg total) by mouth 2 (two) times daily.  Dispense: 60 tablet; Refill: 0  -     diazePAM (VALIUM) 5 MG tablet; Take 1 tablet (5 mg total) by mouth 2 (two) times daily.  Dispense: 60 tablet; Refill: 0  -     diazePAM (VALIUM) 5 MG tablet; Take 1 tablet (5 mg total) by mouth 2 (two) times daily.  Dispense: 60 tablet; Refill: 0    Other orders  -     oxyCODONE-acetaminophen (PERCOCET)  mg per tablet; Take 1 tablet by mouth every 8 (eight) hours as needed for Pain.  Dispense: 90 tablet; Refill: 0  -     oxyCODONE-acetaminophen (PERCOCET)  mg per tablet; Take 1 tablet by mouth every 8 (eight) hours.  Dispense: 90 tablet; Refill: 0  -     oxyCODONE-acetaminophen (PERCOCET)  mg per tablet; Take 1 tablet by mouth every 8 (eight) hours.  Dispense: 90 tablet; Refill: 0    Patient with MS, weakness in UE/LE, and neuropathic pain, weakness and spasticity In LE> UE,    He also has chronic pain syndrome, chronic back and neck pain, with B/l LE weakness, Lt>> Rt, with impaired mobility, and ADLs.    -- He is at present in NH.   -- pain management: recommend to stop Tramadol 50 mg QID, and Oxycodone 5 mg q12 hrs prn pain, and give patient Percocet 10/325 mg po q8 hrs regularly, w/o breakthrough pain. He will resume all his medications, including gabapentin, Baclofen, and Diazepam for spasticity treatment.  Also recommend to keep appointments with , for treatment of MS.  At this time he does not need any additional DME.  All plan was discussed with patient and he agrees.     RTC in  3 months.    Total time spent face to face with patient was 25 minutes.   More than 50% of that time was spent in counseling on diagnosis , prognosis and treatment options.   I also  patient  on common and most usual side effect of prescribed medications. Risk and benefits of opiates, possible risk of developing opiate  dependence and tolerance, need of strict compliance with prescribed medications.  Pain contract, rules and obligations were discussed with patient in details.  He is aware that I would be the only doctor prescribing himpain medications and ED in a case of emergency.  I reviewed Primary care , and other specialty's notes to better coordinate patient's  care.   All questions were answered, and patient voiced understanding.

## 2018-01-03 ENCOUNTER — OFFICE VISIT (OUTPATIENT)
Dept: NEUROLOGY | Facility: CLINIC | Age: 53
End: 2018-01-03
Payer: MEDICARE

## 2018-01-03 ENCOUNTER — DOCUMENTATION ONLY (OUTPATIENT)
Dept: NEUROLOGY | Facility: CLINIC | Age: 53
End: 2018-01-03

## 2018-01-03 ENCOUNTER — LAB VISIT (OUTPATIENT)
Dept: LAB | Facility: HOSPITAL | Age: 53
End: 2018-01-03
Payer: MEDICARE

## 2018-01-03 VITALS
WEIGHT: 183 LBS | HEIGHT: 71 IN | HEART RATE: 61 BPM | DIASTOLIC BLOOD PRESSURE: 78 MMHG | BODY MASS INDEX: 25.62 KG/M2 | SYSTOLIC BLOOD PRESSURE: 135 MMHG

## 2018-01-03 DIAGNOSIS — M79.2 NEUROPATHIC PAIN: ICD-10-CM

## 2018-01-03 DIAGNOSIS — G35 MULTIPLE SCLEROSIS: ICD-10-CM

## 2018-01-03 DIAGNOSIS — Z71.89 COUNSELING REGARDING GOALS OF CARE: ICD-10-CM

## 2018-01-03 DIAGNOSIS — R52 CHRONIC PAIN OF MULTIPLE SITES: Chronic | ICD-10-CM

## 2018-01-03 DIAGNOSIS — F32.A DEPRESSION, UNSPECIFIED DEPRESSION TYPE: ICD-10-CM

## 2018-01-03 DIAGNOSIS — N31.9 NEUROGENIC BLADDER: ICD-10-CM

## 2018-01-03 DIAGNOSIS — Z79.899 ENCOUNTER FOR LONG-TERM (CURRENT) USE OF HIGH-RISK MEDICATION: ICD-10-CM

## 2018-01-03 DIAGNOSIS — R25.2 SPASTICITY: ICD-10-CM

## 2018-01-03 DIAGNOSIS — G89.29 CHRONIC PAIN OF MULTIPLE SITES: Chronic | ICD-10-CM

## 2018-01-03 DIAGNOSIS — R26.9 GAIT DISTURBANCE: ICD-10-CM

## 2018-01-03 DIAGNOSIS — G35 MULTIPLE SCLEROSIS: Primary | ICD-10-CM

## 2018-01-03 LAB
ANION GAP SERPL CALC-SCNC: 8 MMOL/L
BUN SERPL-MCNC: 10 MG/DL
CALCIUM SERPL-MCNC: 8.9 MG/DL
CHLORIDE SERPL-SCNC: 101 MMOL/L
CO2 SERPL-SCNC: 31 MMOL/L
CREAT SERPL-MCNC: 1 MG/DL
EST. GFR  (AFRICAN AMERICAN): >60 ML/MIN/1.73 M^2
EST. GFR  (NON AFRICAN AMERICAN): >60 ML/MIN/1.73 M^2
GLUCOSE SERPL-MCNC: 88 MG/DL
POTASSIUM SERPL-SCNC: 4.4 MMOL/L
SODIUM SERPL-SCNC: 140 MMOL/L

## 2018-01-03 PROCEDURE — 99499 UNLISTED E&M SERVICE: CPT | Mod: S$GLB,,, | Performed by: PHYSICIAN ASSISTANT

## 2018-01-03 PROCEDURE — 99215 OFFICE O/P EST HI 40 MIN: CPT | Mod: ,,, | Performed by: PHYSICIAN ASSISTANT

## 2018-01-03 PROCEDURE — 99999 PR PBB SHADOW E&M-EST. PATIENT-LVL IV: CPT | Mod: PBBFAC,,, | Performed by: PHYSICIAN ASSISTANT

## 2018-01-03 PROCEDURE — 80048 BASIC METABOLIC PNL TOTAL CA: CPT

## 2018-01-03 PROCEDURE — 3008F BODY MASS INDEX DOCD: CPT | Mod: ,,, | Performed by: PHYSICIAN ASSISTANT

## 2018-01-03 PROCEDURE — 99499 UNLISTED E&M SERVICE: CPT | Mod: ,,, | Performed by: PHYSICIAN ASSISTANT

## 2018-01-03 PROCEDURE — 36415 COLL VENOUS BLD VENIPUNCTURE: CPT

## 2018-01-03 NOTE — Clinical Note
Getting CrCl today, and if OK will submit for Ampyra. Mao asks that we give the [facility] number [phone] and ask for Jeyson or Elis for Ampyra approval and copay assistance

## 2018-01-12 ENCOUNTER — TELEPHONE (OUTPATIENT)
Dept: PHYSICAL MEDICINE AND REHAB | Facility: CLINIC | Age: 53
End: 2018-01-12

## 2018-01-12 ENCOUNTER — DOCUMENTATION ONLY (OUTPATIENT)
Dept: NEUROLOGY | Facility: CLINIC | Age: 53
End: 2018-01-12

## 2018-01-12 NOTE — TELEPHONE ENCOUNTER
The pharmacy is asking you to send a another script for this patient.  The insurance company only sharyn for 45 pills and now the patient need the rest.  Pharmacy has been selected.    percocet

## 2018-01-12 NOTE — TELEPHONE ENCOUNTER
----- Message from Lisa Bond sent at 1/12/2018 12:42 PM CST -----  Contact: [facility] is requesting a script for Percocet 10-325mg to be sent to their pharmacy Ph#[phone]      [facility] Ph# [phone]    Thanks

## 2018-01-13 ENCOUNTER — HOSPITAL ENCOUNTER (EMERGENCY)
Facility: HOSPITAL | Age: 53
Discharge: LEFT BEFORE DISCHARGE | End: 2018-01-13
Attending: EMERGENCY MEDICINE
Payer: MEDICARE

## 2018-01-13 VITALS
TEMPERATURE: 99 F | DIASTOLIC BLOOD PRESSURE: 92 MMHG | HEART RATE: 80 BPM | SYSTOLIC BLOOD PRESSURE: 140 MMHG | WEIGHT: 180 LBS | RESPIRATION RATE: 18 BRPM | OXYGEN SATURATION: 99 % | HEIGHT: 66 IN | BODY MASS INDEX: 28.93 KG/M2

## 2018-01-13 DIAGNOSIS — G89.29 OTHER CHRONIC PAIN: Primary | ICD-10-CM

## 2018-01-13 PROCEDURE — 99284 EMERGENCY DEPT VISIT MOD MDM: CPT

## 2018-01-13 PROCEDURE — 25000003 PHARM REV CODE 250: Performed by: STUDENT IN AN ORGANIZED HEALTH CARE EDUCATION/TRAINING PROGRAM

## 2018-01-13 RX ORDER — OXYCODONE AND ACETAMINOPHEN 5; 325 MG/1; MG/1
2 TABLET ORAL
Status: COMPLETED | OUTPATIENT
Start: 2018-01-13 | End: 2018-01-13

## 2018-01-13 RX ADMIN — OXYCODONE HYDROCHLORIDE AND ACETAMINOPHEN 2 TABLET: 5; 325 TABLET ORAL at 10:01

## 2018-01-13 NOTE — Clinical Note
Mao Levin discharge to home/self care.    - Condition on Discharge: Good.  - Patient walked out of the emergency department.  - The patient left the ED accompanied by a family member.  - The discharge instructions were discussed with the patient/parent.   - They state an understanding of the discharge instructions.  - Instructed patient/parent to go to the discharge window.

## 2018-01-14 NOTE — ED PROVIDER NOTES
Encounter Date: 1/13/2018    SCRIBE #1 NOTE: I, Abhi Scott, am scribing for, and in the presence of,  Sakshi Jim MD. I have scribed the following portions of the note - the Resident attestation.       History     Chief Complaint   Patient presents with    Leg Pain     pt from [facility], c/o generalized pain, primarily over legs bilaterally, pt has hx of MS and chronic pain, pt out of pain meds at this time      HPI   53 yo M with PMH of Multiple Sclerosis and chronic pain presents from [facility] with chronic pain in his legs, neck and back. Pt states this pain is a 6/10 a this time and feels like he chronic pain for which he follows with Dr. Estrella (PMR). Denies trauma, but states he did slip while transferring from his wheelchair to the bed 3 days ago. He states he was able to lower himself to the floor and did not have any injuries. He is prescribed Percocet 10-325mg, but is currently out of this medication and requesting a refill. He states the nursing home attempted to contact Dr. Estrella's office, but he never heard back from their office. Per chart review, pt had a prescription written for the Percocet on 12/29/17 for a 90 day supply.   Review of patient's allergies indicates:  No Known Allergies  Past Medical History:   Diagnosis Date    Anxiety     Deep vein thrombosis     Depression     Multiple sclerosis     Multiple sclerosis     Pulmonary embolism      History reviewed. No pertinent surgical history.  Family History   Problem Relation Age of Onset    Arthritis Mother     No Known Problems Father     Cancer Maternal Grandfather      Social History   Substance Use Topics    Smoking status: Former Smoker     Packs/day: 0.50     Years: 23.00     Types: Cigarettes     Quit date: 10/1/2017    Smokeless tobacco: Never Used      Comment: Using patch    Alcohol use No     Review of Systems   Constitutional: Negative for chills and fever.   HENT: Negative for  congestion.    Respiratory: Negative for shortness of breath.    Cardiovascular: Negative for chest pain and leg swelling.   Gastrointestinal: Negative for abdominal pain, nausea and vomiting.   Genitourinary: Negative for dysuria.   Musculoskeletal: Positive for back pain and neck pain.   Neurological: Negative for headaches.   Psychiatric/Behavioral: Negative for confusion.       Physical Exam     Initial Vitals [01/13/18 2017]   BP Pulse Resp Temp SpO2   (!) 140/92 80 18 98.6 °F (37 °C) 99 %      MAP       108         Physical Exam    Nursing note and vitals reviewed.  Constitutional: He is not diaphoretic. No distress.   HENT:   Head: Normocephalic and atraumatic.   Eyes: Conjunctivae and EOM are normal. Pupils are equal, round, and reactive to light. No scleral icterus.   Neck: Normal range of motion. Neck supple.   Cardiovascular: Normal rate, regular rhythm, normal heart sounds and intact distal pulses. Exam reveals no gallop and no friction rub.    No murmur heard.  Pulmonary/Chest: Breath sounds normal. No respiratory distress. He has no wheezes. He has no rhonchi. He has no rales. He exhibits no tenderness.   Abdominal: Soft. Bowel sounds are normal. He exhibits no distension and no mass. There is no tenderness. There is no rebound and no guarding.   Neurological: He is alert and oriented to person, place, and time. No cranial nerve deficit or sensory deficit.   Reflex Scores:       Bicep reflexes are 3+ on the right side.       Patellar reflexes are 3+ on the right side.  Motor Strength  Bilateral upper extremity 5/5  Left Lower extremity 2/5 KE , 2/5 KF ,   Right Lower extremity 2/5 KE , 3/5 KF  AFO on left lower extremity      Skin: Skin is warm and dry. No rash and no abscess noted. No erythema.         ED Course   Procedures  Labs Reviewed - No data to display          Medical Decision Making:   History:   Old Medical Records: I decided to obtain old medical records.       APC / Resident Notes:    HO-II MDM  This is an emergent evaluation of a 52 y.o.M with PMH of Multiple Sclerosis and chronic pain presents from [facility] with bilateral leg pain. Pt states this pain is a 6/10 a this time and feels like he chronic pain for which he follows with Dr. Estrella (PMR). Physical exam notable for chronic decreased muscle weakness. No LE swelling, erythema, or TTP that would be consistent with DVT or infection. I explained to pt that it is document in EPIC that he received a 90 day supply of Percocet on 12/29/17 from Dr. Estrella and if he never had the prescription filled, he should address that with Dr. Estrella's office. I also informed him that one provider should be prescribing his pain medication. He expressed understanding. No labs or imaging indicated. Will give one percocet 5-325mg prior to discharge. Pt instructed to follow-up with Dr. Estrella. Pt is aware of plan and is amenable.     Britt Dye D.O  [facility] EMERGENCY MEDICINE PGY-2  10:20 PM 01/13/2018         Scribe Attestation:   Scribe #1: I performed the above scribed service and the documentation accurately describes the services I performed. I attest to the accuracy of the note.    Attending Attestation:   Physician Attestation Statement for Resident:  As the supervising MD . -: Attending Note:  Physician Attestation Statement: I have personally seen and examined this patient. As the supervising MD I agree with the above history. As the supervising MD I agree with the above PE. As the supervising MD I agree with the above treatment, course, plan, and disposition.     52 y.o. male here for controlled substance refill. No change in pain. No change on physical exam that is consistent for DVT, infection, or arterial insufficiency. Patient stable and comfortable.  I have reviewed the following: old records at this facility.            I, Dr. Sakshi Jim, personally performed the services described in this documentation. All medical  record entries made by the scribe were at my direction and in my presence.  I have reviewed the chart and agree that the record reflects my personal performance and is accurate and complete. Sakshi Jim MD.  8:07 AM 01/15/2018        ED Course      Clinical Impression:   The encounter diagnosis was Other chronic pain.                           Britt Dye DO  Resident  01/15/18 0631       Sakshi Jim MD  01/15/18 0807

## 2018-01-14 NOTE — ED NOTES
Pt presents from [facility] reporting pain in legs, hands, and lower back x 3 days. Pt reports slipping and falling twice within the past week. Pt denies head trauma.

## 2018-01-14 NOTE — ED NOTES
"Pt identifiers checked and accurate with Mao Levin.   LOC: The patient is awake, alert and aware of environment with an appropriate affect, the patient is oriented x 3 and speaking appropriately.  APPEARANCE: Patient resting comfortably and in no acute distress, patient is clean and well groomed  SKIN: The skin is warm and dry, color consistent with ethnicity, patient has normal skin turgor and moist mucus membranes, skin intact, no breakdown or bruising noted.  MUSCULOSKELETAL: Patient moving all extremities well, no obvious swelling or deformities noted. Pt reports "slidding to the floor" twice within the past week, pt denies head trauma.   RESPIRATORY: Airway is open and patent, breath sounds clear throughout all lung fields; respirations are spontaneous, patient has a normal effort and rate, no accessory muscle use noted. Pt denies SOB  CARDIAC: Patient has trace peripheral edema noted to right lower extremity, capillary refill < 3 seconds. No complaints of chest pain   ABDOMEN: Soft and non tender to palpation, no distention noted. Bowel sounds present x 4  NEUROLOGIC: PERRL, 3 mm bilaterally, eyes open spontaneously, behavior appropriate to situation, follows commands, facial expression symmetrical, bilateral hand grasp uneven, left sided weakness to upper and lower extremities. Pt has HX of MS .     "

## 2018-01-15 ENCOUNTER — TELEPHONE (OUTPATIENT)
Dept: PHYSICAL MEDICINE AND REHAB | Facility: CLINIC | Age: 53
End: 2018-01-15

## 2018-01-15 NOTE — TELEPHONE ENCOUNTER
----- Message from Randolph Dowell sent at 1/15/2018  9:49 AM CST -----  Contact: Patient @ [phone]  Patient needs a refill on (oxyCODONE-acetaminophen (PERCOCET)  mg per tablet ) pls fax to [facility] @ [phone]

## 2018-01-16 ENCOUNTER — TELEPHONE (OUTPATIENT)
Dept: PHYSICAL MEDICINE AND REHAB | Facility: CLINIC | Age: 53
End: 2018-01-16

## 2018-01-16 NOTE — TELEPHONE ENCOUNTER
----- Message from Taylor Curry sent at 1/16/2018  3:50 PM CST -----  Contact: pt   Pt states he has left several messages  requesting a prescription for   oxyCODONE-acetaminophen (PERCOCET)  mg per tablet. Pt is asking that you call and speak with his Nurse Mabel at [phone]

## 2018-01-16 NOTE — TELEPHONE ENCOUNTER
Left the message with Mrs. Mancilla to let Nurse Jessy know that I spoke with Yancy on this morning and that the script is at the pharmacy the requested.

## 2018-01-17 RX ORDER — OXYCODONE AND ACETAMINOPHEN 10; 325 MG/1; MG/1
1 TABLET ORAL EVERY 8 HOURS PRN
Qty: 45 TABLET | Refills: 0 | Status: SHIPPED | OUTPATIENT
Start: 2018-01-17 | End: 2018-02-16

## 2018-01-24 ENCOUNTER — TELEPHONE (OUTPATIENT)
Dept: NEUROLOGY | Facility: CLINIC | Age: 53
End: 2018-01-24

## 2018-01-24 NOTE — TELEPHONE ENCOUNTER
----- Message from Edelmira De Oliveira sent at 1/24/2018  2:32 PM CST -----  Contact: self @ [phone]  Pt would like to speak with Lawanda concerning what is going on with the walking pill.   Pls call Nai Chavez at [facility] [phone].

## 2018-01-24 NOTE — TELEPHONE ENCOUNTER
Returned call to [facility] but was put on hold for 20 minutes. Will attempt to call back tomorrow.

## 2018-01-29 NOTE — TELEPHONE ENCOUNTER
Returned call to Rebecca and provided her with the number to [facility] to inquire about pt's Ampyra rx.

## 2018-01-29 NOTE — TELEPHONE ENCOUNTER
----- Message from Leslie Carrasco sent at 1/29/2018  1:28 PM CST -----  Contact: Pt. [phone]  The patient would like to speak to someone regarding his medication. Please contact the patient to discuss further. Please ask to speak to Rebecca Gaines

## 2018-02-01 ENCOUNTER — OFFICE VISIT (OUTPATIENT)
Dept: UROLOGY | Facility: CLINIC | Age: 53
End: 2018-02-01
Payer: MEDICARE

## 2018-02-01 VITALS
DIASTOLIC BLOOD PRESSURE: 80 MMHG | BODY MASS INDEX: 25.62 KG/M2 | WEIGHT: 183 LBS | HEIGHT: 71 IN | SYSTOLIC BLOOD PRESSURE: 122 MMHG

## 2018-02-01 DIAGNOSIS — R35.0 URINARY FREQUENCY: Primary | ICD-10-CM

## 2018-02-01 DIAGNOSIS — G35 MS (MULTIPLE SCLEROSIS): Chronic | ICD-10-CM

## 2018-02-01 DIAGNOSIS — N31.9 NEUROGENIC BLADDER: Chronic | ICD-10-CM

## 2018-02-01 DIAGNOSIS — R82.71 BACTERIA IN URINE: ICD-10-CM

## 2018-02-01 DIAGNOSIS — R39.15 URGENCY OF URINATION: ICD-10-CM

## 2018-02-01 PROCEDURE — 99499 UNLISTED E&M SERVICE: CPT | Mod: S$GLB,,, | Performed by: NURSE PRACTITIONER

## 2018-02-01 PROCEDURE — 87088 URINE BACTERIA CULTURE: CPT

## 2018-02-01 PROCEDURE — 3008F BODY MASS INDEX DOCD: CPT | Mod: S$GLB,,, | Performed by: NURSE PRACTITIONER

## 2018-02-01 PROCEDURE — 99213 OFFICE O/P EST LOW 20 MIN: CPT | Mod: S$GLB,,, | Performed by: NURSE PRACTITIONER

## 2018-02-01 PROCEDURE — 87086 URINE CULTURE/COLONY COUNT: CPT

## 2018-02-01 PROCEDURE — 87186 SC STD MICRODIL/AGAR DIL: CPT

## 2018-02-01 PROCEDURE — 87077 CULTURE AEROBIC IDENTIFY: CPT

## 2018-02-01 PROCEDURE — 99999 PR PBB SHADOW E&M-EST. PATIENT-LVL IV: CPT | Mod: PBBFAC,,, | Performed by: NURSE PRACTITIONER

## 2018-02-01 RX ORDER — SOLIFENACIN SUCCINATE 10 MG/1
10 TABLET, FILM COATED ORAL DAILY
Qty: 30 TABLET | Refills: 3 | Status: SHIPPED | OUTPATIENT
Start: 2018-02-01 | End: 2018-05-08

## 2018-02-01 NOTE — PROGRESS NOTES
CHIEF COMPLAINT:    Mr. Levin is a 52 y.o. male presenting for frequency and urgency.    PRESENTING ILLNESS:    Mao Levin is a 52 y.o. male with a PMH of MS and neurogenic bladder who presents for frequency and urgency.  Last seen on 10/10/17.   He lives in a NH.    He presents today for increased urinary frequency, nocturia, urgency w/ urge incontinence, and stress incontinence. He states he urinates every 1 hr during the day and night. He does not think that his symptoms have improved.  Unsure if he is taking Vesicare 10 mg daily. He states he has not had another UTI since the last visit.     Initial Note:  UTI symptoms? Dysuria and MS flairs. No f/c or n/v.  Severity of incontinence with # of depends?  He reports changing his depend with every urination d/t his urgency and urge incontinence. He believes he uses about 4-5 daily and 1 at night  Voiding symptoms? Reports urgency, frequency, urge incontinence, nocturia, hesitancy, intermittency, incomplete bladder emptying, and decreased urine volume. Denies difficulty urinating, dysuria, and hematuria.    hx? No  testing for his neurogenic bladder  Hx of pelvic surgery? No   Fluid consumption? Daily- Coffee 2 cups, water with meds, soda 0-1 cups, and juice 6 cups  Medications? Oxybutynin 10 mg daily; taking daily for 1 yr and has not seen improvement with his symptoms.  He reports a good urinary stream.  Constipation sometimes    CT abd/pel 10/2016- Kidneys concentrate and excrete contrast appropriately.  No nephrolithiasis.  No hydronephrosis or hydroureter.  No solid renal masses.  Bladder is fluid-filled and unremarkable.  The prostate is normal in size.    Urine cultures:  8/14/17 PROVIDENCIA STUARTII> 100,000 cfu/ml   3/13/17 ESCHERICHIA COLI> 100,000 cfu/ml     REVIEW OF SYSTEMS:    Review of Systems    Constitutional: Negative for fever and chills.   HENT: Negative for hearing loss.   Eyes: Negative for visual disturbance.   Respiratory: Negative for  shortness of breath.   Cardiovascular: Negative for chest pain.   Gastrointestinal: Negative for nausea and vomiting.  Genitourinary:  See above  Neurological: Negative for dizziness.   Hematological: Does not bruise/bleed easily.   Psychiatric/Behavioral: Negative for confusion.       PATIENT HISTORY:    Past Medical History:   Diagnosis Date    Anxiety     Deep vein thrombosis     Depression     Multiple sclerosis     Multiple sclerosis     Pulmonary embolism        History reviewed. No pertinent surgical history.    Family History   Problem Relation Age of Onset    Arthritis Mother     No Known Problems Father     Cancer Maternal Grandfather        Social History     Social History    Marital status: Single     Spouse name: N/A    Number of children: N/A    Years of education: N/A     Occupational History    Not on file.     Social History Main Topics    Smoking status: Former Smoker     Packs/day: 0.50     Years: 23.00     Types: Cigarettes     Quit date: 10/1/2017    Smokeless tobacco: Never Used      Comment: Using patch    Alcohol use No    Drug use: No    Sexual activity: Yes     Partners: Female     Other Topics Concern    Not on file     Social History Narrative    No narrative on file       Allergies:  Patient has no known allergies.    Medications:    Current Outpatient Prescriptions:     citalopram (CELEXA) 20 MG tablet, Take 1 tablet (20 mg total) by mouth once daily., Disp: 30 tablet, Rfl: 0    dalfampridine (AMPYRA) 10 mg Tb12, Take 10 mg by mouth every 12 (twelve) hours., Disp: 60 tablet, Rfl: 5    dantrolene (DANTRIUM) 50 MG Cap, Take 1 capsule (50 mg total) by mouth 4 (four) times daily., Disp: 120 capsule, Rfl: 0    diazePAM (VALIUM) 5 MG tablet, Take 1 tablet (5 mg total) by mouth 2 (two) times daily., Disp: 60 tablet, Rfl: 0    [START ON 3/4/2018] diazePAM (VALIUM) 5 MG tablet, Take 1 tablet (5 mg total) by mouth 2 (two) times daily., Disp: 60 tablet, Rfl: 0     duloxetine (CYMBALTA) 30 MG capsule, Take 30 mg by mouth once daily., Disp: , Rfl:     ergocalciferol (VITAMIN D2) 50,000 unit Cap, Take 1 capsule (50,000 Units total) by mouth every 7 days. (Patient taking differently: Take 50,000 Units by mouth every 7 days. on monday), Disp: 4 capsule, Rfl: 11    gabapentin (NEURONTIN) 300 MG capsule, Take 900 mg morning and evening.  Take 1100 mg total in the afternoon., Disp: 270 capsule, Rfl: 0    nicotine (NICODERM CQ) 14 mg/24 hr, Place 1 patch onto the skin every 24 hours., Disp: , Rfl:     oxyCODONE-acetaminophen (PERCOCET)  mg per tablet, Take 1 tablet by mouth every 8 (eight) hours., Disp: 90 tablet, Rfl: 0    [START ON 2/28/2018] oxyCODONE-acetaminophen (PERCOCET)  mg per tablet, Take 1 tablet by mouth every 8 (eight) hours., Disp: 90 tablet, Rfl: 0    oxyCODONE-acetaminophen (PERCOCET)  mg per tablet, Take 1 tablet by mouth every 8 (eight) hours as needed for Pain., Disp: 45 tablet, Rfl: 0    polyethylene glycol (GLYCOLAX) 17 gram/dose powder, Take 17 g by mouth once daily., Disp: 510 g, Rfl: 6    rivaroxaban (XARELTO) 20 mg Tab, Take 1 tablet (20 mg total) by mouth daily with dinner or evening meal., Disp: 60 tablet, Rfl: 4    senna-docusate 8.6-50 mg (PERICOLACE) 8.6-50 mg per tablet, Take 1 tablet by mouth once daily., Disp: , Rfl:     solifenacin (VESICARE) 10 MG tablet, Take 1 tablet (10 mg total) by mouth once daily., Disp: 30 tablet, Rfl: 3    trazodone (DESYREL) 100 MG tablet, Take 1 tablet (100 mg total) by mouth every evening., Disp: 30 tablet, Rfl: 3    PHYSICAL EXAMINATION:    Constitutional: He is oriented to person, place, and time. He appears well-developed and well-nourished.  He is in no apparent distress.    Neck: No tracheal deviation present.     Cardiovascular: Normal rate.      Pulmonary/Chest: Effort normal. No respiratory distress.     Abdominal:  He exhibits no distension.     Lymphadenopathy:        Right: No  supraclavicular adenopathy present.        Left: No supraclavicular adenopathy present.     Neurological: He is alert and oriented to person, place, and time.     Skin: Skin is warm and dry.     Extremities: No pitting edema noted in lower extremities bilaterally    Psych: Cooperative with normal affect.    Genitourinary: Refused  exam. He states he did not mentally prepare for it and refuses it.     Physical Exam      LABS:  PVR with bladder scanner was 150 cc.   U/a today shows + bacteria and blood. Spec grav 1.020 and ph 5     Lab Results   Component Value Date    PSA 0.21 07/18/2017       IMPRESSION:    Encounter Diagnoses   Name Primary?    Urinary frequency Yes    Urgency of urination     Bacteria in urine     Neurogenic bladder     MS (multiple sclerosis)          PLAN:  -Reassured pt,  -Avoid Bladder Irritants: Tea, coffee, caffeine, alcohol, artificial sweeteners, citrus, spicy foods, acidic foods,chocolate, tomato-based foods, and smoking  -Discussed behavioral modifications: Timed voids every 2-3 hours, constipation, void before bed, and bladder diary for 3 days.  -Stop ditropan. Discussed side effects, indications, and MOA for vesicare. Prescription handed to him. Pt verbalized understanding.  -Discussed urodynamics for neurogenic bladder, urgency and frequency. Will discuss with collaborating physician.  -Discussed UTI precautions and printed worksheet:  Wipe front to back and avoid constipation.  Avoid caffeine.  Drink lots of water  Void soon after urge arises  Cranberry supplement  Probiotic  -RTC for urodynamics w/ cysto. Will do UC prior to testing.     I encouraged him or any of his family members to call or email me with questions and/or concerns.    BIJAL Ryder

## 2018-02-01 NOTE — PATIENT INSTRUCTIONS
Urodynamics Studies     The bladder holds urine until it leaves the body through the urethra.     Urodynamics studies are a series of tests that give your doctor a close look at the working of your bladder and urethra. The tests can help your doctor learn about any problems storing urine or voiding (eliminating) urine from your body.  Understanding the lower urinary tract  The lower part of the urinary tract has several parts.  · The bladder stores urine until youre ready to release it.  · The urethra is the tube that carries urine from the bladder out of the body.  · The sphincter is made up of muscles around the opening of the bladder. The sphincter muscles tighten to hold urine in the bladder. They relax to let urine flow. Signals from the brain tell the sphincter when to tighten and relax. These signals also tell the bladder when to contract to let urine flow out of the body.  Why you need a urodynamics study  This test may be ordered if you:  · Are incontinent (leak urine)  · Have a bladder that does not empty all the way.  · Have symptoms such as the need to urinate often or a constant strong need to urinate  · Have intermittent or weak urine stream  · Have persistent urinary tract infections  Preparing for the study  · Tell your doctor about any medicine youre taking. Ask if you should stop them before the study.  · Keep a diary of your bathroom habits. Do this for a few days before the study. This diary can be a helpful part of the evaluation.  · Ask if you need to arrive for the study with a full bladder.  Date Last Reviewed: 1/1/2017  © 4003-1912 [employer]. [address] [place] [address], [place] [address]. All rights reserved. This information is not intended as a substitute for professional medical care. Always follow your healthcare professional's instructions.

## 2018-02-05 DIAGNOSIS — N39.41 URGE INCONTINENCE OF URINE: ICD-10-CM

## 2018-02-05 DIAGNOSIS — R35.0 FREQUENCY OF URINATION: Primary | ICD-10-CM

## 2018-02-05 LAB — BACTERIA UR CULT: NORMAL

## 2018-02-05 RX ORDER — SULFAMETHOXAZOLE AND TRIMETHOPRIM 800; 160 MG/1; MG/1
1 TABLET ORAL ONCE
Status: CANCELLED | OUTPATIENT
Start: 2018-02-05 | End: 2018-02-05

## 2018-02-05 RX ORDER — LIDOCAINE HYDROCHLORIDE 20 MG/ML
JELLY TOPICAL ONCE
Status: CANCELLED | OUTPATIENT
Start: 2018-02-05 | End: 2018-02-05

## 2018-02-05 NOTE — PATIENT INSTRUCTIONS
Urodynamics Studies     The bladder holds urine until it leaves the body through the urethra.     Urodynamics studies are a series of tests that give your doctor a close look at the working of your bladder and urethra. The tests can help your doctor learn about any problems storing urine or voiding (eliminating) urine from your body.  Understanding the lower urinary tract  The lower part of the urinary tract has several parts.  · The bladder stores urine until youre ready to release it.  · The urethra is the tube that carries urine from the bladder out of the body.  · The sphincter is made up of muscles around the opening of the bladder. The sphincter muscles tighten to hold urine in the bladder. They relax to let urine flow. Signals from the brain tell the sphincter when to tighten and relax. These signals also tell the bladder when to contract to let urine flow out of the body.  Why you need a urodynamics study  This test may be ordered if you:  · Are incontinent (leak urine)  · Have a bladder that does not empty all the way.  · Have symptoms such as the need to urinate often or a constant strong need to urinate  · Have intermittent or weak urine stream  · Have persistent urinary tract infections  Preparing for the study  · Tell your doctor about any medicine youre taking. Ask if you should stop them before the study.  · Keep a diary of your bathroom habits. Do this for a few days before the study. This diary can be a helpful part of the evaluation.  · Ask if you need to arrive for the study with a full bladder.  Date Last Reviewed: 1/1/2017  © 5833-9753 [employer]. [address] [place] [address], [place] [address]. All rights reserved. This information is not intended as a substitute for professional medical care. Always follow your healthcare professional's instructions.          Urodynamic Studies     The equipment used for the study varies depending upon the facility and what tests are done.      Urodynamic studies may be done in your doctors office, a clinic, or a hospital. The studies may take up to an hour or more. This depends on which tests your doctor does. The tests are generally painless. You wont need sedating medicine.  Tests that may be done  Uroflowmetry. This measures the amount and speed of urine you void from your bladder. You urinate into a funnel. Its attached to a computer that records your urine flow over time. The amount of urine left in your bladder after you void may also be measured right after this test.  Cystometry. This test evaluates how much your bladder can hold. It also measures how strong your bladder muscle is and how well the signals work that tell you when your bladder is full. Your healthcare provider fills your bladder with sterile water or saline solution, through a catheter. Your doctor will instruct you to report any sensations you feel. Mention if theyre similar to symptoms youve felt at home. Your doctor may ask you to cough, stand and walk, or bear down during this test.  Electromyogram. This helps evaluate the muscle contractions that control urination, such as sphincter muscle contractions. Your healthcare provider may place electrode patches or wires near your rectum or urethra to make the recording. He or she may ask you to try to tighten or relax your sphincter muscles during this test.  Pressure flow study. This test measures your detrusor, urethral, and abdominal pressures. Detrusor is the muscle surrounding the bladder walls that relaxes to allow your bladder to fill, and and contracts to squeeze out urine. A pressure flow study is often done after cystometry. Youre asked to urinate while a probe in your urethra measures pressures.  Video cystourethrography. This takes video pictures of urine flow through your urinary tract. It can help identify blockages or other problems. The bladder is filled with an X-ray contrast fluid. Then X-ray video  pictures are taken as the fluid is urinated out. Ultrasound imaging may also be combined with routine urodynamic studies.  Ambulatory urodynamics. This test can be used to evaluate you while doing usual activities.  Getting your results  After the study, youll get dressed and return to the consultation room. Test results may be ready soon after the study is finished. Or, you may return to your doctors office in a few days for your results. Your doctor can talk with you about the study report and your options.   Date Last Reviewed: 1/1/2017  © 2214-6149 [employer]. [address] [place] [address]. All rights reserved. This information is not intended as a substitute for professional medical care. Always follow your healthcare professional's instructions.

## 2018-02-06 ENCOUNTER — TELEPHONE (OUTPATIENT)
Dept: UROLOGY | Facility: CLINIC | Age: 53
End: 2018-02-06

## 2018-02-06 DIAGNOSIS — R26.9 GAIT DISTURBANCE: Primary | ICD-10-CM

## 2018-02-06 DIAGNOSIS — G35 MULTIPLE SCLEROSIS: ICD-10-CM

## 2018-02-06 NOTE — TELEPHONE ENCOUNTER
----- Message from William Marcelo sent at 2/6/2018 11:18 AM CST -----  Contact: Self  Pt says [facility] is asking for order for Ampyra be faxed to them @ [phone]; ATTN , so they can order the medication for pt. [facility] is also asking for a call back @ [phone] to confirm what they need to do with the order once it's received. Pls call.

## 2018-02-06 NOTE — TELEPHONE ENCOUNTER
Called to discuss SUDS with cysto on 3- at 930 am with Dr. Augustin. I was on hold for 12 minutes. I redialed number to NH. I left a message with  for Liana Gaines or Rebecca about his scheduled suds with cysto. I also explained I wanted to get a UC checked 10 days prior to procedure. I explained to we could make an appt or NH could do UC.  at NH stated she would deliver the message.

## 2018-02-07 NOTE — TELEPHONE ENCOUNTER
----- Message from William Marcelo sent at 2/6/2018  2:13 PM CST -----  Contact: [facility] koenig/ [facility] @ [phone]  [facility]ant[facility] states am[facility]ra is very limited and [facility].

## 2018-02-07 NOTE — TELEPHONE ENCOUNTER
Returned call to [facility], who said they are unable to fill the rx due to it's limited distribution. Call placed to Elia at [facility] to inform him of this. He said he will reach out to the  to help with this matter.

## 2018-02-15 RX ORDER — DALFAMPRIDINE 10 MG/1
10 TABLET, FILM COATED, EXTENDED RELEASE ORAL EVERY 12 HOURS
Qty: 60 TABLET | Refills: 5 | Status: SHIPPED | OUTPATIENT
Start: 2018-02-15 | End: 2018-03-07 | Stop reason: SDUPTHER

## 2018-02-15 NOTE — TELEPHONE ENCOUNTER
I called [facility] patient support services and was told the Nursing Home May have to change the patient's Medicare part D plan in order to fill through an affiliated specialty pharmacy or perhaps mail to pt's home. I provided Rebecca at [facility] in [place] the number to [facility] to see how it can be arranged for pt to get his Ampyra at the Nursing Home.

## 2018-02-15 NOTE — TELEPHONE ENCOUNTER
----- Message from Randolph Dowell sent at 2/15/2018 11:09 AM CST -----  Contact: Patient @ [phone]  Patient is returning a missed call, pls return call and ask to speak to Rebecca.

## 2018-02-21 ENCOUNTER — TELEPHONE (OUTPATIENT)
Dept: UROLOGY | Facility: CLINIC | Age: 53
End: 2018-02-21

## 2018-02-21 NOTE — TELEPHONE ENCOUNTER
Called to discuss SUDS with cysto on 3- at 930 am with Dr. Augustin. I also explained I wanted to get a UC checked 11 days prior to procedure. His nurse stated that she could perform UC and have it treated at the NH. She stated he would make it  For UDS too.

## 2018-03-07 ENCOUNTER — DOCUMENTATION ONLY (OUTPATIENT)
Dept: NEUROLOGY | Facility: CLINIC | Age: 53
End: 2018-03-07

## 2018-03-07 DIAGNOSIS — G35 MULTIPLE SCLEROSIS: ICD-10-CM

## 2018-03-07 DIAGNOSIS — R26.9 GAIT DISTURBANCE: ICD-10-CM

## 2018-03-07 RX ORDER — DALFAMPRIDINE 10 MG/1
10 TABLET, FILM COATED, EXTENDED RELEASE ORAL EVERY 12 HOURS
Qty: 180 TABLET | Refills: 1 | Status: ON HOLD | OUTPATIENT
Start: 2018-03-07 | End: 2018-12-26 | Stop reason: SDUPTHER

## 2018-03-07 NOTE — PROGRESS NOTES
Fax received from [facility]. Ampyra rx has been forwarded to [facility].

## 2018-03-09 ENCOUNTER — DOCUMENTATION ONLY (OUTPATIENT)
Dept: NEUROLOGY | Facility: CLINIC | Age: 53
End: 2018-03-09

## 2018-03-09 DIAGNOSIS — G35 MULTIPLE SCLEROSIS: Primary | ICD-10-CM

## 2018-03-09 NOTE — PROGRESS NOTES
Eitan Fernandez at [facility], the number to [facility] to set up delivery of pt's Ampyra.

## 2018-03-13 ENCOUNTER — OFFICE VISIT (OUTPATIENT)
Dept: UROLOGY | Facility: CLINIC | Age: 53
End: 2018-03-13
Payer: MEDICARE

## 2018-03-13 DIAGNOSIS — N40.0 BPH WITHOUT URINARY OBSTRUCTION: Primary | ICD-10-CM

## 2018-03-13 DIAGNOSIS — N31.9 NEUROGENIC BLADDER: Chronic | ICD-10-CM

## 2018-03-13 DIAGNOSIS — N39.0 RECURRENT UTI: ICD-10-CM

## 2018-03-13 PROCEDURE — 87186 SC STD MICRODIL/AGAR DIL: CPT

## 2018-03-13 PROCEDURE — 87086 URINE CULTURE/COLONY COUNT: CPT

## 2018-03-13 PROCEDURE — 99214 OFFICE O/P EST MOD 30 MIN: CPT | Mod: S$GLB,,, | Performed by: UROLOGY

## 2018-03-13 PROCEDURE — 87088 URINE BACTERIA CULTURE: CPT

## 2018-03-13 PROCEDURE — 99999 PR PBB SHADOW E&M-EST. PATIENT-LVL II: CPT | Mod: PBBFAC,,, | Performed by: UROLOGY

## 2018-03-13 PROCEDURE — 87077 CULTURE AEROBIC IDENTIFY: CPT

## 2018-03-13 RX ORDER — TAMSULOSIN HYDROCHLORIDE 0.4 MG/1
0.4 CAPSULE ORAL DAILY
Qty: 30 CAPSULE | Refills: 11 | Status: SHIPPED | OUTPATIENT
Start: 2018-03-13 | End: 2018-06-05 | Stop reason: SDUPTHER

## 2018-03-13 RX ORDER — SULFAMETHOXAZOLE AND TRIMETHOPRIM 800; 160 MG/1; MG/1
1 TABLET ORAL 2 TIMES DAILY
Qty: 14 TABLET | Refills: 0 | Status: SHIPPED | OUTPATIENT
Start: 2018-03-13 | End: 2018-03-20

## 2018-03-13 RX ORDER — NITROFURANTOIN (MACROCRYSTALS) 100 MG/1
100 CAPSULE ORAL NIGHTLY
Qty: 100 CAPSULE | Refills: 0 | Status: SHIPPED | OUTPATIENT
Start: 2018-03-13 | End: 2018-03-23

## 2018-03-13 NOTE — PROGRESS NOTES
CC: recurrent UTI    CHIEF COMPLAINT:    Mr. Levin is a 52 y.o. male presenting for the evaluation of neurogenic bladder with frequency and urgency.    PRESENTING ILLNESS:    Mao Levin is a 52 y.o. male with a PMH of MS and neurogenic bladder who presents for frequency and urgency.  Recently seen by Thelma Khalil and started him on vesicare.  He is here for evaluation with suds cysto.  However, it was cancelled due to infected urine.  C/o some dysuria.  No fever or chills.  He is a resident at [facility].    He presents today for increased urinary frequency, nocturia, urgency w/ urge incontinence, and stress incontinence. He states he urinates every 1 hr during the day and night. He does not think that his symptoms have improved.  Unsure if he is taking Vesicare 10 mg daily. He states he has not had another UTI since the last visit.     Initial Note:  UTI symptoms? Dysuria and MS flairs. No f/c or n/v.  Severity of incontinence with # of depends?  He reports changing his depend with every urination d/t his urgency and urge incontinence. He believes he uses about 4-5 daily and 1 at night  Voiding symptoms? Reports urgency, frequency, urge incontinence, nocturia, hesitancy, intermittency, incomplete bladder emptying, and decreased urine volume. Denies difficulty urinating, dysuria, and hematuria.    hx? No  testing for his neurogenic bladder  Hx of pelvic surgery? No   Fluid consumption? Daily- Coffee 2 cups, water with meds, soda 0-1 cups, and juice 6 cups  Medications? Oxybutynin 10 mg daily; taking daily for 1 yr and has not seen improvement with his symptoms.  He reports a good urinary stream.  Constipation sometimes    CT abd/pel 10/2016- Kidneys concentrate and excrete contrast appropriately.  No nephrolithiasis.  No hydronephrosis or hydroureter.  No solid renal masses.  Bladder is fluid-filled and unremarkable.  The prostate is normal in size.    Urine cultures:  8/14/17 PROVIDENCIA STUARTII>  100,000 cfu/ml   3/13/17 ESCHERICHIA COLI> 100,000 cfu/ml     REVIEW OF SYSTEMS:    Review of Systems    Constitutional: Negative for fever and chills.   HENT: Negative for hearing loss.   Eyes: Negative for visual disturbance.   Respiratory: Negative for shortness of breath.   Cardiovascular: Negative for chest pain.   Gastrointestinal: Negative for nausea and vomiting.  Genitourinary:  See above  Neurological: Negative for dizziness.   Hematological: Does not bruise/bleed easily.   Psychiatric/Behavioral: Negative for confusion.       PATIENT HISTORY:    Past Medical History:   Diagnosis Date    Anxiety     Deep vein thrombosis     Depression     Multiple sclerosis     Multiple sclerosis     Pulmonary embolism        No past surgical history on file.    Family History   Problem Relation Age of Onset    Arthritis Mother     No Known Problems Father     Cancer Maternal Grandfather        Social History     Social History    Marital status: Single     Spouse name: N/A    Number of children: N/A    Years of education: N/A     Occupational History    Not on file.     Social History Main Topics    Smoking status: Former Smoker     Packs/day: 0.50     Years: 23.00     Types: Cigarettes     Quit date: 10/1/2017    Smokeless tobacco: Never Used      Comment: Using patch    Alcohol use No    Drug use: No    Sexual activity: Yes     Partners: Female     Other Topics Concern    Not on file     Social History Narrative    No narrative on file       Allergies:  Patient has no known allergies.    Medications:    Current Outpatient Prescriptions:     citalopram (CELEXA) 20 MG tablet, Take 1 tablet (20 mg total) by mouth once daily., Disp: 30 tablet, Rfl: 0    dalfampridine (AMPYRA) 10 mg Tb12, Take 10 mg by mouth every 12 (twelve) hours., Disp: 180 tablet, Rfl: 1    dantrolene (DANTRIUM) 50 MG Cap, Take 1 capsule (50 mg total) by mouth 4 (four) times daily., Disp: 120 capsule, Rfl: 0    diazePAM (VALIUM) 5  MG tablet, Take 1 tablet (5 mg total) by mouth 2 (two) times daily., Disp: 60 tablet, Rfl: 0    duloxetine (CYMBALTA) 30 MG capsule, Take 30 mg by mouth once daily., Disp: , Rfl:     ergocalciferol (VITAMIN D2) 50,000 unit Cap, Take 1 capsule (50,000 Units total) by mouth every 7 days. (Patient taking differently: Take 50,000 Units by mouth every 7 days. on monday), Disp: 4 capsule, Rfl: 11    gabapentin (NEURONTIN) 300 MG capsule, Take 900 mg morning and evening.  Take 1100 mg total in the afternoon., Disp: 270 capsule, Rfl: 0    nicotine (NICODERM CQ) 14 mg/24 hr, Place 1 patch onto the skin every 24 hours., Disp: , Rfl:     oxyCODONE-acetaminophen (PERCOCET)  mg per tablet, Take 1 tablet by mouth every 8 (eight) hours., Disp: 90 tablet, Rfl: 0    polyethylene glycol (GLYCOLAX) 17 gram/dose powder, Take 17 g by mouth once daily., Disp: 510 g, Rfl: 6    rivaroxaban (XARELTO) 20 mg Tab, Take 1 tablet (20 mg total) by mouth daily with dinner or evening meal., Disp: 60 tablet, Rfl: 4    senna-docusate 8.6-50 mg (PERICOLACE) 8.6-50 mg per tablet, Take 1 tablet by mouth once daily., Disp: , Rfl:     solifenacin (VESICARE) 10 MG tablet, Take 1 tablet (10 mg total) by mouth once daily., Disp: 30 tablet, Rfl: 3    trazodone (DESYREL) 100 MG tablet, Take 1 tablet (100 mg total) by mouth every evening., Disp: 30 tablet, Rfl: 3    PHYSICAL EXAMINATION:    Constitutional: He is oriented to person, place, and time. He appears well-developed and well-nourished.  He is in no apparent distress.    Neck: No tracheal deviation present.     Cardiovascular: Normal rate.      Pulmonary/Chest: Effort normal. No respiratory distress.     Abdominal:  He exhibits no distension.     Lymphadenopathy:        Right: No supraclavicular adenopathy present.        Left: No supraclavicular adenopathy present.     Neurological: He is alert and oriented to person, place, and time.     Skin: Skin is warm and dry.     Extremities: No  pitting edema noted in lower extremities bilaterally    Psych: Cooperative with normal affect.    Genitourinary: Refused  exam. He states he did not mentally prepare for it and refuses it.     Physical Exam      LABS:  PVR with bladder scanner was 150 cc.   U/a today shows + bacteria and blood. Spec grav 1.020 and ph 5     Lab Results   Component Value Date    PSA 0.21 07/18/2017       IMPRESSION:  Diagnoses and all orders for this visit:    BPH without urinary obstruction  -     tamsulosin (FLOMAX) 0.4 mg Cp24; Take 1 capsule (0.4 mg total) by mouth once daily.    Recurrent UTI  -     US Retroperitoneal Limited; Future  -     Urine culture  -     sulfamethoxazole-trimethoprim 800-160mg (BACTRIM DS) 800-160 mg Tab; Take 1 tablet by mouth 2 (two) times daily.  -     nitrofurantoin (MACRODANTIN) 100 MG capsule; Take 1 capsule (100 mg total) by mouth nightly.    Neurogenic bladder    cath urine showed positive nitrite, leukocytes.  Sent for culture.    PLAN:  SUDS cysto was cancelled today due to UTI.  He will be rescheduled on 4/5/18.    He can start flomax in the evening daily.  Start Bactrim DS BID for 1 wk, then continue macrodantin 100 mg daily as suppressive abx.    Based on his urine culture, will contact you and change ABx if necessary.    Do US prior to his next visit

## 2018-03-13 NOTE — PATIENT INSTRUCTIONS
SUDS cysto was cancelled today due to UTI.  He will be rescheduled on 4/5/18.    He can start flomax in the evening daily.  Start Bactrim DS BID for 1 wk, then continue macrodantin 100 mg daily as suppressive abx.    Based on his urine culture, will contact you and change ABx if necessary.    Do US prior to his next visit

## 2018-03-14 ENCOUNTER — TELEPHONE (OUTPATIENT)
Dept: NEUROLOGY | Facility: CLINIC | Age: 53
End: 2018-03-14

## 2018-03-14 NOTE — TELEPHONE ENCOUNTER
I spoke with Rebecca at [facility]. She said the DON there approved Ampyra to be shipped to pt's mom's home and will allow his mom to bring the medication to their facility.

## 2018-03-14 NOTE — TELEPHONE ENCOUNTER
----- Message from Randolph Dowell sent at 3/13/2018  1:56 PM CDT -----  Contact: [facility]enig [facility] @ [phone]  Caller is calling to get clarity on a fax received about (dalfampridine (AMPYRA) 10 mg Tb12 ), pls call

## 2018-03-15 DIAGNOSIS — G35 MULTIPLE SCLEROSIS: Primary | ICD-10-CM

## 2018-03-15 LAB — BACTERIA UR CULT: NORMAL

## 2018-03-16 ENCOUNTER — TELEPHONE (OUTPATIENT)
Dept: UROLOGY | Facility: CLINIC | Age: 53
End: 2018-03-16

## 2018-03-16 NOTE — TELEPHONE ENCOUNTER
Please let him know that Bactrim is the right abx for his recent UTI.  After completing Bactrim, he will continue suppressive abx macrodantin daily.

## 2018-03-16 NOTE — TELEPHONE ENCOUNTER
Patient currently residing at [facility]. Message left to continue medication as prescribed per dr. Augustin

## 2018-03-28 ENCOUNTER — OFFICE VISIT (OUTPATIENT)
Dept: PHYSICAL MEDICINE AND REHAB | Facility: CLINIC | Age: 53
End: 2018-03-28
Payer: MEDICARE

## 2018-03-28 VITALS
SYSTOLIC BLOOD PRESSURE: 126 MMHG | BODY MASS INDEX: 26.6 KG/M2 | HEART RATE: 59 BPM | DIASTOLIC BLOOD PRESSURE: 84 MMHG | WEIGHT: 190 LBS | HEIGHT: 71 IN

## 2018-03-28 DIAGNOSIS — G57.93 NEUROPATHIC PAIN, LEG, BILATERAL: Primary | ICD-10-CM

## 2018-03-28 DIAGNOSIS — G89.4 CHRONIC PAIN SYNDROME: ICD-10-CM

## 2018-03-28 DIAGNOSIS — Z79.891 LONG-TERM CURRENT USE OF OPIATE ANALGESIC: ICD-10-CM

## 2018-03-28 DIAGNOSIS — R52 CHRONIC PAIN OF MULTIPLE SITES: Chronic | ICD-10-CM

## 2018-03-28 DIAGNOSIS — G89.29 CHRONIC BILATERAL LOW BACK PAIN WITH BILATERAL SCIATICA: ICD-10-CM

## 2018-03-28 DIAGNOSIS — Z79.52 LONG TERM (CURRENT) USE OF SYSTEMIC STEROIDS: ICD-10-CM

## 2018-03-28 DIAGNOSIS — G62.9 POLYNEUROPATHY: ICD-10-CM

## 2018-03-28 DIAGNOSIS — M54.42 CHRONIC BILATERAL LOW BACK PAIN WITH BILATERAL SCIATICA: ICD-10-CM

## 2018-03-28 DIAGNOSIS — Z78.9 IMPAIRED MOBILITY AND ADLS: Chronic | ICD-10-CM

## 2018-03-28 DIAGNOSIS — R26.81 GAIT INSTABILITY: ICD-10-CM

## 2018-03-28 DIAGNOSIS — R25.2 SPASTICITY: ICD-10-CM

## 2018-03-28 DIAGNOSIS — Z74.09 IMPAIRED MOBILITY AND ADLS: Chronic | ICD-10-CM

## 2018-03-28 DIAGNOSIS — M54.41 CHRONIC BILATERAL LOW BACK PAIN WITH BILATERAL SCIATICA: ICD-10-CM

## 2018-03-28 DIAGNOSIS — G89.29 CHRONIC PAIN OF MULTIPLE SITES: Chronic | ICD-10-CM

## 2018-03-28 DIAGNOSIS — G35 MS (MULTIPLE SCLEROSIS): Chronic | ICD-10-CM

## 2018-03-28 DIAGNOSIS — G82.20 PARAPARESIS: ICD-10-CM

## 2018-03-28 PROCEDURE — 99214 OFFICE O/P EST MOD 30 MIN: CPT | Mod: S$GLB,,, | Performed by: PHYSICAL MEDICINE & REHABILITATION

## 2018-03-28 PROCEDURE — 99499 UNLISTED E&M SERVICE: CPT | Mod: S$GLB,,, | Performed by: PHYSICAL MEDICINE & REHABILITATION

## 2018-03-28 PROCEDURE — 99999 PR PBB SHADOW E&M-EST. PATIENT-LVL III: CPT | Mod: PBBFAC,,, | Performed by: PHYSICAL MEDICINE & REHABILITATION

## 2018-03-28 RX ORDER — OXYCODONE AND ACETAMINOPHEN 10; 325 MG/1; MG/1
1 TABLET ORAL EVERY 8 HOURS
Qty: 90 TABLET | Refills: 0 | Status: SHIPPED | OUTPATIENT
Start: 2018-03-28 | End: 2018-06-05

## 2018-03-28 RX ORDER — DIAZEPAM 5 MG/1
5 TABLET ORAL 2 TIMES DAILY
Qty: 60 TABLET | Refills: 2 | Status: SHIPPED | OUTPATIENT
Start: 2018-03-28 | End: 2018-10-08 | Stop reason: SDUPTHER

## 2018-03-28 NOTE — PROGRESS NOTES
"Subjective:       Patient ID: Mao Levin is a 52 y.o. male.    Chief Complaint: Back Pain; Leg Pain; and Neck Pain    Neck Pain    Associated symptoms include leg pain. Pertinent negatives include no chest pain, headaches, numbness or weakness.   Leg Pain    Pertinent negatives include no numbness.   Back Pain   Associated symptoms include leg pain. Pertinent negatives include no chest pain, dysuria, headaches, numbness or weakness.      Mao Levin is 51 y/o male who is coming first time to clinic for chronic  back, neck,shoulder, knee, and hand pain.   LCV 12/21/17.  Patient is currently in NH, and he is bringing his MAR from NH, for me to review his medications.   The pain started in 2006 following MS diagnosis and symptoms have been worsening.   Brief history: Patient is a 51 yo male with MS diagnosed in 2006 as well as chronic, generalized pain since that time that presents for initial evaluation. He was hospitalized in inpatient rehab in October /2016 and feels that he has become more functional but still is mostly wheel chair bound. He occasionally will use a rolling walker for ambulation.   Pain occurs from the neck down and involves the entire body except the head. No specific area is worse. Pain is "achy" and "tingling".   It is constant at 7/10 but can worsen with any movement to 10/10.   Today he c/o pain in both arm/hands in all fingers, they feel sore and tight.   Back pain is running down to both legs, back of thighs, and pain in leg is worst bellow the knee level, in calves that are tight, and weak.  His Left leg is weaker than Rt leg.   Has foot b/l foot weakness and drop, wears left AFO.  He has been a prolong time on chronic pain management with oxycodone 15mg q6h and oxycontin 40 mg TWICE DAILY ( while in IP Rehab) but did not feel that this was any more helpful.   Tried gabapentin in the past which was tolerated but didn't help.   He also takes diazepam 5mg TWICE DAILY and baclofen 10mg TID which " helps his spasticity.    In NH, he now takes Tramadol ad Oxycodone 5 mg Q8 hrs prn pain.  He states that Tramadol is ineffective, and would like to get appropriate medications to treat his pain.   He is coming today, w/o papers, medical documentation from NH.   Pain Medications:  Percocet 10/325mg, 1 tablet 3x a day  Gabapentin 300mg, 3 tablets three times daily  Valium 5mg, 1 tablet twice daily  Baclofen 20 mg QID  Xarelto 20mg, 1 tablet daily    Pain Description:   The pain is located in the neck, back shoulders, hands  and knees.  Today the current  pain is rated as 7/10  At BEST  5/10   At WORST  10/10 on the WORST day.    On average pain is rated as 7/10.   The pain is described as aching and tingling  Symptoms interfere with daily activity, sleeping and work.   Exacerbating factors: Morning, Extension and Flexing.    Mitigating factors medications and physical therapy.   Patient denies .  Patient denies any suicidal or homicidal ideations    Physical Therapy/Home Exercise: yes     report:  Reviewed and consistent with medication use as prescribed.  Pain Procedures: none      Imaging:   I reviewed C-spine and T-spine MRI which showed demylenating changes as well as fairly significant stenosis throughout the cervical spine.   He is here for follow up, and treatment.    Past Medical History:   Diagnosis Date    Anxiety     Deep vein thrombosis     Depression     Multiple sclerosis     Multiple sclerosis     Pulmonary embolism        No past surgical history on file.    Family History   Problem Relation Age of Onset    Arthritis Mother     No Known Problems Father     Cancer Maternal Grandfather        Social History     Social History    Marital status: Single     Spouse name: N/A    Number of children: N/A    Years of education: N/A     Social History Main Topics    Smoking status: Former Smoker     Packs/day: 0.50     Years: 23.00     Types: Cigarettes     Quit date: 10/1/2017    Smokeless  tobacco: Never Used      Comment: Using patch    Alcohol use No    Drug use: No    Sexual activity: Yes     Partners: Female     Other Topics Concern    None     Social History Narrative    None       Current Outpatient Prescriptions   Medication Sig Dispense Refill    citalopram (CELEXA) 20 MG tablet Take 1 tablet (20 mg total) by mouth once daily. 30 tablet 0    dalfampridine (AMPYRA) 10 mg Tb12 Take 10 mg by mouth every 12 (twelve) hours. 180 tablet 1    dantrolene (DANTRIUM) 50 MG Cap Take 1 capsule (50 mg total) by mouth 4 (four) times daily. 120 capsule 0    diazePAM (VALIUM) 5 MG tablet Take 1 tablet (5 mg total) by mouth 2 (two) times daily. 60 tablet 2    duloxetine (CYMBALTA) 30 MG capsule Take 30 mg by mouth once daily.      ergocalciferol (VITAMIN D2) 50,000 unit Cap Take 1 capsule (50,000 Units total) by mouth every 7 days. (Patient taking differently: Take 50,000 Units by mouth every 7 days. on monday) 4 capsule 11    gabapentin (NEURONTIN) 300 MG capsule Take 900 mg morning and evening.  Take 1100 mg total in the afternoon. 270 capsule 0    nicotine (NICODERM CQ) 14 mg/24 hr Place 1 patch onto the skin every 24 hours.      oxyCODONE-acetaminophen (PERCOCET)  mg per tablet Take 1 tablet by mouth every 8 (eight) hours. CAn you please give patient  Percocet at 3 am, 11 am, and 7 pm. 90 tablet 0    polyethylene glycol (GLYCOLAX) 17 gram/dose powder Take 17 g by mouth once daily. 510 g 6    rivaroxaban (XARELTO) 20 mg Tab Take 1 tablet (20 mg total) by mouth daily with dinner or evening meal. 60 tablet 4    senna-docusate 8.6-50 mg (PERICOLACE) 8.6-50 mg per tablet Take 1 tablet by mouth once daily.      solifenacin (VESICARE) 10 MG tablet Take 1 tablet (10 mg total) by mouth once daily. 30 tablet 3    tamsulosin (FLOMAX) 0.4 mg Cp24 Take 1 capsule (0.4 mg total) by mouth once daily. 30 capsule 11    trazodone (DESYREL) 100 MG tablet Take 1 tablet (100 mg total) by mouth every  evening. 30 tablet 3     No current facility-administered medications for this visit.      Review of patient's allergies indicates:  No Known Allergies    Review of Systems   Constitutional: Negative for appetite change and fatigue.   Eyes: Negative for visual disturbance.   Respiratory: Negative for shortness of breath.    Cardiovascular: Negative for chest pain.   Gastrointestinal: Negative for constipation and diarrhea.   Genitourinary: Negative for dysuria, frequency and urgency.   Musculoskeletal: Positive for arthralgias, back pain, gait problem, myalgias and neck pain. Negative for joint swelling and neck stiffness.   Neurological: Negative for dizziness, tremors, weakness, numbness and headaches.   Psychiatric/Behavioral: Negative for dysphoric mood.   All other systems reviewed and are negative.        Objective:      Physical Exam      Constitutional: He is oriented to person, place, and time.   He appears well-nourished. In manual WC.  Eyes: EOM are normal. Pupils are equal, round, and reactive to light.   Neck: Normal range of motion. Neck supple.   Cardiovascular: Normal rhythm  and rate.    Pulmonary/Chest: Effort normal.   Abdominal: Soft.   Musculoskeletal:   BUEs AROM WNL  BLEs AROM is diminished   Neurological: He is oriented to person, place, and time.   BUEs 5/5  RLE 3/5 HF, 4-/5 HE, 3+/5 KE/KF/DF  LLE 3-/5 HF, 3+/5 HE, 3-/5 KE, 2/5 KF, 0/5 DF .  wears left AFO.  Skin: No rash noted.   Psychiatric: He has a normal mood and affect.       Assessment:           1. Neuropathic pain, leg, bilateral    2. Chronic pain syndrome    3. Paraparesis    4. Polyneuropathy    5. Long-term current use of opiate analgesic    6. Chronic bilateral low back pain with bilateral sciatica    7. Chronic pain of multiple sites    8. Spasticity    9. Long term (current) use of systemic steroids    10. Impaired mobility and ADLs    11. Gait instability    12. MS (multiple sclerosis)            Plan:        Neuropathic  pain, leg, bilateral  -     oxyCODONE-acetaminophen (PERCOCET)  mg per tablet; Take 1 tablet by mouth every 8 (eight) hours. CAn you please give patient  Percocet at 3 am, 11 am, and 7 pm.  Dispense: 90 tablet; Refill: 0  -     diazePAM (VALIUM) 5 MG tablet; Take 1 tablet (5 mg total) by mouth 2 (two) times daily.  Dispense: 60 tablet; Refill: 2    Chronic pain syndrome  -     oxyCODONE-acetaminophen (PERCOCET)  mg per tablet; Take 1 tablet by mouth every 8 (eight) hours. CAn you please give patient  Percocet at 3 am, 11 am, and 7 pm.  Dispense: 90 tablet; Refill: 0  -     diazePAM (VALIUM) 5 MG tablet; Take 1 tablet (5 mg total) by mouth 2 (two) times daily.  Dispense: 60 tablet; Refill: 2    Paraparesis  -     oxyCODONE-acetaminophen (PERCOCET)  mg per tablet; Take 1 tablet by mouth every 8 (eight) hours. CAn you please give patient  Percocet at 3 am, 11 am, and 7 pm.  Dispense: 90 tablet; Refill: 0  -     diazePAM (VALIUM) 5 MG tablet; Take 1 tablet (5 mg total) by mouth 2 (two) times daily.  Dispense: 60 tablet; Refill: 2    Polyneuropathy  -     oxyCODONE-acetaminophen (PERCOCET)  mg per tablet; Take 1 tablet by mouth every 8 (eight) hours. CAn you please give patient  Percocet at 3 am, 11 am, and 7 pm.  Dispense: 90 tablet; Refill: 0  -     diazePAM (VALIUM) 5 MG tablet; Take 1 tablet (5 mg total) by mouth 2 (two) times daily.  Dispense: 60 tablet; Refill: 2    Long-term current use of opiate analgesic  -     oxyCODONE-acetaminophen (PERCOCET)  mg per tablet; Take 1 tablet by mouth every 8 (eight) hours. CAn you please give patient  Percocet at 3 am, 11 am, and 7 pm.  Dispense: 90 tablet; Refill: 0  -     diazePAM (VALIUM) 5 MG tablet; Take 1 tablet (5 mg total) by mouth 2 (two) times daily.  Dispense: 60 tablet; Refill: 2    Chronic bilateral low back pain with bilateral sciatica  -     oxyCODONE-acetaminophen (PERCOCET)  mg per tablet; Take 1 tablet by mouth every 8  (eight) hours. CAn you please give patient  Percocet at 3 am, 11 am, and 7 pm.  Dispense: 90 tablet; Refill: 0  -     diazePAM (VALIUM) 5 MG tablet; Take 1 tablet (5 mg total) by mouth 2 (two) times daily.  Dispense: 60 tablet; Refill: 2    Chronic pain of multiple sites  -     oxyCODONE-acetaminophen (PERCOCET)  mg per tablet; Take 1 tablet by mouth every 8 (eight) hours. CAn you please give patient  Percocet at 3 am, 11 am, and 7 pm.  Dispense: 90 tablet; Refill: 0  -     diazePAM (VALIUM) 5 MG tablet; Take 1 tablet (5 mg total) by mouth 2 (two) times daily.  Dispense: 60 tablet; Refill: 2    Spasticity  -     oxyCODONE-acetaminophen (PERCOCET)  mg per tablet; Take 1 tablet by mouth every 8 (eight) hours. CAn you please give patient  Percocet at 3 am, 11 am, and 7 pm.  Dispense: 90 tablet; Refill: 0  -     diazePAM (VALIUM) 5 MG tablet; Take 1 tablet (5 mg total) by mouth 2 (two) times daily.  Dispense: 60 tablet; Refill: 2    Long term (current) use of systemic steroids  -     oxyCODONE-acetaminophen (PERCOCET)  mg per tablet; Take 1 tablet by mouth every 8 (eight) hours. CAn you please give patient  Percocet at 3 am, 11 am, and 7 pm.  Dispense: 90 tablet; Refill: 0  -     diazePAM (VALIUM) 5 MG tablet; Take 1 tablet (5 mg total) by mouth 2 (two) times daily.  Dispense: 60 tablet; Refill: 2    Impaired mobility and ADLs  -     diazePAM (VALIUM) 5 MG tablet; Take 1 tablet (5 mg total) by mouth 2 (two) times daily.  Dispense: 60 tablet; Refill: 2    Gait instability  -     diazePAM (VALIUM) 5 MG tablet; Take 1 tablet (5 mg total) by mouth 2 (two) times daily.  Dispense: 60 tablet; Refill: 2    MS (multiple sclerosis)  -     diazePAM (VALIUM) 5 MG tablet; Take 1 tablet (5 mg total) by mouth 2 (two) times daily.  Dispense: 60 tablet; Refill: 2    Patient with MS, weakness in UE/LE, and neuropathic pain, weakness and spasticity In LE> UE,    He also has chronic pain syndrome, chronic back and neck  pain, with B/l LE weakness, Lt>> Rt, with impaired mobility, and ADLs.    -- He is at present in NH.   -- Pain management: recommend to stop Tramadol 50 mg QID, and Oxycodone 5 mg q12 hrs prn pain, and give patient only Percocet 10/325 mg po q8 hrs regularly, w/o breakthrough pain ( pt c/o that he is not getting medications if they are written on prn basis, he has to ask multiple times).  He will resume all his medications, including gabapentin, Baclofen, and Diazepam for spasticity treatment.  Also recommend to keep appointments with , for treatment of MS.  At this time he does not need any additional DME.  All plan was discussed with patient and he agrees.     RTC in  1 months ( per pt's request).    Total time spent face to face with patient was 25 minutes.   More than 50% of that time was spent in counseling on diagnosis , prognosis and treatment options.   I also  patient  on common and most usual side effect of prescribed medications. Risk and benefits of opiates, possible risk of developing opiate dependence and tolerance, need of strict compliance with prescribed medications.  Pain contract, rules and obligations were discussed with patient in details.  He is aware that I would be the only doctor prescribing him   pain medications and ED in a case of emergency.  I reviewed Primary care , and other specialty's notes to better coordinate patient's  care.   All questions were answered, and patient voiced understanding.

## 2018-04-03 ENCOUNTER — TELEPHONE (OUTPATIENT)
Dept: NEUROLOGY | Facility: CLINIC | Age: 53
End: 2018-04-03

## 2018-04-03 ENCOUNTER — HOSPITAL ENCOUNTER (OUTPATIENT)
Dept: RADIOLOGY | Facility: HOSPITAL | Age: 53
Discharge: HOME | End: 2018-04-03
Attending: PHYSICIAN ASSISTANT
Payer: MEDICARE

## 2018-04-03 DIAGNOSIS — G35 MULTIPLE SCLEROSIS: ICD-10-CM

## 2018-04-03 PROCEDURE — A9585 GADOBUTROL INJECTION: HCPCS | Performed by: PHYSICIAN ASSISTANT

## 2018-04-03 PROCEDURE — 70553 MRI BRAIN STEM W/O & W/DYE: CPT | Mod: 26,,, | Performed by: RADIOLOGY

## 2018-04-03 PROCEDURE — 25500020 PHARM REV CODE 255: Performed by: PHYSICIAN ASSISTANT

## 2018-04-03 PROCEDURE — 70553 MRI BRAIN STEM W/O & W/DYE: CPT | Mod: TC

## 2018-04-03 RX ORDER — GADOBUTROL 604.72 MG/ML
9 INJECTION INTRAVENOUS
Status: COMPLETED | OUTPATIENT
Start: 2018-04-03 | End: 2018-04-03

## 2018-04-03 RX ADMIN — GADOBUTROL 9 ML: 604.72 INJECTION INTRAVENOUS at 11:04

## 2018-04-03 NOTE — TELEPHONE ENCOUNTER
Received CBC, collected on 3/21/18, from [facility].     WBC 6.1  ALC 2200  CD19, 20=Pending    Placed in Lawanda's folder for review.

## 2018-04-03 NOTE — TELEPHONE ENCOUNTER
Labs reviewed from [facility]  Drawn on 3/21/2018  CBC-normal  WBC-6.1  RBC-4.68  ALC-2200  Will upload full report into [facility]  Next follow up 4/10/2018 with Dr. Sivan Garner as DMT

## 2018-04-04 RX ORDER — ACETAMINOPHEN 325 MG/1
650 TABLET ORAL
Status: CANCELLED | OUTPATIENT
Start: 2018-04-04

## 2018-04-04 RX ORDER — FAMOTIDINE 10 MG/ML
20 INJECTION INTRAVENOUS
Status: CANCELLED | OUTPATIENT
Start: 2018-04-04

## 2018-04-04 RX ORDER — SODIUM CHLORIDE 0.9 % (FLUSH) 0.9 %
10 SYRINGE (ML) INJECTION
Status: CANCELLED | OUTPATIENT
Start: 2018-04-04

## 2018-04-04 RX ORDER — HEPARIN 100 UNIT/ML
500 SYRINGE INTRAVENOUS
Status: CANCELLED | OUTPATIENT
Start: 2018-04-04

## 2018-04-04 NOTE — TELEPHONE ENCOUNTER
Pt is scheduled for his maintenance Rituxan dose on 4/12/18 at [facility]. His last dose was on 10/9/17. Auth approved:      Approved   Primary Insurance:  [employer] MANAGED MEDICARE/[employer] MEDICARE PPO   Authorization #: [id number] - 08/15/2017- 08/15/2018 - [facility]

## 2018-04-05 ENCOUNTER — PROCEDURE VISIT (OUTPATIENT)
Dept: UROLOGY | Facility: CLINIC | Age: 53
End: 2018-04-05
Payer: MEDICARE

## 2018-04-05 DIAGNOSIS — N31.9 NEUROGENIC BLADDER: Primary | Chronic | ICD-10-CM

## 2018-04-05 DIAGNOSIS — N31.9 NEUROGENIC BLADDER: Primary | ICD-10-CM

## 2018-04-05 NOTE — PROCEDURES
Procedures   Pt left without undergoing the procedure because he could not wait.  Will reschedule him.

## 2018-04-05 NOTE — TELEPHONE ENCOUNTER
Attempted to reach Rebecca,  for [facility], but was on hold for too long.  Sent email to Ms. Malhotra, admissions director for [facility] advising of date/time of pt's infusion and advising that he must be accompanied during the infusion.

## 2018-04-06 ENCOUNTER — TELEPHONE (OUTPATIENT)
Dept: PSYCHIATRY | Facility: CLINIC | Age: 53
End: 2018-04-06

## 2018-04-06 ENCOUNTER — TELEPHONE (OUTPATIENT)
Dept: NEUROLOGY | Facility: CLINIC | Age: 53
End: 2018-04-06

## 2018-04-06 NOTE — TELEPHONE ENCOUNTER
Received voicemail from Rebecca at [facility] advising that they received my email re: infusion date and need for aide to accompany.

## 2018-04-06 NOTE — TELEPHONE ENCOUNTER
----- Message from Lawanda Boyce PA-C sent at 4/3/2018  2:26 PM CDT -----  MRI results are stable as compared to prior with no new or enhancing lesions.   Please call patient to notify

## 2018-04-06 NOTE — TELEPHONE ENCOUNTER
At the request of [facility], I faxed the results of patient's MRI and provided results to [phone].

## 2018-04-10 ENCOUNTER — LAB VISIT (OUTPATIENT)
Dept: LAB | Facility: HOSPITAL | Age: 53
End: 2018-04-10
Attending: PSYCHIATRY & NEUROLOGY
Payer: MEDICARE

## 2018-04-10 ENCOUNTER — OFFICE VISIT (OUTPATIENT)
Dept: NEUROLOGY | Facility: CLINIC | Age: 53
End: 2018-04-10
Payer: MEDICARE

## 2018-04-10 ENCOUNTER — TELEPHONE (OUTPATIENT)
Dept: RESEARCH | Facility: HOSPITAL | Age: 53
End: 2018-04-10

## 2018-04-10 VITALS — DIASTOLIC BLOOD PRESSURE: 87 MMHG | HEART RATE: 63 BPM | SYSTOLIC BLOOD PRESSURE: 134 MMHG | HEIGHT: 71 IN

## 2018-04-10 DIAGNOSIS — N31.9 NEUROGENIC BLADDER: ICD-10-CM

## 2018-04-10 DIAGNOSIS — Z00.6 RESEARCH EXAM: Primary | ICD-10-CM

## 2018-04-10 DIAGNOSIS — G35 MS (MULTIPLE SCLEROSIS): ICD-10-CM

## 2018-04-10 DIAGNOSIS — G35 MULTIPLE SCLEROSIS: ICD-10-CM

## 2018-04-10 DIAGNOSIS — Z29.89 PROPHYLACTIC IMMUNOTHERAPY: ICD-10-CM

## 2018-04-10 DIAGNOSIS — R25.2 SPASTICITY: ICD-10-CM

## 2018-04-10 DIAGNOSIS — F32.A DEPRESSION, UNSPECIFIED DEPRESSION TYPE: ICD-10-CM

## 2018-04-10 DIAGNOSIS — Z79.899 ENCOUNTER FOR LONG-TERM (CURRENT) USE OF HIGH-RISK MEDICATION: ICD-10-CM

## 2018-04-10 DIAGNOSIS — E55.9 VITAMIN D DEFICIENCY: ICD-10-CM

## 2018-04-10 DIAGNOSIS — Z74.09 IMPAIRED MOBILITY AND ADLS: ICD-10-CM

## 2018-04-10 DIAGNOSIS — G35 MS (MULTIPLE SCLEROSIS): Primary | ICD-10-CM

## 2018-04-10 DIAGNOSIS — Z72.0 TOBACCO USE: ICD-10-CM

## 2018-04-10 DIAGNOSIS — Z71.89 COUNSELING REGARDING GOALS OF CARE: ICD-10-CM

## 2018-04-10 DIAGNOSIS — Z78.9 IMPAIRED MOBILITY AND ADLS: ICD-10-CM

## 2018-04-10 DIAGNOSIS — R26.9 GAIT DISTURBANCE: ICD-10-CM

## 2018-04-10 LAB — 25(OH)D3+25(OH)D2 SERPL-MCNC: 61 NG/ML

## 2018-04-10 PROCEDURE — 99499 UNLISTED E&M SERVICE: CPT | Mod: S$GLB,,, | Performed by: PSYCHIATRY & NEUROLOGY

## 2018-04-10 PROCEDURE — 99215 OFFICE O/P EST HI 40 MIN: CPT | Mod: S$GLB,,, | Performed by: PSYCHIATRY & NEUROLOGY

## 2018-04-10 PROCEDURE — 99999 PR PBB SHADOW E&M-EST. PATIENT-LVL II: CPT | Mod: PBBFAC,,, | Performed by: PSYCHIATRY & NEUROLOGY

## 2018-04-10 PROCEDURE — 86356 MONONUCLEAR CELL ANTIGEN: CPT

## 2018-04-10 PROCEDURE — 82306 VITAMIN D 25 HYDROXY: CPT

## 2018-04-10 PROCEDURE — 36415 COLL VENOUS BLD VENIPUNCTURE: CPT

## 2018-04-10 RX ORDER — BUPROPION HYDROCHLORIDE 150 MG/1
TABLET, EXTENDED RELEASE ORAL
Qty: 60 TABLET | Refills: 3 | Status: ON HOLD | OUTPATIENT
Start: 2018-04-10 | End: 2018-12-26 | Stop reason: HOSPADM

## 2018-04-10 NOTE — PROGRESS NOTES
Subjective:       Patient ID: Mao Levin is a 52 y.o. male who presents today for a routine clinic visit for MS.      MS HPI:  · DMT: Rituxan  · Side effects from DMT? No  · Taking vitamin D3 as recommended? Yes 50,000 IU / week  · Feels a little weaker and that the left hand is getting tighter;   · Seeing Dr. Augustin for neurogenic bladder  · Doing PT and OT at the NH;     · Transfers from chair to commode independently;   · Likes to exercise at the gym;   · Wants to stop smoking    SOCIAL HISTORY  Social History   Substance Use Topics    Smoking status: Former Smoker     Packs/day: 0.50     Years: 23.00     Types: Cigarettes     Quit date: 10/1/2017    Smokeless tobacco: Never Used      Comment: Using patch    Alcohol use No     Living arrangements - the patient lives in a NH  Employment : no    MS ROS:  · Fatigue: Yes - mild  · Sleep Disturbance: on hs trazodone  · Bladder Dysfunction: Yes - Vesicare, flomax; macrodantin, sees Dr. Augustin  · Bowel Dysfunction: Yes - on Senna   · Spasticity: Yes - Dantrolene 50mg QID and Baclofen 20mg QID and Valium mg q12 hours  · Visual Symptoms: No  · Cognitive: Yes - mild memory issues, no change  · Mood Disorder: Yes - Cymbalta 30mg in AM  · Gait Disturbance: Yes - as above, wears L AF), on Ampyra q12  · Falls: Yes - slid to floor during transfer  · Hand Dysfunction: Yes - coordination  · Pain: Yes - Cymbalta, gabapentin TID; also sees Dr. Estrella for pain management:  Percocet and Tramadol and Hydrocodone  · Sexual Dysfunction: Not Assessed  · Skin Breakdown: No  · Tremors: No  · Dysphagia:  No  · Dysarthria:  No  · Heat sensitivity:  Yes - weakness  · Any un-met adaptive needs? No  · Copay Assist?  Yes   · Clinical Trial candidate? No      Objective:      Neurologic Exam    MS: grossly intact  CN: no obvious TODD; mild dysarthria;   MOTOR: quadraparesis, left >> right;  Unable to open left hand; 2/5 flexors of LLE; 3/5 flexors of RLE;       Imaging:     Results for orders placed  during the hospital encounter of 04/03/18   MRI Brain W WO Contrast    Impression Stable appearance of the brain, again exhibiting findings compatible with patient's history of multiple sclerosis.  No new or enhancing lesions to indicate ongoing or active demyelination.    Electronically signed by resident: Trinh Watkins  Date:    04/03/2018  Time:    13:11    Electronically signed by: Davin Noel MD  Date:    04/03/2018  Time:    14:18     Results for orders placed during the hospital encounter of 03/13/17   MRI Cervical Spine W WO Cont    Impression Persistent multifocal areas of cord signal abnormality, unchanged when compared to the MRI dated 12/04/2016 and most likely representing sequela of demyelinating disease given patient's history of multiple sclerosis.  No enhancing lesions to suggest active demyelination.      Electronically signed by: VALENTINO BALL MD  Date:     03/17/17  Time:    03:51      Results for orders placed during the hospital encounter of 12/02/16   MRI Thoracic Spine W WO Cont    Impression  There are patchy foci of T2 signal hyperintensity in the cervical and thoracic spinal cord overall similar to prior exam and likely relating to patient's history of multiple sclerosis.  There is no finding to indicate active demyelination and no significant change compared to prior examination.      Electronically signed by: Kelsie Farah MD  Date:     12/04/16  Time:    08:42          Labs:     Lab Results   Component Value Date    [id number] 27 (L) 07/18/2017    [id number] 18 (L) 09/25/2016     Lab Results   Component Value Date    JCVINDEX 3.70 (A) 09/22/2016    JCVANTIBODY Positive (A) 09/22/2016     No results found for: CG2DZUFC, ABSOLUTECD3, VL1UVOWU, ABSOLUTECD8, NU2HPHUI, ABSOLUTECD4, LABCD48  Lab Results   Component Value Date    WBC 7.95 08/16/2017    RBC 4.54 (L) 08/16/2017    HGB 13.6 (L) 08/16/2017    HCT 40.0 08/16/2017    MCV 88 08/16/2017    MCH 30.0 08/16/2017    MCHC 34.0  08/16/2017    RDW 13.5 08/16/2017     08/16/2017    MPV 11.0 08/16/2017    GRAN 4.5 08/16/2017    GRAN 56.0 08/16/2017    LYMPH 2.6 08/16/2017    LYMPH 32.1 08/16/2017    MONO 0.7 08/16/2017    MONO 8.9 08/16/2017    EOS 0.2 08/16/2017    BASO 0.03 08/16/2017    EOSINOPHIL 2.5 08/16/2017    BASOPHIL 0.4 08/16/2017     Sodium   Date Value Ref Range Status   01/03/2018 140 136 - 145 mmol/L Final     Potassium   Date Value Ref Range Status   01/03/2018 4.4 3.5 - 5.1 mmol/L Final     Chloride   Date Value Ref Range Status   01/03/2018 101 95 - 110 mmol/L Final     CO2   Date Value Ref Range Status   01/03/2018 31 (H) 23 - 29 mmol/L Final     Glucose   Date Value Ref Range Status   01/03/2018 88 70 - 110 mg/dL Final     BUN, Bld   Date Value Ref Range Status   01/03/2018 10 6 - 20 mg/dL Final     Creatinine   Date Value Ref Range Status   01/03/2018 1.0 0.5 - 1.4 mg/dL Final     Calcium   Date Value Ref Range Status   01/03/2018 8.9 8.7 - 10.5 mg/dL Final     Total Protein   Date Value Ref Range Status   08/14/2017 6.8 6.0 - 8.4 g/dL Final     Albumin   Date Value Ref Range Status   08/14/2017 3.7 3.5 - 5.2 g/dL Final     Total Bilirubin   Date Value Ref Range Status   08/14/2017 0.5 0.1 - 1.0 mg/dL Final     Comment:     For infants and newborns, interpretation of results should be based  on gestational age, weight and in agreement with clinical  observations.  Premature Infant recommended reference ranges:  Up to 24 hours.............<8.0 mg/dL  Up to 48 hours............<12.0 mg/dL  3-5 days..................<15.0 mg/dL  6-29 days.................<15.0 mg/dL       Alkaline Phosphatase   Date Value Ref Range Status   08/14/2017 70 55 - 135 U/L Final     AST   Date Value Ref Range Status   08/14/2017 11 10 - 40 U/L Final     ALT   Date Value Ref Range Status   08/14/2017 8 (L) 10 - 44 U/L Final     Anion Gap   Date Value Ref Range Status   01/03/2018 8 8 - 16 mmol/L Final     eGFR if    Date Value  Ref Range Status   01/03/2018 >60.0 >60 mL/min/1.73 m^2 Final     eGFR if non    Date Value Ref Range Status   01/03/2018 >60.0 >60 mL/min/1.73 m^2 Final     Comment:     Calculation used to obtain the estimated glomerular filtration  rate (eGFR) is the CKD-EPI equation.                    Diagnosis/Assessment/Plan:    1. Multiple Sclerosis  · Assessment: Pt has advanced disability  · Imaging: planned Feb 2018  · Disease Modifying Therapies: continue Rituxan; Refer to ID for vaccinations in consideration of chronically immunosuppressed state; pt vaccinated against pneumonia at NH; continue continue high dose D3; check level today;     2. MS Symptom Assessment / Management  · Mood Disorder: wellbutrin started; continue Cymbalta  · No other changes to regimen described in ROS above    3. Tobacco use--script for Wellbutrin given today;     Over 50% of this 40 minute visit was spent in direct face to face counseling of the patient about MS, DMT considerations, and MS symptom management.         Tobacco use  -     buPROPion (WELLBUTRIN SR) 150 MG TBSR 12 hr tablet; 1 tab po daily x 3 days, then increase to 1 tab po BID; last dose no later than 6PM; stop smoking after 5-7 days of treatment;  Dispense: 60 tablet; Refill: 3    Depression, unspecified depression type  -     buPROPion (WELLBUTRIN SR) 150 MG TBSR 12 hr tablet; 1 tab po daily x 3 days, then increase to 1 tab po BID; last dose no later than 6PM; stop smoking after 5-7 days of treatment;  Dispense: 60 tablet; Refill: 3    MS (multiple sclerosis)  -     Vitamin D; Future  -     Rituxan Sensitivity; Future; Expected date: 04/10/2018    Vitamin D deficiency  -     Vitamin D; Future  -     Rituxan Sensitivity; Future; Expected date: 04/10/2018

## 2018-04-10 NOTE — TELEPHONE ENCOUNTER
Control of Antwon-Barr Virus in Multiple Sclerosis  PI: Mattie Finnegan MD  Sub-I: Lawanda Boyce PA-C    IRB #: [id number]  IRB approval date: 11/9/2017    Mao Levin was approached about study today in clinic after routine visit with Dr. Finnegan.  He wishes to participate.       Informed Consent Process:  Present for discussion: Subject  Is LAR Consenting for Subject: no     Prior to the Informed Consent (IC) being signed, or any protocol required testing, procedure, or intervention being performed, the following was done or discussed:  · Purpose of the Study, Qualifications to Participate: yes  · Study Design, Schedule and Procedures: yes  · Risks, Benefits, Alternative Treatments, Compensation and Costs: yes  · Confidentiality and HIPAA Authorization for Release of Medical Records for the research trial/subject's right/study related injury: yes  · Study related contact information: yes  · Voluntary Participation and Withdrawal from the research trial at any time: yes  · Optional samples/procedures (if applicable): yes  · Patient has been offered the opportunity to ask questions regarding the study and all questions were answered satisfactorily: yes  · Patient verbalizes understanding of the study/procedures and agrees to participate: yes  · CRC and PI contact information given to patient: yes  · Signed copy given to patient: yes  · Copy in patient's chart and original uploaded to Epic: yes     Person Obtaining Consent: REBECA Boswell    Inclusion / Exclusion:     MS Participants (Arm A) Control Participants (Arm B)   Inclusion Criteria · Age 18 - 60  · Gender: male or female  · Meet the 2010 Baxter Criteria for diagnosis of MS · Age 18 - 60  · Gender: male or female  · Scheduled lumbar puncture   Exclusion Criteria · Current or recent (within 2 weeks) use of oral antiplatelet or anticoagulant agents  · Pregnancy  · History of cognitive dysfunction from MS that might impair capacity to consent  · Diagnosis of  dementia, delirium or confusional state  · Suspected alcohol or drug toxicity acutely  · Suspected increased intracranial pressure  · HIV infection · Diagnosis of MS  · Current or recent (within 2 weeks) use of oral antiplatelet or anticoagulant agents  · Pregnancy  · Diagnosis of dementia, delirium or confusional state  · Suspected alcohol or drug toxicity acutely  · Suspected increased intracranial pressure  · HIV infection     The subject meets all of the inclusion criteria and none of the exclusion criteria for the MS participant group.    Verified by PI, Dr. Finnegan, on 10 APR 2018    For MS participants only:  Date of MS diagnosis: unknown    Current DMT: Rituxan    Study Procedures:   Blood Draw: yes  Performed by: [facility]  Date: 4/10/2018  Amount collected: 3 tubes    Lumbar Puncture: no  Performed by: n/a  Date: n/a  Amount collected: 3 tubes    Collected sample(s) were immediately processed and frozen per protocol.  The samples will remain frozen in liquid nitrogen until shipment on dry ice to lab collaborators at [facility] in [place], [facility].    Compensation:  The subject was issued a [employer] and will receive $10 for his donation of blood.    Mao Levin was thanked for his participation and reminded to call CRC should he have any questions.

## 2018-04-12 ENCOUNTER — INFUSION (OUTPATIENT)
Dept: INFUSION THERAPY | Facility: HOSPITAL | Age: 53
End: 2018-04-12
Attending: PSYCHIATRY & NEUROLOGY
Payer: MEDICARE

## 2018-04-12 VITALS
SYSTOLIC BLOOD PRESSURE: 153 MMHG | BODY MASS INDEX: 26.6 KG/M2 | HEIGHT: 71 IN | RESPIRATION RATE: 16 BRPM | HEART RATE: 67 BPM | WEIGHT: 190 LBS | DIASTOLIC BLOOD PRESSURE: 86 MMHG | TEMPERATURE: 99 F

## 2018-04-12 DIAGNOSIS — G35 MS (MULTIPLE SCLEROSIS): Primary | ICD-10-CM

## 2018-04-12 PROCEDURE — S0028 INJECTION, FAMOTIDINE, 20 MG: HCPCS | Performed by: PSYCHIATRY & NEUROLOGY

## 2018-04-12 PROCEDURE — 96413 CHEMO IV INFUSION 1 HR: CPT

## 2018-04-12 PROCEDURE — 96367 TX/PROPH/DG ADDL SEQ IV INF: CPT

## 2018-04-12 PROCEDURE — 96415 CHEMO IV INFUSION ADDL HR: CPT

## 2018-04-12 PROCEDURE — 63600175 PHARM REV CODE 636 W HCPCS: Mod: JG | Performed by: PSYCHIATRY & NEUROLOGY

## 2018-04-12 PROCEDURE — 25000003 PHARM REV CODE 250: Performed by: PSYCHIATRY & NEUROLOGY

## 2018-04-12 PROCEDURE — 96375 TX/PRO/DX INJ NEW DRUG ADDON: CPT

## 2018-04-12 RX ORDER — SODIUM CHLORIDE 0.9 % (FLUSH) 0.9 %
10 SYRINGE (ML) INJECTION
Status: CANCELLED | OUTPATIENT
Start: 2018-04-12

## 2018-04-12 RX ORDER — HEPARIN 100 UNIT/ML
500 SYRINGE INTRAVENOUS
Status: CANCELLED | OUTPATIENT
Start: 2018-04-12

## 2018-04-12 RX ORDER — SODIUM CHLORIDE 0.9 % (FLUSH) 0.9 %
10 SYRINGE (ML) INJECTION
Status: DISCONTINUED | OUTPATIENT
Start: 2018-04-12 | End: 2018-04-12 | Stop reason: HOSPADM

## 2018-04-12 RX ORDER — HEPARIN 100 UNIT/ML
500 SYRINGE INTRAVENOUS
Status: DISCONTINUED | OUTPATIENT
Start: 2018-04-12 | End: 2018-04-12 | Stop reason: HOSPADM

## 2018-04-12 RX ORDER — ACETAMINOPHEN 325 MG/1
650 TABLET ORAL
Status: CANCELLED | OUTPATIENT
Start: 2018-04-12

## 2018-04-12 RX ORDER — FAMOTIDINE 10 MG/ML
20 INJECTION INTRAVENOUS
Status: COMPLETED | OUTPATIENT
Start: 2018-04-12 | End: 2018-04-12

## 2018-04-12 RX ORDER — FAMOTIDINE 10 MG/ML
20 INJECTION INTRAVENOUS
Status: CANCELLED | OUTPATIENT
Start: 2018-04-12

## 2018-04-12 RX ORDER — ACETAMINOPHEN 325 MG/1
650 TABLET ORAL
Status: COMPLETED | OUTPATIENT
Start: 2018-04-12 | End: 2018-04-12

## 2018-04-12 RX ADMIN — DIPHENHYDRAMINE HYDROCHLORIDE 50 MG: 50 INJECTION, SOLUTION INTRAMUSCULAR; INTRAVENOUS at 10:04

## 2018-04-12 RX ADMIN — ACETAMINOPHEN 650 MG: 325 TABLET, FILM COATED ORAL at 10:04

## 2018-04-12 RX ADMIN — RITUXIMAB 1000 MG: 10 INJECTION, SOLUTION INTRAVENOUS at 11:04

## 2018-04-12 RX ADMIN — DEXTROSE: 50 INJECTION, SOLUTION INTRAVENOUS at 10:04

## 2018-04-12 RX ADMIN — FAMOTIDINE 20 MG: 10 INJECTION INTRAVENOUS at 10:04

## 2018-04-12 NOTE — PLAN OF CARE
Problem: Patient Care Overview (Adult)  Goal: Plan of Care Review  Outcome: Ongoing (interventions implemented as appropriate)  Patient tolerated Rituxan infusion well. VSS, NAD noted.  Peripheral IV removed, assisted to wheelchair with max assist.  AVS given.  Patient transported to [facility] via w/c per RN.

## 2018-04-12 NOTE — NURSING
0930:  [facility], per own wheelchair.  Max assist to treatment chair.  VSS, assessment completed.  NAD noted.  Attempts to start IV x 2, to RAC, #24 gauge.  Result was too positional, re-sited to left forearm x 2 attempts.  Pre-meds initiated.  Will monitor.    1230:  Assisted patient to [facility], via wheelchair, saturated brief changed by patient.    1255:  Return to treatment chair, very unsteady on transfer.  Resumed Rituxan infusion.

## 2018-04-17 LAB — CD20 CELLS NFR SPEC: NORMAL %

## 2018-05-02 ENCOUNTER — OFFICE VISIT (OUTPATIENT)
Dept: PHYSICAL MEDICINE AND REHAB | Facility: CLINIC | Age: 53
End: 2018-05-02
Payer: MEDICARE

## 2018-05-02 VITALS
HEART RATE: 70 BPM | WEIGHT: 190 LBS | DIASTOLIC BLOOD PRESSURE: 91 MMHG | HEIGHT: 71 IN | SYSTOLIC BLOOD PRESSURE: 142 MMHG | BODY MASS INDEX: 26.6 KG/M2

## 2018-05-02 DIAGNOSIS — G62.9 POLYNEUROPATHY: ICD-10-CM

## 2018-05-02 DIAGNOSIS — R26.81 GAIT INSTABILITY: ICD-10-CM

## 2018-05-02 DIAGNOSIS — G89.29 CHRONIC BILATERAL LOW BACK PAIN WITH BILATERAL SCIATICA: ICD-10-CM

## 2018-05-02 DIAGNOSIS — Z74.09 IMPAIRED MOBILITY AND ADLS: Chronic | ICD-10-CM

## 2018-05-02 DIAGNOSIS — M54.41 CHRONIC BILATERAL LOW BACK PAIN WITH BILATERAL SCIATICA: ICD-10-CM

## 2018-05-02 DIAGNOSIS — Z78.9 IMPAIRED MOBILITY AND ADLS: Chronic | ICD-10-CM

## 2018-05-02 DIAGNOSIS — R53.1 WEAKNESS GENERALIZED: ICD-10-CM

## 2018-05-02 DIAGNOSIS — R25.2 SPASTICITY: ICD-10-CM

## 2018-05-02 DIAGNOSIS — G82.20 PARAPARESIS: ICD-10-CM

## 2018-05-02 DIAGNOSIS — M54.42 CHRONIC BILATERAL LOW BACK PAIN WITH BILATERAL SCIATICA: ICD-10-CM

## 2018-05-02 DIAGNOSIS — Z79.891 LONG-TERM CURRENT USE OF OPIATE ANALGESIC: ICD-10-CM

## 2018-05-02 DIAGNOSIS — G89.4 CHRONIC PAIN SYNDROME: Primary | ICD-10-CM

## 2018-05-02 DIAGNOSIS — G57.93 NEUROPATHIC PAIN, LEG, BILATERAL: ICD-10-CM

## 2018-05-02 PROCEDURE — 99214 OFFICE O/P EST MOD 30 MIN: CPT | Mod: S$GLB,,, | Performed by: PHYSICAL MEDICINE & REHABILITATION

## 2018-05-02 PROCEDURE — 3008F BODY MASS INDEX DOCD: CPT | Mod: CPTII,S$GLB,, | Performed by: PHYSICAL MEDICINE & REHABILITATION

## 2018-05-02 PROCEDURE — 99999 PR PBB SHADOW E&M-EST. PATIENT-LVL III: CPT | Mod: PBBFAC,,, | Performed by: PHYSICAL MEDICINE & REHABILITATION

## 2018-05-02 PROCEDURE — 99499 UNLISTED E&M SERVICE: CPT | Mod: S$GLB,,, | Performed by: PHYSICAL MEDICINE & REHABILITATION

## 2018-05-02 NOTE — PROGRESS NOTES
"Subjective:       Patient ID: Mao Levin is a 52 y.o. male.    Chief Complaint: Leg Pain    Back Pain   Associated symptoms include leg pain. Pertinent negatives include no chest pain, dysuria, headaches, numbness or weakness.   Leg Pain    Pertinent negatives include no numbness.   Neck Pain    Associated symptoms include leg pain. Pertinent negatives include no chest pain, headaches, numbness or weakness.      Mao Levin is 53 y/o male who returns to clinic for chronic multiple MSK pain, in lower back, neck,multiple joints that include: shoulder, knee, and hand pain.   [facility] 03/28/18.   Patient is currently in NH, and he is bringing his MAR from NH, [facility], for me to review his medications.   Per patient he is in pain, and he believes that he is not given pain medications as we discuss here, therefore he wants to come every month to get his presription, thinking he will get the same medications in NH.  He states that he is getting the same pain medications at all time, at 3 am, 11 am, and 7 pm ( before bedtime, he wakes up at 3 am, and goes to sleep at 7 pm).    The pain started in 2006 following MS diagnosis and symptoms have been worsening.  Brief history: Patient is a 53 yo male with MS diagnosed in 2006 as well as chronic, generalized pain since that time that presents for initial evaluation. He was hospitalized in inpatient rehab in October /2016 and feels that he has become more functional but still is mostly wheel chair bound. .   Pain occurs from the neck down and involves the entire body except the head. No specific area is worse. Pain is "achy" and "tingling".   It is constant at 7-8/10 but can worsen with any movement to 10/10.   Today he c/o pain in both arm/hands in all fingers, they feel sore and tight.   Back pain is running down to both legs, back of thighs, and pain in leg is worst bellow the knee level, in calves that are tight, and weak.  His Left leg is weaker than Rt leg.   Has " foot b/l foot weakness and drop, wears left AFO.  He has been a prolong time on chronic pain management with oxycodone 15mg q6h and oxycontin 40 mg TWICE DAILY ( while in IP Rehab) but did not feel that this was any more helpful.   Tried gabapentin in the past which was tolerated but didn't help.   He also takes diazepam 5mg TWICE DAILY and baclofen 10mg TID which helps his spasticity.    In NH, he now takes Tramadol ad Oxycodone 5 mg Q8 hrs prn pain.  He states that Tramadol is ineffective, and would like to get appropriate medications to treat his pain.   He is coming today, w/o papers, medical documentation from NH.   Pain Medications:  Percocet 10/325mg, 1 tablet 3x a day  Gabapentin 300mg, 3 tablets three times daily  Valium 5mg, 1 tablet twice daily  Baclofen 20 mg QID  Xarelto 20mg, 1 tablet daily    Pain Description:   The pain is located in the neck, back shoulders, hands  and knees.  Today the current  pain is rated as 7/10  At BEST  5/10   At WORST  10/10 on the WORST day.    On average pain is rated as 7/10.   The pain is described as aching and tingling  Symptoms interfere with daily activity, sleeping and work.   Exacerbating factors: Morning, Extension and Flexing.    Mitigating factors medications and physical therapy.   Patient denies .  Patient denies any suicidal or homicidal ideations    Physical Therapy/Home Exercise: yes     report:  Reviewed and consistent with medication use as prescribed.  Pain Procedures: none      Imaging:   I reviewed C-spine and T-spine MRI which showed demylenating changes as well as fairly significant stenosis throughout the cervical spine.   He is here for follow up, and treatment.    Past Medical History:   Diagnosis Date    Anxiety     Deep vein thrombosis     Depression     Multiple sclerosis     Multiple sclerosis     Pulmonary embolism        No past surgical history on file.    Family History   Problem Relation Age of Onset    Arthritis Mother     No  Known Problems Father     Cancer Maternal Grandfather        Social History     Social History    Marital status: Single     Spouse name: N/A    Number of children: N/A    Years of education: N/A     Social History Main Topics    Smoking status: Former Smoker     Packs/day: 0.50     Years: 23.00     Types: Cigarettes     Quit date: 10/1/2017    Smokeless tobacco: Never Used      Comment: Using patch    Alcohol use No    Drug use: No    Sexual activity: Yes     Partners: Female     Other Topics Concern    None     Social History Narrative    None       Current Outpatient Prescriptions   Medication Sig Dispense Refill    buPROPion (WELLBUTRIN SR) 150 MG TBSR 12 hr tablet 1 tab po daily x 3 days, then increase to 1 tab po BID; last dose no later than 6PM; stop smoking after 5-7 days of treatment; 60 tablet 3    citalopram (CELEXA) 20 MG tablet Take 1 tablet (20 mg total) by mouth once daily. 30 tablet 0    dalfampridine (AMPYRA) 10 mg Tb12 Take 10 mg by mouth every 12 (twelve) hours. 180 tablet 1    dantrolene (DANTRIUM) 50 MG Cap Take 1 capsule (50 mg total) by mouth 4 (four) times daily. 120 capsule 0    diazePAM (VALIUM) 5 MG tablet Take 1 tablet (5 mg total) by mouth 2 (two) times daily. 60 tablet 2    duloxetine (CYMBALTA) 30 MG capsule Take 30 mg by mouth once daily.      ergocalciferol (VITAMIN D2) 50,000 unit Cap Take 1 capsule (50,000 Units total) by mouth every 7 days. (Patient taking differently: Take 50,000 Units by mouth every 7 days. on monday) 4 capsule 11    gabapentin (NEURONTIN) 300 MG capsule Take 900 mg morning and evening.  Take 1100 mg total in the afternoon. 270 capsule 0    nicotine (NICODERM CQ) 14 mg/24 hr Place 1 patch onto the skin every 24 hours.      oxyCODONE-acetaminophen (PERCOCET)  mg per tablet Take 1 tablet by mouth every 8 (eight) hours. CAn you please give patient  Percocet at 3 am, 11 am, and 7 pm. 90 tablet 0    polyethylene glycol (GLYCOLAX) 17  gram/dose powder Take 17 g by mouth once daily. 510 g 6    rivaroxaban (XARELTO) 20 mg Tab Take 1 tablet (20 mg total) by mouth daily with dinner or evening meal. 60 tablet 4    senna-docusate 8.6-50 mg (PERICOLACE) 8.6-50 mg per tablet Take 1 tablet by mouth once daily.      solifenacin (VESICARE) 10 MG tablet Take 1 tablet (10 mg total) by mouth once daily. 30 tablet 3    tamsulosin (FLOMAX) 0.4 mg Cp24 Take 1 capsule (0.4 mg total) by mouth once daily. 30 capsule 11    trazodone (DESYREL) 100 MG tablet Take 1 tablet (100 mg total) by mouth every evening. 30 tablet 3     No current facility-administered medications for this visit.      Review of patient's allergies indicates:  No Known Allergies    Review of Systems   Constitutional: Negative for appetite change and fatigue.   Eyes: Negative for visual disturbance.   Respiratory: Negative for shortness of breath.    Cardiovascular: Negative for chest pain.   Gastrointestinal: Negative for constipation and diarrhea.   Genitourinary: Negative for dysuria, frequency and urgency.   Musculoskeletal: Positive for arthralgias, back pain, gait problem, myalgias and neck pain. Negative for joint swelling and neck stiffness.   Neurological: Negative for dizziness, tremors, weakness, numbness and headaches.   Psychiatric/Behavioral: Negative for dysphoric mood.   All other systems reviewed and are negative.        Objective:      Physical Exam      Constitutional: He is oriented to person, place, and time.   He appears well-nourished. In manual WC.  Eyes: EOM are normal. Pupils are equal, round, and reactive to light.   Neck: Normal range of motion. Neck supple.   Cardiovascular: Normal rhythm  and rate.    Pulmonary/Chest: Effort normal.   Abdominal: Soft.   Musculoskeletal:   BUEs AROM WNL  BLEs AROM is diminished   Neurological: He is oriented to person, place, and time.   BUEs 5/5  RLE 3/5 HF, 4-/5 HE, 3+/5 KE/KF/DF  LLE 3-/5 HF, 3+/5 HE, 3-/5 KE, 2/5 KF, 0/5 DF  .  wears left AFO.  Skin: No rash noted.   Psychiatric: He has a normal mood and affect.       Assessment:           1. Chronic pain syndrome    2. Neuropathic pain, leg, bilateral    3. Paraparesis    4. Polyneuropathy    5. Weakness generalized    6. Spasticity    7. Gait instability    8. Impaired mobility and ADLs    9. Chronic bilateral low back pain with bilateral sciatica    10. Long-term current use of opiate analgesic            Plan:        Chronic pain syndrome    Neuropathic pain, leg, bilateral    Paraparesis    Polyneuropathy    Weakness generalized    Spasticity    Gait instability    Impaired mobility and ADLs    Chronic bilateral low back pain with bilateral sciatica    Long-term current use of opiate analgesic    Patient with MS, weakness in UE/LE, and neuropathic pain, weakness and spasticity In LE> UE,    He also has chronic pain syndrome, chronic back and neck pain, with B/l LE weakness, Lt>> Rt, with impaired mobility, and ADLs.    -- He is at present in [facility].  This time pt brought me a MAR form NH, so I can compare medications,and write a recommendations for his treatment.    -- Pain management:   I reviewed , and MAR from NH and found a significant discrepancies, and   Mistakes.  All his medications are filled by NH, and he is given his medication by RN in NH.     1. Since 11/20/2017, I recommended to d/c Hydrocodone 10/325 mg, q8 hrs, and Tramadol for BTP,a nd start Percocet 10/325 mg TID, scheduled, w/o BTP medications. That has not been followed, and pt continues to receive the same medications, per MAR until today, 5/2/18.   Yet, per  Oxycodone 10/325 mg was filled:  #90 tabs, on 5/1 ( J.C), #90 tabs on 3/31 ( MB), #45 tabs on 1/29,  (MB), #40 tabs on 1/16 ( J.O)  Oxycodone 7.5/325 mg was filled in between, #90 tabs, on 3/27 ( J.C), #120 tbs on 2/15 ( J.O).  Oxycodone 10/325 mg, #90 tabs was refilled on 3/31/18, #45 tabs on 1/29/18, and #90 tabs on 11/20/17 (  all 3 prescribed by ), all other prescriptions were given by Primary [facility], dr. Giron, or dr. Mora.  Yet , patient was given Hydrocodone and Tramadol.  I called . dr. Giron's, and dr. Mora's office to report and further investigate this therapy.      2. There are multiple MDs prescribing opioid medications.  , and , who are I suspect MDs that are working in NH).   Per pain mgm rules it should be one doctor to monitor and prescribe opioids.   Therefore I will d/c pt from my clinic and let Primary care/ NH doctors prescribe pain opioids.    I recommended a long time ago to stop Tramadol 50 mg QID, and Oxycodone 5 mg q12 hrs prn pain, and give patient only Percocet 10/325 mg po q8 hrs regularly, w/o breakthrough pain ( pt c/o that he is not getting medications if they are written on prn basis, he has to ask multiple times).  He will resume all his medications, including gabapentin, Baclofen, and Diazepam for spasticity treatment.  Also recommend to keep appointments with , for treatment of MS.  At this time he does not need any additional DME.  All plan was discussed with patient and he agrees.     RTC prn.    Total time spent face to face with patient was 25 minutes.   More than 50% of that time was spent in counseling on diagnosis , prognosis and treatment options.   I also  patient  on common and most usual side effect of prescribed medications. Risk and benefits of opiates, possible risk of developing opiate dependence and tolerance, need of strict compliance with prescribed medications.  I reviewed Primary care , and other specialty's notes to better coordinate patient's  care.   All questions were answered, and patient voiced understanding.

## 2018-05-08 ENCOUNTER — OFFICE VISIT (OUTPATIENT)
Dept: UROLOGY | Facility: CLINIC | Age: 53
End: 2018-05-08
Payer: MEDICARE

## 2018-05-08 DIAGNOSIS — N39.41 URGENCY INCONTINENCE: ICD-10-CM

## 2018-05-08 DIAGNOSIS — G35 MS (MULTIPLE SCLEROSIS): Chronic | ICD-10-CM

## 2018-05-08 DIAGNOSIS — R35.0 FREQUENCY OF URINATION: ICD-10-CM

## 2018-05-08 DIAGNOSIS — N39.0 RECURRENT UTI: Primary | ICD-10-CM

## 2018-05-08 PROCEDURE — 99999 PR PBB SHADOW E&M-EST. PATIENT-LVL III: CPT | Mod: PBBFAC,,, | Performed by: UROLOGY

## 2018-05-08 PROCEDURE — 87186 SC STD MICRODIL/AGAR DIL: CPT

## 2018-05-08 PROCEDURE — 87088 URINE BACTERIA CULTURE: CPT

## 2018-05-08 PROCEDURE — 87086 URINE CULTURE/COLONY COUNT: CPT

## 2018-05-08 PROCEDURE — 99499 UNLISTED E&M SERVICE: CPT | Mod: S$GLB,,, | Performed by: UROLOGY

## 2018-05-08 PROCEDURE — 51701 INSERT BLADDER CATHETER: CPT | Mod: S$GLB,,, | Performed by: UROLOGY

## 2018-05-08 PROCEDURE — 99214 OFFICE O/P EST MOD 30 MIN: CPT | Mod: 25,S$GLB,, | Performed by: UROLOGY

## 2018-05-08 PROCEDURE — 87077 CULTURE AEROBIC IDENTIFY: CPT

## 2018-05-08 NOTE — PROGRESS NOTES
CC: recurrent UTI    CHIEF COMPLAINT:    Mr. Levin is a 52 y.o. male presenting for the evaluation of neurogenic bladder with frequency and urgency.    PRESENTING ILLNESS:    Mao Levin is a 52 y.o. male with a PMH of MS and neurogenic bladder who presents for frequency and urgency.  Recently seen by Thelma Kahlil and started him on vesicare.  He is here for evaluation with suds cysto.  However, it was cancelled due to infected urine.  C/o some dysuria.  C/o urinary frequency, urgency, and incontinence.  No fever or chills.  He is a resident at [facility].    He presents today for increased urinary frequency, nocturia, urgency w/ urge incontinence, and stress incontinence. He states he urinates every 1 hr during the day and night. He does not think that his symptoms have improved.  Unsure if he is taking Vesicare 10 mg daily. He states he has not had another UTI since the last visit.     Initial Note:  UTI symptoms? Dysuria and MS flairs. No f/c or n/v.  Severity of incontinence with # of depends?  He reports changing his depend with every urination d/t his urgency and urge incontinence. He believes he uses about 4-5 daily and 1 at night  Voiding symptoms? Reports urgency, frequency, urge incontinence, nocturia, hesitancy, intermittency, incomplete bladder emptying, and decreased urine volume. Denies difficulty urinating, dysuria, and hematuria.    hx? No  testing for his neurogenic bladder  Hx of pelvic surgery? No   Fluid consumption? Daily- Coffee 2 cups, water with meds, soda 0-1 cups, and juice 6 cups  Medications? Oxybutynin 10 mg daily; taking daily for 1 yr and has not seen improvement with his symptoms.  He reports a good urinary stream.  Constipation sometimes    CT abd/pel 10/2016- Kidneys concentrate and excrete contrast appropriately.  No nephrolithiasis.  No hydronephrosis or hydroureter.  No solid renal masses.  Bladder is fluid-filled and unremarkable.  The prostate is normal in  size.    Urine cultures:  8/14/17 PROVIDENCIA STUARTII> 100,000 cfu/ml   3/13/17 ESCHERICHIA COLI> 100,000 cfu/ml     REVIEW OF SYSTEMS:    Review of Systems    Constitutional: Negative for fever and chills.   HENT: Negative for hearing loss.   Eyes: Negative for visual disturbance.   Respiratory: Negative for shortness of breath.   Cardiovascular: Negative for chest pain.   Gastrointestinal: Negative for nausea and vomiting.  Genitourinary:  See above  Neurological: Negative for dizziness.   Hematological: Does not bruise/bleed easily.   Psychiatric/Behavioral: Negative for confusion.       PATIENT HISTORY:    Past Medical History:   Diagnosis Date    Anxiety     Deep vein thrombosis     Depression     Multiple sclerosis     Multiple sclerosis     Pulmonary embolism        No past surgical history on file.    Family History   Problem Relation Age of Onset    Arthritis Mother     No Known Problems Father     Cancer Maternal Grandfather        Social History     Social History    Marital status: Single     Spouse name: N/A    Number of children: N/A    Years of education: N/A     Occupational History    Not on file.     Social History Main Topics    Smoking status: Former Smoker     Packs/day: 0.50     Years: 23.00     Types: Cigarettes     Quit date: 10/1/2017    Smokeless tobacco: Never Used      Comment: Using patch    Alcohol use No    Drug use: No    Sexual activity: Yes     Partners: Female     Other Topics Concern    Not on file     Social History Narrative    No narrative on file       Allergies:  Patient has no known allergies.    Medications:    Current Outpatient Prescriptions:     buPROPion (WELLBUTRIN SR) 150 MG TBSR 12 hr tablet, 1 tab po daily x 3 days, then increase to 1 tab po BID; last dose no later than 6PM; stop smoking after 5-7 days of treatment;, Disp: 60 tablet, Rfl: 3    citalopram (CELEXA) 20 MG tablet, Take 1 tablet (20 mg total) by mouth once daily., Disp: 30 tablet,  Rfl: 0    dalfampridine (AMPYRA) 10 mg Tb12, Take 10 mg by mouth every 12 (twelve) hours., Disp: 180 tablet, Rfl: 1    dantrolene (DANTRIUM) 50 MG Cap, Take 1 capsule (50 mg total) by mouth 4 (four) times daily., Disp: 120 capsule, Rfl: 0    diazePAM (VALIUM) 5 MG tablet, Take 1 tablet (5 mg total) by mouth 2 (two) times daily., Disp: 60 tablet, Rfl: 2    duloxetine (CYMBALTA) 30 MG capsule, Take 30 mg by mouth once daily., Disp: , Rfl:     ergocalciferol (VITAMIN D2) 50,000 unit Cap, Take 1 capsule (50,000 Units total) by mouth every 7 days. (Patient taking differently: Take 50,000 Units by mouth every 7 days. on monday), Disp: 4 capsule, Rfl: 11    gabapentin (NEURONTIN) 300 MG capsule, Take 900 mg morning and evening.  Take 1100 mg total in the afternoon., Disp: 270 capsule, Rfl: 0    nicotine (NICODERM CQ) 14 mg/24 hr, Place 1 patch onto the skin every 24 hours., Disp: , Rfl:     oxyCODONE-acetaminophen (PERCOCET)  mg per tablet, Take 1 tablet by mouth every 8 (eight) hours. CAn you please give patient  Percocet at 3 am, 11 am, and 7 pm., Disp: 90 tablet, Rfl: 0    polyethylene glycol (GLYCOLAX) 17 gram/dose powder, Take 17 g by mouth once daily., Disp: 510 g, Rfl: 6    rivaroxaban (XARELTO) 20 mg Tab, Take 1 tablet (20 mg total) by mouth daily with dinner or evening meal., Disp: 60 tablet, Rfl: 4    senna-docusate 8.6-50 mg (PERICOLACE) 8.6-50 mg per tablet, Take 1 tablet by mouth once daily., Disp: , Rfl:     tamsulosin (FLOMAX) 0.4 mg Cp24, Take 1 capsule (0.4 mg total) by mouth once daily., Disp: 30 capsule, Rfl: 11    trazodone (DESYREL) 100 MG tablet, Take 1 tablet (100 mg total) by mouth every evening., Disp: 30 tablet, Rfl: 3    PHYSICAL EXAMINATION:    Constitutional: He is oriented to person, place, and time. He appears well-developed and well-nourished.  He is in no apparent distress.    Neck: No tracheal deviation present.     Cardiovascular: Normal rate.      Pulmonary/Chest:  Effort normal. No respiratory distress.     Abdominal:  He exhibits no distension.     Lymphadenopathy:        Right: No supraclavicular adenopathy present.        Left: No supraclavicular adenopathy present.     Neurological: He is alert and oriented to person, place, and time.     Skin: Skin is warm and dry.     Extremities: No pitting edema noted in lower extremities bilaterally    Psych: Cooperative with normal affect.    Genitourinary: Refused  exam. He states he did not mentally prepare for it and refuses it.     Physical Exam   Constitutional:   On a wheel chair due to MS.  Upon moving to the exam table, noted to have soaked wet diaper.         LABS:  PVR with bladder scanner was 150 cc.   U/a today shows + bacteria and blood. Spec grav 1.020 and ph 5     Lab Results   Component Value Date    PSA 0.21 07/18/2017       IMPRESSION:  Diagnoses and all orders for this visit:    Recurrent UTI  -     Urine culture  -     POCT urine dipstick without microscope  -     US Retroperitoneal Limited; Future    Frequency of urination    Urgency incontinence    MS (multiple sclerosis)    cath urine showed positive nitrite, leukocytes.  Sent for culture.     ml per catheterization    PLAN:  SUDS cysto was cancelled today due to UTI.  He will be rescheduled.  Will get US of kidneys and bladder.  Stop Vesicare due to incomplete bladder emptying  Continue flomax in the evening daily.    Will Start appropriate abx based on urine culture and then will continue abx until his next rescheduled suds cysto.    Follow-up in about 4 weeks (around 6/5/2018) for suds cysto.

## 2018-05-09 DIAGNOSIS — G35 MULTIPLE SCLEROSIS: Primary | ICD-10-CM

## 2018-05-10 LAB — BACTERIA UR CULT: NORMAL

## 2018-05-11 ENCOUNTER — TELEPHONE (OUTPATIENT)
Dept: UROLOGY | Facility: CLINIC | Age: 53
End: 2018-05-11

## 2018-05-11 DIAGNOSIS — N39.0 RECURRENT UTI: Primary | ICD-10-CM

## 2018-05-11 RX ORDER — SULFAMETHOXAZOLE AND TRIMETHOPRIM 800; 160 MG/1; MG/1
1 TABLET ORAL 2 TIMES DAILY
Qty: 60 TABLET | Refills: 0 | Status: SHIPPED | OUTPATIENT
Start: 2018-05-11 | End: 2018-06-10

## 2018-05-12 NOTE — TELEPHONE ENCOUNTER
Recurrent UTI  -     sulfamethoxazole-trimethoprim 800-160mg (BACTRIM DS) 800-160 mg Tab; Take 1 tablet by mouth 2 (two) times daily.  Dispense: 60 tablet; Refill: 0    he can start bactrim DS and continue it for 1 month.  He is scheduled to undergo SUDS cysto on 6/5/18.

## 2018-05-16 ENCOUNTER — DOCUMENTATION ONLY (OUTPATIENT)
Dept: NEUROLOGY | Facility: CLINIC | Age: 53
End: 2018-05-16

## 2018-05-24 ENCOUNTER — DOCUMENTATION ONLY (OUTPATIENT)
Dept: NEUROLOGY | Facility: CLINIC | Age: 53
End: 2018-05-24

## 2018-05-28 ENCOUNTER — PES CALL (OUTPATIENT)
Dept: ADMINISTRATIVE | Facility: CLINIC | Age: 53
End: 2018-05-28

## 2018-06-05 ENCOUNTER — HOSPITAL ENCOUNTER (OUTPATIENT)
Dept: RADIOLOGY | Facility: HOSPITAL | Age: 53
Discharge: HOME | End: 2018-06-05
Attending: UROLOGY
Payer: MEDICARE

## 2018-06-05 ENCOUNTER — TELEPHONE (OUTPATIENT)
Dept: UROLOGY | Facility: CLINIC | Age: 53
End: 2018-06-05

## 2018-06-05 ENCOUNTER — PROCEDURE VISIT (OUTPATIENT)
Dept: UROLOGY | Facility: CLINIC | Age: 53
End: 2018-06-05
Payer: MEDICARE

## 2018-06-05 VITALS
TEMPERATURE: 97 F | HEART RATE: 60 BPM | WEIGHT: 190.06 LBS | HEIGHT: 71 IN | SYSTOLIC BLOOD PRESSURE: 142 MMHG | DIASTOLIC BLOOD PRESSURE: 80 MMHG | RESPIRATION RATE: 18 BRPM | BODY MASS INDEX: 26.61 KG/M2

## 2018-06-05 DIAGNOSIS — N39.41 URGENCY INCONTINENCE: ICD-10-CM

## 2018-06-05 DIAGNOSIS — R33.9 INCOMPLETE BLADDER EMPTYING: ICD-10-CM

## 2018-06-05 DIAGNOSIS — N32.81 DETRUSOR INSTABILITY: ICD-10-CM

## 2018-06-05 DIAGNOSIS — N40.0 BPH WITHOUT URINARY OBSTRUCTION: Primary | ICD-10-CM

## 2018-06-05 DIAGNOSIS — N40.0 BPH WITHOUT URINARY OBSTRUCTION: ICD-10-CM

## 2018-06-05 DIAGNOSIS — N39.0 RECURRENT UTI (URINARY TRACT INFECTION): Primary | ICD-10-CM

## 2018-06-05 DIAGNOSIS — N39.0 RECURRENT UTI: ICD-10-CM

## 2018-06-05 DIAGNOSIS — N32.89 SPASTIC BLADDER: ICD-10-CM

## 2018-06-05 PROBLEM — N40.1 BENIGN PROSTATIC HYPERPLASIA WITH WEAK URINARY STREAM: Status: ACTIVE | Noted: 2018-06-05

## 2018-06-05 PROBLEM — R39.12 BENIGN PROSTATIC HYPERPLASIA WITH WEAK URINARY STREAM: Status: ACTIVE | Noted: 2018-06-05

## 2018-06-05 PROCEDURE — 51728 CYSTOMETROGRAM W/VP: CPT | Mod: S$GLB,,, | Performed by: UROLOGY

## 2018-06-05 PROCEDURE — 51797 INTRAABDOMINAL PRESSURE TEST: CPT | Mod: S$GLB,,, | Performed by: UROLOGY

## 2018-06-05 PROCEDURE — 87088 URINE BACTERIA CULTURE: CPT

## 2018-06-05 PROCEDURE — 87077 CULTURE AEROBIC IDENTIFY: CPT

## 2018-06-05 PROCEDURE — 51741 ELECTRO-UROFLOWMETRY FIRST: CPT | Mod: 51,S$GLB,, | Performed by: UROLOGY

## 2018-06-05 PROCEDURE — 51784 ANAL/URINARY MUSCLE STUDY: CPT | Mod: 51,S$GLB,, | Performed by: UROLOGY

## 2018-06-05 PROCEDURE — 76775 US EXAM ABDO BACK WALL LIM: CPT | Mod: 26,,, | Performed by: RADIOLOGY

## 2018-06-05 PROCEDURE — 87186 SC STD MICRODIL/AGAR DIL: CPT

## 2018-06-05 PROCEDURE — 87086 URINE CULTURE/COLONY COUNT: CPT

## 2018-06-05 PROCEDURE — 52000 CYSTOURETHROSCOPY: CPT | Mod: 59,S$GLB,, | Performed by: UROLOGY

## 2018-06-05 PROCEDURE — 76775 US EXAM ABDO BACK WALL LIM: CPT | Mod: TC

## 2018-06-05 RX ORDER — TAMSULOSIN HYDROCHLORIDE 0.4 MG/1
0.4 CAPSULE ORAL DAILY
Qty: 30 CAPSULE | Refills: 11 | Status: ON HOLD | OUTPATIENT
Start: 2018-06-05 | End: 2019-03-17

## 2018-06-05 RX ORDER — LIDOCAINE HYDROCHLORIDE 20 MG/ML
JELLY TOPICAL
Status: COMPLETED | OUTPATIENT
Start: 2018-06-05 | End: 2018-06-05

## 2018-06-05 RX ADMIN — LIDOCAINE HYDROCHLORIDE: 20 JELLY TOPICAL at 11:06

## 2018-06-05 NOTE — TELEPHONE ENCOUNTER
Diagnoses and all orders for this visit:    BPH without urinary obstruction  -     Case Request Operating Room: CYSTOSCOPY, INJECTION, BOTULINUM TOXIN, TYPE A 200 UNITS, DESTRUCTION-PROSTATE-TRANSURETHRAL    Detrusor instability  -     Case Request Operating Room: CYSTOSCOPY, INJECTION, BOTULINUM TOXIN, TYPE A 200 UNITS, DESTRUCTION-PROSTATE-TRANSURETHRAL    Urgency incontinence  -     Case Request Operating Room: CYSTOSCOPY, INJECTION, BOTULINUM TOXIN, TYPE A 200 UNITS, DESTRUCTION-PROSTATE-TRANSURETHRAL    Incomplete bladder emptying  -     Case Request Operating Room: CYSTOSCOPY, INJECTION, BOTULINUM TOXIN, TYPE A 200 UNITS, DESTRUCTION-PROSTATE-TRANSURETHRAL    Spastic bladder  -     Case Request Operating Room: CYSTOSCOPY, INJECTION, BOTULINUM TOXIN, TYPE A 200 UNITS, DESTRUCTION-PROSTATE-TRANSURETHRAL

## 2018-06-05 NOTE — PROCEDURES
Simple urodynamics w/ cysto  Date/Time: 6/5/2018 12:49 PM  Performed by: KIARRA KAN.  Authorized by: KIARRA KAN.   Comments: Procedure Date:  06/05/2018    Procedure:   Diagnostic Cystourethroscopy   Complex Cystometrogram   Voiding / Pressure Study with Intrarectal Balloon   Complex Uroflow   Electromyogram of Anal Sphincter.     Pre-OP Diagnosis:   Urge incontinence, weak urine flow, recurrent UTI   Post-OP Diagnosis:   Same, incomplete bladder emptying  Anesthesia:   Anesthesia Administered:   Intraurethral instillation of 10 mL 2% lidocaine (Xylocaine) jelly.   Findings:   --- Bladder ---   CYSTOMETROGRAM ( Filling Phase ):   Cystometric Numeric Data:   - First Desire (Sensation): 142 mL at 16 cm of water. (urine leak)  - Normal Desire: 288mL at 18 cm of water. (urine leak)  - Strong Desire: 293 mL at 31 cm of water. (urine leak)  - Urgency (Imminent Void) : 433 mL at 26 cm of water. (urine leak)  - Maximum Cystometric Capacity: 434 mL.   Compliance:   - low.   Leak Point Pressure:   - Valsalva ( Abdominal ) Leak Point Pressure: none.   UROFLOW:   - Voided Volume: 117 mL.   - Residual Urine: 70 mL.   - Maximum Flow Rate: 20 mL/sec.   - Flow Pattern: intermittent  VOIDING PRESSURE STUDY ( Voiding Phase ):   Detrusor Pressure:   - Maximum Detrusor Pressure: 114 cm of water.   - Detrusor Pressure at Maximum Flow: 72 cm of water.   - Detrusor Contraction Characteristics: Sustained contraction(s).   ELECTROMYOGRAM:   - normal.     ---Diagnostic Cystourethroscopy ---   Normal urethra.    Prostate 3 cm bilateral minimal enlargement  Width of Bladder Neck Opening: Approximately 18 Fr.   Normal bladder.   Normal ureteral orifices bilaterally.       Description of Procedure:   Informed Consent:   - Risks, benefits and alternatives of procedure discussed with   patient and informed consent obtained.   Patient Position:   - Supine.   --- Bladder ---   Prep and Drape:   - Patient prepped and draped in usual sterile  fashion using povidone   iodine (Betadine).   --- Diagnostic Cystourethroscopy ---   Instruments:   - 16 Fr flexible cystoscope with 0 degree lens.   Procedure Details:   - Cystoscope passed under vision into bladder.   - Bladder and urethra examined in their entirety with findings as   above.   --- Urodynamic Studies ---   Procedure Details:   Cystometrogram:   - Catheter(s) passed into the bladder.   - Rectal balloon inserted.   - Catheter(s) connected to infusion medium and to pressure recording   device.   - Infusion Rate: 30 mL / min.   Electromyogram:   - Perineal electromyogram pad placed and connected to electromyogram   recording device.   Equipment:   - Catheters: Double lumen catheter.   - Medium: Liquid.   - Pressure Recording Device: Calibrated electronic equipment.   Complications:   No immediate complications.    CONCLUSIONS:   1. Detrusor instability with urge incontinence, functional bladder capacity less than 150 ml.  2. Incomplete bladder emptying  3. Recurrent UTI    Has been on Bactrim DS due to recurrent UTI.  Has been on flomax, still has a problem with a urine leakage.  Hx of MS, hx of spasticity    Will plan cysto botox injection 200 units injection to the bladder.  At the same time, will do Rezum therapy.  Stop xarelto 2 days before the surgery.  Will leave the indwelling catheter 5 days after procedure.  Urine culture today.  Will continue to cover him with abx in order to keep his urine sterile prior to his surgery.    Post-OP Plan:   Patient was discharged home in a stable condition.  Medications: bactrim DS   Follow up:  surgery

## 2018-06-05 NOTE — TELEPHONE ENCOUNTER
Called [facility] to clarify any instructions. Nurse taking care of Mr. Levin was not aware of any issues. I told her to call back with any questions.

## 2018-06-05 NOTE — TELEPHONE ENCOUNTER
----- Message from Zayra Bruno MA sent at 6/5/2018  1:54 PM CDT -----  Contact: Yancy koenig/[place] [phone]  Needs Advice    Reason for call:    Needs to clarify the procedure he needs  Communication Preference:  Phone# above  Additional Information:  They are about to change shifts and if she is not their ask for mr. marley burrell's nurse

## 2018-06-05 NOTE — PATIENT INSTRUCTIONS
SIMPLE URODYNAMIC STUDY (SUDS) & CYSTOSCOPY  UROLOGY CLINIC DISCHARGE INSTRUCTIONS    You have had a procedure that will require time to properly heal. Follow the instructions you have been given on how to care for yourself once you are home. Below is additional information to help in your recovery.    ACTIVITY  · There are no restrictions in activity. Start doing again the things you did before the procedure.  · You may experience a slight burning sensation. You may notice a small amount of blood in your urine. This will clear up within a day. Call the clinic if this continues beyond 48 hours.    DIET  · Continue your normal diet. You may eat the same foods you ate before your procedure.  · Drink plenty of fluids during the first 24-48 hours following your procedure.    MEDICATIONS  · Resume all other previous medications from your prescribing physician.  · Continue any pre=procedure antibiotics until they are all gone.    SIGNS AND SYMPTOMS TO REPORT TO THE DOCTOR  · Chills or fever greater than 101° F within 24 hours of procedure.  · Changes in urination, such as increased bleeding, foul smell, cloudy urine, or painful urination.  · Call your doctor with any questions or concerns.    For any emergency situation, call [phone] immediately or go to your nearest emergency room.    [facility]  [phone]  After hours or on the weekends call [phone] and ask to speak with an urology resident on -call with any questions or concerns

## 2018-06-07 LAB — BACTERIA UR CULT: NORMAL

## 2018-06-08 ENCOUNTER — TELEPHONE (OUTPATIENT)
Dept: NEUROLOGY | Facility: CLINIC | Age: 53
End: 2018-06-08

## 2018-06-08 NOTE — TELEPHONE ENCOUNTER
----- Message from Adamaris Devi sent at 6/8/2018  3:11 PM CDT -----  Patient Requesting Sooner Appointment.     Reason for sooner appt.:just that he wants to see and discuss his condition with the doctor  When is the first available appointment?7/18  Communication Preference:phone# [phone]  Additional Information: asking to speak with someone regarding his appt, asking to be seen sooner than 7/18.      Epidermal Autograft Text: The defect edges were debeveled with a #15 scalpel blade.  Given the location of the defect, shape of the defect and the proximity to free margins an epidermal autograft was deemed most appropriate.  Using a sterile surgical marker, the primary defect shape was transferred to the donor site. The epidermal graft was then harvested.  The skin graft was then placed in the primary defect and oriented appropriately.

## 2018-06-12 ENCOUNTER — TELEPHONE (OUTPATIENT)
Dept: UROLOGY | Facility: CLINIC | Age: 53
End: 2018-06-12

## 2018-06-12 DIAGNOSIS — N30.00 ACUTE CYSTITIS WITHOUT HEMATURIA: Primary | ICD-10-CM

## 2018-06-12 RX ORDER — SULFAMETHOXAZOLE AND TRIMETHOPRIM 800; 160 MG/1; MG/1
1 TABLET ORAL 2 TIMES DAILY
Qty: 20 TABLET | Refills: 0 | Status: ON HOLD | OUTPATIENT
Start: 2018-06-12 | End: 2018-06-20

## 2018-06-12 NOTE — TELEPHONE ENCOUNTER
Diagnoses and all orders for this visit:    Acute cystitis without hematuria  -     sulfamethoxazole-trimethoprim 800-160mg (BACTRIM DS) 800-160 mg Tab; Take 1 tablet by mouth 2 (two) times daily.    I am seeing him on 6/15/18 and he will have cysto botox injection on 6/20 ( next weds).  Start Bactrim DS tomorrow ( 1 wk before his surgery).  I gave him 10 days of abx.

## 2018-06-13 ENCOUNTER — ANESTHESIA EVENT (OUTPATIENT)
Dept: SURGERY | Facility: HOSPITAL | Age: 53
End: 2018-06-13
Payer: MEDICARE

## 2018-06-13 DIAGNOSIS — Z01.818 PREOP TESTING: Primary | ICD-10-CM

## 2018-06-13 NOTE — PRE ADMISSION SCREENING
Anesthesia Assessment: Preoperative EQUATION    Planned Procedure: Procedure(s) (LRB):  CYSTOSCOPY (N/A)  INJECTION, BOTULINUM TOXIN, TYPE A 200 UNITS (N/A)  DESTRUCTION-PROSTATE-TRANSURETHRAL (N/A)  Requested Anesthesia Type:Monitor Anesthesia Care  Surgeon: Brian Augustin MD  Service: Urology  Known or anticipated Date of Surgery:6/20/2018    Surgeon notes: reviewed    Electronic QUestionnaire Assessment completed via nurse interview with patient.        No Aq        Triage considerations:     The patient has no apparent active cardiac condition (No unstable coronary Syndrome such as severe unstable angina or recent [<1 month] myocardial infarction, decompensated CHF, severe valvular   disease or significant arrhythmia)    Previous anesthesia records:Not available    Last PCP note: 6-12 months ago , within [facility]  Subspecialty notes: Neurology, Pain Management, rehab/ snf    Other important co-morbidities:   1. MS (multiple sclerosis)    2. Depression, unspecified depression type    3. 10/25/2016 Saddle PE    4. Hyperlipidemia, mixed    5. Hyperglycemia    6. Vitamin D deficiency    7. Screening for prostate cancer    8. Urinary tract infection without hematuria, site unspecified    9. Chronic pain syndrome           Long- term anti-coagulant         Tests already available:  No recent tests.            Instructions given. (See in Nurse's note)    Optimization:  Plan:    Testing:  Hematology Profile and CMP   Pre-anesthesia  visit       Visit focus: to be determined     Consultation:Patient's PCP for a statement of optimization /// xarelto instructions        Navigation: Tests Scheduled.              Consults scheduled.             Results will be tracked by Preop Clinic.

## 2018-06-13 NOTE — ANESTHESIA PREPROCEDURE EVALUATION
[]Hide copied text  Anesthesia Assessment: Preoperative EQUATION     Planned Procedure: Procedure(s) (LRB):  CYSTOSCOPY (N/A)  INJECTION, BOTULINUM TOXIN, TYPE A 200 UNITS (N/A)  DESTRUCTION-PROSTATE-TRANSURETHRAL (N/A)  Requested Anesthesia Type:Monitor Anesthesia Care  Surgeon: Brian Augustin MD  Service: Urology  Known or anticipated Date of Surgery:6/20/2018     Surgeon notes: reviewed     Electronic QUestionnaire Assessment completed via nurse interview with patient.         No Aq           Triage considerations:      The patient has no apparent active cardiac condition (No unstable coronary Syndrome such as severe unstable angina or recent [<1 month] myocardial infarction, decompensated CHF, severe valvular   disease or significant arrhythmia)     Previous anesthesia records:Not available     Last PCP note: 6-12 months ago , within [facility]  Subspecialty notes: Neurology, Pain Management, rehab/ snf     Other important co-morbidities:   1. MS (multiple sclerosis)    2. Depression, unspecified depression type    3. 10/25/2016 Saddle PE    4. Hyperlipidemia, mixed    5. Hyperglycemia    6. Vitamin D deficiency    7. Screening for prostate cancer    8. Urinary tract infection without hematuria, site unspecified    9. Chronic pain syndrome           Long- term anti-coagulant        Pt. States told to hold xarelto 2 days prior to surgery by surgeon's office  Tests already available:  No recent tests.                            Instructions given. (See in Nurse's note)     Optimization:  Plan:               Testing:  Hematology Profile and CMP   Pre-anesthesia  visit                                        Visit focus: to be determined                           Consultation:Patient's PCP for a statement of optimization /// xarelto instructions                               Navigation: Tests Scheduled.                         Consults scheduled.                        Results will be  tracked by Preop Clinic.                                                                                                              06/15/2018  Mao Levin is a 52 y.o., male with MS diagnosed in 2006 with associated neurogenic bladder, chronic pain syndrome with chronic opioid use and a saddle PE 10/2016 on xarelto who presents preoperative clinic for evaluation prior to cystoscopy with botox injection and transurethral destruction of the prostate. .    Anesthesia Evaluation    I have reviewed the Patient Summary Reports.    I have reviewed the Nursing Notes.      Review of Systems  Anesthesia Hx:  No previous Anesthesia Denies Hx of Anesthetic complications  Denies Family Hx of Anesthesia complications.   Denies Personal Hx of Anesthesia complications.   Social:  Smoker, No Alcohol Use    Hematology/Oncology:  Hematology Normal   Oncology Normal     EENT/Dental:EENT/Dental Normal   Cardiovascular:   Exercise tolerance: poor  Functional Capacity low / < 4 METS, patient able to walk 1-2 blocks with walker.  Limited by MS  Denies Cardiovascular Symptoms.    Pulmonary:   Hx PE 2016 Pulmonary Embolism, > 6 months ago, saddle embolus    Renal/:   BPH Neurogenic bladder  bph with obstruction  detruser instability  Spastic bladder  Incomplete emptying   Musculoskeletal:   Chronic back pain  Uses wheelchair Denies Muscle Disorders    Neurological:   Neuromuscular Disease, Headaches Polyneuropathy  MS Dx of Headaches Neuromuscular Disease, Multiple Sclerosis   Psych:   Psychiatric History depression          Physical Exam  General:  Well nourished    Airway/Jaw/Neck:  Airway Findings: Mouth Opening: Normal Tongue: Normal  General Airway Assessment: Adult  Mallampati: III  Improves to II with phonation.  TM Distance: Normal, at least 6 cm  Jaw/Neck Findings:  Limited Ability to Prognath  Neck ROM: Normal ROM     Eyes/Ears/Nose:  Eyes/Ears/Nose Findings: Mucus membranes moist    Dental:  Dental Findings: In tact    Chest/Lungs:  Chest/Lungs Findings: Clear to auscultation, Normal Respiratory Rate     Heart/Vascular:  Heart Findings: Rate: Normal  Rhythm: Regular Rhythm  Sounds: Normal  Heart murmur: negative    Abdomen:  Abdomen Findings: Normal    Musculoskeletal:  Musculoskeletal Findings: Spasticity in the upper extremities    Skin:  Skin Findings: Normal    Mental Status:  Mental Status Findings:  Cooperative, Alert and Oriented         Anesthesia Plan  Type of Anesthesia, risks & benefits discussed:  Anesthesia Type:  general  Patient's Preference:   Intra-op Monitoring Plan: standard ASA monitors  Intra-op Monitoring Plan Comments:   Post Op Pain Control Plan:   Post Op Pain Control Plan Comments:   Induction:   IV  Beta Blocker:  Patient is not currently on a Beta-Blocker (No further documentation required).       Informed Consent: Patient understands risks and agrees with Anesthesia plan.  Questions answered. Anesthesia consent signed with patient.  ASA Score: 3     Day of Surgery Review of History & Physical:            Ready For Surgery From Anesthesia Perspective.

## 2018-06-14 ENCOUNTER — TELEPHONE (OUTPATIENT)
Dept: NEUROLOGY | Facility: CLINIC | Age: 53
End: 2018-06-14

## 2018-06-14 NOTE — PRE-PROCEDURE INSTRUCTIONS
MD Carisa Montero RN              Hi,     I recommend he hold his xarelto for 3-5 days prior to procedure. Thank you!     Dr. KEEN    Previous Messages      ----- Message -----   From: Bruna Vuong MA   Sent: 6/14/2018  10:21 AM   To: Mendy Alarcon MD         ----- Message -----   From: Carisa Rodarte RN   Sent: 6/14/2018  10:17 AM   To: Mendy Alarcon MD, *

## 2018-06-14 NOTE — TELEPHONE ENCOUNTER
Received call from pt. Rescheduled appt to 6/27/18 at 10:45. Case Matti Fernandez and pt are aware of appt date and time.

## 2018-06-15 ENCOUNTER — OFFICE VISIT (OUTPATIENT)
Dept: UROLOGY | Facility: CLINIC | Age: 53
End: 2018-06-15
Payer: MEDICARE

## 2018-06-15 ENCOUNTER — DOCUMENTATION ONLY (OUTPATIENT)
Dept: NEUROLOGY | Facility: CLINIC | Age: 53
End: 2018-06-15

## 2018-06-15 ENCOUNTER — HOSPITAL ENCOUNTER (OUTPATIENT)
Dept: PREADMISSION TESTING | Facility: HOSPITAL | Age: 53
Discharge: HOME | End: 2018-06-15
Attending: ANESTHESIOLOGY
Payer: MEDICARE

## 2018-06-15 VITALS
WEIGHT: 185 LBS | RESPIRATION RATE: 16 BRPM | DIASTOLIC BLOOD PRESSURE: 82 MMHG | HEART RATE: 59 BPM | OXYGEN SATURATION: 100 % | BODY MASS INDEX: 25.9 KG/M2 | SYSTOLIC BLOOD PRESSURE: 133 MMHG | HEIGHT: 71 IN | TEMPERATURE: 98 F

## 2018-06-15 VITALS — RESPIRATION RATE: 18 BRPM | HEIGHT: 71 IN | BODY MASS INDEX: 26.6 KG/M2 | WEIGHT: 190 LBS

## 2018-06-15 DIAGNOSIS — N20.0 KIDNEY STONE: ICD-10-CM

## 2018-06-15 DIAGNOSIS — N39.0 RECURRENT UTI: Primary | ICD-10-CM

## 2018-06-15 PROCEDURE — 3008F BODY MASS INDEX DOCD: CPT | Mod: CPTII,S$GLB,, | Performed by: UROLOGY

## 2018-06-15 PROCEDURE — 99499 UNLISTED E&M SERVICE: CPT | Mod: S$GLB,,, | Performed by: UROLOGY

## 2018-06-15 PROCEDURE — 96372 THER/PROPH/DIAG INJ SC/IM: CPT | Mod: S$GLB,,, | Performed by: UROLOGY

## 2018-06-15 PROCEDURE — 99999 PR PBB SHADOW E&M-EST. PATIENT-LVL IV: CPT | Mod: PBBFAC,,, | Performed by: UROLOGY

## 2018-06-15 PROCEDURE — 99214 OFFICE O/P EST MOD 30 MIN: CPT | Mod: 25,S$GLB,, | Performed by: UROLOGY

## 2018-06-15 RX ORDER — CEFTRIAXONE 1 G/1
1 INJECTION, POWDER, FOR SOLUTION INTRAMUSCULAR; INTRAVENOUS
Status: COMPLETED | OUTPATIENT
Start: 2018-06-15 | End: 2018-06-15

## 2018-06-15 RX ORDER — SULFAMETHOXAZOLE AND TRIMETHOPRIM 800; 160 MG/1; MG/1
1 TABLET ORAL 2 TIMES DAILY
Qty: 14 TABLET | Refills: 0 | Status: SHIPPED | OUTPATIENT
Start: 2018-06-15 | End: 2018-06-22

## 2018-06-15 RX ADMIN — CEFTRIAXONE 1 G: 1 INJECTION, POWDER, FOR SOLUTION INTRAMUSCULAR; INTRAVENOUS at 10:06

## 2018-06-15 NOTE — PROGRESS NOTES
CC: recurrent UTI    CHIEF COMPLAINT:    Mr. Levin is a 52 y.o. male presenting for the evaluation of neurogenic bladder with frequency and urgency.    PRESENTING ILLNESS:    Mao Levin is a 52 y.o. male with a PMH of MS and neurogenic bladder who presents for frequency and urgency.  Recently seen by Thelma Khalil who started him on vesicare.    Underwent SUDS cyst on 18 revealin. Detrusor instability with urge incontinence, functional bladder   capacity less than 150 ml.  2. Incomplete bladder emptying    Initially plan for bladder botox injection and rezum but will cancel rezum for now in light of infection.     Urine culture from SUDS cysto was + for providencia stuartii  We will give him rocephin today in clinic and start him on bactrim in preparation for surgery on 18.    He has had recurrent UTI with same strain of providencia stuartii for the past year. Currently on daily macrobid prophylaxis per med list, unsure if he is taking this    Recent renal ultrasound with likely right renal stone will plan for CTRSS    C/o continued dysuria.  C/o urinary frequency, urgency, and urge incontinence. He states he urinates every 1 hr during the day and night. He does not think that his symptoms have improved.  Unsure if he is taking Vesicare 10 mg daily.   No fever or chills.    He is a resident at [facility].    Renal Ultrasound 18: The right kidney measures 10.1 cm. No cortical thinning. No loss of corticomedullary distinction. Resistive index measures 0.60.  No mass. Echogenic foci demonstrating twinkle artifact within the inferior pole measuring 0.5 cm which may represent a renal stone. No hydronephrosis.   Left kidney: The left kidney measures 9.9 cm. No cortical thinning. No loss of corticomedullary distinction. Resistive index measures 0.53.  No mass. No renal stone. No hydronephrosis.    CT abd/pel 10/2016- Kidneys concentrate and excrete contrast appropriately.  No nephrolithiasis.  No  hydronephrosis or hydroureter.  No solid renal masses.  Bladder is fluid-filled and unremarkable.  The prostate is normal in size.    Urine cultures:   5/8/18: +Providencia stuartii  3/13/18: +Providencia stuartii  2/1/18: +Providencia stuartii  8/14/17: +Providencia stuartii  3/14/17:: + E. coli  2/12/17: + E. coli    Initial Note:  UTI symptoms? Dysuria and MS flairs. No f/c or n/v.  Severity of incontinence with # of depends?  He reports changing his depend with every urination d/t his urgency and urge incontinence. He believes he uses about 4-5 daily and 1 at night  Voiding symptoms? Reports urgency, frequency, urge incontinence, nocturia, hesitancy, intermittency, incomplete bladder emptying, and decreased urine volume. Denies difficulty urinating, dysuria, and hematuria.    hx? No  testing for his neurogenic bladder  Hx of pelvic surgery? No   Fluid consumption? Daily- Coffee 2 cups, water with meds, soda 0-1 cups, and juice 6 cups  Medications? Oxybutynin 10 mg daily; taking daily for 1 yr and has not seen improvement with his symptoms.  He reports a good urinary stream.  Constipation sometimes    REVIEW OF SYSTEMS:    Review of Systems    Constitutional: Negative for fever and chills.   HENT: Negative for hearing loss.   Eyes: Negative for visual disturbance.   Respiratory: Negative for shortness of breath.   Cardiovascular: Negative for chest pain.   Gastrointestinal: Negative for nausea and vomiting.  Genitourinary:  See above  Neurological: Negative for dizziness.   Hematological: Does not bruise/bleed easily.   Psychiatric/Behavioral: Negative for confusion.       PATIENT HISTORY:    Past Medical History:   Diagnosis Date    Anticoagulant long-term use     Anxiety     Chronic back pain     Deep vein thrombosis     Depression     Depression     Gait instability     Multiple sclerosis     Multiple sclerosis     Neurogenic bladder     Polyneuropathy     Pulmonary embolism     Spasticity         History reviewed. No pertinent surgical history.    Family History   Problem Relation Age of Onset    Arthritis Mother     No Known Problems Father     Cancer Maternal Grandfather        Social History     Social History    Marital status: Single     Spouse name: N/A    Number of children: N/A    Years of education: N/A     Occupational History    Not on file.     Social History Main Topics    Smoking status: Former Smoker     Packs/day: 0.50     Years: 23.00     Types: Cigarettes     Quit date: 10/1/2017    Smokeless tobacco: Never Used      Comment: Using patch    Alcohol use No    Drug use: No    Sexual activity: Yes     Partners: Female     Other Topics Concern    Not on file     Social History Narrative    No narrative on file       Allergies:  Patient has no known allergies.    Medications:    Current Outpatient Prescriptions:     buPROPion (WELLBUTRIN SR) 150 MG TBSR 12 hr tablet, 1 tab po daily x 3 days, then increase to 1 tab po BID; last dose no later than 6PM; stop smoking after 5-7 days of treatment; (Patient taking differently: 150 mg 2 (two) times daily. 1 tab po daily x 3 days, then increase to 1 tab po BID; last dose no later than 6PM; stop smoking after 5-7 days of treatment;), Disp: 60 tablet, Rfl: 3    citalopram (CELEXA) 20 MG tablet, Take 1 tablet (20 mg total) by mouth once daily., Disp: 30 tablet, Rfl: 0    cranberry conc-C-bacillus coag 450-30-50 mg-mg-million Tab, Take 1 capsule by mouth once daily., Disp: 120 each, Rfl: 3    dalfampridine (AMPYRA) 10 mg Tb12, Take 10 mg by mouth every 12 (twelve) hours., Disp: 180 tablet, Rfl: 1    dantrolene (DANTRIUM) 50 MG Cap, Take 1 capsule (50 mg total) by mouth 4 (four) times daily., Disp: 120 capsule, Rfl: 0    diazePAM (VALIUM) 5 MG tablet, Take 1 tablet (5 mg total) by mouth 2 (two) times daily., Disp: 60 tablet, Rfl: 2    duloxetine (CYMBALTA) 30 MG capsule, Take 30 mg by mouth once daily., Disp: , Rfl:      ergocalciferol (VITAMIN D2) 50,000 unit Cap, Take 1 capsule (50,000 Units total) by mouth every 7 days. (Patient taking differently: Take 50,000 Units by mouth every 7 days. on monday), Disp: 4 capsule, Rfl: 11    gabapentin (NEURONTIN) 300 MG capsule, Take 900 mg morning and evening.  Take 1100 mg total in the afternoon., Disp: 270 capsule, Rfl: 0    mirabegron (MYRBETRIQ) 50 mg Tb24, Take 1 tablet (50 mg total) by mouth once daily., Disp: 30 tablet, Rfl: 11    nicotine (NICODERM CQ) 14 mg/24 hr, Place 1 patch onto the skin every 24 hours., Disp: , Rfl:     nitrofurantoin macrocrystal (MACRODANTIN ORAL), Take by mouth., Disp: , Rfl:     oxyCODONE-acetaminophen (PERCOCET)  mg per tablet, Take 1 tablet by mouth every 8 (eight) hours. CAn you please give patient  Percocet at 3 am, 11 am, and 7 pm., Disp: 90 tablet, Rfl: 0    polyethylene glycol (GLYCOLAX) 17 gram/dose powder, Take 17 g by mouth once daily., Disp: 510 g, Rfl: 6    rivaroxaban (XARELTO) 20 mg Tab, Take 1 tablet (20 mg total) by mouth daily with dinner or evening meal., Disp: 60 tablet, Rfl: 4    senna-docusate 8.6-50 mg (PERICOLACE) 8.6-50 mg per tablet, Take 1 tablet by mouth once daily., Disp: , Rfl:     sulfamethoxazole-trimethoprim 800-160mg (BACTRIM DS) 800-160 mg Tab, Take 1 tablet by mouth 2 (two) times daily., Disp: 20 tablet, Rfl: 0    sulfamethoxazole-trimethoprim 800-160mg (BACTRIM DS) 800-160 mg Tab, Take 1 tablet by mouth 2 (two) times daily., Disp: 14 tablet, Rfl: 0    tamsulosin (FLOMAX) 0.4 mg Cp24, Take 1 capsule (0.4 mg total) by mouth once daily., Disp: 30 capsule, Rfl: 11    trazodone (DESYREL) 100 MG tablet, Take 1 tablet (100 mg total) by mouth every evening., Disp: 30 tablet, Rfl: 3    Current Facility-Administered Medications:     cefTRIAXone injection 1 g, 1 g, Intramuscular, 1 time in Clinic/HOD, Brian Augustin MD    PHYSICAL EXAMINATION:    Constitutional: He is oriented to person, place, and time. He  appears well-developed and well-nourished.  He is in no apparent distress.    Neck: No tracheal deviation present.     Cardiovascular: Normal rate.      Pulmonary/Chest: Effort normal. No respiratory distress.     Abdominal:  He exhibits no distension.     Lymphadenopathy:        Right: No supraclavicular adenopathy present.        Left: No supraclavicular adenopathy present.     Neurological: He is alert and oriented to person, place, and time.     Skin: Skin is warm and dry.     Extremities: No pitting edema noted in lower extremities bilaterally    Psych: Cooperative with normal affect.    Genitourinary: deferred    Physical Exam   Constitutional:   In a wheel chair due to MS.  Soaked wet diaper.         LABS:  U/a today shows:    Lab Results   Component Value Date    PSA 0.21 07/18/2017       IMPRESSION:  Mao was seen today for urinary frequency and medication refill.    Diagnoses and all orders for this visit:    Recurrent UTI  -     cefTRIAXone injection 1 g; Inject 1 g into the muscle one time.  -     sulfamethoxazole-trimethoprim 800-160mg (BACTRIM DS) 800-160 mg Tab; Take 1 tablet by mouth 2 (two) times daily.  -     cranberry conc-C-bacillus coag 450-30-50 mg-mg-million Tab; Take 1 capsule by mouth once daily.    Kidney stone  -     CT Renal Stone Study ABD Pelvis WO; Future      PLAN:  -Rocephin IM given today for UTI, Rx given for bactrim to start today  -I encouraged the patient to start probiotics today for recurrent UTIs  -Plan to OR for botox injections next week, will cancel rezum, pateint to stop xarelto 2 days prior to surgery  -Continue flomax in the evening daily.  -Plan to CT renal stone study to evaluate for renal stone in right kidney    Nature and risks of operation explained. Will hold off Rezum Therapy at this time.  Will see how he responds to botox injection.  I spent 25 minutes with the patient of which more than half was spent in direct consultation with the patient in regards to our  treatment and plan.      Follow up:  Follow-up for cysto botox injection, CT RSS.

## 2018-06-19 ENCOUNTER — DOCUMENTATION ONLY (OUTPATIENT)
Dept: NEUROLOGY | Facility: CLINIC | Age: 53
End: 2018-06-19

## 2018-06-19 ENCOUNTER — TELEPHONE (OUTPATIENT)
Dept: UROLOGY | Facility: CLINIC | Age: 53
End: 2018-06-19

## 2018-06-19 NOTE — PROGRESS NOTES
Labs reviewed.   CMP-normal apart from low glucose (60)  CBC done without diff--WBC normal, RBC decreased 4.19  Transferrin 182(low)

## 2018-06-19 NOTE — TELEPHONE ENCOUNTER
Called pt's caregiver to confirm 9am arrival time for procedure. Gave pt's caregiver NPO instructions and gave pt's caregiver opportunity to ask questions. Pt's caregiver verbalized understanding.

## 2018-06-20 ENCOUNTER — ANESTHESIA (OUTPATIENT)
Dept: SURGERY | Facility: HOSPITAL | Age: 53
End: 2018-06-20
Payer: MEDICARE

## 2018-06-20 ENCOUNTER — SURGERY (OUTPATIENT)
Age: 53
End: 2018-06-20

## 2018-06-20 ENCOUNTER — HOSPITAL ENCOUNTER (OUTPATIENT)
Facility: HOSPITAL | Age: 53
Discharge: HOME | End: 2018-06-20
Attending: UROLOGY | Admitting: UROLOGY
Payer: MEDICARE

## 2018-06-20 VITALS
OXYGEN SATURATION: 100 % | TEMPERATURE: 98 F | HEIGHT: 71 IN | HEART RATE: 53 BPM | BODY MASS INDEX: 26.61 KG/M2 | SYSTOLIC BLOOD PRESSURE: 147 MMHG | DIASTOLIC BLOOD PRESSURE: 90 MMHG | WEIGHT: 190.06 LBS | RESPIRATION RATE: 16 BRPM

## 2018-06-20 DIAGNOSIS — N30.00 ACUTE CYSTITIS WITHOUT HEMATURIA: ICD-10-CM

## 2018-06-20 DIAGNOSIS — N39.41 URGENCY INCONTINENCE: Primary | ICD-10-CM

## 2018-06-20 DIAGNOSIS — N31.9 NEUROGENIC BLADDER: ICD-10-CM

## 2018-06-20 PROCEDURE — 25000003 PHARM REV CODE 250: Performed by: NURSE ANESTHETIST, CERTIFIED REGISTERED

## 2018-06-20 PROCEDURE — 52287 CYSTOSCOPY CHEMODENERVATION: CPT | Mod: ,,, | Performed by: UROLOGY

## 2018-06-20 PROCEDURE — 25000003 PHARM REV CODE 250: Performed by: STUDENT IN AN ORGANIZED HEALTH CARE EDUCATION/TRAINING PROGRAM

## 2018-06-20 PROCEDURE — 71000033 HC RECOVERY, INTIAL HOUR: Performed by: UROLOGY

## 2018-06-20 PROCEDURE — 63600175 PHARM REV CODE 636 W HCPCS: Performed by: NURSE ANESTHETIST, CERTIFIED REGISTERED

## 2018-06-20 PROCEDURE — D9220A PRA ANESTHESIA: Mod: ,,, | Performed by: ANESTHESIOLOGY

## 2018-06-20 PROCEDURE — 63600175 PHARM REV CODE 636 W HCPCS: Performed by: STUDENT IN AN ORGANIZED HEALTH CARE EDUCATION/TRAINING PROGRAM

## 2018-06-20 PROCEDURE — 36000706: Performed by: UROLOGY

## 2018-06-20 PROCEDURE — 71000015 HC POSTOP RECOV 1ST HR: Performed by: UROLOGY

## 2018-06-20 PROCEDURE — 36000707: Performed by: UROLOGY

## 2018-06-20 PROCEDURE — 71000016 HC POSTOP RECOV ADDL HR: Performed by: UROLOGY

## 2018-06-20 PROCEDURE — 37000008 HC ANESTHESIA 1ST 15 MINUTES: Performed by: UROLOGY

## 2018-06-20 PROCEDURE — 37000009 HC ANESTHESIA EA ADD 15 MINS: Performed by: UROLOGY

## 2018-06-20 RX ORDER — SODIUM CHLORIDE 9 MG/ML
INJECTION, SOLUTION INTRAVENOUS CONTINUOUS
Status: DISCONTINUED | OUTPATIENT
Start: 2018-06-20 | End: 2018-06-20 | Stop reason: HOSPADM

## 2018-06-20 RX ORDER — FENTANYL CITRATE 50 UG/ML
INJECTION, SOLUTION INTRAMUSCULAR; INTRAVENOUS
Status: DISCONTINUED | OUTPATIENT
Start: 2018-06-20 | End: 2018-06-20

## 2018-06-20 RX ORDER — PROPOFOL 10 MG/ML
VIAL (ML) INTRAVENOUS
Status: DISCONTINUED | OUTPATIENT
Start: 2018-06-20 | End: 2018-06-20

## 2018-06-20 RX ORDER — FENTANYL CITRATE 50 UG/ML
25 INJECTION, SOLUTION INTRAMUSCULAR; INTRAVENOUS EVERY 5 MIN PRN
Status: DISCONTINUED | OUTPATIENT
Start: 2018-06-20 | End: 2018-06-20 | Stop reason: HOSPADM

## 2018-06-20 RX ORDER — SODIUM CHLORIDE 9 MG/ML
INJECTION, SOLUTION INTRAVENOUS CONTINUOUS PRN
Status: DISCONTINUED | OUTPATIENT
Start: 2018-06-20 | End: 2018-06-20

## 2018-06-20 RX ORDER — ONDANSETRON 8 MG/1
8 TABLET, ORALLY DISINTEGRATING ORAL EVERY 8 HOURS PRN
Status: DISCONTINUED | OUTPATIENT
Start: 2018-06-20 | End: 2018-06-20 | Stop reason: HOSPADM

## 2018-06-20 RX ORDER — HYDROCODONE BITARTRATE AND ACETAMINOPHEN 5; 325 MG/1; MG/1
1 TABLET ORAL EVERY 4 HOURS PRN
Status: DISCONTINUED | OUTPATIENT
Start: 2018-06-20 | End: 2018-06-20 | Stop reason: HOSPADM

## 2018-06-20 RX ORDER — SODIUM CHLORIDE 0.9 % (FLUSH) 0.9 %
3 SYRINGE (ML) INJECTION
Status: DISCONTINUED | OUTPATIENT
Start: 2018-06-20 | End: 2018-06-20 | Stop reason: HOSPADM

## 2018-06-20 RX ORDER — LIDOCAINE HCL/PF 100 MG/5ML
SYRINGE (ML) INTRAVENOUS
Status: DISCONTINUED | OUTPATIENT
Start: 2018-06-20 | End: 2018-06-20

## 2018-06-20 RX ORDER — OXYCODONE AND ACETAMINOPHEN 5; 325 MG/1; MG/1
1 TABLET ORAL EVERY 4 HOURS PRN
Qty: 5 TABLET | Refills: 0 | Status: SHIPPED | OUTPATIENT
Start: 2018-06-20 | End: 2018-06-27

## 2018-06-20 RX ORDER — MIDAZOLAM HYDROCHLORIDE 1 MG/ML
INJECTION, SOLUTION INTRAMUSCULAR; INTRAVENOUS
Status: DISCONTINUED | OUTPATIENT
Start: 2018-06-20 | End: 2018-06-20

## 2018-06-20 RX ORDER — PROPOFOL 10 MG/ML
VIAL (ML) INTRAVENOUS CONTINUOUS PRN
Status: DISCONTINUED | OUTPATIENT
Start: 2018-06-20 | End: 2018-06-20

## 2018-06-20 RX ORDER — SULFAMETHOXAZOLE AND TRIMETHOPRIM 800; 160 MG/1; MG/1
1 TABLET ORAL 2 TIMES DAILY
Qty: 6 TABLET | Refills: 0 | Status: SHIPPED | OUTPATIENT
Start: 2018-06-20 | End: 2018-06-23

## 2018-06-20 RX ORDER — GLYCOPYRROLATE 0.2 MG/ML
INJECTION INTRAMUSCULAR; INTRAVENOUS
Status: DISCONTINUED | OUTPATIENT
Start: 2018-06-20 | End: 2018-06-20

## 2018-06-20 RX ADMIN — SODIUM CHLORIDE: 9 INJECTION, SOLUTION INTRAVENOUS at 10:06

## 2018-06-20 RX ADMIN — LIDOCAINE HYDROCHLORIDE 50 MG: 20 INJECTION, SOLUTION INTRAVENOUS at 10:06

## 2018-06-20 RX ADMIN — PROPOFOL 50 MG: 10 INJECTION, EMULSION INTRAVENOUS at 10:06

## 2018-06-20 RX ADMIN — FENTANYL CITRATE 25 MCG: 50 INJECTION, SOLUTION INTRAMUSCULAR; INTRAVENOUS at 11:06

## 2018-06-20 RX ADMIN — CEFTRIAXONE SODIUM 2 G: 2 INJECTION, POWDER, FOR SOLUTION INTRAMUSCULAR; INTRAVENOUS at 10:06

## 2018-06-20 RX ADMIN — MIDAZOLAM HYDROCHLORIDE 2 MG: 1 INJECTION, SOLUTION INTRAMUSCULAR; INTRAVENOUS at 10:06

## 2018-06-20 RX ADMIN — GLYCOPYRROLATE 0.2 MG: 0.2 INJECTION, SOLUTION INTRAMUSCULAR; INTRAVENOUS at 10:06

## 2018-06-20 RX ADMIN — PROPOFOL 150 MCG/KG/MIN: 10 INJECTION, EMULSION INTRAVENOUS at 10:06

## 2018-06-20 RX ADMIN — HYDROCODONE BITARTRATE AND ACETAMINOPHEN 1 TABLET: 5; 325 TABLET ORAL at 12:06

## 2018-06-20 NOTE — DISCHARGE SUMMARY
[facility]  Discharge Note  Short Stay    Admit Date: 6/20/2018    Discharge Date and Time: 06/20/2018 11:20 AM      Attending Physician: Brian Augustin MD     Discharge Provider: Urmila Dominguez    Diagnoses:  Active Hospital Problems    Diagnosis  POA    *Neurogenic bladder [N31.9]  Yes     Chronic    Urgency incontinence [N39.41]  Yes    Frequency of urination [R35.0]  Yes    Recurrent UTI [N39.0]  Yes    Long-term current use of opiate analgesic [Z79.891]  Not Applicable    Chronic pain syndrome [G89.4]  Yes    Intractable headache [R51]  Yes    Smoker [F17.200]  Yes    Depression [F32.9]  Yes    MS (multiple sclerosis) [G35]  Yes     Chronic    Constipation due to neurogenic bowel [K59.00]  Yes    Polyneuropathy [G62.9]  Yes    Vitamin D deficiency disease [E55.9]  Yes    Impaired mobility and ADLs [Z74.09]  Yes     Chronic    Paraparesis [G82.20]  Yes    Gait instability [R26.81]  Yes      Resolved Hospital Problems    Diagnosis Date Resolved POA   No resolved problems to display.       Discharged Condition: good    Hospital Course: Patient was admitted for cysto botox injection and tolerated the procedure well with no complications. The patient was discharged home in good condition on the same day.       Final Diagnoses: Same as principal problem.    Disposition: Home or Self Care    Follow up/Patient Instructions:    Medications:  Reconciled Home Medications: Current Discharge Medication List      START taking these medications    Details   oxyCODONE-acetaminophen (PERCOCET) 5-325 mg per tablet Take 1 tablet by mouth every 4 (four) hours as needed.  Qty: 5 tablet, Refills: 0         CONTINUE these medications which have CHANGED    Details   !! sulfamethoxazole-trimethoprim 800-160mg (BACTRIM DS) 800-160 mg Tab Take 1 tablet by mouth 2 (two) times daily.  Qty: 6 tablet, Refills: 0    Associated Diagnoses: Acute cystitis without hematuria       !! - Potential duplicate medications  found. Please discuss with provider.      CONTINUE these medications which have NOT CHANGED    Details   buPROPion (WELLBUTRIN SR) 150 MG TBSR 12 hr tablet 1 tab po daily x 3 days, then increase to 1 tab po BID; last dose no later than 6PM; stop smoking after 5-7 days of treatment;  Qty: 60 tablet, Refills: 3    Associated Diagnoses: Tobacco use; Depression, unspecified depression type      citalopram (CELEXA) 20 MG tablet Take 1 tablet (20 mg total) by mouth once daily.  Qty: 30 tablet, Refills: 0      cranberry conc-C-bacillus coag 450-30-50 mg-mg-million Tab Take 1 capsule by mouth once daily.  Qty: 120 each, Refills: 3    Associated Diagnoses: Recurrent UTI      dalfampridine (AMPYRA) 10 mg Tb12 Take 10 mg by mouth every 12 (twelve) hours.  Qty: 180 tablet, Refills: 1    Associated Diagnoses: Gait disturbance; Multiple sclerosis      dantrolene (DANTRIUM) 50 MG Cap Take 1 capsule (50 mg total) by mouth 4 (four) times daily.  Qty: 120 capsule, Refills: 0      duloxetine (CYMBALTA) 30 MG capsule Take 30 mg by mouth once daily.      ergocalciferol (VITAMIN D2) 50,000 unit Cap Take 1 capsule (50,000 Units total) by mouth every 7 days.  Qty: 4 capsule, Refills: 11      gabapentin (NEURONTIN) 300 MG capsule Take 900 mg morning and evening.  Take 1100 mg total in the afternoon.  Qty: 270 capsule, Refills: 0      mirabegron (MYRBETRIQ) 50 mg Tb24 Take 1 tablet (50 mg total) by mouth once daily.  Qty: 30 tablet, Refills: 11    Associated Diagnoses: Detrusor instability; Urgency incontinence      nicotine (NICODERM CQ) 14 mg/24 hr Place 1 patch onto the skin every 24 hours.      nitrofurantoin macrocrystal (MACRODANTIN ORAL) Take by mouth.      polyethylene glycol (GLYCOLAX) 17 gram/dose powder Take 17 g by mouth once daily.  Qty: 510 g, Refills: 6      rivaroxaban (XARELTO) 20 mg Tab Take 1 tablet (20 mg total) by mouth daily with dinner or evening meal.  Qty: 60 tablet, Refills: 4      senna-docusate 8.6-50 mg  (PERICOLACE) 8.6-50 mg per tablet Take 1 tablet by mouth once daily.      !! sulfamethoxazole-trimethoprim 800-160mg (BACTRIM DS) 800-160 mg Tab Take 1 tablet by mouth 2 (two) times daily.  Qty: 14 tablet, Refills: 0    Associated Diagnoses: Recurrent UTI      tamsulosin (FLOMAX) 0.4 mg Cp24 Take 1 capsule (0.4 mg total) by mouth once daily.  Qty: 30 capsule, Refills: 11    Associated Diagnoses: BPH without urinary obstruction      trazodone (DESYREL) 100 MG tablet Take 1 tablet (100 mg total) by mouth every evening.  Qty: 30 tablet, Refills: 3      diazePAM (VALIUM) 5 MG tablet Take 1 tablet (5 mg total) by mouth 2 (two) times daily.  Qty: 60 tablet, Refills: 2    Associated Diagnoses: Paraparesis; Chronic bilateral low back pain with bilateral sciatica; Spasticity; Chronic pain of multiple sites; Impaired mobility and ADLs; Gait instability; Neuropathic pain, leg, bilateral; MS (multiple sclerosis); Chronic pain syndrome; Long-term current use of opiate analgesic; Polyneuropathy; Long term (current) use of systemic steroids      oxyCODONE-acetaminophen (PERCOCET)  mg per tablet Take 1 tablet by mouth every 8 (eight) hours. CAn you please give patient  Percocet at 3 am, 11 am, and 7 pm.  Qty: 90 tablet, Refills: 0    Associated Diagnoses: Chronic pain syndrome; Neuropathic pain, leg, bilateral; Paraparesis; Polyneuropathy; Long-term current use of opiate analgesic; Chronic bilateral low back pain with bilateral sciatica; Chronic pain of multiple sites; Spasticity; Long term (current) use of systemic steroids       !! - Potential duplicate medications found. Please discuss with provider.          Discharge Procedure Orders  Diet general     Activity as tolerated     Call MD for:  temperature >100.4     Call MD for:  persistent nausea and vomiting     Call MD for:  severe uncontrolled pain     No dressing needed       Follow-up Information     Brian Augustin MD In 3 months.    Specialty:  Urology  Why:  post  op  Contact information:  [address]  [facility] [address]  [phone]                     Urmila Dominguez MD  Urology, PGY-3  Pager# [phone]

## 2018-06-20 NOTE — TRANSFER OF CARE
"Anesthesia Transfer of Care Note    Patient: Mao Levin    Procedure(s) Performed: Procedure(s) (LRB):  CYSTOSCOPY (N/A)  INJECTION, BOTULINUM TOXIN, TYPE A 200 UNITS (N/A)    Patient location: [facility]    Anesthesia Type: MAC    Transport from OR: Transported from OR on 2-3 L/min O2 by NC with adequate spontaneous ventilation    Post pain: adequate analgesia    Post assessment: no apparent anesthetic complications and tolerated procedure well    Post vital signs: stable    Level of consciousness: awake, alert and oriented    Nausea/Vomiting: no nausea/vomiting    Complications: none    Transfer of care protocol was followed      Last vitals:   Visit Vitals  /80   Pulse 62   Temp 36.6 °C (97.9 °F) (Skin)   Resp 16   Ht 5' 11" (1.803 m)   Wt 86.2 kg (190 lb 0.6 oz)   SpO2 100%   BMI 26.50 kg/m²     "

## 2018-06-20 NOTE — PROGRESS NOTES
Report given to pts nurse at [facility], verbalized understanding. . Awaiting transportation from [facility].

## 2018-06-20 NOTE — PLAN OF CARE
Discharge instructions reviewed w/ pt, paper rx given. Pt in NADN.States pain is at baseline. Tolerated liquids w/ no issues and urinated w/ no issues.

## 2018-06-20 NOTE — INTERVAL H&P NOTE
The patient has been examined and the H&P has been reviewed:    I concur with the findings and no changes have occurred since H&P was written.    Anesthesia/Surgery risks, benefits and alternative options discussed and understood by patient/family.    UA today negative for blood, leuks, nitrite  Holding xarelto for 3 days    Active Hospital Problems    Diagnosis  POA    Neurogenic bladder [N31.9]  Yes     Chronic      Resolved Hospital Problems    Diagnosis Date Resolved POA   No resolved problems to display.

## 2018-06-20 NOTE — DISCHARGE INSTRUCTIONS
Resume xarelto tomorrow      Cystoscopy    Cystoscopy is a procedure that lets your doctor look directly inside your urethra and bladder. It can be used to:  · Help diagnose a problem with your urethra, bladder, or kidneys.  · Take a sample (biopsy) of bladder or urethral tissue.  · Treat certain problems (such as removing kidney stones).  · Place a stent to bypass an obstruction.  · Take special X-rays of the kidneys.  Based on the findings, your doctor may recommend other tests or treatments.  What is a cystoscope?  A cystoscope is a telescope-like instrument that contains lenses and fiberoptics (small glass wires that make bright light). The cystoscope may be straight and rigid, or flexible to bend around curves in the urethra. The doctor may look directly into the cystoscope, or project the image onto a monitor.  Getting ready  · Ask your doctor if you should stop taking any medicines before the procedure.  · Ask whether you should avoid eating or drinking anything after midnight before the procedure.  · Follow any other instructions your doctor gives you.  Tell your doctor before the exam if you:  · Take any medicines, such as aspirin or blood thinners  · Have allergies to any medicines  · Are pregnant   The procedure  Cystoscopy is done in the doctors office, surgery center, or hospital. The doctor and a nurse are present during the procedure. It takes only a few minutes, longer if a biopsy, X-ray, or treatment needs to be done.  During the procedure:  · You lie on an exam table on your back, knees bent and legs apart. You are covered with a drape.  · Your urethra and the area around it are washed. Anesthetic jelly may be applied to numb the urethra. Other pain medicine is usually not needed. In some cases, you may be offered a mild sedative to help you relax. If a more extensive procedure is to be done, such as a biopsy or kidney stone removal, general anesthesia may be needed.  · The cystoscope is inserted.  A sterile fluid is put into the bladder to expand it. You may feel pressure from this fluid.  · When the procedure is done, the cystoscope is removed.  After the procedure  If you had a sedative, general anesthesia, or spinal anesthesia, you must have someone drive you home. Once youre home:  · Drink plenty of fluids.  · You may have burning or light bleeding when you urinate--this is normal.  · Medicines may be prescribed to ease any discomfort or prevent infection. Take these as directed.  · Call your doctor if you have heavy bleeding or blood clots, burning that lasts more than a day, a fever over 100°F  (38° C), or trouble urinating.  Date Last Reviewed: 1/1/2017  © 6056-0031 [employer]. [address] [place] [address], [place] [address]. All rights reserved. This information is not intended as a substitute for professional medical care. Always follow your healthcare professional's instructions.

## 2018-06-20 NOTE — OP NOTE
[facility]  Operative Note    Date: 06/20/2018    Pre-Op Diagnosis:   1. Overactive bladder  2. Urinary frequency, urgency  3. Neurogenic bladder  4. Multiple sclerosis     Patient Active Problem List   Diagnosis    Chronic pain of multiple sites    Weakness generalized    Adrenal insufficiency due to steroid withdrawal    Gait instability    Paraparesis    Neurogenic bladder    Spasticity    Impaired mobility and ADLs    Abnormal coordination    Abnormal cortisol level    Vitamin D deficiency disease    10/25/2016 Saddle PE    Polyneuropathy    Neuropathic pain, leg, bilateral    Acute relapsing multiple sclerosis    Long term (current) use of systemic steroids    Constipation due to neurogenic bowel    Abnormal thyroid blood test    MS (multiple sclerosis)    Frequent falls    E. coli urinary tract infection    Bilateral leg pain    Depression    Smoker    Intractable headache    Cluster headache syndrome, unspecified, intractable    Fatigue    Urinary tract infection    Chronic bilateral low back pain with bilateral sciatica    Chronic pain syndrome    Long-term current use of opiate analgesic    Recurrent UTI    Frequency of urination    Urgency incontinence    Benign prostatic hyperplasia with weak urinary stream    Kidney stone       Post-Op Diagnosis: same    Procedure(s) Performed:   1.  Cystoscopy with bladder botox injection    Specimen(s): none    Staff Surgeon:  Brian Augustin MD    Assistant Surgeon: Urmila Dominguez MD    Anesthesia: General endotracheal anesthesia    Indications: Mao Levin is a 52 y.o. male with urinary frequency, urgency refractory to conservative management.     Findings:   - bladder with some debris and minor inflammation  - no tumors or suspicious lesions  - 200 units injected    Estimated Blood Loss: min    Drains: none    Procedure in Detail:  After informed consent was obtained the patient was brought to the cystoscopy  suite and placed in the supine position.  SCDs were applied and working.  Anesthesia was administered.  When the patient was adequately sedated she was placed in the dorsal lithotomy position and prepped and draped in the usual sterile fashion.      A rigid cystoscope in a 22 Fr sheath was introduced into the patients's bladder via the urethra.  This passed easily.  Formal cystoscopy was performed which revealed the ureteral orifices in their normal anatomic position bilaterally.  No bladder masses, trabeculations, stones or diverticuli were seen.  There was some debris present within the bladder and signs of inflammation/irritation.     200 units of botox was injected into the detrusor muscle throughout the bladder.  Good wheals were raised.  The patient's bladder was drained and the cystoscope was removed.     The patient tolerated the procedure well and was transferred to the recovery room in stable condition.      Disposition:  The patient will follow up with Dr. Augustin in 3 months.  He knows how to self-cath should she have any issues with retention.  He was given prescriptions for percocet and bactrim.     Urmila Dominguez MD

## 2018-06-20 NOTE — H&P (VIEW-ONLY)
CC: recurrent UTI    CHIEF COMPLAINT:    Mr. Levin is a 52 y.o. male presenting for the evaluation of neurogenic bladder with frequency and urgency.    PRESENTING ILLNESS:    Mao Levin is a 52 y.o. male with a PMH of MS and neurogenic bladder who presents for frequency and urgency.  Recently seen by Thelma Khalil who started him on vesicare.    Underwent SUDS cyst on 18 revealin. Detrusor instability with urge incontinence, functional bladder   capacity less than 150 ml.  2. Incomplete bladder emptying    Initially plan for bladder botox injection and rezum but will cancel rezum for now in light of infection.     Urine culture from SUDS cysto was + for providencia stuartii  We will give him rocephin today in clinic and start him on bactrim in preparation for surgery on 18.    He has had recurrent UTI with same strain of providencia stuartii for the past year. Currently on daily macrobid prophylaxis per med list, unsure if he is taking this    Recent renal ultrasound with likely right renal stone will plan for CTRSS    C/o continued dysuria.  C/o urinary frequency, urgency, and urge incontinence. He states he urinates every 1 hr during the day and night. He does not think that his symptoms have improved.  Unsure if he is taking Vesicare 10 mg daily.   No fever or chills.    He is a resident at [facility].    Renal Ultrasound 18: The right kidney measures 10.1 cm. No cortical thinning. No loss of corticomedullary distinction. Resistive index measures 0.60.  No mass. Echogenic foci demonstrating twinkle artifact within the inferior pole measuring 0.5 cm which may represent a renal stone. No hydronephrosis.   Left kidney: The left kidney measures 9.9 cm. No cortical thinning. No loss of corticomedullary distinction. Resistive index measures 0.53.  No mass. No renal stone. No hydronephrosis.    CT abd/pel 10/2016- Kidneys concentrate and excrete contrast appropriately.  No nephrolithiasis.  No  hydronephrosis or hydroureter.  No solid renal masses.  Bladder is fluid-filled and unremarkable.  The prostate is normal in size.    Urine cultures:   5/8/18: +Providencia stuartii  3/13/18: +Providencia stuartii  2/1/18: +Providencia stuartii  8/14/17: +Providencia stuartii  3/14/17:: + E. coli  2/12/17: + E. coli    Initial Note:  UTI symptoms? Dysuria and MS flairs. No f/c or n/v.  Severity of incontinence with # of depends?  He reports changing his depend with every urination d/t his urgency and urge incontinence. He believes he uses about 4-5 daily and 1 at night  Voiding symptoms? Reports urgency, frequency, urge incontinence, nocturia, hesitancy, intermittency, incomplete bladder emptying, and decreased urine volume. Denies difficulty urinating, dysuria, and hematuria.    hx? No  testing for his neurogenic bladder  Hx of pelvic surgery? No   Fluid consumption? Daily- Coffee 2 cups, water with meds, soda 0-1 cups, and juice 6 cups  Medications? Oxybutynin 10 mg daily; taking daily for 1 yr and has not seen improvement with his symptoms.  He reports a good urinary stream.  Constipation sometimes    REVIEW OF SYSTEMS:    Review of Systems    Constitutional: Negative for fever and chills.   HENT: Negative for hearing loss.   Eyes: Negative for visual disturbance.   Respiratory: Negative for shortness of breath.   Cardiovascular: Negative for chest pain.   Gastrointestinal: Negative for nausea and vomiting.  Genitourinary:  See above  Neurological: Negative for dizziness.   Hematological: Does not bruise/bleed easily.   Psychiatric/Behavioral: Negative for confusion.       PATIENT HISTORY:    Past Medical History:   Diagnosis Date    Anticoagulant long-term use     Anxiety     Chronic back pain     Deep vein thrombosis     Depression     Depression     Gait instability     Multiple sclerosis     Multiple sclerosis     Neurogenic bladder     Polyneuropathy     Pulmonary embolism     Spasticity         History reviewed. No pertinent surgical history.    Family History   Problem Relation Age of Onset    Arthritis Mother     No Known Problems Father     Cancer Maternal Grandfather        Social History     Social History    Marital status: Single     Spouse name: N/A    Number of children: N/A    Years of education: N/A     Occupational History    Not on file.     Social History Main Topics    Smoking status: Former Smoker     Packs/day: 0.50     Years: 23.00     Types: Cigarettes     Quit date: 10/1/2017    Smokeless tobacco: Never Used      Comment: Using patch    Alcohol use No    Drug use: No    Sexual activity: Yes     Partners: Female     Other Topics Concern    Not on file     Social History Narrative    No narrative on file       Allergies:  Patient has no known allergies.    Medications:    Current Outpatient Prescriptions:     buPROPion (WELLBUTRIN SR) 150 MG TBSR 12 hr tablet, 1 tab po daily x 3 days, then increase to 1 tab po BID; last dose no later than 6PM; stop smoking after 5-7 days of treatment; (Patient taking differently: 150 mg 2 (two) times daily. 1 tab po daily x 3 days, then increase to 1 tab po BID; last dose no later than 6PM; stop smoking after 5-7 days of treatment;), Disp: 60 tablet, Rfl: 3    citalopram (CELEXA) 20 MG tablet, Take 1 tablet (20 mg total) by mouth once daily., Disp: 30 tablet, Rfl: 0    cranberry conc-C-bacillus coag 450-30-50 mg-mg-million Tab, Take 1 capsule by mouth once daily., Disp: 120 each, Rfl: 3    dalfampridine (AMPYRA) 10 mg Tb12, Take 10 mg by mouth every 12 (twelve) hours., Disp: 180 tablet, Rfl: 1    dantrolene (DANTRIUM) 50 MG Cap, Take 1 capsule (50 mg total) by mouth 4 (four) times daily., Disp: 120 capsule, Rfl: 0    diazePAM (VALIUM) 5 MG tablet, Take 1 tablet (5 mg total) by mouth 2 (two) times daily., Disp: 60 tablet, Rfl: 2    duloxetine (CYMBALTA) 30 MG capsule, Take 30 mg by mouth once daily., Disp: , Rfl:      ergocalciferol (VITAMIN D2) 50,000 unit Cap, Take 1 capsule (50,000 Units total) by mouth every 7 days. (Patient taking differently: Take 50,000 Units by mouth every 7 days. on monday), Disp: 4 capsule, Rfl: 11    gabapentin (NEURONTIN) 300 MG capsule, Take 900 mg morning and evening.  Take 1100 mg total in the afternoon., Disp: 270 capsule, Rfl: 0    mirabegron (MYRBETRIQ) 50 mg Tb24, Take 1 tablet (50 mg total) by mouth once daily., Disp: 30 tablet, Rfl: 11    nicotine (NICODERM CQ) 14 mg/24 hr, Place 1 patch onto the skin every 24 hours., Disp: , Rfl:     nitrofurantoin macrocrystal (MACRODANTIN ORAL), Take by mouth., Disp: , Rfl:     oxyCODONE-acetaminophen (PERCOCET)  mg per tablet, Take 1 tablet by mouth every 8 (eight) hours. CAn you please give patient  Percocet at 3 am, 11 am, and 7 pm., Disp: 90 tablet, Rfl: 0    polyethylene glycol (GLYCOLAX) 17 gram/dose powder, Take 17 g by mouth once daily., Disp: 510 g, Rfl: 6    rivaroxaban (XARELTO) 20 mg Tab, Take 1 tablet (20 mg total) by mouth daily with dinner or evening meal., Disp: 60 tablet, Rfl: 4    senna-docusate 8.6-50 mg (PERICOLACE) 8.6-50 mg per tablet, Take 1 tablet by mouth once daily., Disp: , Rfl:     sulfamethoxazole-trimethoprim 800-160mg (BACTRIM DS) 800-160 mg Tab, Take 1 tablet by mouth 2 (two) times daily., Disp: 20 tablet, Rfl: 0    sulfamethoxazole-trimethoprim 800-160mg (BACTRIM DS) 800-160 mg Tab, Take 1 tablet by mouth 2 (two) times daily., Disp: 14 tablet, Rfl: 0    tamsulosin (FLOMAX) 0.4 mg Cp24, Take 1 capsule (0.4 mg total) by mouth once daily., Disp: 30 capsule, Rfl: 11    trazodone (DESYREL) 100 MG tablet, Take 1 tablet (100 mg total) by mouth every evening., Disp: 30 tablet, Rfl: 3    Current Facility-Administered Medications:     cefTRIAXone injection 1 g, 1 g, Intramuscular, 1 time in Clinic/HOD, Brian Augustin MD    PHYSICAL EXAMINATION:    Constitutional: He is oriented to person, place, and time. He  appears well-developed and well-nourished.  He is in no apparent distress.    Neck: No tracheal deviation present.     Cardiovascular: Normal rate.      Pulmonary/Chest: Effort normal. No respiratory distress.     Abdominal:  He exhibits no distension.     Lymphadenopathy:        Right: No supraclavicular adenopathy present.        Left: No supraclavicular adenopathy present.     Neurological: He is alert and oriented to person, place, and time.     Skin: Skin is warm and dry.     Extremities: No pitting edema noted in lower extremities bilaterally    Psych: Cooperative with normal affect.    Genitourinary: deferred    Physical Exam   Constitutional:   In a wheel chair due to MS.  Soaked wet diaper.         LABS:  U/a today shows:    Lab Results   Component Value Date    PSA 0.21 07/18/2017       IMPRESSION:  Mao was seen today for urinary frequency and medication refill.    Diagnoses and all orders for this visit:    Recurrent UTI  -     cefTRIAXone injection 1 g; Inject 1 g into the muscle one time.  -     sulfamethoxazole-trimethoprim 800-160mg (BACTRIM DS) 800-160 mg Tab; Take 1 tablet by mouth 2 (two) times daily.  -     cranberry conc-C-bacillus coag 450-30-50 mg-mg-million Tab; Take 1 capsule by mouth once daily.    Kidney stone  -     CT Renal Stone Study ABD Pelvis WO; Future      PLAN:  -Rocephin IM given today for UTI, Rx given for bactrim to start today  -I encouraged the patient to start probiotics today for recurrent UTIs  -Plan to OR for botox injections next week, will cancel rezum, pateint to stop xarelto 2 days prior to surgery  -Continue flomax in the evening daily.  -Plan to CT renal stone study to evaluate for renal stone in right kidney    Nature and risks of operation explained. Will hold off Rezum Therapy at this time.  Will see how he responds to botox injection.  I spent 25 minutes with the patient of which more than half was spent in direct consultation with the patient in regards to our  treatment and plan.      Follow up:  Follow-up for cysto botox injection, CT RSS.

## 2018-06-27 ENCOUNTER — OFFICE VISIT (OUTPATIENT)
Dept: UROLOGY | Facility: CLINIC | Age: 53
End: 2018-06-27
Payer: MEDICARE

## 2018-06-27 ENCOUNTER — OFFICE VISIT (OUTPATIENT)
Dept: NEUROLOGY | Facility: CLINIC | Age: 53
End: 2018-06-27
Payer: MEDICARE

## 2018-06-27 VITALS — HEIGHT: 71 IN | HEART RATE: 60 BPM | SYSTOLIC BLOOD PRESSURE: 136 MMHG | DIASTOLIC BLOOD PRESSURE: 86 MMHG

## 2018-06-27 VITALS — SYSTOLIC BLOOD PRESSURE: 140 MMHG | DIASTOLIC BLOOD PRESSURE: 84 MMHG | HEART RATE: 72 BPM

## 2018-06-27 DIAGNOSIS — G35 MS (MULTIPLE SCLEROSIS): ICD-10-CM

## 2018-06-27 DIAGNOSIS — Z72.0 TOBACCO USE: ICD-10-CM

## 2018-06-27 DIAGNOSIS — Z11.4 ENCOUNTER FOR SCREENING FOR HIV: ICD-10-CM

## 2018-06-27 DIAGNOSIS — G35 MULTIPLE SCLEROSIS: Primary | ICD-10-CM

## 2018-06-27 DIAGNOSIS — F32.A DEPRESSION, UNSPECIFIED DEPRESSION TYPE: ICD-10-CM

## 2018-06-27 DIAGNOSIS — N31.9 NEUROGENIC BLADDER: ICD-10-CM

## 2018-06-27 DIAGNOSIS — R26.9 NEUROLOGIC GAIT DYSFUNCTION: ICD-10-CM

## 2018-06-27 DIAGNOSIS — Z79.899 ENCOUNTER FOR LONG-TERM (CURRENT) USE OF HIGH-RISK MEDICATION: ICD-10-CM

## 2018-06-27 DIAGNOSIS — R25.2 SPASTICITY: ICD-10-CM

## 2018-06-27 DIAGNOSIS — Z98.890 POST-OPERATIVE STATE: Primary | ICD-10-CM

## 2018-06-27 DIAGNOSIS — Z71.89 COUNSELING REGARDING GOALS OF CARE: ICD-10-CM

## 2018-06-27 DIAGNOSIS — D84.9 IMMUNOSUPPRESSED STATUS: ICD-10-CM

## 2018-06-27 PROCEDURE — 99024 POSTOP FOLLOW-UP VISIT: CPT | Mod: S$GLB,,, | Performed by: NURSE PRACTITIONER

## 2018-06-27 PROCEDURE — 99999 PR PBB SHADOW E&M-EST. PATIENT-LVL IV: CPT | Mod: PBBFAC,,, | Performed by: PHYSICIAN ASSISTANT

## 2018-06-27 PROCEDURE — 99215 OFFICE O/P EST HI 40 MIN: CPT | Mod: S$GLB,,, | Performed by: PHYSICIAN ASSISTANT

## 2018-06-27 PROCEDURE — 99999 PR PBB SHADOW E&M-EST. PATIENT-LVL III: CPT | Mod: PBBFAC,,, | Performed by: NURSE PRACTITIONER

## 2018-06-27 PROCEDURE — 99499 UNLISTED E&M SERVICE: CPT | Mod: S$GLB,,, | Performed by: PHYSICIAN ASSISTANT

## 2018-06-27 PROCEDURE — 99499 UNLISTED E&M SERVICE: CPT | Mod: S$GLB,,, | Performed by: NURSE PRACTITIONER

## 2018-06-27 RX ORDER — HYDROCODONE BITARTRATE AND ACETAMINOPHEN 10; 325 MG/1; MG/1
1 TABLET ORAL
COMMUNITY
End: 2018-10-08 | Stop reason: SDUPTHER

## 2018-06-27 RX ORDER — TRAMADOL HYDROCHLORIDE 50 MG/1
50 TABLET ORAL EVERY 6 HOURS PRN
COMMUNITY
End: 2018-10-08

## 2018-06-27 RX ORDER — ASCORBIC ACID 500 MG
500 TABLET ORAL DAILY
COMMUNITY
End: 2019-03-17

## 2018-06-27 NOTE — ANESTHESIA POSTPROCEDURE EVALUATION
"Anesthesia Post Evaluation    Patient: Mao Levin    Procedure(s) Performed: Procedure(s) (LRB):  CYSTOSCOPY (N/A)  INJECTION, BOTULINUM TOXIN, TYPE A 200 UNITS (N/A)    Final Anesthesia Type: general  Patient location during evaluation: PACU  Level of consciousness: awake and alert  Post-procedure vital signs: reviewed and stable  Pain management: adequate  Airway patency: patent  PONV status at discharge: No PONV  Anesthetic complications: no      Cardiovascular status: blood pressure returned to baseline  Respiratory status: unassisted and spontaneous ventilation  Hydration status: euvolemic  Follow-up not needed.        Visit Vitals  BP (!) 147/90   Pulse (!) 53   Temp 36.6 °C (97.9 °F) (Temporal)   Resp 16   Ht 5' 11" (1.803 m)   Wt 86.2 kg (190 lb 0.6 oz)   SpO2 100%   BMI 26.50 kg/m²       Pain/Luca Score: No Data Recorded      "

## 2018-06-27 NOTE — PROGRESS NOTES
Subjective:       Patient ID: Mao Levin is a 52 y.o. male who presents today for a routine clinic visit for MS.      MS HPI:  · DMT: Rituxan(4/12/2018)  · Side effects from DMT? No  · Taking vitamin D3 as recommended? Yes - 50,000IU/wk Dose:   · Patient states that Wellbutrin is helping to quit smoking and states he is only smoking one cigarette daily if that  · Patient states he is walking with a rollator down the toure in [place] about every other day. He has staff with him while he walks.     SOCIAL HISTORY  Social History   Substance Use Topics    Smoking status: Former Smoker     Packs/day: 0.50     Years: 23.00     Types: Cigarettes     Quit date: 10/1/2017    Smokeless tobacco: Never Used      Comment: Using patch    Alcohol use No     Living arrangements - the patient lives in a nursing home.  Employment  disability    MS ROS:  · Fatigue: Yes - mild  · Sleep Disturbance: on hs trazodone  · Bladder Dysfunction: Yes - Vesicare, flomax; macrodantin, sees Dr. Augustin-recent Botox to bladder which was helpful  · Bowel Dysfunction: Yes - on Senna   · Spasticity: Yes - Dantrolene 50mg QID and Baclofen 20mg QID and Valium mg q12 hours  · Visual Symptoms: No  · Cognitive: Yes - mild memory issues, no change  · Mood Disorder: Yes - Cymbalta 30mg in AM  · Gait Disturbance: Yes - as above, wears L AF), on Ampyra q12  · Falls: No  · Hand Dysfunction: Yes - coordination-feels that L UE tight-he does do stretching  · Pain: Yes - Cymbalta, gabapentin TID; was seeing Dr. Estrella for pain management however physician in [place] are now prescribing pain medication and he has been discharged from Pain management:  Percocet and Tramadol and Hydrocodone  · Sexual Dysfunction: Not Assessed  · Skin Breakdown: No  · Tremors: No  · Dysphagia:  No  · Dysarthria:  No  · Heat sensitivity:  Yes - weakness  · Any un-met adaptive needs? No  · Copay Assist?  Yes   · Clinical Trial candidate? No        Objective:        1. 25 foot timed walk: 1min 55sec  today with U-step and and L AFO; 1: 44.19s with U-step and AFO in 2018  Timed 25 Foot Walk: 3/6/2017   Did patient wear an AFO? Yes   Was assistive device used? Yes   Assistive device used (felipe one): Bilateral Assistance   Bilateral device used Walker/Rollator   Time for 25 Foot Walk (seconds) 34.9       Neurologic Exam     Mental Status   Oriented to person, place, and time.   Attention: normal.   Speech: speech is normal   Level of consciousness: alert    Motor Exam   Right arm tone: increased  Left arm tone: spastic  Right leg tone: increased  Left leg tone: increased    Strength   Right deltoid: 5/5  Left deltoid: 4/5  Right triceps: 5/5  Left triceps: 4/5  Right wrist extension: 5/5  Right interossei: 3/5  Right iliopsoas: 2/5  Left iliopsoas: 1/5  Right hamstrin/5  Left hamstring: 3/5  Right anterior tibial: 4/5L UE spastic with functional ROM proximally     Sensory Exam   Right arm light touch: normal  Left arm light touch: normal  Right leg light touch: normal  Left leg light touch: decreased t/o as compared to R LE.  Right arm vibration: decreased from fingers  Left arm vibration: decreased from fingers  Right leg vibration: decreased from toes  Left leg vibration: decreased from toes    Gait, Coordination, and Reflexes     Gait  Gait: circumduction (L AFO)    Coordination   Finger to nose coordination: abnormal  Tandem gait test: unable.            Imaging:     Results for orders placed during the hospital encounter of 18   MRI Brain W WO Contrast    Impression Stable appearance of the brain, again exhibiting findings compatible with patient's history of multiple sclerosis.  No new or enhancing lesions to indicate ongoing or active demyelination.    Electronically signed by resident: Trinh Watkins  Date:    2018  Time:    13:11    Electronically signed by: Davin Noel MD  Date:    2018  Time:    14:18     Results for orders placed during the hospital encounter of 17    MRI Cervical Spine W WO Cont    Impression Persistent multifocal areas of cord signal abnormality, unchanged when compared to the MRI dated 12/04/2016 and most likely representing sequela of demyelinating disease given patient's history of multiple sclerosis.  No enhancing lesions to suggest active demyelination.      Electronically signed by: VALENTINO BALL MD  Date:     03/17/17  Time:    03:51      Results for orders placed during the hospital encounter of 12/02/16   MRI Thoracic Spine W WO Cont    Impression  There are patchy foci of T2 signal hyperintensity in the cervical and thoracic spinal cord overall similar to prior exam and likely relating to patient's history of multiple sclerosis.  There is no finding to indicate active demyelination and no significant change compared to prior examination.      Electronically signed by: Kelsie Farah MD  Date:     12/04/16  Time:    08:42          Labs:     Lab Results   Component Value Date    LRLDNKXB95CZ 61 04/10/2018    BYBVOMTQ00TI 27 (L) 07/18/2017    ACMDJSOC30KY 18 (L) 09/25/2016     Lab Results   Component Value Date    JCVINDEX 3.70 (A) 09/22/2016    JCVANTIBODY Positive (A) 09/22/2016     No results found for: HF7ROPVM, ABSOLUTECD3, UN0LFANN, ABSOLUTECD8, FW1KJYST, ABSOLUTECD4, LABCD48  Lab Results   Component Value Date    WBC 6.27 06/15/2018    RBC 4.47 (L) 06/15/2018    HGB 14.2 06/15/2018    HCT 41.5 06/15/2018    MCV 93 06/15/2018    MCH 31.8 (H) 06/15/2018    MCHC 34.2 06/15/2018    RDW 13.5 06/15/2018     06/15/2018    MPV 11.0 06/15/2018    GRAN 4.5 08/16/2017    GRAN 56.0 08/16/2017    LYMPH 2.6 08/16/2017    LYMPH 32.1 08/16/2017    MONO 0.7 08/16/2017    MONO 8.9 08/16/2017    EOS 0.2 08/16/2017    BASO 0.03 08/16/2017    EOSINOPHIL 2.5 08/16/2017    BASOPHIL 0.4 08/16/2017     Sodium   Date Value Ref Range Status   06/15/2018 139 136 - 145 mmol/L Final     Potassium   Date Value Ref Range Status   06/15/2018 4.2 3.5 - 5.1 mmol/L Final      Chloride   Date Value Ref Range Status   06/15/2018 102 95 - 110 mmol/L Final     CO2   Date Value Ref Range Status   06/15/2018 29 23 - 29 mmol/L Final     Glucose   Date Value Ref Range Status   06/15/2018 79 70 - 110 mg/dL Final     BUN, Bld   Date Value Ref Range Status   06/15/2018 10 6 - 20 mg/dL Final     Creatinine   Date Value Ref Range Status   06/15/2018 1.2 0.5 - 1.4 mg/dL Final     Calcium   Date Value Ref Range Status   06/15/2018 9.0 8.7 - 10.5 mg/dL Final     Total Protein   Date Value Ref Range Status   08/14/2017 6.8 6.0 - 8.4 g/dL Final     Albumin   Date Value Ref Range Status   08/14/2017 3.7 3.5 - 5.2 g/dL Final     Total Bilirubin   Date Value Ref Range Status   08/14/2017 0.5 0.1 - 1.0 mg/dL Final     Comment:     For infants and newborns, interpretation of results should be based  on gestational age, weight and in agreement with clinical  observations.  Premature Infant recommended reference ranges:  Up to 24 hours.............<8.0 mg/dL  Up to 48 hours............<12.0 mg/dL  3-5 days..................<15.0 mg/dL  6-29 days.................<15.0 mg/dL       Alkaline Phosphatase   Date Value Ref Range Status   08/14/2017 70 55 - 135 U/L Final     AST   Date Value Ref Range Status   08/14/2017 11 10 - 40 U/L Final     ALT   Date Value Ref Range Status   08/14/2017 8 (L) 10 - 44 U/L Final     Anion Gap   Date Value Ref Range Status   06/15/2018 8 8 - 16 mmol/L Final     eGFR if    Date Value Ref Range Status   06/15/2018 >60.0 >60 mL/min/1.73 m^2 Final     eGFR if non    Date Value Ref Range Status   06/15/2018 >60.0 >60 mL/min/1.73 m^2 Final     Comment:     Calculation used to obtain the estimated glomerular filtration  rate (eGFR) is the CKD-EPI equation.            CrCl-87.78        Diagnosis/Assessment/Plan:    1. Multiple Sclerosis  · Assessment: Patient's timed walk is slower today, although his strength appears stable. I have recommended PT and OT in  [place]  · Imaging:MRI stable in April 2018-reviewed images today with patient. Will plan for annual in April 2019  · Disease Modifying Therapies: Rituxan and high dose Vit D3. I have referred to ID today for appropriate vaccinations in light of immune suppressed status(preliminary labs ordered as requested). He is being testing for TB periodically at NH.     2. MS Symptom Assessment / Management  · Bladder Dysfunction: managed by Dr. Augustin  · Gait Disturbance: recommend PT,. OK to continue Ampyra-CrCl 87.78 as noted above  · Hand Dysfunction: recommend OT, recommend nightly L hand splint wear   · Pain: pain medication managed by NH staff no longer seeing Pain management here    Over 50% of this 40 minute visit was spent in direct face to face counseling of the patient about MS, DMT considerations, and MS symptom management.     Follow-up in about 3 months (around 9/27/2018) for follow up with me.  Patient agreed to POC today.    Attending, Dr. Finnegan, was available during today's encounter. Any change to plan along with cosign to appear in the EMR.     Lawanda Boyce PA-C  [facility]      Multiple sclerosis  -     CBC auto differential; Future; Expected date: 06/27/2018  -     Comprehensive metabolic panel; Future  -     Hepatitis B core antibody, total; Future; Expected date: 06/27/2018  -     Hepatitis B surface antibody; Future; Expected date: 06/27/2018  -     Hepatitis B surface antigen; Future; Expected date: 06/27/2018  -     Hepatitis C antibody; Future; Expected date: 06/27/2018  -     HIV-1 and HIV-2 antibodies; Future; Expected date: 06/27/2018  -     RPR; Future; Expected date: 06/27/2018  -     Hepatitis A antibody, IgG; Future; Expected date: 06/27/2018  -     STRONGYLOIDES IGG ANTIBODIES; Future; Expected date: 06/27/2018  -     Ambulatory referral to Infectious Disease  -     Varicella zoster antibody, IgG; Future; Expected date: 06/27/2018    Counseling regarding goals of care    Encounter for long-term  (current) use of high-risk medication  -     CBC auto differential; Future; Expected date: 06/27/2018  -     Comprehensive metabolic panel; Future  -     Hepatitis B core antibody, total; Future; Expected date: 06/27/2018  -     Hepatitis B surface antibody; Future; Expected date: 06/27/2018  -     Hepatitis B surface antigen; Future; Expected date: 06/27/2018  -     Hepatitis C antibody; Future; Expected date: 06/27/2018  -     HIV-1 and HIV-2 antibodies; Future; Expected date: 06/27/2018  -     RPR; Future; Expected date: 06/27/2018  -     Hepatitis A antibody, IgG; Future; Expected date: 06/27/2018  -     STRONGYLOIDES IGG ANTIBODIES; Future; Expected date: 06/27/2018  -     Ambulatory referral to Infectious Disease  -     Varicella zoster antibody, IgG; Future; Expected date: 06/27/2018    Spasticity    Neurologic gait dysfunction    Tobacco use    Depression, unspecified depression type    Immunosuppressed status  -     CBC auto differential; Future; Expected date: 06/27/2018  -     Comprehensive metabolic panel; Future  -     Hepatitis B core antibody, total; Future; Expected date: 06/27/2018  -     Hepatitis B surface antibody; Future; Expected date: 06/27/2018  -     Hepatitis B surface antigen; Future; Expected date: 06/27/2018  -     Hepatitis C antibody; Future; Expected date: 06/27/2018  -     HIV-1 and HIV-2 antibodies; Future; Expected date: 06/27/2018  -     RPR; Future; Expected date: 06/27/2018  -     Hepatitis A antibody, IgG; Future; Expected date: 06/27/2018  -     STRONGYLOIDES IGG ANTIBODIES; Future; Expected date: 06/27/2018  -     Ambulatory referral to Infectious Disease  -     Varicella zoster antibody, IgG; Future; Expected date: 06/27/2018    Encounter for screening for HIV   -     HIV-1 and HIV-2 antibodies; Future; Expected date: 06/27/2018

## 2018-06-27 NOTE — PROGRESS NOTES
Subjective:       Patient ID: Mao Levin is a 52 y.o. male.    Chief Complaint: No chief complaint on file.    Mao Levin is a 52 y.o. male with urinary frequency, urgency refractory to conservative management.     06/20/2018 Procedure(s) Performed:     1.  Cystoscopy with bladder botox injection  Findings:   - bladder with some debris and minor inflammation  - no tumors or suspicious lesions  - 200 units injected    He is here today for post op visit.  He states doing well.  He never had a agee catheter.  He urinating and better control.  He pleased with how he botox helped the urgency/frequency.    He drinks coffee during day. Water in the evening  He sometimes does not wake up night and occasionally wet the bed.  He takes meds to help him sleep.           Past Medical History:  No date: Anticoagulant long-term use  No date: Anxiety  No date: Chronic back pain  No date: Deep vein thrombosis  No date: Depression  No date: Depression  No date: Gait instability  No date: Multiple sclerosis  No date: Multiple sclerosis  No date: Neurogenic bladder  No date: Polyneuropathy  No date: Pulmonary embolism  No date: Spasticity    Past Surgical History:  6/20/2018: CYSTOSCOPY N/A      Comment: Procedure: CYSTOSCOPY;  Surgeon: Brian Augustin MD;  Location: [facility] OR [address] [place] [address];  Service:                Urology;  Laterality: N/A;  1 hour  6/20/2018: INJECTION OF BOTULINUM TOXIN TYPE A N/A      Comment: Procedure: INJECTION, BOTULINUM TOXIN, TYPE A                200 UNITS;  Surgeon: Brian Augustin MD;                 Location: [facility] OR [address] [place] [address];  Service: Urology;                 Laterality: N/A;    Review of patient's family history indicates:  Problem: Arthritis      Relation: Mother       Age of Onset: (Not Specified)   Problem: No Known Problems      Relation: Father       Age of Onset: (Not Specified)   Problem: Cancer      Relation: Maternal Grandfather       Age of Onset: (Not Specified)       Social History     Marital status: Single              Spouse name:                       Years of education:                 Number of children:               Occupational History    None on file    Social History Main Topics    Smoking status: Former Smoker                                                                Packs/day: 0.50      Years: 23.00          Types: Cigarettes       Quit date: 10/1/2017    Smokeless tobacco: Never Used                        Comment: Using patch    Alcohol use: No              Drug use: No              Sexual activity: Yes               Partners with: Female    Other Topics            Concern    None on file    Social History Narrative    None on file        Allergies:  Patient has no known allergies.    Medications:  Current Outpatient Prescriptions:   ascorbic acid, vitamin C, (VITAMIN C) 500 MG tablet, Take 500 mg by mouth once daily., Disp: , Rfl:   buPROPion (WELLBUTRIN SR) 150 MG TBSR 12 hr tablet, 1 tab po daily x 3 days, then increase to 1 tab po BID; last dose no later than 6PM; stop smoking after 5-7 days of treatment; (Patient taking differently: 150 mg 2 (two) times daily. 1 tab po daily x 3 days, then increase to 1 tab po BID; last dose no later than 6PM; stop smoking after 5-7 days of treatment;), Disp: 60 tablet, Rfl: 3  cranberry conc-C-bacillus coag 450-30-50 mg-mg-million Tab, Take 1 capsule by mouth once daily., Disp: 120 each, Rfl: 3  dalfampridine (AMPYRA) 10 mg Tb12, Take 10 mg by mouth every 12 (twelve) hours., Disp: 180 tablet, Rfl: 1  dantrolene (DANTRIUM) 50 MG Cap, Take 1 capsule (50 mg total) by mouth 4 (four) times daily., Disp: 120 capsule, Rfl: 0  duloxetine (CYMBALTA) 30 MG capsule, Take 30 mg by mouth once daily., Disp: , Rfl:   ergocalciferol (VITAMIN D2) 50,000 unit Cap, Take 1 capsule (50,000 Units total) by mouth every 7 days. (Patient taking differently: Take 50,000 Units by mouth every 7 days. on monday), Disp: 4 capsule, Rfl: 11  gabapentin  (NEURONTIN) 300 MG capsule, Take 900 mg morning and evening.  Take 1100 mg total in the afternoon., Disp: 270 capsule, Rfl: 0  HYDROcodone-acetaminophen (NORCO)  mg per tablet, Take 1 tablet by mouth., Disp: , Rfl:   mirabegron (MYRBETRIQ) 50 mg Tb24, Take 1 tablet (50 mg total) by mouth once daily., Disp: 30 tablet, Rfl: 11  multivitamin capsule, Take 1 capsule by mouth once daily., Disp: , Rfl:   nitrofurantoin macrocrystal (MACRODANTIN ORAL), Take by mouth., Disp: , Rfl:   polyethylene glycol (GLYCOLAX) 17 gram/dose powder, Take 17 g by mouth once daily., Disp: 510 g, Rfl: 6  rivaroxaban (XARELTO) 20 mg Tab, Take 1 tablet (20 mg total) by mouth daily with dinner or evening meal., Disp: 60 tablet, Rfl: 4  senna-docusate 8.6-50 mg (PERICOLACE) 8.6-50 mg per tablet, Take 1 tablet by mouth once daily., Disp: , Rfl:   tamsulosin (FLOMAX) 0.4 mg Cp24, Take 1 capsule (0.4 mg total) by mouth once daily., Disp: 30 capsule, Rfl: 11  traMADol (ULTRAM) 50 mg tablet, Take 50 mg by mouth every 6 (six) hours as needed for Pain., Disp: , Rfl:   trazodone (DESYREL) 100 MG tablet, Take 1 tablet (100 mg total) by mouth every evening., Disp: 30 tablet, Rfl: 3  citalopram (CELEXA) 20 MG tablet, Take 1 tablet (20 mg total) by mouth once daily., Disp: 30 tablet, Rfl: 0  diazePAM (VALIUM) 5 MG tablet, Take 1 tablet (5 mg total) by mouth 2 (two) times daily., Disp: 60 tablet, Rfl: 2                Review of Systems   Constitutional: Negative for chills and fever.   Eyes: Negative for visual disturbance.   Respiratory: Negative for chest tightness and shortness of breath.    Cardiovascular: Negative for chest pain.   Gastrointestinal: Negative.    Genitourinary: Negative for penile pain, penile swelling and scrotal swelling.        LUTS have greatly improved after Botox   Musculoskeletal: Positive for gait problem.   Neurological: Positive for weakness.       Objective:      Physical Exam   Nursing note and vitals  reviewed.  Constitutional: He is oriented to person, place, and time. He appears well-developed and well-nourished.  Non-toxic appearance. He does not have a sickly appearance.                          PSA                      0.21                07/18/2017               HENT:   Head: Normocephalic and atraumatic.   Right Ear: External ear normal.   Left Ear: External ear normal.   Nose: Nose normal.   Mouth/Throat: Mucous membranes are normal.   Eyes: Conjunctivae and lids are normal. No scleral icterus.   Neck: Trachea normal, normal range of motion and full passive range of motion without pain. Neck supple. No JVD present. No tracheal deviation present.   Cardiovascular: Normal rate, S1 normal and S2 normal.    Pulmonary/Chest: Effort normal. No respiratory distress. He exhibits no tenderness.   Abdominal: Soft. Normal appearance and bowel sounds are normal. There is no hepatosplenomegaly. There is no tenderness. There is no CVA tenderness.   Neurological: He is alert and oriented to person, place, and time. He has normal strength.   Skin: Skin is warm, dry and intact.     Psychiatric: He has a normal mood and affect. His behavior is normal. Judgment and thought content normal.       Assessment:       1. Post-operative state    2. MS (multiple sclerosis)        Plan:         I spent 20 minutes with the patient of which more than half was spent in direct consultation with the patient in regards to our treatment and plan.    Education and recommendations of today's plan of care including home remedies.  We discussed expectations after botox  Diet recommendations; avoiding bladder irritants.  Decreasing pm fluids.\  Continue flomax  RTC 6 months

## 2018-06-29 ENCOUNTER — LAB VISIT (OUTPATIENT)
Dept: LAB | Facility: HOSPITAL | Age: 53
End: 2018-06-29
Payer: MEDICARE

## 2018-06-29 DIAGNOSIS — Z79.899 ENCOUNTER FOR LONG-TERM (CURRENT) USE OF HIGH-RISK MEDICATION: ICD-10-CM

## 2018-06-29 DIAGNOSIS — D84.9 IMMUNOSUPPRESSED STATUS: ICD-10-CM

## 2018-06-29 DIAGNOSIS — G35 MULTIPLE SCLEROSIS: ICD-10-CM

## 2018-06-29 DIAGNOSIS — Z11.4 ENCOUNTER FOR SCREENING FOR HIV: ICD-10-CM

## 2018-06-29 LAB
ALBUMIN SERPL BCP-MCNC: 3.7 G/DL
ALP SERPL-CCNC: 81 U/L
ALT SERPL W/O P-5'-P-CCNC: 16 U/L
ANION GAP SERPL CALC-SCNC: 6 MMOL/L
AST SERPL-CCNC: 18 U/L
BASOPHILS # BLD AUTO: 0.05 K/UL
BASOPHILS NFR BLD: 0.8 %
BILIRUB SERPL-MCNC: 0.4 MG/DL
BUN SERPL-MCNC: 10 MG/DL
CALCIUM SERPL-MCNC: 9.2 MG/DL
CHLORIDE SERPL-SCNC: 104 MMOL/L
CO2 SERPL-SCNC: 27 MMOL/L
CREAT SERPL-MCNC: 0.9 MG/DL
DIFFERENTIAL METHOD: ABNORMAL
EOSINOPHIL # BLD AUTO: 0.2 K/UL
EOSINOPHIL NFR BLD: 2.3 %
ERYTHROCYTE [DISTWIDTH] IN BLOOD BY AUTOMATED COUNT: 12.8 %
EST. GFR  (AFRICAN AMERICAN): >60 ML/MIN/1.73 M^2
EST. GFR  (NON AFRICAN AMERICAN): >60 ML/MIN/1.73 M^2
GLUCOSE SERPL-MCNC: 82 MG/DL
HBV CORE AB SERPL QL IA: NEGATIVE
HBV SURFACE AG SERPL QL IA: NEGATIVE
HCT VFR BLD AUTO: 40 %
HCV AB SERPL QL IA: NEGATIVE
HEPATITIS A ANTIBODY, IGG: NEGATIVE
HGB BLD-MCNC: 13.6 G/DL
HIV 1+2 AB+HIV1 P24 AG SERPL QL IA: NEGATIVE
IMM GRANULOCYTES # BLD AUTO: 0.02 K/UL
IMM GRANULOCYTES NFR BLD AUTO: 0.3 %
LYMPHOCYTES # BLD AUTO: 2 K/UL
LYMPHOCYTES NFR BLD: 31.6 %
MCH RBC QN AUTO: 31.3 PG
MCHC RBC AUTO-ENTMCNC: 34 G/DL
MCV RBC AUTO: 92 FL
MONOCYTES # BLD AUTO: 0.4 K/UL
MONOCYTES NFR BLD: 6.8 %
NEUTROPHILS # BLD AUTO: 3.8 K/UL
NEUTROPHILS NFR BLD: 58.2 %
NRBC BLD-RTO: 0 /100 WBC
PLATELET # BLD AUTO: 187 K/UL
PMV BLD AUTO: 10.9 FL
POTASSIUM SERPL-SCNC: 4.4 MMOL/L
PROT SERPL-MCNC: 7 G/DL
RBC # BLD AUTO: 4.35 M/UL
SODIUM SERPL-SCNC: 137 MMOL/L
WBC # BLD AUTO: 6.46 K/UL

## 2018-06-29 PROCEDURE — 86704 HEP B CORE ANTIBODY TOTAL: CPT

## 2018-06-29 PROCEDURE — 86703 HIV-1/HIV-2 1 RESULT ANTBDY: CPT

## 2018-06-29 PROCEDURE — 86706 HEP B SURFACE ANTIBODY: CPT

## 2018-06-29 PROCEDURE — 86787 VARICELLA-ZOSTER ANTIBODY: CPT

## 2018-06-29 PROCEDURE — 86682 HELMINTH ANTIBODY: CPT

## 2018-06-29 PROCEDURE — 86592 SYPHILIS TEST NON-TREP QUAL: CPT

## 2018-06-29 PROCEDURE — 86803 HEPATITIS C AB TEST: CPT

## 2018-06-29 PROCEDURE — 87340 HEPATITIS B SURFACE AG IA: CPT

## 2018-06-29 PROCEDURE — 85025 COMPLETE CBC W/AUTO DIFF WBC: CPT

## 2018-06-29 PROCEDURE — 80053 COMPREHEN METABOLIC PANEL: CPT

## 2018-06-29 PROCEDURE — 36415 COLL VENOUS BLD VENIPUNCTURE: CPT

## 2018-06-29 PROCEDURE — 86790 VIRUS ANTIBODY NOS: CPT

## 2018-06-30 LAB — RPR SER QL: NORMAL

## 2018-07-02 LAB
HBV SURFACE AB SER-ACNC: NEGATIVE M[IU]/ML
STRONGYLOIDES ANTIBODY IGG: NEGATIVE
VARICELLA INTERPRETATION: POSITIVE
VARICELLA ZOSTER IGG: 3 ISR

## 2018-08-16 ENCOUNTER — OFFICE VISIT (OUTPATIENT)
Dept: INTERNAL MEDICINE | Facility: CLINIC | Age: 53
End: 2018-08-16
Payer: MEDICARE

## 2018-08-16 VITALS
BODY MASS INDEX: 25.62 KG/M2 | DIASTOLIC BLOOD PRESSURE: 88 MMHG | OXYGEN SATURATION: 98 % | WEIGHT: 183 LBS | HEIGHT: 71 IN | HEART RATE: 78 BPM | SYSTOLIC BLOOD PRESSURE: 133 MMHG

## 2018-08-16 DIAGNOSIS — E64.0 SEQUELAE OF PROTEIN-CALORIE MALNUTRITION: ICD-10-CM

## 2018-08-16 DIAGNOSIS — Z00.00 WELLNESS EXAMINATION: ICD-10-CM

## 2018-08-16 DIAGNOSIS — R94.6 ABNORMAL RESULTS OF THYROID FUNCTION STUDIES: ICD-10-CM

## 2018-08-16 DIAGNOSIS — E55.9 VITAMIN D DEFICIENCY: ICD-10-CM

## 2018-08-16 DIAGNOSIS — Z12.5 ENCOUNTER FOR SCREENING FOR MALIGNANT NEOPLASM OF PROSTATE: ICD-10-CM

## 2018-08-16 DIAGNOSIS — G35 MS (MULTIPLE SCLEROSIS): Primary | Chronic | ICD-10-CM

## 2018-08-16 DIAGNOSIS — R73.9 HYPERGLYCEMIA: ICD-10-CM

## 2018-08-16 DIAGNOSIS — N31.9 NEUROGENIC BLADDER: Chronic | ICD-10-CM

## 2018-08-16 PROCEDURE — 99214 OFFICE O/P EST MOD 30 MIN: CPT | Mod: 25,S$GLB,, | Performed by: INTERNAL MEDICINE

## 2018-08-16 PROCEDURE — 3008F BODY MASS INDEX DOCD: CPT | Mod: CPTII,S$GLB,, | Performed by: INTERNAL MEDICINE

## 2018-08-16 PROCEDURE — 90732 PPSV23 VACC 2 YRS+ SUBQ/IM: CPT | Mod: S$GLB,,, | Performed by: INTERNAL MEDICINE

## 2018-08-16 PROCEDURE — G0009 ADMIN PNEUMOCOCCAL VACCINE: HCPCS | Mod: S$GLB,,, | Performed by: INTERNAL MEDICINE

## 2018-08-16 PROCEDURE — 99499 UNLISTED E&M SERVICE: CPT | Mod: HCWC,S$GLB,, | Performed by: INTERNAL MEDICINE

## 2018-08-16 PROCEDURE — 99999 PR PBB SHADOW E&M-EST. PATIENT-LVL V: CPT | Mod: PBBFAC,,, | Performed by: INTERNAL MEDICINE

## 2018-08-22 DIAGNOSIS — Z12.11 COLON CANCER SCREENING: ICD-10-CM

## 2018-08-22 NOTE — PROGRESS NOTES
Cleaned pt of incontinent urine. Admin scheduled rivaroxaban PO, changed discharge papers to reflect admin. Assisted transfer SNF w/c to transport w/c, escorted pt along with transfer personnel to front entrance with rolling walker and personal rolling chair walker. Pt to be transported to home with home health per DONNA    22-Aug-2018 16:13

## 2018-08-25 ENCOUNTER — HOSPITAL ENCOUNTER (EMERGENCY)
Facility: HOSPITAL | Age: 53
Discharge: NURSING, REHAB OR LONG-TERM CARE | End: 2018-08-25
Attending: EMERGENCY MEDICINE
Payer: MEDICARE

## 2018-08-25 VITALS
TEMPERATURE: 97 F | WEIGHT: 183 LBS | HEART RATE: 57 BPM | HEIGHT: 71 IN | OXYGEN SATURATION: 100 % | SYSTOLIC BLOOD PRESSURE: 132 MMHG | DIASTOLIC BLOOD PRESSURE: 100 MMHG | RESPIRATION RATE: 66 BRPM | BODY MASS INDEX: 25.62 KG/M2

## 2018-08-25 DIAGNOSIS — M54.50 CHRONIC LOW BACK PAIN WITHOUT SCIATICA, UNSPECIFIED BACK PAIN LATERALITY: ICD-10-CM

## 2018-08-25 DIAGNOSIS — G89.29 CHRONIC LOW BACK PAIN WITHOUT SCIATICA, UNSPECIFIED BACK PAIN LATERALITY: ICD-10-CM

## 2018-08-25 DIAGNOSIS — T14.8XXA FRACTURE: ICD-10-CM

## 2018-08-25 DIAGNOSIS — S09.90XA INJURY OF HEAD, INITIAL ENCOUNTER: Primary | ICD-10-CM

## 2018-08-25 PROCEDURE — 99283 EMERGENCY DEPT VISIT LOW MDM: CPT | Mod: ,,, | Performed by: EMERGENCY MEDICINE

## 2018-08-25 PROCEDURE — 25000003 PHARM REV CODE 250: Performed by: EMERGENCY MEDICINE

## 2018-08-25 PROCEDURE — 99284 EMERGENCY DEPT VISIT MOD MDM: CPT | Mod: 25

## 2018-08-25 RX ORDER — OXYCODONE AND ACETAMINOPHEN 5; 325 MG/1; MG/1
1 TABLET ORAL
Status: COMPLETED | OUTPATIENT
Start: 2018-08-25 | End: 2018-08-25

## 2018-08-25 RX ORDER — ACETAMINOPHEN 325 MG/1
650 TABLET ORAL
Status: COMPLETED | OUTPATIENT
Start: 2018-08-25 | End: 2018-08-25

## 2018-08-25 RX ADMIN — OXYCODONE HYDROCHLORIDE AND ACETAMINOPHEN 1 TABLET: 5; 325 TABLET ORAL at 08:08

## 2018-08-25 RX ADMIN — ACETAMINOPHEN 650 MG: 325 TABLET ORAL at 08:08

## 2018-08-26 NOTE — ED PROVIDER NOTES
Encounter Date: 2018    SCRIBE #1 NOTE: I, Fausto Xiao, am scribing for, and in the presence of,  Dr. Peterson. I have scribed the following portions of the note - Other sections scribed: TYLER MENDOZA.       History     Chief Complaint   Patient presents with    Fall     Hx. of MS and from [facility], Fall while trying to stand, reports hitting his head and he is taking Xarelto, no obvious deformities noted     The history is provided by the patient.   Fall   The accident occurred 1 to 2 hours ago. The fall occurred while standing (HISTORY MULTIPLE SCLEROSIS). He fell from a height of 3 to 5 ft. He landed on carpet. There was no blood loss. The point of impact was the head. The pain is present in the back. Associated symptoms include back pain. Pertinent negatives include no neck pain, no bowel incontinence, no headaches and no loss of consciousness. He has tried nothing for the symptoms.     Review of patient's allergies indicates:  No Known Allergies  Past Medical History:   Diagnosis Date    Anticoagulant long-term use     Anxiety     Chronic back pain     Deep vein thrombosis     Depression     Depression     Gait instability     Multiple sclerosis     Multiple sclerosis     Neurogenic bladder     Polyneuropathy     Pulmonary embolism     Spasticity      No past surgical history on file.  Family History   Problem Relation Age of Onset    Arthritis Mother     No Known Problems Father     Cancer Maternal Grandfather      Social History     Tobacco Use    Smoking status: Former Smoker     Packs/day: 0.50     Years: 23.00     Pack years: 11.50     Types: Cigarettes     Last attempt to quit: 2018     Years since quittin.2    Smokeless tobacco: Never Used    Tobacco comment: Using patch   Substance Use Topics    Alcohol use: No    Drug use: No     Review of Systems   Cardiovascular: Negative for chest pain.   Gastrointestinal: Negative for bowel incontinence.   Musculoskeletal: Positive  for back pain. Negative for neck pain.   Neurological: Negative for loss of consciousness and headaches.   All other systems reviewed and are negative.      Physical Exam     Initial Vitals [08/25/18 1956]   BP Pulse Resp Temp SpO2   (!) 144/72 84 16 96.8 °F (36 °C) 98 %      MAP       --         Physical Exam    Vitals reviewed.  Constitutional: He appears well-developed and well-nourished.   GCS 15   HENT:   Head: Normocephalic and atraumatic.   Right Ear: External ear normal.   Left Ear: External ear normal.   NONTENDER MIDLINE CTL  NO SIGNS OF BASAL FRACTURE   Eyes: EOM are normal.   Neck: Neck supple.   Cardiovascular: Normal rate.   Pulmonary/Chest: Breath sounds normal. No respiratory distress.   Abdominal: Soft. There is no tenderness.   PELVIS STABLE   Musculoskeletal:   CONTRACTED LEFT HAND FLEXED ELBOW CHRONIC PER PATIENT WITH HISTORY OF MULTIPLE SCLEROSIS.  NORMAL MOVEMENT OF EXTREMITIES PER PATIENT   Neurological: He is alert and oriented to person, place, and time.   LEFT LEG DECREASED RANGE OF MOTION NORMAL PER PATIENT.  EQUAL LEG LENGTH   Skin: Skin is warm and dry.         ED Course   Procedures  Labs Reviewed - No data to display       Imaging Results    None          Medical Decision Making:   History:   Old Medical Records: I decided to obtain old medical records.  Clinical Tests:   Radiological Study: Ordered and Reviewed  ED Management:  2115 NO SIGNS OF ACUTE NEUROVASCULAR COMPROMISE  SYMPTOMS LIKELY SECONDARY TO HISTORY OF MULTIPLE SCLEROSIS WITH FALL            Scribe Attestation:   Scribe #1: I performed the above scribed service and the documentation accurately describes the services I performed. I attest to the accuracy of the note.               Clinical Impression:    BLUNT HEAD TRAUMA  NON SYNCOPAL FALL  ACUTE ON CHRONIC BACK PAIN    Disposition:   Disposition: Discharged  Condition: Stable                        Alonzo Peterson DO  08/25/18 2119

## 2018-08-26 NOTE — ED NOTES
Rep at [place] [facility] but we have to call for him to be picked up.

## 2018-08-26 NOTE — ED NOTES
Pt comes from NH, states that he fell trying to get up out of his wheelchair to make his bed. Pt complains that he can usually walk, but they make him stay in a wheelchair. This has made his legs more weak. He likes to get around with a walker. He CO back pain 10/10.

## 2018-09-24 ENCOUNTER — TELEPHONE (OUTPATIENT)
Dept: NEUROLOGY | Facility: CLINIC | Age: 53
End: 2018-09-24

## 2018-09-25 ENCOUNTER — TELEPHONE (OUTPATIENT)
Dept: INTERNAL MEDICINE | Facility: CLINIC | Age: 53
End: 2018-09-25

## 2018-09-25 NOTE — TELEPHONE ENCOUNTER
----- Message from Reji Bryan sent at 9/25/2018 10:13 AM CDT -----  Contact: self  Pt called and requested a f/u after being discharged from [facility] but 1st available is not until Jan.3 , pt stated that  stated he must be seen by his PCP with in 2 weeks from today pt can be reached at [phone]

## 2018-09-27 ENCOUNTER — TELEPHONE (OUTPATIENT)
Dept: NEUROLOGY | Facility: CLINIC | Age: 53
End: 2018-09-27

## 2018-09-27 NOTE — TELEPHONE ENCOUNTER
Called [facility] to reschedule missed appt's scheduled today. I was told the Nurse was not available, and they were unsure why he missed his appt, but they would give him our number to call and reschedule. He is being discharged from their facility tomorrow.

## 2018-10-01 DIAGNOSIS — R26.81 GAIT INSTABILITY: ICD-10-CM

## 2018-10-01 DIAGNOSIS — M54.41 CHRONIC BILATERAL LOW BACK PAIN WITH BILATERAL SCIATICA: ICD-10-CM

## 2018-10-01 DIAGNOSIS — G35 MS (MULTIPLE SCLEROSIS): Chronic | ICD-10-CM

## 2018-10-01 DIAGNOSIS — G82.20 PARAPARESIS: ICD-10-CM

## 2018-10-01 DIAGNOSIS — G89.29 CHRONIC PAIN OF MULTIPLE SITES: Chronic | ICD-10-CM

## 2018-10-01 DIAGNOSIS — R25.2 SPASTICITY: ICD-10-CM

## 2018-10-01 DIAGNOSIS — G89.4 CHRONIC PAIN SYNDROME: ICD-10-CM

## 2018-10-01 DIAGNOSIS — R52 CHRONIC PAIN OF MULTIPLE SITES: Chronic | ICD-10-CM

## 2018-10-01 DIAGNOSIS — Z79.891 LONG-TERM CURRENT USE OF OPIATE ANALGESIC: ICD-10-CM

## 2018-10-01 DIAGNOSIS — Z78.9 IMPAIRED MOBILITY AND ADLS: Chronic | ICD-10-CM

## 2018-10-01 DIAGNOSIS — G89.29 CHRONIC BILATERAL LOW BACK PAIN WITH BILATERAL SCIATICA: ICD-10-CM

## 2018-10-01 DIAGNOSIS — Z74.09 IMPAIRED MOBILITY AND ADLS: Chronic | ICD-10-CM

## 2018-10-01 DIAGNOSIS — Z79.52 LONG TERM (CURRENT) USE OF SYSTEMIC STEROIDS: ICD-10-CM

## 2018-10-01 DIAGNOSIS — M54.42 CHRONIC BILATERAL LOW BACK PAIN WITH BILATERAL SCIATICA: ICD-10-CM

## 2018-10-01 DIAGNOSIS — G57.93 NEUROPATHIC PAIN, LEG, BILATERAL: ICD-10-CM

## 2018-10-01 DIAGNOSIS — G62.9 POLYNEUROPATHY: ICD-10-CM

## 2018-10-01 NOTE — TELEPHONE ENCOUNTER
----- Message from Randolph Dowell sent at 10/1/2018  1:32 PM CDT -----  Contact: Patient @ [phone]  Rx Refill/Request     Is this a Refill or New Rx:  Yes ( patient he's not longer living a [facility] )  Rx Name and Strength:  (diazePAM (VALIUM) 5 MG tablet ) (HYDROcodone-acetaminophen (NORCO)  mg per tablet )     Preferred Pharmacy with phone number:     [facility]  [address] [address]  Phone: [phone] Fax: [phone]

## 2018-10-02 ENCOUNTER — TELEPHONE (OUTPATIENT)
Dept: OPTOMETRY | Facility: CLINIC | Age: 53
End: 2018-10-02

## 2018-10-03 ENCOUNTER — TELEPHONE (OUTPATIENT)
Dept: NEUROLOGY | Facility: CLINIC | Age: 53
End: 2018-10-03

## 2018-10-03 NOTE — TELEPHONE ENCOUNTER
Attempted to call pt. Phone number on file is [place] speaking [facility] to provide an [facility] number.

## 2018-10-03 NOTE — TELEPHONE ENCOUNTER
SW received message in Epic that pt called from a new number inquiring about his MS medication and wanting to schedule his infusion.  His new phone number, [phone], was updated in his chart.  Phoned pt and left a voicemail for him to call.  Phoned pt's previous  at [facility].  She advised that his other  Alyx completed his discharge and that he moved into an apartment.  He should also begin receiving Medicaid waiver services, but these had not activated at the time of his discharge from [facility].  Asked Rebecca to fax pt's discharge orders.      Pt phoned back just after this call and advised that he is living with his mother.  Waiver services will come to his mother's home next Thursday.  He would like to schedule an appointment with Lawanda or Dr. Finnegan.

## 2018-10-03 NOTE — TELEPHONE ENCOUNTER
----- Message from William Marcelo sent at 10/3/2018  2:18 PM CDT -----  Needs Advice    Reason for call: Pt is asking to speak w/ Ariane or Lawanda to confirm which medication he's using for his infusions, and he's also asking to schedule an infusion        Communication Preference: [phone]    Additional Information:

## 2018-10-04 RX ORDER — DIAZEPAM 5 MG/1
5 TABLET ORAL 2 TIMES DAILY
Qty: 60 TABLET | Refills: 2 | OUTPATIENT
Start: 2018-10-04 | End: 2018-11-03

## 2018-10-04 RX ORDER — HYDROCODONE BITARTRATE AND ACETAMINOPHEN 10; 325 MG/1; MG/1
1 TABLET ORAL
OUTPATIENT
Start: 2018-10-04

## 2018-10-08 ENCOUNTER — OFFICE VISIT (OUTPATIENT)
Dept: PHYSICAL MEDICINE AND REHAB | Facility: CLINIC | Age: 53
End: 2018-10-08
Payer: MEDICARE

## 2018-10-08 VITALS
WEIGHT: 180 LBS | HEIGHT: 71 IN | HEART RATE: 99 BPM | DIASTOLIC BLOOD PRESSURE: 88 MMHG | SYSTOLIC BLOOD PRESSURE: 139 MMHG | BODY MASS INDEX: 25.2 KG/M2

## 2018-10-08 DIAGNOSIS — G89.29 CHRONIC BILATERAL LOW BACK PAIN WITH BILATERAL SCIATICA: Primary | ICD-10-CM

## 2018-10-08 DIAGNOSIS — G89.29 CHRONIC PAIN OF MULTIPLE SITES: Chronic | ICD-10-CM

## 2018-10-08 DIAGNOSIS — R26.81 GAIT INSTABILITY: ICD-10-CM

## 2018-10-08 DIAGNOSIS — R25.2 SPASTICITY: ICD-10-CM

## 2018-10-08 DIAGNOSIS — Z79.891 LONG-TERM CURRENT USE OF OPIATE ANALGESIC: ICD-10-CM

## 2018-10-08 DIAGNOSIS — Z78.9 IMPAIRED MOBILITY AND ADLS: Chronic | ICD-10-CM

## 2018-10-08 DIAGNOSIS — Z79.52 LONG TERM (CURRENT) USE OF SYSTEMIC STEROIDS: ICD-10-CM

## 2018-10-08 DIAGNOSIS — G62.9 POLYNEUROPATHY: ICD-10-CM

## 2018-10-08 DIAGNOSIS — R52 CHRONIC PAIN OF MULTIPLE SITES: Chronic | ICD-10-CM

## 2018-10-08 DIAGNOSIS — G35 ACUTE RELAPSING MULTIPLE SCLEROSIS: ICD-10-CM

## 2018-10-08 DIAGNOSIS — G57.93 NEUROPATHIC PAIN, LEG, BILATERAL: ICD-10-CM

## 2018-10-08 DIAGNOSIS — R53.1 WEAKNESS GENERALIZED: ICD-10-CM

## 2018-10-08 DIAGNOSIS — Z74.09 IMPAIRED MOBILITY AND ADLS: Chronic | ICD-10-CM

## 2018-10-08 DIAGNOSIS — M54.41 CHRONIC BILATERAL LOW BACK PAIN WITH BILATERAL SCIATICA: Primary | ICD-10-CM

## 2018-10-08 DIAGNOSIS — G35 MS (MULTIPLE SCLEROSIS): Chronic | ICD-10-CM

## 2018-10-08 DIAGNOSIS — G82.20 PARAPARESIS: ICD-10-CM

## 2018-10-08 DIAGNOSIS — G89.4 CHRONIC PAIN SYNDROME: ICD-10-CM

## 2018-10-08 DIAGNOSIS — M54.42 CHRONIC BILATERAL LOW BACK PAIN WITH BILATERAL SCIATICA: Primary | ICD-10-CM

## 2018-10-08 PROCEDURE — 99215 OFFICE O/P EST HI 40 MIN: CPT | Mod: S$PBB,,, | Performed by: PHYSICAL MEDICINE & REHABILITATION

## 2018-10-08 PROCEDURE — 99999 PR PBB SHADOW E&M-EST. PATIENT-LVL III: CPT | Mod: PBBFAC,,, | Performed by: PHYSICAL MEDICINE & REHABILITATION

## 2018-10-08 PROCEDURE — 3008F BODY MASS INDEX DOCD: CPT | Mod: CPTII,,, | Performed by: PHYSICAL MEDICINE & REHABILITATION

## 2018-10-08 PROCEDURE — 99499 UNLISTED E&M SERVICE: CPT | Mod: HCWC,S$GLB,, | Performed by: PHYSICAL MEDICINE & REHABILITATION

## 2018-10-08 PROCEDURE — 99213 OFFICE O/P EST LOW 20 MIN: CPT | Mod: PBBFAC | Performed by: PHYSICAL MEDICINE & REHABILITATION

## 2018-10-08 RX ORDER — OXYCODONE AND ACETAMINOPHEN 10; 325 MG/1; MG/1
1 TABLET ORAL EVERY 8 HOURS PRN
Qty: 90 TABLET | Refills: 0 | Status: ON HOLD | OUTPATIENT
Start: 2018-11-08 | End: 2018-10-21 | Stop reason: SDUPTHER

## 2018-10-08 RX ORDER — DIAZEPAM 5 MG/1
5 TABLET ORAL EVERY 8 HOURS PRN
Qty: 90 TABLET | Refills: 1 | Status: ON HOLD | OUTPATIENT
Start: 2018-10-08 | End: 2018-10-21 | Stop reason: SDUPTHER

## 2018-10-08 RX ORDER — HYDROCODONE BITARTRATE AND ACETAMINOPHEN 10; 325 MG/1; MG/1
1 TABLET ORAL EVERY 8 HOURS PRN
Qty: 90 TABLET | Refills: 0 | Status: ON HOLD | OUTPATIENT
Start: 2018-10-08 | End: 2018-10-21 | Stop reason: HOSPADM

## 2018-10-08 RX ORDER — OXYCODONE AND ACETAMINOPHEN 10; 325 MG/1; MG/1
1 TABLET ORAL EVERY 8 HOURS PRN
Qty: 90 TABLET | Refills: 0 | Status: SHIPPED | OUTPATIENT
Start: 2018-10-08 | End: 2018-10-08 | Stop reason: SDUPTHER

## 2018-10-08 RX ORDER — BACLOFEN 10 MG/1
20 TABLET ORAL 3 TIMES DAILY
Qty: 90 TABLET | Refills: 11 | Status: SHIPPED | OUTPATIENT
Start: 2018-10-08 | End: 2018-11-07

## 2018-10-08 NOTE — PROGRESS NOTES
"Subjective:       Patient ID: Mao Levin is a 53 y.o. male.    Chief Complaint: Neurologic Problem; Chronic Pain Syndrome; and paraparesis    Leg Pain    Pertinent negatives include no numbness.   Back Pain   Associated symptoms include leg pain. Pertinent negatives include no chest pain, dysuria, headaches, numbness or weakness.   Neck Pain    Associated symptoms include leg pain. Pertinent negatives include no chest pain, headaches, numbness or weakness.   Neurologic Problem   The patient's pertinent negatives include no weakness. Associated symptoms include back pain and neck pain. Pertinent negatives include no chest pain, dizziness, fatigue, headaches or shortness of breath.      Mao Levin is 52 y/o male who returns to clinic for chronic multiple MSK pain, in lower back, neck,multiple joints that include: shoulder, knee, and hand pain.   [facility] 05/02/18.   Patient is now out of [facility], and he now lives alone in his own apartment next to his mother.   He is coming for the first time walking with RW, although hardly and very slowly walking.   He is out of one week medication supply given on discharge from [facility].  He has a lot of MSK pain, with his MS.  Reports that he has lift, WC, scooter, and he came with RW, on public transport.   The pain started in 2006 following MS diagnosis and symptoms have been worsening.    Brief history: Patient is a 51 yo male with MS diagnosed in 2006 as well as chronic, generalized pain since that time that presents for initial evaluation. He was hospitalized in inpatient rehab in October /2016 and feels that he has become more functional but still is mostly wheel chair bound. .   Pain occurs from the neck down and involves the entire body except the head. No specific area is worse. Pain is "achy" and "tingling".   It is constant at 7-8/10 but can worsen with any movement to 10/10.   Today he c/o pain in both arm/hands in all fingers, they feel sore and tight.   Back " pain is running down to both legs, back of thighs, and pain in leg is worst bellow the knee level, in calves that are tight, and weak.  His Left leg is weaker than Rt leg.   Has foot b/l foot weakness and drop, wears left AFO.  He has been a prolong time on chronic pain management with oxycodone 15mg q6h and oxycontin 40 mg TWICE DAILY ( while in IP Rehab) but did not feel that this was any more helpful. Tried gabapentin in the past which was tolerated but didn't help.   He also takes diazepam 5mg TWICE DAILY and baclofen 10mg TID which helps his spasticity.    In NH, he was taking  Tramadol and Oxycodone 5 mg Q8 hrs prn pain.  He states that Tramadol is ineffective.  He is coming today, w/o  D/c papers, medical documentation from NH.   Pain Medications:  Percocet 10/325mg, 1 tablet 3x a day  Gabapentin 300mg, 3 tablets three times daily  Valium 5mg, 1 tablet twice daily  Baclofen 20 mg QID  Xarelto 20mg, 1 tablet daily    Pain Description:   The pain is located in the neck, back shoulders, hands  and knees.  Today the current  pain is rated as 7/10  At BEST  5/10   At WORST  10/10 on the WORST day.    On average pain is rated as 7/10.   The pain is described as aching and tingling  Symptoms interfere with daily activity, sleeping and work.   Exacerbating factors: Morning, Extension and Flexing.    Mitigating factors medications and physical therapy.   Patient denies .  Patient denies any suicidal or homicidal ideations    Physical Therapy/Home Exercise: yes     report:  Reviewed and consistent with medication use as prescribed.  Pain Procedures: none      Imaging:   I reviewed C-spine and T-spine MRI which showed demylenating changes as well as fairly significant stenosis throughout the cervical spine.   He is here for follow up, and treatment.    Past Medical History:   Diagnosis Date    Anticoagulant long-term use     Anxiety     Chronic back pain     Deep vein thrombosis     Depression     Depression      Gait instability     Multiple sclerosis     Multiple sclerosis     Neurogenic bladder     Polyneuropathy     Pulmonary embolism     Spasticity        Past Surgical History:   Procedure Laterality Date    CYSTOSCOPY N/A 2018    Procedure: CYSTOSCOPY;  Surgeon: Brian Augustin MD;  Location: [facility] OR [address] [place] [address];  Service: Urology;  Laterality: N/A;  1 hour    CYSTOSCOPY N/A 2018    Performed by Brian Augustin MD at [facility] OR [address] [place] [address]    INJECTION OF BOTULINUM TOXIN TYPE A N/A 2018    Procedure: INJECTION, BOTULINUM TOXIN, TYPE A 200 UNITS;  Surgeon: Brian Augustin MD;  Location: [facility] OR [address] [place] [address];  Service: Urology;  Laterality: N/A;    INJECTION, BOTULINUM TOXIN, TYPE A 200 UNITS N/A 2018    Performed by Brian Augustin MD at [facility] OR [address] [place] [address]       Family History   Problem Relation Age of Onset    Arthritis Mother     No Known Problems Father     Cancer Maternal Grandfather        Social History     Socioeconomic History    Marital status: Single     Spouse name: None    Number of children: None    Years of education: None    Highest education level: None   Social Needs    Financial resource strain: None    Food insecurity - worry: None    Food insecurity - inability: None    Transportation needs - medical: None    Transportation needs - non-medical: None   Occupational History    None   Tobacco Use    Smoking status: Former Smoker     Packs/day: 0.50     Years: 23.00     Pack years: 11.50     Types: Cigarettes     Last attempt to quit: 2018     Years since quittin.3    Smokeless tobacco: Never Used    Tobacco comment: Using patch   Substance and Sexual Activity    Alcohol use: No    Drug use: No    Sexual activity: Yes     Partners: Female   Other Topics Concern    None   Social History Narrative    None       Current Outpatient Medications   Medication Sig Dispense Refill    ascorbic acid, vitamin C, (VITAMIN C) 500 MG tablet Take 500 mg by mouth once daily.       baclofen (LIORESAL) 10 MG tablet Take 2 tablets (20 mg total) by mouth 3 (three) times daily. 90 tablet 11    buPROPion (WELLBUTRIN SR) 150 MG TBSR 12 hr tablet 1 tab po daily x 3 days, then increase to 1 tab po BID; last dose no later than 6PM; stop smoking after 5-7 days of treatment; (Patient taking differently: 150 mg 2 (two) times daily. 1 tab po daily x 3 days, then increase to 1 tab po BID; last dose no later than 6PM; stop smoking after 5-7 days of treatment;) 60 tablet 3    cranberry conc-C-bacillus coag 450-30-50 mg-mg-million Tab Take 1 capsule by mouth once daily. 120 each 3    dalfampridine (AMPYRA) 10 mg Tb12 Take 10 mg by mouth every 12 (twelve) hours. 180 tablet 1    dantrolene (DANTRIUM) 50 MG Cap Take 1 capsule (50 mg total) by mouth 4 (four) times daily. 120 capsule 0    diazePAM (VALIUM) 5 MG tablet Take 1 tablet (5 mg total) by mouth every 8 (eight) hours as needed for Anxiety. 90 tablet 1    duloxetine (CYMBALTA) 30 MG capsule Take 30 mg by mouth once daily.      ergocalciferol (VITAMIN D2) 50,000 unit Cap Take 1 capsule (50,000 Units total) by mouth every 7 days. (Patient taking differently: Take 50,000 Units by mouth every 7 days. on monday) 4 capsule 11    gabapentin (NEURONTIN) 300 MG capsule Take 900 mg morning and evening.  Take 1100 mg total in the afternoon. 270 capsule 0    HYDROcodone-acetaminophen (NORCO)  mg per tablet Take 1 tablet by mouth every 8 (eight) hours as needed for Pain. 90 tablet 0    mirabegron (MYRBETRIQ) 50 mg Tb24 Take 1 tablet (50 mg total) by mouth once daily. 30 tablet 11    multivitamin capsule Take 1 capsule by mouth once daily.      nitrofurantoin macrocrystal (MACRODANTIN ORAL) Take by mouth.      [START ON 11/8/2018] oxyCODONE-acetaminophen (PERCOCET)  mg per tablet Take 1 tablet by mouth every 8 (eight) hours as needed for Pain. 90 tablet 0    polyethylene glycol (GLYCOLAX) 17 gram/dose powder Take 17 g by mouth once daily. 510 g 6     rivaroxaban (XARELTO) 20 mg Tab Take 1 tablet (20 mg total) by mouth daily with dinner or evening meal. 60 tablet 4    senna-docusate 8.6-50 mg (PERICOLACE) 8.6-50 mg per tablet Take 1 tablet by mouth once daily.      tamsulosin (FLOMAX) 0.4 mg Cp24 Take 1 capsule (0.4 mg total) by mouth once daily. 30 capsule 11    trazodone (DESYREL) 100 MG tablet Take 1 tablet (100 mg total) by mouth every evening. 30 tablet 3     No current facility-administered medications for this visit.      Review of patient's allergies indicates:  No Known Allergies    Review of Systems   Constitutional: Negative for appetite change and fatigue.   Eyes: Negative for visual disturbance.   Respiratory: Negative for shortness of breath.    Cardiovascular: Negative for chest pain.   Gastrointestinal: Negative for constipation and diarrhea.   Genitourinary: Negative for dysuria, frequency and urgency.   Musculoskeletal: Positive for arthralgias, back pain, gait problem, myalgias and neck pain. Negative for joint swelling and neck stiffness.   Neurological: Negative for dizziness, tremors, weakness, numbness and headaches.   Psychiatric/Behavioral: Negative for dysphoric mood.   All other systems reviewed and are negative.        Objective:      Physical Exam      Constitutional: He is oriented to person, place, and time.   He appears well-nourished.   Eyes: EOM are normal. Pupils are equal, round, and reactive to light.   Neck: Normal range of motion. Neck supple.   Cardiovascular: Normal rhythm  and rate.    Pulmonary/Chest: Effort normal.   Abdominal: Soft.   Musculoskeletal:   Gait: spastic, walks hardly, very slow, dragging feet, but does not want RW with seat, nor he wants to use WC. He wants to walk again,  BUEs AROM WNL  BLEs AROM is diminished   Neurological: He is oriented to person, place, and time.   BUEs 5/5  RLE 3/5 HF, 4-/5 HE, 3+/5 KE/KF/DF  LLE 3-/5 HF, 3+/5 HE, 3-/5 KE, 2/5 KF, 0/5 DF .  wears left AFO.  Skin: No rash  noted.   Psychiatric: He has a normal mood and affect.       Assessment:           1. Chronic bilateral low back pain with bilateral sciatica    2. Acute relapsing multiple sclerosis    3. Chronic pain syndrome    4. Neuropathic pain, leg, bilateral    5. Paraparesis    6. Spasticity    7. Weakness generalized    8. Gait instability    9. Long-term current use of opiate analgesic    10. Chronic pain of multiple sites    11. Impaired mobility and ADLs    12. MS (multiple sclerosis)    13. Polyneuropathy    14. Long term (current) use of systemic steroids            Plan:        Chronic bilateral low back pain with bilateral sciatica  -     HYDROcodone-acetaminophen (NORCO)  mg per tablet; Take 1 tablet by mouth every 8 (eight) hours as needed for Pain.  Dispense: 90 tablet; Refill: 0  -     diazePAM (VALIUM) 5 MG tablet; Take 1 tablet (5 mg total) by mouth every 8 (eight) hours as needed for Anxiety.  Dispense: 90 tablet; Refill: 1  -     Discontinue: oxyCODONE-acetaminophen (PERCOCET)  mg per tablet; Take 1 tablet by mouth every 8 (eight) hours as needed for Pain.  Dispense: 90 tablet; Refill: 0  -     oxyCODONE-acetaminophen (PERCOCET)  mg per tablet; Take 1 tablet by mouth every 8 (eight) hours as needed for Pain.  Dispense: 90 tablet; Refill: 0    Acute relapsing multiple sclerosis  -     HYDROcodone-acetaminophen (NORCO)  mg per tablet; Take 1 tablet by mouth every 8 (eight) hours as needed for Pain.  Dispense: 90 tablet; Refill: 0  -     diazePAM (VALIUM) 5 MG tablet; Take 1 tablet (5 mg total) by mouth every 8 (eight) hours as needed for Anxiety.  Dispense: 90 tablet; Refill: 1  -     Discontinue: oxyCODONE-acetaminophen (PERCOCET)  mg per tablet; Take 1 tablet by mouth every 8 (eight) hours as needed for Pain.  Dispense: 90 tablet; Refill: 0  -     oxyCODONE-acetaminophen (PERCOCET)  mg per tablet; Take 1 tablet by mouth every 8 (eight) hours as needed for Pain.  Dispense:  90 tablet; Refill: 0    Chronic pain syndrome  -     HYDROcodone-acetaminophen (NORCO)  mg per tablet; Take 1 tablet by mouth every 8 (eight) hours as needed for Pain.  Dispense: 90 tablet; Refill: 0  -     diazePAM (VALIUM) 5 MG tablet; Take 1 tablet (5 mg total) by mouth every 8 (eight) hours as needed for Anxiety.  Dispense: 90 tablet; Refill: 1  -     Discontinue: oxyCODONE-acetaminophen (PERCOCET)  mg per tablet; Take 1 tablet by mouth every 8 (eight) hours as needed for Pain.  Dispense: 90 tablet; Refill: 0  -     oxyCODONE-acetaminophen (PERCOCET)  mg per tablet; Take 1 tablet by mouth every 8 (eight) hours as needed for Pain.  Dispense: 90 tablet; Refill: 0    Neuropathic pain, leg, bilateral  -     HYDROcodone-acetaminophen (NORCO)  mg per tablet; Take 1 tablet by mouth every 8 (eight) hours as needed for Pain.  Dispense: 90 tablet; Refill: 0  -     diazePAM (VALIUM) 5 MG tablet; Take 1 tablet (5 mg total) by mouth every 8 (eight) hours as needed for Anxiety.  Dispense: 90 tablet; Refill: 1  -     Discontinue: oxyCODONE-acetaminophen (PERCOCET)  mg per tablet; Take 1 tablet by mouth every 8 (eight) hours as needed for Pain.  Dispense: 90 tablet; Refill: 0  -     oxyCODONE-acetaminophen (PERCOCET)  mg per tablet; Take 1 tablet by mouth every 8 (eight) hours as needed for Pain.  Dispense: 90 tablet; Refill: 0    Paraparesis  -     HYDROcodone-acetaminophen (NORCO)  mg per tablet; Take 1 tablet by mouth every 8 (eight) hours as needed for Pain.  Dispense: 90 tablet; Refill: 0  -     diazePAM (VALIUM) 5 MG tablet; Take 1 tablet (5 mg total) by mouth every 8 (eight) hours as needed for Anxiety.  Dispense: 90 tablet; Refill: 1  -     Discontinue: oxyCODONE-acetaminophen (PERCOCET)  mg per tablet; Take 1 tablet by mouth every 8 (eight) hours as needed for Pain.  Dispense: 90 tablet; Refill: 0  -     oxyCODONE-acetaminophen (PERCOCET)  mg per tablet; Take 1  tablet by mouth every 8 (eight) hours as needed for Pain.  Dispense: 90 tablet; Refill: 0    Spasticity  -     HYDROcodone-acetaminophen (NORCO)  mg per tablet; Take 1 tablet by mouth every 8 (eight) hours as needed for Pain.  Dispense: 90 tablet; Refill: 0  -     diazePAM (VALIUM) 5 MG tablet; Take 1 tablet (5 mg total) by mouth every 8 (eight) hours as needed for Anxiety.  Dispense: 90 tablet; Refill: 1  -     Discontinue: oxyCODONE-acetaminophen (PERCOCET)  mg per tablet; Take 1 tablet by mouth every 8 (eight) hours as needed for Pain.  Dispense: 90 tablet; Refill: 0  -     oxyCODONE-acetaminophen (PERCOCET)  mg per tablet; Take 1 tablet by mouth every 8 (eight) hours as needed for Pain.  Dispense: 90 tablet; Refill: 0    Weakness generalized  -     HYDROcodone-acetaminophen (NORCO)  mg per tablet; Take 1 tablet by mouth every 8 (eight) hours as needed for Pain.  Dispense: 90 tablet; Refill: 0  -     Discontinue: oxyCODONE-acetaminophen (PERCOCET)  mg per tablet; Take 1 tablet by mouth every 8 (eight) hours as needed for Pain.  Dispense: 90 tablet; Refill: 0  -     oxyCODONE-acetaminophen (PERCOCET)  mg per tablet; Take 1 tablet by mouth every 8 (eight) hours as needed for Pain.  Dispense: 90 tablet; Refill: 0    Gait instability  -     HYDROcodone-acetaminophen (NORCO)  mg per tablet; Take 1 tablet by mouth every 8 (eight) hours as needed for Pain.  Dispense: 90 tablet; Refill: 0  -     diazePAM (VALIUM) 5 MG tablet; Take 1 tablet (5 mg total) by mouth every 8 (eight) hours as needed for Anxiety.  Dispense: 90 tablet; Refill: 1  -     Discontinue: oxyCODONE-acetaminophen (PERCOCET)  mg per tablet; Take 1 tablet by mouth every 8 (eight) hours as needed for Pain.  Dispense: 90 tablet; Refill: 0  -     oxyCODONE-acetaminophen (PERCOCET)  mg per tablet; Take 1 tablet by mouth every 8 (eight) hours as needed for Pain.  Dispense: 90 tablet; Refill: 0    Long-term  current use of opiate analgesic  -     HYDROcodone-acetaminophen (NORCO)  mg per tablet; Take 1 tablet by mouth every 8 (eight) hours as needed for Pain.  Dispense: 90 tablet; Refill: 0  -     diazePAM (VALIUM) 5 MG tablet; Take 1 tablet (5 mg total) by mouth every 8 (eight) hours as needed for Anxiety.  Dispense: 90 tablet; Refill: 1  -     Discontinue: oxyCODONE-acetaminophen (PERCOCET)  mg per tablet; Take 1 tablet by mouth every 8 (eight) hours as needed for Pain.  Dispense: 90 tablet; Refill: 0  -     oxyCODONE-acetaminophen (PERCOCET)  mg per tablet; Take 1 tablet by mouth every 8 (eight) hours as needed for Pain.  Dispense: 90 tablet; Refill: 0    Chronic pain of multiple sites  -     diazePAM (VALIUM) 5 MG tablet; Take 1 tablet (5 mg total) by mouth every 8 (eight) hours as needed for Anxiety.  Dispense: 90 tablet; Refill: 1  -     Discontinue: oxyCODONE-acetaminophen (PERCOCET)  mg per tablet; Take 1 tablet by mouth every 8 (eight) hours as needed for Pain.  Dispense: 90 tablet; Refill: 0  -     oxyCODONE-acetaminophen (PERCOCET)  mg per tablet; Take 1 tablet by mouth every 8 (eight) hours as needed for Pain.  Dispense: 90 tablet; Refill: 0    Impaired mobility and ADLs  -     diazePAM (VALIUM) 5 MG tablet; Take 1 tablet (5 mg total) by mouth every 8 (eight) hours as needed for Anxiety.  Dispense: 90 tablet; Refill: 1  -     Discontinue: oxyCODONE-acetaminophen (PERCOCET)  mg per tablet; Take 1 tablet by mouth every 8 (eight) hours as needed for Pain.  Dispense: 90 tablet; Refill: 0  -     oxyCODONE-acetaminophen (PERCOCET)  mg per tablet; Take 1 tablet by mouth every 8 (eight) hours as needed for Pain.  Dispense: 90 tablet; Refill: 0    MS (multiple sclerosis)  -     diazePAM (VALIUM) 5 MG tablet; Take 1 tablet (5 mg total) by mouth every 8 (eight) hours as needed for Anxiety.  Dispense: 90 tablet; Refill: 1  -     Discontinue: oxyCODONE-acetaminophen (PERCOCET)   mg per tablet; Take 1 tablet by mouth every 8 (eight) hours as needed for Pain.  Dispense: 90 tablet; Refill: 0  -     oxyCODONE-acetaminophen (PERCOCET)  mg per tablet; Take 1 tablet by mouth every 8 (eight) hours as needed for Pain.  Dispense: 90 tablet; Refill: 0    Polyneuropathy  -     diazePAM (VALIUM) 5 MG tablet; Take 1 tablet (5 mg total) by mouth every 8 (eight) hours as needed for Anxiety.  Dispense: 90 tablet; Refill: 1  -     Discontinue: oxyCODONE-acetaminophen (PERCOCET)  mg per tablet; Take 1 tablet by mouth every 8 (eight) hours as needed for Pain.  Dispense: 90 tablet; Refill: 0  -     oxyCODONE-acetaminophen (PERCOCET)  mg per tablet; Take 1 tablet by mouth every 8 (eight) hours as needed for Pain.  Dispense: 90 tablet; Refill: 0    Long term (current) use of systemic steroids  -     diazePAM (VALIUM) 5 MG tablet; Take 1 tablet (5 mg total) by mouth every 8 (eight) hours as needed for Anxiety.  Dispense: 90 tablet; Refill: 1  -     Discontinue: oxyCODONE-acetaminophen (PERCOCET)  mg per tablet; Take 1 tablet by mouth every 8 (eight) hours as needed for Pain.  Dispense: 90 tablet; Refill: 0  -     oxyCODONE-acetaminophen (PERCOCET)  mg per tablet; Take 1 tablet by mouth every 8 (eight) hours as needed for Pain.  Dispense: 90 tablet; Refill: 0    Other orders  -     baclofen (LIORESAL) 10 MG tablet; Take 2 tablets (20 mg total) by mouth 3 (three) times daily.  Dispense: 90 tablet; Refill: 11  -     rivaroxaban (XARELTO) 20 mg Tab; Take 1 tablet (20 mg total) by mouth daily with dinner or evening meal.  Dispense: 60 tablet; Refill: 4    Patient with MS, weakness in UE/LE, and neuropathic pain, weakness and spasticity In LE> UE,    He also has chronic pain syndrome, chronic back and neck pain, with B/l LE weakness, Lt>> Rt, with impaired mobility, and ADLs.    -- He is d/c from [facility], to home.    -- Pain management:   I reviewed , and  MAR.  Percocet partial refills on d/c on 9/18 ( and he is out of medications), therefore I gave him first month Hydrocodone than Percocet for next 3 months.  He will resume all his medications, including gabapentin, Baclofen, and Diazepam for spasticity treatment. I prescribed him Xeralto ,too.  He needs PCP.  Also recommend to keep appointments with , for treatment of MS.  At this time he does not need any additional DME.  All plan was discussed with patient and he agrees.     RTC in 3-4 mo    Total time spent face to face with patient was 45 minutes.   More than 50% of that time was spent in counseling on diagnosis , prognosis and treatment options.   I also  patient  on common and most usual side effect of prescribed medications. Risk and benefits of opiates, possible risk of developing opiate dependence and tolerance, need of strict compliance with prescribed medications.  I reviewed Primary care , and other specialty's notes to better coordinate patient's  care.   All questions were answered, and patient voiced understanding.

## 2018-10-15 ENCOUNTER — HOSPITAL ENCOUNTER (INPATIENT)
Facility: OTHER | Age: 53
LOS: 11 days | Discharge: NURSING, REHAB OR LONG-TERM CARE | DRG: 872 | End: 2018-10-26
Attending: EMERGENCY MEDICINE | Admitting: INTERNAL MEDICINE
Payer: MEDICARE

## 2018-10-15 ENCOUNTER — TELEPHONE (OUTPATIENT)
Dept: NEUROLOGY | Facility: CLINIC | Age: 53
End: 2018-10-15

## 2018-10-15 DIAGNOSIS — R53.81 DEBILITY: ICD-10-CM

## 2018-10-15 DIAGNOSIS — Z74.09 IMPAIRED MOBILITY AND ADLS: Chronic | ICD-10-CM

## 2018-10-15 DIAGNOSIS — G89.4 CHRONIC PAIN SYNDROME: ICD-10-CM

## 2018-10-15 DIAGNOSIS — A41.9 SEPSIS DUE TO URINARY TRACT INFECTION: ICD-10-CM

## 2018-10-15 DIAGNOSIS — G62.9 POLYNEUROPATHY: ICD-10-CM

## 2018-10-15 DIAGNOSIS — G35 MS (MULTIPLE SCLEROSIS): Chronic | ICD-10-CM

## 2018-10-15 DIAGNOSIS — G89.29 CHRONIC BILATERAL LOW BACK PAIN WITH BILATERAL SCIATICA: ICD-10-CM

## 2018-10-15 DIAGNOSIS — Z79.01 CURRENT USE OF LONG TERM ANTICOAGULATION: ICD-10-CM

## 2018-10-15 DIAGNOSIS — N39.0 SEPSIS DUE TO URINARY TRACT INFECTION: ICD-10-CM

## 2018-10-15 DIAGNOSIS — G57.93 NEUROPATHIC PAIN, LEG, BILATERAL: ICD-10-CM

## 2018-10-15 DIAGNOSIS — Z78.9 IMPAIRED MOBILITY AND ADLS: Chronic | ICD-10-CM

## 2018-10-15 DIAGNOSIS — Z79.52 LONG TERM (CURRENT) USE OF SYSTEMIC STEROIDS: ICD-10-CM

## 2018-10-15 DIAGNOSIS — R52 CHRONIC PAIN OF MULTIPLE SITES: Chronic | ICD-10-CM

## 2018-10-15 DIAGNOSIS — N39.0 URINARY TRACT INFECTION WITHOUT HEMATURIA, SITE UNSPECIFIED: Primary | ICD-10-CM

## 2018-10-15 DIAGNOSIS — M54.41 CHRONIC BILATERAL LOW BACK PAIN WITH BILATERAL SCIATICA: ICD-10-CM

## 2018-10-15 DIAGNOSIS — G35 ACUTE RELAPSING MULTIPLE SCLEROSIS: ICD-10-CM

## 2018-10-15 DIAGNOSIS — M54.42 CHRONIC BILATERAL LOW BACK PAIN WITH BILATERAL SCIATICA: ICD-10-CM

## 2018-10-15 DIAGNOSIS — N31.9 NEUROGENIC BLADDER: Chronic | ICD-10-CM

## 2018-10-15 DIAGNOSIS — G82.20 PARAPARESIS: ICD-10-CM

## 2018-10-15 DIAGNOSIS — G89.29 CHRONIC PAIN OF MULTIPLE SITES: Chronic | ICD-10-CM

## 2018-10-15 DIAGNOSIS — R26.81 GAIT INSTABILITY: ICD-10-CM

## 2018-10-15 DIAGNOSIS — A41.9 SEPSIS, DUE TO UNSPECIFIED ORGANISM: ICD-10-CM

## 2018-10-15 DIAGNOSIS — R25.2 SPASTICITY: ICD-10-CM

## 2018-10-15 DIAGNOSIS — Z79.891 LONG-TERM CURRENT USE OF OPIATE ANALGESIC: ICD-10-CM

## 2018-10-15 DIAGNOSIS — R53.1 WEAKNESS GENERALIZED: ICD-10-CM

## 2018-10-15 DIAGNOSIS — R50.9 FEVER: ICD-10-CM

## 2018-10-15 LAB
ALBUMIN SERPL BCP-MCNC: 3.8 G/DL
ALP SERPL-CCNC: 73 U/L
ALT SERPL W/O P-5'-P-CCNC: 15 U/L
ANION GAP SERPL CALC-SCNC: 11 MMOL/L
AST SERPL-CCNC: 31 U/L
BACTERIA #/AREA URNS HPF: ABNORMAL /HPF
BASOPHILS # BLD AUTO: 0.03 K/UL
BASOPHILS NFR BLD: 0.2 %
BILIRUB SERPL-MCNC: 0.5 MG/DL
BILIRUB UR QL STRIP: NEGATIVE
BUN SERPL-MCNC: 10 MG/DL
CALCIUM SERPL-MCNC: 9.4 MG/DL
CHLORIDE SERPL-SCNC: 101 MMOL/L
CLARITY UR: ABNORMAL
CO2 SERPL-SCNC: 24 MMOL/L
COLOR UR: YELLOW
CREAT SERPL-MCNC: 1 MG/DL
DIFFERENTIAL METHOD: ABNORMAL
EOSINOPHIL # BLD AUTO: 0 K/UL
EOSINOPHIL NFR BLD: 0 %
ERYTHROCYTE [DISTWIDTH] IN BLOOD BY AUTOMATED COUNT: 13.4 %
EST. GFR  (AFRICAN AMERICAN): >60 ML/MIN/1.73 M^2
EST. GFR  (NON AFRICAN AMERICAN): >60 ML/MIN/1.73 M^2
GLUCOSE SERPL-MCNC: 103 MG/DL
GLUCOSE UR QL STRIP: NEGATIVE
HCT VFR BLD AUTO: 39.8 %
HGB BLD-MCNC: 13.4 G/DL
HGB UR QL STRIP: ABNORMAL
KETONES UR QL STRIP: NEGATIVE
LACTATE SERPL-SCNC: 0.8 MMOL/L
LACTATE SERPL-SCNC: 1.1 MMOL/L
LEUKOCYTE ESTERASE UR QL STRIP: ABNORMAL
LYMPHOCYTES # BLD AUTO: 1.4 K/UL
LYMPHOCYTES NFR BLD: 9 %
MCH RBC QN AUTO: 30.1 PG
MCHC RBC AUTO-ENTMCNC: 33.7 G/DL
MCV RBC AUTO: 89 FL
MICROSCOPIC COMMENT: ABNORMAL
MONOCYTES # BLD AUTO: 1.8 K/UL
MONOCYTES NFR BLD: 11.2 %
NEUTROPHILS # BLD AUTO: 12.7 K/UL
NEUTROPHILS NFR BLD: 79.4 %
NITRITE UR QL STRIP: POSITIVE
PH UR STRIP: 7 [PH] (ref 5–8)
PLATELET # BLD AUTO: 181 K/UL
PMV BLD AUTO: 11 FL
POTASSIUM SERPL-SCNC: 4.5 MMOL/L
PROT SERPL-MCNC: 7.9 G/DL
PROT UR QL STRIP: ABNORMAL
RBC # BLD AUTO: 4.45 M/UL
RBC #/AREA URNS HPF: 3 /HPF (ref 0–4)
SODIUM SERPL-SCNC: 136 MMOL/L
SP GR UR STRIP: 1.01 (ref 1–1.03)
SQUAMOUS #/AREA URNS HPF: 2 /HPF
URN SPEC COLLECT METH UR: ABNORMAL
UROBILINOGEN UR STRIP-ACNC: 1 EU/DL
WBC # BLD AUTO: 15.99 K/UL
WBC #/AREA URNS HPF: 3 /HPF (ref 0–5)

## 2018-10-15 PROCEDURE — 80053 COMPREHEN METABOLIC PANEL: CPT

## 2018-10-15 PROCEDURE — 63600175 PHARM REV CODE 636 W HCPCS: Performed by: EMERGENCY MEDICINE

## 2018-10-15 PROCEDURE — 93005 ELECTROCARDIOGRAM TRACING: CPT

## 2018-10-15 PROCEDURE — 94761 N-INVAS EAR/PLS OXIMETRY MLT: CPT

## 2018-10-15 PROCEDURE — 96365 THER/PROPH/DIAG IV INF INIT: CPT

## 2018-10-15 PROCEDURE — 99285 EMERGENCY DEPT VISIT HI MDM: CPT | Mod: 25

## 2018-10-15 PROCEDURE — 87040 BLOOD CULTURE FOR BACTERIA: CPT | Mod: 59

## 2018-10-15 PROCEDURE — 96361 HYDRATE IV INFUSION ADD-ON: CPT

## 2018-10-15 PROCEDURE — 96368 THER/DIAG CONCURRENT INF: CPT

## 2018-10-15 PROCEDURE — 25000003 PHARM REV CODE 250: Performed by: EMERGENCY MEDICINE

## 2018-10-15 PROCEDURE — 36415 COLL VENOUS BLD VENIPUNCTURE: CPT

## 2018-10-15 PROCEDURE — 83605 ASSAY OF LACTIC ACID: CPT

## 2018-10-15 PROCEDURE — 25000003 PHARM REV CODE 250: Performed by: NURSE PRACTITIONER

## 2018-10-15 PROCEDURE — 11000001 HC ACUTE MED/SURG PRIVATE ROOM

## 2018-10-15 PROCEDURE — 93010 ELECTROCARDIOGRAM REPORT: CPT | Mod: ,,, | Performed by: INTERNAL MEDICINE

## 2018-10-15 PROCEDURE — 85025 COMPLETE CBC W/AUTO DIFF WBC: CPT

## 2018-10-15 PROCEDURE — 81000 URINALYSIS NONAUTO W/SCOPE: CPT

## 2018-10-15 PROCEDURE — 83605 ASSAY OF LACTIC ACID: CPT | Mod: 91

## 2018-10-15 PROCEDURE — 99223 1ST HOSP IP/OBS HIGH 75: CPT | Mod: ,,, | Performed by: NURSE PRACTITIONER

## 2018-10-15 RX ORDER — DULOXETIN HYDROCHLORIDE 30 MG/1
30 CAPSULE, DELAYED RELEASE ORAL DAILY
Status: DISCONTINUED | OUTPATIENT
Start: 2018-10-16 | End: 2018-10-26 | Stop reason: HOSPADM

## 2018-10-15 RX ORDER — CIPROFLOXACIN 2 MG/ML
400 INJECTION, SOLUTION INTRAVENOUS
Status: DISCONTINUED | OUTPATIENT
Start: 2018-10-16 | End: 2018-10-17

## 2018-10-15 RX ORDER — DIAZEPAM 5 MG/1
5 TABLET ORAL EVERY 8 HOURS PRN
Status: DISCONTINUED | OUTPATIENT
Start: 2018-10-15 | End: 2018-10-26 | Stop reason: HOSPADM

## 2018-10-15 RX ORDER — AMOXICILLIN 250 MG
1 CAPSULE ORAL DAILY
Status: DISCONTINUED | OUTPATIENT
Start: 2018-10-16 | End: 2018-10-26 | Stop reason: HOSPADM

## 2018-10-15 RX ORDER — ASCORBIC ACID 500 MG
500 TABLET ORAL DAILY
Status: DISCONTINUED | OUTPATIENT
Start: 2018-10-16 | End: 2018-10-26 | Stop reason: HOSPADM

## 2018-10-15 RX ORDER — SODIUM CHLORIDE 0.9 % (FLUSH) 0.9 %
5 SYRINGE (ML) INJECTION
Status: DISCONTINUED | OUTPATIENT
Start: 2018-10-15 | End: 2018-10-26 | Stop reason: HOSPADM

## 2018-10-15 RX ORDER — VANCOMYCIN HCL IN 5 % DEXTROSE 1G/250ML
1000 PLASTIC BAG, INJECTION (ML) INTRAVENOUS
Status: DISCONTINUED | OUTPATIENT
Start: 2018-10-15 | End: 2018-10-15

## 2018-10-15 RX ORDER — SODIUM CHLORIDE 9 MG/ML
INJECTION, SOLUTION INTRAVENOUS CONTINUOUS
Status: DISCONTINUED | OUTPATIENT
Start: 2018-10-15 | End: 2018-10-16

## 2018-10-15 RX ORDER — ACETAMINOPHEN 500 MG
1000 TABLET ORAL
Status: COMPLETED | OUTPATIENT
Start: 2018-10-15 | End: 2018-10-15

## 2018-10-15 RX ORDER — TAMSULOSIN HYDROCHLORIDE 0.4 MG/1
0.4 CAPSULE ORAL DAILY
Status: DISCONTINUED | OUTPATIENT
Start: 2018-10-16 | End: 2018-10-26 | Stop reason: HOSPADM

## 2018-10-15 RX ORDER — OXYCODONE AND ACETAMINOPHEN 10; 325 MG/1; MG/1
1 TABLET ORAL ONCE
Status: COMPLETED | OUTPATIENT
Start: 2018-10-15 | End: 2018-10-15

## 2018-10-15 RX ORDER — OXYCODONE AND ACETAMINOPHEN 10; 325 MG/1; MG/1
1 TABLET ORAL ONCE
Status: DISCONTINUED | OUTPATIENT
Start: 2018-10-15 | End: 2018-10-15

## 2018-10-15 RX ORDER — GABAPENTIN 300 MG/1
900 CAPSULE ORAL 3 TIMES DAILY
Status: DISCONTINUED | OUTPATIENT
Start: 2018-10-16 | End: 2018-10-26 | Stop reason: HOSPADM

## 2018-10-15 RX ORDER — IBUPROFEN 600 MG/1
600 TABLET ORAL
Status: COMPLETED | OUTPATIENT
Start: 2018-10-15 | End: 2018-10-15

## 2018-10-15 RX ORDER — POLYETHYLENE GLYCOL 3350 17 G/17G
17 POWDER, FOR SOLUTION ORAL DAILY
Status: DISCONTINUED | OUTPATIENT
Start: 2018-10-16 | End: 2018-10-26 | Stop reason: HOSPADM

## 2018-10-15 RX ORDER — ACETAMINOPHEN 325 MG/1
650 TABLET ORAL EVERY 4 HOURS PRN
Status: DISCONTINUED | OUTPATIENT
Start: 2018-10-15 | End: 2018-10-26 | Stop reason: HOSPADM

## 2018-10-15 RX ORDER — BACLOFEN 10 MG/1
20 TABLET ORAL 3 TIMES DAILY
Status: DISCONTINUED | OUTPATIENT
Start: 2018-10-16 | End: 2018-10-26 | Stop reason: HOSPADM

## 2018-10-15 RX ORDER — DANTROLENE SODIUM 25 MG/1
50 CAPSULE ORAL 4 TIMES DAILY
Status: DISCONTINUED | OUTPATIENT
Start: 2018-10-16 | End: 2018-10-26 | Stop reason: HOSPADM

## 2018-10-15 RX ORDER — IBUPROFEN 400 MG/1
400 TABLET ORAL EVERY 6 HOURS PRN
Status: DISCONTINUED | OUTPATIENT
Start: 2018-10-16 | End: 2018-10-26 | Stop reason: HOSPADM

## 2018-10-15 RX ORDER — DALFAMPRIDINE 10 MG/1
10 TABLET, FILM COATED, EXTENDED RELEASE ORAL EVERY 12 HOURS
Status: DISCONTINUED | OUTPATIENT
Start: 2018-10-15 | End: 2018-10-17

## 2018-10-15 RX ORDER — SODIUM CHLORIDE 9 MG/ML
INJECTION, SOLUTION INTRAVENOUS CONTINUOUS
Status: DISCONTINUED | OUTPATIENT
Start: 2018-10-15 | End: 2018-10-17

## 2018-10-15 RX ORDER — ONDANSETRON 8 MG/1
8 TABLET, ORALLY DISINTEGRATING ORAL EVERY 8 HOURS PRN
Status: DISCONTINUED | OUTPATIENT
Start: 2018-10-15 | End: 2018-10-26 | Stop reason: HOSPADM

## 2018-10-15 RX ORDER — ONDANSETRON 8 MG/1
8 TABLET, ORALLY DISINTEGRATING ORAL EVERY 8 HOURS PRN
Status: DISCONTINUED | OUTPATIENT
Start: 2018-10-15 | End: 2018-10-15 | Stop reason: SDUPTHER

## 2018-10-15 RX ORDER — TRAZODONE HYDROCHLORIDE 100 MG/1
100 TABLET ORAL NIGHTLY
Status: DISCONTINUED | OUTPATIENT
Start: 2018-10-15 | End: 2018-10-26 | Stop reason: HOSPADM

## 2018-10-15 RX ADMIN — TRAZODONE HYDROCHLORIDE 100 MG: 100 TABLET ORAL at 11:10

## 2018-10-15 RX ADMIN — SODIUM CHLORIDE 2448 ML: 0.9 INJECTION, SOLUTION INTRAVENOUS at 07:10

## 2018-10-15 RX ADMIN — CIPROFLOXACIN 400 MG: 2 INJECTION, SOLUTION INTRAVENOUS at 11:10

## 2018-10-15 RX ADMIN — SODIUM CHLORIDE: 0.9 INJECTION, SOLUTION INTRAVENOUS at 11:10

## 2018-10-15 RX ADMIN — PIPERACILLIN AND TAZOBACTAM 4.5 G: 4; .5 INJECTION, POWDER, LYOPHILIZED, FOR SOLUTION INTRAVENOUS; PARENTERAL at 08:10

## 2018-10-15 RX ADMIN — ACETAMINOPHEN 1000 MG: 500 TABLET, FILM COATED ORAL at 07:10

## 2018-10-15 RX ADMIN — VANCOMYCIN HYDROCHLORIDE 1750 MG: 100 INJECTION, POWDER, LYOPHILIZED, FOR SOLUTION INTRAVENOUS at 08:10

## 2018-10-15 RX ADMIN — IBUPROFEN 600 MG: 600 TABLET ORAL at 10:10

## 2018-10-15 RX ADMIN — OXYCODONE HYDROCHLORIDE AND ACETAMINOPHEN 1 TABLET: 10; 325 TABLET ORAL at 10:10

## 2018-10-15 NOTE — TELEPHONE ENCOUNTER
----- Message from William Marcelo sent at 10/15/2018 11:33 AM CDT -----  Needs Advice    Reason for call: Pt is asking to speak w/ a nurse regarding an MS relapse he's experiencing (pt says his body is stiff and he keeps falling on down). Pt states he needs to reschedule appt on today to Thursday.        Communication Preference: [phone]    Additional Information:

## 2018-10-16 LAB
ALBUMIN SERPL BCP-MCNC: 3.1 G/DL
ALBUMIN SERPL BCP-MCNC: 3.1 G/DL
ALP SERPL-CCNC: 71 U/L
ALP SERPL-CCNC: 71 U/L
ALT SERPL W/O P-5'-P-CCNC: 14 U/L
ALT SERPL W/O P-5'-P-CCNC: 14 U/L
ANION GAP SERPL CALC-SCNC: 8 MMOL/L
ANION GAP SERPL CALC-SCNC: 8 MMOL/L
AST SERPL-CCNC: 23 U/L
AST SERPL-CCNC: 23 U/L
BASOPHILS # BLD AUTO: 0.02 K/UL
BASOPHILS # BLD AUTO: 0.02 K/UL
BASOPHILS NFR BLD: 0.1 %
BASOPHILS NFR BLD: 0.1 %
BILIRUB SERPL-MCNC: 0.7 MG/DL
BILIRUB SERPL-MCNC: 0.7 MG/DL
BUN SERPL-MCNC: 8 MG/DL
BUN SERPL-MCNC: 8 MG/DL
CALCIUM SERPL-MCNC: 8.7 MG/DL
CALCIUM SERPL-MCNC: 8.7 MG/DL
CHLORIDE SERPL-SCNC: 108 MMOL/L
CHLORIDE SERPL-SCNC: 108 MMOL/L
CO2 SERPL-SCNC: 26 MMOL/L
CO2 SERPL-SCNC: 26 MMOL/L
CREAT SERPL-MCNC: 0.9 MG/DL
CREAT SERPL-MCNC: 0.9 MG/DL
DIFFERENTIAL METHOD: ABNORMAL
DIFFERENTIAL METHOD: ABNORMAL
EOSINOPHIL # BLD AUTO: 0 K/UL
EOSINOPHIL # BLD AUTO: 0 K/UL
EOSINOPHIL NFR BLD: 0.1 %
EOSINOPHIL NFR BLD: 0.1 %
ERYTHROCYTE [DISTWIDTH] IN BLOOD BY AUTOMATED COUNT: 13.6 %
ERYTHROCYTE [DISTWIDTH] IN BLOOD BY AUTOMATED COUNT: 13.6 %
EST. GFR  (AFRICAN AMERICAN): >60 ML/MIN/1.73 M^2
EST. GFR  (AFRICAN AMERICAN): >60 ML/MIN/1.73 M^2
EST. GFR  (NON AFRICAN AMERICAN): >60 ML/MIN/1.73 M^2
EST. GFR  (NON AFRICAN AMERICAN): >60 ML/MIN/1.73 M^2
GLUCOSE SERPL-MCNC: 100 MG/DL
GLUCOSE SERPL-MCNC: 100 MG/DL
HCT VFR BLD AUTO: 37.1 %
HCT VFR BLD AUTO: 37.1 %
HGB BLD-MCNC: 12.4 G/DL
HGB BLD-MCNC: 12.4 G/DL
LYMPHOCYTES # BLD AUTO: 1 K/UL
LYMPHOCYTES # BLD AUTO: 1 K/UL
LYMPHOCYTES NFR BLD: 6.9 %
LYMPHOCYTES NFR BLD: 6.9 %
MAGNESIUM SERPL-MCNC: 2 MG/DL
MCH RBC QN AUTO: 30.1 PG
MCH RBC QN AUTO: 30.1 PG
MCHC RBC AUTO-ENTMCNC: 33.4 G/DL
MCHC RBC AUTO-ENTMCNC: 33.4 G/DL
MCV RBC AUTO: 90 FL
MCV RBC AUTO: 90 FL
MONOCYTES # BLD AUTO: 1.2 K/UL
MONOCYTES # BLD AUTO: 1.2 K/UL
MONOCYTES NFR BLD: 8.2 %
MONOCYTES NFR BLD: 8.2 %
NEUTROPHILS # BLD AUTO: 12.4 K/UL
NEUTROPHILS # BLD AUTO: 12.4 K/UL
NEUTROPHILS NFR BLD: 84.5 %
NEUTROPHILS NFR BLD: 84.5 %
PHOSPHATE SERPL-MCNC: 2.5 MG/DL
PLATELET # BLD AUTO: 165 K/UL
PLATELET # BLD AUTO: 165 K/UL
PMV BLD AUTO: 11 FL
PMV BLD AUTO: 11 FL
POTASSIUM SERPL-SCNC: 4 MMOL/L
POTASSIUM SERPL-SCNC: 4 MMOL/L
PROT SERPL-MCNC: 6.6 G/DL
PROT SERPL-MCNC: 6.6 G/DL
RBC # BLD AUTO: 4.12 M/UL
RBC # BLD AUTO: 4.12 M/UL
SODIUM SERPL-SCNC: 142 MMOL/L
SODIUM SERPL-SCNC: 142 MMOL/L
WBC # BLD AUTO: 14.73 K/UL
WBC # BLD AUTO: 14.73 K/UL

## 2018-10-16 PROCEDURE — 63600175 PHARM REV CODE 636 W HCPCS: Performed by: NURSE PRACTITIONER

## 2018-10-16 PROCEDURE — 25000003 PHARM REV CODE 250: Performed by: EMERGENCY MEDICINE

## 2018-10-16 PROCEDURE — 80053 COMPREHEN METABOLIC PANEL: CPT

## 2018-10-16 PROCEDURE — 84100 ASSAY OF PHOSPHORUS: CPT

## 2018-10-16 PROCEDURE — 36415 COLL VENOUS BLD VENIPUNCTURE: CPT

## 2018-10-16 PROCEDURE — 85025 COMPLETE CBC W/AUTO DIFF WBC: CPT

## 2018-10-16 PROCEDURE — 94761 N-INVAS EAR/PLS OXIMETRY MLT: CPT

## 2018-10-16 PROCEDURE — 25000003 PHARM REV CODE 250: Performed by: NURSE PRACTITIONER

## 2018-10-16 PROCEDURE — 83735 ASSAY OF MAGNESIUM: CPT

## 2018-10-16 PROCEDURE — 11000001 HC ACUTE MED/SURG PRIVATE ROOM

## 2018-10-16 PROCEDURE — 99233 SBSQ HOSP IP/OBS HIGH 50: CPT | Mod: ,,, | Performed by: INTERNAL MEDICINE

## 2018-10-16 RX ADMIN — DANTROLENE SODIUM 50 MG: 25 CAPSULE ORAL at 09:10

## 2018-10-16 RX ADMIN — POLYETHYLENE GLYCOL 3350 17 G: 17 POWDER, FOR SOLUTION ORAL at 09:10

## 2018-10-16 RX ADMIN — CIPROFLOXACIN 400 MG: 2 INJECTION, SOLUTION INTRAVENOUS at 12:10

## 2018-10-16 RX ADMIN — VANCOMYCIN HYDROCHLORIDE 1250 MG: 10 INJECTION, POWDER, LYOPHILIZED, FOR SOLUTION INTRAVENOUS at 08:10

## 2018-10-16 RX ADMIN — BACLOFEN 20 MG: 10 TABLET ORAL at 09:10

## 2018-10-16 RX ADMIN — DANTROLENE SODIUM 50 MG: 25 CAPSULE ORAL at 03:10

## 2018-10-16 RX ADMIN — GABAPENTIN 900 MG: 300 CAPSULE ORAL at 09:10

## 2018-10-16 RX ADMIN — IBUPROFEN 400 MG: 400 TABLET, FILM COATED ORAL at 03:10

## 2018-10-16 RX ADMIN — DULOXETINE 30 MG: 30 CAPSULE, DELAYED RELEASE ORAL at 09:10

## 2018-10-16 RX ADMIN — BACLOFEN 20 MG: 10 TABLET ORAL at 08:10

## 2018-10-16 RX ADMIN — GABAPENTIN 900 MG: 300 CAPSULE ORAL at 08:10

## 2018-10-16 RX ADMIN — GABAPENTIN 900 MG: 300 CAPSULE ORAL at 03:10

## 2018-10-16 RX ADMIN — TAMSULOSIN HYDROCHLORIDE 0.4 MG: 0.4 CAPSULE ORAL at 09:10

## 2018-10-16 RX ADMIN — PIPERACILLIN AND TAZOBACTAM 4.5 G: 4; .5 INJECTION, POWDER, LYOPHILIZED, FOR SOLUTION INTRAVENOUS; PARENTERAL at 12:10

## 2018-10-16 RX ADMIN — RIVAROXABAN 20 MG: 10 TABLET, FILM COATED ORAL at 06:10

## 2018-10-16 RX ADMIN — DANTROLENE SODIUM 50 MG: 25 CAPSULE ORAL at 10:10

## 2018-10-16 RX ADMIN — PIPERACILLIN AND TAZOBACTAM 4.5 G: 4; .5 INJECTION, POWDER, LYOPHILIZED, FOR SOLUTION INTRAVENOUS; PARENTERAL at 03:10

## 2018-10-16 RX ADMIN — BACLOFEN 20 MG: 10 TABLET ORAL at 03:10

## 2018-10-16 RX ADMIN — OXYCODONE HYDROCHLORIDE AND ACETAMINOPHEN 500 MG: 500 TABLET ORAL at 09:10

## 2018-10-16 RX ADMIN — TRAZODONE HYDROCHLORIDE 100 MG: 100 TABLET ORAL at 08:10

## 2018-10-16 RX ADMIN — STANDARDIZED SENNA CONCENTRATE AND DOCUSATE SODIUM 1 TABLET: 8.6; 5 TABLET ORAL at 09:10

## 2018-10-16 RX ADMIN — PIPERACILLIN AND TAZOBACTAM 4.5 G: 4; .5 INJECTION, POWDER, LYOPHILIZED, FOR SOLUTION INTRAVENOUS; PARENTERAL at 10:10

## 2018-10-16 RX ADMIN — VANCOMYCIN HYDROCHLORIDE 1250 MG: 10 INJECTION, POWDER, LYOPHILIZED, FOR SOLUTION INTRAVENOUS at 09:10

## 2018-10-16 RX ADMIN — IBUPROFEN 400 MG: 400 TABLET, FILM COATED ORAL at 06:10

## 2018-10-16 RX ADMIN — DANTROLENE SODIUM 50 MG: 25 CAPSULE ORAL at 06:10

## 2018-10-16 NOTE — PLAN OF CARE
Patient resting with IVF infusing. Incontinence care performed PRN. VSS on RA. Pt without  injury or breakdown this shift. Safety measures maintained. Will continue to monitor.

## 2018-10-16 NOTE — ED TRIAGE NOTES
Pt to ED with CO fever Xs 2 days. Pt does not report any worsening of chronic symptoms, and is requesting coffee. Pt does report some inc SOB with exertion. PT reports chronic LBP that worsens with movement. Pt is AAO Xs 4, and is calm and cooperative, Respirations even and unlabored, NAD noted. Pt also reports inc weakness since onset of fever.

## 2018-10-16 NOTE — ED NOTES
Pt incontinent of urine perineal care preformed, and linens changed. NAD noted, respirations even and unlabored.

## 2018-10-16 NOTE — ASSESSMENT & PLAN NOTE
Bacteria, Nitrites, WBC on U/A. Febrile, tachycardic, tachypnea, lactic acid 1.1    Blood culture pending  Urine culture pending  Vanc/Zosyn/Cipro  Lactic acid repeat pending  IVF

## 2018-10-16 NOTE — ED PROVIDER NOTES
Encounter Date: 10/15/2018    SCRIBE #1 NOTE: Ganesh KRISHNAN, cheri scribing for, and in the presence of, Dr. Soares.       History     Chief Complaint   Patient presents with    Fever     Pt came to the ED tonight  c.o fevers and chills x 1 day.      Seen by provider: 7:28 PM    Patient is a 53 y.o. male with a history of MS who presents to the ED via EMS with complaint of fever, which began two days ago. Per EMS, patient had a fever of 104.2 when measured en route to the ED. Patient reports general malaise, bilateral leg weakness, mild shortness of breath, and diffuse arthralgias. He reports chronic difficulty urinating and intermittent incontinence secondary to neurogenic bladder, but notes his urine has been more concentrated recently, however he denies cloudy urine. He does not self-catheterize or use a catheter. He denies runny nose, cough, congestion, vomiting, diarrhea, or skin lesions or wounds. He denies any recent history of pneumonia or UTI.       The history is provided by the patient.     Review of patient's allergies indicates:  No Known Allergies  Past Medical History:   Diagnosis Date    Anticoagulant long-term use     Anxiety     Chronic back pain     Deep vein thrombosis     Depression     Depression     Gait instability     Multiple sclerosis     Multiple sclerosis     Neurogenic bladder     Polyneuropathy     Pulmonary embolism     Spasticity      Past Surgical History:   Procedure Laterality Date    CYSTOSCOPY N/A 6/20/2018    Procedure: CYSTOSCOPY;  Surgeon: Brian Augustin MD;  Location: [facility] OR [address] [place] [address];  Service: Urology;  Laterality: N/A;  1 hour    CYSTOSCOPY N/A 6/20/2018    Performed by Brian Augustin MD at [facility] OR [address] [place] [address]    INJECTION OF BOTULINUM TOXIN TYPE A N/A 6/20/2018    Procedure: INJECTION, BOTULINUM TOXIN, TYPE A 200 UNITS;  Surgeon: Brian Augustin MD;  Location: [facility] OR [address] [place] [address];  Service: Urology;  Laterality: N/A;    INJECTION, BOTULINUM TOXIN, TYPE A 200  UNITS N/A 2018    Performed by Brian Augustin MD at [facility] OR [address] [place] [address]     Family History   Problem Relation Age of Onset    Arthritis Mother     No Known Problems Father     Cancer Maternal Grandfather      Social History     Tobacco Use    Smoking status: Former Smoker     Packs/day: 0.50     Years: 23.00     Pack years: 11.50     Types: Cigarettes     Last attempt to quit: 2018     Years since quittin.3    Smokeless tobacco: Never Used    Tobacco comment: Using patch   Substance Use Topics    Alcohol use: No    Drug use: No     Review of Systems   Constitutional: Positive for chills and fever.   HENT: Negative for congestion, rhinorrhea and sore throat.    Respiratory: Positive for shortness of breath (mild). Negative for cough.    Cardiovascular: Negative for chest pain.   Gastrointestinal: Negative for abdominal pain, diarrhea, nausea and vomiting.   Genitourinary: Positive for difficulty urinating (chronic). Negative for dysuria.   Musculoskeletal: Positive for arthralgias.   Skin: Negative for rash and wound.   Neurological: Positive for weakness (bilateral lower extremities).       Physical Exam     Initial Vitals [10/15/18 1912]   BP Pulse Resp Temp SpO2   (!) 140/80 (!) 117 18 (!) 103 °F (39.4 °C) 97 %      MAP       --         Physical Exam    Nursing note and vitals reviewed.  Constitutional: He appears well-developed and well-nourished. No distress.   HENT:   Head: Normocephalic and atraumatic.   Eyes: Conjunctivae and EOM are normal. Pupils are equal, round, and reactive to light.   Neck: Normal range of motion. Neck supple.   No meningismus.    Cardiovascular: Regular rhythm and normal heart sounds. Tachycardia present.  Exam reveals no gallop and no friction rub.    No murmur heard.  Tachycardic at a rate of 112 bpm on exam.   Pulmonary/Chest: Breath sounds normal. No respiratory distress. He has no wheezes. He has no rhonchi. He has no rales.   Abdominal: Soft. Bowel sounds are  normal. He exhibits no distension. There is no tenderness. There is no rebound and no guarding.   Mild fullness in pubic area.   Neurological: He is alert and oriented to person, place, and time.   Global weakness, worse in lower extremities.    Skin: Skin is dry.   Skin is dry and warm to the touch.   Psychiatric: He has a normal mood and affect. His behavior is normal. Judgment and thought content normal.         ED Course   Procedures  Labs Reviewed   CBC W/ AUTO DIFFERENTIAL - Abnormal; Notable for the following components:       Result Value    WBC 15.99 (*)     RBC 4.45 (*)     Hemoglobin 13.4 (*)     Hematocrit 39.8 (*)     Gran # (ANC) 12.7 (*)     Mono # 1.8 (*)     Gran% 79.4 (*)     Lymph% 9.0 (*)     All other components within normal limits   URINALYSIS, REFLEX TO URINE CULTURE - Abnormal; Notable for the following components:    Appearance, UA Hazy (*)     Protein, UA Trace (*)     Occult Blood UA 1+ (*)     Nitrite, UA Positive (*)     Leukocytes, UA 1+ (*)     All other components within normal limits    Narrative:     Preferred Collection Type->Urine, Clean Catch   URINALYSIS MICROSCOPIC - Abnormal; Notable for the following components:    Bacteria, UA Moderate (*)     All other components within normal limits    Narrative:     Preferred Collection Type->Urine, Clean Catch   COMPREHENSIVE METABOLIC PANEL   LACTIC ACID, PLASMA     EKG Readings: (Independently Interpreted)   Sinus tachycardia. Rate of 109 bpm. Nonspecific ST depressions. No STEMI.       Imaging Results          X-Ray Chest AP Portable (Final result)  Result time 10/15/18 19:42:44    Final result by Santos Ramirez MD (10/15/18 19:42:44)                 Impression:      No acute findings in the chest.      Electronically signed by: Santos Ramirez MD  Date:    10/15/2018  Time:    19:42             Narrative:    EXAMINATION:  XR CHEST AP PORTABLE    CLINICAL HISTORY:  Sepsis;    TECHNIQUE:  Single frontal view of the chest was  performed.    COMPARISON:  None    FINDINGS:  No consolidation, pleural effusion or pneumothorax.    Cardiomediastinal silhouette is unremarkable.                                 Medical Decision Making:   Initial Assessment:   53 year old male with a history of MS and neurogenic bladder presents with tachycardia and significantly elevated temperature greater than 103. Plan: sepsis work up and initiation of sepsis therapy, anticipate for admission.  Differential Diagnosis:   Differential Diagnosis includes, but is not limited to:  Sepsis, bacteremia, UTI, pneumonia, cellulitis, abscess, indwelling line/catheter infection, cholecystitis, viral URI, gastroenteritis, viral syndrome, sinusitis, otitis media/externa, neoplasm, drug reaction, serotonin syndrome, intoxication/withdrawal syndrome.    Independently Interpreted Test(s):   I have ordered and independently interpreted EKG Reading(s) - see prior notes  Clinical Tests:   Lab Tests: Ordered and Reviewed  Radiological Study: Ordered and Reviewed  Medical Tests: Ordered and Reviewed  ED Management:  9:48 PM - Case discussed with Frederic Rod PA-C of the hospitalist service who will admits the patient.             Scribe Attestation:   Scribe #1: I performed the above scribed service and the documentation accurately describes the services I performed. I attest to the accuracy of the note.    Attending Attestation:           Physician Attestation for Scribe:  Physician Attestation Statement for Scribe #1: I, Dr. Soares, reviewed documentation, as scribed by Ganesh Mast in my presence, and it is both accurate and complete.           Attending Attestation:     Physician Attestation for Scribe:    I, Dr. Carla Soares, personally performed the services described in this documentation.   All medical record entries made by the scribe were at my direction and in my presence.   I have reviewed the chart and agree that the record is accurate and complete.   Carla Soares MD  9:53  AM 10/23/2018              Clinical Impression:     1. Urinary tract infection without hematuria, site unspecified    2. Fever    3. Sepsis, due to unspecified organism    4. Sepsis due to urinary tract infection    5. Acute relapsing multiple sclerosis    6. Chronic pain syndrome    7. Neuropathic pain, leg, bilateral    8. Paraparesis    9. Chronic bilateral low back pain with bilateral sciatica    10. Spasticity    11. Weakness generalized    12. Gait instability    13. Long-term current use of opiate analgesic    14. Chronic pain of multiple sites    15. Impaired mobility and ADLs    16. MS (multiple sclerosis)    17. Polyneuropathy    18. Long term (current) use of systemic steroids                                 Carla Soares MD  10/24/18 0751

## 2018-10-16 NOTE — ED NOTES
Pt informed of need for urine, and states he will provide sample when he is ready. Pt informed urine is a likely source of infection, and will expedite diagnosis.

## 2018-10-16 NOTE — H&P
[facility] Hospital Medicine  History & Physical    Patient Name: Mao Levin  MRN: [id number]  Admission Date: 10/15/2018  Attending Physician: Padmini Zayas MD   Primary Care Provider: Mendy Alarcon MD         Patient information was obtained from patient, past medical records and ER records.     Subjective:     Principal Problem:Sepsis due to urinary tract infection    Chief Complaint:   Chief Complaint   Patient presents with    Fever     Pt came to the ED tonight  c.o fevers and chills x 1 day.         HPI: The patient is a 53 y.o. male with a history of MS who presents to the ED via EMS with complaint of fever, which began two days ago. Per EMS, patient had a fever of 104.2 when measured en route to the ED. Patient reports general malaise, bilateral leg weakness, mild shortness of breath, and diffuse arthralgias. He reports chronic difficulty urinating and intermittent incontinence secondary to neurogenic bladder, but notes his urine has been more concentrated recently, however he denies cloudy urine. He does not self-catheterize or use a catheter. He denies runny nose, cough, congestion, vomiting, diarrhea, or skin lesions or wounds. He denies any recent history of pneumonia or UTI.         Past Medical History:   Diagnosis Date    Anticoagulant long-term use     Anxiety     Chronic back pain     Deep vein thrombosis     Depression     Depression     Gait instability     Multiple sclerosis     Multiple sclerosis     Neurogenic bladder     Polyneuropathy     Pulmonary embolism     Spasticity        Past Surgical History:   Procedure Laterality Date    CYSTOSCOPY N/A 6/20/2018    Procedure: CYSTOSCOPY;  Surgeon: Brian Augustin MD;  Location: [facility] OR [address] [place] [address];  Service: Urology;  Laterality: N/A;  1 hour    CYSTOSCOPY N/A 6/20/2018    Performed by Brian Augustin MD at [facility] OR [address] [place] [address]    INJECTION OF BOTULINUM TOXIN TYPE A N/A 6/20/2018    Procedure: INJECTION, BOTULINUM  TOXIN, TYPE A 200 UNITS;  Surgeon: Brian Augustin MD;  Location: [facility] OR [address] [place] [address];  Service: Urology;  Laterality: N/A;    INJECTION, BOTULINUM TOXIN, TYPE A 200 UNITS N/A 6/20/2018    Performed by Brian Augustin MD at [facility] OR [address] [place] [address]       Review of patient's allergies indicates:  No Known Allergies    No current facility-administered medications on file prior to encounter.      Current Outpatient Medications on File Prior to Encounter   Medication Sig    ascorbic acid, vitamin C, (VITAMIN C) 500 MG tablet Take 500 mg by mouth once daily.    baclofen (LIORESAL) 10 MG tablet Take 2 tablets (20 mg total) by mouth 3 (three) times daily.    buPROPion (WELLBUTRIN SR) 150 MG TBSR 12 hr tablet 1 tab po daily x 3 days, then increase to 1 tab po BID; last dose no later than 6PM; stop smoking after 5-7 days of treatment; (Patient taking differently: 150 mg 2 (two) times daily. 1 tab po daily x 3 days, then increase to 1 tab po BID; last dose no later than 6PM; stop smoking after 5-7 days of treatment;)    cranberry conc-C-bacillus coag 450-30-50 mg-mg-million Tab Take 1 capsule by mouth once daily.    dalfampridine (AMPYRA) 10 mg Tb12 Take 10 mg by mouth every 12 (twelve) hours.    dantrolene (DANTRIUM) 50 MG Cap Take 1 capsule (50 mg total) by mouth 4 (four) times daily.    diazePAM (VALIUM) 5 MG tablet Take 1 tablet (5 mg total) by mouth every 8 (eight) hours as needed for Anxiety.    duloxetine (CYMBALTA) 30 MG capsule Take 30 mg by mouth once daily.    ergocalciferol (VITAMIN D2) 50,000 unit Cap Take 1 capsule (50,000 Units total) by mouth every 7 days. (Patient taking differently: Take 50,000 Units by mouth every 7 days. on monday)    gabapentin (NEURONTIN) 300 MG capsule Take 900 mg morning and evening.  Take 1100 mg total in the afternoon.    HYDROcodone-acetaminophen (NORCO)  mg per tablet Take 1 tablet by mouth every 8 (eight) hours as needed for Pain.    mirabegron (MYRBETRIQ) 50 mg Tb24 Take 1  tablet (50 mg total) by mouth once daily.    multivitamin capsule Take 1 capsule by mouth once daily.    nitrofurantoin macrocrystal (MACRODANTIN ORAL) Take by mouth.    [START ON 2018] oxyCODONE-acetaminophen (PERCOCET)  mg per tablet Take 1 tablet by mouth every 8 (eight) hours as needed for Pain.    polyethylene glycol (GLYCOLAX) 17 gram/dose powder Take 17 g by mouth once daily.    rivaroxaban (XARELTO) 20 mg Tab Take 1 tablet (20 mg total) by mouth daily with dinner or evening meal.    senna-docusate 8.6-50 mg (PERICOLACE) 8.6-50 mg per tablet Take 1 tablet by mouth once daily.    tamsulosin (FLOMAX) 0.4 mg Cp24 Take 1 capsule (0.4 mg total) by mouth once daily.    trazodone (DESYREL) 100 MG tablet Take 1 tablet (100 mg total) by mouth every evening.     Family History     Problem Relation (Age of Onset)    Arthritis Mother    Cancer Maternal Grandfather    No Known Problems Father        Tobacco Use    Smoking status: Former Smoker     Packs/day: 0.50     Years: 23.00     Pack years: 11.50     Types: Cigarettes     Last attempt to quit: 2018     Years since quittin.3    Smokeless tobacco: Never Used    Tobacco comment: Using patch   Substance and Sexual Activity    Alcohol use: No    Drug use: No    Sexual activity: Yes     Partners: Female     Review of Systems   Constitutional: Positive for activity change, appetite change, fatigue and fever.   HENT: Negative for congestion, ear pain and postnasal drip.    Eyes: Negative for discharge.   Respiratory: Negative for apnea, shortness of breath and wheezing.    Cardiovascular: Negative for chest pain and leg swelling.   Gastrointestinal: Positive for diarrhea, nausea and vomiting. Negative for abdominal distention and abdominal pain.   Endocrine: Negative for polydipsia, polyphagia and polyuria.   Genitourinary: Negative for difficulty urinating, flank pain, frequency, hematuria and urgency.   Musculoskeletal: Negative for  arthralgias and joint swelling.   Skin: Negative for pallor and rash.   Allergic/Immunologic: Negative for environmental allergies and food allergies.   Neurological: Negative for dizziness, speech difficulty, weakness, light-headedness and headaches.   Hematological: Does not bruise/bleed easily.   Psychiatric/Behavioral: Negative for agitation.     Objective:     Vital Signs (Most Recent):  Temp: 98.3 °F (36.8 °C) (10/15/18 2152)  Pulse: 100 (10/15/18 2240)  Resp: (!) 21 (10/15/18 2240)  BP: (!) 143/86 (10/15/18 2223)  SpO2: (!) 59 % (10/15/18 2240) Vital Signs (24h Range):  Temp:  [98.3 °F (36.8 °C)-103 °F (39.4 °C)] 98.3 °F (36.8 °C)  Pulse:  [] 100  Resp:  [15-26] 21  SpO2:  [59 %-100 %] 59 %  BP: (137-158)/(72-86) 143/86     Weight: 81.6 kg (180 lb)  Body mass index is 25.1 kg/m².    Physical Exam   Constitutional: He is oriented to person, place, and time. He appears well-developed and well-nourished.   HENT:   Head: Normocephalic.   Eyes: Conjunctivae are normal.   Neck: Neck supple.   Cardiovascular: Regular rhythm, normal heart sounds and intact distal pulses. Tachycardia present.   Pulses:       Radial pulses are 2+ on the right side, and 2+ on the left side.   Pulmonary/Chest: Effort normal. Tachypnea noted. He has decreased breath sounds in the right lower field.   Abdominal: Soft. He exhibits no distension. Bowel sounds are decreased. There is generalized tenderness.   Musculoskeletal: Normal range of motion.   Neurological: He is alert and oriented to person, place, and time. GCS eye subscore is 4. GCS verbal subscore is 5. GCS motor subscore is 6.   Skin: Skin is warm and dry.   Psychiatric: His speech is normal and behavior is normal. His mood appears anxious.           Significant Labs:   CBC:   Recent Labs   Lab  10/15/18   1925   WBC  15.99*   HGB  13.4*   HCT  39.8*   PLT  181     CMP:   Recent Labs   Lab  10/15/18   1925   NA  136   K  4.5   CL  101   CO2  24   GLU  103   BUN  10    CREATININE  1.0   CALCIUM  9.4   PROT  7.9   ALBUMIN  3.8   BILITOT  0.5   ALKPHOS  73   AST  31   ALT  15   ANIONGAP  11   EGFRNONAA  >60       Significant Imaging: I have reviewed all pertinent imaging results/findings within the past 24 hours.    Assessment/Plan:     * Sepsis due to urinary tract infection    Bacteria, Nitrites, WBC on U/A. Febrile, tachycardic, tachypnea, lactic acid 1.1    Blood culture pending  Urine culture pending  Vanc/Zosyn/Cipro  Lactic acid repeat pending  IVF          Current use of long term anticoagulation    Continue Xarelto          MS (multiple sclerosis)    Continue baclofen, dantrolene, ampyra, neurontin          Neurogenic bladder    Straight cath PRN            VTE Risk Mitigation (From admission, onward)    None             Troy Rod NP  Department of Hospital Medicine   [facility]

## 2018-10-16 NOTE — HPI
Mr. Levin is a 53 year old man with a history of MS who presented to the ED via EMS with a complaint of fever for 2 days.  Per EMS, patient had a fever of 104.2 when measured en route to the ED.  Patient reported general malaise, bilateral leg weakness, shortness of breath, and diffuse arthralgias. He reported chronic difficulty with urinating at baseline and intermittent incontinence secondary to neurogenic bladder, but noted his urine has been more concentrated recently. He does not self-catheterize or use a catheter.  He denied other symptoms and has not had a recent UTI.  On presentation he was febrile with WBC 16K and evidence of UTI on UA.

## 2018-10-16 NOTE — SUBJECTIVE & OBJECTIVE
Interval History:   Pt new to me today.  No fever.  Blood cx ngtd  No urine cx done.  Reports feeling better less malaise,  Improved bilateral leg weakness, mild shortness of breath, and diffuse arthralgias.           Review of Systems   Constitutional: Negative for chills and fever.   Respiratory: Negative for cough and shortness of breath.    Cardiovascular: Negative for chest pain and leg swelling.     Objective:     Vital Signs (Most Recent):  Temp: 97.3 °F (36.3 °C) (10/16/18 1146)  Pulse: 87 (10/16/18 1400)  Resp: 20 (10/16/18 0742)  BP: (!) 96/56 (10/16/18 1146)  SpO2: (!) 93 % (10/16/18 1146) Vital Signs (24h Range):  Temp:  [97.3 °F (36.3 °C)-103 °F (39.4 °C)] 97.3 °F (36.3 °C)  Pulse:  [] 87  Resp:  [15-26] 20  SpO2:  [59 %-100 %] 93 %  BP: ()/(56-86) 96/56     Weight: 75 kg (165 lb 5.5 oz)  Body mass index is 23.06 kg/m².    Intake/Output Summary (Last 24 hours) at 10/16/2018 1430  Last data filed at 10/16/2018 1201  Gross per 24 hour   Intake 1575 ml   Output 250 ml   Net 1325 ml      Physical Exam   Constitutional: He appears well-developed and well-nourished. No distress.   Cardiovascular: Normal rate, regular rhythm, normal heart sounds and intact distal pulses. Exam reveals no gallop and no friction rub.   No murmur heard.  Pulmonary/Chest: Effort normal and breath sounds normal. No respiratory distress. He has no rales.   Abdominal: Soft. Bowel sounds are normal. He exhibits no distension. There is no tenderness.   Skin: Skin is warm and dry. No rash noted. He is not diaphoretic. No erythema.       Significant Labs: All pertinent labs within the past 24 hours have been reviewed.    Significant Imaging: I have reviewed and interpreted all pertinent imaging results/findings within the past 24 hours.

## 2018-10-16 NOTE — ED NOTES
Pt found attempting to take Acetaminophen and Oxycodone Hydrochloride, pill imprint T193. MD informed, and Rx secured.

## 2018-10-16 NOTE — ASSESSMENT & PLAN NOTE
Bacteria, Nitrites, WBC on U/A. Febrile, tachycardic, tachypnea, lactic acid 1.1    Blood culture NGTD  No urine cx sent  Previous urine cx x 2 with [facility]  Sensitive to zosyn  Currently on /Zosyn/Cipro  Cont IVF

## 2018-10-16 NOTE — PLAN OF CARE
10/16/18 1521   Discharge Assessment   Assessment Type Discharge Planning Assessment   Confirmed/corrected address and phone number on facesheet? Yes   Assessment information obtained from? Patient;Caregiver;Medical Record   Communicated expected length of stay with patient/caregiver yes   Prior to hospitilization cognitive status: Alert/Oriented   Prior to hospitalization functional status: Assistive Equipment   Current cognitive status: Alert/Oriented   Current Functional Status: Assistive Equipment;Needs Assistance   Lives With parent(s)   Able to Return to Prior Arrangements yes   Equipment Currently Used at Home walker, rolling;urinary catheter supplies   Do you have any problems affording any of your prescribed medications? No   Is the patient taking medications as prescribed? yes   Does the patient have transportation home? Yes   Transportation Available family or friend will provide   Discharge Plan A Home with family   Discharge Plan B Home Health   Patient/Family In Agreement With Plan yes

## 2018-10-16 NOTE — SUBJECTIVE & OBJECTIVE
Past Medical History:   Diagnosis Date    Anticoagulant long-term use     Anxiety     Chronic back pain     Deep vein thrombosis     Depression     Depression     Gait instability     Multiple sclerosis     Multiple sclerosis     Neurogenic bladder     Polyneuropathy     Pulmonary embolism     Spasticity        Past Surgical History:   Procedure Laterality Date    CYSTOSCOPY N/A 6/20/2018    Procedure: CYSTOSCOPY;  Surgeon: Brian Augustin MD;  Location: [facility] OR [address] [place] [address];  Service: Urology;  Laterality: N/A;  1 hour    CYSTOSCOPY N/A 6/20/2018    Performed by Brian Augustin MD at [facility] OR [address] [place] [address]    INJECTION OF BOTULINUM TOXIN TYPE A N/A 6/20/2018    Procedure: INJECTION, BOTULINUM TOXIN, TYPE A 200 UNITS;  Surgeon: Brian Augustin MD;  Location: [facility] OR [address] [place] [address];  Service: Urology;  Laterality: N/A;    INJECTION, BOTULINUM TOXIN, TYPE A 200 UNITS N/A 6/20/2018    Performed by Brian Augustin MD at [facility] OR [address] [place] [address]       Review of patient's allergies indicates:  No Known Allergies    No current facility-administered medications on file prior to encounter.      Current Outpatient Medications on File Prior to Encounter   Medication Sig    ascorbic acid, vitamin C, (VITAMIN C) 500 MG tablet Take 500 mg by mouth once daily.    baclofen (LIORESAL) 10 MG tablet Take 2 tablets (20 mg total) by mouth 3 (three) times daily.    buPROPion (WELLBUTRIN SR) 150 MG TBSR 12 hr tablet 1 tab po daily x 3 days, then increase to 1 tab po BID; last dose no later than 6PM; stop smoking after 5-7 days of treatment; (Patient taking differently: 150 mg 2 (two) times daily. 1 tab po daily x 3 days, then increase to 1 tab po BID; last dose no later than 6PM; stop smoking after 5-7 days of treatment;)    cranberry conc-C-bacillus coag 450-30-50 mg-mg-million Tab Take 1 capsule by mouth once daily.    dalfampridine (AMPYRA) 10 mg Tb12 Take 10 mg by mouth every 12 (twelve) hours.    dantrolene (DANTRIUM) 50 MG Cap Take 1 capsule  (50 mg total) by mouth 4 (four) times daily.    diazePAM (VALIUM) 5 MG tablet Take 1 tablet (5 mg total) by mouth every 8 (eight) hours as needed for Anxiety.    duloxetine (CYMBALTA) 30 MG capsule Take 30 mg by mouth once daily.    ergocalciferol (VITAMIN D2) 50,000 unit Cap Take 1 capsule (50,000 Units total) by mouth every 7 days. (Patient taking differently: Take 50,000 Units by mouth every 7 days. on monday)    gabapentin (NEURONTIN) 300 MG capsule Take 900 mg morning and evening.  Take 1100 mg total in the afternoon.    HYDROcodone-acetaminophen (NORCO)  mg per tablet Take 1 tablet by mouth every 8 (eight) hours as needed for Pain.    mirabegron (MYRBETRIQ) 50 mg Tb24 Take 1 tablet (50 mg total) by mouth once daily.    multivitamin capsule Take 1 capsule by mouth once daily.    nitrofurantoin macrocrystal (MACRODANTIN ORAL) Take by mouth.    [START ON 2018] oxyCODONE-acetaminophen (PERCOCET)  mg per tablet Take 1 tablet by mouth every 8 (eight) hours as needed for Pain.    polyethylene glycol (GLYCOLAX) 17 gram/dose powder Take 17 g by mouth once daily.    rivaroxaban (XARELTO) 20 mg Tab Take 1 tablet (20 mg total) by mouth daily with dinner or evening meal.    senna-docusate 8.6-50 mg (PERICOLACE) 8.6-50 mg per tablet Take 1 tablet by mouth once daily.    tamsulosin (FLOMAX) 0.4 mg Cp24 Take 1 capsule (0.4 mg total) by mouth once daily.    trazodone (DESYREL) 100 MG tablet Take 1 tablet (100 mg total) by mouth every evening.     Family History     Problem Relation (Age of Onset)    Arthritis Mother    Cancer Maternal Grandfather    No Known Problems Father        Tobacco Use    Smoking status: Former Smoker     Packs/day: 0.50     Years: 23.00     Pack years: 11.50     Types: Cigarettes     Last attempt to quit: 2018     Years since quittin.3    Smokeless tobacco: Never Used    Tobacco comment: Using patch   Substance and Sexual Activity    Alcohol use: No    Drug  use: No    Sexual activity: Yes     Partners: Female     Review of Systems   Constitutional: Positive for activity change, appetite change, fatigue and fever.   HENT: Negative for congestion, ear pain and postnasal drip.    Eyes: Negative for discharge.   Respiratory: Negative for apnea, shortness of breath and wheezing.    Cardiovascular: Negative for chest pain and leg swelling.   Gastrointestinal: Positive for diarrhea, nausea and vomiting. Negative for abdominal distention and abdominal pain.   Endocrine: Negative for polydipsia, polyphagia and polyuria.   Genitourinary: Negative for difficulty urinating, flank pain, frequency, hematuria and urgency.   Musculoskeletal: Negative for arthralgias and joint swelling.   Skin: Negative for pallor and rash.   Allergic/Immunologic: Negative for environmental allergies and food allergies.   Neurological: Negative for dizziness, speech difficulty, weakness, light-headedness and headaches.   Hematological: Does not bruise/bleed easily.   Psychiatric/Behavioral: Negative for agitation.     Objective:     Vital Signs (Most Recent):  Temp: 98.3 °F (36.8 °C) (10/15/18 2152)  Pulse: 100 (10/15/18 2240)  Resp: (!) 21 (10/15/18 2240)  BP: (!) 143/86 (10/15/18 2223)  SpO2: (!) 59 % (10/15/18 2240) Vital Signs (24h Range):  Temp:  [98.3 °F (36.8 °C)-103 °F (39.4 °C)] 98.3 °F (36.8 °C)  Pulse:  [] 100  Resp:  [15-26] 21  SpO2:  [59 %-100 %] 59 %  BP: (137-158)/(72-86) 143/86     Weight: 81.6 kg (180 lb)  Body mass index is 25.1 kg/m².    Physical Exam   Constitutional: He is oriented to person, place, and time. He appears well-developed and well-nourished.   HENT:   Head: Normocephalic.   Eyes: Conjunctivae are normal.   Neck: Neck supple.   Cardiovascular: Regular rhythm, normal heart sounds and intact distal pulses. Tachycardia present.   Pulses:       Radial pulses are 2+ on the right side, and 2+ on the left side.   Pulmonary/Chest: Effort normal. Tachypnea noted. He has  decreased breath sounds in the right lower field.   Abdominal: Soft. He exhibits no distension. Bowel sounds are decreased. There is generalized tenderness.   Musculoskeletal: Normal range of motion.   Neurological: He is alert and oriented to person, place, and time. GCS eye subscore is 4. GCS verbal subscore is 5. GCS motor subscore is 6.   Skin: Skin is warm and dry.   Psychiatric: His speech is normal and behavior is normal. His mood appears anxious.           Significant Labs:   CBC:   Recent Labs   Lab  10/15/18   1925   WBC  15.99*   HGB  13.4*   HCT  39.8*   PLT  181     CMP:   Recent Labs   Lab  10/15/18   1925   NA  136   K  4.5   CL  101   CO2  24   GLU  103   BUN  10   CREATININE  1.0   CALCIUM  9.4   PROT  7.9   ALBUMIN  3.8   BILITOT  0.5   ALKPHOS  73   AST  31   ALT  15   ANIONGAP  11   EGFRNONAA  >60       Significant Imaging: I have reviewed all pertinent imaging results/findings within the past 24 hours.

## 2018-10-16 NOTE — PROGRESS NOTES
[facility] Hospital Medicine  Progress Note    Patient Name: Mao Levin  MRN: [id number]  Patient Class: IP- Inpatient   Admission Date: 10/15/2018  Length of Stay: 1 days  Attending Physician: Jose David Schwartz MD  Primary Care Provider: Mendy Alarcon MD        Subjective:     Principal Problem:Sepsis due to urinary tract infection    HPI:  The patient is a 53 y.o. male with a history of MS who presents to the ED via EMS with complaint of fever, which began two days ago. Per EMS, patient had a fever of 104.2 when measured en route to the ED. Patient reports general malaise, bilateral leg weakness, mild shortness of breath, and diffuse arthralgias. He reports chronic difficulty urinating and intermittent incontinence secondary to neurogenic bladder, but notes his urine has been more concentrated recently, however he denies cloudy urine. He does not self-catheterize or use a catheter. He denies runny nose, cough, congestion, vomiting, diarrhea, or skin lesions or wounds. He denies any recent history of pneumonia or UTI.         Hospital Course:  No notes on file    Interval History:   Pt new to me today.  No fever.  Blood cx ngtd  No urine cx done.  Reports feeling better less malaise,  Improved bilateral leg weakness, mild shortness of breath, and diffuse arthralgias.           Review of Systems   Constitutional: Negative for chills and fever.   Respiratory: Negative for cough and shortness of breath.    Cardiovascular: Negative for chest pain and leg swelling.     Objective:     Vital Signs (Most Recent):  Temp: 97.3 °F (36.3 °C) (10/16/18 1146)  Pulse: 87 (10/16/18 1400)  Resp: 20 (10/16/18 0742)  BP: (!) 96/56 (10/16/18 1146)  SpO2: (!) 93 % (10/16/18 1146) Vital Signs (24h Range):  Temp:  [97.3 °F (36.3 °C)-103 °F (39.4 °C)] 97.3 °F (36.3 °C)  Pulse:  [] 87  Resp:  [15-26] 20  SpO2:  [59 %-100 %] 93 %  BP: ()/(56-86) 96/56     Weight: 75 kg (165 lb 5.5 oz)  Body mass index is  23.06 kg/m².    Intake/Output Summary (Last 24 hours) at 10/16/2018 1430  Last data filed at 10/16/2018 1201  Gross per 24 hour   Intake 1575 ml   Output 250 ml   Net 1325 ml      Physical Exam   Constitutional: He appears well-developed and well-nourished. No distress.   Cardiovascular: Normal rate, regular rhythm, normal heart sounds and intact distal pulses. Exam reveals no gallop and no friction rub.   No murmur heard.  Pulmonary/Chest: Effort normal and breath sounds normal. No respiratory distress. He has no rales.   Abdominal: Soft. Bowel sounds are normal. He exhibits no distension. There is no tenderness.   Skin: Skin is warm and dry. No rash noted. He is not diaphoretic. No erythema.       Significant Labs: All pertinent labs within the past 24 hours have been reviewed.    Significant Imaging: I have reviewed and interpreted all pertinent imaging results/findings within the past 24 hours.    Assessment/Plan:      * Sepsis due to urinary tract infection    Bacteria, Nitrites, WBC on U/A. Febrile, tachycardic, tachypnea, lactic acid 1.1    Blood culture NGTD  No urine cx sent  Previous urine cx x 2 with PROVIDENCIA STUARTII  Sensitive to zosyn  Currently on /Zosyn/Cipro  Cont IVF          Current use of long term anticoagulation    Continue Xarelto for previous hx of dvt          MS (multiple sclerosis)    Continue baclofen, dantrolene, ampyra, neurontin          Neurogenic bladder    Due to multiple sclerosis  Straight cath PRN            VTE Risk Mitigation (From admission, onward)        Ordered     rivaroxaban tablet 20 mg  With dinner      10/15/18 7657              Jose David Schwartz MD  Department of Hospital Medicine   [facility]

## 2018-10-17 PROBLEM — E44.1 MILD MALNUTRITION: Status: ACTIVE | Noted: 2018-10-17

## 2018-10-17 PROBLEM — R53.81 DEBILITY: Status: ACTIVE | Noted: 2018-10-17

## 2018-10-17 LAB
ALBUMIN SERPL BCP-MCNC: 2.4 G/DL
ALBUMIN SERPL BCP-MCNC: 2.4 G/DL
ALP SERPL-CCNC: 73 U/L
ALP SERPL-CCNC: 73 U/L
ALT SERPL W/O P-5'-P-CCNC: 17 U/L
ALT SERPL W/O P-5'-P-CCNC: 17 U/L
ANION GAP SERPL CALC-SCNC: 6 MMOL/L
ANION GAP SERPL CALC-SCNC: 6 MMOL/L
AST SERPL-CCNC: 19 U/L
AST SERPL-CCNC: 19 U/L
BASOPHILS # BLD AUTO: 0.02 K/UL
BASOPHILS # BLD AUTO: 0.02 K/UL
BASOPHILS NFR BLD: 0.2 %
BASOPHILS NFR BLD: 0.2 %
BILIRUB SERPL-MCNC: 0.3 MG/DL
BILIRUB SERPL-MCNC: 0.3 MG/DL
BUN SERPL-MCNC: 8 MG/DL
BUN SERPL-MCNC: 8 MG/DL
CALCIUM SERPL-MCNC: 8.5 MG/DL
CALCIUM SERPL-MCNC: 8.5 MG/DL
CHLORIDE SERPL-SCNC: 110 MMOL/L
CHLORIDE SERPL-SCNC: 110 MMOL/L
CO2 SERPL-SCNC: 27 MMOL/L
CO2 SERPL-SCNC: 27 MMOL/L
CREAT SERPL-MCNC: 0.9 MG/DL
CREAT SERPL-MCNC: 0.9 MG/DL
DIFFERENTIAL METHOD: ABNORMAL
DIFFERENTIAL METHOD: ABNORMAL
EOSINOPHIL # BLD AUTO: 0.2 K/UL
EOSINOPHIL # BLD AUTO: 0.2 K/UL
EOSINOPHIL NFR BLD: 2 %
EOSINOPHIL NFR BLD: 2 %
ERYTHROCYTE [DISTWIDTH] IN BLOOD BY AUTOMATED COUNT: 13.9 %
ERYTHROCYTE [DISTWIDTH] IN BLOOD BY AUTOMATED COUNT: 13.9 %
EST. GFR  (AFRICAN AMERICAN): >60 ML/MIN/1.73 M^2
EST. GFR  (AFRICAN AMERICAN): >60 ML/MIN/1.73 M^2
EST. GFR  (NON AFRICAN AMERICAN): >60 ML/MIN/1.73 M^2
EST. GFR  (NON AFRICAN AMERICAN): >60 ML/MIN/1.73 M^2
GLUCOSE SERPL-MCNC: 105 MG/DL
GLUCOSE SERPL-MCNC: 105 MG/DL
HCT VFR BLD AUTO: 34.8 %
HCT VFR BLD AUTO: 34.8 %
HGB BLD-MCNC: 11.5 G/DL
HGB BLD-MCNC: 11.5 G/DL
LYMPHOCYTES # BLD AUTO: 1.2 K/UL
LYMPHOCYTES # BLD AUTO: 1.2 K/UL
LYMPHOCYTES NFR BLD: 14 %
LYMPHOCYTES NFR BLD: 14 %
MAGNESIUM SERPL-MCNC: 1.9 MG/DL
MCH RBC QN AUTO: 29.9 PG
MCH RBC QN AUTO: 29.9 PG
MCHC RBC AUTO-ENTMCNC: 33 G/DL
MCHC RBC AUTO-ENTMCNC: 33 G/DL
MCV RBC AUTO: 90 FL
MCV RBC AUTO: 90 FL
MONOCYTES # BLD AUTO: 0.9 K/UL
MONOCYTES # BLD AUTO: 0.9 K/UL
MONOCYTES NFR BLD: 11 %
MONOCYTES NFR BLD: 11 %
NEUTROPHILS # BLD AUTO: 6.2 K/UL
NEUTROPHILS # BLD AUTO: 6.2 K/UL
NEUTROPHILS NFR BLD: 72.7 %
NEUTROPHILS NFR BLD: 72.7 %
PHOSPHATE SERPL-MCNC: 2.5 MG/DL
PLATELET # BLD AUTO: 155 K/UL
PLATELET # BLD AUTO: 155 K/UL
PMV BLD AUTO: 10.4 FL
PMV BLD AUTO: 10.4 FL
POTASSIUM SERPL-SCNC: 4.1 MMOL/L
POTASSIUM SERPL-SCNC: 4.1 MMOL/L
PROT SERPL-MCNC: 5.8 G/DL
PROT SERPL-MCNC: 5.8 G/DL
RBC # BLD AUTO: 3.85 M/UL
RBC # BLD AUTO: 3.85 M/UL
SODIUM SERPL-SCNC: 143 MMOL/L
SODIUM SERPL-SCNC: 143 MMOL/L
VANCOMYCIN TROUGH SERPL-MCNC: 11.4 UG/ML
WBC # BLD AUTO: 8.55 K/UL
WBC # BLD AUTO: 8.55 K/UL

## 2018-10-17 PROCEDURE — 97535 SELF CARE MNGMENT TRAINING: CPT

## 2018-10-17 PROCEDURE — 25000003 PHARM REV CODE 250: Performed by: NURSE PRACTITIONER

## 2018-10-17 PROCEDURE — 85025 COMPLETE CBC W/AUTO DIFF WBC: CPT

## 2018-10-17 PROCEDURE — 83735 ASSAY OF MAGNESIUM: CPT

## 2018-10-17 PROCEDURE — 84100 ASSAY OF PHOSPHORUS: CPT

## 2018-10-17 PROCEDURE — 63600175 PHARM REV CODE 636 W HCPCS: Performed by: NURSE PRACTITIONER

## 2018-10-17 PROCEDURE — 94761 N-INVAS EAR/PLS OXIMETRY MLT: CPT

## 2018-10-17 PROCEDURE — 97166 OT EVAL MOD COMPLEX 45 MIN: CPT

## 2018-10-17 PROCEDURE — 99232 SBSQ HOSP IP/OBS MODERATE 35: CPT | Mod: ,,, | Performed by: INTERNAL MEDICINE

## 2018-10-17 PROCEDURE — 80202 ASSAY OF VANCOMYCIN: CPT

## 2018-10-17 PROCEDURE — 25000003 PHARM REV CODE 250: Performed by: INTERNAL MEDICINE

## 2018-10-17 PROCEDURE — 36415 COLL VENOUS BLD VENIPUNCTURE: CPT

## 2018-10-17 PROCEDURE — 80053 COMPREHEN METABOLIC PANEL: CPT

## 2018-10-17 PROCEDURE — 11000001 HC ACUTE MED/SURG PRIVATE ROOM

## 2018-10-17 RX ORDER — OXYCODONE HYDROCHLORIDE 5 MG/1
10 TABLET ORAL EVERY 8 HOURS PRN
Status: DISCONTINUED | OUTPATIENT
Start: 2018-10-17 | End: 2018-10-18

## 2018-10-17 RX ADMIN — POLYETHYLENE GLYCOL 3350 17 G: 17 POWDER, FOR SOLUTION ORAL at 09:10

## 2018-10-17 RX ADMIN — GABAPENTIN 900 MG: 300 CAPSULE ORAL at 02:10

## 2018-10-17 RX ADMIN — BACLOFEN 20 MG: 10 TABLET ORAL at 09:10

## 2018-10-17 RX ADMIN — VANCOMYCIN HYDROCHLORIDE 1250 MG: 10 INJECTION, POWDER, LYOPHILIZED, FOR SOLUTION INTRAVENOUS at 09:10

## 2018-10-17 RX ADMIN — OXYCODONE HYDROCHLORIDE AND ACETAMINOPHEN 500 MG: 500 TABLET ORAL at 09:10

## 2018-10-17 RX ADMIN — BACLOFEN 20 MG: 10 TABLET ORAL at 02:10

## 2018-10-17 RX ADMIN — PIPERACILLIN AND TAZOBACTAM 4.5 G: 4; .5 INJECTION, POWDER, LYOPHILIZED, FOR SOLUTION INTRAVENOUS; PARENTERAL at 10:10

## 2018-10-17 RX ADMIN — GABAPENTIN 900 MG: 300 CAPSULE ORAL at 08:10

## 2018-10-17 RX ADMIN — SODIUM CHLORIDE: 0.9 INJECTION, SOLUTION INTRAVENOUS at 04:10

## 2018-10-17 RX ADMIN — CIPROFLOXACIN 400 MG: 2 INJECTION, SOLUTION INTRAVENOUS at 01:10

## 2018-10-17 RX ADMIN — DULOXETINE 30 MG: 30 CAPSULE, DELAYED RELEASE ORAL at 09:10

## 2018-10-17 RX ADMIN — PIPERACILLIN AND TAZOBACTAM 4.5 G: 4; .5 INJECTION, POWDER, LYOPHILIZED, FOR SOLUTION INTRAVENOUS; PARENTERAL at 02:10

## 2018-10-17 RX ADMIN — DANTROLENE SODIUM 50 MG: 25 CAPSULE ORAL at 02:10

## 2018-10-17 RX ADMIN — OXYCODONE HYDROCHLORIDE 10 MG: 5 TABLET ORAL at 09:10

## 2018-10-17 RX ADMIN — RIVAROXABAN 20 MG: 10 TABLET, FILM COATED ORAL at 05:10

## 2018-10-17 RX ADMIN — DANTROLENE SODIUM 50 MG: 25 CAPSULE ORAL at 08:10

## 2018-10-17 RX ADMIN — DANTROLENE SODIUM 50 MG: 25 CAPSULE ORAL at 09:10

## 2018-10-17 RX ADMIN — TAMSULOSIN HYDROCHLORIDE 0.4 MG: 0.4 CAPSULE ORAL at 09:10

## 2018-10-17 RX ADMIN — OXYCODONE HYDROCHLORIDE 10 MG: 5 TABLET ORAL at 11:10

## 2018-10-17 RX ADMIN — BACLOFEN 20 MG: 10 TABLET ORAL at 08:10

## 2018-10-17 RX ADMIN — DANTROLENE SODIUM 50 MG: 25 CAPSULE ORAL at 05:10

## 2018-10-17 RX ADMIN — PIPERACILLIN AND TAZOBACTAM 4.5 G: 4; .5 INJECTION, POWDER, LYOPHILIZED, FOR SOLUTION INTRAVENOUS; PARENTERAL at 05:10

## 2018-10-17 RX ADMIN — TRAZODONE HYDROCHLORIDE 100 MG: 100 TABLET ORAL at 10:10

## 2018-10-17 RX ADMIN — GABAPENTIN 900 MG: 300 CAPSULE ORAL at 09:10

## 2018-10-17 RX ADMIN — STANDARDIZED SENNA CONCENTRATE AND DOCUSATE SODIUM 1 TABLET: 8.6; 5 TABLET ORAL at 09:10

## 2018-10-17 NOTE — SUBJECTIVE & OBJECTIVE
Interval History:     No fever.  Blood cx ngtd  No urine cx done.  Reports feeling better less malaise,  Improved bilateral leg weakness,   Reports chronic  and diffuse arthralgias.     Baseline walks with walker          Review of Systems   Constitutional: Negative for chills and fever.   Respiratory: Negative for cough and shortness of breath.    Cardiovascular: Negative for chest pain and leg swelling.     Objective:     Vital Signs (Most Recent):  Temp: 98.9 °F (37.2 °C) (10/17/18 0758)  Pulse: 82 (10/17/18 0800)  Resp: 19 (10/17/18 0758)  BP: 128/79 (10/17/18 0758)  SpO2: 100 % (10/17/18 0758) Vital Signs (24h Range):  Temp:  [97.3 °F (36.3 °C)-99.1 °F (37.3 °C)] 98.9 °F (37.2 °C)  Pulse:  [] 82  Resp:  [18-20] 19  SpO2:  [93 %-100 %] 100 %  BP: ()/(56-79) 128/79     Weight: 75 kg (165 lb 5.5 oz)  Body mass index is 23.06 kg/m².    Intake/Output Summary (Last 24 hours) at 10/17/2018 0928  Last data filed at 10/17/2018 0543  Gross per 24 hour   Intake 4578 ml   Output 3100 ml   Net 1478 ml      Physical Exam   Constitutional: He appears well-developed and well-nourished. No distress.   Cardiovascular: Normal rate, regular rhythm, normal heart sounds and intact distal pulses. Exam reveals no gallop and no friction rub.   No murmur heard.  Pulmonary/Chest: Effort normal and breath sounds normal. No respiratory distress. He has no rales.   Abdominal: Soft. Bowel sounds are normal. He exhibits no distension. There is no tenderness.   Skin: Skin is warm and dry. No rash noted. He is not diaphoretic. No erythema.       Significant Labs: All pertinent labs within the past 24 hours have been reviewed.    Significant Imaging: I have reviewed and interpreted all pertinent imaging results/findings within the past 24 hours.

## 2018-10-17 NOTE — ASSESSMENT & PLAN NOTE
Bacteria, Nitrites, WBC on U/A. Febrile, tachycardic, tachypnea, lactic acid 1.1    Blood culture NGTD  No urine cx sent  Previous urine cx x 2 with PROVIDENCIA STUARTII  Sensitive to zosyn  Currently on /Zosyn/Cipro  Will continue zosyn  D/c cipro  Plan treatment course with zosyn empirically ( day # 3 of 7 )  based on previous cultires and sensitivities

## 2018-10-17 NOTE — PROGRESS NOTES
[facility] Hospital Medicine  Progress Note    Patient Name: Mao Levin  MRN: [id number]  Patient Class: IP- Inpatient   Admission Date: 10/15/2018  Length of Stay: 2 days  Attending Physician: Jose David Schwartz MD  Primary Care Provider: Mendy Alarcon MD        Subjective:     Principal Problem:Sepsis due to urinary tract infection      Interval History:     No fever.  Blood cx ngtd  No urine cx done.  Reports feeling better less malaise,  Improved bilateral leg weakness,   Reports chronic  and diffuse arthralgias.     Baseline walks with walker          Review of Systems   Constitutional: Negative for chills and fever.   Respiratory: Negative for cough and shortness of breath.    Cardiovascular: Negative for chest pain and leg swelling.     Objective:     Vital Signs (Most Recent):  Temp: 98.9 °F (37.2 °C) (10/17/18 0758)  Pulse: 82 (10/17/18 0800)  Resp: 19 (10/17/18 0758)  BP: 128/79 (10/17/18 0758)  SpO2: 100 % (10/17/18 0758) Vital Signs (24h Range):  Temp:  [97.3 °F (36.3 °C)-99.1 °F (37.3 °C)] 98.9 °F (37.2 °C)  Pulse:  [] 82  Resp:  [18-20] 19  SpO2:  [93 %-100 %] 100 %  BP: ()/(56-79) 128/79     Weight: 75 kg (165 lb 5.5 oz)  Body mass index is 23.06 kg/m².    Intake/Output Summary (Last 24 hours) at 10/17/2018 0928  Last data filed at 10/17/2018 0543  Gross per 24 hour   Intake 4578 ml   Output 3100 ml   Net 1478 ml      Physical Exam   Constitutional: He appears well-developed and well-nourished. No distress.   Cardiovascular: Normal rate, regular rhythm, normal heart sounds and intact distal pulses. Exam reveals no gallop and no friction rub.   No murmur heard.  Pulmonary/Chest: Effort normal and breath sounds normal. No respiratory distress. He has no rales.   Abdominal: Soft. Bowel sounds are normal. He exhibits no distension. There is no tenderness.   Skin: Skin is warm and dry. No rash noted. He is not diaphoretic. No erythema.       Significant Labs: All  pertinent labs within the past 24 hours have been reviewed.    Significant Imaging: I have reviewed and interpreted all pertinent imaging results/findings within the past 24 hours.    Assessment/Plan:      * Sepsis due to urinary tract infection    Bacteria, Nitrites, WBC on U/A. Febrile, tachycardic, tachypnea, lactic acid 1.1    Blood culture NGTD  No urine cx sent  Previous urine cx x 2 with PROVIDENCIA STUARTII  Sensitive to zosyn  Currently on /Zosyn/Cipro  Will continue zosyn  D/c cipro  Plan treatment course with zosyn empirically ( day # 3 of 7 )  based on previous cultires and sensitivities          Mild malnutrition    Monitor weight and albumin.  Monitor po intake          Debility    Start pt/ot  Baseline walks with walker          Current use of long term anticoagulation    Continue Xarelto for previous hx of dvt          MS (multiple sclerosis)    Continue baclofen, dantrolene, ampyra, neurontin          Neurogenic bladder    Due to multiple sclerosis  Straight cath PRN            VTE Risk Mitigation (From admission, onward)        Ordered     rivaroxaban tablet 20 mg  With dinner      10/15/18 8357              Jose David Schwartz MD  Department of Hospital Medicine   [facility]

## 2018-10-17 NOTE — PLAN OF CARE
Patient asleep for most of this shift. IV antibiotics and continuous fluids infusing. VSS on RA. Condom catheter intact and draining to gravity. Weight shift assistance provided PRN. Safety measures maintained. Pt instructed to use call light for assistance.

## 2018-10-17 NOTE — PHYSICIAN QUERY
PT Name: Mao Levin  MR #: [id number]    Physician Query Form - Cause and Effect Relationship Clarification      CDS/: Carias Gastelum RN CCDS               Contact information: [email or web address]    This form is a permanent document in the medical record.     Query Date: October 17, 2018    By submitting this query, we are merely seeking further clarification of documentation. Please utilize your independent clinical judgment when addressing the question(s) below.    The Medical record contains the following:  Supporting Clinical Findings   Location in record       Sepsis due to urinary tract infection     Bacteria, Nitrites, WBC on U/A. Febrile, tachycardic, tachypnea, lactic acid 1.1     Blood culture NGTD  No urine cx sent  Previous urine cx x 2 with PROVIDENCIA STUARTII  Sensitive to zosyn  Currently on /Zosyn/Cipro  Will continue zosyn  D/c cipro  Plan treatment course with zosyn empirically ( day # 3 of 7 )  based on previous cultires and sensitivities                                                                                                                                                                                                  PN 10/17                                                                                                                                                                                                       Provider, please clarify if there is any correlation between sepsis and Providencia Stuartii.           Are the conditions:     [X ] Due to or associated with each other     [  ] Unrelated to each other     [  ] Other (Please Specify): _________________________     [  ] Clinically Undetermined

## 2018-10-17 NOTE — PLAN OF CARE
[facility]    HOME HEALTH ORDERS  FACE TO FACE ENCOUNTER    Patient Name: Mao Levin  YOB: 1965    PCP: Mendy Alarcon MD   [facility] [address] [facility] [place] [address]  PCP Phone Number: [phone]  PCP Fax: [phone]    Encounter Date: 10/17/2018    Admit to Home Health and Home Infusion Services    Diagnoses:  Active Hospital Problems    Diagnosis  POA    *Sepsis due to urinary tract infection [A41.9, N39.0]  Yes    Debility [R53.81]  Yes    Mild malnutrition [E44.1]  Yes    Current use of long term anticoagulation [Z79.01]  Not Applicable    MS (multiple sclerosis) [G35]  Yes     Chronic    Neurogenic bladder [N31.9]  Yes     Chronic      Resolved Hospital Problems   No resolved problems to display.       Future Appointments   Date Time Provider Department Center   12/6/2018  8:00 AM Kymberly Estrella MD [facility]           I have seen and examined this patient face to face today. My clinical findings that support the need for the home health skilled services and home bound status are the following:  Weakness/numbness causing balance and gait disturbance due to Infection and MS making it taxing to leave home.  Medical restrictions requiring assistance of another human to leave home due to  Unstable ambulation.    Allergies:Review of patient's allergies indicates:  No Known Allergies    Diet: regular diet    Activities: activity as tolerated    Nursing:   SN to complete comprehensive assessment including routine vital signs. Instruct on disease process and s/s of complications to report to MD. Review/verify medication list sent home with the patient at time of discharge  and instruct patient/caregiver as needed. Frequency may be adjusted depending on start of care date.    Notify MD if SBP > 160 or < 90; DBP > 90 or < 50; HR > 120 or < 50; Temp > 101;       CONSULTS:    Physical Therapy to evaluate and treat. Evaluate for home safety and  equipment needs; Establish/upgrade home exercise program. Perform / instruct on therapeutic exercises, gait training, transfer training, and Range of Motion.  Aide to provide assistance with personal care, ADLs, and vital signs.    MISCELLANEOUS CARE:  Home Infusion Therapy:   SN to perform Infusion Therapy/Central Line Care.  Review Central Line Care & Central Line Flush with patient.    Administer (drug and dose): ***    Last dose given: ***                         Home dose due: ***    Scrub the Hub: Prior to accessing the line, always perform a 30 second alcohol scrub  Each lumen of the central line is to be flushed at least daily with 10 mL Normal Saline and 3 mL Heparin flush (10 units/mL)  Skilled Nurse (SN) may draw blood from IV access  Blood Draw Procedure:   - Aspirate at least 5 mL of blood   - Discard   - Obtain specimen   - Change injection cap   - Flush with 20 mL Normal Saline followed by a                 3-5 mL Heparin flush (10 units/mL)  Central :   - Sterile dressing changes are done weekly and as needed.   - Use chlor-hexadine scrub to cleanse site, apply Biopatch to insertion site,       apply securement device dressing   - Injection caps are changed weekly and after EVERY lab draw.   - If sterile gauze is under dressing to control oozing,                 dressing change must be performed every 24 hours until gauze is not needed.      Medications: Review discharge medications with patient and family and provide education.        ***    I certify that this patient is confined to his home and needs intermittent skilled nursing care and physical therapy.

## 2018-10-17 NOTE — PLAN OF CARE
Problem: Patient Care Overview  Goal: Plan of Care Review  Outcome: Ongoing (interventions implemented as appropriate)  Pt remained free of falls and injuries. VSS on room air. Managed pain with PRN medications. Assisted with feeding for meals. LUE and LLE weaker than RUE and RLE. Bed low and locked, call light within reach, side rails up X2. Will continue to monitor.

## 2018-10-17 NOTE — PLAN OF CARE
Problem: Patient Care Overview  Goal: Plan of Care Review  Outcome: Ongoing (interventions implemented as appropriate)  Plan of care reviewed with patient and mother at bedside. Antibiotic trio given as ordered. NS infusing through peripheral IV. Patient afebrile this shift. Blood pressure has remained in the 90s systolic. Patient complains of chronic pain from multiple sclerosis and is adamant about having home pain medications restarted. MD encourages that patient be offered to take prn Tylenol and Ibuprofen. No additional orders given for pain medication at this time. Condom cath on. Adequate urine output this shift. Bed kept locked and in lowest position. Call light in reach. Will continue to monitor.

## 2018-10-17 NOTE — PLAN OF CARE
Problem: Occupational Therapy Goal  Goal: Occupational Therapy Goal  Goals to be met by: 11/17/18     Patient will increase functional independence with ADLs by performing:    UE Dressing with Minimal Assistance.  LE Dressing with Moderate Assistance.  Grooming while EOB with Set-up Assistance.  Stand pivot transfers to be assessed.      Outcome: Ongoing (interventions implemented as appropriate)  OT evaluation completed and treatment initiated. Pt required MOD A for supine > sit with increased assistance at trunk and Sit > supine with assistance at BLE's. Pt required passive flexion at BLE's with increased tone on L side (BUE/LE's). Pt presents with decreased safety awareness and independence with ADL's. Pt would benefit from skilled occupational therapy intervention for increased activity tolerance and independence in ADL's.

## 2018-10-17 NOTE — PT/OT/SLP EVAL
"Occupational Therapy   Evaluation and Treatment    Name: Mao Levin  MRN: [id number]  Admitting Diagnosis:  Sepsis due to urinary tract infection      Recommendations:     Discharge Recommendations: nursing facility, skilled  Discharge Equipment Recommendations:  hospital bed  Barriers to discharge:  Decreased caregiver support, Inaccessible home environment    History:     Occupational Profile:  Living Environment: Pt reports he lives with his mom in a single story house with 5 VASILIY and BHR (far apart). He has a walk in shower with a shower chair and grab bar. Pt has a regular bed.  Previous level of function: Pt reports he ambulates around his home with a rolling walker. He states he has a wheelchair but tries not to use it much (even in the community). Pt reports he was MOD I for dressing, bathing and uses adult briefs for toileting secondary to urgency. He states he changes out his own adult briefs after incontinence. Pt's mother completes meal prep, cleaning and assists with driving pt to appointments. Pt reports x1 fall (believed to be a major fall) within the last 6 months as pt also stated "I'm loan a professional lobo, when you have MS that's just the nature of the beast." Pt admits he will occasionally need MIN A for car transfers or to aid with L side/LLE.      Roles and Routines: Pt states he's originally from the [place] and does not have many [place] related interests. 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     Past Medical History:   Diagnosis Date    Anticoagulant long-term use     Anxiety     Chronic back pain     Deep vein thrombosis     Depression     Depression     Gait instability     Multiple sclerosis     Multiple sclerosis     Neurogenic bladder     Polyneuropathy  " "   Pulmonary embolism     Spasticity        Past Surgical History:   Procedure Laterality Date    CYSTOSCOPY N/A 6/20/2018    Procedure: CYSTOSCOPY;  Surgeon: Brian Augustin MD;  Location: [facility] OR [address] [place] [address];  Service: Urology;  Laterality: N/A;  1 hour    CYSTOSCOPY N/A 6/20/2018    Performed by Brian Augustin MD at [facility] OR [address] [place] [address]    INJECTION OF BOTULINUM TOXIN TYPE A N/A 6/20/2018    Procedure: INJECTION, BOTULINUM TOXIN, TYPE A 200 UNITS;  Surgeon: Brian Augustin MD;  Location: [facility] OR [address] [place] [address];  Service: Urology;  Laterality: N/A;    INJECTION, BOTULINUM TOXIN, TYPE A 200 UNITS N/A 6/20/2018    Performed by Brian Augsutin MD at [facility] OR [address] [place] [address]       Subjective     Chief Complaint: decreased independence   Patient/Family Comments/goals: "I've been dying to get up." "Yea, lets get to a chair we can do it." Pt reports he can dress himself PTA.     Pain/Comfort:  · Pain Rating 1: 0/10  · Pain Rating Post-Intervention 1: 0/10    Patients cultural, spiritual, Druze conflicts given the current situation: none specified    Objective:     Communicated with: nursing prior to session.  Patient found completing lunch HOB elevated and peripheral IV, Condom Catheter upon OT entry to room.    General Precautions: Standard, fall   Orthopedic Precautions:N/A   Braces: N/A     Occupational Performance:    Bed Mobility:    · Patient completed Rolling/Turning to Left with  contact guard assistance  · Patient completed Scooting/Bridging with moderate assistance  · Patient completed Supine to Sit with moderate assistance at trunk  · Patient completed Sit to Supine with moderate assistance BLE's    Activities of Daily Living:  · Feeding:  minimum assistance with sandwich with MOD spillage  · Lower Body Dressing: total assistance doffing socks  · Toileting: dependence for kim-care and pt with condom cath upon OT arrival and pt unintentionally (with LUE hand remains fisted) rolled catheter off.    Cognitive/Visual " Perceptual:  Cognitive/Psychosocial Skills:     -       Oriented to: Person, Place and Time   -       Follows Commands/attention:Easily distracted and Follows one-step commands  -       Communication: clear/fluent  -       Memory: Pt inconsistent with PLOF and history/reported falls etc  -       Safety awareness/insight to disability: impaired   -       Mood/Affect/Coping skills/emotional control: Appropriate to situation and Excited    Physical Exam:  Postural examination/scapula alignment:    -       Forward head  Skin integrity: Visible skin intact  Edema:  none noted BUE's  Sensation:    -       Intact  light/touch RUE  -       Impaired  pt reports decreased sensation in L side   Motor Planning:    -       Fair  Dominant hand:    -       Right  Upper Extremity Range of Motion:     -       Right Upper Extremity: shoulder flexion WFL however, increased weakness and fatigue   -       Left Upper Extremity: pt passive ROM with assist of RUE shoulder flexion. Pt with noted LUE contracture and fisted hand increased tone and overall weakness and decreased independence in functional and voluntary movements.    Upper Extremity Strength:    -       Right Upper Extremity: 3+/5    Strength:    -       Right Upper Extremity: 3+/5   Fine Motor Coordination:    -       Impaired  Left hand, manipulation of objects all functional fine motor tasks and Right hand, manipulation of objects pt with difficulty managing phone/, utensils,     AMPAC 6 Click ADL:  AMPAC Total Score: 13    Treatment & Education:  Sitting balance and tolerance EOB tolerated with SBA-CGA for increased safety. Pt educated on OT role and POC. Pt requires increased assistance for bed mobility and positioning -- bridging hips to maintain alignment. Pt with decreased safety awareness and insight to disability and current functional status.   Education:    Patient left HOB elevated with call button in reach and nursing notified    Assessment:     Mao  "Ollie is a 53 y.o. male with a medical diagnosis of Sepsis due to urinary tract infection.  He presents with the following performance deficits affecting function: weakness, impaired endurance, impaired sensation, impaired functional mobilty, decreased safety awareness, impaired cognition, impaired self care skills, decreased lower extremity function, decreased ROM, decreased upper extremity function, impaired muscle length, pain, gait instability, impaired coordination, impaired fine motor.      Rehab Prognosis: Good-; patient would benefit from acute skilled OT services to address these deficits and reach maximum level of function.         Clinical Decision Makin.  OT Mod:  "Pt evaluation falls under moderate complexity for evaluation coding due to identification of 3-5 performance deficits noted as stated above. Eval required Min/Mod assistance to complete on this date and detailed assessment(s) were utilized. Moreover, an expanded review of history and occupational profile obtained with additional review of cognitive, physical and psychosocial hx."     Plan:     Patient to be seen 5 x/week to address the above listed problems via self-care/home management, therapeutic activities, therapeutic exercises  · Plan of Care Expires: 18  · Plan of Care Reviewed with: patient    This Plan of care has been discussed with the patient who was involved in its development and understands and is in agreement with the identified goals and treatment plan    GOALS:   Multidisciplinary Problems     Occupational Therapy Goals        Problem: Occupational Therapy Goal    Goal Priority Disciplines Outcome Interventions   Occupational Therapy Goal     OT, PT/OT Ongoing (interventions implemented as appropriate)    Description:  Goals to be met by: 18     Patient will increase functional independence with ADLs by performing:    UE Dressing with Minimal Assistance.  LE Dressing with Moderate Assistance.  Grooming " while EOB with Set-up Assistance.  Stand pivot transfers to be assessed.                        Time Tracking:     OT Date of Treatment: 10/17/18  OT Start Time: 1443  OT Stop Time: 1516  OT Total Time (min): 33 min    Billable Minutes:Evaluation 15  Therapeutic Activity 18    Ashli Vazquez OT  10/17/2018

## 2018-10-18 LAB
BASOPHILS # BLD AUTO: 0.02 K/UL
BASOPHILS NFR BLD: 0.3 %
DIFFERENTIAL METHOD: ABNORMAL
EOSINOPHIL # BLD AUTO: 0.2 K/UL
EOSINOPHIL NFR BLD: 3.1 %
ERYTHROCYTE [DISTWIDTH] IN BLOOD BY AUTOMATED COUNT: 13.9 %
HCT VFR BLD AUTO: 33.5 %
HGB BLD-MCNC: 11.1 G/DL
LYMPHOCYTES # BLD AUTO: 1.3 K/UL
LYMPHOCYTES NFR BLD: 22.9 %
MCH RBC QN AUTO: 29.8 PG
MCHC RBC AUTO-ENTMCNC: 33.1 G/DL
MCV RBC AUTO: 90 FL
MONOCYTES # BLD AUTO: 0.8 K/UL
MONOCYTES NFR BLD: 13.1 %
NEUTROPHILS # BLD AUTO: 3.5 K/UL
NEUTROPHILS NFR BLD: 60.4 %
PLATELET # BLD AUTO: 185 K/UL
PMV BLD AUTO: 10 FL
RBC # BLD AUTO: 3.72 M/UL
WBC # BLD AUTO: 5.81 K/UL

## 2018-10-18 PROCEDURE — 25000003 PHARM REV CODE 250: Performed by: PHYSICIAN ASSISTANT

## 2018-10-18 PROCEDURE — 63600175 PHARM REV CODE 636 W HCPCS: Performed by: NURSE PRACTITIONER

## 2018-10-18 PROCEDURE — 97535 SELF CARE MNGMENT TRAINING: CPT

## 2018-10-18 PROCEDURE — 99233 SBSQ HOSP IP/OBS HIGH 50: CPT | Mod: ,,, | Performed by: INTERNAL MEDICINE

## 2018-10-18 PROCEDURE — G8979 MOBILITY GOAL STATUS: HCPCS | Mod: CK

## 2018-10-18 PROCEDURE — 94761 N-INVAS EAR/PLS OXIMETRY MLT: CPT

## 2018-10-18 PROCEDURE — 25000003 PHARM REV CODE 250: Performed by: INTERNAL MEDICINE

## 2018-10-18 PROCEDURE — 97161 PT EVAL LOW COMPLEX 20 MIN: CPT

## 2018-10-18 PROCEDURE — G8978 MOBILITY CURRENT STATUS: HCPCS | Mod: CL

## 2018-10-18 PROCEDURE — 97530 THERAPEUTIC ACTIVITIES: CPT

## 2018-10-18 PROCEDURE — 11000001 HC ACUTE MED/SURG PRIVATE ROOM

## 2018-10-18 PROCEDURE — 36415 COLL VENOUS BLD VENIPUNCTURE: CPT

## 2018-10-18 PROCEDURE — 85025 COMPLETE CBC W/AUTO DIFF WBC: CPT

## 2018-10-18 PROCEDURE — 25000003 PHARM REV CODE 250: Performed by: NURSE PRACTITIONER

## 2018-10-18 RX ORDER — OXYCODONE HYDROCHLORIDE 5 MG/1
10 TABLET ORAL EVERY 6 HOURS PRN
Status: DISCONTINUED | OUTPATIENT
Start: 2018-10-18 | End: 2018-10-20

## 2018-10-18 RX ADMIN — DULOXETINE 30 MG: 30 CAPSULE, DELAYED RELEASE ORAL at 09:10

## 2018-10-18 RX ADMIN — DANTROLENE SODIUM 50 MG: 25 CAPSULE ORAL at 02:10

## 2018-10-18 RX ADMIN — OXYCODONE HYDROCHLORIDE 10 MG: 5 TABLET ORAL at 07:10

## 2018-10-18 RX ADMIN — GABAPENTIN 900 MG: 300 CAPSULE ORAL at 09:10

## 2018-10-18 RX ADMIN — DANTROLENE SODIUM 50 MG: 25 CAPSULE ORAL at 09:10

## 2018-10-18 RX ADMIN — OXYCODONE HYDROCHLORIDE AND ACETAMINOPHEN 500 MG: 500 TABLET ORAL at 09:10

## 2018-10-18 RX ADMIN — STANDARDIZED SENNA CONCENTRATE AND DOCUSATE SODIUM 1 TABLET: 8.6; 5 TABLET ORAL at 09:10

## 2018-10-18 RX ADMIN — TRAZODONE HYDROCHLORIDE 100 MG: 100 TABLET ORAL at 09:10

## 2018-10-18 RX ADMIN — RIVAROXABAN 20 MG: 10 TABLET, FILM COATED ORAL at 05:10

## 2018-10-18 RX ADMIN — PIPERACILLIN AND TAZOBACTAM 4.5 G: 4; .5 INJECTION, POWDER, LYOPHILIZED, FOR SOLUTION INTRAVENOUS; PARENTERAL at 05:10

## 2018-10-18 RX ADMIN — BACLOFEN 20 MG: 10 TABLET ORAL at 09:10

## 2018-10-18 RX ADMIN — OXYCODONE HYDROCHLORIDE 10 MG: 5 TABLET ORAL at 03:10

## 2018-10-18 RX ADMIN — TAMSULOSIN HYDROCHLORIDE 0.4 MG: 0.4 CAPSULE ORAL at 09:10

## 2018-10-18 RX ADMIN — POLYETHYLENE GLYCOL 3350 17 G: 17 POWDER, FOR SOLUTION ORAL at 09:10

## 2018-10-18 RX ADMIN — PIPERACILLIN AND TAZOBACTAM 4.5 G: 4; .5 INJECTION, POWDER, LYOPHILIZED, FOR SOLUTION INTRAVENOUS; PARENTERAL at 09:10

## 2018-10-18 RX ADMIN — OXYCODONE HYDROCHLORIDE 10 MG: 5 TABLET ORAL at 09:10

## 2018-10-18 RX ADMIN — GABAPENTIN 900 MG: 300 CAPSULE ORAL at 02:10

## 2018-10-18 RX ADMIN — PIPERACILLIN AND TAZOBACTAM 4.5 G: 4; .5 INJECTION, POWDER, LYOPHILIZED, FOR SOLUTION INTRAVENOUS; PARENTERAL at 02:10

## 2018-10-18 RX ADMIN — BACLOFEN 20 MG: 10 TABLET ORAL at 02:10

## 2018-10-18 RX ADMIN — DANTROLENE SODIUM 50 MG: 25 CAPSULE ORAL at 05:10

## 2018-10-18 NOTE — PLAN OF CARE
[facility]   Department of Hospital Medicine  [address] [place] [id number]  [phone] (phone)  [phone] (fax)      Facility Transfer Orders                        10/21/2018    Mao Ollie    Admit to: SNF    Diagnoses:  Active Hospital Problems    Diagnosis  POA    *Sepsis due to urinary tract infection [A41.9, N39.0]  Yes    Debility [R53.81]  Yes    Mild malnutrition [E44.1]  Yes    Current use of long term anticoagulation [Z79.01]  Not Applicable    MS (multiple sclerosis) [G35]  Yes     Chronic    Neurogenic bladder [N31.9]  Yes     Chronic      Resolved Hospital Problems   No resolved problems to display.       Allergies:Review of patient's allergies indicates:  No Known Allergies    Vitals:       Once weekly      Diet: Adult Regular     Supplement:  1 can every three times a day with meals                         Type:  House          Activity:      - Up in a chair each morning as tolerated   - Ambulate with assistance to bathroom    Nursing Precautions:    - Fall precautions   - Decubitus precautions:   - Pressure reducing foam mattress   - Turn patient every two hours. Use wedge pillows to anchor patient    CONSULTS:      PT to evaluate and treat - five times a week     OT to evaluate and treat - five times a week     ST to evaluate and treat     Nutrition to evaluate and recommend diet          Medications: Discontinue all previous medication orders, if any. See new list below.     Mao Levin   Home Medication Instructions RICCO:[id number]    Printed on:10/21/18 7683   Medication Information                      acetaminophen (TYLENOL) 325 MG tablet  Take 2 tablets (650 mg total) by mouth every 4 (four) hours as needed for pain <4/10             ascorbic acid, vitamin C, (VITAMIN C) 500 MG tablet  Take 500 mg by mouth once daily.             baclofen (LIORESAL) 10 MG tablet  Take 2 tablets (20 mg total) by mouth 3 (three) times daily.              buPROPion (WELLBUTRIN SR) 150 MG TBSR 12 hr tablet           cranberry conc-C-bacillus coag 450-30-50 mg-mg-million Tab  Take 1 capsule by mouth once daily.             dalfampridine (AMPYRA) 10 mg Tb12  Take 10 mg by mouth every 12 (twelve) hours.             dantrolene (DANTRIUM) 50 MG Cap  Take 1 capsule (50 mg total) by mouth 4 (four) times daily.             diazePAM (VALIUM) 5 MG tablet  Take 1 tablet (5 mg total) by mouth every 12 (twelve) hours as needed for Anxiety.             duloxetine (CYMBALTA) 30 MG capsule  Take 30 mg by mouth once daily.             ergocalciferol (VITAMIN D2) 50,000 unit Cap  Take 1 capsule (50,000 Units total) by mouth every 7 days.             gabapentin (NEURONTIN) 300 MG capsule  Take 900 mg three times daily             mirabegron (MYRBETRIQ) 50 mg Tb24  Take 1 tablet (50 mg total) by mouth once daily.             multivitamin capsule  Take 1 capsule by mouth once daily.             oxyCODONE-acetaminophen (PERCOCET)  mg per tablet  Take 1 tablet by mouth every 8 (eight) hours as needed for Pain >5/10             polyethylene glycol (GLYCOLAX) 17 gram/dose powder  Take 17 g by mouth once daily.             rivaroxaban (XARELTO) 20 mg Tab  Take 1 tablet (20 mg total) by mouth daily with dinner or evening meal.             senna-docusate 8.6-50 mg (PERICOLACE) 8.6-50 mg per tablet  Take 1 tablet by mouth once daily.             tamsulosin (FLOMAX) 0.4 mg Cp24  Take 1 capsule (0.4 mg total) by mouth once daily.             trazodone (DESYREL) 100 MG tablet  Take 1 tablet (100 mg total) by mouth every evening.                       _________________________________  Dr. Schwartz  10/21/2018

## 2018-10-18 NOTE — ASSESSMENT & PLAN NOTE
Bacteria, Nitrites, WBC on U/A. Febrile, tachycardic, tachypnea, lactic acid 1.1    Blood culture NGTD  No urine cx sent  Previous urine cx x 2 with [facility]  Sensitive to zosyn  Currently on /Zosyn/Cipro  Will continue zosyn  D/c cipro  Plan treatment course with zosyn empirically ( day # 4 of 7 )  based on previous cultires and sensitivities

## 2018-10-18 NOTE — PT/OT/SLP EVAL
Physical Therapy Evaluation    Patient Name:  Mao Levin   MRN:  [id number]    Recommendations:     Discharge Recommendations:  nursing facility, skilled   Discharge Equipment Recommendations: (Pending progress but anticipate hospital bed)   Barriers to discharge: Inaccessible home and Decreased caregiver support    Assessment:     Mao Levin is a 53 y.o. male admitted with a medical diagnosis of Sepsis due to urinary tract infection.  He presents with the following impairments/functional limitations:  weakness, impaired endurance, impaired self care skills, impaired sensation, impaired balance, gait instability, impaired functional mobilty, decreased coordination, decreased upper extremity function, decreased lower extremity function, decreased ROM, abnormal tone, impaired muscle length, impaired fine motor. These impairments limit the patient in participating in household and community ambulation and being independent with ADL's.    Patient was diagnosed with MS in 2006, with patient reporting progressive progress dx. Has received therapy in a variety of settings since diagnosis.     Rehab Prognosis:  Good; patient would benefit from acute skilled PT services to address these deficits and reach maximum level of function.      Recent Surgery: * No surgery found *      Plan:     During this hospitalization, patient to be seen 6 x/week to address the above listed problems via gait training, therapeutic activities, therapeutic exercises, neuromuscular re-education, wheelchair management/training  · Plan of Care Expires:  11/01/18   Plan of Care Reviewed with: patient    Subjective     Communicated with ANGELA Krueger prior to session.  Patient found supine with HOB elevated upon PT entry to room, agreeable to evaluation.      Chief Complaint: Laying in bed all the time  Patient comments/goals: To walk  Pain/Comfort:  · Pain Rating 1: (Does not report pain)    Patients cultural, spiritual, Gnosticist conflicts given the  "current situation: None stated    Living Environment:  Patient resides in a single story home with 5 steps to enter with bilateral handrails that are far apart. He resides with his mother, who he reports is "uneducated" about MS.  Prior to admission, patient reported his level of function was Jeremias for household ambulation and Sayda for limited community ambulation. Patient tends to stay in his house and mostly uses a rollator for ambulation and occasionally sits in his W/C in his room. His mother drives him to appointments and she does the grocery shopping, cooking, and cleaning. Patient has the following equipment: walker, rolling, wheelchair, shower chair, grab bar. Upon discharge, patient will have assistance from his mother.    Objective:     Patient found with: peripheral IV, Condom Catheter     General Precautions: Standard, fall((on long term anticoagulation))   Orthopedic Precautions:N/A   Braces: AFO(Patient trying to locate AFO)     Exams:  · Cognition:   · Patient is oriented to person, , date, place, situation.  · Pt follows approximately 100% of one and two step commands.    · Mood: Pleasant and cooperative, excited for therapy.  · Musculoskeletal:  · Posture:    · -       No postural abnormalities identified  · LE ROM/Strength:   · RLE: WNL ROM, strength grossly 4/5.  · LLE: WNL ROM, strength grossly limited: hip flexion 2/5, hip/knee ext 3+/5, 1/5 DF, 2/5 PF  · Neuromuscular:  · Sensation: Intact RLE, impaired LLE  · Coordination: Appears intact on RLE, unable to determine on LLE 2/2 limited AROM  · Balance: Sitting balance: maintains static sitting >30 sec with BLE support and no UE support; able to maintain balance reaching down to floor with RLE and returning to sitting; able to maintain balance with moderate anterior and L lateral perturbations, requires RUE support to maintain balance with moderate posterior and R lateral perturbations   · Visual-vestibular: No impairments identified with " functional mobility. No formal testing performed.      Functional Mobility:  · Bed Mobility:     · Supine to Sit: moderate assistance at LLE and trunk, performed with HOB elevated to 45 deg and pt used handrails  · Transfers:     · Sit to Stand:  minimum assistance and moderate assistance with rolling walker; pt demonstrates awareness of positioning of LE and hand placement for performance, Sayda from higher surface and modA from lower surface  · Gait: Side stepping to L x3 ft with RW (AFO not donned 2/2 not found in room) and generally Sayda, requires mod-maxA to progress LLE with patient able to weight shift to R and minimally advance LE  · Balance: See above, standing balance SBA with BUE on RW    AM-PAC 6 CLICK MOBILITY  Total Score:12       Therapeutic Activities and Exercises:   Sit<>stand from higher surface, supine to sit with HOB 45 deg and use of bed rails with assistance, side-stepping gait, bed to chair stand step transfer with Sayda with mod-maxA at LLE to progress LE    Patient left up in chair with all lines intact, call button in reach and RN Evans notified.    GOALS:   Multidisciplinary Problems     Physical Therapy Goals        Problem: Physical Therapy Goal    Goal Priority Disciplines Outcome Goal Variances Interventions   Physical Therapy Goal     PT, PT/OT      Description:  Goals to be met by: 18    Patient will increase functional independence with mobility by performin. Gait x 25 feet SBA with rolling walker  2. Ascend/descend curb step with rolling walker and Sayda.   3. Perform supine<>sit from flat bed with SBA.  4. Perform sit<>stand from normal chair height with RW and SBA demonstrating appropriate safety awareness.   5. Perform stand step transfer to W/C with least restrictive assistive devices and SBA.  6. Assess W/C propulsion.                    History:     Past Medical History:   Diagnosis Date    Anticoagulant long-term use     Anxiety     Chronic back pain     Deep  vein thrombosis     Depression     Depression     Gait instability     Multiple sclerosis     Multiple sclerosis     Neurogenic bladder     Polyneuropathy     Pulmonary embolism     Spasticity        Past Surgical History:   Procedure Laterality Date    CYSTOSCOPY N/A 6/20/2018    Procedure: CYSTOSCOPY;  Surgeon: Brian Augustin MD;  Location: [facility] OR [address] [place] [address];  Service: Urology;  Laterality: N/A;  1 hour    CYSTOSCOPY N/A 6/20/2018    Performed by Brian Augustin MD at [facility] OR [address] [place] [address]    INJECTION OF BOTULINUM TOXIN TYPE A N/A 6/20/2018    Procedure: INJECTION, BOTULINUM TOXIN, TYPE A 200 UNITS;  Surgeon: Brian Augustin MD;  Location: [facility] OR [address] [place] [address];  Service: Urology;  Laterality: N/A;    INJECTION, BOTULINUM TOXIN, TYPE A 200 UNITS N/A 6/20/2018    Performed by Brian Augustin MD at [facility] OR [address] [place] [address]       Clinical Decision Making:     History  Co-morbidities and personal factors that may impact the plan of care Examination  Body Structures and Functions, activity limitations and participation restrictions that may impact the plan of care Clinical Presentation   Decision Making/ Complexity Score   Co-morbidities:   [] Time since onset of injury / illness / exacerbation  [] Status of current condition  []Patient's cognitive status and safety concerns    [] Multiple Medical Problems (see med hx)  Personal Factors:   [] Patient's age  [] Prior Level of function   [] Patient's home situation (environment and family support)  [] Patient's level of motivation  [] Expected progression of patient      HISTORY:(criteria)    [] 22000 - no personal factors/history    [] 35904 - has 1-2 personal factor/comorbidity     [] 14947 - has >3 personal factor/comorbidity     Body Regions:  [] Objective examination findings  [] Head     []  Neck  [] Trunk   [] Upper Extremity  [] Lower Extremity    Body Systems:  [] For communication ability, affect, cognition, language, and learning style: the assessment of the ability to  make needs known, consciousness, orientation (person, place, and time), expected emotional /behavioral responses, and learning preferences (eg, learning barriers, education  needs)  [] For the neuromuscular system: a general assessment of gross coordinated movement (eg, balance, gait, locomotion, transfers, and transitions) and motor function  (motor control and motor learning)  [] For the musculoskeletal system: the assessment of gross symmetry, gross range of motion, gross strength, height, and weight  [] For the integumentary system: the assessment of pliability(texture), presence of scar formation, skin color, and skin integrity  [] For cardiovascular/pulmonary system: the assessment of heart rate, respiratory rate, blood pressure, and edema     Activity limitations:    [] Patient's cognitive status and saf ety concerns          [] Status of current condition      [] Weight bearing restriction  [] Cardiopulmunary Restriction    Participation Restrictions:   [] Goals and goal agreement with the patient     [] Rehab potential (prognosis) and probable outcome      Examination of Body System: (criteria)    [] 58513 - addressing 1-2 elements    [] 94365 - addressing a total of 3 or more elements     [] 38954 -  Addressing a total of 4 or more elements         Clinical Presentation: (criteria)  Choose one     On examination of body system using standardized tests and measures patient presents with (CHOOSE ONE) elements from any of the following: body structures and functions, activity limitations, and/or participation restrictions.  Leading to a clinical presentation that is considered (CHOOSE ONE)                              Clinical Decision Making  (Eval Complexity):  Choose One     Time Tracking:     PT Received On: 10/18/18  PT Start Time: 0919     PT Stop Time: 1011  PT Total Time (min): 52 min     Billable Minutes: Evaluation 20 and Therapeutic Activity 32      Carmen Ac PT  10/18/2018

## 2018-10-18 NOTE — PLAN OF CARE
Problem: Occupational Therapy Goal  Goal: Occupational Therapy Goal  Goals to be met by: 11/17/18     Patient will increase functional independence with ADLs by performing:    UE Dressing with Minimal Assistance.  LE Dressing with Moderate Assistance.  Grooming while EOB with Set-up Assistance.  Stand pivot transfers to be assessed.  (MET 10/18/18)  REVISED Sit to/from stand with Mod assist and RW  REVISED SPT with Min assist and RW     Outcome: Ongoing (interventions implemented as appropriate)  Difficulty standing without significant assist from low surface.  Uses (L) AFO, but unable to locate and has call into family to assist in locating.  Significant increase in need for assist with basic ADL tasks than pt reported PLOF.  Continue OT services to restore patient functioning.    Comments: GISEL March, 10/18/2018

## 2018-10-18 NOTE — PLAN OF CARE
Problem: Patient Care Overview  Goal: Plan of Care Review  Outcome: Ongoing (interventions implemented as appropriate)  Pt remained free of falls and injuries. Sat up in chair, worked with PT/OT at the bedside. VSS on room air. Condom cath present. Managed pain with PRN medications. Bed low and locked, call light within reach, side rails up X2. Will continue to monitor.

## 2018-10-18 NOTE — SUBJECTIVE & OBJECTIVE
Interval History:     No fever.  Blood cx ngtd  No urine cx done.  Reports feeling better less malaise,  Improved bilateral leg weakness,   Reports chronic  and diffuse arthralgias.     Baseline walks with walker          Review of Systems   Constitutional: Negative for appetite change, chills and fever.   Respiratory: Negative for cough and shortness of breath.    Cardiovascular: Negative for chest pain and leg swelling.     Objective:     Vital Signs (Most Recent):  Temp: 97.6 °F (36.4 °C) (10/18/18 0725)  Pulse: 81 (10/18/18 0725)  Resp: 16 (10/18/18 0725)  BP: 133/74 (10/18/18 0725)  SpO2: 98 % (10/18/18 0725) Vital Signs (24h Range):  Temp:  [97.6 °F (36.4 °C)-99.8 °F (37.7 °C)] 97.6 °F (36.4 °C)  Pulse:  [77-91] 81  Resp:  [14-22] 16  SpO2:  [97 %-98 %] 98 %  BP: (121-136)/(63-78) 133/74     Weight: 75 kg (165 lb 5.5 oz)  Body mass index is 23.06 kg/m².    Intake/Output Summary (Last 24 hours) at 10/18/2018 1018  Last data filed at 10/18/2018 0537  Gross per 24 hour   Intake 1710 ml   Output 3200 ml   Net -1490 ml      Physical Exam   Constitutional: He appears well-developed and well-nourished. No distress.   Cardiovascular: Normal rate, regular rhythm, normal heart sounds and intact distal pulses. Exam reveals no gallop and no friction rub.   No murmur heard.  Pulmonary/Chest: Effort normal and breath sounds normal. No respiratory distress. He has no rales.   Abdominal: Soft. Bowel sounds are normal. He exhibits no distension. There is no tenderness.   Skin: Skin is warm and dry. No rash noted. He is not diaphoretic. No erythema.       Significant Labs: All pertinent labs within the past 24 hours have been reviewed.    Significant Imaging: I have reviewed and interpreted all pertinent imaging results/findings within the past 24 hours.

## 2018-10-18 NOTE — PLAN OF CARE
Pt requests to go to [facility] upon discharge. Pt choice form signed.   Referral sent in [facility] care.     Twila with [facility] notified, consult in progress.  Anticipate medically ready for DC to [facility] on Friday 10/19/2018.     CM to follow closely.       10/18/18 1318   Discharge Reassessment   Assessment Type Discharge Planning Reassessment   Provided patient/caregiver education on the expected discharge date and the discharge plan Yes   Do you have any problems affording any of your prescribed medications? No   Discharge Plan A Rehab   Discharge Plan B Skilled Nursing Facility   Patient choice form signed by patient/caregiver Yes   Can the patient answer the patient profile reliably? Yes, cognitively intact   How does the patient rate their overall health at the present time? Fair   Describe the patient's ability to walk at the present time. Walks with the help of equipment   How often would a person be available to care for the patient? Whenever needed

## 2018-10-18 NOTE — PLAN OF CARE
Problem: Physical Therapy Goal  Goal: Physical Therapy Goal  Goals to be met by: 18    Patient will increase functional independence with mobility by performin. Gait x 50 feet SBA with rolling walker  2. Ascend/descend curb step with rolling walker and Sayda.   3. Perform supine<>sit from flat bed with SBA.  4. Perform sit<>stand from normal chair height with RW and SBA demonstrating appropriate safety awareness.  Patient evaluated and goals established

## 2018-10-18 NOTE — PT/OT/SLP PROGRESS
Occupational Therapy   Treatment    Name: Mao Levin  MRN: [id number]  Admitting Diagnosis:  Sepsis due to urinary tract infection       Recommendations:     Discharge Recommendations: nursing facility, skilled  Discharge Equipment Recommendations:  (TBD, pending progress but anticipate hospital bed)  Barriers to discharge:  Decreased caregiver support, Inaccessible home environment(at present functioning level)    Subjective     Communicated with: ANGELA Krueger prior to session.  Pain/Comfort:  · Pain Rating 1: 8/10  · Location - Orientation 1: midline  · Location 1: cervical spine  · Pain Addressed 1: Reposition, Distraction, Nurse notified  · Pain Rating Post-Intervention 1: 8/10    Patients cultural, spiritual, Moravian conflicts given the current situation: None stated    Objective:     Patient found with: peripheral IV, Condom Catheter    General Precautions: Standard, fall, anti-coagulation medicine   Orthopedic Precautions:N/A   Braces: AFO(pt is trying to locate (L) AFO)     Occupational Performance:    Bed Mobility:    · Patient completed Scooting/Bridging with stand by assistance and increased time at EOB (in chair, Max assist to scoot to edge of chair)  · Patient completed Sit to Supine with moderate assistance     Functional Mobility/Transfers:  · Patient completed Sit <> Stand Transfer with maximal assistance  with  rolling walker on 3 trials to achieve full upright posture  · Patient completed Bed <> Chair Transfer using Stand Pivot technique with moderate assistance with rolling walker and increased time for (L) foot clearance  · Functional Mobility: NT    Activities of Daily Living:  · Feeding:  minimum assistance and set up with lunch tray  · Upper Body Dressing: total assistance doffing gown on as robe seated at EOB  · Lower Body Dressing: total assistance doffing shoes at EOB    Patient left HOB elevated with all lines intact, call button in reach, RN notified and RN present    [facility] 6 Click:  [facility]  Total Score: 13    Treatment & Education:  Educated on LE placement prior to attempting standing.  Verbalized and demonstrated understanding.  Education:    Assessment:     Mao Levin is a 53 y.o. male with a medical diagnosis of Sepsis due to urinary tract infection.  He presents with the following performance deficits affecting function: weakness, impaired endurance, impaired self care skills, impaired functional mobilty, gait instability, impaired balance, impaired coordination, abnormal tone, decreased safety awareness, decreased lower extremity function, decreased upper extremity function, decreased ROM, pain, impaired muscle length, impaired fine motor.    Difficulty standing without significant assist from low surface.  Uses (L) AFO, but unable to locate and has call into family to assist in locating.  Significant increase in need for assist with basic ADL tasks than pt reported PLOF.  Continue OT services to restore patient functioning.    Rehab Prognosis:  Good; patient would benefit from acute skilled OT services to address these deficits and reach maximum level of function.       Plan:     Patient to be seen 5 x/week to address the above listed problems via self-care/home management, therapeutic activities, therapeutic exercises  · Plan of Care Expires: 11/16/18  · Plan of Care Reviewed with: patient    This Plan of care has been discussed with the patient who was involved in its development and understands and is in agreement with the identified goals and treatment plan    GOALS:   Multidisciplinary Problems     Occupational Therapy Goals        Problem: Occupational Therapy Goal    Goal Priority Disciplines Outcome Interventions   Occupational Therapy Goal     OT, PT/OT Ongoing (interventions implemented as appropriate)    Description:  Goals to be met by: 11/17/18     Patient will increase functional independence with ADLs by performing:    UE Dressing with Minimal Assistance.  LE Dressing with  Moderate Assistance.  Grooming while EOB with Set-up Assistance.  Stand pivot transfers to be assessed.  (MET 10/18/18)  REVISED Sit to/from stand with Mod assist and RW  REVISED SPT with Min assist and RW                       Time Tracking:     OT Date of Treatment: 10/18/18  OT Start Time: 1356  OT Stop Time: 1424  OT Total Time (min): 28 min    Billable Minutes:Self Care/Home Management 28    GISEL March  10/18/2018

## 2018-10-18 NOTE — PROGRESS NOTES
[facility] Hospital Medicine  Progress Note    Patient Name: Mao Levin  MRN: [id number]  Patient Class: IP- Inpatient   Admission Date: 10/15/2018  Length of Stay: 3 days  Attending Physician: Jose David Schwartz MD  Primary Care Provider: Mendy Alarcon MD        Subjective:     Principal Problem:Sepsis due to urinary tract infection    HPI:  The patient is a 53 y.o. male with a history of MS who presents to the ED via EMS with complaint of fever, which began two days ago. Per EMS, patient had a fever of 104.2 when measured en route to the ED. Patient reports general malaise, bilateral leg weakness, mild shortness of breath, and diffuse arthralgias. He reports chronic difficulty urinating and intermittent incontinence secondary to neurogenic bladder, but notes his urine has been more concentrated recently, however he denies cloudy urine. He does not self-catheterize or use a catheter. He denies runny nose, cough, congestion, vomiting, diarrhea, or skin lesions or wounds. He denies any recent history of pneumonia or UTI.         Hospital Course:  No notes on file    Interval History:     No fever.  Blood cx ngtd  No urine cx done.  Reports feeling better less malaise,  Improved bilateral leg weakness,   Reports chronic  and diffuse arthralgias.     Baseline walks with walker          Review of Systems   Constitutional: Negative for appetite change, chills and fever.   Respiratory: Negative for cough and shortness of breath.    Cardiovascular: Negative for chest pain and leg swelling.     Objective:     Vital Signs (Most Recent):  Temp: 97.6 °F (36.4 °C) (10/18/18 0725)  Pulse: 81 (10/18/18 0725)  Resp: 16 (10/18/18 0725)  BP: 133/74 (10/18/18 0725)  SpO2: 98 % (10/18/18 0725) Vital Signs (24h Range):  Temp:  [97.6 °F (36.4 °C)-99.8 °F (37.7 °C)] 97.6 °F (36.4 °C)  Pulse:  [77-91] 81  Resp:  [14-22] 16  SpO2:  [97 %-98 %] 98 %  BP: (121-136)/(63-78) 133/74     Weight: 75 kg (165 lb 5.5  oz)  Body mass index is 23.06 kg/m².    Intake/Output Summary (Last 24 hours) at 10/18/2018 1018  Last data filed at 10/18/2018 0537  Gross per 24 hour   Intake 1710 ml   Output 3200 ml   Net -1490 ml      Physical Exam   Constitutional: He appears well-developed and well-nourished. No distress.   Cardiovascular: Normal rate, regular rhythm, normal heart sounds and intact distal pulses. Exam reveals no gallop and no friction rub.   No murmur heard.  Pulmonary/Chest: Effort normal and breath sounds normal. No respiratory distress. He has no rales.   Abdominal: Soft. Bowel sounds are normal. He exhibits no distension. There is no tenderness.   Skin: Skin is warm and dry. No rash noted. He is not diaphoretic. No erythema.       Significant Labs: All pertinent labs within the past 24 hours have been reviewed.    Significant Imaging: I have reviewed and interpreted all pertinent imaging results/findings within the past 24 hours.    Assessment/Plan:      * Sepsis due to urinary tract infection    Bacteria, Nitrites, WBC on U/A. Febrile, tachycardic, tachypnea, lactic acid 1.1    Blood culture NGTD  No urine cx sent  Previous urine cx x 2 with PROVIDENCIA STUARTII  Sensitive to zosyn  Currently on /Zosyn/Cipro  Will continue zosyn  D/c cipro  Plan treatment course with zosyn empirically ( day # 4 of 7 )  based on previous cultires and sensitivities       Mild malnutrition    Monitor weight and albumin.  Monitor po intake       Debility    Start pt/ot  Baseline walks with walker  Dispo: snf vs        Current use of long term anticoagulation    Continue Xarelto for previous hx of dvt       MS (multiple sclerosis)    Continue baclofen, dantrolene, ampyra, neurontin       Neurogenic bladder    Due to multiple sclerosis  Straight cath PRN         VTE Risk Mitigation (From admission, onward)        Ordered     rivaroxaban tablet 20 mg  With dinner      10/15/18 0275              Jose David Schwartz MD  Department of Hospital  Medicine   [facility]

## 2018-10-19 LAB
BASOPHILS # BLD AUTO: 0.04 K/UL
BASOPHILS NFR BLD: 0.8 %
DIFFERENTIAL METHOD: ABNORMAL
EOSINOPHIL # BLD AUTO: 0.2 K/UL
EOSINOPHIL NFR BLD: 4.2 %
ERYTHROCYTE [DISTWIDTH] IN BLOOD BY AUTOMATED COUNT: 14 %
HCT VFR BLD AUTO: 33.3 %
HGB BLD-MCNC: 10.8 G/DL
LYMPHOCYTES # BLD AUTO: 2 K/UL
LYMPHOCYTES NFR BLD: 38.7 %
MCH RBC QN AUTO: 29.4 PG
MCHC RBC AUTO-ENTMCNC: 32.4 G/DL
MCV RBC AUTO: 91 FL
MONOCYTES # BLD AUTO: 0.6 K/UL
MONOCYTES NFR BLD: 11.3 %
NEUTROPHILS # BLD AUTO: 2.3 K/UL
NEUTROPHILS NFR BLD: 44.8 %
PLATELET # BLD AUTO: 231 K/UL
PMV BLD AUTO: 10.3 FL
RBC # BLD AUTO: 3.67 M/UL
WBC # BLD AUTO: 5.06 K/UL

## 2018-10-19 PROCEDURE — 63600175 PHARM REV CODE 636 W HCPCS: Performed by: NURSE PRACTITIONER

## 2018-10-19 PROCEDURE — 97530 THERAPEUTIC ACTIVITIES: CPT

## 2018-10-19 PROCEDURE — 36415 COLL VENOUS BLD VENIPUNCTURE: CPT

## 2018-10-19 PROCEDURE — 99232 SBSQ HOSP IP/OBS MODERATE 35: CPT | Mod: ,,, | Performed by: INTERNAL MEDICINE

## 2018-10-19 PROCEDURE — 63600175 PHARM REV CODE 636 W HCPCS: Performed by: INTERNAL MEDICINE

## 2018-10-19 PROCEDURE — 97535 SELF CARE MNGMENT TRAINING: CPT

## 2018-10-19 PROCEDURE — 99900035 HC TECH TIME PER 15 MIN (STAT)

## 2018-10-19 PROCEDURE — 25000003 PHARM REV CODE 250: Performed by: PHYSICIAN ASSISTANT

## 2018-10-19 PROCEDURE — 85025 COMPLETE CBC W/AUTO DIFF WBC: CPT

## 2018-10-19 PROCEDURE — 86580 TB INTRADERMAL TEST: CPT | Performed by: INTERNAL MEDICINE

## 2018-10-19 PROCEDURE — 25000003 PHARM REV CODE 250: Performed by: NURSE PRACTITIONER

## 2018-10-19 PROCEDURE — 11000001 HC ACUTE MED/SURG PRIVATE ROOM

## 2018-10-19 PROCEDURE — 97116 GAIT TRAINING THERAPY: CPT

## 2018-10-19 PROCEDURE — 94761 N-INVAS EAR/PLS OXIMETRY MLT: CPT

## 2018-10-19 RX ADMIN — DANTROLENE SODIUM 50 MG: 25 CAPSULE ORAL at 09:10

## 2018-10-19 RX ADMIN — DANTROLENE SODIUM 50 MG: 25 CAPSULE ORAL at 05:10

## 2018-10-19 RX ADMIN — PIPERACILLIN AND TAZOBACTAM 4.5 G: 4; .5 INJECTION, POWDER, LYOPHILIZED, FOR SOLUTION INTRAVENOUS; PARENTERAL at 03:10

## 2018-10-19 RX ADMIN — TUBERCULIN PURIFIED PROTEIN DERIVATIVE 5 UNITS: 5 INJECTION INTRADERMAL at 03:10

## 2018-10-19 RX ADMIN — BACLOFEN 20 MG: 10 TABLET ORAL at 08:10

## 2018-10-19 RX ADMIN — OXYCODONE HYDROCHLORIDE 10 MG: 5 TABLET ORAL at 12:10

## 2018-10-19 RX ADMIN — OXYCODONE HYDROCHLORIDE AND ACETAMINOPHEN 500 MG: 500 TABLET ORAL at 09:10

## 2018-10-19 RX ADMIN — GABAPENTIN 900 MG: 300 CAPSULE ORAL at 09:10

## 2018-10-19 RX ADMIN — POLYETHYLENE GLYCOL 3350 17 G: 17 POWDER, FOR SOLUTION ORAL at 09:10

## 2018-10-19 RX ADMIN — TAMSULOSIN HYDROCHLORIDE 0.4 MG: 0.4 CAPSULE ORAL at 09:10

## 2018-10-19 RX ADMIN — TRAZODONE HYDROCHLORIDE 100 MG: 100 TABLET ORAL at 08:10

## 2018-10-19 RX ADMIN — RIVAROXABAN 20 MG: 10 TABLET, FILM COATED ORAL at 05:10

## 2018-10-19 RX ADMIN — BACLOFEN 20 MG: 10 TABLET ORAL at 09:10

## 2018-10-19 RX ADMIN — DIAZEPAM 5 MG: 5 TABLET ORAL at 09:10

## 2018-10-19 RX ADMIN — STANDARDIZED SENNA CONCENTRATE AND DOCUSATE SODIUM 1 TABLET: 8.6; 5 TABLET ORAL at 09:10

## 2018-10-19 RX ADMIN — GABAPENTIN 900 MG: 300 CAPSULE ORAL at 03:10

## 2018-10-19 RX ADMIN — BACLOFEN 20 MG: 10 TABLET ORAL at 03:10

## 2018-10-19 RX ADMIN — PIPERACILLIN AND TAZOBACTAM 4.5 G: 4; .5 INJECTION, POWDER, LYOPHILIZED, FOR SOLUTION INTRAVENOUS; PARENTERAL at 08:10

## 2018-10-19 RX ADMIN — PIPERACILLIN AND TAZOBACTAM 4.5 G: 4; .5 INJECTION, POWDER, LYOPHILIZED, FOR SOLUTION INTRAVENOUS; PARENTERAL at 05:10

## 2018-10-19 RX ADMIN — DANTROLENE SODIUM 50 MG: 25 CAPSULE ORAL at 12:10

## 2018-10-19 RX ADMIN — OXYCODONE HYDROCHLORIDE 10 MG: 5 TABLET ORAL at 05:10

## 2018-10-19 RX ADMIN — OXYCODONE HYDROCHLORIDE 10 MG: 5 TABLET ORAL at 07:10

## 2018-10-19 RX ADMIN — GABAPENTIN 900 MG: 300 CAPSULE ORAL at 08:10

## 2018-10-19 RX ADMIN — DULOXETINE 30 MG: 30 CAPSULE, DELAYED RELEASE ORAL at 09:10

## 2018-10-19 RX ADMIN — DIAZEPAM 5 MG: 5 TABLET ORAL at 12:10

## 2018-10-19 NOTE — PLAN OF CARE
Problem: Patient Care Overview  Goal: Plan of Care Review  Outcome: Ongoing (interventions implemented as appropriate)  Pt in on distress on RA, no changes at this time.

## 2018-10-19 NOTE — PLAN OF CARE
Problem: Patient Care Overview  Goal: Plan of Care Review  Plan of care reviewed with patient, medications explained. Pt currently resting comfortably in bed and appears to be sleeping in between care. VSS, bed in lowest, locked position with call light in reach. No new needs identified at this time, will continue to monitor. PIV in place and patent, site is clear, no redness or swelling noted.

## 2018-10-19 NOTE — PLAN OF CARE
Problem: Physical Therapy Goal  Goal: Physical Therapy Goal  Goals to be met by: 18    Patient will increase functional independence with mobility by performin. Gait x 25 feet SBA with rolling walker  2. Ascend/descend curb step with rolling walker and Sayda.   3. Perform supine<>sit from flat bed with SBA.  4. Perform sit<>stand from normal chair height with RW and SBA demonstrating appropriate safety awareness.   5. Perform stand step transfer to W/C with least restrictive assistive devices and SBA.  6. Assess W/C propulsion.   Outcome: Ongoing (interventions implemented as appropriate)  Gait 45 ft with modA and RW  Supine<>sit with modA from bed with HOB elevated  Sit<>stand from raised bed with RW and Sayda, from normal height chair with RW and maxA  Stand step transfer to chair with RW and modA

## 2018-10-19 NOTE — PHARMACY MED REC
"  Admission Medication Reconciliation - Pharmacy Consult Note    The home medication history was taken by Genevieve Lara CPhT.  Based on information gathered and subsequent review by the clinical pharmacist, the items below may need attention.    You may go to "Admission" then "Reconcile Home Medications" tabs to review and/or act upon these items.    No issues noted with the medication reconciliation.    Medications Prior to Admission   Medication Sig Dispense Refill Last Dose    ascorbic acid, vitamin C, (VITAMIN C) 500 MG tablet Take 500 mg by mouth once daily.   8/25/2018    baclofen (LIORESAL) 10 MG tablet Take 2 tablets (20 mg total) by mouth 3 (three) times daily. 90 tablet 11     buPROPion (WELLBUTRIN SR) 150 MG TBSR 12 hr tablet 1 tab po daily x 3 days, then increase to 1 tab po BID; last dose no later than 6PM; stop smoking after 5-7 days of treatment; (Patient taking differently: 150 mg 2 (two) times daily. 1 tab po daily x 3 days, then increase to 1 tab po BID; last dose no later than 6PM; stop smoking after 5-7 days of treatment;) 60 tablet 3 8/25/2018    dalfampridine (AMPYRA) 10 mg Tb12 Take 10 mg by mouth every 12 (twelve) hours. 180 tablet 1 Taking    diazePAM (VALIUM) 5 MG tablet Take 1 tablet (5 mg total) by mouth every 8 (eight) hours as needed for Anxiety. (Patient taking differently: Take 5 mg by mouth every 12 (twelve) hours as needed for Anxiety. ) 90 tablet 1     duloxetine (CYMBALTA) 30 MG capsule Take 30 mg by mouth once daily.   Taking    ergocalciferol (VITAMIN D2) 50,000 unit Cap Take 1 capsule (50,000 Units total) by mouth every 7 days. (Patient taking differently: Take 50,000 Units by mouth every 7 days. on friday) 4 capsule 11 8/25/2018    gabapentin (NEURONTIN) 300 MG capsule Take 900 mg morning and evening.  Take 1100 mg total in the afternoon. 270 capsule 0 8/25/2018    HYDROcodone-acetaminophen (NORCO)  mg per tablet Take 1 tablet by mouth every 8 (eight) hours " as needed for Pain. 90 tablet 0     multivitamin capsule Take 1 capsule by mouth once daily.   Taking    [START ON 11/8/2018] oxyCODONE-acetaminophen (PERCOCET)  mg per tablet Take 1 tablet by mouth every 8 (eight) hours as needed for Pain. 90 tablet 0     polyethylene glycol (GLYCOLAX) 17 gram/dose powder Take 17 g by mouth once daily. 510 g 6 Taking    rivaroxaban (XARELTO) 20 mg Tab Take 1 tablet (20 mg total) by mouth daily with dinner or evening meal. 60 tablet 4     trazodone (DESYREL) 100 MG tablet Take 1 tablet (100 mg total) by mouth every evening. 30 tablet 3 8/24/2018    cranberry conc-C-bacillus coag 450-30-50 mg-mg-million Tab Take 1 capsule by mouth once daily. 120 each 3 Taking    dantrolene (DANTRIUM) 50 MG Cap Take 1 capsule (50 mg total) by mouth 4 (four) times daily. 120 capsule 0 Taking    mirabegron (MYRBETRIQ) 50 mg Tb24 Take 1 tablet (50 mg total) by mouth once daily. 30 tablet 11 8/25/2018    senna-docusate 8.6-50 mg (PERICOLACE) 8.6-50 mg per tablet Take 1 tablet by mouth once daily.   8/25/2018    tamsulosin (FLOMAX) 0.4 mg Cp24 Take 1 capsule (0.4 mg total) by mouth once daily. 30 capsule 11 Taking     Please address this information as you see fit.  Feel free to contact us if you have any questions or require assistance.    Armond Benitez, Pharm.D., BCPS  [phone]              .  .

## 2018-10-19 NOTE — PROGRESS NOTES
[facility] Hospital Medicine  Progress Note    Patient Name: Mao Levin  MRN: [id number]  Patient Class: IP- Inpatient   Admission Date: 10/15/2018  Length of Stay: 4 days  Attending Physician: Jose David Schwartz MD  Primary Care Provider: Mendy Alarcon MD        Subjective:     Principal Problem:Sepsis due to urinary tract infection    HPI:  The patient is a 53 y.o. male with a history of MS who presents to the ED via EMS with complaint of fever, which began two days ago. Per EMS, patient had a fever of 104.2 when measured en route to the ED. Patient reports general malaise, bilateral leg weakness, mild shortness of breath, and diffuse arthralgias. He reports chronic difficulty urinating and intermittent incontinence secondary to neurogenic bladder, but notes his urine has been more concentrated recently, however he denies cloudy urine. He does not self-catheterize or use a catheter. He denies runny nose, cough, congestion, vomiting, diarrhea, or skin lesions or wounds. He denies any recent history of pneumonia or UTI.         Hospital Course:  No notes on file    Interval History:     No fever.  Blood cx ngtd  No urine cx done.    Baseline walks with walker    Feeling stiff today          Review of Systems   Constitutional: Negative for appetite change, chills and fever.   Respiratory: Negative for cough and shortness of breath.    Cardiovascular: Negative for chest pain and leg swelling.   Gastrointestinal: Negative for abdominal distention and abdominal pain.     Objective:     Vital Signs (Most Recent):  Temp: 97.4 °F (36.3 °C) (10/19/18 0837)  Pulse: 81 (10/19/18 0850)  Resp: 16 (10/19/18 0850)  BP: 135/83 (10/19/18 0837)  SpO2: 97 % (10/19/18 0850) Vital Signs (24h Range):  Temp:  [97.4 °F (36.3 °C)-98.3 °F (36.8 °C)] 97.4 °F (36.3 °C)  Pulse:  [67-89] 81  Resp:  [16-18] 16  SpO2:  [96 %-99 %] 97 %  BP: (118-154)/(69-84) 135/83     Weight: 75 kg (165 lb 5.5 oz)  Body mass index is  23.06 kg/m².    Intake/Output Summary (Last 24 hours) at 10/19/2018 1052  Last data filed at 10/19/2018 0513  Gross per 24 hour   Intake 960 ml   Output 2250 ml   Net -1290 ml      Physical Exam   Constitutional: He appears well-developed and well-nourished. No distress.   Cardiovascular: Normal rate, regular rhythm, normal heart sounds and intact distal pulses. Exam reveals no gallop and no friction rub.   No murmur heard.  Pulmonary/Chest: Effort normal and breath sounds normal. No respiratory distress. He has no rales.   Abdominal: Soft. Bowel sounds are normal. He exhibits no distension. There is no tenderness.   Skin: Skin is warm and dry. No rash noted. He is not diaphoretic. No erythema.       Significant Labs: All pertinent labs within the past 24 hours have been reviewed.    Significant Imaging: I have reviewed and interpreted all pertinent imaging results/findings within the past 24 hours.    Assessment/Plan:      * Sepsis due to urinary tract infection    Bacteria, Nitrites, WBC on U/A. Febrile, tachycardic, tachypnea, lactic acid 1.1    Blood culture NGTD  No urine cx sent  Previous urine cx x 2 with [facility] STUARTII  Sensitive to zosyn  Currently on /Zosyn/Cipro  Will continue zosyn  D/c cipro  Plan treatment course with zosyn empirically ( day # 5 of 7 )  based on previous cultires and sensitivities       Mild malnutrition    Monitor weight and albumin.  Monitor po intake       Debility    Start pt/ot  Baseline walks with walker  Dispo: snf        Current use of long term anticoagulation    Continue Xarelto for previous hx of dvt       MS (multiple sclerosis)    Continue baclofen, dantrolene, ampyra, neurontin       Neurogenic bladder    Due to multiple sclerosis  Straight cath PRN         VTE Risk Mitigation (From admission, onward)        Ordered     rivaroxaban tablet 20 mg  With dinner      10/15/18 5008              Jose David Schwartz MD  Department of Hospital Medicine   [facility]

## 2018-10-19 NOTE — PLAN OF CARE
Tyrone denied MD BRENT ok with pt going SNF on Monday.  Spoke with Yancy at [facility] [phone] ext [phone].    Pt first choice [facility].  VM left with Ila at [facility] [phone]/  [facility] further down the list.  Referrals sent to all.    PASSR completed, SW to call in LOC[facility].     CM to follow closely.

## 2018-10-19 NOTE — PT/OT/SLP PROGRESS
"Physical Therapy Treatment    Patient Name:  Mao Levin   MRN:  [id number]    Recommendations:     Discharge Recommendations:  nursing facility, skilled   Discharge Equipment Recommendations: (Anticipating need for hospital bed)   Barriers to discharge: Inaccessible home and Decreased caregiver support    Assessment:     Mao Levin is a 53 y.o. male admitted with a medical diagnosis of Sepsis due to urinary tract infection.  He presents with the following impairments/functional limitations:  weakness, impaired endurance, impaired self care skills, impaired sensation, impaired balance, gait instability, impaired functional mobilty, decreased coordination, decreased upper extremity function, decreased lower extremity function, decreased ROM, abnormal tone, impaired muscle length, impaired fine motor. Due to these impairments, the patient is limited in their ability to ambulate, transfer, and participate in their chosen activities.    Rehab Prognosis:  Good; patient would benefit from acute skilled PT services to address these deficits and reach maximum level of function.      Recent Surgery: * No surgery found *      Plan:     During this hospitalization, patient to be seen 6 x/week to address the above listed problems via gait training, therapeutic activities, therapeutic exercises, neuromuscular re-education, wheelchair management/training  · Plan of Care Expires:  11/01/18   Plan of Care Reviewed with: patient    Subjective     Communicated with ANGELA Salcedo prior to session.  Patient found supine upon PT entry to room, agreeable to treatment.      Chief Complaint: Feeling tired from sitting in chair all day  Patient comments/goals: To walk  Pain/Comfort:  · Pain Rating 1: (States he's in "a bit" of pain, but does not state rating or location)    Patients cultural, spiritual, Christian conflicts given the current situation: None stated    Objective:     Patient found with: peripheral IV, Condom Catheter     General " Precautions: Standard, fall((anti-coag))   Orthopedic Precautions:N/A   Braces: AFO((Pt's mother forgot to bring to hosptial today))     Functional Mobility:  · Bed Mobility:     · Supine to Sit: moderate assistance  · Sit to Supine: moderate assistance  · Transfers:     · Sit to Stand:  contact guard assistance from raised surface and maximal assistance from low surface with rolling walker, pt requires Sayda with scooting to edge of chair and independently positions RLE prior to standing  · Bed to Chair: maximal assistance from low chair to bed with  rolling walker  using  Step Transfer towards R side  · Gait: 1x45 ft with RW and trial AFO with moderate assistance, able to progress RLE without assistance and requires maxA to progress LLE, pt assists during L swing by vaulting with RLE and weight shifting to R      AM-PAC 6 CLICK MOBILITY  Turning over in bed (including adjusting bedclothes, sheets and blankets)?: 3  Sitting down on and standing up from a chair with arms (e.g., wheelchair, bedside commode, etc.): 2  Moving from lying on back to sitting on the side of the bed?: 2  Moving to and from a bed to a chair (including a wheelchair)?: 2  Need to walk in hospital room?: 2  Climbing 3-5 steps with a railing?: 1  Basic Mobility Total Score: 12       Therapeutic Activities and Exercises:   Supine<>sit, sit<>stand from various height surfaces, gait in hallway x45 ft, stand step transfer from chair to bed    Patient left HOB elevated with all lines intact, call button in reach, bed alarm on and RN Matias present..    GOALS:   Multidisciplinary Problems     Physical Therapy Goals        Problem: Physical Therapy Goal    Goal Priority Disciplines Outcome Goal Variances Interventions   Physical Therapy Goal     PT, PT/OT Ongoing (interventions implemented as appropriate)     Description:  Goals to be met by: 18    Patient will increase functional independence with mobility by performin. Gait x 25 feet SBA  with rolling walker  2. Ascend/descend curb step with rolling walker and Sayda.   3. Perform supine<>sit from flat bed with SBA.  4. Perform sit<>stand from normal chair height with RW and SBA demonstrating appropriate safety awareness.   5. Perform stand step transfer to W/C with least restrictive assistive devices and SBA.  6. Assess W/C propulsion.                    Time Tracking:     PT Received On: 10/19/18  PT Start Time: 1441     PT Stop Time: 1531  PT Total Time (min): 50 min     Billable Minutes: Gait Training 30 and Therapeutic Activity 20    Treatment Type: Treatment  PT/PTA: PT     PTA Visit Number: 0     Carmen Ac, PT  10/19/2018

## 2018-10-19 NOTE — ASSESSMENT & PLAN NOTE
Bacteria, Nitrites, WBC on U/A. Febrile, tachycardic, tachypnea, lactic acid 1.1    Blood culture NGTD  No urine cx sent  Previous urine cx x 2 with [facility]  Sensitive to zosyn  Currently on /Zosyn/Cipro  Will continue zosyn  D/c cipro  Plan treatment course with zosyn empirically ( day # 5 of 7 )  based on previous cultires and sensitivities

## 2018-10-19 NOTE — SUBJECTIVE & OBJECTIVE
Interval History:     No fever.  Blood cx ngtd  No urine cx done.    Baseline walks with walker    Feeling stiff today          Review of Systems   Constitutional: Negative for appetite change, chills and fever.   Respiratory: Negative for cough and shortness of breath.    Cardiovascular: Negative for chest pain and leg swelling.   Gastrointestinal: Negative for abdominal distention and abdominal pain.     Objective:     Vital Signs (Most Recent):  Temp: 97.4 °F (36.3 °C) (10/19/18 0837)  Pulse: 81 (10/19/18 0850)  Resp: 16 (10/19/18 0850)  BP: 135/83 (10/19/18 0837)  SpO2: 97 % (10/19/18 0850) Vital Signs (24h Range):  Temp:  [97.4 °F (36.3 °C)-98.3 °F (36.8 °C)] 97.4 °F (36.3 °C)  Pulse:  [67-89] 81  Resp:  [16-18] 16  SpO2:  [96 %-99 %] 97 %  BP: (118-154)/(69-84) 135/83     Weight: 75 kg (165 lb 5.5 oz)  Body mass index is 23.06 kg/m².    Intake/Output Summary (Last 24 hours) at 10/19/2018 1052  Last data filed at 10/19/2018 0513  Gross per 24 hour   Intake 960 ml   Output 2250 ml   Net -1290 ml      Physical Exam   Constitutional: He appears well-developed and well-nourished. No distress.   Cardiovascular: Normal rate, regular rhythm, normal heart sounds and intact distal pulses. Exam reveals no gallop and no friction rub.   No murmur heard.  Pulmonary/Chest: Effort normal and breath sounds normal. No respiratory distress. He has no rales.   Abdominal: Soft. Bowel sounds are normal. He exhibits no distension. There is no tenderness.   Skin: Skin is warm and dry. No rash noted. He is not diaphoretic. No erythema.       Significant Labs: All pertinent labs within the past 24 hours have been reviewed.    Significant Imaging: I have reviewed and interpreted all pertinent imaging results/findings within the past 24 hours.

## 2018-10-20 LAB
BACTERIA BLD CULT: NORMAL
BACTERIA BLD CULT: NORMAL
BASOPHILS # BLD AUTO: 0.02 K/UL
BASOPHILS NFR BLD: 0.4 %
DIFFERENTIAL METHOD: ABNORMAL
EOSINOPHIL # BLD AUTO: 0.2 K/UL
EOSINOPHIL NFR BLD: 4.1 %
ERYTHROCYTE [DISTWIDTH] IN BLOOD BY AUTOMATED COUNT: 13.8 %
HCT VFR BLD AUTO: 35 %
HGB BLD-MCNC: 11.5 G/DL
LYMPHOCYTES # BLD AUTO: 1.6 K/UL
LYMPHOCYTES NFR BLD: 31.6 %
MCH RBC QN AUTO: 29.6 PG
MCHC RBC AUTO-ENTMCNC: 32.9 G/DL
MCV RBC AUTO: 90 FL
MONOCYTES # BLD AUTO: 0.4 K/UL
MONOCYTES NFR BLD: 7.4 %
NEUTROPHILS # BLD AUTO: 2.9 K/UL
NEUTROPHILS NFR BLD: 56.3 %
PLATELET # BLD AUTO: 260 K/UL
PMV BLD AUTO: 9.8 FL
RBC # BLD AUTO: 3.89 M/UL
WBC # BLD AUTO: 5.16 K/UL

## 2018-10-20 PROCEDURE — 25000003 PHARM REV CODE 250: Performed by: INTERNAL MEDICINE

## 2018-10-20 PROCEDURE — 11000001 HC ACUTE MED/SURG PRIVATE ROOM

## 2018-10-20 PROCEDURE — 94761 N-INVAS EAR/PLS OXIMETRY MLT: CPT

## 2018-10-20 PROCEDURE — 25000003 PHARM REV CODE 250: Performed by: NURSE PRACTITIONER

## 2018-10-20 PROCEDURE — 99232 SBSQ HOSP IP/OBS MODERATE 35: CPT | Mod: ,,, | Performed by: INTERNAL MEDICINE

## 2018-10-20 PROCEDURE — 63600175 PHARM REV CODE 636 W HCPCS: Performed by: NURSE PRACTITIONER

## 2018-10-20 PROCEDURE — 36415 COLL VENOUS BLD VENIPUNCTURE: CPT

## 2018-10-20 PROCEDURE — 85025 COMPLETE CBC W/AUTO DIFF WBC: CPT

## 2018-10-20 PROCEDURE — 25000003 PHARM REV CODE 250: Performed by: PHYSICIAN ASSISTANT

## 2018-10-20 RX ORDER — OXYCODONE HYDROCHLORIDE 5 MG/1
15 TABLET ORAL EVERY 6 HOURS PRN
Status: DISCONTINUED | OUTPATIENT
Start: 2018-10-20 | End: 2018-10-26

## 2018-10-20 RX ADMIN — BACLOFEN 20 MG: 10 TABLET ORAL at 09:10

## 2018-10-20 RX ADMIN — DANTROLENE SODIUM 50 MG: 25 CAPSULE ORAL at 01:10

## 2018-10-20 RX ADMIN — PIPERACILLIN AND TAZOBACTAM 4.5 G: 4; .5 INJECTION, POWDER, LYOPHILIZED, FOR SOLUTION INTRAVENOUS; PARENTERAL at 01:10

## 2018-10-20 RX ADMIN — TRAZODONE HYDROCHLORIDE 100 MG: 100 TABLET ORAL at 10:10

## 2018-10-20 RX ADMIN — BACLOFEN 20 MG: 10 TABLET ORAL at 03:10

## 2018-10-20 RX ADMIN — OXYCODONE HYDROCHLORIDE AND ACETAMINOPHEN 500 MG: 500 TABLET ORAL at 09:10

## 2018-10-20 RX ADMIN — DANTROLENE SODIUM 50 MG: 25 CAPSULE ORAL at 05:10

## 2018-10-20 RX ADMIN — OXYCODONE HYDROCHLORIDE 10 MG: 5 TABLET ORAL at 04:10

## 2018-10-20 RX ADMIN — OXYCODONE HYDROCHLORIDE 15 MG: 5 TABLET ORAL at 05:10

## 2018-10-20 RX ADMIN — DIAZEPAM 5 MG: 5 TABLET ORAL at 10:10

## 2018-10-20 RX ADMIN — DANTROLENE SODIUM 50 MG: 25 CAPSULE ORAL at 09:10

## 2018-10-20 RX ADMIN — DULOXETINE 30 MG: 30 CAPSULE, DELAYED RELEASE ORAL at 09:10

## 2018-10-20 RX ADMIN — OXYCODONE HYDROCHLORIDE 15 MG: 5 TABLET ORAL at 10:10

## 2018-10-20 RX ADMIN — GABAPENTIN 900 MG: 300 CAPSULE ORAL at 09:10

## 2018-10-20 RX ADMIN — RIVAROXABAN 20 MG: 10 TABLET, FILM COATED ORAL at 05:10

## 2018-10-20 RX ADMIN — PIPERACILLIN AND TAZOBACTAM 4.5 G: 4; .5 INJECTION, POWDER, LYOPHILIZED, FOR SOLUTION INTRAVENOUS; PARENTERAL at 05:10

## 2018-10-20 RX ADMIN — GABAPENTIN 900 MG: 300 CAPSULE ORAL at 03:10

## 2018-10-20 RX ADMIN — TAMSULOSIN HYDROCHLORIDE 0.4 MG: 0.4 CAPSULE ORAL at 09:10

## 2018-10-20 RX ADMIN — PIPERACILLIN AND TAZOBACTAM 4.5 G: 4; .5 INJECTION, POWDER, LYOPHILIZED, FOR SOLUTION INTRAVENOUS; PARENTERAL at 09:10

## 2018-10-20 NOTE — ASSESSMENT & PLAN NOTE
Bacteria, Nitrites, WBC on U/A. Febrile, tachycardic, tachypnea, lactic acid 1.1    Blood culture NGTD  No urine cx sent  Previous urine cx x 2 with [facility]UART[facility]  Sensitive to zosyn  Currently on /Zosyn/Cipro  Will continue zosyn  D/c cipro  Plan treatment course with zosyn empirically ( day # 6 of 7 )  based on previous cultires and sensitivities

## 2018-10-20 NOTE — PLAN OF CARE
Problem: Patient Care Overview  Goal: Plan of Care Review  Outcome: Ongoing (interventions implemented as appropriate)  Patient resting in bed at this time, all items are within reach, call light in reach, instructed to call as needed, no injuries reported, bed in lowest and locked position, night light on, rounding and plan of care discussed     RA, tolerating   Saline locked  Condom cath, WNL  No pain reported  Prn anxiety meds given with moderate relief         No further concerns at this time  Will cont to monitor

## 2018-10-20 NOTE — PROGRESS NOTES
[facility] Hospital Medicine  Progress Note    Patient Name: Mao Levin  MRN: [id number]  Patient Class: IP- Inpatient   Admission Date: 10/15/2018  Length of Stay: 5 days  Attending Physician: Jose David Schwartz MD  Primary Care Provider: Mendy Alarcon MD        Subjective:     Principal Problem:Sepsis due to urinary tract infection    HPI:  The patient is a 53 y.o. male with a history of MS who presents to the ED via EMS with complaint of fever, which began two days ago. Per EMS, patient had a fever of 104.2 when measured en route to the ED. Patient reports general malaise, bilateral leg weakness, mild shortness of breath, and diffuse arthralgias. He reports chronic difficulty urinating and intermittent incontinence secondary to neurogenic bladder, but notes his urine has been more concentrated recently, however he denies cloudy urine. He does not self-catheterize or use a catheter. He denies runny nose, cough, congestion, vomiting, diarrhea, or skin lesions or wounds. He denies any recent history of pneumonia or UTI.         Hospital Course:  No notes on file    Interval History:     No fever.  Blood cx ngtd  No urine cx done.    Baseline walks with walker        Review of Systems   Constitutional: Negative for appetite change, chills and fever.   Respiratory: Negative for cough and shortness of breath.    Cardiovascular: Negative for chest pain and leg swelling.   Gastrointestinal: Negative for abdominal distention and abdominal pain.     Objective:     Vital Signs (Most Recent):  Temp: 98.2 °F (36.8 °C) (10/20/18 0741)  Pulse: 69 (10/20/18 0741)  Resp: 18 (10/20/18 0741)  BP: 132/78 (10/20/18 0741)  SpO2: 98 % (10/20/18 0741) Vital Signs (24h Range):  Temp:  [97.2 °F (36.2 °C)-98.3 °F (36.8 °C)] 98.2 °F (36.8 °C)  Pulse:  [69-83] 69  Resp:  [18] 18  SpO2:  [97 %-99 %] 98 %  BP: (111-140)/(67-89) 132/78     Weight: 75 kg (165 lb 5.5 oz)  Body mass index is 23.06 kg/m².    Intake/Output  Summary (Last 24 hours) at 10/20/2018 1018  Last data filed at 10/20/2018 0600  Gross per 24 hour   Intake 1330 ml   Output 3700 ml   Net -2370 ml      Physical Exam   Constitutional: He appears well-developed and well-nourished. No distress.   Cardiovascular: Normal rate, regular rhythm, normal heart sounds and intact distal pulses. Exam reveals no gallop and no friction rub.   No murmur heard.  Pulmonary/Chest: Effort normal and breath sounds normal. No respiratory distress. He has no rales.   Abdominal: Soft. Bowel sounds are normal. He exhibits no distension. There is no tenderness.   Skin: Skin is warm and dry. No rash noted. He is not diaphoretic. No erythema.       Significant Labs: All pertinent labs within the past 24 hours have been reviewed.    Significant Imaging: I have reviewed and interpreted all pertinent imaging results/findings within the past 24 hours.    Assessment/Plan:      * Sepsis due to urinary tract infection    Bacteria, Nitrites, WBC on U/A. Febrile, tachycardic, tachypnea, lactic acid 1.1    Blood culture NGTD  No urine cx sent  Previous urine cx x 2 with [facility] ST[facility]  Sensitive to zosyn  Currently on /Zosyn/Cipro  Will continue zosyn  D/c cipro  Plan treatment course with zosyn empirically ( day # 6 of 7 )  based on previous cultires and sensitivities       Mild malnutrition    Monitor weight and albumin.  Monitor po intake       Debility    Start pt/ot  Baseline walks with walker  Dispo: snf        Current use of long term anticoagulation    Continue Xarelto for previous hx of dvt       MS (multiple sclerosis)    Continue baclofen, dantrolene, ampyra, neurontin       Neurogenic bladder    Due to multiple sclerosis  Straight cath PRN         VTE Risk Mitigation (From admission, onward)        Ordered     rivaroxaban tablet 20 mg  With dinner      10/15/18 3898              Jose David Schwartz MD  Department of Hospital Medicine   [facility]

## 2018-10-20 NOTE — SUBJECTIVE & OBJECTIVE
Interval History:     No fever.  Blood cx ngtd  No urine cx done.    Baseline walks with walker        Review of Systems   Constitutional: Negative for appetite change, chills and fever.   Respiratory: Negative for cough and shortness of breath.    Cardiovascular: Negative for chest pain and leg swelling.   Gastrointestinal: Negative for abdominal distention and abdominal pain.     Objective:     Vital Signs (Most Recent):  Temp: 98.2 °F (36.8 °C) (10/20/18 0741)  Pulse: 69 (10/20/18 0741)  Resp: 18 (10/20/18 0741)  BP: 132/78 (10/20/18 0741)  SpO2: 98 % (10/20/18 0741) Vital Signs (24h Range):  Temp:  [97.2 °F (36.2 °C)-98.3 °F (36.8 °C)] 98.2 °F (36.8 °C)  Pulse:  [69-83] 69  Resp:  [18] 18  SpO2:  [97 %-99 %] 98 %  BP: (111-140)/(67-89) 132/78     Weight: 75 kg (165 lb 5.5 oz)  Body mass index is 23.06 kg/m².    Intake/Output Summary (Last 24 hours) at 10/20/2018 1018  Last data filed at 10/20/2018 0600  Gross per 24 hour   Intake 1330 ml   Output 3700 ml   Net -2370 ml      Physical Exam   Constitutional: He appears well-developed and well-nourished. No distress.   Cardiovascular: Normal rate, regular rhythm, normal heart sounds and intact distal pulses. Exam reveals no gallop and no friction rub.   No murmur heard.  Pulmonary/Chest: Effort normal and breath sounds normal. No respiratory distress. He has no rales.   Abdominal: Soft. Bowel sounds are normal. He exhibits no distension. There is no tenderness.   Skin: Skin is warm and dry. No rash noted. He is not diaphoretic. No erythema.       Significant Labs: All pertinent labs within the past 24 hours have been reviewed.    Significant Imaging: I have reviewed and interpreted all pertinent imaging results/findings within the past 24 hours.

## 2018-10-20 NOTE — PLAN OF CARE
Problem: Occupational Therapy Goal  Goal: Occupational Therapy Goal  Goals to be met by: 11/17/18     Patient will increase functional independence with ADLs by performing:    UE Dressing with Minimal Assistance.  LE Dressing with Moderate Assistance.  Grooming while EOB with Set-up Assistance.  Stand pivot transfers to be assessed.  (MET 10/18/18)  REVISED Sit to/from stand with Mod assist and RW  REVISED SPT with Min assist and RW      Outcome: Ongoing (interventions implemented as appropriate)  Progressing towards goals.  To benefit from continued acute care OT services to increase independence in self-care/functional transfers.  Continue POC.

## 2018-10-20 NOTE — PT/OT/SLP PROGRESS
"Occupational Therapy   Treatment    Name: Mao Levin  MRN: [id number]  Admitting Diagnosis:  Sepsis due to urinary tract infection       Recommendations:     Discharge Recommendations: nursing facility, skilled  Discharge Equipment Recommendations:  hospital bed  Barriers to discharge:       Subjective     Communicated with: Nursing prior to session.  Pain/Comfort:  · Pain Rating 1: other (see comments)(states "I don't like to complain about pain." when initially asked)  · Location 1: other (see comments)(back/neck pain)  · Pain Addressed 1: Distraction, Nurse notified, Reposition  · Pain Rating Post-Intervention 1: 9/10    Patients cultural, spiritual, Church conflicts given the current situation: None stated.     Objective:     Patient found with: telemetry, peripheral IV, Condom Catheter    General Precautions: Standard, anti-coagulation medicine, fall   Orthopedic Precautions:N/A   Braces: AFO     Occupational Performance:    Bed Mobility:    · Patient completed Scooting/Bridging with minimum assistance with increased cues and time for R-hand placement at grab bar; required manual assist for R-knee flexion and stabilization at foot while pushing heels into bed   · Patient completed Supine to Sit with maximal assistance for trunk and BLE management  · Patient completed Sit to Supine with maximal assistance  for trunk and BLE management    Functional Mobility/Transfers:  · Patient completed Sit <> Stand Transfer with minimum assistance  with  rolling walker with bed elevated and HOHA for L-hand placement at RW    Activities of Daily Living:  · Feeding:  stand by assistance and contact guard assistance seated EOB with occasional L-lateral lean requiring MIN A and increased verbal cues for self-correction; required set-up of drink and opening packages, but able to feed self with RUE-hand with occasional loosening of  on utensil  · Toileting: condom catheter leaked upon initiating supine>sit with L-roll; Pt. " Able to clean front kim region with R-hand; received assist for cleaning back kim region while in stance at BUE supported on RW with HOHA for R-hand     Patient left HOB elevated with all lines intact, call button in reach and nursing notified    [facility] 6 Click:  [facility] Total Score: 13    Treatment & Education:  Educated on functional transfer/ADL safety.  Education:    Assessment:   Pt. Tolerated static sitting seated EOB for entire meal with SBA and occasional CGA and MIN A for correction of occasional L-lateral lean and increased verbal cues for use of core to center self.  Required heightened surface to allow for standing along bed to receive assist with back kim hygiene due to urine spill from condom catheter.  To benefit from continued acute care OT services to increase independence in self-care/functional transfers.  Continue POC.     Mao Levin is a 53 y.o. male with a medical diagnosis of Sepsis due to urinary tract infection.  He presents with below deficits decreasing independence in self-care/functional transfers.  Performance deficits affecting function are weakness, impaired endurance, impaired self care skills, impaired functional mobilty, gait instability, impaired balance, decreased coordination, decreased lower extremity function, decreased upper extremity function, decreased safety awareness, pain, decreased ROM, abnormal tone, impaired fine motor.      Rehab Prognosis:  Fair; patient would benefit from acute skilled OT services to address these deficits and reach maximum level of function.       Plan:     Patient to be seen 5 x/week to address the above listed problems via self-care/home management, therapeutic activities, therapeutic exercises  · Plan of Care Expires: 11/16/18  · Plan of Care Reviewed with: patient    This Plan of care has been discussed with the patient who was involved in its development and understands and is in agreement with the identified goals and treatment  plan    GOALS:   Multidisciplinary Problems     Occupational Therapy Goals        Problem: Occupational Therapy Goal    Goal Priority Disciplines Outcome Interventions   Occupational Therapy Goal     OT, PT/OT Ongoing (interventions implemented as appropriate)    Description:  Goals to be met by: 11/17/18     Patient will increase functional independence with ADLs by performing:    UE Dressing with Minimal Assistance.  LE Dressing with Moderate Assistance.  Grooming while EOB with Set-up Assistance.  Stand pivot transfers to be assessed.  (MET 10/18/18)  REVISED Sit to/from stand with Mod assist and RW  REVISED SPT with Min assist and RW                       Time Tracking:     OT Date of Treatment: 10/19/18  OT Start Time: 1804  OT Stop Time: 1858  OT Total Time (min): 54 min    Billable Minutes:Self Care/Home Management 54    Leonela Stapleton OT  10/19/2018

## 2018-10-21 LAB
ANION GAP SERPL CALC-SCNC: 6 MMOL/L
BUN SERPL-MCNC: 15 MG/DL
CALCIUM SERPL-MCNC: 8.6 MG/DL
CHLORIDE SERPL-SCNC: 106 MMOL/L
CO2 SERPL-SCNC: 28 MMOL/L
CREAT SERPL-MCNC: 0.8 MG/DL
EST. GFR  (AFRICAN AMERICAN): >60 ML/MIN/1.73 M^2
EST. GFR  (NON AFRICAN AMERICAN): >60 ML/MIN/1.73 M^2
GLUCOSE SERPL-MCNC: 99 MG/DL
POTASSIUM SERPL-SCNC: 4 MMOL/L
SODIUM SERPL-SCNC: 140 MMOL/L
TB INDURATION 48 - 72 HR READ: 0 MM

## 2018-10-21 PROCEDURE — 11000001 HC ACUTE MED/SURG PRIVATE ROOM

## 2018-10-21 PROCEDURE — 36415 COLL VENOUS BLD VENIPUNCTURE: CPT

## 2018-10-21 PROCEDURE — 94761 N-INVAS EAR/PLS OXIMETRY MLT: CPT

## 2018-10-21 PROCEDURE — 25000003 PHARM REV CODE 250: Performed by: INTERNAL MEDICINE

## 2018-10-21 PROCEDURE — 80048 BASIC METABOLIC PNL TOTAL CA: CPT

## 2018-10-21 PROCEDURE — 63600175 PHARM REV CODE 636 W HCPCS: Performed by: NURSE PRACTITIONER

## 2018-10-21 PROCEDURE — 25000003 PHARM REV CODE 250: Performed by: NURSE PRACTITIONER

## 2018-10-21 PROCEDURE — 99233 SBSQ HOSP IP/OBS HIGH 50: CPT | Mod: ,,, | Performed by: INTERNAL MEDICINE

## 2018-10-21 RX ORDER — ACETAMINOPHEN 325 MG/1
650 TABLET ORAL EVERY 4 HOURS PRN
Refills: 0 | COMMUNITY
Start: 2018-10-21 | End: 2018-10-26 | Stop reason: HOSPADM

## 2018-10-21 RX ORDER — DIAZEPAM 5 MG/1
5 TABLET ORAL EVERY 12 HOURS PRN
Qty: 20 TABLET | Refills: 0 | Status: SHIPPED | OUTPATIENT
Start: 2018-10-21 | End: 2018-10-26 | Stop reason: SDUPTHER

## 2018-10-21 RX ORDER — OXYCODONE AND ACETAMINOPHEN 10; 325 MG/1; MG/1
1 TABLET ORAL EVERY 8 HOURS PRN
Qty: 20 TABLET | Refills: 0 | Status: SHIPPED | OUTPATIENT
Start: 2018-11-08 | End: 2018-10-26 | Stop reason: HOSPADM

## 2018-10-21 RX ADMIN — OXYCODONE HYDROCHLORIDE 15 MG: 5 TABLET ORAL at 05:10

## 2018-10-21 RX ADMIN — PIPERACILLIN AND TAZOBACTAM 4.5 G: 4; .5 INJECTION, POWDER, LYOPHILIZED, FOR SOLUTION INTRAVENOUS; PARENTERAL at 10:10

## 2018-10-21 RX ADMIN — POLYETHYLENE GLYCOL 3350 17 G: 17 POWDER, FOR SOLUTION ORAL at 09:10

## 2018-10-21 RX ADMIN — TRAZODONE HYDROCHLORIDE 100 MG: 100 TABLET ORAL at 10:10

## 2018-10-21 RX ADMIN — DANTROLENE SODIUM 50 MG: 25 CAPSULE ORAL at 10:10

## 2018-10-21 RX ADMIN — BACLOFEN 20 MG: 10 TABLET ORAL at 08:10

## 2018-10-21 RX ADMIN — DANTROLENE SODIUM 50 MG: 25 CAPSULE ORAL at 05:10

## 2018-10-21 RX ADMIN — OXYCODONE HYDROCHLORIDE 15 MG: 5 TABLET ORAL at 10:10

## 2018-10-21 RX ADMIN — GABAPENTIN 900 MG: 300 CAPSULE ORAL at 10:10

## 2018-10-21 RX ADMIN — GABAPENTIN 900 MG: 300 CAPSULE ORAL at 02:10

## 2018-10-21 RX ADMIN — BACLOFEN 20 MG: 10 TABLET ORAL at 02:10

## 2018-10-21 RX ADMIN — STANDARDIZED SENNA CONCENTRATE AND DOCUSATE SODIUM 1 TABLET: 8.6; 5 TABLET ORAL at 09:10

## 2018-10-21 RX ADMIN — DULOXETINE 30 MG: 30 CAPSULE, DELAYED RELEASE ORAL at 09:10

## 2018-10-21 RX ADMIN — DIAZEPAM 5 MG: 5 TABLET ORAL at 11:10

## 2018-10-21 RX ADMIN — PIPERACILLIN AND TAZOBACTAM 4.5 G: 4; .5 INJECTION, POWDER, LYOPHILIZED, FOR SOLUTION INTRAVENOUS; PARENTERAL at 02:10

## 2018-10-21 RX ADMIN — OXYCODONE HYDROCHLORIDE 15 MG: 5 TABLET ORAL at 11:10

## 2018-10-21 RX ADMIN — BACLOFEN 20 MG: 10 TABLET ORAL at 09:10

## 2018-10-21 RX ADMIN — RIVAROXABAN 20 MG: 10 TABLET, FILM COATED ORAL at 05:10

## 2018-10-21 RX ADMIN — GABAPENTIN 900 MG: 300 CAPSULE ORAL at 09:10

## 2018-10-21 RX ADMIN — TAMSULOSIN HYDROCHLORIDE 0.4 MG: 0.4 CAPSULE ORAL at 09:10

## 2018-10-21 RX ADMIN — DANTROLENE SODIUM 50 MG: 25 CAPSULE ORAL at 02:10

## 2018-10-21 RX ADMIN — PIPERACILLIN AND TAZOBACTAM 4.5 G: 4; .5 INJECTION, POWDER, LYOPHILIZED, FOR SOLUTION INTRAVENOUS; PARENTERAL at 06:10

## 2018-10-21 RX ADMIN — DIAZEPAM 5 MG: 5 TABLET ORAL at 10:10

## 2018-10-21 RX ADMIN — OXYCODONE HYDROCHLORIDE AND ACETAMINOPHEN 500 MG: 500 TABLET ORAL at 09:10

## 2018-10-21 NOTE — SUBJECTIVE & OBJECTIVE
Interval History:     No fever.  Blood cx ngtd  No urine cx done.    Baseline walks with walker    Denies pain      Review of Systems   Constitutional: Negative for appetite change, chills and fever.   Respiratory: Negative for cough and shortness of breath.    Cardiovascular: Negative for chest pain and leg swelling.   Gastrointestinal: Negative for abdominal distention and abdominal pain.     Objective:     Vital Signs (Most Recent):  Temp: 97.7 °F (36.5 °C) (10/21/18 0731)  Pulse: 67 (10/21/18 0731)  Resp: 18 (10/21/18 0731)  BP: (!) 123/58 (10/21/18 0731)  SpO2: 99 % (10/21/18 0900) Vital Signs (24h Range):  Temp:  [97.4 °F (36.3 °C)-98.7 °F (37.1 °C)] 97.7 °F (36.5 °C)  Pulse:  [61-93] 67  Resp:  [16-18] 18  SpO2:  [94 %-99 %] 99 %  BP: (102-135)/(58-67) 123/58     Weight: 75 kg (165 lb 5.5 oz)  Body mass index is 23.06 kg/m².    Intake/Output Summary (Last 24 hours) at 10/21/2018 1026  Last data filed at 10/21/2018 0545  Gross per 24 hour   Intake 400 ml   Output 1850 ml   Net -1450 ml      Physical Exam   Constitutional: He appears well-developed and well-nourished. No distress.   Cardiovascular: Normal rate, regular rhythm, normal heart sounds and intact distal pulses. Exam reveals no gallop and no friction rub.   No murmur heard.  Pulmonary/Chest: Effort normal and breath sounds normal. No respiratory distress. He has no rales.   Abdominal: Soft. Bowel sounds are normal. He exhibits no distension. There is no tenderness.   Skin: Skin is warm and dry. No rash noted. He is not diaphoretic. No erythema.       Significant Labs: All pertinent labs within the past 24 hours have been reviewed.    Significant Imaging: I have reviewed and interpreted all pertinent imaging results/findings within the past 24 hours.

## 2018-10-21 NOTE — PLAN OF CARE
Problem: Fall Risk (Adult)  Goal: Absence of Falls  Patient will demonstrate the desired outcomes by discharge/transition of care.  Outcome: Ongoing (interventions implemented as appropriate)  Pt remained free of falls. Bed locked in lowest position, call light and personal items within reach.     Problem: Patient Care Overview  Goal: Plan of Care Review  Outcome: Ongoing (interventions implemented as appropriate)  Vital signs stable, on room air. Complaints of pain treated moderately with PRN meds. IV antibiotics running. Will continue to monitor.

## 2018-10-21 NOTE — PROGRESS NOTES
[facility] Hospital Medicine  Progress Note    Patient Name: Mao Levin  MRN: [id number]  Patient Class: IP- Inpatient   Admission Date: 10/15/2018  Length of Stay: 6 days  Attending Physician: Jose David Schwartz MD  Primary Care Provider: Mendy Alarcon MD        Subjective:     Principal Problem:Sepsis due to urinary tract infection    HPI:  The patient is a 53 y.o. male with a history of MS who presents to the ED via EMS with complaint of fever, which began two days ago. Per EMS, patient had a fever of 104.2 when measured en route to the ED. Patient reports general malaise, bilateral leg weakness, mild shortness of breath, and diffuse arthralgias. He reports chronic difficulty urinating and intermittent incontinence secondary to neurogenic bladder, but notes his urine has been more concentrated recently, however he denies cloudy urine. He does not self-catheterize or use a catheter. He denies runny nose, cough, congestion, vomiting, diarrhea, or skin lesions or wounds. He denies any recent history of pneumonia or UTI.         Hospital Course:  No notes on file    Interval History:     No fever.  Blood cx ngtd  No urine cx done.    Baseline walks with walker    Denies pain      Review of Systems   Constitutional: Negative for appetite change, chills and fever.   Respiratory: Negative for cough and shortness of breath.    Cardiovascular: Negative for chest pain and leg swelling.   Gastrointestinal: Negative for abdominal distention and abdominal pain.     Objective:     Vital Signs (Most Recent):  Temp: 97.7 °F (36.5 °C) (10/21/18 0731)  Pulse: 67 (10/21/18 0731)  Resp: 18 (10/21/18 0731)  BP: (!) 123/58 (10/21/18 0731)  SpO2: 99 % (10/21/18 0900) Vital Signs (24h Range):  Temp:  [97.4 °F (36.3 °C)-98.7 °F (37.1 °C)] 97.7 °F (36.5 °C)  Pulse:  [61-93] 67  Resp:  [16-18] 18  SpO2:  [94 %-99 %] 99 %  BP: (102-135)/(58-67) 123/58     Weight: 75 kg (165 lb 5.5 oz)  Body mass index is 23.06  kg/m².    Intake/Output Summary (Last 24 hours) at 10/21/2018 1026  Last data filed at 10/21/2018 0545  Gross per 24 hour   Intake 400 ml   Output 1850 ml   Net -1450 ml      Physical Exam   Constitutional: He appears well-developed and well-nourished. No distress.   Cardiovascular: Normal rate, regular rhythm, normal heart sounds and intact distal pulses. Exam reveals no gallop and no friction rub.   No murmur heard.  Pulmonary/Chest: Effort normal and breath sounds normal. No respiratory distress. He has no rales.   Abdominal: Soft. Bowel sounds are normal. He exhibits no distension. There is no tenderness.   Skin: Skin is warm and dry. No rash noted. He is not diaphoretic. No erythema.       Significant Labs: All pertinent labs within the past 24 hours have been reviewed.    Significant Imaging: I have reviewed and interpreted all pertinent imaging results/findings within the past 24 hours.    Assessment/Plan:      * Sepsis due to urinary tract infection    Bacteria, Nitrites, WBC on U/A. Febrile, tachycardic, tachypnea, lactic acid 1.1    Blood culture NGTD  No urine cx sent  Previous urine cx x 2 with PROVIDENCIA STUARTII  Sensitive to zosyn    Plan treatment course with zosyn empirically ( day # 7of 7 )  based on previous cultires and sensitivities       Mild malnutrition    Monitor weight and albumin.  Monitor po intake       Debility    Start pt/ot  Baseline walks with walker  Dispo: snf        Current use of long term anticoagulation    Continue Xarelto for previous hx of dvt       MS (multiple sclerosis)    Continue baclofen, dantrolene, ampyra, neurontin       Neurogenic bladder    Due to multiple sclerosis  Straight cath PRN         VTE Risk Mitigation (From admission, onward)        Ordered     rivaroxaban tablet 20 mg  With dinner      10/15/18 1955              Jose David Schwartz MD  Department of Hospital Medicine   [facility]

## 2018-10-21 NOTE — ASSESSMENT & PLAN NOTE
Bacteria, Nitrites, WBC on U/A. Febrile, tachycardic, tachypnea, lactic acid 1.1    Blood culture NGTD  No urine cx sent  Previous urine cx x 2 with PROVIDENCIA STUARTII  Sensitive to zosyn    Plan treatment course with zosyn empirically ( day # 7of 7 )  based on previous cultires and sensitivities

## 2018-10-21 NOTE — PLAN OF CARE
Problem: Patient Care Overview  Goal: Plan of Care Review  Outcome: Ongoing (interventions implemented as appropriate)  Plan of care reviewed with pt. Pt verbalized understanding. Hourly rounding performed. Pain controled with PRN pain meds. Pt sitting in chair. Wheels locked & feet elevated. Personal belongings and call bell within reach. Will continue to monitor.

## 2018-10-22 PROCEDURE — 97110 THERAPEUTIC EXERCISES: CPT

## 2018-10-22 PROCEDURE — 97530 THERAPEUTIC ACTIVITIES: CPT

## 2018-10-22 PROCEDURE — 11000001 HC ACUTE MED/SURG PRIVATE ROOM

## 2018-10-22 PROCEDURE — 25000003 PHARM REV CODE 250: Performed by: INTERNAL MEDICINE

## 2018-10-22 PROCEDURE — 94761 N-INVAS EAR/PLS OXIMETRY MLT: CPT

## 2018-10-22 PROCEDURE — 63600175 PHARM REV CODE 636 W HCPCS: Performed by: NURSE PRACTITIONER

## 2018-10-22 PROCEDURE — 97535 SELF CARE MNGMENT TRAINING: CPT

## 2018-10-22 PROCEDURE — 99900035 HC TECH TIME PER 15 MIN (STAT)

## 2018-10-22 PROCEDURE — 99232 SBSQ HOSP IP/OBS MODERATE 35: CPT | Mod: ,,, | Performed by: INTERNAL MEDICINE

## 2018-10-22 PROCEDURE — 25000003 PHARM REV CODE 250: Performed by: NURSE PRACTITIONER

## 2018-10-22 RX ADMIN — DANTROLENE SODIUM 50 MG: 25 CAPSULE ORAL at 01:10

## 2018-10-22 RX ADMIN — DIAZEPAM 5 MG: 5 TABLET ORAL at 03:10

## 2018-10-22 RX ADMIN — GABAPENTIN 900 MG: 300 CAPSULE ORAL at 09:10

## 2018-10-22 RX ADMIN — TRAZODONE HYDROCHLORIDE 100 MG: 100 TABLET ORAL at 09:10

## 2018-10-22 RX ADMIN — DANTROLENE SODIUM 50 MG: 25 CAPSULE ORAL at 05:10

## 2018-10-22 RX ADMIN — PIPERACILLIN AND TAZOBACTAM 4.5 G: 4; .5 INJECTION, POWDER, LYOPHILIZED, FOR SOLUTION INTRAVENOUS; PARENTERAL at 05:10

## 2018-10-22 RX ADMIN — DIAZEPAM 5 MG: 5 TABLET ORAL at 11:10

## 2018-10-22 RX ADMIN — RIVAROXABAN 20 MG: 10 TABLET, FILM COATED ORAL at 05:10

## 2018-10-22 RX ADMIN — BACLOFEN 20 MG: 10 TABLET ORAL at 09:10

## 2018-10-22 RX ADMIN — BACLOFEN 20 MG: 10 TABLET ORAL at 08:10

## 2018-10-22 RX ADMIN — TAMSULOSIN HYDROCHLORIDE 0.4 MG: 0.4 CAPSULE ORAL at 09:10

## 2018-10-22 RX ADMIN — GABAPENTIN 900 MG: 300 CAPSULE ORAL at 03:10

## 2018-10-22 RX ADMIN — DANTROLENE SODIUM 50 MG: 25 CAPSULE ORAL at 08:10

## 2018-10-22 RX ADMIN — GABAPENTIN 900 MG: 300 CAPSULE ORAL at 08:10

## 2018-10-22 RX ADMIN — DULOXETINE 30 MG: 30 CAPSULE, DELAYED RELEASE ORAL at 09:10

## 2018-10-22 RX ADMIN — OXYCODONE HYDROCHLORIDE 15 MG: 5 TABLET ORAL at 09:10

## 2018-10-22 RX ADMIN — OXYCODONE HYDROCHLORIDE AND ACETAMINOPHEN 500 MG: 500 TABLET ORAL at 09:10

## 2018-10-22 RX ADMIN — BACLOFEN 20 MG: 10 TABLET ORAL at 03:10

## 2018-10-22 RX ADMIN — OXYCODONE HYDROCHLORIDE 15 MG: 5 TABLET ORAL at 03:10

## 2018-10-22 RX ADMIN — DANTROLENE SODIUM 50 MG: 25 CAPSULE ORAL at 09:10

## 2018-10-22 NOTE — PROGRESS NOTES
[facility] Hospital Medicine  Progress Note    Patient Name: Mao Levin  MRN: [id number]  Patient Class: IP- Inpatient   Admission Date: 10/15/2018  Length of Stay: 7 days  Attending Physician: Jose David Schwartz MD  Primary Care Provider: Mendy Alarcon MD        Subjective:     Principal Problem:Sepsis due to urinary tract infection    HPI:  The patient is a 53 y.o. male with a history of MS who presents to the ED via EMS with complaint of fever, which began two days ago. Per EMS, patient had a fever of 104.2 when measured en route to the ED. Patient reports general malaise, bilateral leg weakness, mild shortness of breath, and diffuse arthralgias. He reports chronic difficulty urinating and intermittent incontinence secondary to neurogenic bladder, but notes his urine has been more concentrated recently, however he denies cloudy urine. He does not self-catheterize or use a catheter. He denies runny nose, cough, congestion, vomiting, diarrhea, or skin lesions or wounds. He denies any recent history of pneumonia or UTI.         Hospital Course:  Sepsis from UTI due to neurogenic bladder from MS.  Multiple previous urine cultures grew Previous urine cx x 2 with PROVIDENCIA STUARTII  Sensitive to zosyn.  Blood cx were negative.  No urine cx was sent so he was empirically treated with 7 days of Zosyn which he completed 10/21.    Lots of functional impairments from MS and worsened by this illness so needs SNF for PT/OT.       Interval History:     Finished 7 days of zosyn yesterday  No fever.  Blood cx ngtd  No urine cx done.    Baseline walks with walker    Denies pain      Review of Systems   Constitutional: Negative for appetite change, chills and fever.   Respiratory: Negative for cough and shortness of breath.    Cardiovascular: Negative for chest pain and leg swelling.   Gastrointestinal: Negative for abdominal distention and abdominal pain.     Objective:     Vital Signs (Most  Recent):  Temp: 98.7 °F (37.1 °C) (10/22/18 1144)  Pulse: 72 (10/22/18 1144)  Resp: 18 (10/22/18 1144)  BP: 116/61 (10/22/18 1144)  SpO2: 98 % (10/22/18 1144) Vital Signs (24h Range):  Temp:  [97.6 °F (36.4 °C)-98.7 °F (37.1 °C)] 98.7 °F (37.1 °C)  Pulse:  [63-82] 72  Resp:  [16-18] 18  SpO2:  [97 %-98 %] 98 %  BP: (103-124)/(59-68) 116/61     Weight: 75 kg (165 lb 5.5 oz)  Body mass index is 23.06 kg/m².    Intake/Output Summary (Last 24 hours) at 10/22/2018 1251  Last data filed at 10/22/2018 0905  Gross per 24 hour   Intake 1391 ml   Output 2250 ml   Net -859 ml      Physical Exam   Constitutional: He appears well-developed and well-nourished. No distress.   Cardiovascular: Normal rate, regular rhythm, normal heart sounds and intact distal pulses. Exam reveals no gallop and no friction rub.   No murmur heard.  Pulmonary/Chest: Effort normal and breath sounds normal. No respiratory distress. He has no rales.   Abdominal: Soft. Bowel sounds are normal. He exhibits no distension. There is no tenderness.   Skin: Skin is warm and dry. No rash noted. He is not diaphoretic. No erythema.       Significant Labs: All pertinent labs within the past 24 hours have been reviewed.    Significant Imaging: I have reviewed and interpreted all pertinent imaging results/findings within the past 24 hours.    Assessment/Plan:      * Sepsis due to urinary tract infection    Bacteria, Nitrites, WBC on U/A. Febrile, tachycardic, tachypnea, lactic acid 1.1    Blood culture NGTD  No urine cx sent  Previous urine cx x 2 with PROVIDENCIA STUARTII  Sensitive to zosyn    Completed  treatment course with zosyn empirically ( day # 7of 7 10/21/18 )  based on previous cultires and sensitivities       Mild malnutrition    Monitor weight and albumin.  Monitor po intake       Debility    Start pt/ot  Baseline walks with walker  Dispo: snf        Current use of long term anticoagulation    Continue Xarelto for previous hx of dvt       MS (multiple  sclerosis)    Continue baclofen, dantrolene, ampyra, neurontin       Neurogenic bladder    Due to multiple sclerosis  Straight cath PRN         VTE Risk Mitigation (From admission, onward)        Ordered     rivaroxaban tablet 20 mg  With dinner      10/15/18 1897              Jose David Schwartz MD  Department of Hospital Medicine   [facility]

## 2018-10-22 NOTE — PT/OT/SLP PROGRESS
Occupational Therapy   Treatment    Name: Mao Levin  MRN: [id number]  Admitting Diagnosis:  Sepsis due to urinary tract infection       Recommendations:     Discharge Recommendations: nursing facility, skilled  Discharge Equipment Recommendations:  (TBD at next level of care)  Barriers to discharge:  Decreased caregiver support, Inaccessible home environment(at present functioning level)    Subjective     Communicated with: ANGELA Salcedo prior to session.  Pain/Comfort:  · Pain Rating 1: 0/10  · Pain Rating Post-Intervention 1: 0/10    Patients cultural, spiritual, Pentecostal conflicts given the current situation: None    Objective:     Patient found with: Condom Catheter, peripheral IV    General Precautions: Standard, anti-coagulation medicine, fall   Orthopedic Precautions:N/A   Braces: (pt reports having AFO and (L) hand/finger extension and separator splint)     Occupational Performance:    Bed Mobility:    · NT, found UIC     Functional Mobility/Transfers:  · NT, found UIC  · Functional Mobility: NT    Activities of Daily Living:  · Grooming: stand by assistance and and set up assist for all grooming tasks UIC    Patient left up in chair with all lines intact, call button in reach, RN notified and RN present    AMPA 6 Click:  AMPA Total Score: 14    Treatment & Education:  Performed (L) hand wrist stretching to assist with flexor tone.  Education:    Assessment:     Mao Levin is a 53 y.o. male with a medical diagnosis of Sepsis due to urinary tract infection.  He presents with the following performance deficits affecting function: weakness, impaired endurance, impaired self care skills, impaired functional mobilty, gait instability, impaired balance, impaired coordination, abnormal tone, decreased safety awareness, decreased upper extremity function, decreased lower extremity function, decreased ROM, impaired fine motor.    Engaged in grooming task UIC with SBA and set up.  Noted small cut on upper (L) lip,  notified RN.  Also performed (L) hand stretching to assist with flexor tone.  Continue OT services to restore patient functioning.    Rehab Prognosis:  Good; patient would benefit from acute skilled OT services to address these deficits and reach maximum level of function.       Plan:     Patient to be seen 5 x/week to address the above listed problems via self-care/home management, therapeutic activities, therapeutic exercises  · Plan of Care Expires: 11/16/18  · Plan of Care Reviewed with: patient    This Plan of care has been discussed with the patient who was involved in its development and understands and is in agreement with the identified goals and treatment plan    GOALS:   Multidisciplinary Problems     Occupational Therapy Goals        Problem: Occupational Therapy Goal    Goal Priority Disciplines Outcome Interventions   Occupational Therapy Goal     OT, PT/OT Ongoing (interventions implemented as appropriate)    Description:  Goals to be met by: 11/17/18     Patient will increase functional independence with ADLs by performing:    UE Dressing with Minimal Assistance.  LE Dressing with Moderate Assistance.  Grooming while EOB with Set-up Assistance.  Stand pivot transfers to be assessed.  (MET 10/18/18)  REVISED Sit to/from stand with Mod assist and RW  REVISED SPT with Min assist and RW                       Time Tracking:     OT Date of Treatment: 10/22/18  OT Start Time: 1153  OT Stop Time: 1224  OT Total Time (min): 31 min    Billable Minutes:Self Care/Home Management 20  Therapeutic Exercise 11    GISEL March  10/22/2018

## 2018-10-22 NOTE — SUBJECTIVE & OBJECTIVE
Interval History:     Finished 7 days of zosyn yesterday  No fever.  Blood cx ngtd  No urine cx done.    Baseline walks with walker    Denies pain      Review of Systems   Constitutional: Negative for appetite change, chills and fever.   Respiratory: Negative for cough and shortness of breath.    Cardiovascular: Negative for chest pain and leg swelling.   Gastrointestinal: Negative for abdominal distention and abdominal pain.     Objective:     Vital Signs (Most Recent):  Temp: 98.7 °F (37.1 °C) (10/22/18 1144)  Pulse: 72 (10/22/18 1144)  Resp: 18 (10/22/18 1144)  BP: 116/61 (10/22/18 1144)  SpO2: 98 % (10/22/18 1144) Vital Signs (24h Range):  Temp:  [97.6 °F (36.4 °C)-98.7 °F (37.1 °C)] 98.7 °F (37.1 °C)  Pulse:  [63-82] 72  Resp:  [16-18] 18  SpO2:  [97 %-98 %] 98 %  BP: (103-124)/(59-68) 116/61     Weight: 75 kg (165 lb 5.5 oz)  Body mass index is 23.06 kg/m².    Intake/Output Summary (Last 24 hours) at 10/22/2018 1251  Last data filed at 10/22/2018 0905  Gross per 24 hour   Intake 1391 ml   Output 2250 ml   Net -859 ml      Physical Exam   Constitutional: He appears well-developed and well-nourished. No distress.   Cardiovascular: Normal rate, regular rhythm, normal heart sounds and intact distal pulses. Exam reveals no gallop and no friction rub.   No murmur heard.  Pulmonary/Chest: Effort normal and breath sounds normal. No respiratory distress. He has no rales.   Abdominal: Soft. Bowel sounds are normal. He exhibits no distension. There is no tenderness.   Skin: Skin is warm and dry. No rash noted. He is not diaphoretic. No erythema.       Significant Labs: All pertinent labs within the past 24 hours have been reviewed.    Significant Imaging: I have reviewed and interpreted all pertinent imaging results/findings within the past 24 hours.

## 2018-10-22 NOTE — HOSPITAL COURSE
Patient was diagnosed with sepsis from UTI due to neurogenic bladder from MS.  As multiple urine cultures have grown Providencia stuartii sensitive to Zosyn he was empirically treated with 7 days of Zosyn, completed 10/21/18.  Blood cultures were negative.  With treatment the fever resolved and his energy level improved, and he was able to participate in therapy.    For his MS he is on dantrolene 50 mg 4 times/day, baclofen and Valium for spasticity, and dalfampridine (Ampyra) 10 mg every 12 hours.  We were unable to obtain the Ampyra and he reported he was not taking it at home and did not have the medication available.  He also takes hydrocodone/acetaminophen 10/325 up to 3 times/day for chronic pain.  Review of the [place] [facility] database shows he has also been on Percocet for pain, but the most recent medication prescribed was the hydrocodone/acetaminophen combination.  He is followed by Dr. Finnegan for the MS and has been referred to Dr. Estrella with PMR.    At baseline patient has lower extremity and LUE spasticity and recently has not been able to walk with his cane, requiring a wheelchair most of the time.  His functional impairments have been exacerbated by his current illness.  PT/OT evaluated him and recommended inpatient rehab given his good premorbid functioning and strong motivation to participate in therapy.  He was transferred to [facility] on 10/26/18.

## 2018-10-22 NOTE — PT/OT/SLP PROGRESS
"Physical Therapy Treatment    Patient Name:  Mao Levin   MRN:  [id number]    Recommendations:     Discharge Recommendations:  rehabilitation facility   Discharge Equipment Recommendations: (TBD)   Barriers to discharge: Decreased caregiver support    Assessment:     Mao Levin is a 53 y.o. male admitted with a medical diagnosis of Sepsis due to urinary tract infection.  He presents with the following impairments/functional limitations:  weakness, impaired endurance, impaired self care skills, impaired functional mobilty, gait instability, impaired balance, decreased upper extremity function, decreased lower extremity function, decreased ROM, impaired fine motor ; pt with fair mobility today, limited by issues with condom catheter leaking during session and pt not having AFO brought to hospital yet. (Pt reports he can't keep asking his 70-something year old mother to do things for him). Pt highly motivated in session and would make a great REHAB candidate.    Rehab Prognosis:  excellent; patient would benefit from acute skilled PT services to address these deficits and reach maximum level of function.      Recent Surgery: * No surgery found *      Plan:     During this hospitalization, patient to be seen 6 x/week to address the above listed problems via gait training, therapeutic activities, therapeutic exercises, neuromuscular re-education, wheelchair management/training  · Plan of Care Expires:  11/01/18   Plan of Care Reviewed with: patient    Subjective     Communicated with nurse prior to session.  Patient found supine in bed upon PT entry to room, agreeable to treatment.      Chief Complaint: head/neck aches  Patient comments/goals: "I want the best therapy I can get" (regarding going to a REHAB or SNF unit).  Pain/Comfort:  · Pain Rating 1: 8/10  · Location 1: head(and neck)  · Pain Addressed 1: Reposition, Distraction, Nurse notified  · Pain Rating Post-Intervention 1: 8/10(nurse to bring pain " medicine)    Patients cultural, spiritual, Moravian conflicts given the current situation: none stated    Objective:     Patient found with: Condom Catheter, peripheral IV     General Precautions: Standard, anti-coagulation medicine, fall   Orthopedic Precautions:N/A   Braces: (pt has AFO at home, though may be damaged, may possibly need a new one )     Functional Mobility:  · Bed Mobility:     · Supine to Sit: minimum assistance and HOB up ~45*  · Transfers:     · Sit to Stand:  maximal assistance with rolling walker      AM-PAC 6 CLICK MOBILITY  Turning over in bed (including adjusting bedclothes, sheets and blankets)?: 3  Sitting down on and standing up from a chair with arms (e.g., wheelchair, bedside commode, etc.): 2  Moving from lying on back to sitting on the side of the bed?: 3  Moving to and from a bed to a chair (including a wheelchair)?: 2  Need to walk in hospital room?: 1  Climbing 3-5 steps with a railing?: 1  Basic Mobility Total Score: 12       Therapeutic Activities and Exercises:   pt supine , perf'd AROM/AAROM heelslides x 10 ea. , stretching to heelcords and hamstrings. Seated AAROM LAQ's x 5 ea. AFO not in room today. Pt requesting to just work on standing act's.   pt stood numerous times from EOB, max.A/total A from lowest level; max.A from elevated EOB. Pt's catheter leaking, nsg. Called into room. Pt req'd assistance with moving LLE in standing. Pt t/f' d to chair with max.A SPT.     Patient left up in chair with all lines intact, call button in reach and nurse present..    GOALS:   Multidisciplinary Problems     Physical Therapy Goals        Problem: Physical Therapy Goal    Goal Priority Disciplines Outcome Goal Variances Interventions   Physical Therapy Goal     PT, PT/OT Ongoing (interventions implemented as appropriate)     Description:  Goals to be met by: 18    Patient will increase functional independence with mobility by performin. Gait x 25 feet SBA with rolling  walker  2. Ascend/descend curb step with rolling walker and Sayda.   3. Perform supine<>sit from flat bed with SBA.  4. Perform sit<>stand from normal chair height with RW and SBA demonstrating appropriate safety awareness.   5. Perform stand step transfer to W/C with least restrictive assistive devices and SBA.  6. Assess W/C propulsion.                    Time Tracking:     PT Received On: 10/22/18  PT Start Time: 1017     PT Stop Time: 1122  PT Total Time (min): 65 min     Billable Minutes: Therapeutic Activity 50 and Therapeutic Exercise 15    Treatment Type: Treatment  PT/PTA: PTA     PTA Visit Number: 1     Laura Mcbride PTA  10/22/2018

## 2018-10-22 NOTE — PLAN OF CARE
Problem: Occupational Therapy Goal  Goal: Occupational Therapy Goal  Goals to be met by: 11/17/18     Patient will increase functional independence with ADLs by performing:    UE Dressing with Minimal Assistance.  LE Dressing with Moderate Assistance.  Grooming while EOB with Set-up Assistance.  Stand pivot transfers to be assessed.  (MET 10/18/18)  REVISED Sit to/from stand with Mod assist and RW  REVISED SPT with Min assist and RW      Outcome: Ongoing (interventions implemented as appropriate)  Engaged in grooming task UIC with SBA and set up.  Noted small cut on upper (L) lip, notified RN.  Also performed (L) hand stretching to assist with flexor tone.  Continue OT services to restore patient functioning.    Comments: GISEL March, 10/22/2018

## 2018-10-22 NOTE — PLAN OF CARE
Problem: Physical Therapy Goal  Goal: Physical Therapy Goal  Goals to be met by: 18    Patient will increase functional independence with mobility by performin. Gait x 25 feet SBA with rolling walker  2. Ascend/descend curb step with rolling walker and Sayda.   3. Perform supine<>sit from flat bed with SBA.  4. Perform sit<>stand from normal chair height with RW and SBA demonstrating appropriate safety awareness.   5. Perform stand step transfer to W/C with least restrictive assistive devices and SBA.  6. Assess W/C propulsion.   Pt slowly progressing towards goals, limited today by catheter leaking when standing and pt does not have AFO for LLE (mother is supposed to bring to the hospital). Sup to sit Sayda, sit to stand max.A with RW , from various heights. Worked on standing act's (wt shifting) with RW and Sayda, pt able to t/f to chair with max.A, difficulty with LLE advancement. Recommend cont PT in REHAB.

## 2018-10-22 NOTE — PLAN OF CARE
Pt first choice for SNF is [facility].  They have denied, pt informed.    [facility] and [facility] are still reviewing the case.    Spoke with Sneha at [facility], she is coming to eval the pt today.  VM left with Mattie at [facility] [phone], awaiting callback.      Pt agreeable with going to another SNF.   CM to follow closely.       10/22/18 1155   Discharge Reassessment   Assessment Type Discharge Planning Reassessment   Provided patient/caregiver education on the expected discharge date and the discharge plan Yes   Do you have any problems affording any of your prescribed medications? No   Discharge Plan A Skilled Nursing Facility   Discharge Plan B Home Health   Patient choice form signed by patient/caregiver Yes   Can the patient answer the patient profile reliably? Yes, cognitively intact   How does the patient rate their overall health at the present time? Fair   Describe the patient's ability to walk at the present time. Walks with the help of equipment   How often would a person be available to care for the patient? Whenever needed

## 2018-10-22 NOTE — ASSESSMENT & PLAN NOTE
Bacteria, Nitrites, WBC on U/A. Febrile, tachycardic, tachypnea, lactic acid 1.1    Blood culture NGTD  No urine cx sent  Previous urine cx x 2 with PROVIDENCIA STUARTII  Sensitive to zosyn    Completed  treatment course with zosyn empirically ( day # 7of 7 10/21/18 )  based on previous cultires and sensitivities

## 2018-10-23 PROCEDURE — 99233 SBSQ HOSP IP/OBS HIGH 50: CPT | Mod: ,,, | Performed by: HOSPITALIST

## 2018-10-23 PROCEDURE — 25000003 PHARM REV CODE 250: Performed by: INTERNAL MEDICINE

## 2018-10-23 PROCEDURE — 94761 N-INVAS EAR/PLS OXIMETRY MLT: CPT

## 2018-10-23 PROCEDURE — 11000001 HC ACUTE MED/SURG PRIVATE ROOM

## 2018-10-23 PROCEDURE — 97535 SELF CARE MNGMENT TRAINING: CPT

## 2018-10-23 PROCEDURE — 25000003 PHARM REV CODE 250: Performed by: NURSE PRACTITIONER

## 2018-10-23 PROCEDURE — 97530 THERAPEUTIC ACTIVITIES: CPT

## 2018-10-23 PROCEDURE — 97116 GAIT TRAINING THERAPY: CPT

## 2018-10-23 RX ADMIN — TRAZODONE HYDROCHLORIDE 100 MG: 100 TABLET ORAL at 10:10

## 2018-10-23 RX ADMIN — DANTROLENE SODIUM 50 MG: 25 CAPSULE ORAL at 09:10

## 2018-10-23 RX ADMIN — STANDARDIZED SENNA CONCENTRATE AND DOCUSATE SODIUM 1 TABLET: 8.6; 5 TABLET ORAL at 09:10

## 2018-10-23 RX ADMIN — DIAZEPAM 5 MG: 5 TABLET ORAL at 09:10

## 2018-10-23 RX ADMIN — DIAZEPAM 5 MG: 5 TABLET ORAL at 10:10

## 2018-10-23 RX ADMIN — BACLOFEN 20 MG: 10 TABLET ORAL at 09:10

## 2018-10-23 RX ADMIN — BACLOFEN 20 MG: 10 TABLET ORAL at 03:10

## 2018-10-23 RX ADMIN — OXYCODONE HYDROCHLORIDE 15 MG: 5 TABLET ORAL at 10:10

## 2018-10-23 RX ADMIN — TAMSULOSIN HYDROCHLORIDE 0.4 MG: 0.4 CAPSULE ORAL at 09:10

## 2018-10-23 RX ADMIN — OXYCODONE HYDROCHLORIDE AND ACETAMINOPHEN 500 MG: 500 TABLET ORAL at 09:10

## 2018-10-23 RX ADMIN — GABAPENTIN 900 MG: 300 CAPSULE ORAL at 09:10

## 2018-10-23 RX ADMIN — DANTROLENE SODIUM 50 MG: 25 CAPSULE ORAL at 05:10

## 2018-10-23 RX ADMIN — RIVAROXABAN 20 MG: 10 TABLET, FILM COATED ORAL at 05:10

## 2018-10-23 RX ADMIN — OXYCODONE HYDROCHLORIDE 15 MG: 5 TABLET ORAL at 09:10

## 2018-10-23 RX ADMIN — GABAPENTIN 900 MG: 300 CAPSULE ORAL at 03:10

## 2018-10-23 RX ADMIN — DULOXETINE 30 MG: 30 CAPSULE, DELAYED RELEASE ORAL at 09:10

## 2018-10-23 RX ADMIN — DANTROLENE SODIUM 50 MG: 25 CAPSULE ORAL at 12:10

## 2018-10-23 RX ADMIN — OXYCODONE HYDROCHLORIDE 15 MG: 5 TABLET ORAL at 03:10

## 2018-10-23 NOTE — ASSESSMENT & PLAN NOTE
- Continue PT/OT  - Inpatient rehab recommended  - At baseline walks with walker  - Benefits from stretching exercises and has been participating well in PT

## 2018-10-23 NOTE — NURSING
Pt resting in bed. NAD, VSS on RA, AOx4 with occasional confusion. Pt complains of lower leg and back pain d/t MS. Controlled with PRN oxy IR and scheduled muscle relaxants. Pt had a new condom catheter placed today. Pt worked with OT/PT today and is waiting for placement in rehab. POC reviewed with pt. Bed low and locked. Personal items and call light within reach. Will continue to monitor.

## 2018-10-23 NOTE — PLAN OF CARE
Problem: Occupational Therapy Goal  Goal: Occupational Therapy Goal  Goals to be met by: 11/17/18     Patient will increase functional independence with ADLs by performing:    UE Dressing with Minimal Assistance.  LE Dressing with Moderate Assistance.  Grooming while EOB with Set-up Assistance.  Stand pivot transfers to be assessed.  (MET 10/18/18)  REVISED Sit to/from stand with Mod assist and RW  REVISED SPT with Min assist and RW      Outcome: Ongoing (interventions implemented as appropriate)  Reports pain in ankle, neck, knees, and lower back.  Grooming/hygiene seated in bedside chair seated at sink with increased verbal/tactile cues with forward sitting and in midline with occasional MIN A for returning to midline.  Required retrieval of all needed items for oral care and washing face; able to reach for facet with RUE with increased time.  Pt. Able to doff sock from LLE with manual lifting of extremity onto R-knee for cross leg technique; required assist for stabilizing LLE on knee and able to doff using RUE.  Required assist to doff sock from RLE and don socks/shoes to BLE.  Slowly progressing well towards goals.  To benefit from continued acute care OT services to increase independence in self-care/functional transfers.  Continue POC.

## 2018-10-23 NOTE — PLAN OF CARE
Problem: Patient Care Overview  Goal: Plan of Care Review  Outcome: Ongoing (interventions implemented as appropriate)  Patient assisted to bed from chair using walker with staff. Encouraged patient to call for any and all needs. Patient verbalized understanding of instructions. Left forearm 22 gauge IV access. Condom catheter intact. Patient lying quietly in bed with eyes closed. No distress or discomfort noted at this time. Will continue to monitor patient.

## 2018-10-23 NOTE — PROGRESS NOTES
[facility] Hospital Medicine  Progress Note    Patient Name: Mao Levin  MRN: [id number]  Patient Class: IP- Inpatient   Admission Date: 10/15/2018  Length of Stay: 8 days  Attending Physician: Mendy Baxter MD  Primary Care Provider: Mendy Alarcon MD        Subjective:     Principal Problem:Sepsis due to urinary tract infection    HPI:  The patient is a 53 y.o. male with a history of MS who presents to the ED via EMS with complaint of fever, which began two days ago. Per EMS, patient had a fever of 104.2 when measured en route to the ED. Patient reports general malaise, bilateral leg weakness, mild shortness of breath, and diffuse arthralgias. He reports chronic difficulty urinating and intermittent incontinence secondary to neurogenic bladder, but notes his urine has been more concentrated recently, however he denies cloudy urine. He does not self-catheterize or use a catheter. He denies runny nose, cough, congestion, vomiting, diarrhea, or skin lesions or wounds. He denies any recent history of pneumonia or UTI.         Hospital Course:  Sepsis from UTI due to neurogenic bladder from MS.  Multiple previous urine cultures grew Previous urine cx x 2 with PROVIDENCIA STUARTII  Sensitive to zosyn.  Blood cx were negative.  No urine cx was sent so he was empirically treated with 7 days of Zosyn which he completed 10/21.    Lots of functional impairments from MS and worsened by this illness.  PT/OT evaluated and recommended inpatient rehab given his excellent premorbid functioning and strong motivation to participate in therapy.       Interval History:  Doing well, asking about the possibility of rehab placement rather than SNF.    Review of Systems   Constitutional: Negative for chills and fever.   Respiratory: Negative for cough and shortness of breath.    Cardiovascular: Negative for chest pain and palpitations.   Musculoskeletal: Positive for arthralgias.     Objective:     Vital Signs  (Most Recent):  Temp: 97.5 °F (36.4 °C) (10/23/18 1218)  Pulse: 78 (10/23/18 1218)  Resp: 16 (10/23/18 0824)  BP: 123/69 (10/23/18 1218)  SpO2: 97 % (10/23/18 1218) Vital Signs (24h Range):  Temp:  [97.5 °F (36.4 °C)-98.1 °F (36.7 °C)] 97.5 °F (36.4 °C)  Pulse:  [65-78] 78  Resp:  [14-18] 16  SpO2:  [96 %-98 %] 97 %  BP: (102-129)/(58-73) 123/69     Weight: 75 kg (165 lb 5.5 oz)  Body mass index is 23.06 kg/m².    Intake/Output Summary (Last 24 hours) at 10/23/2018 1620  Last data filed at 10/23/2018 1300  Gross per 24 hour   Intake 1200 ml   Output 2925 ml   Net -1725 ml      Physical Exam   Constitutional: He is oriented to person, place, and time. He appears well-developed and well-nourished.   HENT:   Head: Normocephalic.   Eyes: Conjunctivae are normal. Pupils are equal, round, and reactive to light.   Neck: Neck supple. No thyromegaly present.   Cardiovascular: Normal rate, regular rhythm, normal heart sounds and intact distal pulses. Exam reveals no gallop and no friction rub.   No murmur heard.  Pulmonary/Chest: Effort normal and breath sounds normal.   Abdominal: Soft. Bowel sounds are normal. He exhibits no distension. There is no tenderness.   Musculoskeletal: He exhibits no edema.   Stiffness of lower extremities.   Lymphadenopathy:     He has no cervical adenopathy.   Neurological: He is alert and oriented to person, place, and time.   Strength equal and symmetric   Skin: Skin is warm and dry. No rash noted.   Psychiatric: He has a normal mood and affect. His behavior is normal. Thought content normal.       Significant Labs: All pertinent labs within the past 24 hours have been reviewed.    Significant Imaging: I have reviewed all pertinent imaging results/findings within the past 24 hours.    Assessment/Plan:      * Sepsis due to urinary tract infection    Bacteria, Nitrites, WBC on U/A. Febrile, tachycardic, tachypnea, lactic acid 1.1    Blood culture NGTD  No urine cx sent  Previous urine cx x 2  with PROVIDENCIA STUARTII  Sensitive to zosyn    Completed  treatment course with zosyn empirically ( day # 7of 7 10/21/18 )  based on previous cultires and sensitivities       MS (multiple sclerosis)    - Continue baclofen, dantrolene, ampyra, gabapentin  - [place] by [place]. Finnegan [place] [facility] [place].       Mild malnutrition    Monitor weight and albumin.  Monitor po intake       Debility    - Continue PT/OT  - Inpatient rehab recommended  - At baseline walks with walker  - Benefits from stretching exercises and has been participating well in PT     Current use of long term anticoagulation    Continue Xarelto for previous hx of dvt       Neurogenic bladder    Due to multiple sclerosis  Straight cath PRN         VTE Risk Mitigation (From admission, onward)        Ordered     rivaroxaban tablet 20 mg  With dinner      10/15/18 3421              Mendy Gudino MD  Department of Hospital Medicine   [facility]

## 2018-10-23 NOTE — PT/OT/SLP PROGRESS
"Physical Therapy Treatment    Patient Name:  Mao Levin   MRN:  [id number]    Recommendations:     Discharge Recommendations:  rehabilitation facility   Discharge Equipment Recommendations: (TBD in REHAB)   Barriers to discharge: Decreased caregiver support    Assessment:     Mao Levin is a 53 y.o. male admitted with a medical diagnosis of Sepsis due to urinary tract infection.  He presents with the following impairments/functional limitations:  weakness, impaired endurance, impaired functional mobilty, impaired self care skills, gait instability, impaired balance, decreased upper extremity function, decreased lower extremity function, abnormal tone, decreased ROM, impaired fine motor ;continued PT session in gym , following OT session. Pt wanting to stand up to do "squats" in parallel bars.    Rehab Prognosis:  good; patient would benefit from acute skilled PT services to address these deficits and reach maximum level of function.      Recent Surgery: * No surgery found *      Plan:     During this hospitalization, patient to be seen 6 x/week to address the above listed problems via gait training, therapeutic activities, therapeutic exercises, neuromuscular re-education, wheelchair management/training  · Plan of Care Expires:  11/01/18   Plan of Care Reviewed with: patient    Subjective     Communicated with nurse prior to session.  Patient found sitting up in recliner chair upon PT entry to room, agreeable to treatment.      Chief Complaint: LE stiffness  Patient comments/goals: "I want to do some squats". Pt also asking to have his legs stretched.     Pain/Comfort:  · Pain Rating 1: 0/10  · Pain Rating Post-Intervention 1: 0/10    Patients cultural, spiritual, Zoroastrianism conflicts given the current situation: none stated    Objective:     Patient found with: Condom Catheter, peripheral IV     General Precautions: Standard, anti-coagulation medicine, fall   Orthopedic Precautions:N/A   Braces: AFO(LLE, though " missing strap and req'd use of coban wrap)     Functional Mobility:  · Transfers:     · Sit to Stand:  moderate assistance and of 1 persons with parallel bars      AM-PAC 6 CLICK MOBILITY  Turning over in bed (including adjusting bedclothes, sheets and blankets)?: 3  Sitting down on and standing up from a chair with arms (e.g., wheelchair, bedside commode, etc.): 2  Moving from lying on back to sitting on the side of the bed?: 2  Moving to and from a bed to a chair (including a wheelchair)?: 2  Need to walk in hospital room?: 2  Climbing 3-5 steps with a railing?: 1  Basic Mobility Total Score: 12       Therapeutic Activities and Exercises:   pt stood from chair with modA x 1, pt pulling up on parallel bars to assist. Stood ~2 min. X 2 trials, pt performing mini-squats x 5 reps. Worked on standing balance act's , posture.   Pt ret'd to chair and recv'd HS stretches and hip abd stretches x 3 ea.     Patient left up in chair with all lines intact, call button in reach, PCT present and lunch tray..    GOALS:   Multidisciplinary Problems     Physical Therapy Goals        Problem: Physical Therapy Goal    Goal Priority Disciplines Outcome Goal Variances Interventions   Physical Therapy Goal     PT, PT/OT Ongoing (interventions implemented as appropriate)     Description:  Goals to be met by: 18    Patient will increase functional independence with mobility by performin. Gait x 25 feet SBA with rolling walker  2. Ascend/descend curb step with rolling walker and Sayda.   3. Perform supine<>sit from flat bed with SBA.  4. Perform sit<>stand from normal chair height with RW and SBA demonstrating appropriate safety awareness.   5. Perform stand step transfer to W/C with least restrictive assistive devices and SBA.  6. Assess W/C propulsion.                    Time Tracking:     PT Received On: 10/23/18  PT Start Time: 1400     PT Stop Time: 1440  PT Total Time (min): 40 min     Billable Minutes: Therapeutic  Activity 40    Treatment Type: Treatment  PT/PTA: PTA     PTA Visit Number: 2     Laura Mcbride, PTA  10/23/2018

## 2018-10-23 NOTE — SUBJECTIVE & OBJECTIVE
Interval History:  Doing well, asking about the possibility of rehab placement rather than SNF.    Review of Systems   Constitutional: Negative for chills and fever.   Respiratory: Negative for cough and shortness of breath.    Cardiovascular: Negative for chest pain and palpitations.   Musculoskeletal: Positive for arthralgias.     Objective:     Vital Signs (Most Recent):  Temp: 97.5 °F (36.4 °C) (10/23/18 1218)  Pulse: 78 (10/23/18 1218)  Resp: 16 (10/23/18 0824)  BP: 123/69 (10/23/18 1218)  SpO2: 97 % (10/23/18 1218) Vital Signs (24h Range):  Temp:  [97.5 °F (36.4 °C)-98.1 °F (36.7 °C)] 97.5 °F (36.4 °C)  Pulse:  [65-78] 78  Resp:  [14-18] 16  SpO2:  [96 %-98 %] 97 %  BP: (102-129)/(58-73) 123/69     Weight: 75 kg (165 lb 5.5 oz)  Body mass index is 23.06 kg/m².    Intake/Output Summary (Last 24 hours) at 10/23/2018 1620  Last data filed at 10/23/2018 1300  Gross per 24 hour   Intake 1200 ml   Output 2925 ml   Net -1725 ml      Physical Exam   Constitutional: He is oriented to person, place, and time. He appears well-developed and well-nourished.   HENT:   Head: Normocephalic.   Eyes: Conjunctivae are normal. Pupils are equal, round, and reactive to light.   Neck: Neck supple. No thyromegaly present.   Cardiovascular: Normal rate, regular rhythm, normal heart sounds and intact distal pulses. Exam reveals no gallop and no friction rub.   No murmur heard.  Pulmonary/Chest: Effort normal and breath sounds normal.   Abdominal: Soft. Bowel sounds are normal. He exhibits no distension. There is no tenderness.   Musculoskeletal: He exhibits no edema.   Stiffness of lower extremities.   Lymphadenopathy:     He has no cervical adenopathy.   Neurological: He is alert and oriented to person, place, and time.   Strength equal and symmetric   Skin: Skin is warm and dry. No rash noted.   Psychiatric: He has a normal mood and affect. His behavior is normal. Thought content normal.       Significant Labs: All pertinent labs  within the past 24 hours have been reviewed.    Significant Imaging: I have reviewed all pertinent imaging results/findings within the past 24 hours.

## 2018-10-23 NOTE — PT/OT/SLP PROGRESS
Occupational Therapy   Treatment    Name: Mao Levin  MRN: [id number]  Admitting Diagnosis:  Sepsis due to urinary tract infection       Recommendations:     Discharge Recommendations: nursing facility, skilled  Discharge Equipment Recommendations:  other (see comments)(defer to facility)  Barriers to discharge:  Decreased caregiver support, Inaccessible home environment    Subjective     Communicated with: Nursing prior to session.  Pain/Comfort:  · Pain Rating 1: 8/10  · Location - Side 1: Bilateral  · Location - Orientation 1: generalized  · Location 1: other (see comments)(neck, lower back, knees, and ankles )  · Pain Rating Post-Intervention 1: 8/10    Patients cultural, spiritual, Jehovah's witness conflicts given the current situation: None stated.     Objective:     Patient found with: peripheral IV, Condom Catheter    General Precautions: Standard, anti-coagulation medicine, fall   Orthopedic Precautions:N/A   Braces: AFO(LLE )     Occupational Performance:    Bed Mobility:    · Patient completed Supine to Sit with maximal assistance     Functional Mobility/Transfers:  · Patient completed Sit <> Stand Transfer with moderate assistance  with  rolling walker and bed slightly elevated and AFO to LLE   · Functional Mobility: Ambulated short household distance bed>sink with AFO to LLE, RW with HOHA placement of LUE, increased assist with RW management and LLE stepping    Activities of Daily Living:  · Grooming: stand by assistance; Grooming/hygiene seated in bedside chair seated at sink with increased verbal/tactile cues with forward sitting and in midline with occasional MIN A for returning to midline.  Required retrieval of all needed items for oral care and washing face; able to reach for facet with RUE with increased time.   · Lower Body Dressing: maximal assistance; Pt. Able to doff sock from LLE with manual lifting of extremity onto R-knee for cross leg technique; required assist for stabilizing LLE on knee and  able to doff using RUE.  Pt. With increased tightness to RLE and also with decreased strength/function in LUE thus unable to perform cross leg tech to doff shoe.      Patient left up in chair with all lines intact, call button in reach, nursing notified and PTA present    [facility] 6 Click:  [facility] Total Score: 14    Treatment & Education:  Educated on bed mobility and functional transfer/ADL safety.   Education:    Assessment:   Reports pain in ankle, neck, knees, and lower back.  Grooming/hygiene seated in bedside chair seated at sink with increased verbal/tactile cues with forward sitting and in midline with occasional MIN A for returning to midline.  Required retrieval of all needed items for oral care and washing face; able to reach for facet with RUE with increased time.  Pt. Able to doff sock from LLE with manual lifting of extremity onto R-knee for cross leg technique; required assist for stabilizing LLE on knee and able to doff using RUE.  Pt. With increased tightness to RLE and also with decreased strength/function in LUE thus unable to perform cross leg tech to doff shoe.  Required assist to doff sock from RLE and don socks/shoes to BLE.  Slowly progressing well towards goals.  To benefit from continued acute care OT services to increase independence in self-care/functional transfers.  Continue POC.     Mao Levin is a 53 y.o. male with a medical diagnosis of Sepsis due to urinary tract infection.  He presents with below deficits decreasing independence in self-care/functional transfers.  Performance deficits affecting function are weakness, impaired endurance, impaired self care skills, impaired functional mobilty, gait instability, impaired balance, decreased coordination, decreased upper extremity function, decreased lower extremity function, decreased safety awareness, pain, abnormal tone, impaired fine motor.      Rehab Prognosis:  Good; patient would benefit from acute skilled OT services to address  these deficits and reach maximum level of function.       Plan:     Patient to be seen 5 x/week to address the above listed problems via self-care/home management, therapeutic activities, therapeutic exercises  · Plan of Care Expires: 11/16/18  · Plan of Care Reviewed with: patient    This Plan of care has been discussed with the patient who was involved in its development and understands and is in agreement with the identified goals and treatment plan    GOALS:   Multidisciplinary Problems     Occupational Therapy Goals        Problem: Occupational Therapy Goal    Goal Priority Disciplines Outcome Interventions   Occupational Therapy Goal     OT, PT/OT Ongoing (interventions implemented as appropriate)    Description:  Goals to be met by: 11/17/18     Patient will increase functional independence with ADLs by performing:    UE Dressing with Minimal Assistance.  LE Dressing with Moderate Assistance.  Grooming while EOB with Set-up Assistance.  Stand pivot transfers to be assessed.  (MET 10/18/18)  REVISED Sit to/from stand with Mod assist and RW  REVISED SPT with Min assist and RW                       Time Tracking:     OT Date of Treatment: 10/23/18  OT Start Time: 1309  OT Stop Time: 1359  OT Total Time (min): 50 min    Billable Minutes:Self Care/Home Management 35  Total Time 50 (overlap with PTA)    Leonela Stapleton, OT  10/23/2018

## 2018-10-23 NOTE — PLAN OF CARE
Hospitalist requests pt go to IRF to better meet needs and return pt to prior level of functioning.      [facility] notified, they will review clinicals.     Spoke with Yancy at [facility] [phone]  Ext [phone].  States appeal must be filed with [employer].  Called [phone] for expedited appeal.    Process begun via telephone Case#[id number].  Diagnosis code: R 26.9.  Clinicals need to be faxed to [phone].       Request for peer to peer called within 5 business days, [facility] office is re-routing this request to peer to peer.  Phone number for Dr. Baxter provided.      [facility] dept states they have 72 hours to process expedited appeal.      Clinicals faxed.

## 2018-10-23 NOTE — ASSESSMENT & PLAN NOTE
- Continue baclofen, dantrolene, ampyra, gabapentin  - [place] Fin[place] [facility] [place]

## 2018-10-23 NOTE — PT/OT/SLP PROGRESS
Physical Therapy Treatment    Patient Name:  Mao Levin   MRN:  [id number]    Recommendations:     Discharge Recommendations:  rehabilitation facility   Discharge Equipment Recommendations: (TBD in REHAB)   Barriers to discharge: Decreased caregiver support    Assessment:     Mao Levin is a 53 y.o. male admitted with a medical diagnosis of Sepsis due to urinary tract infection.  He presents with the following impairments/functional limitations:  weakness, impaired endurance, impaired functional mobilty, impaired self care skills, impaired balance, gait instability, decreased upper extremity function, decreased lower extremity function, abnormal tone, decreased ROM, impaired fine motor ; pt with improved mobility today, able to amb. 6' with RW and Sayda x 1, maxA x 1 (to advance LLE). Pt highly motivated in therapy sessions (was mod Ind. At home prior to admisssion) and would make an excellent REHAB candidate.     Rehab Prognosis:  good; patient would benefit from acute skilled PT services to address these deficits and reach maximum level of function.      Recent Surgery: * No surgery found *      Plan:     During this hospitalization, patient to be seen 6 x/week to address the above listed problems via gait training, therapeutic activities, therapeutic exercises, neuromuscular re-education, wheelchair management/training  · Plan of Care Expires:  11/01/18   Plan of Care Reviewed with: patient    Subjective     Communicated with nurse prior to session.  Patient found supine upon PT entry to room, agreeable to treatment.      Chief Complaint: tightness in LE's  Patient comments/goals: pt agreeable to session, highly motivated   Pain/Comfort:  · Pain Rating 1: 0/10  · Pain Rating Post-Intervention 1: 0/10    Patients cultural, spiritual, Orthodoxy conflicts given the current situation: none stated    Objective:     Patient found with: Condom Catheter, peripheral IV     General Precautions: Standard, anti-coagulation  medicine, fall   Orthopedic Precautions:N/A   Braces: AFO(LLE)     Functional Mobility:  · Bed Mobility:     · Supine to Sit: moderate assistance  · Transfers:     · Sit to Stand:  moderate assistance and of 2 persons with rolling walker  · Gait: pt amb' 6' with RW and min.A x 1, max. A x 1 (for advanceing LLE, AFO donned).      AM-PAC 6 CLICK MOBILITY  Turning over in bed (including adjusting bedclothes, sheets and blankets)?: 3  Sitting down on and standing up from a chair with arms (e.g., wheelchair, bedside commode, etc.): 2  Moving from lying on back to sitting on the side of the bed?: 2  Moving to and from a bed to a chair (including a wheelchair)?: 2  Need to walk in hospital room?: 2  Climbing 3-5 steps with a railing?: 1  Basic Mobility Total Score: 12       Therapeutic Activities and Exercises:   CO-TX with OT, pt perf'd ADL's with OT (see note), req'd 2 person assist for amb. Short distance in room.    Patient left up in chair with all lines intact, call button in reach, nurse notified and PCT present..    GOALS:   Multidisciplinary Problems     Physical Therapy Goals        Problem: Physical Therapy Goal    Goal Priority Disciplines Outcome Goal Variances Interventions   Physical Therapy Goal     PT, PT/OT Ongoing (interventions implemented as appropriate)     Description:  Goals to be met by: 18    Patient will increase functional independence with mobility by performin. Gait x 25 feet SBA with rolling walker  2. Ascend/descend curb step with rolling walker and Sayda.   3. Perform supine<>sit from flat bed with SBA.  4. Perform sit<>stand from normal chair height with RW and SBA demonstrating appropriate safety awareness.   5. Perform stand step transfer to W/C with least restrictive assistive devices and SBA.  6. Assess W/C propulsion.                    Time Tracking:     PT Received On: 10/23/18  PT Start Time: 1308     PT Stop Time: 1336(10 min. treatment time, co-tx with OT)  PT Total  Time (min): 28 min     Billable Minutes: Gait Training 10    Treatment Type: Treatment  PT/PTA: PTA     PTA Visit Number: 2     Laura Mcbride PTA  10/23/2018

## 2018-10-23 NOTE — PLAN OF CARE
Problem: Physical Therapy Goal  Goal: Physical Therapy Goal  Goals to be met by: 18    Patient will increase functional independence with mobility by performin. Gait x 25 feet SBA with rolling walker  2. Ascend/descend curb step with rolling walker and Sayda.   3. Perform supine<>sit from flat bed with SBA.  4. Perform sit<>stand from normal chair height with RW and SBA demonstrating appropriate safety awareness.   5. Perform stand step transfer to W/C with least restrictive assistive devices and SBA.  6. Assess W/C propulsion.   Pt progressing towards goals, sit to stand modA x 2 with RW, LLE AFO donned with tennis shoes. Pt amb'd 6' with RW and min A x 1, max.A x 1 (to advance LLE). Recommend cont PT in REHAB.

## 2018-10-24 PROCEDURE — 99232 SBSQ HOSP IP/OBS MODERATE 35: CPT | Mod: ,,, | Performed by: HOSPITALIST

## 2018-10-24 PROCEDURE — 25000003 PHARM REV CODE 250: Performed by: INTERNAL MEDICINE

## 2018-10-24 PROCEDURE — 11000001 HC ACUTE MED/SURG PRIVATE ROOM

## 2018-10-24 PROCEDURE — 97535 SELF CARE MNGMENT TRAINING: CPT

## 2018-10-24 PROCEDURE — 25000003 PHARM REV CODE 250: Performed by: NURSE PRACTITIONER

## 2018-10-24 PROCEDURE — 94761 N-INVAS EAR/PLS OXIMETRY MLT: CPT

## 2018-10-24 PROCEDURE — 97530 THERAPEUTIC ACTIVITIES: CPT

## 2018-10-24 PROCEDURE — 97116 GAIT TRAINING THERAPY: CPT

## 2018-10-24 RX ADMIN — RIVAROXABAN 20 MG: 10 TABLET, FILM COATED ORAL at 04:10

## 2018-10-24 RX ADMIN — TAMSULOSIN HYDROCHLORIDE 0.4 MG: 0.4 CAPSULE ORAL at 08:10

## 2018-10-24 RX ADMIN — DANTROLENE SODIUM 50 MG: 25 CAPSULE ORAL at 12:10

## 2018-10-24 RX ADMIN — DANTROLENE SODIUM 50 MG: 25 CAPSULE ORAL at 08:10

## 2018-10-24 RX ADMIN — DULOXETINE 30 MG: 30 CAPSULE, DELAYED RELEASE ORAL at 08:10

## 2018-10-24 RX ADMIN — BACLOFEN 20 MG: 10 TABLET ORAL at 04:10

## 2018-10-24 RX ADMIN — DIAZEPAM 5 MG: 5 TABLET ORAL at 12:10

## 2018-10-24 RX ADMIN — OXYCODONE HYDROCHLORIDE 15 MG: 5 TABLET ORAL at 05:10

## 2018-10-24 RX ADMIN — GABAPENTIN 900 MG: 300 CAPSULE ORAL at 04:10

## 2018-10-24 RX ADMIN — OXYCODONE HYDROCHLORIDE 15 MG: 5 TABLET ORAL at 11:10

## 2018-10-24 RX ADMIN — OXYCODONE HYDROCHLORIDE AND ACETAMINOPHEN 500 MG: 500 TABLET ORAL at 08:10

## 2018-10-24 RX ADMIN — TRAZODONE HYDROCHLORIDE 100 MG: 100 TABLET ORAL at 08:10

## 2018-10-24 RX ADMIN — GABAPENTIN 900 MG: 300 CAPSULE ORAL at 08:10

## 2018-10-24 RX ADMIN — OXYCODONE HYDROCHLORIDE 15 MG: 5 TABLET ORAL at 06:10

## 2018-10-24 RX ADMIN — BACLOFEN 20 MG: 10 TABLET ORAL at 08:10

## 2018-10-24 RX ADMIN — DANTROLENE SODIUM 50 MG: 25 CAPSULE ORAL at 04:10

## 2018-10-24 RX ADMIN — STANDARDIZED SENNA CONCENTRATE AND DOCUSATE SODIUM 1 TABLET: 8.6; 5 TABLET ORAL at 08:10

## 2018-10-24 NOTE — SUBJECTIVE & OBJECTIVE
Interval History:  Doing well with therapy, went to the gym with therapist and worked on parallel bars.  Very motivated.    Review of Systems   Constitutional: Negative for chills and fever.   Respiratory: Negative for cough and shortness of breath.    Cardiovascular: Negative for chest pain and palpitations.     Objective:     Vital Signs (Most Recent):  Temp: 98.3 °F (36.8 °C) (10/24/18 1145)  Pulse: 77 (10/24/18 1145)  Resp: 14 (10/24/18 1145)  BP: 123/77 (10/24/18 1145)  SpO2: 98 % (10/24/18 1145) Vital Signs (24h Range):  Temp:  [97.3 °F (36.3 °C)-98.6 °F (37 °C)] 98.3 °F (36.8 °C)  Pulse:  [64-88] 77  Resp:  [14-24] 14  SpO2:  [94 %-98 %] 98 %  BP: (114-129)/(57-77) 123/77     Weight: 75 kg (165 lb 5.5 oz)  Body mass index is 23.06 kg/m².    Intake/Output Summary (Last 24 hours) at 10/24/2018 1557  Last data filed at 10/24/2018 0507  Gross per 24 hour   Intake 1120 ml   Output 1500 ml   Net -380 ml      Physical Exam   Constitutional: He is oriented to person, place, and time. He appears well-developed and well-nourished.   HENT:   Head: Normocephalic.   Eyes: Conjunctivae are normal. Pupils are equal, round, and reactive to light.   Neck: Neck supple. No thyromegaly present.   Cardiovascular: Normal rate, regular rhythm, normal heart sounds and intact distal pulses. Exam reveals no gallop and no friction rub.   No murmur heard.  Pulmonary/Chest: Effort normal and breath sounds normal.   Abdominal: Soft. Bowel sounds are normal. He exhibits no distension. There is no tenderness.   Musculoskeletal: He exhibits no edema.   Stiffness of lower extremities.   Lymphadenopathy:     He has no cervical adenopathy.   Neurological: He is alert and oriented to person, place, and time.   Strength equal and symmetric   Skin: Skin is warm and dry. No rash noted.   Psychiatric: He has a normal mood and affect. His behavior is normal. Thought content normal.       Significant Labs: All pertinent labs within the past 24 hours  have been reviewed.    Significant Imaging: I have reviewed all pertinent imaging results/findings within the past 24 hours.

## 2018-10-24 NOTE — PLAN OF CARE
Problem: Patient Care Overview  Goal: Plan of Care Review  Outcome: Ongoing (interventions implemented as appropriate)  Patient up in chair no noted acute distress pain controlled with Prn medications as ordered.call light in reach and safety maintained.

## 2018-10-24 NOTE — PT/OT/SLP PROGRESS
Physical Therapy Treatment    Patient Name:  Mao Levin   MRN:  [id number]    Recommendations:     Discharge Recommendations:  rehabilitation facility   Discharge Equipment Recommendations: (TBD)   Barriers to discharge: Decreased caregiver support    Assessment:     Mao Levin is a 53 y.o. male admitted with a medical diagnosis of Sepsis due to urinary tract infection.  He presents with the following impairments/functional limitations:  weakness, impaired endurance, impaired self care skills, impaired functional mobilty, gait instability, impaired balance, decreased upper extremity function, decreased lower extremity function, abnormal tone, pain, decreased ROM, impaired coordination ; pt with slightly better mobility today, mod A to stand from chair, amb'd 6' with RW and modA at LLE. Pt highly motivated to return to prior level of function at home.    Rehab Prognosis:  good; patient would benefit from acute skilled PT services to address these deficits and reach maximum level of function.      Recent Surgery: * No surgery found *      Plan:     During this hospitalization, patient to be seen 6 x/week to address the above listed problems via gait training, therapeutic activities, therapeutic exercises, neuromuscular re-education, wheelchair management/training  · Plan of Care Expires:  11/01/18   Plan of Care Reviewed with: patient    Subjective     Communicated with nurse prior to session.  Patient found sitting up in chair upon PT entry to room, agreeable to treatment.      Chief Complaint: pt reports pain in BLE's, knees and ankles  Patient comments/goals: pt agreeable to session, eager to work  Pain/Comfort:  · Pain Rating 1: 7/10  · Location - Side 1: Bilateral  · Location - Orientation 1: generalized  · Location 1: leg  · Pain Addressed 1: Reposition, Distraction, Pre-medicate for activity  · Pain Rating Post-Intervention 1: 7/10    Patients cultural, spiritual, Anabaptism conflicts given the current  situation: none stated    Objective:     Patient found with: peripheral IV, Condom Catheter     General Precautions: Standard, anti-coagulation medicine, fall   Orthopedic Precautions:N/A   Braces: AFO(LLE)     Functional Mobility:  · Transfers:     · Sit to Stand:  moderate assistance of 1 persons with rolling walker  · Gait: amb'd 6' with RW and modA (for advancing LLE with AFO donned)      AM-PAC 6 CLICK MOBILITY  Turning over in bed (including adjusting bedclothes, sheets and blankets)?: 3  Sitting down on and standing up from a chair with arms (e.g., wheelchair, bedside commode, etc.): 2  Moving from lying on back to sitting on the side of the bed?: 2  Moving to and from a bed to a chair (including a wheelchair)?: 2  Need to walk in hospital room?: 2  Climbing 3-5 steps with a railing?: 1  Basic Mobility Total Score: 12       Therapeutic Activities and Exercises:   pt req'd assistance with donning socks and shoes and AFO on LLE.  pt stood numerous times with RW and modA, worked on wt shifting with min.A, stepping fwd/bck. Pt stood ~5 min. Each trial.    Patient left up in chair with all lines intact, call button in reach and lunch tray present..    GOALS:   Multidisciplinary Problems     Physical Therapy Goals        Problem: Physical Therapy Goal    Goal Priority Disciplines Outcome Goal Variances Interventions   Physical Therapy Goal     PT, PT/OT Ongoing (interventions implemented as appropriate)     Description:  Goals to be met by: 18    Patient will increase functional independence with mobility by performin. Gait x 25 feet SBA with rolling walker  2. Ascend/descend curb step with rolling walker and Sayda.   3. Perform supine<>sit from flat bed with SBA.  4. Perform sit<>stand from normal chair height with RW and SBA demonstrating appropriate safety awareness.   5. Perform stand step transfer to W/C with least restrictive assistive devices and SBA.  6. Assess W/C propulsion.                     Time Tracking:     PT Received On: 10/24/18  PT Start Time: 1318     PT Stop Time: 1346  PT Total Time (min): 28 min     Billable Minutes: Gait Training 14 and Therapeutic Activity 14    Treatment Type: Treatment  PT/PTA: PTA     PTA Visit Number: 3     Laura Mcbride PTA  10/24/2018

## 2018-10-24 NOTE — PLAN OF CARE
MD pursuing IRF via Peer to Peer/Grievance and Appeals with [facility] secondary to their denial of IRF on 10/19/2018.     Holding off on SNF applications for that determination as pt would have most benefit from IRF admission.     Spoke with Yancy at [facility]. In network SNF includes:   [facility] (pt has been there in the past and refuses to return, dissatisfied with options for rehab at this facility)  [facility] (they have denied, feel pt is a better IRF candidate)  [facility] in [facility]    Awaiting determination of peer to peer. [facility] has up to 72 hours (from 10/23/2018 at 1000)  to respond  CM to follow closely.

## 2018-10-24 NOTE — PROGRESS NOTES
[facility] Hospital Medicine  Progress Note    Patient Name: Mao Levin  MRN: [id number]  Patient Class: IP- Inpatient   Admission Date: 10/15/2018  Length of Stay: 9 days  Attending Physician: Mendy Baxter MD  Primary Care Provider: Mendy Alarcon MD        Subjective:     Principal Problem:Sepsis due to urinary tract infection    HPI:  The patient is a 53 y.o. male with a history of MS who presents to the ED via EMS with complaint of fever, which began two days ago. Per EMS, patient had a fever of 104.2 when measured en route to the ED. Patient reports general malaise, bilateral leg weakness, mild shortness of breath, and diffuse arthralgias. He reports chronic difficulty urinating and intermittent incontinence secondary to neurogenic bladder, but notes his urine has been more concentrated recently. He does not self-catheterize or use a catheter. He denies runny nose, cough, congestion, vomiting, diarrhea, or skin lesions or wounds. He denies any recent history of pneumonia or UTI.         Hospital Course:  Patient was diagnosed with sepsis from UTI due to neurogenic bladder from MS.  As multiple urine cultures have grown Providencia stuartii sensitive to Zosyn he was empirically treated with 7 days of Zosyn, completed 10/21/18.  Blood cultures were negative.    At baseline patient has some functional impairments that have been exacerbated by his current illness.  PT/OT evaluated and recommended inpatient rehab given his excellent premorbid functioning and strong motivation to participate in therapy.       Interval History:  Doing well with therapy, went to the gym with therapist and worked on parallel bars.  Very motivated.    Review of Systems   Constitutional: Negative for chills and fever.   Respiratory: Negative for cough and shortness of breath.    Cardiovascular: Negative for chest pain and palpitations.     Objective:     Vital Signs (Most Recent):  Temp: 98.3 °F (36.8 °C)  (10/24/18 1145)  Pulse: 77 (10/24/18 1145)  Resp: 14 (10/24/18 1145)  BP: 123/77 (10/24/18 1145)  SpO2: 98 % (10/24/18 1145) Vital Signs (24h Range):  Temp:  [97.3 °F (36.3 °C)-98.6 °F (37 °C)] 98.3 °F (36.8 °C)  Pulse:  [64-88] 77  Resp:  [14-24] 14  SpO2:  [94 %-98 %] 98 %  BP: (114-129)/(57-77) 123/77     Weight: 75 kg (165 lb 5.5 oz)  Body mass index is 23.06 kg/m².    Intake/Output Summary (Last 24 hours) at 10/24/2018 1557  Last data filed at 10/24/2018 0507  Gross per 24 hour   Intake 1120 ml   Output 1500 ml   Net -380 ml      Physical Exam   Constitutional: He is oriented to person, place, and time. He appears well-developed and well-nourished.   HENT:   Head: Normocephalic.   Eyes: Conjunctivae are normal. Pupils are equal, round, and reactive to light.   Neck: Neck supple. No thyromegaly present.   Cardiovascular: Normal rate, regular rhythm, normal heart sounds and intact distal pulses. Exam reveals no gallop and no friction rub.   No murmur heard.  Pulmonary/Chest: Effort normal and breath sounds normal.   Abdominal: Soft. Bowel sounds are normal. He exhibits no distension. There is no tenderness.   Musculoskeletal: He exhibits no edema.   Stiffness of lower extremities.   Lymphadenopathy:     He has no cervical adenopathy.   Neurological: He is alert and oriented to person, place, and time.   Strength equal and symmetric   Skin: Skin is warm and dry. No rash noted.   Psychiatric: He has a normal mood and affect. His behavior is normal. Thought content normal.       Significant Labs: All pertinent labs within the past 24 hours have been reviewed.    Significant Imaging: I have reviewed all pertinent imaging results/findings within the past 24 hours.    Assessment/Plan:      * Sepsis due to urinary tract infection    Bacteria, Nitrites, WBC on U/A. Febrile, tachycardic, tachypnea, lactic acid 1.1    Blood culture NGTD  No urine cx sent  Previous urine cx x 2 with ANANCIA CARYRTII  Sensitive to  zosyn    Completed  treatment course with zosyn empirically ( day # 7of 7 10/21/18 )  based on previous cultires and sensitivities       MS (multiple sclerosis)    - Continue baclofen, dantrolene and gabapentin  - Reportedly has been prescribed Ampyra (dalfampridine) but says he is not on it.  - Followed by Dr. Finnegan at [facility].       Mild malnutrition    Monitor weight and albumin.  Monitor po intake       Debility    - Continue PT/OT  - Inpatient rehab recommended  - At baseline walks with walker  - Benefits from stretching exercises and has been participating well in PT     Current use of long term anticoagulation    Continue Xarelto for previous hx of dvt       Neurogenic bladder    Due to multiple sclerosis  Straight cath PRN         VTE Risk Mitigation (From admission, onward)        Ordered     rivaroxaban tablet 20 mg  With dinner      10/15/18 5866              Mendy Gudino MD  Department of Hospital Medicine   [facility]

## 2018-10-24 NOTE — PLAN OF CARE
Problem: Physical Therapy Goal  Goal: Physical Therapy Goal  Goals to be met by: 18    Patient will increase functional independence with mobility by performin. Gait x 25 feet SBA with rolling walker  2. Ascend/descend curb step with rolling walker and Sayda.   3. Perform supine<>sit from flat bed with SBA.  4. Perform sit<>stand from normal chair height with RW and SBA demonstrating appropriate safety awareness.   5. Perform stand step transfer to W/C with least restrictive assistive devices and SBA.  6. Assess W/C propulsion.   Pt progressing towards goals, sit to stand from chair with mod/maxA x 1 with RW, amb'd 6' with RW and modA (for advancing LLE with AFO donned). Recommend cont PT in REHAB.

## 2018-10-24 NOTE — PT/OT/SLP PROGRESS
Occupational Therapy   Treatment    Name: Mao Levin  MRN: [id number]  Admitting Diagnosis:  Sepsis due to urinary tract infection       Recommendations:     Discharge Recommendations: rehabilitation facility(improved tolerance for activity)  Discharge Equipment Recommendations:  (TBD, at next level of care)  Barriers to discharge:  Decreased caregiver support, Inaccessible home environment    Subjective     Communicated with: ANGELA Adler prior to session.  Pain/Comfort:  · Pain Rating 1: 10/10  · Location - Orientation 1: generalized  · Location 1: neck  · Pain Addressed 1: Reposition, Distraction  · Pain Rating Post-Intervention 1: 10/10    Patients cultural, spiritual, Shinto conflicts given the current situation: None stated    Objective:     Patient found with: peripheral IV, Condom Catheter    General Precautions: Standard, anti-coagulation medicine, fall   Orthopedic Precautions:N/A   Braces: AFO(anti spasiticity (L) hand splint with finger separators)     Occupational Performance:    Bed Mobility:    · Patient completed Scooting/Bridging with moderate assistance  · Patient completed Supine to Sit with contact guard assistance     Functional Mobility/Transfers:  · Patient completed Sit <> Stand Transfer with maximal assistance and of 2 from EOB persons  with  rolling walker   · Patient completed Bed <> Chair Transfer using Stand Pivot technique with maximal assistance with rolling walker  · Patient completed Toilet Transfer Stand Pivot technique with maximal assistance  Of 2 personswith  rolling walker  · Functional Mobility: NT    Activities of Daily Living:  · Feeding:  set up assistance for tray  · Grooming: set up assistance for oral care seated at EOB  · Toileting: total assistance for all aspects from BSC    Patient left up in chair with all lines intact, call button in reach and RN notified    [facility] 6 Click:  [facility] Total Score: 13    Treatment & Education:  Educated to need for extension of knees and  trunk in standing for safe functional transfers, as patient wanting to transfer prior to reaching full upright posture.  Will be reinforced.  Education:    Assessment:     Mao Levin is a 53 y.o. male with a medical diagnosis of Sepsis due to urinary tract infection.  He presents with the following performance deficits affecting function: weakness, impaired endurance, impaired self care skills, impaired functional mobilty, gait instability, impaired balance, abnormal tone, decreased safety awareness, decreased lower extremity function, decreased upper extremity function, pain, decreased ROM, impaired coordination.    Patient tolerated 50 plus minute session on this date.  More knee buckling on this date (difficulty extending knees during transfers).  C/o considerable neck pain on this date.    Rehab Prognosis:  Good; patient would benefit from acute skilled OT services to address these deficits and reach maximum level of function.       Plan:     Patient to be seen 6 x/week to address the above listed problems via self-care/home management, therapeutic activities, therapeutic exercises  · Plan of Care Expires: 11/16/18  · Plan of Care Reviewed with: patient    This Plan of care has been discussed with the patient who was involved in its development and understands and is in agreement with the identified goals and treatment plan    GOALS:   Multidisciplinary Problems     Occupational Therapy Goals        Problem: Occupational Therapy Goal    Goal Priority Disciplines Outcome Interventions   Occupational Therapy Goal     OT, PT/OT Ongoing (interventions implemented as appropriate)    Description:  Goals to be met by: 11/17/18     Patient will increase functional independence with ADLs by performing:    UE Dressing with Minimal Assistance.  LE Dressing with Moderate Assistance.  Grooming while EOB with Set-up Assistance. (MET 10/24/18)  Stand pivot transfers to be assessed.  (MET 10/18/18)  REVISED Sit to/from stand  with Mod assist and RW  REVISED SPT with Min assist and RW                        Time Tracking:     OT Date of Treatment: 10/24/18  OT Start Time: 0903  OT Stop Time: 0955  OT Total Time (min): 52 min    Billable Minutes:Self Care/Home Management 52    GISEL March  10/24/2018

## 2018-10-24 NOTE — PLAN OF CARE
Problem: Occupational Therapy Goal  Goal: Occupational Therapy Goal  Goals to be met by: 11/17/18     Patient will increase functional independence with ADLs by performing:    UE Dressing with Minimal Assistance.  LE Dressing with Moderate Assistance.  Grooming while EOB with Set-up Assistance. (MET 10/24/18)  Stand pivot transfers to be assessed.  (MET 10/18/18)  REVISED Sit to/from stand with Mod assist and RW  REVISED SPT with Min assist and RW      Outcome: Ongoing (interventions implemented as appropriate)  Patient tolerated 50 plus minute session on this date.  More knee buckling on this date (difficulty extending knees during transfers).  C/o considerable neck pain on this date.  IPR is now being considered for discharge, though SNF is also an acceptable alternative.  Continue OT services to maximize patient functioning.    Comments: GISEL March, 10/24/2018

## 2018-10-24 NOTE — ASSESSMENT & PLAN NOTE
- Continue baclofen, dantrolene and gabapentin  - Reportedly has been prescribed Ampyra (dalfampridine) but says he is not on it.  - [place] Fin[place]gan [place] [facility]

## 2018-10-25 PROCEDURE — 25000003 PHARM REV CODE 250: Performed by: NURSE PRACTITIONER

## 2018-10-25 PROCEDURE — 11000001 HC ACUTE MED/SURG PRIVATE ROOM

## 2018-10-25 PROCEDURE — 25000003 PHARM REV CODE 250: Performed by: INTERNAL MEDICINE

## 2018-10-25 PROCEDURE — 94761 N-INVAS EAR/PLS OXIMETRY MLT: CPT

## 2018-10-25 PROCEDURE — 97530 THERAPEUTIC ACTIVITIES: CPT

## 2018-10-25 PROCEDURE — 97535 SELF CARE MNGMENT TRAINING: CPT

## 2018-10-25 PROCEDURE — 97802 MEDICAL NUTRITION INDIV IN: CPT

## 2018-10-25 PROCEDURE — 99232 SBSQ HOSP IP/OBS MODERATE 35: CPT | Mod: ,,, | Performed by: HOSPITALIST

## 2018-10-25 RX ADMIN — BACLOFEN 20 MG: 10 TABLET ORAL at 08:10

## 2018-10-25 RX ADMIN — OXYCODONE HYDROCHLORIDE 15 MG: 5 TABLET ORAL at 01:10

## 2018-10-25 RX ADMIN — TRAZODONE HYDROCHLORIDE 100 MG: 100 TABLET ORAL at 09:10

## 2018-10-25 RX ADMIN — GABAPENTIN 900 MG: 300 CAPSULE ORAL at 08:10

## 2018-10-25 RX ADMIN — DIAZEPAM 5 MG: 5 TABLET ORAL at 12:10

## 2018-10-25 RX ADMIN — GABAPENTIN 900 MG: 300 CAPSULE ORAL at 09:10

## 2018-10-25 RX ADMIN — DANTROLENE SODIUM 50 MG: 25 CAPSULE ORAL at 04:10

## 2018-10-25 RX ADMIN — DULOXETINE 30 MG: 30 CAPSULE, DELAYED RELEASE ORAL at 08:10

## 2018-10-25 RX ADMIN — DIAZEPAM 5 MG: 5 TABLET ORAL at 08:10

## 2018-10-25 RX ADMIN — STANDARDIZED SENNA CONCENTRATE AND DOCUSATE SODIUM 1 TABLET: 8.6; 5 TABLET ORAL at 08:10

## 2018-10-25 RX ADMIN — OXYCODONE HYDROCHLORIDE 15 MG: 5 TABLET ORAL at 07:10

## 2018-10-25 RX ADMIN — DANTROLENE SODIUM 50 MG: 25 CAPSULE ORAL at 08:10

## 2018-10-25 RX ADMIN — TAMSULOSIN HYDROCHLORIDE 0.4 MG: 0.4 CAPSULE ORAL at 08:10

## 2018-10-25 RX ADMIN — BACLOFEN 20 MG: 10 TABLET ORAL at 09:10

## 2018-10-25 RX ADMIN — DANTROLENE SODIUM 50 MG: 25 CAPSULE ORAL at 09:10

## 2018-10-25 RX ADMIN — RIVAROXABAN 20 MG: 10 TABLET, FILM COATED ORAL at 04:10

## 2018-10-25 RX ADMIN — OXYCODONE HYDROCHLORIDE 15 MG: 5 TABLET ORAL at 06:10

## 2018-10-25 RX ADMIN — OXYCODONE HYDROCHLORIDE 15 MG: 5 TABLET ORAL at 12:10

## 2018-10-25 RX ADMIN — OXYCODONE HYDROCHLORIDE AND ACETAMINOPHEN 500 MG: 500 TABLET ORAL at 08:10

## 2018-10-25 RX ADMIN — DANTROLENE SODIUM 50 MG: 25 CAPSULE ORAL at 01:10

## 2018-10-25 RX ADMIN — GABAPENTIN 900 MG: 300 CAPSULE ORAL at 03:10

## 2018-10-25 RX ADMIN — BACLOFEN 20 MG: 10 TABLET ORAL at 03:10

## 2018-10-25 NOTE — PT/OT/SLP PROGRESS
Occupational Therapy   Treatment    Name: Mao Levin  MRN: [id number]  Admitting Diagnosis:  Sepsis due to urinary tract infection       Recommendations:     Discharge Recommendations: rehabilitation facility  Discharge Equipment Recommendations:  other (see comments)(TBD at next level of care)  Barriers to discharge:  Inaccessible home environment, Decreased caregiver support(at present functioning level)    Subjective     Communicated with: ANGELA Krueger prior to session.  Pain/Comfort:  · Pain Rating 1: 9/10  · Location - Side 1: Bilateral  · Location - Orientation 1: generalized  · Location 1: leg  · Pain Addressed 1: Pre-medicate for activity, Reposition, Distraction  · Pain Rating Post-Intervention 1: 9/10    Patients cultural, spiritual, Jew conflicts given the current situation: None    Objective:     Patient found with: peripheral IV, Condom Catheter    General Precautions: Standard, anti-coagulation medicine, fall   Orthopedic Precautions:N/A   Braces: AFO(for (L) LE and resting extension hand splint with finger separator)     Occupational Performance:    Bed Mobility:    · Patient completed Scooting/Bridging with stand by assistance  · Patient completed Supine to Sit with moderate assistance for trunk    Functional Mobility/Transfers:  · Patient completed Sit <> Stand Transfer with moderate assistance  with  rolling walker   · Patient completed Bed <> Chair Transfer using Step Transfer technique with moderate assistance with rolling walker  · Functional Mobility: NT    Activities of Daily Living:  · Feeding:  set up assistance of tray (cutting food, opening containers, applying condiments)  · Upper Body Dressing: moderate assistance for donning gown as robe seated at EOB    Patient left up in chair with all lines intact, call button in reach and RN notified    [facility] 6 Click:  [facility] Total Score: 13    Treatment & Education:  Educated to feed self after staff set up.  Verbalized  understanding.  Education:    Assessment:     Mao Levin is a 53 y.o. male with a medical diagnosis of Sepsis due to urinary tract infection.  He presents with the following performance deficits affecting function: weakness, impaired endurance, impaired self care skills, impaired functional mobilty, gait instability, impaired balance, decreased upper extremity function, decreased lower extremity function, decreased ROM, abnormal tone, pain, decreased safety awareness, impaired coordination, edema.    Improved functional transfers on this date from previous session.  Need for assist at trunk during bed mobility.  Continue OT services to maximize patient functioning.    Rehab Prognosis:  Good; patient would benefit from acute skilled OT services to address these deficits and reach maximum level of function.       Plan:     Patient to be seen 6 x/week to address the above listed problems via self-care/home management, therapeutic activities, therapeutic exercises  · Plan of Care Expires: 11/16/18  · Plan of Care Reviewed with: patient    This Plan of care has been discussed with the patient who was involved in its development and understands and is in agreement with the identified goals and treatment plan    GOALS:   Multidisciplinary Problems     Occupational Therapy Goals        Problem: Occupational Therapy Goal    Goal Priority Disciplines Outcome Interventions   Occupational Therapy Goal     OT, PT/OT Ongoing (interventions implemented as appropriate)    Description:  Goals to be met by: 11/17/18     Patient will increase functional independence with ADLs by performing:    UE Dressing with Minimal Assistance.  LE Dressing with Moderate Assistance.  Grooming while EOB with Set-up Assistance. (MET 10/24/18)  Stand pivot transfers to be assessed.  (MET 10/18/18)  REVISED Sit to/from stand with Mod assist and RW  REVISED SPT with Min assist and RW                        Time Tracking:     OT Date of Treatment:  10/25/18  OT Start Time: 1300  OT Stop Time: 1326  OT Total Time (min): 26 min    Billable Minutes:Self Care/Home Management 26    GISEL March  10/25/2018

## 2018-10-25 NOTE — PLAN OF CARE
Problem: Patient Care Overview  Goal: Plan of Care Review  Outcome: Ongoing (interventions implemented as appropriate)  Pt remained free of falls and injuries. VSS on room air. Condom cath intact. Barrier cream applied to groin due to irritation. Bed low and locked, call light within reach, side rails up X2. Will continue to monitor.

## 2018-10-25 NOTE — ASSESSMENT & PLAN NOTE
Bacteria, Nitrites, WBC on U/A. Febrile, tachycardic, tachypnea, lactic acid 1.1    Blood culture NGTD  No urine cx sent  Previous urine cx x 2 with PROVIDENCIA STUARTII  Sensitive to zosyn    Completed  treatment course with zosyn empirically (day # 7of 7 10/21/18 )  based on previous cultires and sensitivities

## 2018-10-25 NOTE — PROGRESS NOTES
" [facility]  Adult Nutrition  Progress Note     SUMMARY       Recommendations    Recommendation/Intervention: Continue regular diet     Goals:   1. Meet >85% EEN and EPN   2. Prevent wt loss   3. Promote nutrition related labs wnl    Nutrition Goal Status: new  Communication of RD Recs: discussed on rounds    Comments: Pt endorses good appetite, finishing 100% of meals. Denies any wt loss, stated UBW 170lbs. Pt has a prominent clavicle bone but states he has always been that way. Does not meet criteria for malnutrition. Denies N/V/D/C.     Reason for Assessment    Reason for Assessment: length of stay  Diagnosis: infection/sepsis(due to UTI)  Relevant Medical History: MS, depression, anxiety, neurogenic bladder  Interdisciplinary Rounds: attended  Nutrition Discharge Planning: d/c on regular diet    Nutrition Risk Screen    Nutrition Risk Screen: no indicators present    Nutrition/Diet History    Patient Reported Diet/Restrictions/Preferences: general  Do you have any cultural, spiritual, Mormonism conflicts, given your current situation?: none stated    Anthropometrics    Temp: 98.2 °F (36.8 °C)  Height Method: Stated  Height: 5' 11" (180.3 cm)  Height (inches): 71 in  Weight Method: Bed Scale  Weight: 75 kg (165 lb 5.5 oz)  Weight (lb): 165.35 lb  Ideal Body Weight (IBW), Male: 172 lb  % Ideal Body Weight, Male (lb): 96.13 lb  BMI (Calculated): 23.1  BMI Grade: 18.5-24.9 - normal  Usual Body Weight (UBW), k.3 kg  % Usual Body Weight: 97.23  % Weight Change From Usual Weight: -2.98 %     Lab/Procedures/Meds    Labs: Reviewed  Lab Results   Component Value Date     10/21/2018    K 4.0 10/21/2018     10/21/2018    CO2 28 10/21/2018    BUN 15 10/21/2018    CREATININE 0.8 10/21/2018    CALCIUM 8.6 (L) 10/21/2018    ESTGFRAFRICA >60 10/21/2018    EGFRNONAA >60 10/21/2018     Meds: Reviewed  Scheduled Meds:   ascorbic acid (vitamin C)  500 mg Oral Daily    baclofen  20 mg Oral TID "    dantrolene  50 mg Oral QID    DULoxetine  30 mg Oral Daily    gabapentin  900 mg Oral TID    polyethylene glycol  17 g Oral Daily    rivaroxaban  20 mg Oral Daily with dinner    senna-docusate 8.6-50 mg  1 tablet Oral Daily    tamsulosin  0.4 mg Oral Daily    traZODone  100 mg Oral QHS     Physical Findings/Assessment    Overall Physical Appearance: nourished  Oral/Mouth Cavity: WDL  Skin: intact    Estimated/Assessed Needs    Weight Used For Calorie Calculations: 75 kg (165 lb 5.5 oz)  Energy Calorie Requirements (kcal): 3650-1246  Energy Need Method: Kcal/kg(25-30 kcal/kg)  Protein Requirements: 75-90 gm/d (1-1.2 gm/kg)  Weight Used For Protein Calculations: 75 kg (165 lb 5.5 oz)  Fluid Requirements (mL): 1875(or per team)  Fluid Need Method: RDA Method  RDA Method (mL): 1875     Nutrition Prescription Ordered    Current Diet Order: regular    Evaluation of Received Nutrient/Fluid Intake in last 24h    Intake/Output Summary (Last 24 hours) at 10/25/2018 1402  Last data filed at 10/25/2018 0647  Gross per 24 hour   Intake 920 ml   Output 1950 ml   Net -1030 ml     % Intake of Estimated Energy Needs: 75 - 100 %  % Meal Intake: 75 - 100 %    Nutrition Risk    Level of Risk/Frequency of Follow-up: low     Assessment and Plan    No nutrition related dx at this time    Monitor and Evaluation    Food and Nutrient Intake: energy intake, food and beverage intake  Food and Nutrient Adminstration: diet order  Physical Activity and Function: nutrition-related ADLs and IADLs  Anthropometric Measurements: weight, weight change  Biochemical Data, Medical Tests and Procedures: electrolyte and renal panel, gastrointestinal profile, glucose/endocrine profile, inflammatory profile, lipid profile  Nutrition-Focused Physical Findings: overall appearance, extremities, muscles and bones, skin     Nutrition Follow-Up    RD Follow-up?: Yes    Dana Ochoa, MS, RD, LDN   Dietitian, [facility]  [phone]

## 2018-10-25 NOTE — SUBJECTIVE & OBJECTIVE
Interval History:  No complaints.  Anxious to do more aggressive therapy.  Looking forward to being able to walk with a cane again.    Review of Systems   Constitutional: Negative for chills and fever.   Respiratory: Negative for cough and shortness of breath.    Cardiovascular: Negative for chest pain and palpitations.     Objective:     Vital Signs (Most Recent):  Temp: 97.5 °F (36.4 °C) (10/25/18 0715)  Pulse: 79 (10/25/18 0802)  Resp: 14 (10/25/18 0802)  BP: 110/60 (10/25/18 0715)  SpO2: (!) 94 % (10/25/18 0802) Vital Signs (24h Range):  Temp:  [97.5 °F (36.4 °C)-98.4 °F (36.9 °C)] 97.5 °F (36.4 °C)  Pulse:  [59-79] 79  Resp:  [12-18] 14  SpO2:  [94 %-99 %] 94 %  BP: (110-139)/(60-85) 110/60     Weight: 75 kg (165 lb 5.5 oz)  Body mass index is 23.06 kg/m².    Intake/Output Summary (Last 24 hours) at 10/25/2018 0958  Last data filed at 10/25/2018 0647  Gross per 24 hour   Intake 920 ml   Output 1950 ml   Net -1030 ml      Physical Exam   Constitutional: He is oriented to person, place, and time. He appears well-developed and well-nourished.   HENT:   Head: Normocephalic.   Eyes: Conjunctivae are normal. Pupils are equal, round, and reactive to light.   Neck: Neck supple. No thyromegaly present.   Cardiovascular: Normal rate, regular rhythm, normal heart sounds and intact distal pulses. Exam reveals no gallop and no friction rub.   No murmur heard.  Pulmonary/Chest: Effort normal and breath sounds normal.   Abdominal: Soft. Bowel sounds are normal. He exhibits no distension. There is no tenderness.   Musculoskeletal: He exhibits no edema.   Stiffness of lower extremities, LUE.   Lymphadenopathy:     He has no cervical adenopathy.   Neurological: He is alert and oriented to person, place, and time.   Skin: Skin is warm and dry. No rash noted.   Psychiatric: He has a normal mood and affect. His behavior is normal. Thought content normal.       Significant Labs: All pertinent labs within the past 24 hours have been  reviewed.    Significant Imaging: I have reviewed all pertinent imaging results/findings within the past 24 hours.

## 2018-10-25 NOTE — PROGRESS NOTES
[facility] Hospital Medicine  Progress Note    Patient Name: Mao Levin  MRN: [id number]  Patient Class: IP- Inpatient   Admission Date: 10/15/2018  Length of Stay: 10 days  Attending Physician: Mendy Baxter MD  Primary Care Provider: Mendy Alarcon MD        Subjective:     Principal Problem:Sepsis due to urinary tract infection    HPI:  The patient is a 53 y.o. male with a history of MS who presents to the ED via EMS with complaint of fever, which began two days ago. Per EMS, patient had a fever of 104.2 when measured en route to the ED. Patient reports general malaise, bilateral leg weakness, mild shortness of breath, and diffuse arthralgias. He reports chronic difficulty urinating and intermittent incontinence secondary to neurogenic bladder, but notes his urine has been more concentrated recently. He does not self-catheterize or use a catheter. He denies runny nose, cough, congestion, vomiting, diarrhea, or skin lesions or wounds. He denies any recent history of pneumonia or UTI.         Hospital Course:  Patient was diagnosed with sepsis from UTI due to neurogenic bladder from MS.  As multiple urine cultures have grown Providencia stuartii sensitive to Zosyn he was empirically treated with 7 days of Zosyn, completed 10/21/18.  Blood cultures were negative.    At baseline patient has some functional impairments that have been exacerbated by his current illness.  PT/OT evaluated and recommended inpatient rehab given his excellent premorbid functioning and strong motivation to participate in therapy.       Interval History:  No complaints.  Anxious to do more aggressive therapy.  Looking forward to being able to walk with a cane again.    Review of Systems   Constitutional: Negative for chills and fever.   Respiratory: Negative for cough and shortness of breath.    Cardiovascular: Negative for chest pain and palpitations.     Objective:     Vital Signs (Most Recent):  Temp: 97.5 °F  (36.4 °C) (10/25/18 0715)  Pulse: 79 (10/25/18 0802)  Resp: 14 (10/25/18 0802)  BP: 110/60 (10/25/18 0715)  SpO2: (!) 94 % (10/25/18 0802) Vital Signs (24h Range):  Temp:  [97.5 °F (36.4 °C)-98.4 °F (36.9 °C)] 97.5 °F (36.4 °C)  Pulse:  [59-79] 79  Resp:  [12-18] 14  SpO2:  [94 %-99 %] 94 %  BP: (110-139)/(60-85) 110/60     Weight: 75 kg (165 lb 5.5 oz)  Body mass index is 23.06 kg/m².    Intake/Output Summary (Last 24 hours) at 10/25/2018 0931  Last data filed at 10/25/2018 0647  Gross per 24 hour   Intake 920 ml   Output 1950 ml   Net -1030 ml      Physical Exam   Constitutional: He is oriented to person, place, and time. He appears well-developed and well-nourished.   HENT:   Head: Normocephalic.   Eyes: Conjunctivae are normal. Pupils are equal, round, and reactive to light.   Neck: Neck supple. No thyromegaly present.   Cardiovascular: Normal rate, regular rhythm, normal heart sounds and intact distal pulses. Exam reveals no gallop and no friction rub.   No murmur heard.  Pulmonary/Chest: Effort normal and breath sounds normal.   Abdominal: Soft. Bowel sounds are normal. He exhibits no distension. There is no tenderness.   Musculoskeletal: He exhibits no edema.   Stiffness of lower extremities, LUE.   Lymphadenopathy:     He has no cervical adenopathy.   Neurological: He is alert and oriented to person, place, and time.   Skin: Skin is warm and dry. No rash noted.   Psychiatric: He has a normal mood and affect. His behavior is normal. Thought content normal.       Significant Labs: All pertinent labs within the past 24 hours have been reviewed.    Significant Imaging: I have reviewed all pertinent imaging results/findings within the past 24 hours.    Assessment/Plan:      * Sepsis due to urinary tract infection    Bacteria, Nitrites, WBC on U/A. Febrile, tachycardic, tachypnea, lactic acid 1.1    Blood culture NGTD  No urine cx sent  Previous urine cx x 2 with PROVIDENCIA STUARTII  Sensitive to  zosyn    Completed  treatment course with zosyn empirically (day # 7of 7 10/21/18 )  based on previous cultires and sensitivities       MS (multiple sclerosis)    - Continue baclofen, dantrolene and gabapentin  - Reportedly has been prescribed Ampyra (dalfampridine) but says he is not on it.  - [place] [facility] [place]. Finnegan [facility].       Mild malnutrition    Monitor weight and albumin.  Monitor po intake       Debility    - Continue PT/OT  - Inpatient rehab recommended  - At baseline walks with walker  - Benefits from stretching exercises and has been participating well in PT     Current use of long term anticoagulation    Continue Xarelto for previous hx of dvt       Neurogenic bladder    Due to multiple sclerosis  Straight cath PRN         VTE Risk Mitigation (From admission, onward)        Ordered     rivaroxaban tablet 20 mg  With dinner      10/15/18 3310              Mendy Gudino MD  Department of Hospital Medicine   [facility]

## 2018-10-25 NOTE — PT/OT/SLP PROGRESS
Physical Therapy Treatment    Patient Name:  Mao Levin   MRN:  [id number]    Recommendations:     Discharge Recommendations:  rehabilitation facility   Discharge Equipment Recommendations: (TBD at next level of care)   Barriers to discharge: Inaccessible home and Decreased caregiver support    Assessment:     Mao Levin is a 53 y.o. male admitted with a medical diagnosis of Sepsis due to urinary tract infection.  He presents with the following impairments/functional limitations:  weakness, impaired self care skills, impaired functional mobilty, gait instability, impaired balance, decreased coordination, decreased upper extremity function, decreased lower extremity function, abnormal tone, decreased ROM, impaired fine motor, decreased safety awareness, edema.    Patient exhibits enthusiasm for therapy. Impairments continue to limit his level of independence with all functional mobility. Gait deferred at this time to address transfers.    Rehab Prognosis:  Good; patient would benefit from acute skilled PT services to address these deficits and reach maximum level of function.      Recent Surgery: * No surgery found *      Plan:     During this hospitalization, patient to be seen 6 x/week to address the above listed problems via gait training, therapeutic activities, therapeutic exercises, neuromuscular re-education, wheelchair management/training  · Plan of Care Expires:  11/01/18   Plan of Care Reviewed with: patient    Subjective     Communicated with ANGELA Krueger prior to session.  Patient found sitting in chair upon PT entry to room, agreeable to treatment.      Chief Complaint: Pain in BLE from feet to knees  Patient comments/goals: To go to rehab facility  Pain/Comfort:  · Pain Rating 1: 8/10  · Location - Side 1: Bilateral  · Location - Orientation 1: lower  · Location 1: leg  · Pain Addressed 1: Reposition, Distraction  · Pain Rating Post-Intervention 1: 8/10    Patients cultural, spiritual, Evangelical conflicts  given the current situation: none stated    Objective:     Patient found with: peripheral IV, Condom Catheter     General Precautions: Standard, fall((anti-coagulation medication))   Orthopedic Precautions:N/A   Braces: AFO(resting extension hand splint with finger separator)     Functional Mobility:  · Bed Mobility:     · Scooting to edge of chair: modified independence, uses UE to assist  · Transfers:     · Sit to Stand:  minimum assistance to moderate assistance of 2 persons with rolling walker, patient demonstrates movement pattern of extending back before completing weight shift anteriorly which increases need for physical assistance, with cues to lean forward during standing pt requires less assistance, pt does demo difficulty extending knees/hips       AM-PAC 6 CLICK MOBILITY  Turning over in bed (including adjusting bedclothes, sheets and blankets)?: 3  Sitting down on and standing up from a chair with arms (e.g., wheelchair, bedside commode, etc.): 2  Moving from lying on back to sitting on the side of the bed?: 2  Moving to and from a bed to a chair (including a wheelchair)?: 2  Need to walk in hospital room?: 2  Climbing 3-5 steps with a railing?: 1  Basic Mobility Total Score: 12       Therapeutic Activities and Exercises:   Sit<>stand x7, 2 person assist (ANGELA Aleman), patient able to verbalize safety awareness    Patient left up in chair with all lines intact, call button in reach and ANGELA Krueger present..    GOALS:   Multidisciplinary Problems     Physical Therapy Goals        Problem: Physical Therapy Goal    Goal Priority Disciplines Outcome Goal Variances Interventions   Physical Therapy Goal     PT, PT/OT Ongoing (interventions implemented as appropriate)     Description:  Goals to be met by: 18    Patient will increase functional independence with mobility by performin. Gait x 25 feet SBA with rolling walker  2. Ascend/descend curb step with rolling walker and Sayda.   3. Perform  supine<>sit from flat bed with SBA.  4. Perform sit<>stand from normal chair height with RW and SBA demonstrating appropriate safety awareness.   5. Perform stand step transfer to W/C with least restrictive assistive devices and SBA.  6. Assess W/C propulsion.                    Time Tracking:     PT Received On: 10/25/18  PT Start Time: 1525     PT Stop Time: 1604  PT Total Time (min): 39 min     Billable Minutes: Therapeutic Activity 39    Treatment Type: Treatment  PT/PTA: PT     PTA Visit Number: 0     Carmen Ac, PT  10/25/2018

## 2018-10-25 NOTE — PLAN OF CARE
Problem: Occupational Therapy Goal  Goal: Occupational Therapy Goal  Goals to be met by: 11/17/18     Patient will increase functional independence with ADLs by performing:    UE Dressing with Minimal Assistance.  LE Dressing with Moderate Assistance.  Grooming while EOB with Set-up Assistance. (MET 10/24/18)  Stand pivot transfers to be assessed.  (MET 10/18/18)  REVISED Sit to/from stand with Mod assist and RW  REVISED SPT with Min assist and RW       Outcome: Ongoing (interventions implemented as appropriate)  Improved functional transfers on this date from previous session.  Need for assist at trunk during bed mobility.  Continue OT services to maximize patient functioning.    Comments: GISEL March, 10/25/2018

## 2018-10-25 NOTE — NURSING
Patient up in chair upon entering room. Noted condom cath was off. Will place back on when patient gets back in bed. Patient would rather keep adult brief on for now. Assisted patient with dinner and repositioning in chair. Kimmy Zavala RN, in room attending patient's needs as well.

## 2018-10-25 NOTE — PLAN OF CARE
Pt and MD agreeable to pt going to Rehab to obtain maximum benefit and return to baseline level of functioning.      Grievance and appeal submitted to [facility].  appointment of representative paperwork signed by pt and faxed to [employer].      Awaiting [facility] decision for placement/peer to peer conversation with hospitalist.  72 hour window lasts until 1000 on 10/26/2018.     Long discussion with pt at bedside, all questions answered, pt verbalizes understanding.  Pt eager to receive rehab at [facility].  Twila at [facility] updated via phone.         10/25/18 1005   Discharge Reassessment   Assessment Type Discharge Planning Reassessment   Provided patient/caregiver education on the expected discharge date and the discharge plan Yes   Do you have any problems affording any of your prescribed medications? No   Discharge Plan A Rehab   Discharge Plan B Skilled Nursing Facility   Patient choice form signed by patient/caregiver Yes

## 2018-10-25 NOTE — PLAN OF CARE
Problem: Patient Care Overview  Goal: Plan of Care Review  Recommendation/Intervention: Continue regular diet      Goals:   1. Meet >85% EEN and EPN   2. Prevent wt loss   3. Promote nutrition related labs wnl

## 2018-10-25 NOTE — PLAN OF CARE
Problem: Patient Care Overview  Goal: Plan of Care Review  Outcome: Ongoing (interventions implemented as appropriate)  Pt on room air, SpO2 98%.

## 2018-10-25 NOTE — PLAN OF CARE
Problem: Physical Therapy Goal  Goal: Physical Therapy Goal  Goals to be met by: 18    Patient will increase functional independence with mobility by performin. Gait x 25 feet SBA with rolling walker  2. Ascend/descend curb step with rolling walker and Sayda.   3. Perform supine<>sit from flat bed with SBA.  4. Perform sit<>stand from normal chair height with RW and SBA demonstrating appropriate safety awareness.   5. Perform stand step transfer to W/C with least restrictive assistive devices and SBA.  6. Assess W/C propulsion.   Outcome: Ongoing (interventions implemented as appropriate)  Sit<>stand with RW and 2-person min-modA, demonstrates safety awareness

## 2018-10-25 NOTE — ASSESSMENT & PLAN NOTE
- Continue baclofen, dantrolene and gabapentin  - Reportedly has been prescribed Ampyra (dalfampridine) but says he is not on it.  - Followed by Dr. Finnegan at [facility].

## 2018-10-26 VITALS
BODY MASS INDEX: 23.15 KG/M2 | SYSTOLIC BLOOD PRESSURE: 132 MMHG | DIASTOLIC BLOOD PRESSURE: 62 MMHG | HEIGHT: 71 IN | RESPIRATION RATE: 16 BRPM | HEART RATE: 59 BPM | TEMPERATURE: 97 F | WEIGHT: 165.38 LBS | OXYGEN SATURATION: 99 %

## 2018-10-26 PROCEDURE — 97530 THERAPEUTIC ACTIVITIES: CPT

## 2018-10-26 PROCEDURE — 99239 HOSP IP/OBS DSCHRG MGMT >30: CPT | Mod: ,,, | Performed by: HOSPITALIST

## 2018-10-26 PROCEDURE — 25000003 PHARM REV CODE 250: Performed by: HOSPITALIST

## 2018-10-26 PROCEDURE — 25000003 PHARM REV CODE 250: Performed by: NURSE PRACTITIONER

## 2018-10-26 PROCEDURE — 97110 THERAPEUTIC EXERCISES: CPT

## 2018-10-26 PROCEDURE — 25000003 PHARM REV CODE 250: Performed by: INTERNAL MEDICINE

## 2018-10-26 PROCEDURE — 99223 1ST HOSP IP/OBS HIGH 75: CPT | Mod: ,,, | Performed by: PHYSICAL MEDICINE & REHABILITATION

## 2018-10-26 PROCEDURE — 94761 N-INVAS EAR/PLS OXIMETRY MLT: CPT

## 2018-10-26 PROCEDURE — 97535 SELF CARE MNGMENT TRAINING: CPT

## 2018-10-26 RX ORDER — HYDROCODONE BITARTRATE AND ACETAMINOPHEN 10; 325 MG/1; MG/1
1 TABLET ORAL EVERY 8 HOURS PRN
Qty: 30 TABLET | Refills: 0 | Status: ON HOLD | OUTPATIENT
Start: 2018-10-26 | End: 2018-12-26 | Stop reason: HOSPADM

## 2018-10-26 RX ORDER — OXYCODONE HYDROCHLORIDE 5 MG/1
10 TABLET ORAL
Status: DISCONTINUED | OUTPATIENT
Start: 2018-10-26 | End: 2018-10-26 | Stop reason: HOSPADM

## 2018-10-26 RX ORDER — DIAZEPAM 5 MG/1
5 TABLET ORAL EVERY 12 HOURS PRN
Qty: 20 TABLET | Refills: 0 | Status: SHIPPED | OUTPATIENT
Start: 2018-10-26 | End: 2018-11-13 | Stop reason: SDUPTHER

## 2018-10-26 RX ADMIN — OXYCODONE HYDROCHLORIDE AND ACETAMINOPHEN 500 MG: 500 TABLET ORAL at 08:10

## 2018-10-26 RX ADMIN — DANTROLENE SODIUM 50 MG: 25 CAPSULE ORAL at 12:10

## 2018-10-26 RX ADMIN — STANDARDIZED SENNA CONCENTRATE AND DOCUSATE SODIUM 1 TABLET: 8.6; 5 TABLET ORAL at 08:10

## 2018-10-26 RX ADMIN — DULOXETINE 30 MG: 30 CAPSULE, DELAYED RELEASE ORAL at 08:10

## 2018-10-26 RX ADMIN — BACLOFEN 20 MG: 10 TABLET ORAL at 03:10

## 2018-10-26 RX ADMIN — OXYCODONE HYDROCHLORIDE 10 MG: 5 TABLET ORAL at 01:10

## 2018-10-26 RX ADMIN — DANTROLENE SODIUM 50 MG: 25 CAPSULE ORAL at 08:10

## 2018-10-26 RX ADMIN — TAMSULOSIN HYDROCHLORIDE 0.4 MG: 0.4 CAPSULE ORAL at 08:10

## 2018-10-26 RX ADMIN — OXYCODONE HYDROCHLORIDE 15 MG: 5 TABLET ORAL at 07:10

## 2018-10-26 RX ADMIN — DIAZEPAM 5 MG: 5 TABLET ORAL at 09:10

## 2018-10-26 RX ADMIN — GABAPENTIN 900 MG: 300 CAPSULE ORAL at 03:10

## 2018-10-26 RX ADMIN — BACLOFEN 20 MG: 10 TABLET ORAL at 08:10

## 2018-10-26 RX ADMIN — GABAPENTIN 900 MG: 300 CAPSULE ORAL at 08:10

## 2018-10-26 NOTE — PLAN OF CARE
Spoke with Santa at [facility] [phone].   Received auth for pt to transfer to IRF  Auth#[id number] , approved from 10/26 - 11/2    Twila at [facility] states pt can be transferred to their facility today.    Bedside RN to call report to [phone].  Transfer packet provided to bedside RN.     MD signed facility transfer orders.  Orders and scripts faxed to [phone].     Pt informed, eager to go to [facility].  Pt choice form signed.      Kelley suresh [phone] to pickup pt by 1600.

## 2018-10-26 NOTE — PT/OT/SLP DISCHARGE
Occupational Therapy Discharge Summary    Mao Levin  MRN: [id number]   Principal Problem: Sepsis due to urinary tract infection      Patient Discharged from acute Occupational Therapy on 10/26/2018.  Please refer to prior OT note dated 10/26/2018 for functional status.    Assessment:      Goals partially met. Patient appropriate for care in another setting.    Objective:     GOALS:   Multidisciplinary Problems     Occupational Therapy Goals        Problem: Occupational Therapy Goal    Goal Priority Disciplines Outcome Interventions   Occupational Therapy Goal     OT, PT/OT Ongoing (interventions implemented as appropriate)    Description:  Goals to be met by: 11/17/18     Patient will increase functional independence with ADLs by performing:    UE Dressing with Minimal Assistance.(MET 10/26/18)  LE Dressing with Moderate Assistance.  Grooming while EOB with Set-up Assistance. (MET 10/24/18)  Stand pivot transfers to be assessed.  (MET 10/18/18)  REVISED Sit to/from stand with Mod assist and RW  REVISED SPT with Min assist and RW                         Reasons for Discontinuation of Therapy Services  Transfer to alternate level of care.      Plan:     Patient Discharged to: Inpatient Rehab    Leonela Stapleton OT  10/26/2018

## 2018-10-26 NOTE — DISCHARGE SUMMARY
[facility] Hospital Medicine  Discharge Summary      Patient Name: Mao Levin  MRN: [id number]  Admission Date: 10/15/2018  Hospital Length of Stay: 11 days  Discharge Date and Time: No discharge date for patient encounter.  Attending Physician: Mendy Baxter MD   Discharging Provider: Mendy Baxter MD  Primary Care Provider: Mendy Alarcon MD      HPI:   Mr. Levin is a 53 year old man with a history of MS who presented to the ED via EMS with a complaint of fever for 2 days.  Per EMS, patient had a fever of 104.2 when measured en route to the ED.  Patient reported general malaise, bilateral leg weakness, shortness of breath, and diffuse arthralgias. He reported chronic difficulty with urinating at baseline and intermittent incontinence secondary to neurogenic bladder, but noted his urine has been more concentrated recently. He does not self-catheterize or use a catheter.  He denied other symptoms and has not had a recent UTI.  On presentation he was febrile with WBC 16K and evidence of UTI on UA.              Hospital Course:   Patient was diagnosed with sepsis from UTI due to neurogenic bladder from MS.  As multiple urine cultures have grown Providencia stuartii sensitive to Zosyn he was empirically treated with 7 days of Zosyn, completed 10/21/18.  Blood cultures were negative.  With treatment the fever resolved and his energy level improved, and he was able to participate in therapy.    For his MS he is on dantrolene 50 mg 4 times/day, baclofen and Valium for spasticity, and dalfampridine (Ampyra) 10 mg every 12 hours.  We were unable to obtain the Ampyra and he reported he was not taking it at home and did not have the medication available.  He also takes hydrocodone/acetaminophen 10/325 up to 3 times/day for chronic pain.  Review of the [facility] database shows he has also been on Percocet for pain, but the most recent medication prescribed was the hydrocodone/acetaminophen  combination.  He is followed by Dr. Finnegan for the MS and has been referred to Dr. Estrella with PMR.    At baseline patient has lower extremity and LUE spasticity and recently has not been able to walk with his cane, requiring a wheelchair most of the time.  His functional impairments have been exacerbated by his current illness.  PT/OT evaluated him and recommended inpatient rehab given his good premorbid functioning and strong motivation to participate in therapy.  He was transferred to [facility] on 10/26/18.            Final Active Diagnoses:    Diagnosis Date Noted POA    PRINCIPAL PROBLEM:  Sepsis due to urinary tract infection [A41.9, N39.0] 08/14/2017 Yes    MS (multiple sclerosis) [G35] 12/19/2016 Yes     Chronic    Debility [R53.81] 10/17/2018 Yes    Current use of long term anticoagulation [Z79.01] 10/15/2018 Not Applicable    Neurogenic bladder [N31.9] 10/06/2016 Yes     Chronic      Problems Resolved During this Admission:       Discharged Condition: stable    Disposition: Rehab Facility    Follow Up:  Follow-up Information     Mendy Alarcon MD.    Specialty:  Internal Medicine  Why:  Follow up in 1-2 weeks  Contact information:  [address]  [facility] [address]  [phone]             Schedule an appointment as soon as possible for a visit with Mattie Finnegan MD.    Specialty:  Neurology  Why:  Follow up for the next available appointment  Contact information:  [address]  [facility] [address]  [phone]             Go to Kymberly Estrella MD.    Specialty:  Physical Medicine and Rehabilitation  Why:  As scheduled  Contact information:  [address] [facility] [id number] [phone]                 Patient Instructions:      Diet Adult Regular     Activity as tolerated       Medications:  Reconciled Home Medications:      Medication List      START taking these medications    diazePAM 5 MG tablet  Commonly known as:  VALIUM  Take 1 tablet (5 mg  total) by mouth every 12 (twelve) hours as needed for Anxiety.        CHANGE how you take these medications    buPROPion 150 MG TBSR 12 hr tablet  Commonly known as:  WELLBUTRIN SR  1 tab po daily x 3 days, then increase to 1 tab po BID; last dose no later than 6PM; stop smoking after 5-7 days of treatment;  What changed:    · how much to take  · when to take this  · additional instructions     ergocalciferol 50,000 unit Cap  Commonly known as:  VITAMIN D2  Take 1 capsule (50,000 Units total) by mouth every 7 days.  What changed:  additional instructions        CONTINUE taking these medications    baclofen 10 MG tablet  Commonly known as:  LIORESAL  Take 2 tablets (20 mg total) by mouth 3 (three) times daily.     cranberry conc-C-bacillus coag 450-30-50 mg-mg-million Tab  Take 1 capsule by mouth once daily.     dalfampridine 10 mg Tb12  Commonly known as:  AMPYRA  Take 10 mg by mouth every 12 (twelve) hours.     dantrolene 50 MG Cap  Commonly known as:  DANTRIUM  Take 1 capsule (50 mg total) by mouth 4 (four) times daily.     DULoxetine 30 MG capsule  Commonly known as:  CYMBALTA  Take 30 mg by mouth once daily.     gabapentin 300 MG capsule  Commonly known as:  NEURONTIN  Take 900 mg morning and evening.  Take 1100 mg total in the afternoon.     HYDROcodone-acetaminophen  mg per tablet  Commonly known as:  NORCO  Take 1 tablet by mouth every 8 (eight) hours as needed for Pain.     mirabegron 50 mg Tb24  Commonly known as:  MYRBETRIQ  Take 1 tablet (50 mg total) by mouth once daily.     multivitamin capsule  Take 1 capsule by mouth once daily.     polyethylene glycol 17 gram/dose powder  Commonly known as:  GLYCOLAX  Take 17 g by mouth once daily.     senna-docusate 8.6-50 mg 8.6-50 mg per tablet  Commonly known as:  PERICOLACE  Take 1 tablet by mouth once daily.     tamsulosin 0.4 mg Cap  Commonly known as:  FLOMAX  Take 1 capsule (0.4 mg total) by mouth once daily.     traZODone 100 MG tablet  Commonly known  as:  DESYREL  Take 1 tablet (100 mg total) by mouth every evening.     VITAMIN C 500 MG tablet  Generic drug:  ascorbic acid (vitamin C)  Take 500 mg by mouth once daily.     XARELTO 20 mg Tab  Generic drug:  rivaroxaban  Take 1 tablet (20 mg total) by mouth daily with dinner or evening meal.        STOP taking these medications    oxyCODONE-acetaminophen  mg per tablet  Commonly known as:  PERCOCET              Time spent on the discharge of patient: <30 minutes  Patient was seen and examined on the date of discharge and determined to be suitable for discharge.         Mendy Gudino MD  Department of Hospital Medicine  [facility]

## 2018-10-26 NOTE — PLAN OF CARE
Referrals sent to multiple IRF in [facility].  Pt choice form signed.      Pt first choice is [facility].  Twila at [facility] has one potential bed available today.

## 2018-10-26 NOTE — PT/OT/SLP PROGRESS
Occupational Therapy   Treatment    Name: Mao Levin  MRN: [id number]  Admitting Diagnosis:  Sepsis due to urinary tract infection       Recommendations:     Discharge Recommendations: rehabilitation facility  Discharge Equipment Recommendations:  (TBD at next level of care)  Barriers to discharge:  Inaccessible home environment, Decreased caregiver support    Subjective     Communicated with: ANGELA Krueger prior to session.  Pain/Comfort:  · Pain Rating 1: 0/10  · Pain Rating Post-Intervention 1: 0/10    Patients cultural, spiritual, Druze conflicts given the current situation: None    Objective:     Patient found with: peripheral IV(condom catheter removed by RN in session)    General Precautions: Standard, anti-coagulation medicine, fall   Orthopedic Precautions:N/A   Braces: AFO(resting extension hand splint with finger separator)     Occupational Performance:    Bed Mobility:    · NT, found UI     Functional Mobility/Transfers:  · Patient completed Sit <> Stand Transfer with moderate assistance  with  rolling walker from bedside chair  · Functional Mobility: NT    Activities of Daily Living:  · Grooming: stand by assistance for oral care seated  · Upper Body Dressing: minimum assistance for donning pull over shirt seated in bedside chair  · Lower Body Dressing: maximal assistance for LBD    Patient left up in chair with all lines intact, call button in reach and RN notified    AMPA 6 Click:  AMPA Total Score: 14    Treatment & Education:  Performed (L) UE stretching for flexor tone.  Verbal cues for scooting to edge of chair prior to sit to stand.  Verbalized and demonstrated understanding.  Education:    Assessment:     Mao Levin is a 53 y.o. male with a medical diagnosis of Sepsis due to urinary tract infection.  He presents with the following performance deficits affecting function: weakness, impaired endurance, impaired self care skills, impaired functional mobilty, gait instability, impaired balance,  impaired coordination, impaired fine motor, decreased safety awareness, abnormal tone, decreased upper extremity function, decreased lower extremity function, decreased ROM.  Tolerated session well.  Excited about rehab placement and progressing.  Continue OT to maximize patient functioning.    Rehab Prognosis:  Good; patient would benefit from acute skilled OT services to address these deficits and reach maximum level of function.       Plan:     Patient to be seen 6 x/week to address the above listed problems via self-care/home management, therapeutic activities, therapeutic exercises  · Plan of Care Expires: 11/16/18  · Plan of Care Reviewed with: patient    This Plan of care has been discussed with the patient who was involved in its development and understands and is in agreement with the identified goals and treatment plan    GOALS:   Multidisciplinary Problems     Occupational Therapy Goals        Problem: Occupational Therapy Goal    Goal Priority Disciplines Outcome Interventions   Occupational Therapy Goal     OT, PT/OT Ongoing (interventions implemented as appropriate)    Description:  Goals to be met by: 11/17/18     Patient will increase functional independence with ADLs by performing:    UE Dressing with Minimal Assistance.(MET 10/26/18)  LE Dressing with Moderate Assistance.  Grooming while EOB with Set-up Assistance. (MET 10/24/18)  Stand pivot transfers to be assessed.  (MET 10/18/18)  REVISED Sit to/from stand with Mod assist and RW  REVISED SPT with Min assist and RW                         Time Tracking:     OT Date of Treatment: 10/26/18  OT Start Time: 1416  OT Stop Time: 1502  OT Total Time (min): 46 min    Billable Minutes:Self Care/Home Management 46    GISEL March  10/26/2018

## 2018-10-26 NOTE — PLAN OF CARE
Problem: Occupational Therapy Goal  Goal: Occupational Therapy Goal  Goals to be met by: 11/17/18     Patient will increase functional independence with ADLs by performing:    UE Dressing with Minimal Assistance.(MET 10/26/18)  LE Dressing with Moderate Assistance.  Grooming while EOB with Set-up Assistance. (MET 10/24/18)  Stand pivot transfers to be assessed.  (MET 10/18/18)  REVISED Sit to/from stand with Mod assist and RW  REVISED SPT with Min assist and RW       Outcome: Ongoing (interventions implemented as appropriate)  Tolerated session well.  Excited about rehab placement and progressing.  Continue OT to maximize patient functioning.    Comments: GISEL March, 10/26/2018

## 2018-10-26 NOTE — NURSING
Discharge instructions given. IV removed with catheter intact. Belongings sent with pt to [facility]. Transported via [facility].

## 2018-10-26 NOTE — PLAN OF CARE
">1hr phone call to [facility]:    Initially spoke with Yancy [phone]  Informed that I should speak with grievance and appeal [phone]    Called [phone]  Transferred to 3 different customer service people, on hold for 58 minutes.       1st rep: I do not have any information on this case.    2nd rep:  A 14 day extension has been filed because we did not receive an appointment of representation form.  (appointment of representation requested by [facility] at 0900 on 10/25/18, faxed back to [facility] on 10/25/18 at 1000). Informed rep of falsehood in this statement, requested to speak to supervisor, placed on hold again, stated he is a leader and all he can see is status updates and this is the last update.  Placed on longer hold, provided : Clare Snow at [facility] ([phone]. [phone]): "We are the customer service department, not Grievance and appeals.  We do not have a direct phone number to [employer], we can only send them messages and request them to call you back.  All we can see is status updates and the last status update is that the appointment of representation was received yesterday 10/25 and at that point the case was sent to medical director for review and from that point they have 72 hours to 14 days to respond to your request."    Informed Gwendolyn this is unacceptable, pt is inpatient and determination needs to be made to have pt transferred to next level of care.  Gwendolyn states there is nothing further she can do, there is no other supervisor to speak with and no way to directly contact [employer] other than by text message request that she will send to [employer] requesting callback to CM.  Provided phone number to CM and CM  for callback.      There is NO documentation, according to Gwendolyn, that a peer to peer will happen or that MD needs to await phone call.  Gwendolyn states following request for information from [employer] CM will either receive phone call or fax with update prior to end of business " day today.     [facility] [place] call [employer].  Supervisor informed of process of this case, MD updated.

## 2018-10-26 NOTE — PLAN OF CARE
Problem: Patient Care Overview  Goal: Plan of Care Review  Outcome: Ongoing (interventions implemented as appropriate)  Patient lying quietly in bed with eyes closed. No distress or discomfort noted. Left forearm 22 gauge IV access and condom catheter intact. Call light within reach and bed alarm on. Encouraged patient to call with any and all needs. Patient verbalized understanding of instructions. Will continue to monitor patient.

## 2018-10-26 NOTE — PLAN OF CARE
[facility]   Department of Hospital Medicine  [address] [place] [id number]  [phone] (phone)  [phone] (fax)      Facility Transfer Orders                        10/26/2018    Moa Levin    Admit to: IRF    Diagnoses:  Active Hospital Problems    Diagnosis  POA    *Sepsis due to urinary tract infection [A41.9, N39.0]  Yes    Debility [R53.81]  Yes    Current use of long term anticoagulation [Z79.01]  Not Applicable    MS (multiple sclerosis) [G35]  Yes     Chronic    Neurogenic bladder [N31.9]  Yes     Chronic      Resolved Hospital Problems   No resolved problems to display.       Allergies:Review of patient's allergies indicates:  No Known Allergies    Vitals:       Every shift (Skilled Nursing patients)    Diet: Adult Regular     Supplement:  1 can every three times a day with meals                         Type:  House          Activity:      - Up in a chair each morning as tolerated   - Ambulate with assistance to bathroom   - May ambulate independently      Nursing Precautions:   - Fall precautions    - Decubitus precautions:   - Pressure reducing foam mattress       CONSULTS:      PT to evaluate and treat - five times a week     OT to evaluate and treat - five times a week     ST to evaluate and treat     Nutrition to evaluate and recommend diet        Medications: Discontinue all previous medication orders, if any. See new list below.   Mao Levin   Home Medication Instructions RICCO:[id number]    Printed on:10/26/18 1433   Medication Information                                 ascorbic acid, vitamin C, (VITAMIN C) 500 MG tablet  Take 500 mg by mouth once daily.             baclofen (LIORESAL) 10 MG tablet  Take 2 tablets (20 mg total) by mouth 3 (three) times daily.             buPROPion (WELLBUTRIN SR) 150 MG TBSR 12 hr tablet  1 tab po daily x 3 days, then increase to 1 tab po BID; last dose no later than 6PM; stop smoking after 5-7 days of  treatment;             cranberry conc-C-bacillus coag 450-30-50 mg-mg-million Tab  Take 1 capsule by mouth once daily.             dalfampridine (AMPYRA) 10 mg Tb12  Take 10 mg by mouth every 12 (twelve) hours.             dantrolene (DANTRIUM) 50 MG Cap  Take 1 capsule (50 mg total) by mouth 4 (four) times daily.             diazePAM (VALIUM) 5 MG tablet  Take 1 tablet (5 mg total) by mouth every 12 (twelve) hours as needed for Anxiety.             duloxetine (CYMBALTA) 30 MG capsule  Take 30 mg by mouth once daily.             ergocalciferol (VITAMIN D2) 50,000 unit Cap  Take 1 capsule (50,000 Units total) by mouth every 7 days.             gabapentin (NEURONTIN) 300 MG capsule  Take 900 mg morning and evening.  Take 1100 mg total in the afternoon.             HYDROcodone-acetaminophen (NORCO)  mg per tablet  Take 1 tablet by mouth every 8 (eight) hours as needed for Pain.             mirabegron (MYRBETRIQ) 50 mg Tb24  Take 1 tablet (50 mg total) by mouth once daily.             multivitamin capsule  Take 1 capsule by mouth once daily.             polyethylene glycol (GLYCOLAX) 17 gram/dose powder  Take 17 g by mouth once daily.             rivaroxaban (XARELTO) 20 mg Tab  Take 1 tablet (20 mg total) by mouth daily with dinner or evening meal.             senna-docusate 8.6-50 mg (PERICOLACE) 8.6-50 mg per tablet  Take 1 tablet by mouth once daily.             tamsulosin (FLOMAX) 0.4 mg Cp24  Take 1 capsule (0.4 mg total) by mouth once daily.             trazodone (DESYREL) 100 MG tablet  Take 1 tablet (100 mg total) by mouth every evening.                         _________________________________  Mendy Baxter MD MPH  10/26/2018

## 2018-10-26 NOTE — PLAN OF CARE
Problem: Physical Therapy Goal  Goal: Physical Therapy Goal  Goals to be met by: 18    Patient will increase functional independence with mobility by performin. Gait x 25 feet SBA with rolling walker  2. Ascend/descend curb step with rolling walker and Sayda.   3. Perform supine<>sit from flat bed with SBA.  4. Perform sit<>stand from normal chair height with RW and SBA demonstrating appropriate safety awareness.   5. Perform stand step transfer to W/C with least restrictive assistive devices and SBA.  6. Assess W/C propulsion.   Outcome: Ongoing (interventions implemented as appropriate)  Supine to sit with independent use of hospital bed features (elevated HOB, bedrails) with CGA-Sayda at second half of performance  Stand step transfer to bedside chair with RW and modA

## 2018-10-26 NOTE — PT/OT/SLP PROGRESS
"Physical Therapy Treatment    Patient Name:  Mao Levin   MRN:  [id number]    Recommendations:     Discharge Recommendations:  rehabilitation facility   Discharge Equipment Recommendations: (TBD at next level of care; anticipate W/C and hospital bed)   Barriers to discharge: Decreased caregiver support    Assessment:     Mao Levin is a 53 y.o. male admitted with a medical diagnosis of Sepsis due to urinary tract infection.  He presents with the following impairments/functional limitations:  weakness, impaired self care skills, impaired functional mobilty, gait instability, impaired balance, decreased coordination, decreased upper extremity function, decreased lower extremity function, abnormal tone, decreased ROM, impaired fine motor, decreased safety awareness, edema.    Patient demonstrate increased independence with supine to sit with independent use of hospital bed features to modifiy activity. Patient demonstrates increased use of UE to assist with sit<>stand.    Rehab Prognosis:  Good; patient would benefit from acute skilled PT services to address these deficits and reach maximum level of function.      Recent Surgery: * No surgery found *      Plan:     During this hospitalization, patient to be seen 6 x/week to address the above listed problems via gait training, therapeutic exercises, therapeutic activities, neuromuscular re-education, wheelchair management/training  · Plan of Care Expires:  11/01/18   Plan of Care Reviewed with: patient    Subjective     Communicated with ANGELA Krueger prior to session.  Patient found supine upon PT entry to room, agreeable to treatment.      Chief Complaint: Pain in bilateral lower extremities  Patient comments/goals: To take a shower  Pain/Comfort:  · Pain Rating 1: 7/10("Heavy" feeling in legs)  · Location - Side 1: Bilateral  · Location - Orientation 1: lower  · Location 1: leg  · Pain Addressed 1: Reposition, Distraction  · Pain Rating Post-Intervention 1: 8/10(legs, " hands, neck)    Patients cultural, spiritual, Cheondoism conflicts given the current situation: none stated    Objective:     Patient found with: peripheral IV, Condom Catheter     General Precautions: Standard, fall   Orthopedic Precautions:N/A   Braces: AFO(resting extension hand splint with finger separator)     Functional Mobility:  · Bed Mobility:     · Bridging: minimum assistance  · Supine to Sit: contact guard assistance  · Transfers:     · Sit to Stand:  moderate assistance with rolling walker      AM-PAC 6 CLICK MOBILITY  Turning over in bed (including adjusting bedclothes, sheets and blankets)?: 3  Sitting down on and standing up from a chair with arms (e.g., wheelchair, bedside commode, etc.): 2  Moving from lying on back to sitting on the side of the bed?: 3  Moving to and from a bed to a chair (including a wheelchair)?: 2  Need to walk in hospital room?: 2  Climbing 3-5 steps with a railing?: 1  Basic Mobility Total Score: 13       Therapeutic Activities and Exercises:   LE stretching in supine: gastroc, hamstrings, abductors, ER/IR, glutes  Supine to sit (Jeremias with use of hospital bed features with CGA at end of performance to achieve fully upright posture), scooting laterally and posteriorly with use of BUE with supervision, sit<>stand x3 (requires elevated bed surface to perform with modA, demos insufficient anterior weight shift to achieve standing without LOB posterior)    Patient left up in chair with all lines intact, call button in reach and RN Evans notified.    GOALS:   Multidisciplinary Problems     Physical Therapy Goals        Problem: Physical Therapy Goal    Goal Priority Disciplines Outcome Goal Variances Interventions   Physical Therapy Goal     PT, PT/OT Ongoing (interventions implemented as appropriate)     Description:  Goals to be met by: 18    Patient will increase functional independence with mobility by performin. Gait x 25 feet SBA with rolling walker  2.  Ascend/descend curb step with rolling walker and Sayda.   3. Perform supine<>sit from flat bed with SBA.  4. Perform sit<>stand from normal chair height with RW and SBA demonstrating appropriate safety awareness.   5. Perform stand step transfer to W/C with least restrictive assistive devices and SBA.  6. Assess W/C propulsion.                    Time Tracking:     PT Received On: 10/26/18  PT Start Time: 1001     PT Stop Time: 1044  PT Total Time (min): 43 min     Billable Minutes: Therapeutic Activity 25 and Therapeutic Exercise 18    Treatment Type: Treatment  PT/PTA: PT     PTA Visit Number: 0     Carmen Ac, PT  10/26/2018

## 2018-10-27 NOTE — PROGRESS NOTES
Physical Therapy Discharge Summary    Name: Mao Levin  MRN: [id number]   Principal Problem: Sepsis due to urinary tract infection     Patient Discharged from acute Physical Therapy on 10-26-18.  Please refer to prior PT noted date on 10-26-18 for functional status.     Assessment:     Patient appropriate for care in another setting.    Objective:     GOALS:   Multidisciplinary Problems     Physical Therapy Goals        Problem: Physical Therapy Goal    Goal Priority Disciplines Outcome Goal Variances Interventions   Physical Therapy Goal     PT, PT/OT Ongoing (interventions implemented as appropriate)     Description:  Goals to be met by: 18    Patient will increase functional independence with mobility by performin. Gait x 25 feet SBA with rolling walker  2. Ascend/descend curb step with rolling walker and Sayda.   3. Perform supine<>sit from flat bed with SBA.  4. Perform sit<>stand from normal chair height with RW and SBA demonstrating appropriate safety awareness.   5. Perform stand step transfer to W/C with least restrictive assistive devices and SBA.  6. Assess W/C propulsion.                    Reasons for Discontinuation of Therapy Services  Transfer to alternate level of care.      Plan:     Patient Discharged to: Inpatient Rehab.    Rafael Blandon, PT  10/27/2018

## 2018-11-01 ENCOUNTER — TELEPHONE (OUTPATIENT)
Dept: UROLOGY | Facility: CLINIC | Age: 53
End: 2018-11-01

## 2018-11-01 NOTE — TELEPHONE ENCOUNTER
----- Message from Sasha Cummings LPN sent at 10/31/2018 11:01 AM CDT -----  Contact: self        ----- Message -----  From: Zayra Bruno MA  Sent: 10/31/2018  10:57 AM  To: Charli MULTANI Staff    Needs Advice    Reason for call:  I am calling to find out how often I am to have the procedure        Communication Preference:  Phone# above    Additional Information:  na

## 2018-11-09 ENCOUNTER — TELEPHONE (OUTPATIENT)
Dept: NEUROLOGY | Facility: CLINIC | Age: 53
End: 2018-11-09

## 2018-11-12 ENCOUNTER — TELEPHONE (OUTPATIENT)
Dept: INTERNAL MEDICINE | Facility: CLINIC | Age: 53
End: 2018-11-12

## 2018-11-12 NOTE — TELEPHONE ENCOUNTER
Called Eliz and let her know Doctor Agnes will oversee this patient while under while he is at this facility

## 2018-11-12 NOTE — TELEPHONE ENCOUNTER
----- Message from Edelmira Felix sent at 11/12/2018  1:57 PM CST -----  Contact: [facility] room H/H Eliz [phone]  Needs to know if the doctor will oversee patients HH services.     Request a call back.    Please call and advise  Thank you

## 2018-11-13 DIAGNOSIS — Z79.891 LONG-TERM CURRENT USE OF OPIATE ANALGESIC: ICD-10-CM

## 2018-11-13 DIAGNOSIS — G62.9 POLYNEUROPATHY: ICD-10-CM

## 2018-11-13 DIAGNOSIS — Z78.9 IMPAIRED MOBILITY AND ADLS: Chronic | ICD-10-CM

## 2018-11-13 DIAGNOSIS — R52 CHRONIC PAIN OF MULTIPLE SITES: Chronic | ICD-10-CM

## 2018-11-13 DIAGNOSIS — G89.29 CHRONIC BILATERAL LOW BACK PAIN WITH BILATERAL SCIATICA: ICD-10-CM

## 2018-11-13 DIAGNOSIS — G89.4 CHRONIC PAIN SYNDROME: ICD-10-CM

## 2018-11-13 DIAGNOSIS — Z74.09 IMPAIRED MOBILITY AND ADLS: Chronic | ICD-10-CM

## 2018-11-13 DIAGNOSIS — Z79.52 LONG TERM (CURRENT) USE OF SYSTEMIC STEROIDS: ICD-10-CM

## 2018-11-13 DIAGNOSIS — M54.41 CHRONIC BILATERAL LOW BACK PAIN WITH BILATERAL SCIATICA: ICD-10-CM

## 2018-11-13 DIAGNOSIS — R25.2 SPASTICITY: ICD-10-CM

## 2018-11-13 DIAGNOSIS — R26.81 GAIT INSTABILITY: ICD-10-CM

## 2018-11-13 DIAGNOSIS — M54.42 CHRONIC BILATERAL LOW BACK PAIN WITH BILATERAL SCIATICA: ICD-10-CM

## 2018-11-13 DIAGNOSIS — G35 MS (MULTIPLE SCLEROSIS): Chronic | ICD-10-CM

## 2018-11-13 DIAGNOSIS — G57.93 NEUROPATHIC PAIN, LEG, BILATERAL: ICD-10-CM

## 2018-11-13 DIAGNOSIS — G89.29 CHRONIC PAIN OF MULTIPLE SITES: Chronic | ICD-10-CM

## 2018-11-13 DIAGNOSIS — G35 ACUTE RELAPSING MULTIPLE SCLEROSIS: ICD-10-CM

## 2018-11-13 DIAGNOSIS — G82.20 PARAPARESIS: ICD-10-CM

## 2018-11-13 RX ORDER — DIAZEPAM 5 MG/1
5 TABLET ORAL EVERY 8 HOURS PRN
Qty: 70 TABLET | Refills: 0 | Status: SHIPPED | OUTPATIENT
Start: 2018-11-13 | End: 2018-11-19 | Stop reason: SDUPTHER

## 2018-11-13 NOTE — TELEPHONE ENCOUNTER
----- Message from William Marcelo sent at 11/13/2018  9:35 AM CST -----  Rx Refill/Request     Is this a Refill or New Rx: Refill    Rx Name and Strength:  diazePAM (VALIUM) 5 MG tablet  Preferred Pharmacy with phone number: see below  Communication Preference: [phone]  Additional Information:     [facility] [address] [address]  Phone: [phone] Fax: [phone]

## 2018-11-13 NOTE — TELEPHONE ENCOUNTER
----- Message from Edelmira De Oliveira sent at 11/13/2018  9:29 AM CST -----  Contact: self @ [phone]  Returning call concerning an appt.  Would like to schedule an infusion.  Pls call.

## 2018-11-17 NOTE — TELEPHONE ENCOUNTER
Patient was provided with morning medication. Patient stated, \"Since I started this new medicine. I haven't been able to sleep. \"  This nurse asked, \"What do you mean? You stated you couldn't sleep the night before as well and that was before the start of new medication? \"  This nurse asked if patient was experiencing stress or racing thoughts and patient denied and explained, \"I have talked about everything in group that I needed to talk about. \"  Asked patient about sleep cycle at home and he reports that he sleeps the same at home. Will continue to monitor and provided support as needed. They are unable to read the instructions that were sent to the [facility] home. Please call them and see if you can help them

## 2018-11-19 ENCOUNTER — TELEPHONE (OUTPATIENT)
Dept: NEUROLOGY | Facility: CLINIC | Age: 53
End: 2018-11-19

## 2018-11-19 DIAGNOSIS — G82.20 PARAPARESIS: ICD-10-CM

## 2018-11-19 DIAGNOSIS — M54.41 CHRONIC BILATERAL LOW BACK PAIN WITH BILATERAL SCIATICA: ICD-10-CM

## 2018-11-19 DIAGNOSIS — G89.29 CHRONIC BILATERAL LOW BACK PAIN WITH BILATERAL SCIATICA: ICD-10-CM

## 2018-11-19 DIAGNOSIS — R26.81 GAIT INSTABILITY: ICD-10-CM

## 2018-11-19 DIAGNOSIS — R25.2 SPASTICITY: ICD-10-CM

## 2018-11-19 DIAGNOSIS — G35 ACUTE RELAPSING MULTIPLE SCLEROSIS: ICD-10-CM

## 2018-11-19 DIAGNOSIS — G35 MS (MULTIPLE SCLEROSIS): Chronic | ICD-10-CM

## 2018-11-19 DIAGNOSIS — G62.9 POLYNEUROPATHY: ICD-10-CM

## 2018-11-19 DIAGNOSIS — Z78.9 IMPAIRED MOBILITY AND ADLS: Chronic | ICD-10-CM

## 2018-11-19 DIAGNOSIS — Z79.52 LONG TERM (CURRENT) USE OF SYSTEMIC STEROIDS: ICD-10-CM

## 2018-11-19 DIAGNOSIS — G57.93 NEUROPATHIC PAIN, LEG, BILATERAL: ICD-10-CM

## 2018-11-19 DIAGNOSIS — R52 CHRONIC PAIN OF MULTIPLE SITES: Chronic | ICD-10-CM

## 2018-11-19 DIAGNOSIS — G89.4 CHRONIC PAIN SYNDROME: ICD-10-CM

## 2018-11-19 DIAGNOSIS — M54.42 CHRONIC BILATERAL LOW BACK PAIN WITH BILATERAL SCIATICA: ICD-10-CM

## 2018-11-19 DIAGNOSIS — G89.29 CHRONIC PAIN OF MULTIPLE SITES: Chronic | ICD-10-CM

## 2018-11-19 DIAGNOSIS — Z79.891 LONG-TERM CURRENT USE OF OPIATE ANALGESIC: ICD-10-CM

## 2018-11-19 DIAGNOSIS — Z74.09 IMPAIRED MOBILITY AND ADLS: Chronic | ICD-10-CM

## 2018-11-19 RX ORDER — DIAZEPAM 5 MG/1
5 TABLET ORAL EVERY 8 HOURS PRN
Qty: 70 TABLET | Refills: 0 | Status: ON HOLD | OUTPATIENT
Start: 2018-11-19 | End: 2018-12-26

## 2018-11-19 NOTE — TELEPHONE ENCOUNTER
----- Message from Adamaris Devi sent at 11/19/2018  2:36 PM CST -----  Needs Advice    Reason for call:calling to have his prescription for medication: diazePAM (VALIUM) 5 MG   tablet,  that was escript to [facility] sent to [facility] instead, says he did not get the refill yet. Patient has transportation issues and [facility] is easier for him to get his medication. Please call patient when done.        Communication Preference:pt @ [phone]    Additional Information:

## 2018-11-19 NOTE — TELEPHONE ENCOUNTER
Can you resend script to [facility] pharmacy, thanks    Script has been canceled at [facility] already

## 2018-11-19 NOTE — TELEPHONE ENCOUNTER
----- Message from Adamaris Devi sent at 11/19/2018  3:03 PM CST -----  Needs Advice    Reason for call:calling to schedule an appt with Dr. Finnegan, also needs to schedule his infusion. Please call.        Communication Preference:pt @ [phone]    Additional Information:

## 2018-11-26 ENCOUNTER — TELEPHONE (OUTPATIENT)
Dept: ADMINISTRATIVE | Facility: CLINIC | Age: 53
End: 2018-11-26

## 2018-11-26 ENCOUNTER — OFFICE VISIT (OUTPATIENT)
Dept: NEUROLOGY | Facility: CLINIC | Age: 53
End: 2018-11-26
Payer: MEDICARE

## 2018-11-26 VITALS
BODY MASS INDEX: 23.06 KG/M2 | SYSTOLIC BLOOD PRESSURE: 136 MMHG | HEIGHT: 71 IN | DIASTOLIC BLOOD PRESSURE: 83 MMHG | HEART RATE: 85 BPM

## 2018-11-26 DIAGNOSIS — M79.2 NEUROPATHIC PAIN: ICD-10-CM

## 2018-11-26 DIAGNOSIS — E55.9 VITAMIN D INSUFFICIENCY: ICD-10-CM

## 2018-11-26 DIAGNOSIS — Z71.89 COUNSELING REGARDING GOALS OF CARE: ICD-10-CM

## 2018-11-26 DIAGNOSIS — D84.9 IMMUNOSUPPRESSED STATUS: ICD-10-CM

## 2018-11-26 DIAGNOSIS — R25.2 SPASTICITY: ICD-10-CM

## 2018-11-26 DIAGNOSIS — R26.9 NEUROLOGIC GAIT DYSFUNCTION: ICD-10-CM

## 2018-11-26 DIAGNOSIS — G35 MULTIPLE SCLEROSIS: Primary | ICD-10-CM

## 2018-11-26 DIAGNOSIS — Z72.0 TOBACCO USE: ICD-10-CM

## 2018-11-26 DIAGNOSIS — N31.9 NEUROGENIC BLADDER: ICD-10-CM

## 2018-11-26 DIAGNOSIS — Z79.899 ENCOUNTER FOR LONG-TERM (CURRENT) USE OF HIGH-RISK MEDICATION: ICD-10-CM

## 2018-11-26 PROCEDURE — 99499 UNLISTED E&M SERVICE: CPT | Mod: HCWC,S$GLB,, | Performed by: PHYSICIAN ASSISTANT

## 2018-11-26 PROCEDURE — 99999 PR PBB SHADOW E&M-EST. PATIENT-LVL III: CPT | Mod: PBBFAC,HCWC,, | Performed by: PHYSICIAN ASSISTANT

## 2018-11-26 PROCEDURE — 99214 OFFICE O/P EST MOD 30 MIN: CPT | Mod: HCWC,S$GLB,, | Performed by: PHYSICIAN ASSISTANT

## 2018-11-26 PROCEDURE — 3008F BODY MASS INDEX DOCD: CPT | Mod: CPTII,HCWC,S$GLB, | Performed by: PHYSICIAN ASSISTANT

## 2018-11-26 NOTE — TELEPHONE ENCOUNTER
Home Health SOC 11/13/2018 - 01/11/2019 with [facility] - Dr. Mendy Alarcon. Patient received PT and OT services.

## 2018-11-26 NOTE — Clinical Note
Please call patient--we talked about the smoking cessation program but I didn't give him the brochure. Please give him the phone number to call

## 2018-11-26 NOTE — PROGRESS NOTES
Subjective:       Patient ID: Mao Levin is a 53 y.o. male who presents today for a routine clinic visit for MS.  Patient was unfortunately late to earlier schedule appt this morning, then delayed again for rescheduled visit.  Last visit to [facility] was in 2018 with this provider    MS HPI:  · DMT: Rituxan--last does in 2018  · Side effects from DMT? No  · Taking vitamin D3 as recommended? Yes   Hospitalized 10/15/2018 for UTI-transferred to inpatient Rehab for about two weeks  He states he is using rolling walker to ambulate in house--he is getting HH twice-PT and OT(unsure of company)-is unable to transition to outpatient due to transportation issues  Phone number(cell)--[phone]  Patient states he is having a sharp pain R side of neck--this is ongoing issue for him  Patient states he tried Wellbutrin for smoking cessation and it seemed to work but due to life stressors he was not able to stop smoking      SOCIAL HISTORY  Social History     Tobacco Use    Smoking status: Former Smoker     Packs/day: 0.50     Years: 23.00     Pack years: 11.50     Types: Cigarettes     Last attempt to quit: 2018     Years since quittin.4    Smokeless tobacco: Never Used    Tobacco comment: Using patch   Substance Use Topics    Alcohol use: No    Drug use: No     Living arrangements - the patient lives with mother in a shotgun house.  Employment disabled    MS ROS:    · Sleep Disturbance: No--not taking trazodone  · Bladder Dysfunction: Yes - Flomax/Merbetriq-see above  · Spasticity: Yes - Baclofen-10mg QID  · Visual Symptoms: No  · Gait Disturbance: Yes - see above(PT with HH)--currently on Ampyra  · Hand Dysfunction: Yes - OT with HH  · Pain: Yes - as above  · Sexual Dysfunction: Not Assessed  · Skin Breakdown: No          Objective:        1. 25 foot timed walk:1 min 6sec with rolling walker and L AFO; 1min 55sec last visit with U-step and and L AFO; 1: 44.19s with U-step and AFO in January  2018  Timed 25 Foot Walk: 3/6/2017   Did patient wear an AFO? Yes   Was assistive device used? Yes   Assistive device used (felipe one): Bilateral Assistance   Bilateral device used Walker/Rollator   Time for 25 Foot Walk (seconds) 34.9       Neurologic Exam      Gait: L foot drop(AFO)-knee hyperextension, weak hip flexors bilaterally    Imaging:     Results for orders placed during the hospital encounter of 04/03/18   MRI Brain W WO Contrast    Narrative EXAMINATION:  MRI BRAIN W WO CONTRAST    CLINICAL HISTORY:  ms;.  Multiple sclerosis    TECHNIQUE:  Multiplanar multisequence MR imaging of the brain was performed before and after the administration of 9 mL Gadavist  intravenous contrast.    COMPARISON:  MRI brain 03/06/2017.    FINDINGS:  Intracranial compartment:    Ventricles and sulci are normal in size for age without evidence of hydrocephalus.    No extra-axial blood or fluid collections.    Numerous rounded ovoid foci of T2/FLAIR high signal intensity in the supratentorial and infratentorial white matter in a distribution consistent with the reported history of multiple sclerosis.  Lesions appears stable from prior exam allowing for variation in slice selection and technique.  No new discrete lesion identified.  There is no evidence of contrast enhancement to suggest acute demyelination.  No new mass, hemorrhage or infarction    Normal vascular flow voids are preserved.    Skull/extracranial contents (limited evaluation): Bone marrow signal intensity is normal.      Impression Stable appearance of the brain, again exhibiting findings compatible with patient's history of multiple sclerosis.  No new or enhancing lesions to indicate ongoing or active demyelination.    Electronically signed by resident: Trinh Watkins  Date:    04/03/2018  Time:    13:11    Electronically signed by: Davin Noel MD  Date:    04/03/2018  Time:    14:18     Results for orders placed during the hospital encounter of 03/13/17   MRI  Cervical Spine W WO Cont    Narrative Technique: Routine cervical spine MRI performed with and without the use of 8 cc of Gadavist IV contrast.    Comparison: MRI 12/04/2016.    Findings:    Cervical spine alignment demonstrates 1-2 mm of retrolisthesis of C4 on C5 and C5 on C6, unchanged when compared to the previous exam.  Vertebral body heights are well-maintained without evidence for fracture.  There is mild suspected marrow edema at the right C3-C4 facet joint.  Remaining visualized osseous structures demonstrate intact marrow signal intensity without findings to suggest an infiltrative process.    Again identified are several scattered areas of signal abnormalities throughout the visualized cervical spinal cord and brainstem, unchanged when compared to the previous exam.  Postcontrast images demonstrate no abnormal enhancement.    Multilevel degenerative changes are again identified, not significantly changed when compared to the previous exam and most pronounced at C4-C5 and C5-C6.    Prevertebral soft tissues are unremarkable.  Vertebral artery flow voids are present.  Paraspinal musculature demonstrate normal bulk and signal intensity.    Impression Persistent multifocal areas of cord signal abnormality, unchanged when compared to the MRI dated 12/04/2016 and most likely representing sequela of demyelinating disease given patient's history of multiple sclerosis.  No enhancing lesions to suggest active demyelination.      Electronically signed by: VALENTINO BALL MD  Date:     03/17/17  Time:    03:51          Labs:     Lab Results   Component Value Date    ZZZVIUDF63RB 61 04/10/2018    YDZPWWFZ32CC 27 (L) 07/18/2017    VHZUMMTL16UO 18 (L) 09/25/2016     Lab Results   Component Value Date    JCVINDEX 3.70 (A) 09/22/2016    JCVANTIBODY Positive (A) 09/22/2016     No results found for: LQ7JGKUO, ABSOLUTECD3, CJ6VEXYQ, ABSOLUTECD8, OZ8NCPGM, ABSOLUTECD4, LABCD48  Lab Results   Component Value Date    WBC 5.16  10/20/2018    RBC 3.89 (L) 10/20/2018    HGB 11.5 (L) 10/20/2018    HCT 35.0 (L) 10/20/2018    MCV 90 10/20/2018    MCH 29.6 10/20/2018    MCHC 32.9 10/20/2018    RDW 13.8 10/20/2018     10/20/2018    MPV 9.8 10/20/2018    GRAN 2.9 10/20/2018    GRAN 56.3 10/20/2018    LYMPH 1.6 10/20/2018    LYMPH 31.6 10/20/2018    MONO 0.4 10/20/2018    MONO 7.4 10/20/2018    EOS 0.2 10/20/2018    BASO 0.02 10/20/2018    EOSINOPHIL 4.1 10/20/2018    BASOPHIL 0.4 10/20/2018     Sodium   Date Value Ref Range Status   10/21/2018 140 136 - 145 mmol/L Final     Potassium   Date Value Ref Range Status   10/21/2018 4.0 3.5 - 5.1 mmol/L Final     Chloride   Date Value Ref Range Status   10/21/2018 106 95 - 110 mmol/L Final     CO2   Date Value Ref Range Status   10/21/2018 28 23 - 29 mmol/L Final     Glucose   Date Value Ref Range Status   10/21/2018 99 70 - 110 mg/dL Final     BUN, Bld   Date Value Ref Range Status   10/21/2018 15 6 - 20 mg/dL Final     Creatinine   Date Value Ref Range Status   10/21/2018 0.8 0.5 - 1.4 mg/dL Final     Calcium   Date Value Ref Range Status   10/21/2018 8.6 (L) 8.7 - 10.5 mg/dL Final     Total Protein   Date Value Ref Range Status   10/17/2018 5.8 (L) 6.0 - 8.4 g/dL Final   10/17/2018 5.8 (L) 6.0 - 8.4 g/dL Final     Albumin   Date Value Ref Range Status   10/17/2018 2.4 (L) 3.5 - 5.2 g/dL Final   10/17/2018 2.4 (L) 3.5 - 5.2 g/dL Final     Total Bilirubin   Date Value Ref Range Status   10/17/2018 0.3 0.1 - 1.0 mg/dL Final     Comment:     For infants and newborns, interpretation of results should be based  on gestational age, weight and in agreement with clinical  observations.  Premature Infant recommended reference ranges:  Up to 24 hours.............<8.0 mg/dL  Up to 48 hours............<12.0 mg/dL  3-5 days..................<15.0 mg/dL  6-29 days.................<15.0 mg/dL     10/17/2018 0.3 0.1 - 1.0 mg/dL Final     Comment:     For infants and newborns, interpretation of results should be  based  on gestational age, weight and in agreement with clinical  observations.  Premature Infant recommended reference ranges:  Up to 24 hours.............<8.0 mg/dL  Up to 48 hours............<12.0 mg/dL  3-5 days..................<15.0 mg/dL  6-29 days.................<15.0 mg/dL       Alkaline Phosphatase   Date Value Ref Range Status   10/17/2018 73 55 - 135 U/L Final   10/17/2018 73 55 - 135 U/L Final     AST   Date Value Ref Range Status   10/17/2018 19 10 - 40 U/L Final   10/17/2018 19 10 - 40 U/L Final     ALT   Date Value Ref Range Status   10/17/2018 17 10 - 44 U/L Final   10/17/2018 17 10 - 44 U/L Final     Anion Gap   Date Value Ref Range Status   10/21/2018 6 (L) 8 - 16 mmol/L Final     eGFR if    Date Value Ref Range Status   10/21/2018 >60 >60 mL/min/1.73 m^2 Final     eGFR if non    Date Value Ref Range Status   10/21/2018 >60 >60 mL/min/1.73 m^2 Final     Comment:     Calculation used to obtain the estimated glomerular filtration  rate (eGFR) is the CKD-EPI equation.          Diagnosis/Assessment/Plan:    1. Multiple Sclerosis  · Assessment: Patient's timed walk is improved today. He is currently late in receiving his Rituxan dose(was due in October) due to recent hospitalization/rehab in October.  We will check labs today and proceed with infusion if appropriate  · Imaging: MRI in March 2019-will schedule next visit  · Disease Modifying Therapies: As above,  past due for Rituxan now-will proceed if labs OK. The patient was counseled about the risks associated with immune suppressive therapy, importance of avoiding all live virus vaccines while on immune suppressive medication.   ·     2. MS Symptom Assessment / Management  · Gait Disturbance: have requested assistance from  to delve into transportation available so that patient may be able to transition to outpatient therapy when appropriate.        3.  Will provide patient with the number for the [facility] smoking  cessation program    Over 50% of this 35minute visit was spent in direct face to face counseling of the patient about MS, DMT considerations, and MS symptom management.     Follow-up in about 3 months (around 2/26/2019) for follow up with Dr. Finnegan.  Patient agreed to POC today.    Attending, Dr. Finnegan, was available during today's encounter.     Lawanda Boyce PA-C  [facility]    Problem List Items Addressed This Visit     None      Visit Diagnoses     Multiple sclerosis    -  Primary    Relevant Orders    CBC auto differential    Hepatitis B surface antibody    Hepatitis B surface antigen    Hepatitis B core antibody, total    Rituxan Sensitivity    Comprehensive metabolic panel    Immunosuppressed status        Relevant Orders    CBC auto differential    Hepatitis B surface antibody    Hepatitis B surface antigen    Hepatitis B core antibody, total    Rituxan Sensitivity    Counseling regarding goals of care        Encounter for long-term (current) use of high-risk medication        Relevant Orders    Comprehensive metabolic panel

## 2018-11-26 NOTE — Clinical Note
Please call(see my note for updated phone number)--he would like to be able to transition to outpatient therapy, but does not have transportation available. He's wondering if he may have coverage with his insurance.....

## 2018-11-28 ENCOUNTER — TELEPHONE (OUTPATIENT)
Dept: NEUROLOGY | Facility: CLINIC | Age: 53
End: 2018-11-28

## 2018-11-28 RX ORDER — SODIUM CHLORIDE 0.9 % (FLUSH) 0.9 %
10 SYRINGE (ML) INJECTION
Status: CANCELLED | OUTPATIENT
Start: 2018-11-28

## 2018-11-28 RX ORDER — HEPARIN 100 UNIT/ML
500 SYRINGE INTRAVENOUS
Status: CANCELLED | OUTPATIENT
Start: 2018-11-28

## 2018-11-28 RX ORDER — ACETAMINOPHEN 325 MG/1
650 TABLET ORAL
Status: CANCELLED | OUTPATIENT
Start: 2018-11-28

## 2018-11-28 RX ORDER — FAMOTIDINE 10 MG/ML
20 INJECTION INTRAVENOUS
Status: CANCELLED | OUTPATIENT
Start: 2018-11-28

## 2018-11-28 NOTE — TELEPHONE ENCOUNTER
----- Message from Lawanda Boyce PA-C sent at 11/28/2018  8:32 AM CST -----  CrCl-113.28  CMP-normal  Hep screening negative  CBC stable with WBC 7.79, ANC-4.7, ALC-2300

## 2018-11-30 ENCOUNTER — TELEPHONE (OUTPATIENT)
Dept: PSYCHIATRY | Facility: CLINIC | Age: 53
End: 2018-11-30

## 2018-11-30 ENCOUNTER — HOSPITAL ENCOUNTER (OUTPATIENT)
Facility: HOSPITAL | Age: 53
Discharge: NURSING, REHAB OR LONG-TERM CARE | End: 2018-12-04
Attending: EMERGENCY MEDICINE | Admitting: EMERGENCY MEDICINE
Payer: MEDICARE

## 2018-11-30 DIAGNOSIS — G35 MULTIPLE SCLEROSIS EXACERBATION: ICD-10-CM

## 2018-11-30 DIAGNOSIS — N39.0 URINARY TRACT INFECTION WITHOUT HEMATURIA, SITE UNSPECIFIED: Primary | ICD-10-CM

## 2018-11-30 DIAGNOSIS — R53.1 WEAKNESS: ICD-10-CM

## 2018-11-30 LAB
ALBUMIN SERPL BCP-MCNC: 3.9 G/DL
ALP SERPL-CCNC: 68 U/L
ALT SERPL W/O P-5'-P-CCNC: 24 U/L
ANION GAP SERPL CALC-SCNC: 9 MMOL/L
AST SERPL-CCNC: 22 U/L
BACTERIA #/AREA URNS AUTO: ABNORMAL /HPF
BASOPHILS # BLD AUTO: 0.03 K/UL
BASOPHILS NFR BLD: 0.3 %
BILIRUB SERPL-MCNC: 0.5 MG/DL
BILIRUB UR QL STRIP: NEGATIVE
BUN SERPL-MCNC: 10 MG/DL
CALCIUM SERPL-MCNC: 9.4 MG/DL
CHLORIDE SERPL-SCNC: 105 MMOL/L
CLARITY UR REFRACT.AUTO: ABNORMAL
CO2 SERPL-SCNC: 28 MMOL/L
COLOR UR AUTO: YELLOW
CREAT SERPL-MCNC: 0.8 MG/DL
DIFFERENTIAL METHOD: ABNORMAL
EOSINOPHIL # BLD AUTO: 0 K/UL
EOSINOPHIL NFR BLD: 0.4 %
ERYTHROCYTE [DISTWIDTH] IN BLOOD BY AUTOMATED COUNT: 14 %
EST. GFR  (AFRICAN AMERICAN): >60 ML/MIN/1.73 M^2
EST. GFR  (NON AFRICAN AMERICAN): >60 ML/MIN/1.73 M^2
GLUCOSE SERPL-MCNC: 92 MG/DL
GLUCOSE UR QL STRIP: NEGATIVE
HCT VFR BLD AUTO: 38.3 %
HGB BLD-MCNC: 12.9 G/DL
HGB UR QL STRIP: NEGATIVE
IMM GRANULOCYTES # BLD AUTO: 0.03 K/UL
IMM GRANULOCYTES NFR BLD AUTO: 0.3 %
KETONES UR QL STRIP: NEGATIVE
LEUKOCYTE ESTERASE UR QL STRIP: ABNORMAL
LYMPHOCYTES # BLD AUTO: 2.2 K/UL
LYMPHOCYTES NFR BLD: 24.8 %
MCH RBC QN AUTO: 30.4 PG
MCHC RBC AUTO-ENTMCNC: 33.7 G/DL
MCV RBC AUTO: 90 FL
MICROSCOPIC COMMENT: ABNORMAL
MONOCYTES # BLD AUTO: 0.6 K/UL
MONOCYTES NFR BLD: 6.4 %
NEUTROPHILS # BLD AUTO: 6.1 K/UL
NEUTROPHILS NFR BLD: 67.8 %
NITRITE UR QL STRIP: POSITIVE
NRBC BLD-RTO: 0 /100 WBC
PH UR STRIP: 8 [PH] (ref 5–8)
PLATELET # BLD AUTO: 264 K/UL
PMV BLD AUTO: 10.3 FL
POTASSIUM SERPL-SCNC: 3.7 MMOL/L
PROT SERPL-MCNC: 7.1 G/DL
PROT UR QL STRIP: NEGATIVE
RBC # BLD AUTO: 4.25 M/UL
RBC #/AREA URNS AUTO: 1 /HPF (ref 0–4)
SODIUM SERPL-SCNC: 142 MMOL/L
SP GR UR STRIP: 1.01 (ref 1–1.03)
SQUAMOUS #/AREA URNS AUTO: 1 /HPF
URN SPEC COLLECT METH UR: ABNORMAL
WBC # BLD AUTO: 9.03 K/UL
WBC #/AREA URNS AUTO: 33 /HPF (ref 0–5)

## 2018-11-30 PROCEDURE — 96374 THER/PROPH/DIAG INJ IV PUSH: CPT | Mod: HCWC

## 2018-11-30 PROCEDURE — G0378 HOSPITAL OBSERVATION PER HR: HCPCS | Mod: HCWC

## 2018-11-30 PROCEDURE — 81001 URINALYSIS AUTO W/SCOPE: CPT | Mod: HCWC

## 2018-11-30 PROCEDURE — 80053 COMPREHEN METABOLIC PANEL: CPT | Mod: HCWC

## 2018-11-30 PROCEDURE — 87186 SC STD MICRODIL/AGAR DIL: CPT | Mod: HCWC

## 2018-11-30 PROCEDURE — 99220 PR INITIAL OBSERVATION CARE,LEVL III: CPT | Mod: HCWC,,, | Performed by: PHYSICIAN ASSISTANT

## 2018-11-30 PROCEDURE — 99285 EMERGENCY DEPT VISIT HI MDM: CPT | Mod: 25,HCWC

## 2018-11-30 PROCEDURE — 87077 CULTURE AEROBIC IDENTIFY: CPT | Mod: HCWC

## 2018-11-30 PROCEDURE — 63600175 PHARM REV CODE 636 W HCPCS: Mod: HCWC | Performed by: EMERGENCY MEDICINE

## 2018-11-30 PROCEDURE — 99284 EMERGENCY DEPT VISIT MOD MDM: CPT | Mod: HCWC,,, | Performed by: EMERGENCY MEDICINE

## 2018-11-30 PROCEDURE — 96375 TX/PRO/DX INJ NEW DRUG ADDON: CPT | Mod: HCWC

## 2018-11-30 PROCEDURE — 87086 URINE CULTURE/COLONY COUNT: CPT | Mod: HCWC

## 2018-11-30 PROCEDURE — 25000003 PHARM REV CODE 250: Mod: HCWC | Performed by: PHYSICIAN ASSISTANT

## 2018-11-30 PROCEDURE — 85025 COMPLETE CBC W/AUTO DIFF WBC: CPT | Mod: HCWC

## 2018-11-30 PROCEDURE — 87088 URINE BACTERIA CULTURE: CPT | Mod: HCWC

## 2018-11-30 PROCEDURE — 25000003 PHARM REV CODE 250: Mod: HCWC | Performed by: EMERGENCY MEDICINE

## 2018-11-30 RX ORDER — IBUPROFEN 200 MG
16 TABLET ORAL
Status: DISCONTINUED | OUTPATIENT
Start: 2018-11-30 | End: 2018-12-04 | Stop reason: HOSPADM

## 2018-11-30 RX ORDER — OXYCODONE AND ACETAMINOPHEN 5; 325 MG/1; MG/1
2 TABLET ORAL
Status: DISCONTINUED | OUTPATIENT
Start: 2018-11-30 | End: 2018-11-30

## 2018-11-30 RX ORDER — GABAPENTIN 400 MG/1
400 CAPSULE ORAL 4 TIMES DAILY
Status: DISCONTINUED | OUTPATIENT
Start: 2018-11-30 | End: 2018-12-04 | Stop reason: HOSPADM

## 2018-11-30 RX ORDER — ACETAMINOPHEN 500 MG
1000 TABLET ORAL 3 TIMES DAILY
Status: DISCONTINUED | OUTPATIENT
Start: 2018-12-01 | End: 2018-12-04 | Stop reason: HOSPADM

## 2018-11-30 RX ORDER — OXYCODONE HYDROCHLORIDE 5 MG/1
15 TABLET ORAL EVERY 6 HOURS PRN
Status: DISCONTINUED | OUTPATIENT
Start: 2018-11-30 | End: 2018-12-04 | Stop reason: HOSPADM

## 2018-11-30 RX ORDER — PROCHLORPERAZINE EDISYLATE 5 MG/ML
5 INJECTION INTRAMUSCULAR; INTRAVENOUS EVERY 6 HOURS PRN
Status: DISCONTINUED | OUTPATIENT
Start: 2018-11-30 | End: 2018-12-04 | Stop reason: HOSPADM

## 2018-11-30 RX ORDER — IBUPROFEN 200 MG
24 TABLET ORAL
Status: DISCONTINUED | OUTPATIENT
Start: 2018-11-30 | End: 2018-12-04 | Stop reason: HOSPADM

## 2018-11-30 RX ORDER — OXYCODONE AND ACETAMINOPHEN 5; 325 MG/1; MG/1
2 TABLET ORAL
Status: COMPLETED | OUTPATIENT
Start: 2018-11-30 | End: 2018-11-30

## 2018-11-30 RX ORDER — BUPROPION HYDROCHLORIDE 150 MG/1
150 TABLET ORAL DAILY
Status: DISCONTINUED | OUTPATIENT
Start: 2018-12-01 | End: 2018-12-04 | Stop reason: HOSPADM

## 2018-11-30 RX ORDER — IPRATROPIUM BROMIDE AND ALBUTEROL SULFATE 2.5; .5 MG/3ML; MG/3ML
3 SOLUTION RESPIRATORY (INHALATION) EVERY 4 HOURS PRN
Status: DISCONTINUED | OUTPATIENT
Start: 2018-11-30 | End: 2018-12-04 | Stop reason: HOSPADM

## 2018-11-30 RX ORDER — DULOXETIN HYDROCHLORIDE 30 MG/1
30 CAPSULE, DELAYED RELEASE ORAL DAILY
Status: DISCONTINUED | OUTPATIENT
Start: 2018-12-01 | End: 2018-12-04 | Stop reason: HOSPADM

## 2018-11-30 RX ORDER — AMOXICILLIN 250 MG
1 CAPSULE ORAL 2 TIMES DAILY
Status: DISCONTINUED | OUTPATIENT
Start: 2018-11-30 | End: 2018-12-04 | Stop reason: HOSPADM

## 2018-11-30 RX ORDER — DIAZEPAM 5 MG/1
5 TABLET ORAL EVERY 8 HOURS PRN
Status: DISCONTINUED | OUTPATIENT
Start: 2018-11-30 | End: 2018-12-04 | Stop reason: HOSPADM

## 2018-11-30 RX ORDER — CEFTRIAXONE 1 G/1
1 INJECTION, POWDER, FOR SOLUTION INTRAMUSCULAR; INTRAVENOUS
Status: COMPLETED | OUTPATIENT
Start: 2018-11-30 | End: 2018-11-30

## 2018-11-30 RX ORDER — DANTROLENE SODIUM 50 MG/1
50 CAPSULE ORAL 4 TIMES DAILY
Status: DISCONTINUED | OUTPATIENT
Start: 2018-11-30 | End: 2018-12-04 | Stop reason: HOSPADM

## 2018-11-30 RX ORDER — IBUPROFEN 600 MG/1
600 TABLET ORAL EVERY 6 HOURS PRN
Status: DISCONTINUED | OUTPATIENT
Start: 2018-11-30 | End: 2018-12-04 | Stop reason: HOSPADM

## 2018-11-30 RX ORDER — DIAZEPAM 5 MG/1
10 TABLET ORAL
Status: COMPLETED | OUTPATIENT
Start: 2018-11-30 | End: 2018-11-30

## 2018-11-30 RX ORDER — TAMSULOSIN HYDROCHLORIDE 0.4 MG/1
0.4 CAPSULE ORAL DAILY
Status: DISCONTINUED | OUTPATIENT
Start: 2018-12-01 | End: 2018-12-04 | Stop reason: HOSPADM

## 2018-11-30 RX ORDER — MORPHINE SULFATE 4 MG/ML
4 INJECTION, SOLUTION INTRAMUSCULAR; INTRAVENOUS
Status: COMPLETED | OUTPATIENT
Start: 2018-11-30 | End: 2018-11-30

## 2018-11-30 RX ORDER — SODIUM CHLORIDE 0.9 % (FLUSH) 0.9 %
5 SYRINGE (ML) INJECTION
Status: DISCONTINUED | OUTPATIENT
Start: 2018-11-30 | End: 2018-12-04 | Stop reason: HOSPADM

## 2018-11-30 RX ORDER — DALFAMPRIDINE 10 MG/1
10 TABLET, FILM COATED, EXTENDED RELEASE ORAL EVERY 12 HOURS
Status: DISCONTINUED | OUTPATIENT
Start: 2018-11-30 | End: 2018-11-30

## 2018-11-30 RX ORDER — ONDANSETRON 8 MG/1
8 TABLET, ORALLY DISINTEGRATING ORAL EVERY 8 HOURS PRN
Status: DISCONTINUED | OUTPATIENT
Start: 2018-11-30 | End: 2018-12-04 | Stop reason: HOSPADM

## 2018-11-30 RX ORDER — TRAZODONE HYDROCHLORIDE 50 MG/1
100 TABLET ORAL NIGHTLY
Status: DISCONTINUED | OUTPATIENT
Start: 2018-11-30 | End: 2018-12-04 | Stop reason: HOSPADM

## 2018-11-30 RX ORDER — CEFTRIAXONE 1 G/1
1 INJECTION, POWDER, FOR SOLUTION INTRAMUSCULAR; INTRAVENOUS
Status: DISCONTINUED | OUTPATIENT
Start: 2018-12-01 | End: 2018-12-04

## 2018-11-30 RX ORDER — ACETAMINOPHEN 325 MG/1
650 TABLET ORAL EVERY 8 HOURS PRN
Status: DISCONTINUED | OUTPATIENT
Start: 2018-11-30 | End: 2018-12-04 | Stop reason: HOSPADM

## 2018-11-30 RX ORDER — BISACODYL 10 MG
10 SUPPOSITORY, RECTAL RECTAL DAILY PRN
Status: DISCONTINUED | OUTPATIENT
Start: 2018-11-30 | End: 2018-12-04 | Stop reason: HOSPADM

## 2018-11-30 RX ORDER — POLYETHYLENE GLYCOL 3350 17 G/17G
17 POWDER, FOR SOLUTION ORAL DAILY
Status: DISCONTINUED | OUTPATIENT
Start: 2018-12-01 | End: 2018-12-04 | Stop reason: HOSPADM

## 2018-11-30 RX ORDER — BACLOFEN 10 MG/1
20 TABLET ORAL 4 TIMES DAILY
Status: DISCONTINUED | OUTPATIENT
Start: 2018-11-30 | End: 2018-12-04 | Stop reason: HOSPADM

## 2018-11-30 RX ORDER — GLUCAGON 1 MG
1 KIT INJECTION
Status: DISCONTINUED | OUTPATIENT
Start: 2018-11-30 | End: 2018-12-04 | Stop reason: HOSPADM

## 2018-11-30 RX ORDER — OXYCODONE HYDROCHLORIDE 5 MG/1
10 TABLET ORAL EVERY 6 HOURS PRN
Status: DISCONTINUED | OUTPATIENT
Start: 2018-11-30 | End: 2018-12-04 | Stop reason: HOSPADM

## 2018-11-30 RX ADMIN — DANTROLENE SODIUM 50 MG: 50 CAPSULE ORAL at 09:11

## 2018-11-30 RX ADMIN — OXYCODONE HYDROCHLORIDE 15 MG: 5 TABLET ORAL at 09:11

## 2018-11-30 RX ADMIN — OXYCODONE HYDROCHLORIDE AND ACETAMINOPHEN 2 TABLET: 5; 325 TABLET ORAL at 02:11

## 2018-11-30 RX ADMIN — MORPHINE SULFATE 4 MG: 4 INJECTION, SOLUTION INTRAMUSCULAR; INTRAVENOUS at 05:11

## 2018-11-30 RX ADMIN — GABAPENTIN 400 MG: 400 CAPSULE ORAL at 09:11

## 2018-11-30 RX ADMIN — TRAZODONE HYDROCHLORIDE 100 MG: 50 TABLET ORAL at 09:11

## 2018-11-30 RX ADMIN — CEFTRIAXONE SODIUM 1 G: 1 INJECTION, POWDER, FOR SOLUTION INTRAMUSCULAR; INTRAVENOUS at 05:11

## 2018-11-30 RX ADMIN — BACLOFEN 20 MG: 10 TABLET ORAL at 09:11

## 2018-11-30 RX ADMIN — DIAZEPAM 10 MG: 5 TABLET ORAL at 02:11

## 2018-11-30 NOTE — ED TRIAGE NOTES
Pt presented to ED for pain management. Pt has a history of MS. VS stable NAD. Pt is at baseline of ambulation. Pain to lower bilat legs 10/10

## 2018-11-30 NOTE — ED NOTES
Pt ao4, ambulatory at baseline and is in NAD. Pain 10/0 due to hx of MS. Pt manages pain w infusions at [facility]. Pt unable to schedule appointment w clinic due to scheduling.

## 2018-11-30 NOTE — ED PROVIDER NOTES
Encounter Date: 11/30/2018    SCRIBE #1 NOTE: I, Abhi Scott, am scribing for, and in the presence of,  Leonor Bradley MD. I have scribed the following portions of the note - Other sections scribed: HPI, ROS, PE.       History     Chief Complaint   Patient presents with    Chronic Pain     hx Multiple Sclerosis; bilateral leg weakness     Time seen by provider: 12:56 PM    This is a 53 y.o. male with past medical history of MS, followed in the MS clinic for difficulty ambulating, presenting to the ED with complaint of worsening bilateral lower extremity pain and weakness. Patient reports his leg pain as spasms. Patient denies any new weakness or numbness to his extremities. Patient denies fever, cough, cold, chest pain, shortness of breath. Patient uses a rolling walker at home at baseline. He was prescribed Rituxan for his MS recently but has not started it yet.       The history is provided by the patient and medical records.     Review of patient's allergies indicates:  No Known Allergies  Past Medical History:   Diagnosis Date    Anticoagulant long-term use     Anxiety     Chronic back pain     Deep vein thrombosis     Depression     Depression     Gait instability     Multiple sclerosis     Multiple sclerosis     Neurogenic bladder     Polyneuropathy     Pulmonary embolism     Spasticity      Past Surgical History:   Procedure Laterality Date    CYSTOSCOPY N/A 6/20/2018    Procedure: CYSTOSCOPY;  Surgeon: Brian Augustin MD;  Location: [facility] OR [address] [place] [address];  Service: Urology;  Laterality: N/A;  1 hour    CYSTOSCOPY N/A 6/20/2018    Performed by Brian Augustin MD at [facility] OR [address] [place] [address]    INJECTION OF BOTULINUM TOXIN TYPE A N/A 6/20/2018    Procedure: INJECTION, BOTULINUM TOXIN, TYPE A 200 UNITS;  Surgeon: Brian Augustin MD;  Location: [facility] OR [address] [place] [address];  Service: Urology;  Laterality: N/A;    INJECTION, BOTULINUM TOXIN, TYPE A 200 UNITS N/A 6/20/2018    Performed by Brian Augustin MD at [facility] OR [address] [place] [address]      Family History   Problem Relation Age of Onset    Arthritis Mother     No Known Problems Father     Cancer Maternal Grandfather      Social History     Tobacco Use    Smoking status: Former Smoker     Packs/day: 0.50     Years: 23.00     Pack years: 11.50     Types: Cigarettes     Last attempt to quit: 2018     Years since quittin.5    Smokeless tobacco: Never Used    Tobacco comment: Using patch   Substance Use Topics    Alcohol use: No    Drug use: No     Review of Systems   Constitutional: Negative for chills and fever.   HENT: Negative for rhinorrhea and sore throat.    Respiratory: Negative for cough and shortness of breath.    Cardiovascular: Negative for chest pain and leg swelling.   Gastrointestinal: Negative for abdominal pain, diarrhea, nausea and vomiting.   Genitourinary: Negative for dysuria, flank pain, frequency, penile pain and urgency.   Musculoskeletal: Positive for gait problem and myalgias. Negative for back pain.   Skin: Negative for rash.   Neurological: Positive for weakness. Negative for dizziness, speech difficulty, numbness and headaches.   Hematological: Does not bruise/bleed easily.       Physical Exam     Initial Vitals [18 1132]   BP Pulse Resp Temp SpO2   (!) 146/80 89 18 98.1 °F (36.7 °C) 96 %      MAP       --         Physical Exam    Nursing note and vitals reviewed.  Constitutional: He appears well-developed and well-nourished. He is not diaphoretic. He appears distressed.   HENT:   Head: Normocephalic and atraumatic.   Mouth/Throat: Oropharynx is clear and moist.   Neck: Normal range of motion. Neck supple. No JVD present.   Cardiovascular: Normal rate, regular rhythm, normal heart sounds and intact distal pulses.   Pulmonary/Chest: Breath sounds normal. No respiratory distress. He has no wheezes. He has no rhonchi. He has no rales.   Abdominal: Soft. He exhibits no distension. There is no tenderness.   Musculoskeletal: He exhibits no edema.   Bilateral  lower extremity contractures and weakness with active cramping observed in the calves bilaterally.    Neurological: He is alert and oriented to person, place, and time. No cranial nerve deficit or sensory deficit.   Skin: Skin is warm and dry.         ED Course   Procedures  Labs Reviewed   CBC W/ AUTO DIFFERENTIAL - Abnormal; Notable for the following components:       Result Value    RBC 4.25 (*)     Hemoglobin 12.9 (*)     Hematocrit 38.3 (*)     All other components within normal limits   URINALYSIS, REFLEX TO URINE CULTURE - Abnormal; Notable for the following components:    Appearance, UA Hazy (*)     Nitrite, UA Positive (*)     Leukocytes, UA 2+ (*)     All other components within normal limits    Narrative:     Preferred Collection Type->Urine, Clean Catch  cup only  11/30/2018  14:55    URINALYSIS MICROSCOPIC - Abnormal; Notable for the following components:    WBC, UA 33 (*)     Bacteria, UA Many (*)     All other components within normal limits    Narrative:     Preferred Collection Type->Urine, Clean Catch  cup only  11/30/2018  14:55    COMPREHENSIVE METABOLIC PANEL          Imaging Results          X-Ray Chest PA And Lateral (Final result)  Result time 11/30/18 15:37:50    Final result by Lennox Anaya MD (11/30/18 15:37:50)                 Impression:      1. No acute cardiopulmonary process, noting pulmonary hyperinflation suggests sequela of COPD/emphysema, correlation with any history of the same recommended.      Electronically signed by: Lennox Anaya MD  Date:    11/30/2018  Time:    15:37             Narrative:    EXAMINATION:  XR CHEST PA AND LATERAL    CLINICAL HISTORY:  Weakness    TECHNIQUE:  PA and lateral views of the chest were performed.    COMPARISON:  10/15/2018    FINDINGS:  The cardiomediastinal silhouette is not enlarged.  There is no pleural effusion.  The trachea is midline.  The lungs are symmetrically expanded bilaterally without evidence of acute parenchymal process.  No large focal consolidation seen.  There is no pneumothorax.  The osseous structures are remarkable for degenerative changes..                                 Medical Decision Making:   History:   Old Medical Records: I decided to obtain old medical records.  Initial Assessment:   53 y.o. male presents for worsening of chronic lower extremity pain and weakness possibly secondary to underlying infection. Will initiate general infectious workup including UA and CXR and likely admit for pain control.   Differential Diagnosis:   Infection (UTI vs Viral syndrome vs PNA) vs MS flare  Clinical Tests:   Lab Tests: Ordered and Reviewed  Radiological Study: Ordered and Reviewed  ED Management:  4:00 PM. UA positive for infection. Patient reports that he had a UA done last month when he had similar symptoms and suspected that he had a urinary infection, although he continues to deny urinary or genitourinary complaints. Pt given pain relief and started on abx for UA, admitted for MS flare  Other:   I have discussed this case with another health care provider.       <> Summary of the Discussion: Neurology who agrees infection likely cause of MS flare            Scribe Attestation:   Scribe #1: I performed the above scribed service and the documentation accurately describes the services I performed. I attest to the accuracy of the note.               Clinical Impression:   The primary encounter diagnosis was Urinary tract infection without hematuria, site unspecified. Diagnoses of Weakness and Multiple sclerosis exacerbation were also pertinent to this visit.                             Leonor Bradley MD  12/02/18 [id number]

## 2018-11-30 NOTE — TELEPHONE ENCOUNTER
Spoke to Ambrose at [facility], he informed me that the pt was eligible for 6 one-way trips (<25 miles) per year with $0 copay through [employer]: [phone].     Called pt, left VM.

## 2018-12-01 LAB
ANION GAP SERPL CALC-SCNC: 6 MMOL/L
BASOPHILS # BLD AUTO: 0.04 K/UL
BASOPHILS NFR BLD: 0.5 %
BUN SERPL-MCNC: 14 MG/DL
CALCIUM SERPL-MCNC: 8.5 MG/DL
CHLORIDE SERPL-SCNC: 107 MMOL/L
CO2 SERPL-SCNC: 27 MMOL/L
CREAT SERPL-MCNC: 0.9 MG/DL
DIFFERENTIAL METHOD: ABNORMAL
EOSINOPHIL # BLD AUTO: 0.2 K/UL
EOSINOPHIL NFR BLD: 2.5 %
ERYTHROCYTE [DISTWIDTH] IN BLOOD BY AUTOMATED COUNT: 13.8 %
EST. GFR  (AFRICAN AMERICAN): >60 ML/MIN/1.73 M^2
EST. GFR  (NON AFRICAN AMERICAN): >60 ML/MIN/1.73 M^2
ESTIMATED AVG GLUCOSE: 91 MG/DL
GLUCOSE SERPL-MCNC: 111 MG/DL
HBA1C MFR BLD HPLC: 4.8 %
HCT VFR BLD AUTO: 33.7 %
HGB BLD-MCNC: 11.2 G/DL
IMM GRANULOCYTES # BLD AUTO: 0.01 K/UL
IMM GRANULOCYTES NFR BLD AUTO: 0.1 %
LYMPHOCYTES # BLD AUTO: 3.1 K/UL
LYMPHOCYTES NFR BLD: 39.6 %
MAGNESIUM SERPL-MCNC: 1.9 MG/DL
MCH RBC QN AUTO: 29.7 PG
MCHC RBC AUTO-ENTMCNC: 33.2 G/DL
MCV RBC AUTO: 89 FL
MONOCYTES # BLD AUTO: 0.6 K/UL
MONOCYTES NFR BLD: 8.3 %
NEUTROPHILS # BLD AUTO: 3.8 K/UL
NEUTROPHILS NFR BLD: 49 %
NRBC BLD-RTO: 0 /100 WBC
PHOSPHATE SERPL-MCNC: 3.8 MG/DL
PLATELET # BLD AUTO: 237 K/UL
PMV BLD AUTO: 11 FL
POTASSIUM SERPL-SCNC: 3.6 MMOL/L
RBC # BLD AUTO: 3.77 M/UL
SODIUM SERPL-SCNC: 140 MMOL/L
WBC # BLD AUTO: 7.7 K/UL

## 2018-12-01 PROCEDURE — G0378 HOSPITAL OBSERVATION PER HR: HCPCS | Mod: HCWC

## 2018-12-01 PROCEDURE — 83735 ASSAY OF MAGNESIUM: CPT | Mod: HCWC

## 2018-12-01 PROCEDURE — 84100 ASSAY OF PHOSPHORUS: CPT | Mod: HCWC

## 2018-12-01 PROCEDURE — 99226 PR SUBSEQUENT OBSERVATION CARE,LEVEL III: CPT | Mod: HCWC,,, | Performed by: PHYSICIAN ASSISTANT

## 2018-12-01 PROCEDURE — 36415 COLL VENOUS BLD VENIPUNCTURE: CPT | Mod: HCWC

## 2018-12-01 PROCEDURE — 25000003 PHARM REV CODE 250: Mod: HCWC | Performed by: PHYSICIAN ASSISTANT

## 2018-12-01 PROCEDURE — 83036 HEMOGLOBIN GLYCOSYLATED A1C: CPT | Mod: HCWC

## 2018-12-01 PROCEDURE — 25500020 PHARM REV CODE 255: Mod: HCWC | Performed by: INTERNAL MEDICINE

## 2018-12-01 PROCEDURE — 85025 COMPLETE CBC W/AUTO DIFF WBC: CPT | Mod: HCWC

## 2018-12-01 PROCEDURE — 80048 BASIC METABOLIC PNL TOTAL CA: CPT | Mod: HCWC

## 2018-12-01 PROCEDURE — A9585 GADOBUTROL INJECTION: HCPCS | Mod: HCWC | Performed by: INTERNAL MEDICINE

## 2018-12-01 PROCEDURE — G8981 BODY POS CURRENT STATUS: HCPCS | Mod: CN,HCWC

## 2018-12-01 PROCEDURE — 99214 OFFICE O/P EST MOD 30 MIN: CPT | Mod: HCWC,GC,, | Performed by: PSYCHIATRY & NEUROLOGY

## 2018-12-01 PROCEDURE — 97116 GAIT TRAINING THERAPY: CPT | Mod: HCWC

## 2018-12-01 PROCEDURE — 97162 PT EVAL MOD COMPLEX 30 MIN: CPT | Mod: HCWC

## 2018-12-01 PROCEDURE — G8982 BODY POS GOAL STATUS: HCPCS | Mod: CK,HCWC

## 2018-12-01 PROCEDURE — 63600175 PHARM REV CODE 636 W HCPCS: Mod: HCWC | Performed by: PHYSICIAN ASSISTANT

## 2018-12-01 RX ORDER — GADOBUTROL 604.72 MG/ML
8 INJECTION INTRAVENOUS
Status: COMPLETED | OUTPATIENT
Start: 2018-12-01 | End: 2018-12-01

## 2018-12-01 RX ADMIN — ACETAMINOPHEN 1000 MG: 500 TABLET, FILM COATED ORAL at 10:12

## 2018-12-01 RX ADMIN — OXYCODONE HYDROCHLORIDE 15 MG: 5 TABLET ORAL at 07:12

## 2018-12-01 RX ADMIN — RIVAROXABAN 20 MG: 20 TABLET, FILM COATED ORAL at 07:12

## 2018-12-01 RX ADMIN — BACLOFEN 20 MG: 10 TABLET ORAL at 09:12

## 2018-12-01 RX ADMIN — STANDARDIZED SENNA CONCENTRATE AND DOCUSATE SODIUM 1 TABLET: 8.6; 5 TABLET, FILM COATED ORAL at 09:12

## 2018-12-01 RX ADMIN — GABAPENTIN 400 MG: 400 CAPSULE ORAL at 09:12

## 2018-12-01 RX ADMIN — POLYETHYLENE GLYCOL 3350 17 G: 17 POWDER, FOR SOLUTION ORAL at 09:12

## 2018-12-01 RX ADMIN — GADOBUTROL 8 ML: 604.72 INJECTION INTRAVENOUS at 06:12

## 2018-12-01 RX ADMIN — BUPROPION HYDROCHLORIDE 150 MG: 150 TABLET, FILM COATED, EXTENDED RELEASE ORAL at 09:12

## 2018-12-01 RX ADMIN — OXYCODONE HYDROCHLORIDE 10 MG: 5 TABLET ORAL at 12:12

## 2018-12-01 RX ADMIN — DANTROLENE SODIUM 50 MG: 50 CAPSULE ORAL at 10:12

## 2018-12-01 RX ADMIN — DANTROLENE SODIUM 50 MG: 50 CAPSULE ORAL at 07:12

## 2018-12-01 RX ADMIN — GABAPENTIN 400 MG: 400 CAPSULE ORAL at 07:12

## 2018-12-01 RX ADMIN — TAMSULOSIN HYDROCHLORIDE 0.4 MG: 0.4 CAPSULE ORAL at 09:12

## 2018-12-01 RX ADMIN — BACLOFEN 20 MG: 10 TABLET ORAL at 07:12

## 2018-12-01 RX ADMIN — CEFTRIAXONE 1 G: 1 INJECTION, POWDER, FOR SOLUTION INTRAMUSCULAR; INTRAVENOUS at 07:12

## 2018-12-01 RX ADMIN — BACLOFEN 20 MG: 10 TABLET ORAL at 10:12

## 2018-12-01 RX ADMIN — ACETAMINOPHEN 1000 MG: 500 TABLET, FILM COATED ORAL at 09:12

## 2018-12-01 RX ADMIN — STANDARDIZED SENNA CONCENTRATE AND DOCUSATE SODIUM 1 TABLET: 8.6; 5 TABLET, FILM COATED ORAL at 10:12

## 2018-12-01 RX ADMIN — GABAPENTIN 400 MG: 400 CAPSULE ORAL at 10:12

## 2018-12-01 RX ADMIN — BACLOFEN 20 MG: 10 TABLET ORAL at 01:12

## 2018-12-01 RX ADMIN — TRAZODONE HYDROCHLORIDE 100 MG: 50 TABLET ORAL at 10:12

## 2018-12-01 RX ADMIN — DULOXETINE 30 MG: 30 CAPSULE, DELAYED RELEASE ORAL at 09:12

## 2018-12-01 RX ADMIN — GABAPENTIN 400 MG: 400 CAPSULE ORAL at 01:12

## 2018-12-01 NOTE — PT/OT/SLP EVAL
Physical Therapy Evaluation and treatment    Patient Name:  Mao Levin   MRN:  [id number]    Recommendations:     Discharge Recommendations:  nursing facility, skilled   Discharge Equipment Recommendations: (will determine DME needs closer to discharge)   Barriers to discharge: Decreased caregiver support pt mother will not be able to assist pt at current functional level.     Assessment:     Mao Levin is a 53 y.o. male admitted with a medical diagnosis of Urinary tract infection without hematuria.  He presents with the following impairments/functional limitations:  impaired functional mobilty, gait instability, impaired endurance, impaired balance, decreased lower extremity function, decreased coordination pt steff treatment fair but increased tone in BLE limited functional mobility. Pt will benefit from cont PT 4x/wk to progress physically and will need SNF placement to maximize rehab potential.     Rehab Prognosis:  good; patient would benefit from acute skilled PT services to address these deficits and reach maximum level of function.      Recent Surgery: * No surgery found *      Plan:     During this hospitalization, patient to be seen 4 x/week to address the above listed problems via gait training, therapeutic activities  · Plan of Care Expires:  12/29/18   Plan of Care Reviewed with: patient    Subjective     Communicated with  nurse prior to session.  Patient found supine upon PT entry to room, agreeable to evaluation.      Chief Complaint: pt c/o pain in B legs  Patient comments/goals:  To get better and go home.   Pain/Comfort:  · Pain Rating 1: 7/10  · Location - Side 1: Bilateral  · Location 1: (legs)  · Pain Rating Post-Intervention 1: 7/10    Patients cultural, spiritual, Restorationism conflicts given the current situation: none    Living Environment:  Pt lives with his mother who is his caregiver. They live in 1 story with 5 steps and B handrails.   Prior to admission, patients level of function was  modified Independent using RW. .  Patient has the following equipment: walker, rolling, wheelchair(walk in shower with seat. L AFO).  DME owned (not currently used): none.  Upon discharge, patient will have assistance from mother  .    Objective:     Patient found with: telemetry(hep lock IV)     General Precautions: Standard, fall   Orthopedic Precautions:    Braces:       Exams:  · Cognitive Exam:  Patient is oriented to Person, Place, Time and Situation  · RLE ROM: increased extensor tone limited mobility at joints.  · LLE ROM: see above     MMT not performed 2nd to increased extensor tone in BLE     Functional Mobility:  · Bed Mobility:   Pt needed verbal cues for hand placement and sequencing for functional mobility.   · Rolling Right: maximal assistance  · Supine to Sit: maximal assistance    Balance: pt was moderate assistance static sitting balance.   ·   · Transfers:     · Sit to Stand:  maximal assistance with rolling walker  ·   · Gait: pt received gait training ~ 16 ft with RW, L AFO and moderated assist. pt had decreased upright posture with gait and B decreased step lengh. Pt was total assist christopher AFO.     AM-PAC 6 CLICK MOBILITY  Total Score:11         Patient left on bedside commode with PCT in room changing sheets.  with all lines intact.    GOALS:   Multidisciplinary Problems     Physical Therapy Goals        Problem: Physical Therapy Goal    Goal Priority Disciplines Outcome Goal Variances Interventions   Physical Therapy Goal     PT, PT/OT      Description:  Goals to be met by: 12/15/18     Patient will increase functional independence with mobility by performin. Supine to sit with MInimal Assistance -not met  2. Sit to stand transfer with Minimal Assistance -not met  3. Gait  x 100 feet with Minimal Assistance using Rolling Walker. -not met  4. Ascend/descend 5 stair with bilateral Handrails Minimal Assistance -not met                      History:     Past Medical History:   Diagnosis  Date    Anticoagulant long-term use     Anxiety     Chronic back pain     Deep vein thrombosis     Depression     Depression     Gait instability     Multiple sclerosis     Multiple sclerosis     Neurogenic bladder     Polyneuropathy     Pulmonary embolism     Spasticity        Past Surgical History:   Procedure Laterality Date    CYSTOSCOPY N/A 6/20/2018    Procedure: CYSTOSCOPY;  Surgeon: Brian Augustin MD;  Location: [facility] OR [address] [place] [address];  Service: Urology;  Laterality: N/A;  1 hour    CYSTOSCOPY N/A 6/20/2018    Performed by Brian Augustin MD at [facility] OR [address] [place] [address]    INJECTION OF BOTULINUM TOXIN TYPE A N/A 6/20/2018    Procedure: INJECTION, BOTULINUM TOXIN, TYPE A 200 UNITS;  Surgeon: Brian Augustin MD;  Location: [facility] OR [address] [place] [address];  Service: Urology;  Laterality: N/A;    INJECTION, BOTULINUM TOXIN, TYPE A 200 UNITS N/A 6/20/2018    Performed by Brian Augustin MD at [facility] OR [address] [place] [address]       Clinical Decision Making:     History  Co-morbidities and personal factors that may impact the plan of care Examination  Body Structures and Functions, activity limitations and participation restrictions that may impact the plan of care Clinical Presentation   Decision Making/ Complexity Score   Co-morbidities:   [] Time since onset of injury / illness / exacerbation  [] Status of current condition  []Patient's cognitive status and safety concerns    [] Multiple Medical Problems (see med hx)  Personal Factors:   [] Patient's age  [] Prior Level of function   [] Patient's home situation (environment and family support)  [] Patient's level of motivation  [] Expected progression of patient      HISTORY:(criteria)    [] 56648 - no personal factors/history    [] 23708 - has 1-2 personal factor/comorbidity     [] 39899 - has >3 personal factor/comorbidity     Body Regions:  [] Objective examination findings  [] Head     []  Neck  [] Trunk   [] Upper Extremity  [] Lower Extremity    Body Systems:  [] For communication ability,  affect, cognition, language, and learning style: the assessment of the ability to make needs known, consciousness, orientation (person, place, and time), expected emotional /behavioral responses, and learning preferences (eg, learning barriers, education  needs)  [] For the neuromuscular system: a general assessment of gross coordinated movement (eg, balance, gait, locomotion, transfers, and transitions) and motor function  (motor control and motor learning)  [] For the musculoskeletal system: the assessment of gross symmetry, gross range of motion, gross strength, height, and weight  [] For the integumentary system: the assessment of pliability(texture), presence of scar formation, skin color, and skin integrity  [] For cardiovascular/pulmonary system: the assessment of heart rate, respiratory rate, blood pressure, and edema     Activity limitations:    [] Patient's cognitive status and saf ety concerns          [] Status of current condition      [] Weight bearing restriction  [] Cardiopulmunary Restriction    Participation Restrictions:   [] Goals and goal agreement with the patient     [] Rehab potential (prognosis) and probable outcome      Examination of Body System: (criteria)    [] 93554 - addressing 1-2 elements    [] 11929 - addressing a total of 3 or more elements     [] 12464 -  Addressing a total of 4 or more elements         Clinical Presentation: (criteria)  Choose one     On examination of body system using standardized tests and measures patient presents with (CHOOSE ONE) elements from any of the following: body structures and functions, activity limitations, and/or participation restrictions.  Leading to a clinical presentation that is considered (CHOOSE ONE)                              Clinical Decision Making  (Eval Complexity):  Choose One     Time Tracking:     PT Received On: 12/01/18  PT Start Time: 0810     PT Stop Time: 0831  PT Total Time (min): 21 min     Billable Minutes: Evaluation 10  min and Gait Training 11 min      Chloe Kumar, PT  12/01/2018

## 2018-12-01 NOTE — HOSPITAL COURSE
Patient admitted to observation for weakness. UA suggestive of UTI. Neurology consulted, MRI brain w/wo contrast with no new lesions. Likely discharge in 1-3 days pending workup. PT/OT/SLP consulted, SNF recommended. Urine culture growing providencia stuartii, transitioned to Bactrim. Patient discharged to SNF..

## 2018-12-01 NOTE — SUBJECTIVE & OBJECTIVE
Past Medical History:   Diagnosis Date    Anticoagulant long-term use     Anxiety     Chronic back pain     Deep vein thrombosis     Depression     Depression     Gait instability     Multiple sclerosis     Multiple sclerosis     Neurogenic bladder     Polyneuropathy     Pulmonary embolism     Spasticity        Past Surgical History:   Procedure Laterality Date    CYSTOSCOPY N/A 6/20/2018    Procedure: CYSTOSCOPY;  Surgeon: Brian Augustin MD;  Location: [facility] OR [address] [place] [address];  Service: Urology;  Laterality: N/A;  1 hour    CYSTOSCOPY N/A 6/20/2018    Performed by Brian Augustin MD at [facility] OR [address] [place] [address]    INJECTION OF BOTULINUM TOXIN TYPE A N/A 6/20/2018    Procedure: INJECTION, BOTULINUM TOXIN, TYPE A 200 UNITS;  Surgeon: Brian Augustin MD;  Location: [facility] OR [address] [place] [address];  Service: Urology;  Laterality: N/A;    INJECTION, BOTULINUM TOXIN, TYPE A 200 UNITS N/A 6/20/2018    Performed by Brian Augustin MD at [facility] OR [address] [place] [address]       Review of patient's allergies indicates:  No Known Allergies    No current facility-administered medications on file prior to encounter.      Current Outpatient Medications on File Prior to Encounter   Medication Sig    apixaban (ELIQUIS) 2.5 mg Tab Take 1 tablet by mouth  2 times daily for 30 days    ascorbic acid, vitamin C, (VITAMIN C) 500 MG tablet Take 500 mg by mouth once daily.    baclofen (LIORESAL) 20 MG tablet Take 1 tablet by mouth 4 times daily for 30 days    buPROPion (WELLBUTRIN SR) 150 MG TBSR 12 hr tablet 1 tab po daily x 3 days, then increase to 1 tab po BID; last dose no later than 6PM; stop smoking after 5-7 days of treatment; (Patient taking differently: 150 mg 2 (two) times daily. 1 tab po daily x 3 days, then increase to 1 tab po BID; last dose no later than 6PM; stop smoking after 5-7 days of treatment;)    cranberry conc-C-bacillus coag 450-30-50 mg-mg-million Tab Take 1 capsule by mouth once daily.    dalfampridine (AMPYRA) 10 mg Tb12 Take 10 mg by mouth  every 12 (twelve) hours.    dantrolene (DANTRIUM) 50 MG Cap Take 1 capsule by mouth 4 times daily for 30 days    diazePAM (VALIUM) 5 MG tablet Take 1 tablet (5 mg total) by mouth every 8 (eight) hours as needed (muscle spasms).    duloxetine (CYMBALTA) 30 MG capsule Take 30 mg by mouth once daily.    ergocalciferol (VITAMIN D2) 50,000 unit Cap Take 1 capsule (50,000 Units total) by mouth every 7 days. (Patient taking differently: Take 50,000 Units by mouth every 7 days. on friday)    gabapentin (NEURONTIN) 300 MG capsule Take 3 capsules by mouth 4 times daily for 30 days    HYDROcodone-acetaminophen (NORCO)  mg per tablet Take 1 tablet by mouth every 8 (eight) hours as needed for Pain.    mirabegron (MYRBETRIQ) 50 mg Tb24 Take 1 tablet (50 mg total) by mouth once daily.    multivitamin capsule Take 1 capsule by mouth once daily.    oxyCODONE-acetaminophen (PERCOCET)  mg per tablet Take one tablet by mouth every 4 hours as needed for moderate pain    polyethylene glycol (GLYCOLAX) 17 gram/dose powder Take 17 g by mouth once daily.    rivaroxaban (XARELTO) 20 mg Tab Take 1 tablet (20 mg total) by mouth daily with dinner or evening meal.    senna-docusate 8.6-50 mg (PERICOLACE) 8.6-50 mg per tablet Take 1 tablet by mouth once daily.    tamsulosin (FLOMAX) 0.4 mg Cp24 Take 1 capsule (0.4 mg total) by mouth once daily.    trazodone (DESYREL) 100 MG tablet Take 1 tablet (100 mg total) by mouth every evening.    vitamin D (VITAMIN D3) 1000 units Tab Take 2 tablets by mouth once daily for 30 days     Family History     Problem Relation (Age of Onset)    Arthritis Mother    Cancer Maternal Grandfather    No Known Problems Father        Tobacco Use    Smoking status: Former Smoker     Packs/day: 0.50     Years: 23.00     Pack years: 11.50     Types: Cigarettes     Last attempt to quit: 2018     Years since quittin.4    Smokeless tobacco: Never Used    Tobacco comment: Using patch    Substance and Sexual Activity    Alcohol use: No    Drug use: No    Sexual activity: Yes     Partners: Female     Review of Systems   Constitutional: Negative for appetite change, diaphoresis, fatigue and fever.   HENT: Negative for congestion, sore throat and trouble swallowing.    Eyes: Negative for pain and visual disturbance (chronic blurred vision due to MS).   Respiratory: Negative for cough, chest tightness and shortness of breath.    Cardiovascular: Negative for chest pain, palpitations and leg swelling.   Gastrointestinal: Negative for abdominal pain, blood in stool, constipation, diarrhea, nausea and vomiting.   Endocrine: Negative for polydipsia and polyuria.   Genitourinary: Positive for frequency. Negative for dysuria, flank pain, hematuria and urgency.   Musculoskeletal: Positive for back pain (lower back), gait problem, myalgias (distal LEs) and neck stiffness. Negative for arthralgias.   Skin: Negative for pallor and rash.   Neurological: Positive for weakness. Negative for dizziness, tremors, seizures, syncope, facial asymmetry, speech difficulty, numbness and headaches.   Hematological: Negative for adenopathy. Does not bruise/bleed easily.   Psychiatric/Behavioral: Negative for agitation and confusion.     Objective:     Vital Signs (Most Recent):  Temp: 98.2 °F (36.8 °C) (11/30/18 1737)  Pulse: 68 (11/30/18 1737)  Resp: 18 (11/30/18 1737)  BP: (!) 146/70 (11/30/18 1740)  SpO2: 98 % (11/30/18 1740) Vital Signs (24h Range):  Temp:  [98.1 °F (36.7 °C)-98.5 °F (36.9 °C)] 98.2 °F (36.8 °C)  Pulse:  [68-89] 68  Resp:  [18] 18  SpO2:  [95 %-100 %] 98 %  BP: (129-146)/(70-87) 146/70     Weight: 81.6 kg (180 lb)  Body mass index is 25.83 kg/m².    Physical Exam   Constitutional: He is oriented to person, place, and time. He appears well-developed and well-nourished.  Non-toxic appearance. No distress.   HENT:   Head: Normocephalic and atraumatic.   Right Ear: External ear normal.   Left Ear: External  ear normal.   Nose: Nose normal.   Mouth/Throat: Uvula is midline.   Eyes: Conjunctivae, EOM and lids are normal. Pupils are equal, round, and reactive to light.   Neck: Normal range of motion, full passive range of motion without pain and phonation normal.   Cardiovascular: Normal rate, regular rhythm and normal heart sounds. Exam reveals no gallop and no friction rub.   No murmur heard.  Pulmonary/Chest: Effort normal and breath sounds normal. No respiratory distress. He has no wheezes. He has no rales. He exhibits no tenderness.   Abdominal: Soft. Normal appearance and bowel sounds are normal. He exhibits no distension and no mass. There is no tenderness. There is no guarding and no CVA tenderness.   Musculoskeletal: He exhibits no edema or deformity.        Right shoulder: Normal.        Right elbow: Normal.       Right wrist: Normal.        Right lower leg: He exhibits tenderness. He exhibits no swelling, no edema and no deformity.        Left lower leg: He exhibits tenderness. He exhibits no swelling, no edema and no deformity.   Decreased strength in LE bilaterally. Left LE weaker than right. Patient states this is his baseline. Decreased  strength in left hand.    Neurological: He is alert and oriented to person, place, and time. A sensory deficit (left LE) is present.   Skin: Skin is warm and dry. He is not diaphoretic.   Psychiatric: He has a normal mood and affect. His speech is normal and behavior is normal. Judgment and thought content normal. Cognition and memory are normal.   Nursing note and vitals reviewed.        CRANIAL NERVES     CN III, IV, VI   Pupils are equal, round, and reactive to light.  Extraocular motions are normal.        Significant Labs:   CBC:   Recent Labs   Lab 11/30/18  1357   WBC 9.03   HGB 12.9*   HCT 38.3*        CMP:   Recent Labs   Lab 11/30/18  1357      K 3.7      CO2 28   GLU 92   BUN 10   CREATININE 0.8   CALCIUM 9.4   PROT 7.1   ALBUMIN 3.9    BILITOT 0.5   ALKPHOS 68   AST 22   ALT 24   ANIONGAP 9   EGFRNONAA >60.0     Urine Culture: No results for input(s): LABURIN in the last 48 hours.  Urine Studies:   Recent Labs   Lab 11/30/18  1357   COLORU Yellow   APPEARANCEUA Hazy*   PHUR 8.0   SPECGRAV 1.015   PROTEINUA Negative   GLUCUA Negative   KETONESU Negative   BILIRUBINUA Negative   OCCULTUA Negative   NITRITE Positive*   LEUKOCYTESUR 2+*   RBCUA 1   WBCUA 33*   BACTERIA Many*   SQUAMEPITHEL 1     All pertinent labs within the past 24 hours have been reviewed.    Significant Imaging: I have reviewed all pertinent imaging results/findings within the past 24 hours.

## 2018-12-01 NOTE — CONSULTS
[facility]  Neurology  Consult Note    Patient Name: Mao Levin  MRN: [id number]  Admission Date: 11/30/2018  Hospital Length of Stay: 0 days  Code Status: Full Code   Attending Provider: JESSE Bravo MD   Consulting Provider: Jarad Holcomb MD  Primary Care Physician: Mendy Alarcon MD  Principal Problem:MS (multiple sclerosis)    Inpatient consult to Neurology  Consult performed by: Jarad Holcomb MD  Consult ordered by: Jim Galarza PA-C         Subjective:     Chief Complaint:  Worsening weakness    HPI:   Patient is a 53 yr old male with PMHx of MS who presented to the ED with complaints of worsening lower extremity>upper extremity for the past 3-4 days. He is on Rituxan for MS and was due for Rituxan in Oct 2018 which he did not receive.  He was also admitted to the hospital in 10/2018 for worsening weakness - found to have a UTI and was treated and improved - went to rehab. He was seen in MS clinic last month with plans to get Rituxan infusion soon after pre-labs were determined to be normal. However patient had worsening weakness the past 3-4 days and came to the ED. Workup was remarkable for possible UTI, culture pending. He was started on rocephin since admission. No reported improvement in symptoms since being admitted  At baseline, patient ambulates with a U step walker.     Past Medical History:   Diagnosis Date    Anticoagulant long-term use     Anxiety     Chronic back pain     Deep vein thrombosis     Depression     Depression     Gait instability     Multiple sclerosis     Multiple sclerosis     Neurogenic bladder     Polyneuropathy     Pulmonary embolism     Spasticity        Past Surgical History:   Procedure Laterality Date    CYSTOSCOPY N/A 6/20/2018    Procedure: CYSTOSCOPY;  Surgeon: Brian Augustin MD;  Location: [facility] OR [address] [place] [address];  Service: Urology;  Laterality: N/A;  1 hour    CYSTOSCOPY N/A 6/20/2018    Performed by Brian Augustin MD at [facility] OR  1ST FLR    INJECTION OF BOTULINUM TOXIN TYPE A N/A 6/20/2018    Procedure: INJECTION, BOTULINUM TOXIN, TYPE A 200 UNITS;  Surgeon: Brian Augustin MD;  Location: [facility] OR [address] [place] [address];  Service: Urology;  Laterality: N/A;    INJECTION, BOTULINUM TOXIN, TYPE A 200 UNITS N/A 6/20/2018    [facility]       Review of patient's allergies indicates:  No Known Allergies    Current Neurological Medications:   Baclofen 20mg QID  wellbutrin 150mg qd  Dantrolene 50mg QID  Duloxetine 30mg qd  trazadone 100mg qpm  Valium 5mg q8h PRN    No current facility-administered medications on file prior to encounter.      Current Outpatient Medications on File Prior to Encounter   Medication Sig    apixaban (ELIQUIS) 2.5 mg Tab Take 1 tablet by mouth  2 times daily for 30 days    ascorbic acid, vitamin C, (VITAMIN C) 500 MG tablet Take 500 mg by mouth once daily.    baclofen (LIORESAL) 20 MG tablet Take 1 tablet by mouth 4 times daily for 30 days    buPROPion (WELLBUTRIN SR) 150 MG TBSR 12 hr tablet 1 tab po daily x 3 days, then increase to 1 tab po BID; last dose no later than 6PM; stop smoking after 5-7 days of treatment; (Patient taking differently: 150 mg 2 (two) times daily. 1 tab po daily x 3 days, then increase to 1 tab po BID; last dose no later than 6PM; stop smoking after 5-7 days of treatment;)    cranberry conc-C-bacillus coag 450-30-50 mg-mg-million Tab Take 1 capsule by mouth once daily.    dalfampridine (AMPYRA) 10 mg Tb12 Take 10 mg by mouth every 12 (twelve) hours.    dantrolene (DANTRIUM) 50 MG Cap Take 1 capsule by mouth 4 times daily for 30 days    diazePAM (VALIUM) 5 MG tablet Take 1 tablet (5 mg total) by mouth every 8 (eight) hours as needed (muscle spasms).    duloxetine (CYMBALTA) 30 MG capsule Take 30 mg by mouth once daily.    ergocalciferol (VITAMIN D2) 50,000 unit Cap Take 1 capsule (50,000 Units total) by mouth every 7 days. (Patient taking differently: Take 50,000  Units by mouth every 7 days. on friday)    gabapentin (NEURONTIN) 300 MG capsule Take 3 capsules by mouth 4 times daily for 30 days    HYDROcodone-acetaminophen (NORCO)  mg per tablet Take 1 tablet by mouth every 8 (eight) hours as needed for Pain.    mirabegron (MYRBETRIQ) 50 mg Tb24 Take 1 tablet (50 mg total) by mouth once daily.    multivitamin capsule Take 1 capsule by mouth once daily.    oxyCODONE-acetaminophen (PERCOCET)  mg per tablet Take one tablet by mouth every 4 hours as needed for moderate pain    polyethylene glycol (GLYCOLAX) 17 gram/dose powder Take 17 g by mouth once daily.    rivaroxaban (XARELTO) 20 mg Tab Take 1 tablet (20 mg total) by mouth daily with dinner or evening meal.    senna-docusate 8.6-50 mg (PERICOLACE) 8.6-50 mg per tablet Take 1 tablet by mouth once daily.    tamsulosin (FLOMAX) 0.4 mg Cp24 Take 1 capsule (0.4 mg total) by mouth once daily.    trazodone (DESYREL) 100 MG tablet Take 1 tablet (100 mg total) by mouth every evening.    vitamin D (VITAMIN D3) 1000 units Tab Take 2 tablets by mouth once daily for 30 days     Family History     Problem Relation (Age of Onset)    Arthritis Mother    Cancer Maternal Grandfather    No Known Problems Father        Tobacco Use    Smoking status: Former Smoker     Packs/day: 0.50     Years: 23.00     Pack years: 11.50     Types: Cigarettes     Last attempt to quit: 2018     Years since quittin.5    Smokeless tobacco: Never Used    Tobacco comment: Using patch   Substance and Sexual Activity    Alcohol use: No    Drug use: No    Sexual activity: Yes     Partners: Female     Review of Systems   Constitutional: Positive for fatigue.   Eyes: Positive for visual disturbance.   Respiratory: Negative for shortness of breath.    Cardiovascular: Negative for chest pain.   Gastrointestinal: Negative for abdominal pain.   Musculoskeletal: Positive for neck pain.   Neurological: Positive for weakness. Negative for  dizziness.   Psychiatric/Behavioral: Negative for agitation.     Objective:     Vital Signs (Most Recent):  Temp: 98.3 °F (36.8 °C) (12/01/18 0717)  Pulse: 65 (12/01/18 0717)  Resp: 16 (12/01/18 0717)  BP: (!) 98/55 (12/01/18 0717)  SpO2: 97 % (12/01/18 0717) Vital Signs (24h Range):  Temp:  [97.6 °F (36.4 °C)-99.2 °F (37.3 °C)] 98.3 °F (36.8 °C)  Pulse:  [64-89] 65  Resp:  [16-18] 16  SpO2:  [95 %-100 %] 97 %  BP: ()/(50-87) 98/55     Weight: 80.2 kg (176 lb 12.9 oz)  Body mass index is 25.37 kg/m².    Physical Exam   Constitutional: He is oriented to person, place, and time. He appears well-developed and well-nourished.   HENT:   Head: Normocephalic and atraumatic.   Eyes: Pupils are equal, round, and reactive to light.   Neurological: He is oriented to person, place, and time. He has a normal Finger-Nose-Finger Test.   Reflex Scores:       Tricep reflexes are 2+ on the right side and 3+ on the left side.       Bicep reflexes are 2+ on the right side and 3+ on the left side.       Patellar reflexes are 2+ on the right side and 3+ on the left side.      NEUROLOGICAL EXAMINATION:     MENTAL STATUS   Oriented to person, place, and time.   Level of consciousness: alert    CRANIAL NERVES     CN III, IV, VI   Pupils are equal, round, and reactive to light.    CN V   Left facial sensation deficit: complete    CN VII   Left facial weakness: central    MOTOR EXAM     Strength   Right deltoid: 5/5  Left deltoid: 4/5  Right biceps: 5/5  Left biceps: 5/5  Left triceps: 4/5  Right interossei: 5/5  Left interossei: 2/5  Right iliopsoas: 3/5  Left iliopsoas: 3/5  Right anterior tibial: 4/5  Left anterior tibial: 2/5    REFLEXES     Reflexes   Right biceps: 2+  Left biceps: 3+  Right triceps: 2+  Left triceps: 3+  Right patellar: 2+  Left patellar: 3+    SENSORY EXAM   Left arm light touch: decreased sensation to light touch on the left upper and lower extremities.    GAIT AND COORDINATION      Coordination   Finger to nose  coordination: normal      Significant Labs:   Recent Lab Results       12/01/18  0357   11/30/18  1357        Immature Granulocytes 0.1 0.3     Immature Grans (Abs) 0.01  Comment:  Mild elevation in immature granulocytes is non specific and   can be seen in a variety of conditions including stress response,   acute inflammation, trauma and pregnancy. Correlation with other   laboratory and clinical findings is essential.   0.03  Comment:  Mild elevation in immature granulocytes is non specific and   can be seen in a variety of conditions including stress response,   acute inflammation, trauma and pregnancy. Correlation with other   laboratory and clinical findings is essential.       Albumin   3.9     Alkaline Phosphatase   68     ALT   24     Anion Gap 6 9     Appearance, UA   Hazy     AST   22     Bacteria, UA   Many     Baso # 0.04 0.03     Basophil% 0.5 0.3     Bilirubin (UA)   Negative     Total Bilirubin   0.5  Comment:  For infants and newborns, interpretation of results should be based  on gestational age, weight and in agreement with clinical  observations.  Premature Infant recommended reference ranges:  Up to 24 hours.............<8.0 mg/dL  Up to 48 hours............<12.0 mg/dL  3-5 days..................<15.0 mg/dL  6-29 days.................<15.0 mg/dL       BUN, Bld 14 10     Calcium 8.5 9.4     Chloride 107 105     CO2 27 28     Color, UA   Yellow     Creatinine 0.9 0.8     Differential Method Automated Automated     eGFR if  >60.0 >60.0     eGFR if non  >60.0  Comment:  Calculation used to obtain the estimated glomerular filtration  rate (eGFR) is the CKD-EPI equation.    >60.0  Comment:  Calculation used to obtain the estimated glomerular filtration  rate (eGFR) is the CKD-EPI equation.        Eos # 0.2 0.0     Eosinophil% 2.5 0.4     Estimated Avg Glucose 91       Glucose 111 92     Glucose, UA   Negative     Gran # (ANC) 3.8 6.1     Gran% 49.0 67.8     Hematocrit  33.7 38.3     Hemoglobin 11.2 12.9     Hemoglobin A1C 4.8  Comment:  ADA Screening Guidelines:  5.7-6.4%  Consistent with prediabetes  >or=6.5%  Consistent with diabetes  High levels of fetal hemoglobin interfere with the HbA1C  assay. Heterozygous hemoglobin variants (HbS, HgC, etc)do  not significantly interfere with this assay.   However, presence of multiple variants may affect accuracy.         Ketones, UA   Negative     Leukocytes, UA   2+     Lymph # 3.1 2.2     Lymph% 39.6 24.8     Magnesium 1.9       MCH 29.7 30.4     MCHC 33.2 33.7     MCV 89 90     Microscopic Comment   SEE COMMENT  Comment:  Other formed elements not mentioned in the report are not   present in the microscopic examination.        Mono # 0.6 0.6     Mono% 8.3 6.4     MPV 11.0 10.3     Nitrite, UA   Positive     nRBC 0 0     Occult Blood UA   Negative     pH, UA   8.0     Phosphorus 3.8       Platelets 237 264     Potassium 3.6 3.7     Total Protein   7.1     Protein, UA   Negative  Comment:  Recommend a 24 hour urine protein or a urine   protein/creatinine ratio if globulin induced proteinuria is  clinically suspected.       RBC 3.77 4.25     RBC, UA   1     RDW 13.8 14.0     Sodium 140 142     Specific Gravity, UA   1.015     Specimen UA   Urine, Clean Catch     Squam Epithel, UA   1     WBC, UA   33     WBC 7.70 9.03           Significant Imaging:     No new imaging this admission to review    Assessment and Plan:     * MS (multiple sclerosis)    Last dose of Rituxan was in 04/2018  Was due for Rituxan in Oct 2018 but did not receive it. CD20 cells repopulated on last lab check    Currently labs suspicious for UTI - urine culture pending.     MS pseudoflare vs exacerbation  - continue treatment for UTI  - Recommend MRI brain and c spine demyelinating protocol W WO contrast to rule out active lesions also given patient currently not immunomodulating therapy.   - If MRI concerning for new active lesions - then will need 3-5 day course of  1g solumedrol as well.   - Recommend PT/OT and SLP consult while inpatient       Urinary tract infection without hematuria    UA with 2+ WBC, Urine culture pending  Continue rocephin     Chronic pain of multiple sites    Recommend increasing gabapentin dose to 800mg TID for chronic neck pain  Continue other home pain medications     Spasticity    Continue home dose dantrolene and baclofen.   Valium PRN for breakthrough spasms.   Monitor respiratory status         VTE Risk Mitigation (From admission, onward)        Ordered     rivaroxaban tablet 20 mg  With dinner      11/30/18 1925          Thank you for your consult. I will follow-up with patient. Please contact us if you have any additional questions.    Jarad Holcomb MD  Neurology  [facility]

## 2018-12-01 NOTE — ASSESSMENT & PLAN NOTE
- acute on chronic LE weakness, likely pseudo-flair due to UTI  - Continue baclofen 20mg, dantrolene 50mg and gabapentin 300 mg  - Reportedly has been prescribed Ampyra (dalfampridine) but says he is not on it.  - continue PT/OT  - neuro consult  - patient normally ambulates with a walker  - fall and aspriation precautions

## 2018-12-01 NOTE — HPI
53-year-old male with a past medical history of MS, neurogenic bladder, PE on xarelto, chronic pain who presents for evaluation of increased LE weakness and pain for the past 2 days. Patient normally ambulates around 300 ft with a walker and has only been able to walk 4-10 steps for the past 2 days. He admits to falling 4 times today and denies LOC or head trauma. Patient was diagnosed with UTI in the ED today. He denies UTI symptoms besides increased urinary frequency. He was admitted for sepsis due to UTI last month and treated with Zosyn. Patient has a history of urgency incontinence due to MS controlled with mirabegron. He does not self cath and does not have a agee catheter. Denies fevers, chills, CP, SOB, abdominal pain, dysuria, hesitancy, hematuria, n/v, constipation, diarrhea, headaches, and vision changes, new numbness, or weakness. He states his symptoms are c/w prior UTIs.    ED: CBC WNL, CMP WNL, UA+, started on rocephin.

## 2018-12-01 NOTE — ASSESSMENT & PLAN NOTE
Last dose of Rituxan was in 04/2018  Was due for Rituxan in Oct 2018 but did not receive it. CD20 cells repopulated on last lab check    Currently labs suspicious for UTI - urine culture pending.     MS pseudoflare vs exacerbation  - continue treatment for UTI  - Recommend MRI brain and c spine demyelinating protocol W WO contrast to rule out active lesions also given patient currently not immunomodulating therapy.   - If MRI concerning for new active lesions - then will need 3-5 day course of 1g solumedrol as well.   - Recommend PT/OT and SLP consult while inpatient

## 2018-12-01 NOTE — ASSESSMENT & PLAN NOTE
Neurogenic bladder  Recurrent UTIs  - continue empiric rocephin for now  - previous UCx with [facility] sensitive to rocephin  - 0/4 SIRS

## 2018-12-01 NOTE — PLAN OF CARE
Problem: Physical Therapy Goal  Goal: Physical Therapy Goal  Goals to be met by: 12/15/18     Patient will increase functional independence with mobility by performin. Supine to sit with MInimal Assistance -not met  2. Sit to stand transfer with Minimal Assistance -not met  3. Gait  x 100 feet with Minimal Assistance using Rolling Walker. -not met  4. Ascend/descend 5 stair with bilateral Handrails Minimal Assistance -not met    Evaluation completed and goals appropriate. Chloe Kumar, PT 2018

## 2018-12-01 NOTE — SUBJECTIVE & OBJECTIVE
Past Medical History:   Diagnosis Date    Anticoagulant long-term use     Anxiety     Chronic back pain     Deep vein thrombosis     Depression     Depression     Gait instability     Multiple sclerosis     Multiple sclerosis     Neurogenic bladder     Polyneuropathy     Pulmonary embolism     Spasticity        Past Surgical History:   Procedure Laterality Date    CYSTOSCOPY N/A 6/20/2018    Procedure: CYSTOSCOPY;  Surgeon: Brian Augustin MD;  Location: [facility] OR [address] [place] [address];  Service: Urology;  Laterality: N/A;  1 hour    CYSTOSCOPY N/A 6/20/2018    Performed by Brian Augustin MD at [facility] OR [address] [place] [address]    INJECTION OF BOTULINUM TOXIN TYPE A N/A 6/20/2018    Procedure: INJECTION, BOTULINUM TOXIN, TYPE A 200 UNITS;  Surgeon: Brian Augustin MD;  Location: [facility] OR [address] [place] [address];  Service: Urology;  Laterality: N/A;    INJECTION, BOTULINUM TOXIN, TYPE A 200 UNITS N/A 6/20/2018    Performed by Brian Augustin MD at [facility] OR [address] [place] [address]       Review of patient's allergies indicates:  No Known Allergies    Current Neurological Medications:   Baclofen 20mg QID  wellbutrin 150mg qd  Dantrolene 50mg QID  Duloxetine 30mg qd  trazadone 100mg qpm  Valium 5mg q8h PRN    No current facility-administered medications on file prior to encounter.      Current Outpatient Medications on File Prior to Encounter   Medication Sig    apixaban (ELIQUIS) 2.5 mg Tab Take 1 tablet by mouth  2 times daily for 30 days    ascorbic acid, vitamin C, (VITAMIN C) 500 MG tablet Take 500 mg by mouth once daily.    baclofen (LIORESAL) 20 MG tablet Take 1 tablet by mouth 4 times daily for 30 days    buPROPion (WELLBUTRIN SR) 150 MG TBSR 12 hr tablet 1 tab po daily x 3 days, then increase to 1 tab po BID; last dose no later than 6PM; stop smoking after 5-7 days of treatment; (Patient taking differently: 150 mg 2 (two) times daily. 1 tab po daily x 3 days, then increase to 1 tab po BID; last dose no later than 6PM; stop smoking after 5-7 days of treatment;)     cranberry conc-C-bacillus coag 450-30-50 mg-mg-million Tab Take 1 capsule by mouth once daily.    dalfampridine (AMPYRA) 10 mg Tb12 Take 10 mg by mouth every 12 (twelve) hours.    dantrolene (DANTRIUM) 50 MG Cap Take 1 capsule by mouth 4 times daily for 30 days    diazePAM (VALIUM) 5 MG tablet Take 1 tablet (5 mg total) by mouth every 8 (eight) hours as needed (muscle spasms).    duloxetine (CYMBALTA) 30 MG capsule Take 30 mg by mouth once daily.    ergocalciferol (VITAMIN D2) 50,000 unit Cap Take 1 capsule (50,000 Units total) by mouth every 7 days. (Patient taking differently: Take 50,000 Units by mouth every 7 days. on friday)    gabapentin (NEURONTIN) 300 MG capsule Take 3 capsules by mouth 4 times daily for 30 days    HYDROcodone-acetaminophen (NORCO)  mg per tablet Take 1 tablet by mouth every 8 (eight) hours as needed for Pain.    mirabegron (MYRBETRIQ) 50 mg Tb24 Take 1 tablet (50 mg total) by mouth once daily.    multivitamin capsule Take 1 capsule by mouth once daily.    oxyCODONE-acetaminophen (PERCOCET)  mg per tablet Take one tablet by mouth every 4 hours as needed for moderate pain    polyethylene glycol (GLYCOLAX) 17 gram/dose powder Take 17 g by mouth once daily.    rivaroxaban (XARELTO) 20 mg Tab Take 1 tablet (20 mg total) by mouth daily with dinner or evening meal.    senna-docusate 8.6-50 mg (PERICOLACE) 8.6-50 mg per tablet Take 1 tablet by mouth once daily.    tamsulosin (FLOMAX) 0.4 mg Cp24 Take 1 capsule (0.4 mg total) by mouth once daily.    trazodone (DESYREL) 100 MG tablet Take 1 tablet (100 mg total) by mouth every evening.    vitamin D (VITAMIN D3) 1000 units Tab Take 2 tablets by mouth once daily for 30 days     Family History     Problem Relation (Age of Onset)    Arthritis Mother    Cancer Maternal Grandfather    No Known Problems Father        Tobacco Use    Smoking status: Former Smoker     Packs/day: 0.50     Years: 23.00     Pack years: 11.50      Types: Cigarettes     Last attempt to quit: 2018     Years since quittin.5    Smokeless tobacco: Never Used    Tobacco comment: Using patch   Substance and Sexual Activity    Alcohol use: No    Drug use: No    Sexual activity: Yes     Partners: Female     Review of Systems   Constitutional: Positive for fatigue.   Eyes: Positive for visual disturbance.   Respiratory: Negative for shortness of breath.    Cardiovascular: Negative for chest pain.   Gastrointestinal: Negative for abdominal pain.   Musculoskeletal: Positive for neck pain.   Neurological: Positive for weakness. Negative for dizziness.   Psychiatric/Behavioral: Negative for agitation.     Objective:     Vital Signs (Most Recent):  Temp: 98.3 °F (36.8 °C) (18)  Pulse: 65 (18)  Resp: 16 (18)  BP: (!) 98/55 (18)  SpO2: 97 % (18) Vital Signs (24h Range):  Temp:  [97.6 °F (36.4 °C)-99.2 °F (37.3 °C)] 98.3 °F (36.8 °C)  Pulse:  [64-89] 65  Resp:  [16-18] 16  SpO2:  [95 %-100 %] 97 %  BP: ()/(50-87) 98/55     Weight: 80.2 kg (176 lb 12.9 oz)  Body mass index is 25.37 kg/m².    Physical Exam   Constitutional: He is oriented to person, place, and time. He appears well-developed and well-nourished.   HENT:   Head: Normocephalic and atraumatic.   Eyes: Pupils are equal, round, and reactive to light.   Neurological: He is oriented to person, place, and time. He has a normal Finger-Nose-Finger Test.   Reflex Scores:       Tricep reflexes are 2+ on the right side and 3+ on the left side.       Bicep reflexes are 2+ on the right side and 3+ on the left side.       Patellar reflexes are 2+ on the right side and 3+ on the left side.      NEUROLOGICAL EXAMINATION:     MENTAL STATUS   Oriented to person, place, and time.   Level of consciousness: alert    CRANIAL NERVES     CN III, IV, VI   Pupils are equal, round, and reactive to light.    CN V   Left facial sensation deficit: complete    CN VII    Left facial weakness: central    MOTOR EXAM     Strength   Right deltoid: 5/5  Left deltoid: 4/5  Right biceps: 5/5  Left biceps: 5/5  Left triceps: 4/5  Right interossei: 5/5  Left interossei: 2/5  Right iliopsoas: 3/5  Left iliopsoas: 3/5  Right anterior tibial: 4/5  Left anterior tibial: 2/5    REFLEXES     Reflexes   Right biceps: 2+  Left biceps: 3+  Right triceps: 2+  Left triceps: 3+  Right patellar: 2+  Left patellar: 3+    SENSORY EXAM   Left arm light touch: decreased sensation to light touch on the left upper and lower extremities.    GAIT AND COORDINATION      Coordination   Finger to nose coordination: normal      Significant Labs:   Recent Lab Results       12/01/18  0357   11/30/18  1357        Immature Granulocytes 0.1 0.3     Immature Grans (Abs) 0.01  Comment:  Mild elevation in immature granulocytes is non specific and   can be seen in a variety of conditions including stress response,   acute inflammation, trauma and pregnancy. Correlation with other   laboratory and clinical findings is essential.   0.03  Comment:  Mild elevation in immature granulocytes is non specific and   can be seen in a variety of conditions including stress response,   acute inflammation, trauma and pregnancy. Correlation with other   laboratory and clinical findings is essential.       Albumin   3.9     Alkaline Phosphatase   68     ALT   24     Anion Gap 6 9     Appearance, UA   Hazy     AST   22     Bacteria, UA   Many     Baso # 0.04 0.03     Basophil% 0.5 0.3     Bilirubin (UA)   Negative     Total Bilirubin   0.5  Comment:  For infants and newborns, interpretation of results should be based  on gestational age, weight and in agreement with clinical  observations.  Premature Infant recommended reference ranges:  Up to 24 hours.............<8.0 mg/dL  Up to 48 hours............<12.0 mg/dL  3-5 days..................<15.0 mg/dL  6-29 days.................<15.0 mg/dL       BUN, Bld 14 10     Calcium 8.5 9.4      Chloride 107 105     CO2 27 28     Color, UA   Yellow     Creatinine 0.9 0.8     Differential Method Automated Automated     eGFR if  >60.0 >60.0     eGFR if non  >60.0  Comment:  Calculation used to obtain the estimated glomerular filtration  rate (eGFR) is the CKD-EPI equation.    >60.0  Comment:  Calculation used to obtain the estimated glomerular filtration  rate (eGFR) is the CKD-EPI equation.        Eos # 0.2 0.0     Eosinophil% 2.5 0.4     Estimated Avg Glucose 91       Glucose 111 92     Glucose, UA   Negative     Gran # (ANC) 3.8 6.1     Gran% 49.0 67.8     Hematocrit 33.7 38.3     Hemoglobin 11.2 12.9     Hemoglobin A1C 4.8  Comment:  ADA Screening Guidelines:  5.7-6.4%  Consistent with prediabetes  >or=6.5%  Consistent with diabetes  High levels of fetal hemoglobin interfere with the HbA1C  assay. Heterozygous hemoglobin variants (HbS, HgC, etc)do  not significantly interfere with this assay.   However, presence of multiple variants may affect accuracy.         Ketones, UA   Negative     Leukocytes, UA   2+     Lymph # 3.1 2.2     Lymph% 39.6 24.8     Magnesium 1.9       MCH 29.7 30.4     MCHC 33.2 33.7     MCV 89 90     Microscopic Comment   SEE COMMENT  Comment:  Other formed elements not mentioned in the report are not   present in the microscopic examination.        Mono # 0.6 0.6     Mono% 8.3 6.4     MPV 11.0 10.3     Nitrite, UA   Positive     nRBC 0 0     Occult Blood UA   Negative     pH, UA   8.0     Phosphorus 3.8       Platelets 237 264     Potassium 3.6 3.7     Total Protein   7.1     Protein, UA   Negative  Comment:  Recommend a 24 hour urine protein or a urine   protein/creatinine ratio if globulin induced proteinuria is  clinically suspected.       RBC 3.77 4.25     RBC, UA   1     RDW 13.8 14.0     Sodium 140 142     Specific Gravity, UA   1.015     Specimen UA   Urine, Clean Catch     Squam Epithel, UA   1     WBC, UA   33     WBC 7.70 9.03            Significant Imaging:     No new imaging this admission to review

## 2018-12-01 NOTE — ED NOTES
"Pt expressing anger re hunger. Pt was provided a turkey sandwich. Pt stated to this RN, "I am hungry and I do not want another fake turkey sandwich." Pt was updated on plan of care and was asked for more time until dietary can bring tray.   "

## 2018-12-01 NOTE — ASSESSMENT & PLAN NOTE
Recommend increasing gabapentin dose to 800mg TID for chronic neck pain  Continue other home pain medications

## 2018-12-01 NOTE — HPI
Patient is a 53 yr old male with PMHx of MS who presented to the ED with complaints of worsening lower extremity>upper extremity for the past 3-4 days. He is on Rituxan for MS and was due for Rituxan in Oct 2018 which he did not receive.  He was also admitted to the hospital in 10/2018 for worsening weakness - found to have a UTI and was treated and improved - went to rehab. He was seen in MS clinic last month with plans to get Rituxan infusion soon after pre-labs were determined to be normal. However patient had worsening weakness the past 3-4 days and came to the ED. Workup was remarkable for possible UTI, culture pending. He was started on rocephin since admission. No reported improvement in symptoms since being admitted  At baseline, patient ambulates with a U step walker.

## 2018-12-01 NOTE — PLAN OF CARE
Problem: Patient Care Overview  Goal: Plan of Care Review  Outcome: Ongoing (interventions implemented as appropriate)  Patient is lying in bed with eyes closed. Patient's breathing is even and unlabored. Vitals are WDL, and fall precautions are in place. Bed is low and locked, and call light is within reach. New peripheral IV started in patients Right forearm with a 22 gauge catheter with one stick. Patient tolerated well. Patient states that he feels a little stiff this morning, but is not currently experiencing any pain. Will continue to monitor until arrival of day shift.

## 2018-12-01 NOTE — H&P
[facility] Hospital Medicine  History & Physical    Patient Name: Mao Levin  MRN: [id number]  Admission Date: 11/30/2018  Attending Physician: JESSE Bravo MD   Primary Care Provider: Mendy Alarcon MD    [facility] Medicine Team: [facility] Jim Galarza PA-C     Patient information was obtained from patient, past medical records and ER records.     Subjective:     Principal Problem:Urinary tract infection without hematuria    Chief Complaint:   Chief Complaint   Patient presents with    Chronic Pain     hx Multiple Sclerosis; bilateral leg weakness        HPI: 53-year-old male with a past medical history of MS, neurogenic bladder, PE on xarelto, chronic pain who presents for evaluation of increased LE weakness and pain for the past 2 days. Patient normally ambulates around 300 ft with a walker and has only been able to walk 4-10 steps for the past 2 days. He admits to falling 4 times today and denies LOC or head trauma. Patient was diagnosed with UTI in the ED today. He denies UTI symptoms besides increased urinary frequency. He was admitted for sepsis due to UTI last month and treated with Zosyn. Patient has a history of urgency incontinence due to MS controlled with mirabegron. He does not self cath and does not have a agee catheter. Denies fevers, chills, CP, SOB, abdominal pain, dysuria, hesitancy, hematuria, n/v, constipation, diarrhea, headaches, and vision changes, new numbness, or weakness. He states his symptoms are c/w prior UTIs.    ED: CBC WNL, CMP WNL, UA+, started on rocephin.    Past Medical History:   Diagnosis Date    Anticoagulant long-term use     Anxiety     Chronic back pain     Deep vein thrombosis     Depression     Depression     Gait instability     Multiple sclerosis     Multiple sclerosis     Neurogenic bladder     Polyneuropathy     Pulmonary embolism     Spasticity        Past Surgical History:   Procedure Laterality Date    CYSTOSCOPY  N/A 6/20/2018    Procedure: CYSTOSCOPY;  Surgeon: Brian Augustin MD;  Location: [facility] OR [address] [place] [address];  Service: Urology;  Laterality: N/A;  1 hour    CYSTOSCOPY N/A 6/20/2018    Performed by Brian Augustin MD at [facility] OR [address] [place] [address]    INJECTION OF BOTULINUM TOXIN TYPE A N/A 6/20/2018    Procedure: INJECTION, BOTULINUM TOXIN, TYPE A 200 UNITS;  Surgeon: Brian Augustin MD;  Location: [facility] OR [address] [place] [address];  Service: Urology;  Laterality: N/A;    INJECTION, BOTULINUM TOXIN, TYPE A 200 UNITS N/A 6/20/2018    Performed by Brian Augustin MD at [facility] OR [address] [place] [address]       Review of patient's allergies indicates:  No Known Allergies    No current facility-administered medications on file prior to encounter.      Current Outpatient Medications on File Prior to Encounter   Medication Sig    apixaban (ELIQUIS) 2.5 mg Tab Take 1 tablet by mouth  2 times daily for 30 days    ascorbic acid, vitamin C, (VITAMIN C) 500 MG tablet Take 500 mg by mouth once daily.    baclofen (LIORESAL) 20 MG tablet Take 1 tablet by mouth 4 times daily for 30 days    buPROPion (WELLBUTRIN SR) 150 MG TBSR 12 hr tablet 1 tab po daily x 3 days, then increase to 1 tab po BID; last dose no later than 6PM; stop smoking after 5-7 days of treatment; (Patient taking differently: 150 mg 2 (two) times daily. 1 tab po daily x 3 days, then increase to 1 tab po BID; last dose no later than 6PM; stop smoking after 5-7 days of treatment;)    cranberry conc-C-bacillus coag 450-30-50 mg-mg-million Tab Take 1 capsule by mouth once daily.    dalfampridine (AMPYRA) 10 mg Tb12 Take 10 mg by mouth every 12 (twelve) hours.    dantrolene (DANTRIUM) 50 MG Cap Take 1 capsule by mouth 4 times daily for 30 days    diazePAM (VALIUM) 5 MG tablet Take 1 tablet (5 mg total) by mouth every 8 (eight) hours as needed (muscle spasms).    duloxetine (CYMBALTA) 30 MG capsule Take 30 mg by mouth once daily.    ergocalciferol (VITAMIN D2) 50,000 unit Cap Take 1 capsule (50,000 Units total) by mouth  every 7 days. (Patient taking differently: Take 50,000 Units by mouth every 7 days. on friday)    gabapentin (NEURONTIN) 300 MG capsule Take 3 capsules by mouth 4 times daily for 30 days    HYDROcodone-acetaminophen (NORCO)  mg per tablet Take 1 tablet by mouth every 8 (eight) hours as needed for Pain.    mirabegron (MYRBETRIQ) 50 mg Tb24 Take 1 tablet (50 mg total) by mouth once daily.    multivitamin capsule Take 1 capsule by mouth once daily.    oxyCODONE-acetaminophen (PERCOCET)  mg per tablet Take one tablet by mouth every 4 hours as needed for moderate pain    polyethylene glycol (GLYCOLAX) 17 gram/dose powder Take 17 g by mouth once daily.    rivaroxaban (XARELTO) 20 mg Tab Take 1 tablet (20 mg total) by mouth daily with dinner or evening meal.    senna-docusate 8.6-50 mg (PERICOLACE) 8.6-50 mg per tablet Take 1 tablet by mouth once daily.    tamsulosin (FLOMAX) 0.4 mg Cp24 Take 1 capsule (0.4 mg total) by mouth once daily.    trazodone (DESYREL) 100 MG tablet Take 1 tablet (100 mg total) by mouth every evening.    vitamin D (VITAMIN D3) 1000 units Tab Take 2 tablets by mouth once daily for 30 days     Family History     Problem Relation (Age of Onset)    Arthritis Mother    Cancer Maternal Grandfather    No Known Problems Father        Tobacco Use    Smoking status: Former Smoker     Packs/day: 0.50     Years: 23.00     Pack years: 11.50     Types: Cigarettes     Last attempt to quit: 2018     Years since quittin.4    Smokeless tobacco: Never Used    Tobacco comment: Using patch   Substance and Sexual Activity    Alcohol use: No    Drug use: No    Sexual activity: Yes     Partners: Female     Review of Systems   Constitutional: Negative for appetite change, diaphoresis, fatigue and fever.   HENT: Negative for congestion, sore throat and trouble swallowing.    Eyes: Negative for pain and visual disturbance (chronic blurred vision due to MS).   Respiratory: Negative for  cough, chest tightness and shortness of breath.    Cardiovascular: Negative for chest pain, palpitations and leg swelling.   Gastrointestinal: Negative for abdominal pain, blood in stool, constipation, diarrhea, nausea and vomiting.   Endocrine: Negative for polydipsia and polyuria.   Genitourinary: Positive for frequency. Negative for dysuria, flank pain, hematuria and urgency.   Musculoskeletal: Positive for back pain (lower back), gait problem, myalgias (distal LEs) and neck stiffness. Negative for arthralgias.   Skin: Negative for pallor and rash.   Neurological: Positive for weakness. Negative for dizziness, tremors, seizures, syncope, facial asymmetry, speech difficulty, numbness and headaches.   Hematological: Negative for adenopathy. Does not bruise/bleed easily.   Psychiatric/Behavioral: Negative for agitation and confusion.     Objective:     Vital Signs (Most Recent):  Temp: 98.2 °F (36.8 °C) (11/30/18 1737)  Pulse: 68 (11/30/18 1737)  Resp: 18 (11/30/18 1737)  BP: (!) 146/70 (11/30/18 1740)  SpO2: 98 % (11/30/18 1740) Vital Signs (24h Range):  Temp:  [98.1 °F (36.7 °C)-98.5 °F (36.9 °C)] 98.2 °F (36.8 °C)  Pulse:  [68-89] 68  Resp:  [18] 18  SpO2:  [95 %-100 %] 98 %  BP: (129-146)/(70-87) 146/70     Weight: 81.6 kg (180 lb)  Body mass index is 25.83 kg/m².    Physical Exam   Constitutional: He is oriented to person, place, and time. He appears well-developed and well-nourished.  Non-toxic appearance. No distress.   HENT:   Head: Normocephalic and atraumatic.   Right Ear: External ear normal.   Left Ear: External ear normal.   Nose: Nose normal.   Mouth/Throat: Uvula is midline.   Eyes: Conjunctivae, EOM and lids are normal. Pupils are equal, round, and reactive to light.   Neck: Normal range of motion, full passive range of motion without pain and phonation normal.   Cardiovascular: Normal rate, regular rhythm and normal heart sounds. Exam reveals no gallop and no friction rub.   No murmur  heard.  Pulmonary/Chest: Effort normal and breath sounds normal. No respiratory distress. He has no wheezes. He has no rales. He exhibits no tenderness.   Abdominal: Soft. Normal appearance and bowel sounds are normal. He exhibits no distension and no mass. There is no tenderness. There is no guarding and no CVA tenderness.   Musculoskeletal: He exhibits no edema or deformity.        Right shoulder: Normal.        Right elbow: Normal.       Right wrist: Normal.        Right lower leg: He exhibits tenderness. He exhibits no swelling, no edema and no deformity.        Left lower leg: He exhibits tenderness. He exhibits no swelling, no edema and no deformity.   Decreased strength in LE bilaterally. Left LE weaker than right. Patient states this is his baseline. Decreased  strength in left hand.    Neurological: He is alert and oriented to person, place, and time. A sensory deficit (left LE) is present.   Skin: Skin is warm and dry. He is not diaphoretic.   Psychiatric: He has a normal mood and affect. His speech is normal and behavior is normal. Judgment and thought content normal. Cognition and memory are normal.   Nursing note and vitals reviewed.        CRANIAL NERVES     CN III, IV, VI   Pupils are equal, round, and reactive to light.  Extraocular motions are normal.        Significant Labs:   CBC:   Recent Labs   Lab 11/30/18  1357   WBC 9.03   HGB 12.9*   HCT 38.3*        CMP:   Recent Labs   Lab 11/30/18  1357      K 3.7      CO2 28   GLU 92   BUN 10   CREATININE 0.8   CALCIUM 9.4   PROT 7.1   ALBUMIN 3.9   BILITOT 0.5   ALKPHOS 68   AST 22   ALT 24   ANIONGAP 9   EGFRNONAA >60.0     Urine Culture: No results for input(s): LABURIN in the last 48 hours.  Urine Studies:   Recent Labs   Lab 11/30/18  1357   COLORU Yellow   APPEARANCEUA Hazy*   PHUR 8.0   SPECGRAV 1.015   PROTEINUA Negative   GLUCUA Negative   KETONESU Negative   BILIRUBINUA Negative   OCCULTUA Negative   NITRITE Positive*    LEUKOCYTESUR 2+*   RBCUA 1   WBCUA 33*   BACTERIA Many*   SQUAMEPITHEL 1     All pertinent labs within the past 24 hours have been reviewed.    Significant Imaging: I have reviewed all pertinent imaging results/findings within the past 24 hours.    Assessment/Plan:     * Urinary tract infection without hematuria    Neurogenic bladder  Recurrent UTIs  - continue empiric rocephin for now  - previous UCx with [facility] sensitive to rocephin  - 0/4 SIRS     MS (multiple sclerosis)    - acute on chronic LE weakness, likely pseudo-flair due to UTI  - Continue baclofen 20mg, dantrolene 50mg and gabapentin 300 mg  - Reportedly has been prescribed Ampyra (dalfampridine) but says he is not on it.  - continue PT/OT  - neuro consult  - patient normally ambulates with a walker  - fall and aspriation precautions       Chronic pain of multiple sites    - scheduled tylenol, oxy 10,15 PRN     10/25/2016 Saddle PE    - continue Xarelto 20mg       VTE Risk Mitigation (From admission, onward)        Ordered     rivaroxaban tablet 20 mg  With dinner      11/30/18 1925             Jim Galarza PA-C  Department of Hospital Medicine   [facility]

## 2018-12-01 NOTE — NURSING
Patient arrived to the unit [id number] with complaint of 9 out of 10 generalized pain. Vitals are WDL, and fall precautions are in place. Bed is low and locked, and call light is within reach. Home medications and pain medication administered per emar. See admission assessment for details. Will continue to monitor.

## 2018-12-01 NOTE — ASSESSMENT & PLAN NOTE
Continue home dose dantrolene and baclofen.   Valium PRN for breakthrough spasms.   Monitor respiratory status

## 2018-12-01 NOTE — PROGRESS NOTES
[facility] Hospital Medicine  Progress Note    Patient Name: Mao Levin  MRN: [id number]  Patient Class: OP- Observation   Admission Date: 11/30/2018  Length of Stay: 0 days  Attending Physician: JESSE Bravo MD  Primary Care Provider: Mendy Alarcon MD    [facility] Medicine Team: [facility]P MED E Danilo Link PA-C    Subjective:     Principal Problem:MS (multiple sclerosis)    HPI:  53-year-old male with a past medical history of MS, neurogenic bladder, PE on xarelto, chronic pain who presents for evaluation of increased LE weakness and pain for the past 2 days. Patient normally ambulates around 300 ft with a walker and has only been able to walk 4-10 steps for the past 2 days. He admits to falling 4 times today and denies LOC or head trauma. Patient was diagnosed with UTI in the ED today. He denies UTI symptoms besides increased urinary frequency. He was admitted for sepsis due to UTI last month and treated with Zosyn. Patient has a history of urgency incontinence due to MS controlled with mirabegron. He does not self cath and does not have a agee catheter. Denies fevers, chills, CP, SOB, abdominal pain, dysuria, hesitancy, hematuria, n/v, constipation, diarrhea, headaches, and vision changes, new numbness, or weakness. He states his symptoms are c/w prior UTIs.    ED: CBC WNL, CMP WNL, UA+, started on rocephin.    Hospital Course:  Patient admitted to observation for weakness. UA suggestive of UTI. Neurology consulted, MRI brain w/wo contrast pending to rule out MS flare. Likely discharge in 1-3 days pending workup. PT/OT/SLP consulted.    Interval History: No events overnight. Patient resting in bed. Neurology consulted.    Review of Systems   Constitutional: Negative for fatigue and fever.   Eyes: Negative for visual disturbance (chronic blurred vision due to MS).   Respiratory: Negative for chest tightness and shortness of breath.    Cardiovascular: Negative for chest pain and  palpitations.   Gastrointestinal: Negative for abdominal pain and nausea.   Genitourinary: Positive for frequency. Negative for dysuria, flank pain and urgency.   Musculoskeletal: Positive for back pain (lower back), gait problem, myalgias (distal LEs) and neck stiffness.   Neurological: Positive for weakness. Negative for dizziness, numbness and headaches.   Psychiatric/Behavioral: Negative for agitation and confusion.     Objective:     Vital Signs (Most Recent):  Temp: 98.6 °F (37 °C) (12/01/18 1138)  Pulse: 69 (12/01/18 1138)  Resp: 18 (12/01/18 1138)  BP: 106/74 (12/01/18 1138)  SpO2: 97 % (12/01/18 1138) Vital Signs (24h Range):  Temp:  [97.6 °F (36.4 °C)-99.2 °F (37.3 °C)] 98.6 °F (37 °C)  Pulse:  [64-83] 69  Resp:  [16-18] 18  SpO2:  [95 %-100 %] 97 %  BP: ()/(50-82) 106/74     Weight: 80.2 kg (176 lb 12.9 oz)  Body mass index is 25.37 kg/m².    Intake/Output Summary (Last 24 hours) at 12/1/2018 1350  Last data filed at 12/1/2018 1200  Gross per 24 hour   Intake 960 ml   Output --   Net 960 ml      Physical Exam   Constitutional: He is oriented to person, place, and time. He appears well-developed and well-nourished.   Cardiovascular: Normal rate, regular rhythm and normal heart sounds.   Pulmonary/Chest: Effort normal and breath sounds normal.   Abdominal: Soft. Normal appearance and bowel sounds are normal. There is no tenderness. There is no CVA tenderness.   Musculoskeletal: He exhibits no edema or deformity.        Right shoulder: Normal.        Right elbow: Normal.       Right wrist: Normal.        Right lower leg: He exhibits tenderness. He exhibits no swelling, no edema and no deformity.        Left lower leg: He exhibits tenderness. He exhibits no swelling, no edema and no deformity.   Decreased strength in LE bilaterally. Left LE weaker than right. Patient states this is his baseline. Decreased  strength in left hand.    Neurological: He is oriented to person, place, and time. A sensory  deficit (left LE) is present.   Skin: Skin is warm and dry.   Psychiatric: He has a normal mood and affect. His speech is normal and behavior is normal. Cognition and memory are normal.   Nursing note and vitals reviewed.      Significant Labs:   BMP:   Recent Labs   Lab 12/01/18  0357   *      K 3.6      CO2 27   BUN 14   CREATININE 0.9   CALCIUM 8.5*   MG 1.9     CBC:   Recent Labs   Lab 11/30/18  1357 12/01/18  0357   WBC 9.03 7.70   HGB 12.9* 11.2*   HCT 38.3* 33.7*    237     Urine Studies:   Recent Labs   Lab 11/30/18  1357   COLORU Yellow   APPEARANCEUA Hazy*   PHUR 8.0   SPECGRAV 1.015   PROTEINUA Negative   GLUCUA Negative   KETONESU Negative   BILIRUBINUA Negative   OCCULTUA Negative   NITRITE Positive*   LEUKOCYTESUR 2+*   RBCUA 1   WBCUA 33*   BACTERIA Many*   SQUAMEPITHEL 1       Significant Imaging: I have reviewed all pertinent imaging results/findings within the past 24 hours.    Assessment/Plan:      * MS (multiple sclerosis)    - acute on chronic LE weakness, likely pseudo-flair due to UTI  - Continue baclofen 20mg, dantrolene 50mg and gabapentin 300 mg  - Reportedly has been prescribed Ampyra (dalfampridine) but says he is not on it.  - continue PT/OT/SLP  - neuro consulted   - MRI brain & c-spine w/wo contrast epilepsy protocol pending  - patient normally ambulates with a walker  - fall and aspriation precautions       Urinary tract infection without hematuria    Neurogenic bladder  Recurrent UTIs  - continue empiric rocephin for now  - previous UCx with [facility] sensitive to rocephin  - 0/4 SIRS     10/25/2016 [facility] PE    - continue Xarelto 20mg     Chronic pain of multiple sites    - scheduled tylenol, oxy 10,15 PRN       VTE Risk Mitigation (From admission, onward)        Ordered     rivaroxaban tablet 20 mg  With dinner      11/30/18 1925              GREYSON ZelayaC  Department of Hospital Medicine   [facility]

## 2018-12-01 NOTE — ASSESSMENT & PLAN NOTE
- acute on chronic LE weakness, likely pseudo-flair due to UTI  - Continue baclofen 20mg, dantrolene 50mg and gabapentin 300 mg  - Reportedly has been prescribed Ampyra (dalfampridine) but says he is not on it.  - continue PT/OT/SLP  - neuro consulted   - MRI brain & c-spine w/wo contrast epilepsy protocol pending  - patient normally ambulates with a walker  - fall and aspriation precautions

## 2018-12-01 NOTE — SUBJECTIVE & OBJECTIVE
Interval History: No events overnight. Patient resting in bed. Neurology consulted.    Review of Systems   Constitutional: Negative for fatigue and fever.   Eyes: Negative for visual disturbance (chronic blurred vision due to MS).   Respiratory: Negative for chest tightness and shortness of breath.    Cardiovascular: Negative for chest pain and palpitations.   Gastrointestinal: Negative for abdominal pain and nausea.   Genitourinary: Positive for frequency. Negative for dysuria, flank pain and urgency.   Musculoskeletal: Positive for back pain (lower back), gait problem, myalgias (distal LEs) and neck stiffness.   Neurological: Positive for weakness. Negative for dizziness, numbness and headaches.   Psychiatric/Behavioral: Negative for agitation and confusion.     Objective:     Vital Signs (Most Recent):  Temp: 98.6 °F (37 °C) (12/01/18 1138)  Pulse: 69 (12/01/18 1138)  Resp: 18 (12/01/18 1138)  BP: 106/74 (12/01/18 1138)  SpO2: 97 % (12/01/18 1138) Vital Signs (24h Range):  Temp:  [97.6 °F (36.4 °C)-99.2 °F (37.3 °C)] 98.6 °F (37 °C)  Pulse:  [64-83] 69  Resp:  [16-18] 18  SpO2:  [95 %-100 %] 97 %  BP: ()/(50-82) 106/74     Weight: 80.2 kg (176 lb 12.9 oz)  Body mass index is 25.37 kg/m².    Intake/Output Summary (Last 24 hours) at 12/1/2018 1350  Last data filed at 12/1/2018 1200  Gross per 24 hour   Intake 960 ml   Output --   Net 960 ml      Physical Exam   Constitutional: He is oriented to person, place, and time. He appears well-developed and well-nourished.   Cardiovascular: Normal rate, regular rhythm and normal heart sounds.   Pulmonary/Chest: Effort normal and breath sounds normal.   Abdominal: Soft. Normal appearance and bowel sounds are normal. There is no tenderness. There is no CVA tenderness.   Musculoskeletal: He exhibits no edema or deformity.        Right shoulder: Normal.        Right elbow: Normal.       Right wrist: Normal.        Right lower leg: He exhibits tenderness. He exhibits no  swelling, no edema and no deformity.        Left lower leg: He exhibits tenderness. He exhibits no swelling, no edema and no deformity.   Decreased strength in LE bilaterally. Left LE weaker than right. Patient states this is his baseline. Decreased  strength in left hand.    Neurological: He is oriented to person, place, and time. A sensory deficit (left LE) is present.   Skin: Skin is warm and dry.   Psychiatric: He has a normal mood and affect. His speech is normal and behavior is normal. Cognition and memory are normal.   Nursing note and vitals reviewed.      Significant Labs:   BMP:   Recent Labs   Lab 12/01/18  0357   *      K 3.6      CO2 27   BUN 14   CREATININE 0.9   CALCIUM 8.5*   MG 1.9     CBC:   Recent Labs   Lab 11/30/18  1357 12/01/18  0357   WBC 9.03 7.70   HGB 12.9* 11.2*   HCT 38.3* 33.7*    237     Urine Studies:   Recent Labs   Lab 11/30/18  1357   COLORU Yellow   APPEARANCEUA Hazy*   PHUR 8.0   SPECGRAV 1.015   PROTEINUA Negative   GLUCUA Negative   KETONESU Negative   BILIRUBINUA Negative   OCCULTUA Negative   NITRITE Positive*   LEUKOCYTESUR 2+*   RBCUA 1   WBCUA 33*   BACTERIA Many*   SQUAMEPITHEL 1       Significant Imaging: I have reviewed all pertinent imaging results/findings within the past 24 hours.

## 2018-12-02 ENCOUNTER — TELEPHONE (OUTPATIENT)
Dept: INTERNAL MEDICINE | Facility: CLINIC | Age: 53
End: 2018-12-02

## 2018-12-02 LAB
ANION GAP SERPL CALC-SCNC: 10 MMOL/L
BASOPHILS # BLD AUTO: 0.04 K/UL
BASOPHILS NFR BLD: 0.6 %
BUN SERPL-MCNC: 10 MG/DL
CALCIUM SERPL-MCNC: 8.2 MG/DL
CHLORIDE SERPL-SCNC: 106 MMOL/L
CO2 SERPL-SCNC: 24 MMOL/L
CREAT SERPL-MCNC: 0.8 MG/DL
DIFFERENTIAL METHOD: ABNORMAL
EOSINOPHIL # BLD AUTO: 0.2 K/UL
EOSINOPHIL NFR BLD: 3.3 %
ERYTHROCYTE [DISTWIDTH] IN BLOOD BY AUTOMATED COUNT: 13.9 %
EST. GFR  (AFRICAN AMERICAN): >60 ML/MIN/1.73 M^2
EST. GFR  (NON AFRICAN AMERICAN): >60 ML/MIN/1.73 M^2
GLUCOSE SERPL-MCNC: 98 MG/DL
HCT VFR BLD AUTO: 34.4 %
HGB BLD-MCNC: 11.1 G/DL
IMM GRANULOCYTES # BLD AUTO: 0.02 K/UL
IMM GRANULOCYTES NFR BLD AUTO: 0.3 %
LYMPHOCYTES # BLD AUTO: 3.1 K/UL
LYMPHOCYTES NFR BLD: 44.5 %
MAGNESIUM SERPL-MCNC: 1.8 MG/DL
MCH RBC QN AUTO: 29.3 PG
MCHC RBC AUTO-ENTMCNC: 32.3 G/DL
MCV RBC AUTO: 91 FL
MONOCYTES # BLD AUTO: 0.5 K/UL
MONOCYTES NFR BLD: 6.9 %
NEUTROPHILS # BLD AUTO: 3.1 K/UL
NEUTROPHILS NFR BLD: 44.4 %
NRBC BLD-RTO: 0 /100 WBC
PHOSPHATE SERPL-MCNC: 4 MG/DL
PLATELET # BLD AUTO: 227 K/UL
PMV BLD AUTO: 10.8 FL
POTASSIUM SERPL-SCNC: 4 MMOL/L
RBC # BLD AUTO: 3.79 M/UL
SODIUM SERPL-SCNC: 140 MMOL/L
WBC # BLD AUTO: 6.97 K/UL

## 2018-12-02 PROCEDURE — G0378 HOSPITAL OBSERVATION PER HR: HCPCS | Mod: HCWC

## 2018-12-02 PROCEDURE — 63600175 PHARM REV CODE 636 W HCPCS: Mod: HCWC | Performed by: PHYSICIAN ASSISTANT

## 2018-12-02 PROCEDURE — 84100 ASSAY OF PHOSPHORUS: CPT | Mod: HCWC

## 2018-12-02 PROCEDURE — 83735 ASSAY OF MAGNESIUM: CPT | Mod: HCWC

## 2018-12-02 PROCEDURE — 99214 OFFICE O/P EST MOD 30 MIN: CPT | Mod: HCWC,GC,, | Performed by: PSYCHIATRY & NEUROLOGY

## 2018-12-02 PROCEDURE — 85025 COMPLETE CBC W/AUTO DIFF WBC: CPT | Mod: HCWC

## 2018-12-02 PROCEDURE — 99226 PR SUBSEQUENT OBSERVATION CARE,LEVEL III: CPT | Mod: HCWC,,, | Performed by: PHYSICIAN ASSISTANT

## 2018-12-02 PROCEDURE — 36415 COLL VENOUS BLD VENIPUNCTURE: CPT | Mod: HCWC

## 2018-12-02 PROCEDURE — 25000003 PHARM REV CODE 250: Mod: HCWC | Performed by: PHYSICIAN ASSISTANT

## 2018-12-02 PROCEDURE — 80048 BASIC METABOLIC PNL TOTAL CA: CPT | Mod: HCWC

## 2018-12-02 RX ADMIN — TRAZODONE HYDROCHLORIDE 100 MG: 50 TABLET ORAL at 09:12

## 2018-12-02 RX ADMIN — OXYCODONE HYDROCHLORIDE 15 MG: 5 TABLET ORAL at 03:12

## 2018-12-02 RX ADMIN — BACLOFEN 20 MG: 10 TABLET ORAL at 09:12

## 2018-12-02 RX ADMIN — BACLOFEN 20 MG: 10 TABLET ORAL at 01:12

## 2018-12-02 RX ADMIN — CEFTRIAXONE 1 G: 1 INJECTION, POWDER, FOR SOLUTION INTRAMUSCULAR; INTRAVENOUS at 06:12

## 2018-12-02 RX ADMIN — GABAPENTIN 400 MG: 400 CAPSULE ORAL at 10:12

## 2018-12-02 RX ADMIN — DANTROLENE SODIUM 50 MG: 50 CAPSULE ORAL at 09:12

## 2018-12-02 RX ADMIN — OXYCODONE HYDROCHLORIDE 15 MG: 5 TABLET ORAL at 10:12

## 2018-12-02 RX ADMIN — BUPROPION HYDROCHLORIDE 150 MG: 150 TABLET, FILM COATED, EXTENDED RELEASE ORAL at 10:12

## 2018-12-02 RX ADMIN — DULOXETINE 30 MG: 30 CAPSULE, DELAYED RELEASE ORAL at 09:12

## 2018-12-02 RX ADMIN — DANTROLENE SODIUM 50 MG: 50 CAPSULE ORAL at 01:12

## 2018-12-02 RX ADMIN — ACETAMINOPHEN 1000 MG: 500 TABLET, FILM COATED ORAL at 09:12

## 2018-12-02 RX ADMIN — ACETAMINOPHEN 1000 MG: 500 TABLET, FILM COATED ORAL at 03:12

## 2018-12-02 RX ADMIN — POLYETHYLENE GLYCOL 3350 17 G: 17 POWDER, FOR SOLUTION ORAL at 09:12

## 2018-12-02 RX ADMIN — GABAPENTIN 400 MG: 400 CAPSULE ORAL at 01:12

## 2018-12-02 RX ADMIN — RIVAROXABAN 20 MG: 20 TABLET, FILM COATED ORAL at 06:12

## 2018-12-02 RX ADMIN — DANTROLENE SODIUM 50 MG: 50 CAPSULE ORAL at 06:12

## 2018-12-02 RX ADMIN — STANDARDIZED SENNA CONCENTRATE AND DOCUSATE SODIUM 1 TABLET: 8.6; 5 TABLET, FILM COATED ORAL at 09:12

## 2018-12-02 RX ADMIN — BACLOFEN 20 MG: 10 TABLET ORAL at 06:12

## 2018-12-02 RX ADMIN — DIAZEPAM 5 MG: 5 TABLET ORAL at 03:12

## 2018-12-02 RX ADMIN — STANDARDIZED SENNA CONCENTRATE AND DOCUSATE SODIUM 1 TABLET: 8.6; 5 TABLET, FILM COATED ORAL at 10:12

## 2018-12-02 RX ADMIN — GABAPENTIN 400 MG: 400 CAPSULE ORAL at 09:12

## 2018-12-02 RX ADMIN — GABAPENTIN 400 MG: 400 CAPSULE ORAL at 06:12

## 2018-12-02 RX ADMIN — TAMSULOSIN HYDROCHLORIDE 0.4 MG: 0.4 CAPSULE ORAL at 10:12

## 2018-12-02 NOTE — ASSESSMENT & PLAN NOTE
- acute on chronic LE weakness, likely pseudo-flair due to UTI  - Continue baclofen 20mg, dantrolene 50mg and gabapentin 300 mg  - Reportedly has been prescribed Ampyra (dalfampridine) but says he is not on it.  - continue PT/OT/SLP  - neuro consulted   - MRI brain & c-spine w/wo contrast epilepsy protocol with no new lesions  - patient normally ambulates with a walker  - fall and aspriation precautions

## 2018-12-02 NOTE — PROGRESS NOTES
[facility] Hospital Medicine  Progress Note    Patient Name: Mao Levin  MRN: [id number]  Patient Class: OP- Observation   Admission Date: 11/30/2018  Length of Stay: 0 days  Attending Physician: JESSE Bravo MD  Primary Care Provider: Mendy Alarcon MD    [facility] Team: [facility] Danilo Link PA-C    Subjective:     Principal Problem:MS (multiple sclerosis)    HPI:  53-year-old male with a past medical history of MS, neurogenic bladder, PE on xarelto, chronic pain who presents for evaluation of increased LE weakness and pain for the past 2 days. Patient normally ambulates around 300 ft with a walker and has only been able to walk 4-10 steps for the past 2 days. He admits to falling 4 times today and denies LOC or head trauma. Patient was diagnosed with UTI in the ED today. He denies UTI symptoms besides increased urinary frequency. He was admitted for sepsis due to UTI last month and treated with Zosyn. Patient has a history of urgency incontinence due to MS controlled with mirabegron. He does not self cath and does not have a agee catheter. Denies fevers, chills, CP, SOB, abdominal pain, dysuria, hesitancy, hematuria, n/v, constipation, diarrhea, headaches, and vision changes, new numbness, or weakness. He states his symptoms are c/w prior UTIs.    ED: CBC WNL, CMP WNL, UA+, started on rocephin.    Hospital Course:  Patient admitted to observation for weakness. UA suggestive of UTI. Neurology consulted, MRI brain w/wo contrast with no new lesions. Likely discharge in 1-3 days pending workup. PT/OT/SLP consulted, SNF recommended.    Interval History: No events overnight. Patient resting in bed. Neurology consulted.    Review of Systems   Constitutional: Negative for fatigue and fever.   Eyes: Negative for visual disturbance (chronic blurred vision due to MS).   Respiratory: Negative for chest tightness and shortness of breath.    Cardiovascular: Negative for chest  pain and palpitations.   Gastrointestinal: Negative for abdominal pain and nausea.   Genitourinary: Positive for frequency. Negative for dysuria, flank pain and urgency.   Musculoskeletal: Positive for back pain (lower back), gait problem, myalgias (distal LEs) and neck stiffness.   Neurological: Positive for weakness. Negative for dizziness, numbness and headaches.   Psychiatric/Behavioral: Negative for agitation and confusion.     Objective:     Vital Signs (Most Recent):  Temp: 97.9 °F (36.6 °C) (12/02/18 1118)  Pulse: 70 (12/02/18 1118)  Resp: 18 (12/02/18 1118)  BP: 123/76 (12/02/18 1118)  SpO2: 98 % (12/02/18 1118) Vital Signs (24h Range):  Temp:  [96.4 °F (35.8 °C)-97.9 °F (36.6 °C)] 97.9 °F (36.6 °C)  Pulse:  [61-76] 70  Resp:  [16-18] 18  SpO2:  [95 %-100 %] 98 %  BP: ()/(50-76) 123/76     Weight: 80.2 kg (176 lb 12.9 oz)  Body mass index is 25.37 kg/m².    Intake/Output Summary (Last 24 hours) at 12/2/2018 1431  Last data filed at 12/2/2018 0945  Gross per 24 hour   Intake 720 ml   Output 150 ml   Net 570 ml      Physical Exam   Constitutional: He is oriented to person, place, and time. He appears well-developed and well-nourished.   Cardiovascular: Normal rate, regular rhythm and normal heart sounds.   Pulmonary/Chest: Effort normal and breath sounds normal.   Abdominal: Soft. Normal appearance and bowel sounds are normal. There is no tenderness. There is no CVA tenderness.   Musculoskeletal: He exhibits no edema or deformity.        Right shoulder: Normal.        Right elbow: Normal.       Right wrist: Normal.        Right lower leg: He exhibits tenderness. He exhibits no swelling, no edema and no deformity.        Left lower leg: He exhibits tenderness. He exhibits no swelling, no edema and no deformity.   Decreased strength in LE bilaterally. Left LE weaker than right. Patient states this is his baseline. Decreased  strength in left hand.    Neurological: He is oriented to person, place, and  time. A sensory deficit (left LE) is present.   Skin: Skin is warm and dry.   Psychiatric: He has a normal mood and affect. His speech is normal and behavior is normal. Cognition and memory are normal.   Nursing note and vitals reviewed.      Significant Labs:   BMP:   Recent Labs   Lab 12/02/18  0357   GLU 98      K 4.0      CO2 24   BUN 10   CREATININE 0.8   CALCIUM 8.2*   MG 1.8     CBC:   Recent Labs   Lab 12/01/18 0357 12/02/18 0357   WBC 7.70 6.97   HGB 11.2* 11.1*   HCT 33.7* 34.4*    227     Urine Studies:   No results for input(s): COLORU, APPEARANCEUA, PHUR, SPECGRAV, PROTEINUA, GLUCUA, KETONESU, BILIRUBINUA, OCCULTUA, NITRITE, UROBILINOGEN, LEUKOCYTESUR, RBCUA, WBCUA, BACTERIA, SQUAMEPITHEL, HYALINECASTS in the last 48 hours.    Invalid input(s): LUIS FELIPE    Significant Imaging: I have reviewed all pertinent imaging results/findings within the past 24 hours.    Assessment/Plan:      * MS (multiple sclerosis)    - acute on chronic LE weakness, likely pseudo-flair due to UTI  - Continue baclofen 20mg, dantrolene 50mg and gabapentin 300 mg  - Reportedly has been prescribed Ampyra (dalfampridine) but says he is not on it.  - continue PT/OT/SLP  - neuro consulted   - MRI brain & c-spine w/wo contrast epilepsy protocol with no new lesions  - patient normally ambulates with a walker  - fall and aspriation precautions     Urinary tract infection without hematuria    Neurogenic bladder  Recurrent UTIs  - continue empiric rocephin for now  - previous UCx with [facility] sensitive to rocephin  - 0/4 SIRS     10/25/2016 [facility] PE    - continue Xarelto 20mg     Chronic pain of multiple sites    - scheduled tylenol, oxy 10,15 PRN       VTE Risk Mitigation (From admission, onward)        Ordered     rivaroxaban tablet 20 mg  With dinner      11/30/18 1925              Danilo Link PA-C  Department of Hospital Medicine   [facility]

## 2018-12-02 NOTE — PLAN OF CARE
Problem: Patient Care Overview  Goal: Plan of Care Review  Outcome: Ongoing (interventions implemented as appropriate)  Activity adjusted as tolerated, due meds given. Assisted on personal hygiene, teeth brushed.   Educated on the risk developing pressure sore, encouraged  to shift weight frequently.  Safety maintained: bed at low and locked position, call bell within reach, intrusted to call for mobility assistance.  Promoted rest and sleep. Patient did not urinate overnight, reported to incoming RNJami.

## 2018-12-02 NOTE — SUBJECTIVE & OBJECTIVE
Interval History: No events overnight. Patient resting in bed. Neurology consulted.    Review of Systems   Constitutional: Negative for fatigue and fever.   Eyes: Negative for visual disturbance (chronic blurred vision due to MS).   Respiratory: Negative for chest tightness and shortness of breath.    Cardiovascular: Negative for chest pain and palpitations.   Gastrointestinal: Negative for abdominal pain and nausea.   Genitourinary: Positive for frequency. Negative for dysuria, flank pain and urgency.   Musculoskeletal: Positive for back pain (lower back), gait problem, myalgias (distal LEs) and neck stiffness.   Neurological: Positive for weakness. Negative for dizziness, numbness and headaches.   Psychiatric/Behavioral: Negative for agitation and confusion.     Objective:     Vital Signs (Most Recent):  Temp: 97.9 °F (36.6 °C) (12/02/18 1118)  Pulse: 70 (12/02/18 1118)  Resp: 18 (12/02/18 1118)  BP: 123/76 (12/02/18 1118)  SpO2: 98 % (12/02/18 1118) Vital Signs (24h Range):  Temp:  [96.4 °F (35.8 °C)-97.9 °F (36.6 °C)] 97.9 °F (36.6 °C)  Pulse:  [61-76] 70  Resp:  [16-18] 18  SpO2:  [95 %-100 %] 98 %  BP: ()/(50-76) 123/76     Weight: 80.2 kg (176 lb 12.9 oz)  Body mass index is 25.37 kg/m².    Intake/Output Summary (Last 24 hours) at 12/2/2018 1431  Last data filed at 12/2/2018 0945  Gross per 24 hour   Intake 720 ml   Output 150 ml   Net 570 ml      Physical Exam   Constitutional: He is oriented to person, place, and time. He appears well-developed and well-nourished.   Cardiovascular: Normal rate, regular rhythm and normal heart sounds.   Pulmonary/Chest: Effort normal and breath sounds normal.   Abdominal: Soft. Normal appearance and bowel sounds are normal. There is no tenderness. There is no CVA tenderness.   Musculoskeletal: He exhibits no edema or deformity.        Right shoulder: Normal.        Right elbow: Normal.       Right wrist: Normal.        Right lower leg: He exhibits tenderness. He  exhibits no swelling, no edema and no deformity.        Left lower leg: He exhibits tenderness. He exhibits no swelling, no edema and no deformity.   Decreased strength in LE bilaterally. Left LE weaker than right. Patient states this is his baseline. Decreased  strength in left hand.    Neurological: He is oriented to person, place, and time. A sensory deficit (left LE) is present.   Skin: Skin is warm and dry.   Psychiatric: He has a normal mood and affect. His speech is normal and behavior is normal. Cognition and memory are normal.   Nursing note and vitals reviewed.      Significant Labs:   BMP:   Recent Labs   Lab 12/02/18 0357   GLU 98      K 4.0      CO2 24   BUN 10   CREATININE 0.8   CALCIUM 8.2*   MG 1.8     CBC:   Recent Labs   Lab 12/01/18 0357 12/02/18 0357   WBC 7.70 6.97   HGB 11.2* 11.1*   HCT 33.7* 34.4*    227     Urine Studies:   No results for input(s): COLORU, APPEARANCEUA, PHUR, SPECGRAV, PROTEINUA, GLUCUA, KETONESU, BILIRUBINUA, OCCULTUA, NITRITE, UROBILINOGEN, LEUKOCYTESUR, RBCUA, WBCUA, BACTERIA, SQUAMEPITHEL, HYALINECASTS in the last 48 hours.    Invalid input(s): LUIS FELIPE    Significant Imaging: I have reviewed all pertinent imaging results/findings within the past 24 hours.

## 2018-12-03 LAB
ANION GAP SERPL CALC-SCNC: 7 MMOL/L
BACTERIA UR CULT: NORMAL
BASOPHILS # BLD AUTO: 0.05 K/UL
BASOPHILS NFR BLD: 0.8 %
BUN SERPL-MCNC: 15 MG/DL
CALCIUM SERPL-MCNC: 8.3 MG/DL
CHLORIDE SERPL-SCNC: 107 MMOL/L
CO2 SERPL-SCNC: 28 MMOL/L
CREAT SERPL-MCNC: 1 MG/DL
DIFFERENTIAL METHOD: ABNORMAL
EOSINOPHIL # BLD AUTO: 0.2 K/UL
EOSINOPHIL NFR BLD: 3.5 %
ERYTHROCYTE [DISTWIDTH] IN BLOOD BY AUTOMATED COUNT: 13.9 %
EST. GFR  (AFRICAN AMERICAN): >60 ML/MIN/1.73 M^2
EST. GFR  (NON AFRICAN AMERICAN): >60 ML/MIN/1.73 M^2
GLUCOSE SERPL-MCNC: 95 MG/DL
HCT VFR BLD AUTO: 34 %
HGB BLD-MCNC: 10.9 G/DL
IMM GRANULOCYTES # BLD AUTO: 0.01 K/UL
IMM GRANULOCYTES NFR BLD AUTO: 0.2 %
LYMPHOCYTES # BLD AUTO: 3 K/UL
LYMPHOCYTES NFR BLD: 45.5 %
MAGNESIUM SERPL-MCNC: 2.1 MG/DL
MCH RBC QN AUTO: 29.9 PG
MCHC RBC AUTO-ENTMCNC: 32.1 G/DL
MCV RBC AUTO: 93 FL
MONOCYTES # BLD AUTO: 0.5 K/UL
MONOCYTES NFR BLD: 6.9 %
NEUTROPHILS # BLD AUTO: 2.8 K/UL
NEUTROPHILS NFR BLD: 43.1 %
NRBC BLD-RTO: 0 /100 WBC
PHOSPHATE SERPL-MCNC: 4.4 MG/DL
PLATELET # BLD AUTO: 238 K/UL
PMV BLD AUTO: 10.9 FL
POTASSIUM SERPL-SCNC: 4.2 MMOL/L
RBC # BLD AUTO: 3.65 M/UL
SODIUM SERPL-SCNC: 142 MMOL/L
WBC # BLD AUTO: 6.53 K/UL

## 2018-12-03 PROCEDURE — 99226 PR SUBSEQUENT OBSERVATION CARE,LEVEL III: CPT | Mod: HCWC,,, | Performed by: PHYSICIAN ASSISTANT

## 2018-12-03 PROCEDURE — 97110 THERAPEUTIC EXERCISES: CPT | Mod: HCWC

## 2018-12-03 PROCEDURE — 83735 ASSAY OF MAGNESIUM: CPT | Mod: HCWC

## 2018-12-03 PROCEDURE — 97535 SELF CARE MNGMENT TRAINING: CPT | Mod: HCWC

## 2018-12-03 PROCEDURE — 92610 EVALUATE SWALLOWING FUNCTION: CPT | Mod: HCWC

## 2018-12-03 PROCEDURE — G8987 SELF CARE CURRENT STATUS: HCPCS | Mod: CK,HCWC

## 2018-12-03 PROCEDURE — 85025 COMPLETE CBC W/AUTO DIFF WBC: CPT | Mod: HCWC

## 2018-12-03 PROCEDURE — 99214 OFFICE O/P EST MOD 30 MIN: CPT | Mod: HCWC,GC,, | Performed by: PSYCHIATRY & NEUROLOGY

## 2018-12-03 PROCEDURE — 63600175 PHARM REV CODE 636 W HCPCS: Mod: HCWC | Performed by: PHYSICIAN ASSISTANT

## 2018-12-03 PROCEDURE — 36415 COLL VENOUS BLD VENIPUNCTURE: CPT | Mod: HCWC

## 2018-12-03 PROCEDURE — 86580 TB INTRADERMAL TEST: CPT | Mod: HCWC | Performed by: INTERNAL MEDICINE

## 2018-12-03 PROCEDURE — 25000003 PHARM REV CODE 250: Mod: HCWC | Performed by: PHYSICIAN ASSISTANT

## 2018-12-03 PROCEDURE — 80048 BASIC METABOLIC PNL TOTAL CA: CPT | Mod: HCWC

## 2018-12-03 PROCEDURE — 97165 OT EVAL LOW COMPLEX 30 MIN: CPT | Mod: HCWC

## 2018-12-03 PROCEDURE — G8988 SELF CARE GOAL STATUS: HCPCS | Mod: CJ,HCWC

## 2018-12-03 PROCEDURE — G0378 HOSPITAL OBSERVATION PER HR: HCPCS | Mod: HCWC

## 2018-12-03 PROCEDURE — 84100 ASSAY OF PHOSPHORUS: CPT | Mod: HCWC

## 2018-12-03 PROCEDURE — 63600175 PHARM REV CODE 636 W HCPCS: Mod: HCWC | Performed by: INTERNAL MEDICINE

## 2018-12-03 RX ADMIN — BACLOFEN 20 MG: 10 TABLET ORAL at 09:12

## 2018-12-03 RX ADMIN — OXYCODONE HYDROCHLORIDE 15 MG: 5 TABLET ORAL at 05:12

## 2018-12-03 RX ADMIN — DULOXETINE 30 MG: 30 CAPSULE, DELAYED RELEASE ORAL at 09:12

## 2018-12-03 RX ADMIN — DIAZEPAM 5 MG: 5 TABLET ORAL at 10:12

## 2018-12-03 RX ADMIN — TRAZODONE HYDROCHLORIDE 100 MG: 50 TABLET ORAL at 07:12

## 2018-12-03 RX ADMIN — BUPROPION HYDROCHLORIDE 150 MG: 150 TABLET, FILM COATED, EXTENDED RELEASE ORAL at 09:12

## 2018-12-03 RX ADMIN — DANTROLENE SODIUM 50 MG: 50 CAPSULE ORAL at 01:12

## 2018-12-03 RX ADMIN — DANTROLENE SODIUM 50 MG: 50 CAPSULE ORAL at 10:12

## 2018-12-03 RX ADMIN — POLYETHYLENE GLYCOL 3350 17 G: 17 POWDER, FOR SOLUTION ORAL at 10:12

## 2018-12-03 RX ADMIN — DANTROLENE SODIUM 50 MG: 50 CAPSULE ORAL at 07:12

## 2018-12-03 RX ADMIN — CEFTRIAXONE 1 G: 1 INJECTION, POWDER, FOR SOLUTION INTRAMUSCULAR; INTRAVENOUS at 04:12

## 2018-12-03 RX ADMIN — GABAPENTIN 400 MG: 400 CAPSULE ORAL at 04:12

## 2018-12-03 RX ADMIN — BACLOFEN 20 MG: 10 TABLET ORAL at 04:12

## 2018-12-03 RX ADMIN — STANDARDIZED SENNA CONCENTRATE AND DOCUSATE SODIUM 1 TABLET: 8.6; 5 TABLET, FILM COATED ORAL at 09:12

## 2018-12-03 RX ADMIN — RIVAROXABAN 20 MG: 20 TABLET, FILM COATED ORAL at 05:12

## 2018-12-03 RX ADMIN — ACETAMINOPHEN 1000 MG: 500 TABLET, FILM COATED ORAL at 04:12

## 2018-12-03 RX ADMIN — ACETAMINOPHEN 1000 MG: 500 TABLET, FILM COATED ORAL at 07:12

## 2018-12-03 RX ADMIN — OXYCODONE HYDROCHLORIDE 15 MG: 5 TABLET ORAL at 10:12

## 2018-12-03 RX ADMIN — BACLOFEN 20 MG: 10 TABLET ORAL at 07:12

## 2018-12-03 RX ADMIN — Medication 5 UNITS: at 04:12

## 2018-12-03 RX ADMIN — GABAPENTIN 400 MG: 400 CAPSULE ORAL at 01:12

## 2018-12-03 RX ADMIN — TAMSULOSIN HYDROCHLORIDE 0.4 MG: 0.4 CAPSULE ORAL at 10:12

## 2018-12-03 RX ADMIN — GABAPENTIN 400 MG: 400 CAPSULE ORAL at 09:12

## 2018-12-03 RX ADMIN — OXYCODONE HYDROCHLORIDE 15 MG: 5 TABLET ORAL at 04:12

## 2018-12-03 RX ADMIN — ACETAMINOPHEN 1000 MG: 500 TABLET, FILM COATED ORAL at 10:12

## 2018-12-03 RX ADMIN — STANDARDIZED SENNA CONCENTRATE AND DOCUSATE SODIUM 1 TABLET: 8.6; 5 TABLET, FILM COATED ORAL at 07:12

## 2018-12-03 RX ADMIN — DANTROLENE SODIUM 50 MG: 50 CAPSULE ORAL at 04:12

## 2018-12-03 RX ADMIN — BACLOFEN 20 MG: 10 TABLET ORAL at 01:12

## 2018-12-03 RX ADMIN — DIAZEPAM 5 MG: 5 TABLET ORAL at 05:12

## 2018-12-03 RX ADMIN — GABAPENTIN 400 MG: 400 CAPSULE ORAL at 07:12

## 2018-12-03 NOTE — PROGRESS NOTES
[facility] Hospital Medicine  Progress Note    Patient Name: Mao Levin  MRN: [id number]  Patient Class: OP- Observation   Admission Date: 11/30/2018  Length of Stay: 0 days  Attending Physician: JESSE Bravo MD  Primary Care Provider: Mendy Alarcon MD    [facility] Medicine Team: [facility] Danilo Link PA-C    Subjective:     Principal Problem:MS (multiple sclerosis)    HPI:  53-year-old male with a past medical history of MS, neurogenic bladder, PE on xarelto, chronic pain who presents for evaluation of increased LE weakness and pain for the past 2 days. Patient normally ambulates around 300 ft with a walker and has only been able to walk 4-10 steps for the past 2 days. He admits to falling 4 times today and denies LOC or head trauma. Patient was diagnosed with UTI in the ED today. He denies UTI symptoms besides increased urinary frequency. He was admitted for sepsis due to UTI last month and treated with Zosyn. Patient has a history of urgency incontinence due to MS controlled with mirabegron. He does not self cath and does not have a agee catheter. Denies fevers, chills, CP, SOB, abdominal pain, dysuria, hesitancy, hematuria, n/v, constipation, diarrhea, headaches, and vision changes, new numbness, or weakness. He states his symptoms are c/w prior UTIs.    ED: CBC WNL, CMP WNL, UA+, started on rocephin.    Hospital Course:  Patient admitted to observation for weakness. UA suggestive of UTI. Neurology consulted, MRI brain w/wo contrast with no new lesions. Likely discharge in 1-3 days pending workup. PT/OT/SLP consulted, SNF recommended. Urine culture growing providencia stuartii, will continue Ceftriaxone until discharge, transition to Bactrim.    Interval History: No events overnight. Patient resting in bed, planning to work with OT today.    Review of Systems   Constitutional: Negative for fatigue and fever.   Eyes: Negative for visual disturbance (chronic blurred vision  due to MS).   Respiratory: Negative for chest tightness and shortness of breath.    Cardiovascular: Negative for chest pain and palpitations.   Gastrointestinal: Negative for abdominal pain and nausea.   Genitourinary: Positive for frequency. Negative for dysuria, flank pain and urgency.   Musculoskeletal: Positive for back pain (lower back), gait problem, myalgias (distal LEs) and neck stiffness.   Neurological: Positive for weakness. Negative for dizziness, numbness and headaches.   Psychiatric/Behavioral: Negative for agitation and confusion.     Objective:     Vital Signs (Most Recent):  Temp: 98.2 °F (36.8 °C) (12/03/18 1157)  Pulse: 72 (12/03/18 1157)  Resp: 18 (12/03/18 1157)  BP: 106/65 (12/03/18 1157)  SpO2: 98 % (12/03/18 1157) Vital Signs (24h Range):  Temp:  [96.2 °F (35.7 °C)-98.2 °F (36.8 °C)] 98.2 °F (36.8 °C)  Pulse:  [64-75] 72  Resp:  [16-20] 18  SpO2:  [96 %-99 %] 98 %  BP: ()/(52-72) 106/65     Weight: 80.2 kg (176 lb 12.9 oz)  Body mass index is 25.37 kg/m².    Intake/Output Summary (Last 24 hours) at 12/3/2018 1229  Last data filed at 12/2/2018 1650  Gross per 24 hour   Intake --   Output 650 ml   Net -650 ml      Physical Exam   Constitutional: He is oriented to person, place, and time. He appears well-developed and well-nourished.   Cardiovascular: Normal rate, regular rhythm and normal heart sounds.   Pulmonary/Chest: Effort normal and breath sounds normal.   Abdominal: Soft. Normal appearance and bowel sounds are normal. There is no tenderness. There is no CVA tenderness.   Musculoskeletal: He exhibits no edema or deformity.        Right shoulder: Normal.        Right elbow: Normal.       Right wrist: Normal.        Right lower leg: He exhibits tenderness. He exhibits no swelling, no edema and no deformity.        Left lower leg: He exhibits tenderness. He exhibits no swelling, no edema and no deformity.   Decreased strength in LE bilaterally. Left LE weaker than right. Patient states  this is his baseline. Decreased  strength in left hand.    Neurological: He is oriented to person, place, and time. A sensory deficit (left LE) is present.   Skin: Skin is warm and dry.   Psychiatric: He has a normal mood and affect. His speech is normal and behavior is normal. Cognition and memory are normal.   Nursing note and vitals reviewed.      Significant Labs:   BMP:   Recent Labs   Lab 12/03/18  0338   GLU 95      K 4.2      CO2 28   BUN 15   CREATININE 1.0   CALCIUM 8.3*   MG 2.1     CBC:   Recent Labs   Lab 12/02/18  0357 12/03/18  0338   WBC 6.97 6.53   HGB 11.1* 10.9*   HCT 34.4* 34.0*    238     Urine Studies:   No results for input(s): COLORU, APPEARANCEUA, PHUR, SPECGRAV, PROTEINUA, GLUCUA, KETONESU, BILIRUBINUA, OCCULTUA, NITRITE, UROBILINOGEN, LEUKOCYTESUR, RBCUA, WBCUA, BACTERIA, SQUAMEPITHEL, HYALINECASTS in the last 48 hours.    Invalid input(s): LUIS FELIPE    Significant Imaging: I have reviewed all pertinent imaging results/findings within the past 24 hours.    Assessment/Plan:      * MS (multiple sclerosis)    - acute on chronic LE weakness, likely pseudo-flair due to UTI  - Continue baclofen 20mg, dantrolene 50mg and gabapentin 300 mg  - Reportedly has been prescribed Ampyra (dalfampridine) but says he is not on it.  - continue PT/OT/SLP  - neuro consulted   - MRI brain & c-spine w/wo contrast epilepsy protocol with no new lesions  - patient normally ambulates with a walker  - fall and aspriation precautions  - likely SNF placement     Urinary tract infection without hematuria    Neurogenic bladder  Recurrent UTIs  - continue empiric rocephin for now  - previous UCx with PROVIDENCIA STUARTII sensitive to rocephin  - 0/4 SIRS  - likely transition to Bactrim on discharge     10/25/2016 Saddle PE    - continue Xarelto 20mg     Chronic pain of multiple sites    - scheduled tylenol, oxy 10,15 PRN       VTE Risk Mitigation (From admission, onward)        Ordered      rivaroxaban tablet 20 mg  With dinner      11/30/18 1925              Danilo Link PA-C  Department of [facility] Medicine   [facility]

## 2018-12-03 NOTE — ASSESSMENT & PLAN NOTE
Last dose of Rituxan was in 04/2018  Was due for Rituxan in Oct 2018 but did not receive it. CD20 cells repopulated on last lab check    Currently labs remarkable for UTI    MRI brain and c spine without evidence of active lesions. Clinical presentation - MS pseudoflare in setting of UTI    - continue treatment for UTI  - PT/OT and SLP consult while inpatient  - will discuss with patient's MS team with regards to plans for Rituxan

## 2018-12-03 NOTE — PT/OT/SLP EVAL
Speech Language Pathology Evaluation  Bedside Swallow  Discharge Summary    Patient Name:  Mao Levin   MRN:  [id number]  Admitting Diagnosis: MS (multiple sclerosis)    Recommendations:                 General Recommendations:  Follow-up not indicated  Diet recommendations:  Regular, Thin   Aspiration Precautions: Meds whole 1 at a time and Standard aspiration precautions   General Precautions: Standard, fall, aspiration  Communication strategies:  none    History:     Past Medical History:   Diagnosis Date    Anticoagulant long-term use     Anxiety     Chronic back pain     Deep vein thrombosis     Depression     Depression     Gait instability     Multiple sclerosis     Multiple sclerosis     Neurogenic bladder     Polyneuropathy     Pulmonary embolism     Spasticity        Past Surgical History:   Procedure Laterality Date    CYSTOSCOPY N/A 6/20/2018    Procedure: CYSTOSCOPY;  Surgeon: Brian Augustin MD;  Location: [facility] OR [address] [place] [address];  Service: Urology;  Laterality: N/A;  1 hour    CYSTOSCOPY N/A 6/20/2018    Performed by Brian Augustin MD at [facility] OR [address] [place] [address]    INJECTION OF BOTULINUM TOXIN TYPE A N/A 6/20/2018    Procedure: INJECTION, BOTULINUM TOXIN, TYPE A 200 UNITS;  Surgeon: Brian Augustin MD;  Location: [facility] OR [address] [place] [address];  Service: Urology;  Laterality: N/A;    INJECTION, BOTULINUM TOXIN, TYPE A 200 UNITS N/A 6/20/2018    Performed by Brian Augustin MD at [facility] OR [address] [place] [address]       Social History: Patient lives with his mother.    Prior Intubation HX:  None this admission    Modified Barium Swallow: none this admission    Chest X-Rays: 11/30/18:  No acute cardiopulmonary process, noting pulmonary hyperinflation suggests sequela of COPD/emphysema, correlation with any history of the same recommended.    Prior diet: regular.    Occupation/hobbies/homemaking: none expressed.    Subjective     Pt was awake and alert in NAD.  He was agreeable to evaluation.  Patient goals: to have a cup of coffee      Pain/Comfort:  · Pain Rating 1: 0/10  · Pain Rating Post-Intervention 1: 0/10    Objective:     Oral Musculature Evaluation  · Oral Musculature: WFL  · Dentition: present and adequate  · Secretion Management: adequate  · Mucosal Quality: adequate  · Mandibular Strength and Mobility: WFL  · Oral Labial Strength and Mobility: WFL  · Lingual Strength and Mobility: WFL  · Buccal Strength and Mobility: WFL  · Volitional Cough: adequate  · Volitional Swallow: present  · Voice Prior to PO Intake: clear    Bedside Swallow Eval:   Consistencies Assessed:  · Thin liquids tsp, cup and straw sips  · Puree tsp bites  · Solids self-fed bites     Oral Phase:   · WNL    Pharyngeal Phase:   · WNL  · no overt clinical signs/symptoms of aspiration  · no overt clinical signs/symptoms of pharyngeal dysphagia    Compensatory Strategies  · None    Treatment: Education was provided to pt re:  SLP role, eval results, diet recs, aspiration precautions, s/s of aspiration, risk of aspiration and SLP POC.  He indicated good understanding and agreed w/ recommendations.    Assessment:     Mao Levin is a 53 y.o. male with an SLP diagnosis of Dysphagia.  He presents with a normal oropharyngeal swallow at this time.  Further Skilled Speech services are not indicated at this time.    Goals:   Multidisciplinary Problems     SLP Goals     Not on file          Multidisciplinary Problems (Resolved)        Problem: SLP Goal    Goal Priority Disciplines Outcome   SLP Goal   (Resolved)     SLP Outcome(s) achieved                   Plan:     · Patient to be seen:      · Plan of Care expires:     · Plan of Care reviewed with:  patient   · SLP Follow-Up:  No       Discharge recommendations:  nursing facility, skilled   Barriers to Discharge:  None    Time Tracking:     SLP Treatment Date:   12/03/18  Speech Start Time:  0926  Speech Stop Time:  0938     Speech Total Time (min):  12 min    Billable Minutes: Eval Swallow and Oral Function 12    Myesha GALLOWAY  CESAR Poe-SLP  12/03/2018

## 2018-12-03 NOTE — PLAN OF CARE
Problem: Patient Care Overview  Goal: Plan of Care Review  Outcome: Ongoing (interventions implemented as appropriate)  Stable VS, pain medicine given with moderate effect. Incontinent of urine,  Pad changed 3 x overnight. Skin intact.  No spiking of temperature. Safety maintained: bed low and locked, call bell within reach. No fall or injury occurred on this shift.  Promoted rest and sleep.

## 2018-12-03 NOTE — PROGRESS NOTES
[facility]  Neurology  Progress Note    Patient Name: Mao Levin  MRN: [id number]  Admission Date: 11/30/2018  Hospital Length of Stay: 0 days  Code Status: Full Code   Attending Provider: JESSE Bravo MD  Primary Care Physician: Mendy Alarcon MD   Principal Problem:MS (multiple sclerosis)      Subjective:     Interval History:   No acute issues overnight. Urine culture growing providencii. On rocephin. Mri brain and c spine completed    Current Neurological Medications:   Baclofen 20mg QID  wellbutrin 150mg qd  Dantrolene 50mg QID  Duloxetine 30mg qd  trazadone 100mg qpm  Valium 5mg q8h PRN    Current Facility-Administered Medications   Medication Dose Route Frequency Provider Last Rate Last Dose    acetaminophen tablet 1,000 mg  1,000 mg Oral TID LOUISE Ornelas-C   1,000 mg at 12/02/18 1539    acetaminophen tablet 650 mg  650 mg Oral Q8H PRN LOUISE Ornelas-FRANCINE        albuterol-ipratropium 2.5 mg-0.5 mg/3 mL nebulizer solution 3 mL  3 mL Nebulization Q4H PRN LOUISE Ornelas-FRANCINE        baclofen tablet 20 mg  20 mg Oral QID LOUISE Ornelas-C   20 mg at 12/02/18 1811    bisacodyl suppository 10 mg  10 mg Rectal Daily PRN LOUISE Ornelas-FRANCINE        buPROPion TB24 tablet 150 mg  150 mg Oral Daily LOUISE Ornelas-C   150 mg at 12/02/18 1000    cefTRIAXone injection 1 g  1 g Intravenous Q24H LOUISE Ornelas-C   1 g at 12/02/18 1812    dantrolene capsule 50 mg  50 mg Oral QID LOUISE Ornelas-C   50 mg at 12/02/18 1811    dextrose 50% injection 12.5 g  12.5 g Intravenous PRN LOUISE Ornelas-C        dextrose 50% injection 25 g  25 g Intravenous PRN LOUISE Ornelas-FRANCINE        diazePAM tablet 5 mg  5 mg Oral Q8H PRN LOUISE Ornelas-C   5 mg at 12/02/18 1539    DULoxetine DR capsule 30 mg  30 mg Oral Daily LOUISE Ornelas-C   30 mg at 12/02/18 0959    gabapentin capsule 400 mg  400 mg Oral QID Jim Galarza PA-C   400 mg at 12/02/18 1811    glucagon (human  recombinant) injection 1 mg  1 mg Intramuscular PRN Jim Galarza, PA-C        glucose chewable tablet 16 g  16 g Oral PRN Jim Galarza, PA-C        glucose chewable tablet 24 g  24 g Oral PRN Jim Galarza, PA-C        ibuprofen tablet 600 mg  600 mg Oral Q6H PRN Jim Galarza, PA-C        ondansetron disintegrating tablet 8 mg  8 mg Oral Q8H PRN Jim Galarza, PA-C        oxyCODONE immediate release tablet 10 mg  10 mg Oral Q6H PRN Jim Galarza, PA-C   10 mg at 12/01/18 1204    oxyCODONE immediate release tablet 15 mg  15 mg Oral Q6H PRN Jim Galarza, PA-C   15 mg at 12/02/18 1539    polyethylene glycol packet 17 g  17 g Oral Daily Jim Galarza, PA-C   17 g at 12/02/18 0959    prochlorperazine injection Soln 5 mg  5 mg Intravenous Q6H PRN LOUISE Ornelas-C        rivaroxaban tablet 20 mg  20 mg Oral Daily with dinner Jim Galarza PA-C   20 mg at 12/02/18 1811    senna-docusate 8.6-50 mg per tablet 1 tablet  1 tablet Oral BID LOUISE Ornelas-C   1 tablet at 12/02/18 1000    sodium chloride 0.9% flush 5 mL  5 mL Intravenous PRN Jim Galarza PA-C        tamsulosin 24 hr capsule 0.4 mg  0.4 mg Oral Daily Jim Galarza, PA-C   0.4 mg at 12/02/18 1001    traZODone tablet 100 mg  100 mg Oral QHS Jim Galarza, PA-C   100 mg at 12/01/18 2231       Review of Systems  Objective:     Vital Signs (Most Recent):  Temp: 97.1 °F (36.2 °C) (12/02/18 2028)  Pulse: 69 (12/02/18 2028)  Resp: 18 (12/02/18 2028)  BP: 109/66 (12/02/18 2028)  SpO2: 99 % (12/02/18 2028) Vital Signs (24h Range):  Temp:  [96.4 °F (35.8 °C)-98.1 °F (36.7 °C)] 97.1 °F (36.2 °C)  Pulse:  [61-75] 69  Resp:  [18-20] 18  SpO2:  [95 %-99 %] 99 %  BP: ()/(50-76) 109/66     Weight: 80.2 kg (176 lb 12.9 oz)  Body mass index is 25.37 kg/m².    Physical Exam   Constitutional: He is oriented to person, place, and time.   Eyes: Pupils are equal, round, and reactive to light.   Neurological: He is oriented to person, place, and time. He has  a normal Finger-Nose-Finger Test.   Reflex Scores:       Tricep reflexes are 2+ on the right side and 3+ on the left side.       Bicep reflexes are 2+ on the right side and 3+ on the left side.       Patellar reflexes are 2+ on the right side and 3+ on the left side.      NEUROLOGICAL EXAMINATION:     MENTAL STATUS   Oriented to person, place, and time.   Level of consciousness: alert    CRANIAL NERVES     CN III, IV, VI   Pupils are equal, round, and reactive to light.    CN V   Left facial sensation deficit: complete    CN VII   Left facial weakness: central    MOTOR EXAM     Strength   Right deltoid: 5/5  Left deltoid: 4/5  Right biceps: 5/5  Left biceps: 5/5  Left triceps: 4/5  Right interossei: 5/5  Left interossei: 2/5  Right iliopsoas: 3/5  Left iliopsoas: 3/5  Right anterior tibial: 4/5  Left anterior tibial: 2/5    REFLEXES     Reflexes   Right biceps: 2+  Left biceps: 3+  Right triceps: 2+  Left triceps: 3+  Right patellar: 2+  Left patellar: 3+    SENSORY EXAM   Left arm light touch: decreased sensation to light touch on the left upper and lower extremities.    GAIT AND COORDINATION      Coordination   Finger to nose coordination: normal      Significant Labs:   Recent Lab Results       12/02/18  0357        Immature Granulocytes 0.3     Immature Grans (Abs) 0.02  Comment:  Mild elevation in immature granulocytes is non specific and   can be seen in a variety of conditions including stress response,   acute inflammation, trauma and pregnancy. Correlation with other   laboratory and clinical findings is essential.       Anion Gap 10     Baso # 0.04     Basophil% 0.6     BUN, Bld 10     Calcium 8.2     Chloride 106     CO2 24     Creatinine 0.8     Differential Method Automated     eGFR if African American >60.0     eGFR if non  >60.0  Comment:  Calculation used to obtain the estimated glomerular filtration  rate (eGFR) is the CKD-EPI equation.        Eos # 0.2     Eosinophil% 3.3      Glucose 98     Gran # (ANC) 3.1     Gran% 44.4     Hematocrit 34.4     Hemoglobin 11.1     Lymph # 3.1     Lymph% 44.5     Magnesium 1.8     MCH 29.3     MCHC 32.3     MCV 91     Mono # 0.5     Mono% 6.9     MPV 10.8     nRBC 0     Phosphorus 4.0     Platelets 227     Potassium 4.0     RBC 3.79     RDW 13.9     Sodium 140     WBC 6.97           Significant Imaging:     MRI brain and cervical spine W WO contrast (12/1/18)  1.  No significant detrimental interval change in the appearance of the brain, noting findings in keeping with patient's reported history of multiple sclerosis.  No evidence of new or enhancing lesions to suggest active demyelination.    2.  Stable appearance of the cervical spine, noting similar distribution and extent of multiple foci of signal abnormality throughout the cervical and visualized upper thoracic cord.  No evidence of new or enhancing lesions to suggest active demyelination.    3.  Degenerative changes of the cervical spine.        Assessment and Plan:     * MS (multiple sclerosis)    Last dose of Rituxan was in 04/2018  Was due for Rituxan in Oct 2018 but did not receive it. CD20 cells repopulated on last lab check    Currently labs remarkable for UTI    MRI brain and c spine without evidence of active lesions. Clinical presentation - MS pseudoflare in setting of UTI    - continue treatment for UTI  - PT/OT and SLP consult while inpatient  - will discuss with patient's MS team with regards to plans for Rituxan     Urinary tract infection without hematuria    UCs - providencii; sensitivities pending  Continue rocephin     Chronic pain of multiple sites    Recommend increasing gabapentin dose to 800mg TID for chronic neck pain  Continue other home pain medications     Spasticity    Continue home dose dantrolene and baclofen.   Valium PRN for breakthrough spasms.   Monitor respiratory status         VTE Risk Mitigation (From admission, onward)        Ordered     rivaroxaban tablet 20  mg  With dinner      11/30/18 1925          Jarad Holcomb MD  Neurology  [facility]

## 2018-12-03 NOTE — PLAN OF CARE
Problem: Fall Risk (Adult)  Goal: Identify Related Risk Factors and Signs and Symptoms  Related risk factors and signs and symptoms are identified upon initiation of Human Response Clinical Practice Guideline (CPG)  Outcome: Ongoing (interventions implemented as appropriate)  Patient sitting in bed with HOB elevated 45 degrees. Denies any discomfort at this time. Safety maintained at this time.

## 2018-12-03 NOTE — PT/OT/SLP EVAL
Occupational Therapy   Evaluation    Name: Mao Levin  MRN: [id number]  Admitting Diagnosis:  MS (multiple sclerosis)      Recommendations:     Discharge Recommendations: nursing facility, skilled  Discharge Equipment Recommendations:  none  Barriers to discharge:  None    History:     Occupational Profile:  Living Environment: Lives in  home with 5 VASILIY and B HR's but only able to use one side at a time  Previous level of function: able to perform basic ADL's with mod I, assist at times with AFO/shoes, assist with some IADL's  Roles and Routines: son, brother  Equipment Used at Home:  walker, rolling, wheelchair, rollator, shower chair  Assistance upon Discharge: yes, mom, but u/a to provide current level of assist needed    Past Medical History:   Diagnosis Date    Anticoagulant long-term use     Anxiety     Chronic back pain     Deep vein thrombosis     Depression     Depression     Gait instability     Multiple sclerosis     Multiple sclerosis     Neurogenic bladder     Polyneuropathy     Pulmonary embolism     Spasticity        Past Surgical History:   Procedure Laterality Date    CYSTOSCOPY N/A 6/20/2018    Procedure: CYSTOSCOPY;  Surgeon: Brian Augustin MD;  Location: [facility] OR [address] [place] [address];  Service: Urology;  Laterality: N/A;  1 hour    CYSTOSCOPY N/A 6/20/2018    Performed by Brian Augustin MD at [facility] OR [address] [place] [address]    INJECTION OF BOTULINUM TOXIN TYPE A N/A 6/20/2018    Procedure: INJECTION, BOTULINUM TOXIN, TYPE A 200 UNITS;  Surgeon: Brian Augustin MD;  Location: [facility] OR [address] [place] [address];  Service: Urology;  Laterality: N/A;    INJECTION, BOTULINUM TOXIN, TYPE A 200 UNITS N/A 6/20/2018    Performed by Brian Augustin MD at [facility] OR [address] [place] [address]       Subjective     Chief Complaint: c/o L hand and LE with increased tightness  Patient/Family Comments/goals: return to PLOF    Pain/Comfort:  · Pain Rating 1: 0/10    Patients cultural, spiritual, Anglican conflicts given the current situation: none    Objective:      Communicated with: allan prior to session.  Patient found with: call button in reach and telemetry upon OT entry to room.    General Precautions: Standard, fall, aspiration   Orthopedic Precautions:    Braces:       Occupational Performance:    Bed Mobility:    · Sup to sit with increased time/effort and mod assist    Functional Mobility/Transfers:  · Balance seated static and dynamic with SBA to min and mod assist, leans L in sitting  · Functional Mobility: scooting along EOB with mod assist x 4 reps    Activities of Daily Living:  · Doffed and donned socks seated EOB with total assist to bring LE's into figure 4 and min assist for task with increased time/effort, assist for seated balance at EOB and for keeping LE's in figure 4 position  · UE dress with gown with max assist  · Drinking coffee with setup assist to initially grasp, able to bring to mouth without assist    Cognitive/Visual Perceptual:  intact    Physical Exam:  LUE: Decreased LT LUE, increased tone BUE's with L>R in flexor tone, grossly wfl PROM at shoulder, elbow with decreased full extension PROM, wrist to ~neutral extension PROM, fingers with slightly decreased full extension on PROM, tone inhibits LUE AROM; RUE: grasp 4/5, elbow flexion/extension grossly wfl AROM with slight decrease full elbow extension, shoulder grossly wfl AROM    AMPAC 6 Click ADL:  AMPAC Total Score: 15    Treatment & Education:  -performed LUE PROM all jt's with hold at EOR finger and wrist extension, elbow extension and shoulder flexion, WB sitting EOB through LUE; stretch and hold in figure 4 position LE sitting up in bed; educated on importance of con't with exercises and stretches/ROM to BUE's/BLE's  -Pt edu on OT role/POC  -Importance of  activity with staff assistance  -Safety during functional t/f and mobility   - Multiple self care tasks completed-- assistance level noted above  - All questions/concerns answered within OT scope of  "practice           Education:    Patient left HOB elevated with call button in reach    Assessment:     Mao Levin is a 53 y.o. male with a medical diagnosis of MS (multiple sclerosis).  He presents with the following performance deficits affecting function: impaired self care skills, impaired functional mobilty, decreased lower extremity function, decreased upper extremity function, abnormal tone.      Rehab Prognosis: Good; patient would benefit from acute skilled OT services to address these deficits and reach maximum level of function.         Clinical Decision Makin.  OT Low:  "Pt evaluation falls under low complexity for evaluation coding due to performance deficits noted in 1-3 areas as stated above and 0 co-morbities affecting current functional status. Data obtained from problem focused assessments. No modifications or assistance was required for completion of evaluation. Only brief occupational profile and history review completed."     Plan:     Patient to be seen 4 x/week to address the above listed problems via self-care/home management, therapeutic activities, therapeutic exercises, neuromuscular re-education  · Plan of Care Expires: 19  · Plan of Care Reviewed with: patient    This Plan of care has been discussed with the patient who was involved in its development and understands and is in agreement with the identified goals and treatment plan    GOALS:   Multidisciplinary Problems     Occupational Therapy Goals        Problem: Occupational Therapy Goal    Goal Priority Disciplines Outcome Interventions   Occupational Therapy Goal     OT, PT/OT Ongoing (interventions implemented as appropriate)    Description:  Goals to be met by: 18     Patient will increase functional independence with ADLs by performing:    Feeding with Set-up Assistance.  UE Dressing with Set-up Assistance and Stand-by Assistance.  LE Dressing with Set-up Assistance and Contact Guard Assistance.  Grooming " while standing with Set-up Assistance and Contact Guard Assistance.  Toileting from bedside commode/toilet as appropriate with Contact Guard Assistance for hygiene and clothing management.   Toilet transfer to toilet/bedside commode as appropriate with Contact Guard Assistance.                      Time Tracking:     OT Date of Treatment: 12/03/18  OT Start Time: 1119  OT Stop Time: 1200  OT Total Time (min): 41 min    Billable Minutes:Evaluation 18 min   SCT 10 min  TE 8 min      Ila Hines, OT  12/3/2018

## 2018-12-03 NOTE — PLAN OF CARE
Problem: SLP Goal  Goal: SLP Goal  Outcome: Outcome(s) achieved Date Met: 12/03/18  Clinical Swallow Evaluation.  REC:  Pt cont po intake w/ regular consistency diet w/ thin liquids, oral meds whole 1-2 at a time, standard aspiration precautions.  Further Skilled Speech services are not indicated at this time.  ST to s/o.  Recs reviewed w/ RN.      Myesha Poe CCC-SLP  12/3/2018

## 2018-12-03 NOTE — SUBJECTIVE & OBJECTIVE
Subjective:     Interval History:   No acute issues overnight. Urine culture growing providencii. On rocephin. Mri brain and c spine completed    Current Neurological Medications:   Baclofen 20mg QID  wellbutrin 150mg qd  Dantrolene 50mg QID  Duloxetine 30mg qd  trazadone 100mg qpm  Valium 5mg q8h PRN    Current Facility-Administered Medications   Medication Dose Route Frequency Provider Last Rate Last Dose    acetaminophen tablet 1,000 mg  1,000 mg Oral TID LOUISE Ornelas-C   1,000 mg at 12/02/18 1539    acetaminophen tablet 650 mg  650 mg Oral Q8H PRN Jim Galarza PA-C        albuterol-ipratropium 2.5 mg-0.5 mg/3 mL nebulizer solution 3 mL  3 mL Nebulization Q4H PRN Jim Galarza PA-C        baclofen tablet 20 mg  20 mg Oral QID GREYSON OrnelasC   20 mg at 12/02/18 1811    bisacodyl suppository 10 mg  10 mg Rectal Daily PRN Jim Galarza PA-C        buPROPion TB24 tablet 150 mg  150 mg Oral Daily Jim Galarza PA-C   150 mg at 12/02/18 1000    cefTRIAXone injection 1 g  1 g Intravenous Q24H GREYSON OrnelasC   1 g at 12/02/18 1812    dantrolene capsule 50 mg  50 mg Oral QID LOUISE Ornelas-C   50 mg at 12/02/18 1811    dextrose 50% injection 12.5 g  12.5 g Intravenous PRN Jim Galarza PA-C        dextrose 50% injection 25 g  25 g Intravenous PRN Jim Galarza PA-C        diazePAM tablet 5 mg  5 mg Oral Q8H PRN Jim Galarza PA-C   5 mg at 12/02/18 1539    DULoxetine DR capsule 30 mg  30 mg Oral Daily GREYSON OrnelasC   30 mg at 12/02/18 0959    gabapentin capsule 400 mg  400 mg Oral QID LOUISE Ornelas-C   400 mg at 12/02/18 1811    glucagon (human recombinant) injection 1 mg  1 mg Intramuscular PRN Jim Galarza PA-C        glucose chewable tablet 16 g  16 g Oral PRN Jim Galarza PA-C        glucose chewable tablet 24 g  24 g Oral PRN Jim Galarza PA-C        ibuprofen tablet 600 mg  600 mg Oral Q6H PRN Jim Galarza PA-C        ondansetron disintegrating tablet 8  mg  8 mg Oral Q8H PRN LOUISE Ornelas-C        oxyCODONE immediate release tablet 10 mg  10 mg Oral Q6H PRN Jim Galarza, PA-C   10 mg at 12/01/18 1204    oxyCODONE immediate release tablet 15 mg  15 mg Oral Q6H PRN Jim Galarza, PA-C   15 mg at 12/02/18 1539    polyethylene glycol packet 17 g  17 g Oral Daily Jim Galarza, PA-C   17 g at 12/02/18 0959    prochlorperazine injection Soln 5 mg  5 mg Intravenous Q6H PRN Jim Galarza PA-C        rivaroxaban tablet 20 mg  20 mg Oral Daily with dinner LOUISE Ornelas-C   20 mg at 12/02/18 1811    senna-docusate 8.6-50 mg per tablet 1 tablet  1 tablet Oral BID LOUISE Ornelas-C   1 tablet at 12/02/18 1000    sodium chloride 0.9% flush 5 mL  5 mL Intravenous PRN LOUISE Ornelas-C        tamsulosin 24 hr capsule 0.4 mg  0.4 mg Oral Daily Jim Galarza PA-C   0.4 mg at 12/02/18 1001    traZODone tablet 100 mg  100 mg Oral QHS LOUISE Ornelas-C   100 mg at 12/01/18 2231       Review of Systems  Objective:     Vital Signs (Most Recent):  Temp: 97.1 °F (36.2 °C) (12/02/18 2028)  Pulse: 69 (12/02/18 2028)  Resp: 18 (12/02/18 2028)  BP: 109/66 (12/02/18 2028)  SpO2: 99 % (12/02/18 2028) Vital Signs (24h Range):  Temp:  [96.4 °F (35.8 °C)-98.1 °F (36.7 °C)] 97.1 °F (36.2 °C)  Pulse:  [61-75] 69  Resp:  [18-20] 18  SpO2:  [95 %-99 %] 99 %  BP: ()/(50-76) 109/66     Weight: 80.2 kg (176 lb 12.9 oz)  Body mass index is 25.37 kg/m².    Physical Exam   Constitutional: He is oriented to person, place, and time.   Eyes: Pupils are equal, round, and reactive to light.   Neurological: He is oriented to person, place, and time. He has a normal Finger-Nose-Finger Test.   Reflex Scores:       Tricep reflexes are 2+ on the right side and 3+ on the left side.       Bicep reflexes are 2+ on the right side and 3+ on the left side.       Patellar reflexes are 2+ on the right side and 3+ on the left side.      NEUROLOGICAL EXAMINATION:     MENTAL STATUS   Oriented to  person, place, and time.   Level of consciousness: alert    CRANIAL NERVES     CN III, IV, VI   Pupils are equal, round, and reactive to light.    CN V   Left facial sensation deficit: complete    CN VII   Left facial weakness: central    MOTOR EXAM     Strength   Right deltoid: 5/5  Left deltoid: 4/5  Right biceps: 5/5  Left biceps: 5/5  Left triceps: 4/5  Right interossei: 5/5  Left interossei: 2/5  Right iliopsoas: 3/5  Left iliopsoas: 3/5  Right anterior tibial: 4/5  Left anterior tibial: 2/5    REFLEXES     Reflexes   Right biceps: 2+  Left biceps: 3+  Right triceps: 2+  Left triceps: 3+  Right patellar: 2+  Left patellar: 3+    SENSORY EXAM   Left arm light touch: decreased sensation to light touch on the left upper and lower extremities.    GAIT AND COORDINATION      Coordination   Finger to nose coordination: normal      Significant Labs:   Recent Lab Results       12/02/18  0357        Immature Granulocytes 0.3     Immature Grans (Abs) 0.02  Comment:  Mild elevation in immature granulocytes is non specific and   can be seen in a variety of conditions including stress response,   acute inflammation, trauma and pregnancy. Correlation with other   laboratory and clinical findings is essential.       Anion Gap 10     Baso # 0.04     Basophil% 0.6     BUN, Bld 10     Calcium 8.2     Chloride 106     CO2 24     Creatinine 0.8     Differential Method Automated     eGFR if African American >60.0     eGFR if non  >60.0  Comment:  Calculation used to obtain the estimated glomerular filtration  rate (eGFR) is the CKD-EPI equation.        Eos # 0.2     Eosinophil% 3.3     Glucose 98     Gran # (ANC) 3.1     Gran% 44.4     Hematocrit 34.4     Hemoglobin 11.1     Lymph # 3.1     Lymph% 44.5     Magnesium 1.8     MCH 29.3     MCHC 32.3     MCV 91     Mono # 0.5     Mono% 6.9     MPV 10.8     nRBC 0     Phosphorus 4.0     Platelets 227     Potassium 4.0     RBC 3.79     RDW 13.9     Sodium 140     WBC 6.97            Significant Imaging:     MRI brain and cervical spine W WO contrast (12/1/18)  1.  No significant detrimental interval change in the appearance of the brain, noting findings in keeping with patient's reported history of multiple sclerosis.  No evidence of new or enhancing lesions to suggest active demyelination.    2.  Stable appearance of the cervical spine, noting similar distribution and extent of multiple foci of signal abnormality throughout the cervical and visualized upper thoracic cord.  No evidence of new or enhancing lesions to suggest active demyelination.    3.  Degenerative changes of the cervical spine.

## 2018-12-03 NOTE — SUBJECTIVE & OBJECTIVE
Subjective:     Interval History:   Patient continues to improve as he is being treated for UTI. Denies any acute events overnight. Noted w/ improved  strength today while drinking his coffee during attending rounds; states he notices improvements as well although does mention that he does not feel as if he is back to his baseline.     Current Neurological Medications:   Baclofen 20mg QID  Wellbutrin 150mg qd  Dantrolene 50mg QID  Duloxetine 30mg qd  Trazadone 100mg qpm  Valium 5mg q8h PRN    Current Facility-Administered Medications   Medication Dose Route Frequency Provider Last Rate Last Dose    acetaminophen tablet 1,000 mg  1,000 mg Oral TID LOUISE Ornelas-C   1,000 mg at 12/03/18 1000    acetaminophen tablet 650 mg  650 mg Oral Q8H PRN Jim Galarza PA-C        albuterol-ipratropium 2.5 mg-0.5 mg/3 mL nebulizer solution 3 mL  3 mL Nebulization Q4H PRN Jim Galarza PA-C        baclofen tablet 20 mg  20 mg Oral QID GREYSON OrnelasC   20 mg at 12/03/18 1313    bisacodyl suppository 10 mg  10 mg Rectal Daily PRN Jim Galarza PA-C        buPROPion TB24 tablet 150 mg  150 mg Oral Daily GREYSON OrnelasC   150 mg at 12/03/18 0959    cefTRIAXone injection 1 g  1 g Intravenous Q24H GREYSON OrnelasC   1 g at 12/02/18 1812    dantrolene capsule 50 mg  50 mg Oral QID LOUISE Ornelas-C   50 mg at 12/03/18 1313    dextrose 50% injection 12.5 g  12.5 g Intravenous PRN LOUISE Ornelas-FRANCINE        dextrose 50% injection 25 g  25 g Intravenous PRN Jim Galarza PA-C        diazePAM tablet 5 mg  5 mg Oral Q8H PRN Jim Galarza PA-C   5 mg at 12/03/18 0519    DULoxetine DR capsule 30 mg  30 mg Oral Daily LOUISE Ornelas-C   30 mg at 12/03/18 0958    gabapentin capsule 400 mg  400 mg Oral QID LOUISE Ornelas-C   400 mg at 12/03/18 1313    glucagon (human recombinant) injection 1 mg  1 mg Intramuscular PRN Jim Galarza, PA-C        glucose chewable tablet 16 g  16 g Oral PRN Jim  BETH Galarza        glucose chewable tablet 24 g  24 g Oral PRN LOUISE Ornelas-C        ibuprofen tablet 600 mg  600 mg Oral Q6H PRN Jim Galarza PA-C        ondansetron disintegrating tablet 8 mg  8 mg Oral Q8H PRN Jim Galarza, PA-C        oxyCODONE immediate release tablet 10 mg  10 mg Oral Q6H PRN Jim Galarza, PA-C   10 mg at 12/01/18 1204    oxyCODONE immediate release tablet 15 mg  15 mg Oral Q6H PRN Jim Galarza, PA-C   15 mg at 12/03/18 1004    polyethylene glycol packet 17 g  17 g Oral Daily Jim Galarza, PA-C   17 g at 12/03/18 1000    prochlorperazine injection Soln 5 mg  5 mg Intravenous Q6H PRN LOUISE Ornelas-C        rivaroxaban tablet 20 mg  20 mg Oral Daily with dinner LOUISE Ornelas-C   20 mg at 12/02/18 1811    senna-docusate 8.6-50 mg per tablet 1 tablet  1 tablet Oral BID LOUISE Ornelas-C   1 tablet at 12/03/18 0958    sodium chloride 0.9% flush 5 mL  5 mL Intravenous PRN LOUISE Ornelas-FRNACINE        tamsulosin 24 hr capsule 0.4 mg  0.4 mg Oral Daily LOUISE Ornelas-C   0.4 mg at 12/03/18 1000    traZODone tablet 100 mg  100 mg Oral QHS LOUISE Ornelas-C   100 mg at 12/02/18 2144    tuberculin injection 5 Units  5 Units Intradermal Once W. Abdifatah Bravo MD           Review of Systems   Constitutional: Positive for activity change and fatigue.   HENT: Negative.    Eyes: Negative.    Respiratory: Negative.    Cardiovascular: Negative.    Gastrointestinal: Negative.    Allergic/Immunologic: Negative.    Neurological: Positive for weakness.   Hematological: Negative.      Objective:     Vital Signs (Most Recent):  Temp: 98.2 °F (36.8 °C) (12/03/18 1157)  Pulse: 72 (12/03/18 1157)  Resp: 18 (12/03/18 1157)  BP: 106/65 (12/03/18 1157)  SpO2: 98 % (12/03/18 1157) Vital Signs (24h Range):  Temp:  [96.2 °F (35.7 °C)-98.2 °F (36.8 °C)] 98.2 °F (36.8 °C)  Pulse:  [64-75] 72  Resp:  [16-20] 18  SpO2:  [96 %-99 %] 98 %  BP: ()/(52-72) 106/65     Weight: 80.2 kg (176 lb  12.9 oz)  Body mass index is 25.37 kg/m².    Physical Exam   Constitutional: He is oriented to person, place, and time.   Eyes: Pupils are equal, round, and reactive to light.   Neurological: He is oriented to person, place, and time. He has a normal Finger-Nose-Finger Test.   Reflex Scores:       Tricep reflexes are 2+ on the right side and 3+ on the left side.       Bicep reflexes are 2+ on the right side and 3+ on the left side.       Patellar reflexes are 2+ on the right side and 3+ on the left side.      NEUROLOGICAL EXAMINATION:     MENTAL STATUS   Oriented to person, place, and time.   Level of consciousness: alert    CRANIAL NERVES     CN III, IV, VI   Pupils are equal, round, and reactive to light.    CN V   Left facial sensation deficit: complete    CN VII   Left facial weakness: central    MOTOR EXAM     Strength   Right deltoid: 5/5  Left deltoid: 4/5  Right biceps: 5/5  Left biceps: 5/5  Left triceps: 4/5  Right interossei: 5/5  Left interossei: 2/5  Right iliopsoas: 3/5  Left iliopsoas: 3/5  Right anterior tibial: 4/5  Left anterior tibial: 2/5    REFLEXES     Reflexes   Right biceps: 2+  Left biceps: 3+  Right triceps: 2+  Left triceps: 3+  Right patellar: 2+  Left patellar: 3+    SENSORY EXAM   Left arm light touch: decreased sensation to light touch on the left upper and lower extremities.    GAIT AND COORDINATION      Coordination   Finger to nose coordination: normal      Significant Labs:   Recent Lab Results       12/03/18  0338        Immature Granulocytes 0.2     Immature Grans (Abs) 0.01  Comment:  Mild elevation in immature granulocytes is non specific and   can be seen in a variety of conditions including stress response,   acute inflammation, trauma and pregnancy. Correlation with other   laboratory and clinical findings is essential.       Anion Gap 7     Baso # 0.05     Basophil% 0.8     BUN, Bld 15     Calcium 8.3     Chloride 107     CO2 28     Creatinine 1.0     Differential Method  Automated     eGFR if African American >60.0     eGFR if non  >60.0  Comment:  Calculation used to obtain the estimated glomerular filtration  rate (eGFR) is the CKD-EPI equation.        Eos # 0.2     Eosinophil% 3.5     Glucose 95     Gran # (ANC) 2.8     Gran% 43.1     Hematocrit 34.0     Hemoglobin 10.9     Lymph # 3.0     Lymph% 45.5     Magnesium 2.1     MCH 29.9     MCHC 32.1     MCV 93     Mono # 0.5     Mono% 6.9     MPV 10.9     nRBC 0     Phosphorus 4.4     Platelets 238     Potassium 4.2     RBC 3.65     RDW 13.9     Sodium 142     WBC 6.53           Significant Imaging:     MRI brain and cervical spine W WO contrast (12/1/18)  1.  No significant detrimental interval change in the appearance of the brain, noting findings in keeping with patient's reported history of multiple sclerosis.  No evidence of new or enhancing lesions to suggest active demyelination.  2.  Stable appearance of the cervical spine, noting similar distribution and extent of multiple foci of signal abnormality throughout the cervical and visualized upper thoracic cord.  No evidence of new or enhancing lesions to suggest active demyelination.  3.  Degenerative changes of the cervical spine.

## 2018-12-03 NOTE — ASSESSMENT & PLAN NOTE
- Continue home dose Dantrolene and Baclofen.   - Valium PRN for breakthrough spasms.   - Monitor respiratory status

## 2018-12-03 NOTE — PLAN OF CARE
Discharge Planning:    Patient SNF choices are [facility], [facility] and [facility].  Sw sent patients info via [facility].    Sw attempted to complete LOCET [phone] but was placed on hold/ no one ever picked up.     will try again tomorrow to complete LOCET.    Erick Karimi, MSW,LCSW

## 2018-12-03 NOTE — PLAN OF CARE
Problem: Occupational Therapy Goal  Goal: Occupational Therapy Goal  Goals to be met by: 12/17/18     Patient will increase functional independence with ADLs by performing:    Feeding with Set-up Assistance.  UE Dressing with Set-up Assistance and Stand-by Assistance.  LE Dressing with Set-up Assistance and Contact Guard Assistance.  Grooming while standing with Set-up Assistance and Contact Guard Assistance.  Toileting from bedside commode/toilet as appropriate with Contact Guard Assistance for hygiene and clothing management.   Toilet transfer to toilet/bedside commode as appropriate with Contact Guard Assistance.    Outcome: Ongoing (interventions implemented as appropriate)  Goals established  Ila Hinse, OTR

## 2018-12-03 NOTE — TELEPHONE ENCOUNTER
Message   Received: 3 days ago   Message Contents   BETH Merino, RN         B cells have repopulated   CrCl-113.28   CMP-normal   Hep screening negative   CBC stable with WBC 7.79, ANC-4.7, [id number]

## 2018-12-03 NOTE — PLAN OF CARE
12/03/18 1310   Discharge Assessment   Assessment Type Discharge Planning Assessment   Confirmed/corrected address and phone number on facesheet? Yes   Assessment information obtained from? Patient   Expected Length of Stay (days) 5   Communicated expected length of stay with patient/caregiver yes   Prior to hospitilization cognitive status: Alert/Oriented   Prior to hospitalization functional status: Needs Assistance   Current cognitive status: Alert/Oriented   Current Functional Status: Partially Dependent   Lives With parent(s)  (mother, Naheed Levin ([phone]))   Able to Return to Prior Arrangements no   Is patient able to care for self after discharge? No   Patient's perception of discharge disposition skilled nursing facility   Readmission Within The Last 30 Days no previous admission in last 30 days   Patient currently receives any other outside agency services? No   Equipment Currently Used at Home wheelchair;walker, rolling;rollator;shower chair   Do you have any problems affording any of your prescribed medications? No   Is the patient taking medications as prescribed? yes   Does the patient have transportation home? Yes   Transportation Available agency transportation   Does the patient receive [facility]? No   Discharge Plan A Skilled Nursing Facility   Discharge Plan B Home Health   Patient/Family In Agreement With Plan yes     Dx: UTI  Consult: neuro & PT/OT/SLP  Ph[facility]: [facility]  Hosp f/u appt:     Printed HMM approved SNF list given to the patient. Patient stated that he would like to be admitted to [facility], [facility], or [facility]F. CM informed DONNA Suarez ([id number]) of above. PPD & SNF consult ordered. CXR was done 11/30/18.

## 2018-12-03 NOTE — PROGRESS NOTES
[facility]  Neurology  Progress Note    Patient Name: Mao Levin  MRN: [id number]  Admission Date: 11/30/2018  Hospital Length of Stay: 0 days  Code Status: Full Code   Attending Provider: JESSE Bravo MD  Primary Care Physician: Mendy Alarcon MD   Principal Problem:MS (multiple sclerosis)      Subjective:     Interval History:   Patient continues to improve as he is being treated for UTI. Denies any acute events overnight. Noted w/ improved  strength today while drinking his coffee during attending rounds; states he notices improvements as well although does mention that he does not feel as if he is back to his baseline.     Current Neurological Medications:   Baclofen 20mg QID  Wellbutrin 150mg qd  Dantrolene 50mg QID  Duloxetine 30mg qd  Trazadone 100mg qpm  Valium 5mg q8h PRN    Current Facility-Administered Medications   Medication Dose Route Frequency Provider Last Rate Last Dose    acetaminophen tablet 1,000 mg  1,000 mg Oral TID LOUISE Ornelas-C   1,000 mg at 12/03/18 1000    acetaminophen tablet 650 mg  650 mg Oral Q8H PRN LOUISE Ornelas-FRANCINE        albuterol-ipratropium 2.5 mg-0.5 mg/3 mL nebulizer solution 3 mL  3 mL Nebulization Q4H PRN LOUISE Ornelas-FRANCINE        baclofen tablet 20 mg  20 mg Oral QID Jim Galarza PA-C   20 mg at 12/03/18 1313    bisacodyl suppository 10 mg  10 mg Rectal Daily PRN LOUISE Ornelas-FRANCINE        buPROPion TB24 tablet 150 mg  150 mg Oral Daily LOUISE Ornelas-C   150 mg at 12/03/18 0959    cefTRIAXone injection 1 g  1 g Intravenous Q24H LOUISE Ornelas-C   1 g at 12/02/18 1812    dantrolene capsule 50 mg  50 mg Oral QID LOUISE Ornelas-C   50 mg at 12/03/18 1313    dextrose 50% injection 12.5 g  12.5 g Intravenous PRN LOUISE Ornelas-C        dextrose 50% injection 25 g  25 g Intravenous PRN Jim Galarza PA-C        diazePAM tablet 5 mg  5 mg Oral Q8H PRN LOUISE Ornelas-C   5 mg at 12/03/18 0519    DULoxetine DR  capsule 30 mg  30 mg Oral Daily LOUISE Ornelas-C   30 mg at 12/03/18 0958    gabapentin capsule 400 mg  400 mg Oral QID LOUISE Ornelas-C   400 mg at 12/03/18 1313    glucagon (human recombinant) injection 1 mg  1 mg Intramuscular PRN LOUISE Ornelas-C        glucose chewable tablet 16 g  16 g Oral PRN Jim Galarza, PA-C        glucose chewable tablet 24 g  24 g Oral PRN LOUISE Ornelas-C        ibuprofen tablet 600 mg  600 mg Oral Q6H PRN Jim Galarza PA-C        ondansetron disintegrating tablet 8 mg  8 mg Oral Q8H PRN LOUISE Ornelas-C        oxyCODONE immediate release tablet 10 mg  10 mg Oral Q6H PRN LOUISE Ornelas-C   10 mg at 12/01/18 1204    oxyCODONE immediate release tablet 15 mg  15 mg Oral Q6H PRN LOUISE Ornelas-C   15 mg at 12/03/18 1004    polyethylene glycol packet 17 g  17 g Oral Daily LOUISE Ornelas-C   17 g at 12/03/18 1000    prochlorperazine injection Soln 5 mg  5 mg Intravenous Q6H PRN Jim Galarza PA-C        rivaroxaban tablet 20 mg  20 mg Oral Daily with dinner LOUISE Ornelas-C   20 mg at 12/02/18 1811    senna-docusate 8.6-50 mg per tablet 1 tablet  1 tablet Oral BID LOUISE Ornelas-C   1 tablet at 12/03/18 0958    sodium chloride 0.9% flush 5 mL  5 mL Intravenous PRN LOUISE Ornelas-FRANCINE        tamsulosin 24 hr capsule 0.4 mg  0.4 mg Oral Daily LOUISE Ornelas-C   0.4 mg at 12/03/18 1000    traZODone tablet 100 mg  100 mg Oral QHS LOUISE Ornelas-C   100 mg at 12/02/18 2144    tuberculin injection 5 Units  5 Units Intradermal Once W. Abdifatah Bravo MD           Review of Systems   Constitutional: Positive for activity change and fatigue.   HENT: Negative.    Eyes: Negative.    Respiratory: Negative.    Cardiovascular: Negative.    Gastrointestinal: Negative.    Allergic/Immunologic: Negative.    Neurological: Positive for weakness.   Hematological: Negative.      Objective:     Vital Signs (Most Recent):  Temp: 98.2 °F (36.8 °C) (12/03/18  1157)  Pulse: 72 (12/03/18 1157)  Resp: 18 (12/03/18 1157)  BP: 106/65 (12/03/18 1157)  SpO2: 98 % (12/03/18 1157) Vital Signs (24h Range):  Temp:  [96.2 °F (35.7 °C)-98.2 °F (36.8 °C)] 98.2 °F (36.8 °C)  Pulse:  [64-75] 72  Resp:  [16-20] 18  SpO2:  [96 %-99 %] 98 %  BP: ()/(52-72) 106/65     Weight: 80.2 kg (176 lb 12.9 oz)  Body mass index is 25.37 kg/m².    Physical Exam   Constitutional: He is oriented to person, place, and time.   Eyes: Pupils are equal, round, and reactive to light.   Neurological: He is oriented to person, place, and time. He has a normal Finger-Nose-Finger Test.   Reflex Scores:       Tricep reflexes are 2+ on the right side and 3+ on the left side.       Bicep reflexes are 2+ on the right side and 3+ on the left side.       Patellar reflexes are 2+ on the right side and 3+ on the left side.      NEUROLOGICAL EXAMINATION:     MENTAL STATUS   Oriented to person, place, and time.   Level of consciousness: alert    CRANIAL NERVES     CN III, IV, VI   Pupils are equal, round, and reactive to light.    CN V   Left facial sensation deficit: complete    CN VII   Left facial weakness: central    MOTOR EXAM     Strength   Right deltoid: 5/5  Left deltoid: 4/5  Right biceps: 5/5  Left biceps: 5/5  Left triceps: 4/5  Right interossei: 5/5  Left interossei: 2/5  Right iliopsoas: 3/5  Left iliopsoas: 3/5  Right anterior tibial: 4/5  Left anterior tibial: 2/5    REFLEXES     Reflexes   Right biceps: 2+  Left biceps: 3+  Right triceps: 2+  Left triceps: 3+  Right patellar: 2+  Left patellar: 3+    SENSORY EXAM   Left arm light touch: decreased sensation to light touch on the left upper and lower extremities.    GAIT AND COORDINATION      Coordination   Finger to nose coordination: normal      Significant Labs:   Recent Lab Results       12/03/18  0338        Immature Granulocytes 0.2     Immature Grans (Abs) 0.01  Comment:  Mild elevation in immature granulocytes is non specific and   can be seen in  a variety of conditions including stress response,   acute inflammation, trauma and pregnancy. Correlation with other   laboratory and clinical findings is essential.       Anion Gap 7     Baso # 0.05     Basophil% 0.8     BUN, Bld 15     Calcium 8.3     Chloride 107     CO2 28     Creatinine 1.0     Differential Method Automated     eGFR if African American >60.0     eGFR if non  >60.0  Comment:  Calculation used to obtain the estimated glomerular filtration  rate (eGFR) is the CKD-EPI equation.        Eos # 0.2     Eosinophil% 3.5     Glucose 95     Gran # (ANC) 2.8     Gran% 43.1     Hematocrit 34.0     Hemoglobin 10.9     Lymph # 3.0     Lymph% 45.5     Magnesium 2.1     MCH 29.9     MCHC 32.1     MCV 93     Mono # 0.5     Mono% 6.9     MPV 10.9     nRBC 0     Phosphorus 4.4     Platelets 238     Potassium 4.2     RBC 3.65     RDW 13.9     Sodium 142     WBC 6.53           Significant Imaging:     MRI brain and cervical spine W WO contrast (12/1/18)  1.  No significant detrimental interval change in the appearance of the brain, noting findings in keeping with patient's reported history of multiple sclerosis.  No evidence of new or enhancing lesions to suggest active demyelination.  2.  Stable appearance of the cervical spine, noting similar distribution and extent of multiple foci of signal abnormality throughout the cervical and visualized upper thoracic cord.  No evidence of new or enhancing lesions to suggest active demyelination.  3.  Degenerative changes of the cervical spine.        Assessment and Plan:     * MS (multiple sclerosis)    Last dose of Rituxan was in 04/2018  Was due for Rituxan in Oct 2018 but did not receive it. CD20 cells repopulated on last lab check    Currently labs remarkable for UTI; Cx growing Providencii and he is currently on Rocephin 1g Q24 hours   MRI brain and c spine without evidence of active lesions. Clinical presentation - MS pseudoflare in setting of UTI    -  continue treatment for UTI, per primary team   - PT/OT and SLP consult while inpatient; recommendation for SNF at this time  - Discussed case w/ pt's outpatient MS provider; will hold off on Rituxan treatment that patient has missed as he is currently being treated for an acute infection  - Patient will be scheduled for an outpatient infusion by his outpatient MS provider  - Discussed plan with patient; agreable at this time   - Neurology will sign off at this time; again, agree with UTI treatment and discharge to SNF once medically stable per primary team   - Please call with any additional questions or concerns   - Appreciate consult      Urinary tract infection without hematuria    - UCx - Providencii; sensitivities pending  - Continue rocephin     Spasticity    - Continue home dose Dantrolene and Baclofen.   - Valium PRN for breakthrough spasms.   - Monitor respiratory status     Chronic pain of multiple sites    - Continue to recommend increasing gabapentin dose to 800mg TID for chronic neck pain  - Continue other home pain medications         VTE Risk Mitigation (From admission, onward)        Ordered     rivaroxaban tablet 20 mg  With dinner      11/30/18 1925        Will sign off at this time. Appreciate consult. Please contact w/ any additional questions or concerns.       Estela Celis MD  Neurology  [facility]

## 2018-12-03 NOTE — SUBJECTIVE & OBJECTIVE
Interval History: No events overnight. Patient resting in bed, planning to work with OT today.    Review of Systems   Constitutional: Negative for fatigue and fever.   Eyes: Negative for visual disturbance (chronic blurred vision due to MS).   Respiratory: Negative for chest tightness and shortness of breath.    Cardiovascular: Negative for chest pain and palpitations.   Gastrointestinal: Negative for abdominal pain and nausea.   Genitourinary: Positive for frequency. Negative for dysuria, flank pain and urgency.   Musculoskeletal: Positive for back pain (lower back), gait problem, myalgias (distal LEs) and neck stiffness.   Neurological: Positive for weakness. Negative for dizziness, numbness and headaches.   Psychiatric/Behavioral: Negative for agitation and confusion.     Objective:     Vital Signs (Most Recent):  Temp: 98.2 °F (36.8 °C) (12/03/18 1157)  Pulse: 72 (12/03/18 1157)  Resp: 18 (12/03/18 1157)  BP: 106/65 (12/03/18 1157)  SpO2: 98 % (12/03/18 1157) Vital Signs (24h Range):  Temp:  [96.2 °F (35.7 °C)-98.2 °F (36.8 °C)] 98.2 °F (36.8 °C)  Pulse:  [64-75] 72  Resp:  [16-20] 18  SpO2:  [96 %-99 %] 98 %  BP: ()/(52-72) 106/65     Weight: 80.2 kg (176 lb 12.9 oz)  Body mass index is 25.37 kg/m².    Intake/Output Summary (Last 24 hours) at 12/3/2018 1229  Last data filed at 12/2/2018 1650  Gross per 24 hour   Intake --   Output 650 ml   Net -650 ml      Physical Exam   Constitutional: He is oriented to person, place, and time. He appears well-developed and well-nourished.   Cardiovascular: Normal rate, regular rhythm and normal heart sounds.   Pulmonary/Chest: Effort normal and breath sounds normal.   Abdominal: Soft. Normal appearance and bowel sounds are normal. There is no tenderness. There is no CVA tenderness.   Musculoskeletal: He exhibits no edema or deformity.        Right shoulder: Normal.        Right elbow: Normal.       Right wrist: Normal.        Right lower leg: He exhibits tenderness. He  exhibits no swelling, no edema and no deformity.        Left lower leg: He exhibits tenderness. He exhibits no swelling, no edema and no deformity.   Decreased strength in LE bilaterally. Left LE weaker than right. Patient states this is his baseline. Decreased  strength in left hand.    Neurological: He is oriented to person, place, and time. A sensory deficit (left LE) is present.   Skin: Skin is warm and dry.   Psychiatric: He has a normal mood and affect. His speech is normal and behavior is normal. Cognition and memory are normal.   Nursing note and vitals reviewed.      Significant Labs:   BMP:   Recent Labs   Lab 12/03/18  0338   GLU 95      K 4.2      CO2 28   BUN 15   CREATININE 1.0   CALCIUM 8.3*   MG 2.1     CBC:   Recent Labs   Lab 12/02/18  0357 12/03/18  0338   WBC 6.97 6.53   HGB 11.1* 10.9*   HCT 34.4* 34.0*    238     Urine Studies:   No results for input(s): COLORU, APPEARANCEUA, PHUR, SPECGRAV, PROTEINUA, GLUCUA, KETONESU, BILIRUBINUA, OCCULTUA, NITRITE, UROBILINOGEN, LEUKOCYTESUR, RBCUA, WBCUA, BACTERIA, SQUAMEPITHEL, HYALINECASTS in the last 48 hours.    Invalid input(s): LUIS FELIPE    Significant Imaging: I have reviewed all pertinent imaging results/findings within the past 24 hours.

## 2018-12-03 NOTE — ASSESSMENT & PLAN NOTE
Last dose of Rituxan was in 04/2018  Was due for Rituxan in Oct 2018 but did not receive it. CD20 cells repopulated on last lab check    Currently labs remarkable for UTI; Cx growing Providencii and he is currently on Rocephin 1g Q24 hours   MRI brain and c spine without evidence of active lesions. Clinical presentation - MS pseudoflare in setting of UTI    - continue treatment for UTI, per primary team   - PT/OT and SLP consult while inpatient; recommendation for SNF at this time  - Discussed case w/ pt's outpatient MS provider; will hold off on Rituxan treatment that patient has missed as he is currently being treated for an acute infection  - Patient will be scheduled for an outpatient infusion by his outpatient MS provider  - Discussed plan with patient; agreable at this time   - Neurology will sign off at this time; again, agree with UTI treatment and discharge to SNF once medically stable per primary team   - Please call with any additional questions or concerns   - Appreciate consult

## 2018-12-03 NOTE — ASSESSMENT & PLAN NOTE
- Continue to recommend increasing gabapentin dose to 800mg TID for chronic neck pain  - Continue other home pain medications

## 2018-12-03 NOTE — ASSESSMENT & PLAN NOTE
- acute on chronic LE weakness, likely pseudo-flair due to UTI  - Continue baclofen 20mg, dantrolene 50mg and gabapentin 300 mg  - Reportedly has been prescribed Ampyra (dalfampridine) but says he is not on it.  - continue PT/OT/SLP  - neuro consulted   - MRI brain & c-spine w/wo contrast epilepsy protocol with no new lesions  - patient normally ambulates with a walker  - fall and aspriation precautions  - likely SNF placement

## 2018-12-03 NOTE — ASSESSMENT & PLAN NOTE
Neurogenic bladder  Recurrent UTIs  - continue empiric rocephin for now  - previous UCx with PROVIDENCIA STUARTII sensitive to rocephin  - 0/4 SIRS  - likely transition to Bactrim on discharge

## 2018-12-04 ENCOUNTER — HOSPITAL ENCOUNTER (INPATIENT)
Facility: HOSPITAL | Age: 53
LOS: 23 days | Discharge: HOME HEALTH | DRG: 059 | End: 2018-12-27
Attending: INTERNAL MEDICINE | Admitting: EMERGENCY MEDICINE
Payer: MEDICARE

## 2018-12-04 VITALS
HEIGHT: 70 IN | SYSTOLIC BLOOD PRESSURE: 123 MMHG | WEIGHT: 176.81 LBS | HEART RATE: 75 BPM | RESPIRATION RATE: 18 BRPM | TEMPERATURE: 99 F | BODY MASS INDEX: 25.31 KG/M2 | DIASTOLIC BLOOD PRESSURE: 85 MMHG | OXYGEN SATURATION: 97 %

## 2018-12-04 DIAGNOSIS — G89.4 CHRONIC PAIN SYNDROME: ICD-10-CM

## 2018-12-04 DIAGNOSIS — Z78.9 IMPAIRED MOBILITY AND ADLS: Chronic | ICD-10-CM

## 2018-12-04 DIAGNOSIS — G82.20 PARAPARESIS: ICD-10-CM

## 2018-12-04 DIAGNOSIS — M54.41 CHRONIC BILATERAL LOW BACK PAIN WITH BILATERAL SCIATICA: ICD-10-CM

## 2018-12-04 DIAGNOSIS — G89.29 CHRONIC PAIN OF MULTIPLE SITES: Chronic | ICD-10-CM

## 2018-12-04 DIAGNOSIS — R26.9 GAIT DISTURBANCE: ICD-10-CM

## 2018-12-04 DIAGNOSIS — M54.42 CHRONIC BILATERAL LOW BACK PAIN WITH BILATERAL SCIATICA: ICD-10-CM

## 2018-12-04 DIAGNOSIS — F32.A DEPRESSION, UNSPECIFIED DEPRESSION TYPE: ICD-10-CM

## 2018-12-04 DIAGNOSIS — R26.81 GAIT INSTABILITY: ICD-10-CM

## 2018-12-04 DIAGNOSIS — R52 CHRONIC PAIN OF MULTIPLE SITES: Chronic | ICD-10-CM

## 2018-12-04 DIAGNOSIS — K59.2 CONSTIPATION DUE TO NEUROGENIC BOWEL: ICD-10-CM

## 2018-12-04 DIAGNOSIS — G57.93 NEUROPATHIC PAIN, LEG, BILATERAL: ICD-10-CM

## 2018-12-04 DIAGNOSIS — G62.9 POLYNEUROPATHY: ICD-10-CM

## 2018-12-04 DIAGNOSIS — N39.0 URINARY TRACT INFECTION WITHOUT HEMATURIA, SITE UNSPECIFIED: ICD-10-CM

## 2018-12-04 DIAGNOSIS — Z74.09 IMPAIRED MOBILITY AND ADLS: Chronic | ICD-10-CM

## 2018-12-04 DIAGNOSIS — Z79.891 LONG-TERM CURRENT USE OF OPIATE ANALGESIC: ICD-10-CM

## 2018-12-04 DIAGNOSIS — G35 ACUTE RELAPSING MULTIPLE SCLEROSIS: ICD-10-CM

## 2018-12-04 DIAGNOSIS — N31.9 NEUROGENIC BLADDER: Chronic | ICD-10-CM

## 2018-12-04 DIAGNOSIS — G35 MULTIPLE SCLEROSIS: ICD-10-CM

## 2018-12-04 DIAGNOSIS — K59.00 CONSTIPATION DUE TO NEUROGENIC BOWEL: ICD-10-CM

## 2018-12-04 DIAGNOSIS — R25.2 SPASTICITY: ICD-10-CM

## 2018-12-04 DIAGNOSIS — G89.29 CHRONIC BILATERAL LOW BACK PAIN WITH BILATERAL SCIATICA: ICD-10-CM

## 2018-12-04 DIAGNOSIS — R53.81 DEBILITY: ICD-10-CM

## 2018-12-04 DIAGNOSIS — Z79.52 LONG TERM (CURRENT) USE OF SYSTEMIC STEROIDS: ICD-10-CM

## 2018-12-04 DIAGNOSIS — G35 MS (MULTIPLE SCLEROSIS): Primary | Chronic | ICD-10-CM

## 2018-12-04 LAB
ANION GAP SERPL CALC-SCNC: 6 MMOL/L
BASOPHILS # BLD AUTO: 0.08 K/UL
BASOPHILS NFR BLD: 1.1 %
BUN SERPL-MCNC: 12 MG/DL
CALCIUM SERPL-MCNC: 8.4 MG/DL
CHLORIDE SERPL-SCNC: 107 MMOL/L
CO2 SERPL-SCNC: 25 MMOL/L
CREAT SERPL-MCNC: 0.9 MG/DL
DIFFERENTIAL METHOD: ABNORMAL
EOSINOPHIL # BLD AUTO: 0.3 K/UL
EOSINOPHIL NFR BLD: 4 %
ERYTHROCYTE [DISTWIDTH] IN BLOOD BY AUTOMATED COUNT: 13.7 %
EST. GFR  (AFRICAN AMERICAN): >60 ML/MIN/1.73 M^2
EST. GFR  (NON AFRICAN AMERICAN): >60 ML/MIN/1.73 M^2
GLUCOSE SERPL-MCNC: 91 MG/DL
HCT VFR BLD AUTO: 36.4 %
HGB BLD-MCNC: 12.1 G/DL
IMM GRANULOCYTES # BLD AUTO: 0.06 K/UL
IMM GRANULOCYTES NFR BLD AUTO: 0.8 %
LYMPHOCYTES # BLD AUTO: 3.2 K/UL
LYMPHOCYTES NFR BLD: 44.5 %
MAGNESIUM SERPL-MCNC: 2.2 MG/DL
MCH RBC QN AUTO: 30.6 PG
MCHC RBC AUTO-ENTMCNC: 33.2 G/DL
MCV RBC AUTO: 92 FL
MONOCYTES # BLD AUTO: 0.6 K/UL
MONOCYTES NFR BLD: 8 %
NEUTROPHILS # BLD AUTO: 3 K/UL
NEUTROPHILS NFR BLD: 41.6 %
NRBC BLD-RTO: 0 /100 WBC
PHOSPHATE SERPL-MCNC: 3.9 MG/DL
PLATELET # BLD AUTO: 225 K/UL
PMV BLD AUTO: 11.3 FL
POTASSIUM SERPL-SCNC: 4.8 MMOL/L
RBC # BLD AUTO: 3.95 M/UL
SODIUM SERPL-SCNC: 138 MMOL/L
WBC # BLD AUTO: 7.23 K/UL

## 2018-12-04 PROCEDURE — 97116 GAIT TRAINING THERAPY: CPT | Mod: HCWC,59

## 2018-12-04 PROCEDURE — 11000004 HC SNF PRIVATE: Mod: HCWC

## 2018-12-04 PROCEDURE — 36415 COLL VENOUS BLD VENIPUNCTURE: CPT | Mod: HCWC

## 2018-12-04 PROCEDURE — 83735 ASSAY OF MAGNESIUM: CPT | Mod: HCWC

## 2018-12-04 PROCEDURE — 97535 SELF CARE MNGMENT TRAINING: CPT | Mod: HCWC,59

## 2018-12-04 PROCEDURE — 85025 COMPLETE CBC W/AUTO DIFF WBC: CPT | Mod: HCWC

## 2018-12-04 PROCEDURE — 25000003 PHARM REV CODE 250: Mod: HCWC | Performed by: PHYSICIAN ASSISTANT

## 2018-12-04 PROCEDURE — G8978 MOBILITY CURRENT STATUS: HCPCS | Mod: CK,HCWC

## 2018-12-04 PROCEDURE — G0378 HOSPITAL OBSERVATION PER HR: HCPCS | Mod: HCWC

## 2018-12-04 PROCEDURE — 97530 THERAPEUTIC ACTIVITIES: CPT | Mod: HCWC

## 2018-12-04 PROCEDURE — 99217 PR OBSERVATION CARE DISCHARGE: CPT | Mod: HCWC,,, | Performed by: PHYSICIAN ASSISTANT

## 2018-12-04 PROCEDURE — 80048 BASIC METABOLIC PNL TOTAL CA: CPT | Mod: HCWC

## 2018-12-04 PROCEDURE — G8979 MOBILITY GOAL STATUS: HCPCS | Mod: CJ,HCWC

## 2018-12-04 PROCEDURE — 84100 ASSAY OF PHOSPHORUS: CPT | Mod: HCWC

## 2018-12-04 PROCEDURE — G8980 MOBILITY D/C STATUS: HCPCS | Mod: CK,HCWC

## 2018-12-04 RX ORDER — GABAPENTIN 400 MG/1
400 CAPSULE ORAL 4 TIMES DAILY
Status: CANCELLED | OUTPATIENT
Start: 2018-12-04

## 2018-12-04 RX ORDER — DIAZEPAM 5 MG/1
5 TABLET ORAL EVERY 8 HOURS PRN
Status: CANCELLED | OUTPATIENT
Start: 2018-12-04

## 2018-12-04 RX ORDER — IBUPROFEN 200 MG
16 TABLET ORAL
Status: DISCONTINUED | OUTPATIENT
Start: 2018-12-04 | End: 2018-12-27 | Stop reason: HOSPADM

## 2018-12-04 RX ORDER — RAMELTEON 8 MG/1
8 TABLET ORAL NIGHTLY PRN
Status: DISCONTINUED | OUTPATIENT
Start: 2018-12-04 | End: 2018-12-27 | Stop reason: HOSPADM

## 2018-12-04 RX ORDER — OXYCODONE HYDROCHLORIDE 5 MG/1
10 TABLET ORAL EVERY 6 HOURS PRN
Status: CANCELLED | OUTPATIENT
Start: 2018-12-04

## 2018-12-04 RX ORDER — ONDANSETRON 8 MG/1
8 TABLET, ORALLY DISINTEGRATING ORAL EVERY 8 HOURS PRN
Status: DISCONTINUED | OUTPATIENT
Start: 2018-12-04 | End: 2018-12-27 | Stop reason: HOSPADM

## 2018-12-04 RX ORDER — POLYETHYLENE GLYCOL 3350 17 G/17G
17 POWDER, FOR SOLUTION ORAL DAILY
Status: DISCONTINUED | OUTPATIENT
Start: 2018-12-05 | End: 2018-12-27 | Stop reason: HOSPADM

## 2018-12-04 RX ORDER — AMOXICILLIN 250 MG
1 CAPSULE ORAL 2 TIMES DAILY
Status: DISCONTINUED | OUTPATIENT
Start: 2018-12-04 | End: 2018-12-27 | Stop reason: HOSPADM

## 2018-12-04 RX ORDER — BUPROPION HYDROCHLORIDE 150 MG/1
150 TABLET ORAL DAILY
Status: CANCELLED | OUTPATIENT
Start: 2018-12-05

## 2018-12-04 RX ORDER — SODIUM CHLORIDE 0.9 % (FLUSH) 0.9 %
5 SYRINGE (ML) INJECTION
Status: CANCELLED | OUTPATIENT
Start: 2018-12-04

## 2018-12-04 RX ORDER — DANTROLENE SODIUM 50 MG/1
50 CAPSULE ORAL 4 TIMES DAILY
Status: DISCONTINUED | OUTPATIENT
Start: 2018-12-05 | End: 2018-12-27 | Stop reason: HOSPADM

## 2018-12-04 RX ORDER — GLUCAGON 1 MG
1 KIT INJECTION
Status: CANCELLED | OUTPATIENT
Start: 2018-12-04

## 2018-12-04 RX ORDER — AMOXICILLIN 250 MG
1 CAPSULE ORAL 2 TIMES DAILY
Status: CANCELLED | OUTPATIENT
Start: 2018-12-04

## 2018-12-04 RX ORDER — TAMSULOSIN HYDROCHLORIDE 0.4 MG/1
0.4 CAPSULE ORAL DAILY
Status: DISCONTINUED | OUTPATIENT
Start: 2018-12-05 | End: 2018-12-27 | Stop reason: HOSPADM

## 2018-12-04 RX ORDER — BUPROPION HYDROCHLORIDE 150 MG/1
150 TABLET ORAL DAILY
Status: DISCONTINUED | OUTPATIENT
Start: 2018-12-05 | End: 2018-12-06

## 2018-12-04 RX ORDER — IBUPROFEN 200 MG
24 TABLET ORAL
Status: DISCONTINUED | OUTPATIENT
Start: 2018-12-04 | End: 2018-12-27 | Stop reason: HOSPADM

## 2018-12-04 RX ORDER — TAMSULOSIN HYDROCHLORIDE 0.4 MG/1
0.4 CAPSULE ORAL DAILY
Status: CANCELLED | OUTPATIENT
Start: 2018-12-05

## 2018-12-04 RX ORDER — PROCHLORPERAZINE EDISYLATE 5 MG/ML
5 INJECTION INTRAMUSCULAR; INTRAVENOUS EVERY 6 HOURS PRN
Status: DISCONTINUED | OUTPATIENT
Start: 2018-12-04 | End: 2018-12-27 | Stop reason: HOSPADM

## 2018-12-04 RX ORDER — CALCIUM CARBONATE 200(500)MG
500 TABLET,CHEWABLE ORAL 2 TIMES DAILY PRN
Status: DISCONTINUED | OUTPATIENT
Start: 2018-12-04 | End: 2018-12-27 | Stop reason: HOSPADM

## 2018-12-04 RX ORDER — BACLOFEN 10 MG/1
20 TABLET ORAL 4 TIMES DAILY
Status: DISCONTINUED | OUTPATIENT
Start: 2018-12-05 | End: 2018-12-27 | Stop reason: HOSPADM

## 2018-12-04 RX ORDER — OXYCODONE HYDROCHLORIDE 10 MG/1
10 TABLET ORAL EVERY 6 HOURS PRN
Status: DISCONTINUED | OUTPATIENT
Start: 2018-12-04 | End: 2018-12-27 | Stop reason: HOSPADM

## 2018-12-04 RX ORDER — ACETAMINOPHEN 325 MG/1
650 TABLET ORAL EVERY 6 HOURS PRN
Status: CANCELLED | OUTPATIENT
Start: 2018-12-04

## 2018-12-04 RX ORDER — BISACODYL 10 MG
10 SUPPOSITORY, RECTAL RECTAL DAILY PRN
Status: CANCELLED | OUTPATIENT
Start: 2018-12-04

## 2018-12-04 RX ORDER — CALCIUM CARBONATE 200(500)MG
500 TABLET,CHEWABLE ORAL 2 TIMES DAILY PRN
Status: CANCELLED | OUTPATIENT
Start: 2018-12-04

## 2018-12-04 RX ORDER — RAMELTEON 8 MG/1
8 TABLET ORAL NIGHTLY PRN
Status: CANCELLED | OUTPATIENT
Start: 2018-12-04

## 2018-12-04 RX ORDER — DULOXETIN HYDROCHLORIDE 30 MG/1
30 CAPSULE, DELAYED RELEASE ORAL DAILY
Status: DISCONTINUED | OUTPATIENT
Start: 2018-12-05 | End: 2018-12-27 | Stop reason: HOSPADM

## 2018-12-04 RX ORDER — DANTROLENE SODIUM 50 MG/1
50 CAPSULE ORAL 4 TIMES DAILY
Status: CANCELLED | OUTPATIENT
Start: 2018-12-04

## 2018-12-04 RX ORDER — ACETAMINOPHEN 325 MG/1
650 TABLET ORAL EVERY 8 HOURS PRN
Status: DISCONTINUED | OUTPATIENT
Start: 2018-12-04 | End: 2018-12-05

## 2018-12-04 RX ORDER — TRAZODONE HYDROCHLORIDE 50 MG/1
100 TABLET ORAL NIGHTLY
Status: CANCELLED | OUTPATIENT
Start: 2018-12-04

## 2018-12-04 RX ORDER — IBUPROFEN 600 MG/1
600 TABLET ORAL EVERY 6 HOURS PRN
Status: DISCONTINUED | OUTPATIENT
Start: 2018-12-04 | End: 2018-12-27 | Stop reason: HOSPADM

## 2018-12-04 RX ORDER — SULFAMETHOXAZOLE AND TRIMETHOPRIM 800; 160 MG/1; MG/1
1 TABLET ORAL 2 TIMES DAILY
Status: COMPLETED | OUTPATIENT
Start: 2018-12-05 | End: 2018-12-09

## 2018-12-04 RX ORDER — IBUPROFEN 200 MG
24 TABLET ORAL
Status: CANCELLED | OUTPATIENT
Start: 2018-12-04

## 2018-12-04 RX ORDER — DULOXETIN HYDROCHLORIDE 30 MG/1
30 CAPSULE, DELAYED RELEASE ORAL DAILY
Status: CANCELLED | OUTPATIENT
Start: 2018-12-05

## 2018-12-04 RX ORDER — BACLOFEN 10 MG/1
20 TABLET ORAL 4 TIMES DAILY
Status: CANCELLED | OUTPATIENT
Start: 2018-12-04

## 2018-12-04 RX ORDER — ACETAMINOPHEN 325 MG/1
650 TABLET ORAL EVERY 8 HOURS PRN
Status: CANCELLED | OUTPATIENT
Start: 2018-12-04

## 2018-12-04 RX ORDER — OXYCODONE HYDROCHLORIDE 5 MG/1
15 TABLET ORAL EVERY 6 HOURS PRN
Status: CANCELLED | OUTPATIENT
Start: 2018-12-04

## 2018-12-04 RX ORDER — SODIUM CHLORIDE 0.9 % (FLUSH) 0.9 %
5 SYRINGE (ML) INJECTION
Status: DISCONTINUED | OUTPATIENT
Start: 2018-12-04 | End: 2018-12-27 | Stop reason: HOSPADM

## 2018-12-04 RX ORDER — BISACODYL 10 MG
10 SUPPOSITORY, RECTAL RECTAL DAILY PRN
Status: DISCONTINUED | OUTPATIENT
Start: 2018-12-04 | End: 2018-12-27 | Stop reason: HOSPADM

## 2018-12-04 RX ORDER — IBUPROFEN 200 MG
16 TABLET ORAL
Status: CANCELLED | OUTPATIENT
Start: 2018-12-04

## 2018-12-04 RX ORDER — IBUPROFEN 600 MG/1
600 TABLET ORAL EVERY 6 HOURS PRN
Status: CANCELLED | OUTPATIENT
Start: 2018-12-04

## 2018-12-04 RX ORDER — GLUCAGON 1 MG
1 KIT INJECTION
Status: DISCONTINUED | OUTPATIENT
Start: 2018-12-04 | End: 2018-12-27 | Stop reason: HOSPADM

## 2018-12-04 RX ORDER — ACETAMINOPHEN 325 MG/1
650 TABLET ORAL EVERY 6 HOURS PRN
Status: DISCONTINUED | OUTPATIENT
Start: 2018-12-04 | End: 2018-12-27 | Stop reason: HOSPADM

## 2018-12-04 RX ORDER — SULFAMETHOXAZOLE AND TRIMETHOPRIM 800; 160 MG/1; MG/1
1 TABLET ORAL 2 TIMES DAILY
Status: CANCELLED | OUTPATIENT
Start: 2018-12-04 | End: 2018-12-09

## 2018-12-04 RX ORDER — POLYETHYLENE GLYCOL 3350 17 G/17G
17 POWDER, FOR SOLUTION ORAL DAILY
Status: CANCELLED | OUTPATIENT
Start: 2018-12-05

## 2018-12-04 RX ORDER — ONDANSETRON 8 MG/1
8 TABLET, ORALLY DISINTEGRATING ORAL EVERY 8 HOURS PRN
Status: CANCELLED | OUTPATIENT
Start: 2018-12-04

## 2018-12-04 RX ORDER — TRAZODONE HYDROCHLORIDE 50 MG/1
100 TABLET ORAL NIGHTLY
Status: DISCONTINUED | OUTPATIENT
Start: 2018-12-05 | End: 2018-12-27 | Stop reason: HOSPADM

## 2018-12-04 RX ORDER — AMOXICILLIN 250 MG
1 CAPSULE ORAL 2 TIMES DAILY
Status: DISCONTINUED | OUTPATIENT
Start: 2018-12-05 | End: 2018-12-05

## 2018-12-04 RX ORDER — PROCHLORPERAZINE EDISYLATE 5 MG/ML
5 INJECTION INTRAMUSCULAR; INTRAVENOUS EVERY 6 HOURS PRN
Status: CANCELLED | OUTPATIENT
Start: 2018-12-04

## 2018-12-04 RX ORDER — SULFAMETHOXAZOLE AND TRIMETHOPRIM 800; 160 MG/1; MG/1
1 TABLET ORAL 2 TIMES DAILY
Status: DISCONTINUED | OUTPATIENT
Start: 2018-12-04 | End: 2018-12-04 | Stop reason: HOSPADM

## 2018-12-04 RX ORDER — DIAZEPAM 5 MG/1
5 TABLET ORAL EVERY 8 HOURS PRN
Status: DISCONTINUED | OUTPATIENT
Start: 2018-12-04 | End: 2018-12-27 | Stop reason: HOSPADM

## 2018-12-04 RX ADMIN — BACLOFEN 20 MG: 10 TABLET ORAL at 07:12

## 2018-12-04 RX ADMIN — OXYCODONE HYDROCHLORIDE 15 MG: 5 TABLET ORAL at 04:12

## 2018-12-04 RX ADMIN — DANTROLENE SODIUM 50 MG: 50 CAPSULE ORAL at 12:12

## 2018-12-04 RX ADMIN — RIVAROXABAN 20 MG: 20 TABLET, FILM COATED ORAL at 04:12

## 2018-12-04 RX ADMIN — RAMELTEON 8 MG: 8 TABLET, FILM COATED ORAL at 11:12

## 2018-12-04 RX ADMIN — TAMSULOSIN HYDROCHLORIDE 0.4 MG: 0.4 CAPSULE ORAL at 08:12

## 2018-12-04 RX ADMIN — GABAPENTIN 400 MG: 400 CAPSULE ORAL at 08:12

## 2018-12-04 RX ADMIN — ACETAMINOPHEN 1000 MG: 500 TABLET, FILM COATED ORAL at 04:12

## 2018-12-04 RX ADMIN — STANDARDIZED SENNA CONCENTRATE AND DOCUSATE SODIUM 1 TABLET: 8.6; 5 TABLET, FILM COATED ORAL at 07:12

## 2018-12-04 RX ADMIN — SENNOSIDES AND DOCUSATE SODIUM 1 TABLET: 8.6; 5 TABLET ORAL at 11:12

## 2018-12-04 RX ADMIN — SULFAMETHOXAZOLE AND TRIMETHOPRIM 1 TABLET: 800; 160 TABLET ORAL at 07:12

## 2018-12-04 RX ADMIN — DANTROLENE SODIUM 50 MG: 50 CAPSULE ORAL at 07:12

## 2018-12-04 RX ADMIN — OXYCODONE HYDROCHLORIDE 15 MG: 5 TABLET ORAL at 07:12

## 2018-12-04 RX ADMIN — ACETAMINOPHEN 1000 MG: 500 TABLET, FILM COATED ORAL at 07:12

## 2018-12-04 RX ADMIN — BACLOFEN 20 MG: 10 TABLET ORAL at 12:12

## 2018-12-04 RX ADMIN — OXYCODONE HYDROCHLORIDE 15 MG: 10 TABLET ORAL at 11:12

## 2018-12-04 RX ADMIN — ACETAMINOPHEN 1000 MG: 500 TABLET, FILM COATED ORAL at 08:12

## 2018-12-04 RX ADMIN — SULFAMETHOXAZOLE AND TRIMETHOPRIM 1 TABLET: 800; 160 TABLET ORAL at 12:12

## 2018-12-04 RX ADMIN — BACLOFEN 20 MG: 10 TABLET ORAL at 08:12

## 2018-12-04 RX ADMIN — GABAPENTIN 400 MG: 400 CAPSULE ORAL at 04:12

## 2018-12-04 RX ADMIN — DANTROLENE SODIUM 50 MG: 50 CAPSULE ORAL at 08:12

## 2018-12-04 RX ADMIN — BUPROPION HYDROCHLORIDE 150 MG: 150 TABLET, FILM COATED, EXTENDED RELEASE ORAL at 08:12

## 2018-12-04 RX ADMIN — STANDARDIZED SENNA CONCENTRATE AND DOCUSATE SODIUM 1 TABLET: 8.6; 5 TABLET, FILM COATED ORAL at 08:12

## 2018-12-04 RX ADMIN — DANTROLENE SODIUM 50 MG: 50 CAPSULE ORAL at 04:12

## 2018-12-04 RX ADMIN — BACLOFEN 20 MG: 10 TABLET ORAL at 04:12

## 2018-12-04 RX ADMIN — DULOXETINE 30 MG: 30 CAPSULE, DELAYED RELEASE ORAL at 08:12

## 2018-12-04 RX ADMIN — GABAPENTIN 400 MG: 400 CAPSULE ORAL at 12:12

## 2018-12-04 RX ADMIN — GABAPENTIN 400 MG: 400 CAPSULE ORAL at 07:12

## 2018-12-04 NOTE — PLAN OF CARE
12/04/18 1339   Post-Acute Status   Post-Acute Authorization Placement   Post-Acute Placement Status Pending Payor Review     DONNA spoke with Ila at [facility].   The dr will accept and they submitted for insurance approval.  Pt will be able to transfer to [facility] today if they get auth today. DONNA will f/u as needed.    Carolyn Wallace, [phone]

## 2018-12-04 NOTE — PT/OT/SLP PROGRESS
Occupational Therapy   Treatment    Name: Mao Levin  MRN: [id number]  Admitting Diagnosis:  MS (multiple sclerosis)       Recommendations:     Discharge Recommendations: nursing facility, skilled  Discharge Equipment Recommendations:  none  Barriers to discharge:  None    Subjective     Pain/Comfort:  ·      Objective:     Communicated with: nsg and PT prior to session.  Patient found with call button in reach and telemetry upon OT entry to room.    General Precautions: Standard, fall, aspiration   Orthopedic Precautions:    Braces:       Occupational Performance:    Bed Mobility:    · Sup to sit with min assist  · Scooting sitting EOB with min assist     Functional Mobility/Transfers:  · Sit to stand x 2 from bed with min assist, RW, assist with L hand on walker  · Functional Mobility: taking 4 steps forward/back and laterally along EOB min assist with RW    Activities of Daily Living:  · Feeding:  stand by assistance and minimum assistance with setup   · UE dress with gown with mod assist sitting EOB  · Donned shoes/AFO with total assist sitting EOB  · Washing face with setup/SBA sitting EOB      AMPAC 6 Click ADL:      Treatment & Education:  -Pt edu on OT role/POC  -Importance of OOB activity with staff assistance only  -Safety during functional t/f and mobility   - White board updated  - Multiple self care tasks completed-- assistance level noted above  - All questions/concerns answered within OT scope of practice             Patient left HOB elevated with call button in reach and PA present  Education:    Assessment:     Mao Levin is a 53 y.o. male with a medical diagnosis of MS (multiple sclerosis).  He presents with the following performance deficits affecting function are impaired self care skills, impaired functional mobilty, decreased lower extremity function, decreased upper extremity function, abnormal tone.     Rehab Prognosis:  Good; patient would benefit from acute skilled OT services to address  these deficits and reach maximum level of function.       Plan:     Patient to be seen 4 x/week to address the above listed problems via self-care/home management, therapeutic activities, therapeutic exercises, neuromuscular re-education  · Plan of Care Expires: 01/02/19  · Plan of Care Reviewed with: patient    This Plan of care has been discussed with the patient who was involved in its development and understands and is in agreement with the identified goals and treatment plan    GOALS:   Multidisciplinary Problems     Occupational Therapy Goals        Problem: Occupational Therapy Goal    Goal Priority Disciplines Outcome Interventions   Occupational Therapy Goal     OT, PT/OT Ongoing (interventions implemented as appropriate)    Description:  Goals to be met by: 12/17/18     Patient will increase functional independence with ADLs by performing:    Feeding with Set-up Assistance.  UE Dressing with Set-up Assistance and Stand-by Assistance.  LE Dressing with Set-up Assistance and Contact Guard Assistance.  Grooming while standing with Set-up Assistance and Contact Guard Assistance.  Toileting from bedside commode/toilet as appropriate with Contact Guard Assistance for hygiene and clothing management.   Toilet transfer to toilet/bedside commode as appropriate with Contact Guard Assistance.                      Time Tracking:     OT Date of Treatment: 12/04/18  OT Start Time: 1147  OT Stop Time: 1218  OT Total Time (min): 31 min    Billable Minutes:Self Care/Home Management 31 min    Ila Hines OT  12/4/2018

## 2018-12-04 NOTE — PLAN OF CARE
Problem: Fall Risk (Adult)  Goal: Absence of Falls  Patient will demonstrate the desired outcomes by discharge/transition of care.   12/04/18 0105   Fall Risk (Adult)   Absence of Falls making progress toward outcome

## 2018-12-04 NOTE — PLAN OF CARE
Problem: Occupational Therapy Goal  Goal: Occupational Therapy Goal  Goals to be met by: 12/17/18     Patient will increase functional independence with ADLs by performing:    Feeding with Set-up Assistance.  UE Dressing with Set-up Assistance and Stand-by Assistance.  LE Dressing with Set-up Assistance and Contact Guard Assistance.  Grooming while standing with Set-up Assistance and Contact Guard Assistance.  Toileting from bedside commode/toilet as appropriate with Contact Guard Assistance for hygiene and clothing management.   Toilet transfer to toilet/bedside commode as appropriate with Contact Guard Assistance.     Outcome: Ongoing (interventions implemented as appropriate)  Cont' with plan  Ila Hines, OTR

## 2018-12-04 NOTE — PT/OT/SLP PROGRESS
"Physical Therapy Treatment    Patient Name:  Mao Levin   MRN:  [id number]    Recommendations:     Discharge Recommendations:  nursing facility, skilled   Discharge Equipment Recommendations: none   Barriers to discharge: Inaccessible home and Decreased caregiver support    Assessment:     Mao Levin is a 53 y.o. male admitted with a medical diagnosis of MS (multiple sclerosis).  He presents with the following impairments/functional limitations:  weakness, gait instability, impaired functional mobilty, impaired endurance, impaired balance, impaired self care skills, decreased lower extremity function, decreased upper extremity function, abnormal tone.  Pt tolerates session c c/o fatigue.  Performed bed mobility c min A, transfer c min A and gait c min A using RW.  Pt ambulated short distance and demo decreased gait speed, FFP, difficulty c BLE advancement L>R,  L foot drag despite use of L AFO, min c/o fatigue, mild unsteadiness.  Pt safe to transfer to/from bedside chair c assistance of 1 person.  Pt would benefit from continued skilled acute PT 4x/wk to improve functional mobility.      Rehab Prognosis:  good; patient would benefit from acute skilled PT services to address these deficits and reach maximum level of function.      Recent Surgery: * No surgery found *      Plan:     During this hospitalization, patient to be seen 4 x/week to address the above listed problems via gait training, therapeutic activities, therapeutic exercises, neuromuscular re-education  · Plan of Care Expires:  12/29/18   Plan of Care Reviewed with: patient    Subjective     Communicated with RN and OT prior to session.  Patient found supine c PCT present upon PT entry to room, agreeable to treatment.      Chief Complaint: fatigue  Patient comments/goals: "I'm pretty tired today."  Pain/Comfort:  · Pain Rating 1: 0/10    Patients cultural, spiritual, Adventism conflicts given the current situation: none    Objective:     Patient found " with: telemetry     General Precautions: Standard, fall, aspiration   Orthopedic Precautions:N/A   Braces: N/A     Functional Mobility:  · Bed Mobility:     · Rolling Right: contact guard assistance  · Scooting: minimum assistance  · Supine to Sit: minimum assistance  · Sit to Supine: minimum assistance  · Transfers:     · Sit to Stand:  minimum assistance with rolling walker  · Gait: ~5ft forwards and backwards c RW min A; 3 steps R lateral c RW min A   · decreased gait speed, FFP, difficulty c BLE advancement L>R,  L foot drag despite use of L AFO, min c/o fatigue, mild unsteadiness  · Balance: sitting (SBA-CGA); standing (CGA)      AM-PAC 6 CLICK MOBILITY  Turning over in bed (including adjusting bedclothes, sheets and blankets)?: 3  Sitting down on and standing up from a chair with arms (e.g., wheelchair, bedside commode, etc.): 3  Moving from lying on back to sitting on the side of the bed?: 3  Moving to and from a bed to a chair (including a wheelchair)?: 3  Need to walk in hospital room?: 2  Climbing 3-5 steps with a railing?: 1  Basic Mobility Total Score: 15       Therapeutic Activities and Exercises:  -Pt educated on: Progress; safety c mobility; benefits of OOB activities; performing therex; d/c recs - v/u  -Sat EOB x8mins - able to accept some challenges - demo posterior and left lateral lean  -Functional reach in sitting x2mins  -Static standinx1min  -Sit<>stand: 2x from bed (elevated)  -required total A to don shoes    Patient left HOB elevated with all lines intact, call button in reach, RN notified and PA present..    GOALS:   Multidisciplinary Problems     Physical Therapy Goals        Problem: Physical Therapy Goal    Goal Priority Disciplines Outcome Goal Variances Interventions   Physical Therapy Goal     PT, PT/OT Ongoing (interventions implemented as appropriate)     Description:  Goals to be met by: 12/15/18     Patient will increase functional independence with mobility by  performin. Supine to sit with MInimal Assistance -not met  2. Sit to stand transfer with Minimal Assistance -not met  3. Gait  x 100 feet with Minimal Assistance using Rolling Walker. -not met  4. Ascend/descend 5 stair with bilateral Handrails Minimal Assistance -not met                      Time Tracking:     PT Received On: 18  PT Start Time: 1147     PT Stop Time: 1218  PT Total Time (min): 31 min     Billable Minutes: Gait Training 10 min and Therapeutic Activity 15 min    Treatment Type: Treatment  PT/PTA: PT     PTA Visit Number: 0     Chase Fajardo, PT  2018

## 2018-12-04 NOTE — PLAN OF CARE
12/04/18 1533   Post-Acute Status   Post-Acute Authorization Placement   Post-Acute Placement Status Authorization Obtained     DONNA spoke with Ila at [facility]. Pt was accepted to transfer to [facility] today.  DONNA gave the nurse the number to call report.  The nurse updated the pt.  DONNA attempted to call the pt's mother but she has a vm that is full.  DONNA called the nurse to ask if she could have the pt update his family about transfer. DONNA set up a wc van with the [facility] (x [phone]).  DONNA requested a p/u for 4:20pm.    [phone]

## 2018-12-04 NOTE — PLAN OF CARE
Problem: Physical Therapy Goal  Goal: Physical Therapy Goal  Goals to be met by: 12/15/18     Patient will increase functional independence with mobility by performin. Supine to sit with MInimal Assistance -not met  2. Sit to stand transfer with Minimal Assistance -not met  3. Gait  x 100 feet with Minimal Assistance using Rolling Walker. -not met  4. Ascend/descend 5 stair with bilateral Handrails Minimal Assistance -not met     Outcome: Ongoing (interventions implemented as appropriate)  Progressing well towards goals    Chase Fajardo DPT  2018

## 2018-12-04 NOTE — PLAN OF CARE
Problem: Patient Care Overview  Goal: Plan of Care Review  Outcome: Ongoing (interventions implemented as appropriate)  POC reviewed with Pt.  Pt aao x4.Pt up with PT/OT session. Incontinent of urine. No BM reported. Safety precautions maintained.call light within reach. Pt free of falls.  TB test to LFA to be read on 12/5/2018.

## 2018-12-04 NOTE — PLAN OF CARE
Problem: Patient Care Overview  Goal: Plan of Care Review  Outcome: Ongoing (interventions implemented as appropriate)  Plan of care reviewed with patient. Verbalized understanding. Awaiting acceptance at [facility]. TB skin test done today, Left arm. Should be read 12/5/2018.  NAD noted. Will continue to monitor.

## 2018-12-05 LAB
ANION GAP SERPL CALC-SCNC: 7 MMOL/L
BASOPHILS # BLD AUTO: 0.04 K/UL
BASOPHILS NFR BLD: 0.6 %
BUN SERPL-MCNC: 13 MG/DL
CALCIUM SERPL-MCNC: 8.3 MG/DL
CHLORIDE SERPL-SCNC: 105 MMOL/L
CO2 SERPL-SCNC: 27 MMOL/L
CREAT SERPL-MCNC: 0.8 MG/DL
DIFFERENTIAL METHOD: ABNORMAL
EOSINOPHIL # BLD AUTO: 0.2 K/UL
EOSINOPHIL NFR BLD: 3.5 %
ERYTHROCYTE [DISTWIDTH] IN BLOOD BY AUTOMATED COUNT: 13.9 %
EST. GFR  (AFRICAN AMERICAN): >60 ML/MIN/1.73 M^2
EST. GFR  (NON AFRICAN AMERICAN): >60 ML/MIN/1.73 M^2
GLUCOSE SERPL-MCNC: 85 MG/DL
HCT VFR BLD AUTO: 35 %
HGB BLD-MCNC: 11.5 G/DL
IMM GRANULOCYTES # BLD AUTO: 0.01 K/UL
IMM GRANULOCYTES NFR BLD AUTO: 0.2 %
LYMPHOCYTES # BLD AUTO: 2.6 K/UL
LYMPHOCYTES NFR BLD: 42.3 %
MAGNESIUM SERPL-MCNC: 2.1 MG/DL
MCH RBC QN AUTO: 29.9 PG
MCHC RBC AUTO-ENTMCNC: 32.9 G/DL
MCV RBC AUTO: 91 FL
MONOCYTES # BLD AUTO: 0.5 K/UL
MONOCYTES NFR BLD: 8.2 %
NEUTROPHILS # BLD AUTO: 2.8 K/UL
NEUTROPHILS NFR BLD: 45.2 %
NRBC BLD-RTO: 0 /100 WBC
PHOSPHATE SERPL-MCNC: 4.1 MG/DL
PLATELET # BLD AUTO: 246 K/UL
PMV BLD AUTO: 11.1 FL
POTASSIUM SERPL-SCNC: 4.1 MMOL/L
RBC # BLD AUTO: 3.84 M/UL
SODIUM SERPL-SCNC: 139 MMOL/L
WBC # BLD AUTO: 6.24 K/UL

## 2018-12-05 PROCEDURE — 97530 THERAPEUTIC ACTIVITIES: CPT | Mod: HCWC

## 2018-12-05 PROCEDURE — 84100 ASSAY OF PHOSPHORUS: CPT | Mod: HCWC

## 2018-12-05 PROCEDURE — 97166 OT EVAL MOD COMPLEX 45 MIN: CPT | Mod: HCWC

## 2018-12-05 PROCEDURE — 99309 SBSQ NF CARE MODERATE MDM 30: CPT | Mod: HCWC,,, | Performed by: NURSE PRACTITIONER

## 2018-12-05 PROCEDURE — 11000004 HC SNF PRIVATE: Mod: HCWC

## 2018-12-05 PROCEDURE — 80048 BASIC METABOLIC PNL TOTAL CA: CPT | Mod: HCWC

## 2018-12-05 PROCEDURE — 97116 GAIT TRAINING THERAPY: CPT | Mod: HCWC

## 2018-12-05 PROCEDURE — 97162 PT EVAL MOD COMPLEX 30 MIN: CPT | Mod: HCWC

## 2018-12-05 PROCEDURE — 36415 COLL VENOUS BLD VENIPUNCTURE: CPT | Mod: HCWC

## 2018-12-05 PROCEDURE — 97110 THERAPEUTIC EXERCISES: CPT | Mod: HCWC

## 2018-12-05 PROCEDURE — 97535 SELF CARE MNGMENT TRAINING: CPT | Mod: HCWC

## 2018-12-05 PROCEDURE — 25000003 PHARM REV CODE 250: Mod: HCWC | Performed by: PHYSICIAN ASSISTANT

## 2018-12-05 PROCEDURE — 99309 PR NURSING FAC CARE, SUBSEQ, SIGNIF COMPLIC: ICD-10-PCS | Mod: HCWC,,, | Performed by: NURSE PRACTITIONER

## 2018-12-05 PROCEDURE — 85025 COMPLETE CBC W/AUTO DIFF WBC: CPT | Mod: HCWC

## 2018-12-05 PROCEDURE — 83735 ASSAY OF MAGNESIUM: CPT | Mod: HCWC

## 2018-12-05 RX ADMIN — BACLOFEN 20 MG: 10 TABLET ORAL at 01:12

## 2018-12-05 RX ADMIN — GABAPENTIN 400 MG: 100 CAPSULE ORAL at 10:12

## 2018-12-05 RX ADMIN — SULFAMETHOXAZOLE AND TRIMETHOPRIM 1 TABLET: 800; 160 TABLET ORAL at 08:12

## 2018-12-05 RX ADMIN — TRAZODONE HYDROCHLORIDE 100 MG: 50 TABLET ORAL at 08:12

## 2018-12-05 RX ADMIN — SENNOSIDES AND DOCUSATE SODIUM 1 TABLET: 8.6; 5 TABLET ORAL at 10:12

## 2018-12-05 RX ADMIN — TAMSULOSIN HYDROCHLORIDE 0.4 MG: 0.4 CAPSULE ORAL at 10:12

## 2018-12-05 RX ADMIN — SULFAMETHOXAZOLE AND TRIMETHOPRIM 1 TABLET: 800; 160 TABLET ORAL at 10:12

## 2018-12-05 RX ADMIN — DANTROLENE SODIUM 50 MG: 50 CAPSULE ORAL at 10:12

## 2018-12-05 RX ADMIN — DANTROLENE SODIUM 50 MG: 50 CAPSULE ORAL at 01:12

## 2018-12-05 RX ADMIN — RIVAROXABAN 20 MG: 20 TABLET, FILM COATED ORAL at 05:12

## 2018-12-05 RX ADMIN — DANTROLENE SODIUM 50 MG: 50 CAPSULE ORAL at 05:12

## 2018-12-05 RX ADMIN — OXYCODONE HYDROCHLORIDE 15 MG: 10 TABLET ORAL at 10:12

## 2018-12-05 RX ADMIN — OXYCODONE HYDROCHLORIDE 15 MG: 10 TABLET ORAL at 04:12

## 2018-12-05 RX ADMIN — BUPROPION HYDROCHLORIDE 150 MG: 150 TABLET, EXTENDED RELEASE ORAL at 10:12

## 2018-12-05 RX ADMIN — GABAPENTIN 400 MG: 100 CAPSULE ORAL at 05:12

## 2018-12-05 RX ADMIN — BACLOFEN 20 MG: 10 TABLET ORAL at 08:12

## 2018-12-05 RX ADMIN — BACLOFEN 20 MG: 10 TABLET ORAL at 10:12

## 2018-12-05 RX ADMIN — GABAPENTIN 400 MG: 100 CAPSULE ORAL at 01:12

## 2018-12-05 RX ADMIN — POLYETHYLENE GLYCOL 3350 17 G: 17 POWDER, FOR SOLUTION ORAL at 10:12

## 2018-12-05 RX ADMIN — DANTROLENE SODIUM 50 MG: 50 CAPSULE ORAL at 08:12

## 2018-12-05 RX ADMIN — BACLOFEN 20 MG: 10 TABLET ORAL at 05:12

## 2018-12-05 RX ADMIN — DULOXETINE 30 MG: 30 CAPSULE, DELAYED RELEASE ORAL at 10:12

## 2018-12-05 RX ADMIN — GABAPENTIN 400 MG: 100 CAPSULE ORAL at 08:12

## 2018-12-05 NOTE — ASSESSMENT & PLAN NOTE
· [place] [facility] [place]  · Transitioned to Bactrim to completed 10 days, to be completed 12/10

## 2018-12-05 NOTE — PROGRESS NOTES
[facility] [place]  Department of [facility] Medicine  Progress Note    Patient Name: Mao Levin  MRN: [id number]  Code Status: Prior  Admission Date: 12/4/2018  Length of Stay: 1 days  Attending Physician: Radha Mckeon MD  Primary Care Provider: Mendy Alarcon MD    Subjective:     Principal Problem:MS (multiple sclerosis)    Chief complaint/ Reason for Admission: MS    HPI: 53-year-old male with a past medical history of MS, neurogenic bladder, PE on xarelto, chronic pain who presents for evaluation of increased LE weakness. Patient was diagnosed with a UTI treated with IV rocephin and transitioned to bactrim PO upon admission to SNF. Neurology also following to determine MS flare or Pseudoflare. It was deemed patient with Pseudoflare due to UTI. No new lesions noted no imaging. Patient has a history of urgency incontinence due to MS controlled with mirabegron. He does not self cath and does not have a agee catheter. Had botox injections with Dr. Augustin which at that time helped with incontinence. Denies fevers, chills, CP, SOB, abdominal pain, dysuria, hesitancy, hematuria, n/v, constipation, diarrhea, headaches, and vision changes, new numbness, or weakness. He states he lives at home with assistance from his mother. SNF placement was recommended for PT/OT services.     Hospital course:  12/4/18: Patient admitted to SNF for ongoing PT/OT following a hospitalization for pseudoflare MS due to UTI.  12/5: Patient seen at bedside, c/o incontinence and would like to f/u with Dr. Augustin. Discussed Plan of care and verbalized understanding. Answered all questions.    Interval History: Patient seen at bedside, doing well, no acute events overnight.     Review of Systems   Constitutional: Negative for appetite change, chills, fatigue and fever.   HENT: Negative for trouble swallowing.    Respiratory: Negative for cough, chest tightness, shortness of breath and wheezing.    Cardiovascular: Negative for chest  pain, palpitations and leg swelling.   Gastrointestinal: Negative for abdominal pain, constipation, diarrhea and nausea.   Genitourinary: Positive for frequency. Negative for difficulty urinating and urgency.   Musculoskeletal: Positive for back pain, gait problem and myalgias. Negative for arthralgias.   Skin: Negative for rash.   Neurological: Positive for weakness and numbness. Negative for dizziness, light-headedness and headaches.        +numbess and tingling to BLE (chronic)   Psychiatric/Behavioral: Negative for sleep disturbance.     Scheduled Meds:   baclofen  20 mg Oral QID    buPROPion  150 mg Oral Daily    dantrolene  50 mg Oral QID    DULoxetine  30 mg Oral Daily    gabapentin  400 mg Oral QID    polyethylene glycol  17 g Oral Daily    rivaroxaban  20 mg Oral Daily with dinner    senna-docusate 8.6-50 mg  1 tablet Oral BID    sulfamethoxazole-trimethoprim 800-160mg  1 tablet Oral BID    tamsulosin  0.4 mg Oral Daily    traZODone  100 mg Oral QHS     Continuous Infusions:  PRN Meds:.acetaminophen, acetaminophen, bisacodyl, calcium carbonate, dextrose 50%, dextrose 50%, diazePAM, glucagon (human recombinant), glucose, glucose, ibuprofen, ondansetron, oxyCODONE, oxyCODONE, prochlorperazine, ramelteon, sodium chloride 0.9%    Objective:     Vital Signs (Most Recent):  Temp: 97.7 °F (36.5 °C) (12/05/18 0713)  Pulse: 69 (12/05/18 0713)  Resp: 18 (12/05/18 0713)  BP: 119/66 (12/05/18 0713)  SpO2: 97 % (12/05/18 0713) Vital Signs (24h Range):  Temp:  [97.5 °F (36.4 °C)-98.7 °F (37.1 °C)] 97.7 °F (36.5 °C)  Pulse:  [69-75] 69  Resp:  [18] 18  SpO2:  [96 %-100 %] 97 %  BP: (114-123)/(63-85) 119/66     Weight: 81.1 kg (178 lb 12.7 oz)  Body mass index is 25.65 kg/m².  No intake or output data in the 24 hours ending 12/05/18 1017   Physical Exam   Constitutional: He is oriented to person, place, and time. He appears well-developed and well-nourished. No distress.   Cardiovascular: Normal rate, regular  rhythm and normal heart sounds. Exam reveals no gallop and no friction rub.   No murmur heard.  Pulmonary/Chest: Effort normal and breath sounds normal. No respiratory distress. He has no wheezes. He has no rales.   Abdominal: Soft. Bowel sounds are normal. He exhibits no distension. There is no tenderness.   Musculoskeletal: He exhibits edema. He exhibits no tenderness.   1+ edema to left ankle, Decreased strength in LE bilaterally. Left LE weaker than right. Patient states this is his baseline. Decreased  strength in left hand.    Neurological: He is alert and oriented to person, place, and time.   Skin: Skin is warm and dry. No rash noted. He is not diaphoretic. No cyanosis. Nails show no clubbing.   Psychiatric: He has a normal mood and affect. His behavior is normal.       Significant Labs:  Recent Labs   Lab 12/03/18 0338 12/04/18 0518 12/05/18 0522   WBC 6.53 7.23 6.24   HGB 10.9* 12.1* 11.5*   HCT 34.0* 36.4* 35.0*    225 246     Recent Labs   Lab 11/30/18  1357  12/03/18 0338 12/04/18 0518 12/05/18  0522      < > 142 138 139   K 3.7   < > 4.2 4.8 4.1      < > 107 107 105   CO2 28   < > 28 25 27   BUN 10   < > 15 12 13   CREATININE 0.8   < > 1.0 0.9 0.8   CALCIUM 9.4   < > 8.3* 8.4* 8.3*   PROT 7.1  --   --   --   --    BILITOT 0.5  --   --   --   --    ALKPHOS 68  --   --   --   --    ALT 24  --   --   --   --    AST 22  --   --   --   --     < > = values in this interval not displayed.     Lab Results   Component Value Date    LABPROT 10.8 12/15/2016    ALBUMIN 3.9 11/30/2018     Lab Results   Component Value Date    CALCIUM 8.3 (L) 12/05/2018    PHOS 4.1 12/05/2018       Significant Imaging: na    Assessment/Plan:      * MS (multiple sclerosis)    · Chronic with increase weakness to lower extremities with this hospitalization  · Neurology followed and was likely a pseudoflare due to UTI  · Continue home dose of baclofen, dantrolene, gabapentin  · Patient will need to f/u in MS  clinic and scheduled Rituxan infusion, MS clinic aware of patient's needs  · PT/OT  · Fall precautions  · dvt ppx with rivaroxaban  · Bowel regimen with senokot-s and miralax, hold for loose or frequent stoolings     Urinary tract infection without hematuria    · Treated with rocephin at [facility] [place]  · Transitioned to Bactrim to completed 10 days, to be completed 12/10     Depression    · chronic  · Mood stable, sleeping well  · Continue home regimen of bupropion, duloxetine, and trazodone     Polyneuropathy    · Reports being followed by PMR  · Continue home regimen of gabapentin  · F/u with Dr. Estrella upon discharge     10/25/2016 Saddle PE    · Chronically on xarleto--continue  · denies sob or CP     Spasticity    · Continue home scheduled regimen of baclofen, dantrolene  · And prn valium for breakthrough spasms  · F/u with neurology outpatient     Neurogenic bladder    · Chronic  · Patient has not ever done self caths  · Has been being followed by Dr. Augustin and had Botox injections in July 2018  · I Reviewed Urology note from clinic and patient to continue flomax and f/u in 6 months. Since patient had to cancel f/u visit, will reschedule upon discharge  · Continue flomax  · F/u with urology, Dr. Augustin       I certify that SNF services are required to be given on an inpatient basis because Mao Levin's needs for skilled nursing care and/or skilled rehabilitation are required on a daily basis and such services can only practically be provided in a skilled nursing facility setting and are for an ongoing condition for which he received inpatient care in the hospital.     Future Appointments   Date Time Provider Department Center   12/6/2018  8:00 AM Kymberly Estrella MD [facility] DAVID Kelley   3/11/2019  1:00 PM Mattie Finnegan MD [facility] CRISTINA Kelley       Patient's care plan and discharge planning will be discussed by the SNF team in IDT meeting weekly. Medications to be reviewed and discussed with the SNF unit clinical  pharmacist.      Osiris Navarrete NP  Department of [facility] Medicine  [facility]

## 2018-12-05 NOTE — ASSESSMENT & PLAN NOTE
· Chronic  · Patient has not ever done self caths  · Has been being followed by Dr. Augustin and had Botox injections in July 2018  · I Reviewed Urology note from clinic and patient to continue flomax and f/u in 6 months. Since patient had to cancel f/u visit, will reschedule upon discharge  · Continue flomax  · F/u with urology, Dr. Augustin

## 2018-12-05 NOTE — PT/OT/SLP DISCHARGE
Physical Therapy Discharge Summary    Name: Mao Levin  MRN: [id number]   Principal Problem: MS (multiple sclerosis)     Patient Discharged from acute Physical Therapy on 2018.  Please refer to prior PT noted date on 2018 for functional status.     Assessment:     Patient was discharged unexpectedly.  Information required to complete an accurate discharge summary is unknown.  Refer to therapy initial evaluation and last progress note for initial and most recent functional status and goal achievement.  Recommendations made may be found in medical record.    Objective:     GOALS:   Multidisciplinary Problems     Physical Therapy Goals        Problem: Physical Therapy Goal    Goal Priority Disciplines Outcome Goal Variances Interventions   Physical Therapy Goal     PT, PT/OT Ongoing (interventions implemented as appropriate)     Description:  Goals to be met by: 12/15/18     Patient will increase functional independence with mobility by performin. Supine to sit with MInimal Assistance -not met  2. Sit to stand transfer with Minimal Assistance -not met  3. Gait  x 100 feet with Minimal Assistance using Rolling Walker. -not met  4. Ascend/descend 5 stair with bilateral Handrails Minimal Assistance -not met                      Reasons for Discontinuation of Therapy Services  Transfer to alternate level of care.      Plan:     Patient Discharged to: Skilled Nursing Facility.    Chase Fajardo, PT  2018

## 2018-12-05 NOTE — PLAN OF CARE
12/05/18 0831   Final Note   Assessment Type Final Discharge Note     Patient discharged to [facility] on 12/4/18.

## 2018-12-05 NOTE — PLAN OF CARE
Problem: Physical Therapy Goal  Goal: Physical Therapy Goal  Goals to be met by: 18     Patient will increase functional independence with mobility by performin. Supine to sit with Stand-by Assistance  2. Sit to supine with Stand-by Assistance  3. Rolling to Left and Right with Stand-by Assistance.  4. Sit to stand transfer with Stand-by Assistance  5. Bed to chair transfer with Stand-by Assistance using Rolling Walker  6. Gait  x 50 feet with Stand-by Assistance using Rolling Walker.   7. Ascend/descend 6 stair with Right OR Left Handrails Contact Guard Assistance    Outcome: Ongoing (interventions implemented as appropriate)  PT eval completed. Pt will begin PT POC.    Angelica Ribera, PT  2018

## 2018-12-05 NOTE — CLINICAL REVIEW
Clinical Pharmacy Chart Review Note      Admit Date: 12/4/2018   LOS: 1 day       Mao Levin is a 53 y.o. male admitted to SNF for PT/OT after hospitalization for MS (multiple sclerosis).    Active Hospital Problems    Diagnosis  POA    *MS (multiple sclerosis) [G35]  Yes     Chronic    Urinary tract infection without hematuria [N39.0]  Yes    Depression [F32.9]  Yes    Polyneuropathy [G62.9]  Yes    10/25/2016 Saddle PE [I26.92]  Yes     Chronic    Spasticity [R25.2]  Yes    Neurogenic bladder [N31.9]  Yes     Chronic      Resolved Hospital Problems   No resolved problems to display.     Review of patient's allergies indicates:  No Known Allergies  Patient Active Problem List    Diagnosis Date Noted    Urinary tract infection without hematuria 11/30/2018    Debility 10/17/2018    Mild malnutrition 10/17/2018    Fever 10/15/2018    Current use of long term anticoagulation 10/15/2018    Kidney stone 06/15/2018    Benign prostatic hyperplasia with weak urinary stream 06/05/2018    Frequency of urination 05/08/2018    Urgency incontinence 05/08/2018    Recurrent UTI 03/13/2018    Chronic pain syndrome 12/21/2017    Long-term current use of opiate analgesic 12/21/2017    Chronic bilateral low back pain with bilateral sciatica 11/20/2017    Sepsis due to urinary tract infection 08/14/2017    Fatigue 05/26/2017    Cluster headache syndrome, unspecified, intractable 05/01/2017    Intractable headache 04/20/2017    Bilateral leg pain 03/17/2017    Depression 03/17/2017    Smoker 03/17/2017    Frequent falls 03/07/2017    MS (multiple sclerosis) 12/19/2016    Acute relapsing multiple sclerosis 12/16/2016    Long term (current) use of systemic steroids 12/16/2016    Constipation due to neurogenic bowel 12/16/2016    Abnormal thyroid blood test 12/16/2016    Neuropathic pain, leg, bilateral 12/08/2016    Polyneuropathy 10/27/2016    10/25/2016 Saddle PE 10/25/2016    Vitamin D deficiency  disease 10/24/2016    Abnormal cortisol level 10/23/2016    Neurogenic bladder 10/06/2016    Spasticity 10/06/2016    Impaired mobility and ADLs 10/06/2016    Abnormal coordination 10/06/2016    Paraparesis 09/29/2016    Gait instability 09/26/2016    Adrenal insufficiency due to steroid withdrawal 09/24/2016    Chronic pain of multiple sites 09/22/2016    Weakness generalized 09/22/2016       Scheduled Meds:    baclofen  20 mg Oral QID    buPROPion  150 mg Oral Daily    dantrolene  50 mg Oral QID    DULoxetine  30 mg Oral Daily    gabapentin  400 mg Oral QID    polyethylene glycol  17 g Oral Daily    rivaroxaban  20 mg Oral Daily with dinner    senna-docusate 8.6-50 mg  1 tablet Oral BID    sulfamethoxazole-trimethoprim 800-160mg  1 tablet Oral BID    tamsulosin  0.4 mg Oral Daily    traZODone  100 mg Oral QHS     Continuous Infusions:   PRN Meds: acetaminophen, acetaminophen, bisacodyl, calcium carbonate, dextrose 50%, dextrose 50%, diazePAM, glucagon (human recombinant), glucose, glucose, ibuprofen, ondansetron, oxyCODONE, oxyCODONE, prochlorperazine, ramelteon, sodium chloride 0.9%    OBJECTIVE:     Vital Signs (Last 24H)  Temp:  [97.5 °F (36.4 °C)-98.7 °F (37.1 °C)]   Pulse:  [69-75]   Resp:  [18]   BP: (114-123)/(66-85)   SpO2:  [96 %-97 %]     Laboratory:  CBC:   Recent Labs   Lab 12/03/18 0338 12/04/18 0518 12/05/18 0522   WBC 6.53 7.23 6.24   RBC 3.65* 3.95* 3.84*   HGB 10.9* 12.1* 11.5*   HCT 34.0* 36.4* 35.0*    225 246   MCV 93 92 91   MCH 29.9 30.6 29.9   MCHC 32.1 33.2 32.9     BMP:   Recent Labs   Lab 12/03/18 0338 12/04/18 0518 12/05/18 0522   GLU 95 91 85    138 139   K 4.2 4.8 4.1    107 105   CO2 28 25 27   BUN 15 12 13   CREATININE 1.0 0.9 0.8   CALCIUM 8.3* 8.4* 8.3*   MG 2.1 2.2 2.1     CMP:   Recent Labs   Lab 11/30/18  1357  12/03/18  0338 12/04/18  0518 12/05/18  0522   GLU 92   < > 95 91 85   CALCIUM 9.4   < > 8.3* 8.4* 8.3*   ALBUMIN 3.9  --    --   --   --    PROT 7.1  --   --   --   --       < > 142 138 139   K 3.7   < > 4.2 4.8 4.1   CO2 28   < > 28 25 27      < > 107 107 105   BUN 10   < > 15 12 13   CREATININE 0.8   < > 1.0 0.9 0.8   ALKPHOS 68  --   --   --   --    ALT 24  --   --   --   --    AST 22  --   --   --   --    BILITOT 0.5  --   --   --   --     < > = values in this interval not displayed.     LFTs:   Recent Labs   Lab 11/30/18  1357   ALT 24   AST 22   ALKPHOS 68   BILITOT 0.5   PROT 7.1   ALBUMIN 3.9     Coagulation: No results for input(s): PT, INR, APTT in the last 168 hours.  Cardiac markers: No results for input(s): CKMB, TROPONINT, MYOGLOBIN in the last 168 hours.  ABGs: No results for input(s): PH, PCO2, PO2, HCO3, POCSATURATED, BE in the last 168 hours.  Microbiology Results (last 7 days)     ** No results found for the last 168 hours. **        Specimen (12h ago, onward)    None        Recent Labs   Lab 11/30/18  1357   COLORU Yellow   SPECGRAV 1.015   PHUR 8.0   PROTEINUA Negative   BACTERIA Many*   NITRITE Positive*   LEUKOCYTESUR 2+*         ASSESSMENT/PLAN:     Active Hospital Problems    Diagnosis    *MS (multiple sclerosis)   --PT/OT:  mg q6hrs prn mild pain; Oxycodone 10 mg q6hrs prn moderate pain; 15 mg q6hrs prn severe pain  --bowel regimen for constipation; hold for loose or frequent stools: Miralax daily; senna-docusate twice daily; bisacodyl 10 mg supp daily prn  --DVT prophylaxis: Xarelto 20 mg daily      Urinary tract infection without hematuria   --Sulfamethoxazole-trimethopri, 800-160 mg twice daily for 10 doses      Depression   --Bupropion TB24 150 mg daily  --Duloxetine 30 mg daily  --Trazodone 100 m g nightly  Monitor: mental status for depression, suicide ideation, anxiety, serotonin syndrome, hyponatremia      Polyneuropathy   --Duloxetine 30 mg daily  --Gabapentin 400 mg four times daily      10/25/2016 Saddle PE   --Xarelto 20 mg daily (monitor CBC with BIW labs)    Lab Results    Component Value Date    WBC 6.24 12/05/2018    HGB 11.5 (L) 12/05/2018    HCT 35.0 (L) 12/05/2018    MCV 91 12/05/2018     12/05/2018         Spasticity  --Baclofen 20 mg four times daily  --Dantrolene 50 mg four times daily  --Diazepam prn      Neurogenic bladder   --Tamsulosin 0.4 mg daily          I have reviewed the medications in compliance with CMS Regulation F329 of the [place] Appendix PP.      Asiya Mcdermott, Pharm. D.  Clinical Pharmacist  [facility]

## 2018-12-05 NOTE — ASSESSMENT & PLAN NOTE
General Daily Progress Note    Admission Date: 11/2/2017  Hospital Day 7  Post-Op Day 6  Subjective:   Pain control adequate so far with PCA discontinued yesterday evening. Approximately 6 bowel movements overnight. Objective:   Patient Vitals for the past 24 hrs:   BP Temp Pulse Resp SpO2   11/09/17 0145 110/69 98.1 °F (36.7 °C) 64 16 95 %   11/08/17 2156 103/69 98.3 °F (36.8 °C) 81 18 94 %   11/08/17 1855 123/81 98.9 °F (37.2 °C) 88 20 95 %   11/08/17 1648 126/73 98.7 °F (37.1 °C) 86 18 94 %   11/08/17 0937 121/65 99 °F (37.2 °C) 83 18 96 %        11/07 1901 - 11/09 0700  In: 84689 [P.O.:1860; I.V.:67615]  Out: 300 [Urine:300]      Physical Examination:  General Appearance:  No distress. Abdomen:  Binder in place. Data Review   Recent Results (from the past 24 hour(s))   GLUCOSE, POC    Collection Time: 11/08/17 12:33 PM   Result Value Ref Range    Glucose (POC) 82 65 - 100 mg/dL    Performed by Joseph Núñez    GLUCOSE, POC    Collection Time: 11/08/17  5:03 PM   Result Value Ref Range    Glucose (POC) 104 (H) 65 - 100 mg/dL    Performed by Monday  Tigist    GLUCOSE, POC    Collection Time: 11/08/17 10:00 PM   Result Value Ref Range    Glucose (POC) 106 (H) 65 - 100 mg/dL    Performed by Nicolas Jones    CBC WITH AUTOMATED DIFF    Collection Time: 11/09/17  2:08 AM   Result Value Ref Range    WBC 5.8 4.1 - 11.1 K/uL    RBC 3.61 (L) 4.10 - 5.70 M/uL    HGB 10.1 (L) 12.1 - 17.0 g/dL    HCT 31.5 (L) 36.6 - 50.3 %    MCV 87.3 80.0 - 99.0 FL    MCH 28.0 26.0 - 34.0 PG    MCHC 32.1 30.0 - 36.5 g/dL    RDW 15.6 (H) 11.5 - 14.5 %    PLATELET 925 149 - 880 K/uL    NEUTROPHILS 60 32 - 75 %    LYMPHOCYTES 26 12 - 49 %    MONOCYTES 10 5 - 13 %    EOSINOPHILS 4 0 - 7 %    BASOPHILS 0 0 - 1 %    ABS. NEUTROPHILS 3.5 1.8 - 8.0 K/UL    ABS. LYMPHOCYTES 1.5 0.8 - 3.5 K/UL    ABS. MONOCYTES 0.6 0.0 - 1.0 K/UL    ABS. EOSINOPHILS 0.3 0.0 - 0.4 K/UL    ABS.  BASOPHILS 0.0 0.0 - 0.1 K/UL   GLUCOSE, POC    Collection · Continue home scheduled regimen of baclofen, dantrolene  · And prn valium for breakthrough spasms  · F/u with neurology outpatient   Time: 11/09/17  5:40 AM   Result Value Ref Range    Glucose (POC) 94 65 - 100 mg/dL    Performed by Nathanael Mccoy            Assessment:     Principal Problem:    Intussusception of intestine ([address] (11/3/2017)    Active Problems:    Chronic pain (3/17/2015)      Short bowel syndrome (9/28/2015)      Incisional hernia, without obstruction or gangrene (1/31/2017)      Bowel obstruction (11/3/2017)        6 Days Post-Op s/p Exploratory laparotomy, extensive lysis of adhesions, and reduction of intussusception.      LUE PICC placed post-operatively on 11/3/2017.      Hemodynamically stable.     GI function is returning.  TPN was begun on 11/3/2017 and discontinued yesterday evening. Control of diarrhea seems to be improving. Colestid, Imodium, and Lomotil were restarted on 11/6/2017.     No evidence of infection.      Pain management is improving. Palliative Medicine assistance is greatly appreciated. I do not think that the patient should be discharged today because it is too soon to know whether the pain management will be adequate.       Plan:   Continue regular diet. Continue current antidiarrheal medications.      Continue pain management per Palliative Medicine.      Ambulate. Incentive spirometer.      Continue abdominal binder.     Probable discharge tomorrow morning.

## 2018-12-05 NOTE — PT/OT/SLP EVAL
PhysicalTherapy   Evaluation    Mao Levin   MRN: [id number]     PT Received On: 12/05/18  PT Start Time: 1059     PT Stop Time: 1207    PT Total Time (min): 68 min       Billable Minutes:  Evaluation 15, Gait Training 15, Therapeutic Activity 15, Therapeutic Exercise 23 and Total Time 53    Diagnosis: UTI, pt has MS  Past Medical History:   Diagnosis Date    Anticoagulant long-term use     Anxiety     Chronic back pain     Deep vein thrombosis     Depression     Depression     Gait instability     Multiple sclerosis     Multiple sclerosis     Neurogenic bladder     Polyneuropathy     Pulmonary embolism     Spasticity       Past Surgical History:   Procedure Laterality Date    CYSTOSCOPY N/A 6/20/2018    Procedure: CYSTOSCOPY;  Surgeon: Brian Augustin MD;  Location: [facility] OR [address] [place] [address];  Service: Urology;  Laterality: N/A;  1 hour    CYSTOSCOPY N/A 6/20/2018    Performed by Brian Augustin MD at [facility] OR [address] [place] [address]    INJECTION OF BOTULINUM TOXIN TYPE A N/A 6/20/2018    Procedure: INJECTION, BOTULINUM TOXIN, TYPE A 200 UNITS;  Surgeon: Brian Augustin MD;  Location: [facility] OR [address] [place] [address];  Service: Urology;  Laterality: N/A;    INJECTION, BOTULINUM TOXIN, TYPE A 200 UNITS N/A 6/20/2018    Performed by Brian Augustin MD at [facility] OR [address] [place] [address]         General Precautions: Standard, fall  Orthopedic Precautions: N/A   Braces: N/A    Do you have any cultural, spiritual, Confucianism conflicts, given your current situation?: none    Patient History:  Lives With: parent(s)  Living Arrangements: house  Home Accessibility: stairs to enter home  Home Layout: Able to live on 1st floor  Number of Stairs to Enter Home: 6  Stair Railings at Home: outside, present at both sides  Transportation Available: family or friend will provide  Living Environment Comment: Pt lives w/ his mom in a 1SH w/ 6STE and (B) rails that are too far apart to reach both. Pt has a walk-in shower. Pt's mom is available 24/7 but unable to provide physical  assistance.   Equipment Currently Used at Home: bath bench, walker, rolling (borrowed and broken), other (see comments)(LLE AFO)  DME owned (not currently used): wheelchair    Previous Level of Function: Pt reports that PTA he was Mindy w/ mobility using RW for household and community distances. Pt does report that he would use motorized carts when at [facility]. Pt reports 5 falls in the past year due to LOB/weakness.   Ambulation Skills: needs device  Transfer Skills: needs device  ADL Skills: needs device    Subjective:  Communicated with patient prior to session.  Pt agreeable to session.   Chief Complaint: BLE pain and weakness/stiffness  Patient goals: return to PLOF    Pain/Comfort  Pain Rating 1: 9/10  Location - Side 1: Bilateral  Location - Orientation 1: lower  Location 1: leg  Pain Addressed 1: Pre-medicate for activity, Reposition, Distraction  Pain Rating Post-Intervention 1: 9/10    Objective:  Patient found sitting in w/c. Patient found with: (LLE AFO)    Cognitive Exam:  Oriented to: Person, Place, Time and Situation  Follows Commands/attention: Follows multistep  commands  Communication: clear/fluent  Safety awareness/insight to disability: intact    Physical Exam:  Postural examination/scapula alignment:    -       Rounded shoulders  -       Forward head    Skin integrity: Visible skin intact  Edema: None noted     Sensation:      -       Impaired  light/touch feet more impaired than hands    Upper Extremity Range of Motion(See OT eval note for details):  Right Upper Extremity: WFL PROM, grossly limited AROM due to spasticity  Left Upper Extremity: WFL PROM, grossly limited AROM due to spasticity    Upper Extremity  Strength: grossly limited BUE    Lower Extremity Range of Motion:  Right Lower Extremity: WFL  Left Lower Extremity: WFL PROM    Lower Extremity Strength:  Right Lower Extremity: HF 2+/5, KE 4+/5, KF 3+/5, DF/PF 3/5  Left Lower Extremity: HF 1/5, KE 3/5, KF 3/5, DF/PF 0/5    AM-PAC 6  CLICK MOBILITY  Total Score:11    Bed Mobility:  Sit>Supine:on mat w/ ModA for BLE  Supine>Sit: on mat w/ MaxA for trunk and BLE, cueing/assistance for logroll technique  inc'd time and cueing required    Transfers:  Sit<>Stand: to/from w/c (2 trials) in // bars w/ Miri(x1) to rise  Stand Pivot Transfer: w/c>EOM w/o AD w/ MaxA to rise and pivot  Scoot pivot: EOM>w/c w/c AD w/ Miri  Cueing for forward lean and transfer techniques    Gait:  Amb length of // bars (x2 trials) w/ seated rest break b/w trials, Miri for LLE advancement and Min/CGA for stability  Cueing for upright posture including trunk/hip extension  Cueing also for gait sequencing     Wheelchair Mobility:  Patient propels w/c on and off unit Mindy w/ BUE    Therex:  Supine BLE therex x10 reps (GS, SAQ, AP RLE) w/ AAROM as needed  Supine hip/knee flexion/extension w/ resistance in extension x5 reps each w/ AAROM  Supine stretch BLE all planes/joints    Balance:  Static sit EOM w/ SBA/SPV  Static  // bars w/ CGA for safety, cueing for upright posture    Additional Treatment:  Pt educated on PT role and POC. Pt verb understanding.    Patient left up in chair with call button in reach.    Assessment:  Mao Levin is a 53 y.o. male with a medical diagnosis of UIT.  Pt presents w/ previous pertinent co-morbidities and personal factors including MS, BUE/BLE spasticity, chronic pain, decreased caregiver support, and inaccessible home environment. Pt currently presents w/ the below mentioned deficits requiring Miri for gait in // bars and Miri for scoot pivot transfers. Pt's clinical presentation is evolving w/ changing characteristics including UTI which is being treated medically and MS diagnosis. These factors classify pt as a MODERATE complexity evaluation. Pt is appropriate for skilled PT and will begin PT POC.    Rehab identified problem list/impairments: weakness, impaired endurance, impaired sensation, impaired self care skills, impaired functional  mobilty, gait instability, impaired balance, decreased coordination, decreased upper extremity function, decreased lower extremity function, pain, abnormal tone, decreased ROM, impaired coordination, impaired fine motor, impaired joint extensibility    Rehab potential is fair.    Activity tolerance: Good    Discharge recommendations: home with home health     Barriers to discharge: Inaccessible home environment, Decreased caregiver support    Equipment recommendations: bedside commode and rolling walker    GOALS:   Multidisciplinary Problems     Physical Therapy Goals        Problem: Physical Therapy Goal    Goal Priority Disciplines Outcome Goal Variances Interventions   Physical Therapy Goal     PT, PT/OT Ongoing (interventions implemented as appropriate)     Description:  Goals to be met by: 18     Patient will increase functional independence with mobility by performin. Supine to sit with Stand-by Assistance  2. Sit to supine with Stand-by Assistance  3. Rolling to Left and Right with Stand-by Assistance.  4. Sit to stand transfer with Stand-by Assistance  5. Bed to chair transfer with Stand-by Assistance using Rolling Walker  6. Gait  x 50 feet with Stand-by Assistance using Rolling Walker.   7. Ascend/descend 6 stair with Right OR Left Handrails Contact Guard Assistance                      PLAN:    Patient to be seen (5-6X/week)  to address the above listed problems via gait training, therapeutic activities, therapeutic exercises  Plan of Care Expires: 19    Angelica Ribera, PT 2018

## 2018-12-05 NOTE — ASSESSMENT & PLAN NOTE
· Chronic with increase weakness to lower extremities with this hospitalization  · Neurology followed and was likely a pseudoflare due to UTI  · Continue home dose of baclofen, dantrolene, gabapentin  · Patient will need to f/u in MS clinic and scheduled Rituxan infusion, MS clinic aware of patient's needs  · PT/OT  · Fall precautions  · dvt ppx with rivaroxaban  · Bowel regimen with senokot-s and miralax, hold for loose or frequent stoolings

## 2018-12-05 NOTE — PT/OT/SLP EVAL
Occupational Therapy  Evaluation/Treatment    Mao Levin   MRN: [id number]   Admitting Diagnosis: MS (multiple sclerosis)     OT Date of Treatment: 12/05/18   OT Start Time: 0930  OT Stop Time: 1045  OT Total Time (min): 75 min    Billable Minutes:  Evaluation 15  Self Care/Home Management 60    Diagnosis: MS (multiple sclerosis)    Past Medical History:   Diagnosis Date    Anticoagulant long-term use     Anxiety     Chronic back pain     Deep vein thrombosis     Depression     Depression     Gait instability     Multiple sclerosis     Multiple sclerosis     Neurogenic bladder     Polyneuropathy     Pulmonary embolism     Spasticity       Past Surgical History:   Procedure Laterality Date    CYSTOSCOPY N/A 6/20/2018    Procedure: CYSTOSCOPY;  Surgeon: Brian Augustin MD;  Location: [facility] OR [address] [place] [address];  Service: Urology;  Laterality: N/A;  1 hour    CYSTOSCOPY N/A 6/20/2018    Performed by Brian Augustin MD at [facility] OR [address] [place] [address]    INJECTION OF BOTULINUM TOXIN TYPE A N/A 6/20/2018    Procedure: INJECTION, BOTULINUM TOXIN, TYPE A 200 UNITS;  Surgeon: Brian Augustin MD;  Location: [facility] OR [address] [place] [address];  Service: Urology;  Laterality: N/A;    INJECTION, BOTULINUM TOXIN, TYPE A 200 UNITS N/A 6/20/2018    Performed by Brian Augustin MD at [facility] OR [address] [place] [address]         General Precautions: Standard, fall  Orthopedic Precautions: N/A  Braces: N/A          Patient History:  Lives With: parent(s)  Living Arrangements: house  Home Accessibility: stairs to enter home  Home Layout: Able to live on 1st floor  Number of Stairs to Enter Home: 6  Stair Railings at Home: outside, present at both sides(unable to reach them at the same time)  Living Environment Comment: Patient lives with mom in a 1 SH with 6 VASILIY and B handrails that are too far apart to reach at the same time. Patient has a walk in shower with a bench. Patient has a regular toilet. Patient owns a w/c and BSC. Patient's mom is available 24/7, but is unable to provide  assistance at his current level of functioning    Prior level of function:   Bed Mobility/Transfers: needs device  Grooming: independent  Bathing: needs device  Upper Body Dressing: independent  Lower Body Dressing: independent  Toileting: independent        Dominant hand: right    Subjective:  Communicated with patient prior to session.    Chief Complaint: weakness and decreased mobility  Patient/Family stated goals: to go home    Pain/Comfort  Pain Rating 1: 10/10  Location - Orientation 1: generalized  Location 1: (all over )  Pain Addressed 1: Pre-medicate for activity, Reposition, Distraction  Pain Rating Post-Intervention 1: (all over )    Objective:        Cognitive Exam:  Oriented to: Person, Place, Time and Situation  Follows Commands/attention: Follows multistep  commands  Communication: clear/fluent  Memory:  No Deficits noted  Safety awareness/insight to disability: intact  Coping skills/emotional control: Appropriate to situation    Visual/perceptual:  Intact    Physical Exam:  Postural examination/scapula alignment:    -       Rounded shoulders  -       Forward head  Skin integrity: Visible skin intact  Edema: None noted      Sensation:   Impaired in B hands    Upper Extremity Range of Motion:  Right Upper Extremity: WFL PROM, grossly limited AROM due to spasticity  Left Upper Extremity: WFL PROM, grossly limited AROM due to spasticity (Actively cannot lift past 90 degrees)    Upper Extremity Strength:  Right Upper Extremity: Deficits: 3-/5  Left Upper Extremity: Deficits: 3-/5   Strength: limited in B hands     Fine motor coordination:      -       Impaired  in L hand    Occupational Performance:    Bed Mobility:    · Patient completed Rolling/Turning to Left with  moderate assistance  · Patient completed Rolling/Turning to Right with maximal assistance  · Patient completed Scooting/Bridging with maximal assistance  · Patient completed Supine to Sit with maximal assistance  · Patient completed Sit  to Supine with maximal assistance     Functional Mobility/Transfers:  · Patient completed Bed > w/c Transfer using Squat Pivot technique with moderate assistance with hand-held assist    Activities of Daily Living:  · Feeding:  setup    · Grooming: supervision washing face only  · Bathing: maximal assistance spongebath from EOB  · Upper Body Dressing: maximal assistance Fisher front gown and donning pullover shirt  · Lower Body Dressing: total assistance Donning socks, depends, pants, and shoes with AFO  · Toileting: maximal assistance Patient was soiled and assisted with toileting hygiene from bed level. Patient did need assistance using urinal.     AMPA 6 Click:  AMPA Total Score: 13    Additional Treatment:  Safety, ADL retraining, functional mobility training, discharge planning    Patient left up in chair with call button in reach and all needs met.     Assessment:  Mao Levin is a 53 y.o. male with a medical diagnosis of MS (multiple sclerosis) and presents with the deficits listed below. Patient will benefit from skilled OT services to achieve maximal independence prior to discharge. Moderate complexity evaluation due to decreased functional mobility, decreased self care performance, impaired upper and lower extremity function.     Rehab identified problem list/impairments: weakness, impaired endurance, impaired self care skills, impaired functional mobilty, gait instability, impaired balance, impaired sensation, impaired joint extensibility, impaired fine motor, decreased coordination, decreased lower extremity function, decreased upper extremity function, impaired coordination, pain    Rehab potential is good    Activity tolerance: Good    Discharge recommendations: home with home health     Barriers to discharge: Inaccessible home environment, Decreased caregiver support     Equipment recommendations: walker, rolling     GOALS:   Multidisciplinary Problems     Occupational Therapy Goals        Problem:  Occupational Therapy Goal    Goal Priority Disciplines Outcome Interventions   Occupational Therapy Goal     OT, PT/OT Ongoing (interventions implemented as appropriate)    Description:  Goals to be met by: 12/17/2018     Patient will increase functional independence with ADLs by performing:    Feeding with Modified [place].  UE Dressing with Set-up Assistance.  LE Dressing with Minimal Assistance.  Grooming while seated with Supervision.  Toileting from bedside commode with Stand-by Assistance for hygiene and clothing management.   Bathing from  shower chair/bench with Stand-by Assistance.  Rolling to Right, Left with Supervision.   Supine to sit with Supervision.  Stand pivot transfers with Stand-by Assistance.  Toilet transfer to bedside commode with Stand-by Assistance.  Upper extremity exercise program 3 x 15 reps per handout, with independence.  Patient will perform a functional standing activity for 10 min with S in order to perform household tasks.                     PLAN: Patient to be seen 5 x/week to address the above listed problems via self-care/home management, therapeutic activities, therapeutic exercises  Plan of Care expires: 01/05/18  Plan of Care reviewed with: patient    GISEL Flowers  12/05/2018

## 2018-12-05 NOTE — SUBJECTIVE & OBJECTIVE
Hospital course:  12/4/18: Patient admitted to SNF for ongoing PT/OT following a hospitalization for pseudoflare MS due to UTI.  12/5: Patient seen at bedside, c/o incontinence and would like to f/u with Dr. Augustin. Discussed Plan of care and verbalized understanding. Answered all questions.    Interval History: Patient seen at bedside, doing well, no acute events overnight.     Review of Systems   Constitutional: Negative for appetite change, chills, fatigue and fever.   HENT: Negative for trouble swallowing.    Respiratory: Negative for cough, chest tightness, shortness of breath and wheezing.    Cardiovascular: Negative for chest pain, palpitations and leg swelling.   Gastrointestinal: Negative for abdominal pain, constipation, diarrhea and nausea.   Genitourinary: Positive for frequency. Negative for difficulty urinating and urgency.   Musculoskeletal: Positive for back pain, gait problem and myalgias. Negative for arthralgias.   Skin: Negative for rash.   Neurological: Positive for weakness and numbness. Negative for dizziness, light-headedness and headaches.        +numbess and tingling to BLE (chronic)   Psychiatric/Behavioral: Negative for sleep disturbance.     Scheduled Meds:   baclofen  20 mg Oral QID    buPROPion  150 mg Oral Daily    dantrolene  50 mg Oral QID    DULoxetine  30 mg Oral Daily    gabapentin  400 mg Oral QID    polyethylene glycol  17 g Oral Daily    rivaroxaban  20 mg Oral Daily with dinner    senna-docusate 8.6-50 mg  1 tablet Oral BID    sulfamethoxazole-trimethoprim 800-160mg  1 tablet Oral BID    tamsulosin  0.4 mg Oral Daily    traZODone  100 mg Oral QHS     Continuous Infusions:  PRN Meds:.acetaminophen, acetaminophen, bisacodyl, calcium carbonate, dextrose 50%, dextrose 50%, diazePAM, glucagon (human recombinant), glucose, glucose, ibuprofen, ondansetron, oxyCODONE, oxyCODONE, prochlorperazine, ramelteon, sodium chloride 0.9%    Objective:     Vital Signs (Most  Recent):  Temp: 97.7 °F (36.5 °C) (12/05/18 0713)  Pulse: 69 (12/05/18 0713)  Resp: 18 (12/05/18 0713)  BP: 119/66 (12/05/18 0713)  SpO2: 97 % (12/05/18 0713) Vital Signs (24h Range):  Temp:  [97.5 °F (36.4 °C)-98.7 °F (37.1 °C)] 97.7 °F (36.5 °C)  Pulse:  [69-75] 69  Resp:  [18] 18  SpO2:  [96 %-100 %] 97 %  BP: (114-123)/(63-85) 119/66     Weight: 81.1 kg (178 lb 12.7 oz)  Body mass index is 25.65 kg/m².  No intake or output data in the 24 hours ending 12/05/18 1017   Physical Exam   Constitutional: He is oriented to person, place, and time. He appears well-developed and well-nourished. No distress.   Cardiovascular: Normal rate, regular rhythm and normal heart sounds. Exam reveals no gallop and no friction rub.   No murmur heard.  Pulmonary/Chest: Effort normal and breath sounds normal. No respiratory distress. He has no wheezes. He has no rales.   Abdominal: Soft. Bowel sounds are normal. He exhibits no distension. There is no tenderness.   Musculoskeletal: He exhibits edema. He exhibits no tenderness.   1+ edema to left ankle, Decreased strength in LE bilaterally. Left LE weaker than right. Patient states this is his baseline. Decreased  strength in left hand.    Neurological: He is alert and oriented to person, place, and time.   Skin: Skin is warm and dry. No rash noted. He is not diaphoretic. No cyanosis. Nails show no clubbing.   Psychiatric: He has a normal mood and affect. His behavior is normal.       Significant Labs:  Recent Labs   Lab 12/03/18  0338 12/04/18 0518 12/05/18  0522   WBC 6.53 7.23 6.24   HGB 10.9* 12.1* 11.5*   HCT 34.0* 36.4* 35.0*    225 246     Recent Labs   Lab 11/30/18  1357  12/03/18  0338 12/04/18 0518 12/05/18  0522      < > 142 138 139   K 3.7   < > 4.2 4.8 4.1      < > 107 107 105   CO2 28   < > 28 25 27   BUN 10   < > 15 12 13   CREATININE 0.8   < > 1.0 0.9 0.8   CALCIUM 9.4   < > 8.3* 8.4* 8.3*   PROT 7.1  --   --   --   --    BILITOT 0.5  --   --   --    --    ALKPHOS 68  --   --   --   --    ALT 24  --   --   --   --    AST 22  --   --   --   --     < > = values in this interval not displayed.     Lab Results   Component Value Date    LABPROT 10.8 12/15/2016    ALBUMIN 3.9 11/30/2018     Lab Results   Component Value Date    CALCIUM 8.3 (L) 12/05/2018    PHOS 4.1 12/05/2018       Significant Imaging: na

## 2018-12-05 NOTE — PLAN OF CARE
Problem: Occupational Therapy Goal  Goal: Occupational Therapy Goal  Goals to be met by: 12/17/2018     Patient will increase functional independence with ADLs by performing:    Feeding with Modified [place].  UE Dressing with Set-up Assistance.  LE Dressing with Minimal Assistance.  Grooming while seated with Supervision.  Toileting from bedside commode with Stand-by Assistance for hygiene and clothing management.   Bathing from  shower chair/bench with Stand-by Assistance.  Rolling to Right, Left with Supervision.   Supine to sit with Supervision.  Stand pivot transfers with Stand-by Assistance.  Toilet transfer to bedside commode with Stand-by Assistance.  Upper extremity exercise program 3 x 15 reps per handout, with independence.  Patient will perform a functional standing activity for 10 min with S in order to perform household tasks.   Outcome: Ongoing (interventions implemented as appropriate)  Patient's goals are set.   GISEL Flowers  12/5/2018

## 2018-12-05 NOTE — HPI
Chief complaint/ Reason for Admission: MS    HPI: 53-year-old male with a past medical history of MS, neurogenic bladder, PE on xarelto, chronic pain who presents for evaluation of increased LE weakness. Patient was diagnosed with a UTI treated with IV rocephin and transitioned to bactrim PO upon admission to SNF. Neurology also following to determine MS flare or Pseudoflare. It was deemed patient with Pseudoflare due to UTI. No new lesions noted no imaging. Patient has a history of urgency incontinence due to MS controlled with mirabegron. He does not self cath and does not have a agee catheter. Had botox injections with Dr. Augustin which at that time helped with incontinence. Denies fevers, chills, CP, SOB, abdominal pain, dysuria, hesitancy, hematuria, n/v, constipation, diarrhea, headaches, and vision changes, new numbness, or weakness. He states he lives at home with assistance from his mother. SNF placement was recommended for PT/OT services.

## 2018-12-05 NOTE — ASSESSMENT & PLAN NOTE
· Reports being followed by PMR  · Continue home regimen of gabapentin  · F/u with Dr. Estrella upon discharge

## 2018-12-05 NOTE — HOSPITAL COURSE
12/4/18: Patient admitted to SNF for ongoing PT/OT following a hospitalization for pseudoflare MS due to UTI.  12/5: Patient seen at bedside, c/o incontinence and would like to f/u with Dr. Augustin. Discussed Plan of care and verbalized understanding. Answered all questions.  12/6: patient complains of generalized aching pain to back, legs, hips rated at 9/10.  He was last prescribed oxycodone/APAP 10 mg tabs and is currently receiving 15 mg tabs prn.  He has urinary incontinence but does not have an indwelling agee or CIC.  He denies dysuria.  12/12: Patient seen at bedside, pains controlled, hoping to stay longer at SNF, will discuss in in IDT  12/27: Patient discharged home (recently received an additional weekly since winning his appeal). Discharge home with home health services.

## 2018-12-05 NOTE — PLAN OF CARE
Problem: Patient Care Overview  Goal: Plan of Care Review  Outcome: Ongoing (interventions implemented as appropriate)  Recommendations  Recommendation/Intervention: Continue Regular diet. RD to follow.      Goals: PO intake maintained at  > 75% EEN and EPN by next RD f/u   Nutrition Goal Status: new

## 2018-12-05 NOTE — ASSESSMENT & PLAN NOTE
· chronic  · Mood stable, sleeping well  · Continue home regimen of bupropion, duloxetine, and trazodone

## 2018-12-06 PROBLEM — F17.200 CURRENT EVERY DAY SMOKER: Status: ACTIVE | Noted: 2018-12-06

## 2018-12-06 LAB
ANION GAP SERPL CALC-SCNC: 8 MMOL/L
BASOPHILS # BLD AUTO: 0.04 K/UL
BASOPHILS NFR BLD: 0.6 %
BUN SERPL-MCNC: 13 MG/DL
CALCIUM SERPL-MCNC: 8.5 MG/DL
CHLORIDE SERPL-SCNC: 106 MMOL/L
CO2 SERPL-SCNC: 26 MMOL/L
CREAT SERPL-MCNC: 0.9 MG/DL
DIFFERENTIAL METHOD: ABNORMAL
EOSINOPHIL # BLD AUTO: 0.2 K/UL
EOSINOPHIL NFR BLD: 2.9 %
ERYTHROCYTE [DISTWIDTH] IN BLOOD BY AUTOMATED COUNT: 13.9 %
EST. GFR  (AFRICAN AMERICAN): >60 ML/MIN/1.73 M^2
EST. GFR  (NON AFRICAN AMERICAN): >60 ML/MIN/1.73 M^2
GLUCOSE SERPL-MCNC: 89 MG/DL
HCT VFR BLD AUTO: 34.5 %
HGB BLD-MCNC: 11.3 G/DL
IMM GRANULOCYTES # BLD AUTO: 0.01 K/UL
IMM GRANULOCYTES NFR BLD AUTO: 0.1 %
LYMPHOCYTES # BLD AUTO: 2.2 K/UL
LYMPHOCYTES NFR BLD: 31.2 %
MAGNESIUM SERPL-MCNC: 2 MG/DL
MCH RBC QN AUTO: 29.9 PG
MCHC RBC AUTO-ENTMCNC: 32.8 G/DL
MCV RBC AUTO: 91 FL
MONOCYTES # BLD AUTO: 0.6 K/UL
MONOCYTES NFR BLD: 9 %
NEUTROPHILS # BLD AUTO: 3.9 K/UL
NEUTROPHILS NFR BLD: 56.2 %
NRBC BLD-RTO: 0 /100 WBC
PHOSPHATE SERPL-MCNC: 3.6 MG/DL
PLATELET # BLD AUTO: 263 K/UL
PMV BLD AUTO: 10.9 FL
POTASSIUM SERPL-SCNC: 3.8 MMOL/L
RBC # BLD AUTO: 3.78 M/UL
SODIUM SERPL-SCNC: 140 MMOL/L
WBC # BLD AUTO: 6.89 K/UL

## 2018-12-06 PROCEDURE — 97116 GAIT TRAINING THERAPY: CPT | Mod: HCWC

## 2018-12-06 PROCEDURE — 97110 THERAPEUTIC EXERCISES: CPT | Mod: HCWC

## 2018-12-06 PROCEDURE — 99305 1ST NF CARE MODERATE MDM 35: CPT | Mod: HCWC,,, | Performed by: INTERNAL MEDICINE

## 2018-12-06 PROCEDURE — 85025 COMPLETE CBC W/AUTO DIFF WBC: CPT | Mod: HCWC

## 2018-12-06 PROCEDURE — 11000004 HC SNF PRIVATE: Mod: HCWC

## 2018-12-06 PROCEDURE — 99305 PR NURSING FACILITY CARE, INIT, MOD SEVERITY: ICD-10-PCS | Mod: HCWC,,, | Performed by: INTERNAL MEDICINE

## 2018-12-06 PROCEDURE — 80048 BASIC METABOLIC PNL TOTAL CA: CPT | Mod: HCWC

## 2018-12-06 PROCEDURE — 97530 THERAPEUTIC ACTIVITIES: CPT | Mod: HCWC

## 2018-12-06 PROCEDURE — 25000003 PHARM REV CODE 250: Mod: HCWC | Performed by: NURSE PRACTITIONER

## 2018-12-06 PROCEDURE — 25000003 PHARM REV CODE 250: Mod: HCWC | Performed by: PHYSICIAN ASSISTANT

## 2018-12-06 PROCEDURE — 83735 ASSAY OF MAGNESIUM: CPT | Mod: HCWC

## 2018-12-06 PROCEDURE — 36415 COLL VENOUS BLD VENIPUNCTURE: CPT | Mod: HCWC

## 2018-12-06 PROCEDURE — 84100 ASSAY OF PHOSPHORUS: CPT | Mod: HCWC

## 2018-12-06 RX ORDER — BUPROPION HYDROCHLORIDE 150 MG/1
150 TABLET ORAL 2 TIMES DAILY WITH MEALS
Status: DISCONTINUED | OUTPATIENT
Start: 2018-12-07 | End: 2018-12-27 | Stop reason: HOSPADM

## 2018-12-06 RX ORDER — LACTULOSE 10 G/15ML
20 SOLUTION ORAL EVERY 6 HOURS PRN
Status: DISCONTINUED | OUTPATIENT
Start: 2018-12-07 | End: 2018-12-27 | Stop reason: HOSPADM

## 2018-12-06 RX ORDER — LACTULOSE 10 G/15ML
20 SOLUTION ORAL EVERY 4 HOURS
Status: COMPLETED | OUTPATIENT
Start: 2018-12-06 | End: 2018-12-06

## 2018-12-06 RX ADMIN — LACTULOSE 20 G: 20 SOLUTION ORAL at 06:12

## 2018-12-06 RX ADMIN — BUPROPION HYDROCHLORIDE 150 MG: 150 TABLET, EXTENDED RELEASE ORAL at 10:12

## 2018-12-06 RX ADMIN — TRAZODONE HYDROCHLORIDE 100 MG: 50 TABLET ORAL at 08:12

## 2018-12-06 RX ADMIN — TAMSULOSIN HYDROCHLORIDE 0.4 MG: 0.4 CAPSULE ORAL at 10:12

## 2018-12-06 RX ADMIN — SENNOSIDES AND DOCUSATE SODIUM 1 TABLET: 8.6; 5 TABLET ORAL at 08:12

## 2018-12-06 RX ADMIN — BACLOFEN 20 MG: 10 TABLET ORAL at 05:12

## 2018-12-06 RX ADMIN — SENNOSIDES AND DOCUSATE SODIUM 1 TABLET: 8.6; 5 TABLET ORAL at 10:12

## 2018-12-06 RX ADMIN — DANTROLENE SODIUM 50 MG: 50 CAPSULE ORAL at 08:12

## 2018-12-06 RX ADMIN — GABAPENTIN 400 MG: 100 CAPSULE ORAL at 05:12

## 2018-12-06 RX ADMIN — OXYCODONE HYDROCHLORIDE 10 MG: 10 TABLET ORAL at 08:12

## 2018-12-06 RX ADMIN — BACLOFEN 20 MG: 10 TABLET ORAL at 10:12

## 2018-12-06 RX ADMIN — DANTROLENE SODIUM 50 MG: 50 CAPSULE ORAL at 10:12

## 2018-12-06 RX ADMIN — DANTROLENE SODIUM 50 MG: 50 CAPSULE ORAL at 01:12

## 2018-12-06 RX ADMIN — DIAZEPAM 5 MG: 5 TABLET ORAL at 07:12

## 2018-12-06 RX ADMIN — RIVAROXABAN 20 MG: 20 TABLET, FILM COATED ORAL at 04:12

## 2018-12-06 RX ADMIN — SULFAMETHOXAZOLE AND TRIMETHOPRIM 1 TABLET: 800; 160 TABLET ORAL at 08:12

## 2018-12-06 RX ADMIN — OXYCODONE HYDROCHLORIDE 15 MG: 10 TABLET ORAL at 07:12

## 2018-12-06 RX ADMIN — BACLOFEN 20 MG: 10 TABLET ORAL at 08:12

## 2018-12-06 RX ADMIN — DULOXETINE 30 MG: 30 CAPSULE, DELAYED RELEASE ORAL at 10:12

## 2018-12-06 RX ADMIN — GABAPENTIN 400 MG: 100 CAPSULE ORAL at 01:12

## 2018-12-06 RX ADMIN — POLYETHYLENE GLYCOL 3350 17 G: 17 POWDER, FOR SOLUTION ORAL at 10:12

## 2018-12-06 RX ADMIN — BACLOFEN 20 MG: 10 TABLET ORAL at 01:12

## 2018-12-06 RX ADMIN — LACTULOSE 20 G: 20 SOLUTION ORAL at 04:12

## 2018-12-06 RX ADMIN — OXYCODONE HYDROCHLORIDE 15 MG: 10 TABLET ORAL at 01:12

## 2018-12-06 RX ADMIN — GABAPENTIN 400 MG: 100 CAPSULE ORAL at 08:12

## 2018-12-06 RX ADMIN — DANTROLENE SODIUM 50 MG: 50 CAPSULE ORAL at 05:12

## 2018-12-06 RX ADMIN — DIAZEPAM 5 MG: 5 TABLET ORAL at 08:12

## 2018-12-06 RX ADMIN — SULFAMETHOXAZOLE AND TRIMETHOPRIM 1 TABLET: 800; 160 TABLET ORAL at 10:12

## 2018-12-06 RX ADMIN — RAMELTEON 8 MG: 8 TABLET, FILM COATED ORAL at 08:12

## 2018-12-06 RX ADMIN — GABAPENTIN 400 MG: 100 CAPSULE ORAL at 10:12

## 2018-12-06 NOTE — PT/OT/SLP PROGRESS
Occupational Therapy  Treatment    Mao Levin   MRN: [id number]   Admitting Diagnosis: MS (multiple sclerosis)    OT Date of Treatment: 12/06/18  Total Time (min): 45 min    Billable Minutes:  Therapeutic Activity 45    General Precautions: Standard, fall  Orthopedic Precautions: N/A  Braces: N/A         Subjective:  Communicated with nsg prior to session.  Oh Lord It's you again ( Pt. Joking)    Pain/Comfort  Pain Rating 1: 8/10  Location - Side 1: Bilateral  Location 1: (all over)  Pain Addressed 1: Reposition, Distraction, Nurse notified    Objective:   Pt. Seated in w/c on arrival with nsg present     Occupational Performance:    Bed Mobility:    · Not tested     Functional Mobility/Transfers:  · Patient completed Chair <> Mat Squat Pivot technique with moderate assistance with no assistive device  · Functional Mobility: Pt. With SPT w/c <> EOM with cues for safety and  Pt. With Min A for w/c propulsion to gym    Activities of Daily Living:  ·  Not tested     AMPA 6 Click:  [facility] Total Score: 13    OT Exercises: UE Ergometer 10 min    Additional Treatment:  Pt. With EOM activity with postural control management. Pt. With forward flex posture Pt. Provided with BUE stretch in all available planes and joints x 10 reps with Min A to SBA to stabilize balance while EOM  Pt. Also provided with pectoral stretch for rounded posture and weight bearing into L UE and hand due to flex digits with extension stretch and ROM provided with wrsit flex ext. Supination/pronation.  Pt. With ROM and  therex performed to increase ROM, endurance selfcare task and fxl mobility for independence   Patient left up in chair with all lines intact and call button in reach    ASSESSMENT:  Mao Levin is a 53 y.o. male with a medical diagnosis of MS (multiple sclerosis) Pt. participated well with session on this day.Pt demos physical deficits with balance  functional mobility, UB strength, endurance  level of functional indep with daily tasks and  activities and selfcare skills .Pt. Will continue to benefit from continued OT to progress towards goals  .    Rehab identified problem list/impairments: weakness, impaired endurance, impaired self care skills, impaired functional mobilty, gait instability, impaired balance, impaired sensation, impaired joint extensibility, impaired fine motor, decreased coordination, decreased lower extremity function, decreased upper extremity function, impaired coordination, pain    Rehab potential is fair    Activity tolerance: Fair    Discharge recommendations: home with home health     Barriers to discharge: Inaccessible home environment, Decreased caregiver support     Equipment recommendations: walker, rolling     GOALS:   Multidisciplinary Problems     Occupational Therapy Goals        Problem: Occupational Therapy Goal    Goal Priority Disciplines Outcome Interventions   Occupational Therapy Goal     OT, PT/OT Ongoing (interventions implemented as appropriate)    Description:  Goals to be met by: 12/17/2018     Patient will increase functional independence with ADLs by performing:    Feeding with Modified [place].  UE Dressing with Set-up Assistance.  LE Dressing with Minimal Assistance.  Grooming while seated with Supervision.  Toileting from bedside commode with Stand-by Assistance for hygiene and clothing management.   Bathing from  shower chair/bench with Stand-by Assistance.  Rolling to Right, Left with Supervision.   Supine to sit with Supervision.  Stand pivot transfers with Stand-by Assistance.  Toilet transfer to bedside commode with Stand-by Assistance.  Upper extremity exercise program 3 x 15 reps per handout, with independence.  Patient will perform a functional standing activity for 10 min with S in order to perform household tasks.                 Plan:  Patient to be seen 5 x/week to address the above listed problems via self-care/home management, therapeutic activities, therapeutic exercises  Plan of  Care expires: 01/05/18  Plan of Care reviewed with: patient  The GISEL and CHELA have collaborated and discussed the patient's status, treatment plan and progress toward established goals.   CHELA Mustafa/NILESH 12/6/2018

## 2018-12-06 NOTE — PT/OT/SLP PROGRESS
Physical Therapy  Treatment    Mao Levin   MRN: [id number]   Admitting Diagnosis: MS (multiple sclerosis)    PT Received On: 12/06/18  Total Time (min): 48       Billable Minutes:  Gait Training 15, Therapeutic Activity 18 , Therapeutic Exercise 15 and Total Time 48    Treatment Type: Treatment  PT/PTA: PT     PTA Visit Number: 0       General Precautions: Standard, fall  Orthopedic Precautions: N/A   Braces: N/A    Do you have any cultural, spiritual, Evangelical conflicts, given your current situation?: none    Subjective:  Communicated with Pt prior to session.  Pt agreeable to session     Pain/Comfort  Pain Rating 1: (not reported)  Pain Rating Post-Intervention 1: (not reported)    Objective:  Patient found seated on hospital room commode with Patient found with: (LLE AFO)     AM-PAC 6 CLICK MOBILITY  Total Score:11    Transfers:  Sit<>Stand: to/from w/c in // bars (2 trials) w/ Min A for rise  Stand Pivot Transfer: toilet<> w/c and w/c<>BSC, both w/o AD w/ Grab bar and Min A   Cueing for hand placement, sequencing and safety    Gait:  Amb 2 trials in // bars (1 full length, half length) w/ Min A for LLE advancement and CGA for stability  Cueing for sequencing, upright posture and hand placement     Wheelchair Mobility:  Patient propels w/c 90 ft w/ BUE and SPV  Limited due to fatigue     Therex:  Seated BLE 2x10 reps (GS, HF AA, LAQs AA, AP R)    Balance:  Static stand (2 trials) w/ BUE on grab bars x1 minute each trial     Additional Treatment:  Pt required Total A w/ toileting including: SPT w/ Min A using grab bar andTotal A for scrub pants and diaper    Patient left up in chair with call button in reach and LLE AFO on.     Assessment:  Mao Levin is a 53 y.o. male with a medical diagnosis of MS (multiple sclerosis).  Pt tolerated session well but was limited due to fatigue. Pt continues to require Min A for sit to stand transfers and Min A for ambulation in // bars for LLE advancement. Pt is progressing and  will continue w/ PT POC.     Rehab identified problem list/impairments: weakness, impaired endurance, impaired sensation, impaired self care skills, impaired functional mobilty, gait instability, impaired balance, decreased coordination, decreased upper extremity function, decreased lower extremity function, pain, abnormal tone, decreased ROM, impaired coordination, impaired fine motor, impaired joint extensibility    Rehab potential is good.    Activity tolerance: Good    Discharge recommendations: home with home health     Barriers to discharge: Inaccessible home environment, Decreased caregiver support    Equipment recommendations: bedside commode, walker, rolling     GOALS:   Multidisciplinary Problems     Physical Therapy Goals        Problem: Physical Therapy Goal    Goal Priority Disciplines Outcome Goal Variances Interventions   Physical Therapy Goal     PT, PT/OT Ongoing (interventions implemented as appropriate)     Description:  Goals to be met by: 18     Patient will increase functional independence with mobility by performin. Supine to sit with Stand-by Assistance  2. Sit to supine with Stand-by Assistance  3. Rolling to Left and Right with Stand-by Assistance.  4. Sit to stand transfer with Stand-by Assistance  5. Bed to chair transfer with Stand-by Assistance using Rolling Walker  6. Gait  x 50 feet with Stand-by Assistance using Rolling Walker.   7. Ascend/descend 6 stair with Right OR Left Handrails Contact Guard Assistance                      PLAN:    Patient to be seen (5-6X/week)  to address the above listed problems via gait training, therapeutic activities, therapeutic exercises  Plan of Care expires: 19  Plan of Care reviewed with: patient    Georgia Shay, [facility]  2018

## 2018-12-06 NOTE — PLAN OF CARE
Problem: Physical Therapy Goal  Goal: Physical Therapy Goal  Goals to be met by: 18     Patient will increase functional independence with mobility by performin. Supine to sit with Stand-by Assistance  2. Sit to supine with Stand-by Assistance  3. Rolling to Left and Right with Stand-by Assistance.  4. Sit to stand transfer with Stand-by Assistance  5. Bed to chair transfer with Stand-by Assistance using Rolling Walker  6. Gait  x 50 feet with Stand-by Assistance using Rolling Walker.   7. Ascend/descend 6 stair with Right OR Left Handrails Contact Guard Assistance     Outcome: Ongoing (interventions implemented as appropriate)  LTGs remain appropriate. Pt will continue PT POC.  Georgia Shay, [place]  2018

## 2018-12-06 NOTE — PLAN OF CARE
Problem: Fall Risk (Adult)  Goal: Identify Related Risk Factors and Signs and Symptoms  Related risk factors and signs and symptoms are identified upon initiation of Human [employer] Clinical Practice Guideline (CPG)   12/06/18 0028   Fall Risk   Related Risk Factors (Fall Risk) fatigue/slow reaction;fear of falling;gait/mobility problems   Signs and Symptoms (Fall Risk) presence of risk factors

## 2018-12-06 NOTE — PLAN OF CARE
Problem: Occupational Therapy Goal  Goal: Occupational Therapy Goal  Goals to be met by: 12/17/2018     Patient will increase functional independence with ADLs by performing:    Feeding with Modified [place].  UE Dressing with Set-up Assistance.  LE Dressing with Minimal Assistance.  Grooming while seated with Supervision.  Toileting from bedside commode with Stand-by Assistance for hygiene and clothing management.   Bathing from  shower chair/bench with Stand-by Assistance.  Rolling to Right, Left with Supervision.   Supine to sit with Supervision.  Stand pivot transfers with Stand-by Assistance.  Toilet transfer to bedside commode with Stand-by Assistance.  Upper extremity exercise program 3 x 15 reps per handout, with independence.  Patient will perform a functional standing activity for 10 min with S in order to perform household tasks.    Outcome: Ongoing (interventions implemented as appropriate)  .

## 2018-12-07 PROCEDURE — 97530 THERAPEUTIC ACTIVITIES: CPT | Mod: HCWC

## 2018-12-07 PROCEDURE — 11000004 HC SNF PRIVATE: Mod: HCWC

## 2018-12-07 PROCEDURE — 25000003 PHARM REV CODE 250: Mod: HCWC | Performed by: PHYSICIAN ASSISTANT

## 2018-12-07 PROCEDURE — 97110 THERAPEUTIC EXERCISES: CPT | Mod: HCWC

## 2018-12-07 PROCEDURE — 97116 GAIT TRAINING THERAPY: CPT | Mod: HCWC

## 2018-12-07 PROCEDURE — 25000003 PHARM REV CODE 250: Mod: HCWC | Performed by: INTERNAL MEDICINE

## 2018-12-07 RX ADMIN — LACTULOSE 20 G: 20 SOLUTION ORAL at 11:12

## 2018-12-07 RX ADMIN — TRAZODONE HYDROCHLORIDE 100 MG: 50 TABLET ORAL at 08:12

## 2018-12-07 RX ADMIN — GABAPENTIN 400 MG: 100 CAPSULE ORAL at 05:12

## 2018-12-07 RX ADMIN — BUPROPION HYDROCHLORIDE 150 MG: 150 TABLET, EXTENDED RELEASE ORAL at 05:12

## 2018-12-07 RX ADMIN — OXYCODONE HYDROCHLORIDE 10 MG: 10 TABLET ORAL at 02:12

## 2018-12-07 RX ADMIN — BACLOFEN 20 MG: 10 TABLET ORAL at 08:12

## 2018-12-07 RX ADMIN — DIAZEPAM 5 MG: 5 TABLET ORAL at 02:12

## 2018-12-07 RX ADMIN — DULOXETINE 30 MG: 30 CAPSULE, DELAYED RELEASE ORAL at 10:12

## 2018-12-07 RX ADMIN — GABAPENTIN 400 MG: 100 CAPSULE ORAL at 08:12

## 2018-12-07 RX ADMIN — OXYCODONE HYDROCHLORIDE 10 MG: 10 TABLET ORAL at 07:12

## 2018-12-07 RX ADMIN — DANTROLENE SODIUM 50 MG: 50 CAPSULE ORAL at 02:12

## 2018-12-07 RX ADMIN — POLYETHYLENE GLYCOL 3350 17 G: 17 POWDER, FOR SOLUTION ORAL at 10:12

## 2018-12-07 RX ADMIN — BACLOFEN 20 MG: 10 TABLET ORAL at 01:12

## 2018-12-07 RX ADMIN — BACLOFEN 20 MG: 10 TABLET ORAL at 05:12

## 2018-12-07 RX ADMIN — SULFAMETHOXAZOLE AND TRIMETHOPRIM 1 TABLET: 800; 160 TABLET ORAL at 08:12

## 2018-12-07 RX ADMIN — OXYCODONE HYDROCHLORIDE 15 MG: 10 TABLET ORAL at 08:12

## 2018-12-07 RX ADMIN — GABAPENTIN 400 MG: 100 CAPSULE ORAL at 01:12

## 2018-12-07 RX ADMIN — BUPROPION HYDROCHLORIDE 150 MG: 150 TABLET, EXTENDED RELEASE ORAL at 10:12

## 2018-12-07 RX ADMIN — BACLOFEN 20 MG: 10 TABLET ORAL at 10:12

## 2018-12-07 RX ADMIN — SENNOSIDES AND DOCUSATE SODIUM 1 TABLET: 8.6; 5 TABLET ORAL at 10:12

## 2018-12-07 RX ADMIN — SENNOSIDES AND DOCUSATE SODIUM 1 TABLET: 8.6; 5 TABLET ORAL at 08:12

## 2018-12-07 RX ADMIN — TAMSULOSIN HYDROCHLORIDE 0.4 MG: 0.4 CAPSULE ORAL at 10:12

## 2018-12-07 RX ADMIN — SULFAMETHOXAZOLE AND TRIMETHOPRIM 1 TABLET: 800; 160 TABLET ORAL at 10:12

## 2018-12-07 RX ADMIN — DANTROLENE SODIUM 50 MG: 50 CAPSULE ORAL at 05:12

## 2018-12-07 RX ADMIN — GABAPENTIN 400 MG: 100 CAPSULE ORAL at 10:12

## 2018-12-07 RX ADMIN — DANTROLENE SODIUM 50 MG: 50 CAPSULE ORAL at 10:12

## 2018-12-07 RX ADMIN — DANTROLENE SODIUM 50 MG: 50 CAPSULE ORAL at 08:12

## 2018-12-07 NOTE — PLAN OF CARE
Problem: Physical Therapy Goal  Goal: Physical Therapy Goal  Goals to be met by: 18     Patient will increase functional independence with mobility by performin. Supine to sit with Stand-by Assistance  2. Sit to supine with Stand-by Assistance  3. Rolling to Left and Right with Stand-by Assistance.  4. Sit to stand transfer with Stand-by Assistance  5. Bed to chair transfer with Stand-by Assistance using Rolling Walker  6. Gait  x 50 feet with Stand-by Assistance using Rolling Walker.   7. Ascend/descend 6 stair with Right OR Left Handrails Contact Guard Assistance     Outcome: Ongoing (interventions implemented as appropriate)  LTGs remain appropriate. Pt will continue PT POC.    Angelica Ribera, PT  2018

## 2018-12-07 NOTE — ASSESSMENT & PLAN NOTE
Patient has had success with bupropion in the past and would like to resume BID dosing to quit again.  Increase bupropion to BID with tobacco cessation in 3 days thereafter.

## 2018-12-07 NOTE — ASSESSMENT & PLAN NOTE
Continue therapy with miralax and senokot-s  Lactulose as needed if scheduled meds ineffective.  Enema prn.

## 2018-12-07 NOTE — PT/OT/SLP PROGRESS
"Physical Therapy  Treatment    Mao Levin   MRN: [id number]   Admitting Diagnosis: MS (multiple sclerosis)    PT Received On: 12/07/18  Total Time (min): 40       Billable Minutes:  Gait Training 20, Therapeutic Activity 10, Therapeutic Exercise 10 and Total Time 40    Treatment Type: Treatment  PT/PTA: PT     PTA Visit Number: 0       General Precautions: Standard, fall  Orthopedic Precautions: N/A   Braces: N/A    Do you have any cultural, spiritual, Cheondoism conflicts, given your current situation?: none    Subjective:  Communicated with patient and nursing prior to session.  Pt agreeable to session.    Pain/Comfort  Pain Rating 1: 9/10  Location - Side 1: Bilateral  Location - Orientation 1: lower  Location 1: leg  Pain Addressed 1: Pre-medicate for activity, Reposition, Distraction  Pain Rating Post-Intervention 1: 9/10    Objective:  Patient found sitting in w/c. Patient found with: (LLE AFO)     AM-PAC 6 CLICK MOBILITY  Total Score:12    Bed Mobility:  Not performed    Transfers:  Sit<>Stand: to/from w/c 5 trials total   4 trials in // bars w/ Miri to rise, both hands on // bars   1 at foot of stairs w/ 1 hand on w/c arm rest and 1 on HR, MaxA to rise  Cueing for forward lean, inc'd time required to fully rise    Gait:  Amb 3 trials in // bars (2 full lengths and 1 half length) w/ Miri for LLE advancement  Cueing (verbal and tactile) for trunk/hip/knee extension in stance on LLE  inc'd time     Advanced Gait:  Stairs: attempted 1 steps w/ (B) rails, pt able to place RLE on to 6" step w/ Miri for stability (x2 trials), he was unable to place LLE on step using RLE to raise body    Therex:  Seated BLE therex 2x15 reps (GS and HF w/ AAROM)    Balance:  Static stand 3min in // bars w/ CGA for stability  Cueing (verbal and tactile) for upright posture including trunk/hip/knee extension    Patient left up in chair with call button in reach.    Assessment:  Mao Levin is a 53 y.o. male with a medical diagnosis of MS " (multiple sclerosis).  Pt steff session well w/ good participation. Stairs were attempted but pt unsuccessful due to BLE weakness. He will continue PT POC.    Rehab identified problem list/impairments: weakness, impaired endurance, impaired sensation, impaired self care skills, impaired functional mobilty, gait instability, impaired balance, decreased coordination, decreased upper extremity function, decreased lower extremity function, pain, abnormal tone, decreased ROM, impaired coordination, impaired fine motor, impaired joint extensibility    Rehab potential is good.    Activity tolerance: Good    Discharge recommendations: home with home health     Barriers to discharge: Inaccessible home environment, Decreased caregiver support    Equipment recommendations: bedside commode, walker, rolling     GOALS:   Multidisciplinary Problems     Physical Therapy Goals        Problem: Physical Therapy Goal    Goal Priority Disciplines Outcome Goal Variances Interventions   Physical Therapy Goal     PT, PT/OT Ongoing (interventions implemented as appropriate)     Description:  Goals to be met by: 18     Patient will increase functional independence with mobility by performin. Supine to sit with Stand-by Assistance  2. Sit to supine with Stand-by Assistance  3. Rolling to Left and Right with Stand-by Assistance.  4. Sit to stand transfer with Stand-by Assistance  5. Bed to chair transfer with Stand-by Assistance using Rolling Walker  6. Gait  x 50 feet with Stand-by Assistance using Rolling Walker.   7. Ascend/descend 6 stair with Right OR Left Handrails Contact Guard Assistance                      PLAN:    Patient to be seen (5-6X/week)  to address the above listed problems via gait training, therapeutic activities, therapeutic exercises  Plan of Care expires: 19  Plan of Care reviewed with: patient    Angelica M Bacilio, PT  2018

## 2018-12-07 NOTE — ASSESSMENT & PLAN NOTE
· Cultured providencia; Treated with rocephin at [facility]  · Transitioned to Bactrim to completed 10 days, to be completed 12/10

## 2018-12-07 NOTE — SUBJECTIVE & OBJECTIVE
Past Medical History:   Diagnosis Date    Anticoagulant long-term use     Anxiety     Chronic back pain     Deep vein thrombosis     Depression     Depression     Gait instability     Multiple sclerosis     Multiple sclerosis     Neurogenic bladder     Polyneuropathy     Pulmonary embolism     Spasticity        Past Surgical History:   Procedure Laterality Date    CYSTOSCOPY N/A 6/20/2018    Procedure: CYSTOSCOPY;  Surgeon: Brian Augustin MD;  Location: [facility] OR [address] [place] [address];  Service: Urology;  Laterality: N/A;  1 hour    CYSTOSCOPY N/A 6/20/2018    Performed by Brian Augustin MD at [facility] OR [address] [place] [address]    INJECTION OF BOTULINUM TOXIN TYPE A N/A 6/20/2018    Procedure: INJECTION, BOTULINUM TOXIN, TYPE A 200 UNITS;  Surgeon: Brian Augustin MD;  Location: [facility] OR [address] [place] [address];  Service: Urology;  Laterality: N/A;    INJECTION, BOTULINUM TOXIN, TYPE A 200 UNITS N/A 6/20/2018    Performed by Brian Augustin MD at [facility] OR [address] [place] [address]       Review of patient's allergies indicates:  No Known Allergies    No current facility-administered medications on file prior to encounter.      Current Outpatient Medications on File Prior to Encounter   Medication Sig    ascorbic acid, vitamin C, (VITAMIN C) 500 MG tablet Take 500 mg by mouth once daily.    baclofen (LIORESAL) 20 MG tablet Take 1 tablet by mouth 4 times daily for 30 days    buPROPion (WELLBUTRIN SR) 150 MG TBSR 12 hr tablet 1 tab po daily x 3 days, then increase to 1 tab po BID; last dose no later than 6PM; stop smoking after 5-7 days of treatment; (Patient taking differently: 150 mg 2 (two) times daily. 1 tab po daily x 3 days, then increase to 1 tab po BID; last dose no later than 6PM; stop smoking after 5-7 days of treatment;)    cranberry conc-C-bacillus coag 450-30-50 mg-mg-million Tab Take 1 capsule by mouth once daily.    dalfampridine (AMPYRA) 10 mg Tb12 Take 10 mg by mouth every 12 (twelve) hours.    dantrolene (DANTRIUM) 50 MG Cap Take 1 capsule by mouth 4  times daily for 30 days    diazePAM (VALIUM) 5 MG tablet Take 1 tablet (5 mg total) by mouth every 8 (eight) hours as needed (muscle spasms).    duloxetine (CYMBALTA) 30 MG capsule Take 30 mg by mouth once daily.    ergocalciferol (VITAMIN D2) 50,000 unit Cap Take 1 capsule (50,000 Units total) by mouth every 7 days. (Patient taking differently: Take 50,000 Units by mouth every 7 days. on friday)    gabapentin (NEURONTIN) 300 MG capsule Take 3 capsules by mouth 4 times daily for 30 days    HYDROcodone-acetaminophen (NORCO)  mg per tablet Take 1 tablet by mouth every 8 (eight) hours as needed for Pain.    multivitamin capsule Take 1 capsule by mouth once daily.    oxyCODONE-acetaminophen (PERCOCET)  mg per tablet Take one tablet by mouth every 4 hours as needed for moderate pain    rivaroxaban (XARELTO) 20 mg Tab Take 1 tablet (20 mg total) by mouth daily with dinner or evening meal.    tamsulosin (FLOMAX) 0.4 mg Cp24 Take 1 capsule (0.4 mg total) by mouth once daily.    trazodone (DESYREL) 100 MG tablet Take 1 tablet (100 mg total) by mouth every evening.    vitamin D (VITAMIN D3) 1000 units Tab Take 2 tablets by mouth once daily for 30 days    mirabegron (MYRBETRIQ) 50 mg Tb24 Take 1 tablet (50 mg total) by mouth once daily.    polyethylene glycol (GLYCOLAX) 17 gram/dose powder Take 17 g by mouth once daily.    senna-docusate 8.6-50 mg (PERICOLACE) 8.6-50 mg per tablet Take 1 tablet by mouth once daily.     Scheduled Meds:   baclofen  20 mg Oral QID    buPROPion  150 mg Oral Daily    dantrolene  50 mg Oral QID    DULoxetine  30 mg Oral Daily    gabapentin  400 mg Oral QID    polyethylene glycol  17 g Oral Daily    rivaroxaban  20 mg Oral Daily with dinner    senna-docusate 8.6-50 mg  1 tablet Oral BID    sulfamethoxazole-trimethoprim 800-160mg  1 tablet Oral BID    tamsulosin  0.4 mg Oral Daily    traZODone  100 mg Oral QHS     Continuous Infusions:  PRN Meds:.acetaminophen,  bisacodyl, calcium carbonate, dextrose 50%, dextrose 50%, diazePAM, glucagon (human recombinant), glucose, glucose, ibuprofen, ondansetron, oxyCODONE, oxyCODONE, prochlorperazine, ramelteon, sodium chloride 0.9%    Family History     Problem Relation (Age of Onset)    Arthritis Mother    Cancer Maternal Grandfather    No Known Problems Father        Tobacco Use    Smoking status: Former Smoker     Packs/day: 0.50     Years: 23.00     Pack years: 11.50     Types: Cigarettes     Last attempt to quit: 2018     Years since quittin.5    Smokeless tobacco: Never Used    Tobacco comment: Using patch   Substance and Sexual Activity    Alcohol use: No    Drug use: No    Sexual activity: Yes     Partners: Female     Review of Systems   Constitutional: no fever or chills  Eyes: no visual changes  ENT: no nasal congestion or sore throat  Respiratory: no cough or shortness of breath  Cardiovascular: no chest pain or palpitations  Gastrointestinal: no nausea or vomiting, no abdominal pain; last BM:   Genitourinary: no hematuria or dysuria  Integument/Breast: no rash or pruritis  Hematologic/Lymphatic: no easy bruising or lymphadenopathy  Allergy/Immunology: no postnasal drip  Musculoskeletal: + generalized pain  Neurological: no seizures or tremors  Behavioral/Psych: no auditory or visual hallucinations  Endocrine: no heat or cold intolerance      Objective:     Vital Signs (Most Recent):  Temp: 98.9 °F (37.2 °C) (18 1025)  Pulse: 72 (18 192)  Resp: 18 (18 1025)  BP: 119/67 (18 192)  SpO2: 95 % (18 192) Vital Signs (24h Range):  Temp:  [98.9 °F (37.2 °C)] 98.9 °F (37.2 °C)  Pulse:  [72-84] 72  Resp:  [18] 18  SpO2:  [95 %-98 %] 95 %  BP: (114-119)/(64-67) 119/67     Weight: 81.1 kg (178 lb 12.7 oz)  Body mass index is 25.65 kg/m².    Physical Exam  General: well developed, well nourished, no distress, in wheelchair; smells of cigarette smoke  HENT: Head:normocephalic,  atraumatic. Ears:bilateral external ear canals normal. Nose: Nares normal. Septum midline. Mucosa normal. Throat: lips, mucosa, and tongue normal and no throat erythema.  Eyes: conjunctivae/corneas clear.  EOM normal.  Neck: supple, symmetrical, trachea midline.  Lungs:  clear to auscultation bilaterally and normal respiratory effort.  Cardiovascular: Heart: regular rate and rhythm, S1, S2 normal, no murmur, click, rub or gallop. Chest Wall: no tenderness. Extremities: no cyanosis, no edema, no clubbing. Pulses: 2+ and symmetric.  Abdomen/Rectal: Abdomen: soft, non-tender non-distended; bowel sounds normal; no masses,  no organomegaly.   Skin: Skin color, texture, turgor normal. No rashes or lesions.  Musculoskeletal: no clubbing, cyanosis.  Lymph Nodes: No cervical or supraclavicular adenopathy.  Neurologic:+ foot drop on left; + weakness 3/5 to BLE; 4/5 BUE  Psych/Behavioral:  Alert and oriented, appropriate affect.    Significant Labs:   Recent Results (from the past 24 hour(s))   Basic Metabolic Panel (BMP)    Collection Time: 12/06/18  6:01 AM   Result Value Ref Range    Sodium 140 136 - 145 mmol/L    Potassium 3.8 3.5 - 5.1 mmol/L    Chloride 106 95 - 110 mmol/L    CO2 26 23 - 29 mmol/L    Glucose 89 70 - 110 mg/dL    BUN, Bld 13 6 - 20 mg/dL    Creatinine 0.9 0.5 - 1.4 mg/dL    Calcium 8.5 (L) 8.7 - 10.5 mg/dL    Anion Gap 8 8 - 16 mmol/L    eGFR if African American >60.0 >60 mL/min/1.73 m^2    eGFR if non African American >60.0 >60 mL/min/1.73 m^2   CBC auto differential    Collection Time: 12/06/18  6:01 AM   Result Value Ref Range    WBC 6.89 3.90 - 12.70 K/uL    RBC 3.78 (L) 4.60 - 6.20 M/uL    Hemoglobin 11.3 (L) 14.0 - 18.0 g/dL    Hematocrit 34.5 (L) 40.0 - 54.0 %    MCV 91 82 - 98 fL    MCH 29.9 27.0 - 31.0 pg    MCHC 32.8 32.0 - 36.0 g/dL    RDW 13.9 11.5 - 14.5 %    Platelets 263 150 - 350 K/uL    MPV 10.9 9.2 - 12.9 fL    Immature Granulocytes 0.1 0.0 - 0.5 %    Gran # (ANC) 3.9 1.8 - 7.7 K/uL     Immature Grans (Abs) 0.01 0.00 - 0.04 K/uL    Lymph # 2.2 1.0 - 4.8 K/uL    Mono # 0.6 0.3 - 1.0 K/uL    Eos # 0.2 0.0 - 0.5 K/uL    Baso # 0.04 0.00 - 0.20 K/uL    nRBC 0 0 /100 WBC    Gran% 56.2 38.0 - 73.0 %    Lymph% 31.2 18.0 - 48.0 %    Mono% 9.0 4.0 - 15.0 %    Eosinophil% 2.9 0.0 - 8.0 %    Basophil% 0.6 0.0 - 1.9 %    Differential Method Automated    Magnesium    Collection Time: 12/06/18  6:01 AM   Result Value Ref Range    Magnesium 2.0 1.6 - 2.6 mg/dL   Phosphorus    Collection Time: 12/06/18  6:01 AM   Result Value Ref Range    Phosphorus 3.6 2.7 - 4.5 mg/dL

## 2018-12-07 NOTE — PT/OT/SLP PROGRESS
Occupational Therapy  Treatment    Mao Levin   MRN: [id number]   Admitting Diagnosis: MS (multiple sclerosis)    OT Date of Treatment: 12/07/18  Total Time (min): 40 min    Billable Minutes:  Therapeutic Activity 40    General Precautions: Standard, fall  Orthopedic Precautions: N/A  Braces: N/A         Subjective:  Communicated with nsg prior to session.  I haven't  got my pain medicine yet     Pain/Comfort  Pain Rating 1: 9/10  Location - Side 1: Bilateral  Location 1: leg  Pain Addressed 1: Reposition, Distraction, Nurse notified  Pain Rating Post-Intervention 1: 9/10    Objective:   Pt. Supine on arriva;    Occupational Performance:    Bed Mobility:    · Patient completed Sit to Supine with moderate assistance     Functional Mobility/Transfers:  · Patient completed Bed <> Chair Transfer using Squat Pivot technique with moderate assistance with no assistive device      Activities of Daily Living:  · Not tested     [facility] 6 Click:  [facility] Total Score: 13    OT Exercises: UE Ergometer 10 min    Additional Treatment:  Pt. Provided with BUE ROM x 10 reps in all planes and joints with stretch  Pt.  provided with pectoral stretch for rounded posture and weight bearing into L UE and hand due to flex digits with extension stretch and ROM provided with wrsit flex ext. Supination/pronation.  Pt. With ROM and  therex performed to increase ROM, endurance selfcare task and fxl mobility for independence     Patient left up in chair with all lines intact and call button in reach    ASSESSMENT:  Mao Levin is a 53 y.o. male with a medical diagnosis of MS (multiple sclerosis) Pt. participated well with session on this day. Pt. Mostly in pain and awaiting for meds but cooperative and motivated with therapy   Pt demos physical deficits with balance  functional mobility, UB strength, endurance  level of functional indep with daily tasks and activities and selfcare skills .Pt. Will continue to benefit from continued OT to progress  towards goals  .    Rehab identified problem list/impairments: weakness, impaired endurance, impaired self care skills, impaired functional mobilty, gait instability, impaired balance, impaired sensation, impaired joint extensibility, impaired fine motor, decreased coordination, decreased lower extremity function, decreased upper extremity function, impaired coordination, pain    Rehab potential is fair    Activity tolerance: Fair    Discharge recommendations: home with home health     Barriers to discharge: Inaccessible home environment, Decreased caregiver support     Equipment recommendations: walker, rolling     GOALS:   Multidisciplinary Problems     Occupational Therapy Goals        Problem: Occupational Therapy Goal    Goal Priority Disciplines Outcome Interventions   Occupational Therapy Goal     OT, PT/OT Ongoing (interventions implemented as appropriate)    Description:  Goals to be met by: 12/17/2018     Patient will increase functional independence with ADLs by performing:    Feeding with Modified [place].  UE Dressing with Set-up Assistance.  LE Dressing with Minimal Assistance.  Grooming while seated with Supervision.  Toileting from bedside commode with Stand-by Assistance for hygiene and clothing management.   Bathing from  shower chair/bench with Stand-by Assistance.  Rolling to Right, Left with Supervision.   Supine to sit with Supervision.  Stand pivot transfers with Stand-by Assistance.  Toilet transfer to bedside commode with Stand-by Assistance.  Upper extremity exercise program 3 x 15 reps per handout, with independence.  Patient will perform a functional standing activity for 10 min with S in order to perform household tasks.                     Plan:  Patient to be seen 5 x/week to address the above listed problems via self-care/home management, therapeutic activities, therapeutic exercises  Plan of Care expires: 01/05/18  Plan of Care reviewed with: patient    Kerry Antony,  YORK/L  12/07/2018

## 2018-12-07 NOTE — H&P
[facility] - Skilled Nursing Care  Department of [facility] Medicine  History & Physical    Patient Name: Mao Levin  MRN: [id number]  Code Status: Full Code  Admission Date: 12/4/2018  Attending Physician: Radha Mckeon MD   Primary Care Provider: Mendy Alarcon MD    Subjective:     Principal Problem:MS (multiple sclerosis)     HPI: Chief complaint/ Reason for Admission: MS    HPI: 53-year-old male with a past medical history of MS, neurogenic bladder, PE on xarelto, chronic pain who presents for evaluation of increased LE weakness. Patient was diagnosed with a UTI treated with IV rocephin and transitioned to bactrim PO upon admission to SNF. Neurology also following to determine MS flare or Pseudoflare. It was deemed patient with Pseudoflare due to UTI. No new lesions noted no imaging. Patient has a history of urgency incontinence due to MS controlled with mirabegron. He does not self cath and does not have a agee catheter. Had botox injections with Dr. Augustin which at that time helped with incontinence. Denies fevers, chills, CP, SOB, abdominal pain, dysuria, hesitancy, hematuria, n/v, constipation, diarrhea, headaches, and vision changes, new numbness, or weakness. He states he lives at home with assistance from his mother. SNF placement was recommended for PT/OT services.     Past Medical History:   Diagnosis Date    Anticoagulant long-term use     Anxiety     Chronic back pain     Deep vein thrombosis     Depression     Depression     Gait instability     Multiple sclerosis     Multiple sclerosis     Neurogenic bladder     Polyneuropathy     Pulmonary embolism     Spasticity        Past Surgical History:   Procedure Laterality Date    CYSTOSCOPY N/A 6/20/2018    Procedure: CYSTOSCOPY;  Surgeon: Brian Augustin MD;  Location: [facility] OR [address] [place] [address];  Service: Urology;  Laterality: N/A;  1 hour    CYSTOSCOPY N/A 6/20/2018    Performed by Brian Augustin MD at [facility] OR [address] [place] [address]    INJECTION OF BOTULINUM  TOXIN TYPE A N/A 6/20/2018    Procedure: INJECTION, BOTULINUM TOXIN, TYPE A 200 UNITS;  Surgeon: Brian Augustin MD;  Location: [facility] OR [address] [place] [address];  Service: Urology;  Laterality: N/A;    INJECTION, BOTULINUM TOXIN, TYPE A 200 UNITS N/A 6/20/2018    Performed by Brian Augustin MD at [facility] OR [address] [place] [address]       Review of patient's allergies indicates:  No Known Allergies    No current facility-administered medications on file prior to encounter.      Current Outpatient Medications on File Prior to Encounter   Medication Sig    ascorbic acid, vitamin C, (VITAMIN C) 500 MG tablet Take 500 mg by mouth once daily.    baclofen (LIORESAL) 20 MG tablet Take 1 tablet by mouth 4 times daily for 30 days    buPROPion (WELLBUTRIN SR) 150 MG TBSR 12 hr tablet 1 tab po daily x 3 days, then increase to 1 tab po BID; last dose no later than 6PM; stop smoking after 5-7 days of treatment; (Patient taking differently: 150 mg 2 (two) times daily. 1 tab po daily x 3 days, then increase to 1 tab po BID; last dose no later than 6PM; stop smoking after 5-7 days of treatment;)    cranberry conc-C-bacillus coag 450-30-50 mg-mg-million Tab Take 1 capsule by mouth once daily.    dalfampridine (AMPYRA) 10 mg Tb12 Take 10 mg by mouth every 12 (twelve) hours.    dantrolene (DANTRIUM) 50 MG Cap Take 1 capsule by mouth 4 times daily for 30 days    diazePAM (VALIUM) 5 MG tablet Take 1 tablet (5 mg total) by mouth every 8 (eight) hours as needed (muscle spasms).    duloxetine (CYMBALTA) 30 MG capsule Take 30 mg by mouth once daily.    ergocalciferol (VITAMIN D2) 50,000 unit Cap Take 1 capsule (50,000 Units total) by mouth every 7 days. (Patient taking differently: Take 50,000 Units by mouth every 7 days. on friday)    gabapentin (NEURONTIN) 300 MG capsule Take 3 capsules by mouth 4 times daily for 30 days    HYDROcodone-acetaminophen (NORCO)  mg per tablet Take 1 tablet by mouth every 8 (eight) hours as needed for Pain.    multivitamin  capsule Take 1 capsule by mouth once daily.    oxyCODONE-acetaminophen (PERCOCET)  mg per tablet Take one tablet by mouth every 4 hours as needed for moderate pain    rivaroxaban (XARELTO) 20 mg Tab Take 1 tablet (20 mg total) by mouth daily with dinner or evening meal.    tamsulosin (FLOMAX) 0.4 mg Cp24 Take 1 capsule (0.4 mg total) by mouth once daily.    trazodone (DESYREL) 100 MG tablet Take 1 tablet (100 mg total) by mouth every evening.    vitamin D (VITAMIN D3) 1000 units Tab Take 2 tablets by mouth once daily for 30 days    mirabegron (MYRBETRIQ) 50 mg Tb24 Take 1 tablet (50 mg total) by mouth once daily.    polyethylene glycol (GLYCOLAX) 17 gram/dose powder Take 17 g by mouth once daily.    senna-docusate 8.6-50 mg (PERICOLACE) 8.6-50 mg per tablet Take 1 tablet by mouth once daily.     Scheduled Meds:   baclofen  20 mg Oral QID    buPROPion  150 mg Oral Daily    dantrolene  50 mg Oral QID    DULoxetine  30 mg Oral Daily    gabapentin  400 mg Oral QID    polyethylene glycol  17 g Oral Daily    rivaroxaban  20 mg Oral Daily with dinner    senna-docusate 8.6-50 mg  1 tablet Oral BID    sulfamethoxazole-trimethoprim 800-160mg  1 tablet Oral BID    tamsulosin  0.4 mg Oral Daily    traZODone  100 mg Oral QHS     Continuous Infusions:  PRN Meds:.acetaminophen, bisacodyl, calcium carbonate, dextrose 50%, dextrose 50%, diazePAM, glucagon (human recombinant), glucose, glucose, ibuprofen, ondansetron, oxyCODONE, oxyCODONE, prochlorperazine, ramelteon, sodium chloride 0.9%    Family History     Problem Relation (Age of Onset)    Arthritis Mother    Cancer Maternal Grandfather    No Known Problems Father        Tobacco Use    Smoking status: Former Smoker     Packs/day: 0.50     Years: 23.00     Pack years: 11.50     Types: Cigarettes     Last attempt to quit: 2018     Years since quittin.5    Smokeless tobacco: Never Used    Tobacco comment: Using patch   Substance and Sexual  Activity    Alcohol use: No    Drug use: No    Sexual activity: Yes     Partners: Female     Review of Systems   Constitutional: no fever or chills  Eyes: no visual changes  ENT: no nasal congestion or sore throat  Respiratory: no cough or shortness of breath  Cardiovascular: no chest pain or palpitations  Gastrointestinal: no nausea or vomiting, no abdominal pain; last BM: 11/30  Genitourinary: no hematuria or dysuria  Integument/Breast: no rash or pruritis  Hematologic/Lymphatic: no easy bruising or lymphadenopathy  Allergy/Immunology: no postnasal drip  Musculoskeletal: + generalized pain  Neurological: no seizures or tremors  Behavioral/Psych: no auditory or visual hallucinations  Endocrine: no heat or cold intolerance      Objective:     Vital Signs (Most Recent):  Temp: 98.9 °F (37.2 °C) (12/06/18 1025)  Pulse: 72 (12/06/18 1922)  Resp: 18 (12/06/18 1025)  BP: 119/67 (12/06/18 1922)  SpO2: 95 % (12/06/18 1922) Vital Signs (24h Range):  Temp:  [98.9 °F (37.2 °C)] 98.9 °F (37.2 °C)  Pulse:  [72-84] 72  Resp:  [18] 18  SpO2:  [95 %-98 %] 95 %  BP: (114-119)/(64-67) 119/67     Weight: 81.1 kg (178 lb 12.7 oz)  Body mass index is 25.65 kg/m².    Physical Exam  General: well developed, well nourished, no distress, in wheelchair; smells of cigarette smoke  HENT: Head:normocephalic, atraumatic. Ears:bilateral external ear canals normal. Nose: Nares normal. Septum midline. Mucosa normal. Throat: lips, mucosa, and tongue normal and no throat erythema.  Eyes: conjunctivae/corneas clear.  EOM normal.  Neck: supple, symmetrical, trachea midline.  Lungs:  clear to auscultation bilaterally and normal respiratory effort.  Cardiovascular: Heart: regular rate and rhythm, S1, S2 normal, no murmur, click, rub or gallop. Chest Wall: no tenderness. Extremities: no cyanosis, no edema, no clubbing. Pulses: 2+ and symmetric.  Abdomen/Rectal: Abdomen: soft, non-tender non-distended; bowel sounds normal; no masses,  no organomegaly.    Skin: Skin color, texture, turgor normal. No rashes or lesions.  Musculoskeletal: no clubbing, cyanosis.  Lymph Nodes: No cervical or supraclavicular adenopathy.  Neurologic:+ foot drop on left; + weakness 3/5 to BLE; 4/5 BUE  Psych/Behavioral:  Alert and oriented, appropriate affect.    Significant Labs:   Recent Results (from the past 24 hour(s))   Basic Metabolic Panel (BMP)    Collection Time: 12/06/18  6:01 AM   Result Value Ref Range    Sodium 140 136 - 145 mmol/L    Potassium 3.8 3.5 - 5.1 mmol/L    Chloride 106 95 - 110 mmol/L    CO2 26 23 - 29 mmol/L    Glucose 89 70 - 110 mg/dL    BUN, Bld 13 6 - 20 mg/dL    Creatinine 0.9 0.5 - 1.4 mg/dL    Calcium 8.5 (L) 8.7 - 10.5 mg/dL    Anion Gap 8 8 - 16 mmol/L    eGFR if African American >60.0 >60 mL/min/1.73 m^2    eGFR if non African American >60.0 >60 mL/min/1.73 m^2   CBC auto differential    Collection Time: 12/06/18  6:01 AM   Result Value Ref Range    WBC 6.89 3.90 - 12.70 K/uL    RBC 3.78 (L) 4.60 - 6.20 M/uL    Hemoglobin 11.3 (L) 14.0 - 18.0 g/dL    Hematocrit 34.5 (L) 40.0 - 54.0 %    MCV 91 82 - 98 fL    MCH 29.9 27.0 - 31.0 pg    MCHC 32.8 32.0 - 36.0 g/dL    RDW 13.9 11.5 - 14.5 %    Platelets 263 150 - 350 K/uL    MPV 10.9 9.2 - 12.9 fL    Immature Granulocytes 0.1 0.0 - 0.5 %    Gran # (ANC) 3.9 1.8 - 7.7 K/uL    Immature Grans (Abs) 0.01 0.00 - 0.04 K/uL    Lymph # 2.2 1.0 - 4.8 K/uL    Mono # 0.6 0.3 - 1.0 K/uL    Eos # 0.2 0.0 - 0.5 K/uL    Baso # 0.04 0.00 - 0.20 K/uL    nRBC 0 0 /100 WBC    Gran% 56.2 38.0 - 73.0 %    Lymph% 31.2 18.0 - 48.0 %    Mono% 9.0 4.0 - 15.0 %    Eosinophil% 2.9 0.0 - 8.0 %    Basophil% 0.6 0.0 - 1.9 %    Differential Method Automated    Magnesium    Collection Time: 12/06/18  6:01 AM   Result Value Ref Range    Magnesium 2.0 1.6 - 2.6 mg/dL   Phosphorus    Collection Time: 12/06/18  6:01 AM   Result Value Ref Range    Phosphorus 3.6 2.7 - 4.5 mg/dL             Assessment/Plan:     * MS (multiple sclerosis)     · Chronic with increase weakness to lower extremities with this hospitalization  · Neurology followed and was likely a pseudoflare due to UTI  · Continue home dose of baclofen, dantrolene, gabapentin, valium prn  · Patient will need to f/u in MS clinic and scheduled Rituxan infusion, MS clinic aware of patient's needs  · PT/OT  · Fall precautions  · dvt ppx with rivaroxaban  · Bowel regimen with senokot-s and miralax, hold for loose or frequent stoolings     Spasticity    · Continue home scheduled regimen of baclofen, dantrolene  · And prn valium for breakthrough spasms  · F/u with neurology outpatient     Polyneuropathy    · Reports being followed by PMR  · Continue home regimen of gabapentin  · F/u with Dr. Estrella upon discharge     Current every day smoker    Patient has had success with bupropion in the past and would like to resume BID dosing to quit again.  Increase bupropion to BID with tobacco cessation in 3 days thereafter.     Urinary tract infection without hematuria    · Cultured providencia; Treated with rocephin at [facility]  · Transitioned to Bactrim to completed 10 days, to be completed 12/10     Depression    · chronic  · Mood stable, sleeping well  · Continue home regimen of bupropion, duloxetine, and trazodone     Constipation due to neurogenic bowel    Continue therapy with miralax and senokot-s  Lactulose as needed if scheduled meds ineffective.  Enema prn.     10/25/2016 Saddle PE    · Chronically on xarleto--continue  · denies sob or CP     Neurogenic bladder    · Chronic  · Patient has not ever done self caths  · Has been being followed by Dr. Augustin and had Botox injections in July 2018  · I Reviewed Urology note from clinic and patient to continue flomax and f/u in 6 months. Since patient had to cancel f/u visit, will reschedule upon discharge  · Continue flomax  · F/u with urology, Dr. Charli Mckeon MD  Department of Hospital Medicine  [facility] - [facility]illed Nursing Care

## 2018-12-07 NOTE — ASSESSMENT & PLAN NOTE
· Continue home scheduled regimen of baclofen, dantrolene  · And prn valium for breakthrough spasms  · F/u with neurology outpatient

## 2018-12-07 NOTE — ASSESSMENT & PLAN NOTE
· Chronic with increase weakness to lower extremities with this hospitalization  · Neurology followed and was likely a pseudoflare due to UTI  · Continue home dose of baclofen, dantrolene, gabapentin, valium prn  · Patient will need to f/u in MS clinic and scheduled Rituxan infusion, MS clinic aware of patient's needs  · PT/OT  · Fall precautions  · dvt ppx with rivaroxaban  · Bowel regimen with senokot-s and miralax, hold for loose or frequent stoolings

## 2018-12-08 PROCEDURE — 25000003 PHARM REV CODE 250: Mod: HCWC | Performed by: INTERNAL MEDICINE

## 2018-12-08 PROCEDURE — 97110 THERAPEUTIC EXERCISES: CPT | Mod: HCWC

## 2018-12-08 PROCEDURE — 97116 GAIT TRAINING THERAPY: CPT | Mod: HCWC

## 2018-12-08 PROCEDURE — 97530 THERAPEUTIC ACTIVITIES: CPT | Mod: HCWC

## 2018-12-08 PROCEDURE — 11000004 HC SNF PRIVATE: Mod: HCWC

## 2018-12-08 PROCEDURE — 25000003 PHARM REV CODE 250: Mod: HCWC | Performed by: PHYSICIAN ASSISTANT

## 2018-12-08 RX ADMIN — LACTULOSE 20 G: 20 SOLUTION ORAL at 03:12

## 2018-12-08 RX ADMIN — DANTROLENE SODIUM 50 MG: 50 CAPSULE ORAL at 10:12

## 2018-12-08 RX ADMIN — BACLOFEN 20 MG: 10 TABLET ORAL at 10:12

## 2018-12-08 RX ADMIN — TRAZODONE HYDROCHLORIDE 100 MG: 50 TABLET ORAL at 10:12

## 2018-12-08 RX ADMIN — SENNOSIDES AND DOCUSATE SODIUM 1 TABLET: 8.6; 5 TABLET ORAL at 10:12

## 2018-12-08 RX ADMIN — OXYCODONE HYDROCHLORIDE 15 MG: 10 TABLET ORAL at 01:12

## 2018-12-08 RX ADMIN — BUPROPION HYDROCHLORIDE 150 MG: 150 TABLET, EXTENDED RELEASE ORAL at 10:12

## 2018-12-08 RX ADMIN — DULOXETINE 30 MG: 30 CAPSULE, DELAYED RELEASE ORAL at 10:12

## 2018-12-08 RX ADMIN — OXYCODONE HYDROCHLORIDE 10 MG: 10 TABLET ORAL at 07:12

## 2018-12-08 RX ADMIN — GABAPENTIN 400 MG: 100 CAPSULE ORAL at 06:12

## 2018-12-08 RX ADMIN — RAMELTEON 8 MG: 8 TABLET, FILM COATED ORAL at 10:12

## 2018-12-08 RX ADMIN — POLYETHYLENE GLYCOL 3350 17 G: 17 POWDER, FOR SOLUTION ORAL at 10:12

## 2018-12-08 RX ADMIN — TAMSULOSIN HYDROCHLORIDE 0.4 MG: 0.4 CAPSULE ORAL at 10:12

## 2018-12-08 RX ADMIN — DIAZEPAM 5 MG: 5 TABLET ORAL at 10:12

## 2018-12-08 RX ADMIN — BUPROPION HYDROCHLORIDE 150 MG: 150 TABLET, EXTENDED RELEASE ORAL at 06:12

## 2018-12-08 RX ADMIN — DANTROLENE SODIUM 50 MG: 50 CAPSULE ORAL at 01:12

## 2018-12-08 RX ADMIN — DIAZEPAM 5 MG: 5 TABLET ORAL at 01:12

## 2018-12-08 RX ADMIN — DANTROLENE SODIUM 50 MG: 50 CAPSULE ORAL at 06:12

## 2018-12-08 RX ADMIN — SULFAMETHOXAZOLE AND TRIMETHOPRIM 1 TABLET: 800; 160 TABLET ORAL at 10:12

## 2018-12-08 RX ADMIN — OXYCODONE HYDROCHLORIDE 15 MG: 10 TABLET ORAL at 10:12

## 2018-12-08 RX ADMIN — GABAPENTIN 400 MG: 100 CAPSULE ORAL at 10:12

## 2018-12-08 RX ADMIN — GABAPENTIN 400 MG: 100 CAPSULE ORAL at 01:12

## 2018-12-08 RX ADMIN — BACLOFEN 20 MG: 10 TABLET ORAL at 06:12

## 2018-12-08 RX ADMIN — BACLOFEN 20 MG: 10 TABLET ORAL at 01:12

## 2018-12-08 RX ADMIN — RIVAROXABAN 20 MG: 20 TABLET, FILM COATED ORAL at 06:12

## 2018-12-08 NOTE — PT/OT/SLP PROGRESS
Physical Therapy  Treatment    Mao Levin   MRN: [id number]   Admitting Diagnosis: MS (multiple sclerosis)    PT Received On: 12/08/18  Total Time (min): 53       Billable Minutes:  Gait Training 15, Therapeutic Activity 15, Therapeutic Exercise 23 and Total Time 53    Treatment Type: Treatment  PT/PTA: PT     PTA Visit Number: 0       General Precautions: Standard, fall  Orthopedic Precautions: N/A   Braces: N/A    Do you have any cultural, spiritual, Tenriism conflicts, given your current situation?: none    Subjective:  Communicated with patient prior to session.  Pt agreeable to session.    Pain/Comfort  Pain Rating 1: 9/10  Location - Side 1: Bilateral  Location - Orientation 1: lower  Location 1: leg  Pain Addressed 1: Reposition, Pre-medicate for activity, Distraction  Pain Rating Post-Intervention 1: 9/10    Objective:  Patient found sitting in w/c. Patient found with: (LLE AFO)     AM-PAC 6 CLICK MOBILITY  Total Score:12    Bed Mobility:  Sit>Supine:on mat w/ ModA for BLE  Supine>Sit: on mat w/ MaxA for trunk and BLE, inc'd time and cueing required for logroll technique    Transfers:  Sit<>Stand: to/from w/c (3 trials) in // bars w/ CGA tor ise  Stand Pivot Transfer: w/c<>EOM w/o AD w/ Miri to rise and pivot    Gait:  Amb 2 trials (length of // bars each) w/ Miri for LLE advancement  Cueing (Tactile and verbal) for upright posture and LLE extension (hip and trunk)  Cueing also for weight shifting     Therex:  Supine PROM BLE all joints/planes ~5min each LE  Supine hip/knee flexion/extension w/ resistance in to extension and AAROM in to flexion  Standing squats in // bars 2x10 reps w/ CGA for safety and cueing for form    Balance:  Static  // bars w/ CGA for safety    Additional Treatment:  PT donned LUE wrist splint at end of session.    Patient left up in chair with call button in reach.    Assessment:  Mao Levin is a 53 y.o. male with a medical diagnosis of MS (multiple sclerosis).  Pt steff session  well w/ good participation. He was able to improve sit>stand transfers to CGA today in // bars- was previously Miri to rise. Pt will continue PT POC in order to address the below mentioned deficits.    Rehab identified problem list/impairments: weakness, impaired endurance, impaired sensation, impaired self care skills, impaired functional mobilty, gait instability, impaired balance, decreased coordination, decreased upper extremity function, decreased lower extremity function, pain, abnormal tone, decreased ROM, impaired coordination, impaired fine motor, impaired joint extensibility    Rehab potential is good.    Activity tolerance: Good    Discharge recommendations: home with home health     Barriers to discharge: Inaccessible home environment, Decreased caregiver support    Equipment recommendations: bedside commode, walker, rolling     GOALS:   Multidisciplinary Problems     Physical Therapy Goals        Problem: Physical Therapy Goal    Goal Priority Disciplines Outcome Goal Variances Interventions   Physical Therapy Goal     PT, PT/OT Ongoing (interventions implemented as appropriate)     Description:  Goals to be met by: 18     Patient will increase functional independence with mobility by performin. Supine to sit with Stand-by Assistance  2. Sit to supine with Stand-by Assistance  3. Rolling to Left and Right with Stand-by Assistance.  4. Sit to stand transfer with Stand-by Assistance  5. Bed to chair transfer with Stand-by Assistance using Rolling Walker  6. Gait  x 50 feet with Stand-by Assistance using Rolling Walker.   7. Ascend/descend 6 stair with Right OR Left Handrails Contact Guard Assistance                      PLAN:    Patient to be seen (5-6X/week)  to address the above listed problems via gait training, therapeutic activities, therapeutic exercises  Plan of Care expires: 19  Plan of Care reviewed with: patient    Angelica M Bacilio, PT  2018

## 2018-12-08 NOTE — NURSING
Patient had an unwitnessed fall in his bathroom at 1710 today.  Patient states he was trying to pull his pants back up after using the commode and felt unstable, so he lowered himself to the floor to avoid falling.   Patient denies any new pain or injuries as a result of the incident. VS were taken and were stable. Dr. Mckeon was notified as well as patients emergency contact. Patient admitted he was educated this morning to use the call button and not get up on his own. Patient did not use the call button prior to this incident.  Patient was reeducated on using the call button and not to attempt to get up on his own.

## 2018-12-08 NOTE — PLAN OF CARE
Problem: Patient Care Overview  Goal: Plan of Care Review  Outcome: Ongoing (interventions implemented as appropriate)   12/08/18 1636   Coping/Psychosocial   Plan Of Care Reviewed With patient       Problem: Pressure Ulcer Risk (Huy Scale) (Adult,Obstetrics,Pediatric)  Goal: Skin Integrity  Patient will demonstrate the desired outcomes by discharge/transition of care.  Outcome: Ongoing (interventions implemented as appropriate)   12/08/18 1636   Pressure Ulcer Risk (Huy Scale) (Adult,Obstetrics,Pediatric)   Skin Integrity making progress toward outcome       Problem: Pain, Acute (Adult)  Goal: Acceptable Pain Control/Comfort Level  Patient will demonstrate the desired outcomes by discharge/transition of care.  Outcome: Ongoing (interventions implemented as appropriate)   12/08/18 1636   Pain, Acute (Adult)   Acceptable Pain Control/Comfort Level making progress toward outcome       Comments: Patient monitored every 1 to 2 hours for pain and safety.  Safety maintained.  Patient instructed to call for assistance.Call  Light and persoanl items in reach.

## 2018-12-08 NOTE — PLAN OF CARE
Problem: Fall Risk (Adult)  Goal: Identify Related Risk Factors and Signs and Symptoms  Related risk factors and signs and symptoms are identified upon initiation of Human Response Clinical Practice Guideline (CPG)   12/08/18 0109   Fall Risk   Related Risk Factors (Fall Risk) fatigue/slow reaction;fear of falling;gait/mobility problems   Signs and Symptoms (Fall Risk) presence of risk factors

## 2018-12-08 NOTE — PLAN OF CARE
Problem: Patient Care Overview  Goal: Plan of Care Review  Outcome: Ongoing (interventions implemented as appropriate)   12/07/18 1937   Coping/Psychosocial   Plan Of Care Reviewed With patient       Problem: Pressure Ulcer Risk (Huy Scale) (Adult,Obstetrics,Pediatric)  Goal: Skin Integrity  Patient will demonstrate the desired outcomes by discharge/transition of care.  Outcome: Ongoing (interventions implemented as appropriate)   12/07/18 1937   Pressure Ulcer Risk (Huy Scale) (Adult,Obstetrics,Pediatric)   Skin Integrity making progress toward outcome       Problem: Fall Risk (Adult)  Goal: Absence of Falls  Patient will demonstrate the desired outcomes by discharge/transition of care.  Outcome: Ongoing (interventions implemented as appropriate)   12/07/18 1937   Fall Risk (Adult)   Absence of Falls making progress toward outcome       Problem: Pain, Acute (Adult)  Goal: Acceptable Pain Control/Comfort Level  Patient will demonstrate the desired outcomes by discharge/transition of care.  Outcome: Ongoing (interventions implemented as appropriate)   12/07/18 1937   Pain, Acute (Adult)   Acceptable Pain Control/Comfort Level making progress toward outcome       Comments: Patient monitored every 1 to 2 hours for pain and safety.  Safety maintained.  Patient instructed to call for assistance.Call  Light and persoanl items in reach.

## 2018-12-09 PROCEDURE — 11000004 HC SNF PRIVATE: Mod: HCWC

## 2018-12-09 PROCEDURE — 25000003 PHARM REV CODE 250: Mod: HCWC | Performed by: PHYSICIAN ASSISTANT

## 2018-12-09 PROCEDURE — 25000003 PHARM REV CODE 250: Mod: HCWC | Performed by: INTERNAL MEDICINE

## 2018-12-09 PROCEDURE — 97110 THERAPEUTIC EXERCISES: CPT | Mod: HCWC

## 2018-12-09 PROCEDURE — 97535 SELF CARE MNGMENT TRAINING: CPT | Mod: HCWC

## 2018-12-09 RX ADMIN — RIVAROXABAN 20 MG: 20 TABLET, FILM COATED ORAL at 07:12

## 2018-12-09 RX ADMIN — SENNOSIDES AND DOCUSATE SODIUM 1 TABLET: 8.6; 5 TABLET ORAL at 08:12

## 2018-12-09 RX ADMIN — BUPROPION HYDROCHLORIDE 150 MG: 150 TABLET, EXTENDED RELEASE ORAL at 05:12

## 2018-12-09 RX ADMIN — RAMELTEON 8 MG: 8 TABLET, FILM COATED ORAL at 08:12

## 2018-12-09 RX ADMIN — OXYCODONE HYDROCHLORIDE 15 MG: 10 TABLET ORAL at 12:12

## 2018-12-09 RX ADMIN — BACLOFEN 20 MG: 10 TABLET ORAL at 11:12

## 2018-12-09 RX ADMIN — OXYCODONE HYDROCHLORIDE 15 MG: 10 TABLET ORAL at 08:12

## 2018-12-09 RX ADMIN — DANTROLENE SODIUM 50 MG: 50 CAPSULE ORAL at 09:12

## 2018-12-09 RX ADMIN — SENNOSIDES AND DOCUSATE SODIUM 1 TABLET: 8.6; 5 TABLET ORAL at 09:12

## 2018-12-09 RX ADMIN — DANTROLENE SODIUM 50 MG: 50 CAPSULE ORAL at 05:12

## 2018-12-09 RX ADMIN — DIAZEPAM 5 MG: 5 TABLET ORAL at 12:12

## 2018-12-09 RX ADMIN — BACLOFEN 20 MG: 10 TABLET ORAL at 12:12

## 2018-12-09 RX ADMIN — GABAPENTIN 400 MG: 100 CAPSULE ORAL at 12:12

## 2018-12-09 RX ADMIN — POLYETHYLENE GLYCOL 3350 17 G: 17 POWDER, FOR SOLUTION ORAL at 09:12

## 2018-12-09 RX ADMIN — DULOXETINE 30 MG: 30 CAPSULE, DELAYED RELEASE ORAL at 12:12

## 2018-12-09 RX ADMIN — DANTROLENE SODIUM 50 MG: 50 CAPSULE ORAL at 08:12

## 2018-12-09 RX ADMIN — TRAZODONE HYDROCHLORIDE 100 MG: 50 TABLET ORAL at 08:12

## 2018-12-09 RX ADMIN — SULFAMETHOXAZOLE AND TRIMETHOPRIM 1 TABLET: 800; 160 TABLET ORAL at 09:12

## 2018-12-09 RX ADMIN — DIAZEPAM 5 MG: 5 TABLET ORAL at 08:12

## 2018-12-09 RX ADMIN — GABAPENTIN 400 MG: 100 CAPSULE ORAL at 05:12

## 2018-12-09 RX ADMIN — GABAPENTIN 400 MG: 100 CAPSULE ORAL at 08:12

## 2018-12-09 RX ADMIN — GABAPENTIN 400 MG: 100 CAPSULE ORAL at 09:12

## 2018-12-09 RX ADMIN — TAMSULOSIN HYDROCHLORIDE 0.4 MG: 0.4 CAPSULE ORAL at 09:12

## 2018-12-09 RX ADMIN — ACETAMINOPHEN 650 MG: 325 TABLET ORAL at 06:12

## 2018-12-09 RX ADMIN — DANTROLENE SODIUM 50 MG: 50 CAPSULE ORAL at 12:12

## 2018-12-09 RX ADMIN — BUPROPION HYDROCHLORIDE 150 MG: 150 TABLET, EXTENDED RELEASE ORAL at 09:12

## 2018-12-09 RX ADMIN — LACTULOSE 20 G: 20 SOLUTION ORAL at 09:12

## 2018-12-09 RX ADMIN — SULFAMETHOXAZOLE AND TRIMETHOPRIM 1 TABLET: 800; 160 TABLET ORAL at 08:12

## 2018-12-09 RX ADMIN — OXYCODONE HYDROCHLORIDE 15 MG: 10 TABLET ORAL at 06:12

## 2018-12-09 NOTE — PROGRESS NOTES
Called to room by another patient who said patient needs help in bathroom.  Nurse went to room and found patient on bathroom floor lying next to toilet.  Nurse called for help and charge nurse and 3 other nurses assisted patient off floor into wheelchair.  Patient denies pain or injury.  Dr. Mckeon and family notified by charge nurse.

## 2018-12-09 NOTE — PLAN OF CARE
Problem: Occupational Therapy Goal  Goal: Occupational Therapy Goal  Goals to be met by: 12/17/2018     Patient will increase functional independence with ADLs by performing:    Feeding with Modified [place].  UE Dressing with Set-up Assistance.  LE Dressing with Minimal Assistance.  Grooming while seated with Supervision.  Toileting from bedside commode with Stand-by Assistance for hygiene and clothing management.   Bathing from  shower chair/bench with Stand-by Assistance.  Rolling to Right, Left with Supervision.   Supine to sit with Supervision.  Stand pivot transfers with Stand-by Assistance.  Toilet transfer to bedside commode with Stand-by Assistance.  Upper extremity exercise program 3 x 15 reps per handout, with independence.  Patient will perform a functional standing activity for 10 min with S in order to perform household tasks.    Outcome: Ongoing (interventions implemented as appropriate)  Patient's goals are appropriate.   GISEL Flowers  12/9/2018

## 2018-12-09 NOTE — PT/OT/SLP PROGRESS
Occupational Therapy  Treatment    Mao Levin   MRN: [id number]   Admitting Diagnosis: MS (multiple sclerosis)    OT Date of Treatment: 12/09/18  Total Time (min): 45 min    Billable Minutes:  Self Care/Home Management 35 and Therapeutic Exercise 10    General Precautions: Standard, fall  Orthopedic Precautions: N/A  Braces: N/A         Subjective:  Communicated with patient prior to session.    Pain/Comfort  Pain Rating 1: 7/10  Location - Side 1: Bilateral  Location - Orientation 1: lower  Location 1: leg  Pain Addressed 1: Reposition, Distraction  Pain Rating Post-Intervention 1: 7/10    Objective:       Occupational Performance:    Bed Mobility:    · Patient completed Supine to Sit with minimum assistance /c HOB elevated and using handrail    Functional Mobility/Transfers:  · Patient completed Sit <> Stand Transfer with minimum assistance  with  hand-held assist   · Patient completed Bed > Chair Transfer using Stand Pivot technique with minimum assistance with hand-held assist  · Patient completed Toilet Transfer Stand Pivot technique with contact guard assistance with  grab bars     Activities of Daily Living:  · Grooming: minimum assistance oral care and washing face seated in w/c at sink  · Upper Body Dressing: moderate assistance Donning pullover shirt  · Lower Body Dressing: maximal assistance Donning pants.   · Toileting: moderate assistance     AMPA 6 Click:  AMPAC Total Score: 16    OT Exercises: UE Ergometer 10 min for improving endurance to increase independence with ADLs.     Patient left up in chair with call button in reach and all needs met.     ASSESSMENT:  Mao Levin is a 53 y.o. male with a medical diagnosis of MS (multiple sclerosis) and presents with the deficits listed below. Patient tolerated treatment session and was motivated to complete tasks. Patient continues to benefit from skilled OT services to achieve maximal independence.    Rehab identified problem list/impairments: weakness,  impaired endurance, impaired self care skills, impaired functional mobilty, gait instability, impaired balance, impaired sensation, impaired joint extensibility, impaired fine motor, decreased coordination, decreased lower extremity function, decreased upper extremity function, impaired coordination, pain    Rehab potential is good    Activity tolerance: Good    Discharge recommendations: home with home health     Barriers to discharge: Inaccessible home environment, Decreased caregiver support     Equipment recommendations: walker, rolling     GOALS:   Multidisciplinary Problems     Occupational Therapy Goals        Problem: Occupational Therapy Goal    Goal Priority Disciplines Outcome Interventions   Occupational Therapy Goal     OT, PT/OT Ongoing (interventions implemented as appropriate)    Description:  Goals to be met by: 12/17/2018     Patient will increase functional independence with ADLs by performing:    Feeding with Modified [place].  UE Dressing with Set-up Assistance.  LE Dressing with Minimal Assistance.  Grooming while seated with Supervision.  Toileting from bedside commode with Stand-by Assistance for hygiene and clothing management.   Bathing from  shower chair/bench with Stand-by Assistance.  Rolling to Right, Left with Supervision.   Supine to sit with Supervision.  Stand pivot transfers with Stand-by Assistance.  Toilet transfer to bedside commode with Stand-by Assistance.  Upper extremity exercise program 3 x 15 reps per handout, with independence.  Patient will perform a functional standing activity for 10 min with S in order to perform household tasks.                     Plan:  Patient to be seen 5 x/week to address the above listed problems via self-care/home management, therapeutic activities, therapeutic exercises  Plan of Care expires: 01/05/18  Plan of Care reviewed with: patient    GISEL Flowers  12/09/2018

## 2018-12-09 NOTE — PLAN OF CARE
Problem: Patient Care Overview  Goal: Plan of Care Review  Outcome: Ongoing (interventions implemented as appropriate)   12/09/18 2774   Plan of Care Review   Plan of Care Reviewed With patient

## 2018-12-10 PROCEDURE — 97535 SELF CARE MNGMENT TRAINING: CPT | Mod: HCWC

## 2018-12-10 PROCEDURE — 11000004 HC SNF PRIVATE: Mod: HCWC

## 2018-12-10 PROCEDURE — 97530 THERAPEUTIC ACTIVITIES: CPT | Mod: HCWC

## 2018-12-10 PROCEDURE — 97110 THERAPEUTIC EXERCISES: CPT | Mod: HCWC

## 2018-12-10 PROCEDURE — 97112 NEUROMUSCULAR REEDUCATION: CPT | Mod: HCWC

## 2018-12-10 PROCEDURE — 25000003 PHARM REV CODE 250: Mod: HCWC | Performed by: PHYSICIAN ASSISTANT

## 2018-12-10 PROCEDURE — 25000003 PHARM REV CODE 250: Mod: HCWC | Performed by: INTERNAL MEDICINE

## 2018-12-10 PROCEDURE — 97116 GAIT TRAINING THERAPY: CPT | Mod: HCWC

## 2018-12-10 RX ADMIN — GABAPENTIN 400 MG: 100 CAPSULE ORAL at 08:12

## 2018-12-10 RX ADMIN — BUPROPION HYDROCHLORIDE 150 MG: 150 TABLET, EXTENDED RELEASE ORAL at 05:12

## 2018-12-10 RX ADMIN — BACLOFEN 20 MG: 10 TABLET ORAL at 08:12

## 2018-12-10 RX ADMIN — OXYCODONE HYDROCHLORIDE 15 MG: 10 TABLET ORAL at 06:12

## 2018-12-10 RX ADMIN — DANTROLENE SODIUM 50 MG: 50 CAPSULE ORAL at 09:12

## 2018-12-10 RX ADMIN — POLYETHYLENE GLYCOL 3350 17 G: 17 POWDER, FOR SOLUTION ORAL at 09:12

## 2018-12-10 RX ADMIN — RIVAROXABAN 20 MG: 20 TABLET, FILM COATED ORAL at 05:12

## 2018-12-10 RX ADMIN — ACETAMINOPHEN 650 MG: 325 TABLET ORAL at 06:12

## 2018-12-10 RX ADMIN — GABAPENTIN 400 MG: 100 CAPSULE ORAL at 12:12

## 2018-12-10 RX ADMIN — DANTROLENE SODIUM 50 MG: 50 CAPSULE ORAL at 05:12

## 2018-12-10 RX ADMIN — DIAZEPAM 5 MG: 5 TABLET ORAL at 02:12

## 2018-12-10 RX ADMIN — GABAPENTIN 400 MG: 100 CAPSULE ORAL at 09:12

## 2018-12-10 RX ADMIN — BACLOFEN 20 MG: 10 TABLET ORAL at 05:12

## 2018-12-10 RX ADMIN — GABAPENTIN 400 MG: 100 CAPSULE ORAL at 05:12

## 2018-12-10 RX ADMIN — SENNOSIDES AND DOCUSATE SODIUM 1 TABLET: 8.6; 5 TABLET ORAL at 08:12

## 2018-12-10 RX ADMIN — OXYCODONE HYDROCHLORIDE 15 MG: 10 TABLET ORAL at 02:12

## 2018-12-10 RX ADMIN — SENNOSIDES AND DOCUSATE SODIUM 1 TABLET: 8.6; 5 TABLET ORAL at 09:12

## 2018-12-10 RX ADMIN — TAMSULOSIN HYDROCHLORIDE 0.4 MG: 0.4 CAPSULE ORAL at 09:12

## 2018-12-10 RX ADMIN — OXYCODONE HYDROCHLORIDE 15 MG: 10 TABLET ORAL at 08:12

## 2018-12-10 RX ADMIN — BACLOFEN 20 MG: 10 TABLET ORAL at 12:12

## 2018-12-10 RX ADMIN — TRAZODONE HYDROCHLORIDE 100 MG: 50 TABLET ORAL at 08:12

## 2018-12-10 RX ADMIN — DANTROLENE SODIUM 50 MG: 50 CAPSULE ORAL at 12:12

## 2018-12-10 RX ADMIN — BUPROPION HYDROCHLORIDE 150 MG: 150 TABLET, EXTENDED RELEASE ORAL at 09:12

## 2018-12-10 RX ADMIN — DANTROLENE SODIUM 50 MG: 50 CAPSULE ORAL at 08:12

## 2018-12-10 NOTE — PLAN OF CARE
Problem: Occupational Therapy Goal  Goal: Occupational Therapy Goal  Goals to be met by: 12/17/2018     Patient will increase functional independence with ADLs by performing:    Feeding with Modified [place].  UE Dressing with Set-up Assistance.  LE Dressing with Minimal Assistance.  Grooming while seated with Supervision.  Toileting from bedside commode with Stand-by Assistance for hygiene and clothing management.   Bathing from  shower chair/bench with Stand-by Assistance.  Rolling to Right, Left with Supervision.   Supine to sit with Supervision.  Stand pivot transfers with Stand-by Assistance.  Toilet transfer to bedside commode with Stand-by Assistance.  Upper extremity exercise program 3 x 15 reps per handout, with independence.  Patient will perform a functional standing activity for 10 min with S in order to perform household tasks.    Outcome: Ongoing (interventions implemented as appropriate)  MERCY Cason/NILESH      12/10/2018

## 2018-12-10 NOTE — PLAN OF CARE
SW spoke with patient as he was leaving his room in a wheelchair.  SW introduced self to patient and discuss role of .  Patient's communication board updated with anticipated discharge date of 12/17/2018.  SW will continue to assist with discharge planning needs.  Teresa Ramos LMSW, AC[facility]EN[facility], [facility]  12/10/2018

## 2018-12-10 NOTE — PT/OT/SLP PROGRESS
"Physical Therapy  Treatment    Mao Levin   MRN: [id number]   Admitting Diagnosis: MS (multiple sclerosis)    PT Received On: 12/10/18  Total Time (min): 50       Billable Minutes:  Gait Training 16, Therapeutic Activity 14, Therapeutic Exercise 20 and Total Time 50    Treatment Type: Treatment  PT/PTA: PT     PTA Visit Number: 0       General Precautions: Standard, fall  Orthopedic Precautions: N/A   Braces: N/A         Subjective:  Communicated with Pt prior to session.  Pt agreeable to session     Pain/Comfort  Pain Rating 1: 7/10  Location - Side 1: Bilateral  Location - Orientation 1: lower  Location 1: back  Pain Addressed 1: Reposition  Pain Rating Post-Intervention 1: 7/10    Objective:  Patient found seated in w/c with LLE AFO Patient found with: (LLE AFO)     AM-PAC 6 CLICK MOBILITY  Total Score:12    Bed Mobility:  Sit>Supine:on mat (1 trial) Mod/Min A for trunk   Supine>Sit: on mat (1 trial) Min A for LLE  Cueing for sequencing and using BUEs for assistance    Transfers:  Sit<>Stand: to/from w/c in // bars (3 trials) w/ CGA  Stand Pivot Transfer: mat>w/c (1 trial) w/ no AD and Min/Mod A  Scoot pivot transfer: w/c>mat (1 trial) w/ Min/Mod A    Multiple scoots required for transfer  Cueing for sequencing, hand and foot placement    Gait:  Amb 1 trial (length of // bars) w/ Min A for LLE advancement   Verbal/tactile curing for upright posture and hip/knee extension   Cueing for weight shift      Therex:  Standing squats in // bars 2x10 reps w/ CGA and seated rest break between trials    Cueing for form   Pt placed RLE on 6" curb 2x10 in // bars   Seated rest break between trials    Cueing for sequencing   Supine hip/knee flexion/extension    AAROM into flexion and resistance into extension   Supine PROM hamstring stretch for BLE ~2 minutes each      Patient left up in chair with call button in reach.    Assessment:  Mao Levin is a 53 y.o. male with a medical diagnosis of MS (multiple sclerosis). Pt tolerated " session well. Today, Pt was able to perform performed therex w/ RLE  in // bars in order to help progress him towards asc/chandana stairs. Pt is continuing to require Min/Mod A for transfers and CGA for sit to  // bars. Pt is progressing and will continue w/ PT POC.     Rehab identified problem list/impairments: weakness, impaired endurance, impaired sensation, impaired self care skills, impaired functional mobilty, gait instability, impaired balance, decreased coordination, decreased upper extremity function, decreased lower extremity function, pain, abnormal tone, decreased ROM, impaired coordination, impaired fine motor, impaired joint extensibility    Rehab potential is good.    Activity tolerance: Good    Discharge recommendations: home with home health     Barriers to discharge: Inaccessible home environment, Decreased caregiver support    Equipment recommendations: bedside commode, walker, rolling     GOALS:   Multidisciplinary Problems     Physical Therapy Goals        Problem: Physical Therapy Goal    Goal Priority Disciplines Outcome Goal Variances Interventions   Physical Therapy Goal     PT, PT/OT Ongoing (interventions implemented as appropriate)     Description:  Goals to be met by: 18     Patient will increase functional independence with mobility by performin. Supine to sit with Stand-by Assistance  2. Sit to supine with Stand-by Assistance  3. Rolling to Left and Right with Stand-by Assistance.  4. Sit to stand transfer with Stand-by Assistance  5. Bed to chair transfer with Stand-by Assistance using Rolling Walker  6. Gait  x 50 feet with Stand-by Assistance using Rolling Walker.   7. Ascend/descend 6 stair with Right OR Left Handrails Contact Guard Assistance                      PLAN:    Patient to be seen (5-6X/week)  to address the above listed problems via gait training, therapeutic activities, therapeutic exercises  Plan of Care expires: 19  Plan of Care reviewed with:  patient    Georgia Shay, SPT  12/10/2018

## 2018-12-10 NOTE — PLAN OF CARE
Problem: Physical Therapy Goal  Goal: Physical Therapy Goal  Goals to be met by: 18     Patient will increase functional independence with mobility by performin. Supine to sit with Stand-by Assistance  2. Sit to supine with Stand-by Assistance  3. Rolling to Left and Right with Stand-by Assistance.  4. Sit to stand transfer with Stand-by Assistance  5. Bed to chair transfer with Stand-by Assistance using Rolling Walker  6. Gait  x 50 feet with Stand-by Assistance using Rolling Walker.   7. Ascend/descend 6 stair with Right OR Left Handrails Contact Guard Assistance     Outcome: Ongoing (interventions implemented as appropriate)  LTGs remain appropriate. Pt will continue PT POC.  Georgia Shay, [place]  12/10/2018

## 2018-12-10 NOTE — TELEPHONE ENCOUNTER
Contacted medicaid [phone] and spoke to bianka Ceron's medicaid does not include transportation benefit.

## 2018-12-10 NOTE — PLAN OF CARE
Problem: Patient Care Overview  Goal: Plan of Care Review  Outcome: Ongoing (interventions implemented as appropriate)   12/09/18 2127   Plan of Care Review   Plan of Care Reviewed With patient       Comments: Patient monitored every 1 to 2 hours for pain and safety.  Safety maintained.  Patient instructed to call for assistance.Call  Light and persoanl items in reach.

## 2018-12-10 NOTE — PLAN OF CARE
Problem: Patient Care Overview  Goal: Plan of Care Review  Outcome: Ongoing (interventions implemented as appropriate)   12/10/18 6910   Plan of Care Review   Plan of Care Reviewed With patient

## 2018-12-10 NOTE — PLAN OF CARE
Problem: Patient Care Overview  Goal: Plan of Care Review   12/10/18 1640   Plan of Care Review   Plan of Care Reviewed With patient       Comments: Patient monitored every 1 to 2 hours for pain and safety.  Safety maintained.  Patient instructed to call for assistance.Call  Light and persoanl items in reach.

## 2018-12-10 NOTE — PT/OT/SLP PROGRESS
Occupational Therapy  Treatment    Mao Levin   MRN: [id number]   Admitting Diagnosis: MS (multiple sclerosis)    OT Date of Treatment: 12/10/18  Total Time (min): 54 min    Billable Minutes:  Self Care/Home Management 25, Therapeutic Activity 14 and Neuromuscular Re-education 15    General Precautions: Standard, fall  Orthopedic Precautions: N/A  Braces: N/A         Subjective:  Communicated with nurse prior to session.      Pain/Comfort  Pain Rating 1: 7/10  Location - Side 1: Bilateral  Location - Orientation 1: lower  Location 1: back  Pain Addressed 1: Reposition, Distraction, Nurse notified  Pain Rating Post-Intervention 1: 7/10    Objective:  Patient found with: (no lines)    Occupational Performance:    Bed Mobility:    · Pt seated in W/C at time of therapist's arrival.    Functional Mobility/Transfers:  · Patient completed Sit <> Stand Transfer with minimum assistance  with  rolling walker   · Patient completed Bed <> Chair Transfer using Stand Pivot technique with moderate assistance with rolling walker      Activities of Daily Living:    · Upper Body Dressing: minimum assistance and moderate assistance donning pullover shirt.  · Lower Body Dressing: maximal assistance donning pants, socks and shoes. RW to steady when standing and (A) to pull pants up in back.    AMPA 6 Click:  AMPAC Total Score: 16    Performed PROM and A/AROM to (L) UE with gentle stretch provided at end range. Performed RIPs to decrease hypertonicity (L)  UE. . Performed 1 set 10 reps all planes.    Pt worked on functional standing activity consisting of standing at elevated table with (L) UE maintained in wt bearing while Pt reached with (R) UE in all planes , crossing of midline and reaching to varying heights to facilitate (B) wt shifting, stability in standing and normalize tone in (L) UE and LE and to increase (I)ce when performing self care, and functional activities with standing component.  Pt tolerated up to 7 Min. 14 sec in  standing with no A/D and min to CGA to maintain erect posture .      Patient left up in chair with call button in reach and nurse notified    ASSESSMENT:  Mao Levin is a 53 y.o. male with a medical diagnosis of MS (multiple sclerosis) . Pt was agreeable to OT and tolerated Tx without incidence but continues to require (A) to perform functional activities to completion. OT addressed self care tasks, functional mobility, functional transfers, functional standing and endurance to perform ADLS.  Pt is making progress but continues to require OT Intervention to perform functional transfers, functional standing activities, and self care tasks with standing component more independently. OT is recommended to further his functional (I)ce and safety. Goals remain appropriate and continued OT is recommended.        Rehab identified problem list/impairments: weakness, impaired endurance, impaired self care skills, impaired functional mobilty, gait instability, impaired balance, impaired sensation, impaired joint extensibility, impaired fine motor, decreased coordination, decreased lower extremity function, decreased upper extremity function, impaired coordination, pain    Rehab potential is good    Activity tolerance: Good    Discharge recommendations: home with home health     Barriers to discharge: Inaccessible home environment, Decreased caregiver support     Equipment recommendations: walker, rolling     GOALS:   Multidisciplinary Problems     Occupational Therapy Goals        Problem: Occupational Therapy Goal    Goal Priority Disciplines Outcome Interventions   Occupational Therapy Goal     OT, PT/OT Ongoing (interventions implemented as appropriate)    Description:  Goals to be met by: 12/17/2018     Patient will increase functional independence with ADLs by performing:    Feeding with Modified [place].  UE Dressing with Set-up Assistance.  LE Dressing with Minimal Assistance.  Grooming while seated with  Supervision.  Toileting from bedside commode with Stand-by Assistance for hygiene and clothing management.   Bathing from  shower chair/bench with Stand-by Assistance.  Rolling to Right, Left with Supervision.   Supine to sit with Supervision.  Stand pivot transfers with Stand-by Assistance.  Toilet transfer to bedside commode with Stand-by Assistance.  Upper extremity exercise program 3 x 15 reps per handout, with independence.  Patient will perform a functional standing activity for 10 min with S in order to perform household tasks.                     Plan:  Patient to be seen 5 x/week to address the above listed problems via self-care/home management, therapeutic activities, therapeutic exercises  Plan of Care expires: 01/05/18  Plan of Care reviewed with: patient    Pedro Felix, OTR/L  12/10/2018

## 2018-12-11 PROCEDURE — 97803 MED NUTRITION INDIV SUBSEQ: CPT | Mod: HCWC

## 2018-12-11 PROCEDURE — 97110 THERAPEUTIC EXERCISES: CPT | Mod: HCWC

## 2018-12-11 PROCEDURE — 97530 THERAPEUTIC ACTIVITIES: CPT | Mod: HCWC

## 2018-12-11 PROCEDURE — 11000004 HC SNF PRIVATE: Mod: HCWC

## 2018-12-11 PROCEDURE — 25000003 PHARM REV CODE 250: Mod: HCWC | Performed by: INTERNAL MEDICINE

## 2018-12-11 PROCEDURE — 25000003 PHARM REV CODE 250: Mod: HCWC | Performed by: PHYSICIAN ASSISTANT

## 2018-12-11 PROCEDURE — 97535 SELF CARE MNGMENT TRAINING: CPT | Mod: HCWC

## 2018-12-11 PROCEDURE — 97116 GAIT TRAINING THERAPY: CPT | Mod: HCWC

## 2018-12-11 PROCEDURE — 99900058 HC 022 PAID UNDER SNF PPS

## 2018-12-11 RX ADMIN — GABAPENTIN 400 MG: 100 CAPSULE ORAL at 12:12

## 2018-12-11 RX ADMIN — RIVAROXABAN 20 MG: 20 TABLET, FILM COATED ORAL at 05:12

## 2018-12-11 RX ADMIN — BACLOFEN 20 MG: 10 TABLET ORAL at 05:12

## 2018-12-11 RX ADMIN — DANTROLENE SODIUM 50 MG: 50 CAPSULE ORAL at 09:12

## 2018-12-11 RX ADMIN — SENNOSIDES AND DOCUSATE SODIUM 1 TABLET: 8.6; 5 TABLET ORAL at 08:12

## 2018-12-11 RX ADMIN — BACLOFEN 20 MG: 10 TABLET ORAL at 12:12

## 2018-12-11 RX ADMIN — DULOXETINE 30 MG: 30 CAPSULE, DELAYED RELEASE ORAL at 08:12

## 2018-12-11 RX ADMIN — BACLOFEN 20 MG: 10 TABLET ORAL at 09:12

## 2018-12-11 RX ADMIN — BUPROPION HYDROCHLORIDE 150 MG: 150 TABLET, EXTENDED RELEASE ORAL at 05:12

## 2018-12-11 RX ADMIN — SENNOSIDES AND DOCUSATE SODIUM 1 TABLET: 8.6; 5 TABLET ORAL at 09:12

## 2018-12-11 RX ADMIN — GABAPENTIN 400 MG: 100 CAPSULE ORAL at 09:12

## 2018-12-11 RX ADMIN — GABAPENTIN 400 MG: 100 CAPSULE ORAL at 05:12

## 2018-12-11 RX ADMIN — RAMELTEON 8 MG: 8 TABLET, FILM COATED ORAL at 09:12

## 2018-12-11 RX ADMIN — POLYETHYLENE GLYCOL 3350 17 G: 17 POWDER, FOR SOLUTION ORAL at 08:12

## 2018-12-11 RX ADMIN — DANTROLENE SODIUM 50 MG: 50 CAPSULE ORAL at 08:12

## 2018-12-11 RX ADMIN — TAMSULOSIN HYDROCHLORIDE 0.4 MG: 0.4 CAPSULE ORAL at 08:12

## 2018-12-11 RX ADMIN — OXYCODONE HYDROCHLORIDE 15 MG: 10 TABLET ORAL at 07:12

## 2018-12-11 RX ADMIN — GABAPENTIN 400 MG: 100 CAPSULE ORAL at 08:12

## 2018-12-11 RX ADMIN — TRAZODONE HYDROCHLORIDE 100 MG: 50 TABLET ORAL at 09:12

## 2018-12-11 RX ADMIN — OXYCODONE HYDROCHLORIDE 15 MG: 10 TABLET ORAL at 09:12

## 2018-12-11 RX ADMIN — BUPROPION HYDROCHLORIDE 150 MG: 150 TABLET, EXTENDED RELEASE ORAL at 08:12

## 2018-12-11 RX ADMIN — OXYCODONE HYDROCHLORIDE 15 MG: 10 TABLET ORAL at 02:12

## 2018-12-11 RX ADMIN — DANTROLENE SODIUM 50 MG: 50 CAPSULE ORAL at 05:12

## 2018-12-11 RX ADMIN — DIAZEPAM 5 MG: 5 TABLET ORAL at 02:12

## 2018-12-11 RX ADMIN — DANTROLENE SODIUM 50 MG: 50 CAPSULE ORAL at 12:12

## 2018-12-11 RX ADMIN — BACLOFEN 20 MG: 10 TABLET ORAL at 08:12

## 2018-12-11 NOTE — PROGRESS NOTES
"[facility] - Skilled Nursing Care  Adult Nutrition  Progress Note    SUMMARY       Recommendations    Recommendation:   Continue Regular diet. RD to follow.   Goals: PO intake maintained at  > 75% EEN and EPN by next RD f/u   Nutrition Goal Status: progressing towards goal  Communication of RD Recs: other (comment)(POC)    Reason for Assessment    Reason For Assessment: RD follow-up  Diagnosis: other (see comments)(MS w/ bilateral leg weakness; UTI)  Relevant Medical History: MS, anxiety, pulmonary embolism, gait, polyneuropathy, depression, deep vein thrombrosis, anticoagulant longterm use   Interdisciplinary Rounds: did not attend  General Information Comments: Pt reported good appetite, % of meals. Pt aware of always available menu, and food preferences are being honored. No N/V/D reported at this time, some constipation LBM 12/7/18. Recommend prune juice as pt accepts.   Nutrition Discharge Planning: d/c on regular diet     Nutrition Risk Screen    Nutrition Risk Screen: no indicators present    Nutrition/Diet History    Patient Reported Diet/Restrictions/Preferences: general  Typical Food/Fluid Intake: good appetite/intake PTA  Food Allergies: other (see comments)(mushrooms)  Factors Affecting Nutritional Intake: None identified at this time    Anthropometrics    Temp: 96.5 °F (35.8 °C)  Height Method: Stated  Height: 5' 10" (177.8 cm)  Height (inches): 70 in  Weight Method: Standard Scale  Weight: 78.8 kg (173 lb 11.6 oz)  Weight (lb): 173.72 lb  Ideal Body Weight (IBW), Male: 166 lb  % Ideal Body Weight, Male (lb): 107.7 lb  BMI (Calculated): 25.7    Lab/Procedures/Meds    Pertinent Labs Reviewed: reviewed  Pertinent Labs Comments: Ca 8.5  Pertinent Medications Reviewed: reviewed  Pertinent Medications Comments: baclofen; bupropion; dantrolene; gabapentin; polyethylene glycol; rivaroxaban; tamsulosin     Estimated/Assessed Needs    Weight Used For Calorie Calculations: 81.1 kg (178 lb 12.7 oz)  Energy " Calorie Requirements (kcal): 2078 kcal/d  Energy Need Method: [place]-St Jeor(RMR X 1.25 PAL )  Protein Requirements: 65-81 g/d(0.8-1.0 g/kg)  Weight Used For Protein Calculations: 81.1 kg (178 lb 12.7 oz)  Fluid Requirements (mL): 1 ml/kcal or per MD    Estimated Fluid Requirement Method: RDA Method  RDA Method (mL): 2078     Nutrition Prescription Ordered    Current Diet Order: Regular   Nutrition Order Comments: recommend prune juice for constipation     Evaluation of Received Nutrient/Fluid Intake    Energy Calories Required: meeting needs  Protein Required: meeting needs  Fluid Required: meeting needs  Comments: LBM 11/30   Tolerance: tolerating  % Intake of Estimated Energy Needs: 75 - 100 %  % Meal Intake: 75 - 100 %    Nutrition Risk    Level of Risk/Frequency of Follow-up: low     Assessment and Plan  No nutrition dx at this time     Monitor and Evaluation    Food and Nutrient Intake: energy intake, food and beverage intake  Food and Nutrient Adminstration: diet order  Physical Activity and Function: nutrition-related ADLs and IADLs  Anthropometric Measurements: weight, weight change  Biochemical Data, Medical Tests and Procedures: electrolyte and renal panel, gastrointestinal profile, glucose/endocrine profile, inflammatory profile, lipid profile  Nutrition-Focused Physical Findings: overall appearance     Nutrition Follow-Up    RD Follow-up?: Yes

## 2018-12-11 NOTE — PLAN OF CARE
Problem: Patient Care Overview  Goal: Plan of Care Review  Outcome: Ongoing (interventions implemented as appropriate)  Recommendation:   Continue Regular diet. RD to follow.   Goals: PO intake maintained at  > 75% EEN and EPN by next RD f/u   Nutrition Goal Status: progressing towards goal  Communication of RD Recs: other (comment)(POC)

## 2018-12-11 NOTE — PLAN OF CARE
Problem: Pain, Acute (Adult)  Intervention: Develop Pain Management Plan   12/11/18 5562   Prevent or Manage Pain   Pain Management Interventions breathing exercises;pillow support provided;prescribed exercises encouraged;quiet environment facilitated

## 2018-12-11 NOTE — PT/OT/SLP PROGRESS
"Physical Therapy  Treatment    Mao Levin   MRN: [id number]   Admitting Diagnosis: MS (multiple sclerosis)    PT Received On: 12/11/18  Total Time (min): 43       Billable Minutes:  Gait Training 14, Therapeutic Activity 16, Therapeutic Exercise 13 and Total Time 43    Treatment Type: Treatment  PT/PTA: PT     PTA Visit Number: 0       General Precautions: Standard, fall  Orthopedic Precautions: N/A   Braces: N/A         Subjective:  Communicated with Pt prior to session.  Pt agreeable to session     Pain/Comfort  Pain Rating 1: 6/10  Location - Side 1: Bilateral  Location - Orientation 1: lower  Location 1: back  Pain Addressed 1: Reposition  Pain Rating Post-Intervention 1: 6/10    Objective:  Patient found seated in w/c at sink washing hands in hospital room Patient found with: (LLE AFO)     AM-PAC 6 CLICK MOBILITY  Total Score:12    Transfers:  Sit<>Stand: to/from w/c (3 trials) in // bars w/ close SBA  Stand Pivot Transfer: w/c<> BSC w/ grab bars and SBA  Cueing for sequencing and foot placement     Gait:  Amb 2 trials (length of // bars) w/ Min A for LLE advancement   Verbal/tactile cueing for upright posture, hip/knee extension   Cueing for weight shifting       Wheelchair Mobility:  Patient propels w/c 150 ft w/ Mod I     Therex:  Seated BLE 2x10 (HF AA L)  Standing step ups in // bars (x10) on 3.5" curb w/ CGA for stability and Min A for LLE advancement    Leading w/ RLE   Cueing for sequencing   Static Stand ~1 minute w/ grab bars and SBA while SPT helped don/doff pants        Additional Treatment:  SPT assisted Pt w/ toileting requiring Max A overall including: SPT w/c<> BSC w/ grab bars and CGA, seated hygiene Mod I and LBD w/ Max A    Patient left up in chair with call button in reach.    Assessment:  Mao Levin is a 53 y.o. male with a medical diagnosis of MS (multiple sclerosis).  Today, Pt was able to perform BLE therex in // bars  in order to help prepare Pt for stair ambulation. Pt was also able to " perform sit to stands in // bars w/ close SBA. Pt is progressing and will continue w/ PT POC.       Rehab identified problem list/impairments: weakness, impaired endurance, impaired sensation, impaired self care skills, impaired functional mobilty, gait instability, impaired balance, decreased coordination, decreased upper extremity function, decreased lower extremity function, pain, abnormal tone, decreased ROM, impaired coordination, impaired fine motor, impaired joint extensibility    Rehab potential is good.    Activity tolerance: Good    Discharge recommendations: home with home health     Barriers to discharge: Inaccessible home environment, Decreased caregiver support    Equipment recommendations: bedside commode, walker, rolling     GOALS:   Multidisciplinary Problems     Physical Therapy Goals        Problem: Physical Therapy Goal    Goal Priority Disciplines Outcome Goal Variances Interventions   Physical Therapy Goal     PT, PT/OT Ongoing (interventions implemented as appropriate)     Description:  Goals to be met by: 18     Patient will increase functional independence with mobility by performin. Supine to sit with Stand-by Assistance  2. Sit to supine with Stand-by Assistance  3. Rolling to Left and Right with Stand-by Assistance.  4. Sit to stand transfer with Stand-by Assistance  5. Bed to chair transfer with Stand-by Assistance using Rolling Walker  6. Gait  x 50 feet with Stand-by Assistance using Rolling Walker.   7. Ascend/descend 6 stair with Right OR Left Handrails Contact Guard Assistance                      PLAN:    Patient to be seen (5-6X/week)  to address the above listed problems via gait training, therapeutic activities, therapeutic exercises  Plan of Care expires: 19  Plan of Care reviewed with: patient    Georgia Shay, [place]  2018

## 2018-12-11 NOTE — PT/OT/SLP PROGRESS
Occupational Therapy  Treatment    Mao Levin   MRN: [id number]   Admitting Diagnosis: MS (multiple sclerosis)    OT Date of Treatment: 12/11/18  Total Time (min): 45 min    Billable Minutes:  Therapeutic Activity 30 and Therapeutic Exercise 15    General Precautions: Standard, fall  Orthopedic Precautions: N/A  Braces: N/A         Subjective:  Communicated with nurse prior to session.      Pain/Comfort  Pain Rating 1: 6/10  Location - Side 1: Bilateral  Location - Orientation 1: lower  Location 1: back  Pain Addressed 1: Reposition, Distraction  Pain Rating Post-Intervention 1: 6/10    Objective:  Patient found with: (no lines)    Occupational Performance:    Bed Mobility:    · Patient completed Rolling/Turning to Right with stand by assistance  · Patient completed Scooting/Bridging with stand by assistance  · Patient completed Supine to Sit with minimum assistance  · Patient completed Sit to Supine with minimum assistance     Functional Mobility/Transfers:  · Patient completed Sit <> Stand Transfer with minimum assistance  with  rolling walker   · Patient completed Bed <> Chair Transfer using Stand Pivot technique with minimum assistance with rolling walker      Activities of Daily Living:  · Upper Body Dressing: stand by assistance donning pullover shirt  · Lower Body Dressing: moderate assistance with assist to don socks and to steady when standing.    AMPA 6 Click:  AMPA Total Score: 16    OT Exercises: UE Ergometer performed 15 minutes on UE UBE with Min resistance.    Additional Treatment:  Pt worked on functional standing activity consisting of standing with RW while reaching in all planes , crossing of midline and reaching to varying heights to facilitate (B) wt shifting and stability in standing to increase (I)ce when performing self care, and functional activities with standing component.  Pt tolerated up to 7 Min. 13 sec in standing with Min Assist and RW  to steady.    Patient left up in chair with call  button in reach and nurse notified    ASSESSMENT:  Mao Levin is a 53 y.o. male with a medical diagnosis of MS (multiple sclerosis) . Pt was agreeable to OT and tolerated Tx without incidence but continues to require (A) to perform functional activities to completion. OT addressed self care tasks, functional mobility, functional transfers, functional standing and endurance to perform ADLS.  Pt is making progress but continues to require OT Intervention to perform functional transfers, functional standing activities, and self care tasks with standing component more independently. OT is recommended to further his functional (I)ce and safety. Goals remain appropriate and continued OT is recommended.        Rehab identified problem list/impairments: weakness, impaired endurance, impaired self care skills, impaired functional mobilty, gait instability, impaired balance, impaired sensation, impaired joint extensibility, impaired fine motor, decreased coordination, decreased lower extremity function, decreased upper extremity function, impaired coordination, pain    Rehab potential is good    Activity tolerance: Good    Discharge recommendations: home with home health     Barriers to discharge: Inaccessible home environment, Decreased caregiver support     Equipment recommendations: walker, rolling     GOALS:   Multidisciplinary Problems     Occupational Therapy Goals        Problem: Occupational Therapy Goal    Goal Priority Disciplines Outcome Interventions   Occupational Therapy Goal     OT, PT/OT Ongoing (interventions implemented as appropriate)    Description:  Goals to be met by: 12/17/2018     Patient will increase functional independence with ADLs by performing:    Feeding with Modified [place].  UE Dressing with Set-up Assistance.  LE Dressing with Minimal Assistance.  Grooming while seated with Supervision.  Toileting from bedside commode with Stand-by Assistance for hygiene and clothing management.    Bathing from  shower chair/bench with Stand-by Assistance.  Rolling to Right, Left with Supervision.   Supine to sit with Supervision.  Stand pivot transfers with Stand-by Assistance.  Toilet transfer to bedside commode with Stand-by Assistance.  Upper extremity exercise program 3 x 15 reps per handout, with independence.  Patient will perform a functional standing activity for 10 min with S in order to perform household tasks.                     Plan:  Patient to be seen 5 x/week to address the above listed problems via self-care/home management, therapeutic activities, therapeutic exercises  Plan of Care expires: 01/05/18  Plan of Care reviewed with: patient    Pedro Felix, OTR/L  12/11/2018

## 2018-12-11 NOTE — PLAN OF CARE
Problem: Fall Risk (Adult)  Goal: Absence of Falls  Patient will demonstrate the desired outcomes by discharge/transition of care.  Outcome: Ongoing (interventions implemented as appropriate)  Rested quietly throughout night. No fall or injury noted this shift. Safety measure maintained

## 2018-12-11 NOTE — PLAN OF CARE
Problem: Physical Therapy Goal  Goal: Physical Therapy Goal  Goals to be met by: 18     Patient will increase functional independence with mobility by performin. Supine to sit with Stand-by Assistance  2. Sit to supine with Stand-by Assistance  3. Rolling to Left and Right with Stand-by Assistance.  4. Sit to stand transfer with Stand-by Assistance  5. Bed to chair transfer with Stand-by Assistance using Rolling Walker  6. Gait  x 50 feet with Stand-by Assistance using Rolling Walker.   7. Ascend/descend 6 stair with Right OR Left Handrails Contact Guard Assistance     Outcome: Ongoing (interventions implemented as appropriate)  LTGs remain appropriate. Pt will continue PT POC.  Georgia Shay  2018

## 2018-12-11 NOTE — PT/OT/SLP PROGRESS
Physical Therapy  PM Treatment    Mao Levin   MRN: [id number]   Admitting Diagnosis: MS (multiple sclerosis)    PT Received On: 12/11/18  Total Time (min): 40       Billable Minutes:  Therapeutic Activity 15, Therapeutic Exercise 25 and Total Time 40    Treatment Type: Treatment  PT/PTA: PT     PTA Visit Number: 0       General Precautions: Standard, fall  Orthopedic Precautions: N/A   Braces: N/A    Subjective:  Communicated with patient prior to session.  Pt agreeable to PM session.     Pain/Comfort  Pain Rating 1: 6/10  Location - Side 1: Bilateral  Location - Orientation 1: lower  Location 1: back  Pain Addressed 1: Reposition  Pain Rating Post-Intervention 1: 6/10    Objective:  Patient found sitting in w/c. Patient found with: (LLE AFO)     AM-PAC 6 CLICK MOBILITY  Total Score:13    Bed Mobility:  Sit>Supine:on mat w/ SBA, inc'd time and cueing required, pt uses BUE to lift BLE on to mat  Supine>Sit: on mat w/ Miir for trunk, inc'd time required    Transfers:  Sit<>stand: not performed  Scoot pivot w/c<>EOM w/o AD w/ Miri  Cueing required for scooting technique  Multiple scoots required    Gait:  See AM note    Therex:  Supine therex 2x10 reps (GS, SAQ, ABd/ADd) w/ AAROM as needed  Seated therex 2x10 reps (HF)  Supine hamstring stretch contract-relax technique    Balance:  Static/dynamic sit EOM w/ SPV for safety    Additional Treatment:  Lateral scooting in seated on EOM ~5ft L/R w/ SBA/SPV    Patient left up in chair with call button in reach.    Assessment:  Mao Levin is a 53 y.o. male with a medical diagnosis of MS (multiple sclerosis).  Pt steff session well w/ good participation. He remains at a Miri level for transfers. He was however able to improve bed mobility today on mat w/ inc'd time and cueing. Pt is progressing well but remains limited by inc'd tone and decreased overall strength. He will continue to benefit from skilled PT.    Rehab identified problem list/impairments: weakness, impaired  endurance, impaired sensation, impaired self care skills, impaired functional mobilty, gait instability, impaired balance, decreased coordination, decreased upper extremity function, decreased lower extremity function, pain, abnormal tone, decreased ROM, impaired coordination, impaired fine motor, impaired joint extensibility    Rehab potential is good.    Activity tolerance: Good    Discharge recommendations: home with home health     Barriers to discharge: Inaccessible home environment, Decreased caregiver support    Equipment recommendations: bedside commode, walker, rolling     GOALS:   Multidisciplinary Problems     Physical Therapy Goals        Problem: Physical Therapy Goal    Goal Priority Disciplines Outcome Goal Variances Interventions   Physical Therapy Goal     PT, PT/OT Ongoing (interventions implemented as appropriate)     Description:  Goals to be met by: 18     Patient will increase functional independence with mobility by performin. Supine to sit with Stand-by Assistance  2. Sit to supine with Stand-by Assistance  3. Rolling to Left and Right with Stand-by Assistance.  4. Sit to stand transfer with Stand-by Assistance  5. Bed to chair transfer with Stand-by Assistance using Rolling Walker  6. Gait  x 50 feet with Stand-by Assistance using Rolling Walker.   7. Ascend/descend 6 stair with Right OR Left Handrails Contact Guard Assistance                      PLAN:    Patient to be seen (5-6X/week)  to address the above listed problems via gait training, therapeutic activities, therapeutic exercises  Plan of Care expires: 19  Plan of Care reviewed with: patient    Angelica Ribera, PT  2018

## 2018-12-12 LAB
ANION GAP SERPL CALC-SCNC: 6 MMOL/L
BASOPHILS # BLD AUTO: 0.04 K/UL
BASOPHILS NFR BLD: 0.7 %
BUN SERPL-MCNC: 14 MG/DL
CALCIUM SERPL-MCNC: 8.6 MG/DL
CHLORIDE SERPL-SCNC: 108 MMOL/L
CO2 SERPL-SCNC: 26 MMOL/L
CREAT SERPL-MCNC: 0.8 MG/DL
DIFFERENTIAL METHOD: ABNORMAL
EOSINOPHIL # BLD AUTO: 0.2 K/UL
EOSINOPHIL NFR BLD: 3.9 %
ERYTHROCYTE [DISTWIDTH] IN BLOOD BY AUTOMATED COUNT: 14.2 %
EST. GFR  (AFRICAN AMERICAN): >60 ML/MIN/1.73 M^2
EST. GFR  (NON AFRICAN AMERICAN): >60 ML/MIN/1.73 M^2
GLUCOSE SERPL-MCNC: 87 MG/DL
HCT VFR BLD AUTO: 34.7 %
HGB BLD-MCNC: 11.6 G/DL
IMM GRANULOCYTES # BLD AUTO: 0.01 K/UL
IMM GRANULOCYTES NFR BLD AUTO: 0.2 %
LYMPHOCYTES # BLD AUTO: 2.1 K/UL
LYMPHOCYTES NFR BLD: 35.4 %
MAGNESIUM SERPL-MCNC: 2.1 MG/DL
MCH RBC QN AUTO: 30.1 PG
MCHC RBC AUTO-ENTMCNC: 33.4 G/DL
MCV RBC AUTO: 90 FL
MONOCYTES # BLD AUTO: 0.5 K/UL
MONOCYTES NFR BLD: 7.9 %
NEUTROPHILS # BLD AUTO: 3.1 K/UL
NEUTROPHILS NFR BLD: 51.9 %
NRBC BLD-RTO: 0 /100 WBC
PHOSPHATE SERPL-MCNC: 4.1 MG/DL
PLATELET # BLD AUTO: 213 K/UL
PMV BLD AUTO: 10.9 FL
POTASSIUM SERPL-SCNC: 3.8 MMOL/L
RBC # BLD AUTO: 3.85 M/UL
SODIUM SERPL-SCNC: 140 MMOL/L
WBC # BLD AUTO: 5.96 K/UL

## 2018-12-12 PROCEDURE — 80048 BASIC METABOLIC PNL TOTAL CA: CPT

## 2018-12-12 PROCEDURE — 11000004 HC SNF PRIVATE

## 2018-12-12 PROCEDURE — 25000003 PHARM REV CODE 250: Performed by: INTERNAL MEDICINE

## 2018-12-12 PROCEDURE — 85025 COMPLETE CBC W/AUTO DIFF WBC: CPT

## 2018-12-12 PROCEDURE — 97530 THERAPEUTIC ACTIVITIES: CPT

## 2018-12-12 PROCEDURE — 84100 ASSAY OF PHOSPHORUS: CPT

## 2018-12-12 PROCEDURE — 25000003 PHARM REV CODE 250: Mod: HCWC | Performed by: PHYSICIAN ASSISTANT

## 2018-12-12 PROCEDURE — 99309 SBSQ NF CARE MODERATE MDM 30: CPT | Mod: HCWC,,, | Performed by: NURSE PRACTITIONER

## 2018-12-12 PROCEDURE — 99309 PR NURSING FAC CARE, SUBSEQ, SIGNIF COMPLIC: ICD-10-PCS | Mod: HCWC,,, | Performed by: NURSE PRACTITIONER

## 2018-12-12 PROCEDURE — 97116 GAIT TRAINING THERAPY: CPT

## 2018-12-12 PROCEDURE — 83735 ASSAY OF MAGNESIUM: CPT

## 2018-12-12 PROCEDURE — 97535 SELF CARE MNGMENT TRAINING: CPT

## 2018-12-12 PROCEDURE — 97110 THERAPEUTIC EXERCISES: CPT

## 2018-12-12 PROCEDURE — 36415 COLL VENOUS BLD VENIPUNCTURE: CPT

## 2018-12-12 RX ADMIN — SENNOSIDES AND DOCUSATE SODIUM 1 TABLET: 8.6; 5 TABLET ORAL at 09:12

## 2018-12-12 RX ADMIN — OXYCODONE HYDROCHLORIDE 15 MG: 10 TABLET ORAL at 09:12

## 2018-12-12 RX ADMIN — GABAPENTIN 400 MG: 100 CAPSULE ORAL at 05:12

## 2018-12-12 RX ADMIN — BUPROPION HYDROCHLORIDE 150 MG: 150 TABLET, EXTENDED RELEASE ORAL at 05:12

## 2018-12-12 RX ADMIN — BUPROPION HYDROCHLORIDE 150 MG: 150 TABLET, EXTENDED RELEASE ORAL at 08:12

## 2018-12-12 RX ADMIN — BACLOFEN 20 MG: 10 TABLET ORAL at 09:12

## 2018-12-12 RX ADMIN — BACLOFEN 20 MG: 10 TABLET ORAL at 05:12

## 2018-12-12 RX ADMIN — DANTROLENE SODIUM 50 MG: 50 CAPSULE ORAL at 09:12

## 2018-12-12 RX ADMIN — RIVAROXABAN 20 MG: 20 TABLET, FILM COATED ORAL at 05:12

## 2018-12-12 RX ADMIN — GABAPENTIN 400 MG: 100 CAPSULE ORAL at 10:12

## 2018-12-12 RX ADMIN — GABAPENTIN 400 MG: 100 CAPSULE ORAL at 09:12

## 2018-12-12 RX ADMIN — SENNOSIDES AND DOCUSATE SODIUM 1 TABLET: 8.6; 5 TABLET ORAL at 10:12

## 2018-12-12 RX ADMIN — DANTROLENE SODIUM 50 MG: 50 CAPSULE ORAL at 10:12

## 2018-12-12 RX ADMIN — OXYCODONE HYDROCHLORIDE 15 MG: 10 TABLET ORAL at 06:12

## 2018-12-12 RX ADMIN — DANTROLENE SODIUM 50 MG: 50 CAPSULE ORAL at 05:12

## 2018-12-12 RX ADMIN — BACLOFEN 20 MG: 10 TABLET ORAL at 12:12

## 2018-12-12 RX ADMIN — DANTROLENE SODIUM 50 MG: 50 CAPSULE ORAL at 12:12

## 2018-12-12 RX ADMIN — DIAZEPAM 5 MG: 5 TABLET ORAL at 06:12

## 2018-12-12 RX ADMIN — DULOXETINE 30 MG: 30 CAPSULE, DELAYED RELEASE ORAL at 10:12

## 2018-12-12 RX ADMIN — DIAZEPAM 5 MG: 5 TABLET ORAL at 09:12

## 2018-12-12 RX ADMIN — TAMSULOSIN HYDROCHLORIDE 0.4 MG: 0.4 CAPSULE ORAL at 10:12

## 2018-12-12 RX ADMIN — TRAZODONE HYDROCHLORIDE 100 MG: 50 TABLET ORAL at 09:12

## 2018-12-12 RX ADMIN — BACLOFEN 20 MG: 10 TABLET ORAL at 10:12

## 2018-12-12 RX ADMIN — OXYCODONE HYDROCHLORIDE 15 MG: 10 TABLET ORAL at 12:12

## 2018-12-12 RX ADMIN — GABAPENTIN 400 MG: 100 CAPSULE ORAL at 12:12

## 2018-12-12 NOTE — PT/OT/SLP PROGRESS
Occupational Therapy  Treatment    Mao Levin   MRN: [id number]   Admitting Diagnosis: MS (multiple sclerosis)    OT Date of Treatment: 12/12/18  Total Time (min): 55 min    Billable Minutes:  Self Care/Home Management 45, and Therapeutic Activity 10    General Precautions: Standard, fall  Orthopedic Precautions: N/A  Braces: N/A         Subjective:  Communicated with nurse prior to session.      Pain/Comfort  Pain Rating 1: 3/10  Location - Side 1: Bilateral  Location - Orientation 1: lower  Location 1: back  Pain Addressed 1: Reposition, Distraction  Pain Rating Post-Intervention 1: 3/10    Objective:  Patient found with: (no lines)    Occupational Performance:    Bed Mobility:    · Patient completed Rolling/Turning to Left with  supervision  · Patient completed Rolling/Turning to Right with supervision  · Patient completed Scooting/Bridging with supervision  · Patient completed Supine to Sit with stand by assistance     Functional Mobility/Transfers:  · Patient completed Sit <> Stand Transfer with contact guard assistance  with  rolling walker   · Patient completed Bed <> Chair Transfer using Stand Pivot technique with contact guard assistance with rolling walker  · Patient completed  Shower Transfer Stand Pivot technique with contact guard assistance with rolling walker      Activities of Daily Living:  · Feeding:  modified independence no assist  · Grooming: modified independence seated.  · Bathing: contact guard assistance with bathing performed from shower seat.  · Upper Body Dressing: supervision after set up.  · Lower Body Dressing: minimum assistance with (A) to don socks.  · Toileting: contact guard assistance from [facility] with RW to stand.    [facility] 6 Click:  [facility] Total Score: 19    Additional Treatment:  Pt edu on Plan of care,  safety when performing functional transfers, self care tasks and functional standing activities.  - White board updated  - Self care tasks completed-- as noted above   - He performed  dynamic sitting activity incorporating reaching in all planes while sitting unsupported EOB and working on self care tasks.    Patient left up in chair with call button in reach and nurse notified    ASSESSMENT:  Mao Levin is a 53 y.o. male with a medical diagnosis of MS (multiple sclerosis) . Pt was agreeable to OT and tolerated Tx without incidence but continues to require (A) to perform functional activities to completion. OT addressed self care tasks, functional mobility, functional transfers, functional standing and endurance to perform ADLS.  Pt is making progress but continues to require OT Intervention to perform functional transfers, functional standing activities, and self care tasks with standing component more independently. OT is recommended to further his functional (I)ce and safety. Goals remain appropriate and continued OT is recommended.        Rehab identified problem list/impairments: weakness, impaired endurance, impaired self care skills, impaired functional mobilty, gait instability, impaired balance, impaired sensation, impaired joint extensibility, impaired fine motor, decreased coordination, decreased lower extremity function, decreased upper extremity function, impaired coordination, pain    Rehab potential is good    Activity tolerance: Good    Discharge recommendations: home with home health     Barriers to discharge: Inaccessible home environment, Decreased caregiver support     Equipment recommendations: walker, rolling     GOALS:   Multidisciplinary Problems     Occupational Therapy Goals        Problem: Occupational Therapy Goal    Goal Priority Disciplines Outcome Interventions   Occupational Therapy Goal     OT, PT/OT Ongoing (interventions implemented as appropriate)    Description:  Goals to be met by: 12/17/2018     Patient will increase functional independence with ADLs by performing:    Feeding with Modified [place].    Met  UE Dressing with Set-up Assistance.   Met  LE  Dressing with Minimal Assistance.  Grooming while seated with Supervision.    Met  Toileting from bedside commode with Stand-by Assistance for hygiene and clothing management.   Bathing from  shower chair/bench with Stand-by Assistance.  Rolling to Right, Left with Supervision.    Met  Supine to sit with Supervision.  Met  Stand pivot transfers with Stand-by Assistance.  Toilet transfer to bedside commode with Stand-by Assistance.  Upper extremity exercise program 3 x 15 reps per handout, with independence.  Patient will perform a functional standing activity for 10 min with S in order to perform household tasks.                      Plan:  Patient to be seen 5 x/week to address the above listed problems via self-care/home management, therapeutic activities, therapeutic exercises  Plan of Care expires: 01/05/18  Plan of Care reviewed with: patient    Pedro Felix OTR/NILESH  12/12/2018

## 2018-12-12 NOTE — TELEPHONE ENCOUNTER
Contacted pt and spoke to him regarding transportation benefits. Requested a letter with all information for when he gets home from [facility].

## 2018-12-12 NOTE — PT/OT/SLP PROGRESS
Physical Therapy  BID Treatment/ AM and PM sessions    Mao Levin   MRN: [id number]   Admitting Diagnosis: MS (multiple sclerosis)    PT Received On: 12/12/18  Total Time (min): 79       Billable Minutes:  AM: Gait Training 10, Therapeutic Activity 12, Therapeutic Exercise 16 and Total Time 38  PM: Gait Training 15, Therapeutic Activity 13, Therapeutic Exercise 13 and Total Time 41  AM + PM= 79 minutes total    Treatment Type: Treatment  PT/PTA: PT     PTA Visit Number: 0       General Precautions: Standard, fall  Orthopedic Precautions: N/A   Braces: N/A         Subjective:  Communicated with Pt prior to session.  Pt agreeable to session     Pain/Comfort  Pain Rating 1: 7/10  Location - Side 1: Bilateral  Location - Orientation 1: lower  Location 1: back  Pain Addressed 1: Reposition  Pain Rating Post-Intervention 1: 7/10    Objective:  Patient found seated in w/c with LLE AFO Patient found with: (AFO on LLE)     AM-PAC 6 CLICK MOBILITY  Total Score:14     Transfers:  Sit<>Stand: to/from w/c (multiple trials) in // bars w/ SBA  Cueing for hand placement on w/c prior to standing and returning to a seated position     Gait:  Amb 2 trials (length of // bars) w/ CGA for LLE advancement   Verbal/ tactile cueing for upright posture, hip/knee extension   Cueing for weight shift    Wheelchair Mobility:  Patient propels w/c 300 ft w/ BUE and Mod I     Therex:  Standing in // bars 2x10 reps (HF w/ AA L) w/ close SBA for safety   W/ seated rest breaks between trials  Squats in // bars 2x10 reps w/ SBA for safety       Additional Treatment:  PM session   Pt found sitting in w/c w/ LLE AFO. Pt reported 0/10 pain.   Pt performed:     Bed mobility:  Sit> supine: on mat (1 trial) w/ Mod A for BLE and trunk   Supine> sit: on mat (1 trial) w/ SBA  Cueing for sequencing and using BUEs to assist     Transfers:  Scoot pivot transfer: w/c> mat (1 trial) w/ no AD and Min A  Cueing for sequencing, hand and foot placement   Multiple scoots  required for transfer    Sit to stand: from EOM (1 trial), to/from w/c (1 trial) w/ RW and Min A  Cueing hand placement     Therex:  Supine BLE 2x10 reps (bridges, SAQs)  Supine PROM hamstring stretch ~ 2 minutes each   Supine hip/knee flexion/extensiopn   AAROM into flexion and resistance into extension     Gait:   Amb 2 trials (20 ft, 38ft) w/ RW and Min A for stability  In order to advance LLE, Pt compensates by RLE heel raise and L hip hike  Verbal/tactile cueing for hip/knee extension and RW management  Cueing for staying inside RW         Patient left up in chair with call button in reach.    Assessment:  Mao Levin is a 53 y.o. male with a medical diagnosis of MS (multiple sclerosis). Patient tolerated session well. Today, Pt was able to ambulate w/ RW and Min A for stability. However, Pt does require tactile/verbal cueing for improved gait mechanics and safety. Pt continues to require mod A for bed mobility and Min A for scoot pivot transfer. Pt is progressing and will continue w/ PT POC.     Rehab identified problem list/impairments: weakness, impaired endurance, impaired sensation, impaired self care skills, impaired functional mobilty, gait instability, impaired balance, decreased coordination, decreased upper extremity function, decreased lower extremity function, pain, abnormal tone, decreased ROM, impaired coordination, impaired fine motor, impaired joint extensibility    Rehab potential is good.    Activity tolerance: Good    Discharge recommendations: home with home health     Barriers to discharge: Inaccessible home environment, Decreased caregiver support    Equipment recommendations: bedside commode, walker, rolling     GOALS:   Multidisciplinary Problems     Physical Therapy Goals        Problem: Physical Therapy Goal    Goal Priority Disciplines Outcome Goal Variances Interventions   Physical Therapy Goal     PT, PT/OT Ongoing (interventions implemented as appropriate)     Description:  Goals to  be met by: 18     Patient will increase functional independence with mobility by performin. Supine to sit with Stand-by Assistance  2. Sit to supine with Stand-by Assistance  3. Rolling to Left and Right with Stand-by Assistance.  4. Sit to stand transfer with Stand-by Assistance  5. Bed to chair transfer with Stand-by Assistance using Rolling Walker  6. Gait  x 50 feet with Stand-by Assistance using Rolling Walker.   7. Ascend/descend 6 stair with Right OR Left Handrails Contact Guard Assistance                      PLAN:    Patient to be seen (5-6X/week)  to address the above listed problems via gait training, therapeutic activities, therapeutic exercises  Plan of Care expires: 19  Plan of Care reviewed with: patient    Georgia Shay, [place]  2018

## 2018-12-12 NOTE — PLAN OF CARE
Problem: Occupational Therapy Goal  Goal: Occupational Therapy Goal  Goals to be met by: 12/17/2018     Patient will increase functional independence with ADLs by performing:    Feeding with Modified [place].    Met  UE Dressing with Set-up Assistance.   Met  LE Dressing with Minimal Assistance.  Grooming while seated with Supervision.    Met  Toileting from bedside commode with Stand-by Assistance for hygiene and clothing management.   Bathing from  shower chair/bench with Stand-by Assistance.  Rolling to Right, Left with Supervision.    Met  Supine to sit with Supervision.  Met  Stand pivot transfers with Stand-by Assistance.  Toilet transfer to bedside commode with Stand-by Assistance.  Upper extremity exercise program 3 x 15 reps per handout, with independence.  Patient will perform a functional standing activity for 10 min with S in order to perform household tasks.    Outcome: Ongoing (interventions implemented as appropriate)  MERCY Cason/NILESH      12/12/2018

## 2018-12-13 ENCOUNTER — TELEPHONE (OUTPATIENT)
Dept: NEUROLOGY | Facility: CLINIC | Age: 53
End: 2018-12-13

## 2018-12-13 ENCOUNTER — TELEPHONE (OUTPATIENT)
Dept: INTERNAL MEDICINE | Facility: CLINIC | Age: 53
End: 2018-12-13

## 2018-12-13 PROCEDURE — 97116 GAIT TRAINING THERAPY: CPT | Mod: HCWC

## 2018-12-13 PROCEDURE — 25000003 PHARM REV CODE 250: Performed by: INTERNAL MEDICINE

## 2018-12-13 PROCEDURE — 97110 THERAPEUTIC EXERCISES: CPT | Mod: HCWC

## 2018-12-13 PROCEDURE — 25000003 PHARM REV CODE 250: Performed by: PHYSICIAN ASSISTANT

## 2018-12-13 PROCEDURE — 97530 THERAPEUTIC ACTIVITIES: CPT | Mod: HCWC

## 2018-12-13 PROCEDURE — 11000004 HC SNF PRIVATE: Mod: HCWC

## 2018-12-13 RX ADMIN — DANTROLENE SODIUM 50 MG: 50 CAPSULE ORAL at 05:12

## 2018-12-13 RX ADMIN — OXYCODONE HYDROCHLORIDE 10 MG: 10 TABLET ORAL at 06:12

## 2018-12-13 RX ADMIN — BACLOFEN 20 MG: 10 TABLET ORAL at 05:12

## 2018-12-13 RX ADMIN — BUPROPION HYDROCHLORIDE 150 MG: 150 TABLET, EXTENDED RELEASE ORAL at 05:12

## 2018-12-13 RX ADMIN — DULOXETINE 30 MG: 30 CAPSULE, DELAYED RELEASE ORAL at 09:12

## 2018-12-13 RX ADMIN — BUPROPION HYDROCHLORIDE 150 MG: 150 TABLET, EXTENDED RELEASE ORAL at 09:12

## 2018-12-13 RX ADMIN — GABAPENTIN 400 MG: 100 CAPSULE ORAL at 05:12

## 2018-12-13 RX ADMIN — DIAZEPAM 5 MG: 5 TABLET ORAL at 09:12

## 2018-12-13 RX ADMIN — OXYCODONE HYDROCHLORIDE 15 MG: 10 TABLET ORAL at 09:12

## 2018-12-13 RX ADMIN — TAMSULOSIN HYDROCHLORIDE 0.4 MG: 0.4 CAPSULE ORAL at 09:12

## 2018-12-13 RX ADMIN — OXYCODONE HYDROCHLORIDE 15 MG: 10 TABLET ORAL at 06:12

## 2018-12-13 RX ADMIN — DANTROLENE SODIUM 50 MG: 50 CAPSULE ORAL at 12:12

## 2018-12-13 RX ADMIN — BACLOFEN 20 MG: 10 TABLET ORAL at 09:12

## 2018-12-13 RX ADMIN — DANTROLENE SODIUM 50 MG: 50 CAPSULE ORAL at 09:12

## 2018-12-13 RX ADMIN — GABAPENTIN 400 MG: 100 CAPSULE ORAL at 09:12

## 2018-12-13 RX ADMIN — BACLOFEN 20 MG: 10 TABLET ORAL at 12:12

## 2018-12-13 RX ADMIN — GABAPENTIN 400 MG: 100 CAPSULE ORAL at 12:12

## 2018-12-13 RX ADMIN — DIAZEPAM 5 MG: 5 TABLET ORAL at 06:12

## 2018-12-13 RX ADMIN — SENNOSIDES AND DOCUSATE SODIUM 1 TABLET: 8.6; 5 TABLET ORAL at 09:12

## 2018-12-13 RX ADMIN — OXYCODONE HYDROCHLORIDE 15 MG: 10 TABLET ORAL at 12:12

## 2018-12-13 RX ADMIN — TRAZODONE HYDROCHLORIDE 100 MG: 50 TABLET ORAL at 09:12

## 2018-12-13 RX ADMIN — RIVAROXABAN 20 MG: 20 TABLET, FILM COATED ORAL at 05:12

## 2018-12-13 NOTE — PLAN OF CARE
Problem: Occupational Therapy Goal  Goal: Occupational Therapy Goal  Goals to be met by: 12/17/2018     Patient will increase functional independence with ADLs by performing:    Feeding with Modified [place].    Met  UE Dressing with Set-up Assistance.   Met  LE Dressing with Minimal Assistance.  Grooming while seated with Supervision.    Met  Toileting from bedside commode with Stand-by Assistance for hygiene and clothing management.   Bathing from  shower chair/bench with Stand-by Assistance.  Rolling to Right, Left with Supervision.    Met  Supine to sit with Supervision.  Met  Stand pivot transfers with Stand-by Assistance.  Toilet transfer to bedside commode with Stand-by Assistance.  Upper extremity exercise program 3 x 15 reps per handout, with independence.  Patient will perform a functional standing activity for 10 min with S in order to perform household tasks.     Outcome: Ongoing (interventions implemented as appropriate)  Patient's goals are appropriate.   GISEL Flowers  12/13/2018

## 2018-12-13 NOTE — TELEPHONE ENCOUNTER
----- Message from William Marcelo sent at 12/13/2018 11:32 AM CST -----  Needs Advice    Reason for call: Yancy is calling to schedule skilled nursing f/u         Communication Preference: Yancy koenig/ [facility] @ [phone]     Additional Information:

## 2018-12-13 NOTE — PT/OT/SLP PROGRESS
"Physical Therapy  Treatment    Mao Levin   MRN: [id number]   Admitting Diagnosis: MS (multiple sclerosis)    PT Received On: 12/13/18  Total Time (min): 45       Billable Minutes:  Gait Training 24 and Therapeutic Exercise 21    Treatment Type: Treatment  PT/PTA: PTA     PTA Visit Number: 1       General Precautions: Standard, fall  Orthopedic Precautions: N/A   Braces: N/A         Subjective:  "fine"      Pain/Comfort  Pain Rating 1: 8/10  Location - Side 1: Bilateral  Location - Orientation 1: generalized  Location 1: knee  Pain Addressed 1: Pre-medicate for activity, Reposition, Distraction, Cessation of Activity  Pain Rating Post-Intervention 1: (no further mention)  Pain Rating 2: 8/10  Location - Orientation 2: lower  Location 2: back  Pain Addressed 2: Pre-medicate for activity, Reposition, Distraction, Cessation of Activity  Pain Rating Post-Intervention 2: (no further mention)    Objective:  Patient found in wc with LLE AFO     AM-PAC 6 CLICK MOBILITY  Total Score:15    Transfers:  Sit<>Stand: in II bars SBA with RW min A    Gait:  Amb in II bars CGA vc/tcs for erect posture, proper gait mechanics  Amb with RW min A ~ 38 ft x 2 trials vcs for erect posture, proper gait mechanics  Pt appears to compensate with hip hiking, trunk extension, using adductors to adv LLE, little to no hip nor knee flex during swing phase    Therex:  2x10 reps A/AA as needed R AP, GS,LAQ,hip flex    Additional Treatment:  Attempted mini elliptical a few rotations with A    Patient left up in chair with call button in reach and belongings in reach.    Assessment:  Mao Levin is a 53 y.o. male with a medical diagnosis of MS (multiple sclerosis).  Pt tolerated well, pt demo good effort, appeared receptive to education/instructions, pt would continue to benefit from skilled PT services to improve overall functional mobility, strength and endurance.  .    Rehab identified problem list/impairments: weakness, impaired endurance, impaired " sensation, impaired self care skills, impaired functional mobilty, gait instability, impaired balance, decreased coordination, decreased upper extremity function, decreased lower extremity function, pain, abnormal tone, decreased ROM, impaired coordination, impaired fine motor, impaired joint extensibility    Rehab potential is good.    Activity tolerance: Fair    Discharge recommendations: home with home health     Barriers to discharge: Inaccessible home environment, Decreased caregiver support    Equipment recommendations: bedside commode, walker, rolling     GOALS:   Multidisciplinary Problems     Physical Therapy Goals        Problem: Physical Therapy Goal    Goal Priority Disciplines Outcome Goal Variances Interventions   Physical Therapy Goal     PT, PT/OT Ongoing (interventions implemented as appropriate)     Description:  Goals to be met by: 18     Patient will increase functional independence with mobility by performin. Supine to sit with Stand-by Assistance  2. Sit to supine with Stand-by Assistance  3. Rolling to Left and Right with Stand-by Assistance.  4. Sit to stand transfer with Stand-by Assistance  5. Bed to chair transfer with Stand-by Assistance using Rolling Walker  6. Gait  x 50 feet with Stand-by Assistance using Rolling Walker.   7. Ascend/descend 6 stair with Right OR Left Handrails Contact Guard Assistance                      PLAN:    Patient to be seen (5-6X/week)  to address the above listed problems via gait training, therapeutic activities, therapeutic exercises  Plan of Care expires: 19  Plan of Care reviewed with: patient    Chely De Andapadmini, PTA  2018

## 2018-12-13 NOTE — PT/OT/SLP PROGRESS
Occupational Therapy  Treatment    Mao Levin   MRN: [id number]   Admitting Diagnosis: MS (multiple sclerosis)    OT Date of Treatment: 12/13/18  Total Time (min): 45 min    Billable Minutes:  Therapeutic Activity 30 and Therapeutic Exercise 15    General Precautions: Standard, fall  Orthopedic Precautions: N/A  Braces: N/A         Subjective:  Communicated with patient prior to session.    Pain/Comfort  Pain Rating 1: 7/10  Location - Side 1: Bilateral  Location - Orientation 1: generalized  Location 1: knee  Pain Addressed 1: Reposition, Distraction, Cessation of Activity  Pain Rating Post-Intervention 1: 7/10    Objective:       Occupational Performance:    Functional Mobility/Transfers:  · Patient completed Sit <> Stand Transfer with minimum assistance  with  hand-held assist at counter top    AMPA 6 Click:  [facility] Total Score: 19    OT Exercises: UE Ergometer 10 min for improving endurance and L UE ROM to increase independence with ADLs.      1 x 15 R UE and L UE using 10# kettlebell for improving strength to increase independence with ADL tasks.     PROM and stretching to L UE hand (all digits and wrists) in order to facilitate normal movement and promote natural tone in order for preparation for daily tasks and therapeutic activities.    Additional Treatment:  Patient performed a FM coordination task using R hand while standing with SBA at counter top (sit<>stand with min A) while weightbearing into L hand while being flat on counter into extension.     Patient left up in chair with call button in reach and all needs met.     ASSESSMENT:  Mao Levin is a 53 y.o. male with a medical diagnosis of MS (multiple sclerosis) and presents with the deficits listed below. Patient tolerated treatment session and was motivated to complete tasks. Patient did need seated rest breaks during standing activity and verbal cues to stand more upright and in midline. Patient continues to benefit from skilled OT services to achieve  maximal independence.    Rehab identified problem list/impairments: weakness, impaired endurance, impaired self care skills, impaired functional mobilty, gait instability, impaired balance, impaired sensation, impaired joint extensibility, impaired fine motor, decreased coordination, decreased lower extremity function, decreased upper extremity function, impaired coordination, pain    Rehab potential is good    Activity tolerance: Good    Discharge recommendations: home with home health     Barriers to discharge: Inaccessible home environment, Decreased caregiver support     Equipment recommendations: walker, rolling     GOALS:   Multidisciplinary Problems     Occupational Therapy Goals        Problem: Occupational Therapy Goal    Goal Priority Disciplines Outcome Interventions   Occupational Therapy Goal     OT, PT/OT Ongoing (interventions implemented as appropriate)    Description:  Goals to be met by: 12/17/2018     Patient will increase functional independence with ADLs by performing:    Feeding with Modified [place].    Met  UE Dressing with Set-up Assistance.   Met  LE Dressing with Minimal Assistance.  Grooming while seated with Supervision.    Met  Toileting from bedside commode with Stand-by Assistance for hygiene and clothing management.   Bathing from  shower chair/bench with Stand-by Assistance.  Rolling to Right, Left with Supervision.    Met  Supine to sit with Supervision.  Met  Stand pivot transfers with Stand-by Assistance.  Toilet transfer to bedside commode with Stand-by Assistance.  Upper extremity exercise program 3 x 15 reps per handout, with independence.  Patient will perform a functional standing activity for 10 min with S in order to perform household tasks.                      Plan:  Patient to be seen 5 x/week to address the above listed problems via self-care/home management, therapeutic activities, therapeutic exercises  Plan of Care expires: 01/05/18  Plan of Care reviewed with:  patient    Gwendolyn Powers, LOTAMBROSE  12/13/2018

## 2018-12-13 NOTE — PLAN OF CARE
Problem: Infection, Risk/Actual (Adult)  Intervention: Prevent Infection/Maximize Resistance   12/13/18 1218   Prevent Infection/Maximize Resistance   Bathing/Skin Care dressed/undressed;bath, complete;linen changed   Support Asthma Symptom Control   Airway/Ventilation Management calming measures promoted   Monitor and Manage Ketoacidosis   Glycemic Management oral hydration promoted;supplemental insulin given   Monitor and Manage Anemia   Oral Nutrition Promotion rest periods promoted;safe use of adaptive equipment encouraged   Prevent or Manage Pain   Sleep/Rest Enhancement relaxation techniques promoted;awakenings minimized

## 2018-12-14 PROCEDURE — 97530 THERAPEUTIC ACTIVITIES: CPT | Mod: HCWC

## 2018-12-14 PROCEDURE — 97116 GAIT TRAINING THERAPY: CPT | Mod: HCWC

## 2018-12-14 PROCEDURE — 97535 SELF CARE MNGMENT TRAINING: CPT | Mod: HCWC

## 2018-12-14 PROCEDURE — 25000003 PHARM REV CODE 250: Mod: HCWC | Performed by: INTERNAL MEDICINE

## 2018-12-14 PROCEDURE — 97110 THERAPEUTIC EXERCISES: CPT | Mod: HCWC

## 2018-12-14 PROCEDURE — 25000003 PHARM REV CODE 250: Mod: HCWC | Performed by: PHYSICIAN ASSISTANT

## 2018-12-14 PROCEDURE — 11000004 HC SNF PRIVATE: Mod: HCWC

## 2018-12-14 RX ADMIN — OXYCODONE HYDROCHLORIDE 15 MG: 10 TABLET ORAL at 01:12

## 2018-12-14 RX ADMIN — BACLOFEN 20 MG: 10 TABLET ORAL at 05:12

## 2018-12-14 RX ADMIN — GABAPENTIN 400 MG: 100 CAPSULE ORAL at 08:12

## 2018-12-14 RX ADMIN — DANTROLENE SODIUM 50 MG: 50 CAPSULE ORAL at 08:12

## 2018-12-14 RX ADMIN — GABAPENTIN 400 MG: 100 CAPSULE ORAL at 01:12

## 2018-12-14 RX ADMIN — OXYCODONE HYDROCHLORIDE 10 MG: 10 TABLET ORAL at 06:12

## 2018-12-14 RX ADMIN — TAMSULOSIN HYDROCHLORIDE 0.4 MG: 0.4 CAPSULE ORAL at 08:12

## 2018-12-14 RX ADMIN — RIVAROXABAN 20 MG: 20 TABLET, FILM COATED ORAL at 05:12

## 2018-12-14 RX ADMIN — GABAPENTIN 400 MG: 100 CAPSULE ORAL at 05:12

## 2018-12-14 RX ADMIN — BACLOFEN 20 MG: 10 TABLET ORAL at 08:12

## 2018-12-14 RX ADMIN — DIAZEPAM 5 MG: 5 TABLET ORAL at 01:12

## 2018-12-14 RX ADMIN — SENNOSIDES AND DOCUSATE SODIUM 1 TABLET: 8.6; 5 TABLET ORAL at 08:12

## 2018-12-14 RX ADMIN — BACLOFEN 20 MG: 10 TABLET ORAL at 01:12

## 2018-12-14 RX ADMIN — DULOXETINE 30 MG: 30 CAPSULE, DELAYED RELEASE ORAL at 08:12

## 2018-12-14 RX ADMIN — DANTROLENE SODIUM 50 MG: 50 CAPSULE ORAL at 05:12

## 2018-12-14 RX ADMIN — TRAZODONE HYDROCHLORIDE 100 MG: 50 TABLET ORAL at 08:12

## 2018-12-14 RX ADMIN — BUPROPION HYDROCHLORIDE 150 MG: 150 TABLET, EXTENDED RELEASE ORAL at 08:12

## 2018-12-14 RX ADMIN — BUPROPION HYDROCHLORIDE 150 MG: 150 TABLET, EXTENDED RELEASE ORAL at 05:12

## 2018-12-14 RX ADMIN — OXYCODONE HYDROCHLORIDE 15 MG: 10 TABLET ORAL at 08:12

## 2018-12-14 RX ADMIN — POLYETHYLENE GLYCOL 3350 17 G: 17 POWDER, FOR SOLUTION ORAL at 08:12

## 2018-12-14 RX ADMIN — DANTROLENE SODIUM 50 MG: 50 CAPSULE ORAL at 01:12

## 2018-12-14 NOTE — PLAN OF CARE
Problem: Pressure Ulcer Risk (Huy Scale) (Adult,Obstetrics,Pediatric)  Intervention: Prevent/Manage Excess Moisture   12/14/18 1544   Prevent Infection/Maximize Resistance   Bathing/Skin Care dressed/undressed;bath, complete   Monitor/Manage Chemotherapy Gastrointestinal Effects   Perineal Care absorbent pad changed   Monitor and Manage Hypervolemia   Skin Protection incontinence pads utilized

## 2018-12-14 NOTE — SUBJECTIVE & OBJECTIVE
Hospital course:  12/4/18: Patient admitted to SNF for ongoing PT/OT following a hospitalization for pseudoflare MS due to UTI.  12/5: Patient seen at bedside, c/o incontinence and would like to f/u with Dr. Augustin. Discussed Plan of care and verbalized understanding. Answered all questions.  12/6: patient complains of generalized aching pain to back, legs, hips rated at 9/10.  He was last prescribed oxycodone/APAP 10 mg tabs and is currently receiving 15 mg tabs prn.  He has urinary incontinence but does not have an indwelling agee or CIC.  He denies dysuria.  12/12: Patient seen at bedside, pains controlled, hoping to stay longer at SNF, will discuss in in IDT    Interval History: Patient seen at bedside, doing well, no acute events overnight.     Review of Systems   Constitutional: Negative for appetite change, chills, fatigue and fever.   HENT: Negative for trouble swallowing.    Respiratory: Negative for cough, chest tightness, shortness of breath and wheezing.    Cardiovascular: Negative for chest pain, palpitations and leg swelling.   Gastrointestinal: Negative for abdominal pain, constipation, diarrhea and nausea.   Genitourinary: Positive for frequency. Negative for difficulty urinating and urgency.   Musculoskeletal: Positive for back pain, gait problem and myalgias. Negative for arthralgias.   Skin: Negative for rash.   Neurological: Positive for weakness and numbness. Negative for dizziness, light-headedness and headaches.        +numbess and tingling to BLE (chronic)   Psychiatric/Behavioral: Negative for sleep disturbance.     Scheduled Meds:   baclofen  20 mg Oral QID    buPROPion  150 mg Oral BID WM    dantrolene  50 mg Oral QID    DULoxetine  30 mg Oral Daily    gabapentin  400 mg Oral QID    polyethylene glycol  17 g Oral Daily    rivaroxaban  20 mg Oral Daily with dinner    senna-docusate 8.6-50 mg  1 tablet Oral BID    tamsulosin  0.4 mg Oral Daily    traZODone  100 mg Oral QHS      Continuous Infusions:  PRN Meds:.acetaminophen, bisacodyl, calcium carbonate, dextrose 50%, dextrose 50%, diazePAM, glucagon (human recombinant), glucose, glucose, ibuprofen, lactulose, ondansetron, oxyCODONE, oxyCODONE, prochlorperazine, ramelteon, sodium chloride 0.9%, sodium phosphates    Objective:     Vital Signs (Most Recent):  Temp: 98.2 °F (36.8 °C) (12/13/18 2100)  Pulse: 70 (12/13/18 0753)  Resp: 18 (12/13/18 2100)  BP: (!) 142/89 (12/13/18 2100)  SpO2: 97 % (12/13/18 2100) Vital Signs (24h Range):  Temp:  [97.3 °F (36.3 °C)-98.2 °F (36.8 °C)] 98.2 °F (36.8 °C)  Pulse:  [70] 70  Resp:  [18] 18  SpO2:  [97 %] 97 %  BP: (131-142)/(68-89) 142/89     Weight: 78.8 kg (173 lb 11.6 oz)  Body mass index is 24.93 kg/m².    Intake/Output Summary (Last 24 hours) at 12/13/2018 2231  Last data filed at 12/13/2018 1200  Gross per 24 hour   Intake 540 ml   Output --   Net 540 ml      Physical Exam   Constitutional: He is oriented to person, place, and time. He appears well-developed and well-nourished. No distress.   Cardiovascular: Normal rate, regular rhythm and normal heart sounds. Exam reveals no gallop and no friction rub.   No murmur heard.  Pulmonary/Chest: Effort normal and breath sounds normal. No respiratory distress. He has no wheezes. He has no rales.   Abdominal: Soft. Bowel sounds are normal. He exhibits no distension. There is no tenderness.   Musculoskeletal: He exhibits edema. He exhibits no tenderness.   1+ edema to left ankle, Decreased strength in LE bilaterally. Left LE weaker than right. Patient states this is his baseline. Decreased  strength in left hand.    Neurological: He is alert and oriented to person, place, and time.   Skin: Skin is warm and dry. No rash noted. He is not diaphoretic. No cyanosis. Nails show no clubbing.   Psychiatric: He has a normal mood and affect. His behavior is normal.       Significant Labs:  Recent Labs   Lab 12/12/18  0558   WBC 5.96   HGB 11.6*   HCT 34.7*         Recent Labs   Lab 12/12/18  0558      K 3.8      CO2 26   BUN 14   CREATININE 0.8   CALCIUM 8.6*     Lab Results   Component Value Date    LABPROT 10.8 12/15/2016    ALBUMIN 3.9 11/30/2018     Lab Results   Component Value Date    CALCIUM 8.6 (L) 12/12/2018    PHOS 4.1 12/12/2018       Significant Imaging: na

## 2018-12-14 NOTE — ASSESSMENT & PLAN NOTE
· Chronic  · Patient has not ever done self caths  · Has been being followed by Dr. Augustin and had Botox injections in July 2018  · I Reviewed Urology note from clinic and patient to continue flomax and f/u in 6 months. Since patient had to cancel f/u visit, will reschedule upon discharge  · Continue flomax  · F/u with urology, Dr. Augustin    Evaluated on 12/12/18  Reports adequate urine output wihtout urinary dysfunction  Continue flomax

## 2018-12-14 NOTE — PT/OT/SLP PROGRESS
"Physical Therapy  Treatment    Mao Levin   MRN: [id number]   Admitting Diagnosis: MS (multiple sclerosis)    PT Received On: 12/14/18  Total Time (min): 58       Billable Minutes:  Gait Training 23, Therapeutic exercise: 15, Therapeutic Activity 20 and Total Time 58    Treatment Type: Treatment  PT/PTA: PT     PTA Visit Number: 0       General Precautions: Standard, fall  Orthopedic Precautions: N/A   Braces: N/A         Subjective:  Communicated with Pt prior to session.  Pt agreeable to session    Pain/Comfort  Pain Rating 1: 7/10  Location - Side 1: Bilateral  Location - Orientation 1: generalized  Location 1: knee  Pain Addressed 1: Reposition  Pain Rating Post-Intervention 1: (none reported)    Objective:  Patient found seated in w/c       AM-PAC 6 CLICK MOBILITY  Total Score:15      Transfers:  Sit<>Stand: to/from w/c (2 trials) in // bars w/ SBA   To/from w/c (1 trial) w/ RW and Min A for rise  Stand Pivot Transfer: w/c<>nustep (1 trial) w/ RW and Min/Mod A for rise and CGA for pivot  Cueing for sequencing, hand and foot placement     Gait:  Amb 1 trial (length of // bars) w/ CGA for LLE advancement  Verbal/tactile cueing for upright posture and hip/knee extension     Amb 1 trial (38 ft) w/ RW and Min A for LLE advancement and stability   Verbal/tactile cueing for upright posture throughout gait cycle  Cueing LLE hip/knee extension in stance phase  Cueing for sequencing, flexing LLE knee/hip to advance foot and weight shift in swing phase  Pt tends to compensate in order to advance LLE including:R plantar flexion, L hip hike, and external rotating of LLE while using adductors    Advanced Gait:  Curb Step: 3.5" curb step in // bars w/ Min A overall; Pt leads w/ RLE and requires Min A for LLE placement on step  Cueing for sequencing and technique  1 trial completed, second trial attempted but Pt unsuccessful      Additional Treatment:  Recumbent crosstrainer x13 minutes resistance level 3, x2 minutes resistance " level 4 for endurance and ROM for LLE    Patient left up in chair with call button in reach.    Assessment:  Mao Levin is a 53 y.o. male with a medical diagnosis of MS (multiple sclerosis).  Pt tolerated session well with good participation. Today, patient was able to ambulate w/ RW but still remains a fall risk and requires Min A for gait. He also is still unable to navigate stairs due to generalized weakness. He will greatly benefit from skilled therapy to address the below deficits and to decrease fall risk.     Rehab identified problem list/impairments: weakness, impaired endurance, impaired sensation, impaired self care skills, impaired functional mobilty, gait instability, impaired balance, decreased coordination, decreased upper extremity function, decreased lower extremity function, pain, abnormal tone, decreased ROM, impaired coordination, impaired fine motor, impaired joint extensibility    Rehab potential is good.    Activity tolerance: Good    Discharge recommendations: home with home health     Barriers to discharge: Inaccessible home environment, Decreased caregiver support    Equipment recommendations: bedside commode, walker, rolling     GOALS:   Multidisciplinary Problems     Physical Therapy Goals        Problem: Physical Therapy Goal    Goal Priority Disciplines Outcome Goal Variances Interventions   Physical Therapy Goal     PT, PT/OT Ongoing (interventions implemented as appropriate)     Description:  Goals to be met by: 18     Patient will increase functional independence with mobility by performin. Supine to sit with Stand-by Assistance  2. Sit to supine with Stand-by Assistance  3. Rolling to Left and Right with Stand-by Assistance.  4. Sit to stand transfer with Stand-by Assistance  5. Bed to chair transfer with Stand-by Assistance using Rolling Walker  6. Gait  x 50 feet with Stand-by Assistance using Rolling Walker.   7. Ascend/descend 6 stair with Right OR Left Handrails  Contact Guard Assistance                      PLAN:    Patient to be seen (5-6X/week)  to address the above listed problems via gait training, therapeutic activities, therapeutic exercises  Plan of Care expires: 01/04/19  Plan of Care reviewed with: patient    Georgia Shay, SPT  12/14/2018

## 2018-12-14 NOTE — PT/OT/SLP PROGRESS
Occupational Therapy  Treatment    Moa Levin   MRN: [id number]   Admitting Diagnosis: MS (multiple sclerosis)    OT Date of Treatment: 12/14/18  Total Time (min): 90 min(50 min AM Tx  /   40 min PM Tx)    Billable Minutes:  Self Care/Home Management 40, Therapeutic Activity 35 and Therapeutic Exercise 15    General Precautions: Standard, fall  Orthopedic Precautions: N/A  Braces: N/A         Subjective:  Communicated with nurse prior to session.      Pain/Comfort  Pain Rating 1: 6/10  Location - Side 1: Bilateral  Location - Orientation 1: generalized  Location 1: knee(and lower back)  Pain Addressed 1: Reposition, Distraction  Pain Rating Post-Intervention 1: 6/10    Objective:  Patient found with: (no lines )    Occupational Performance:    Bed Mobility:    · Patient completed Rolling/Turning to Right with modified independence  · Patient completed Scooting/Bridging with modified independence  · Patient completed Supine to Sit with supervision     Functional Mobility/Transfers:  · Patient completed Sit <> Stand Transfer with contact guard assistance  with  rolling walker   · Patient completed Bed <> Chair Transfer using Stand Pivot technique with contact guard assistance with rolling walker      Activities of Daily Living:  · Upper Body Dressing: stand by assistance pullover shirt  · Lower Body Dressing: minimum assistance and moderate assistance donning/doffing pants, socks , tennis and AFO.  · Toileting: minimum assistance with (A) to steady when standing and to pull pants up in back.    AMPA 6 Click:  AMPAC Total Score: 19    OT Exercises: PROM performed PROM to (L) UE all planes with gentle stretch provided at end range to inhibit hypertonicity in prep for active use of (L) UE.   UE Ergometer Perfomeed 15 minutes on UE UBE with Min resistance.   Pt performed dowel exercises with  1 pound dowel 1 set 10 reps performing shld Flex/ Extn, Horizontal Ab/Adduction, chest presses, and biceps curls.  SBA needed to  complete exercises and brief breaks provided between sets.    Additional Treatment:  Pt worked on functional standing activity consisting of standing with RW while reaching in all planes , crossing of midline and reaching to varying heights to facilitate (B) wt shifting and stability in standing to increase (I)ce when performing self care, and functional activities with standing component.  Pt tolerated up to 8 Min. 12 sec in standing with Min  Assist provided to maintain (L) UE on table top in WT bearing and tone inhibiting position.. Facilitation provided at trunk to achieve and maintain proper trunk alignment in standing.    Patient left up in chair with call button in reach and nurse notified    ASSESSMENT:  Mao Levin is a 53 y.o. male with a medical diagnosis of MS (multiple sclerosis) .  Pt was agreeable to OT and tolerated Tx without incidence but continues to require (A) to perform functional activities to completion. OT addressed self care tasks, functional mobility, functional transfers, functional standing and endurance to perform ADLS.  Pt is making progress but continues to require OT Intervention to perform functional transfers, functional standing activities, and self care tasks with standing component more independently. OT is recommended to further his functional (I)ce and safety. Goals remain appropriate and continued OT is recommended.        Rehab identified problem list/impairments: weakness, impaired endurance, impaired self care skills, impaired functional mobilty, gait instability, impaired balance, impaired sensation, impaired joint extensibility, impaired fine motor, decreased coordination, decreased lower extremity function, decreased upper extremity function, impaired coordination, pain    Rehab potential is good    Activity tolerance: Good    Discharge recommendations: home with home health     Barriers to discharge: Inaccessible home environment, Decreased caregiver support      Equipment recommendations: walker, rolling     GOALS:   Multidisciplinary Problems     Occupational Therapy Goals        Problem: Occupational Therapy Goal    Goal Priority Disciplines Outcome Interventions   Occupational Therapy Goal     OT, PT/OT Ongoing (interventions implemented as appropriate)    Description:  Goals to be met by: 12/17/2018     Patient will increase functional independence with ADLs by performing:    Feeding with Modified [place].    Met  UE Dressing with Set-up Assistance.   Met  LE Dressing with Minimal Assistance.  Grooming while seated with Supervision.    Met  Toileting from bedside commode with Stand-by Assistance for hygiene and clothing management.   Bathing from  shower chair/bench with Stand-by Assistance.  Rolling to Right, Left with Supervision.    Met  Supine to sit with Supervision.  Met  Stand pivot transfers with Stand-by Assistance.  Toilet transfer to bedside commode with Stand-by Assistance.  Upper extremity exercise program 3 x 15 reps per handout, with independence.  Patient will perform a functional standing activity for 10 min with S in order to perform household tasks.                      Plan:  Patient to be seen 5 x/week to address the above listed problems via self-care/home management, therapeutic activities, therapeutic exercises  Plan of Care expires: 01/05/18  Plan of Care reviewed with: patient    Pedro Felix OTR/L  12/14/2018

## 2018-12-14 NOTE — PLAN OF CARE
Patient will discharge to home with home health on anticipated discharge date of 12/17/2018. Pt from home and lives with mother (Naheed Levin).  Patient has the following DME at home: wheelchair; RW, and shower chair.  PCP:  Mendy Alarcon MD, [address] [facility] [address], [phone]  Pharmacy:    [facility] - [address] AT [facility]  [address] [facility] [address]  Phone: [phone] Fax: [phone]    [facility] [address] [place] [address] [place] - [address] [facility]  [address] [facility] [address]  Phone: [phone] Fax: [phone]    [facility] [address] [address]  Phone: [phone] Fax: [phone]    [facility]  [address] [facility] [address]  Phone: [phone] Fax: [phone]    PCP Followup:  1/3/2019 at 12:30 p.m.  Speciality f/u:  12/19/2018 at 1:45 p.m.  MS. THOMPSON will assist with arranging post discharge services, e.g., home health.    Teresa Ramos LMSW, MODESTA-Sara, [facility]  12/14/2018

## 2018-12-14 NOTE — CHAPLAIN
provided a compassionate presence for patient.  Patient requested a  visit at another time.   will again attempt to visit patient at another time.

## 2018-12-14 NOTE — ASSESSMENT & PLAN NOTE
· Chronic with increase weakness to lower extremities with this hospitalization  · Neurology followed and was likely a pseudoflare due to UTI  · Continue home dose of baclofen, dantrolene, gabapentin, valium prn  · Patient will need to f/u in MS clinic and scheduled Rituxan infusion, MS clinic aware of patient's needs  · PT/OT  · Fall precautions  · dvt ppx with rivaroxaban  · Bowel regimen with senokot-s and miralax, hold for loose or frequent stoolings    Evaluated on 12/12/18  continue Pt/OT  Fall precautions  DVT ppx with rivaroxaban  Continue Bowel regimen

## 2018-12-14 NOTE — ASSESSMENT & PLAN NOTE
Patient has had success with bupropion in the past and would like to resume BID dosing to quit again.  Increase bupropion to BID with tobacco cessation in 3 days thereafter.    Evaluated on 12/1/2/18  Reports less cravings  continue bupropion

## 2018-12-14 NOTE — ASSESSMENT & PLAN NOTE
· Reports being followed by PMR  · Continue home regimen of gabapentin  · F/u with Dr. Estrella upon discharge    Evaluated on 12/12/18  Continue gabapentin

## 2018-12-14 NOTE — PROGRESS NOTES
Community message received from Lawanda GALLOWAY, with [facility] advising  that patient is currently on service with their agency.  SW to submit HH orders when patient is discharged.  Teresa Ramos LMSW, ACM-Sara, [facility]  12/14/2018

## 2018-12-14 NOTE — PROGRESS NOTES
Patient reported that he will appeal his discharge.  Patient calling [facility].    jacque Ramos LMSW, MODESTA-DONNA, [facility]  12/14/2018

## 2018-12-14 NOTE — ASSESSMENT & PLAN NOTE
Continue therapy with miralax and senokot-s  Lactulose as needed if scheduled meds ineffective.  Enema prn.    Evaluated on 12/12/18  Patient having more regular BMS  contniue to monitor

## 2018-12-14 NOTE — PROGRESS NOTES
Patient served [facility] in error.  Anticipated discharge date is 12/22/2018.  [facility]AL contacted facility and spoke with , Chelo KIRK, to advise of appeal.  CRYSTAL was advised that [facility] was served in error. Unable to recind verbally.    This writer submitted a fax to [employer] at  [phone] advising that [facility] had been served in error.    Teresa Ramos LMSW, ACM-SW, CCM  12/14/2018

## 2018-12-14 NOTE — PROGRESS NOTES
[facility] - Skilled Nursing Care  Department of [place] [facility] Medicine  Progress Note    Patient Name: Mao Levin  MRN: [id number]  Code Status: Full Code  Admission Date: 12/4/2018  Length of Stay: 9 days  Attending Physician: Matthew Barrientos MD  Primary Care Provider: Mendy Alarcon MD    Subjective:     Principal Problem:MS (multiple sclerosis)     Chief complaint/ Reason for Admission: MS    HPI: 53-year-old male with a past medical history of MS, neurogenic bladder, PE on xarelto, chronic pain who presents for evaluation of increased LE weakness. Patient was diagnosed with a UTI treated with IV rocephin and transitioned to bactrim PO upon admission to SNF. Neurology also following to determine MS flare or Pseudoflare. It was deemed patient with Pseudoflare due to UTI. No new lesions noted no imaging. Patient has a history of urgency incontinence due to MS controlled with mirabegron. He does not self cath and does not have a agee catheter. Had botox injections with Dr. Augustin which at that time helped with incontinence. Denies fevers, chills, CP, SOB, abdominal pain, dysuria, hesitancy, hematuria, n/v, constipation, diarrhea, headaches, and vision changes, new numbness, or weakness. He states he lives at home with assistance from his mother. SNF placement was recommended for PT/OT services.     Hospital course:  12/4/18: Patient admitted to SNF for ongoing PT/OT following a hospitalization for pseudoflare MS due to UTI.  12/5: Patient seen at bedside, c/o incontinence and would like to f/u with Dr. Augustin. Discussed Plan of care and verbalized understanding. Answered all questions.  12/6: patient complains of generalized aching pain to back, legs, hips rated at 9/10.  He was last prescribed oxycodone/APAP 10 mg tabs and is currently receiving 15 mg tabs prn.  He has urinary incontinence but does not have an indwelling agee or CIC.  He denies dysuria.  12/12: Patient seen at bedside, pains controlled, hoping to stay  longer at SNF, will discuss in in IDT    Interval History: Patient seen at bedside, doing well, no acute events overnight.     Review of Systems   Constitutional: Negative for appetite change, chills, fatigue and fever.   HENT: Negative for trouble swallowing.    Respiratory: Negative for cough, chest tightness, shortness of breath and wheezing.    Cardiovascular: Negative for chest pain, palpitations and leg swelling.   Gastrointestinal: Negative for abdominal pain, constipation, diarrhea and nausea.   Genitourinary: Positive for frequency. Negative for difficulty urinating and urgency.   Musculoskeletal: Positive for back pain, gait problem and myalgias. Negative for arthralgias.   Skin: Negative for rash.   Neurological: Positive for weakness and numbness. Negative for dizziness, light-headedness and headaches.        +numbess and tingling to BLE (chronic)   Psychiatric/Behavioral: Negative for sleep disturbance.     Scheduled Meds:   baclofen  20 mg Oral QID    buPROPion  150 mg Oral BID WM    dantrolene  50 mg Oral QID    DULoxetine  30 mg Oral Daily    gabapentin  400 mg Oral QID    polyethylene glycol  17 g Oral Daily    rivaroxaban  20 mg Oral Daily with dinner    senna-docusate 8.6-50 mg  1 tablet Oral BID    tamsulosin  0.4 mg Oral Daily    traZODone  100 mg Oral QHS     Continuous Infusions:  PRN Meds:.acetaminophen, bisacodyl, calcium carbonate, dextrose 50%, dextrose 50%, diazePAM, glucagon (human recombinant), glucose, glucose, ibuprofen, lactulose, ondansetron, oxyCODONE, oxyCODONE, prochlorperazine, ramelteon, sodium chloride 0.9%, sodium phosphates    Objective:     Vital Signs (Most Recent):  Temp: 98.2 °F (36.8 °C) (12/13/18 2100)  Pulse: 70 (12/13/18 0753)  Resp: 18 (12/13/18 2100)  BP: (!) 142/89 (12/13/18 2100)  SpO2: 97 % (12/13/18 2100) Vital Signs (24h Range):  Temp:  [97.3 °F (36.3 °C)-98.2 °F (36.8 °C)] 98.2 °F (36.8 °C)  Pulse:  [70] 70  Resp:  [18] 18  SpO2:  [97 %] 97 %  BP:  (131-142)/(68-89) 142/89     Weight: 78.8 kg (173 lb 11.6 oz)  Body mass index is 24.93 kg/m².    Intake/Output Summary (Last 24 hours) at 12/13/2018 2231  Last data filed at 12/13/2018 1200  Gross per 24 hour   Intake 540 ml   Output --   Net 540 ml      Physical Exam   Constitutional: He is oriented to person, place, and time. He appears well-developed and well-nourished. No distress.   Cardiovascular: Normal rate, regular rhythm and normal heart sounds. Exam reveals no gallop and no friction rub.   No murmur heard.  Pulmonary/Chest: Effort normal and breath sounds normal. No respiratory distress. He has no wheezes. He has no rales.   Abdominal: Soft. Bowel sounds are normal. He exhibits no distension. There is no tenderness.   Musculoskeletal: He exhibits edema. He exhibits no tenderness.   1+ edema to left ankle, Decreased strength in LE bilaterally. Left LE weaker than right. Patient states this is his baseline. Decreased  strength in left hand.    Neurological: He is alert and oriented to person, place, and time.   Skin: Skin is warm and dry. No rash noted. He is not diaphoretic. No cyanosis. Nails show no clubbing.   Psychiatric: He has a normal mood and affect. His behavior is normal.       Significant Labs:  Recent Labs   Lab 12/12/18  0558   WBC 5.96   HGB 11.6*   HCT 34.7*        Recent Labs   Lab 12/12/18  0558      K 3.8      CO2 26   BUN 14   CREATININE 0.8   CALCIUM 8.6*     Lab Results   Component Value Date    LABPROT 10.8 12/15/2016    ALBUMIN 3.9 11/30/2018     Lab Results   Component Value Date    CALCIUM 8.6 (L) 12/12/2018    PHOS 4.1 12/12/2018       Significant Imaging: na    Assessment/Plan:      * MS (multiple sclerosis)    · Chronic with increase weakness to lower extremities with this hospitalization  · Neurology followed and was likely a pseudoflare due to UTI  · Continue home dose of baclofen, dantrolene, gabapentin, valium prn  · Patient will need to f/u in MS  clinic and scheduled Rituxan infusion, MS clinic aware of patient's needs  · PT/OT  · Fall precautions  · dvt ppx with rivaroxaban  · Bowel regimen with senokot-s and miralax, hold for loose or frequent stoolings    Evaluated on 12/12/18  continue Pt/OT  Fall precautions  DVT ppx with rivaroxaban  Continue Bowel regimen     Current every day smoker    Patient has had success with bupropion in the past and would like to resume BID dosing to quit again.  Increase bupropion to BID with tobacco cessation in 3 days thereafter.    Evaluated on 12/1/2/18  Reports less cravings  continue bupropion     Depression    · chronic  · Mood stable, sleeping well  · Continue home regimen of bupropion, duloxetine, and trazodone    Evaluated on 12/12/18  Continue bupropion, duloxetine, and trazodone      Constipation due to neurogenic bowel    Continue therapy with miralax and senokot-s  Lactulose as needed if scheduled meds ineffective.  Enema prn.    Evaluated on 12/12/18  Patient having more regular BMS  contniue to monitor     Polyneuropathy    · Reports being followed by PMR  · Continue home regimen of gabapentin  · F/u with Dr. Estrella upon discharge    Evaluated on 12/12/18  Continue gabapentin     10/25/2016 Saddle PE    · Chronically on xarleto--continue  · denies sob or CP    Evaluated on 12/12/18  Continue xarleto     Spasticity    · Continue home scheduled regimen of baclofen, dantrolene  · And prn valium for breakthrough spasms  · F/u with neurology outpatient    Evaluated on 12/12/18  Continue baclofen, dantrolene, and valium     Neurogenic bladder    · Chronic  · Patient has not ever done self caths  · Has been being followed by Dr. Augustin and had Botox injections in July 2018  · I Reviewed Urology note from clinic and patient to continue flomax and f/u in 6 months. Since patient had to cancel f/u visit, will reschedule upon discharge  · Continue flomax  · F/u with urology, Dr. Augustin    Evaluated on 12/12/18  Reports adequate  urine output wihtout urinary dysfunction  Continue flomax       Future Appointments   Date Time Provider Department Center   12/19/2018  1:45 PM Lawanda Boyce PA-C [facility] CRISTINA Kelley   1/3/2019 12:30 PM Mendy Alarcon MD [facility] TRENT MALLOY Kenneth   2/5/2019  8:20 AM Brian Augustin MD [facility] UROLOGY Frederic jayesh   3/11/2019  1:00 PM Mattie Finnegan MD [facility] MSC Frederic Kelley   4/1/2019  9:40 AM Kymberly Estrella MD [facility] PHYSMED Frederic jayesh Navarrete NP  Department of Hospital Medicine  [facility] - Skilled Nursing Care

## 2018-12-14 NOTE — ASSESSMENT & PLAN NOTE
· Continue home scheduled regimen of baclofen, dantrolene  · And prn valium for breakthrough spasms  · F/u with neurology outpatient    Evaluated on 12/12/18  Continue baclofen, dantrolene, and valium

## 2018-12-14 NOTE — PLAN OF CARE
12/14/18 1234   Medicare Message   Important Message from Medicare regarding Discharge Appeal Rights Given to patient/caregiver;Explained to patient/caregiver;Signed/date by patient/caregiver  ([facility] served and signed.)

## 2018-12-14 NOTE — ASSESSMENT & PLAN NOTE
· chronic  · Mood stable, sleeping well  · Continue home regimen of bupropion, duloxetine, and trazodone    Evaluated on 12/12/18  Continue bupropion, duloxetine, and trazodone

## 2018-12-15 PROCEDURE — 25000003 PHARM REV CODE 250: Mod: HCWC | Performed by: PHYSICIAN ASSISTANT

## 2018-12-15 PROCEDURE — 11000004 HC SNF PRIVATE: Mod: HCWC

## 2018-12-15 PROCEDURE — 25000003 PHARM REV CODE 250: Mod: HCWC | Performed by: INTERNAL MEDICINE

## 2018-12-15 RX ADMIN — GABAPENTIN 400 MG: 100 CAPSULE ORAL at 09:12

## 2018-12-15 RX ADMIN — SENNOSIDES AND DOCUSATE SODIUM 1 TABLET: 8.6; 5 TABLET ORAL at 08:12

## 2018-12-15 RX ADMIN — BACLOFEN 20 MG: 10 TABLET ORAL at 05:12

## 2018-12-15 RX ADMIN — DANTROLENE SODIUM 50 MG: 50 CAPSULE ORAL at 05:12

## 2018-12-15 RX ADMIN — GABAPENTIN 400 MG: 100 CAPSULE ORAL at 08:12

## 2018-12-15 RX ADMIN — DIAZEPAM 5 MG: 5 TABLET ORAL at 08:12

## 2018-12-15 RX ADMIN — TRAZODONE HYDROCHLORIDE 100 MG: 50 TABLET ORAL at 08:12

## 2018-12-15 RX ADMIN — BUPROPION HYDROCHLORIDE 150 MG: 150 TABLET, EXTENDED RELEASE ORAL at 09:12

## 2018-12-15 RX ADMIN — OXYCODONE HYDROCHLORIDE 15 MG: 10 TABLET ORAL at 01:12

## 2018-12-15 RX ADMIN — BUPROPION HYDROCHLORIDE 150 MG: 150 TABLET, EXTENDED RELEASE ORAL at 05:12

## 2018-12-15 RX ADMIN — DANTROLENE SODIUM 50 MG: 50 CAPSULE ORAL at 08:12

## 2018-12-15 RX ADMIN — BACLOFEN 20 MG: 10 TABLET ORAL at 09:12

## 2018-12-15 RX ADMIN — TAMSULOSIN HYDROCHLORIDE 0.4 MG: 0.4 CAPSULE ORAL at 09:12

## 2018-12-15 RX ADMIN — OXYCODONE HYDROCHLORIDE 15 MG: 10 TABLET ORAL at 06:12

## 2018-12-15 RX ADMIN — BACLOFEN 20 MG: 10 TABLET ORAL at 08:12

## 2018-12-15 RX ADMIN — OXYCODONE HYDROCHLORIDE 15 MG: 10 TABLET ORAL at 08:12

## 2018-12-15 RX ADMIN — DULOXETINE 30 MG: 30 CAPSULE, DELAYED RELEASE ORAL at 09:12

## 2018-12-15 RX ADMIN — GABAPENTIN 400 MG: 100 CAPSULE ORAL at 12:12

## 2018-12-15 RX ADMIN — BACLOFEN 20 MG: 10 TABLET ORAL at 12:12

## 2018-12-15 RX ADMIN — RIVAROXABAN 20 MG: 20 TABLET, FILM COATED ORAL at 05:12

## 2018-12-15 RX ADMIN — GABAPENTIN 400 MG: 100 CAPSULE ORAL at 05:12

## 2018-12-15 RX ADMIN — DANTROLENE SODIUM 50 MG: 50 CAPSULE ORAL at 09:12

## 2018-12-15 RX ADMIN — DANTROLENE SODIUM 50 MG: 50 CAPSULE ORAL at 12:12

## 2018-12-15 NOTE — DISCHARGE SUMMARY
[facility] Hospital Medicine  Discharge Summary      Patient Name: Mao Levin  MRN: [id number]  Admission Date: 11/30/2018  Hospital Length of Stay: 0 days  Discharge Date and Time: 12/4/2018  9:07 PM  Attending Physician: Antionette Wisdom MD  Discharging Provider: Singh Alonso PA-C  Primary Care Provider: Mendy Alarcon MD  Hospital Medicine Team: [facility] HOSP MED E Singh Alonso PA-C    HPI:   53-year-old male with a past medical history of MS, neurogenic bladder, PE on xarelto, chronic pain who presents for evaluation of increased LE weakness and pain for the past 2 days. Patient normally ambulates around 300 ft with a walker and has only been able to walk 4-10 steps for the past 2 days. He admits to falling 4 times today and denies LOC or head trauma. Patient was diagnosed with UTI in the ED today. He denies UTI symptoms besides increased urinary frequency. He was admitted for sepsis due to UTI last month and treated with Zosyn. Patient has a history of urgency incontinence due to MS controlled with mirabegron. He does not self cath and does not have a agee catheter. Denies fevers, chills, CP, SOB, abdominal pain, dysuria, hesitancy, hematuria, n/v, constipation, diarrhea, headaches, and vision changes, new numbness, or weakness. He states his symptoms are c/w prior UTIs.    ED: CBC WNL, CMP WNL, UA+, started on rocephin.    * No surgery found *      Hospital Course:   Patient admitted to observation for weakness. UA suggestive of UTI. Neurology consulted, MRI brain w/wo contrast with no new lesions. Likely discharge in 1-3 days pending workup. PT/OT/SLP consulted, SNF recommended. Urine culture growing providencia stuartii, transitioned to Bactrim. Patient discharged to SNF..     Consults:   Consults (From admission, onward)        Status Ordering Provider     Inpatient consult to Neurology  Once     Provider:  (Not yet assigned)    Completed RHETT WOODRUFF          No new Assessment &  Plan notes have been filed under this hospital service since the last note was generated.  Service: Hospital Medicine    Final Active Diagnoses:    Diagnosis Date Noted POA    PRINCIPAL PROBLEM:  MS (multiple sclerosis) [G35] 12/19/2016 Yes     Chronic    Urinary tract infection without hematuria [N39.0] 11/30/2018 Yes    10/25/2016 Saddle PE [I26.92] 10/25/2016 Yes     Chronic    Spasticity [R25.2] 10/06/2016 Yes    Chronic pain of multiple sites [R52, G89.29] 09/22/2016 Yes     Chronic      Problems Resolved During this Admission:       Discharged Condition: stable    Disposition: Skilled Nursing Facility    Follow Up:    Patient Instructions:   No discharge procedures on file.    Significant Diagnostic Studies: Labs: All labs within the past 24 hours have been reviewed    Pending Diagnostic Studies:     None         Medications:  Reconciled Home Medications:      Medication List      ASK your doctor about these medications    baclofen 20 MG tablet  Commonly known as:  LIORESAL  Take 1 tablet by mouth 4 times daily for 30 days     buPROPion 150 MG TBSR 12 hr tablet  Commonly known as:  WELLBUTRIN SR  1 tab po daily x 3 days, then increase to 1 tab po BID; last dose no later than 6PM; stop smoking after 5-7 days of treatment;     cranberry conc-C-bacillus coag 450-30-50 mg-mg-million Tab  Take 1 capsule by mouth once daily.     dalfampridine 10 mg Tb12  Commonly known as:  AMPYRA  Take 10 mg by mouth every 12 (twelve) hours.     dantrolene 50 MG Cap  Commonly known as:  DANTRIUM  Take 1 capsule by mouth 4 times daily for 30 days     diazePAM 5 MG tablet  Commonly known as:  VALIUM  Take 1 tablet (5 mg total) by mouth every 8 (eight) hours as needed (muscle spasms).     DULoxetine 30 MG capsule  Commonly known as:  CYMBALTA  Take 30 mg by mouth once daily.     ergocalciferol 50,000 unit Cap  Commonly known as:  VITAMIN D2  Take 1 capsule (50,000 Units total) by mouth every 7 days.     gabapentin 300 MG  capsule  Commonly known as:  NEURONTIN  Take 3 capsules by mouth 4 times daily for 30 days     HYDROcodone-acetaminophen  mg per tablet  Commonly known as:  NORCO  Take 1 tablet by mouth every 8 (eight) hours as needed for Pain.     mirabegron 50 mg Tb24  Commonly known as:  MYRBETRIQ  Take 1 tablet (50 mg total) by mouth once daily.     multivitamin capsule  Take 1 capsule by mouth once daily.     oxyCODONE-acetaminophen  mg per tablet  Commonly known as:  PERCOCET  Take one tablet by mouth every 4 hours as needed for moderate pain     polyethylene glycol 17 gram/dose powder  Commonly known as:  GLYCOLAX  Take 17 g by mouth once daily.     senna-docusate 8.6-50 mg 8.6-50 mg per tablet  Commonly known as:  PERICOLACE  Take 1 tablet by mouth once daily.     tamsulosin 0.4 mg Cap  Commonly known as:  FLOMAX  Take 1 capsule (0.4 mg total) by mouth once daily.     traZODone 100 MG tablet  Commonly known as:  DESYREL  Take 1 tablet (100 mg total) by mouth every evening.     VITAMIN C 500 MG tablet  Generic drug:  ascorbic acid (vitamin C)  Take 500 mg by mouth once daily.     VITAMIN D3 1000 units Tab  Generic drug:  vitamin D  Take 2 tablets by mouth once daily for 30 days     XARELTO 20 mg Tab  Generic drug:  rivaroxaban  Take 1 tablet (20 mg total) by mouth daily with dinner or evening meal.            Indwelling Lines/Drains at time of discharge:   Lines/Drains/Airways          None          Time spent on the discharge of patient: 30 minutes  Patient was seen and examined on the date of discharge and determined to be suitable for discharge.         Singh Alonso PA-C  Department of Hospital Medicine  [facility]

## 2018-12-15 NOTE — PLAN OF CARE
Problem: Patient Care Overview  Goal: Plan of Care Review  Outcome: Ongoing (interventions implemented as appropriate)  Mr Levin remains AAO; pt is sitting up in w/c in room 333. He received 15 mg of Oxycodone IR for c/o generalized pain rated at 9 on a pain scale of 0-10. Safety measures maintained. Mr Levin received teaching on continuing to use the call button to signal staff for assistance. Call light is within reach. Will continue with plan of care.

## 2018-12-16 PROCEDURE — 97116 GAIT TRAINING THERAPY: CPT | Mod: HCWC

## 2018-12-16 PROCEDURE — 25000003 PHARM REV CODE 250: Mod: HCWC | Performed by: INTERNAL MEDICINE

## 2018-12-16 PROCEDURE — 11000004 HC SNF PRIVATE: Mod: HCWC

## 2018-12-16 PROCEDURE — 97110 THERAPEUTIC EXERCISES: CPT | Mod: HCWC

## 2018-12-16 PROCEDURE — 97530 THERAPEUTIC ACTIVITIES: CPT | Mod: HCWC

## 2018-12-16 PROCEDURE — 25000003 PHARM REV CODE 250: Mod: HCWC | Performed by: PHYSICIAN ASSISTANT

## 2018-12-16 RX ADMIN — SENNOSIDES AND DOCUSATE SODIUM 1 TABLET: 8.6; 5 TABLET ORAL at 08:12

## 2018-12-16 RX ADMIN — RAMELTEON 8 MG: 8 TABLET, FILM COATED ORAL at 08:12

## 2018-12-16 RX ADMIN — BUPROPION HYDROCHLORIDE 150 MG: 150 TABLET, EXTENDED RELEASE ORAL at 04:12

## 2018-12-16 RX ADMIN — BACLOFEN 20 MG: 10 TABLET ORAL at 09:12

## 2018-12-16 RX ADMIN — BACLOFEN 20 MG: 10 TABLET ORAL at 04:12

## 2018-12-16 RX ADMIN — DANTROLENE SODIUM 50 MG: 50 CAPSULE ORAL at 08:12

## 2018-12-16 RX ADMIN — RIVAROXABAN 20 MG: 20 TABLET, FILM COATED ORAL at 04:12

## 2018-12-16 RX ADMIN — OXYCODONE HYDROCHLORIDE 15 MG: 10 TABLET ORAL at 08:12

## 2018-12-16 RX ADMIN — BUPROPION HYDROCHLORIDE 150 MG: 150 TABLET, EXTENDED RELEASE ORAL at 09:12

## 2018-12-16 RX ADMIN — OXYCODONE HYDROCHLORIDE 15 MG: 10 TABLET ORAL at 06:12

## 2018-12-16 RX ADMIN — DANTROLENE SODIUM 50 MG: 50 CAPSULE ORAL at 04:12

## 2018-12-16 RX ADMIN — TRAZODONE HYDROCHLORIDE 100 MG: 50 TABLET ORAL at 08:12

## 2018-12-16 RX ADMIN — GABAPENTIN 400 MG: 100 CAPSULE ORAL at 02:12

## 2018-12-16 RX ADMIN — GABAPENTIN 400 MG: 100 CAPSULE ORAL at 08:12

## 2018-12-16 RX ADMIN — GABAPENTIN 400 MG: 100 CAPSULE ORAL at 09:12

## 2018-12-16 RX ADMIN — DIAZEPAM 5 MG: 5 TABLET ORAL at 12:12

## 2018-12-16 RX ADMIN — DULOXETINE 30 MG: 30 CAPSULE, DELAYED RELEASE ORAL at 09:12

## 2018-12-16 RX ADMIN — BACLOFEN 20 MG: 10 TABLET ORAL at 02:12

## 2018-12-16 RX ADMIN — DANTROLENE SODIUM 50 MG: 50 CAPSULE ORAL at 09:12

## 2018-12-16 RX ADMIN — DIAZEPAM 5 MG: 5 TABLET ORAL at 08:12

## 2018-12-16 RX ADMIN — BACLOFEN 20 MG: 10 TABLET ORAL at 08:12

## 2018-12-16 RX ADMIN — DANTROLENE SODIUM 50 MG: 50 CAPSULE ORAL at 02:12

## 2018-12-16 RX ADMIN — OXYCODONE HYDROCHLORIDE 15 MG: 10 TABLET ORAL at 12:12

## 2018-12-16 RX ADMIN — GABAPENTIN 400 MG: 100 CAPSULE ORAL at 04:12

## 2018-12-16 RX ADMIN — TAMSULOSIN HYDROCHLORIDE 0.4 MG: 0.4 CAPSULE ORAL at 09:12

## 2018-12-16 NOTE — PT/OT/SLP PROGRESS
"Physical Therapy  Treatment    Mao Levin   MRN: [id number]   Admitting Diagnosis: MS (multiple sclerosis)    PT Received On: 12/16/18  Total Time (min): 48       Billable Minutes:  Gait Training 20, Therapeutic Activity 10 and Therapeutic Exercise 15    Treatment Type: Treatment  PT/PTA: PTA     PTA Visit Number: 1       General Precautions: Standard, fall  Orthopedic Precautions: N/A   Braces: N/A         Subjective:  "pain is not gonna stop me"      Pain/Comfort  Pain Rating 1: 7/10  Location - Side 1: Bilateral  Location - Orientation 1: generalized  Location 1: knee(and back)  Pain Addressed 1: Pre-medicate for activity, Reposition, Distraction, Cessation of Activity  Pain Rating Post-Intervention 1: 7/10    Objective:  Patient found in wc LLE AFO     AM-PAC 6 CLICK MOBILITY  Total Score:15    Transfers:  Sit<>Stand: with RW min/CGA  Stand Pivot Transfer: min A no AD nustep to  towards right vcs for tech    Gait:  Amb with RW min A ~ 20 ft and 36 ft seated rest break, compensates throughout to advance/clear LLE, hip hiking, RLE heel rises however less ER noted of LLE to advance    Therex:  gastroc stretch x3:15-20 sec hold educated to perform daily    Additional Treatment:  Recumbent cross  x 15 min L-4    Patient left up in chair with call button in reach and belongings inreach.    Assessment:  Mao Levin is a 53 y.o. male with a medical diagnosis of MS (multiple sclerosis).  Pt tolerated well, pt would continue to benefit from skilled PT services to improve overall functional mobility, strength and endurance.  .    Rehab identified problem list/impairments: weakness, impaired endurance, impaired sensation, impaired self care skills, impaired functional mobilty, gait instability, impaired balance, decreased coordination, decreased upper extremity function, decreased lower extremity function, pain, abnormal tone, decreased ROM, impaired coordination, impaired fine motor, impaired joint " extensibility    Rehab potential is good.    Activity tolerance: Fair    Discharge recommendations: home with home health     Barriers to discharge: Inaccessible home environment, Decreased caregiver support    Equipment recommendations: bedside commode, walker, rolling     GOALS:   Multidisciplinary Problems     Physical Therapy Goals        Problem: Physical Therapy Goal    Goal Priority Disciplines Outcome Goal Variances Interventions   Physical Therapy Goal     PT, PT/OT Ongoing (interventions implemented as appropriate)     Description:  Goals to be met by: 18     Patient will increase functional independence with mobility by performin. Supine to sit with Stand-by Assistance  2. Sit to supine with Stand-by Assistance  3. Rolling to Left and Right with Stand-by Assistance.  4. Sit to stand transfer with Stand-by Assistance  5. Bed to chair transfer with Stand-by Assistance using Rolling Walker  6. Gait  x 50 feet with Stand-by Assistance using Rolling Walker.   7. Ascend/descend 6 stair with Right OR Left Handrails Contact Guard Assistance                      PLAN:    Patient to be seen (5-6X/week)  to address the above listed problems via gait training, therapeutic activities, therapeutic exercises  Plan of Care expires: 19  Plan of Care reviewed with: patient    Chely Gudino, PTA  2018

## 2018-12-16 NOTE — PLAN OF CARE
Problem: Patient Care Overview  Goal: Plan of Care Review  Outcome: Ongoing (interventions implemented as appropriate)  Mr Levin propelled his w/c back to unit from front entrance to the building. Pt stated he went outside to smoke. He was informed that [facility] is a non-smoking facility. Pt c/o generalized pain rated at 9 on a pain scale of 0-10. He received 15 mg of Oxycosone IR. Safety measures maintained. Call light is within reach. Will continue with plan of care.

## 2018-12-16 NOTE — PLAN OF CARE
Problem: Physical Therapy Goal  Goal: Physical Therapy Goal  Goals to be met by: 18     Patient will increase functional independence with mobility by performin. Supine to sit with Stand-by Assistance  2. Sit to supine with Stand-by Assistance  3. Rolling to Left and Right with Stand-by Assistance.  4. Sit to stand transfer with Stand-by Assistance  5. Bed to chair transfer with Stand-by Assistance using Rolling Walker  6. Gait  x 50 feet with Stand-by Assistance using Rolling Walker.   7. Ascend/descend 6 stair with Right OR Left Handrails Contact Guard Assistance     Outcome: Ongoing (interventions implemented as appropriate)  Goals remain appropriate

## 2018-12-17 PROCEDURE — 97116 GAIT TRAINING THERAPY: CPT | Mod: HCWC

## 2018-12-17 PROCEDURE — 25000003 PHARM REV CODE 250: Mod: HCWC | Performed by: INTERNAL MEDICINE

## 2018-12-17 PROCEDURE — 97530 THERAPEUTIC ACTIVITIES: CPT | Mod: HCWC

## 2018-12-17 PROCEDURE — 97110 THERAPEUTIC EXERCISES: CPT | Mod: HCWC

## 2018-12-17 PROCEDURE — 11000004 HC SNF PRIVATE: Mod: HCWC

## 2018-12-17 PROCEDURE — 99900058 HC 022 PAID UNDER SNF PPS

## 2018-12-17 PROCEDURE — 25000003 PHARM REV CODE 250: Mod: HCWC | Performed by: PHYSICIAN ASSISTANT

## 2018-12-17 RX ADMIN — GABAPENTIN 400 MG: 100 CAPSULE ORAL at 09:12

## 2018-12-17 RX ADMIN — BACLOFEN 20 MG: 10 TABLET ORAL at 01:12

## 2018-12-17 RX ADMIN — DANTROLENE SODIUM 50 MG: 50 CAPSULE ORAL at 08:12

## 2018-12-17 RX ADMIN — TRAZODONE HYDROCHLORIDE 100 MG: 50 TABLET ORAL at 08:12

## 2018-12-17 RX ADMIN — SENNOSIDES AND DOCUSATE SODIUM 1 TABLET: 8.6; 5 TABLET ORAL at 08:12

## 2018-12-17 RX ADMIN — TAMSULOSIN HYDROCHLORIDE 0.4 MG: 0.4 CAPSULE ORAL at 09:12

## 2018-12-17 RX ADMIN — DANTROLENE SODIUM 50 MG: 50 CAPSULE ORAL at 01:12

## 2018-12-17 RX ADMIN — BACLOFEN 20 MG: 10 TABLET ORAL at 05:12

## 2018-12-17 RX ADMIN — BACLOFEN 20 MG: 10 TABLET ORAL at 08:12

## 2018-12-17 RX ADMIN — RIVAROXABAN 20 MG: 20 TABLET, FILM COATED ORAL at 06:12

## 2018-12-17 RX ADMIN — BUPROPION HYDROCHLORIDE 150 MG: 150 TABLET, EXTENDED RELEASE ORAL at 05:12

## 2018-12-17 RX ADMIN — GABAPENTIN 400 MG: 100 CAPSULE ORAL at 08:12

## 2018-12-17 RX ADMIN — RAMELTEON 8 MG: 8 TABLET, FILM COATED ORAL at 08:12

## 2018-12-17 RX ADMIN — DULOXETINE 30 MG: 30 CAPSULE, DELAYED RELEASE ORAL at 09:12

## 2018-12-17 RX ADMIN — DIAZEPAM 5 MG: 5 TABLET ORAL at 12:12

## 2018-12-17 RX ADMIN — DANTROLENE SODIUM 50 MG: 50 CAPSULE ORAL at 05:12

## 2018-12-17 RX ADMIN — GABAPENTIN 400 MG: 100 CAPSULE ORAL at 05:12

## 2018-12-17 RX ADMIN — SENNOSIDES AND DOCUSATE SODIUM 1 TABLET: 8.6; 5 TABLET ORAL at 09:12

## 2018-12-17 RX ADMIN — OXYCODONE HYDROCHLORIDE 15 MG: 10 TABLET ORAL at 06:12

## 2018-12-17 RX ADMIN — GABAPENTIN 400 MG: 100 CAPSULE ORAL at 01:12

## 2018-12-17 RX ADMIN — OXYCODONE HYDROCHLORIDE 15 MG: 10 TABLET ORAL at 08:12

## 2018-12-17 RX ADMIN — BUPROPION HYDROCHLORIDE 150 MG: 150 TABLET, EXTENDED RELEASE ORAL at 09:12

## 2018-12-17 RX ADMIN — OXYCODONE HYDROCHLORIDE 15 MG: 10 TABLET ORAL at 12:12

## 2018-12-17 RX ADMIN — BACLOFEN 20 MG: 10 TABLET ORAL at 09:12

## 2018-12-17 RX ADMIN — DANTROLENE SODIUM 50 MG: 50 CAPSULE ORAL at 09:12

## 2018-12-17 NOTE — PLAN OF CARE
Problem: Occupational Therapy Goal  Goal: Occupational Therapy Goal  Goals to be met by: 12/17/2018     Patient will increase functional independence with ADLs by performing:    Feeding with Modified [place].    Met  UE Dressing with Set-up Assistance.   Met  LE Dressing with Minimal Assistance.  Grooming while seated with Supervision.    Met  Toileting from bedside commode with Stand-by Assistance for hygiene and clothing management.   Bathing from  shower chair/bench with Stand-by Assistance.  Rolling to Right, Left with Supervision.    Met  Supine to sit with Supervision.  Met  Stand pivot transfers with Stand-by Assistance.  Toilet transfer to bedside commode with Stand-by Assistance.  Upper extremity exercise program 3 x 15 reps per handout, with independence.  Patient will perform a functional standing activity for 10 min with S in order to perform household tasks.     Outcome: Ongoing (interventions implemented as appropriate)  .

## 2018-12-17 NOTE — PROGRESS NOTES
Expedited appeal notice received from [employer].  DONNA faxed notice that [employer] was filed in error with supporting documentation.  Teresa Ramos LMSW, MODE[facility]-DONNA, [facility]  12/17/2018

## 2018-12-17 NOTE — PT/OT/SLP PROGRESS
Occupational Therapy  Treatment    Mao Levin   MRN: [id number]   Admitting Diagnosis: MS (multiple sclerosis)    OT Date of Treatment: 12/17/18  Total Time (min): 49 min    Billable Minutes:  Therapeutic Activity 49    General Precautions: Standard, fall  Orthopedic Precautions: N/A  Braces: N/A         Subjective:  Communicated with PT prior to session.  I am doing well today     Pain/Comfort  Pain Rating 1: 7/10  Location - Side 1: Bilateral  Location - Orientation 1: generalized  Location 1: knee  Pain Addressed 1: Reposition, Distraction, Pre-medicate for activity  Pain Rating Post-Intervention 1: 7/10    Objective:    Pt. Seated in w/c in gym    Occupational Performance:    Bed Mobility:    · Patient completed Supine to Sit with minimum assistance  · Patient completed Sit to Supine with minimum assistance with LE management     Functional Mobility/Transfers:  · Patient completed Chair <> Mat Squat Pivot technique with minimum assistance with no assistive device      Activities of Daily Living:  · Feeding:  modified independence after set up    AMPA 6 Click:  [facility] Total Score: 19    OT Exercises: UE Ergometer 10 min    Additional Treatment:  Pt. With EOM activity with weight bearing into LUE and forearm with CGA/ Min A for  Dynamic and postural sitting bal and increase tone in L hand. Pt. Also with supine act with BUE ROM. Pt. With BUE stretch provided with stretch with  Red theraband with shd flex with (A) , bicep curls forward flex abd/ add internal/ext rotation 15 reps and edu on self ROM and BUE stretches when in supine.  Pt. With therex performed to increase ROM, endurance selfcare task and fxl mobility for independence     Patient left up in chair with all lines intact and call button in reach    ASSESSMENT:  Mao Levin is a 53 y.o. male with a medical diagnosis of MS (multiple sclerosis) Pt. participated well with session on this day.Pt demos physical deficits with balance  functional mobility, UB  strength, endurance  level of functional indep with daily tasks and activities and selfcare skills .Pt. Will continue to benefit from continued OT to progress towards goals      Rehab identified problem list/impairments: weakness, impaired endurance, impaired self care skills, impaired functional mobilty, gait instability, impaired balance, impaired sensation, impaired joint extensibility, impaired fine motor, decreased coordination, decreased lower extremity function, decreased upper extremity function, impaired coordination, pain    Rehab potential is fair    Activity tolerance: Fair    Discharge recommendations: home with home health     Barriers to discharge: Inaccessible home environment, Decreased caregiver support     Equipment recommendations: walker, rolling     GOALS:   Multidisciplinary Problems     Occupational Therapy Goals        Problem: Occupational Therapy Goal    Goal Priority Disciplines Outcome Interventions   Occupational Therapy Goal     OT, PT/OT Ongoing (interventions implemented as appropriate)    Description:  Goals to be met by: 12/17/2018     Patient will increase functional independence with ADLs by performing:    Feeding with Modified [place].    Met  UE Dressing with Set-up Assistance.   Met  LE Dressing with Minimal Assistance.  Grooming while seated with Supervision.    Met  Toileting from bedside commode with Stand-by Assistance for hygiene and clothing management.   Bathing from  shower chair/bench with Stand-by Assistance.  Rolling to Right, Left with Supervision.    Met  Supine to sit with Supervision.  Met  Stand pivot transfers with Stand-by Assistance.  Toilet transfer to bedside commode with Stand-by Assistance.  Upper extremity exercise program 3 x 15 reps per handout, with independence.  Patient will perform a functional standing activity for 10 min with S in order to perform household tasks.                  Plan:  Patient to be seen 5 x/week to address the above  listed problems via self-care/home management, therapeutic activities, therapeutic exercises  Plan of Care expires: 01/05/18  Plan of Care reviewed with: patient    CHELA Mustafa/NILESH  12/17/2018

## 2018-12-17 NOTE — PT/OT/SLP PROGRESS
Physical Therapy  Treatment    Mao Levin   MRN: [id number]   Admitting Diagnosis: MS (multiple sclerosis)    PT Received On: 12/17/18  Total Time (min): 50       Billable Minutes:  Gait Training 15, Therapeutic Activity 15, Therapeutic Exercise 20 and Total Time 50    Treatment Type: Treatment  PT/PTA: PT     PTA Visit Number: 0       General Precautions: Standard, fall  Orthopedic Precautions: N/A   Braces: N/A         Subjective:  Communicated with Pt prior to session.  Pt agreeable to session     Pain/Comfort  Pain Rating 1: 7/10  Location - Side 1: Bilateral  Location - Orientation 1: generalized  Location 1: knee  Pain Addressed 1: Reposition  Pain Rating Post-Intervention 1: 7/10    Objective:  Patient found seated in w/c with LLE AFO       AM-PAC 6 CLICK MOBILITY  Total Score:15    Bed Mobility:  Sit>Supine:on mat (1 trial) w/ Mod A for BLE  Supine>Sit: on mat (1 trial) w/ SBA  Cueing for sequencing, using BUEs to assist     Transfers:  Sit<>Stand: to/from w/c (1 trial) w/ RW and Min A for rise  Stand Pivot Transfer: w/c<>mat (1 trial) w/ no AD and Mod/Min A  Cueing for sequencing, hand placement and foot placement     Gait:  Amb 1 trial (38 ft) w/ RW and Miri for stability and CGA for LLE advancement   Verbal/tactile cueing for upright posture throughout gait   Cueing for LLE hip/knee extension in stance phase   Cueing for sequencing, RW management, flexing LLE knee/hip during swing phase and weight shift  Pt compensates w/ L hip hike, R plantar flexion in order to advance LLE   Limited due to fatigue      Wheelchair Mobility:  Patient propels w/c 150 ft w/ BUEs and Mod I     Therex:  Seated BLE 2x15 reps (GS, HF, LAQs, AP R) w/ AA as needed   Supine PROM hamstring stech ~ 2 minutes each   Supine hip/knee flexion/extension    AAROM into flexion and resistance into extension       Patient left up in chair with call button in reach.    Assessment:  Mao Levin is a 53 y.o. male with a medical diagnosis of MS  (multiple sclerosis). Pt tolerated session well with good participation.Pt continues to require Miri/CGA during ambulation w/ RW and continues to be a fall risk. Pt is progressing and would continue to benefit from skilled PT in order to improve overall functional mobility.     Rehab identified problem list/impairments: weakness, impaired endurance, impaired sensation, impaired self care skills, impaired functional mobilty, gait instability, impaired balance, decreased coordination, decreased upper extremity function, decreased lower extremity function, pain, abnormal tone, decreased ROM, impaired coordination, impaired fine motor, impaired joint extensibility    Rehab potential is good.    Activity tolerance: Good    Discharge recommendations: home with home health     Barriers to discharge: Inaccessible home environment, Decreased caregiver support    Equipment recommendations: bedside commode, walker, rolling     GOALS:   Multidisciplinary Problems     Physical Therapy Goals        Problem: Physical Therapy Goal    Goal Priority Disciplines Outcome Goal Variances Interventions   Physical Therapy Goal     PT, PT/OT Ongoing (interventions implemented as appropriate)     Description:  Goals to be met by: 18     Patient will increase functional independence with mobility by performin. Supine to sit with Stand-by Assistance  2. Sit to supine with Stand-by Assistance  3. Rolling to Left and Right with Stand-by Assistance.  4. Sit to stand transfer with Stand-by Assistance  5. Bed to chair transfer with Stand-by Assistance using Rolling Walker  6. Gait  x 50 feet with Stand-by Assistance using Rolling Walker.   7. Ascend/descend 6 stair with Right OR Left Handrails Contact Guard Assistance                        PLAN:    Patient to be seen (5-6X/week)  to address the above listed problems via gait training, therapeutic activities, therapeutic exercises  Plan of Care expires: 19  Plan of Care reviewed  with: patient    Georgia Jaspal, SPT  12/17/2018

## 2018-12-18 PROCEDURE — 97530 THERAPEUTIC ACTIVITIES: CPT | Mod: HCWC

## 2018-12-18 PROCEDURE — 97116 GAIT TRAINING THERAPY: CPT | Mod: HCWC

## 2018-12-18 PROCEDURE — 97110 THERAPEUTIC EXERCISES: CPT | Mod: HCWC

## 2018-12-18 PROCEDURE — 25000003 PHARM REV CODE 250: Mod: HCWC | Performed by: INTERNAL MEDICINE

## 2018-12-18 PROCEDURE — 11000004 HC SNF PRIVATE: Mod: HCWC

## 2018-12-18 PROCEDURE — 97803 MED NUTRITION INDIV SUBSEQ: CPT | Mod: HCWC

## 2018-12-18 PROCEDURE — 25000003 PHARM REV CODE 250: Mod: HCWC | Performed by: PHYSICIAN ASSISTANT

## 2018-12-18 RX ADMIN — BACLOFEN 20 MG: 10 TABLET ORAL at 01:12

## 2018-12-18 RX ADMIN — DULOXETINE 30 MG: 30 CAPSULE, DELAYED RELEASE ORAL at 09:12

## 2018-12-18 RX ADMIN — RIVAROXABAN 20 MG: 20 TABLET, FILM COATED ORAL at 04:12

## 2018-12-18 RX ADMIN — TRAZODONE HYDROCHLORIDE 100 MG: 50 TABLET ORAL at 08:12

## 2018-12-18 RX ADMIN — OXYCODONE HYDROCHLORIDE 15 MG: 10 TABLET ORAL at 01:12

## 2018-12-18 RX ADMIN — GABAPENTIN 400 MG: 100 CAPSULE ORAL at 04:12

## 2018-12-18 RX ADMIN — OXYCODONE HYDROCHLORIDE 15 MG: 10 TABLET ORAL at 07:12

## 2018-12-18 RX ADMIN — SENNOSIDES AND DOCUSATE SODIUM 1 TABLET: 8.6; 5 TABLET ORAL at 08:12

## 2018-12-18 RX ADMIN — BACLOFEN 20 MG: 10 TABLET ORAL at 09:12

## 2018-12-18 RX ADMIN — BACLOFEN 20 MG: 10 TABLET ORAL at 04:12

## 2018-12-18 RX ADMIN — DANTROLENE SODIUM 50 MG: 50 CAPSULE ORAL at 09:12

## 2018-12-18 RX ADMIN — DANTROLENE SODIUM 50 MG: 50 CAPSULE ORAL at 04:12

## 2018-12-18 RX ADMIN — OXYCODONE HYDROCHLORIDE 15 MG: 10 TABLET ORAL at 08:12

## 2018-12-18 RX ADMIN — DANTROLENE SODIUM 50 MG: 50 CAPSULE ORAL at 08:12

## 2018-12-18 RX ADMIN — TAMSULOSIN HYDROCHLORIDE 0.4 MG: 0.4 CAPSULE ORAL at 09:12

## 2018-12-18 RX ADMIN — DANTROLENE SODIUM 50 MG: 50 CAPSULE ORAL at 01:12

## 2018-12-18 RX ADMIN — SENNOSIDES AND DOCUSATE SODIUM 1 TABLET: 8.6; 5 TABLET ORAL at 09:12

## 2018-12-18 RX ADMIN — GABAPENTIN 400 MG: 100 CAPSULE ORAL at 09:12

## 2018-12-18 RX ADMIN — BUPROPION HYDROCHLORIDE 150 MG: 150 TABLET, EXTENDED RELEASE ORAL at 08:12

## 2018-12-18 RX ADMIN — GABAPENTIN 400 MG: 100 CAPSULE ORAL at 08:12

## 2018-12-18 RX ADMIN — GABAPENTIN 400 MG: 100 CAPSULE ORAL at 01:12

## 2018-12-18 RX ADMIN — BACLOFEN 20 MG: 10 TABLET ORAL at 08:12

## 2018-12-18 RX ADMIN — BUPROPION HYDROCHLORIDE 150 MG: 150 TABLET, EXTENDED RELEASE ORAL at 04:12

## 2018-12-18 NOTE — PROGRESS NOTES
DONNA received a call from [place] with [facility] advising that patient's case ([id number]) due to [employer] being serve in error.  Per [place], patient was contacted and advised that he could file an appeal once served again.  Teresa Ramos LMSW, MODESTA-Donna, [facility]  12/18/2018

## 2018-12-18 NOTE — PROGRESS NOTES
SW f/u with patient to discuss discharge planning and to update communication board.  Patient will discharge to home with mother.  Will also have home health.  Patient advised  that he planned to appeal his discharge when he gets the paper (NOM[employer]) because he needs all of the services he can get. SW will continue to followup with patient and assist with post discharge services.   Teresa Ramos LMSW, MODESTA-Sara, [facility]  12/18/2018

## 2018-12-18 NOTE — PT/OT/SLP PROGRESS
"Physical Therapy  Treatment    Mao Levin   MRN: [id number]   Admitting Diagnosis: MS (multiple sclerosis)    PT Received On: 12/18/18  Total Time (min): 48       Billable Minutes:  Gait Training 16, Therapeutic Activity 12, Therapeutic Exercise 20 and Total Time 48    Treatment Type: Treatment  PT/PTA: PT     PTA Visit Number: 0       General Precautions: Standard, fall  Orthopedic Precautions: N/A   Braces: N/A         Subjective:  Communicated with Pt prior to session.  Pt agreeable to session     Pain/Comfort  Pain Rating 1: 7/10  Location - Side 1: Bilateral  Location - Orientation 1: generalized  Location 1: knee(back)  Pain Addressed 1: Reposition  Pain Rating Post-Intervention 1: 7/10    Objective:  Patient found seated in w/c with LLE AFO       AM-PAC 6 CLICK MOBILITY  Total Score:16    Bed Mobility:  Sit>Supine: on mat (1 trial) w/ SBA  Supine>Sit: on mat (1 trial) w/ SBA  Cueing for sequencing and using BUEs to assist     Transfers:  Sit<>Stand: to/from w/c (2 trials) w/ RW and CGA/Min A   Stand Pivot Transfer: w/c<> mat (1 trial) w/ RW and CGA  Cueing for sequencing, foot/hand placement    Gait:  Amb 1 trial (38 ft) w/ RW and Miri for stability and CGA for LLE advancement   Verbal/tactile cueing for upright posture throughout gait   Cueing for sequencing, flexing LLE knee/hip during swing phase, weight shift   Pt compensates w/ L hip hike, R plantar flexion in order to advance LLE    Advanced Gait:  Stairs: 6" step ups (6 trials) w/ CGA for stability and BHR  Cueing for sequencing, placing entire foot onto step     Therex:  Supine BLE 2x15 reps (GS, SAQs)  PROM hamstring stretch ~2 minutes each   Seated modified crunch w/ red theraball 2x10 reps and SBA    No LOB      Patient left up in chair with call button in reach.    Assessment:  Mao Levin is a 53 y.o. male with a medical diagnosis of MS (multiple sclerosis).  Patient tolerated session well and with good participation. Today, Pt was able to perform " "6" step up/down w/ CGA and BHR. Pt continues to require Min A/CGA during ambulation w/ RW and continues to be a fall risk. Pt would continue to benefit from skilled PT in order to progress in order to improve overall mobility.     Rehab identified problem list/impairments: weakness, impaired endurance, impaired sensation, impaired self care skills, impaired functional mobilty, gait instability, impaired balance, decreased coordination, decreased upper extremity function, decreased lower extremity function, pain, abnormal tone, decreased ROM, impaired coordination, impaired fine motor, impaired joint extensibility    Rehab potential is good.    Activity tolerance: Good    Discharge recommendations: home with home health     Barriers to discharge: Inaccessible home environment, Decreased caregiver support    Equipment recommendations: bedside commode, walker, rolling     GOALS:   Multidisciplinary Problems     Physical Therapy Goals        Problem: Physical Therapy Goal    Goal Priority Disciplines Outcome Goal Variances Interventions   Physical Therapy Goal     PT, PT/OT Ongoing (interventions implemented as appropriate)     Description:  Goals to be met by: 18     Patient will increase functional independence with mobility by performin. Supine to sit with Stand-by Assistance- Met 2018  2. Sit to supine with Stand-by Assistance- Met 2018  3. Rolling to Left and Right with Stand-by Assistance.  4. Sit to stand transfer with Stand-by Assistance  5. Bed to chair transfer with Stand-by Assistance using Rolling Walker  6. Gait  x 50 feet with Stand-by Assistance using Rolling Walker.   7. Ascend/descend 6 stair with Right OR Left Handrails Contact Guard Assistance                         PLAN:    Patient to be seen (5-6X/week)  to address the above listed problems via gait training, therapeutic activities, therapeutic exercises  Plan of Care expires: 19  Plan of Care reviewed with: " patient    Georgia Shay, SPT  12/18/2018

## 2018-12-18 NOTE — PT/OT/SLP PROGRESS
Occupational Therapy  Treatment    Mao Levin   MRN: [id number]   Admitting Diagnosis: MS (multiple sclerosis)    OT Date of Treatment: 12/18/18  Total Time (min): 53 min    Billable Minutes:  Therapeutic Activity 33 and Therapeutic Exercise 20    General Precautions: Standard, fall  Orthopedic Precautions: N/A  Braces: N/A         Subjective:  Communicated with nsg/Pt prior to session.  I am doing well today    Pain/Comfort  Pain Rating 1: 8/10  Location - Side 1: Bilateral  Location - Orientation 1: generalized  Location 1: (all over)  Pain Addressed 1: Reposition, Distraction, Pre-medicate for activity  Pain Rating Post-Intervention 1: 8/10    Objective:   Pt. Seated in gym on arrival    Occupational Performance:    Bed Mobility:    · Not tested     Functional Mobility/Transfers:  · Patient completed Sit <> Stand Transfer with minimum assistance to moderate assistance  with  rolling walker from w/c with cues for safety and posture management.       Activities of Daily Living:  ·  Not tested     AMPA 6 Click:  [facility] Total Score: 19    OT Exercises: UE Ergometer 10 min    Additional Treatment:  Pt. With standing act on this day with task. Pt. With Min A for balance aspects with task with RW at raised counter Pt. With weight bearing into LUE to increase ext pattern due to flexer tone and ice to help diminish pain and relax muscles for stretch. And proprioception aspects   Pt. With 3# dowel activity with 2x20 reps with  shd flex, bicep curls horz adb/add and forward flex motion to increase BUE ROM and strength with (A) and stretch on LUE   Pt. Also with seated FM act with resistive  Different strength cloths pins with digit flex.ext for hand  strength and UE ROM    Pt. With standing and therex performed to increase ROM, endurance selfcare task and fxl mobility for independence     Patient left up in chair with all lines intact and call button in reach    ASSESSMENT:  Mao Levin is a 53 y.o. male with a medical  diagnosis of MS (multiple sclerosis) Pt. participated well with session on this day.Pt demos physical deficits with balance  functional mobility, UB strength, endurance  level of functional indep with daily tasks and activities and selfcare skills .Pt. Will continue to benefit from continued OT to progress towards goals  .    Rehab identified problem list/impairments: weakness, impaired endurance, impaired self care skills, impaired functional mobilty, gait instability, impaired balance, impaired sensation, impaired joint extensibility, impaired fine motor, decreased coordination, decreased lower extremity function, decreased upper extremity function, impaired coordination, pain    Rehab potential is fair    Activity tolerance: Fair    Discharge recommendations: home with home health     Barriers to discharge: Inaccessible home environment, Decreased caregiver support     Equipment recommendations: walker, rolling     GOALS:   Multidisciplinary Problems     Occupational Therapy Goals        Problem: Occupational Therapy Goal    Goal Priority Disciplines Outcome Interventions   Occupational Therapy Goal     OT, PT/OT Ongoing (interventions implemented as appropriate)    Description:  Goals to be met by: 12/17/2018     Patient will increase functional independence with ADLs by performing:    Feeding with Modified [place].    Met  UE Dressing with Set-up Assistance.   Met  LE Dressing with Minimal Assistance.  Grooming while seated with Supervision.    Met  Toileting from bedside commode with Stand-by Assistance for hygiene and clothing management.   Bathing from  shower chair/bench with Stand-by Assistance.  Rolling to Right, Left with Supervision.    Met  Supine to sit with Supervision.  Met  Stand pivot transfers with Stand-by Assistance.  Toilet transfer to bedside commode with Stand-by Assistance.  Upper extremity exercise program 3 x 15 reps per handout, with independence.  Patient will perform a functional  standing activity for 10 min with S in order to perform household tasks.                  Plan:  Patient to be seen 5 x/week to address the above listed problems via self-care/home management, therapeutic activities, therapeutic exercises  Plan of Care expires: 01/05/18  Plan of Care reviewed with: patient    WENDY Mustafa  12/18/2018

## 2018-12-18 NOTE — PLAN OF CARE
Problem: Occupational Therapy Goal  Goal: Occupational Therapy Goal  Goals to be met by: 12/17/2018     Patient will increase functional independence with ADLs by performing:    Feeding with Modified [place].    Met  UE Dressing with Set-up Assistance.   Met  LE Dressing with Minimal Assistance.  Grooming while seated with Supervision.    Met  Toileting from bedside commode with Stand-by Assistance for hygiene and clothing management.   Bathing from  shower chair/bench with Stand-by Assistance.  Rolling to Right, Left with Supervision.    Met  Supine to sit with Supervision.  Met  Stand pivot transfers with Stand-by Assistance.  Toilet transfer to bedside commode with Stand-by Assistance.  Upper extremity exercise program 3 x 15 reps per handout, with independence.  Patient will perform a functional standing activity for 10 min with S in order to perform household tasks.     Outcome: Ongoing (interventions implemented as appropriate)  .

## 2018-12-18 NOTE — TREATMENT PLAN
"Rehab Services' DME recommendations    Mao Levin  MRN: [id number]    [x] Walker Adult (5'4"-6"6")    Accessories N/A    Wheels Yes    [x] Home health PT, OT and Aide    Angelica Ribera, PT 12/18/2018        "

## 2018-12-18 NOTE — PROGRESS NOTES
"[facility] - Skilled Nursing Care  Adult Nutrition  Progress Note    SUMMARY       Recommendations    Recommendation: Continue Regular diet. RD to follow.   Goals: PO intake maintained at  > 75% EEN and EPN by next RD f/u   Nutrition Goal Status: progressing towards goal  Communication of RD Recs: other (comment)(POC)    Reason for Assessment    Reason For Assessment: RD follow-up  Diagnosis: other (see comments)(MS w/ bilateral leg weakness; UTI)  Relevant Medical History: MS, anxiety, pulmonary embolism, gait, polyneuropathy, depression, deep vein thrombrosis, anticoagulant longterm use   Interdisciplinary Rounds: did not attend  General Information Comments: Pt continues with good appetite, % of meals. Food preferences being honored. No N/V/C/D reported at this time. RD to follow. Outside snacks in room.   Nutrition Discharge Planning: d/c on regular diet     Nutrition Risk Screen    Nutrition Risk Screen: no indicators present    Nutrition/Diet History    Patient Reported Diet/Restrictions/Preferences: general  Typical Food/Fluid Intake: good appetite/intake PTA  Food Allergies: other (see comments)(mushrooms)  Factors Affecting Nutritional Intake: None identified at this time    Anthropometrics    Temp: 98.2 °F (36.8 °C)  Height Method: Stated  Height: 5' 10" (177.8 cm)  Height (inches): 70 in  Weight Method: Standard Scale  Weight: 79 kg (174 lb 2.6 oz)  Weight (lb): 174.17 lb  Ideal Body Weight (IBW), Male: 166 lb  % Ideal Body Weight, Male (lb): 107.7 lb  BMI (Calculated): 25.7  BMI Grade: 25 - 29.9 - overweight    Lab/Procedures/Meds    Pertinent Labs Reviewed: reviewed  Pertinent Labs Comments: Ca 8.6  Pertinent Medications Reviewed: reviewed    Estimated/Assessed Needs    Weight Used For Calorie Calculations: 81.1 kg (178 lb 12.7 oz)  Energy Calorie Requirements (kcal): 2078 kcal/d  Energy Need Method: Lina-[place]oto(RMR X 1.25 PAL )  Protein Requirements: 65-81 g/d(0.8-1.0 g/kg)  Weight Used For " Protein Calculations: 81.1 kg (178 lb 12.7 oz)  Fluid Requirements (mL): 1 ml/kcal or per MD    Estimated Fluid Requirement Method: RDA Method  RDA Method (mL): 2078    Nutrition Prescription Ordered    Current Diet Order: Regular   Nutrition Order Comments: recommend prune juice for constipation     Evaluation of Received Nutrient/Fluid Intake    Energy Calories Required: meeting needs  Protein Required: meeting needs  Fluid Required: meeting needs  Comments: LBM 11/30   Tolerance: tolerating  % Intake of Estimated Energy Needs: 75 - 100 %  % Meal Intake: 75 - 100 %    Nutrition Risk    Level of Risk/Frequency of Follow-up: low     Assessment and Plan  No nutrition dx at this time    Monitor and Evaluation    Food and Nutrient Intake: energy intake, food and beverage intake  Food and Nutrient Adminstration: diet order  Physical Activity and Function: nutrition-related ADLs and IADLs  Anthropometric Measurements: weight, weight change  Biochemical Data, Medical Tests and Procedures: electrolyte and renal panel, gastrointestinal profile, glucose/endocrine profile, inflammatory profile, lipid profile  Nutrition-Focused Physical Findings: overall appearance     Nutrition Follow-Up    RD Follow-up?: Yes

## 2018-12-18 NOTE — PLAN OF CARE
Problem: Physical Therapy Goal  Goal: Physical Therapy Goal  Goals to be met by: 18     Patient will increase functional independence with mobility by performin. Supine to sit with Stand-by Assistance- Met 2018  2. Sit to supine with Stand-by Assistance- Met 2018  3. Rolling to Left and Right with Stand-by Assistance.  4. Sit to stand transfer with Stand-by Assistance  5. Bed to chair transfer with Stand-by Assistance using Rolling Walker  6. Gait  x 50 feet with Stand-by Assistance using Rolling Walker.   7. Ascend/descend 6 stair with Right OR Left Handrails Contact Guard Assistance        Outcome: Ongoing (interventions implemented as appropriate)  LTGs remain appropriate. Pt will continue PT POC.  Georgia Shay, [place]  2018

## 2018-12-18 NOTE — PLAN OF CARE
Problem: Pain, Acute (Adult)  Intervention: Develop Pain Management Plan   12/18/18 5390   Prevent or Manage Pain   Pain Management Interventions pain management plan reviewed with patient/caregiver;pillow support provided;quiet environment facilitated

## 2018-12-19 LAB
ANION GAP SERPL CALC-SCNC: 7 MMOL/L
BASOPHILS # BLD AUTO: 0.06 K/UL
BASOPHILS NFR BLD: 1.1 %
BUN SERPL-MCNC: 9 MG/DL
CALCIUM SERPL-MCNC: 8.2 MG/DL
CHLORIDE SERPL-SCNC: 107 MMOL/L
CO2 SERPL-SCNC: 26 MMOL/L
CREAT SERPL-MCNC: 0.7 MG/DL
DIFFERENTIAL METHOD: ABNORMAL
EOSINOPHIL # BLD AUTO: 0.2 K/UL
EOSINOPHIL NFR BLD: 3.7 %
ERYTHROCYTE [DISTWIDTH] IN BLOOD BY AUTOMATED COUNT: 14.5 %
EST. GFR  (AFRICAN AMERICAN): >60 ML/MIN/1.73 M^2
EST. GFR  (NON AFRICAN AMERICAN): >60 ML/MIN/1.73 M^2
GLUCOSE SERPL-MCNC: 87 MG/DL
HCT VFR BLD AUTO: 35.1 %
HGB BLD-MCNC: 11.7 G/DL
IMM GRANULOCYTES # BLD AUTO: 0.01 K/UL
IMM GRANULOCYTES NFR BLD AUTO: 0.2 %
LYMPHOCYTES # BLD AUTO: 2.2 K/UL
LYMPHOCYTES NFR BLD: 39.9 %
MAGNESIUM SERPL-MCNC: 1.9 MG/DL
MCH RBC QN AUTO: 30.2 PG
MCHC RBC AUTO-ENTMCNC: 33.3 G/DL
MCV RBC AUTO: 91 FL
MONOCYTES # BLD AUTO: 0.5 K/UL
MONOCYTES NFR BLD: 8.2 %
NEUTROPHILS # BLD AUTO: 2.6 K/UL
NEUTROPHILS NFR BLD: 46.9 %
NRBC BLD-RTO: 0 /100 WBC
PHOSPHATE SERPL-MCNC: 3.6 MG/DL
PLATELET # BLD AUTO: 158 K/UL
PMV BLD AUTO: 11.9 FL
POTASSIUM SERPL-SCNC: 3.7 MMOL/L
RBC # BLD AUTO: 3.88 M/UL
SODIUM SERPL-SCNC: 140 MMOL/L
WBC # BLD AUTO: 5.62 K/UL

## 2018-12-19 PROCEDURE — 25000003 PHARM REV CODE 250: Mod: HCWC | Performed by: INTERNAL MEDICINE

## 2018-12-19 PROCEDURE — 97110 THERAPEUTIC EXERCISES: CPT | Mod: HCWC

## 2018-12-19 PROCEDURE — 11000004 HC SNF PRIVATE: Mod: HCWC

## 2018-12-19 PROCEDURE — 36415 COLL VENOUS BLD VENIPUNCTURE: CPT | Mod: HCWC

## 2018-12-19 PROCEDURE — 25000003 PHARM REV CODE 250: Mod: HCWC | Performed by: PHYSICIAN ASSISTANT

## 2018-12-19 PROCEDURE — 83735 ASSAY OF MAGNESIUM: CPT | Mod: HCWC

## 2018-12-19 PROCEDURE — 97116 GAIT TRAINING THERAPY: CPT | Mod: HCWC

## 2018-12-19 PROCEDURE — 80048 BASIC METABOLIC PNL TOTAL CA: CPT | Mod: HCWC

## 2018-12-19 PROCEDURE — 85025 COMPLETE CBC W/AUTO DIFF WBC: CPT | Mod: HCWC

## 2018-12-19 PROCEDURE — 97530 THERAPEUTIC ACTIVITIES: CPT | Mod: HCWC

## 2018-12-19 PROCEDURE — 84100 ASSAY OF PHOSPHORUS: CPT | Mod: HCWC

## 2018-12-19 RX ORDER — LIDOCAINE 50 MG/G
1 PATCH TOPICAL DAILY
Status: DISCONTINUED | OUTPATIENT
Start: 2018-12-20 | End: 2018-12-27 | Stop reason: HOSPADM

## 2018-12-19 RX ADMIN — OXYCODONE HYDROCHLORIDE 15 MG: 10 TABLET ORAL at 05:12

## 2018-12-19 RX ADMIN — DANTROLENE SODIUM 50 MG: 50 CAPSULE ORAL at 12:12

## 2018-12-19 RX ADMIN — GABAPENTIN 400 MG: 100 CAPSULE ORAL at 12:12

## 2018-12-19 RX ADMIN — DULOXETINE 30 MG: 30 CAPSULE, DELAYED RELEASE ORAL at 09:12

## 2018-12-19 RX ADMIN — DANTROLENE SODIUM 50 MG: 50 CAPSULE ORAL at 09:12

## 2018-12-19 RX ADMIN — GABAPENTIN 400 MG: 100 CAPSULE ORAL at 09:12

## 2018-12-19 RX ADMIN — OXYCODONE HYDROCHLORIDE 15 MG: 10 TABLET ORAL at 09:12

## 2018-12-19 RX ADMIN — BACLOFEN 20 MG: 10 TABLET ORAL at 12:12

## 2018-12-19 RX ADMIN — BUPROPION HYDROCHLORIDE 150 MG: 150 TABLET, EXTENDED RELEASE ORAL at 05:12

## 2018-12-19 RX ADMIN — BUPROPION HYDROCHLORIDE 150 MG: 150 TABLET, EXTENDED RELEASE ORAL at 07:12

## 2018-12-19 RX ADMIN — TRAZODONE HYDROCHLORIDE 100 MG: 50 TABLET ORAL at 09:12

## 2018-12-19 RX ADMIN — RAMELTEON 8 MG: 8 TABLET, FILM COATED ORAL at 09:12

## 2018-12-19 RX ADMIN — POLYETHYLENE GLYCOL 3350 17 G: 17 POWDER, FOR SOLUTION ORAL at 09:12

## 2018-12-19 RX ADMIN — RIVAROXABAN 20 MG: 20 TABLET, FILM COATED ORAL at 05:12

## 2018-12-19 RX ADMIN — DIAZEPAM 5 MG: 5 TABLET ORAL at 09:12

## 2018-12-19 RX ADMIN — TAMSULOSIN HYDROCHLORIDE 0.4 MG: 0.4 CAPSULE ORAL at 09:12

## 2018-12-19 RX ADMIN — SENNOSIDES AND DOCUSATE SODIUM 1 TABLET: 8.6; 5 TABLET ORAL at 09:12

## 2018-12-19 RX ADMIN — GABAPENTIN 400 MG: 100 CAPSULE ORAL at 05:12

## 2018-12-19 RX ADMIN — DANTROLENE SODIUM 50 MG: 50 CAPSULE ORAL at 05:12

## 2018-12-19 RX ADMIN — BACLOFEN 20 MG: 10 TABLET ORAL at 09:12

## 2018-12-19 RX ADMIN — DIAZEPAM 5 MG: 5 TABLET ORAL at 12:12

## 2018-12-19 RX ADMIN — LACTULOSE 20 G: 20 SOLUTION ORAL at 12:12

## 2018-12-19 RX ADMIN — BACLOFEN 20 MG: 10 TABLET ORAL at 05:12

## 2018-12-19 RX ADMIN — OXYCODONE HYDROCHLORIDE 15 MG: 10 TABLET ORAL at 12:12

## 2018-12-19 NOTE — PLAN OF CARE
12/19/18 1025   Medicare Message   Important Message from Medicare regarding Discharge Appeal Rights ([facility] served and signed.)   Teresa Ramos LMSW, ACM-SW, CCM  12/19/2018

## 2018-12-19 NOTE — PLAN OF CARE
Problem: Physical Therapy Goal  Goal: Physical Therapy Goal  Goals to be met by: 18     Patient will increase functional independence with mobility by performin. Supine to sit with Stand-by Assistance- Met 2018  2. Sit to supine with Stand-by Assistance- Met 2018  3. Rolling to Left and Right with Stand-by Assistance.  4. Sit to stand transfer with Stand-by Assistance  5. Bed to chair transfer with Stand-by Assistance using Rolling Walker  6. Gait  x 50 feet with Stand-by Assistance using Rolling Walker.   7. Ascend/descend 6 stair with Right OR Left Handrails Contact Guard Assistance        Outcome: Ongoing (interventions implemented as appropriate)  Goals remain appropriate

## 2018-12-19 NOTE — PROGRESS NOTES
Patient advised  that he is appealing his discharge.    Teresa Ramos LMSW, ACNGUYEN-Sara, CCM  12/19/2018

## 2018-12-19 NOTE — PT/OT/SLP PROGRESS
"Physical Therapy  Treatment    Mao Levin   MRN: [id number]   Admitting Diagnosis: MS (multiple sclerosis)    PT Received On: 12/19/18  Total Time (min): 50       Billable Minutes:  Gait Training 15, Therapeutic Activity 17 and Therapeutic Exercise 18    Treatment Type: Treatment  PT/PTA: PTA     PTA Visit Number: 1       General Precautions: Standard, fall  Orthopedic Precautions: N/A   Braces: N/A         Subjective:  "I'm alright"      Pain/Comfort  Pain Rating 1: 0/10  Pain Rating Post-Intervention 1: 0/10    Objective:   Patient found with: (in wc)     AM-PAC 6 CLICK MOBILITY  Total Score:18    Transfers:  Sit<>Stand: with RW min/CGA  Stand Pivot Transfer: min/CG no AD nustep > WC toward right    Gait:  Amb with RW min/CG ~ 15 ft with LLE AFO, additional 55 ft with RW min/CG with added ace wrap over shoe and AFO pt able to clear foot better     Advanced Gait: performed at astart of session before pt fatigued  Stairs: asc/chandana 4 steps with BUE on LHR with min A inc time    Therex:  L knee flex with cloth on floor x 10 reps  Pt repts compliance with LLE gastroc stretches    Additional Treatment:  Recumbent cross  x 15 min L-5    Patient left up in chair with call button in reach and belongings in reach.    Assessment:  Mao Levin is a 53 y.o. male with a medical diagnosis of MS (multiple sclerosis).  Pt tolerated well, pt performed 4 steps today with min A 2nd person for safety, pt would continue to benefit from skilled PT services to improve overall functional mobility, strength and endurance.  .    Rehab identified problem list/impairments: weakness, impaired endurance, impaired sensation, impaired self care skills, impaired functional mobilty, gait instability, impaired balance, decreased coordination, decreased upper extremity function, decreased lower extremity function, pain, abnormal tone, decreased ROM, impaired coordination, impaired fine motor, impaired joint extensibility    Rehab potential is " good.    Activity tolerance: Fair    Discharge recommendations: home with home health     Barriers to discharge: Inaccessible home environment, Decreased caregiver support    Equipment recommendations: bedside commode, walker, rolling     GOALS:   Multidisciplinary Problems     Physical Therapy Goals        Problem: Physical Therapy Goal    Goal Priority Disciplines Outcome Goal Variances Interventions   Physical Therapy Goal     PT, PT/OT Ongoing (interventions implemented as appropriate)     Description:  Goals to be met by: 18     Patient will increase functional independence with mobility by performin. Supine to sit with Stand-by Assistance- Met 2018  2. Sit to supine with Stand-by Assistance- Met 2018  3. Rolling to Left and Right with Stand-by Assistance.  4. Sit to stand transfer with Stand-by Assistance  5. Bed to chair transfer with Stand-by Assistance using Rolling Walker  6. Gait  x 50 feet with Stand-by Assistance using Rolling Walker.   7. Ascend/descend 6 stair with Right OR Left Handrails Contact Guard Assistance                         PLAN:    Patient to be seen (5-6X/week)  to address the above listed problems via gait training, therapeutic activities, therapeutic exercises  Plan of Care expires: 19  Plan of Care reviewed with: patient    Chely Gudino, PTA  2018

## 2018-12-19 NOTE — PROGRESS NOTES
Notice of Appeal received from [facility].  [id number]  DENC received from [facility].  Chart faxed to [facility].  Awaiting  appeal determination.    Teresa Ramos LMSW, ACNGUYEN-DONNA, [facility]  12/19/2018

## 2018-12-20 PROCEDURE — 97116 GAIT TRAINING THERAPY: CPT | Mod: HCWC

## 2018-12-20 PROCEDURE — 97535 SELF CARE MNGMENT TRAINING: CPT | Mod: HCWC

## 2018-12-20 PROCEDURE — 97110 THERAPEUTIC EXERCISES: CPT | Mod: HCWC

## 2018-12-20 PROCEDURE — 97530 THERAPEUTIC ACTIVITIES: CPT | Mod: HCWC

## 2018-12-20 PROCEDURE — 25000003 PHARM REV CODE 250: Mod: HCWC | Performed by: NURSE PRACTITIONER

## 2018-12-20 PROCEDURE — 25000003 PHARM REV CODE 250: Mod: HCWC | Performed by: PHYSICIAN ASSISTANT

## 2018-12-20 PROCEDURE — 11000004 HC SNF PRIVATE: Mod: HCWC

## 2018-12-20 PROCEDURE — 25000003 PHARM REV CODE 250: Mod: HCWC | Performed by: INTERNAL MEDICINE

## 2018-12-20 RX ADMIN — OXYCODONE HYDROCHLORIDE 15 MG: 10 TABLET ORAL at 02:12

## 2018-12-20 RX ADMIN — BACLOFEN 20 MG: 10 TABLET ORAL at 12:12

## 2018-12-20 RX ADMIN — BUPROPION HYDROCHLORIDE 150 MG: 150 TABLET, EXTENDED RELEASE ORAL at 04:12

## 2018-12-20 RX ADMIN — OXYCODONE HYDROCHLORIDE 15 MG: 10 TABLET ORAL at 09:12

## 2018-12-20 RX ADMIN — DULOXETINE 30 MG: 30 CAPSULE, DELAYED RELEASE ORAL at 10:12

## 2018-12-20 RX ADMIN — BACLOFEN 20 MG: 10 TABLET ORAL at 09:12

## 2018-12-20 RX ADMIN — GABAPENTIN 400 MG: 100 CAPSULE ORAL at 04:12

## 2018-12-20 RX ADMIN — DANTROLENE SODIUM 50 MG: 50 CAPSULE ORAL at 12:12

## 2018-12-20 RX ADMIN — GABAPENTIN 400 MG: 100 CAPSULE ORAL at 09:12

## 2018-12-20 RX ADMIN — DANTROLENE SODIUM 50 MG: 50 CAPSULE ORAL at 09:12

## 2018-12-20 RX ADMIN — DIAZEPAM 5 MG: 5 TABLET ORAL at 09:12

## 2018-12-20 RX ADMIN — RIVAROXABAN 20 MG: 20 TABLET, FILM COATED ORAL at 04:12

## 2018-12-20 RX ADMIN — TAMSULOSIN HYDROCHLORIDE 0.4 MG: 0.4 CAPSULE ORAL at 09:12

## 2018-12-20 RX ADMIN — OXYCODONE HYDROCHLORIDE 15 MG: 10 TABLET ORAL at 07:12

## 2018-12-20 RX ADMIN — LIDOCAINE 1 PATCH: 50 PATCH TOPICAL at 09:12

## 2018-12-20 RX ADMIN — SENNOSIDES AND DOCUSATE SODIUM 1 TABLET: 8.6; 5 TABLET ORAL at 09:12

## 2018-12-20 RX ADMIN — BACLOFEN 20 MG: 10 TABLET ORAL at 04:12

## 2018-12-20 RX ADMIN — DIAZEPAM 5 MG: 5 TABLET ORAL at 02:12

## 2018-12-20 RX ADMIN — BUPROPION HYDROCHLORIDE 150 MG: 150 TABLET, EXTENDED RELEASE ORAL at 08:12

## 2018-12-20 RX ADMIN — GABAPENTIN 400 MG: 100 CAPSULE ORAL at 12:12

## 2018-12-20 RX ADMIN — TRAZODONE HYDROCHLORIDE 100 MG: 50 TABLET ORAL at 09:12

## 2018-12-20 RX ADMIN — DANTROLENE SODIUM 50 MG: 50 CAPSULE ORAL at 04:12

## 2018-12-20 NOTE — PROGRESS NOTES
Patient won discharge appeal.  New anticipated discharge date is 12/27/2018.  Communication Board udated.  Teresa Ramos LMSW, ACNGUYEN-SW, [facility]  12/20/2018

## 2018-12-20 NOTE — PT/OT/SLP PROGRESS
Occupational Therapy  Treatment    Mao Levin   MRN: [id number]   Admitting Diagnosis: MS (multiple sclerosis)    OT Date of Treatment: 12/20/18  Total Time (min): 50 min    Billable Minutes:  Self Care/Home Management 35 and Therapeutic Exercise 15    General Precautions: Standard, fall  Orthopedic Precautions: N/A  Braces: N/A         Subjective:  Communicated with nurse prior to session.      Pain/Comfort  Pain Rating 1: 0/10  Pain Rating 2: 0/10    Objective:  Patient found with: (no lines)    Occupational Performance:    Bed Mobility:    · Patient completed Rolling/Turning to Right with modified independence  · Patient completed Scooting/Bridging with modified independence  · Patient completed Supine to Sit with supervision     Functional Mobility/Transfers:  · Patient completed Sit <> Stand Transfer with stand by assistance  with  rolling walker   · Patient completed Bed <> Chair Transfer using Stand Pivot technique with stand by assistance with rolling walker      Activities of Daily Living:  · Grooming: modified independence seated  · Upper Body Dressing: stand by assistance pullover shirt  · Lower Body Dressing: minimum assistance with Pt donning/doffing pants, socks , shoes and AFO.    AMPAC 6 Click:  AMPAC Total Score: 20    OT Exercises: UE Ergometer performed 15 minutes on UE UBD with Mod resistance. UE exercises performed to increase functional endurance and strength.  Strengthening required in order increase independence when performing self care tasks, W/C propulsion , functional standing activities as well as when performing functional tasks.      Additional Treatment:  Pt edu on Plan of care,  safety when performing functional transfers, self care tasks and functional standing activities.  - White board updated  - Self care tasks completed-- as noted above   - He  performed dynamic sitting activity incorporating reaching in all planes while sitting unsupported EOB and working on self care  tasks.    Patient left up in chair with call button in reach and nurse notified    ASSESSMENT:  Mao Levin is a 53 y.o. male with a medical diagnosis of MS (multiple sclerosis) . Pt was agreeable to OT and tolerated Tx without incidence but continues to require (A) to perform functional activities to completion. OT addressed self care tasks, functional mobility, functional transfers, functional standing and endurance to perform ADLS.  Pt is making progress but continues to require OT Intervention to perform functional transfers, functional standing activities, and self care tasks with standing component more independently. OT is recommended to further his functional (I)ce and safety. Goals remain appropriate and continued OT is recommended.        Rehab identified problem list/impairments: weakness, impaired endurance, impaired self care skills, impaired functional mobilty, gait instability, impaired balance, impaired sensation, impaired joint extensibility, impaired fine motor, decreased coordination, decreased lower extremity function, decreased upper extremity function, impaired coordination, pain    Rehab potential is good    Activity tolerance: Good    Discharge recommendations: home with home health     Barriers to discharge: Inaccessible home environment, Decreased caregiver support     Equipment recommendations: walker, rolling     GOALS:   Multidisciplinary Problems     Occupational Therapy Goals        Problem: Occupational Therapy Goal    Goal Priority Disciplines Outcome Interventions   Occupational Therapy Goal     OT, PT/OT Ongoing (interventions implemented as appropriate)    Description:  Goals to be met by: 12/17/2018     Patient will increase functional independence with ADLs by performing:    Feeding with Modified [place].    Met  UE Dressing with Set-up Assistance.   Met  LE Dressing with Minimal Assistance.  Grooming while seated with Supervision.    Met  Toileting from bedside commode with  Stand-by Assistance for hygiene and clothing management.   Bathing from  shower chair/bench with Stand-by Assistance.  Rolling to Right, Left with Supervision.    Met  Supine to sit with Supervision.  Met  Stand pivot transfers with Stand-by Assistance.  Toilet transfer to bedside commode with Stand-by Assistance.  Upper extremity exercise program 3 x 15 reps per handout, with independence.  Patient will perform a functional standing activity for 10 min with S in order to perform household tasks.                      Plan:  Patient to be seen 5 x/week to address the above listed problems via self-care/home management, therapeutic activities, therapeutic exercises  Plan of Care expires: 01/05/18  Plan of Care reviewed with: patient    Pedro Felix, OTR/NILESH  12/20/2018

## 2018-12-20 NOTE — PLAN OF CARE
Problem: Physical Therapy Goal  Goal: Physical Therapy Goal  Goals to be met by: 18     Patient will increase functional independence with mobility by performin. Supine to sit with Stand-by Assistance- Met 2018  2. Sit to supine with Stand-by Assistance- Met 2018  3. Rolling to Left and Right with Stand-by Assistance.  4. Sit to stand transfer with Stand-by Assistance  5. Bed to chair transfer with Stand-by Assistance using Rolling Walker  6. Gait  x 50 feet with Stand-by Assistance using Rolling Walker.   7. Ascend/descend 6 stair with Right OR Left Handrails Contact Guard Assistance        Outcome: Ongoing (interventions implemented as appropriate)  Goals remains appropriate

## 2018-12-20 NOTE — PLAN OF CARE
Problem: Occupational Therapy Goal  Goal: Occupational Therapy Goal  Goals to be met by: 12/17/2018     Patient will increase functional independence with ADLs by performing:    Feeding with Modified [place].    Met  UE Dressing with Set-up Assistance.   Met  LE Dressing with Minimal Assistance.  Grooming while seated with Supervision.    Met  Toileting from bedside commode with Stand-by Assistance for hygiene and clothing management.   Bathing from  shower chair/bench with Stand-by Assistance.  Rolling to Right, Left with Supervision.    Met  Supine to sit with Supervision.  Met  Stand pivot transfers with Stand-by Assistance.  Toilet transfer to bedside commode with Stand-by Assistance.  Upper extremity exercise program 3 x 15 reps per handout, with independence.  Patient will perform a functional standing activity for 10 min with S in order to perform household tasks.     Outcome: Ongoing (interventions implemented as appropriate)  MERCY Cason/NILESH      12/20/2018

## 2018-12-20 NOTE — PT/OT/SLP PROGRESS
"Physical Therapy  Treatment    Mao Levin   MRN: [id number]   Admitting Diagnosis: MS (multiple sclerosis)    PT Received On: 12/20/18  Total Time (min): 45       Billable Minutes:  Gait Training 15, Therapeutic Activity 15 and Therapeutic Exercise 15    Treatment Type: Treatment  PT/PTA: PTA     PTA Visit Number: 2       General Precautions: Standard, fall  Orthopedic Precautions: N/A   Braces: N/A     Subjective:  "alright, just stiff neck, the normal" has pain patch    Pain/Comfort  Pain Rating 1: (Normal")  Location - Side 1: Bilateral  Location - Orientation 1: generalized  Location 1: (legs and stiff neck)  Pain Addressed 1: Pre-medicate for activity, Reposition, Distraction  Pain Rating Post-Intervention 1: (no further mention)    Objective:  Patient found in wc LLE AFO     AM-PAC 6 CLICK MOBILITY  Total Score:18    Transfers:  Sit<>Stand: with RW CGA some A to  with left hand on RW  Stand Pivot Transfer: with RW CGA nustep>wc    Gait:  Amb with RW CGA ~ 65 ft slow keyshawn, ace wrap ontop of AFO to inc DF for better clearance and less compensating     Advanced Gait: pt repts only having his mother which is unable to A  Stairs: asc/chandana 4 steps with BUE on LHR CGA inc time    Additional Treatment:  Recumbent cross  L-6 for 10 min and L-7 for 5 minutes    Patient left up in chair with call button in reach and belongingsin reach.    Assessment:  Mao Levin is a 53 y.o. male with a medical diagnosis of MS (multiple sclerosis).  Pt tolerated well, sent message to  to see if any available resources for pt possibly to assist with a ramp to enter home, pt would continue to benefit from skilled PT services to improve overall functional mobility, strength and endurance.  .    Rehab identified problem list/impairments: weakness, impaired endurance, impaired sensation, impaired self care skills, impaired functional mobilty, gait instability, impaired balance, decreased coordination, decreased upper extremity " function, decreased lower extremity function, pain, abnormal tone, decreased ROM, impaired coordination, impaired fine motor, impaired joint extensibility    Rehab potential is good.    Activity tolerance: Fair    Discharge recommendations: home with home health     Barriers to discharge: Inaccessible home environment, Decreased caregiver support    Equipment recommendations: bedside commode, walker, rolling     GOALS:   Multidisciplinary Problems     Physical Therapy Goals        Problem: Physical Therapy Goal    Goal Priority Disciplines Outcome Goal Variances Interventions   Physical Therapy Goal     PT, PT/OT Ongoing (interventions implemented as appropriate)     Description:  Goals to be met by: 18     Patient will increase functional independence with mobility by performin. Supine to sit with Stand-by Assistance- Met 2018  2. Sit to supine with Stand-by Assistance- Met 2018  3. Rolling to Left and Right with Stand-by Assistance.  4. Sit to stand transfer with Stand-by Assistance  5. Bed to chair transfer with Stand-by Assistance using Rolling Walker  6. Gait  x 50 feet with Stand-by Assistance using Rolling Walker.   7. Ascend/descend 6 stair with Right OR Left Handrails Contact Guard Assistance                         PLAN:    Patient to be seen (5-6X/week)  to address the above listed problems via gait training, therapeutic activities, therapeutic exercises  Plan of Care expires: 19  Plan of Care reviewed with: patient    Chely Gudino, PTA  2018

## 2018-12-20 NOTE — PLAN OF CARE
Problem: Patient Care Overview  Goal: Plan of Care Review  Outcome: Ongoing (interventions implemented as appropriate)  NO PRESSURE ULCERS NOTED.FALL PRECAUTIONS MAINTAINED NO INJURIES NOTED.AFEBRILE.PAIN IN NECK BACK AND SHOULDER RELIEVED FROM OXYCODONE.

## 2018-12-20 NOTE — PLAN OF CARE
Problem: Patient Care Overview  Goal: Plan of Care Review  Outcome: Ongoing (interventions implemented as appropriate)   12/20/18 6627   Plan of Care Review   Plan of Care Reviewed With patient

## 2018-12-21 PROCEDURE — 25000003 PHARM REV CODE 250: Mod: HCWC | Performed by: NURSE PRACTITIONER

## 2018-12-21 PROCEDURE — 97110 THERAPEUTIC EXERCISES: CPT | Mod: HCWC

## 2018-12-21 PROCEDURE — 25000003 PHARM REV CODE 250: Mod: HCWC | Performed by: INTERNAL MEDICINE

## 2018-12-21 PROCEDURE — 11000004 HC SNF PRIVATE: Mod: HCWC

## 2018-12-21 PROCEDURE — 97530 THERAPEUTIC ACTIVITIES: CPT | Mod: HCWC

## 2018-12-21 PROCEDURE — 97116 GAIT TRAINING THERAPY: CPT | Mod: HCWC

## 2018-12-21 PROCEDURE — 25000003 PHARM REV CODE 250: Mod: HCWC | Performed by: PHYSICIAN ASSISTANT

## 2018-12-21 PROCEDURE — 97535 SELF CARE MNGMENT TRAINING: CPT | Mod: HCWC

## 2018-12-21 RX ADMIN — LIDOCAINE 1 PATCH: 50 PATCH TOPICAL at 08:12

## 2018-12-21 RX ADMIN — TRAZODONE HYDROCHLORIDE 100 MG: 50 TABLET ORAL at 08:12

## 2018-12-21 RX ADMIN — DANTROLENE SODIUM 50 MG: 50 CAPSULE ORAL at 08:12

## 2018-12-21 RX ADMIN — DULOXETINE 30 MG: 30 CAPSULE, DELAYED RELEASE ORAL at 08:12

## 2018-12-21 RX ADMIN — DANTROLENE SODIUM 50 MG: 50 CAPSULE ORAL at 12:12

## 2018-12-21 RX ADMIN — DIAZEPAM 5 MG: 5 TABLET ORAL at 08:12

## 2018-12-21 RX ADMIN — BACLOFEN 20 MG: 10 TABLET ORAL at 12:12

## 2018-12-21 RX ADMIN — RIVAROXABAN 20 MG: 20 TABLET, FILM COATED ORAL at 04:12

## 2018-12-21 RX ADMIN — BACLOFEN 20 MG: 10 TABLET ORAL at 08:12

## 2018-12-21 RX ADMIN — SENNOSIDES AND DOCUSATE SODIUM 1 TABLET: 8.6; 5 TABLET ORAL at 08:12

## 2018-12-21 RX ADMIN — TAMSULOSIN HYDROCHLORIDE 0.4 MG: 0.4 CAPSULE ORAL at 08:12

## 2018-12-21 RX ADMIN — GABAPENTIN 400 MG: 100 CAPSULE ORAL at 12:12

## 2018-12-21 RX ADMIN — GABAPENTIN 400 MG: 100 CAPSULE ORAL at 08:12

## 2018-12-21 RX ADMIN — OXYCODONE HYDROCHLORIDE 15 MG: 10 TABLET ORAL at 06:12

## 2018-12-21 RX ADMIN — OXYCODONE HYDROCHLORIDE 15 MG: 10 TABLET ORAL at 08:12

## 2018-12-21 RX ADMIN — BUPROPION HYDROCHLORIDE 150 MG: 150 TABLET, EXTENDED RELEASE ORAL at 04:12

## 2018-12-21 RX ADMIN — DIAZEPAM 5 MG: 5 TABLET ORAL at 12:12

## 2018-12-21 RX ADMIN — GABAPENTIN 400 MG: 100 CAPSULE ORAL at 04:12

## 2018-12-21 RX ADMIN — BUPROPION HYDROCHLORIDE 150 MG: 150 TABLET, EXTENDED RELEASE ORAL at 08:12

## 2018-12-21 RX ADMIN — DANTROLENE SODIUM 50 MG: 50 CAPSULE ORAL at 05:12

## 2018-12-21 RX ADMIN — POLYETHYLENE GLYCOL 3350 17 G: 17 POWDER, FOR SOLUTION ORAL at 08:12

## 2018-12-21 RX ADMIN — OXYCODONE HYDROCHLORIDE 15 MG: 10 TABLET ORAL at 01:12

## 2018-12-21 RX ADMIN — BACLOFEN 20 MG: 10 TABLET ORAL at 04:12

## 2018-12-21 NOTE — PLAN OF CARE
Problem: Occupational Therapy Goal  Goal: Occupational Therapy Goal  Goals to be met by: 12/17/2018     Patient will increase functional independence with ADLs by performing:    Feeding with Modified [place].    Met  UE Dressing with Set-up Assistance.   Met  LE Dressing with Minimal Assistance.  Grooming while seated with Supervision.    Met  Toileting from bedside commode with Stand-by Assistance for hygiene and clothing management.   Bathing from  shower chair/bench with Stand-by Assistance.  Rolling to Right, Left with Supervision.    Met  Supine to sit with Supervision.  Met  Stand pivot transfers with Stand-by Assistance.  Toilet transfer to bedside commode with Stand-by Assistance.  Upper extremity exercise program 3 x 15 reps per handout, with independence.  Patient will perform a functional standing activity for 10 min with S in order to perform household tasks.     Outcome: Ongoing (interventions implemented as appropriate)  .

## 2018-12-21 NOTE — PLAN OF CARE
Problem: Physical Therapy Goal  Goal: Physical Therapy Goal  Goals to be met by: 18     Patient will increase functional independence with mobility by performin. Supine to sit with Stand-by Assistance- Met 2018  2. Sit to supine with Stand-by Assistance- Met 2018  3. Rolling to Left and Right with Stand-by Assistance.  4. Sit to stand transfer with Stand-by Assistance  5. Bed to chair transfer with Stand-by Assistance using Rolling Walker  6. Gait  x 50 feet with Stand-by Assistance using Rolling Walker.   7. Ascend/descend 6 stair with Right OR Left Handrails Contact Guard Assistance        Outcome: Ongoing (interventions implemented as appropriate)  Goals remain appropraite

## 2018-12-21 NOTE — PT/OT/SLP PROGRESS
Occupational Therapy  Treatment    Mao Levin   MRN: [id number]   Admitting Diagnosis: MS (multiple sclerosis)    OT Date of Treatment: 12/21/18  Total Time (min): 45 min    Billable Minutes:  Self Care/Home Management 45    General Precautions: Standard, fall  Orthopedic Precautions: N/A  Braces: N/A         Subjective:  Communicated with nsg prior to session.  I am doing well today    Pain/Comfort  Pain Rating 1: 0/10  Pain Rating 2: 0/10    Objective:   Pt.  Supine on arrival     Occupational Performance:    Bed Mobility:    · Patient completed Supine to Sit with stand by assistance     Functional Mobility/Transfers:  · Patient completed Sit <> Stand Transfer with contact guard assistance  with  rolling walker   · Patient completed Bed <> Chair Transfer using Stand Pivot technique with contact guard assistance with no assistive device      Activities of Daily Living:  · Feeding:  modified independence after set up  · Grooming: supervision grooming asepcts  · Bathing: contact guard assistance standing balance management EOB level  · Upper Body Dressing: stand by assistance to christopher pull over shirt  · Lower Body Dressing: minimum assistance for LB dressing asepcts with managing pants over hips instance also  (A) with depends and Mod a for AFO adjustment with shoes    [facility] 6 Click:  [facility] Total Score: 20    Patient left up in chair with all lines intact and call button in reach    ASSESSMENT:  Mao Levin is a 53 y.o. male with a medical diagnosis of MS (multiple sclerosis) Pt. participated well with session on this day.Pt demos physical deficits with balance  functional mobility, UB strength, endurance  level of functional indep with daily tasks and activities and selfcare skills .Pt. Will continue to benefit from continued OT to progress towards goals      Rehab identified problem list/impairments: weakness, impaired endurance, impaired self care skills, impaired functional mobilty, gait instability, impaired  balance, impaired sensation, impaired joint extensibility, impaired fine motor, decreased coordination, decreased lower extremity function, decreased upper extremity function, impaired coordination, pain    Rehab potential is fair    Activity tolerance: Fair    Discharge recommendations: home with home health     Barriers to discharge: Inaccessible home environment, Decreased caregiver support     Equipment recommendations: walker, rolling     GOALS:   Multidisciplinary Problems     Occupational Therapy Goals        Problem: Occupational Therapy Goal    Goal Priority Disciplines Outcome Interventions   Occupational Therapy Goal     OT, PT/OT Ongoing (interventions implemented as appropriate)    Description:  Goals to be met by: 12/17/2018     Patient will increase functional independence with ADLs by performing:    Feeding with Modified [place].    Met  UE Dressing with Set-up Assistance.   Met  LE Dressing with Minimal Assistance.  Grooming while seated with Supervision.    Met  Toileting from bedside commode with Stand-by Assistance for hygiene and clothing management.   Bathing from  shower chair/bench with Stand-by Assistance.  Rolling to Right, Left with Supervision.    Met  Supine to sit with Supervision.  Met  Stand pivot transfers with Stand-by Assistance.  Toilet transfer to bedside commode with Stand-by Assistance.  Upper extremity exercise program 3 x 15 reps per handout, with independence.  Patient will perform a functional standing activity for 10 min with S in order to perform household tasks.                  Plan:  Patient to be seen 5 x/week to address the above listed problems via self-care/home management, therapeutic activities, therapeutic exercises  Plan of Care expires: 01/05/18  Plan of Care reviewed with: patient    WENDY Mustafa  12/21/2018

## 2018-12-21 NOTE — PLAN OF CARE
Problem: Fall Risk (Adult)  Intervention: Monitor/Assist with Self Care   12/21/18 0124   Promote Activity and Functional [place]   Activity Assistance Provided assistance, 1 person   Identify and Manage Contributors to Fall Injury Risk   Self-Care Promotion BADL personal objects within reach;BADL personal routines maintained   Functional Screen (every 3 days/change)   Ambulation 3 - assistive equipment and person   Transferring 3 - assistive equipment and person   Toileting 3 - assistive equipment and person   Bathing 3 - assistive equipment and person   Dressing 3 - assistive equipment and person   Eating 0 - independent   Communication 0 - understands/communicates without difficulty      12/21/18 0124   Promote Activity and Functional [place]   Activity Assistance Provided assistance, 1 person   Identify and Manage Contributors to Fall Injury Risk   Self-Care Promotion BADL personal objects within reach;BADL personal routines maintained   Functional Screen (every 3 days/change)   Ambulation 3 - assistive equipment and person   Transferring 3 - assistive equipment and person   Toileting 3 - assistive equipment and person   Bathing 3 - assistive equipment and person   Dressing 3 - assistive equipment and person   Eating 0 - independent   Communication 0 - understands/communicates without difficulty   Swallowing 0 - swallows foods/liquids without difficulty

## 2018-12-21 NOTE — PT/OT/SLP PROGRESS
"Physical Therapy  Treatment    Mao Levin   MRN: [id number]   Admitting Diagnosis: MS (multiple sclerosis)    PT Received On: 12/21/18  Total Time (min): 68       Billable Minutes:  Gait Training 15, Therapeutic Activity 23 and Therapeutic Exercise 30    Treatment Type: Treatment  PT/PTA: PTA     PTA Visit Number: 3       General Precautions: Standard, fall  Orthopedic Precautions: N/A   Braces: N/A         Subjective:  "I'm alright, ready"      Pain/Comfort  Pain Rating 1: 0/10  Pain Rating 2: 0/10    Objective:  Patient found in wc with LLE AFO     AM-PAC 6 CLICK MOBILITY  Total Score:18    Bed Mobility:  Sit>Supine:SBA/S inc time/effort "I can do it" on mat  Supine>Sit: SBA/S inc time/effort on mat    Transfers:  Sit<>Stand: CGA with RW x 1 , close SBA with RW from WC/EOM/nustep/BSC  Stand Pivot Transfer: SBA with grab bar in bathroom WC<>BSCommode, CG/SBA with RW to EOM and nustep     Gait:  Amb with RW LLE AFO with added ace wrap to inc DF for better clearance ~76 ft CG/close SBA, ~ 25 ft from EOM>nustep without ace wrap improved clearance noted CG/close SBA    Advanced Gait:  Stairs: asc/chandana 4 steps with BUE on LHR CG/close SBA inc time    Therex:A/AA as needed especially with hip flex on L  Seated LAQ x 20 reps  Supine 2x10 reps RLE AP, LLE PROM AP and gastroc stretch x3:15 sec holds, LLE HS with A,abd/add with A,SLR     Balance:  EOM SBA/S    Additional Treatment:  Recumbent cross  x 15 min L-6  toileting SBA with grab bar for trfs, SBA with clothing mgmt and kim care/hand hygiene in sitting    Patient left up in chair with call button in reach and belongings in reach.    Assessment:  Mao Levin is a 53 y.o. male with a medical diagnosis of MS (multiple sclerosis).  Pt tolerated well, improvement noted with overall functional mobility progressing towards goals,  pt would continue to benefit from skilled PT services to improve overall functional mobility, strength and endurance.  .    Rehab identified " problem list/impairments: weakness, impaired endurance, impaired sensation, impaired self care skills, impaired functional mobilty, gait instability, impaired balance, decreased coordination, decreased upper extremity function, decreased lower extremity function, pain, abnormal tone, decreased ROM, impaired coordination, impaired fine motor, impaired joint extensibility    Rehab potential is good.    Activity tolerance: Fair    Discharge recommendations: home with home health     Barriers to discharge: Inaccessible home environment, Decreased caregiver support    Equipment recommendations: bedside commode, walker, rolling     GOALS:   Multidisciplinary Problems     Physical Therapy Goals        Problem: Physical Therapy Goal    Goal Priority Disciplines Outcome Goal Variances Interventions   Physical Therapy Goal     PT, PT/OT Ongoing (interventions implemented as appropriate)     Description:  Goals to be met by: 18     Patient will increase functional independence with mobility by performin. Supine to sit with Stand-by Assistance- Met 2018  2. Sit to supine with Stand-by Assistance- Met 2018  3. Rolling to Left and Right with Stand-by Assistance.  4. Sit to stand transfer with Stand-by Assistance  5. Bed to chair transfer with Stand-by Assistance using Rolling Walker  6. Gait  x 50 feet with Stand-by Assistance using Rolling Walker.   7. Ascend/descend 6 stair with Right OR Left Handrails Contact Guard Assistance                         PLAN:    Patient to be seen (5-6X/week)  to address the above listed problems via gait training, therapeutic activities, therapeutic exercises  Plan of Care expires: 19  Plan of Care reviewed with: patient    Chely Gudino, PTA  2018

## 2018-12-21 NOTE — PLAN OF CARE
Problem: Patient Care Overview  Goal: Plan of Care Review  Outcome: Ongoing (interventions implemented as appropriate)  NO PRESSURE ULCERS NOTED.FALL PRECAUTIONS MAINTAINED NO INJURIES NOTED.PAIN CONTROLLED BY OXYCODONE IN NECK ,SHOULDERS AND BACK.AFEBRILE.

## 2018-12-22 PROCEDURE — 97116 GAIT TRAINING THERAPY: CPT | Mod: HCWC

## 2018-12-22 PROCEDURE — 97530 THERAPEUTIC ACTIVITIES: CPT | Mod: HCWC

## 2018-12-22 PROCEDURE — 25000003 PHARM REV CODE 250: Mod: HCWC | Performed by: NURSE PRACTITIONER

## 2018-12-22 PROCEDURE — 25000003 PHARM REV CODE 250: Mod: HCWC | Performed by: PHYSICIAN ASSISTANT

## 2018-12-22 PROCEDURE — 97110 THERAPEUTIC EXERCISES: CPT | Mod: HCWC

## 2018-12-22 PROCEDURE — 11000004 HC SNF PRIVATE: Mod: HCWC

## 2018-12-22 PROCEDURE — 25000003 PHARM REV CODE 250: Mod: HCWC | Performed by: INTERNAL MEDICINE

## 2018-12-22 RX ADMIN — OXYCODONE HYDROCHLORIDE 15 MG: 10 TABLET ORAL at 11:12

## 2018-12-22 RX ADMIN — LIDOCAINE 1 PATCH: 50 PATCH TOPICAL at 08:12

## 2018-12-22 RX ADMIN — RIVAROXABAN 20 MG: 20 TABLET, FILM COATED ORAL at 05:12

## 2018-12-22 RX ADMIN — BUPROPION HYDROCHLORIDE 150 MG: 150 TABLET, EXTENDED RELEASE ORAL at 08:12

## 2018-12-22 RX ADMIN — SENNOSIDES AND DOCUSATE SODIUM 1 TABLET: 8.6; 5 TABLET ORAL at 08:12

## 2018-12-22 RX ADMIN — TRAZODONE HYDROCHLORIDE 100 MG: 50 TABLET ORAL at 08:12

## 2018-12-22 RX ADMIN — GABAPENTIN 400 MG: 100 CAPSULE ORAL at 08:12

## 2018-12-22 RX ADMIN — DIAZEPAM 5 MG: 5 TABLET ORAL at 08:12

## 2018-12-22 RX ADMIN — DANTROLENE SODIUM 50 MG: 50 CAPSULE ORAL at 01:12

## 2018-12-22 RX ADMIN — DANTROLENE SODIUM 50 MG: 50 CAPSULE ORAL at 08:12

## 2018-12-22 RX ADMIN — GABAPENTIN 400 MG: 100 CAPSULE ORAL at 01:12

## 2018-12-22 RX ADMIN — GABAPENTIN 400 MG: 100 CAPSULE ORAL at 05:12

## 2018-12-22 RX ADMIN — OXYCODONE HYDROCHLORIDE 15 MG: 10 TABLET ORAL at 08:12

## 2018-12-22 RX ADMIN — BACLOFEN 20 MG: 10 TABLET ORAL at 08:12

## 2018-12-22 RX ADMIN — BUPROPION HYDROCHLORIDE 150 MG: 150 TABLET, EXTENDED RELEASE ORAL at 05:12

## 2018-12-22 RX ADMIN — DANTROLENE SODIUM 50 MG: 50 CAPSULE ORAL at 05:12

## 2018-12-22 RX ADMIN — DIAZEPAM 5 MG: 5 TABLET ORAL at 11:12

## 2018-12-22 RX ADMIN — BACLOFEN 20 MG: 10 TABLET ORAL at 05:12

## 2018-12-22 RX ADMIN — BACLOFEN 20 MG: 10 TABLET ORAL at 01:12

## 2018-12-22 RX ADMIN — TAMSULOSIN HYDROCHLORIDE 0.4 MG: 0.4 CAPSULE ORAL at 08:12

## 2018-12-22 RX ADMIN — RAMELTEON 8 MG: 8 TABLET, FILM COATED ORAL at 08:12

## 2018-12-22 RX ADMIN — DULOXETINE 30 MG: 30 CAPSULE, DELAYED RELEASE ORAL at 08:12

## 2018-12-22 RX ADMIN — OXYCODONE HYDROCHLORIDE 15 MG: 10 TABLET ORAL at 05:12

## 2018-12-22 NOTE — PLAN OF CARE
Problem: Physical Therapy Goal  Goal: Physical Therapy Goal  Goals to be met by: 18     Patient will increase functional independence with mobility by performin. Supine to sit with Stand-by Assistance- Met 2018  2. Sit to supine with Stand-by Assistance- Met 2018  3. Rolling to Left and Right with Stand-by Assistance.  4. Sit to stand transfer with Stand-by Assistance met  5. Bed to chair transfer with Stand-by Assistance using Rolling Walker met  6. Gait  x 50 feet with Stand-by Assistance using Rolling Walker. met  7. Ascend/descend 6 stair with Right OR Left Handrails Contact Guard Assistance        Outcome: Ongoing (interventions implemented as appropriate)  Goals remain appropriate

## 2018-12-22 NOTE — PLAN OF CARE
Problem: Patient Care Overview  Goal: Plan of Care Review  Outcome: Ongoing (interventions implemented as appropriate)  Mr Levin is up in w/c in room 333. Pt received 15 mg of Oxycodone IR for c/o generalized pain rated at 9 on a pain scale of 0-10. Safety measures maintained. Pt received teaching on continuing to use the call button to signal staff for assistance. Mr Levin verbalized understanding of information. Call light is within reach. Will continue with plan of care.

## 2018-12-22 NOTE — PT/OT/SLP PROGRESS
"Physical Therapy  Treatment    Mao Levin   MRN: [id number]   Admitting Diagnosis: MS (multiple sclerosis)    PT Received On: 12/22/18  Total Time (min): 42       Billable Minutes:  Gait Training 14, Therapeutic Activity 13 and Therapeutic Exercise 15    Treatment Type: Treatment  PT/PTA: PTA     PTA Visit Number: 4       General Precautions: Standard, fall  Orthopedic Precautions: N/A   Braces: N/A     Subjective:  "ready"    Pain/Comfort  Pain Rating 1: 0/10  Pain Rating 2: 0/10    Objective:   Patient found with: (in wc LLE AFO)     AM-PAC 6 CLICK MOBILITY  Total Score:18    Transfers:  Sit<>Stand: with RW close SBA  Stand Pivot Transfer: with RW close SBA    Gait:  Amb with RW close SBA ~ 105 ft LLE AFO reinforced with ace wrap, occ vcs for better gait mechanics     Advanced Gait:  Stairs: asc/chandana 4 steps with BUE on LHR close SBA    Additional Treatment:  Recumbent cross  x 15 min L-6 and L7    Patient left up in chair with call button in reach and belongings inreach.    Assessment:  Mao Levin is a 53 y.o. male with a medical diagnosis of MS (multiple sclerosis).  Pt tolerated well, pt is close SBA with trfs , gait and 4 steps today,  pt would continue to benefit from skilled PT services to improve overall functional mobility, strength and endurance.  .    Rehab identified problem list/impairments: weakness, impaired endurance, impaired sensation, impaired self care skills, impaired functional mobilty, gait instability, impaired balance, decreased coordination, decreased upper extremity function, decreased lower extremity function, pain, abnormal tone, decreased ROM, impaired coordination, impaired fine motor, impaired joint extensibility    Rehab potential is good.    Activity tolerance: Fair    Discharge recommendations: home with home health     Barriers to discharge: Inaccessible home environment, Decreased caregiver support    Equipment recommendations: bedside commode, walker, rolling     GOALS: "   Multidisciplinary Problems     Physical Therapy Goals        Problem: Physical Therapy Goal    Goal Priority Disciplines Outcome Goal Variances Interventions   Physical Therapy Goal     PT, PT/OT Ongoing (interventions implemented as appropriate)     Description:  Goals to be met by: 18     Patient will increase functional independence with mobility by performin. Supine to sit with Stand-by Assistance- Met 2018  2. Sit to supine with Stand-by Assistance- Met 2018  3. Rolling to Left and Right with Stand-by Assistance.  4. Sit to stand transfer with Stand-by Assistance met  5. Bed to chair transfer with Stand-by Assistance using Rolling Walker met  6. Gait  x 50 feet with Stand-by Assistance using Rolling Walker. met  7. Ascend/descend 6 stair with Right OR Left Handrails Contact Guard Assistance                          PLAN:    Patient to be seen (5-6X/week)  to address the above listed problems via gait training, therapeutic activities, therapeutic exercises  Plan of Care expires: 19  Plan of Care reviewed with: patient    Chely Gudino, PTA  2018

## 2018-12-23 PROCEDURE — 25000003 PHARM REV CODE 250: Mod: HCWC | Performed by: NURSE PRACTITIONER

## 2018-12-23 PROCEDURE — 25000003 PHARM REV CODE 250: Mod: HCWC | Performed by: INTERNAL MEDICINE

## 2018-12-23 PROCEDURE — 11000004 HC SNF PRIVATE: Mod: HCWC

## 2018-12-23 PROCEDURE — 25000003 PHARM REV CODE 250: Mod: HCWC | Performed by: PHYSICIAN ASSISTANT

## 2018-12-23 RX ADMIN — GABAPENTIN 400 MG: 100 CAPSULE ORAL at 08:12

## 2018-12-23 RX ADMIN — OXYCODONE HYDROCHLORIDE 15 MG: 10 TABLET ORAL at 06:12

## 2018-12-23 RX ADMIN — DANTROLENE SODIUM 50 MG: 50 CAPSULE ORAL at 08:12

## 2018-12-23 RX ADMIN — OXYCODONE HYDROCHLORIDE 15 MG: 10 TABLET ORAL at 12:12

## 2018-12-23 RX ADMIN — GABAPENTIN 400 MG: 100 CAPSULE ORAL at 01:12

## 2018-12-23 RX ADMIN — LIDOCAINE 1 PATCH: 50 PATCH TOPICAL at 08:12

## 2018-12-23 RX ADMIN — TRAZODONE HYDROCHLORIDE 100 MG: 50 TABLET ORAL at 08:12

## 2018-12-23 RX ADMIN — POLYETHYLENE GLYCOL 3350 17 G: 17 POWDER, FOR SOLUTION ORAL at 08:12

## 2018-12-23 RX ADMIN — SENNOSIDES AND DOCUSATE SODIUM 1 TABLET: 8.6; 5 TABLET ORAL at 08:12

## 2018-12-23 RX ADMIN — BACLOFEN 20 MG: 10 TABLET ORAL at 04:12

## 2018-12-23 RX ADMIN — RAMELTEON 8 MG: 8 TABLET, FILM COATED ORAL at 08:12

## 2018-12-23 RX ADMIN — BUPROPION HYDROCHLORIDE 150 MG: 150 TABLET, EXTENDED RELEASE ORAL at 04:12

## 2018-12-23 RX ADMIN — DANTROLENE SODIUM 50 MG: 50 CAPSULE ORAL at 01:12

## 2018-12-23 RX ADMIN — DULOXETINE 30 MG: 30 CAPSULE, DELAYED RELEASE ORAL at 08:12

## 2018-12-23 RX ADMIN — BACLOFEN 20 MG: 10 TABLET ORAL at 08:12

## 2018-12-23 RX ADMIN — BUPROPION HYDROCHLORIDE 150 MG: 150 TABLET, EXTENDED RELEASE ORAL at 08:12

## 2018-12-23 RX ADMIN — DIAZEPAM 5 MG: 5 TABLET ORAL at 12:12

## 2018-12-23 RX ADMIN — BACLOFEN 20 MG: 10 TABLET ORAL at 01:12

## 2018-12-23 RX ADMIN — DIAZEPAM 5 MG: 5 TABLET ORAL at 08:12

## 2018-12-23 RX ADMIN — DANTROLENE SODIUM 50 MG: 50 CAPSULE ORAL at 04:12

## 2018-12-23 RX ADMIN — OXYCODONE HYDROCHLORIDE 15 MG: 10 TABLET ORAL at 08:12

## 2018-12-23 RX ADMIN — GABAPENTIN 400 MG: 100 CAPSULE ORAL at 04:12

## 2018-12-23 RX ADMIN — RIVAROXABAN 20 MG: 20 TABLET, FILM COATED ORAL at 04:12

## 2018-12-23 RX ADMIN — TAMSULOSIN HYDROCHLORIDE 0.4 MG: 0.4 CAPSULE ORAL at 08:12

## 2018-12-23 RX ADMIN — SENNOSIDES AND DOCUSATE SODIUM 1 TABLET: 8.6; 5 TABLET ORAL at 09:12

## 2018-12-23 NOTE — PLAN OF CARE
Problem: Patient Care Overview  Goal: Plan of Care Review  Outcome: Ongoing (interventions implemented as appropriate)  Mr Levin is sitting up in w/c in room 333. Pt received 15 mg of Oxycodone IR for c/o generalized pain rated at 8 on a pain scale of 0-10, and 5 mg of Diazepam for c/o muscle spasms. Safety measures maintained. Call light is within reach, and family is at the bedside. Will continue with plan of care.

## 2018-12-24 PROCEDURE — 97116 GAIT TRAINING THERAPY: CPT | Mod: HCWC

## 2018-12-24 PROCEDURE — 25000003 PHARM REV CODE 250: Mod: HCWC | Performed by: NURSE PRACTITIONER

## 2018-12-24 PROCEDURE — 25000003 PHARM REV CODE 250: Mod: HCWC | Performed by: INTERNAL MEDICINE

## 2018-12-24 PROCEDURE — 97112 NEUROMUSCULAR REEDUCATION: CPT | Mod: HCWC

## 2018-12-24 PROCEDURE — 11000004 HC SNF PRIVATE: Mod: HCWC

## 2018-12-24 PROCEDURE — 97110 THERAPEUTIC EXERCISES: CPT | Mod: HCWC

## 2018-12-24 PROCEDURE — 97530 THERAPEUTIC ACTIVITIES: CPT | Mod: HCWC

## 2018-12-24 PROCEDURE — 25000003 PHARM REV CODE 250: Mod: HCWC | Performed by: PHYSICIAN ASSISTANT

## 2018-12-24 RX ADMIN — BUPROPION HYDROCHLORIDE 150 MG: 150 TABLET, EXTENDED RELEASE ORAL at 04:12

## 2018-12-24 RX ADMIN — TRAZODONE HYDROCHLORIDE 100 MG: 50 TABLET ORAL at 09:12

## 2018-12-24 RX ADMIN — DIAZEPAM 5 MG: 5 TABLET ORAL at 12:12

## 2018-12-24 RX ADMIN — BACLOFEN 20 MG: 10 TABLET ORAL at 01:12

## 2018-12-24 RX ADMIN — DANTROLENE SODIUM 50 MG: 50 CAPSULE ORAL at 04:12

## 2018-12-24 RX ADMIN — GABAPENTIN 400 MG: 100 CAPSULE ORAL at 09:12

## 2018-12-24 RX ADMIN — OXYCODONE HYDROCHLORIDE 15 MG: 10 TABLET ORAL at 09:12

## 2018-12-24 RX ADMIN — SENNOSIDES AND DOCUSATE SODIUM 1 TABLET: 8.6; 5 TABLET ORAL at 09:12

## 2018-12-24 RX ADMIN — BACLOFEN 20 MG: 10 TABLET ORAL at 04:12

## 2018-12-24 RX ADMIN — RIVAROXABAN 20 MG: 20 TABLET, FILM COATED ORAL at 04:12

## 2018-12-24 RX ADMIN — LIDOCAINE 1 PATCH: 50 PATCH TOPICAL at 08:12

## 2018-12-24 RX ADMIN — DIAZEPAM 5 MG: 5 TABLET ORAL at 09:12

## 2018-12-24 RX ADMIN — SENNOSIDES AND DOCUSATE SODIUM 1 TABLET: 8.6; 5 TABLET ORAL at 08:12

## 2018-12-24 RX ADMIN — DANTROLENE SODIUM 50 MG: 50 CAPSULE ORAL at 01:12

## 2018-12-24 RX ADMIN — BACLOFEN 20 MG: 10 TABLET ORAL at 09:12

## 2018-12-24 RX ADMIN — TAMSULOSIN HYDROCHLORIDE 0.4 MG: 0.4 CAPSULE ORAL at 08:12

## 2018-12-24 RX ADMIN — GABAPENTIN 400 MG: 100 CAPSULE ORAL at 08:12

## 2018-12-24 RX ADMIN — GABAPENTIN 400 MG: 100 CAPSULE ORAL at 04:12

## 2018-12-24 RX ADMIN — OXYCODONE HYDROCHLORIDE 15 MG: 10 TABLET ORAL at 06:12

## 2018-12-24 RX ADMIN — DANTROLENE SODIUM 50 MG: 50 CAPSULE ORAL at 09:12

## 2018-12-24 RX ADMIN — BUPROPION HYDROCHLORIDE 150 MG: 150 TABLET, EXTENDED RELEASE ORAL at 08:12

## 2018-12-24 RX ADMIN — DULOXETINE 30 MG: 30 CAPSULE, DELAYED RELEASE ORAL at 08:12

## 2018-12-24 RX ADMIN — BACLOFEN 20 MG: 10 TABLET ORAL at 08:12

## 2018-12-24 RX ADMIN — GABAPENTIN 400 MG: 100 CAPSULE ORAL at 01:12

## 2018-12-24 RX ADMIN — OXYCODONE HYDROCHLORIDE 15 MG: 10 TABLET ORAL at 12:12

## 2018-12-24 RX ADMIN — DANTROLENE SODIUM 50 MG: 50 CAPSULE ORAL at 08:12

## 2018-12-24 NOTE — PT/OT/SLP PROGRESS
Physical Therapy  Treatment    Mao Levin   MRN: [id number]   Admitting Diagnosis: MS (multiple sclerosis)    PT Received On: 12/24/18  Total Time (min): 55       Billable Minutes:  Gait Training 10, Therapeutic Activity 15, Therapeutic Exercise 15 and Neuromuscular Re-education 15    Treatment Type: Treatment  PT/PTA: PT     PTA Visit Number: 0       General Precautions: Standard, fall  Orthopedic Precautions: N/A   Braces: N/A         Subjective:  Communicated with nurse prior to session.  Agreeable to PT services.     Pain/Comfort  Pain Rating 1: 0/10    Objective:  Patient found with: (in WC with L AFO donned)     AM-PAC 6 CLICK MOBILITY  Total Score:     Transfers:  Sit<>Stand: SBA from WC  Stand Pivot Transfer: SBA with UE support    Gait:  Amb 95 feet with use of rolling walker and SBA-CGA. Pt displayed vaulting on RLE to swing through LLE due to decreased hip, knee flexion and dorsiflexion on LLE.     Therex:  · Pelvic anterior/posterior tilts x 20 reps  · Forward flexion of trunk with LUE weight-bearing with hand flat on yoga blocks/stretching at wrist (2x20 reps).  · Pt's L hand was more soft (able to open/close) after this activity, as it's usually closed in shape of a fist tightly.      Additional Treatment:  · Pt requested to utilize the restroom. He was able to transfer SBA to bedside commode with grab bar use. Required Mod A for management of his pants and brief.  · Pt's L tibialis anterior was kinesiotaped today using the rule of 3, 100% tension to assist with dorsiflexion/proprioception during ambulation.  · Completed 10 minutes at level 7-8 on the recumbent stepper to improve his LE strength as well as his endurance.    Patient left up in chair with call button in reach.    Assessment:  Mao Levin is a 53 y.o. male with a medical diagnosis of MS (multiple sclerosis).  Mr. Levin is showing progress with his ambulation, as he's able to ambulate 95 feet with SBA-CGA using his rolling walker. We will  continue to strengthen his dorsiflexors as well as work on his LUE use and flexibility via weight-bearing activities.    Rehab identified problem list/impairments: weakness, impaired endurance, impaired sensation, impaired self care skills, impaired functional mobilty, gait instability, impaired balance, decreased coordination, decreased upper extremity function, decreased lower extremity function, pain, abnormal tone, decreased ROM, impaired coordination, impaired fine motor, impaired joint extensibility    Rehab potential is good.    Activity tolerance: Good    Discharge recommendations: home with home health     Barriers to discharge: Inaccessible home environment, Decreased caregiver support    Equipment recommendations: bedside commode, walker, rolling     GOALS:   Multidisciplinary Problems     Physical Therapy Goals        Problem: Physical Therapy Goal    Goal Priority Disciplines Outcome Goal Variances Interventions   Physical Therapy Goal     PT, PT/OT Ongoing (interventions implemented as appropriate)     Description:  Goals to be met by: 19    Patient will increase functional independence with mobility by performin. Supine to sit with Stand-by Assistance- Met 2018  2. Sit to supine with Stand-by Assistance- Met 2018  3. Rolling to Left and Right with Stand-by Assistance.  4. Sit to stand transfer with Stand-by Assistance met  -Sit<>stand with supervision. Not met  5. Bed to chair transfer with Stand-by Assistance using Rolling Walker met\  -Stand pivot transfer with UE support and supervision. Not met  6. Gait  x 50 feet with Stand-by Assistance using Rolling Walker. Met  -Gait x 150 feet with use of RW, supervision. Not met  7. Ascend/descend 6 stair with Right OR Left Handrails Contact Guard Assistance                           PLAN:    Patient to be seen (5-6X/week)  to address the above listed problems via gait training, therapeutic activities, therapeutic exercises  Plan of  Care expires: 01/04/19  Plan of Care reviewed with: patient    Inés LUGO Robert, PT  12/24/2018

## 2018-12-24 NOTE — PROGRESS NOTES
[facility]  ID# [id number]. Received 12/24/2018 at 2:40:17 Central Time; 3:40 Central Time.  Teresa Ramos LMSW, MODESTA-DONNA, [facility]  12/24/2018

## 2018-12-24 NOTE — PROGRESS NOTES
Patient's chart  faxed to [facility] with barcoded cover sheet.  NOMNC received from [place] (Sakshi) and also included in chart sent to [facility] .  Patient provided with copy of DEN.  Teresa Ramos LMSW, MODESTA-DONNA, CCM  12/24/2018

## 2018-12-24 NOTE — PLAN OF CARE
12/24/18 1245   Medicare Message   Important Message from Medicare regarding Discharge Appeal Rights (NOMNC served and signed.)   Date IMM was signed 12/24/18   Time IMM was signed [id number]

## 2018-12-24 NOTE — PLAN OF CARE
Problem: Physical Therapy Goal  Goal: Physical Therapy Goal  Goals to be met by: 19    Patient will increase functional independence with mobility by performin. Supine to sit with Stand-by Assistance- Met 2018  2. Sit to supine with Stand-by Assistance- Met 2018  3. Rolling to Left and Right with Stand-by Assistance.  4. Sit to stand transfer with Stand-by Assistance met  -Sit<>stand with supervision. Not met  5. Bed to chair transfer with Stand-by Assistance using Rolling Walker met\  -Stand pivot transfer with UE support and supervision. Not met  6. Gait  x 50 feet with Stand-by Assistance using Rolling Walker. Met  -Gait x 150 feet with use of RW, supervision. Not met  7. Ascend/descend 6 stair with Right OR Left Handrails Contact Guard Assistance         Outcome: Ongoing (interventions implemented as appropriate)  Goals revised.

## 2018-12-24 NOTE — PLAN OF CARE
Problem: Patient Care Overview  Goal: Plan of Care Review  Outcome: Ongoing (interventions implemented as appropriate)   12/24/18 0331   Plan of Care Review   Plan of Care Reviewed With patient       Problem: Fall Risk (Adult)  Intervention: Monitor/Assist with Self Care   12/24/18 0331   Promote Activity and Functional [place]   Activity Assistance Provided assistance, 1 person   Identify and Manage Contributors to Fall Injury Risk   Self-Care Promotion independence encouraged

## 2018-12-24 NOTE — PLAN OF CARE
Problem: Patient Care Overview  Goal: Plan of Care Review  Outcome: Ongoing (interventions implemented as appropriate)  NO PRESSURE ULCERS NOTED.AFEBRILE.FALL PRECAUTIONS MAINTAINED NO INJURIES NOTED.PAIN CONTROLLED BY OXYCODONE..

## 2018-12-25 PROCEDURE — 25000003 PHARM REV CODE 250: Mod: HCWC | Performed by: PHYSICIAN ASSISTANT

## 2018-12-25 PROCEDURE — 25000003 PHARM REV CODE 250: Mod: HCWC | Performed by: NURSE PRACTITIONER

## 2018-12-25 PROCEDURE — 25000003 PHARM REV CODE 250: Mod: HCWC | Performed by: INTERNAL MEDICINE

## 2018-12-25 PROCEDURE — 11000004 HC SNF PRIVATE: Mod: HCWC

## 2018-12-25 RX ADMIN — DANTROLENE SODIUM 50 MG: 50 CAPSULE ORAL at 08:12

## 2018-12-25 RX ADMIN — GABAPENTIN 400 MG: 100 CAPSULE ORAL at 08:12

## 2018-12-25 RX ADMIN — GABAPENTIN 400 MG: 100 CAPSULE ORAL at 12:12

## 2018-12-25 RX ADMIN — OXYCODONE HYDROCHLORIDE 15 MG: 10 TABLET ORAL at 01:12

## 2018-12-25 RX ADMIN — GABAPENTIN 400 MG: 100 CAPSULE ORAL at 09:12

## 2018-12-25 RX ADMIN — DULOXETINE 30 MG: 30 CAPSULE, DELAYED RELEASE ORAL at 09:12

## 2018-12-25 RX ADMIN — BUPROPION HYDROCHLORIDE 150 MG: 150 TABLET, EXTENDED RELEASE ORAL at 04:12

## 2018-12-25 RX ADMIN — GABAPENTIN 400 MG: 100 CAPSULE ORAL at 04:12

## 2018-12-25 RX ADMIN — OXYCODONE HYDROCHLORIDE 15 MG: 10 TABLET ORAL at 08:12

## 2018-12-25 RX ADMIN — DIAZEPAM 5 MG: 5 TABLET ORAL at 06:12

## 2018-12-25 RX ADMIN — LIDOCAINE 1 PATCH: 50 PATCH TOPICAL at 09:12

## 2018-12-25 RX ADMIN — OXYCODONE HYDROCHLORIDE 15 MG: 10 TABLET ORAL at 06:12

## 2018-12-25 RX ADMIN — DANTROLENE SODIUM 50 MG: 50 CAPSULE ORAL at 04:12

## 2018-12-25 RX ADMIN — BACLOFEN 20 MG: 10 TABLET ORAL at 04:12

## 2018-12-25 RX ADMIN — DANTROLENE SODIUM 50 MG: 50 CAPSULE ORAL at 12:12

## 2018-12-25 RX ADMIN — TAMSULOSIN HYDROCHLORIDE 0.4 MG: 0.4 CAPSULE ORAL at 09:12

## 2018-12-25 RX ADMIN — BACLOFEN 20 MG: 10 TABLET ORAL at 08:12

## 2018-12-25 RX ADMIN — BUPROPION HYDROCHLORIDE 150 MG: 150 TABLET, EXTENDED RELEASE ORAL at 09:12

## 2018-12-25 RX ADMIN — TRAZODONE HYDROCHLORIDE 100 MG: 50 TABLET ORAL at 08:12

## 2018-12-25 RX ADMIN — RIVAROXABAN 20 MG: 20 TABLET, FILM COATED ORAL at 04:12

## 2018-12-25 RX ADMIN — BACLOFEN 20 MG: 10 TABLET ORAL at 09:12

## 2018-12-25 RX ADMIN — DANTROLENE SODIUM 50 MG: 50 CAPSULE ORAL at 09:12

## 2018-12-25 RX ADMIN — BACLOFEN 20 MG: 10 TABLET ORAL at 12:12

## 2018-12-25 RX ADMIN — RAMELTEON 8 MG: 8 TABLET, FILM COATED ORAL at 08:12

## 2018-12-25 RX ADMIN — DIAZEPAM 5 MG: 5 TABLET ORAL at 08:12

## 2018-12-25 RX ADMIN — POLYETHYLENE GLYCOL 3350 17 G: 17 POWDER, FOR SOLUTION ORAL at 09:12

## 2018-12-25 RX ADMIN — SENNOSIDES AND DOCUSATE SODIUM 1 TABLET: 8.6; 5 TABLET ORAL at 09:12

## 2018-12-25 NOTE — PLAN OF CARE
Problem: Patient Care Overview  Goal: Plan of Care Review  Outcome: Ongoing (interventions implemented as appropriate)  Mr Levin remains AAO: pt is sitting up in w/c in room 333. He received 15 mg of Oxycodone IR for c/o generalized pain rated at 8 on a pain scale of 0-10. Safety measures maintained. Call light is within reach. Will continue with plan of care.

## 2018-12-26 LAB
ANION GAP SERPL CALC-SCNC: 7 MMOL/L
BASOPHILS # BLD AUTO: 0.05 K/UL
BASOPHILS NFR BLD: 0.9 %
BUN SERPL-MCNC: 8 MG/DL
CALCIUM SERPL-MCNC: 8.4 MG/DL
CHLORIDE SERPL-SCNC: 106 MMOL/L
CO2 SERPL-SCNC: 26 MMOL/L
CREAT SERPL-MCNC: 0.8 MG/DL
DIFFERENTIAL METHOD: ABNORMAL
EOSINOPHIL # BLD AUTO: 0.2 K/UL
EOSINOPHIL NFR BLD: 3.7 %
ERYTHROCYTE [DISTWIDTH] IN BLOOD BY AUTOMATED COUNT: 14.6 %
EST. GFR  (AFRICAN AMERICAN): >60 ML/MIN/1.73 M^2
EST. GFR  (NON AFRICAN AMERICAN): >60 ML/MIN/1.73 M^2
GLUCOSE SERPL-MCNC: 90 MG/DL
HCT VFR BLD AUTO: 37.9 %
HGB BLD-MCNC: 12.4 G/DL
IMM GRANULOCYTES # BLD AUTO: 0 K/UL
IMM GRANULOCYTES NFR BLD AUTO: 0 %
LYMPHOCYTES # BLD AUTO: 1.9 K/UL
LYMPHOCYTES NFR BLD: 35.3 %
MAGNESIUM SERPL-MCNC: 1.9 MG/DL
MCH RBC QN AUTO: 29.8 PG
MCHC RBC AUTO-ENTMCNC: 32.7 G/DL
MCV RBC AUTO: 91 FL
MONOCYTES # BLD AUTO: 0.4 K/UL
MONOCYTES NFR BLD: 7.8 %
NEUTROPHILS # BLD AUTO: 2.8 K/UL
NEUTROPHILS NFR BLD: 52.3 %
NRBC BLD-RTO: 0 /100 WBC
PHOSPHATE SERPL-MCNC: 3.4 MG/DL
PLATELET # BLD AUTO: 148 K/UL
PMV BLD AUTO: 11.7 FL
POTASSIUM SERPL-SCNC: 4 MMOL/L
RBC # BLD AUTO: 4.16 M/UL
SODIUM SERPL-SCNC: 139 MMOL/L
WBC # BLD AUTO: 5.36 K/UL

## 2018-12-26 PROCEDURE — 85025 COMPLETE CBC W/AUTO DIFF WBC: CPT | Mod: HCWC

## 2018-12-26 PROCEDURE — 97803 MED NUTRITION INDIV SUBSEQ: CPT | Mod: HCWC

## 2018-12-26 PROCEDURE — 97530 THERAPEUTIC ACTIVITIES: CPT | Mod: HCWC

## 2018-12-26 PROCEDURE — 83735 ASSAY OF MAGNESIUM: CPT | Mod: HCWC

## 2018-12-26 PROCEDURE — 80048 BASIC METABOLIC PNL TOTAL CA: CPT | Mod: HCWC

## 2018-12-26 PROCEDURE — 25000003 PHARM REV CODE 250: Mod: HCWC | Performed by: PHYSICIAN ASSISTANT

## 2018-12-26 PROCEDURE — 36415 COLL VENOUS BLD VENIPUNCTURE: CPT | Mod: HCWC

## 2018-12-26 PROCEDURE — 97110 THERAPEUTIC EXERCISES: CPT | Mod: HCWC

## 2018-12-26 PROCEDURE — 25000003 PHARM REV CODE 250: Mod: HCWC | Performed by: NURSE PRACTITIONER

## 2018-12-26 PROCEDURE — 97112 NEUROMUSCULAR REEDUCATION: CPT | Mod: HCWC

## 2018-12-26 PROCEDURE — 84100 ASSAY OF PHOSPHORUS: CPT | Mod: HCWC

## 2018-12-26 PROCEDURE — 25000003 PHARM REV CODE 250: Mod: HCWC | Performed by: INTERNAL MEDICINE

## 2018-12-26 PROCEDURE — 11000004 HC SNF PRIVATE: Mod: HCWC

## 2018-12-26 RX ORDER — DIAZEPAM 5 MG/1
5 TABLET ORAL EVERY 8 HOURS PRN
Qty: 15 TABLET | Refills: 0 | Status: SHIPPED | OUTPATIENT
Start: 2018-12-26 | End: 2019-01-02 | Stop reason: SDUPTHER

## 2018-12-26 RX ORDER — DULOXETIN HYDROCHLORIDE 30 MG/1
30 CAPSULE, DELAYED RELEASE ORAL DAILY
Qty: 30 CAPSULE | Refills: 3 | Status: ON HOLD | OUTPATIENT
Start: 2018-12-26 | End: 2019-03-17

## 2018-12-26 RX ORDER — DALFAMPRIDINE 10 MG/1
10 TABLET, FILM COATED, EXTENDED RELEASE ORAL EVERY 12 HOURS
Qty: 180 TABLET | Refills: 1 | Status: SHIPPED | OUTPATIENT
Start: 2018-12-26 | End: 2019-12-26

## 2018-12-26 RX ORDER — BUPROPION HYDROCHLORIDE 150 MG/1
150 TABLET ORAL 2 TIMES DAILY WITH MEALS
Qty: 60 TABLET | Refills: 3 | Status: ON HOLD | OUTPATIENT
Start: 2018-12-26 | End: 2019-03-17

## 2018-12-26 RX ORDER — OXYCODONE AND ACETAMINOPHEN 10; 325 MG/1; MG/1
1 TABLET ORAL EVERY 6 HOURS PRN
Qty: 21 TABLET | Refills: 0 | Status: SHIPPED | OUTPATIENT
Start: 2018-12-26 | End: 2019-01-02 | Stop reason: SDUPTHER

## 2018-12-26 RX ORDER — GABAPENTIN 400 MG/1
400 CAPSULE ORAL 4 TIMES DAILY
Qty: 120 CAPSULE | Refills: 3 | Status: ON HOLD | OUTPATIENT
Start: 2018-12-26 | End: 2019-03-18 | Stop reason: SDUPTHER

## 2018-12-26 RX ORDER — AMOXICILLIN 250 MG
1 CAPSULE ORAL 2 TIMES DAILY
Status: ON HOLD | COMMUNITY
Start: 2018-12-26 | End: 2019-03-17

## 2018-12-26 RX ADMIN — BACLOFEN 20 MG: 10 TABLET ORAL at 09:12

## 2018-12-26 RX ADMIN — OXYCODONE HYDROCHLORIDE 15 MG: 10 TABLET ORAL at 09:12

## 2018-12-26 RX ADMIN — DANTROLENE SODIUM 50 MG: 50 CAPSULE ORAL at 09:12

## 2018-12-26 RX ADMIN — OXYCODONE HYDROCHLORIDE 15 MG: 10 TABLET ORAL at 01:12

## 2018-12-26 RX ADMIN — GABAPENTIN 400 MG: 100 CAPSULE ORAL at 10:12

## 2018-12-26 RX ADMIN — GABAPENTIN 400 MG: 100 CAPSULE ORAL at 09:12

## 2018-12-26 RX ADMIN — BUPROPION HYDROCHLORIDE 150 MG: 150 TABLET, EXTENDED RELEASE ORAL at 06:12

## 2018-12-26 RX ADMIN — DANTROLENE SODIUM 50 MG: 50 CAPSULE ORAL at 06:12

## 2018-12-26 RX ADMIN — RIVAROXABAN 20 MG: 20 TABLET, FILM COATED ORAL at 06:12

## 2018-12-26 RX ADMIN — DIAZEPAM 5 MG: 5 TABLET ORAL at 01:12

## 2018-12-26 RX ADMIN — LIDOCAINE 1 PATCH: 50 PATCH TOPICAL at 10:12

## 2018-12-26 RX ADMIN — BACLOFEN 20 MG: 10 TABLET ORAL at 10:12

## 2018-12-26 RX ADMIN — DIAZEPAM 5 MG: 5 TABLET ORAL at 09:12

## 2018-12-26 RX ADMIN — SENNOSIDES AND DOCUSATE SODIUM 1 TABLET: 8.6; 5 TABLET ORAL at 09:12

## 2018-12-26 RX ADMIN — SENNOSIDES AND DOCUSATE SODIUM 1 TABLET: 8.6; 5 TABLET ORAL at 10:12

## 2018-12-26 RX ADMIN — TAMSULOSIN HYDROCHLORIDE 0.4 MG: 0.4 CAPSULE ORAL at 10:12

## 2018-12-26 RX ADMIN — GABAPENTIN 400 MG: 100 CAPSULE ORAL at 06:12

## 2018-12-26 RX ADMIN — DULOXETINE 30 MG: 30 CAPSULE, DELAYED RELEASE ORAL at 10:12

## 2018-12-26 RX ADMIN — DANTROLENE SODIUM 50 MG: 50 CAPSULE ORAL at 12:12

## 2018-12-26 RX ADMIN — GABAPENTIN 400 MG: 100 CAPSULE ORAL at 12:12

## 2018-12-26 RX ADMIN — OXYCODONE HYDROCHLORIDE 15 MG: 10 TABLET ORAL at 06:12

## 2018-12-26 RX ADMIN — BACLOFEN 20 MG: 10 TABLET ORAL at 12:12

## 2018-12-26 RX ADMIN — BUPROPION HYDROCHLORIDE 150 MG: 150 TABLET, EXTENDED RELEASE ORAL at 10:12

## 2018-12-26 RX ADMIN — DANTROLENE SODIUM 50 MG: 50 CAPSULE ORAL at 10:12

## 2018-12-26 RX ADMIN — BACLOFEN 20 MG: 10 TABLET ORAL at 06:12

## 2018-12-26 RX ADMIN — TRAZODONE HYDROCHLORIDE 100 MG: 50 TABLET ORAL at 09:12

## 2018-12-26 RX ADMIN — RAMELTEON 8 MG: 8 TABLET, FILM COATED ORAL at 09:12

## 2018-12-26 NOTE — PROGRESS NOTES
Per review of [employer] on line Status Check: The physician agreed with the notice.  [facility] Liability Starts on 12/27/2018.  Teresa Ramos LMSW, ACNGUYEN-Sw, [facility]  12/26/2018

## 2018-12-26 NOTE — PT/OT/SLP PROGRESS
Physical Therapy  Treatment    Mao Levin   MRN: [id number]   Admitting Diagnosis: MS (multiple sclerosis)    PT Received On: 12/26/18  Total Time (min): 53       Billable Minutes:  Gait Training 0, Therapeutic Activity 15, Therapeutic Exercise 15 and Neuromuscular Re-education 23    Treatment Type: Treatment  PT/PTA: PT     PTA Visit Number: 0       General Precautions: Standard, fall  Orthopedic Precautions: N/A   Braces: N/A         Subjective:  Communicated with nurse prior to session.  Agreeable to PT services.    Pain/Comfort  Pain Rating 1: 0/10    Objective:  Patient found with: (in WC with L AFO donned)     AM-PAC 6 CLICK MOBILITY  Total Score:     Transfers:  Sit<>Stand: supervision  Stand Pivot Transfer: SBA-CGA with foot placement and hand placement.    Gait:  Side-stepping x 4 trials  (9 feet) with UE support on parallel bars-- Supervision    Advanced Gait:  Stairs: 4 steps with one hand rail, Min A provided for LLE (upon descending for placement on step)    Therex:  Seated trunk flexion/extension x 20 reps with weight-bearing through LUE with hand open on 2 yoga blocks. Hand softer thereafter (SBA).  Standing: squats with use of parallel bars, 20 reps; heel raises x 20 reps using parallel bars-SBA .    Balance:  Needs UE support during dynamic standing activity and/or stand pivot transfer.    Additional Treatment:  · Completed 5 minutes on recumbent elliptical with Mod A to get onto and off of seat due to impaired hip/knee flexion of BLEs. Level 3-5 to improve his endurance and strength.  · Kineisotape applied to LUE to open hand via fan-cut, anchor at dorsum of L forearm near olecranon at 25% tension to improve proprioception.   · FT scheduled for tomorrow.    Patient left up in chair with call button in reach.    Assessment:  Mao Levin is a 53 y.o. male with a medical diagnosis of MS (multiple sclerosis).  Mr. Levin was able to utilize the recumbent elliptical to improve his endurance and LE strength  today. He will benefit from continued PT services, focusing on his hip flexion, LE coordination, and LUE motor control/flexibility.    Rehab identified problem list/impairments: weakness, impaired endurance, impaired sensation, impaired self care skills, impaired functional mobilty, gait instability, impaired balance, decreased coordination, decreased upper extremity function, decreased lower extremity function, pain, abnormal tone, decreased ROM, impaired coordination, impaired fine motor, impaired joint extensibility    Rehab potential is good.    Activity tolerance: Good    Discharge recommendations: home with home health     Barriers to discharge: Inaccessible home environment, Decreased caregiver support    Equipment recommendations: bedside commode, walker, rolling     GOALS:   Multidisciplinary Problems     Physical Therapy Goals        Problem: Physical Therapy Goal    Goal Priority Disciplines Outcome Goal Variances Interventions   Physical Therapy Goal     PT, PT/OT Ongoing (interventions implemented as appropriate)     Description:  Goals to be met by: 19    Patient will increase functional independence with mobility by performin. Supine to sit with Stand-by Assistance- Met 2018  2. Sit to supine with Stand-by Assistance- Met 2018  3. Rolling to Left and Right with Stand-by Assistance.  4. Sit to stand transfer with Stand-by Assistance met  -Sit<>stand with supervision. Met (2018)  5. Bed to chair transfer with Stand-by Assistance using Rolling Walker met  -Stand pivot transfer with UE support and supervision. Not met  6. Gait  x 50 feet with Stand-by Assistance using Rolling Walker. Met  -Gait x 150 feet with use of RW, supervision. Not met  7. Ascend/descend 6 stair with Right OR Left Handrails Contact Guard Assistance                            PLAN:    Patient to be seen (5-6X/week)  to address the above listed problems via gait training, therapeutic activities,  therapeutic exercises  Plan of Care expires: 01/04/19  Plan of Care reviewed with: patient    Inés LUGO Robert, PT  12/26/2018

## 2018-12-26 NOTE — PLAN OF CARE
Problem: Patient Care Overview  Goal: Plan of Care Review  Outcome: Ongoing (interventions implemented as appropriate)   12/26/18 1124   Plan of Care Review   Plan of Care Reviewed With patient       Comments: Patient monitored every 1 to 2 hours for pain and safety.  Safety maintained.  Patient instructed to call for assistance.Call  Light and persoanl items in reach.

## 2018-12-26 NOTE — PLAN OF CARE
Problem: Patient Care Overview  Goal: Plan of Care Review  Outcome: Ongoing (interventions implemented as appropriate)   12/26/18 0110   Plan of Care Review   Plan of Care Reviewed With patient

## 2018-12-26 NOTE — PLAN OF CARE
[facility] - Skilled Nursing Care    HOME HEALTH ORDERS  FACE TO FACE ENCOUNTER    Patient Name: Mao Levin  YOB: 1965    PCP: Mendy Alarcon MD   PCP Address: [address] / [place] [address]  PCP Phone Number: [phone]  PCP Fax: [phone]    Encounter Date: 12/26/2018    Admit to Home Health    Diagnoses:  Active Hospital Problems    Diagnosis  POA    *MS (multiple sclerosis) [G35]  Yes     Chronic    Current every day smoker [F17.200]  Yes    Urinary tract infection without hematuria [N39.0]  Yes    Depression [F32.9]  Yes    Constipation due to neurogenic bowel [K59.00]  Yes    Polyneuropathy [G62.9]  Yes    10/25/2016 Saddle PE [I26.92]  Yes     Chronic    Spasticity [R25.2]  Yes    Neurogenic bladder [N31.9]  Yes     Chronic      Resolved Hospital Problems   No resolved problems to display.       Future Appointments   Date Time Provider Department Center   1/3/2019 12:30 PM Mendy Alarcon MD [facility] TRENT [facility]   2/5/2019  8:20 AM Brian Augustin MD [facility] UROLOGY [facility]   3/11/2019  1:00 PM Mattie Finnegan MD [facility] MSC [facility]   4/1/2019  9:40 AM Kymberly Estrella MD [facility]     Follow-up Information     Mendy Alarcon MD.    Specialty:  Internal Medicine  Contact information:  [address]  [facility] [address]  [phone]                     I have seen and examined this patient face to face today. My clinical findings that support the need for the home health skilled services and home bound status are the following:  Weakness/numbness causing balance and gait disturbance due to MS making it taxing to leave home.    Allergies:Review of patient's allergies indicates:  No Known Allergies    Diet: regular diet    Activities: activity as tolerated and no lifting, Driving, or Strenuous exercise    Nursing:   SN to complete comprehensive assessment including routine vital signs. Instruct on disease process and s/s of complications to  report to MD. Review/verify medication list sent home with the patient at time of discharge  and instruct patient/caregiver as needed. Frequency may be adjusted depending on start of care date.    Notify MD if SBP > 160 or < 90; DBP > 90 or < 50; HR > 120 or < 50; Temp > 101      CONSULTS:    Physical Therapy to evaluate and treat. Evaluate for home safety and equipment needs; Establish/upgrade home exercise program. Perform / instruct on therapeutic exercises, gait training, transfer training, and Range of Motion.  Occupational Therapy to evaluate and treat. Evaluate home environment for safety and equipment needs. Perform/Instruct on transfers, ADL training, ROM, and therapeutic exercises.   to evaluate for community resources/long-range planning.  Aide to provide assistance with personal care, ADLs, and vital signs.    MISCELLANEOUS CARE:  N/A    WOUND CARE ORDERS  n/a      Medications: Review discharge medications with patient and family and provide education.      Current Discharge Medication List      START taking these medications    Details   buPROPion (WELLBUTRIN XL) 150 MG TB24 tablet Take 1 tablet (150 mg total) by mouth 2 (two) times daily with meals.  Qty: 60 tablet, Refills: 3         CONTINUE these medications which have CHANGED    Details   dalfampridine (AMPYRA) 10 mg Tb12 Take 10 mg by mouth every 12 (twelve) hours. DO NOT take until seen by Neurology  Qty: 180 tablet, Refills: 1    Associated Diagnoses: Gait disturbance; Multiple sclerosis      diazePAM (VALIUM) 5 MG tablet Take 1 tablet (5 mg total) by mouth every 8 (eight) hours as needed (muscle spasms).  Qty: 15 tablet, Refills: 0    Associated Diagnoses: Paraparesis; Chronic bilateral low back pain with bilateral sciatica; Spasticity; Chronic pain of multiple sites; Impaired mobility and ADLs; Gait instability; Neuropathic pain, leg, bilateral; MS (multiple sclerosis); Chronic pain syndrome; Long-term current use of opiate  analgesic; Polyneuropathy; Long term (current) use of systemic steroids; Acute relapsing multiple sclerosis      DULoxetine (CYMBALTA) 30 MG capsule Take 1 capsule (30 mg total) by mouth once daily.  Qty: 30 capsule, Refills: 3      gabapentin (NEURONTIN) 400 MG capsule Take 1 capsule (400 mg total) by mouth 4 (four) times daily.  Qty: 120 capsule, Refills: 3      oxyCODONE-acetaminophen (PERCOCET)  mg per tablet Take 1 tablet by mouth every 6 (six) hours as needed for Pain.  Qty: 21 tablet, Refills: 0      senna-docusate 8.6-50 mg (PERICOLACE) 8.6-50 mg per tablet Take 1 tablet by mouth 2 (two) times daily.         CONTINUE these medications which have NOT CHANGED    Details   ascorbic acid, vitamin C, (VITAMIN C) 500 MG tablet Take 500 mg by mouth once daily.      baclofen (LIORESAL) 20 MG tablet Take 1 tablet by mouth 4 times daily for 30 days  Qty: 120 tablet, Refills: 0      cranberry conc-C-bacillus coag 450-30-50 mg-mg-million Tab Take 1 capsule by mouth once daily.  Qty: 120 each, Refills: 3    Associated Diagnoses: Recurrent UTI      dantrolene (DANTRIUM) 50 MG Cap Take 1 capsule by mouth 4 times daily for 30 days  Qty: 120 capsule, Refills: 0      ergocalciferol (VITAMIN D2) 50,000 unit Cap Take 1 capsule (50,000 Units total) by mouth every 7 days.  Qty: 4 capsule, Refills: 11      multivitamin capsule Take 1 capsule by mouth once daily.      rivaroxaban (XARELTO) 20 mg Tab Take 1 tablet (20 mg total) by mouth daily with dinner or evening meal.  Qty: 60 tablet, Refills: 4      tamsulosin (FLOMAX) 0.4 mg Cp24 Take 1 capsule (0.4 mg total) by mouth once daily.  Qty: 30 capsule, Refills: 11    Associated Diagnoses: BPH without urinary obstruction      trazodone (DESYREL) 100 MG tablet Take 1 tablet (100 mg total) by mouth every evening.  Qty: 30 tablet, Refills: 3      vitamin D (VITAMIN D3) 1000 units Tab Take 2 tablets by mouth once daily for 30 days  Qty: 60 tablet, Refills: 0      polyethylene  glycol (GLYCOLAX) 17 gram/dose powder Take 17 g by mouth once daily.  Qty: 510 g, Refills: 6         STOP taking these medications       buPROPion (WELLBUTRIN SR) 150 MG TBSR 12 hr tablet Comments:   Reason for Stopping:         HYDROcodone-acetaminophen (NORCO)  mg per tablet Comments:   Reason for Stopping:         mirabegron (MYRBETRIQ) 50 mg Tb24 Comments:   Reason for Stopping:               I certify that this patient is confined to his home and needs intermittent skilled nursing care, physical therapy and occupational therapy.

## 2018-12-26 NOTE — PLAN OF CARE
Problem: Patient Care Overview  Goal: Plan of Care Review  Recommendation:   Continue Regular diet. RD to follow.   Goals: PO intake maintained at  > 75% EEN and EPN by next RD f/u   Nutrition Goal Status: goal met  Communication of RD Recs: other (comment)(POC)

## 2018-12-26 NOTE — PROGRESS NOTES
Patient's home health orders sent to [facility] advising that patient will discharge tomorrow.  Teresa Ramos LMSW, MODESTA-DONNA,CCM  12/26/2018

## 2018-12-26 NOTE — PROGRESS NOTES
"[facility] - Skilled Nursing Care  Adult Nutrition  Progress Note    SUMMARY       Recommendations    Recommendation:   Continue Regular diet. RD to follow.   Goals: PO intake maintained at  > 75% EEN and EPN by next RD f/u   Nutrition Goal Status: goal met  Communication of RD Recs: other (comment)(POC)    Reason for Assessment    Reason For Assessment: RD follow-up  Diagnosis: other (see comments)(MS w/ bilateral leg weakness; UTI)  Relevant Medical History: MS, anxiety, pulmonary embolism, gait, polyneuropathy, depression, deep vein thrombrosis, anticoagulant longterm use   Interdisciplinary Rounds: did not attend  General Information Comments: Good appetite, PO ~% of meals. No N/V/C/D reported at this time, LBM 12/26. RD to follow.   Nutrition Discharge Planning: d/c on regular diet     Nutrition Risk Screen    Nutrition Risk Screen: no indicators present    Nutrition/Diet History    Patient Reported Diet/Restrictions/Preferences: general  Typical Food/Fluid Intake: good appetite/intake PTA  Food Allergies: other (see comments)(mushrooms)  Factors Affecting Nutritional Intake: None identified at this time    Anthropometrics    Temp: 97.6 °F (36.4 °C)  Height Method: Stated  Height: 5' 10" (177.8 cm)  Height (inches): 70 in  Weight Method: Standard Scale  Weight: 81.6 kg (179 lb 14.3 oz)  Weight (lb): 179.9 lb  Ideal Body Weight (IBW), Male: 166 lb  % Ideal Body Weight, Male (lb): 107.7 lb  BMI (Calculated): 25.7  BMI Grade: 25 - 29.9 - overweight    Lab/Procedures/Meds    Pertinent Labs Reviewed: reviewed  Pertinent Labs Comments: Ca 8.4; Hgb 12.4  Pertinent Medications Reviewed: reviewed    Estimated/Assessed Needs    Weight Used For Calorie Calculations: 81.1 kg (178 lb 12.7 oz)  Energy Calorie Requirements (kcal): 2078 kcal/d  Energy Need Method: [place](RMR X 1.25 PAL )  Protein Requirements: 65-81 g/d(0.8-1.0 g/kg)  Weight Used For Protein Calculations: 81.1 kg (178 lb 12.7 oz)  Fluid " Requirements (mL): 1 ml/kcal or per MD    Estimated Fluid Requirement Method: RDA Method  RDA Method (mL): 2078     Nutrition Prescription Ordered    Current Diet Order: Regular   Nutrition Order Comments:continue prune juice for constipation as needed    Evaluation of Received Nutrient/Fluid Intake    Energy Calories Required: meeting needs  Protein Required: meeting needs  Fluid Required: meeting needs  Tolerance: tolerating  % Intake of Estimated Energy Needs: 75 - 100 %  % Meal Intake: 75 - 100 %    Nutrition Risk    Level of Risk/Frequency of Follow-up: low     Assessment and Plan  No nutrition dx at this time    Monitor and Evaluation    Food and Nutrient Intake: energy intake, food and beverage intake  Food and Nutrient Adminstration: diet order  Physical Activity and Function: nutrition-related ADLs and IADLs  Anthropometric Measurements: weight, weight change  Biochemical Data, Medical Tests and Procedures: electrolyte and renal panel, gastrointestinal profile, glucose/endocrine profile, inflammatory profile, lipid profile  Nutrition-Focused Physical Findings: overall appearance     Nutrition Follow-Up    RD Follow-up?: Yes

## 2018-12-26 NOTE — PLAN OF CARE
Problem: Physical Therapy Goal  Goal: Physical Therapy Goal  Goals to be met by: 19    Patient will increase functional independence with mobility by performin. Supine to sit with Stand-by Assistance- Met 2018  2. Sit to supine with Stand-by Assistance- Met 2018  3. Rolling to Left and Right with Stand-by Assistance.  4. Sit to stand transfer with Stand-by Assistance met  -Sit<>stand with supervision. Met (2018)  5. Bed to chair transfer with Stand-by Assistance using Rolling Walker met  -Stand pivot transfer with UE support and supervision. Not met  6. Gait  x 50 feet with Stand-by Assistance using Rolling Walker. Met  -Gait x 150 feet with use of RW, supervision. Not met  7. Ascend/descend 6 stair with Right OR Left Handrails Contact Guard Assistance          Outcome: Ongoing (interventions implemented as appropriate)  Met one goal today.

## 2018-12-26 NOTE — PT/OT/SLP PROGRESS
Occupational Therapy  Treatment    Mao Levin   MRN: [id number]   Admitting Diagnosis: MS (multiple sclerosis)    OT Date of Treatment: 12/26/18  Total Time (min): 45 min    Billable Minutes:  Therapeutic Activity 35 and Therapeutic Exercise 10    General Precautions: Standard, fall  Orthopedic Precautions: N/A  Braces: N/A         Subjective:  Communicated with nsg prior to session.  I am doing well today    Pain/Comfort  Pain Rating 1: 7/10  Location - Side 1: Bilateral  Location - Orientation 1: generalized  Location 1: lip  Pain Addressed 1: Reposition, Distraction, Nurse notified    Objective:   Pt, seated on toilet with PCT present     Occupational Performance:    Bed Mobility:    · Not tested     Functional Mobility/Transfers:  · Patient completed Sit <> Stand Transfer with contact guard assistance  with  grab bars(s) and from w/c with CGA to [facility] couter  · Patient completed Toilet Transfer Stand Pivot technique with contact guard assistance with  grab bars      Activities of Daily Living:  · Grooming: stand by assistance at sink level  · Lower Body Dressing: minimum assistance to doff/christopher depends  and pants with Min A and AFO and BLE tennis shoes with Mod A  · Toileting: minimum assistance for cleaning and clothing management in stance phase    AMPA 6 Click:  AMPA Total Score: 20    OT Exercises: UE Ergometer 10 min    Additional Treatment:  Pt. With standing act on this day with task. Pt. With CGA for balance aspects with task with no AD at raised counter Pt  With Graded Totem Pole with (A) at elbow and alternation use of BUE's during task to increase BUE ROM also pt. With standing x 8 min to complete task       Pt. With 2# dowel activity with x15 reps with  shd flex, bicep curls horz adb/add and forward flex motion to increase BUE ROM and strength,.     Pt. With standing and therex performed to increase ROM, endurance selfcare task and fxl mobility for independence     Patient left up in chair with in  gym wt PT present    ASSESSMENT:  Mao Levin is a 53 y.o. male with a medical diagnosis of MS (multiple sclerosis) Pt. participated well with session on this day.Pt demos physical deficits with balance  functional mobility, UB strength, endurance  level of functional indep with daily tasks and activities and selfcare skills .Pt. Will continue to benefit from continued OT to progress towards goals  .    Rehab identified problem list/impairments: weakness, impaired endurance, impaired self care skills, impaired functional mobilty, gait instability, impaired balance, impaired sensation, impaired joint extensibility, impaired fine motor, decreased coordination, decreased lower extremity function, decreased upper extremity function, impaired coordination, pain    Rehab potential is fair    Activity tolerance: Fair    Discharge recommendations: home with home health     Barriers to discharge: Inaccessible home environment, Decreased caregiver support     Equipment recommendations: walker, rolling     GOALS:   Multidisciplinary Problems     Occupational Therapy Goals        Problem: Occupational Therapy Goal    Goal Priority Disciplines Outcome Interventions   Occupational Therapy Goal     OT, PT/OT Ongoing (interventions implemented as appropriate)    Description:  Goals to be met by: 12/28/2018     Patient will increase functional independence with ADLs by performing:    Feeding with Modified [place].    Met  UE Dressing with Set-up Assistance.   Met  LE Dressing with Minimal Assistance.  Grooming while seated with Supervision.    Met  Toileting from bedside commode with Stand-by Assistance for hygiene and clothing management.   Bathing from  shower chair/bench with Stand-by Assistance.  Rolling to Right, Left with Supervision.    Met  Supine to sit with Supervision.  Met  Stand pivot transfers with Stand-by Assistance.  Toilet transfer to bedside commode with Stand-by Assistance.  Upper extremity exercise program  3 x 15 reps per handout, with independence.  Patient will perform a functional standing activity for 10 min with S in order to perform household tasks.                   Plan:  Patient to be seen 5 x/week to address the above listed problems via self-care/home management, therapeutic activities, therapeutic exercises  Plan of Care expires: 01/05/18  Plan of Care reviewed with: patient    WENDY Mustafa  12/26/2018

## 2018-12-26 NOTE — PLAN OF CARE
Problem: Occupational Therapy Goal  Goal: Occupational Therapy Goal  Goals to be met by: 12/28/2018     Patient will increase functional independence with ADLs by performing:    Feeding with Modified [place].    Met  UE Dressing with Set-up Assistance.   Met  LE Dressing with Minimal Assistance.  Grooming while seated with Supervision.    Met  Toileting from bedside commode with Stand-by Assistance for hygiene and clothing management.   Bathing from  shower chair/bench with Stand-by Assistance.  Rolling to Right, Left with Supervision.    Met  Supine to sit with Supervision.  Met  Stand pivot transfers with Stand-by Assistance.  Toilet transfer to bedside commode with Stand-by Assistance.  Upper extremity exercise program 3 x 15 reps per handout, with independence.  Patient will perform a functional standing activity for 10 min with S in order to perform household tasks.      Outcome: Ongoing (interventions implemented as appropriate)  .

## 2018-12-26 NOTE — PROGRESS NOTES
DONNA contacted the [employer] MS [employer] to obtain information/resources to provide to patient to have a ramp installed.  DONNA spoke with MS Navigator, Citlalli, who provided the following information:  installing a ramp would be considered a home modification.  In order to begin the process, an occupational or physical therapist would have to conduct a home visit to determine if a ramp is needed.  The therapist would record the specification, e.g., the type of wheelchair the patient uses, weight, etc.  After the home evaluation is completed the therapist would write or request a prescription for the ramp.  There are specifications that must be met.   There are several providers in which ramps can be purchased, e.g., [employer] and [employer].  Ramps are usually built by contractors. Paying for and installing:  there is some funding but it is limited.  The patient has to call to speak with an MS Navigator.  There is an [employer] program and the application is online or patient can call [phone] for assistance.  Patient can also call the [facility] ([phone]) for resources for installation and funding or ramps or contact the [facility] ( [phone]).  MS Navigator will e-mail  resources that can be shared with patient.  Teresa Ramos LMSw, MODESTA-DONNA, [facility]  12/26/2018.

## 2018-12-27 ENCOUNTER — TELEPHONE (OUTPATIENT)
Dept: PHARMACY | Facility: CLINIC | Age: 53
End: 2018-12-27

## 2018-12-27 VITALS
SYSTOLIC BLOOD PRESSURE: 115 MMHG | DIASTOLIC BLOOD PRESSURE: 68 MMHG | RESPIRATION RATE: 18 BRPM | OXYGEN SATURATION: 100 % | HEIGHT: 70 IN | WEIGHT: 179.88 LBS | TEMPERATURE: 98 F | HEART RATE: 66 BPM | BODY MASS INDEX: 25.75 KG/M2

## 2018-12-27 PROCEDURE — 25000003 PHARM REV CODE 250: Mod: HCWC | Performed by: PHYSICIAN ASSISTANT

## 2018-12-27 PROCEDURE — 99315 PR NURSING FAC DISCHRGE DAY,1-30 MIN: ICD-10-PCS | Mod: HCWC,GC,, | Performed by: NURSE PRACTITIONER

## 2018-12-27 PROCEDURE — 97116 GAIT TRAINING THERAPY: CPT | Mod: HCWC

## 2018-12-27 PROCEDURE — 97112 NEUROMUSCULAR REEDUCATION: CPT | Mod: HCWC

## 2018-12-27 PROCEDURE — 99315 NF DSCHRG MGMT 30 MIN/LESS: CPT | Mod: HCWC,GC,, | Performed by: NURSE PRACTITIONER

## 2018-12-27 PROCEDURE — 25000003 PHARM REV CODE 250: Mod: HCWC | Performed by: NURSE PRACTITIONER

## 2018-12-27 PROCEDURE — 97530 THERAPEUTIC ACTIVITIES: CPT | Mod: HCWC

## 2018-12-27 PROCEDURE — 25000003 PHARM REV CODE 250: Mod: HCWC | Performed by: INTERNAL MEDICINE

## 2018-12-27 PROCEDURE — 97110 THERAPEUTIC EXERCISES: CPT | Mod: HCWC

## 2018-12-27 RX ADMIN — TAMSULOSIN HYDROCHLORIDE 0.4 MG: 0.4 CAPSULE ORAL at 08:12

## 2018-12-27 RX ADMIN — OXYCODONE HYDROCHLORIDE 15 MG: 10 TABLET ORAL at 03:12

## 2018-12-27 RX ADMIN — BACLOFEN 20 MG: 10 TABLET ORAL at 08:12

## 2018-12-27 RX ADMIN — DULOXETINE 30 MG: 30 CAPSULE, DELAYED RELEASE ORAL at 08:12

## 2018-12-27 RX ADMIN — GABAPENTIN 400 MG: 100 CAPSULE ORAL at 12:12

## 2018-12-27 RX ADMIN — GABAPENTIN 400 MG: 100 CAPSULE ORAL at 08:12

## 2018-12-27 RX ADMIN — LIDOCAINE 1 PATCH: 50 PATCH TOPICAL at 08:12

## 2018-12-27 RX ADMIN — DANTROLENE SODIUM 50 MG: 50 CAPSULE ORAL at 08:12

## 2018-12-27 RX ADMIN — OXYCODONE HYDROCHLORIDE 15 MG: 10 TABLET ORAL at 10:12

## 2018-12-27 RX ADMIN — BUPROPION HYDROCHLORIDE 150 MG: 150 TABLET, EXTENDED RELEASE ORAL at 08:12

## 2018-12-27 RX ADMIN — DANTROLENE SODIUM 50 MG: 50 CAPSULE ORAL at 12:12

## 2018-12-27 RX ADMIN — DIAZEPAM 5 MG: 5 TABLET ORAL at 10:12

## 2018-12-27 RX ADMIN — BACLOFEN 20 MG: 10 TABLET ORAL at 12:12

## 2018-12-27 NOTE — PT/OT/SLP PROGRESS
Physical Therapy  Treatment/family training/DC summary    Mao Levin   MRN: [id number]   Admitting Diagnosis: MS (multiple sclerosis)    PT Received On: 12/27/18  Total Time (min): 53       Billable Minutes:  Gait Training 15, Therapeutic Activity 15 and Therapeutic Exercise 15 Neuromuscular reeducation 8    Treatment Type: Treatment  PT/PTA: PT     PTA Visit Number: 0       General Precautions: Standard, fall  Orthopedic Precautions: N/A   Braces: N/A         Subjective:  Communicated with nurse prior to session.  Agreeable to PT services.    Pain/Comfort  Pain Rating 1: 0/10    Objective:   Patient found with: (Seated in WC)     AM-PAC 6 CLICK MOBILITY  Total Score:       Transfers:  Sit<>Stand: supervision  Stand Pivot Transfer: SBA-CGA (WC<>commode/mat)    Gait:  Amb 70 feet with one turn with use of rolling walker, slow keyshawn, SBA. Swing-through gait cycle, continues to vault on RLE during swing-through of LLE.    Advanced Gait:  Stairs: 4 steps with 1 HR, step-to, Min A for placement of LLE on step.     Wheelchair Mobility:  Patient propels w/c 50 feet with BUE use and Mod I     Therex:  Trunk flexion with LUE on yoga blocks (hand open- to work on weight-bearing, wrist flexion) x20 reps    Additional Treatment:  · Kinesiotaped lower paraspinals, anchored distally to promote relaxation of lumbar lordosis (25 % tension, I-strip).  · Educated on where kinesiotape is sold and it's purposes.   · Required Mod A for management of pants while going to restroom. Needed Max A for donning his shoes/L AFO.     Patient left up in chair with call button in reach and mother present.    Assessment:  Mao Levin is a 53 y.o. male with a medical diagnosis of MS (multiple sclerosis).  Mr. Cavanaugh's family training was completed today. Pt's mother did not have many questions. Suggest pt go to outpatient therapy to work on his LUE, LLE and overall balance/functional use of his extremities. Will benefit from HHPT services in the  meanwhile.     Rehab identified problem list/impairments: weakness, impaired endurance, impaired sensation, impaired self care skills, impaired functional mobilty, gait instability, impaired balance, decreased coordination, decreased upper extremity function, decreased lower extremity function, pain, abnormal tone, decreased ROM, impaired coordination, impaired fine motor, impaired joint extensibility    Rehab potential is good.    Activity tolerance: Fair    Discharge recommendations: home with home health     Barriers to discharge: Inaccessible home environment, Decreased caregiver support    Equipment recommendations: bedside commode, walker, rolling     GOALS:   Multidisciplinary Problems     Physical Therapy Goals     Not on file          Multidisciplinary Problems (Resolved)        Problem: Physical Therapy Goal    Goal Priority Disciplines Outcome Goal Variances Interventions   Physical Therapy Goal   (Resolved)     PT, PT/OT Outcome(s) achieved     Description:  Goals to be met by: 19    Patient will increase functional independence with mobility by performin. Supine to sit with Stand-by Assistance- Met 2018  2. Sit to supine with Stand-by Assistance- Met 2018  3. Rolling to Left and Right with Stand-by Assistance.not met  4. Sit to stand transfer with Stand-by Assistance met  -Sit<>stand with supervision. Met (2018)  5. Bed to chair transfer with Stand-by Assistance using Rolling Walker met  -Stand pivot transfer with UE support and supervision. Not met  6. Gait  x 50 feet with Stand-by Assistance using Rolling Walker. Met  -Gait x 150 feet with use of RW, supervision. Not met  7. Ascend/descend 4 stair with Right OR Left Handrails Contact Guard Assistance. Met (2018)                             PLAN:    DC home with mother, HHPT services.   Inés Jones, PT  2018

## 2018-12-27 NOTE — PLAN OF CARE
Problem: Patient Care Overview  Goal: Plan of Care Review  Outcome: Ongoing (interventions implemented as appropriate)   12/27/18 9417   Plan of Care Review   Plan of Care Reviewed With patient

## 2018-12-27 NOTE — PROGRESS NOTES
spoke with Ghassan at [facility] to advise of patient's discharge today and to advise that HH orders had been faxed yesterday.  DONNA also sent a Community Message via EPIC to Lawanda Kearns with [facility] advising of patient's discharge.  Order for RW not needed because patient has all DME per Ursual with [facility].  Patient's post discharge f/u visit scheduled by Yancy KEEN and placed on AVS.  Teresa Ramos, NADIRA, MODESTA-DONNA, CCM  12/27/2018

## 2018-12-27 NOTE — PROGRESS NOTES
Pt Ollie has all equipment, he received a RW  05/02/2017 and wheelchair on 05/05/2017 he not [facility].  Teresa Ramos LMSW, ACNGUYEN-DONNA, [facility]  12/27/2018

## 2018-12-27 NOTE — PLAN OF CARE
Problem: Physical Therapy Goal  Goal: Physical Therapy Goal  Goals to be met by: 19    Patient will increase functional independence with mobility by performin. Supine to sit with Stand-by Assistance- Met 2018  2. Sit to supine with Stand-by Assistance- Met 2018  3. Rolling to Left and Right with Stand-by Assistance.not met  4. Sit to stand transfer with Stand-by Assistance met  -Sit<>stand with supervision. Met (2018)  5. Bed to chair transfer with Stand-by Assistance using Rolling Walker met  -Stand pivot transfer with UE support and supervision. Not met  6. Gait  x 50 feet with Stand-by Assistance using Rolling Walker. Met  -Gait x 150 feet with use of RW, supervision. Not met  7. Ascend/descend 4 stair with Right OR Left Handrails Contact Guard Assistance. Met (2018)           Outcome: Outcome(s) achieved Date Met: 18  Met 6/9 goals.

## 2018-12-27 NOTE — NURSING
Instructed pt on importance of follow up and safety.  Reviewed home medications. Pt verbalized understanding. PCT transported pt via w/c accompanied by pt's mother to front entrance for discharge to home with home health

## 2018-12-27 NOTE — CHAPLAIN
Facts (Spiritual Perception of Illness): Patient has multiple sclerosis but is thankful to God for using this disease as a way of bringing him into a relationship with God.       Feelings (Emotions/Experiences/Coping): Patient is accepting of situation because he will be discharged from the hospital today and there is nothing that he can do to change his situation.  He also has an active luh because he believes that God is taking care of him.        Family/Friends: Patient mentions an ex-wife, mother, sister and two sons who love and care about him.         Luh (Belief/Meaning/Philosophy): Patient believes that God wants people to be in a relationship with God, where they trust in God to take care of them and requested prayer in order to communicate to God that he wants God to continue taking care of him.   responded by providing prayer support.      Future (Spiritual Care Plan of Action): Patient will request further pastoral support as needed when hospitalized.

## 2018-12-27 NOTE — PLAN OF CARE
Problem: Patient Care Overview  Goal: Plan of Care Review  Outcome: Revised  Repositions independently, no new skin breakdowns noted. Afebrile. Monitored for pain and safety. Pt compliant with safety. Pain meds effective

## 2018-12-28 ENCOUNTER — TELEPHONE (OUTPATIENT)
Dept: PHYSICAL MEDICINE AND REHAB | Facility: CLINIC | Age: 53
End: 2018-12-28

## 2018-12-28 NOTE — TELEPHONE ENCOUNTER
----- Message from Edelmira De Oliveira sent at 12/27/2018  2:49 PM CST -----  Contact: self @ [phone]  Pt is req a refill for oxycodone 10/325 and diazePAM (VALIUM) 5 MG tablet.    [facility] [phone] (Phone)    [phone] (Fax)

## 2019-01-02 DIAGNOSIS — G35 MS (MULTIPLE SCLEROSIS): Chronic | ICD-10-CM

## 2019-01-02 DIAGNOSIS — Z74.09 IMPAIRED MOBILITY AND ADLS: Chronic | ICD-10-CM

## 2019-01-02 DIAGNOSIS — G35 ACUTE RELAPSING MULTIPLE SCLEROSIS: ICD-10-CM

## 2019-01-02 DIAGNOSIS — Z79.52 LONG TERM (CURRENT) USE OF SYSTEMIC STEROIDS: ICD-10-CM

## 2019-01-02 DIAGNOSIS — G82.20 PARAPARESIS: ICD-10-CM

## 2019-01-02 DIAGNOSIS — M54.42 CHRONIC BILATERAL LOW BACK PAIN WITH BILATERAL SCIATICA: ICD-10-CM

## 2019-01-02 DIAGNOSIS — G57.93 NEUROPATHIC PAIN, LEG, BILATERAL: ICD-10-CM

## 2019-01-02 DIAGNOSIS — G89.29 CHRONIC BILATERAL LOW BACK PAIN WITH BILATERAL SCIATICA: ICD-10-CM

## 2019-01-02 DIAGNOSIS — G62.9 POLYNEUROPATHY: ICD-10-CM

## 2019-01-02 DIAGNOSIS — R52 CHRONIC PAIN OF MULTIPLE SITES: Chronic | ICD-10-CM

## 2019-01-02 DIAGNOSIS — R25.2 SPASTICITY: ICD-10-CM

## 2019-01-02 DIAGNOSIS — R26.81 GAIT INSTABILITY: ICD-10-CM

## 2019-01-02 DIAGNOSIS — G89.4 CHRONIC PAIN SYNDROME: ICD-10-CM

## 2019-01-02 DIAGNOSIS — Z78.9 IMPAIRED MOBILITY AND ADLS: Chronic | ICD-10-CM

## 2019-01-02 DIAGNOSIS — M54.41 CHRONIC BILATERAL LOW BACK PAIN WITH BILATERAL SCIATICA: ICD-10-CM

## 2019-01-02 DIAGNOSIS — G89.29 CHRONIC PAIN OF MULTIPLE SITES: Chronic | ICD-10-CM

## 2019-01-02 DIAGNOSIS — Z79.891 LONG-TERM CURRENT USE OF OPIATE ANALGESIC: ICD-10-CM

## 2019-01-02 RX ORDER — DIAZEPAM 5 MG/1
5 TABLET ORAL EVERY 8 HOURS PRN
Qty: 90 TABLET | Refills: 0 | Status: SHIPPED | OUTPATIENT
Start: 2019-01-02 | End: 2019-01-29 | Stop reason: SDUPTHER

## 2019-01-02 RX ORDER — OXYCODONE AND ACETAMINOPHEN 10; 325 MG/1; MG/1
1 TABLET ORAL EVERY 6 HOURS PRN
Qty: 120 TABLET | Refills: 0 | Status: SHIPPED | OUTPATIENT
Start: 2019-01-02 | End: 2019-01-29 | Stop reason: SDUPTHER

## 2019-01-02 NOTE — TELEPHONE ENCOUNTER
Last visit: 12/06/2018  Next visit: 04/01/2019  Last refill Valium: 12/27/2018 5 day prescription from another MD  Last refill Percocet: 12/27/2018 5 day prescription from another MD    Pt has been receiving both Valium and Percocet from Dr Estrella, LR Valium 11/19/2018, LR Percocet 11/11/2018.

## 2019-01-02 NOTE — TELEPHONE ENCOUNTER
Called pharmacy to find when pt picked up his 5d prescriptions for Valium and Percocet, he picked them up 12/27/2018.

## 2019-01-02 NOTE — TELEPHONE ENCOUNTER
I spoke with pt who is currently home with home health. Pt confirmed he does not have a UTI at this time. Open to scheduling infusion any day. Pt is now scheduled for his maintenance Rituxan on 1/14/19 at 8AM at the [facility]. Left VM informing pt of appt, and I also mailed an appt reminder.

## 2019-01-02 NOTE — TELEPHONE ENCOUNTER
Confirmed with SNF that pt has been discharged. Per note, he was discharged home with home health.

## 2019-01-03 ENCOUNTER — OFFICE VISIT (OUTPATIENT)
Dept: INTERNAL MEDICINE | Facility: CLINIC | Age: 54
End: 2019-01-03
Payer: MEDICARE

## 2019-01-03 ENCOUNTER — IMMUNIZATION (OUTPATIENT)
Dept: INTERNAL MEDICINE | Facility: CLINIC | Age: 54
End: 2019-01-03
Payer: MEDICARE

## 2019-01-03 VITALS
OXYGEN SATURATION: 95 % | DIASTOLIC BLOOD PRESSURE: 84 MMHG | HEART RATE: 64 BPM | HEIGHT: 70 IN | SYSTOLIC BLOOD PRESSURE: 140 MMHG | BODY MASS INDEX: 25.81 KG/M2

## 2019-01-03 DIAGNOSIS — N39.0 URINARY TRACT INFECTION WITHOUT HEMATURIA, SITE UNSPECIFIED: Primary | ICD-10-CM

## 2019-01-03 DIAGNOSIS — M54.12 CERVICAL RADICULOPATHY: ICD-10-CM

## 2019-01-03 LAB
BILIRUB UR QL STRIP: NEGATIVE
CLARITY UR REFRACT.AUTO: CLEAR
COLOR UR AUTO: YELLOW
GLUCOSE UR QL STRIP: NEGATIVE
HGB UR QL STRIP: NEGATIVE
KETONES UR QL STRIP: NEGATIVE
LEUKOCYTE ESTERASE UR QL STRIP: NEGATIVE
NITRITE UR QL STRIP: NEGATIVE
PH UR STRIP: 6 [PH] (ref 5–8)
PROT UR QL STRIP: NEGATIVE
SP GR UR STRIP: 1.01 (ref 1–1.03)
URN SPEC COLLECT METH UR: NORMAL

## 2019-01-03 PROCEDURE — 99999 PR PBB SHADOW E&M-EST. PATIENT-LVL V: ICD-10-PCS | Mod: PBBFAC,HCWC,, | Performed by: INTERNAL MEDICINE

## 2019-01-03 PROCEDURE — G0008 FLU VACCINE (QUAD) GREATER THAN OR EQUAL TO 3YO PRESERVATIVE FREE IM: ICD-10-PCS | Mod: HCWC,S$GLB,, | Performed by: INTERNAL MEDICINE

## 2019-01-03 PROCEDURE — 99214 PR OFFICE/OUTPT VISIT, EST, LEVL IV, 30-39 MIN: ICD-10-PCS | Mod: HCWC,25,S$GLB, | Performed by: INTERNAL MEDICINE

## 2019-01-03 PROCEDURE — 90686 FLU VACCINE (QUAD) GREATER THAN OR EQUAL TO 3YO PRESERVATIVE FREE IM: ICD-10-PCS | Mod: HCWC,S$GLB,, | Performed by: INTERNAL MEDICINE

## 2019-01-03 PROCEDURE — 90686 IIV4 VACC NO PRSV 0.5 ML IM: CPT | Mod: HCWC,S$GLB,, | Performed by: INTERNAL MEDICINE

## 2019-01-03 PROCEDURE — 99214 OFFICE O/P EST MOD 30 MIN: CPT | Mod: HCWC,25,S$GLB, | Performed by: INTERNAL MEDICINE

## 2019-01-03 PROCEDURE — 81003 URINALYSIS AUTO W/O SCOPE: CPT | Mod: HCWC

## 2019-01-03 PROCEDURE — 87086 URINE CULTURE/COLONY COUNT: CPT | Mod: HCWC

## 2019-01-03 PROCEDURE — G0008 ADMIN INFLUENZA VIRUS VAC: HCPCS | Mod: HCWC,S$GLB,, | Performed by: INTERNAL MEDICINE

## 2019-01-03 PROCEDURE — 99999 PR PBB SHADOW E&M-EST. PATIENT-LVL V: CPT | Mod: PBBFAC,HCWC,, | Performed by: INTERNAL MEDICINE

## 2019-01-03 RX ORDER — METHYLPREDNISOLONE 4 MG/1
TABLET ORAL
Qty: 1 PACKAGE | Refills: 0 | Status: SHIPPED | OUTPATIENT
Start: 2019-01-03 | End: 2019-03-17

## 2019-01-03 NOTE — TELEPHONE ENCOUNTER
DOCUMENTATION ONLY:  Prior Authorization for Dalfampridine approved from 07/11/18 to 07/11/19    Case Id: [id number]    Co-pay: $0    Dalfampridine PA completed by [facility] previously.    Patient Assistance IS NOT required  EDWARD

## 2019-01-05 LAB — BACTERIA UR CULT: NO GROWTH

## 2019-01-06 NOTE — ASSESSMENT & PLAN NOTE
Continue therapy with miralax and senokot-s  Lactulose as needed if scheduled meds ineffective.  Enema prn  Patient having more regular BMS

## 2019-01-06 NOTE — PROGRESS NOTES
Subjective:       Patient ID: Mao Levin is a 53 y.o. male.    Chief Complaint: Hospital Follow Up    HPIPt hospitalized with UTI - then went to SNF.  Home for about one week.  Doing better.  No CP or SOB.  Needs better brace for his left hand.  Pt reports pain in R neck and R arm - has pinched nerve seen on MRI.  Review of Systems   Respiratory: Negative for shortness of breath.    Cardiovascular: Negative for chest pain.   Gastrointestinal: Negative for abdominal pain, diarrhea, nausea and vomiting.   Genitourinary: Negative for dysuria.   Neurological: Negative for seizures, syncope and headaches.       Objective:      Physical Exam   Constitutional: He is oriented to person, place, and time. He appears well-developed and well-nourished. No distress.   HENT:   Mouth/Throat: Oropharynx is clear and moist.   Neck: Neck supple. No JVD present. No thyromegaly present.   Cardiovascular: Normal rate, regular rhythm, normal heart sounds and intact distal pulses. Exam reveals no gallop and no friction rub.   No murmur heard.  Pulmonary/Chest: Effort normal and breath sounds normal. He has no wheezes. He has no rales.   Abdominal: Soft. Bowel sounds are normal. He exhibits no distension and no mass. There is no tenderness. There is no rebound and no guarding.   Musculoskeletal: He exhibits no edema.   Lymphadenopathy:     He has no cervical adenopathy.   Neurological: He is alert and oriented to person, place, and time.   Skin: Skin is warm and dry.   Psychiatric: He has a normal mood and affect. His behavior is normal. Judgment and thought content normal.       Assessment:       1. Urinary tract infection without hematuria, site unspecified    2. Cervical radiculopathy        Plan:   Urinary tract infection without hematuria, site unspecified  -     Urinalysis; Future; Expected date: 01/03/2019  -     Urine culture; Future; Expected date: 01/03/2019  -     Urinalysis  -     Urine culture    Cervical radiculopathy  -      Ambulatory consult to Pain Clinic    Other orders  -     methylPREDNISolone (MEDROL, RUBA,) 4 mg tablet; Follow package directions  Dispense: 1 Package; Refill: 0  -     Influenza - Quadrivalent (3 years & older) (PF)    Be sure he is on apixaban - had xarelto in hospital - has apixaban at home - be sure he is on 5mg BID - will check with his home health.    Transitional Care Note    Family and/or Caretaker present at visit?  No.  Diagnostic tests reviewed/disposition: I have reviewed all completed as well as pending diagnostic tests at the time of discharge.  Disease/illness education: yes  Home health/community services discussion/referrals: Patient has home health established - they are going out tomorrow   Establishment or re-establishment of referral orders for community resources: No other necessary community resources.   Discussion with other health care providers: No discussion with other health care providers necessary.

## 2019-01-06 NOTE — DISCHARGE SUMMARY
[facility] [place] [facility] [place]  Department of Hospital Medicine  Discharge Summary      Patient Name: Mao Levin  MRN: [id number]  Admission Date: 12/4/2018  Hospital Length of Stay: 23 days  Discharge Date and Time: 12/27/2018  2:36 PM  Attending Physician: Matthew Barrientos MD   Discharging Provider: Osiris Navarrete NP  Primary Care Provider: Mendy Alarcon MD    Chief complaint/ Reason for Admission: MS    HPI: 53-year-old male with a past medical history of MS, neurogenic bladder, PE on xarelto, chronic pain who presents for evaluation of increased LE weakness. Patient was diagnosed with a UTI treated with IV rocephin and transitioned to bactrim PO upon admission to SNF. Neurology also following to determine MS flare or Pseudoflare. It was deemed patient with Pseudoflare due to UTI. No new lesions noted no imaging. Patient has a history of urgency incontinence due to MS controlled with mirabegron. He does not self cath and does not have a agee catheter. Had botox injections with Dr. Augustin which at that time helped with incontinence. Denies fevers, chills, CP, SOB, abdominal pain, dysuria, hesitancy, hematuria, n/v, constipation, diarrhea, headaches, and vision changes, new numbness, or weakness. He states he lives at home with assistance from his mother. SNF placement was recommended for PT/OT services.     * No surgery found *      Hospital Course:   12/4/18: Patient admitted to SNF for ongoing PT/OT following a hospitalization for pseudoflare MS due to UTI.  12/5: Patient seen at bedside, c/o incontinence and would like to f/u with Dr. Augustin. Discussed Plan of care and verbalized understanding. Answered all questions.  12/6: patient complains of generalized aching pain to back, legs, hips rated at 9/10.  He was last prescribed oxycodone/APAP 10 mg tabs and is currently receiving 15 mg tabs prn.  He has urinary incontinence but does not have an indwelling agee or CIC.  He denies dysuria.  12/12: Patient seen  at bedside, pains controlled, hoping to stay longer at SNF, will discuss in in IDT  12/27: Patient discharged home (recently received an additional weekly since winning his appeal). Discharge home with home health services.      Significant Diagnostic Studies:  Results for MAGALI ALCANTAR (MRN [id number]) as of 1/5/2019 23:49   Ref. Range 12/26/2018 07:56   WBC Latest Ref Range: 3.90 - 12.70 K/uL 5.36   RBC Latest Ref Range: 4.60 - 6.20 M/uL 4.16 (L)   Hemoglobin Latest Ref Range: 14.0 - 18.0 g/dL 12.4 (L)   Hematocrit Latest Ref Range: 40.0 - 54.0 % 37.9 (L)   MCV Latest Ref Range: 82 - 98 fL 91   MCH Latest Ref Range: 27.0 - 31.0 pg 29.8   MCHC Latest Ref Range: 32.0 - 36.0 g/dL 32.7   RDW Latest Ref Range: 11.5 - 14.5 % 14.6 (H)   Platelets Latest Ref Range: 150 - 350 K/uL 148 (L)   Results for MAGALI ALCANTAR (MRN [id number]) as of 1/5/2019 23:49   Ref. Range 12/26/2018 07:56   Sodium Latest Ref Range: 136 - 145 mmol/L 139   Potassium Latest Ref Range: 3.5 - 5.1 mmol/L 4.0   Chloride Latest Ref Range: 95 - 110 mmol/L 106   CO2 Latest Ref Range: 23 - 29 mmol/L 26   Anion Gap Latest Ref Range: 8 - 16 mmol/L 7 (L)   BUN, Bld Latest Ref Range: 6 - 20 mg/dL 8   Creatinine Latest Ref Range: 0.5 - 1.4 mg/dL 0.8   eGFR if non African American Latest Ref Range: >60 mL/min/1.73 m^2 >60.0   eGFR if African American Latest Ref Range: >60 mL/min/1.73 m^2 >60.0   Glucose Latest Ref Range: 70 - 110 mg/dL 90   Calcium Latest Ref Range: 8.7 - 10.5 mg/dL 8.4 (L)   Phosphorus Latest Ref Range: 2.7 - 4.5 mg/dL 3.4   Magnesium Latest Ref Range: 1.6 - 2.6 mg/dL 1.9       Final Active Diagnoses:    Diagnosis Date Noted POA    PRINCIPAL PROBLEM:  MS (multiple sclerosis) [G35] 12/19/2016 Yes     Chronic    Current every day smoker [F17.200] 12/06/2018 Yes    Urinary tract infection without hematuria [N39.0] 11/30/2018 Yes    Depression [F32.9] 03/17/2017 Yes    Constipation due to neurogenic bowel [K59.00] 12/16/2016 Yes    Polyneuropathy  [G62.9] 10/27/2016 Yes    10/25/2016 Saddle PE [I26.92] 10/25/2016 Yes     Chronic    Spasticity [R25.2] 10/06/2016 Yes    Neurogenic bladder [N31.9] 10/06/2016 Yes     Chronic      Problems Resolved During this Admission:      * MS (multiple sclerosis)    · Chronic with increase weakness to lower extremities with this hospitalization  · Neurology followed and was likely a pseudoflare due to UTI  · Continue home dose of baclofen, dantrolene, gabapentin, valium prn  · Patient will need to f/u in MS clinic and scheduled Rituxan infusion, MS clinic aware of patient's needs  · PT/OT  · Fall precautions  · dvt ppx with rivaroxaban  · Bowel regimen with senokot-s and miralax, hold for loose or frequent stoolings     Current every day smoker    Patient has had success with bupropion in the past and would like to resume BID dosing to quit again.  Increase bupropion to BID with tobacco cessation in 3 days thereafter.     Urinary tract infection without hematuria    · Cultured providencia; Treated with rocephin at [facility] [place]  · Transitioned to Bactrim to completed 10 days, to be completed 12/10     Depression    · chronic  · Mood stable, sleeping well  · Continue home regimen of bupropion, duloxetine, and trazodone     Constipation due to neurogenic bowel    Continue therapy with miralax and senokot-s  Lactulose as needed if scheduled meds ineffective.  Enema prn  Patient having more regular BMS     Polyneuropathy    · Reports being followed by PMR  · Continue home regimen of gabapentin  · F/u with Dr. Estrella upon discharge     10/25/2016 Saddle PE    · Chronically on xarleto--continue  · denies sob or CP     Spasticity    · Continue home scheduled regimen of baclofen, dantrolene  · And prn valium for breakthrough spasms  · F/u with neurology outpatient     Neurogenic bladder    · Chronic  · Patient has not ever done self caths  · Has been being followed by Dr. Augustin and had Botox injections in July 2018  · I Reviewed Urology note  from clinic and patient to continue flomax and f/u in 6 months. Since patient had to cancel f/u visit, will reschedule upon discharge  · Continue flomax  · F/u with urology, Dr. Augustin      Reports adequate urine output wihtout urinary dysfunction  Continue flomax     Pt note, ANABEL Jones, PT 12/27/18  General Precautions: Standard, fall  Orthopedic Precautions: N/A   Braces: N/A  Transfers:  Sit<>Stand: supervision  Stand Pivot Transfer: SBA-CGA (WC<>commode/mat)  Gait:  Amb 70 feet with one turn with use of rolling walker, slow keyshawn, SBA. Swing-through gait cycle, continues to vault on RLE during swing-through of LLE.  Advanced Gait:  Stairs: 4 steps with 1 HR, step-to, Min A for placement of LLE on step.   Wheelchair Mobility:  Patient propels w/c 50 feet with BUE use and Mod I   Discharge recommendations: home with home health       Ot note, AI Antony YORK/NILESH 12/26/18  General Precautions: Standard, fall  Orthopedic Precautions: N/A  Braces: N/A  Bed Mobility:    · Not tested   Functional Mobility/Transfers:  · Patient completed Sit <> Stand Transfer with contact guard assistance  with  grab bars(s) and [facility]  · Patient completed Toilet Transfer Stand Pivot technique with contact guard assistance with  grab bars  Activities of Daily Living:  · Grooming: stand by assistance at sink level  · Lower Body Dressing: minimum assistance to doff/christopher depends  and pants with Min A and AFO and BLE tennis shoes with Mod A  · Toileting: minimum assistance for cleaning and clothing management in stance phase  Discharge recommendations: home with home health        Discharged Condition: stable    [place] [facility] [place] Up:  Follow-up Information     Mendy Alarcon MD.    Specialty:  Internal Medicine  Contact information:  [facility] [facility] [address] [phone]             [facility].    Specialty:  Home Health Services  Contact  "information:  [address] [id number]  [phone]                 Future Appointments   Date Time Provider Department Center   1/14/2019  8:00 AM [facility], CHEMO [facility] CHEMO [place] [facility]   2/5/2019  8:20 AM Brian Augustin MD [facility] UROLOGY [address]   3/11/2019  1:00 PM Mattie iFnnegan MD [facility] MSC [address]   4/1/2019  9:40 AM Kymberly Estrella MD [facility] PHYSMED [facility]       Patient Instructions:      WALKER FOR HOME USE     Order Specific Question Answer Comments   Type of Walker: Adult (5'4"-6'6")    With wheels? Yes    Height: 5' 10" (1.778 m)    Weight: 81.6 kg (179 lb 14.3 oz)    Length of need (1-99 months): 99    Does patient have medical equipment at home? bath bench LLE AFO / LLE AFO / LLE AFO   Does patient have medical equipment at home? walker, rolling    Does patient have medical equipment at home? other (see comments)    Please check all that apply: Patient's condition impairs ambulation.    Please check all that apply: Patient is unable to safely ambulate without equipment.    Please check all that apply: Walker will be used for gait training.    Vendor: Other (use comments) Pt has equipment 2017   Expected Date of Delivery: 12/26/2018      No driving until:     Notify your health care provider if you experience any of the following:  temperature >100.4     Notify your health care provider if you experience any of the following:  persistent nausea and vomiting or diarrhea     Notify your health care provider if you experience any of the following:  severe uncontrolled pain     Notify your health care provider if you experience any of the following:  difficulty breathing or increased cough     Notify your health care provider if you experience any of the following:  severe persistent headache     Notify your health care provider if you experience any of the following:  persistent dizziness, light-headedness, or visual disturbances     Notify your health care provider if you experience any of the " following:  increased confusion or weakness     Activity as tolerated     Medications:  Reconciled Home Medications:      Medication List      START taking these medications    buPROPion 150 MG TB24 tablet  Commonly known as:  WELLBUTRIN XL  Take 1 tablet (150 mg total) by mouth 2 (two) times daily with meals.  Replaces:  buPROPion 150 MG TBSR 12 hr tablet        CHANGE how you take these medications    ergocalciferol 50,000 unit Cap  Commonly known as:  VITAMIN D2  Take 1 capsule (50,000 Units total) by mouth every 7 days.  What changed:  additional instructions     gabapentin 400 MG capsule  Commonly known as:  NEURONTIN  Take 1 capsule (400 mg total) by mouth 4 (four) times daily.  What changed:    · medication strength  · how much to take  · how to take this  · when to take this     senna-docusate 8.6-50 mg 8.6-50 mg per tablet  Commonly known as:  PERICOLACE  Take 1 tablet by mouth 2 (two) times daily.  What changed:  when to take this        CONTINUE taking these medications    baclofen 20 MG tablet  Commonly known as:  LIORESAL  Take 1 tablet by mouth 4 times daily for 30 days     cranberry conc-C-bacillus coag 450-30-50 mg-mg-million Tab  Take 1 capsule by mouth once daily.     dalfampridine 10 mg Tb12  Commonly known as:  AMPYRA  Take 1 tablet by mouth every 12 (twelve) hours. DO NOT take until seen by Neurology     dantrolene 50 MG Cap  Commonly known as:  DANTRIUM  Take 1 capsule by mouth 4 times daily for 30 days     DULoxetine 30 MG capsule  Commonly known as:  CYMBALTA  Take 1 capsule (30 mg total) by mouth once daily.     multivitamin capsule  Take 1 capsule by mouth once daily.     polyethylene glycol 17 gram/dose powder  Commonly known as:  GLYCOLAX  Take 17 g by mouth once daily.     tamsulosin 0.4 mg Cap  Commonly known as:  FLOMAX  Take 1 capsule (0.4 mg total) by mouth once daily.     traZODone 100 MG tablet  Commonly known as:  DESYREL  Take 1 tablet (100 mg total) by mouth every evening.      VITAMIN C 500 MG tablet  Generic drug:  ascorbic acid (vitamin C)  Take 500 mg by mouth once daily.     VITAMIN D3 1000 units Tab  Generic drug:  vitamin D  Take 2 tablets by mouth once daily for 30 days     XARELTO 20 mg Tab  Generic drug:  rivaroxaban  Take 1 tablet (20 mg total) by mouth daily with dinner or evening meal.        STOP taking these medications    buPROPion 150 MG TBSR 12 hr tablet  Commonly known as:  WELLBUTRIN SR  Replaced by:  buPROPion 150 MG TB24 tablet     diazePAM 5 MG tablet  Commonly known as:  VALIUM     HYDROcodone-acetaminophen  mg per tablet  Commonly known as:  NORCO     mirabegron 50 mg Tb24  Commonly known as:  MYRBETRIQ     oxyCODONE-acetaminophen  mg per tablet  Commonly known as:  PERCOCET          Time spent on the discharge of patient: 30 minutes    Osiris Navarrete NP  Department of Hospital Medicine  [facility] [place] [facility]

## 2019-01-06 NOTE — ASSESSMENT & PLAN NOTE
· Cultured providencia; Treated with rocephin at [facility]  · Transitioned to Bactrim to completed 10 days, to be completed 12/10

## 2019-01-06 NOTE — ASSESSMENT & PLAN NOTE
· Chronic  · Patient has not ever done self caths  · Has been being followed by Dr. Augustin and had Botox injections in July 2018  · I Reviewed Urology note from clinic and patient to continue flomax and f/u in 6 months. Since patient had to cancel f/u visit, will reschedule upon discharge  · Continue flomax  · F/u with urology, Dr. Augustin      Reports adequate urine output wihtout urinary dysfunction  Continue flomax

## 2019-01-08 ENCOUNTER — TELEPHONE (OUTPATIENT)
Dept: INTERNAL MEDICINE | Facility: CLINIC | Age: 54
End: 2019-01-08

## 2019-01-08 NOTE — TELEPHONE ENCOUNTER
Left a message for the patient to call the office back.   To inform of normal lab results      Please Advise  Thank You

## 2019-01-14 ENCOUNTER — INFUSION (OUTPATIENT)
Dept: INFUSION THERAPY | Facility: HOSPITAL | Age: 54
End: 2019-01-14
Attending: PSYCHIATRY & NEUROLOGY
Payer: MEDICARE

## 2019-01-14 ENCOUNTER — TELEPHONE (OUTPATIENT)
Dept: NEUROLOGY | Facility: CLINIC | Age: 54
End: 2019-01-14

## 2019-01-14 VITALS
TEMPERATURE: 98 F | WEIGHT: 179.88 LBS | BODY MASS INDEX: 25.75 KG/M2 | SYSTOLIC BLOOD PRESSURE: 150 MMHG | HEIGHT: 70 IN | DIASTOLIC BLOOD PRESSURE: 79 MMHG | RESPIRATION RATE: 18 BRPM | HEART RATE: 83 BPM

## 2019-01-14 DIAGNOSIS — G35 MS (MULTIPLE SCLEROSIS): Primary | ICD-10-CM

## 2019-01-14 PROCEDURE — 96415 CHEMO IV INFUSION ADDL HR: CPT | Mod: HCWC

## 2019-01-14 PROCEDURE — 96413 CHEMO IV INFUSION 1 HR: CPT | Mod: HCWC

## 2019-01-14 PROCEDURE — 25000003 PHARM REV CODE 250: Mod: HCWC | Performed by: PHYSICIAN ASSISTANT

## 2019-01-14 PROCEDURE — S0028 INJECTION, FAMOTIDINE, 20 MG: HCPCS | Mod: HCWC | Performed by: PHYSICIAN ASSISTANT

## 2019-01-14 PROCEDURE — 96375 TX/PRO/DX INJ NEW DRUG ADDON: CPT | Mod: HCWC

## 2019-01-14 PROCEDURE — 96367 TX/PROPH/DG ADDL SEQ IV INF: CPT | Mod: HCWC

## 2019-01-14 PROCEDURE — 25000003 PHARM REV CODE 250: Mod: HCWC | Performed by: PSYCHIATRY & NEUROLOGY

## 2019-01-14 PROCEDURE — 63600175 PHARM REV CODE 636 W HCPCS: Mod: JG,HCWC | Performed by: PSYCHIATRY & NEUROLOGY

## 2019-01-14 PROCEDURE — 63600175 PHARM REV CODE 636 W HCPCS: Mod: HCWC | Performed by: PHYSICIAN ASSISTANT

## 2019-01-14 RX ORDER — SODIUM CHLORIDE 0.9 % (FLUSH) 0.9 %
10 SYRINGE (ML) INJECTION
Status: CANCELLED | OUTPATIENT
Start: 2019-01-14

## 2019-01-14 RX ORDER — ACETAMINOPHEN 325 MG/1
650 TABLET ORAL
Status: COMPLETED | OUTPATIENT
Start: 2019-01-14 | End: 2019-01-14

## 2019-01-14 RX ORDER — FAMOTIDINE 10 MG/ML
20 INJECTION INTRAVENOUS
Status: COMPLETED | OUTPATIENT
Start: 2019-01-14 | End: 2019-01-14

## 2019-01-14 RX ORDER — OXYCODONE AND ACETAMINOPHEN 5; 325 MG/1; MG/1
2 TABLET ORAL ONCE
Status: COMPLETED | OUTPATIENT
Start: 2019-01-14 | End: 2019-01-14

## 2019-01-14 RX ORDER — HEPARIN 100 UNIT/ML
500 SYRINGE INTRAVENOUS
Status: CANCELLED | OUTPATIENT
Start: 2019-01-14

## 2019-01-14 RX ORDER — SODIUM CHLORIDE 0.9 % (FLUSH) 0.9 %
10 SYRINGE (ML) INJECTION
Status: DISCONTINUED | OUTPATIENT
Start: 2019-01-14 | End: 2019-01-14 | Stop reason: HOSPADM

## 2019-01-14 RX ORDER — HEPARIN 100 UNIT/ML
500 SYRINGE INTRAVENOUS
Status: DISCONTINUED | OUTPATIENT
Start: 2019-01-14 | End: 2019-01-14 | Stop reason: HOSPADM

## 2019-01-14 RX ORDER — ACETAMINOPHEN 325 MG/1
650 TABLET ORAL
Status: CANCELLED | OUTPATIENT
Start: 2019-01-14

## 2019-01-14 RX ORDER — FAMOTIDINE 10 MG/ML
20 INJECTION INTRAVENOUS
Status: CANCELLED | OUTPATIENT
Start: 2019-01-14

## 2019-01-14 RX ADMIN — DEXTROSE: 50 INJECTION, SOLUTION INTRAVENOUS at 09:01

## 2019-01-14 RX ADMIN — DIPHENHYDRAMINE HYDROCHLORIDE 50 MG: 50 INJECTION, SOLUTION INTRAMUSCULAR; INTRAVENOUS at 09:01

## 2019-01-14 RX ADMIN — OXYCODONE HYDROCHLORIDE AND ACETAMINOPHEN 2 TABLET: 5; 325 TABLET ORAL at 02:01

## 2019-01-14 RX ADMIN — FAMOTIDINE 20 MG: 10 INJECTION, SOLUTION INTRAVENOUS at 09:01

## 2019-01-14 RX ADMIN — ACETAMINOPHEN 650 MG: 325 TABLET ORAL at 09:01

## 2019-01-14 RX ADMIN — RITUXIMAB 1000 MG: 10 INJECTION, SOLUTION INTRAVENOUS at 11:01

## 2019-01-14 NOTE — NURSING
Patient c/o 10/10 generalized pain from his MS. States he normally takes percocet 10 mg at home however forgot his medication. Dr. Finnegan notified and ordered to give percocet 10 mg now.

## 2019-01-14 NOTE — PLAN OF CARE
Problem: Adult Inpatient Plan of Care  Goal: Plan of Care Review  Outcome: Ongoing (interventions implemented as appropriate)  Patient tolerated rituxan well today with nad noted upon discharge. Patient is assistance x1. Patient had no caregiver by side or didn't bring food with him for a long treatment. Reminded patient it is important to have a caregiver with him at treatment and he is also allowed to bring outside food to the infusion center. Verbalized understanding. PIV removed, catheter tip intact. AVS given. Discharged home, escorted in wheelchair downstairs. Verbalized understanding to call MD for any questions/concerns.

## 2019-01-16 ENCOUNTER — TELEPHONE (OUTPATIENT)
Dept: NEUROLOGY | Facility: CLINIC | Age: 54
End: 2019-01-16

## 2019-01-29 ENCOUNTER — TELEPHONE (OUTPATIENT)
Dept: INTERNAL MEDICINE | Facility: CLINIC | Age: 54
End: 2019-01-29

## 2019-01-29 DIAGNOSIS — M54.41 CHRONIC BILATERAL LOW BACK PAIN WITH BILATERAL SCIATICA: ICD-10-CM

## 2019-01-29 DIAGNOSIS — R25.2 SPASTICITY: ICD-10-CM

## 2019-01-29 DIAGNOSIS — G35 MS (MULTIPLE SCLEROSIS): Chronic | ICD-10-CM

## 2019-01-29 DIAGNOSIS — R52 CHRONIC PAIN OF MULTIPLE SITES: Chronic | ICD-10-CM

## 2019-01-29 DIAGNOSIS — G89.4 CHRONIC PAIN SYNDROME: ICD-10-CM

## 2019-01-29 DIAGNOSIS — G35 ACUTE RELAPSING MULTIPLE SCLEROSIS: ICD-10-CM

## 2019-01-29 DIAGNOSIS — G89.29 CHRONIC PAIN OF MULTIPLE SITES: Chronic | ICD-10-CM

## 2019-01-29 DIAGNOSIS — Z79.52 LONG TERM (CURRENT) USE OF SYSTEMIC STEROIDS: ICD-10-CM

## 2019-01-29 DIAGNOSIS — G62.9 POLYNEUROPATHY: ICD-10-CM

## 2019-01-29 DIAGNOSIS — G89.29 CHRONIC BILATERAL LOW BACK PAIN WITH BILATERAL SCIATICA: ICD-10-CM

## 2019-01-29 DIAGNOSIS — Z74.09 IMPAIRED MOBILITY AND ADLS: Chronic | ICD-10-CM

## 2019-01-29 DIAGNOSIS — M54.42 CHRONIC BILATERAL LOW BACK PAIN WITH BILATERAL SCIATICA: ICD-10-CM

## 2019-01-29 DIAGNOSIS — G57.93 NEUROPATHIC PAIN, LEG, BILATERAL: ICD-10-CM

## 2019-01-29 DIAGNOSIS — G82.20 PARAPARESIS: ICD-10-CM

## 2019-01-29 DIAGNOSIS — R26.81 GAIT INSTABILITY: ICD-10-CM

## 2019-01-29 DIAGNOSIS — Z79.891 LONG-TERM CURRENT USE OF OPIATE ANALGESIC: ICD-10-CM

## 2019-01-29 DIAGNOSIS — Z78.9 IMPAIRED MOBILITY AND ADLS: Chronic | ICD-10-CM

## 2019-01-29 RX ORDER — DIAZEPAM 5 MG/1
5 TABLET ORAL EVERY 8 HOURS PRN
Qty: 90 TABLET | Refills: 0 | Status: SHIPPED | OUTPATIENT
Start: 2019-01-29 | End: 2019-03-01 | Stop reason: SDUPTHER

## 2019-01-29 RX ORDER — OXYCODONE AND ACETAMINOPHEN 10; 325 MG/1; MG/1
1 TABLET ORAL EVERY 8 HOURS PRN
Qty: 90 TABLET | Refills: 0 | Status: SHIPPED | OUTPATIENT
Start: 2019-01-29 | End: 2019-03-01 | Stop reason: SDUPTHER

## 2019-01-29 NOTE — TELEPHONE ENCOUNTER
Informed Ghassan the Dr. Watt signed the orders on Dr. pelletier behalf. Ghassan understood and took the orders

## 2019-01-29 NOTE — TELEPHONE ENCOUNTER
----- Message from Edelmira De Oliveira sent at 1/28/2019  2:34 PM CST -----  Contact: self @ [phone]  Pt is req a refill for oxycodone 10/325 and diazePAM (VALIUM) 5 MG tablet.    [facility]          [phone] (Phone)          [phone] (Fax)

## 2019-01-29 NOTE — TELEPHONE ENCOUNTER
----- Message from Bren Schmitt sent at 1/29/2019 11:45 AM CST -----  Contact: [place]/ [facility]/ [phone]  Calling to speak with someone in regards to an order that was faxed over for the pt to be recerted into home health. She states that the order can't be completed until she knows who signed off on it.Please call back and advise.      Thanks

## 2019-01-30 ENCOUNTER — TELEPHONE (OUTPATIENT)
Dept: PHARMACY | Facility: CLINIC | Age: 54
End: 2019-01-30

## 2019-01-30 NOTE — TELEPHONE ENCOUNTER
Called to complete initial Ampyra assessment, onboarding, and delivery/shipment. $0.00 copay at 004. No answer, LVM.     Skip Romero, PharmD  Clinical Pharmacist  [facility]  P: [phone]

## 2019-02-12 ENCOUNTER — TELEPHONE (OUTPATIENT)
Dept: ADMINISTRATIVE | Facility: CLINIC | Age: 54
End: 2019-02-12

## 2019-02-12 NOTE — TELEPHONE ENCOUNTER
Home Health Recert with [facility] -Dr. Mendy Alarcon. Pt received  SN, PT, OT, MSW, and HHA services.

## 2019-02-13 ENCOUNTER — TELEPHONE (OUTPATIENT)
Dept: INTERNAL MEDICINE | Facility: CLINIC | Age: 54
End: 2019-02-13

## 2019-02-13 NOTE — TELEPHONE ENCOUNTER
----- Message from Edelmira Felix sent at 2/13/2019  3:26 PM CST -----  Contact: [facility] [phone]  Type: Home Health (orders, updates, clarifications, etc.)    Home Health Agency/Nurse:[facility]     Phone number:[phone]    Reason for call:  is calling in regards to faxes transmitted on 2/2, 2/5 and 2/7 (Patient Communication)     Comments:Please sign and return.    Please call and advise  Thank you

## 2019-02-14 ENCOUNTER — TELEPHONE (OUTPATIENT)
Dept: NEUROLOGY | Facility: CLINIC | Age: 54
End: 2019-02-14

## 2019-02-14 NOTE — TELEPHONE ENCOUNTER
----- Message from Sasha Jerome sent at 2/14/2019 12:04 PM CST -----  Contact: pt at [phone]  Needs Advice    Reason for call:Pt states the he may need to be seen for his MS thatt is not getting any better.        Communication Preference:call pt at above number to discuss. [phone]    Additional Information:

## 2019-02-14 NOTE — TELEPHONE ENCOUNTER
"Pt's hands are tightening up. Pt states not new, but getting worse. Pt not taking baclofen. Pt said he's out of medication, but wants to come to clinic "to get everything back on track." Offered to send Lawanda a request to refill baclofen. Pt refused and said he will wait until seen in clinic. Pt scheduled. Appt reminder mailed.  "

## 2019-02-20 ENCOUNTER — OFFICE VISIT (OUTPATIENT)
Dept: NEUROLOGY | Facility: CLINIC | Age: 54
End: 2019-02-20
Payer: MEDICARE

## 2019-02-20 VITALS
BODY MASS INDEX: 25.81 KG/M2 | DIASTOLIC BLOOD PRESSURE: 81 MMHG | SYSTOLIC BLOOD PRESSURE: 142 MMHG | HEIGHT: 70 IN | HEART RATE: 80 BPM

## 2019-02-20 DIAGNOSIS — M54.41 CHRONIC BILATERAL LOW BACK PAIN WITH BILATERAL SCIATICA: ICD-10-CM

## 2019-02-20 DIAGNOSIS — Z79.899 ENCOUNTER FOR LONG-TERM (CURRENT) USE OF HIGH-RISK MEDICATION: ICD-10-CM

## 2019-02-20 DIAGNOSIS — D84.9 IMMUNOSUPPRESSED STATUS: ICD-10-CM

## 2019-02-20 DIAGNOSIS — N31.9 NEUROGENIC BLADDER: ICD-10-CM

## 2019-02-20 DIAGNOSIS — M54.42 CHRONIC BILATERAL LOW BACK PAIN WITH BILATERAL SCIATICA: ICD-10-CM

## 2019-02-20 DIAGNOSIS — R26.9 NEUROLOGIC GAIT DYSFUNCTION: ICD-10-CM

## 2019-02-20 DIAGNOSIS — M79.2 NEUROPATHIC PAIN: ICD-10-CM

## 2019-02-20 DIAGNOSIS — G35 MULTIPLE SCLEROSIS: Primary | ICD-10-CM

## 2019-02-20 DIAGNOSIS — Z71.89 COUNSELING REGARDING GOALS OF CARE: ICD-10-CM

## 2019-02-20 DIAGNOSIS — G89.29 CHRONIC BILATERAL LOW BACK PAIN WITH BILATERAL SCIATICA: ICD-10-CM

## 2019-02-20 DIAGNOSIS — E55.9 VITAMIN D INSUFFICIENCY: ICD-10-CM

## 2019-02-20 DIAGNOSIS — R25.2 SPASTICITY: ICD-10-CM

## 2019-02-20 DIAGNOSIS — R29.6 FREQUENT FALLS: ICD-10-CM

## 2019-02-20 PROCEDURE — 99215 PR OFFICE/OUTPT VISIT, EST, LEVL V, 40-54 MIN: ICD-10-PCS | Mod: HCWC,S$GLB,, | Performed by: PHYSICIAN ASSISTANT

## 2019-02-20 PROCEDURE — 3008F BODY MASS INDEX DOCD: CPT | Mod: HCWC,CPTII,S$GLB, | Performed by: PHYSICIAN ASSISTANT

## 2019-02-20 PROCEDURE — 99999 PR PBB SHADOW E&M-EST. PATIENT-LVL II: CPT | Mod: PBBFAC,HCWC,, | Performed by: PHYSICIAN ASSISTANT

## 2019-02-20 PROCEDURE — 99499 UNLISTED E&M SERVICE: CPT | Mod: HCWC,S$GLB,, | Performed by: PHYSICIAN ASSISTANT

## 2019-02-20 PROCEDURE — 99499 RISK ADDL DX/OHS AUDIT: ICD-10-PCS | Mod: HCWC,S$GLB,, | Performed by: PHYSICIAN ASSISTANT

## 2019-02-20 PROCEDURE — 99215 OFFICE O/P EST HI 40 MIN: CPT | Mod: HCWC,S$GLB,, | Performed by: PHYSICIAN ASSISTANT

## 2019-02-20 PROCEDURE — 3008F PR BODY MASS INDEX (BMI) DOCUMENTED: ICD-10-PCS | Mod: HCWC,CPTII,S$GLB, | Performed by: PHYSICIAN ASSISTANT

## 2019-02-20 PROCEDURE — 99999 PR PBB SHADOW E&M-EST. PATIENT-LVL II: ICD-10-PCS | Mod: PBBFAC,HCWC,, | Performed by: PHYSICIAN ASSISTANT

## 2019-02-20 RX ORDER — BACLOFEN 20 MG/1
TABLET ORAL
Qty: 120 TABLET | Refills: 6 | Status: ON HOLD | OUTPATIENT
Start: 2019-02-20 | End: 2019-03-18 | Stop reason: SDUPTHER

## 2019-02-20 NOTE — PROGRESS NOTES
"Subjective:       Patient ID: Mao Levin is a 53 y.o. male who presents today for a routine clinic visit for MS.      MS HPI:  · DMT: Rituxan -last dosed 2019  · Side effects from DMT? No  · Taking vitamin D3 as recommended? Yes    · Ran out of Baclofen--needs refill  · Has fallen 3 times---once yesterday on the way to the bathroom  · Has not received Ampyra--will call speciality pharmacy to have delivered  · Discharged from [facility] end of December followed up with home health-nursing PT and OT. Ended "not to long ago"    SOCIAL HISTORY  Social History     Tobacco Use    Smoking status: Former Smoker     Packs/day: 0.50     Years: 23.00     Pack years: 11.50     Types: Cigarettes     Last attempt to quit: 2018     Years since quittin.7    Smokeless tobacco: Never Used    Tobacco comment: Using patch   Substance Use Topics    Alcohol use: No    Drug use: No     Living arrangements - the patient lives in home with mother.  Employment disabled    MS ROS:  ·   · Sleep Disturbance: No-sleeping fairly well  · Bladder Dysfunction: No  · Bowel Dysfunction: No  · Spasticity: Yes - needs refill on Baclofen  · Visual Symptoms: No  · Cognitive: Yes - some impairment--trouble with names  · Mood Disorder: Yes - Wellbutrin   · Gait Disturbance: Yes - walking with standard rolling walker in home   · Falls: Yes  · Hand Dysfunction: Yes - coordination  · Pain: Yes - LE's, back/neck  · Sexual Dysfunction: Not Assessed  · Skin Breakdown: Yes - scrap on forehead from fall  · Tremors: No  · Dysphagia:  No  · Dysarthria:  No  · Heat sensitivity:  Yes   · Any un-met adaptive needs? No  · Copay Assist?  Yes           Objective:        1. 25 foot timed walk: 42.30s with rolling walker and L AFO; 1 min 6sec with rolling walker and L AFO; 1min 55sec last visit with U-step and and L AFO; 1: 44.19s with U-step and AFO in 2018  Timed 25 Foot Walk: 3/6/2017   Did patient wear an AFO? Yes   Was assistive device used? Yes "   Assistive device used (felipe one): Bilateral Assistance   Bilateral device used Walker/Rollator   Time for 25 Foot Walk (seconds) 34.9       Neurologic Exam          Imaging:       Results for orders placed during the hospital encounter of 11/30/18   MRI Brain Demyelinating W W/O Contrast    Impression 1.  No significant detrimental interval change in the appearance of the brain, noting findings in keeping with patient's reported history of multiple sclerosis.  No evidence of new or enhancing lesions to suggest active demyelination.    2.  Stable appearance of the cervical spine, noting similar distribution and extent of multiple foci of signal abnormality throughout the cervical and visualized upper thoracic cord.  No evidence of new or enhancing lesions to suggest active demyelination.    3.  Degenerative changes of the cervical spine.      Electronically signed by: Babar Harden MD  Date:    12/02/2018  Time:    02:49     Results for orders placed during the hospital encounter of 11/30/18   MRI Cervical Spine Demyelinating W W/O Contrast    Impression 1.  No significant detrimental interval change in the appearance of the brain, noting findings in keeping with patient's reported history of multiple sclerosis.  No evidence of new or enhancing lesions to suggest active demyelination.    2.  Stable appearance of the cervical spine, noting similar distribution and extent of multiple foci of signal abnormality throughout the cervical and visualized upper thoracic cord.  No evidence of new or enhancing lesions to suggest active demyelination.    3.  Degenerative changes of the cervical spine.      Electronically signed by: Babar Harden MD  Date:    12/02/2018  Time:    02:49         Labs:     Lab Results   Component Value Date    IJJQFGHJ25AG 61 04/10/2018    XCVQXINK63EM 27 (L) 07/18/2017    SXROTLLE56QP 18 (L) 09/25/2016     Lab Results   Component Value Date    JCVINDEX 3.70 (A) 09/22/2016    JCVANTIBODY  Positive (A) 09/22/2016     No results found for: TJ0ONVRS, ABSOLUTECD3, CQ0MCEMR, ABSOLUTECD8, GT7ILRJB, ABSOLUTECD4, LABCD48  Lab Results   Component Value Date    WBC 5.36 12/26/2018    RBC 4.16 (L) 12/26/2018    HGB 12.4 (L) 12/26/2018    HCT 37.9 (L) 12/26/2018    MCV 91 12/26/2018    MCH 29.8 12/26/2018    MCHC 32.7 12/26/2018    RDW 14.6 (H) 12/26/2018     (L) 12/26/2018    MPV 11.7 12/26/2018    GRAN 2.8 12/26/2018    GRAN 52.3 12/26/2018    LYMPH 1.9 12/26/2018    LYMPH 35.3 12/26/2018    MONO 0.4 12/26/2018    MONO 7.8 12/26/2018    EOS 0.2 12/26/2018    BASO 0.05 12/26/2018    EOSINOPHIL 3.7 12/26/2018    BASOPHIL 0.9 12/26/2018     Sodium   Date Value Ref Range Status   12/26/2018 139 136 - 145 mmol/L Final     Potassium   Date Value Ref Range Status   12/26/2018 4.0 3.5 - 5.1 mmol/L Final     Chloride   Date Value Ref Range Status   12/26/2018 106 95 - 110 mmol/L Final     CO2   Date Value Ref Range Status   12/26/2018 26 23 - 29 mmol/L Final     Glucose   Date Value Ref Range Status   12/26/2018 90 70 - 110 mg/dL Final     BUN, Bld   Date Value Ref Range Status   12/26/2018 8 6 - 20 mg/dL Final     Creatinine   Date Value Ref Range Status   12/26/2018 0.8 0.5 - 1.4 mg/dL Final     Calcium   Date Value Ref Range Status   12/26/2018 8.4 (L) 8.7 - 10.5 mg/dL Final     Total Protein   Date Value Ref Range Status   11/30/2018 7.1 6.0 - 8.4 g/dL Final     Albumin   Date Value Ref Range Status   11/30/2018 3.9 3.5 - 5.2 g/dL Final     Total Bilirubin   Date Value Ref Range Status   11/30/2018 0.5 0.1 - 1.0 mg/dL Final     Comment:     For infants and newborns, interpretation of results should be based  on gestational age, weight and in agreement with clinical  observations.  Premature Infant recommended reference ranges:  Up to 24 hours.............<8.0 mg/dL  Up to 48 hours............<12.0 mg/dL  3-5 days..................<15.0 mg/dL  6-29 days.................<15.0 mg/dL       Alkaline Phosphatase    Date Value Ref Range Status   11/30/2018 68 55 - 135 U/L Final     AST   Date Value Ref Range Status   11/30/2018 22 10 - 40 U/L Final     ALT   Date Value Ref Range Status   11/30/2018 24 10 - 44 U/L Final     Anion Gap   Date Value Ref Range Status   12/26/2018 7 (L) 8 - 16 mmol/L Final     eGFR if    Date Value Ref Range Status   12/26/2018 >60.0 >60 mL/min/1.73 m^2 Final     eGFR if non    Date Value Ref Range Status   12/26/2018 >60.0 >60 mL/min/1.73 m^2 Final     Comment:     Calculation used to obtain the estimated glomerular filtration  rate (eGFR) is the CKD-EPI equation.          Diagnosis/Assessment/Plan:    1. Multiple Sclerosis  · Assessment: Patient's timed walk is much improved.   · Imaging:MRI's stable in December-will plan for annual in December 2019  · Disease Modifying Therapies: Rituxan due again in July. CBC(month one) and creatine today(Ampyra).    2. MS Symptom Assessment / Management  · Gait Disturbance: Patient to call [facility] speciality pharm regarding Ampyra-approved and ready for delivery. Will recheck creatinine   · Will consider PT and OT again         Over 50% of this 40 minute visit was spent in direct face to face counseling of the patient about MS, DMT considerations, and MS symptom management.   Follow-up in about 3 months (around 5/20/2019) for follow up with Dr. Finnegan.  Patient agreed to POC today.    Attending, Dr. Finnegan, was available during today's encounter.     Lawanda Boyce PA-C  [facility]      Problem List Items Addressed This Visit        Renal/    Neurogenic bladder (Chronic)       Orthopedic    Chronic bilateral low back pain with bilateral sciatica       Other    Spasticity    Relevant Medications    baclofen (LIORESAL) 20 MG tablet    Frequent falls      Other Visit Diagnoses     Multiple sclerosis    -  Primary    Relevant Medications    baclofen (LIORESAL) 20 MG tablet    Other Relevant Orders    CBC auto differential    CREATININE,  SERUM    Counseling regarding goals of care        Encounter for long-term (current) use of high-risk medication        Relevant Orders    CBC auto differential    CREATININE, SERUM    Immunosuppressed status        Neuropathic pain        Neurologic gait dysfunction        Vitamin D insufficiency

## 2019-03-01 ENCOUNTER — HOSPITAL ENCOUNTER (EMERGENCY)
Facility: HOSPITAL | Age: 54
Discharge: HOME | End: 2019-03-01
Attending: EMERGENCY MEDICINE
Payer: MEDICARE

## 2019-03-01 ENCOUNTER — TELEPHONE (OUTPATIENT)
Dept: PHYSICAL MEDICINE AND REHAB | Facility: CLINIC | Age: 54
End: 2019-03-01

## 2019-03-01 VITALS
DIASTOLIC BLOOD PRESSURE: 85 MMHG | SYSTOLIC BLOOD PRESSURE: 124 MMHG | BODY MASS INDEX: 25.83 KG/M2 | RESPIRATION RATE: 18 BRPM | OXYGEN SATURATION: 96 % | HEART RATE: 90 BPM | WEIGHT: 180 LBS | TEMPERATURE: 98 F

## 2019-03-01 DIAGNOSIS — G35 HX OF MULTIPLE SCLEROSIS: ICD-10-CM

## 2019-03-01 DIAGNOSIS — G35 ACUTE RELAPSING MULTIPLE SCLEROSIS: ICD-10-CM

## 2019-03-01 DIAGNOSIS — Z79.891 LONG-TERM CURRENT USE OF OPIATE ANALGESIC: ICD-10-CM

## 2019-03-01 DIAGNOSIS — G89.4 CHRONIC PAIN SYNDROME: ICD-10-CM

## 2019-03-01 DIAGNOSIS — M54.41 CHRONIC BILATERAL LOW BACK PAIN WITH BILATERAL SCIATICA: ICD-10-CM

## 2019-03-01 DIAGNOSIS — R25.2 SPASTICITY: ICD-10-CM

## 2019-03-01 DIAGNOSIS — R52 CHRONIC PAIN OF MULTIPLE SITES: Chronic | ICD-10-CM

## 2019-03-01 DIAGNOSIS — Z78.9 IMPAIRED MOBILITY AND ADLS: Chronic | ICD-10-CM

## 2019-03-01 DIAGNOSIS — G89.29 CHRONIC BILATERAL LOW BACK PAIN WITH BILATERAL SCIATICA: ICD-10-CM

## 2019-03-01 DIAGNOSIS — G89.29 CHRONIC PAIN OF MULTIPLE SITES: Chronic | ICD-10-CM

## 2019-03-01 DIAGNOSIS — M54.42 CHRONIC BILATERAL LOW BACK PAIN WITH BILATERAL SCIATICA: ICD-10-CM

## 2019-03-01 DIAGNOSIS — R52 GENERALIZED PAIN: Primary | ICD-10-CM

## 2019-03-01 DIAGNOSIS — G82.20 PARAPARESIS: ICD-10-CM

## 2019-03-01 DIAGNOSIS — G57.93 NEUROPATHIC PAIN, LEG, BILATERAL: ICD-10-CM

## 2019-03-01 DIAGNOSIS — Z74.09 IMPAIRED MOBILITY AND ADLS: Chronic | ICD-10-CM

## 2019-03-01 DIAGNOSIS — Z79.52 LONG TERM (CURRENT) USE OF SYSTEMIC STEROIDS: ICD-10-CM

## 2019-03-01 DIAGNOSIS — G62.9 POLYNEUROPATHY: ICD-10-CM

## 2019-03-01 DIAGNOSIS — R26.81 GAIT INSTABILITY: ICD-10-CM

## 2019-03-01 DIAGNOSIS — G35 MS (MULTIPLE SCLEROSIS): Chronic | ICD-10-CM

## 2019-03-01 PROCEDURE — 25000003 PHARM REV CODE 250: Mod: HCWC | Performed by: PHYSICIAN ASSISTANT

## 2019-03-01 PROCEDURE — 99284 EMERGENCY DEPT VISIT MOD MDM: CPT | Mod: ,,, | Performed by: PHYSICIAN ASSISTANT

## 2019-03-01 PROCEDURE — 99284 PR EMERGENCY DEPT VISIT,LEVEL IV: ICD-10-PCS | Mod: ,,, | Performed by: PHYSICIAN ASSISTANT

## 2019-03-01 PROCEDURE — 99283 EMERGENCY DEPT VISIT LOW MDM: CPT | Mod: HCWC

## 2019-03-01 RX ORDER — DIAZEPAM 5 MG/1
5 TABLET ORAL EVERY 8 HOURS PRN
Qty: 90 TABLET | Refills: 0 | Status: ON HOLD | OUTPATIENT
Start: 2019-03-02 | End: 2019-03-17

## 2019-03-01 RX ORDER — OXYCODONE AND ACETAMINOPHEN 10; 325 MG/1; MG/1
1 TABLET ORAL EVERY 8 HOURS PRN
Qty: 90 TABLET | Refills: 0 | Status: ON HOLD | OUTPATIENT
Start: 2019-03-02 | End: 2019-03-17

## 2019-03-01 RX ORDER — OXYCODONE AND ACETAMINOPHEN 10; 325 MG/1; MG/1
1 TABLET ORAL ONCE
Status: COMPLETED | OUTPATIENT
Start: 2019-03-01 | End: 2019-03-01

## 2019-03-01 RX ADMIN — OXYCODONE HYDROCHLORIDE AND ACETAMINOPHEN 1 TABLET: 10; 325 TABLET ORAL at 11:03

## 2019-03-01 NOTE — TELEPHONE ENCOUNTER
----- Message from Hina Costa sent at 3/1/2019 11:29 AM CST -----  Contact: LOUISE Craig in the ED  Pt would like to discuss getting a sooner appt.  He is in the ED for pain and they cannot help him there.  The PA in the ED is requesting that he is seen sooner by Dr Estrella so he can get a refill on pain medication from her.    Pt can be reached at [phone]    Dr Fajardo asked that I send the message urgent because having pain all over his body but mainly in the knees.

## 2019-03-01 NOTE — TELEPHONE ENCOUNTER
Last Visit: 10/08/2018  Canceled/No Show Visit: 12/06/2018  Next Visit: 04/01/2019 High priority on wait list  Last Refill Valium: 01/29/2019  Last refill Percocet: 01/29/2019    ----- Message from Adamaris Devi sent at 3/1/2019 10:12 AM CST -----  Rx Refill/Request     Is this a Refill or New Rx:  Refills    Rx Name and Strength:  diazePAM (VALIUM) 5 MG tablet and oxyCODONE-acetaminophen (PERCOCET)  mg per tablet    Preferred Pharmacy with phone number:     [facility]  [address] [facility] [address]  Phone: [phone] Fax: [phone]    Communication Preference:pt@ [phone]  Additional Information: says he is waiting at the pharmacy now. Please call patient to let him know the status of the refill request.

## 2019-03-01 NOTE — ED TRIAGE NOTES
Mao Levin, a 53 y.o. male presents to the ED via private auto with CC generalized pain for the past few weeks.       Patient identifiers verified verbally with patient and correct for Mao Levin.    LOC/ APPEARANCE: The patient is AAOx4. Pt is speaking appropriately, no slurred speech.  Pt is clean and well groomed. Reports generalized pain due to MS unable to see PCP today due to appointment mix up. Pt reports pain level of 10/10. Reports neck pain from fall last Wednesday. Pt updated on POC.  SKIN: Skin is warm dry and intact, and color is consistent with ethnicity. Capillary refill <3 seconds. No breakdown or brusing visible. Mucus membranes moist, acyanotic.  RESPIRATORY: Airway is open and patent. Respirations-spontaneous, unlabored, regular rate, equal bilaterally on inspiration and expiration. No accessory muscle use noted.  CARDIAC:  No peripheral edema noted, and patient has no c/o chest pain. Peripheral pulses present equal and strong throughout.  ABDOMEN:  Pt has no complaints of abnormal bowel movements, denies blood in stool. Pt reports normal appetite.   NEUROLOGIC: Eyes open spontaneously and facial expression symmetrical. Pt behavior appropriate to situation, and pt follows commands. Pt reports sensation present in all extremities when touched with a finger, denies any numbness or tingling. PERRLA  MUSCULOSKELETAL: Spontaneous movement noted to TRISH ALBERT RLE. Hand  equal and leg strength strong +5 to right leg, no movement to left leg due to chronic condition.   : No complaints of frequency, burning, urgency or blood in the urine. No complaints of incontinence.

## 2019-03-01 NOTE — DISCHARGE INSTRUCTIONS
Take all of your medication as prescribed. Call and follow up with all of your appointments as scheduled.     Future Appointments   Date Time Provider Department Center   4/1/2019  9:40 AM Kymberly Estrella MD [facility] DAVID Kelley   6/3/2019  1:00 PM Mattie Finnegan MD [facility] CRISTINA Kelley

## 2019-03-01 NOTE — ED NOTES
Bed: INT 00  Expected date:   Expected time:   Means of arrival:   Comments:     Essence Story, ANGELA  03/01/19 [id number]

## 2019-03-01 NOTE — ED PROVIDER NOTES
"Encounter Date: 3/1/2019       History     Chief Complaint   Patient presents with    pain all over     hx MS; ran out of meds     11:12 AM  Patient is a 53 year old male with a pmhx of MS, DVT/PE, anxiety, depression, chronic pain, gait instability, spasticity presents to the ED with chronic generalized pain. Patient states that he thought he had an appointment today, March 1st, but when he went up to his pain management clinic visit, his appointment was on April 1st instead.  He was unable to see his doctor.  He had a follow-up visit today.  Patient presents to the ED due to need for medication for his pain. He states that if he did not come to [facility] for his appointment, he would not be in the emergency room at this time.  He is endorsing 10/10 generalized pain mainly from below his knee, low back, and neck. Also has associated visual disturbances. He as frequent falls. He is interested in PT and OT again. His pain is not new and "just bad." He states that he is baclofen refill is in the pharmacy, and he has not picked it up yet. His mother brought him to the ED. He denies any fever or chills. No other complaints at this time.           Review of patient's allergies indicates:  No Known Allergies  Past Medical History:   Diagnosis Date    Anticoagulant long-term use     Anxiety     Chronic back pain     Deep vein thrombosis     Depression     Depression     Gait instability     Multiple sclerosis     Multiple sclerosis     Neurogenic bladder     Polyneuropathy     Pulmonary embolism     Spasticity      Past Surgical History:   Procedure Laterality Date    CYSTOSCOPY N/A 6/20/2018    Performed by [facility] at [facility] OR [address] [place] [address]    INJECTION, BOTULINUM TOXIN, TYPE A 200 UNITS N/A 6/20/2018    Performed by Brian [facility]in MD at [facility] OR [facility]     Family History   Problem Relation Age of Onset    Arthritis Mother     No Known Problems Father     Cancer Maternal Grandfather      Social History "     Tobacco Use    Smoking status: Former Smoker     Packs/day: 0.50     Years: 23.00     Pack years: 11.50     Types: Cigarettes     Last attempt to quit: 2018     Years since quittin.7    Smokeless tobacco: Never Used    Tobacco comment: Using patch   Substance Use Topics    Alcohol use: No    Drug use: No     Review of Systems   Constitutional: Negative for chills, diaphoresis and fever.   HENT: Negative for ear pain and sore throat.    Eyes: Positive for visual disturbance.   Respiratory: Negative for shortness of breath.    Cardiovascular: Negative for chest pain.   Gastrointestinal: Negative for abdominal pain and nausea.   Genitourinary: Negative for dysuria and hematuria.   Musculoskeletal: Positive for arthralgias, gait problem and myalgias. Negative for back pain.   Skin: Negative for rash.   Neurological: Positive for weakness. Negative for headaches.   Hematological: Does not bruise/bleed easily.       Physical Exam     Initial Vitals [19 1058]   BP Pulse Resp Temp SpO2   124/85 90 18 98.2 °F (36.8 °C) 96 %      MAP       --         Physical Exam    Vitals reviewed.  Constitutional: He appears well-developed and well-nourished. He is not diaphoretic.   Average age male in NAD and nontoxic appearing. He has his walker with him.    HENT:   Head: Normocephalic and atraumatic.   Nose: Nose normal.   Eyes: Conjunctivae and EOM are normal. Pupils are equal, round, and reactive to light. Right eye exhibits no nystagmus. Left eye exhibits no nystagmus.   Neck: Normal range of motion.   Cardiovascular: Normal rate, regular rhythm and normal heart sounds. Exam reveals no friction rub.    No murmur heard.  Pulmonary/Chest: Breath sounds normal. No respiratory distress. He has no wheezes. He has no rales.   Abdominal: Soft. Bowel sounds are normal. He exhibits no distension. There is no tenderness.   Musculoskeletal:   1/5 L finger .   3/5 L hip, 3/5 L knee, 0/5 left ankle strength  4/5 RLE  strength   Neurological: He is alert and oriented to person, place, and time.   Answering questions in clear and full sentences.    Skin: Skin is warm and dry. No erythema.   Psychiatric: He has a normal mood and affect. Thought content normal.         ED Course   Procedures  Labs Reviewed - No data to display       Imaging Results    None          Medical Decision Making:   History:   Old Medical Records: I decided to obtain old medical records.  Initial Assessment:   Patient is a 53-year-old male with a history of multiple sclerosis, chronic pain, gait instability who presents the ED with uncontrolled pain. He has been out of his medication for 2 days.  Differential Diagnosis:   Includes but is not limited to chronic pain. No recent injury or trauma.  I do not suspect multiple sclerosis flare.  ED Management:  Patient came to [facility] because he thought he had an appointment with his pain management doctor this morning.  His appointment is actually next month.  He came into the ED due to his acute on chronic pain.  He has been out of his medications for 2 days.  His exam is reassuring and at baseline for him.  His vitals are stable.  He does not need any labs or imaging.  There are no emergent or life-threatening conditions at this time.  I expressed that I cannot refill his medication and treat his chronic pain from the emergency department.  However, I can give him a narcotic pain medication here in the emergency department.  He has prescription for muscle relaxer waiting for him at the pharmacy that he should  and take in the meantime.  I also called pain management office and talked to the  who sent a urgent message to his doctor.  This seemed to satisfy patient.  All of his questions were answered.  He is to closely follow up as scheduled or earlier if possible.  Patient is comfortable with plan and stable for discharge.                      Clinical Impression:       ICD-10-CM  ICD-9-CM   1. Generalized pain R52 780.96   2. Hx of multiple sclerosis Z86.69 V12.49         Disposition:   Disposition: Discharged  Condition: Stable                        Libia Fajardo PA-C  03/01/19 1206

## 2019-03-06 ENCOUNTER — TELEPHONE (OUTPATIENT)
Dept: PHARMACY | Facility: CLINIC | Age: 54
End: 2019-03-06

## 2019-03-06 NOTE — TELEPHONE ENCOUNTER
Initial dalfampridine (Ampyra) consult completed on Wednesday 3/6/19. Dalfampridine 10mg will be shipped on Thursday 3/6/19 to arrive at patient's home on Friday 3/8/19 via [employer]. $0.00 copay. Patient intends to start therapy once received. Address confirmed. Confirmed 2 patient identifiers - name and . Therapy Appropriate.    Counseled patient on administration directions:  Oral: 10mg twice daily; doses >20mg daily are associated with no additional benefit.   RT; w/ or w/o food    Patient was counseled on possible side effects:  · Urinary tract infection (12%)  · Central nervous system: Insomnia (9%), dizziness (7%), headache (7%), equilibrium disturbance (5%), paresthesia (4%)  · Gastrointestinal: Nausea (7%), constipation (3%), dyspepsia (2%)  · Neuromuscular & skeletal: Weakness (7%), back pain (5%), acute exacerbations of multiple sclerosis (4%)  · Respiratory: Nasopharyngitis (4%), pharyngolaryngeal pain (2%)    DDIs:  Medication list reviewed and no DDIs encountered (no cimetidine, metformin, or quinidine)    Monitoring: Renal function, seizures    Patient verbalized understanding. Compliance stressed. Patient advised to call myself or provider should any questions arise. Consultation included: indication; goals of treatment; administration; storage and handling; side effects; how to handle side effects; the importance of compliance; how to handle missed doses; the importance of laboratory monitoring; the importance of keeping all follow up appointments.  Patient understands to report any medication changes to [facility] and provider. All questions answered and addressed to patients satisfaction. I will f/u with patient in 1 week from start, [facility] to contact patient in 3 weeks for refills.    Armani Valenzuela, PharmD  Clinical Pharmacist  [facility]  P: [phone]    [facility] Center[facility] sent to provider on 3/6/19 at 3:48 pm

## 2019-03-08 ENCOUNTER — NURSE TRIAGE (OUTPATIENT)
Dept: ADMINISTRATIVE | Facility: CLINIC | Age: 54
End: 2019-03-08

## 2019-03-09 NOTE — TELEPHONE ENCOUNTER
Sister Brooke called to report the following:     -would like for patient to not have as many pain pills prescribed   -would like the Rx filled weekly   -states pt fell a couple of hours ago and will not go to ER   -states that pt would get pain med filled on Monday and would be out by Friday  -concerned about brother addiction   -advised to f/u with provider   -caller not with patient        Reason for Disposition   Caller has NON-URGENT medication question about med that PCP prescribed and triager unable to answer question    Protocols used: MEDICATION QUESTION CALL-A-AH

## 2019-03-11 NOTE — TELEPHONE ENCOUNTER
Informed Dr Estrella.  Ms Cox is not listed as family on the chart, the phone number is not in the system, cannot call and talk to someone who is not in the chart about someone's care.  Pt cannot get another refill without being seen and next appt for 04/01/2019.

## 2019-03-12 ENCOUNTER — TELEPHONE (OUTPATIENT)
Dept: NEUROLOGY | Facility: CLINIC | Age: 54
End: 2019-03-12

## 2019-03-14 ENCOUNTER — TELEPHONE (OUTPATIENT)
Dept: PSYCHIATRY | Facility: CLINIC | Age: 54
End: 2019-03-14

## 2019-03-14 DIAGNOSIS — G35 MS (MULTIPLE SCLEROSIS): Chronic | ICD-10-CM

## 2019-03-14 DIAGNOSIS — Z78.9 IMPAIRED MOBILITY AND ADLS: Chronic | ICD-10-CM

## 2019-03-14 DIAGNOSIS — G35 ACUTE RELAPSING MULTIPLE SCLEROSIS: ICD-10-CM

## 2019-03-14 DIAGNOSIS — M54.41 CHRONIC BILATERAL LOW BACK PAIN WITH BILATERAL SCIATICA: ICD-10-CM

## 2019-03-14 DIAGNOSIS — Z78.9 IMPAIRED MOBILITY AND ADLS: ICD-10-CM

## 2019-03-14 DIAGNOSIS — G57.93 NEUROPATHIC PAIN, LEG, BILATERAL: ICD-10-CM

## 2019-03-14 DIAGNOSIS — G83.89: ICD-10-CM

## 2019-03-14 DIAGNOSIS — G89.29 CHRONIC PAIN OF MULTIPLE SITES: Chronic | ICD-10-CM

## 2019-03-14 DIAGNOSIS — Z74.09 IMPAIRED MOBILITY AND ADLS: ICD-10-CM

## 2019-03-14 DIAGNOSIS — M54.42 CHRONIC BILATERAL LOW BACK PAIN WITH BILATERAL SCIATICA: ICD-10-CM

## 2019-03-14 DIAGNOSIS — R52 CHRONIC PAIN OF MULTIPLE SITES: Chronic | ICD-10-CM

## 2019-03-14 DIAGNOSIS — G82.20 PARAPARESIS: ICD-10-CM

## 2019-03-14 DIAGNOSIS — G89.29 CHRONIC BILATERAL LOW BACK PAIN WITH BILATERAL SCIATICA: ICD-10-CM

## 2019-03-14 DIAGNOSIS — Z74.09 IMPAIRED MOBILITY AND ADLS: Chronic | ICD-10-CM

## 2019-03-14 DIAGNOSIS — Z79.52 LONG TERM (CURRENT) USE OF SYSTEMIC STEROIDS: ICD-10-CM

## 2019-03-14 DIAGNOSIS — G89.4 CHRONIC PAIN SYNDROME: ICD-10-CM

## 2019-03-14 DIAGNOSIS — G35 MULTIPLE SCLEROSIS: Primary | ICD-10-CM

## 2019-03-14 DIAGNOSIS — R25.2 SPASTICITY: ICD-10-CM

## 2019-03-14 DIAGNOSIS — R26.9 NEUROLOGIC GAIT DYSFUNCTION: ICD-10-CM

## 2019-03-14 DIAGNOSIS — Z79.891 LONG-TERM CURRENT USE OF OPIATE ANALGESIC: ICD-10-CM

## 2019-03-14 DIAGNOSIS — G62.9 POLYNEUROPATHY: ICD-10-CM

## 2019-03-14 DIAGNOSIS — R26.81 GAIT INSTABILITY: ICD-10-CM

## 2019-03-14 NOTE — TELEPHONE ENCOUNTER
"Informed Dr Estrella, who said she cannot replace his lost medication.  Called pt and arranged an appt on 03/20/2019 at 10:20am, pt repeated the time and date back.  Informed pt that if he starts to have withdrawals he can go to the after hours clinic or the ED depending on the severity.  -----  Returned call.  Pt said he lost his diazepam and oxycodone-acetaminophen.  Says "it was in my bag" and "it musta spilled in my sister's car cause she found my bank card" and a few other things.  They were unable to find his medication after.    Last visit: 10/08/2018  Canceled/No Show visit: 11/13/2018, 12/06/2018  Next visit: 04/01/2019  Last refill Percocet: 03/02/2019  Last refill Valium: 03/02/2019    ----- Message from Adamaris Devi sent at 3/14/2019  1:48 PM CDT -----  Contact: pt @ [phone]  Calling to speak with someone in Dr. Estrella's office says he lost all of his medication, and wants to speak with the doctor. Please call.    "

## 2019-03-14 NOTE — TELEPHONE ENCOUNTER
Received voicemail from patient to call about his needs.  Spoke with pt and he advised that someone is already helping him to locate housing.  He wants a power scooter or power wheelchair.  SW reviewed pt's chart and he was scheduled to see Dr. Davis for an evaluation in 9/2017 but did not keep this appointment.  SW advised that he would need to be re-referred to Dr. Davis or Dr. Gomes; pt is open to seeing either physician.  Will discuss with Lawanda Boyce PA-C.

## 2019-03-15 ENCOUNTER — TELEPHONE (OUTPATIENT)
Dept: PSYCHIATRY | Facility: CLINIC | Age: 54
End: 2019-03-15

## 2019-03-15 NOTE — TELEPHONE ENCOUNTER
SW intern spoke with pt by phone and received confirmation from him that it is okay for me to speak with Ivy.

## 2019-03-15 NOTE — TELEPHONE ENCOUNTER
DONNA bates spoke with Ivy, the Care Manager from [facility]. Ivy told DONNA bates that she in helping pt with various needs. DONNA bates called pt and left a message on his voicemail asking for consent to coordinate care and share information with Ivy.

## 2019-03-17 ENCOUNTER — HOSPITAL ENCOUNTER (OUTPATIENT)
Facility: HOSPITAL | Age: 54
Discharge: HOME | End: 2019-03-18
Attending: EMERGENCY MEDICINE | Admitting: HOSPITALIST
Payer: MEDICARE

## 2019-03-17 DIAGNOSIS — N40.0 BPH WITHOUT URINARY OBSTRUCTION: ICD-10-CM

## 2019-03-17 DIAGNOSIS — G35 MULTIPLE SCLEROSIS: ICD-10-CM

## 2019-03-17 DIAGNOSIS — R53.81 DEBILITY: ICD-10-CM

## 2019-03-17 DIAGNOSIS — K52.9 PROCTOCOLITIS: ICD-10-CM

## 2019-03-17 DIAGNOSIS — R25.2 SPASTICITY: ICD-10-CM

## 2019-03-17 DIAGNOSIS — G35 MS (MULTIPLE SCLEROSIS): Primary | Chronic | ICD-10-CM

## 2019-03-17 PROBLEM — M54.2 NECK PAIN ON RIGHT SIDE: Status: ACTIVE | Noted: 2019-03-17

## 2019-03-17 LAB
ALBUMIN SERPL BCP-MCNC: 3.9 G/DL
ALP SERPL-CCNC: 80 U/L
ALT SERPL W/O P-5'-P-CCNC: 15 U/L
ANION GAP SERPL CALC-SCNC: 8 MMOL/L
AST SERPL-CCNC: 14 U/L
BASOPHILS # BLD AUTO: 0.06 K/UL
BASOPHILS NFR BLD: 0.6 %
BILIRUB SERPL-MCNC: 0.2 MG/DL
BILIRUB UR QL STRIP: NEGATIVE
BUN SERPL-MCNC: 8 MG/DL
CALCIUM SERPL-MCNC: 9 MG/DL
CHLORIDE SERPL-SCNC: 105 MMOL/L
CLARITY UR REFRACT.AUTO: CLEAR
CO2 SERPL-SCNC: 26 MMOL/L
COLOR UR AUTO: YELLOW
CREAT SERPL-MCNC: 0.8 MG/DL
DIFFERENTIAL METHOD: ABNORMAL
EOSINOPHIL # BLD AUTO: 0.2 K/UL
EOSINOPHIL NFR BLD: 1.5 %
ERYTHROCYTE [DISTWIDTH] IN BLOOD BY AUTOMATED COUNT: 13.1 %
EST. GFR  (AFRICAN AMERICAN): >60 ML/MIN/1.73 M^2
EST. GFR  (NON AFRICAN AMERICAN): >60 ML/MIN/1.73 M^2
ESTIMATED AVG GLUCOSE: 100 MG/DL
GLUCOSE SERPL-MCNC: 101 MG/DL
GLUCOSE UR QL STRIP: NEGATIVE
HBA1C MFR BLD HPLC: 5.1 %
HCT VFR BLD AUTO: 39 %
HGB BLD-MCNC: 13.2 G/DL
HGB UR QL STRIP: NEGATIVE
IMM GRANULOCYTES # BLD AUTO: 0.03 K/UL
IMM GRANULOCYTES NFR BLD AUTO: 0.3 %
KETONES UR QL STRIP: NEGATIVE
LEUKOCYTE ESTERASE UR QL STRIP: NEGATIVE
LIPASE SERPL-CCNC: 25 U/L
LYMPHOCYTES # BLD AUTO: 2.8 K/UL
LYMPHOCYTES NFR BLD: 28.3 %
MCH RBC QN AUTO: 29.9 PG
MCHC RBC AUTO-ENTMCNC: 33.8 G/DL
MCV RBC AUTO: 88 FL
MONOCYTES # BLD AUTO: 0.7 K/UL
MONOCYTES NFR BLD: 6.7 %
NEUTROPHILS # BLD AUTO: 6.2 K/UL
NEUTROPHILS NFR BLD: 62.6 %
NITRITE UR QL STRIP: NEGATIVE
NRBC BLD-RTO: 0 /100 WBC
PH UR STRIP: 7 [PH] (ref 5–8)
PLATELET # BLD AUTO: 215 K/UL
PMV BLD AUTO: 10.5 FL
POTASSIUM SERPL-SCNC: 3.7 MMOL/L
PROT SERPL-MCNC: 6.8 G/DL
PROT UR QL STRIP: NEGATIVE
RBC # BLD AUTO: 4.41 M/UL
SODIUM SERPL-SCNC: 139 MMOL/L
SP GR UR STRIP: 1.01 (ref 1–1.03)
URN SPEC COLLECT METH UR: NORMAL
WBC # BLD AUTO: 9.96 K/UL

## 2019-03-17 PROCEDURE — G0378 HOSPITAL OBSERVATION PER HR: HCPCS | Mod: HCWC

## 2019-03-17 PROCEDURE — 81003 URINALYSIS AUTO W/O SCOPE: CPT | Mod: HCWC

## 2019-03-17 PROCEDURE — 83036 HEMOGLOBIN GLYCOSYLATED A1C: CPT | Mod: HCWC

## 2019-03-17 PROCEDURE — 25000003 PHARM REV CODE 250: Mod: HCWC | Performed by: EMERGENCY MEDICINE

## 2019-03-17 PROCEDURE — 99285 EMERGENCY DEPT VISIT HI MDM: CPT | Mod: ,,, | Performed by: EMERGENCY MEDICINE

## 2019-03-17 PROCEDURE — 99220 PR INITIAL OBSERVATION CARE,LEVL III: ICD-10-PCS | Mod: HCWC,GC,, | Performed by: HOSPITALIST

## 2019-03-17 PROCEDURE — 96375 TX/PRO/DX INJ NEW DRUG ADDON: CPT | Mod: HCWC

## 2019-03-17 PROCEDURE — 96376 TX/PRO/DX INJ SAME DRUG ADON: CPT | Mod: HCWC

## 2019-03-17 PROCEDURE — 25000003 PHARM REV CODE 250: Mod: HCWC | Performed by: HOSPITALIST

## 2019-03-17 PROCEDURE — 96361 HYDRATE IV INFUSION ADD-ON: CPT | Mod: HCWC

## 2019-03-17 PROCEDURE — 25000003 PHARM REV CODE 250: Mod: HCWC | Performed by: STUDENT IN AN ORGANIZED HEALTH CARE EDUCATION/TRAINING PROGRAM

## 2019-03-17 PROCEDURE — 63600175 PHARM REV CODE 636 W HCPCS: Mod: HCWC | Performed by: EMERGENCY MEDICINE

## 2019-03-17 PROCEDURE — 63600175 PHARM REV CODE 636 W HCPCS: Mod: HCWC | Performed by: STUDENT IN AN ORGANIZED HEALTH CARE EDUCATION/TRAINING PROGRAM

## 2019-03-17 PROCEDURE — 86703 HIV-1/HIV-2 1 RESULT ANTBDY: CPT | Mod: HCWC

## 2019-03-17 PROCEDURE — 87491 CHLMYD TRACH DNA AMP PROBE: CPT | Mod: HCWC

## 2019-03-17 PROCEDURE — 99220 PR INITIAL OBSERVATION CARE,LEVL III: CPT | Mod: HCWC,GC,, | Performed by: HOSPITALIST

## 2019-03-17 PROCEDURE — 80053 COMPREHEN METABOLIC PANEL: CPT | Mod: HCWC

## 2019-03-17 PROCEDURE — 99285 PR EMERGENCY DEPT VISIT,LEVEL V: ICD-10-PCS | Mod: ,,, | Performed by: EMERGENCY MEDICINE

## 2019-03-17 PROCEDURE — 99285 EMERGENCY DEPT VISIT HI MDM: CPT | Mod: 25,HCWC

## 2019-03-17 PROCEDURE — 83690 ASSAY OF LIPASE: CPT | Mod: HCWC

## 2019-03-17 PROCEDURE — 25500020 PHARM REV CODE 255: Mod: HCWC | Performed by: EMERGENCY MEDICINE

## 2019-03-17 PROCEDURE — 85025 COMPLETE CBC W/AUTO DIFF WBC: CPT | Mod: HCWC

## 2019-03-17 PROCEDURE — 96374 THER/PROPH/DIAG INJ IV PUSH: CPT | Mod: HCWC

## 2019-03-17 RX ORDER — POLYETHYLENE GLYCOL 3350 17 G/17G
17 POWDER, FOR SOLUTION ORAL DAILY
Status: DISCONTINUED | OUTPATIENT
Start: 2019-03-17 | End: 2019-03-17

## 2019-03-17 RX ORDER — SODIUM CHLORIDE 0.9 % (FLUSH) 0.9 %
5 SYRINGE (ML) INJECTION
Status: DISCONTINUED | OUTPATIENT
Start: 2019-03-17 | End: 2019-03-18 | Stop reason: HOSPADM

## 2019-03-17 RX ORDER — OXYCODONE AND ACETAMINOPHEN 5; 325 MG/1; MG/1
1 TABLET ORAL
COMMUNITY
Start: 2012-07-25 | End: 2019-03-17

## 2019-03-17 RX ORDER — DIAZEPAM 5 MG/1
5 TABLET ORAL 3 TIMES DAILY
Status: ON HOLD | COMMUNITY
End: 2019-03-18 | Stop reason: SDUPTHER

## 2019-03-17 RX ORDER — TAMSULOSIN HYDROCHLORIDE 0.4 MG/1
0.4 CAPSULE ORAL DAILY
Status: DISCONTINUED | OUTPATIENT
Start: 2019-03-17 | End: 2019-03-18 | Stop reason: HOSPADM

## 2019-03-17 RX ORDER — METRONIDAZOLE 500 MG/1
500 TABLET ORAL EVERY 8 HOURS
Status: DISCONTINUED | OUTPATIENT
Start: 2019-03-17 | End: 2019-03-18 | Stop reason: HOSPADM

## 2019-03-17 RX ORDER — GABAPENTIN 300 MG/1
900 CAPSULE ORAL
COMMUNITY
Start: 2018-11-09 | End: 2019-03-17 | Stop reason: SDUPTHER

## 2019-03-17 RX ORDER — GABAPENTIN 400 MG/1
400 CAPSULE ORAL 4 TIMES DAILY
Status: DISCONTINUED | OUTPATIENT
Start: 2019-03-17 | End: 2019-03-18 | Stop reason: HOSPADM

## 2019-03-17 RX ORDER — OXYCODONE AND ACETAMINOPHEN 5; 325 MG/1; MG/1
1 TABLET ORAL
Status: COMPLETED | OUTPATIENT
Start: 2019-03-17 | End: 2019-03-17

## 2019-03-17 RX ORDER — DOXYCYCLINE HYCLATE 100 MG
100 TABLET ORAL DAILY
Status: DISCONTINUED | OUTPATIENT
Start: 2019-03-17 | End: 2019-03-17

## 2019-03-17 RX ORDER — CHLORPROMAZINE HYDROCHLORIDE 25 MG/1
25 TABLET, FILM COATED ORAL
Status: COMPLETED | OUTPATIENT
Start: 2019-03-17 | End: 2019-03-17

## 2019-03-17 RX ORDER — OXYCODONE AND ACETAMINOPHEN 10; 325 MG/1; MG/1
1 TABLET ORAL EVERY 8 HOURS PRN
Status: DISCONTINUED | OUTPATIENT
Start: 2019-03-17 | End: 2019-03-17

## 2019-03-17 RX ORDER — AMOXICILLIN 250 MG
1 CAPSULE ORAL
COMMUNITY
End: 2019-03-17 | Stop reason: SDUPTHER

## 2019-03-17 RX ORDER — IBUPROFEN 200 MG
16 TABLET ORAL
Status: DISCONTINUED | OUTPATIENT
Start: 2019-03-17 | End: 2019-03-18 | Stop reason: HOSPADM

## 2019-03-17 RX ORDER — CEFTRIAXONE 1 G/1
1 INJECTION, POWDER, FOR SOLUTION INTRAMUSCULAR; INTRAVENOUS
Status: DISCONTINUED | OUTPATIENT
Start: 2019-03-17 | End: 2019-03-18 | Stop reason: HOSPADM

## 2019-03-17 RX ORDER — DULOXETIN HYDROCHLORIDE 30 MG/1
30 CAPSULE, DELAYED RELEASE ORAL
COMMUNITY
Start: 2018-11-10 | End: 2019-03-17 | Stop reason: SDUPTHER

## 2019-03-17 RX ORDER — OXYCODONE AND ACETAMINOPHEN 10; 325 MG/1; MG/1
1 TABLET ORAL EVERY 4 HOURS PRN
COMMUNITY
End: 2019-03-20

## 2019-03-17 RX ORDER — BACLOFEN 10 MG/1
20 TABLET ORAL DAILY
Status: DISCONTINUED | OUTPATIENT
Start: 2019-03-17 | End: 2019-03-17

## 2019-03-17 RX ORDER — ACETAMINOPHEN 325 MG/1
650 TABLET ORAL EVERY 4 HOURS PRN
Status: DISCONTINUED | OUTPATIENT
Start: 2019-03-17 | End: 2019-03-18 | Stop reason: HOSPADM

## 2019-03-17 RX ORDER — DALFAMPRIDINE 10 MG/1
10 TABLET, FILM COATED, EXTENDED RELEASE ORAL EVERY 12 HOURS
Status: DISCONTINUED | OUTPATIENT
Start: 2019-03-17 | End: 2019-03-18 | Stop reason: HOSPADM

## 2019-03-17 RX ORDER — SODIUM CHLORIDE, SODIUM LACTATE, POTASSIUM CHLORIDE, CALCIUM CHLORIDE 600; 310; 30; 20 MG/100ML; MG/100ML; MG/100ML; MG/100ML
INJECTION, SOLUTION INTRAVENOUS CONTINUOUS
Status: ACTIVE | OUTPATIENT
Start: 2019-03-17 | End: 2019-03-18

## 2019-03-17 RX ORDER — CEFTRIAXONE 1 G/1
1 INJECTION, POWDER, FOR SOLUTION INTRAMUSCULAR; INTRAVENOUS
Status: COMPLETED | OUTPATIENT
Start: 2019-03-17 | End: 2019-03-17

## 2019-03-17 RX ORDER — OXYCODONE AND ACETAMINOPHEN 10; 325 MG/1; MG/1
1 TABLET ORAL EVERY 6 HOURS PRN
Status: DISCONTINUED | OUTPATIENT
Start: 2019-03-17 | End: 2019-03-18 | Stop reason: HOSPADM

## 2019-03-17 RX ORDER — GLUCAGON 1 MG
1 KIT INJECTION
Status: DISCONTINUED | OUTPATIENT
Start: 2019-03-17 | End: 2019-03-18 | Stop reason: HOSPADM

## 2019-03-17 RX ORDER — BACLOFEN 10 MG/1
20 TABLET ORAL 4 TIMES DAILY
Status: DISCONTINUED | OUTPATIENT
Start: 2019-03-17 | End: 2019-03-18 | Stop reason: HOSPADM

## 2019-03-17 RX ORDER — MORPHINE SULFATE 4 MG/ML
4 INJECTION, SOLUTION INTRAMUSCULAR; INTRAVENOUS
Status: COMPLETED | OUTPATIENT
Start: 2019-03-17 | End: 2019-03-17

## 2019-03-17 RX ORDER — DIAZEPAM 5 MG/1
5 TABLET ORAL EVERY 8 HOURS PRN
Status: DISCONTINUED | OUTPATIENT
Start: 2019-03-17 | End: 2019-03-18 | Stop reason: HOSPADM

## 2019-03-17 RX ORDER — TRAZODONE HYDROCHLORIDE 100 MG/1
100 TABLET ORAL
COMMUNITY
Start: 2018-11-09 | End: 2019-03-17 | Stop reason: SDUPTHER

## 2019-03-17 RX ORDER — INTERFERON BETA-1B 0.3 MG
KIT SUBCUTANEOUS
Status: ON HOLD | COMMUNITY
End: 2019-03-17

## 2019-03-17 RX ORDER — IBUPROFEN 200 MG
24 TABLET ORAL
Status: DISCONTINUED | OUTPATIENT
Start: 2019-03-17 | End: 2019-03-18 | Stop reason: HOSPADM

## 2019-03-17 RX ORDER — DULOXETIN HYDROCHLORIDE 30 MG/1
30 CAPSULE, DELAYED RELEASE ORAL DAILY
Status: DISCONTINUED | OUTPATIENT
Start: 2019-03-17 | End: 2019-03-18 | Stop reason: HOSPADM

## 2019-03-17 RX ADMIN — OXYCODONE HYDROCHLORIDE AND ACETAMINOPHEN 1 TABLET: 10; 325 TABLET ORAL at 12:03

## 2019-03-17 RX ADMIN — GABAPENTIN 400 MG: 400 CAPSULE ORAL at 05:03

## 2019-03-17 RX ADMIN — GABAPENTIN 400 MG: 400 CAPSULE ORAL at 10:03

## 2019-03-17 RX ADMIN — METRONIDAZOLE 500 MG: 500 TABLET ORAL at 02:03

## 2019-03-17 RX ADMIN — SODIUM CHLORIDE 1000 ML: 0.9 INJECTION, SOLUTION INTRAVENOUS at 03:03

## 2019-03-17 RX ADMIN — BACLOFEN 20 MG: 10 TABLET ORAL at 10:03

## 2019-03-17 RX ADMIN — CEFTRIAXONE SODIUM 1 G: 1 INJECTION, POWDER, FOR SOLUTION INTRAMUSCULAR; INTRAVENOUS at 12:03

## 2019-03-17 RX ADMIN — METRONIDAZOLE 500 MG: 500 TABLET ORAL at 10:03

## 2019-03-17 RX ADMIN — DULOXETINE 30 MG: 30 CAPSULE, DELAYED RELEASE ORAL at 10:03

## 2019-03-17 RX ADMIN — DOXYCYCLINE HYCLATE 100 MG: 100 TABLET, COATED ORAL at 06:03

## 2019-03-17 RX ADMIN — OXYCODONE HYDROCHLORIDE AND ACETAMINOPHEN 1 TABLET: 5; 325 TABLET ORAL at 07:03

## 2019-03-17 RX ADMIN — MORPHINE SULFATE 4 MG: 4 INJECTION INTRAVENOUS at 03:03

## 2019-03-17 RX ADMIN — TAMSULOSIN HYDROCHLORIDE 0.4 MG: 0.4 CAPSULE ORAL at 10:03

## 2019-03-17 RX ADMIN — IOHEXOL 100 ML: 350 INJECTION, SOLUTION INTRAVENOUS at 04:03

## 2019-03-17 RX ADMIN — CHLORPROMAZINE HYDROCHLORIDE 25 MG: 25 TABLET, SUGAR COATED ORAL at 04:03

## 2019-03-17 RX ADMIN — SODIUM CHLORIDE, SODIUM LACTATE, POTASSIUM CHLORIDE, AND CALCIUM CHLORIDE: .6; .31; .03; .02 INJECTION, SOLUTION INTRAVENOUS at 10:03

## 2019-03-17 RX ADMIN — OXYCODONE HYDROCHLORIDE AND ACETAMINOPHEN 1 TABLET: 10; 325 TABLET ORAL at 10:03

## 2019-03-17 RX ADMIN — DIAZEPAM 5 MG: 5 TABLET ORAL at 10:03

## 2019-03-17 RX ADMIN — RIVAROXABAN 20 MG: 20 TABLET, FILM COATED ORAL at 05:03

## 2019-03-17 RX ADMIN — CEFTRIAXONE SODIUM 1 G: 1 INJECTION, POWDER, FOR SOLUTION INTRAMUSCULAR; INTRAVENOUS at 06:03

## 2019-03-17 RX ADMIN — GABAPENTIN 400 MG: 400 CAPSULE ORAL at 12:03

## 2019-03-17 RX ADMIN — MORPHINE SULFATE 4 MG: 4 INJECTION, SOLUTION INTRAMUSCULAR; INTRAVENOUS at 04:03

## 2019-03-17 NOTE — ED TRIAGE NOTES
"Mao Levin, a 53 y.o. male presents to the ED w/ complaint of "not being able to keep anything in your body". Pt states his whole body hurts. Pt states he has been falling more frequently. Pt states he has been cramping and twitching.    Triage note:  Chief Complaint   Patient presents with    Diarrhea     reports diarrhea for 2 days, reports hx of multiple sclerosis reports decreased appetite      Review of patient's allergies indicates:  No Known Allergies  Past Medical History:   Diagnosis Date    Anticoagulant long-term use     Anxiety     Chronic back pain     Deep vein thrombosis     Depression     Depression     Gait instability     Multiple sclerosis     Multiple sclerosis     Neurogenic bladder     Polyneuropathy     Pulmonary embolism     Spasticity      "

## 2019-03-17 NOTE — ED NOTES
LOC: The patient is awake, alert, and oriented to place, time, situation. Affect is appropriate.  Speech is appropriate and clear.     APPEARANCE: Patient resting comfortably in no acute distress.  Patient is clean and well groomed.    SKIN: The skin is warm and dry; color consistent with ethnicity.  Patient has normal skin turgor and moist mucus membranes.  Skin intact; no breakdown, rash or bruising noted.     MUSCULOSKELETAL: Patient moving RUE and RLE without difficulty. Patient has baseline LLE and LUE weakness secondary to MS. Reports generalized body pain 8/10.     RESPIRATORY: Airway is open and patent. Respirations spontaneous, even, easy, and non-labored.  Patient has a normal effort and rate.  No accessory muscle use noted. Denies cough. Patient denies sob.    CARDIAC:  Normal rhythm and rate noted.  No peripheral edema noted. No complaints of chest pain.     ABDOMEN: Soft and tender to palpation.  Patient reports x1 week of multiple episodes of diarrhea. Reports last solid bm was at least 7-8 days ago. Denies blood. Baseline incontinence. Denies n/v.     NEUROLOGIC: Eyes open spontaneously.  Behavior appropriate to situation.  Follows commands; facial expression symmetrical.  Purposeful motor response noted; normal sensation in all extremities. Patient denies headache and dizziness.

## 2019-03-17 NOTE — ED PROVIDER NOTES
Encounter Date: 3/17/2019    SCRIBE #1 NOTE: I, Ham Langley, am scribing for, and in the presence of,  Dr. Bethany Arriaga. I have scribed the following portions of the note - Other sections scribed: HPI, ROS, PE.       History     Chief Complaint   Patient presents with    Diarrhea     reports diarrhea for 2 days, reports hx of multiple sclerosis reports decreased appetite      Time patient was seen by the provider: 2:35 AM      The patient is a 53 y.o. male with co-morbidities including: multiple sclerosis, long term anticoagulant use, depression, and history of DVT and PE, who presents to the ED with a complaint of diarrhea for 2-3 days. Patient reports if he eats he has diarrhea. He describes it as loose stool. His last solid bowel movement was 7-8 days ago. Patient also notes constant hiccups for the past 2 days. Patient endorses constant diffuse body pain. Patient denies taking anything to stop his diarrhea. Of note, patient is not on steroids for his MS.       The history is provided by the patient.     Review of patient's allergies indicates:  No Known Allergies  Past Medical History:   Diagnosis Date    Anticoagulant long-term use     Anxiety     Chronic back pain     Deep vein thrombosis     Depression     Depression     Gait instability     Multiple sclerosis     Multiple sclerosis     Neurogenic bladder     Polyneuropathy     Pulmonary embolism     Spasticity      Past Surgical History:   Procedure Laterality Date    CYSTOSCOPY N/A 6/20/2018    [facility]    INJECTION, BOTULINUM TOXIN, TYPE A 200 UNITS N/A 6/20/2018    Performed by Brian Augustin MD at [facility]     Family History   Problem Relation Age of Onset    Arthritis Mother     No Known Problems Father     Cancer Maternal Grandfather      Social History     Tobacco Use    Smoking status: Former Smoker     Packs/day: 0.50     Years: 23.00     Pack years: 11.50     Types: Cigarettes      Last attempt to quit: 2018     Years since quittin.7    Smokeless tobacco: Never Used    Tobacco comment: Using patch   Substance Use Topics    Alcohol use: No    Drug use: No     Review of Systems   Constitutional: Negative for chills and fever.   HENT: Negative for sore throat.    Eyes: Negative for visual disturbance.   Respiratory: Negative for shortness of breath.    Cardiovascular: Negative for chest pain.   Gastrointestinal: Positive for diarrhea. Negative for abdominal pain, nausea and vomiting.   Genitourinary: Negative for dysuria.   Musculoskeletal: Positive for arthralgias and myalgias. Negative for back pain.   Skin: Negative for rash.   Neurological: Negative for weakness.       Physical Exam     Initial Vitals [19 0017]   BP Pulse Resp Temp SpO2   (!) 157/81 70 18 97.4 °F (36.3 °C) 100 %      MAP       --         Physical Exam    Nursing note and vitals reviewed.  Constitutional: He appears well-developed and well-nourished.   HENT:   Head: Normocephalic and atraumatic.   Eyes: Pupils are equal, round, and reactive to light.   Cardiovascular: Normal rate and regular rhythm.   Pulmonary/Chest: No respiratory distress.   Abdominal: Soft. He exhibits no distension. There is tenderness (diffuse, worse in RLQ and suprapubic region). There is guarding.   Neurological: He is alert and oriented to person, place, and time.   Neurologic deficits at baseline due to MS. Dragging left foot.    Skin: Skin is warm and dry.         ED Course   Procedures  Labs Reviewed   CBC W/ AUTO DIFFERENTIAL - Abnormal; Notable for the following components:       Result Value    RBC 4.41 (*)     Hemoglobin 13.2 (*)     Hematocrit 39.0 (*)     All other components within normal limits   CLOSTRIDIUM DIFFICILE   COMPREHENSIVE METABOLIC PANEL   LIPASE   URINALYSIS, REFLEX TO URINE CULTURE    Narrative:     Preferred Collection Type->Urine, Clean Catch  yellow and gray tube          Imaging Results          CT  Abdomen Pelvis With Contrast (Final result)  Result time 03/17/19 04:53:39    Final result by Santos Ramirez MD (03/17/19 04:53:39)                 Impression:      1. Wall thickening involving the distal sigmoid colon and rectum suggesting proctocolitis.  2. Gallbladder is contracted without evidence of acute cholecystitis.  3. Centrilobular emphysema.  4. Additional findings as above.    Electronically signed by resident: Tank Hercules  Date:    03/17/2019  Time:    04:25    Electronically signed by: Santos Ramirez MD  Date:    03/17/2019  Time:    04:53             Narrative:    EXAMINATION:  CT ABDOMEN PELVIS WITH CONTRAST    CLINICAL HISTORY:  Nausea, vomiting, diarrhea    TECHNIQUE:  Routine axial CT images of the abdomen and pelvis were obtained after administration 100cc of IV Omnipaque 350.  Coronal and Sagittal reformatted images were also obtained.    COMPARISON:  CT 10/23/2016    FINDINGS:  Base of heart is normal in size.  No pericardial effusion.    Lung bases are clear without large focal consolidation.  No pleural effusion.  Centrilobular emphysema.    Liver is normal in size and attenuation.  No focal hepatic lesion.  Gallbladder is contracted.  No gallbladder wall thickening or pericholecystic fluid.  No biliary ductal dilatation.  Portal vein is patent.  Spleen and adrenal glands are unremarkable.  Stable prominence of the pancreatic duct.  No pancreatic mass or peripancreatic inflammatory change.    Stomach is unremarkable.  No evidence of obstruction.  Wall thickening involving the distal sigmoid colon and rectum.  Appendix is unremarkable.  No free intraperitoneal air or fluid.    Kidneys are normal in size and location.  Normal concentration and excretion of contrast.  No hydronephrosis or ureteral dilatation.  Urinary bladder is unremarkable.    Abdominal aorta is normal in course and caliber with mild calcific atherosclerosis.    Soft tissues of the body wall are unremarkable.    No  acute osseous abnormality.  Stable radiopaque foreign bodies adjacent to the right femoral neck.                                 Medical Decision Making:   History:   Old Medical Records: I decided to obtain old medical records.  Initial Assessment:   53-year-old male who comes to the emergency department for evaluation of diarrhea.  Differential Diagnosis:   Differential diagnosis include but not limited to...    Infectious diarrhea  Colitis  SBO  Gastroenteritis  Dehydration  Electrolyte abnormality  Clinical Tests:   Lab Tests: Ordered and Reviewed  Radiological Study: Ordered and Reviewed  ED Management:  Upon initial evaluation physical examination the patient sitting uncomfortably on the chair.  Patient state that he was having lower abdominal/rectum discomfort.  Patient states that for the last couple days he has been having diarrhea (loose stool) that do not seem to resolve.  Patient denies any fevers, chills, chest pain, severe abdominal pain, nausea and vomiting.  In addition I noticed that the patient was constantly hiccuping and when asked he stated that he this has been continuous for the last 1.5 days.  Throughout the patient's ED stay he continued to have loose stools but hiccups resolved after 1 dose of Thorazine.  I discussed in detailThe patient's laboratory and CT scan results.  I explained that the CT scan showed concern for proctocolitis which we will start treating with antibiotics.  I also explained to him the inpatient plan for further workup and treatment as needed.  Patient verbalized understanding and agreement with this plan.            Scribe Attestation:   Scribe #1: I performed the above scribed service and the documentation accurately describes the services I performed. I attest to the accuracy of the note.               Clinical Impression:       ICD-10-CM ICD-9-CM   1. Proctocolitis K52.9 556.2                                Audrey Arriaga MD  03/17/19 0724

## 2019-03-17 NOTE — SUBJECTIVE & OBJECTIVE
Past Medical History:   Diagnosis Date    Anticoagulant long-term use     Anxiety     Chronic back pain     Deep vein thrombosis     Depression     Depression     Gait instability     Multiple sclerosis     Multiple sclerosis     Neurogenic bladder     Polyneuropathy     Pulmonary embolism     Spasticity        Past Surgical History:   Procedure Laterality Date    CYSTOSCOPY N/A 6/20/2018    [facility]    INJECTION, BOTULINUM TOXIN, TYPE A 200 UNITS N/A 6/20/2018    Performed by [facility] at [facility]       Review of patient's allergies indicates:  No Known Allergies    No current facility-administered medications on file prior to encounter.      Current Outpatient Medications on File Prior to Encounter   Medication Sig    baclofen (LIORESAL) 20 MG tablet Take 1 tablet by mouth 4 times daily for 30 days    buPROPion (WELLBUTRIN XL) 150 MG TB24 tablet Take 1 tablet (150 mg total) by mouth 2 (two) times daily with meals.    dalfampridine (AMPYRA) 10 mg Tb12 Take 1 tablet by mouth every 12 (twelve) hours. DO NOT take until seen by Neurology    dantrolene (DANTRIUM) 50 MG Cap Take 1 capsule by mouth 4 times daily for 30 days    diazePAM (VALIUM) 5 MG tablet Take 1 tablet (5 mg total) by mouth every 8 (eight) hours as needed (muscle spasms).    DULoxetine (CYMBALTA) 30 MG capsule Take 1 capsule (30 mg total) by mouth once daily.    ergocalciferol (VITAMIN D2) 50,000 unit Cap Take 1 capsule (50,000 Units total) by mouth every 7 days. (Patient taking differently: Take 50,000 Units by mouth every 7 days. on friday)    gabapentin (NEURONTIN) 400 MG capsule Take 1 capsule (400 mg total) by mouth 4 (four) times daily.    oxyCODONE-acetaminophen (PERCOCET)  mg per tablet Take 1 tablet by mouth every 8 (eight) hours as needed for Pain.    rivaroxaban (XARELTO) 20 mg Tab Take 1 tablet (20 mg total) by mouth daily with dinner or evening meal.     trazodone (DESYREL) 100 MG tablet Take 1 tablet (100 mg total) by mouth every evening.    methylPREDNISolone (MEDROL, RUBA,) 4 mg tablet Follow package directions    polyethylene glycol (GLYCOLAX) 17 gram/dose powder Take 17 g by mouth once daily.    senna-docusate 8.6-50 mg (PERICOLACE) 8.6-50 mg per tablet Take 1 tablet by mouth 2 (two) times daily.    tamsulosin (FLOMAX) 0.4 mg Cp24 Take 1 capsule (0.4 mg total) by mouth once daily.    [DISCONTINUED] ascorbic acid, vitamin C, (VITAMIN C) 500 MG tablet Take 500 mg by mouth once daily.    [DISCONTINUED] cranberry conc-C-bacillus coag 450-30-50 mg-mg-million Tab Take 1 capsule by mouth once daily.    [DISCONTINUED] multivitamin capsule Take 1 capsule by mouth once daily.     Family History     Problem Relation (Age of Onset)    Arthritis Mother    Cancer Maternal Grandfather    No Known Problems Father        Tobacco Use    Smoking status: Former Smoker     Packs/day: 0.50     Years: 23.00     Pack years: 11.50     Types: Cigarettes     Last attempt to quit: 2018     Years since quittin.7    Smokeless tobacco: Never Used    Tobacco comment: Using patch   Substance and Sexual Activity    Alcohol use: No    Drug use: No    Sexual activity: Yes     Partners: Female     Review of Systems   Constitutional: Negative for chills, fatigue and fever.   HENT: Negative for sore throat.    Eyes: Negative for visual disturbance.   Respiratory: Negative for shortness of breath.    Cardiovascular: Negative for chest pain and palpitations.   Gastrointestinal: Positive for diarrhea. Negative for abdominal distention, abdominal pain, nausea and vomiting.   Endocrine: Negative for polyuria.   Genitourinary: Negative for difficulty urinating.   Musculoskeletal: Negative for arthralgias.   Neurological: Negative for headaches.   Psychiatric/Behavioral: Negative for agitation.     Objective:     Vital Signs (Most Recent):  Temp: 97.5 °F (36.4 °C) (19  0600)  Pulse: 62 (03/17/19 0700)  Resp: 18 (03/17/19 0700)  BP: 139/76 (03/17/19 0700)  SpO2: 100 % (03/17/19 0700) Vital Signs (24h Range):  Temp:  [97.4 °F (36.3 °C)-97.5 °F (36.4 °C)] 97.5 °F (36.4 °C)  Pulse:  [62-78] 62  Resp:  [16-18] 18  SpO2:  [99 %-100 %] 100 %  BP: (127-176)/() 139/76     Weight: 81.6 kg (180 lb)  Body mass index is 25.83 kg/m².    Physical Exam   Constitutional: He is oriented to person, place, and time. He appears well-developed and well-nourished. No distress.   HENT:   Head: Normocephalic and atraumatic.   Eyes: Right eye exhibits no discharge. Left eye exhibits no discharge. No scleral icterus.   Cardiovascular: Normal rate and regular rhythm. Exam reveals no gallop and no friction rub.   No murmur heard.  Pulmonary/Chest: Effort normal. He has no wheezes. He has no rales.   Abdominal: Soft. Bowel sounds are normal. He exhibits no mass. There is no tenderness. There is no guarding.   Neurological: He is alert and oriented to person, place, and time.   Skin: Skin is warm. He is not diaphoretic.   Nursing note and vitals reviewed.          Significant Labs:   BMP:   Recent Labs   Lab 03/17/19  0315         K 3.7      CO2 26   BUN 8   CREATININE 0.8   CALCIUM 9.0     CBC:   Recent Labs   Lab 03/17/19  0315   WBC 9.96   HGB 13.2*   HCT 39.0*

## 2019-03-17 NOTE — ED NOTES
Assumed patient care.     Pt resting on stretcher in NAD, respirations even and unlabored. Stretcher locked and in lowest position, side rails x 2, call bell within reach. Cardiac monitor, pulse ox and BP cuff applied cycling Q 30 minute vitals. Pt offered restroom assistance, pt states no needs at this time. Pt placed in hospital gown, perineum care provided and fresh linen. Will continue to monitor and update pt on plan of care.    LOC: The patient is awake, alert and aware of environment with an appropriate affect, the patient is oriented x 3 and speaking appropriately.  APPEARANCE: Patient resting comfortably and in no acute distress, patient is clean and well groomed  SKIN: The skin is warm and dry, color consistent with ethnicity, patient has normal skin turgor and moist mucus membranes, skin intac  MUSCULOSKELETAL: No obvious swelling or deformities noted. Pt presents with left sided weakness to upper and lower extremities, hx of MS.  RESPIRATORY: Airway is open and patent; respirations are spontaneous, patient has a normal effort and rate, no accessory muscle use noted.   ABDOMEN: Soft and non tender to palpation, no distention noted. Bowel sounds present x 4  NEUROLOGIC: PERRL, 3 mm bilaterally, eyes open spontaneously, behavior appropriate to situation, follows commands, purposeful motor response noted

## 2019-03-17 NOTE — HPI
Mao Levin is 54yo with MS, neurogenic bladder, and depression presents for evaluation of diarrhea. States that he has been having 2 large bowel movements per day without blood or mucus. He endorses abdominal discomfort but denies abdominal pain. He denies sick contacts and recent travel. He does not have recent antibiotic use. He is sexually active and has had one partner within last 6 months. He does not have reported history of STDs.     He follows with neurology for MS and last rituxamib on 1/14/2019.

## 2019-03-18 VITALS
HEART RATE: 81 BPM | SYSTOLIC BLOOD PRESSURE: 141 MMHG | OXYGEN SATURATION: 100 % | HEIGHT: 70 IN | BODY MASS INDEX: 25.15 KG/M2 | WEIGHT: 175.69 LBS | TEMPERATURE: 98 F | RESPIRATION RATE: 15 BRPM | DIASTOLIC BLOOD PRESSURE: 81 MMHG

## 2019-03-18 LAB
ALBUMIN SERPL BCP-MCNC: 2.9 G/DL
ALP SERPL-CCNC: 62 U/L
ALT SERPL W/O P-5'-P-CCNC: 11 U/L
ANION GAP SERPL CALC-SCNC: 6 MMOL/L
AST SERPL-CCNC: 9 U/L
BASOPHILS # BLD AUTO: 0.03 K/UL
BASOPHILS NFR BLD: 0.5 %
BILIRUB SERPL-MCNC: 0.3 MG/DL
BUN SERPL-MCNC: 7 MG/DL
C TRACH DNA SPEC QL NAA+PROBE: NOT DETECTED
CALCIUM SERPL-MCNC: 8.2 MG/DL
CHLORIDE SERPL-SCNC: 108 MMOL/L
CO2 SERPL-SCNC: 25 MMOL/L
CREAT SERPL-MCNC: 0.8 MG/DL
DIFFERENTIAL METHOD: ABNORMAL
EOSINOPHIL # BLD AUTO: 0.2 K/UL
EOSINOPHIL NFR BLD: 3 %
ERYTHROCYTE [DISTWIDTH] IN BLOOD BY AUTOMATED COUNT: 13.5 %
EST. GFR  (AFRICAN AMERICAN): >60 ML/MIN/1.73 M^2
EST. GFR  (NON AFRICAN AMERICAN): >60 ML/MIN/1.73 M^2
GLUCOSE SERPL-MCNC: 86 MG/DL
HCT VFR BLD AUTO: 35.7 %
HGB BLD-MCNC: 11.8 G/DL
HIV 1+2 AB+HIV1 P24 AG SERPL QL IA: NEGATIVE
IMM GRANULOCYTES # BLD AUTO: 0.01 K/UL
IMM GRANULOCYTES NFR BLD AUTO: 0.2 %
LYMPHOCYTES # BLD AUTO: 2.2 K/UL
LYMPHOCYTES NFR BLD: 36.8 %
MAGNESIUM SERPL-MCNC: 1.6 MG/DL
MCH RBC QN AUTO: 30 PG
MCHC RBC AUTO-ENTMCNC: 33.1 G/DL
MCV RBC AUTO: 91 FL
MONOCYTES # BLD AUTO: 0.4 K/UL
MONOCYTES NFR BLD: 7.1 %
N GONORRHOEA DNA SPEC QL NAA+PROBE: NOT DETECTED
NEUTROPHILS # BLD AUTO: 3.2 K/UL
NEUTROPHILS NFR BLD: 52.4 %
NRBC BLD-RTO: 0 /100 WBC
PHOSPHATE SERPL-MCNC: 3.6 MG/DL
PLATELET # BLD AUTO: 191 K/UL
PMV BLD AUTO: 10.6 FL
POTASSIUM SERPL-SCNC: 4 MMOL/L
PROT SERPL-MCNC: 5.1 G/DL
RBC # BLD AUTO: 3.93 M/UL
SODIUM SERPL-SCNC: 139 MMOL/L
WBC # BLD AUTO: 6.03 K/UL

## 2019-03-18 PROCEDURE — 99217 PR OBSERVATION CARE DISCHARGE: CPT | Mod: HCWC,GC,, | Performed by: HOSPITALIST

## 2019-03-18 PROCEDURE — 25000003 PHARM REV CODE 250: Mod: HCWC | Performed by: HOSPITALIST

## 2019-03-18 PROCEDURE — 99217 PR OBSERVATION CARE DISCHARGE: ICD-10-PCS | Mod: HCWC,GC,, | Performed by: HOSPITALIST

## 2019-03-18 PROCEDURE — 25000003 PHARM REV CODE 250: Mod: HCWC | Performed by: STUDENT IN AN ORGANIZED HEALTH CARE EDUCATION/TRAINING PROGRAM

## 2019-03-18 PROCEDURE — 83735 ASSAY OF MAGNESIUM: CPT | Mod: HCWC

## 2019-03-18 PROCEDURE — 80053 COMPREHEN METABOLIC PANEL: CPT | Mod: HCWC

## 2019-03-18 PROCEDURE — 85025 COMPLETE CBC W/AUTO DIFF WBC: CPT | Mod: HCWC

## 2019-03-18 PROCEDURE — G8978 MOBILITY CURRENT STATUS: HCPCS | Mod: CK,HCWC

## 2019-03-18 PROCEDURE — G8979 MOBILITY GOAL STATUS: HCPCS | Mod: CJ,HCWC

## 2019-03-18 PROCEDURE — 36415 COLL VENOUS BLD VENIPUNCTURE: CPT | Mod: HCWC

## 2019-03-18 PROCEDURE — 97530 THERAPEUTIC ACTIVITIES: CPT | Mod: HCWC

## 2019-03-18 PROCEDURE — 97165 OT EVAL LOW COMPLEX 30 MIN: CPT | Mod: HCWC

## 2019-03-18 PROCEDURE — G0378 HOSPITAL OBSERVATION PER HR: HCPCS | Mod: HCWC

## 2019-03-18 PROCEDURE — 84100 ASSAY OF PHOSPHORUS: CPT | Mod: HCWC

## 2019-03-18 PROCEDURE — 63600175 PHARM REV CODE 636 W HCPCS: Mod: HCWC | Performed by: STUDENT IN AN ORGANIZED HEALTH CARE EDUCATION/TRAINING PROGRAM

## 2019-03-18 PROCEDURE — 97161 PT EVAL LOW COMPLEX 20 MIN: CPT | Mod: HCWC

## 2019-03-18 RX ORDER — GABAPENTIN 400 MG/1
400 CAPSULE ORAL 4 TIMES DAILY
Qty: 120 CAPSULE | Refills: 3 | Status: SHIPPED | OUTPATIENT
Start: 2019-03-18 | End: 2019-05-30 | Stop reason: SDUPTHER

## 2019-03-18 RX ORDER — ENOXAPARIN SODIUM 100 MG/ML
40 INJECTION SUBCUTANEOUS EVERY 24 HOURS
Status: CANCELLED | OUTPATIENT
Start: 2019-03-18

## 2019-03-18 RX ORDER — CEFPODOXIME PROXETIL 200 MG/1
200 TABLET, FILM COATED ORAL EVERY 12 HOURS
Qty: 12 TABLET | Refills: 0 | Status: SHIPPED | OUTPATIENT
Start: 2019-03-18 | End: 2019-03-24

## 2019-03-18 RX ORDER — OXYCODONE HYDROCHLORIDE 10 MG/1
10 TABLET ORAL EVERY 4 HOURS PRN
Qty: 6 TABLET | Refills: 0 | Status: SHIPPED | OUTPATIENT
Start: 2019-03-18 | End: 2019-07-11 | Stop reason: SDUPTHER

## 2019-03-18 RX ORDER — METRONIDAZOLE 500 MG/1
500 TABLET ORAL EVERY 8 HOURS
Qty: 18 TABLET | Refills: 0 | Status: SHIPPED | OUTPATIENT
Start: 2019-03-18 | End: 2019-03-24

## 2019-03-18 RX ORDER — DULOXETIN HYDROCHLORIDE 30 MG/1
30 CAPSULE, DELAYED RELEASE ORAL DAILY
Qty: 30 CAPSULE | Refills: 3 | Status: SHIPPED | OUTPATIENT
Start: 2019-03-18 | End: 2019-07-11 | Stop reason: SDUPTHER

## 2019-03-18 RX ORDER — DIAZEPAM 5 MG/1
5 TABLET ORAL 3 TIMES DAILY
Qty: 6 TABLET | Refills: 0 | Status: SHIPPED | OUTPATIENT
Start: 2019-03-18 | End: 2019-03-20

## 2019-03-18 RX ORDER — BACLOFEN 20 MG/1
20 TABLET ORAL 4 TIMES DAILY
Qty: 28 TABLET | Refills: 0 | Status: SHIPPED | OUTPATIENT
Start: 2019-03-18 | End: 2019-07-11 | Stop reason: SDUPTHER

## 2019-03-18 RX ORDER — TAMSULOSIN HYDROCHLORIDE 0.4 MG/1
0.4 CAPSULE ORAL DAILY
Qty: 30 CAPSULE | Refills: 3 | Status: SHIPPED | OUTPATIENT
Start: 2019-03-18 | End: 2019-06-14 | Stop reason: SDUPTHER

## 2019-03-18 RX ORDER — CIPROFLOXACIN 750 MG/1
750 TABLET, FILM COATED ORAL EVERY 12 HOURS
Qty: 12 TABLET | Refills: 0 | Status: CANCELLED | OUTPATIENT
Start: 2019-03-18 | End: 2019-03-24

## 2019-03-18 RX ADMIN — METRONIDAZOLE 500 MG: 500 TABLET ORAL at 03:03

## 2019-03-18 RX ADMIN — DIAZEPAM 5 MG: 5 TABLET ORAL at 11:03

## 2019-03-18 RX ADMIN — OXYCODONE HYDROCHLORIDE AND ACETAMINOPHEN 1 TABLET: 10; 325 TABLET ORAL at 05:03

## 2019-03-18 RX ADMIN — TAMSULOSIN HYDROCHLORIDE 0.4 MG: 0.4 CAPSULE ORAL at 08:03

## 2019-03-18 RX ADMIN — METRONIDAZOLE 500 MG: 500 TABLET ORAL at 05:03

## 2019-03-18 RX ADMIN — GABAPENTIN 400 MG: 400 CAPSULE ORAL at 12:03

## 2019-03-18 RX ADMIN — BACLOFEN 20 MG: 10 TABLET ORAL at 12:03

## 2019-03-18 RX ADMIN — BACLOFEN 20 MG: 10 TABLET ORAL at 08:03

## 2019-03-18 RX ADMIN — OXYCODONE HYDROCHLORIDE AND ACETAMINOPHEN 1 TABLET: 10; 325 TABLET ORAL at 11:03

## 2019-03-18 RX ADMIN — CEFTRIAXONE SODIUM 1 G: 1 INJECTION, POWDER, FOR SOLUTION INTRAMUSCULAR; INTRAVENOUS at 11:03

## 2019-03-18 RX ADMIN — GABAPENTIN 400 MG: 400 CAPSULE ORAL at 08:03

## 2019-03-18 RX ADMIN — DULOXETINE 30 MG: 30 CAPSULE, DELAYED RELEASE ORAL at 08:03

## 2019-03-18 RX ADMIN — SODIUM CHLORIDE, SODIUM LACTATE, POTASSIUM CHLORIDE, AND CALCIUM CHLORIDE: .6; .31; .03; .02 INJECTION, SOLUTION INTRAVENOUS at 08:03

## 2019-03-18 NOTE — PLAN OF CARE
Future Appointments   Date Time Provider Department Center   3/20/2019 10:20 AM Kymberly Estrella MD [facility] [address]   6/3/2019  1:00 PM Mattie Finnegan MD [facility] [address]        03/18/19 1427   Discharge Assessment   Assessment Type Discharge Planning Assessment   Confirmed/corrected address and phone number on facesheet? Yes   Assessment information obtained from? Patient   Expected Length of Stay (days) 1   Communicated expected length of stay with patient/caregiver yes   Prior to hospitilization cognitive status: Alert/Oriented   Prior to hospitalization functional status: Independent;Assistive Equipment   Current cognitive status: Alert/Oriented   Current Functional Status: Independent;Assistive Equipment   Lives With parent(s)   Able to Return to Prior Arrangements yes   Patient's perception of discharge disposition other (comments)   Readmission Within the Last 30 Days no previous admission in last 30 days   Equipment Currently Used at Home bath bench;walker, rolling;rollator   Do you have any problems affording any of your prescribed medications? No   Is the patient taking medications as prescribed? yes   Does the patient have transportation home? Yes   Transportation Anticipated family or friend will provide   Discharge Plan A Home with family;Other   Discharge Plan B Other;Home with family   DME Needed Upon Discharge  none   Patient/Family in Agreement with Plan yes

## 2019-03-18 NOTE — ASSESSMENT & PLAN NOTE
Home medications:  dalfampridine 10mg, baclofen 20mg, duloxetine 30mg, valium 5, and Gabapentin 400mg QID  Follows with Neurology for MS, first diagnosed in 2006  -Will continue home regimen

## 2019-03-18 NOTE — PLAN OF CARE
Problem: Physical Therapy Goal  Goal: Physical Therapy Goal  Goals to be met by: 2019     Patient will increase functional independence with mobility by performin. Supine to sit with Contact Guard Assistance  2. Sit to supine with Contact Guard Assistance  3. Sit to stand transfer with Contact Guard Assistance  4. Gait  x 75 feet with Contact Guard Assistance using Rolling Walker.   5. Ascend/descend 4 stair with right Handrails Minimal Assistance   Outcome: Ongoing (interventions implemented as appropriate)  Eval completed and POC established    Chase Fajardo PT,DPT  3/18/2019

## 2019-03-18 NOTE — PLAN OF CARE
Problem: Adult Inpatient Plan of Care  Goal: Plan of Care Review  Outcome: Outcome(s) achieved Date Met: 03/18/19  Pt free from any injury or falls, VSS, skin intact. Able to position himself in bed to prevent any skin breakdown. PRN pain medications given as needed for pain with full relief obtained. Oral antibiotics given as ordered. Will go over DC instructions and remove IV once seen by team.

## 2019-03-18 NOTE — NURSING
DC instructions given and all questions answered. IVs removed, catheter intact, tolerated well. Will remove condom catheter once pt ride is here.

## 2019-03-18 NOTE — PLAN OF CARE
Problem: Occupational Therapy Goal  Goal: Occupational Therapy Goal  Goals to be met by: 3/29/19    Patient will increase functional independence with ADLs by performing:    UE Dressing with Minimum Assistancee sitting EOB.  Rolling to Bilateral with Minimum Assistance.   Supine to sit with Minimum Assistance.  LE Dressing with Minimum Assistance.  Step transfer with Minimal Assistance with RW.    Outcome: Ongoing (interventions implemented as appropriate)  Evaluation with pt and established OT POC. Continue as tolerated.  Lula Ramirez OT  3/18/2019

## 2019-03-18 NOTE — H&P
[facility] Hospital Medicine  History & Physical    Patient Name: Mao Levin  MRN: [id number]  Admission Date: 3/17/2019  Attending Physician: No att. providers found   Primary Care Provider: Mendy Alarcon MD    Hospital Medicine Team: [facility] 1 Myesha Maldonado MD     Patient information was obtained from patient and ER records.     Subjective:     Principal Problem:Proctocolitis    Chief Complaint:   Chief Complaint   Patient presents with    Diarrhea     reports diarrhea for 2 days, reports hx of multiple sclerosis reports decreased appetite         HPI: Mao Levin is 54yo with MS, neurogenic bladder, and depression presents for evaluation of diarrhea. States that he has been having 2 large bowel movements per day without blood or mucus. He endorses abdominal discomfort but denies abdominal pain. He denies sick contacts and recent travel. He does not have recent antibiotic use. He is sexually active and has had one partner within last 6 months. He does not have reported history of STDs. Of note, he follows with neurology for his MS and last rituxamib infusion on 1/14/2019.     Past Medical History:   Diagnosis Date    Anticoagulant long-term use     Anxiety     Chronic back pain     Deep vein thrombosis     Depression     Depression     Gait instability     Multiple sclerosis     Multiple sclerosis     Neurogenic bladder     Polyneuropathy     Pulmonary embolism     Spasticity        Past Surgical History:   Procedure Laterality Date    CYSTOSCOPY N/A 6/20/2018    Performed by [facility]in [facility] at [facility] OR 1ST FLR    INJECTION, BOTULINUM TOXIN, TYPE A 200 UNITS N/A 6/20/2018    Performed by Brian Augustin MD at [facility] OR 1ST FLR       Review of patient's allergies indicates:  No Known Allergies    No current facility-administered medications on file prior to encounter.      Current Outpatient Medications on File Prior to Encounter   Medication Sig    baclofen (LIORESAL)  20 MG tablet Take 1 tablet by mouth 4 times daily for 30 days    buPROPion (WELLBUTRIN XL) 150 MG TB24 tablet Take 1 tablet (150 mg total) by mouth 2 (two) times daily with meals.    dalfampridine (AMPYRA) 10 mg Tb12 Take 1 tablet by mouth every 12 (twelve) hours. DO NOT take until seen by Neurology    dantrolene (DANTRIUM) 50 MG Cap Take 1 capsule by mouth 4 times daily for 30 days    diazePAM (VALIUM) 5 MG tablet Take 1 tablet (5 mg total) by mouth every 8 (eight) hours as needed (muscle spasms).    DULoxetine (CYMBALTA) 30 MG capsule Take 1 capsule (30 mg total) by mouth once daily.    ergocalciferol (VITAMIN D2) 50,000 unit Cap Take 1 capsule (50,000 Units total) by mouth every 7 days. (Patient taking differently: Take 50,000 Units by mouth every 7 days. on friday)    gabapentin (NEURONTIN) 400 MG capsule Take 1 capsule (400 mg total) by mouth 4 (four) times daily.    oxyCODONE-acetaminophen (PERCOCET)  mg per tablet Take 1 tablet by mouth every 8 (eight) hours as needed for Pain.    rivaroxaban (XARELTO) 20 mg Tab Take 1 tablet (20 mg total) by mouth daily with dinner or evening meal.    trazodone (DESYREL) 100 MG tablet Take 1 tablet (100 mg total) by mouth every evening.    methylPREDNISolone (MEDROL, RUBA,) 4 mg tablet Follow package directions    polyethylene glycol (GLYCOLAX) 17 gram/dose powder Take 17 g by mouth once daily.    senna-docusate 8.6-50 mg (PERICOLACE) 8.6-50 mg per tablet Take 1 tablet by mouth 2 (two) times daily.    tamsulosin (FLOMAX) 0.4 mg Cp24 Take 1 capsule (0.4 mg total) by mouth once daily.    [DISCONTINUED] ascorbic acid, vitamin C, (VITAMIN C) 500 MG tablet Take 500 mg by mouth once daily.    [DISCONTINUED] cranberry conc-C-bacillus coag 450-30-50 mg-mg-million Tab Take 1 capsule by mouth once daily.    [DISCONTINUED] multivitamin capsule Take 1 capsule by mouth once daily.     Family History     Problem Relation (Age of Onset)    Arthritis Mother    Cancer  Maternal Grandfather    No Known Problems Father        Tobacco Use    Smoking status: Former Smoker     Packs/day: 0.50     Years: 23.00     Pack years: 11.50     Types: Cigarettes     Last attempt to quit: 2018     Years since quittin.7    Smokeless tobacco: Never Used    Tobacco comment: Using patch   Substance and Sexual Activity    Alcohol use: No    Drug use: No    Sexual activity: Yes     Partners: Female     Review of Systems   Constitutional: Negative for chills, fatigue and fever.   HENT: Negative for sore throat.    Eyes: Negative for visual disturbance.   Respiratory: Negative for shortness of breath.    Cardiovascular: Negative for chest pain and palpitations.   Gastrointestinal: Positive for diarrhea. Negative for abdominal distention, abdominal pain, nausea and vomiting.   Endocrine: Negative for polyuria.   Genitourinary: Negative for difficulty urinating.   Musculoskeletal: Negative for arthralgias.   Neurological: Negative for headaches.   Psychiatric/Behavioral: Negative for agitation.     Objective:     Vital Signs (Most Recent):  Temp: 97.5 °F (36.4 °C) (19 0600)  Pulse: 62 (19 0700)  Resp: 18 (19 0700)  BP: 139/76 (19 0700)  SpO2: 100 % (19 0700) Vital Signs (24h Range):  Temp:  [97.4 °F (36.3 °C)-97.5 °F (36.4 °C)] 97.5 °F (36.4 °C)  Pulse:  [62-78] 62  Resp:  [16-18] 18  SpO2:  [99 %-100 %] 100 %  BP: (127-176)/() 139/76     Weight: 81.6 kg (180 lb)  Body mass index is 25.83 kg/m².    Physical Exam   Constitutional: He is oriented to person, place, and time. He appears well-developed and well-nourished. No distress.   HENT:   Head: Normocephalic and atraumatic.   Eyes: Right eye exhibits no discharge. Left eye exhibits no discharge. No scleral icterus.   Cardiovascular: Normal rate and regular rhythm. Exam reveals no gallop and no friction rub.   No murmur heard.  Pulmonary/Chest: Effort normal. He has no wheezes. He has no rales.   Abdominal:  Soft. Bowel sounds are normal. He exhibits no mass. There is no tenderness. There is no guarding.   Neurological: He is alert and oriented to person, place, and time.   Skin: Skin is warm. He is not diaphoretic.   Nursing note and vitals reviewed.          Significant Labs:   BMP:   Recent Labs   Lab 03/17/19  0315         K 3.7      CO2 26   BUN 8   CREATININE 0.8   CALCIUM 9.0     CBC:   Recent Labs   Lab 03/17/19  0315   WBC 9.96   HGB 13.2*   HCT 39.0*            Assessment/Plan:     * Proctocolitis    Patient presenting with diarrhea for past 3-4 days with associated abdominal discomfort  CT abdomen showed wall thickening involving the distal sigmoid colon and rectum suggesting proctocolitis  Given Ceftriaxone in ED  Etiology of colitis unclear, will treat empirically for colitis; no recent travel or sexual history risk factors    -will order stool cultures, CMV, gonorrhea/chlamdyia, stool WBCs  -continue on ceftriaxone and flagyl       Debility    PT/OT consulted     MS (multiple sclerosis)    Home medications:  dalfampridine 10mg, baclofen 20mg, duloxetine 30mg, valium 5, and Gabapentin 400mg QID  Follows with Neurology for MS, first diagnosed in 2006  -Will continue home regimen       VTE Risk Mitigation (From admission, onward)        Ordered     rivaroxaban tablet 20 mg  With dinner      03/17/19 1032     IP VTE HIGH RISK PATIENT  Once      03/17/19 0847     Place sequential compression device  Until discontinued      03/17/19 0847     Place sequential compression device  Until discontinued      03/17/19 0810             Myesha Maldonado MD  Department of Hospital Medicine   [facility]

## 2019-03-18 NOTE — PT/OT/SLP EVAL
Occupational Therapy   Evaluation    Name: Mao Levin  MRN: [id number]  Admitting Diagnosis:  Proctocolitis      Recommendations:     Discharge Recommendations: other (see comments), outpatient OT, home health OT(TBD (Home health OT vs OP OT))  Discharge Equipment Recommendations:  none  Barriers to discharge:       Assessment:     Mao Levin is a 53 y.o. male with a medical diagnosis of Proctocolitis.  He presents with impaired ADL and functional mobility performance. Pt motivated and compliant during OT/PT eval with clear deficits in balance and overall mobility needed for safe ADLs. Pt also limited in donning socks and donning gown sitting EOB however demo fair plus sitting balance during these tasks. Performance deficits affecting function: weakness, impaired self care skills, impaired endurance, gait instability, impaired functional mobilty, impaired balance, impaired cognition, decreased coordination, decreased upper extremity function, decreased lower extremity function, decreased safety awareness, pain, abnormal tone, impaired fine motor, impaired muscle length.  Pt would benefit from continued OT services 3x/wk to increase daily living skills for improved QOL. Recommended d/c is TBD- home health vs OP OT at this time.    Rehab Prognosis: Good; patient would benefit from acute skilled OT services to address these deficits and reach maximum level of function.       Plan:     Patient to be seen 3 x/week to address the above listed problems via self-care/home management, therapeutic activities, therapeutic exercises  · Plan of Care Expires: 04/18/19  · Plan of Care Reviewed with: patient    Subjective     Chief Complaint: Neck, back, and LLE and LUE pain.  Patient/Family Comments/goals: Return home.    Occupational Profile:  Living Environment: Pt lives in a 1 story home with mother. Pt's home layout includes 4 steps to enter/exit home with bilateral hand rails for enter/exiting. Pt's bathroom layout includes a  walk-in shower with built in seat for bathing.  Previous level of function: Pt reports he was (I) with ADLs and used a rolling walker for ambulation. Pt is not driving.  Roles and Routines: Pt is not working however enjoys spending time with sisters exploring [place] as pt is new to the city from [facility].  Equipment Used at Home:  bath bench, walker, rolling, rollator  Assistance upon Discharge: Mom as needed.    Pain/Comfort:  · Pain Rating 1: other (see comments)(Pain in neck, back and LLE and LUE)    Patients cultural, spiritual, Moravian conflicts given the current situation: no    Objective:     Communicated with: RN prior to session.  Patient found supine with peripheral IV, Condom Catheter, telemetry upon OT entry to room.    General Precautions: Standard, fall   Orthopedic Precautions:N/A   Braces: AFO(LLE)     Occupational Performance:    Bed Mobility:    · Patient completed Rolling/Turning to Left with  moderate assistance  · Patient completed Scooting/Bridging with maximal assistance  · Patient completed Supine to Sit with moderate assistance  · Patient completed Sit to Supine with moderate assistance    Functional Mobility/Transfers:  · Patient completed Sit <> Stand Transfer with minimum assistance  with  rolling walker   · Functional Mobility: Pt completed sit <>stand transfer with taking forward/backward steps (3 steps each way) requiring min (A) while wearing L AFO and utilizing RW for safety.    Activities of Daily Living:  · Upper Body Dressing: moderate assistance sitting EOB.  · Lower Body Dressing: maximal assistance donning socks. Pt attempted leg crossing however had difficulty donning socks over toes and adequately adjusting  socks for safe fit.    Cognitive/Visual Perceptual:  Cognitive/Psychosocial Skills:     -       Oriented to: Person, Place, Time and Situation   -       Follows Commands/attention:Follows multistep  commands  -       Communication: clear/fluent  -        Memory: No Deficits noted  -       Safety awareness/insight to disability: intact   -       Mood/Affect/Coping skills/emotional control: Appropriate to situation    Physical Exam:  Balance:    -       Demo fair plus sitting balance with noted self correction of LOS with donning socks sitting EOB. Pt demo good standing balance  Dominant hand:    -       right  Upper Extremity Range of Motion:     -       Right Upper Extremity: Deficits: limited shoulder flexion  -       Left Upper Extremity: Deficits: limited shoulder flexion  Upper Extremity Strength:    -       Right Upper Extremity: WNL  -       Left Upper Extremity: 4/5 with all UE movements sitting EOB.    Strength:    -       Right Upper Extremity: WNL  -       Left Upper Extremity: WNL  Fine Motor Coordination:    -       Impaired per pt report, specifically with L UE.    AMPAC 6 Click ADL:  AMPAC Total Score: 14    Treatment & Education:  Role of OT, POC, and safety during ADLs and functional mobility education with pt. Pt verbalized understanding.   Education:    Patient left HOB elevated with all lines intact, call button in reach and bed alarm on    GOALS:   Multidisciplinary Problems     Occupational Therapy Goals        Problem: Occupational Therapy Goal    Goal Priority Disciplines Outcome Interventions   Occupational Therapy Goal     OT, PT/OT Ongoing (interventions implemented as appropriate)    Description:  Goals to be met by: 3/29/19    Patient will increase functional independence with ADLs by performing:    UE Dressing with Minimum Assistancee sitting EOB.  Rolling to Bilateral with Minimum Assistance.   Supine to sit with Minimum Assistance.  LE Dressing with Minimum Assistance.  Step transfer with Minimal Assistance with RW.                      History:     Past Medical History:   Diagnosis Date    Anticoagulant long-term use     Anxiety     Chronic back pain     Deep vein thrombosis     Depression     Depression     Gait  instability     Multiple sclerosis     Multiple sclerosis     Neurogenic bladder     Polyneuropathy     Pulmonary embolism     Spasticity        Past Surgical History:   Procedure Laterality Date    CYSTOSCOPY N/A 6/20/2018    Performed by [facility]in [facility] at [facility] OR 1ST FLR    INJECTION, BOTULINUM TOXIN, TYPE A 200 UNITS N/A 6/20/2018    Performed by Brian Augustin MD at [facility] OR 1ST FLR       Time Tracking:     OT Date of Treatment: 03/18/19  OT Start Time: 0930  OT Stop Time: 1007  OT Total Time (min): 37 min    Billable Minutes:Evaluation 37 min    Lula Ramirez, OT  3/18/2019

## 2019-03-18 NOTE — ASSESSMENT & PLAN NOTE
Patient presenting with diarrhea for past 3-4 days with associated abdominal discomfort  CT abdomen showed wall thickening involving the distal sigmoid colon and rectum suggesting proctocolitis  Given Ceftriaxone in ED  Etiology of colitis unclear, will treat empirically for colitis; no recent travel or sexual history risk factors    -will order stool cultures, CMV, gonorrhea/chlamdyia, stool WBCs  -continue on ceftriaxone and flagyl

## 2019-03-18 NOTE — SUBJECTIVE & OBJECTIVE
Interval History: Still complains of neck pain but states that diarrhea stopped and that he has not had it since he arrived to the ED.    Review of Systems   Constitutional: Negative for chills, fatigue and fever.   HENT: Negative for sore throat.    Eyes: Negative for visual disturbance.   Respiratory: Negative for shortness of breath.    Cardiovascular: Negative for chest pain and palpitations.   Gastrointestinal: Negative for abdominal distention, abdominal pain, diarrhea, nausea and vomiting.   Endocrine: Negative for polyuria.   Genitourinary: Negative for difficulty urinating.   Musculoskeletal: Negative for arthralgias.   Neurological: Negative for headaches.   Psychiatric/Behavioral: Negative for agitation.     Objective:     Vital Signs (Most Recent):  Temp: 98.6 °F (37 °C) (03/18/19 0500)  Pulse: 67 (03/18/19 0846)  Resp: 17 (03/18/19 0846)  BP: (!) 133/93 (03/18/19 0846)  SpO2: 99 % (03/18/19 0846) Vital Signs (24h Range):  Temp:  [98.2 °F (36.8 °C)-98.6 °F (37 °C)] 98.6 °F (37 °C)  Pulse:  [61-79] 67  Resp:  [11-17] 17  SpO2:  [96 %-100 %] 99 %  BP: ()/() 133/93     Weight: 79.7 kg (175 lb 11.2 oz)  Body mass index is 25.21 kg/m².    Intake/Output Summary (Last 24 hours) at 3/18/2019 1058  Last data filed at 3/18/2019 0500  Gross per 24 hour   Intake 720 ml   Output 1600 ml   Net -880 ml      Physical Exam   Constitutional: He is oriented to person, place, and time. He appears well-developed and well-nourished. No distress.   HENT:   Head: Normocephalic and atraumatic.   Eyes: Right eye exhibits no discharge. Left eye exhibits no discharge. No scleral icterus.   Cardiovascular: Normal rate and regular rhythm. Exam reveals no gallop and no friction rub.   No murmur heard.  Pulmonary/Chest: Effort normal. He has no wheezes. He has no rales.   Abdominal: Soft. Bowel sounds are normal. He exhibits no mass. There is no tenderness. There is no guarding.   Neurological: He is alert and oriented to  person, place, and time.   Skin: Skin is warm. He is not diaphoretic.   Nursing note and vitals reviewed.      Significant Labs:   BMP:   Recent Labs   Lab 03/18/19  0703   GLU 86      K 4.0      CO2 25   BUN 7   CREATININE 0.8   CALCIUM 8.2*   MG 1.6     CBC:   Recent Labs   Lab 03/17/19  0315 03/18/19  0704   WBC 9.96 6.03   HGB 13.2* 11.8*   HCT 39.0* 35.7*    191

## 2019-03-18 NOTE — HOSPITAL COURSE
Patient admitted to team 1 for proctocolitis - rectum/sigmoid. He was started on ceftriaxone and flagyl. No reported diarrhea since admitted to hospital. On hospital day 2, he showed clinical improvement and was medically stable for discharge. He was discharged with flagyl and cefpodoxine 200mg x 7days with close PCP follow-up and neurology follow-up previously scheduled.

## 2019-03-18 NOTE — PT/OT/SLP EVAL
Physical Therapy Evaluation    Patient Name:  Mao Levin   MRN:  [id number]    Recommendations:     Discharge Recommendations:  home health PT(may benefit from OPPT/OT)   Discharge Equipment Recommendations: none   Barriers to discharge: Inaccessible home    Assessment:     Mao Levin is a 53 y.o. male admitted with a medical diagnosis of Proctocolitis.  He presents with the following impairments/functional limitations:  weakness, impaired self care skills, impaired functional mobilty, impaired endurance, gait instability, impaired balance, decreased lower extremity function, decreased upper extremity function, pain, abnormal tone, impaired fine motor, decreased safety awareness, decreased coordination, impaired coordination.  Pt demo decreased functional mobility secondary to pain, weakness and unsteadiness on feet.  Performed bed mobility c S-mod A, transfer c min-mod A and gait c min A using RW.  Pt able to take few steps along EOB on this date and demo decreased gait speed, FFP, narrow YULIANA, decreased toe/floor clearance L>R, unsteadiness, increased lateral trunk lean, c/o pain and weakness.  Pt safe to ambulate short distance c assistance of 1x person.  Pt would benefit from continued skilled acute PT 3x/wk to improve functional mobility.  Recommending pt receive PT services in HH setting following d/c from hospital once medically cleared.      Rehab Prognosis: Good; patient would benefit from acute skilled PT services to address these deficits and reach maximum level of function.    Recent Surgery: * No surgery found *      Plan:     During this hospitalization, patient to be seen 3 x/week to address the identified rehab impairments via gait training, therapeutic activities, therapeutic exercises, neuromuscular re-education and progress toward the following goals:    · Plan of Care Expires:  04/12/19    Subjective     Chief Complaint: weakness; pain  Patient/Family Comments/goals: to return  home  Pain/Comfort:  · Pain Rating 1: (reports pain in neck, back and LLE/LUE)    Patients cultural, spiritual, Denominational conflicts given the current situation: no    Living Environment:  Pt lives c mother in [facility] c [facility].  PTA not driving, not working and reports 15 falls in past few months.    Prior to admission, patients level of function was independent and using RW for mobility.  Equipment used at home: bath bench, walker, rolling, rollator.  DME owned (not currently used): none.  Upon discharge, patient will have assistance from family.    Objective:     Communicated with RN and OT prior to session.  Patient found HOB elevated peripheral IV, Condom Catheter, telemetry  upon PT entry to room.    General Precautions: Standard, fall   Orthopedic Precautions:N/A   Braces: N/A     Exams:  · Cognitive Exam:  Patient is oriented to Person, Place, Time and Situation  · Gross Motor Coordination:  requiring prolonged time for sequencing gait  · Sensation:    · -       Intact  · RLE ROM: WFL  · RLE Strength: grossly 4/5  · LLE ROM: lacking ankle DF  · LLE Strength: grossly 4-/5 (limited ankle DF)    Functional Mobility:  · Bed Mobility:     · Rolling Left:  supervision  · Scooting: minimum assistance  · Supine to Sit: moderate assistance  · Sit to Supine: moderate assistance  · Transfers:     · Sit to Stand:  moderate assistance with rolling walker 2x from bed  · Min A c RW 1x from bed  · Gait: ~5ft c RW min A   · decreased gait speed, FFP, narrow YULIANA, decreased toe/floor clearance L>R, unsteadiness, increased lateral trunk lean, c/o pain and weakness  · Balance: sitting (S); standing (CGA-Sayda)      Therapeutic Activities and Exercises:  Pt educated on: PT role/POC; safety c mobility; benefits of OOB activities; performing therex; d/c recs - v/u  -Sat EOB x8mins  -AAROM LLE  -bedding changed d/t soiling  -standinx1min    AM-PAC 6 CLICK MOBILITY  Total Score:16     Patient left HOB elevated with all lines intact,  call button in reach and RN notified.    GOALS:   Multidisciplinary Problems     Physical Therapy Goals        Problem: Physical Therapy Goal    Goal Priority Disciplines Outcome Goal Variances Interventions   Physical Therapy Goal     PT, PT/OT Ongoing (interventions implemented as appropriate)     Description:  Goals to be met by: 2019     Patient will increase functional independence with mobility by performin. Supine to sit with Contact Guard Assistance  2. Sit to supine with Contact Guard Assistance  3. Sit to stand transfer with Contact Guard Assistance  4. Gait  x 75 feet with Contact Guard Assistance using Rolling Walker.   5. Ascend/descend 4 stair with right Handrails Minimal Assistance                     History:     Past Medical History:   Diagnosis Date    Anticoagulant long-term use     Anxiety     Chronic back pain     Deep vein thrombosis     Depression     Depression     Gait instability     Multiple sclerosis     Multiple sclerosis     Neurogenic bladder     Polyneuropathy     Pulmonary embolism     Spasticity        Past Surgical History:   Procedure Laterality Date    CYSTOSCOPY N/A 2018    Performed by [facility] at [facility] OR 1ST FLR    INJECTION, BOTULINUM TOXIN, TYPE A 200 UNITS N/A 2018    Performed by Brian Augustin MD at [facility] OR 1ST FLR       Time Tracking:     PT Received On: 19  PT Start Time: 930     PT Stop Time: 1006  PT Total Time (min): 36 min     Billable Minutes: Evaluation 15 min and Therapeutic Activity 10 min      Chase Fajardo, PT  2019

## 2019-03-18 NOTE — PLAN OF CARE
Problem: Fall Injury Risk  Goal: Absence of Fall and Fall-Related Injury  Outcome: Ongoing (interventions implemented as appropriate)  Patient remains free from falls.  Bed locked and in lowest position.  Personal items and call light in reach.  Ambulates with extensive assist and walker. Alert and oriented x 4.  Patient is talkative., calm and cooperative. Patient has muscle cramps and pain and discomfort to legs and back.  Patient has pain medications and muscle relaxers ordered.   Lungs clear.  Abdomen soft and non tender with active bowel sounds all 4 quadrants.  No bowel movement this shift.  Need for a stool sample and patient understands.  Patient has a condom catheter in use.  LR at 100ml/hr infusing into 22 gauge to the left hand.  20 gauge to right forearm saline locked.  Patient has weakness to all extremities however the left hand is contracted.

## 2019-03-18 NOTE — DISCHARGE SUMMARY
[facility] Hospital Medicine  Discharge Summary      Patient Name: Mao Levin  MRN: [id number]  Admission Date: 3/17/2019  Hospital Length of Stay: 0 days  Discharge Date and Time:  03/18/2019 5:35 PM  Attending Physician: Pradeep Calhoun MD   Discharging Provider: Myesha Maldonado MD  Primary Care Provider: Mendy Alarcon MD  [facility] Medicine Team: [facility] MED 1 Myesha Maldonado MD    HPI:   Mao Levin is 54yo with MS, neurogenic bladder, and depression presents for evaluation of diarrhea. States that he has been having 2 large bowel movements per day without blood or mucus. He endorses abdominal discomfort but denies abdominal pain. He denies sick contacts and recent travel. He does not have recent antibiotic use. He is sexually active and has had one partner within last 6 months. He does not have reported history of STDs.     He follows with neurology for MS and last rituxamib on 1/14/2019.     * No surgery found *      Hospital Course:   Patient admitted to team 1 for proctocolitis - rectum/sigmoid. He was started on ceftriaxone and flagyl. No reported diarrhea since admitted to hospital. On hospital day 2, he showed clinical improvement and was medically stable for discharge. He was discharged with flagyl and cefpodoxine 200mg x 7days with PT/OT referral, close PCP follow-up and neurology follow-up previously scheduled.      Interval History: Still complains of neck pain but states that diarrhea stopped and that he has not had it since he arrived to the ED.    Review of Systems   Constitutional: Negative for chills, fatigue and fever.   HENT: Negative for sore throat.    Eyes: Negative for visual disturbance.   Respiratory: Negative for shortness of breath.    Cardiovascular: Negative for chest pain and palpitations.   Gastrointestinal: Negative for abdominal distention, abdominal pain, diarrhea, nausea and vomiting.   Endocrine: Negative for polyuria.   Genitourinary: Negative for  difficulty urinating.   Musculoskeletal: Negative for arthralgias.   Neurological: Negative for headaches.   Psychiatric/Behavioral: Negative for agitation.     Objective:     Vital Signs (Most Recent):  Temp: 98.6 °F (37 °C) (03/18/19 0500)  Pulse: 67 (03/18/19 0846)  Resp: 17 (03/18/19 0846)  BP: (!) 133/93 (03/18/19 0846)  SpO2: 99 % (03/18/19 0846) Vital Signs (24h Range):  Temp:  [98.2 °F (36.8 °C)-98.6 °F (37 °C)] 98.6 °F (37 °C)  Pulse:  [61-79] 67  Resp:  [11-17] 17  SpO2:  [96 %-100 %] 99 %  BP: ()/() 133/93     Weight: 79.7 kg (175 lb 11.2 oz)  Body mass index is 25.21 kg/m².    Intake/Output Summary (Last 24 hours) at 3/18/2019 1058  Last data filed at 3/18/2019 0500  Gross per 24 hour   Intake 720 ml   Output 1600 ml   Net -880 ml      Physical Exam   Constitutional: He is oriented to person, place, and time. He appears well-developed and well-nourished. No distress.   HENT:   Head: Normocephalic and atraumatic.   Eyes: Right eye exhibits no discharge. Left eye exhibits no discharge. No scleral icterus.   Cardiovascular: Normal rate and regular rhythm. Exam reveals no gallop and no friction rub.   No murmur heard.  Pulmonary/Chest: Effort normal. He has no wheezes. He has no rales.   Abdominal: Soft. Bowel sounds are normal. He exhibits no mass. There is no tenderness. There is no guarding.   Neurological: He is alert and oriented to person, place, and time.   Skin: Skin is warm. He is not diaphoretic.   Nursing note and vitals reviewed.      Significant Labs:   BMP:   Recent Labs   Lab 03/18/19  0703   GLU 86      K 4.0      CO2 25   BUN 7   CREATININE 0.8   CALCIUM 8.2*   MG 1.6     CBC:   Recent Labs   Lab 03/17/19  0315 03/18/19  0704   WBC 9.96 6.03   HGB 13.2* 11.8*   HCT 39.0* 35.7*    191     Assessment/Plan:      * Proctocolitis    Patient presenting with diarrhea for past 3-4 days with associated abdominal discomfort  CT abdomen showed wall thickening involving  the distal sigmoid colon and rectum suggesting proctocolitis  Given Ceftriaxone in ED  Etiology of colitis unclear, will treat empirically for colitis; no recent travel or sexual history risk factors    -will order stool cultures, CMV, gonorrhea/chlamdyia, stool WBCs  -continue on ceftriaxone and flagyl       Debility    PT/OT consulted, will give PT/OT referral on discharge     MS (multiple sclerosis)    Home medications:  dalfampridine 10mg, baclofen 20mg, duloxetine 30mg, valium 5, and Gabapentin 400mg QID  Follows with Neurology for MS, first diagnosed in 2006  -Will continue home regimen           Final Active Diagnoses:    Diagnosis Date Noted POA    PRINCIPAL PROBLEM:  Proctocolitis [K52.9] 03/17/2019 Yes    Neck pain on right side, right lateral [M54.2] 03/17/2019 Yes    Debility [R53.81] 10/17/2018 Yes    MS (multiple sclerosis) [G35] 12/19/2016 Yes     Chronic      Problems Resolved During this Admission:       Discharged Condition: stable    Disposition: Home    Follow Up:  Follow-up Information     Mendy Alarcon MD In 2 weeks.    Specialty:  Internal Medicine  Contact information:  [address]  [place] [address]  [phone]                 Patient Instructions:      Ambulatory consult to Physical Therapy   Referral Priority: Routine Referral Type: Physical Medicine   Referral Reason: Specialty Services Required   Requested Specialty: Physical Therapy   Number of Visits Requested: 1     Ambulatory consult to Occupational Therapy   Referral Priority: Routine Referral Type: Occupational Therapy   Referral Reason: Specialty Services Required   Requested Specialty: Occupational Therapy   Number of Visits Requested: 1     Ambulatory Referral to Physical/Occupational Therapy   Referral Priority: Routine Referral Type: Physical Medicine   Referral Reason: Specialty Services Required   Number of Visits Requested: 1       Significant Diagnostic Studies: Labs:   BMP:   Recent Labs   Lab  03/17/19  0315 03/18/19  0703    86    139   K 3.7 4.0    108   CO2 26 25   BUN 8 7   CREATININE 0.8 0.8   CALCIUM 9.0 8.2*   MG  --  1.6    and CBC   Recent Labs   Lab 03/17/19  0315 03/18/19  0704   WBC 9.96 6.03   HGB 13.2* 11.8*   HCT 39.0* 35.7*    191       Pending Diagnostic Studies:     None         Medications:  Reconciled Home Medications:      Medication List      START taking these medications    cefpodoxime 200 MG tablet  Commonly known as:  VANTIN  Take 1 tablet (200 mg total) by mouth every 12 (twelve) hours. for 6 days     metroNIDAZOLE 500 MG tablet  Commonly known as:  FLAGYL  Take 1 tablet (500 mg total) by mouth every 8 (eight) hours. for 6 days     oxyCODONE 10 mg Tab immediate release tablet  Commonly known as:  ROXICODONE  Take 1 tablet (10 mg total) by mouth every 4 (four) hours as needed for Pain.        CHANGE how you take these medications    baclofen 20 MG tablet  Commonly known as:  LIORESAL  Take 1 tablet (20 mg total) by mouth 4 (four) times daily. for 7 days  What changed:    · how much to take  · how to take this  · when to take this     ergocalciferol 50,000 unit Cap  Commonly known as:  VITAMIN D2  Take 1 capsule (50,000 Units total) by mouth every 7 days.  What changed:  additional instructions        CONTINUE taking these medications    dalfampridine 10 mg Tb12  Take 1 tablet by mouth every 12 (twelve) hours. DO NOT take until seen by Neurology     diazePAM 5 MG tablet  Commonly known as:  VALIUM  Take 1 tablet (5 mg total) by mouth 3 (three) times daily.     DULoxetine 30 MG capsule  Commonly known as:  CYMBALTA  Take 1 capsule (30 mg total) by mouth once daily.     gabapentin 400 MG capsule  Commonly known as:  NEURONTIN  Take 1 capsule (400 mg total) by mouth 4 (four) times daily.     oxyCODONE-acetaminophen  mg per tablet  Commonly known as:  PERCOCET  Take 1 tablet by mouth every 4 (four) hours as needed for Pain.     tamsulosin 0.4 mg  Cap  Commonly known as:  FLOMAX  Take 1 capsule (0.4 mg total) by mouth once daily.     XARELTO 20 mg Tab  Generic drug:  rivaroxaban  Take 1 tablet (20 mg total) by mouth daily with dinner or evening meal.            Indwelling Lines/Drains at time of discharge:   Lines/Drains/Airways     Drain            Male External Urinary Catheter 03/17/19 0520 Large 1 day                Time spent on the discharge of patient: 35 minutes  Patient was seen and examined on the date of discharge and determined to be suitable for discharge.         Myesha Maldonado MD  Department of Hospital Medicine  [facility]

## 2019-03-19 NOTE — PT/OT/SLP DISCHARGE
Occupational Therapy Discharge Summary    Moa Levin  MRN: [id number]   Principal Problem: Proctocolitis      Patient Discharged from acute Occupational Therapy on 3/18/19.  Please refer to prior OT note dated 3/18/19 for functional status.    Assessment:      Patient was discharged unexpectedly.  Information required to complete an accurate discharge summary is unknown.  Refer to therapy initial evaluation and last progress note for initial and most recent functional status and goal achievement.  Recommendations made may be found in medical record.    Objective:     GOALS:   Multidisciplinary Problems     Occupational Therapy Goals        Problem: Occupational Therapy Goal    Goal Priority Disciplines Outcome Interventions   Occupational Therapy Goal     OT, PT/OT Ongoing (interventions implemented as appropriate)    Description:  Goals to be met by: 3/29/19    Patient will increase functional independence with ADLs by performing:    UE Dressing with Minimum Assistancee sitting EOB.  Rolling to Bilateral with Minimum Assistance.   Supine to sit with Minimum Assistance.  LE Dressing with Minimum Assistance.  Step transfer with Minimal Assistance with RW.                      Reasons for Discontinuation of Therapy Services  Transfer to alternate level of care.      Plan:     Patient Discharged to: Home no OT services needed    Lula Ramirez OT  3/19/2019

## 2019-03-20 ENCOUNTER — OFFICE VISIT (OUTPATIENT)
Dept: PHYSICAL MEDICINE AND REHAB | Facility: CLINIC | Age: 54
End: 2019-03-20
Payer: MEDICARE

## 2019-03-20 VITALS
WEIGHT: 175 LBS | HEART RATE: 81 BPM | BODY MASS INDEX: 24.5 KG/M2 | HEIGHT: 71 IN | DIASTOLIC BLOOD PRESSURE: 76 MMHG | SYSTOLIC BLOOD PRESSURE: 121 MMHG

## 2019-03-20 DIAGNOSIS — G35 ACUTE RELAPSING MULTIPLE SCLEROSIS: ICD-10-CM

## 2019-03-20 DIAGNOSIS — Z74.09 IMPAIRED MOBILITY AND ADLS: ICD-10-CM

## 2019-03-20 DIAGNOSIS — M54.42 CHRONIC BILATERAL LOW BACK PAIN WITH BILATERAL SCIATICA: Primary | ICD-10-CM

## 2019-03-20 DIAGNOSIS — R53.1 WEAKNESS GENERALIZED: ICD-10-CM

## 2019-03-20 DIAGNOSIS — R25.2 SPASTICITY: ICD-10-CM

## 2019-03-20 DIAGNOSIS — Z79.891 LONG-TERM CURRENT USE OF OPIATE ANALGESIC: ICD-10-CM

## 2019-03-20 DIAGNOSIS — G62.9 POLYNEUROPATHY: ICD-10-CM

## 2019-03-20 DIAGNOSIS — Z78.9 IMPAIRED MOBILITY AND ADLS: ICD-10-CM

## 2019-03-20 DIAGNOSIS — G89.29 CHRONIC PAIN OF MULTIPLE SITES: ICD-10-CM

## 2019-03-20 DIAGNOSIS — R29.6 FREQUENT FALLS: ICD-10-CM

## 2019-03-20 DIAGNOSIS — G82.20 PARAPARESIS: ICD-10-CM

## 2019-03-20 DIAGNOSIS — R52 CHRONIC PAIN OF MULTIPLE SITES: ICD-10-CM

## 2019-03-20 DIAGNOSIS — G89.29 CHRONIC BILATERAL LOW BACK PAIN WITH BILATERAL SCIATICA: Primary | ICD-10-CM

## 2019-03-20 DIAGNOSIS — R27.8 ABNORMAL COORDINATION: ICD-10-CM

## 2019-03-20 DIAGNOSIS — G35 MS (MULTIPLE SCLEROSIS): Chronic | ICD-10-CM

## 2019-03-20 DIAGNOSIS — G89.4 CHRONIC PAIN SYNDROME: ICD-10-CM

## 2019-03-20 DIAGNOSIS — M54.41 CHRONIC BILATERAL LOW BACK PAIN WITH BILATERAL SCIATICA: Primary | ICD-10-CM

## 2019-03-20 PROCEDURE — 99499 UNLISTED E&M SERVICE: CPT | Mod: HCWC,S$GLB,, | Performed by: PHYSICAL MEDICINE & REHABILITATION

## 2019-03-20 PROCEDURE — 99999 PR PBB SHADOW E&M-EST. PATIENT-LVL III: ICD-10-PCS | Mod: PBBFAC,HCWC,, | Performed by: PHYSICAL MEDICINE & REHABILITATION

## 2019-03-20 PROCEDURE — 99214 OFFICE O/P EST MOD 30 MIN: CPT | Mod: HCWC,S$GLB,, | Performed by: PHYSICAL MEDICINE & REHABILITATION

## 2019-03-20 PROCEDURE — 3008F PR BODY MASS INDEX (BMI) DOCUMENTED: ICD-10-PCS | Mod: HCWC,CPTII,S$GLB, | Performed by: PHYSICAL MEDICINE & REHABILITATION

## 2019-03-20 PROCEDURE — 99214 PR OFFICE/OUTPT VISIT, EST, LEVL IV, 30-39 MIN: ICD-10-PCS | Mod: HCWC,S$GLB,, | Performed by: PHYSICAL MEDICINE & REHABILITATION

## 2019-03-20 PROCEDURE — 3008F BODY MASS INDEX DOCD: CPT | Mod: HCWC,CPTII,S$GLB, | Performed by: PHYSICAL MEDICINE & REHABILITATION

## 2019-03-20 PROCEDURE — 99999 PR PBB SHADOW E&M-EST. PATIENT-LVL III: CPT | Mod: PBBFAC,HCWC,, | Performed by: PHYSICAL MEDICINE & REHABILITATION

## 2019-03-20 PROCEDURE — 99499 RISK ADDL DX/OHS AUDIT: ICD-10-PCS | Mod: HCWC,S$GLB,, | Performed by: PHYSICAL MEDICINE & REHABILITATION

## 2019-03-20 RX ORDER — OXYCODONE AND ACETAMINOPHEN 10; 325 MG/1; MG/1
1 TABLET ORAL EVERY 6 HOURS PRN
Qty: 28 TABLET | Refills: 0 | Status: SHIPPED | OUTPATIENT
Start: 2019-03-20 | End: 2019-03-29 | Stop reason: SDUPTHER

## 2019-03-20 RX ORDER — DIAZEPAM 5 MG/1
5 TABLET ORAL 3 TIMES DAILY
Qty: 21 TABLET | Refills: 0 | Status: SHIPPED | OUTPATIENT
Start: 2019-03-20 | End: 2019-04-04 | Stop reason: SDUPTHER

## 2019-03-20 NOTE — PROGRESS NOTES
Subjective:       Patient ID: Mao Levin is a 53 y.o. male.    Chief Complaint: Back Pain and Leg Pain    Neurologic Problem   The patient's pertinent negatives include no weakness. Associated symptoms include back pain and neck pain. Pertinent negatives include no chest pain, dizziness, fatigue, headaches or shortness of breath.   Leg Pain    Pertinent negatives include no numbness.   Back Pain   Associated symptoms include leg pain. Pertinent negatives include no chest pain, dysuria, headaches, numbness or weakness.   Neck Pain    Associated symptoms include leg pain. Pertinent negatives include no chest pain, headaches, numbness or weakness.      Mao Levin is 54 y/o male who returns to clinic for chronic multiple MSK pain, in lower back, neck, multiple joints that include: shoulder, knee, and hand pain.   [facility] 10/08/18.  Since [facility], he was calling for earlier medications, since he lost entire bottle. Per patient he was in car with his sister , and everything drop out of his small  that he wears   Around his neck, and he was not able to find .  Per his sister Brooke , who called his PCP, and reported that he is using pain medications different than prescribed.  She states that he uses all prescribed medications for month within first 7 days ,and she is afraid from his addiction to opioids.  She also advised to prescribe him meds every week, so he does not have that many in his possesion.   He is dispensing his medications on his own.   Patient was not given medications electronically but called to office to discuss this problem.  He denied the whole strory, and states that he lost medications in his sister's car, and she did not give to him.       Patient is now out of [facility], and he now lives alone in his own apartment next to his mother.   He is coming for the first time walking with RW, although hardly and very slowly walking.   He is out of one week medication supply given on discharge  "from [facility].  He has a lot of MSK pain, with his MS.  Reports that he has lift, WC, scooter, and he came with RW, on public transport.   The pain started in 2006 following MS diagnosis and symptoms have been worsening.    Brief history: Patient is a 53 yo male with MS diagnosed in 2006 as well as chronic, generalized pain since that time that presents for initial evaluation. He was hospitalized in inpatient rehab in October /2016 and feels that he has become more functional but still is mostly wheel chair bound. .   Pain occurs from the neck down and involves the entire body except the head. No specific area is worse. Pain is "achy" and "tingling".   It is constant at 7-8/10 but can worsen with any movement to 10/10.   Today he c/o pain in both arm/hands in all fingers, they feel sore and tight.   Back pain is running down to both legs, back of thighs, and pain in leg is worst bellow the knee level, in calves that are tight, and weak.  His Left leg is weaker than Rt leg.   Has foot b/l foot weakness and drop, wears left AFO.  He has been a prolong time on chronic pain management with oxycodone 15mg q6h and oxycontin 40 mg TWICE DAILY ( while in IP Rehab) but did not feel that this was any more helpful. Tried gabapentin in the past which was tolerated but didn't help.   He also takes diazepam 5mg TWICE DAILY and baclofen 10mg TID which helps his spasticity.    In NH, he was taking  Tramadol and Oxycodone 5 mg Q8 hrs prn pain.  He states that Tramadol is ineffective.  He is coming today, w/o  D/c papers, medical documentation from NH.   Pain Medications:  Percocet 10/325mg, 1 tablet 3x a day  Gabapentin 300mg, 3 tablets three times daily  Valium 5mg, 1 tablet twice daily  Baclofen 20 mg QID  Xarelto 20mg, 1 tablet daily    Pain Description:   The pain is located in the neck, back shoulders, hands  and knees.  Today the current  pain is rated as 7/10  At BEST  5/10   At WORST  10/10 on the WORST day.    On average pain " is rated as 7/10.   The pain is described as aching and tingling  Symptoms interfere with daily activity, sleeping and work.   Exacerbating factors: Morning, Extension and Flexing.    Mitigating factors medications and physical therapy.   Patient denies .  Patient denies any suicidal or homicidal ideations    Physical Therapy/Home Exercise: yes     report:  Reviewed and consistent with medication use as prescribed.  Pain Procedures: none      Imaging:   I reviewed C-spine and T-spine MRI which showed demylenating changes as well as fairly significant stenosis throughout the cervical spine.   He is here for follow up, and treatment.    Past Medical History:   Diagnosis Date    Anticoagulant long-term use     Anxiety     Chronic back pain     Deep vein thrombosis     Depression     Depression     Gait instability     Multiple sclerosis     Multiple sclerosis     Neurogenic bladder     Polyneuropathy     Pulmonary embolism     Spasticity        Past Surgical History:   Procedure Laterality Date    CYSTOSCOPY N/A 2018    [facility]formed [facility]    INJECTION, BOTULINUM TOXIN, TYPE A 200 UNITS N/A 2018    Performed by [facility] at [facility] OR [facility]       Family History   Problem Relation Age of Onset    Arthritis Mother     No Known Problems Father     Cancer Maternal Grandfather        Social History     Socioeconomic History    Marital status: Single     Spouse name: None    Number of children: None    Years of education: None    Highest education level: None   Occupational History    None   Social Needs    Financial resource strain: None    Food insecurity:     Worry: None     Inability: None    Transportation needs:     Medical: None     Non-medical: None   Tobacco Use    Smoking status: Former Smoker     Packs/day: 0.50     Years: 23.00     Pack years: 11.50     Types: Cigarettes     Last attempt to quit: 2018     Years since quittin.8     Smokeless tobacco: Never Used    Tobacco comment: Using patch   Substance and Sexual Activity    Alcohol use: No    Drug use: No    Sexual activity: Yes     Partners: Female   Lifestyle    Physical activity:     Days per week: None     Minutes per session: None    Stress: None   Relationships    Social connections:     Talks on phone: None     Gets together: None     Attends [employer] service: None     Active member of club or organization: None     Attends meetings of clubs or organizations: None     Relationship status: None    Intimate partner violence:     Fear of current or ex partner: None     Emotionally abused: None     Physically abused: None     Forced sexual activity: None   Other Topics Concern    None   Social History Narrative    None       Current Outpatient Medications   Medication Sig Dispense Refill    baclofen (LIORESAL) 20 MG tablet Take 1 tablet (20 mg total) by mouth 4 (four) times daily. for 7 days 28 tablet 0    cefpodoxime (VANTIN) 200 MG tablet Take 1 tablet (200 mg total) by mouth every 12 (twelve) hours. for 6 days 12 tablet 0    dalfampridine (AMPYRA) 10 mg Tb12 Take 1 tablet by mouth every 12 (twelve) hours. DO NOT take until seen by Neurology 180 tablet 1    diazePAM (VALIUM) 5 MG tablet Take 1 tablet (5 mg total) by mouth 3 (three) times daily. for 7 days 21 tablet 0    DULoxetine (CYMBALTA) 30 MG capsule Take 1 capsule (30 mg total) by mouth once daily. 30 capsule 3    ergocalciferol (VITAMIN D2) 50,000 unit Cap Take 1 capsule (50,000 Units total) by mouth every 7 days. (Patient taking differently: Take 50,000 Units by mouth every 7 days. on friday) 4 capsule 11    gabapentin (NEURONTIN) 400 MG capsule Take 1 capsule (400 mg total) by mouth 4 (four) times daily. 120 capsule 3    metroNIDAZOLE (FLAGYL) 500 MG tablet Take 1 tablet (500 mg total) by mouth every 8 (eight) hours. for 6 days 18 tablet 0    oxyCODONE (ROXICODONE) 10 mg Tab immediate release tablet  Take 1 tablet (10 mg total) by mouth every 4 (four) hours as needed for Pain. 6 tablet 0    oxyCODONE-acetaminophen (PERCOCET)  mg per tablet Take 1 tablet by mouth every 6 (six) hours as needed for Pain. 28 tablet 0    rivaroxaban (XARELTO) 20 mg Tab Take 1 tablet (20 mg total) by mouth daily with dinner or evening meal. 30 tablet 3    tamsulosin (FLOMAX) 0.4 mg Cap Take 1 capsule (0.4 mg total) by mouth once daily. 30 capsule 3     No current facility-administered medications for this visit.      Review of patient's allergies indicates:  No Known Allergies    Review of Systems   Constitutional: Negative for appetite change and fatigue.   Eyes: Negative for visual disturbance.   Respiratory: Negative for shortness of breath.    Cardiovascular: Negative for chest pain.   Gastrointestinal: Negative for constipation and diarrhea.   Genitourinary: Negative for dysuria, frequency and urgency.   Musculoskeletal: Positive for arthralgias, back pain, gait problem, myalgias and neck pain. Negative for joint swelling and neck stiffness.   Neurological: Negative for dizziness, tremors, weakness, numbness and headaches.   Psychiatric/Behavioral: Negative for dysphoric mood.   All other systems reviewed and are negative.        Objective:      Physical Exam      Constitutional: He is oriented to person, place, and time.   He appears well-nourished.   Eyes: EOM are normal. Pupils are equal, round, and reactive to light.   Neck: Normal range of motion. Neck supple.   Cardiovascular: Normal rhythm  and rate.    Pulmonary/Chest: Effort normal.   Abdominal: Soft.   Musculoskeletal:   Gait: spastic, walks hardly, very slow, dragging feet, but does not want RW with seat, nor he wants to use WC. He wants to walk again,  BUEs AROM WNL  BLEs AROM is diminished   Neurological: He is oriented to person, place, and time.   BUEs 5/5  RLE 3/5 HF, 4-/5 HE, 3+/5 KE/KF/DF  LLE 3-/5 HF, 3+/5 HE, 3-/5 KE, 2/5 KF, 0/5 DF .  wears left  AFO.  Skin: No rash noted.   Psychiatric: He has a normal mood and affect.       Assessment:           1. Chronic bilateral low back pain with bilateral sciatica    2. Chronic pain of multiple sites    3. Acute relapsing multiple sclerosis    4. Paraparesis    5. Weakness generalized    6. Chronic pain syndrome    7. Impaired mobility and ADLs    8. Long-term current use of opiate analgesic    9. Spasticity    10. Frequent falls    11. Abnormal coordination    12. Polyneuropathy    13. MS (multiple sclerosis)            Plan:        Chronic bilateral low back pain with bilateral sciatica  -     oxyCODONE-acetaminophen (PERCOCET)  mg per tablet; Take 1 tablet by mouth every 6 (six) hours as needed for Pain.  Dispense: 28 tablet; Refill: 0  -     diazePAM (VALIUM) 5 MG tablet; Take 1 tablet (5 mg total) by mouth 3 (three) times daily. for 7 days  Dispense: 21 tablet; Refill: 0    Chronic pain of multiple sites  -     oxyCODONE-acetaminophen (PERCOCET)  mg per tablet; Take 1 tablet by mouth every 6 (six) hours as needed for Pain.  Dispense: 28 tablet; Refill: 0  -     diazePAM (VALIUM) 5 MG tablet; Take 1 tablet (5 mg total) by mouth 3 (three) times daily. for 7 days  Dispense: 21 tablet; Refill: 0    Acute relapsing multiple sclerosis  -     oxyCODONE-acetaminophen (PERCOCET)  mg per tablet; Take 1 tablet by mouth every 6 (six) hours as needed for Pain.  Dispense: 28 tablet; Refill: 0  -     diazePAM (VALIUM) 5 MG tablet; Take 1 tablet (5 mg total) by mouth 3 (three) times daily. for 7 days  Dispense: 21 tablet; Refill: 0    Paraparesis  -     oxyCODONE-acetaminophen (PERCOCET)  mg per tablet; Take 1 tablet by mouth every 6 (six) hours as needed for Pain.  Dispense: 28 tablet; Refill: 0  -     diazePAM (VALIUM) 5 MG tablet; Take 1 tablet (5 mg total) by mouth 3 (three) times daily. for 7 days  Dispense: 21 tablet; Refill: 0    Weakness generalized  -     oxyCODONE-acetaminophen (PERCOCET)   mg per tablet; Take 1 tablet by mouth every 6 (six) hours as needed for Pain.  Dispense: 28 tablet; Refill: 0  -     diazePAM (VALIUM) 5 MG tablet; Take 1 tablet (5 mg total) by mouth 3 (three) times daily. for 7 days  Dispense: 21 tablet; Refill: 0    Chronic pain syndrome  -     oxyCODONE-acetaminophen (PERCOCET)  mg per tablet; Take 1 tablet by mouth every 6 (six) hours as needed for Pain.  Dispense: 28 tablet; Refill: 0  -     diazePAM (VALIUM) 5 MG tablet; Take 1 tablet (5 mg total) by mouth 3 (three) times daily. for 7 days  Dispense: 21 tablet; Refill: 0    Impaired mobility and ADLs  -     oxyCODONE-acetaminophen (PERCOCET)  mg per tablet; Take 1 tablet by mouth every 6 (six) hours as needed for Pain.  Dispense: 28 tablet; Refill: 0  -     diazePAM (VALIUM) 5 MG tablet; Take 1 tablet (5 mg total) by mouth 3 (three) times daily. for 7 days  Dispense: 21 tablet; Refill: 0    Long-term current use of opiate analgesic  -     oxyCODONE-acetaminophen (PERCOCET)  mg per tablet; Take 1 tablet by mouth every 6 (six) hours as needed for Pain.  Dispense: 28 tablet; Refill: 0  -     diazePAM (VALIUM) 5 MG tablet; Take 1 tablet (5 mg total) by mouth 3 (three) times daily. for 7 days  Dispense: 21 tablet; Refill: 0    Spasticity  -     oxyCODONE-acetaminophen (PERCOCET)  mg per tablet; Take 1 tablet by mouth every 6 (six) hours as needed for Pain.  Dispense: 28 tablet; Refill: 0  -     diazePAM (VALIUM) 5 MG tablet; Take 1 tablet (5 mg total) by mouth 3 (three) times daily. for 7 days  Dispense: 21 tablet; Refill: 0    Frequent falls  -     oxyCODONE-acetaminophen (PERCOCET)  mg per tablet; Take 1 tablet by mouth every 6 (six) hours as needed for Pain.  Dispense: 28 tablet; Refill: 0  -     diazePAM (VALIUM) 5 MG tablet; Take 1 tablet (5 mg total) by mouth 3 (three) times daily. for 7 days  Dispense: 21 tablet; Refill: 0    Abnormal coordination  -     oxyCODONE-acetaminophen  (PERCOCET)  mg per tablet; Take 1 tablet by mouth every 6 (six) hours as needed for Pain.  Dispense: 28 tablet; Refill: 0  -     diazePAM (VALIUM) 5 MG tablet; Take 1 tablet (5 mg total) by mouth 3 (three) times daily. for 7 days  Dispense: 21 tablet; Refill: 0    Polyneuropathy  -     oxyCODONE-acetaminophen (PERCOCET)  mg per tablet; Take 1 tablet by mouth every 6 (six) hours as needed for Pain.  Dispense: 28 tablet; Refill: 0  -     diazePAM (VALIUM) 5 MG tablet; Take 1 tablet (5 mg total) by mouth 3 (three) times daily. for 7 days  Dispense: 21 tablet; Refill: 0    MS (multiple sclerosis)  -     oxyCODONE-acetaminophen (PERCOCET)  mg per tablet; Take 1 tablet by mouth every 6 (six) hours as needed for Pain.  Dispense: 28 tablet; Refill: 0  -     diazePAM (VALIUM) 5 MG tablet; Take 1 tablet (5 mg total) by mouth 3 (three) times daily. for 7 days  Dispense: 21 tablet; Refill: 0    Patient with MS, weakness in UE/LE, and neuropathic pain, weakness and spasticity In LE> UE,    He also has chronic pain syndrome, chronic back and neck pain, with B/l LE weakness, Lt>> Rt, with impaired mobility, and ADLs.     -- Pain management:   I reviewed , and MAR.  Percocet partial refill for 7 days given after discussion about using opioids not as prescribed.  Pt denies all that his sister Brooke ( [phone], reported to his PCP. There is some family dynamics, mother would be the best person to discuss,   since she is living next to him, and could possibly supervise him.   I discussed opioid agreement, contract.  He is agreeable to get pain meds every week for now.      He will resume all his medications, including gabapentin, Baclofen, and Diazepam for spasticity treatment. I prescribed him Xeralto ,too.  Also recommend to keep appointments with , for treatment of MS.  At this time he does not need any additional DME.  All plan was discussed with patient and he agrees.     RTC in 1 mo, and will  call every week for weekly opioid medications.    Total time spent face to face with patient was 25 minutes.   More than 50% of that time was spent in counseling on diagnosis , prognosis and treatment options.   I also  patient  on common and most usual side effect of prescribed medications. Pain contract rules were discuss with pt.  Risk and benefits of opiates, possible risk of developing opiate dependence and tolerance, need of strict compliance with prescribed medications.  I reviewed Primary care , and other specialty's notes to better coordinate patient's  care.   All questions were answered, and patient voiced understanding.

## 2019-03-22 RX ORDER — DIAZEPAM 5 MG/1
5 TABLET ORAL EVERY 8 HOURS PRN
Qty: 90 TABLET | Refills: 0 | OUTPATIENT
Start: 2019-03-22 | End: 2019-04-21

## 2019-03-22 RX ORDER — OXYCODONE AND ACETAMINOPHEN 10; 325 MG/1; MG/1
1 TABLET ORAL EVERY 8 HOURS PRN
Qty: 90 TABLET | Refills: 0 | OUTPATIENT
Start: 2019-03-22 | End: 2019-04-21

## 2019-03-27 NOTE — PT/OT/SLP DISCHARGE
Physical Therapy Discharge Summary    Name: Mao Levin  MRN: [id number]   Principal Problem: Proctocolitis     Patient Discharged from acute Physical Therapy on 3/18/2019.  Please refer to prior PT noted date on 3/18/2019 for functional status.     Assessment:     Patient was discharged unexpectedly.  Information required to complete an accurate discharge summary is unknown.  Refer to therapy initial evaluation and last progress note for initial and most recent functional status and goal achievement.  Recommendations made may be found in medical record.    Objective:     GOALS:   Multidisciplinary Problems     Physical Therapy Goals        Problem: Physical Therapy Goal    Goal Priority Disciplines Outcome Goal Variances Interventions   Physical Therapy Goal     PT, PT/OT Ongoing (interventions implemented as appropriate)     Description:  Goals to be met by: 2019     Patient will increase functional independence with mobility by performin. Supine to sit with Contact Guard Assistance  2. Sit to supine with Contact Guard Assistance  3. Sit to stand transfer with Contact Guard Assistance  4. Gait  x 75 feet with Contact Guard Assistance using Rolling Walker.   5. Ascend/descend 4 stair with right Handrails Minimal Assistance                     Reasons for Discontinuation of Therapy Services  Transfer to alternate level of care.      Plan:     Patient Discharged to: Home no PT services needed.    Chase Fajardo, PT  3/27/2019

## 2019-03-29 ENCOUNTER — TELEPHONE (OUTPATIENT)
Dept: PHARMACY | Facility: CLINIC | Age: 54
End: 2019-03-29

## 2019-03-29 DIAGNOSIS — R52 CHRONIC PAIN OF MULTIPLE SITES: ICD-10-CM

## 2019-03-29 DIAGNOSIS — Z79.891 LONG-TERM CURRENT USE OF OPIATE ANALGESIC: ICD-10-CM

## 2019-03-29 DIAGNOSIS — G82.20 PARAPARESIS: ICD-10-CM

## 2019-03-29 DIAGNOSIS — Z74.09 IMPAIRED MOBILITY AND ADLS: ICD-10-CM

## 2019-03-29 DIAGNOSIS — G62.9 POLYNEUROPATHY: ICD-10-CM

## 2019-03-29 DIAGNOSIS — G89.29 CHRONIC PAIN OF MULTIPLE SITES: ICD-10-CM

## 2019-03-29 DIAGNOSIS — R53.1 WEAKNESS GENERALIZED: ICD-10-CM

## 2019-03-29 DIAGNOSIS — G89.29 CHRONIC BILATERAL LOW BACK PAIN WITH BILATERAL SCIATICA: ICD-10-CM

## 2019-03-29 DIAGNOSIS — R27.8 ABNORMAL COORDINATION: ICD-10-CM

## 2019-03-29 DIAGNOSIS — G35 MS (MULTIPLE SCLEROSIS): Chronic | ICD-10-CM

## 2019-03-29 DIAGNOSIS — R29.6 FREQUENT FALLS: ICD-10-CM

## 2019-03-29 DIAGNOSIS — R25.2 SPASTICITY: ICD-10-CM

## 2019-03-29 DIAGNOSIS — M54.41 CHRONIC BILATERAL LOW BACK PAIN WITH BILATERAL SCIATICA: ICD-10-CM

## 2019-03-29 DIAGNOSIS — M54.42 CHRONIC BILATERAL LOW BACK PAIN WITH BILATERAL SCIATICA: ICD-10-CM

## 2019-03-29 DIAGNOSIS — G35 ACUTE RELAPSING MULTIPLE SCLEROSIS: ICD-10-CM

## 2019-03-29 DIAGNOSIS — Z78.9 IMPAIRED MOBILITY AND ADLS: ICD-10-CM

## 2019-03-29 DIAGNOSIS — G89.4 CHRONIC PAIN SYNDROME: ICD-10-CM

## 2019-03-29 RX ORDER — OXYCODONE AND ACETAMINOPHEN 10; 325 MG/1; MG/1
1 TABLET ORAL EVERY 6 HOURS PRN
Qty: 28 TABLET | Refills: 0 | Status: SHIPPED | OUTPATIENT
Start: 2019-03-29 | End: 2019-04-04 | Stop reason: SDUPTHER

## 2019-03-29 NOTE — TELEPHONE ENCOUNTER
Last visit: 03/20/2019  Canceled/No Show visit: 04/01/2019  Next visit: 04/17/2019  Last refill Percocet: 03/20/2019 28 tablets    ----- Message from William Marcelo sent at 3/29/2019  3:09 PM CDT -----  Rx Refill/Request     Is this a Refill or New Rx: Refill    Rx Name and Strength: oxyCODONE-acetaminophen (PERCOCET)  mg per tablet    Preferred Pharmacy with phone number: see below  Communication Preference: [phone]  Additional Information:     [facility]  [address] [address]  Phone: [phone] Fax: [phone]

## 2019-04-01 DIAGNOSIS — R29.6 FREQUENT FALLS: ICD-10-CM

## 2019-04-01 DIAGNOSIS — R52 CHRONIC PAIN OF MULTIPLE SITES: ICD-10-CM

## 2019-04-01 DIAGNOSIS — Z74.09 IMPAIRED MOBILITY AND ADLS: ICD-10-CM

## 2019-04-01 DIAGNOSIS — R27.8 ABNORMAL COORDINATION: ICD-10-CM

## 2019-04-01 DIAGNOSIS — G89.29 CHRONIC BILATERAL LOW BACK PAIN WITH BILATERAL SCIATICA: ICD-10-CM

## 2019-04-01 DIAGNOSIS — G62.9 POLYNEUROPATHY: ICD-10-CM

## 2019-04-01 DIAGNOSIS — M54.41 CHRONIC BILATERAL LOW BACK PAIN WITH BILATERAL SCIATICA: ICD-10-CM

## 2019-04-01 DIAGNOSIS — G82.20 PARAPARESIS: ICD-10-CM

## 2019-04-01 DIAGNOSIS — Z79.891 LONG-TERM CURRENT USE OF OPIATE ANALGESIC: ICD-10-CM

## 2019-04-01 DIAGNOSIS — G89.29 CHRONIC PAIN OF MULTIPLE SITES: ICD-10-CM

## 2019-04-01 DIAGNOSIS — G35 ACUTE RELAPSING MULTIPLE SCLEROSIS: ICD-10-CM

## 2019-04-01 DIAGNOSIS — R25.2 SPASTICITY: ICD-10-CM

## 2019-04-01 DIAGNOSIS — R53.1 WEAKNESS GENERALIZED: ICD-10-CM

## 2019-04-01 DIAGNOSIS — M54.42 CHRONIC BILATERAL LOW BACK PAIN WITH BILATERAL SCIATICA: ICD-10-CM

## 2019-04-01 DIAGNOSIS — G35 MS (MULTIPLE SCLEROSIS): Chronic | ICD-10-CM

## 2019-04-01 DIAGNOSIS — G89.4 CHRONIC PAIN SYNDROME: ICD-10-CM

## 2019-04-01 DIAGNOSIS — Z78.9 IMPAIRED MOBILITY AND ADLS: ICD-10-CM

## 2019-04-01 RX ORDER — OXYCODONE HYDROCHLORIDE 10 MG/1
10 TABLET ORAL EVERY 4 HOURS PRN
Qty: 6 TABLET | Refills: 0 | OUTPATIENT
Start: 2019-04-01

## 2019-04-01 NOTE — TELEPHONE ENCOUNTER
----- Message from Leslie Carrasco sent at 4/1/2019  9:21 AM CDT -----  Contact: Pt. [phone]  Rx Refill/Request     Refill Rx:      Rx Name and Strength:  Oxycodone  MG    Preferred Pharmacy with phone number:     [facility]  [address] [address]  Phone: [phone] Fax: [phone]      Communication Preference:PHONE    Additional Information:

## 2019-04-01 NOTE — TELEPHONE ENCOUNTER
Pt called to check on refill request, informed him that the doctor has it.  Pt would like a valium refill as well.  Last visit: 03/20/2019  Next visit: 04/17/2019  Last refill Valium: 03/20/2019 7d prescription    ---  Verified with [facility] that the Percocet was canceled.  ---  Pt called to ask about his refill, informed him it was sent in error to the [facility] and that we have informed Dr Estrella and just need to wait for her to switch it over.  Pt accepting.

## 2019-04-02 ENCOUNTER — TELEPHONE (OUTPATIENT)
Dept: PHARMACY | Facility: CLINIC | Age: 54
End: 2019-04-02

## 2019-04-04 ENCOUNTER — TELEPHONE (OUTPATIENT)
Dept: PHYSICAL MEDICINE AND REHAB | Facility: CLINIC | Age: 54
End: 2019-04-04

## 2019-04-04 DIAGNOSIS — Z79.891 LONG-TERM CURRENT USE OF OPIATE ANALGESIC: ICD-10-CM

## 2019-04-04 DIAGNOSIS — M54.41 CHRONIC BILATERAL LOW BACK PAIN WITH BILATERAL SCIATICA: ICD-10-CM

## 2019-04-04 DIAGNOSIS — R27.8 ABNORMAL COORDINATION: ICD-10-CM

## 2019-04-04 DIAGNOSIS — R29.6 FREQUENT FALLS: ICD-10-CM

## 2019-04-04 DIAGNOSIS — M54.42 CHRONIC BILATERAL LOW BACK PAIN WITH BILATERAL SCIATICA: ICD-10-CM

## 2019-04-04 DIAGNOSIS — G35 MS (MULTIPLE SCLEROSIS): Chronic | ICD-10-CM

## 2019-04-04 DIAGNOSIS — G62.9 POLYNEUROPATHY: ICD-10-CM

## 2019-04-04 DIAGNOSIS — R53.1 WEAKNESS GENERALIZED: ICD-10-CM

## 2019-04-04 DIAGNOSIS — R52 CHRONIC PAIN OF MULTIPLE SITES: ICD-10-CM

## 2019-04-04 DIAGNOSIS — Z78.9 IMPAIRED MOBILITY AND ADLS: ICD-10-CM

## 2019-04-04 DIAGNOSIS — G82.20 PARAPARESIS: ICD-10-CM

## 2019-04-04 DIAGNOSIS — G89.4 CHRONIC PAIN SYNDROME: ICD-10-CM

## 2019-04-04 DIAGNOSIS — G89.29 CHRONIC BILATERAL LOW BACK PAIN WITH BILATERAL SCIATICA: ICD-10-CM

## 2019-04-04 DIAGNOSIS — Z74.09 IMPAIRED MOBILITY AND ADLS: ICD-10-CM

## 2019-04-04 DIAGNOSIS — G89.29 CHRONIC PAIN OF MULTIPLE SITES: ICD-10-CM

## 2019-04-04 DIAGNOSIS — R25.2 SPASTICITY: ICD-10-CM

## 2019-04-04 DIAGNOSIS — G35 ACUTE RELAPSING MULTIPLE SCLEROSIS: ICD-10-CM

## 2019-04-04 NOTE — TELEPHONE ENCOUNTER
This was given to the supervisor to handle. The patient's refill was sent to the wrong pharmacy. The correct pharmacy was given to the doctor and she hasn't done it yet.

## 2019-04-04 NOTE — TELEPHONE ENCOUNTER
----- Message from Lisa Bond sent at 4/4/2019 11:33 AM CDT -----  Contact: Pt  Pt is very upset regarding his pain medication, he is requesting to speak with someone today     Ph# [phone]    Thanks

## 2019-04-05 ENCOUNTER — OFFICE VISIT (OUTPATIENT)
Dept: URGENT CARE | Facility: CLINIC | Age: 54
End: 2019-04-05
Payer: MEDICARE

## 2019-04-05 ENCOUNTER — TELEPHONE (OUTPATIENT)
Dept: INTERNAL MEDICINE | Facility: CLINIC | Age: 54
End: 2019-04-05

## 2019-04-05 VITALS
HEIGHT: 71 IN | BODY MASS INDEX: 24.5 KG/M2 | RESPIRATION RATE: 20 BRPM | HEART RATE: 83 BPM | OXYGEN SATURATION: 100 % | SYSTOLIC BLOOD PRESSURE: 147 MMHG | WEIGHT: 175 LBS | DIASTOLIC BLOOD PRESSURE: 129 MMHG | TEMPERATURE: 98 F

## 2019-04-05 DIAGNOSIS — F41.9 ANXIETY: Primary | ICD-10-CM

## 2019-04-05 PROCEDURE — 99214 OFFICE O/P EST MOD 30 MIN: CPT | Mod: S$GLB,,, | Performed by: NURSE PRACTITIONER

## 2019-04-05 PROCEDURE — 3008F BODY MASS INDEX DOCD: CPT | Mod: CPTII,S$GLB,, | Performed by: NURSE PRACTITIONER

## 2019-04-05 PROCEDURE — 3008F PR BODY MASS INDEX (BMI) DOCUMENTED: ICD-10-PCS | Mod: CPTII,S$GLB,, | Performed by: NURSE PRACTITIONER

## 2019-04-05 PROCEDURE — 99214 PR OFFICE/OUTPT VISIT, EST, LEVL IV, 30-39 MIN: ICD-10-PCS | Mod: S$GLB,,, | Performed by: NURSE PRACTITIONER

## 2019-04-05 RX ORDER — OXYCODONE AND ACETAMINOPHEN 10; 325 MG/1; MG/1
1 TABLET ORAL EVERY 6 HOURS PRN
Qty: 28 TABLET | Refills: 0 | Status: SHIPPED | OUTPATIENT
Start: 2019-04-05 | End: 2019-04-11 | Stop reason: SDUPTHER

## 2019-04-05 RX ORDER — DIAZEPAM 5 MG/1
5 TABLET ORAL EVERY 8 HOURS PRN
Qty: 9 TABLET | Refills: 0 | Status: SHIPPED | OUTPATIENT
Start: 2019-04-05 | End: 2019-04-05 | Stop reason: SDUPTHER

## 2019-04-05 RX ORDER — OXYCODONE AND ACETAMINOPHEN 10; 325 MG/1; MG/1
1 TABLET ORAL EVERY 6 HOURS PRN
Qty: 28 TABLET | Refills: 0 | OUTPATIENT
Start: 2019-04-05 | End: 2019-04-12

## 2019-04-05 RX ORDER — DIAZEPAM 5 MG/1
5 TABLET ORAL 3 TIMES DAILY
Qty: 21 TABLET | Refills: 0 | Status: SHIPPED | OUTPATIENT
Start: 2019-04-05 | End: 2019-04-17 | Stop reason: SDUPTHER

## 2019-04-05 RX ORDER — DIAZEPAM 5 MG/1
5 TABLET ORAL 3 TIMES DAILY
Qty: 21 TABLET | Refills: 0 | Status: SHIPPED | OUTPATIENT
Start: 2019-04-05 | End: 2019-04-12

## 2019-04-05 RX ORDER — DIAZEPAM 5 MG/1
5 TABLET ORAL EVERY 8 HOURS PRN
Qty: 9 TABLET | Refills: 0 | Status: SHIPPED | OUTPATIENT
Start: 2019-04-05 | End: 2019-04-08

## 2019-04-05 NOTE — PROGRESS NOTES
"Subjective:       Patient ID: Mao Levin is a 53 y.o. male.    Vitals:  height is 5' 11" (1.803 m) and weight is 79.4 kg (175 lb). His temperature is 97.7 °F (36.5 °C). His blood pressure is 147/129 (abnormal) and his pulse is 83. His respiration is 20 and oxygen saturation is 100%.     Chief Complaint: Pain (spasms)    Patient states he has pain in his hands, neck and back and in his legs from the knees down. This is chronic recurring pain due to MS.  He was unable to get an appointment with his primary care provider and needs something for pain .      Pain   This is a chronic problem. The current episode started more than 1 year ago. The problem occurs constantly. The problem has been unchanged. Associated symptoms include myalgias. Pertinent negatives include no arthralgias, chest pain, chills, congestion, coughing, fatigue, fever, headaches, joint swelling, nausea, rash, sore throat, vertigo or vomiting. Nothing aggravates the symptoms. He has tried nothing for the symptoms. The treatment provided no relief.       Constitution: Negative for chills, fatigue and fever.   HENT: Negative for congestion and sore throat.    Neck: Negative for painful lymph nodes.   Cardiovascular: Negative for chest pain and leg swelling.   Eyes: Negative for double vision and blurred vision.   Respiratory: Negative for cough and shortness of breath.    Gastrointestinal: Negative for nausea, vomiting and diarrhea.   Genitourinary: Negative for dysuria, frequency and urgency.   Musculoskeletal: Positive for pain, muscle cramps and muscle ache. Negative for joint pain and joint swelling.   Skin: Negative for color change, pale and rash.   Allergic/Immunologic: Negative for seasonal allergies.   Neurological: Negative for dizziness, history of vertigo, light-headedness, passing out and headaches.   Hematologic/Lymphatic: Negative for swollen lymph nodes, easy bruising/bleeding and history of blood clots. Does not bruise/bleed easily. "   Psychiatric/Behavioral: Negative for nervous/anxious, sleep disturbance and depression. The patient is not nervous/anxious.        Objective:      Physical Exam   Constitutional: He is oriented to person, place, and time. He appears well-developed and well-nourished. He is cooperative.  Non-toxic appearance. He does not appear ill. No distress.   HENT:   Head: Normocephalic and atraumatic.   Right Ear: Hearing, tympanic membrane, external ear and ear canal normal.   Left Ear: Hearing, tympanic membrane, external ear and ear canal normal.   Nose: Nose normal. No mucosal edema, rhinorrhea or nasal deformity. No epistaxis. Right sinus exhibits no maxillary sinus tenderness and no frontal sinus tenderness. Left sinus exhibits no maxillary sinus tenderness and no frontal sinus tenderness.   Mouth/Throat: Uvula is midline, oropharynx is clear and moist and mucous membranes are normal. No trismus in the jaw. Normal dentition. No uvula swelling. No posterior oropharyngeal erythema.   Eyes: Conjunctivae and lids are normal. Right eye exhibits no discharge. Left eye exhibits no discharge. No scleral icterus.   Sclera clear bilat   Neck: Trachea normal, normal range of motion, full passive range of motion without pain and phonation normal. Neck supple.   Cardiovascular: Normal rate, regular rhythm, normal heart sounds, intact distal pulses and normal pulses.   Pulmonary/Chest: Effort normal and breath sounds normal. No respiratory distress.   Abdominal: Soft. Normal appearance and bowel sounds are normal. He exhibits no distension, no pulsatile midline mass and no mass. There is no tenderness.   Musculoskeletal: Normal range of motion. He exhibits no edema or deformity.   Neurological: He is alert and oriented to person, place, and time. He exhibits normal muscle tone. Coordination normal.   Skin: Skin is warm, dry and intact. He is not diaphoretic. No pallor.   Psychiatric: He has a normal mood and affect. His speech is  normal and behavior is normal. Judgment and thought content normal. Cognition and memory are normal.   Nursing note and vitals reviewed.      Assessment:       1. Anxiety        Plan:         Anxiety    Other orders  -     Discontinue: diazePAM (VALIUM) 5 MG tablet; Take 1 tablet (5 mg total) by mouth every 8 (eight) hours as needed for Anxiety or Insomnia.  Dispense: 9 tablet; Refill: 0  -     diazePAM (VALIUM) 5 MG tablet; Take 1 tablet (5 mg total) by mouth every 8 (eight) hours as needed for Anxiety or Insomnia.  Dispense: 9 tablet; Refill: 0    Have educated the pt on pain management requirements.  He cannot be seen outside of his current provider who manages his pain.  Will be unable to see the pt for any controlled substance refills in the future.      Anxiety Reaction  Anxiety is the feeling we all get when we think something bad might happen. It is a normal response to stress and usually causes only a mild reaction. When anxiety becomes more severe, it can interfere with daily life. In some cases, you may not even be aware of what it is youre anxious about. There may also be a genetic link or it may be a learned behavior in the home.  Both psychological and physical triggers cause stress reaction. It's often a response to fear or emotional stress, real or imagined. This stress may come from home, family, work, or social relationships.  During an anxiety reaction, you may feel:  · Helpless  · Nervous  · Depressed  · Irritable  Your body may show signs of anxiety in many ways. You may experience:  · Dry mouth  · Shakiness  · Dizziness  · Weakness  · Trouble breathing  · Breathing fast (hyperventilating)  · Chest pressure  · Sweating  · Headache  · Nausea  · Diarrhea  · Tiredness  · Inability to sleep  · Sexual problems  Home care  · Try to locate the sources of stress in your life. They may not be obvious. These may include:  ¨ Daily hassles of life (traffic jams, missed appointments, car troubles,  etc.)  ¨ Major life changes, both good (new baby, job promotion) and bad (loss of job, loss of loved one)  ¨ Overload: feeling that you have too many responsibilities and can't take care of all of them at once  ¨ Feeling helpless, feeling that your problems are beyond what youre able to solve  · Notice how your body reacts to stress. Learn to listen to your body signals. This will help you take action before the stress becomes severe.  · When you can, do something about the source of your stress. (Avoid hassles, limit the amount of change that happens in your life at one time and take a break when you feel overloaded).  · Unfortunately, many stressful situations can't be avoided. It is necessary to learn how to better manage stress. There are many proven methods that will reduce your anxiety. These include simple things like exercise, good nutrition and adequate rest. Also, there are certain techniques that are helpful:  ¨ Relaxation  ¨ Breathing exercises  ¨ Visualization  ¨ Biofeedback  ¨ Meditation  For more information about this, consult your doctor or go to a local bookstore and review the many books and tapes available on this subject.  Follow-up care  If you feel that your anxiety is not responding to self-help measures, contact your doctor or make an appointment with a counselor. You may need short-term psychological counseling and temporary medicine to help you manage stress.  Call 911  Call your healthcare provider right away if any of these occur:  · Trouble breathing  · Confusion  · Drowsiness or trouble wakening  · Fainting or loss of consciousness  · Rapid heart rate  · Seizure  · New chest pain that becomes more severe, lasts longer, or spreads into your shoulder, arm, neck, jaw, or back  When to seek medical advice  Call your healthcare provider right away if any of these occur:  · Your symptoms get worse  · Severe headache not relieved by rest and mild pain reliever  Date Last Reviewed:  9/29/2015 © 2000-2017 [employer]. [address] [place] [address], [place] [address]. All rights reserved. This information is not intended as a substitute for professional medical care. Always follow your healthcare professional's instructions.      Please drink plenty of fluids.  Please get plenty of rest.  Please return here or go to the Emergency Department for any concerns or worsening of condition.  If you were prescribed a narcotic medication, do not drive or operate heavy equipment or machinery while taking these medications.  If you were not prescribed an anti-inflammatory medication, and if you do not have any history of stomach/intestinal ulcers, or kidney disease, or are not taking a blood thinner such as Coumadin, Plavix, Pradaxa, Eloquis, or Xaralta for example, it is OK to take over the counter Ibuprofen or Advil or Motrin or Aleve as directed.  Do not take these medications on an empty stomach.  Rest, ice, compression and elevation to the affected joint or limb as needed.  Please follow up with your primary care doctor or specialist as needed.    If you  smoke, please stop smoking.

## 2019-04-05 NOTE — TELEPHONE ENCOUNTER
"Spoke to patient he states that he is having pain all over and has been having this pain for one week, on a pain scale of 0-10 patient states his pain is an "11" patient advised that if pain is an 11 he should go to the ED, patient refused, patient advised that Dr Alarcon doesn't have any available appointments and was offered to be scheduled an UC appointment. Patient states that he will go to the nearest UC clinic near his home.  "

## 2019-04-05 NOTE — TELEPHONE ENCOUNTER
----- Message from Yanni Fernandez sent at 4/5/2019  9:13 AM CDT -----  Contact: self/[phone]  .1 Patient would like to get medical advice.  Symptoms (please be specific): multiple pain around his body  How long has patient had these symptoms:   Pharmacy name and phone#: [facility] [phone] (Phone)  [phone] (Fax)  Any drug allergies: onfile  Comments: Patient would like to get medical advice. Patient stated that he suffers from MS

## 2019-04-05 NOTE — PATIENT INSTRUCTIONS
Anxiety Reaction  Anxiety is the feeling we all get when we think something bad might happen. It is a normal response to stress and usually causes only a mild reaction. When anxiety becomes more severe, it can interfere with daily life. In some cases, you may not even be aware of what it is youre anxious about. There may also be a genetic link or it may be a learned behavior in the home.  Both psychological and physical triggers cause stress reaction. It's often a response to fear or emotional stress, real or imagined. This stress may come from home, family, work, or social relationships.  During an anxiety reaction, you may feel:  · Helpless  · Nervous  · Depressed  · Irritable  Your body may show signs of anxiety in many ways. You may experience:  · Dry mouth  · Shakiness  · Dizziness  · Weakness  · Trouble breathing  · Breathing fast (hyperventilating)  · Chest pressure  · Sweating  · Headache  · Nausea  · Diarrhea  · Tiredness  · Inability to sleep  · Sexual problems  Home care  · Try to locate the sources of stress in your life. They may not be obvious. These may include:  ¨ Daily hassles of life (traffic jams, missed appointments, car troubles, etc.)  ¨ Major life changes, both good (new baby, job promotion) and bad (loss of job, loss of loved one)  ¨ Overload: feeling that you have too many responsibilities and can't take care of all of them at once  ¨ Feeling helpless, feeling that your problems are beyond what youre able to solve  · Notice how your body reacts to stress. Learn to listen to your body signals. This will help you take action before the stress becomes severe.  · When you can, do something about the source of your stress. (Avoid hassles, limit the amount of change that happens in your life at one time and take a break when you feel overloaded).  · Unfortunately, many stressful situations can't be avoided. It is necessary to learn how to better manage stress. There are many proven methods  that will reduce your anxiety. These include simple things like exercise, good nutrition and adequate rest. Also, there are certain techniques that are helpful:  ¨ Relaxation  ¨ Breathing exercises  ¨ Visualization  ¨ Biofeedback  ¨ Meditation  For more information about this, consult your doctor or go to a local bookstore and review the many books and tapes available on this subject.  Follow-up care  If you feel that your anxiety is not responding to self-help measures, contact your doctor or make an appointment with a counselor. You may need short-term psychological counseling and temporary medicine to help you manage stress.  Call 911  Call your healthcare provider right away if any of these occur:  · Trouble breathing  · Confusion  · Drowsiness or trouble wakening  · Fainting or loss of consciousness  · Rapid heart rate  · Seizure  · New chest pain that becomes more severe, lasts longer, or spreads into your shoulder, arm, neck, jaw, or back  When to seek medical advice  Call your healthcare provider right away if any of these occur:  · Your symptoms get worse  · Severe headache not relieved by rest and mild pain reliever  Date Last Reviewed: 9/29/2015  © 3326-4722 [employer]. [address] [place] [address]. All rights reserved. This information is not intended as a substitute for professional medical care. Always follow your healthcare professional's instructions.      Please drink plenty of fluids.  Please get plenty of rest.  Please return here or go to the Emergency Department for any concerns or worsening of condition.  If you were prescribed a narcotic medication, do not drive or operate heavy equipment or machinery while taking these medications.  If you were not prescribed an anti-inflammatory medication, and if you do not have any history of stomach/intestinal ulcers, or kidney disease, or are not taking a blood thinner such as Coumadin, Plavix, Pradaxa, Eloquis, or Xaralta for  example, it is OK to take over the counter Ibuprofen or Advil or Motrin or Aleve as directed.  Do not take these medications on an empty stomach.  Rest, ice, compression and elevation to the affected joint or limb as needed.  Please follow up with your primary care doctor or specialist as needed.    If you  smoke, please stop smoking.

## 2019-04-11 DIAGNOSIS — R53.1 WEAKNESS GENERALIZED: ICD-10-CM

## 2019-04-11 DIAGNOSIS — G82.20 PARAPARESIS: ICD-10-CM

## 2019-04-11 DIAGNOSIS — G89.29 CHRONIC PAIN OF MULTIPLE SITES: ICD-10-CM

## 2019-04-11 DIAGNOSIS — Z78.9 IMPAIRED MOBILITY AND ADLS: ICD-10-CM

## 2019-04-11 DIAGNOSIS — M54.42 CHRONIC BILATERAL LOW BACK PAIN WITH BILATERAL SCIATICA: ICD-10-CM

## 2019-04-11 DIAGNOSIS — G35 ACUTE RELAPSING MULTIPLE SCLEROSIS: ICD-10-CM

## 2019-04-11 DIAGNOSIS — Z74.09 IMPAIRED MOBILITY AND ADLS: ICD-10-CM

## 2019-04-11 DIAGNOSIS — G35 MS (MULTIPLE SCLEROSIS): Chronic | ICD-10-CM

## 2019-04-11 DIAGNOSIS — R27.8 ABNORMAL COORDINATION: ICD-10-CM

## 2019-04-11 DIAGNOSIS — Z79.891 LONG-TERM CURRENT USE OF OPIATE ANALGESIC: ICD-10-CM

## 2019-04-11 DIAGNOSIS — M54.41 CHRONIC BILATERAL LOW BACK PAIN WITH BILATERAL SCIATICA: ICD-10-CM

## 2019-04-11 DIAGNOSIS — R29.6 FREQUENT FALLS: ICD-10-CM

## 2019-04-11 DIAGNOSIS — G89.4 CHRONIC PAIN SYNDROME: ICD-10-CM

## 2019-04-11 DIAGNOSIS — G62.9 POLYNEUROPATHY: ICD-10-CM

## 2019-04-11 DIAGNOSIS — R25.2 SPASTICITY: ICD-10-CM

## 2019-04-11 DIAGNOSIS — G89.29 CHRONIC BILATERAL LOW BACK PAIN WITH BILATERAL SCIATICA: ICD-10-CM

## 2019-04-11 DIAGNOSIS — R52 CHRONIC PAIN OF MULTIPLE SITES: ICD-10-CM

## 2019-04-11 NOTE — TELEPHONE ENCOUNTER
Last visit: 03/20/2019  Next visit: 04/17/2019  Last refill Percocet: 04/05/2019 7d prescription    ----- Message from William Marcelo sent at 4/11/2019 10:38 AM CDT -----  Rx Refill/Request     Is this a Refill or New Rx: Refill   Rx Name and Strength: oxyCODONE-acetaminophen (PERCOCET)  mg per tablet    Preferred Pharmacy with phone number: see below  Communication Preference: [phone]  Additional Information:     [facility]  [address]  [place] [address]  Phone: [phone] Fax: [phone]

## 2019-04-12 DIAGNOSIS — Z74.09 IMPAIRED MOBILITY AND ADLS: ICD-10-CM

## 2019-04-12 DIAGNOSIS — Z79.891 LONG-TERM CURRENT USE OF OPIATE ANALGESIC: ICD-10-CM

## 2019-04-12 DIAGNOSIS — R53.1 WEAKNESS GENERALIZED: ICD-10-CM

## 2019-04-12 DIAGNOSIS — M54.42 CHRONIC BILATERAL LOW BACK PAIN WITH BILATERAL SCIATICA: ICD-10-CM

## 2019-04-12 DIAGNOSIS — G89.29 CHRONIC BILATERAL LOW BACK PAIN WITH BILATERAL SCIATICA: ICD-10-CM

## 2019-04-12 DIAGNOSIS — R29.6 FREQUENT FALLS: ICD-10-CM

## 2019-04-12 DIAGNOSIS — G89.4 CHRONIC PAIN SYNDROME: ICD-10-CM

## 2019-04-12 DIAGNOSIS — R27.8 ABNORMAL COORDINATION: ICD-10-CM

## 2019-04-12 DIAGNOSIS — M54.41 CHRONIC BILATERAL LOW BACK PAIN WITH BILATERAL SCIATICA: ICD-10-CM

## 2019-04-12 DIAGNOSIS — G35 MS (MULTIPLE SCLEROSIS): Chronic | ICD-10-CM

## 2019-04-12 DIAGNOSIS — R52 CHRONIC PAIN OF MULTIPLE SITES: ICD-10-CM

## 2019-04-12 DIAGNOSIS — R25.2 SPASTICITY: ICD-10-CM

## 2019-04-12 DIAGNOSIS — G35 ACUTE RELAPSING MULTIPLE SCLEROSIS: ICD-10-CM

## 2019-04-12 DIAGNOSIS — G89.29 CHRONIC PAIN OF MULTIPLE SITES: ICD-10-CM

## 2019-04-12 DIAGNOSIS — Z78.9 IMPAIRED MOBILITY AND ADLS: ICD-10-CM

## 2019-04-12 DIAGNOSIS — G82.20 PARAPARESIS: ICD-10-CM

## 2019-04-12 DIAGNOSIS — G62.9 POLYNEUROPATHY: ICD-10-CM

## 2019-04-12 RX ORDER — OXYCODONE AND ACETAMINOPHEN 10; 325 MG/1; MG/1
1 TABLET ORAL EVERY 6 HOURS PRN
Qty: 28 TABLET | Refills: 0 | Status: SHIPPED | OUTPATIENT
Start: 2019-04-12 | End: 2019-04-16 | Stop reason: SDUPTHER

## 2019-04-12 RX ORDER — OXYCODONE AND ACETAMINOPHEN 10; 325 MG/1; MG/1
1 TABLET ORAL EVERY 6 HOURS PRN
Qty: 28 TABLET | Refills: 0 | Status: CANCELLED | OUTPATIENT
Start: 2019-04-12 | End: 2019-04-19

## 2019-04-15 DIAGNOSIS — Z78.9 IMPAIRED MOBILITY AND ADLS: ICD-10-CM

## 2019-04-15 DIAGNOSIS — G35 MS (MULTIPLE SCLEROSIS): Chronic | ICD-10-CM

## 2019-04-15 DIAGNOSIS — G89.29 CHRONIC BILATERAL LOW BACK PAIN WITH BILATERAL SCIATICA: ICD-10-CM

## 2019-04-15 DIAGNOSIS — R25.2 SPASTICITY: ICD-10-CM

## 2019-04-15 DIAGNOSIS — G82.20 PARAPARESIS: ICD-10-CM

## 2019-04-15 DIAGNOSIS — M54.42 CHRONIC BILATERAL LOW BACK PAIN WITH BILATERAL SCIATICA: ICD-10-CM

## 2019-04-15 DIAGNOSIS — G89.4 CHRONIC PAIN SYNDROME: ICD-10-CM

## 2019-04-15 DIAGNOSIS — M54.41 CHRONIC BILATERAL LOW BACK PAIN WITH BILATERAL SCIATICA: ICD-10-CM

## 2019-04-15 DIAGNOSIS — G35 ACUTE RELAPSING MULTIPLE SCLEROSIS: ICD-10-CM

## 2019-04-15 DIAGNOSIS — R53.1 WEAKNESS GENERALIZED: ICD-10-CM

## 2019-04-15 DIAGNOSIS — G89.29 CHRONIC PAIN OF MULTIPLE SITES: ICD-10-CM

## 2019-04-15 DIAGNOSIS — Z79.891 LONG-TERM CURRENT USE OF OPIATE ANALGESIC: ICD-10-CM

## 2019-04-15 DIAGNOSIS — R27.8 ABNORMAL COORDINATION: ICD-10-CM

## 2019-04-15 DIAGNOSIS — Z74.09 IMPAIRED MOBILITY AND ADLS: ICD-10-CM

## 2019-04-15 DIAGNOSIS — R29.6 FREQUENT FALLS: ICD-10-CM

## 2019-04-15 DIAGNOSIS — R52 CHRONIC PAIN OF MULTIPLE SITES: ICD-10-CM

## 2019-04-15 DIAGNOSIS — G62.9 POLYNEUROPATHY: ICD-10-CM

## 2019-04-15 RX ORDER — DIAZEPAM 5 MG/1
5 TABLET ORAL 3 TIMES DAILY
Qty: 21 TABLET | Refills: 0 | Status: CANCELLED | OUTPATIENT
Start: 2019-04-15 | End: 2019-04-22

## 2019-04-16 RX ORDER — DIAZEPAM 5 MG/1
5 TABLET ORAL 3 TIMES DAILY
Qty: 21 TABLET | Refills: 0 | Status: SHIPPED | OUTPATIENT
Start: 2019-04-16 | End: 2019-04-17 | Stop reason: SDUPTHER

## 2019-04-16 RX ORDER — OXYCODONE AND ACETAMINOPHEN 10; 325 MG/1; MG/1
1 TABLET ORAL EVERY 6 HOURS PRN
Qty: 28 TABLET | Refills: 0 | Status: SHIPPED | OUTPATIENT
Start: 2019-04-16 | End: 2019-04-17 | Stop reason: SDUPTHER

## 2019-04-16 NOTE — TELEPHONE ENCOUNTER
Called specialty pharmacy, verified Percocet was canceled.  Last visit: 03/20/2019  Next visit: 04/17/2019  Last refill Oxycodone: 04/12/2019    ----- Message from Shu Corea MA sent at 4/15/2019  2:06 PM CDT -----  Contact: Patient @ [phone]  His rx for oxycodone was sent to his specialty pharmacy in error. Both are in the doctors box for refills.    ----- Message -----  From: Randolph Dowell  Sent: 4/15/2019  11:49 AM  To: Delores Traylor Staff    Patient calling to get an update on the medication refill request for (oxyCODONE-acetaminophen (PERCOCET)  mg per tablet ) & (diazePAM (VALIUM) 5 MG tablet )  pls call or forward to:     [facility] [address] [place] [address]  Phone: [phone] Fax: [phone]

## 2019-04-17 ENCOUNTER — OFFICE VISIT (OUTPATIENT)
Dept: PHYSICAL MEDICINE AND REHAB | Facility: CLINIC | Age: 54
End: 2019-04-17
Payer: MEDICARE

## 2019-04-17 VITALS
DIASTOLIC BLOOD PRESSURE: 84 MMHG | HEART RATE: 76 BPM | HEIGHT: 71 IN | WEIGHT: 180 LBS | SYSTOLIC BLOOD PRESSURE: 122 MMHG | BODY MASS INDEX: 25.2 KG/M2

## 2019-04-17 DIAGNOSIS — G35 MS (MULTIPLE SCLEROSIS): Chronic | ICD-10-CM

## 2019-04-17 DIAGNOSIS — R29.6 FREQUENT FALLS: ICD-10-CM

## 2019-04-17 DIAGNOSIS — R52 CHRONIC PAIN OF MULTIPLE SITES: ICD-10-CM

## 2019-04-17 DIAGNOSIS — G89.4 CHRONIC PAIN SYNDROME: ICD-10-CM

## 2019-04-17 DIAGNOSIS — Z79.891 LONG-TERM CURRENT USE OF OPIATE ANALGESIC: ICD-10-CM

## 2019-04-17 DIAGNOSIS — G35 ACUTE RELAPSING MULTIPLE SCLEROSIS: ICD-10-CM

## 2019-04-17 DIAGNOSIS — Z78.9 IMPAIRED MOBILITY AND ADLS: ICD-10-CM

## 2019-04-17 DIAGNOSIS — Z74.09 IMPAIRED MOBILITY AND ADLS: ICD-10-CM

## 2019-04-17 DIAGNOSIS — M54.41 CHRONIC BILATERAL LOW BACK PAIN WITH BILATERAL SCIATICA: Primary | ICD-10-CM

## 2019-04-17 DIAGNOSIS — G35 MULTIPLE SCLEROSIS: ICD-10-CM

## 2019-04-17 DIAGNOSIS — R27.8 ABNORMAL COORDINATION: ICD-10-CM

## 2019-04-17 DIAGNOSIS — M54.42 CHRONIC BILATERAL LOW BACK PAIN WITH BILATERAL SCIATICA: Primary | ICD-10-CM

## 2019-04-17 DIAGNOSIS — G89.29 CHRONIC PAIN OF MULTIPLE SITES: ICD-10-CM

## 2019-04-17 DIAGNOSIS — G82.20 PARAPARESIS: ICD-10-CM

## 2019-04-17 DIAGNOSIS — R26.81 GAIT INSTABILITY: ICD-10-CM

## 2019-04-17 DIAGNOSIS — Z79.891 OPIOID USE AGREEMENT EXISTS: ICD-10-CM

## 2019-04-17 DIAGNOSIS — R25.2 SPASTICITY: ICD-10-CM

## 2019-04-17 DIAGNOSIS — G89.29 CHRONIC BILATERAL LOW BACK PAIN WITH BILATERAL SCIATICA: Primary | ICD-10-CM

## 2019-04-17 DIAGNOSIS — G62.9 POLYNEUROPATHY: ICD-10-CM

## 2019-04-17 DIAGNOSIS — M62.838 MUSCLE SPASTICITY: ICD-10-CM

## 2019-04-17 DIAGNOSIS — R53.1 WEAKNESS GENERALIZED: ICD-10-CM

## 2019-04-17 PROCEDURE — 99499 UNLISTED E&M SERVICE: CPT | Mod: HCWC,S$GLB,, | Performed by: PHYSICAL MEDICINE & REHABILITATION

## 2019-04-17 PROCEDURE — 99214 OFFICE O/P EST MOD 30 MIN: CPT | Mod: HCWC,S$GLB,, | Performed by: PHYSICAL MEDICINE & REHABILITATION

## 2019-04-17 PROCEDURE — 99999 PR PBB SHADOW E&M-EST. PATIENT-LVL III: CPT | Mod: PBBFAC,HCWC,, | Performed by: PHYSICAL MEDICINE & REHABILITATION

## 2019-04-17 PROCEDURE — 99999 PR PBB SHADOW E&M-EST. PATIENT-LVL III: ICD-10-PCS | Mod: PBBFAC,HCWC,, | Performed by: PHYSICAL MEDICINE & REHABILITATION

## 2019-04-17 PROCEDURE — 3008F BODY MASS INDEX DOCD: CPT | Mod: HCWC,CPTII,S$GLB, | Performed by: PHYSICAL MEDICINE & REHABILITATION

## 2019-04-17 PROCEDURE — 3008F PR BODY MASS INDEX (BMI) DOCUMENTED: ICD-10-PCS | Mod: HCWC,CPTII,S$GLB, | Performed by: PHYSICAL MEDICINE & REHABILITATION

## 2019-04-17 PROCEDURE — 99499 RISK ADDL DX/OHS AUDIT: ICD-10-PCS | Mod: HCWC,S$GLB,, | Performed by: PHYSICAL MEDICINE & REHABILITATION

## 2019-04-17 PROCEDURE — 99214 PR OFFICE/OUTPT VISIT, EST, LEVL IV, 30-39 MIN: ICD-10-PCS | Mod: HCWC,S$GLB,, | Performed by: PHYSICAL MEDICINE & REHABILITATION

## 2019-04-17 RX ORDER — DIAZEPAM 5 MG/1
5 TABLET ORAL 3 TIMES DAILY
Qty: 42 TABLET | Refills: 0 | Status: SHIPPED | OUTPATIENT
Start: 2019-05-15 | End: 2019-05-30 | Stop reason: SDUPTHER

## 2019-04-17 RX ORDER — OXYCODONE AND ACETAMINOPHEN 10; 325 MG/1; MG/1
1 TABLET ORAL EVERY 6 HOURS PRN
Qty: 56 TABLET | Refills: 0 | Status: SHIPPED | OUTPATIENT
Start: 2019-05-15 | End: 2019-04-23 | Stop reason: SDUPTHER

## 2019-04-17 RX ORDER — DIAZEPAM 5 MG/1
5 TABLET ORAL 3 TIMES DAILY
Qty: 42 TABLET | Refills: 0 | Status: SHIPPED | OUTPATIENT
Start: 2019-05-01 | End: 2019-04-23 | Stop reason: SDUPTHER

## 2019-04-17 RX ORDER — OXYCODONE AND ACETAMINOPHEN 10; 325 MG/1; MG/1
1 TABLET ORAL EVERY 6 HOURS PRN
Qty: 56 TABLET | Refills: 0 | Status: SHIPPED | OUTPATIENT
Start: 2019-05-01 | End: 2019-05-30 | Stop reason: SDUPTHER

## 2019-04-17 NOTE — PROGRESS NOTES
Subjective:       Patient ID: Mao Levin is a 53 y.o. male.    Chief Complaint: Back Pain and paraparesis    Back Pain   Associated symptoms include leg pain. Pertinent negatives include no chest pain, dysuria, headaches, numbness or weakness.   Leg Pain    Pertinent negatives include no numbness.   Neurologic Problem   The patient's pertinent negatives include no weakness. Associated symptoms include back pain and neck pain. Pertinent negatives include no chest pain, dizziness, fatigue, headaches or shortness of breath.   Neck Pain    Associated symptoms include leg pain. Pertinent negatives include no chest pain, headaches, numbness or weakness.      Mao Levin is 54 y/o male who returns to clinic for chronic multiple MSK pain, in lower back, neck, multiple joints that include: shoulder, knee, and hand pain.   [facility] 03/20/19.  Patient is under very strict follow up, given pain medication every 7 days, last refill was on 4/16 ( yesterday), secondary to reported aberrant behavior.  Considering that this pt with advanced MS, with significant spasticity needs  These medications, for spasticity and pain management, he was not d/c from clinic but called   And placed under special monitoring program.   He was getting medication ( electronically prescribed)  Every week, and now will try every 2 wks.      From [facility] 3/20/19.  Since [facility], he was calling for earlier medications, since he lost entire bottle.   Per patient he was in car with his sister , and everything drop out of his small  that he wears   Around his neck, and he was not able to find .  Per his sister Brooke , who called his PCP, and reported that he is using pain medications different than prescribed.  She states that he uses all prescribed medications for month within first 7 days ,and she is afraid from his addiction to opioids.  She also advised to prescribe him meds every week, so he does not have that many in his possesion.   He is dispensing his  "medications on his own.   Patient was not given medications electronically but called to office to discuss this problem.  He denied the whole strory, and states that he lost medications in his sister's car, and she did not give to him.       Patient is now out of NH, [facility], and he now lives alone in his own apartment next to his mother.   He is coming for the first time walking with RW, although hardly and very slowly walking.   He is out of one week medication supply given on discharge from [facility].  He has a lot of MSK pain, with his MS.  Reports that he has lift, WC, scooter, and he came with RW, on public transport.   The pain started in 2006 following MS diagnosis and symptoms have been worsening.    Brief history: Patient is a 53 yo male with MS diagnosed in 2006 as well as chronic, generalized pain since that time that presents for initial evaluation. He was hospitalized in inpatient rehab in October /2016 and feels that he has become more functional but still is mostly wheel chair bound. .   Pain occurs from the neck down and involves the entire body except the head. No specific area is worse. Pain is "achy" and "tingling".   It is constant at 7-8/10 but can worsen with any movement to 10/10.   Today he c/o pain in both arm/hands in all fingers, they feel sore and tight.   Back pain is running down to both legs, back of thighs, and pain in leg is worst bellow the knee level, in calves that are tight, and weak.  His Left leg is weaker than Rt leg.   Has foot b/l foot weakness and drop, wears left AFO.  He has been a prolong time on chronic pain management with oxycodone 15mg q6h and oxycontin 40 mg TWICE DAILY ( while in IP Rehab) but did not feel that this was any more helpful. Tried gabapentin in the past which was tolerated but didn't help.   He also takes diazepam 5mg TWICE DAILY and baclofen 10mg TID which helps his spasticity.    In NH, he was taking  Tramadol and Oxycodone 5 mg Q8 hrs prn " pain.  He states that Tramadol is ineffective.  He is coming today, w/o  D/c papers, medical documentation from NH.   Pain Medications:  Percocet 10/325mg, 1 tablet 3x a day  Gabapentin 300mg, 3 tablets three times daily  Valium 5mg, 1 tablet twice daily  Baclofen 20 mg QID  Xarelto 20mg, 1 tablet daily    Pain Description:   The pain is located in the neck, back shoulders, hands  and knees.  Today the current  pain is rated as 7/10  At BEST  5/10   At WORST  10/10 on the WORST day.    On average pain is rated as 7/10.   The pain is described as aching and tingling  Symptoms interfere with daily activity, sleeping and work.   Exacerbating factors: Morning, Extension and Flexing.    Mitigating factors medications and physical therapy.   Patient denies .  Patient denies any suicidal or homicidal ideations    Physical Therapy/Home Exercise: yes     report:  Reviewed and consistent with medication use as prescribed.  Pain Procedures: none      Imaging:   I reviewed C-spine and T-spine MRI which showed demylenating changes as well as fairly significant stenosis throughout the cervical spine.   He is here for follow up, and treatment.    Past Medical History:   Diagnosis Date    Anticoagulant long-term use     Anxiety     Chronic back pain     Deep vein thrombosis     Depression     Depression     Gait instability     Multiple sclerosis     Multiple sclerosis     Neurogenic bladder     Polyneuropathy     Pulmonary embolism     Spasticity        Past Surgical History:   Procedure Laterality Date    CYSTOSCOPY N/A 6/20/2018    Performed [facility] at [facility]    INJECTION, BOTULINUM TOXIN, TYPE A 200 UNITS N/A 6/20/2018    Performed by [facility] at [facility] OR [facility]ST [facility]LR       Family History   Problem Relation Age of Onset    Arthritis Mother     No Known Problems Father     Cancer Maternal Grandfather        Social History     Socioeconomic History    Marital status: Single      Spouse name: Not on file    Number of children: Not on file    Years of education: Not on file    Highest education level: Not on file   Occupational History    Not on file   Social Needs    Financial resource strain: Not on file    Food insecurity:     Worry: Not on file     Inability: Not on file    Transportation needs:     Medical: Not on file     Non-medical: Not on file   Tobacco Use    Smoking status: Former Smoker     Packs/day: 0.50     Years: 23.00     Pack years: 11.50     Types: Cigarettes     Last attempt to quit: 2018     Years since quittin.8    Smokeless tobacco: Never Used    Tobacco comment: Using patch   Substance and Sexual Activity    Alcohol use: No    Drug use: No    Sexual activity: Yes     Partners: Female   Lifestyle    Physical activity:     Days per week: Not on file     Minutes per session: Not on file    Stress: Not on file   Relationships    Social connections:     Talks on phone: Not on file     Gets together: Not on file     Attends Confucianist service: Not on file     Active member of club or organization: Not on file     Attends meetings of clubs or organizations: Not on file     Relationship status: Not on file   Other Topics Concern    Not on file   Social History Narrative    Not on file       Current Outpatient Medications   Medication Sig Dispense Refill    baclofen (LIORESAL) 20 MG tablet Take 1 tablet (20 mg total) by mouth 4 (four) times daily. for 7 days 28 tablet 0    dalfampridine (AMPYRA) 10 mg Tb12 Take 1 tablet by mouth every 12 (twelve) hours. DO NOT take until seen by Neurology 180 tablet 1    [START ON 2019] diazePAM (VALIUM) 5 MG tablet Take 1 tablet (5 mg total) by mouth 3 (three) times daily. for 14 days 42 tablet 0    [START ON 5/15/2019] diazePAM (VALIUM) 5 MG tablet Take 1 tablet (5 mg total) by mouth 3 (three) times daily. for 14 days 42 tablet 0    DULoxetine (CYMBALTA) 30 MG capsule Take 1 capsule (30 mg total) by mouth  once daily. 30 capsule 3    ergocalciferol (VITAMIN D2) 50,000 unit Cap Take 1 capsule (50,000 Units total) by mouth every 7 days. (Patient taking differently: Take 50,000 Units by mouth every 7 days. on friday) 4 capsule 11    gabapentin (NEURONTIN) 400 MG capsule Take 1 capsule (400 mg total) by mouth 4 (four) times daily. 120 capsule 3    oxyCODONE (ROXICODONE) 10 mg Tab immediate release tablet Take 1 tablet (10 mg total) by mouth every 4 (four) hours as needed for Pain. 6 tablet 0    [START ON 5/15/2019] oxyCODONE-acetaminophen (PERCOCET)  mg per tablet Take 1 tablet by mouth every 6 (six) hours as needed for Pain. 56 tablet 0    [START ON 5/1/2019] oxyCODONE-acetaminophen (PERCOCET)  mg per tablet Take 1 tablet by mouth every 6 (six) hours as needed for Pain. 56 tablet 0    rivaroxaban (XARELTO) 20 mg Tab Take 1 tablet (20 mg total) by mouth daily with dinner or evening meal. 30 tablet 3    tamsulosin (FLOMAX) 0.4 mg Cap Take 1 capsule (0.4 mg total) by mouth once daily. 30 capsule 3     No current facility-administered medications for this visit.      Review of patient's allergies indicates:  No Known Allergies    Review of Systems   Constitutional: Negative for appetite change and fatigue.   Eyes: Negative for visual disturbance.   Respiratory: Negative for shortness of breath.    Cardiovascular: Negative for chest pain.   Gastrointestinal: Negative for constipation and diarrhea.   Genitourinary: Negative for dysuria, frequency and urgency.   Musculoskeletal: Positive for arthralgias, back pain, gait problem, myalgias and neck pain. Negative for joint swelling and neck stiffness.   Neurological: Negative for dizziness, tremors, weakness, numbness and headaches.   Psychiatric/Behavioral: Negative for dysphoric mood.   All other systems reviewed and are negative.        Objective:      Physical Exam      Constitutional: He is oriented to person, place, and time.   He appears well-nourished.    Eyes: EOM are normal. Pupils are equal, round, and reactive to light.   Neck: Normal range of motion. Neck supple.   Cardiovascular: Normal rhythm  and rate.    Pulmonary/Chest: Effort normal.   Abdominal: Soft.   Musculoskeletal:   Gait: spastic, walks hardly, very slow, dragging feet, but does not want RW with seat, nor he wants to use WC. He wants to walk again,  BUEs AROM WNL  BLEs AROM is diminished   Neurological: He is oriented to person, place, and time.   BUEs 5/5  RLE 3/5 HF, 4-/5 HE, 3+/5 KE/KF/DF  LLE 3-/5 HF, 3+/5 HE, 3-/5 KE, 2/5 KF, 0/5 DF .  wears left AFO.  Skin: No rash noted.   Psychiatric: He has a normal mood and affect.       Assessment:           1. Chronic bilateral low back pain with bilateral sciatica    2. Chronic pain of multiple sites    3. Paraparesis    4. Impaired mobility and ADLs    5. Acute relapsing multiple sclerosis    6. Weakness generalized    7. Long-term current use of opiate analgesic    8. Chronic pain syndrome    9. Gait instability    10. Muscle spasticity    11. Opioid use agreement exists    12. Spasticity    13. Frequent falls    14. Abnormal coordination    15. Polyneuropathy    16. MS (multiple sclerosis)    17. Multiple sclerosis            Plan:        Chronic bilateral low back pain with bilateral sciatica  -     oxyCODONE-acetaminophen (PERCOCET)  mg per tablet; Take 1 tablet by mouth every 6 (six) hours as needed for Pain.  Dispense: 56 tablet; Refill: 0  -     oxyCODONE-acetaminophen (PERCOCET)  mg per tablet; Take 1 tablet by mouth every 6 (six) hours as needed for Pain.  Dispense: 56 tablet; Refill: 0  -     diazePAM (VALIUM) 5 MG tablet; Take 1 tablet (5 mg total) by mouth 3 (three) times daily. for 14 days  Dispense: 42 tablet; Refill: 0  -     diazePAM (VALIUM) 5 MG tablet; Take 1 tablet (5 mg total) by mouth 3 (three) times daily. for 14 days  Dispense: 42 tablet; Refill: 0    Chronic pain of multiple sites  -     oxyCODONE-acetaminophen  (PERCOCET)  mg per tablet; Take 1 tablet by mouth every 6 (six) hours as needed for Pain.  Dispense: 56 tablet; Refill: 0  -     oxyCODONE-acetaminophen (PERCOCET)  mg per tablet; Take 1 tablet by mouth every 6 (six) hours as needed for Pain.  Dispense: 56 tablet; Refill: 0  -     diazePAM (VALIUM) 5 MG tablet; Take 1 tablet (5 mg total) by mouth 3 (three) times daily. for 14 days  Dispense: 42 tablet; Refill: 0  -     diazePAM (VALIUM) 5 MG tablet; Take 1 tablet (5 mg total) by mouth 3 (three) times daily. for 14 days  Dispense: 42 tablet; Refill: 0    Paraparesis  -     oxyCODONE-acetaminophen (PERCOCET)  mg per tablet; Take 1 tablet by mouth every 6 (six) hours as needed for Pain.  Dispense: 56 tablet; Refill: 0  -     oxyCODONE-acetaminophen (PERCOCET)  mg per tablet; Take 1 tablet by mouth every 6 (six) hours as needed for Pain.  Dispense: 56 tablet; Refill: 0  -     diazePAM (VALIUM) 5 MG tablet; Take 1 tablet (5 mg total) by mouth 3 (three) times daily. for 14 days  Dispense: 42 tablet; Refill: 0  -     diazePAM (VALIUM) 5 MG tablet; Take 1 tablet (5 mg total) by mouth 3 (three) times daily. for 14 days  Dispense: 42 tablet; Refill: 0    Impaired mobility and ADLs  -     oxyCODONE-acetaminophen (PERCOCET)  mg per tablet; Take 1 tablet by mouth every 6 (six) hours as needed for Pain.  Dispense: 56 tablet; Refill: 0  -     oxyCODONE-acetaminophen (PERCOCET)  mg per tablet; Take 1 tablet by mouth every 6 (six) hours as needed for Pain.  Dispense: 56 tablet; Refill: 0  -     diazePAM (VALIUM) 5 MG tablet; Take 1 tablet (5 mg total) by mouth 3 (three) times daily. for 14 days  Dispense: 42 tablet; Refill: 0  -     diazePAM (VALIUM) 5 MG tablet; Take 1 tablet (5 mg total) by mouth 3 (three) times daily. for 14 days  Dispense: 42 tablet; Refill: 0    Acute relapsing multiple sclerosis  -     oxyCODONE-acetaminophen (PERCOCET)  mg per tablet; Take 1 tablet by mouth every 6  (six) hours as needed for Pain.  Dispense: 56 tablet; Refill: 0  -     oxyCODONE-acetaminophen (PERCOCET)  mg per tablet; Take 1 tablet by mouth every 6 (six) hours as needed for Pain.  Dispense: 56 tablet; Refill: 0  -     diazePAM (VALIUM) 5 MG tablet; Take 1 tablet (5 mg total) by mouth 3 (three) times daily. for 14 days  Dispense: 42 tablet; Refill: 0  -     diazePAM (VALIUM) 5 MG tablet; Take 1 tablet (5 mg total) by mouth 3 (three) times daily. for 14 days  Dispense: 42 tablet; Refill: 0    Weakness generalized  -     oxyCODONE-acetaminophen (PERCOCET)  mg per tablet; Take 1 tablet by mouth every 6 (six) hours as needed for Pain.  Dispense: 56 tablet; Refill: 0  -     oxyCODONE-acetaminophen (PERCOCET)  mg per tablet; Take 1 tablet by mouth every 6 (six) hours as needed for Pain.  Dispense: 56 tablet; Refill: 0  -     diazePAM (VALIUM) 5 MG tablet; Take 1 tablet (5 mg total) by mouth 3 (three) times daily. for 14 days  Dispense: 42 tablet; Refill: 0  -     diazePAM (VALIUM) 5 MG tablet; Take 1 tablet (5 mg total) by mouth 3 (three) times daily. for 14 days  Dispense: 42 tablet; Refill: 0    Long-term current use of opiate analgesic  -     oxyCODONE-acetaminophen (PERCOCET)  mg per tablet; Take 1 tablet by mouth every 6 (six) hours as needed for Pain.  Dispense: 56 tablet; Refill: 0  -     oxyCODONE-acetaminophen (PERCOCET)  mg per tablet; Take 1 tablet by mouth every 6 (six) hours as needed for Pain.  Dispense: 56 tablet; Refill: 0  -     diazePAM (VALIUM) 5 MG tablet; Take 1 tablet (5 mg total) by mouth 3 (three) times daily. for 14 days  Dispense: 42 tablet; Refill: 0  -     diazePAM (VALIUM) 5 MG tablet; Take 1 tablet (5 mg total) by mouth 3 (three) times daily. for 14 days  Dispense: 42 tablet; Refill: 0    Chronic pain syndrome  -     oxyCODONE-acetaminophen (PERCOCET)  mg per tablet; Take 1 tablet by mouth every 6 (six) hours as needed for Pain.  Dispense: 56 tablet;  Refill: 0  -     oxyCODONE-acetaminophen (PERCOCET)  mg per tablet; Take 1 tablet by mouth every 6 (six) hours as needed for Pain.  Dispense: 56 tablet; Refill: 0  -     diazePAM (VALIUM) 5 MG tablet; Take 1 tablet (5 mg total) by mouth 3 (three) times daily. for 14 days  Dispense: 42 tablet; Refill: 0  -     diazePAM (VALIUM) 5 MG tablet; Take 1 tablet (5 mg total) by mouth 3 (three) times daily. for 14 days  Dispense: 42 tablet; Refill: 0    Gait instability    Muscle spasticity    Opioid use agreement exists    Spasticity  -     oxyCODONE-acetaminophen (PERCOCET)  mg per tablet; Take 1 tablet by mouth every 6 (six) hours as needed for Pain.  Dispense: 56 tablet; Refill: 0  -     oxyCODONE-acetaminophen (PERCOCET)  mg per tablet; Take 1 tablet by mouth every 6 (six) hours as needed for Pain.  Dispense: 56 tablet; Refill: 0  -     diazePAM (VALIUM) 5 MG tablet; Take 1 tablet (5 mg total) by mouth 3 (three) times daily. for 14 days  Dispense: 42 tablet; Refill: 0  -     diazePAM (VALIUM) 5 MG tablet; Take 1 tablet (5 mg total) by mouth 3 (three) times daily. for 14 days  Dispense: 42 tablet; Refill: 0    Frequent falls  -     oxyCODONE-acetaminophen (PERCOCET)  mg per tablet; Take 1 tablet by mouth every 6 (six) hours as needed for Pain.  Dispense: 56 tablet; Refill: 0  -     oxyCODONE-acetaminophen (PERCOCET)  mg per tablet; Take 1 tablet by mouth every 6 (six) hours as needed for Pain.  Dispense: 56 tablet; Refill: 0  -     diazePAM (VALIUM) 5 MG tablet; Take 1 tablet (5 mg total) by mouth 3 (three) times daily. for 14 days  Dispense: 42 tablet; Refill: 0  -     diazePAM (VALIUM) 5 MG tablet; Take 1 tablet (5 mg total) by mouth 3 (three) times daily. for 14 days  Dispense: 42 tablet; Refill: 0    Abnormal coordination  -     oxyCODONE-acetaminophen (PERCOCET)  mg per tablet; Take 1 tablet by mouth every 6 (six) hours as needed for Pain.  Dispense: 56 tablet; Refill: 0  -      oxyCODONE-acetaminophen (PERCOCET)  mg per tablet; Take 1 tablet by mouth every 6 (six) hours as needed for Pain.  Dispense: 56 tablet; Refill: 0  -     diazePAM (VALIUM) 5 MG tablet; Take 1 tablet (5 mg total) by mouth 3 (three) times daily. for 14 days  Dispense: 42 tablet; Refill: 0  -     diazePAM (VALIUM) 5 MG tablet; Take 1 tablet (5 mg total) by mouth 3 (three) times daily. for 14 days  Dispense: 42 tablet; Refill: 0    Polyneuropathy  -     oxyCODONE-acetaminophen (PERCOCET)  mg per tablet; Take 1 tablet by mouth every 6 (six) hours as needed for Pain.  Dispense: 56 tablet; Refill: 0  -     oxyCODONE-acetaminophen (PERCOCET)  mg per tablet; Take 1 tablet by mouth every 6 (six) hours as needed for Pain.  Dispense: 56 tablet; Refill: 0  -     diazePAM (VALIUM) 5 MG tablet; Take 1 tablet (5 mg total) by mouth 3 (three) times daily. for 14 days  Dispense: 42 tablet; Refill: 0  -     diazePAM (VALIUM) 5 MG tablet; Take 1 tablet (5 mg total) by mouth 3 (three) times daily. for 14 days  Dispense: 42 tablet; Refill: 0    MS (multiple sclerosis)  -     oxyCODONE-acetaminophen (PERCOCET)  mg per tablet; Take 1 tablet by mouth every 6 (six) hours as needed for Pain.  Dispense: 56 tablet; Refill: 0  -     oxyCODONE-acetaminophen (PERCOCET)  mg per tablet; Take 1 tablet by mouth every 6 (six) hours as needed for Pain.  Dispense: 56 tablet; Refill: 0  -     diazePAM (VALIUM) 5 MG tablet; Take 1 tablet (5 mg total) by mouth 3 (three) times daily. for 14 days  Dispense: 42 tablet; Refill: 0  -     diazePAM (VALIUM) 5 MG tablet; Take 1 tablet (5 mg total) by mouth 3 (three) times daily. for 14 days  Dispense: 42 tablet; Refill: 0    Multiple sclerosis    Patient with MS, weakness in UE/LE, and neuropathic pain, weakness and spasticity In LE> UE,    He also has chronic pain syndrome, chronic back and neck pain, with B/l LE weakness, Lt>> Rt, with impaired mobility, and ADLs.     -- Pain  management:   I reviewed , and MAR.  Opioid Risk Score       Value Time User    Opioid Risk Score  6 4/21/2019 11:53 PM Kymberly Estrella MD        Percocet partial refill for 7 days given after discussion about using opioids not as prescribed.  Pt denies all that his sister Brooke ( [phone], reported to his PCP. There is some family dynamics, mother would be the best person to discuss,   since she is living next to him, and could possibly supervise him.   I discussed opioid agreement, contract.  He is agreeable to get pain meds every week for now.      He will resume all his medications, including gabapentin, Baclofen, and Diazepam for spasticity treatment. I prescribed him Xeralto ,too.  Also recommend to keep appointments with , for treatment of MS.  At this time he does not need any additional DME.  All plan was discussed with patient and he agrees.     RTC in 6 weeks., and  Pt is given biweekly opioid medications.    Total time spent face to face with patient was 25 minutes.   More than 50% of that time was spent in counseling on diagnosis , prognosis and treatment options.   I also  patient  on common and most usual side effect of prescribed medications. Pain contract rules were discuss with pt.  Risk and benefits of opiates, possible risk of developing opiate dependence and tolerance, need of strict compliance with prescribed medications.  I reviewed Primary care , and other specialty's notes to better coordinate patient's  care.   All questions were answered, and patient voiced understanding.

## 2019-04-23 DIAGNOSIS — Z78.9 IMPAIRED MOBILITY AND ADLS: ICD-10-CM

## 2019-04-23 DIAGNOSIS — G82.20 PARAPARESIS: ICD-10-CM

## 2019-04-23 DIAGNOSIS — G89.29 CHRONIC PAIN OF MULTIPLE SITES: ICD-10-CM

## 2019-04-23 DIAGNOSIS — M54.41 CHRONIC BILATERAL LOW BACK PAIN WITH BILATERAL SCIATICA: ICD-10-CM

## 2019-04-23 DIAGNOSIS — G89.29 CHRONIC BILATERAL LOW BACK PAIN WITH BILATERAL SCIATICA: ICD-10-CM

## 2019-04-23 DIAGNOSIS — R25.2 SPASTICITY: ICD-10-CM

## 2019-04-23 DIAGNOSIS — G89.4 CHRONIC PAIN SYNDROME: ICD-10-CM

## 2019-04-23 DIAGNOSIS — G62.9 POLYNEUROPATHY: ICD-10-CM

## 2019-04-23 DIAGNOSIS — R27.8 ABNORMAL COORDINATION: ICD-10-CM

## 2019-04-23 DIAGNOSIS — R52 CHRONIC PAIN OF MULTIPLE SITES: ICD-10-CM

## 2019-04-23 DIAGNOSIS — Z79.891 LONG-TERM CURRENT USE OF OPIATE ANALGESIC: ICD-10-CM

## 2019-04-23 DIAGNOSIS — M54.42 CHRONIC BILATERAL LOW BACK PAIN WITH BILATERAL SCIATICA: ICD-10-CM

## 2019-04-23 DIAGNOSIS — R29.6 FREQUENT FALLS: ICD-10-CM

## 2019-04-23 DIAGNOSIS — R53.1 WEAKNESS GENERALIZED: ICD-10-CM

## 2019-04-23 DIAGNOSIS — Z74.09 IMPAIRED MOBILITY AND ADLS: ICD-10-CM

## 2019-04-23 DIAGNOSIS — G35 ACUTE RELAPSING MULTIPLE SCLEROSIS: ICD-10-CM

## 2019-04-23 DIAGNOSIS — G35 MS (MULTIPLE SCLEROSIS): Chronic | ICD-10-CM

## 2019-04-23 RX ORDER — DIAZEPAM 5 MG/1
5 TABLET ORAL 3 TIMES DAILY
Qty: 21 TABLET | Refills: 0 | Status: SHIPPED | OUTPATIENT
Start: 2019-04-23 | End: 2019-05-01 | Stop reason: SDUPTHER

## 2019-04-23 RX ORDER — OXYCODONE AND ACETAMINOPHEN 10; 325 MG/1; MG/1
1 TABLET ORAL EVERY 6 HOURS PRN
Qty: 28 TABLET | Refills: 0 | Status: SHIPPED | OUTPATIENT
Start: 2019-04-23 | End: 2019-05-01 | Stop reason: SDUPTHER

## 2019-04-23 NOTE — TELEPHONE ENCOUNTER
Last visit: 04/17/2019  Next visit: 05/30/2019  Last refill Percocet: 04/16/2019 7d prescription  Last refill Valium: 04/16/2019 7d prescription    ----- Message from Adamaris Devi sent at 4/23/2019  9:36 AM CDT -----  Asking to have his prescriptions sent to a different pharmacy, wants refills sent to [facility];  Rx Refill/Request     Is this a Refill or New Rx:  Refills    Rx Name and Strength:  diazePAM (VALIUM) 5 MG tablet and oxyCODONE-acetaminophen (PERCOCET)  mg per tablet    Preferred Pharmacy with phone number:     [facility]  [address] [address]  Phone: [phone] Fax: [phone]    Communication Preference:pt@ [phone]  Additional Information:

## 2019-04-23 NOTE — TELEPHONE ENCOUNTER
Called pt per MD request to inform them that his prescriptions are ready.  Pt verbalized understanding.

## 2019-04-23 NOTE — PLAN OF CARE
SW contacted Elmer, admission coordinator for [facility] ([phone]) has not received referral today but per Elmer, sees something from a couple of days ago.  Myesha, admission coordinator for [facility], reports that [facility] has access to epic and will pull updated info from system.      Lizet Ortez, [facility]  [id number]   2-point gait

## 2019-04-29 ENCOUNTER — TELEPHONE (OUTPATIENT)
Dept: PHARMACY | Facility: CLINIC | Age: 54
End: 2019-04-29

## 2019-05-15 ENCOUNTER — TELEPHONE (OUTPATIENT)
Dept: NEUROLOGY | Facility: CLINIC | Age: 54
End: 2019-05-15

## 2019-05-15 NOTE — TELEPHONE ENCOUNTER
"Pt requesting PT. Staying with his mother. Pt requesting to be admitted to inpatient therapy. Feel "my walking and his hand is worse." Pt can't walk more than 20 feet without a strange. "My hand is getting tighter."  "

## 2019-05-15 NOTE — TELEPHONE ENCOUNTER
----- Message from Edelmira De Oliveira sent at 5/15/2019  4:13 PM CDT -----  Contact: self 2 [phone]  Pt is requesting to speak with Lawanda about his health issues.  Pls call.

## 2019-05-16 ENCOUNTER — TELEPHONE (OUTPATIENT)
Dept: PSYCHIATRY | Facility: CLINIC | Age: 54
End: 2019-05-16

## 2019-05-16 NOTE — TELEPHONE ENCOUNTER
SW left message for Ivy , to call re: pt's status and re: power chair that she was considering donating to pt.

## 2019-05-16 NOTE — TELEPHONE ENCOUNTER
----- Message from Adamaris Devi sent at 5/14/2019  4:49 PM CDT -----  Contact: Ivy () @ [phone]  Patients care manager called to speak with , wanting to get an update on the patient. Please call.

## 2019-05-17 ENCOUNTER — TELEPHONE (OUTPATIENT)
Dept: PSYCHIATRY | Facility: CLINIC | Age: 54
End: 2019-05-17

## 2019-05-17 NOTE — TELEPHONE ENCOUNTER
SW spoke with Ivy  for homecare agency.  Pt has obtained an apartment with assistance of ROSAS  (Genia): 4th floor apartment in [place], move in date 6/1.  Ivy also added pt to wait list for [facility] for more support.  She is donating a manual wheelchair to pt and will help him locate furniture.  Issues:    1) Power mobility device - Pt needs evaluation for device.  Has missed previous appointments with Eliseo, so will need to reschedule.    2) Home health - will need home health once moved to evaluation home needs and to provide treatment.    3) Medical equipment - could benefit from tub transfer chair, over the bed table, hospital bed, lift chair.  SW will see what may be covered by insurance vs obtained from MS organizations.  (Pt's [employer] navigator is Twila Avendaño).     4) Waiver services - unclear if pt was approved for Community Choices Waiver; SW will need to follow up with pt.     SW will begin working on these items on Monday.

## 2019-05-17 NOTE — LETTER
[facility]  [address]  [place] [address]  Phone: [phone]       May 29, 2019    RE: Mao Levin (: 1965)    Dear CURTIS:  Mr. Levin is under the care of Mattie Finnegan MD, MPH and ED Paul, CNS for treatment of multiple sclerosis.  He is moving to a new apartment and will need certain pieces of medical equipment, including a tub transfer bench, in order to safely perform his ADLs.  Please contact our office at the above phone number with any questions or concerns.    Sincerely,        Ariane Freitas [facility]

## 2019-05-27 ENCOUNTER — TELEPHONE (OUTPATIENT)
Dept: PHARMACY | Facility: CLINIC | Age: 54
End: 2019-05-27

## 2019-05-28 ENCOUNTER — TELEPHONE (OUTPATIENT)
Dept: PHYSICAL MEDICINE AND REHAB | Facility: CLINIC | Age: 54
End: 2019-05-28

## 2019-05-28 NOTE — TELEPHONE ENCOUNTER
Left a v/mail that Dr Estrella prefers to give refills in person if they are within two days of the appt, and that she will see them on Thursday and give him his refills in person then.    ----- Message from Adamaris Devi sent at 5/28/2019  9:47 AM CDT -----  Rx Refill/Request     Is this a Refill or New Rx:  Refills    Rx Name and Strength:  oxyCODONE-acetaminophen (PERCOCET)  mg per tablet and diazePAM (VALIUM) 5 MG tablet    Preferred Pharmacy with phone number:     [facility]  [address] [address]  Phone: [phone] Fax: [phone]      Communication Preference:pt@ [phone]  Additional Information:

## 2019-05-29 NOTE — TELEPHONE ENCOUNTER
Pt called back and we completed his [employer] application for a free tub transfer bench.  He is uncertain about his moved date - possibly 6/3 when he is scheduled to see Dr. Finnegan, but he will try to confirm and discuss moving on 6/4, if possible. SW provided the MS[employer] application to MSW Intern Myesha Betts, who will try to meet with pt on 6/3 to have him sign and fill in new address (once known).      We also left a message for Pat at [facility] [phone] to check on status of his Community Choices Waiver, which he applied for in 2017 with the help of his [facility].

## 2019-05-29 NOTE — TELEPHONE ENCOUNTER
Attempted to reach pt x2 to follow up on MS[employer] application for free tub transfer bench and to help him check the status of his Community Choices Waiver.  Left voicemail.    SW updated pt's case joaquín Haynes with [employer], by email, regarding these calls and status of requests for DME.  DONNA also discussed case with MSW Intern, who will assist with needs during this writer's absence.

## 2019-05-30 ENCOUNTER — OFFICE VISIT (OUTPATIENT)
Dept: PHYSICAL MEDICINE AND REHAB | Facility: CLINIC | Age: 54
End: 2019-05-30
Payer: MEDICARE

## 2019-05-30 VITALS
DIASTOLIC BLOOD PRESSURE: 84 MMHG | HEIGHT: 71 IN | HEART RATE: 60 BPM | WEIGHT: 180 LBS | BODY MASS INDEX: 25.2 KG/M2 | SYSTOLIC BLOOD PRESSURE: 133 MMHG

## 2019-05-30 DIAGNOSIS — M54.41 CHRONIC BILATERAL LOW BACK PAIN WITH BILATERAL SCIATICA: ICD-10-CM

## 2019-05-30 DIAGNOSIS — R53.1 WEAKNESS GENERALIZED: ICD-10-CM

## 2019-05-30 DIAGNOSIS — R29.6 FREQUENT FALLS: ICD-10-CM

## 2019-05-30 DIAGNOSIS — Z74.09 IMPAIRED MOBILITY AND ADLS: ICD-10-CM

## 2019-05-30 DIAGNOSIS — R27.8 ABNORMAL COORDINATION: ICD-10-CM

## 2019-05-30 DIAGNOSIS — M62.838 MUSCLE SPASTICITY: ICD-10-CM

## 2019-05-30 DIAGNOSIS — G89.4 CHRONIC PAIN SYNDROME: ICD-10-CM

## 2019-05-30 DIAGNOSIS — R26.81 GAIT INSTABILITY: ICD-10-CM

## 2019-05-30 DIAGNOSIS — Z78.9 IMPAIRED MOBILITY AND ADLS: ICD-10-CM

## 2019-05-30 DIAGNOSIS — R25.2 SPASTICITY: ICD-10-CM

## 2019-05-30 DIAGNOSIS — G89.29 CHRONIC BILATERAL LOW BACK PAIN WITH BILATERAL SCIATICA: ICD-10-CM

## 2019-05-30 DIAGNOSIS — G62.9 POLYNEUROPATHY: ICD-10-CM

## 2019-05-30 DIAGNOSIS — Z79.891 LONG-TERM CURRENT USE OF OPIATE ANALGESIC: ICD-10-CM

## 2019-05-30 DIAGNOSIS — R52 CHRONIC PAIN OF MULTIPLE SITES: ICD-10-CM

## 2019-05-30 DIAGNOSIS — Z79.891 OPIOID USE AGREEMENT EXISTS: ICD-10-CM

## 2019-05-30 DIAGNOSIS — G89.29 CHRONIC PAIN OF MULTIPLE SITES: ICD-10-CM

## 2019-05-30 DIAGNOSIS — G82.20 PARAPARESIS: ICD-10-CM

## 2019-05-30 DIAGNOSIS — G35 ACUTE RELAPSING MULTIPLE SCLEROSIS: Primary | ICD-10-CM

## 2019-05-30 DIAGNOSIS — G35 MS (MULTIPLE SCLEROSIS): Chronic | ICD-10-CM

## 2019-05-30 DIAGNOSIS — M54.42 CHRONIC BILATERAL LOW BACK PAIN WITH BILATERAL SCIATICA: ICD-10-CM

## 2019-05-30 PROCEDURE — 99499 UNLISTED E&M SERVICE: CPT | Mod: HCWC,S$GLB,, | Performed by: PHYSICAL MEDICINE & REHABILITATION

## 2019-05-30 PROCEDURE — 99499 RISK ADDL DX/OHS AUDIT: ICD-10-PCS | Mod: HCWC,S$GLB,, | Performed by: PHYSICAL MEDICINE & REHABILITATION

## 2019-05-30 PROCEDURE — 3008F BODY MASS INDEX DOCD: CPT | Mod: HCWC,CPTII,S$GLB, | Performed by: PHYSICAL MEDICINE & REHABILITATION

## 2019-05-30 PROCEDURE — 99999 PR PBB SHADOW E&M-EST. PATIENT-LVL III: ICD-10-PCS | Mod: PBBFAC,HCWC,, | Performed by: PHYSICAL MEDICINE & REHABILITATION

## 2019-05-30 PROCEDURE — 99999 PR PBB SHADOW E&M-EST. PATIENT-LVL III: CPT | Mod: PBBFAC,HCWC,, | Performed by: PHYSICAL MEDICINE & REHABILITATION

## 2019-05-30 PROCEDURE — 3008F PR BODY MASS INDEX (BMI) DOCUMENTED: ICD-10-PCS | Mod: HCWC,CPTII,S$GLB, | Performed by: PHYSICAL MEDICINE & REHABILITATION

## 2019-05-30 PROCEDURE — 99214 PR OFFICE/OUTPT VISIT, EST, LEVL IV, 30-39 MIN: ICD-10-PCS | Mod: HCWC,S$GLB,, | Performed by: PHYSICAL MEDICINE & REHABILITATION

## 2019-05-30 PROCEDURE — 99214 OFFICE O/P EST MOD 30 MIN: CPT | Mod: HCWC,S$GLB,, | Performed by: PHYSICAL MEDICINE & REHABILITATION

## 2019-05-30 RX ORDER — OXYCODONE AND ACETAMINOPHEN 10; 325 MG/1; MG/1
1 TABLET ORAL EVERY 6 HOURS PRN
Qty: 60 TABLET | Refills: 0 | Status: SHIPPED | OUTPATIENT
Start: 2019-06-28 | End: 2019-07-11 | Stop reason: SDUPTHER

## 2019-05-30 RX ORDER — OXYCODONE AND ACETAMINOPHEN 10; 325 MG/1; MG/1
1 TABLET ORAL EVERY 6 HOURS PRN
Qty: 60 TABLET | Refills: 0 | Status: SHIPPED | OUTPATIENT
Start: 2019-05-30 | End: 2019-06-14 | Stop reason: SDUPTHER

## 2019-05-30 RX ORDER — GABAPENTIN 400 MG/1
400 CAPSULE ORAL 4 TIMES DAILY
Qty: 120 CAPSULE | Refills: 11 | Status: SHIPPED | OUTPATIENT
Start: 2019-05-30 | End: 2019-10-01 | Stop reason: SDUPTHER

## 2019-05-30 RX ORDER — DIAZEPAM 5 MG/1
5 TABLET ORAL 3 TIMES DAILY
Qty: 45 TABLET | Refills: 0 | Status: SHIPPED | OUTPATIENT
Start: 2019-06-14 | End: 2019-06-14 | Stop reason: SDUPTHER

## 2019-05-30 RX ORDER — DIAZEPAM 5 MG/1
5 TABLET ORAL 3 TIMES DAILY
Qty: 45 TABLET | Refills: 0 | Status: SHIPPED | OUTPATIENT
Start: 2019-06-28 | End: 2019-06-14

## 2019-05-30 RX ORDER — OXYCODONE AND ACETAMINOPHEN 10; 325 MG/1; MG/1
1 TABLET ORAL EVERY 6 HOURS PRN
Qty: 60 TABLET | Refills: 0 | Status: SHIPPED | OUTPATIENT
Start: 2019-06-14 | End: 2019-07-11 | Stop reason: ALTCHOICE

## 2019-05-30 RX ORDER — DIAZEPAM 5 MG/1
5 TABLET ORAL 3 TIMES DAILY
Qty: 45 TABLET | Refills: 0 | Status: SHIPPED | OUTPATIENT
Start: 2019-05-30 | End: 2019-06-14 | Stop reason: SDUPTHER

## 2019-05-30 NOTE — PROGRESS NOTES
"Subjective:       Patient ID: Mao Levin is a 53 y.o. male.    Chief Complaint: Back Pain and paraparesis     Mao Levin is 52 y/o male who returns to clinic for chronic multiple MSK pain, in lower back, neck, multiple joints that include: shoulder, knee, and hand pain.   LCV 04/17/19.  Patient is under very strict follow up, given pain medication every 14 days, secondary to reported aberrant behavior ( per family member), although pt denies.  Considering that this pt with advanced MS, with significant spasticity needs these medications, for spasticity and pain management, he was not d/c'd from clinic but   Instead called  and placed under special monitoring program.   He was getting medication ( hard copy prescriptions ) every 2 weeks.    Patient is now lives alone in his own apartment next to his mother.  But he wants to move away from his mother, and he talked to his , that send me a msg   About his needs for DMEs, hospital bed, BSC, and possible power wheel chair, or sit-stand push up recliner.   But pt states that he has not find apartment yet,and he wants all this equipment when he gets apt.    He is coming in manual escort WC, pushed by escort. States that his mother drove him in clinic.  Than he is very anxious since his mother is waiting in car, and makes phone call to him at all times..    He previously reported  that he has lift, WC, scooter,and RW.    He has a lot of MSK pain, with his MS.  The pain started in 2006 following MS diagnosis and symptoms have been worsening.    Brief history: Patient is a 51 yo male with MS diagnosed in 2006 as well as chronic, generalized pain since that time that presents for initial evaluation. He was hospitalized in inpatient rehab in October /2016 and feels that he has become more functional but still is mostly wheel chair bound. .   Pain occurs from the neck down and involves the entire body except the head. No specific area is worse. Pain is "achy" and " ""tingling".   It is constant at 7-8/10 but can worsen with any movement to 10/10.   Today he c/o pain in both arm/hands in all fingers, they feel sore and tight.   Back pain is running down to both legs, back of thighs, and pain in leg is worst bellow the knee level, in calves that are tight, and weak.  His Left leg is weaker than Rt leg.   Has foot b/l foot weakness and drop, wears left AFO.  He has been a prolong time on chronic pain management with oxycodone 15mg q6h and oxycontin 40 mg TWICE DAILY ( while in IP Rehab) but did not feel that this was any more helpful. Tried gabapentin in the past which was tolerated but didn't help.   He also takes diazepam 5mg TWICE DAILY and baclofen 10mg TID which helps his spasticity.    In NH, he was taking  Tramadol and Oxycodone 5 mg Q8 hrs prn pain.  He states that Tramadol is ineffective.  He is coming today, w/o  D/c papers, medical documentation from NH.   Pain Medications:  Percocet 10/325mg, 1 tablet 3x a day  Gabapentin 300mg, 3 tablets three times daily  Valium 5mg, 1 tablet twice daily  Baclofen 20 mg QID  Xarelto 20mg, 1 tablet daily    Pain Description:   The pain is located in the neck, back shoulders, hands  and knees.  Today the current  pain is rated as 7/10  At BEST  5/10   At WORST  10/10 on the WORST day.    On average pain is rated as 7/10.   The pain is described as aching and tingling  Symptoms interfere with daily activity, sleeping and work.   Exacerbating factors: Morning, Extension and Flexing.    Mitigating factors medications and physical therapy.   Patient denies .  Patient denies any suicidal or homicidal ideations    Physical Therapy/Home Exercise: yes     report:  Reviewed and consistent with medication use as prescribed.  Pain Procedures: none    He is here for chronic pain management with opioids.      Imaging:   I reviewed C-spine and T-spine MRI which showed demylenating changes as well as fairly significant stenosis throughout the " cervical spine.   He is here for follow up, and treatment.    Past Medical History:   Diagnosis Date    Anticoagulant long-term use     Anxiety     Chronic back pain     Deep vein thrombosis     Depression     Depression     Gait instability     Multiple sclerosis     Multiple sclerosis     Neurogenic bladder     Polyneuropathy     Pulmonary embolism     Spasticity        Past Surgical History:   Procedure Laterality Date    CYSTOSCOPY N/A 2018    Performed by [facility] at [facility] OR [address] [place] [address]    INJECTION, BOTULINUM TOXIN, TYPE A 200 UNITS N/A 2018    Performed by Brian Augustin MD at [facility] OR [address] [place] [address]       Family History   Problem Relation Age of Onset    Arthritis Mother     No Known Problems Father     Cancer Maternal Grandfather        Social History     Socioeconomic History    Marital status: Single     Spouse name: Not on file    Number of children: Not on file    Years of education: Not on file    Highest education level: Not on file   Occupational History    Not on file   Social Needs    Financial resource strain: Not on file    Food insecurity:     Worry: Not on file     Inability: Not on file    Transportation needs:     Medical: Not on file     Non-medical: Not on file   Tobacco Use    Smoking status: Former Smoker     Packs/day: 0.50     Years: 23.00     Pack years: 11.50     Types: Cigarettes     Last attempt to quit: 2018     Years since quittin.0    Smokeless tobacco: Never Used    Tobacco comment: Using patch   Substance and Sexual Activity    Alcohol use: No    Drug use: No    Sexual activity: Yes     Partners: Female   Lifestyle    Physical activity:     Days per week: Not on file     Minutes per session: Not on file    Stress: Not on file   Relationships    Social connections:     Talks on phone: Not on file     Gets together: Not on file     Attends [employer] service: Not on file     Active member of club or organization: Not on file      Attends meetings of clubs or organizations: Not on file     Relationship status: Not on file   Other Topics Concern    Not on file   Social History Narrative    Not on file       Current Outpatient Medications   Medication Sig Dispense Refill    baclofen (LIORESAL) 20 MG tablet Take 1 tablet (20 mg total) by mouth 4 (four) times daily. for 7 days 28 tablet 0    dalfampridine (AMPYRA) 10 mg Tb12 Take 1 tablet by mouth every 12 (twelve) hours. 180 tablet 1    diazePAM (VALIUM) 5 MG tablet Take 1 tablet (5 mg total) by mouth 3 (three) times daily. 42 tablet 0    diazePAM (VALIUM) 5 MG tablet Take 1 tablet (5 mg total) by mouth 3 (three) times daily. for 15 days 45 tablet 0    [START ON 6/14/2019] diazePAM (VALIUM) 5 MG tablet Take 1 tablet (5 mg total) by mouth 3 (three) times daily. for 15 days 45 tablet 0    [START ON 6/28/2019] diazePAM (VALIUM) 5 MG tablet Take 1 tablet (5 mg total) by mouth 3 (three) times daily. for 15 days 45 tablet 0    DULoxetine (CYMBALTA) 30 MG capsule Take 1 capsule (30 mg total) by mouth once daily. 30 capsule 3    ergocalciferol (VITAMIN D2) 50,000 unit Cap Take 1 capsule (50,000 Units total) by mouth every 7 days. (Patient taking differently: Take 50,000 Units by mouth every 7 days. on friday) 4 capsule 11    gabapentin (NEURONTIN) 400 MG capsule Take 1 capsule (400 mg total) by mouth 4 (four) times daily. 120 capsule 11    oxyCODONE (ROXICODONE) 10 mg Tab immediate release tablet Take 1 tablet (10 mg total) by mouth every 4 (four) hours as needed for Pain. 6 tablet 0    oxyCODONE-acetaminophen (PERCOCET)  mg per tablet Take 1 tablet by mouth every 6 (six) hours as needed for Pain. 60 tablet 0    [START ON 6/14/2019] oxyCODONE-acetaminophen (PERCOCET)  mg per tablet Take 1 tablet by mouth every 6 (six) hours as needed for Pain. 60 tablet 0    [START ON 6/28/2019] oxyCODONE-acetaminophen (PERCOCET)  mg per tablet Take 1 tablet by mouth every 6 (six) hours  as needed for Pain. 60 tablet 0    rivaroxaban (XARELTO) 20 mg Tab Take 1 tablet (20 mg total) by mouth daily with dinner or evening meal. 30 tablet 3    tamsulosin (FLOMAX) 0.4 mg Cap Take 1 capsule (0.4 mg total) by mouth once daily. 30 capsule 3     No current facility-administered medications for this visit.      Review of patient's allergies indicates:  No Known Allergies    Review of Systems   Constitutional: Negative for appetite change and fatigue.   Eyes: Negative for visual disturbance.   Respiratory: Negative for shortness of breath.    Cardiovascular: Negative for chest pain.   Gastrointestinal: Negative for constipation and diarrhea.   Genitourinary: Negative for dysuria, frequency and urgency.   Musculoskeletal: Positive for arthralgias, back pain, gait problem, myalgias and neck pain. Negative for joint swelling and neck stiffness.   Neurological: Negative for dizziness, tremors, weakness, numbness and headaches.   Psychiatric/Behavioral: Negative for dysphoric mood.   All other systems reviewed and are negative.        Objective:      Physical Exam      Constitutional: He is oriented to person, place, and time.   He appears well-nourished.   Eyes: EOM are normal. Pupils are equal, round, and reactive to light.   Neck: Normal range of motion. Neck supple.   Cardiovascular: Normal rhythm  and rate.    Pulmonary/Chest: Effort normal.   Abdominal: Soft.   Musculoskeletal:   Gait: spastic, walks hardly, very slow, dragging feet, but does not want RW with seat, nor he wants to use WC. He wants to walk again,  BUEs AROM WNL  BLEs AROM is diminished   Neurological: He is oriented to person, place, and time.   BUEs 5/5  RLE 3/5 HF, 4-/5 HE, 3+/5 KE/KF/DF  LLE 3-/5 HF, 3+/5 HE, 3-/5 KE, 2/5 KF, 0/5 DF .  wears left AFO.  Skin: No rash noted.   Psychiatric: He has a normal mood and affect.       Assessment:           1. Acute relapsing multiple sclerosis    2. Chronic bilateral low back pain with bilateral  sciatica    3. Chronic pain of multiple sites    4. Paraparesis    5. Impaired mobility and ADLs    6. Muscle spasticity    7. Chronic pain syndrome    8. Gait instability    9. Polyneuropathy    10. Opioid use agreement exists    11. Weakness generalized    12. Long-term current use of opiate analgesic    13. Spasticity    14. Frequent falls    15. Abnormal coordination    16. MS (multiple sclerosis)            Plan:        Acute relapsing multiple sclerosis  -     HME - OTHER  -     HME - OTHER  -     oxyCODONE-acetaminophen (PERCOCET)  mg per tablet; Take 1 tablet by mouth every 6 (six) hours as needed for Pain.  Dispense: 60 tablet; Refill: 0  -     oxyCODONE-acetaminophen (PERCOCET)  mg per tablet; Take 1 tablet by mouth every 6 (six) hours as needed for Pain.  Dispense: 60 tablet; Refill: 0  -     oxyCODONE-acetaminophen (PERCOCET)  mg per tablet; Take 1 tablet by mouth every 6 (six) hours as needed for Pain.  Dispense: 60 tablet; Refill: 0  -     gabapentin (NEURONTIN) 400 MG capsule; Take 1 capsule (400 mg total) by mouth 4 (four) times daily.  Dispense: 120 capsule; Refill: 11  -     diazePAM (VALIUM) 5 MG tablet; Take 1 tablet (5 mg total) by mouth 3 (three) times daily. for 15 days  Dispense: 45 tablet; Refill: 0  -     diazePAM (VALIUM) 5 MG tablet; Take 1 tablet (5 mg total) by mouth 3 (three) times daily. for 15 days  Dispense: 45 tablet; Refill: 0  -     diazePAM (VALIUM) 5 MG tablet; Take 1 tablet (5 mg total) by mouth 3 (three) times daily. for 15 days  Dispense: 45 tablet; Refill: 0    Chronic bilateral low back pain with bilateral sciatica  -     HME - OTHER  -     HME - OTHER  -     oxyCODONE-acetaminophen (PERCOCET)  mg per tablet; Take 1 tablet by mouth every 6 (six) hours as needed for Pain.  Dispense: 60 tablet; Refill: 0  -     oxyCODONE-acetaminophen (PERCOCET)  mg per tablet; Take 1 tablet by mouth every 6 (six) hours as needed for Pain.  Dispense: 60 tablet;  Refill: 0  -     oxyCODONE-acetaminophen (PERCOCET)  mg per tablet; Take 1 tablet by mouth every 6 (six) hours as needed for Pain.  Dispense: 60 tablet; Refill: 0  -     gabapentin (NEURONTIN) 400 MG capsule; Take 1 capsule (400 mg total) by mouth 4 (four) times daily.  Dispense: 120 capsule; Refill: 11  -     diazePAM (VALIUM) 5 MG tablet; Take 1 tablet (5 mg total) by mouth 3 (three) times daily. for 15 days  Dispense: 45 tablet; Refill: 0  -     diazePAM (VALIUM) 5 MG tablet; Take 1 tablet (5 mg total) by mouth 3 (three) times daily. for 15 days  Dispense: 45 tablet; Refill: 0  -     diazePAM (VALIUM) 5 MG tablet; Take 1 tablet (5 mg total) by mouth 3 (three) times daily. for 15 days  Dispense: 45 tablet; Refill: 0    Chronic pain of multiple sites  -     HME - OTHER  -     HME - OTHER  -     oxyCODONE-acetaminophen (PERCOCET)  mg per tablet; Take 1 tablet by mouth every 6 (six) hours as needed for Pain.  Dispense: 60 tablet; Refill: 0  -     oxyCODONE-acetaminophen (PERCOCET)  mg per tablet; Take 1 tablet by mouth every 6 (six) hours as needed for Pain.  Dispense: 60 tablet; Refill: 0  -     oxyCODONE-acetaminophen (PERCOCET)  mg per tablet; Take 1 tablet by mouth every 6 (six) hours as needed for Pain.  Dispense: 60 tablet; Refill: 0  -     gabapentin (NEURONTIN) 400 MG capsule; Take 1 capsule (400 mg total) by mouth 4 (four) times daily.  Dispense: 120 capsule; Refill: 11  -     diazePAM (VALIUM) 5 MG tablet; Take 1 tablet (5 mg total) by mouth 3 (three) times daily. for 15 days  Dispense: 45 tablet; Refill: 0  -     diazePAM (VALIUM) 5 MG tablet; Take 1 tablet (5 mg total) by mouth 3 (three) times daily. for 15 days  Dispense: 45 tablet; Refill: 0  -     diazePAM (VALIUM) 5 MG tablet; Take 1 tablet (5 mg total) by mouth 3 (three) times daily. for 15 days  Dispense: 45 tablet; Refill: 0    Paraparesis  -     HME - OTHER  -     HME - OTHER  -     oxyCODONE-acetaminophen (PERCOCET)   mg per tablet; Take 1 tablet by mouth every 6 (six) hours as needed for Pain.  Dispense: 60 tablet; Refill: 0  -     oxyCODONE-acetaminophen (PERCOCET)  mg per tablet; Take 1 tablet by mouth every 6 (six) hours as needed for Pain.  Dispense: 60 tablet; Refill: 0  -     oxyCODONE-acetaminophen (PERCOCET)  mg per tablet; Take 1 tablet by mouth every 6 (six) hours as needed for Pain.  Dispense: 60 tablet; Refill: 0  -     gabapentin (NEURONTIN) 400 MG capsule; Take 1 capsule (400 mg total) by mouth 4 (four) times daily.  Dispense: 120 capsule; Refill: 11  -     diazePAM (VALIUM) 5 MG tablet; Take 1 tablet (5 mg total) by mouth 3 (three) times daily. for 15 days  Dispense: 45 tablet; Refill: 0  -     diazePAM (VALIUM) 5 MG tablet; Take 1 tablet (5 mg total) by mouth 3 (three) times daily. for 15 days  Dispense: 45 tablet; Refill: 0  -     diazePAM (VALIUM) 5 MG tablet; Take 1 tablet (5 mg total) by mouth 3 (three) times daily. for 15 days  Dispense: 45 tablet; Refill: 0    Impaired mobility and ADLs  -     HME - OTHER  -     HME - OTHER  -     oxyCODONE-acetaminophen (PERCOCET)  mg per tablet; Take 1 tablet by mouth every 6 (six) hours as needed for Pain.  Dispense: 60 tablet; Refill: 0  -     oxyCODONE-acetaminophen (PERCOCET)  mg per tablet; Take 1 tablet by mouth every 6 (six) hours as needed for Pain.  Dispense: 60 tablet; Refill: 0  -     oxyCODONE-acetaminophen (PERCOCET)  mg per tablet; Take 1 tablet by mouth every 6 (six) hours as needed for Pain.  Dispense: 60 tablet; Refill: 0  -     gabapentin (NEURONTIN) 400 MG capsule; Take 1 capsule (400 mg total) by mouth 4 (four) times daily.  Dispense: 120 capsule; Refill: 11  -     diazePAM (VALIUM) 5 MG tablet; Take 1 tablet (5 mg total) by mouth 3 (three) times daily. for 15 days  Dispense: 45 tablet; Refill: 0  -     diazePAM (VALIUM) 5 MG tablet; Take 1 tablet (5 mg total) by mouth 3 (three) times daily. for 15 days  Dispense: 45  tablet; Refill: 0  -     diazePAM (VALIUM) 5 MG tablet; Take 1 tablet (5 mg total) by mouth 3 (three) times daily. for 15 days  Dispense: 45 tablet; Refill: 0    Muscle spasticity  -     HME - OTHER  -     HME - OTHER  -     gabapentin (NEURONTIN) 400 MG capsule; Take 1 capsule (400 mg total) by mouth 4 (four) times daily.  Dispense: 120 capsule; Refill: 11    Chronic pain syndrome  -     HME - OTHER  -     HME - OTHER  -     oxyCODONE-acetaminophen (PERCOCET)  mg per tablet; Take 1 tablet by mouth every 6 (six) hours as needed for Pain.  Dispense: 60 tablet; Refill: 0  -     oxyCODONE-acetaminophen (PERCOCET)  mg per tablet; Take 1 tablet by mouth every 6 (six) hours as needed for Pain.  Dispense: 60 tablet; Refill: 0  -     oxyCODONE-acetaminophen (PERCOCET)  mg per tablet; Take 1 tablet by mouth every 6 (six) hours as needed for Pain.  Dispense: 60 tablet; Refill: 0  -     gabapentin (NEURONTIN) 400 MG capsule; Take 1 capsule (400 mg total) by mouth 4 (four) times daily.  Dispense: 120 capsule; Refill: 11  -     diazePAM (VALIUM) 5 MG tablet; Take 1 tablet (5 mg total) by mouth 3 (three) times daily. for 15 days  Dispense: 45 tablet; Refill: 0  -     diazePAM (VALIUM) 5 MG tablet; Take 1 tablet (5 mg total) by mouth 3 (three) times daily. for 15 days  Dispense: 45 tablet; Refill: 0  -     diazePAM (VALIUM) 5 MG tablet; Take 1 tablet (5 mg total) by mouth 3 (three) times daily. for 15 days  Dispense: 45 tablet; Refill: 0    Gait instability  -     HME - OTHER  -     HME - OTHER  -     gabapentin (NEURONTIN) 400 MG capsule; Take 1 capsule (400 mg total) by mouth 4 (four) times daily.  Dispense: 120 capsule; Refill: 11    Polyneuropathy  -     HME - OTHER  -     HME - OTHER  -     oxyCODONE-acetaminophen (PERCOCET)  mg per tablet; Take 1 tablet by mouth every 6 (six) hours as needed for Pain.  Dispense: 60 tablet; Refill: 0  -     oxyCODONE-acetaminophen (PERCOCET)  mg per tablet; Take  1 tablet by mouth every 6 (six) hours as needed for Pain.  Dispense: 60 tablet; Refill: 0  -     oxyCODONE-acetaminophen (PERCOCET)  mg per tablet; Take 1 tablet by mouth every 6 (six) hours as needed for Pain.  Dispense: 60 tablet; Refill: 0  -     gabapentin (NEURONTIN) 400 MG capsule; Take 1 capsule (400 mg total) by mouth 4 (four) times daily.  Dispense: 120 capsule; Refill: 11  -     diazePAM (VALIUM) 5 MG tablet; Take 1 tablet (5 mg total) by mouth 3 (three) times daily. for 15 days  Dispense: 45 tablet; Refill: 0  -     diazePAM (VALIUM) 5 MG tablet; Take 1 tablet (5 mg total) by mouth 3 (three) times daily. for 15 days  Dispense: 45 tablet; Refill: 0  -     diazePAM (VALIUM) 5 MG tablet; Take 1 tablet (5 mg total) by mouth 3 (three) times daily. for 15 days  Dispense: 45 tablet; Refill: 0    Opioid use agreement exists  -     HME - OTHER  -     HME - OTHER  -     gabapentin (NEURONTIN) 400 MG capsule; Take 1 capsule (400 mg total) by mouth 4 (four) times daily.  Dispense: 120 capsule; Refill: 11    Weakness generalized  -     oxyCODONE-acetaminophen (PERCOCET)  mg per tablet; Take 1 tablet by mouth every 6 (six) hours as needed for Pain.  Dispense: 60 tablet; Refill: 0  -     oxyCODONE-acetaminophen (PERCOCET)  mg per tablet; Take 1 tablet by mouth every 6 (six) hours as needed for Pain.  Dispense: 60 tablet; Refill: 0  -     oxyCODONE-acetaminophen (PERCOCET)  mg per tablet; Take 1 tablet by mouth every 6 (six) hours as needed for Pain.  Dispense: 60 tablet; Refill: 0  -     gabapentin (NEURONTIN) 400 MG capsule; Take 1 capsule (400 mg total) by mouth 4 (four) times daily.  Dispense: 120 capsule; Refill: 11  -     diazePAM (VALIUM) 5 MG tablet; Take 1 tablet (5 mg total) by mouth 3 (three) times daily. for 15 days  Dispense: 45 tablet; Refill: 0  -     diazePAM (VALIUM) 5 MG tablet; Take 1 tablet (5 mg total) by mouth 3 (three) times daily. for 15 days  Dispense: 45 tablet; Refill:  0  -     diazePAM (VALIUM) 5 MG tablet; Take 1 tablet (5 mg total) by mouth 3 (three) times daily. for 15 days  Dispense: 45 tablet; Refill: 0    Long-term current use of opiate analgesic  -     oxyCODONE-acetaminophen (PERCOCET)  mg per tablet; Take 1 tablet by mouth every 6 (six) hours as needed for Pain.  Dispense: 60 tablet; Refill: 0  -     oxyCODONE-acetaminophen (PERCOCET)  mg per tablet; Take 1 tablet by mouth every 6 (six) hours as needed for Pain.  Dispense: 60 tablet; Refill: 0  -     oxyCODONE-acetaminophen (PERCOCET)  mg per tablet; Take 1 tablet by mouth every 6 (six) hours as needed for Pain.  Dispense: 60 tablet; Refill: 0  -     gabapentin (NEURONTIN) 400 MG capsule; Take 1 capsule (400 mg total) by mouth 4 (four) times daily.  Dispense: 120 capsule; Refill: 11  -     diazePAM (VALIUM) 5 MG tablet; Take 1 tablet (5 mg total) by mouth 3 (three) times daily. for 15 days  Dispense: 45 tablet; Refill: 0  -     diazePAM (VALIUM) 5 MG tablet; Take 1 tablet (5 mg total) by mouth 3 (three) times daily. for 15 days  Dispense: 45 tablet; Refill: 0  -     diazePAM (VALIUM) 5 MG tablet; Take 1 tablet (5 mg total) by mouth 3 (three) times daily. for 15 days  Dispense: 45 tablet; Refill: 0    Spasticity  -     oxyCODONE-acetaminophen (PERCOCET)  mg per tablet; Take 1 tablet by mouth every 6 (six) hours as needed for Pain.  Dispense: 60 tablet; Refill: 0  -     oxyCODONE-acetaminophen (PERCOCET)  mg per tablet; Take 1 tablet by mouth every 6 (six) hours as needed for Pain.  Dispense: 60 tablet; Refill: 0  -     oxyCODONE-acetaminophen (PERCOCET)  mg per tablet; Take 1 tablet by mouth every 6 (six) hours as needed for Pain.  Dispense: 60 tablet; Refill: 0  -     gabapentin (NEURONTIN) 400 MG capsule; Take 1 capsule (400 mg total) by mouth 4 (four) times daily.  Dispense: 120 capsule; Refill: 11  -     diazePAM (VALIUM) 5 MG tablet; Take 1 tablet (5 mg total) by mouth 3 (three)  times daily. for 15 days  Dispense: 45 tablet; Refill: 0  -     diazePAM (VALIUM) 5 MG tablet; Take 1 tablet (5 mg total) by mouth 3 (three) times daily. for 15 days  Dispense: 45 tablet; Refill: 0  -     diazePAM (VALIUM) 5 MG tablet; Take 1 tablet (5 mg total) by mouth 3 (three) times daily. for 15 days  Dispense: 45 tablet; Refill: 0    Frequent falls  -     oxyCODONE-acetaminophen (PERCOCET)  mg per tablet; Take 1 tablet by mouth every 6 (six) hours as needed for Pain.  Dispense: 60 tablet; Refill: 0  -     oxyCODONE-acetaminophen (PERCOCET)  mg per tablet; Take 1 tablet by mouth every 6 (six) hours as needed for Pain.  Dispense: 60 tablet; Refill: 0  -     oxyCODONE-acetaminophen (PERCOCET)  mg per tablet; Take 1 tablet by mouth every 6 (six) hours as needed for Pain.  Dispense: 60 tablet; Refill: 0  -     gabapentin (NEURONTIN) 400 MG capsule; Take 1 capsule (400 mg total) by mouth 4 (four) times daily.  Dispense: 120 capsule; Refill: 11  -     diazePAM (VALIUM) 5 MG tablet; Take 1 tablet (5 mg total) by mouth 3 (three) times daily. for 15 days  Dispense: 45 tablet; Refill: 0  -     diazePAM (VALIUM) 5 MG tablet; Take 1 tablet (5 mg total) by mouth 3 (three) times daily. for 15 days  Dispense: 45 tablet; Refill: 0  -     diazePAM (VALIUM) 5 MG tablet; Take 1 tablet (5 mg total) by mouth 3 (three) times daily. for 15 days  Dispense: 45 tablet; Refill: 0    Abnormal coordination  -     oxyCODONE-acetaminophen (PERCOCET)  mg per tablet; Take 1 tablet by mouth every 6 (six) hours as needed for Pain.  Dispense: 60 tablet; Refill: 0  -     oxyCODONE-acetaminophen (PERCOCET)  mg per tablet; Take 1 tablet by mouth every 6 (six) hours as needed for Pain.  Dispense: 60 tablet; Refill: 0  -     oxyCODONE-acetaminophen (PERCOCET)  mg per tablet; Take 1 tablet by mouth every 6 (six) hours as needed for Pain.  Dispense: 60 tablet; Refill: 0  -     gabapentin (NEURONTIN) 400 MG capsule;  Take 1 capsule (400 mg total) by mouth 4 (four) times daily.  Dispense: 120 capsule; Refill: 11  -     diazePAM (VALIUM) 5 MG tablet; Take 1 tablet (5 mg total) by mouth 3 (three) times daily. for 15 days  Dispense: 45 tablet; Refill: 0  -     diazePAM (VALIUM) 5 MG tablet; Take 1 tablet (5 mg total) by mouth 3 (three) times daily. for 15 days  Dispense: 45 tablet; Refill: 0  -     diazePAM (VALIUM) 5 MG tablet; Take 1 tablet (5 mg total) by mouth 3 (three) times daily. for 15 days  Dispense: 45 tablet; Refill: 0    MS (multiple sclerosis)  -     oxyCODONE-acetaminophen (PERCOCET)  mg per tablet; Take 1 tablet by mouth every 6 (six) hours as needed for Pain.  Dispense: 60 tablet; Refill: 0  -     oxyCODONE-acetaminophen (PERCOCET)  mg per tablet; Take 1 tablet by mouth every 6 (six) hours as needed for Pain.  Dispense: 60 tablet; Refill: 0  -     oxyCODONE-acetaminophen (PERCOCET)  mg per tablet; Take 1 tablet by mouth every 6 (six) hours as needed for Pain.  Dispense: 60 tablet; Refill: 0  -     gabapentin (NEURONTIN) 400 MG capsule; Take 1 capsule (400 mg total) by mouth 4 (four) times daily.  Dispense: 120 capsule; Refill: 11  -     diazePAM (VALIUM) 5 MG tablet; Take 1 tablet (5 mg total) by mouth 3 (three) times daily. for 15 days  Dispense: 45 tablet; Refill: 0  -     diazePAM (VALIUM) 5 MG tablet; Take 1 tablet (5 mg total) by mouth 3 (three) times daily. for 15 days  Dispense: 45 tablet; Refill: 0  -     diazePAM (VALIUM) 5 MG tablet; Take 1 tablet (5 mg total) by mouth 3 (three) times daily. for 15 days  Dispense: 45 tablet; Refill: 0      Patient with MS, weakness in UE/LE, and neuropathic pain, weakness and spasticity In LE> UE,    He also has chronic pain syndrome, chronic back and neck pain, with B/l LE weakness, Lt>> Rt, with impaired mobility, and ADLs.     -- Pain management:    He is taking Percocet 10/325 mg po Q6 hrs, #60 tabs/ given prescription for 2 wks.   Per ,   Percocet  refills on 5/15, 5/01, 4/24 /19.  Diazepam, 5 mg po Q 8 hrs prn muscle spasms, refills on  n 5/15, 5/01, 4/24/19.    Opioid Risk Score       Value Time User    Opioid Risk Score  6 4/21/2019 11:53 PM Kymberly Estrella MD           He will resume all his medications, including gabapentin, Baclofen, and Diazepam for spasticity treatment.   Also recommend to keep appointments with , for treatment of MS.  DME: prescription for semi electric hospital bed, and BSC.     All plan was discussed with patient and he agrees.     RTC in 6 weeks., and  Pt is given biweekly opioid medications for 6 wks.    Total time spent face to face with patient was 25 minutes.   More than 50% of that time was spent in counseling on diagnosis , prognosis and treatment options.   I also  patient  on common and most usual side effect of prescribed medications. Pain contract rules were discuss with pt.  Risk and benefits of opiates, possible risk of developing opiate dependence and tolerance, need of strict compliance with prescribed medications.  I reviewed Primary care , and other specialty's notes to better coordinate patient's  care.   All questions were answered, and patient voiced understanding.

## 2019-06-03 ENCOUNTER — TELEPHONE (OUTPATIENT)
Dept: NEUROLOGY | Facility: CLINIC | Age: 54
End: 2019-06-03

## 2019-06-03 NOTE — TELEPHONE ENCOUNTER
Called pt to offer same day appointment with [facility] since he missed appointment with Dr. Finnegan. Left voicemail with contact info.

## 2019-06-03 NOTE — TELEPHONE ENCOUNTER
----- Message from Leslie Carrasco sent at 6/3/2019  1:28 PM CDT -----  Contact: Pt. [phone]  Needs Advice    Reason for call: The patient would like to speak to someone regarding being seen today. Please contact the patient to discuss further.          Communication Preference: PHONE     Additional Information:

## 2019-06-05 ENCOUNTER — TELEPHONE (OUTPATIENT)
Dept: PHYSICAL MEDICINE AND REHAB | Facility: CLINIC | Age: 54
End: 2019-06-05

## 2019-06-05 ENCOUNTER — TELEPHONE (OUTPATIENT)
Dept: PHARMACY | Facility: CLINIC | Age: 54
End: 2019-06-05

## 2019-06-05 ENCOUNTER — TELEPHONE (OUTPATIENT)
Dept: PSYCHIATRY | Facility: CLINIC | Age: 54
End: 2019-06-05

## 2019-06-05 NOTE — TELEPHONE ENCOUNTER
SW intern spoke with Ivy again to tell her that Kymberly Estrella MD (and staff) had been contacted through an urgent message regarding the hospital bed but this intern has not yet heard back. Ivy told this intern that pt's new address, beginning 6/7, is [address] [place] [id number].

## 2019-06-05 NOTE — TELEPHONE ENCOUNTER
SW intern spoke with Ivy. Ivy told this intern that pt's move in date is 6/7 and requested order for hospital bed and HH eval.

## 2019-06-05 NOTE — TELEPHONE ENCOUNTER
Dr. Estrella,     Pt is requesting hospital bed to be available as soon as Friday, 6/7/19, if at all possible, as he will be moving into his new apartment on that day and will be in need of a bed.   Please let me know if I can help at all in moving this along quickly.     Thanks so much,     Myesha Betts   Social Work Intern   [facility]   [phone]

## 2019-06-07 ENCOUNTER — TELEPHONE (OUTPATIENT)
Dept: PHYSICAL MEDICINE AND REHAB | Facility: CLINIC | Age: 54
End: 2019-06-07

## 2019-06-07 ENCOUNTER — TELEPHONE (OUTPATIENT)
Dept: NEUROLOGY | Facility: CLINIC | Age: 54
End: 2019-06-07

## 2019-06-07 ENCOUNTER — TELEPHONE (OUTPATIENT)
Dept: PSYCHIATRY | Facility: CLINIC | Age: 54
End: 2019-06-07

## 2019-06-07 DIAGNOSIS — G35 MULTIPLE SCLEROSIS: Primary | ICD-10-CM

## 2019-06-07 NOTE — TELEPHONE ENCOUNTER
SW intern called [facility] to check in around pt's non-compliant status. [facility] informed this intern that Ms. Hernandez would be out of the office until Tuesday of next week.

## 2019-06-07 NOTE — TELEPHONE ENCOUNTER
SW intern spoke with Yariel Nance at [phone] with Kymberly Estrella MD's office, who stated that pt has not been able to be reached. This intern will f/u with Ivy on provider preference for the bed.

## 2019-06-07 NOTE — TELEPHONE ENCOUNTER
SW intern spoke with Yariel at Kymberly Estrella MD's office to receive confirmation that the order is for an electric bed. This intern provided Elebrendar with the two options ([facility] and [facility]), whom this intern had researched prior. This intern informed Elenor to make this an urgent matter and to encourage the [employer] company to provide this bed by Monday at the latest. This intern also informed Yariel of pt's new address including apartment # ([id number], [place] [address]), per Ivy.

## 2019-06-07 NOTE — TELEPHONE ENCOUNTER
SW intern spoke with Ivy, who stated that any DME provider for the bed is fine as long as the bed is electric.

## 2019-06-07 NOTE — TELEPHONE ENCOUNTER
SW intern confirmed with both [facility] and [facility] that pt's insurance is accepted and hospital beds and currently in-stock.

## 2019-06-07 NOTE — TELEPHONE ENCOUNTER
SW intern spoke with Ivy by phone. Ivy was calling to check in on the status of the hospital bed order. This intern informed Ivy that she would f/u again with Kymberly Estrella MD. Ivy provided intern with her email address in order to have [facility]. Ivy will send back once signed.

## 2019-06-10 NOTE — TELEPHONE ENCOUNTER
DONNA intern faxed Equipment Distribution Program Application to [facility] at [phone].

## 2019-06-11 NOTE — TELEPHONE ENCOUNTER
----- Message from Leslie Carrasco sent at 6/11/2019 11:58 AM CDT -----  Contact: Pt. [phone]  Needs Advice    Reason for call: The patient would like to speak to someone regarding scheduling. Please contact the patient to discuss further.          Communication Preference:PHONE    Additional Information:

## 2019-06-14 ENCOUNTER — OFFICE VISIT (OUTPATIENT)
Dept: NEUROLOGY | Facility: CLINIC | Age: 54
End: 2019-06-14
Payer: MEDICARE

## 2019-06-14 VITALS — HEIGHT: 71 IN | BODY MASS INDEX: 25.1 KG/M2

## 2019-06-14 DIAGNOSIS — N31.9 NEUROGENIC BLADDER: ICD-10-CM

## 2019-06-14 DIAGNOSIS — Z79.899 ENCOUNTER FOR LONG-TERM (CURRENT) USE OF HIGH-RISK MEDICATION: ICD-10-CM

## 2019-06-14 DIAGNOSIS — R25.2 SPASTICITY: ICD-10-CM

## 2019-06-14 DIAGNOSIS — G35 MULTIPLE SCLEROSIS: Primary | Chronic | ICD-10-CM

## 2019-06-14 DIAGNOSIS — E55.9 VITAMIN D INSUFFICIENCY: ICD-10-CM

## 2019-06-14 DIAGNOSIS — Z71.89 COUNSELING REGARDING GOALS OF CARE: ICD-10-CM

## 2019-06-14 DIAGNOSIS — N40.0 BPH WITHOUT URINARY OBSTRUCTION: ICD-10-CM

## 2019-06-14 DIAGNOSIS — M62.830 PARASPINAL MUSCLE SPASM: ICD-10-CM

## 2019-06-14 DIAGNOSIS — Z72.0 TOBACCO USE: ICD-10-CM

## 2019-06-14 DIAGNOSIS — Z78.9 IMPAIRED MOBILITY AND ADLS: ICD-10-CM

## 2019-06-14 DIAGNOSIS — R26.9 NEUROLOGIC GAIT DYSFUNCTION: ICD-10-CM

## 2019-06-14 DIAGNOSIS — Z74.09 IMPAIRED MOBILITY AND ADLS: ICD-10-CM

## 2019-06-14 DIAGNOSIS — M79.2 NEUROPATHIC PAIN: ICD-10-CM

## 2019-06-14 DIAGNOSIS — D84.9 IMMUNOSUPPRESSED STATUS: ICD-10-CM

## 2019-06-14 PROCEDURE — 99999 PR PBB SHADOW E&M-EST. PATIENT-LVL III: ICD-10-PCS | Mod: PBBFAC,HCWC,, | Performed by: PHYSICIAN ASSISTANT

## 2019-06-14 PROCEDURE — 99354 PR PROLONGED SVC, OUPT, 1ST HR: ICD-10-PCS | Mod: HCWC,S$GLB,, | Performed by: PHYSICIAN ASSISTANT

## 2019-06-14 PROCEDURE — 99499 UNLISTED E&M SERVICE: CPT | Mod: HCWC,S$GLB,, | Performed by: PHYSICIAN ASSISTANT

## 2019-06-14 PROCEDURE — 99214 PR OFFICE/OUTPT VISIT, EST, LEVL IV, 30-39 MIN: ICD-10-PCS | Mod: HCWC,S$GLB,, | Performed by: PHYSICIAN ASSISTANT

## 2019-06-14 PROCEDURE — 99354 PR PROLONGED SVC, OUPT, 1ST HR: CPT | Mod: HCWC,S$GLB,, | Performed by: PHYSICIAN ASSISTANT

## 2019-06-14 PROCEDURE — 3008F BODY MASS INDEX DOCD: CPT | Mod: HCWC,CPTII,S$GLB, | Performed by: PHYSICIAN ASSISTANT

## 2019-06-14 PROCEDURE — 3008F PR BODY MASS INDEX (BMI) DOCUMENTED: ICD-10-PCS | Mod: HCWC,CPTII,S$GLB, | Performed by: PHYSICIAN ASSISTANT

## 2019-06-14 PROCEDURE — 99999 PR PBB SHADOW E&M-EST. PATIENT-LVL III: CPT | Mod: PBBFAC,HCWC,, | Performed by: PHYSICIAN ASSISTANT

## 2019-06-14 PROCEDURE — 99214 OFFICE O/P EST MOD 30 MIN: CPT | Mod: HCWC,S$GLB,, | Performed by: PHYSICIAN ASSISTANT

## 2019-06-14 PROCEDURE — 99499 RISK ADDL DX/OHS AUDIT: ICD-10-PCS | Mod: HCWC,S$GLB,, | Performed by: PHYSICIAN ASSISTANT

## 2019-06-14 RX ORDER — BUPROPION HYDROCHLORIDE 150 MG/1
TABLET, EXTENDED RELEASE ORAL
Qty: 60 TABLET | Refills: 3 | Status: SHIPPED | OUTPATIENT
Start: 2019-06-14 | End: 2019-10-01

## 2019-06-14 RX ORDER — TAMSULOSIN HYDROCHLORIDE 0.4 MG/1
0.4 CAPSULE ORAL DAILY
Qty: 30 CAPSULE | Refills: 3 | Status: SHIPPED | OUTPATIENT
Start: 2019-06-14 | End: 2019-10-01 | Stop reason: SDUPTHER

## 2019-06-14 NOTE — Clinical Note
Ariane this is FYI for you just so you're aware HH orders have been placed-want to make sure doesn't fall through the cracks :)Please place [facility] HH orders for Dr. Finnegan to sign as follows:PT: MS/neurologic gait dysfunction/fall risk twice weekly: eval and treat: gait training, spasticity management, core stability, balance retraining, evaluate for L knee brace due to hyperextensionOT: MS: eval and treat twice weekly: ADL retraining/safety in home, equipment needs, L UE splinting, LUE spasticity managementNursing Aide: assist with ADL's/fall risk 2-3 times weekly

## 2019-06-14 NOTE — PROGRESS NOTES
Subjective:       Patient ID: Mao Levin is a 53 y.o. male who presents today for a routine clinic visit for MS.  [facility] visit to [facility] 2019 with this provider    MS HPI:  · DMT: Rituxan(last dosed 2019)-due 2019  · Side effects from DMT? No  · Taking vitamin D3 as recommended? No -ran out-need to  more   · Would like to have PT and OT Home health to focus on weakness and assist in care-he has just moved to an apt and living alone. Sister comes on weekends to assist with groceries and other needs. Friends check on him during the week  · Needs assistance with bathing/grooming  · Took [employer] to come to appt    SOCIAL HISTORY  Social History     Tobacco Use    Smoking status: Former Smoker     Packs/day: 0.50     Years: 23.00     Pack years: 11.50     Types: Cigarettes     Last attempt to quit: 2018     Years since quittin.0    Smokeless tobacco: Never Used    Tobacco comment: Using patch   Substance Use Topics    Alcohol use: No    Drug use: No     Living arrangements - the patient lives alone(apartment)  Employment disabled    MS ROS:    · Sleep Disturbance: No-sleeping fairly well  · Fatigue: wakes feeling fairly rested but fatigue as the day progresses  · Bladder Dysfunction: Yes-does not have Flomax at present-request refill-admits to not emptying bladder fully  · Bowel Dysfunction: No  · Spasticity: Yes -Baclofen 20mg QID  · Visual Symptoms: No  · Cognitive: Yes - some impairment--trouble with names at times  · Mood Disorder: Yes -   · Gait Disturbance: Yes - walking with standard rolling walker in home -Ampyra  · Falls: No falls in last month  · Hand Dysfunction: Yes - coordination  · Pain: Yes - LE's, back/neck--managed by Dr. Estrella  · Sexual Dysfunction: Not Assessed  · Skin Breakdown: No  · Tremors: No  · Equipment: needs transfer tub bench  · Dysphagia:  No  · Dysarthria:  No  · Heat sensitivity:  Yes   · Any un-met adaptive needs? No  · Copay Assist?  Yes          Objective:      1. 25 foot timed walk:32.40s with standard rolling walker and L AFO: 42.30s with rolling walker and L AFO    Timed 25 Foot Walk: 3/6/2017   Did patient wear an AFO? Yes   Was assistive device used? Yes   Assistive device used (felipe one): Bilateral Assistance   Bilateral device used Walker/Rollator   Time for 25 Foot Walk (seconds) 34.9       Neurologic Exam     Mental Status   Oriented to person, place, and time.   Follows 3 step commands.   Speech: speech is normal   Level of consciousness: alert  Normal comprehension.     Cranial Nerves     CN II   Visual acuity: (20/40 OD and OS with Snellen hand held chart at 6 ft)    CN III, IV, VI   Pupils are equal, round, and reactive to light.  Extraocular motions are normal.     CN V   Facial sensation intact.     CN VII   Facial expression full, symmetric.     CN VIII   Hearing: intact (finger rub)    CN IX, X   Palate: symmetric    CN XI   CN XI normal.     CN XII   Tongue deviation: none    Motor Exam   Muscle bulk: normal  Overall muscle tone: increased  Right arm tone: normal  Left arm tone: spastic  Right leg tone: increased  Left leg tone: spastic    Strength   Right deltoid: 5/5  Right triceps: 5/5  Right wrist extension: 5/5  Right interossei: 5/5  Right iliopsoas: 3/5  Left iliopsoas: 2/5  Right hamstrin/5  Left hamstring: 3/5Decreased AROM LE-shoulder flexion/abd approx 110 degrees, elbow flexion/extension WFL  L UE spastic, hand fisted-uses as gross assist   Significant spasticity bilateral paraspinal muscles     Sensory Exam   Right arm light touch: normal  Left arm light touch: normal  Right leg light touch: normal  Left leg light touch: decreased as compared to R LE.  Right arm vibration: decreased from fingers  Left arm vibration: decreased from fingers  Right leg vibration: decreased from toes  Left leg vibration: decreased from toes    Gait, Coordination, and Reflexes     Gait  Gait: spastic (L AFO(foot drop), L knee hyperextension,  weak hip flexors bilaterally)    Coordination   Finger to nose coordination: abnormal  Heel to shin coordination: abnormal  Tandem walking coordination: abnormal (unable)    Tremor   Resting tremor: absent  Action tremor: absent    Reflexes   Right brachioradialis: 3+  Left brachioradialis: 3+  Right biceps: 3+  Left biceps: 3+  Right triceps: 3+  Left triceps: 3+  Right patellar: 3+  Left patellar: 3+  Right achilles: 3+Unable Heel/toe walk  Impaired RSM             Imaging:       Results for orders placed during the hospital encounter of 11/30/18   MRI Brain Demyelinating W W/O Contrast    Impression 1.  No significant detrimental interval change in the appearance of the brain, noting findings in keeping with patient's reported history of multiple sclerosis.  No evidence of new or enhancing lesions to suggest active demyelination.    2.  Stable appearance of the cervical spine, noting similar distribution and extent of multiple foci of signal abnormality throughout the cervical and visualized upper thoracic cord.  No evidence of new or enhancing lesions to suggest active demyelination.    3.  Degenerative changes of the cervical spine.      Electronically signed by: Babar Harden MD  Date:    12/02/2018  Time:    02:49     Results for orders placed during the hospital encounter of 11/30/18   MRI Cervical Spine Demyelinating W W/O Contrast    Impression 1.  No significant detrimental interval change in the appearance of the brain, noting findings in keeping with patient's reported history of multiple sclerosis.  No evidence of new or enhancing lesions to suggest active demyelination.    2.  Stable appearance of the cervical spine, noting similar distribution and extent of multiple foci of signal abnormality throughout the cervical and visualized upper thoracic cord.  No evidence of new or enhancing lesions to suggest active demyelination.    3.  Degenerative changes of the cervical spine.      Electronically  signed by: Babar Harden MD  Date:    12/02/2018  Time:    02:49     No results found for this or any previous visit.    Labs:     Lab Results   Component Value Date    EGZFPHDE11NV 61 04/10/2018    OBGHXFEW13MR 27 (L) 07/18/2017    SFNZXABU93OK 18 (L) 09/25/2016     Lab Results   Component Value Date    JCVINDEX 3.70 (A) 09/22/2016    JCVANTIBODY Positive (A) 09/22/2016     No results found for: LW1EBANP, ABSOLUTECD3, YE1BUJDX, ABSOLUTECD8, ZS2RMSEK, ABSOLUTECD4, LABCD48  Lab Results   Component Value Date    WBC 6.03 03/18/2019    RBC 3.93 (L) 03/18/2019    HGB 11.8 (L) 03/18/2019    HCT 35.7 (L) 03/18/2019    MCV 91 03/18/2019    MCH 30.0 03/18/2019    MCHC 33.1 03/18/2019    RDW 13.5 03/18/2019     03/18/2019    MPV 10.6 03/18/2019    GRAN 3.2 03/18/2019    GRAN 52.4 03/18/2019    LYMPH 2.2 03/18/2019    LYMPH 36.8 03/18/2019    MONO 0.4 03/18/2019    MONO 7.1 03/18/2019    EOS 0.2 03/18/2019    BASO 0.03 03/18/2019    EOSINOPHIL 3.0 03/18/2019    BASOPHIL 0.5 03/18/2019     Sodium   Date Value Ref Range Status   03/18/2019 139 136 - 145 mmol/L Final     Potassium   Date Value Ref Range Status   03/18/2019 4.0 3.5 - 5.1 mmol/L Final     Chloride   Date Value Ref Range Status   03/18/2019 108 95 - 110 mmol/L Final     CO2   Date Value Ref Range Status   03/18/2019 25 23 - 29 mmol/L Final     Glucose   Date Value Ref Range Status   03/18/2019 86 70 - 110 mg/dL Final     BUN, Bld   Date Value Ref Range Status   03/18/2019 7 6 - 20 mg/dL Final     Creatinine   Date Value Ref Range Status   03/18/2019 0.8 0.5 - 1.4 mg/dL Final     Calcium   Date Value Ref Range Status   03/18/2019 8.2 (L) 8.7 - 10.5 mg/dL Final     Total Protein   Date Value Ref Range Status   03/18/2019 5.1 (L) 6.0 - 8.4 g/dL Final     Albumin   Date Value Ref Range Status   03/18/2019 2.9 (L) 3.5 - 5.2 g/dL Final     Total Bilirubin   Date Value Ref Range Status   03/18/2019 0.3 0.1 - 1.0 mg/dL Final     Comment:     For infants and  newborns, interpretation of results should be based  on gestational age, weight and in agreement with clinical  observations.  Premature Infant recommended reference ranges:  Up to 24 hours.............<8.0 mg/dL  Up to 48 hours............<12.0 mg/dL  3-5 days..................<15.0 mg/dL  6-29 days.................<15.0 mg/dL       Alkaline Phosphatase   Date Value Ref Range Status   03/18/2019 62 55 - 135 U/L Final     AST   Date Value Ref Range Status   03/18/2019 9 (L) 10 - 40 U/L Final     ALT   Date Value Ref Range Status   03/18/2019 11 10 - 44 U/L Final     Anion Gap   Date Value Ref Range Status   03/18/2019 6 (L) 8 - 16 mmol/L Final     eGFR if    Date Value Ref Range Status   03/18/2019 >60.0 >60 mL/min/1.73 m^2 Final     eGFR if non    Date Value Ref Range Status   03/18/2019 >60.0 >60 mL/min/1.73 m^2 Final     Comment:     Calculation used to obtain the estimated glomerular filtration  rate (eGFR) is the CKD-EPI equation.          Diagnosis/Assessment/Plan:    1.  Multiple Sclerosis  · Assessment: Patient's timed walk is  improved.   · Imaging: annual in December 2019  · Disease Modifying Therapies: Rituxan due next month-safety labs ordered along with CMP(Ampyra) and Vit D3.     2.   MS Symptom Assessment / Management  · Gait Disturbance:  PT -gait training, spasticity management, core stability, knee brace secondary to hyperextension L knee  · UE Dysfunction:  OT-ADL's, equipment, L UE function/spasticity management  · Nursing Aide: ADL's  · Spasticity: referral placed to Botox approval for paraspinal muscles(200 units)-insurance auth placed today               3.  Smoking Cessation: ordered placed again for smoking cessation protocol     Our visit today lasted 60 minutes, and 100% of this time was spent face to face with the patient. Over 50% of this visit included discussion of the treatment plan/medication changes/symptom management/exam  findings/imaging/coordination of care.   Follow up in about 3 months (around 9/14/2019) for follow up with Dr. Finnegan.   Botox with me in July-  Patient agreed to POC today.    Attending, Dr. Finnegan, was available during today's encounter.     Lawanda Boyce PA-C  [facility]        Problem List Items Addressed This Visit        Renal/    Neurogenic bladder (Chronic)       Other    Impaired mobility and ADLs (Chronic)    Spasticity      Other Visit Diagnoses     Multiple sclerosis  (Chronic)   -  Primary    stable on Rituxan    Relevant Medications    buPROPion (WELLBUTRIN SR) 150 MG TBSR 12 hr tablet    Other Relevant Orders    Comprehensive metabolic panel    Prior Authorization Order    Vitamin D insufficiency        Relevant Orders    Vitamin D    Tobacco use        Relevant Medications    buPROPion (WELLBUTRIN SR) 150 MG TBSR 12 hr tablet    Paraspinal muscle spasm        Relevant Orders    Prior Authorization Order    BPH without urinary obstruction        Relevant Medications    tamsulosin (FLOMAX) 0.4 mg Cap    Counseling regarding goals of care        Encounter for long-term (current) use of high-risk medication        Neuropathic pain        Neurologic gait dysfunction        Immunosuppressed status

## 2019-06-17 DIAGNOSIS — Z74.09 IMPAIRED MOBILITY AND ADLS: ICD-10-CM

## 2019-06-17 DIAGNOSIS — G35 MULTIPLE SCLEROSIS: Primary | ICD-10-CM

## 2019-06-17 DIAGNOSIS — Z78.9 IMPAIRED MOBILITY AND ADLS: ICD-10-CM

## 2019-06-19 ENCOUNTER — TELEPHONE (OUTPATIENT)
Dept: NEUROLOGY | Facility: CLINIC | Age: 54
End: 2019-06-19

## 2019-06-20 ENCOUNTER — TELEPHONE (OUTPATIENT)
Dept: PSYCHIATRY | Facility: CLINIC | Age: 54
End: 2019-06-20

## 2019-06-26 ENCOUNTER — TELEPHONE (OUTPATIENT)
Dept: PHYSICAL MEDICINE AND REHAB | Facility: CLINIC | Age: 54
End: 2019-06-26

## 2019-06-26 NOTE — TELEPHONE ENCOUNTER
----- Message from Ariane Freitas LCSW-BACS sent at 6/26/2019  1:03 PM CDT -----  Regarding: RE: Mobility Evaluation  Great!  Thank you!    ----- Message -----  From: Shu Corea MA  Sent: 6/26/2019  11:58 AM  To: JOYCELYN Rhodes  Subject: RE: Mobility Evaluation                          Patient has an appt to see the doctor on 7/11/19. Will discuss at next clinic visit.     ----- Message -----  From: Ariane Freitas LCSW-BACS  Sent: 6/24/2019   9:07 AM  To: Kymberly Estrella MD, Delores Traylor Staff  Subject: Mobility Evaluation                              Good morning, Dr. Estrella.  Pt is now living in his own apartment and could use a power wheelchair in that home (unlike when he was living at his mother's home).  Wondering if you're able to complete a mobility evaluation or at least completed the MD portion of that eval OR if you'd prefer he complete this with Dr. Davis.  Thoughts?  Ariane  ( [facility])

## 2019-06-27 ENCOUNTER — TELEPHONE (OUTPATIENT)
Dept: NEUROLOGY | Facility: CLINIC | Age: 54
End: 2019-06-27

## 2019-06-27 NOTE — TELEPHONE ENCOUNTER
----- Message from Edelmira De Oliveira sent at 6/27/2019 10:32 AM CDT -----  Contact: self @ [phone]  Pt says he is returning Carla's call.

## 2019-07-09 ENCOUNTER — TELEPHONE (OUTPATIENT)
Dept: PHARMACY | Facility: CLINIC | Age: 54
End: 2019-07-09

## 2019-07-09 ENCOUNTER — TELEPHONE (OUTPATIENT)
Dept: PHYSICAL MEDICINE AND REHAB | Facility: CLINIC | Age: 54
End: 2019-07-09

## 2019-07-09 NOTE — TELEPHONE ENCOUNTER
Call attempt 1 for refill and adherence followup for patient's dalfampridine. LVM. MyChart not active.

## 2019-07-09 NOTE — TELEPHONE ENCOUNTER
Pt was unaware of appt on 07/11/2019, reverified with pt, informed him that there are no appts open tmrw.  Pt verbalized understanding.    ----- Message from Adamaris Devi sent at 7/9/2019  4:50 PM CDT -----  Contact: riana @ [phone]  Asking to have his appt 7/11 rescheduled to 7/10 with Dr. Estrella, says this will help him with transportation. Please call.

## 2019-07-10 ENCOUNTER — TELEPHONE (OUTPATIENT)
Dept: NEUROLOGY | Facility: CLINIC | Age: 54
End: 2019-07-10

## 2019-07-10 NOTE — TELEPHONE ENCOUNTER
----- Message from William Marcelo sent at 7/10/2019  8:10 AM CDT -----  Needs Advice    Reason for call: Pt is calling to move botox appt from today to tomorrow        Communication Preference: [phone]    Additional Information:

## 2019-07-11 ENCOUNTER — OFFICE VISIT (OUTPATIENT)
Dept: PHYSICAL MEDICINE AND REHAB | Facility: CLINIC | Age: 54
End: 2019-07-11
Payer: MEDICARE

## 2019-07-11 ENCOUNTER — PROCEDURE VISIT (OUTPATIENT)
Dept: NEUROLOGY | Facility: CLINIC | Age: 54
End: 2019-07-11
Payer: MEDICARE

## 2019-07-11 ENCOUNTER — TELEPHONE (OUTPATIENT)
Dept: PHYSICAL MEDICINE AND REHAB | Facility: CLINIC | Age: 54
End: 2019-07-11

## 2019-07-11 VITALS
BODY MASS INDEX: 22.84 KG/M2 | HEART RATE: 57 BPM | DIASTOLIC BLOOD PRESSURE: 82 MMHG | WEIGHT: 163.13 LBS | SYSTOLIC BLOOD PRESSURE: 141 MMHG | HEIGHT: 71 IN

## 2019-07-11 VITALS
SYSTOLIC BLOOD PRESSURE: 147 MMHG | HEIGHT: 71 IN | DIASTOLIC BLOOD PRESSURE: 94 MMHG | BODY MASS INDEX: 25.1 KG/M2 | HEART RATE: 61 BPM

## 2019-07-11 DIAGNOSIS — R53.1 WEAKNESS GENERALIZED: ICD-10-CM

## 2019-07-11 DIAGNOSIS — M54.42 CHRONIC BILATERAL LOW BACK PAIN WITH BILATERAL SCIATICA: ICD-10-CM

## 2019-07-11 DIAGNOSIS — G35 MULTIPLE SCLEROSIS: ICD-10-CM

## 2019-07-11 DIAGNOSIS — G35 ACUTE RELAPSING MULTIPLE SCLEROSIS: ICD-10-CM

## 2019-07-11 DIAGNOSIS — G82.20 PARAPARESIS: ICD-10-CM

## 2019-07-11 DIAGNOSIS — R25.2 SPASTICITY: ICD-10-CM

## 2019-07-11 DIAGNOSIS — G35 MS (MULTIPLE SCLEROSIS): Primary | Chronic | ICD-10-CM

## 2019-07-11 DIAGNOSIS — G89.29 CHRONIC BILATERAL LOW BACK PAIN WITH BILATERAL SCIATICA: ICD-10-CM

## 2019-07-11 DIAGNOSIS — Z74.09 IMPAIRED MOBILITY AND ADLS: ICD-10-CM

## 2019-07-11 DIAGNOSIS — Z78.9 IMPAIRED MOBILITY AND ADLS: ICD-10-CM

## 2019-07-11 DIAGNOSIS — Z79.891 OPIOID USE AGREEMENT EXISTS: ICD-10-CM

## 2019-07-11 DIAGNOSIS — G35 MULTIPLE SCLEROSIS: Primary | ICD-10-CM

## 2019-07-11 DIAGNOSIS — M62.830 PARASPINAL MUSCLE SPASM: ICD-10-CM

## 2019-07-11 DIAGNOSIS — M54.41 CHRONIC BILATERAL LOW BACK PAIN WITH BILATERAL SCIATICA: ICD-10-CM

## 2019-07-11 DIAGNOSIS — R27.8 ABNORMAL COORDINATION: ICD-10-CM

## 2019-07-11 DIAGNOSIS — M62.838 MUSCLE SPASTICITY: ICD-10-CM

## 2019-07-11 PROCEDURE — 64647 CHEMODENERV TRUNK MUSC 6/>: CPT | Mod: HCWC,S$GLB,, | Performed by: PHYSICIAN ASSISTANT

## 2019-07-11 PROCEDURE — 99215 OFFICE O/P EST HI 40 MIN: CPT | Mod: HCWC,S$GLB,, | Performed by: PHYSICAL MEDICINE & REHABILITATION

## 2019-07-11 PROCEDURE — 99999 PR PBB SHADOW E&M-EST. PATIENT-LVL III: ICD-10-PCS | Mod: PBBFAC,HCWC,, | Performed by: PHYSICAL MEDICINE & REHABILITATION

## 2019-07-11 PROCEDURE — 95874 GUIDE NERV DESTR NEEDLE EMG: CPT | Mod: HCWC,S$GLB,, | Performed by: PHYSICIAN ASSISTANT

## 2019-07-11 PROCEDURE — 99499 UNLISTED E&M SERVICE: CPT | Mod: HCWC,S$GLB,, | Performed by: PHYSICAL MEDICINE & REHABILITATION

## 2019-07-11 PROCEDURE — 95874 PR NEEDLE EMG GUIDANCE FOR CHEMODENERVATION: ICD-10-PCS | Mod: HCWC,S$GLB,, | Performed by: PHYSICIAN ASSISTANT

## 2019-07-11 PROCEDURE — 3008F PR BODY MASS INDEX (BMI) DOCUMENTED: ICD-10-PCS | Mod: HCWC,CPTII,S$GLB, | Performed by: PHYSICAL MEDICINE & REHABILITATION

## 2019-07-11 PROCEDURE — 99499 RISK ADDL DX/OHS AUDIT: ICD-10-PCS | Mod: HCWC,S$GLB,, | Performed by: PHYSICAL MEDICINE & REHABILITATION

## 2019-07-11 PROCEDURE — 99999 PR PBB SHADOW E&M-EST. PATIENT-LVL III: CPT | Mod: PBBFAC,HCWC,, | Performed by: PHYSICAL MEDICINE & REHABILITATION

## 2019-07-11 PROCEDURE — 3008F BODY MASS INDEX DOCD: CPT | Mod: HCWC,CPTII,S$GLB, | Performed by: PHYSICAL MEDICINE & REHABILITATION

## 2019-07-11 PROCEDURE — 64647 PR CHEMODENERV TRUNK MUSCLE(S); 6/> MUSCLES: ICD-10-PCS | Mod: HCWC,S$GLB,, | Performed by: PHYSICIAN ASSISTANT

## 2019-07-11 PROCEDURE — 99215 PR OFFICE/OUTPT VISIT, EST, LEVL V, 40-54 MIN: ICD-10-PCS | Mod: HCWC,S$GLB,, | Performed by: PHYSICAL MEDICINE & REHABILITATION

## 2019-07-11 RX ORDER — HYDROCODONE BITARTRATE AND ACETAMINOPHEN 10; 325 MG/1; MG/1
1 TABLET ORAL EVERY 6 HOURS PRN
Qty: 120 TABLET | Refills: 0 | Status: SHIPPED | OUTPATIENT
Start: 2019-07-11 | End: 2019-07-26

## 2019-07-11 RX ORDER — DULOXETIN HYDROCHLORIDE 30 MG/1
30 CAPSULE, DELAYED RELEASE ORAL DAILY
Qty: 30 CAPSULE | Refills: 11 | Status: SHIPPED | OUTPATIENT
Start: 2019-07-11 | End: 2019-10-01 | Stop reason: SDUPTHER

## 2019-07-11 RX ORDER — DIAZEPAM 5 MG/1
TABLET ORAL
Qty: 45 TABLET | Refills: 0 | Status: SHIPPED | OUTPATIENT
Start: 2019-07-11 | End: 2019-07-26

## 2019-07-11 RX ORDER — DIAZEPAM 5 MG/1
TABLET ORAL
Qty: 45 TABLET | Refills: 0 | Status: SHIPPED | OUTPATIENT
Start: 2019-08-09 | End: 2019-10-01 | Stop reason: SDUPTHER

## 2019-07-11 RX ORDER — HYDROCODONE BITARTRATE AND ACETAMINOPHEN 10; 325 MG/1; MG/1
1 TABLET ORAL EVERY 6 HOURS PRN
Qty: 120 TABLET | Refills: 0 | Status: SHIPPED | OUTPATIENT
Start: 2019-08-09 | End: 2019-08-23

## 2019-07-11 RX ORDER — DIAZEPAM 5 MG/1
TABLET ORAL
Qty: 45 TABLET | Refills: 0 | Status: SHIPPED | OUTPATIENT
Start: 2019-07-26 | End: 2019-08-09

## 2019-07-11 RX ORDER — HYDROCODONE BITARTRATE AND ACETAMINOPHEN 10; 325 MG/1; MG/1
1 TABLET ORAL EVERY 6 HOURS PRN
Qty: 120 TABLET | Refills: 0 | Status: SHIPPED | OUTPATIENT
Start: 2019-07-26 | End: 2019-08-01 | Stop reason: SDUPTHER

## 2019-07-11 RX ORDER — BACLOFEN 20 MG/1
20 TABLET ORAL 4 TIMES DAILY
Qty: 120 TABLET | Refills: 11 | Status: SHIPPED | OUTPATIENT
Start: 2019-07-11 | End: 2019-10-01 | Stop reason: SDUPTHER

## 2019-07-11 NOTE — PROCEDURES
Procedures   Procedure Note:  Botox Injection   Indication: Paraspinal Muscle Spasm, MS    Risks and benefits of the procedure were described to the patient, and informed consent was obtained by provider.  Time out was performed by Carla Starr MA  Botox Type A(Lot No.  C3) was diluted with preservative free normal saline to a concentration of 33.3 Units per cc    The skin of the patient's mid thoracic to lumbar regions was swabbed bilaterally with alcohol, and then using EMG guidance bilaterally with each injection, the mid thoracic (T4) to lumbar region(L3-4)was injected bilaterally a total of 12 sites with a 25 gauge needle for a total of 200 units with no waste.    The patient tolerated the procedure well.  Pain Paraspinal Muscles pre-procedure 9/10    Lawanda Boyce PA-C  [facility]

## 2019-07-11 NOTE — PROGRESS NOTES
"Subjective:       Patient ID: Mao Levin is a 53 y.o. male.    Chief Complaint: Multiple Sclerosis and mobility evaluation     Mao Levin is 54 y/o male who returns to clinic for face to face evaluation for  Mobility  Evaluation,   He is requesting POWER WHEEL CHAIR.  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   His Left leg is weaker than Rt leg.    Has b/l foot weakness and drop, and wears left AFO.  Back pain  Is associated with spasticity, that is running down to both legs, back of thighs, and pain/ muscle spasm in leg is worst bellow the knee level, in calves that are tight, and very weak.  He states that he is able to stand less than a minute unless he holds for something  In front of him,otherwise,  he needs to sit down, otherwise he will fall on floor.   He is able to walk less than 20 ft , very slow, and unstable, with scissoring gait,and  it takes him a lot of time and he gets leg cramps. Gets tired easily. Has no endurance whatsoever.  He is mostly wheel chair bound. . Has an old manual WC, that he is not able to push on his own secondary to arm / hands weakness.  He was in [facility], until he moved in apartment closer to his mother, that helped him a lot.   But, most recently he moved in apartment alone, and now he needs to get power WC, to improve his mobility and independency.   He needs mod.Ass for ambulation, using gold   Would like to have PT and OT Home health to focus on weakness and assist in care-he has just moved to an apt and living alone. Sister comes on weekends to assist with groceries and other needs. F  riends check on him during the week  Needs assistance with bathing/grooming, and almost all ADLs.  He takes UBER to get around, and using an old "borrowed RW inside house/apartment.      He has a chronic multiple MSK " "pain, in lower back, neck, multiple joints that include: shoulder, knee, and hand pain.   Patient is under very strict follow up, given pain medication every 14 days, secondary to reported aberrant behavior ( per family member), although pt denies.  Considering that this pt with advanced MS, with significant spasticity needs these medications, for spasticity and pain management, he was not d/c'd from clinic but   Instead called  and placed under special monitoring program.   He was getting medication ( hard copy prescriptions ) every 2 weeks.   Brief history:   Patient is a 53 yo male with MS diagnosed in 2006 as well as chronic, generalized pain   He is mostly wheel chair bound..  Pain occurs from the neck down and involves the entire body except the head. No specific area is worse. Pain is "achy" and "tingling".   It is constant at 7-8/10 but can worsen with any movement to 10/10.   Today he c/o pain in both arm/hands in all fingers, they feel sore and tight.   Back pain is running down to both legs, back of thighs, and pain in leg is worst bellow the knee level, in calves that are tight, and weak.  His Left leg is weaker than Rt leg.   Has foot b/l foot weakness and drop, wears left AFO.  He has been a prolong time on chronic pain management with oxycodone 15mg q6h and oxycontin 40 mg TWICE DAILY ( while in [facility] Rehab) but did not feel that this was any more helpful.   Tried gabapentin in the past which was tolerated but didn't help.   He also takes diazepam 5mg TWICE DAILY and baclofen 10mg TID which helps his spasticity.    In NH, he was taking  Tramadol and Oxycodone 5 mg Q8 hrs prn pain.  He states that Tramadol is ineffective.  He is coming today, w/o  D/c papers, medical documentation from NH.   Pain Medications:  Percocet 10/325mg, 1 tablet 3x a day  Gabapentin 300mg, 3 tablets three times daily  Valium 5mg, 1 tablet twice daily  Baclofen 20 mg QID  Xarelto 20mg, 1 tablet daily    He is here for chronic pain " management with opioids.      Imaging:   I reviewed C-spine and T-spine MRI which showed demylenating changes as well as fairly significant stenosis throughout the cervical spine.   He is here for follow up, and treatment.    Past Medical History:   Diagnosis Date    Anticoagulant long-term use     Anxiety     Chronic back pain     Deep vein thrombosis     Depression     Depression     Gait instability     Multiple sclerosis     Multiple sclerosis     Neurogenic bladder     Polyneuropathy     Pulmonary embolism     Spasticity        Past Surgical History:   Procedure Laterality Date    CYSTOSCOPY N/A 2018    [facility]    INJECTION, BOTULINUM TOXIN, TYPE A 200 UNITS N/A 2018    Performed by [facility] at [facility] OR [facility]       Family History   Problem Relation Age of Onset    Arthritis Mother     No Known Problems Father     Cancer Maternal Grandfather        Social History     Socioeconomic History    Marital status: Single     Spouse name: Not on file    Number of children: Not on file    Years of education: Not on file    Highest education level: Not on file   Occupational History    Not on file   Social Needs    Financial resource strain: Not on file    Food insecurity:     Worry: Not on file     Inability: Not on file    Transportation needs:     Medical: Not on file     Non-medical: Not on file   Tobacco Use    Smoking status: Former Smoker     Packs/day: 0.50     Years: 23.00     Pack years: 11.50     Types: Cigarettes     Last attempt to quit: 2018     Years since quittin.1    Smokeless tobacco: Never Used    Tobacco comment: Using patch   Substance and Sexual Activity    Alcohol use: No    Drug use: No    Sexual activity: Yes     Partners: Female   Lifestyle    Physical activity:     Days per week: Not on file     Minutes per session: Not on file    Stress: Not on file   Relationships    Social connections:      Talks on phone: Not on file     Gets together: Not on file     Attends [employer] service: Not on file     Active member of club or organization: Not on file     Attends meetings of clubs or organizations: Not on file     Relationship status: Not on file   Other Topics Concern    Not on file   Social History Narrative    Not on file       Current Outpatient Medications   Medication Sig Dispense Refill    baclofen (LIORESAL) 20 MG tablet Take 1 tablet (20 mg total) by mouth 4 (four) times daily. 120 tablet 11    buPROPion (WELLBUTRIN SR) 150 MG TBSR 12 hr tablet Take 1 tablet by mouth once daily for 3 days, then increase to 1 tablet by mouth twice daily; last dose no later than 6PM; stop smoking after 5-7 days of treatment 60 tablet 3    dalfampridine (AMPYRA) 10 mg Tb12 Take 1 tablet by mouth every 12 (twelve) hours. 180 tablet 1    diazePAM (VALIUM) 5 MG tablet Take 1 tablet by mouth 3 times daily for 15 days. 45 tablet 0    [START ON 7/26/2019] diazePAM (VALIUM) 5 MG tablet Take 1 tablet by mouth 3 times daily for 15 days. 45 tablet 0    [START ON 8/9/2019] diazePAM (VALIUM) 5 MG tablet Take 1 tablet by mouth 3 times daily for 15 days. 45 tablet 0    DULoxetine (CYMBALTA) 30 MG capsule Take 1 capsule (30 mg total) by mouth once daily. 30 capsule 11    ergocalciferol (VITAMIN D2) 50,000 unit Cap Take 1 capsule (50,000 Units total) by mouth every 7 days. (Patient taking differently: Take 50,000 Units by mouth every 7 days. on friday) 4 capsule 11    gabapentin (NEURONTIN) 400 MG capsule Take 1 capsule (400 mg total) by mouth 4 (four) times daily. 120 capsule 11    HYDROcodone-acetaminophen (NORCO)  mg per tablet Take 1 tablet by mouth every 6 (six) hours as needed. 120 tablet 0    [START ON 7/26/2019] HYDROcodone-acetaminophen (NORCO)  mg per tablet Take 1 tablet by mouth every 6 (six) hours as needed. 120 tablet 0    [START ON 8/9/2019] HYDROcodone-acetaminophen (NORCO)  mg per tablet  Take 1 tablet by mouth every 6 (six) hours as needed. 120 tablet 0    HYDROcodone-acetaminophen (NORCO)  mg per tablet take 1 tablet by mouth every 6 hours as needed 120 tablet 0    rivaroxaban (XARELTO) 20 mg Tab Take 1 tablet (20 mg total) by mouth daily with dinner or evening meal. 30 tablet 3    tamsulosin (FLOMAX) 0.4 mg Cap Take 1 capsule (0.4 mg total) by mouth once daily. 30 capsule 3     No current facility-administered medications for this visit.      Review of patient's allergies indicates:  No Known Allergies    Review of Systems   Constitutional: Negative for appetite change and fatigue.   Eyes: Negative for visual disturbance.   Respiratory: Negative for shortness of breath.    Cardiovascular: Negative for chest pain.   Gastrointestinal: Negative for constipation and diarrhea.   Genitourinary: Negative for dysuria, frequency and urgency.   Musculoskeletal: Positive for back pain, gait problem, myalgias and neck pain. Negative for arthralgias, joint swelling and neck stiffness.   Neurological: Negative for dizziness, tremors, weakness, numbness and headaches.   Psychiatric/Behavioral: Negative for dysphoric mood.   All other systems reviewed and are negative.        Objective:      Physical Exam      Constitutional: He is oriented to person, place, and time.   He appears well-nourished.   Eyes: EOM are normal. Pupils are equal, round, and reactive to light.   Neck: Normal range of motion. Neck supple.   Cardiovascular: Normal rhythm  and rate.    Pulmonary/Chest: Effort normal.   Abdominal: Soft.   Musculoskeletal:   Gait: spastic, walks hardly, very slow, dragging feet, very unstable.  BUEs AROM  decreased  BLEs AROM is diminished   Neurological: He is oriented to person, place, and time.   BUEs 4/5  RLE 3/5 HF, 4-/5 HE, 3+/5 KE/KF/DF  LLE 3-/5 HF, 3+/5 HE, 3-/5 KE, 2/5 KF, 0/5 DF .  wears left AFO.  Skin: No rash noted.   Psychiatric: He has a normal mood and affect.       Assessment:            1. MS (multiple sclerosis)    2. Paraparesis    3. Abnormal coordination    4. Impaired mobility and ADLs    5. Acute relapsing multiple sclerosis    6. Muscle spasticity    7. Chronic bilateral low back pain with bilateral sciatica    8. Opioid use agreement exists    9. Weakness generalized    10. Multiple sclerosis    11. Spasticity            Plan:        MS (multiple sclerosis)  -     HYDROcodone-acetaminophen (NORCO)  mg per tablet; Take 1 tablet by mouth every 6 (six) hours as needed.  Dispense: 120 tablet; Refill: 0  -     HYDROcodone-acetaminophen (NORCO)  mg per tablet; Take 1 tablet by mouth every 6 (six) hours as needed.  Dispense: 120 tablet; Refill: 0  -     HYDROcodone-acetaminophen (NORCO)  mg per tablet; Take 1 tablet by mouth every 6 (six) hours as needed.  Dispense: 120 tablet; Refill: 0  -     diazePAM (VALIUM) 5 MG tablet; Take 1 tablet by mouth 3 times daily for 15 days.  Dispense: 45 tablet; Refill: 0  -     diazePAM (VALIUM) 5 MG tablet; Take 1 tablet by mouth 3 times daily for 15 days.  Dispense: 45 tablet; Refill: 0  -     diazePAM (VALIUM) 5 MG tablet; Take 1 tablet by mouth 3 times daily for 15 days.  Dispense: 45 tablet; Refill: 0  -     DULoxetine (CYMBALTA) 30 MG capsule; Take 1 capsule (30 mg total) by mouth once daily.  Dispense: 30 capsule; Refill: 11  -     HME - OTHER    Paraparesis  -     HYDROcodone-acetaminophen (NORCO)  mg per tablet; Take 1 tablet by mouth every 6 (six) hours as needed.  Dispense: 120 tablet; Refill: 0  -     HYDROcodone-acetaminophen (NORCO)  mg per tablet; Take 1 tablet by mouth every 6 (six) hours as needed.  Dispense: 120 tablet; Refill: 0  -     HYDROcodone-acetaminophen (NORCO)  mg per tablet; Take 1 tablet by mouth every 6 (six) hours as needed.  Dispense: 120 tablet; Refill: 0  -     diazePAM (VALIUM) 5 MG tablet; Take 1 tablet by mouth 3 times daily for 15 days.  Dispense: 45 tablet; Refill: 0  -     diazePAM  (VALIUM) 5 MG tablet; Take 1 tablet by mouth 3 times daily for 15 days.  Dispense: 45 tablet; Refill: 0  -     diazePAM (VALIUM) 5 MG tablet; Take 1 tablet by mouth 3 times daily for 15 days.  Dispense: 45 tablet; Refill: 0  -     DULoxetine (CYMBALTA) 30 MG capsule; Take 1 capsule (30 mg total) by mouth once daily.  Dispense: 30 capsule; Refill: 11  -     HME - OTHER    Abnormal coordination  -     HYDROcodone-acetaminophen (NORCO)  mg per tablet; Take 1 tablet by mouth every 6 (six) hours as needed.  Dispense: 120 tablet; Refill: 0  -     HYDROcodone-acetaminophen (NORCO)  mg per tablet; Take 1 tablet by mouth every 6 (six) hours as needed.  Dispense: 120 tablet; Refill: 0  -     HYDROcodone-acetaminophen (NORCO)  mg per tablet; Take 1 tablet by mouth every 6 (six) hours as needed.  Dispense: 120 tablet; Refill: 0  -     diazePAM (VALIUM) 5 MG tablet; Take 1 tablet by mouth 3 times daily for 15 days.  Dispense: 45 tablet; Refill: 0  -     diazePAM (VALIUM) 5 MG tablet; Take 1 tablet by mouth 3 times daily for 15 days.  Dispense: 45 tablet; Refill: 0  -     diazePAM (VALIUM) 5 MG tablet; Take 1 tablet by mouth 3 times daily for 15 days.  Dispense: 45 tablet; Refill: 0  -     DULoxetine (CYMBALTA) 30 MG capsule; Take 1 capsule (30 mg total) by mouth once daily.  Dispense: 30 capsule; Refill: 11  -     HME - OTHER    Impaired mobility and ADLs  -     HYDROcodone-acetaminophen (NORCO)  mg per tablet; Take 1 tablet by mouth every 6 (six) hours as needed.  Dispense: 120 tablet; Refill: 0  -     HYDROcodone-acetaminophen (NORCO)  mg per tablet; Take 1 tablet by mouth every 6 (six) hours as needed.  Dispense: 120 tablet; Refill: 0  -     HYDROcodone-acetaminophen (NORCO)  mg per tablet; Take 1 tablet by mouth every 6 (six) hours as needed.  Dispense: 120 tablet; Refill: 0  -     diazePAM (VALIUM) 5 MG tablet; Take 1 tablet by mouth 3 times daily for 15 days.  Dispense: 45 tablet;  Refill: 0  -     diazePAM (VALIUM) 5 MG tablet; Take 1 tablet by mouth 3 times daily for 15 days.  Dispense: 45 tablet; Refill: 0  -     diazePAM (VALIUM) 5 MG tablet; Take 1 tablet by mouth 3 times daily for 15 days.  Dispense: 45 tablet; Refill: 0  -     DULoxetine (CYMBALTA) 30 MG capsule; Take 1 capsule (30 mg total) by mouth once daily.  Dispense: 30 capsule; Refill: 11  -     HME - OTHER    Acute relapsing multiple sclerosis  -     HYDROcodone-acetaminophen (NORCO)  mg per tablet; Take 1 tablet by mouth every 6 (six) hours as needed.  Dispense: 120 tablet; Refill: 0  -     HYDROcodone-acetaminophen (NORCO)  mg per tablet; Take 1 tablet by mouth every 6 (six) hours as needed.  Dispense: 120 tablet; Refill: 0  -     HYDROcodone-acetaminophen (NORCO)  mg per tablet; Take 1 tablet by mouth every 6 (six) hours as needed.  Dispense: 120 tablet; Refill: 0  -     diazePAM (VALIUM) 5 MG tablet; Take 1 tablet by mouth 3 times daily for 15 days.  Dispense: 45 tablet; Refill: 0  -     diazePAM (VALIUM) 5 MG tablet; Take 1 tablet by mouth 3 times daily for 15 days.  Dispense: 45 tablet; Refill: 0  -     diazePAM (VALIUM) 5 MG tablet; Take 1 tablet by mouth 3 times daily for 15 days.  Dispense: 45 tablet; Refill: 0  -     DULoxetine (CYMBALTA) 30 MG capsule; Take 1 capsule (30 mg total) by mouth once daily.  Dispense: 30 capsule; Refill: 11  -     HME - OTHER    Muscle spasticity  -     HYDROcodone-acetaminophen (NORCO)  mg per tablet; Take 1 tablet by mouth every 6 (six) hours as needed.  Dispense: 120 tablet; Refill: 0  -     HYDROcodone-acetaminophen (NORCO)  mg per tablet; Take 1 tablet by mouth every 6 (six) hours as needed.  Dispense: 120 tablet; Refill: 0  -     HYDROcodone-acetaminophen (NORCO)  mg per tablet; Take 1 tablet by mouth every 6 (six) hours as needed.  Dispense: 120 tablet; Refill: 0  -     diazePAM (VALIUM) 5 MG tablet; Take 1 tablet by mouth 3 times daily for 15  days.  Dispense: 45 tablet; Refill: 0  -     diazePAM (VALIUM) 5 MG tablet; Take 1 tablet by mouth 3 times daily for 15 days.  Dispense: 45 tablet; Refill: 0  -     diazePAM (VALIUM) 5 MG tablet; Take 1 tablet by mouth 3 times daily for 15 days.  Dispense: 45 tablet; Refill: 0  -     DULoxetine (CYMBALTA) 30 MG capsule; Take 1 capsule (30 mg total) by mouth once daily.  Dispense: 30 capsule; Refill: 11  -     HME - OTHER    Chronic bilateral low back pain with bilateral sciatica  -     HYDROcodone-acetaminophen (NORCO)  mg per tablet; Take 1 tablet by mouth every 6 (six) hours as needed.  Dispense: 120 tablet; Refill: 0  -     HYDROcodone-acetaminophen (NORCO)  mg per tablet; Take 1 tablet by mouth every 6 (six) hours as needed.  Dispense: 120 tablet; Refill: 0  -     HYDROcodone-acetaminophen (NORCO)  mg per tablet; Take 1 tablet by mouth every 6 (six) hours as needed.  Dispense: 120 tablet; Refill: 0  -     diazePAM (VALIUM) 5 MG tablet; Take 1 tablet by mouth 3 times daily for 15 days.  Dispense: 45 tablet; Refill: 0  -     diazePAM (VALIUM) 5 MG tablet; Take 1 tablet by mouth 3 times daily for 15 days.  Dispense: 45 tablet; Refill: 0  -     diazePAM (VALIUM) 5 MG tablet; Take 1 tablet by mouth 3 times daily for 15 days.  Dispense: 45 tablet; Refill: 0  -     DULoxetine (CYMBALTA) 30 MG capsule; Take 1 capsule (30 mg total) by mouth once daily.  Dispense: 30 capsule; Refill: 11  -     HME - OTHER    Opioid use agreement exists  -     HYDROcodone-acetaminophen (NORCO)  mg per tablet; Take 1 tablet by mouth every 6 (six) hours as needed.  Dispense: 120 tablet; Refill: 0  -     HYDROcodone-acetaminophen (NORCO)  mg per tablet; Take 1 tablet by mouth every 6 (six) hours as needed.  Dispense: 120 tablet; Refill: 0  -     HYDROcodone-acetaminophen (NORCO)  mg per tablet; Take 1 tablet by mouth every 6 (six) hours as needed.  Dispense: 120 tablet; Refill: 0  -     diazePAM (VALIUM) 5  MG tablet; Take 1 tablet by mouth 3 times daily for 15 days.  Dispense: 45 tablet; Refill: 0  -     diazePAM (VALIUM) 5 MG tablet; Take 1 tablet by mouth 3 times daily for 15 days.  Dispense: 45 tablet; Refill: 0  -     diazePAM (VALIUM) 5 MG tablet; Take 1 tablet by mouth 3 times daily for 15 days.  Dispense: 45 tablet; Refill: 0  -     DULoxetine (CYMBALTA) 30 MG capsule; Take 1 capsule (30 mg total) by mouth once daily.  Dispense: 30 capsule; Refill: 11  -     HME - OTHER    Weakness generalized  -     HYDROcodone-acetaminophen (NORCO)  mg per tablet; Take 1 tablet by mouth every 6 (six) hours as needed.  Dispense: 120 tablet; Refill: 0  -     HYDROcodone-acetaminophen (NORCO)  mg per tablet; Take 1 tablet by mouth every 6 (six) hours as needed.  Dispense: 120 tablet; Refill: 0  -     HYDROcodone-acetaminophen (NORCO)  mg per tablet; Take 1 tablet by mouth every 6 (six) hours as needed.  Dispense: 120 tablet; Refill: 0  -     diazePAM (VALIUM) 5 MG tablet; Take 1 tablet by mouth 3 times daily for 15 days.  Dispense: 45 tablet; Refill: 0  -     diazePAM (VALIUM) 5 MG tablet; Take 1 tablet by mouth 3 times daily for 15 days.  Dispense: 45 tablet; Refill: 0  -     diazePAM (VALIUM) 5 MG tablet; Take 1 tablet by mouth 3 times daily for 15 days.  Dispense: 45 tablet; Refill: 0  -     DULoxetine (CYMBALTA) 30 MG capsule; Take 1 capsule (30 mg total) by mouth once daily.  Dispense: 30 capsule; Refill: 11  -     HME - OTHER    Multiple sclerosis  -     baclofen (LIORESAL) 20 MG tablet; Take 1 tablet (20 mg total) by mouth 4 (four) times daily.  Dispense: 120 tablet; Refill: 11  -     HME - OTHER    Spasticity  -     baclofen (LIORESAL) 20 MG tablet; Take 1 tablet (20 mg total) by mouth 4 (four) times daily.  Dispense: 120 tablet; Refill: 11  -     HME - OTHER      Patient with MS, weakness in UE/LE, and neuropathic pain, weakness and spasticity In LE> UE,    He also has chronic pain syndrome, chronic  back and neck pain, with B/l LE weakness, Lt >> Rt, with impaired mobility, and ADLs.     -  POWER WHEEL CHAIR    Rx: Power wheel chair.  Lifetime need.  Face-to-face: 07/11/19.    - The patient was seen today for mobility evaluation for a powered mobility device due to significant impairment of gait and ADL's at home.   - The patient has multifactorial gait impairment, due to progressive MS,  dx 2006.  Complicated with weakness in arms/ leg associated with Cervical myelopathy.  - The patient is not able to ambulate safely to the kitchen or living room, he is able to wallk 15- 20 ft with RW, cannot make from one room to another safely, very unsteady.  - The patient is unable to use cane or walker for functional distances due to MS, bilateral  leg and arms/hands  Weakness and spasticity, associated with numbness and pain.  - The patient is unable to use a manual wheelchair for functional distances due to generalized and focal weakness in arms associated with numbness c/w radiculopathy vs peripheral neuropathy in MS, and  Cervical Myelopathy..  .- The patient is unable  To use scooter secondary to poor trunk control, arm, and leg weakness, cannot safely clear the ledge of scooter, nor he can hold arms / hands, secondary to weakness.  - The patient's cognition is intact & she should be able to use a powered mobility device / Power wheel chair, well at home.   - The patient was given a prescription for POWER WHEEL CHAIR.  - This will allow the patient to go safely from bedroom, to bathroom, to the kitchen, dining room or living room for feeding & ADL's, and socialization.   Would improve quality of her life and decrease a risk of falls.        -- Pain management:    He is taking Percocet 10/325 mg po Q6 hrs, #60 tabs/ given prescription for 2 wks.   Per ,   Percocet refills on 5/15, 5/01, 4/24 /19.  Diazepam, 5 mg po Q 8 hrs prn muscle spasms, refills on  n 5/15, 5/01, 4/24/19.    Opioid Risk Score       Value Time  User    Opioid Risk Score  6 4/21/2019 11:53 PM Kymberly Estrella MD           He will resume all his medications, including gabapentin, Baclofen, and Diazepam for spasticity treatment.   Also recommend to keep appointments with , for treatment of MS.  All plan was discussed with patient and he agrees.       RTC in 6 weeks and  Pt is given biweekly prescription for opioid medications.    Total time spent face to face with patient was > 45 minutes.   More than 50% of that time was spent in counseling on diagnosis , prognosis and treatment options.   I also  patient  on common and most usual side effect of prescribed medications. Pain contract rules were discuss with pt.  Risk and benefits of opiates, possible risk of developing opiate dependence and tolerance, need of strict compliance with prescribed medications.  I reviewed Primary care , and other specialty's notes to better coordinate patient's  care.   All questions were answered, and patient voiced understanding.

## 2019-07-11 NOTE — TELEPHONE ENCOUNTER
Called pt to verify which company he wanted his wheelchair from.  Pt says he does not have a preference, says that as long as his insurance covers it he's fine.  Pt received hospital bed through [facility], checking now to find if they do wheelchairs as well.    ---    Wheelchair prescription faxed to [facility].

## 2019-07-15 ENCOUNTER — TELEPHONE (OUTPATIENT)
Dept: NEUROLOGY | Facility: CLINIC | Age: 54
End: 2019-07-15

## 2019-07-15 NOTE — TELEPHONE ENCOUNTER
----- Message from Lawanda Boyce PA-C sent at 7/12/2019  8:44 AM CDT -----  Hep B core, total-negative  Hep B surface antibody-negative  Hep B surface antigen-negative  Vit D3 deficient-recommend 10,000IU/d-  CrCl-111.67-OK to continue Ampyra  LFT's normal  CBC with ALC-1400, ANC-3800  OK to infuse Rituxan

## 2019-07-16 NOTE — TELEPHONE ENCOUNTER
Provided vitamin d results and advised pt take Vitamin D3 10,000 Units daily. Pt verbalized understanding.     Pt scheduled for maintenance Rituxan on 8/1/19. Offered sooner appt but pt declined. Educated on importance of getting infusions on time to avoid ms relapse. Pt verbalized understanding.

## 2019-07-19 ENCOUNTER — DOCUMENTATION ONLY (OUTPATIENT)
Dept: NEUROLOGY | Facility: CLINIC | Age: 54
End: 2019-07-19

## 2019-07-19 ENCOUNTER — EXTERNAL HOME HEALTH (OUTPATIENT)
Dept: HOME HEALTH SERVICES | Facility: HOSPITAL | Age: 54
End: 2019-07-19

## 2019-07-23 ENCOUNTER — TELEPHONE (OUTPATIENT)
Dept: PHYSICAL MEDICINE AND REHAB | Facility: CLINIC | Age: 54
End: 2019-07-23

## 2019-07-23 NOTE — TELEPHONE ENCOUNTER
Attempted to return call, v/mail full.    ----- Message from Randolph Dowell sent at 7/23/2019 10:20 AM CDT -----  Contact: Tracie Juarez @ [phone]   Caller calling to get an update on the wheelchair order, pls advise

## 2019-07-24 NOTE — TELEPHONE ENCOUNTER
MS SW phoned pt and left voicemail advising that she can assist pt in locating power mobility device vendor.  Will request signed prescription from Dr. Estrella's office.

## 2019-07-25 NOTE — TELEPHONE ENCOUNTER
[facility] faxed C prescription and evaluation note to [facility] [phone].  Updated pt by phone.

## 2019-07-29 ENCOUNTER — TELEPHONE (OUTPATIENT)
Dept: PHYSICAL MEDICINE AND REHAB | Facility: CLINIC | Age: 54
End: 2019-07-29

## 2019-07-29 NOTE — PROGRESS NOTES
Faxed CBC and CD8 orders to [facility] at [phone].   Intermediate Repair Preamble Text (Leave Blank If You Do Not Want): Undermining was performed with blunt dissection.

## 2019-07-30 NOTE — TELEPHONE ENCOUNTER
Left v/mail requesting callback.    ----- Message from Ila Fontana sent at 7/30/2019  8:37 AM CDT -----  Contact: Self  The pt states he has been waiting on an emergency call back from someone since yesterday but hasn't heard back yet. He is requesting a call back as soon as possible. No other information was provided.

## 2019-07-30 NOTE — TELEPHONE ENCOUNTER
Spoke to pt, pt says the pharmacy did not give him 120tabs on 07/11/2019.  Pt is confused as to why he would receive a 30d script when Dr Estrella is giving him his Valium every 2 weeks.  Directly messaged MD.    ----- Message from Marlin Scales MA sent at 7/30/2019 10:46 AM CDT -----  Contact: [phone] (M)  Patient Returning Call from [facility]    Who Left Message for Patient: Aditi Nance RN    Communication Preference: [phone] (M)    Additional Information: pt missed a call from earlier today, please try again

## 2019-07-31 ENCOUNTER — TELEPHONE (OUTPATIENT)
Dept: NEUROLOGY | Facility: CLINIC | Age: 54
End: 2019-07-31

## 2019-07-31 NOTE — TELEPHONE ENCOUNTER
----- Message from Edelmira De Oliveira sent at 7/31/2019  8:44 AM CDT -----  Contact: self @ [phone]  Pt says he is not able to keep his infusion appt on 8-1-19.  Pls call with a new appt.

## 2019-08-01 ENCOUNTER — OFFICE VISIT (OUTPATIENT)
Dept: PHYSICAL MEDICINE AND REHAB | Facility: CLINIC | Age: 54
End: 2019-08-01
Payer: MEDICARE

## 2019-08-01 DIAGNOSIS — R27.8 ABNORMAL COORDINATION: ICD-10-CM

## 2019-08-01 DIAGNOSIS — M54.41 CHRONIC BILATERAL LOW BACK PAIN WITH BILATERAL SCIATICA: ICD-10-CM

## 2019-08-01 DIAGNOSIS — G89.29 CHRONIC BILATERAL LOW BACK PAIN WITH BILATERAL SCIATICA: ICD-10-CM

## 2019-08-01 DIAGNOSIS — Z78.9 IMPAIRED MOBILITY AND ADLS: ICD-10-CM

## 2019-08-01 DIAGNOSIS — R25.2 SPASTICITY: ICD-10-CM

## 2019-08-01 DIAGNOSIS — G82.20 PARAPARESIS: Primary | ICD-10-CM

## 2019-08-01 DIAGNOSIS — R53.1 WEAKNESS GENERALIZED: ICD-10-CM

## 2019-08-01 DIAGNOSIS — Z74.09 IMPAIRED MOBILITY AND ADLS: ICD-10-CM

## 2019-08-01 DIAGNOSIS — Z79.891 OPIOID USE AGREEMENT EXISTS: ICD-10-CM

## 2019-08-01 DIAGNOSIS — M62.838 MUSCLE SPASTICITY: ICD-10-CM

## 2019-08-01 DIAGNOSIS — G35 ACUTE RELAPSING MULTIPLE SCLEROSIS: ICD-10-CM

## 2019-08-01 DIAGNOSIS — G35 MS (MULTIPLE SCLEROSIS): ICD-10-CM

## 2019-08-01 DIAGNOSIS — M54.42 CHRONIC BILATERAL LOW BACK PAIN WITH BILATERAL SCIATICA: ICD-10-CM

## 2019-08-01 PROCEDURE — 99214 OFFICE O/P EST MOD 30 MIN: CPT | Mod: HCWC,S$GLB,, | Performed by: PHYSICAL MEDICINE & REHABILITATION

## 2019-08-01 PROCEDURE — 99499 UNLISTED E&M SERVICE: CPT | Mod: HCWC,S$GLB,, | Performed by: PHYSICAL MEDICINE & REHABILITATION

## 2019-08-01 PROCEDURE — 99999 PR PBB SHADOW E&M-EST. PATIENT-LVL III: ICD-10-PCS | Mod: PBBFAC,HCWC,, | Performed by: PHYSICAL MEDICINE & REHABILITATION

## 2019-08-01 PROCEDURE — 99499 RISK ADDL DX/OHS AUDIT: ICD-10-PCS | Mod: HCWC,S$GLB,, | Performed by: PHYSICAL MEDICINE & REHABILITATION

## 2019-08-01 PROCEDURE — 99214 PR OFFICE/OUTPT VISIT, EST, LEVL IV, 30-39 MIN: ICD-10-PCS | Mod: HCWC,S$GLB,, | Performed by: PHYSICAL MEDICINE & REHABILITATION

## 2019-08-01 PROCEDURE — 99999 PR PBB SHADOW E&M-EST. PATIENT-LVL III: CPT | Mod: PBBFAC,HCWC,, | Performed by: PHYSICAL MEDICINE & REHABILITATION

## 2019-08-01 RX ORDER — HYDROCODONE BITARTRATE AND ACETAMINOPHEN 10; 325 MG/1; MG/1
1 TABLET ORAL EVERY 6 HOURS PRN
Qty: 60 TABLET | Refills: 0 | Status: SHIPPED | OUTPATIENT
Start: 2019-08-30 | End: 2019-09-03 | Stop reason: SDUPTHER

## 2019-08-01 RX ORDER — HYDROCODONE BITARTRATE AND ACETAMINOPHEN 10; 325 MG/1; MG/1
1 TABLET ORAL EVERY 6 HOURS PRN
Qty: 60 TABLET | Refills: 0 | Status: SHIPPED | OUTPATIENT
Start: 2019-08-15 | End: 2019-08-29

## 2019-08-01 RX ORDER — HYDROCODONE BITARTRATE AND ACETAMINOPHEN 7.5; 325 MG/1; MG/1
1 TABLET ORAL EVERY 6 HOURS PRN
Qty: 60 TABLET | Refills: 0 | Status: SHIPPED | OUTPATIENT
Start: 2019-08-01 | End: 2019-08-16

## 2019-08-01 NOTE — PROGRESS NOTES
Subjective:       Patient ID: Mao Levin is a 53 y.o. male.    Chief Complaint: Back Pain and Multiple Sclerosis     Mao Levin is 54 y/o male who returns to clinic for chronic multiple MSK pain, in lower back, neck, multiple joints that include: shoulder, knee, and hand pain.   [facility] 07/11/19.   Today, pt is coming for earlier appointment bringing the other 2 prescription.   He was given a prescription for Hydrocodone for 30 days #120 tabs, that he used in 15 days, that means that he Idouble the medications over the last less than 14 days, and came to Pharmacy for another prescription.  And [facility] refused to give him a prescription , and reported to my office his aberant behavior.  Patient denied and tried to convince me that he was not given that many medications, and he was using as prescribed.   He did the same on last time, when we have a prolong conversation.     Patient was under a very strict follow up, given pain medication every 14 days, secondary to reported aberrant behavior ( per family member, his sister), although pt denied.  His sister reported that he would take all his pain medications in first few days, and would call for more mediation, ask for an earlier refill under different excuses.,  Considering that this pt with advanced MS, with significant spasticity needs these medications, for spasticity and pain management, he was not d/c'd from clinic but   Instead called  and placed under special monitoring program.   He was getting medication ( hard copy prescriptions ) every 2 weeks. He was given at that time very serious domonique before the final discharge from clinic.  Nevertheless , he did it again. He is bringing the last 2 prescriptions, printed.    Patient is now lives alone in his own apartment away from his mother, and family. States that he pays [employer]  to come to clinic.  He contacted , that found him a new apt.   He received all required DMEs, and is yet to receive  "power WC, that will help his mobility.       From [facility] 7/11/19.  He is coming in manual escort WC, pushed by escort. States that his mother drove him in clinic.  Than he is very anxious since his mother is waiting in car, and makes phone call to him at all times..    He previously reported  that he has lift, WC, scooter,and RW.    He has a lot of MSK pain, with his MS.  The pain started in 2006 following MS diagnosis and symptoms have been worsening.    Brief history: Patient is a 53 yo male with MS diagnosed in 2006 as well as chronic, generalized pain since that time that presents for initial evaluation. He was hospitalized in inpatient rehab in October /2016 and feels that he has become more functional but still is mostly wheel chair bound. .   Pain occurs from the neck down and involves the entire body except the head. No specific area is worse. Pain is "achy" and "tingling".   It is constant at 7-8/10 but can worsen with any movement to 10/10.   Today he c/o pain in both arm/hands in all fingers, they feel sore and tight.   Back pain is running down to both legs, back of thighs, and pain in leg is worst bellow the knee level, in calves that are tight, and weak.  His Left leg is weaker than Rt leg.   Has foot b/l foot weakness and drop, wears left AFO.  He has been a prolong time on chronic pain management with oxycodone 15mg q6h and oxycontin 40 mg TWICE DAILY ( while in IP Rehab) but did not feel that this was any more helpful. Tried gabapentin in the past which was tolerated but didn't help.   He also takes diazepam 5mg TWICE DAILY and baclofen 10mg TID which helps his spasticity.    In NH, he was taking  Tramadol and Oxycodone 5 mg Q8 hrs prn pain.  He states that Tramadol is ineffective.  He is coming today, w/o  D/c papers, medical documentation from NH.   Pain Medications:  Percocet 10/325mg, 1 tablet 3x a day  Gabapentin 300mg, 3 tablets three times daily  Valium 5mg, 1 tablet twice daily  Baclofen 20 mg " QID  Xarelto 20mg, 1 tablet daily    Pain Description:   The pain is located in the neck, back shoulders, hands  and knees.  Today the current  pain is rated as 7/10  At BEST  5/10   At WORST  10/10 on the WORST day.    On average pain is rated as 7/10.   The pain is described as aching and tingling  Symptoms interfere with daily activity, sleeping and work.   Exacerbating factors: Morning, Extension and Flexing.    Mitigating factors medications and physical therapy.   Patient denies .  Patient denies any suicidal or homicidal ideations    Physical Therapy/Home Exercise: yes     report:  Reviewed and consistent with medication use as prescribed.  Pain Procedures: none    He is here for chronic pain management with opioids.      Imaging:   I reviewed C-spine and T-spine MRI which showed demylenating changes as well as fairly significant stenosis throughout the cervical spine.   He is here for follow up, and treatment.    Past Medical History:   Diagnosis Date    Anticoagulant long-term use     Anxiety     Chronic back pain     Deep vein thrombosis     Depression     Depression     Gait instability     Multiple sclerosis     Multiple sclerosis     Neurogenic bladder     Polyneuropathy     Pulmonary embolism     Spasticity        Past Surgical History:   Procedure Laterality Date    CYSTOSCOPY N/A 6/20/2018    Performed by [facility] at [facility] OR [facility]ST FLR    INJECTION, BOTULINUM TOXIN, TYPE A 200 UNITS N/A 6/20/2018    Performed by Brian Augustin MD at [facility] OR 1ST FLR       Family History   Problem Relation Age of Onset    Arthritis Mother     No Known Problems Father     Cancer Maternal Grandfather        Social History     Socioeconomic History    Marital status: Single     Spouse name: Not on file    Number of children: Not on file    Years of education: Not on file    Highest education level: Not on file   Occupational History    Not on file   Social Needs    Financial resource  strain: Not on file    Food insecurity:     Worry: Not on file     Inability: Not on file    Transportation needs:     Medical: Not on file     Non-medical: Not on file   Tobacco Use    Smoking status: Former Smoker     Packs/day: 0.50     Years: 23.00     Pack years: 11.50     Types: Cigarettes     Last attempt to quit: 2018     Years since quittin.1    Smokeless tobacco: Never Used    Tobacco comment: Using patch   Substance and Sexual Activity    Alcohol use: No    Drug use: No    Sexual activity: Yes     Partners: Female   Lifestyle    Physical activity:     Days per week: Not on file     Minutes per session: Not on file    Stress: Not on file   Relationships    Social connections:     Talks on phone: Not on file     Gets together: Not on file     Attends Gnosticist service: Not on file     Active member of club or organization: Not on file     Attends meetings of clubs or organizations: Not on file     Relationship status: Not on file   Other Topics Concern    Not on file   Social History Narrative    Not on file       Current Outpatient Medications   Medication Sig Dispense Refill    baclofen (LIORESAL) 20 MG tablet Take 1 tablet (20 mg total) by mouth 4 (four) times daily. 120 tablet 11    buPROPion (WELLBUTRIN SR) 150 MG TBSR 12 hr tablet Take 1 tablet by mouth once daily for 3 days, then increase to 1 tablet by mouth twice daily; last dose no later than 6PM; stop smoking after 5-7 days of treatment 60 tablet 3    dalfampridine (AMPYRA) 10 mg Tb12 Take 1 tablet by mouth every 12 (twelve) hours. 180 tablet 1    diazePAM (VALIUM) 5 MG tablet Take 1 tablet by mouth 3 times daily for 15 days. 45 tablet 0    [START ON 2019] diazePAM (VALIUM) 5 MG tablet Take 1 tablet by mouth 3 times daily for 15 days. 45 tablet 0    DULoxetine (CYMBALTA) 30 MG capsule Take 1 capsule (30 mg total) by mouth once daily. 30 capsule 11    ergocalciferol (VITAMIN D2) 50,000 unit Cap Take 1 capsule  (50,000 Units total) by mouth every 7 days. (Patient taking differently: Take 50,000 Units by mouth every 7 days. on friday) 4 capsule 11    gabapentin (NEURONTIN) 400 MG capsule Take 1 capsule (400 mg total) by mouth 4 (four) times daily. 120 capsule 11    [START ON 8/9/2019] HYDROcodone-acetaminophen (NORCO)  mg per tablet Take 1 tablet by mouth every 6 (six) hours as needed. 120 tablet 0    [START ON 8/30/2019] HYDROcodone-acetaminophen (NORCO)  mg per tablet Take 1 tablet by mouth every 6 (six) hours as needed for Pain. 60 tablet 0    [START ON 8/15/2019] HYDROcodone-acetaminophen (NORCO)  mg per tablet Take 1 tablet by mouth every 6 (six) hours as needed for Pain. 60 tablet 0    HYDROcodone-acetaminophen (NORCO) 7.5-325 mg per tablet Take 1 tablet by mouth every 6 (six) hours as needed for Pain. 60 tablet 0    rivaroxaban (XARELTO) 20 mg Tab Take 1 tablet (20 mg total) by mouth daily with dinner or evening meal. 30 tablet 3    tamsulosin (FLOMAX) 0.4 mg Cap Take 1 capsule (0.4 mg total) by mouth once daily. 30 capsule 3     No current facility-administered medications for this visit.      Review of patient's allergies indicates:  No Known Allergies    Review of Systems   Constitutional: Negative for appetite change and fatigue.   Eyes: Negative for visual disturbance.   Respiratory: Negative for shortness of breath.    Cardiovascular: Negative for chest pain.   Gastrointestinal: Negative for constipation and diarrhea.   Genitourinary: Negative for dysuria, frequency and urgency.   Musculoskeletal: Positive for arthralgias, back pain, gait problem, myalgias and neck pain. Negative for joint swelling and neck stiffness.   Neurological: Negative for dizziness, tremors, weakness, numbness and headaches.   Psychiatric/Behavioral: Negative for dysphoric mood.   All other systems reviewed and are negative.        Objective:      Physical Exam      Constitutional: He is oriented to person,  place, and time.   He appears well-nourished.   Eyes: EOM are normal. Pupils are equal, round, and reactive to light.   Neck: Normal range of motion. Neck supple.   Cardiovascular: Normal rhythm  and rate.    Pulmonary/Chest: Effort normal.   Abdominal: Soft.   Musculoskeletal:   Gait: spastic, walks hardly, very slow, dragging feet, but does not want RW with seat, nor he wants to use WC. He wants to walk again,  BUEs AROM WNL  BLEs AROM is diminished   Neurological: He is oriented to person, place, and time.   BUEs 5/5  RLE 3/5 HF, 4-/5 HE, 3+/5 KE/KF/DF  LLE 3-/5 HF, 3+/5 HE, 3-/5 KE, 2/5 KF, 0/5 DF .  wears left AFO.  Skin: No rash noted.   Psychiatric: He has a normal mood and affect.       Assessment:           1. Paraparesis    2. MS (multiple sclerosis)    3. Impaired mobility and ADLs    4. Chronic bilateral low back pain with bilateral sciatica    5. Spasticity    6. Abnormal coordination    7. Acute relapsing multiple sclerosis    8. Muscle spasticity    9. Opioid use agreement exists    10. Weakness generalized            Plan:        Paraparesis  -     HYDROcodone-acetaminophen (NORCO)  mg per tablet; Take 1 tablet by mouth every 6 (six) hours as needed for Pain.  Dispense: 60 tablet; Refill: 0  -     HYDROcodone-acetaminophen (NORCO)  mg per tablet; Take 1 tablet by mouth every 6 (six) hours as needed for Pain.  Dispense: 60 tablet; Refill: 0  -     HYDROcodone-acetaminophen (NORCO) 7.5-325 mg per tablet; Take 1 tablet by mouth every 6 (six) hours as needed for Pain.  Dispense: 60 tablet; Refill: 0    MS (multiple sclerosis)  -     HYDROcodone-acetaminophen (NORCO)  mg per tablet; Take 1 tablet by mouth every 6 (six) hours as needed for Pain.  Dispense: 60 tablet; Refill: 0  -     HYDROcodone-acetaminophen (NORCO)  mg per tablet; Take 1 tablet by mouth every 6 (six) hours as needed for Pain.  Dispense: 60 tablet; Refill: 0  -     HYDROcodone-acetaminophen (NORCO) 7.5-325 mg per  tablet; Take 1 tablet by mouth every 6 (six) hours as needed for Pain.  Dispense: 60 tablet; Refill: 0    Impaired mobility and ADLs  -     HYDROcodone-acetaminophen (NORCO)  mg per tablet; Take 1 tablet by mouth every 6 (six) hours as needed for Pain.  Dispense: 60 tablet; Refill: 0  -     HYDROcodone-acetaminophen (NORCO)  mg per tablet; Take 1 tablet by mouth every 6 (six) hours as needed for Pain.  Dispense: 60 tablet; Refill: 0  -     HYDROcodone-acetaminophen (NORCO) 7.5-325 mg per tablet; Take 1 tablet by mouth every 6 (six) hours as needed for Pain.  Dispense: 60 tablet; Refill: 0    Chronic bilateral low back pain with bilateral sciatica  -     HYDROcodone-acetaminophen (NORCO)  mg per tablet; Take 1 tablet by mouth every 6 (six) hours as needed for Pain.  Dispense: 60 tablet; Refill: 0  -     HYDROcodone-acetaminophen (NORCO)  mg per tablet; Take 1 tablet by mouth every 6 (six) hours as needed for Pain.  Dispense: 60 tablet; Refill: 0  -     HYDROcodone-acetaminophen (NORCO) 7.5-325 mg per tablet; Take 1 tablet by mouth every 6 (six) hours as needed for Pain.  Dispense: 60 tablet; Refill: 0    Spasticity  -     HYDROcodone-acetaminophen (NORCO)  mg per tablet; Take 1 tablet by mouth every 6 (six) hours as needed for Pain.  Dispense: 60 tablet; Refill: 0  -     HYDROcodone-acetaminophen (NORCO)  mg per tablet; Take 1 tablet by mouth every 6 (six) hours as needed for Pain.  Dispense: 60 tablet; Refill: 0  -     HYDROcodone-acetaminophen (NORCO) 7.5-325 mg per tablet; Take 1 tablet by mouth every 6 (six) hours as needed for Pain.  Dispense: 60 tablet; Refill: 0    Abnormal coordination  -     HYDROcodone-acetaminophen (NORCO)  mg per tablet; Take 1 tablet by mouth every 6 (six) hours as needed for Pain.  Dispense: 60 tablet; Refill: 0  -     HYDROcodone-acetaminophen (NORCO)  mg per tablet; Take 1 tablet by mouth every 6 (six) hours as needed for Pain.   Dispense: 60 tablet; Refill: 0  -     HYDROcodone-acetaminophen (NORCO) 7.5-325 mg per tablet; Take 1 tablet by mouth every 6 (six) hours as needed for Pain.  Dispense: 60 tablet; Refill: 0    Acute relapsing multiple sclerosis  -     HYDROcodone-acetaminophen (NORCO)  mg per tablet; Take 1 tablet by mouth every 6 (six) hours as needed for Pain.  Dispense: 60 tablet; Refill: 0  -     HYDROcodone-acetaminophen (NORCO)  mg per tablet; Take 1 tablet by mouth every 6 (six) hours as needed for Pain.  Dispense: 60 tablet; Refill: 0  -     HYDROcodone-acetaminophen (NORCO) 7.5-325 mg per tablet; Take 1 tablet by mouth every 6 (six) hours as needed for Pain.  Dispense: 60 tablet; Refill: 0    Muscle spasticity  -     HYDROcodone-acetaminophen (NORCO)  mg per tablet; Take 1 tablet by mouth every 6 (six) hours as needed for Pain.  Dispense: 60 tablet; Refill: 0  -     HYDROcodone-acetaminophen (NORCO)  mg per tablet; Take 1 tablet by mouth every 6 (six) hours as needed for Pain.  Dispense: 60 tablet; Refill: 0  -     HYDROcodone-acetaminophen (NORCO) 7.5-325 mg per tablet; Take 1 tablet by mouth every 6 (six) hours as needed for Pain.  Dispense: 60 tablet; Refill: 0    Opioid use agreement exists  -     HYDROcodone-acetaminophen (NORCO)  mg per tablet; Take 1 tablet by mouth every 6 (six) hours as needed for Pain.  Dispense: 60 tablet; Refill: 0  -     HYDROcodone-acetaminophen (NORCO)  mg per tablet; Take 1 tablet by mouth every 6 (six) hours as needed for Pain.  Dispense: 60 tablet; Refill: 0  -     HYDROcodone-acetaminophen (NORCO) 7.5-325 mg per tablet; Take 1 tablet by mouth every 6 (six) hours as needed for Pain.  Dispense: 60 tablet; Refill: 0    Weakness generalized  -     HYDROcodone-acetaminophen (NORCO)  mg per tablet; Take 1 tablet by mouth every 6 (six) hours as needed for Pain.  Dispense: 60 tablet; Refill: 0  -     HYDROcodone-acetaminophen (NORCO)  mg per tablet;  Take 1 tablet by mouth every 6 (six) hours as needed for Pain.  Dispense: 60 tablet; Refill: 0  -     HYDROcodone-acetaminophen (NORCO) 7.5-325 mg per tablet; Take 1 tablet by mouth every 6 (six) hours as needed for Pain.  Dispense: 60 tablet; Refill: 0      Patient with MS, weakness in UE/LE, and neuropathic pain, weakness and spasticity In LE> UE,    He also has chronic pain syndrome, chronic back and neck pain, with B/l LE weakness, Lt>> Rt, with impaired mobility, and ADLs.     -- Pain management:    He was taking Percocet 10/325 mg po Q6 hrs, #60 tabs,   And now given prescription for Hydrocodone, #60 tabs/14 days, for the following  6 wks.     Per ,   Hydrocodone 08/01, 7/11 ( #120).   Percocet refills on 6/28, 6/14, 5/30, 5/15, 5/01, 4/24 /19.  Diazepam, 5 mg po Q 8 hrs prn muscle spasms, refills on  n 5/15, 5/01, 4/24/19.    Opioid Risk Score       Value Time User    Opioid Risk Score  6 4/21/2019 11:53 PM Kymberly Estrella MD           He will resume all his medications, including gabapentin, Baclofen, and Diazepam for spasticity treatment.   Also recommend to keep appointments with , for treatment of MS.      He will be d/c from chronic pain management clinic, secondary to aberrant behavior, brajking a pain contract multiple times, despite warning and giving a second chance.    Total time spent face to face with patient was 25 minutes.   More than 50% of that time was spent in counseling on diagnosis , prognosis and treatment options.   I also  patient  on common and most usual side effect of prescribed medications. Pain contract rules were discuss with pt.  Risk and benefits of opiates, possible risk of developing opiate dependence and tolerance, need of strict compliance with prescribed medications.  I reviewed Primary care , and other specialty's notes to better coordinate patient's  care.   All questions were answered, and patient voiced understanding.

## 2019-08-02 RX ORDER — HYDROCODONE BITARTRATE AND ACETAMINOPHEN 10; 325 MG/1; MG/1
TABLET ORAL
Qty: 120 TABLET | Refills: 0 | OUTPATIENT
Start: 2019-08-02

## 2019-08-09 ENCOUNTER — TELEPHONE (OUTPATIENT)
Dept: PHARMACY | Facility: CLINIC | Age: 54
End: 2019-08-09

## 2019-08-14 ENCOUNTER — TELEPHONE (OUTPATIENT)
Dept: NEUROLOGY | Facility: CLINIC | Age: 54
End: 2019-08-14

## 2019-08-14 NOTE — TELEPHONE ENCOUNTER
----- Message from Leila Molina sent at 8/14/2019 10:59 AM CDT -----  Contact: pt  Needs Advice    Reason for call: asking to speak with nurse pertining to his treatment no other info. provided        Communication Preference: [phone]    Additional Information:

## 2019-08-15 ENCOUNTER — TELEPHONE (OUTPATIENT)
Dept: PSYCHIATRY | Facility: CLINIC | Age: 54
End: 2019-08-15

## 2019-08-15 DIAGNOSIS — G82.20 PARAPARESIS: ICD-10-CM

## 2019-08-15 DIAGNOSIS — R27.8 ABNORMAL COORDINATION: ICD-10-CM

## 2019-08-15 DIAGNOSIS — G89.29 CHRONIC BILATERAL LOW BACK PAIN WITH BILATERAL SCIATICA: ICD-10-CM

## 2019-08-15 DIAGNOSIS — R53.1 WEAKNESS GENERALIZED: ICD-10-CM

## 2019-08-15 DIAGNOSIS — G35 MS (MULTIPLE SCLEROSIS): ICD-10-CM

## 2019-08-15 DIAGNOSIS — Z74.09 IMPAIRED MOBILITY AND ADLS: ICD-10-CM

## 2019-08-15 DIAGNOSIS — R25.2 SPASTICITY: ICD-10-CM

## 2019-08-15 DIAGNOSIS — Z78.9 IMPAIRED MOBILITY AND ADLS: ICD-10-CM

## 2019-08-15 DIAGNOSIS — M54.41 CHRONIC BILATERAL LOW BACK PAIN WITH BILATERAL SCIATICA: ICD-10-CM

## 2019-08-15 DIAGNOSIS — M62.838 MUSCLE SPASTICITY: ICD-10-CM

## 2019-08-15 DIAGNOSIS — Z79.891 OPIOID USE AGREEMENT EXISTS: ICD-10-CM

## 2019-08-15 DIAGNOSIS — G35 ACUTE RELAPSING MULTIPLE SCLEROSIS: ICD-10-CM

## 2019-08-15 DIAGNOSIS — M54.42 CHRONIC BILATERAL LOW BACK PAIN WITH BILATERAL SCIATICA: ICD-10-CM

## 2019-08-15 NOTE — TELEPHONE ENCOUNTER
"SW received phone call from pt, who spent some time sharing his experience with MS, pain, and the challenges of seeking help for his symptoms and "proving" to providers that he needs certain medications.  SW provided active listening.  Then, we discussed pt's need for waiver services.  SW and pt phoned and left a message for Pat at [facility] [phone], this time asking that she call pt back directly regarding the status of his waiver application.  Pt shared that he is experiencing difficulty living on his own and needs help. Will discuss with Lawanda Boyce PA-C who sees pt in one month; perhaps appropriate to order  services again as he is reporting a decline in mobility (was able to walk a little, now having more difficulty).  Pt was using [facility] [phone].    "

## 2019-08-15 NOTE — TELEPHONE ENCOUNTER
Dr. LANCASTER can you please send this patient's script in for him he states he has no one to come and bring in the script for him, thanks

## 2019-08-15 NOTE — TELEPHONE ENCOUNTER
----- Message from Leslie Carrasco sent at 8/15/2019 12:14 PM CDT -----  Contact: Pt. [phone]  Needs Advice    Reason for call: The patient would like to speak to someone regarding therapy. Please contact the patient to discuss further.          Communication Preference: PHONE    Additional Information:

## 2019-08-15 NOTE — TELEPHONE ENCOUNTER
"----- Message from William HUSAIN Marcelo sent at 8/15/2019 12:16 PM CDT -----  Needs Advice    Reason for call: Pt states pharmacy will not fill HYDROcodone-acetaminophen (NORCO)  mg per tablet, due to prescription not stating "medically necessary for more than 7 days" since it's being prescribed for more than 7 days. Pt is asking to speak w/ the nurse regarding this.        Communication Preference: [phone]    Additional Information:    [id number] - [place] - [address] AT [facility] [address]  [address] [place] [address]  Phone: [phone] Fax: [phone]    "

## 2019-08-17 RX ORDER — HYDROCODONE BITARTRATE AND ACETAMINOPHEN 10; 325 MG/1; MG/1
1 TABLET ORAL EVERY 6 HOURS PRN
Qty: 60 TABLET | Refills: 0 | OUTPATIENT
Start: 2019-08-30 | End: 2019-09-13

## 2019-08-19 ENCOUNTER — TELEPHONE (OUTPATIENT)
Dept: PHYSICAL MEDICINE AND REHAB | Facility: CLINIC | Age: 54
End: 2019-08-19

## 2019-08-19 ENCOUNTER — TELEPHONE (OUTPATIENT)
Dept: NEUROLOGY | Facility: CLINIC | Age: 54
End: 2019-08-19

## 2019-08-19 NOTE — TELEPHONE ENCOUNTER
----- Message from Randolph Dowell sent at 8/19/2019 12:05 PM CDT -----  Contact: Patient @ [phone]  Patient calling to get an update on the medication refill request for (HYDROcodone-acetaminophen (NORCO)  mg per tablet ) pls advise ( patient is out of medication )     [id number] - [place] - [address] AT [facility] [place] [address]  Phone: [phone] Fax: [phone]

## 2019-08-19 NOTE — TELEPHONE ENCOUNTER
----- Message from Leslie Carrasco sent at 8/19/2019 12:24 PM CDT -----  Contact: Pt. [phone]  Needs Advice    Reason for call: The patient is requesting to speak to Cyndi regarding some medical advice. Please contact the patient to discuss further.          Communication Preference: PHONE    Additional Information:

## 2019-08-27 NOTE — TELEPHONE ENCOUNTER
DONNA phoned pt to help him follow up with ELBA, Ms. Quintana, to check his place on the [facility] Waiver wait list.  We phone [phone] and left a message with Angelica to have Lilian call pt.

## 2019-08-29 ENCOUNTER — TELEPHONE (OUTPATIENT)
Dept: NEUROLOGY | Facility: CLINIC | Age: 54
End: 2019-08-29

## 2019-08-29 NOTE — TELEPHONE ENCOUNTER
----- Message from Edelmira De Oliveira sent at 8/29/2019  9:35 AM CDT -----  Contact: self @ [phone]  Pt would like to speak with Cyndi concerning his infusion.  Pls call.

## 2019-09-03 ENCOUNTER — INFUSION (OUTPATIENT)
Dept: INFUSION THERAPY | Facility: HOSPITAL | Age: 54
End: 2019-09-03
Attending: PSYCHIATRY & NEUROLOGY
Payer: MEDICARE

## 2019-09-03 VITALS
HEART RATE: 70 BPM | BODY MASS INDEX: 22.84 KG/M2 | OXYGEN SATURATION: 98 % | RESPIRATION RATE: 18 BRPM | WEIGHT: 163.13 LBS | DIASTOLIC BLOOD PRESSURE: 72 MMHG | HEIGHT: 71 IN | SYSTOLIC BLOOD PRESSURE: 128 MMHG | TEMPERATURE: 98 F

## 2019-09-03 DIAGNOSIS — Z79.891 OPIOID USE AGREEMENT EXISTS: ICD-10-CM

## 2019-09-03 DIAGNOSIS — G82.20 PARAPARESIS: ICD-10-CM

## 2019-09-03 DIAGNOSIS — G35 MS (MULTIPLE SCLEROSIS): ICD-10-CM

## 2019-09-03 DIAGNOSIS — G89.29 CHRONIC BILATERAL LOW BACK PAIN WITH BILATERAL SCIATICA: ICD-10-CM

## 2019-09-03 DIAGNOSIS — G35 MS (MULTIPLE SCLEROSIS): Primary | ICD-10-CM

## 2019-09-03 DIAGNOSIS — R25.2 SPASTICITY: ICD-10-CM

## 2019-09-03 DIAGNOSIS — Z78.9 IMPAIRED MOBILITY AND ADLS: ICD-10-CM

## 2019-09-03 DIAGNOSIS — G35 ACUTE RELAPSING MULTIPLE SCLEROSIS: ICD-10-CM

## 2019-09-03 DIAGNOSIS — R27.8 ABNORMAL COORDINATION: ICD-10-CM

## 2019-09-03 DIAGNOSIS — M62.838 MUSCLE SPASTICITY: ICD-10-CM

## 2019-09-03 DIAGNOSIS — Z74.09 IMPAIRED MOBILITY AND ADLS: ICD-10-CM

## 2019-09-03 DIAGNOSIS — M54.41 CHRONIC BILATERAL LOW BACK PAIN WITH BILATERAL SCIATICA: ICD-10-CM

## 2019-09-03 DIAGNOSIS — R53.1 WEAKNESS GENERALIZED: ICD-10-CM

## 2019-09-03 DIAGNOSIS — M54.42 CHRONIC BILATERAL LOW BACK PAIN WITH BILATERAL SCIATICA: ICD-10-CM

## 2019-09-03 PROCEDURE — 63600175 PHARM REV CODE 636 W HCPCS: Mod: HCWC | Performed by: PHYSICAL MEDICINE & REHABILITATION

## 2019-09-03 PROCEDURE — 96367 TX/PROPH/DG ADDL SEQ IV INF: CPT | Mod: HCWC

## 2019-09-03 PROCEDURE — S0028 INJECTION, FAMOTIDINE, 20 MG: HCPCS | Mod: HCWC | Performed by: PHYSICIAN ASSISTANT

## 2019-09-03 PROCEDURE — 96415 CHEMO IV INFUSION ADDL HR: CPT | Mod: HCWC

## 2019-09-03 PROCEDURE — 96375 TX/PRO/DX INJ NEW DRUG ADDON: CPT | Mod: HCWC

## 2019-09-03 PROCEDURE — 63600175 PHARM REV CODE 636 W HCPCS: Mod: HCWC | Performed by: PHYSICIAN ASSISTANT

## 2019-09-03 PROCEDURE — 25000003 PHARM REV CODE 250: Mod: HCWC | Performed by: PHYSICIAN ASSISTANT

## 2019-09-03 PROCEDURE — 63600175 PHARM REV CODE 636 W HCPCS: Mod: HCWC | Performed by: PSYCHIATRY & NEUROLOGY

## 2019-09-03 PROCEDURE — 96413 CHEMO IV INFUSION 1 HR: CPT | Mod: HCWC

## 2019-09-03 RX ORDER — FAMOTIDINE 10 MG/ML
20 INJECTION INTRAVENOUS
Status: CANCELLED | OUTPATIENT
Start: 2019-12-31

## 2019-09-03 RX ORDER — ACETAMINOPHEN 325 MG/1
650 TABLET ORAL
Status: CANCELLED | OUTPATIENT
Start: 2019-12-31

## 2019-09-03 RX ORDER — HEPARIN 100 UNIT/ML
500 SYRINGE INTRAVENOUS
Status: DISCONTINUED | OUTPATIENT
Start: 2019-09-03 | End: 2019-09-03 | Stop reason: HOSPADM

## 2019-09-03 RX ORDER — SODIUM CHLORIDE 0.9 % (FLUSH) 0.9 %
10 SYRINGE (ML) INJECTION
Status: CANCELLED | OUTPATIENT
Start: 2019-12-31

## 2019-09-03 RX ORDER — SODIUM CHLORIDE 0.9 % (FLUSH) 0.9 %
10 SYRINGE (ML) INJECTION
Status: DISCONTINUED | OUTPATIENT
Start: 2019-09-03 | End: 2019-09-03 | Stop reason: HOSPADM

## 2019-09-03 RX ORDER — HEPARIN 100 UNIT/ML
500 SYRINGE INTRAVENOUS
Status: CANCELLED | OUTPATIENT
Start: 2019-12-31

## 2019-09-03 RX ORDER — ACETAMINOPHEN 325 MG/1
650 TABLET ORAL
Status: COMPLETED | OUTPATIENT
Start: 2019-09-03 | End: 2019-09-03

## 2019-09-03 RX ORDER — FAMOTIDINE 10 MG/ML
20 INJECTION INTRAVENOUS
Status: COMPLETED | OUTPATIENT
Start: 2019-09-03 | End: 2019-09-03

## 2019-09-03 RX ADMIN — RITUXIMAB 1000 MG: 10 INJECTION, SOLUTION INTRAVENOUS at 10:09

## 2019-09-03 RX ADMIN — DIPHENHYDRAMINE HYDROCHLORIDE 50 MG: 50 INJECTION, SOLUTION INTRAMUSCULAR; INTRAVENOUS at 10:09

## 2019-09-03 RX ADMIN — FAMOTIDINE 20 MG: 10 INJECTION INTRAVENOUS at 10:09

## 2019-09-03 RX ADMIN — DEXTROSE: 50 INJECTION, SOLUTION INTRAVENOUS at 10:09

## 2019-09-03 RX ADMIN — SODIUM CHLORIDE: 9 INJECTION, SOLUTION INTRAVENOUS at 09:09

## 2019-09-03 RX ADMIN — ACETAMINOPHEN 650 MG: 325 TABLET ORAL at 10:09

## 2019-09-03 NOTE — TELEPHONE ENCOUNTER
LV--08/01/2019  LR--    Patient will need the rest of script got a 7 day supply already   Script will have to be quanity of 32,  Thanks    Please send in for patient thanks

## 2019-09-03 NOTE — PLAN OF CARE
Problem: Adult Inpatient Plan of Care  Goal: Plan of Care Review  Outcome: Ongoing (interventions implemented as appropriate)  Pt admitted for maintenance  Rituxan infusion. Side effects discussed. Pt received infusion over 3 hours 47 minutes, tolerated well. Plan of care reviewed and Pt instructed to contact MD with any further concerns or questions, he verbalized understanding. Pt given AVS and discharged @ 15:35

## 2019-09-05 ENCOUNTER — TELEPHONE (OUTPATIENT)
Dept: PHYSICAL MEDICINE AND REHAB | Facility: CLINIC | Age: 54
End: 2019-09-05

## 2019-09-05 NOTE — TELEPHONE ENCOUNTER
----- Message from Randolph Dowell sent at 9/5/2019 11:04 AM CDT -----  Contact: Patient @ [phone]  Patient calling to schedule a f/u appt, pls call

## 2019-09-10 RX ORDER — HYDROCODONE BITARTRATE AND ACETAMINOPHEN 10; 325 MG/1; MG/1
1 TABLET ORAL EVERY 6 HOURS PRN
Qty: 32 TABLET | Refills: 0 | Status: SHIPPED | OUTPATIENT
Start: 2019-09-10 | End: 2019-09-16 | Stop reason: SDUPTHER

## 2019-09-16 ENCOUNTER — OFFICE VISIT (OUTPATIENT)
Dept: INTERNAL MEDICINE | Facility: CLINIC | Age: 54
End: 2019-09-16
Payer: MEDICARE

## 2019-09-16 ENCOUNTER — TELEPHONE (OUTPATIENT)
Dept: INTERNAL MEDICINE | Facility: CLINIC | Age: 54
End: 2019-09-16

## 2019-09-16 VITALS
WEIGHT: 163.13 LBS | HEIGHT: 71 IN | DIASTOLIC BLOOD PRESSURE: 86 MMHG | OXYGEN SATURATION: 99 % | SYSTOLIC BLOOD PRESSURE: 134 MMHG | HEART RATE: 60 BPM | BODY MASS INDEX: 22.84 KG/M2

## 2019-09-16 DIAGNOSIS — M54.41 CHRONIC BILATERAL LOW BACK PAIN WITH BILATERAL SCIATICA: ICD-10-CM

## 2019-09-16 DIAGNOSIS — G35 MS (MULTIPLE SCLEROSIS): ICD-10-CM

## 2019-09-16 DIAGNOSIS — Z74.09 IMPAIRED MOBILITY AND ADLS: ICD-10-CM

## 2019-09-16 DIAGNOSIS — G89.29 CHRONIC BILATERAL LOW BACK PAIN WITH BILATERAL SCIATICA: ICD-10-CM

## 2019-09-16 DIAGNOSIS — M54.42 CHRONIC BILATERAL LOW BACK PAIN WITH BILATERAL SCIATICA: ICD-10-CM

## 2019-09-16 DIAGNOSIS — Z78.9 IMPAIRED MOBILITY AND ADLS: ICD-10-CM

## 2019-09-16 DIAGNOSIS — G35 MS (MULTIPLE SCLEROSIS): Primary | Chronic | ICD-10-CM

## 2019-09-16 DIAGNOSIS — R27.8 ABNORMAL COORDINATION: ICD-10-CM

## 2019-09-16 DIAGNOSIS — R53.1 WEAKNESS GENERALIZED: ICD-10-CM

## 2019-09-16 DIAGNOSIS — Z79.891 OPIOID USE AGREEMENT EXISTS: ICD-10-CM

## 2019-09-16 DIAGNOSIS — G35 ACUTE RELAPSING MULTIPLE SCLEROSIS: ICD-10-CM

## 2019-09-16 DIAGNOSIS — R25.2 SPASTICITY: ICD-10-CM

## 2019-09-16 DIAGNOSIS — M62.838 MUSCLE SPASTICITY: ICD-10-CM

## 2019-09-16 DIAGNOSIS — G82.20 PARAPARESIS: ICD-10-CM

## 2019-09-16 PROCEDURE — 99999 PR PBB SHADOW E&M-EST. PATIENT-LVL III: CPT | Mod: PBBFAC,HCWC,, | Performed by: NURSE PRACTITIONER

## 2019-09-16 PROCEDURE — 3008F PR BODY MASS INDEX (BMI) DOCUMENTED: ICD-10-PCS | Mod: HCWC,CPTII,S$GLB, | Performed by: NURSE PRACTITIONER

## 2019-09-16 PROCEDURE — 3008F BODY MASS INDEX DOCD: CPT | Mod: HCWC,CPTII,S$GLB, | Performed by: NURSE PRACTITIONER

## 2019-09-16 PROCEDURE — 99999 PR PBB SHADOW E&M-EST. PATIENT-LVL III: ICD-10-PCS | Mod: PBBFAC,HCWC,, | Performed by: NURSE PRACTITIONER

## 2019-09-16 PROCEDURE — 99212 OFFICE O/P EST SF 10 MIN: CPT | Mod: HCWC,S$GLB,, | Performed by: NURSE PRACTITIONER

## 2019-09-16 PROCEDURE — 99212 PR OFFICE/OUTPT VISIT, EST, LEVL II, 10-19 MIN: ICD-10-PCS | Mod: HCWC,S$GLB,, | Performed by: NURSE PRACTITIONER

## 2019-09-16 RX ORDER — HYDROCODONE BITARTRATE AND ACETAMINOPHEN 10; 325 MG/1; MG/1
1 TABLET ORAL EVERY 6 HOURS PRN
Qty: 60 TABLET | Refills: 0 | Status: SHIPPED | OUTPATIENT
Start: 2019-09-17 | End: 2019-10-01 | Stop reason: SDUPTHER

## 2019-09-16 NOTE — TELEPHONE ENCOUNTER
Script for #60 hydrocodone 10mg printed - should last 2 weeks - appt made with me 10/1/19 (exactly 2 weeks) - will discuss with Dr. Blanco - needs to take xarelto due to multiple PE.  Needs to take cymbalta, gabapentin, baclofen as well.

## 2019-09-16 NOTE — PROGRESS NOTES
"Subjective:      Patient ID: Mao Levin is a 54 y.o. male.    Chief Complaint: Follow-up    Mr Cavanaugh is here today "to get on the same page with my doctors"    He states that he is seeing "a dozen specialist" and wants to only see Neurology and PCP.  He states that it is difficult for him to get around as a complication of his MS. He would like PCP to manage pain meds.     We discussed at length his goal for this appointment. He states that Dr Estrella was leaving and that he was referred to another pain specialist. He would like to see PCP only for pain management. He wants to know if he should seek care elsewhere. States "I fucking hate [facility]". Reassurance provided.     He did eventually ask if I would refill Valium. I informed him that I could not do this. I also informed the patient that I would discuss his concerns with his PCP today, at which point he decided to leave. In the hallway, he asked again about why he was here. I invited him back into the exam room. He waited until I spoke with PCP regarding his concerns. A limited amount of pain medication was refilled by PCP. He was later discharged by the nurse.        Review of Systems    Review of patient's allergies indicates:  No Known Allergies    Current Outpatient Medications   Medication Sig Dispense Refill    baclofen (LIORESAL) 20 MG tablet Take 1 tablet (20 mg total) by mouth 4 (four) times daily. 120 tablet 11    dalfampridine (AMPYRA) 10 mg Tb12 Take 1 tablet by mouth every 12 (twelve) hours. 180 tablet 1    gabapentin (NEURONTIN) 400 MG capsule Take 1 capsule (400 mg total) by mouth 4 (four) times daily. 120 capsule 11    buPROPion (WELLBUTRIN SR) 150 MG TBSR 12 hr tablet Take 1 tablet by mouth once daily for 3 days, then increase to 1 tablet by mouth twice daily; last dose no later than 6PM; stop smoking after 5-7 days of treatment 60 tablet 3    diazePAM (VALIUM) 5 MG tablet Take 1 tablet by mouth 3 times daily for 15 days. 45 tablet 0    " DULoxetine (CYMBALTA) 30 MG capsule Take 1 capsule (30 mg total) by mouth once daily. 30 capsule 11    ergocalciferol (VITAMIN D2) 50,000 unit Cap Take 1 capsule (50,000 Units total) by mouth every 7 days. (Patient taking differently: Take 50,000 Units by mouth every 7 days. on friday) 4 capsule 11    [START ON 9/17/2019] HYDROcodone-acetaminophen (NORCO)  mg per tablet Take 1 tablet by mouth every 6 (six) hours as needed for Pain. More than 7 day supply and Medically Necessary. 60 tablet 0    rivaroxaban (XARELTO) 20 mg Tab Take 1 tablet (20 mg total) by mouth daily with dinner or evening meal. 30 tablet 3    tamsulosin (FLOMAX) 0.4 mg Cap Take 1 capsule (0.4 mg total) by mouth once daily. 30 capsule 3     No current facility-administered medications for this visit.        Patient Active Problem List    Diagnosis Date Noted    Proctocolitis 03/17/2019    Neck pain on right side, right lateral 03/17/2019    Current every day smoker 12/06/2018    Urinary tract infection without hematuria 11/30/2018    Debility 10/17/2018    Mild malnutrition 10/17/2018    Fever 10/15/2018    Current use of long term anticoagulation 10/15/2018    Kidney stone 06/15/2018    Benign prostatic hyperplasia with weak urinary stream 06/05/2018    Frequency of urination 05/08/2018    Urgency incontinence 05/08/2018    Recurrent UTI 03/13/2018    Chronic pain syndrome 12/21/2017    Long-term current use of opiate analgesic 12/21/2017    Chronic bilateral low back pain with bilateral sciatica 11/20/2017    Sepsis due to urinary tract infection 08/14/2017    Fatigue 05/26/2017    Cluster headache syndrome, unspecified, intractable 05/01/2017    Intractable headache 04/20/2017    Bilateral leg pain 03/17/2017    Depression 03/17/2017    Smoker 03/17/2017    Frequent falls 03/07/2017    MS (multiple sclerosis) 12/19/2016    Acute relapsing multiple sclerosis 12/16/2016    Long term (current) use of systemic  steroids 12/16/2016    Constipation due to neurogenic bowel 12/16/2016    Abnormal thyroid blood test 12/16/2016    Neuropathic pain, leg, bilateral 12/08/2016    Polyneuropathy 10/27/2016    10/25/2016 Saddle PE 10/25/2016    Vitamin D deficiency disease 10/24/2016    Abnormal cortisol level 10/23/2016    Neurogenic bladder 10/06/2016    Spasticity 10/06/2016    Impaired mobility and ADLs 10/06/2016    Abnormal coordination 10/06/2016    Paraparesis 09/29/2016    Gait instability 09/26/2016    Adrenal insufficiency due to steroid withdrawal 09/24/2016    Chronic pain of multiple sites 09/22/2016    Weakness generalized 09/22/2016       Past Medical History:   Diagnosis Date    Anticoagulant long-term use     Anxiety     Chronic back pain     Deep vein thrombosis     Depression     Depression     Gait instability     Multiple sclerosis     Multiple sclerosis     Neurogenic bladder     Polyneuropathy     Pulmonary embolism     Spasticity        Past Surgical History:   Procedure Laterality Date    CYSTOSCOPY N/A 6/20/2018    Performed by [facility] OR [facility]    INJECTION, BOTULINUM TOXIN, TYPE A 200 UNITS N/A 6/20/2018    Performed by [facility]in [facility] at [facility] OR [facility]       Family History   Problem Relation Age of Onset    Arthritis Mother     No Known Problems Father     Cancer Maternal Grandfather     No Known Problems Son     No Known Problems Son          Objective:     Lab Results   Component Value Date    WBC 5.70 07/11/2019    HGB 13.5 (L) 07/11/2019    HCT 39.3 (L) 07/11/2019     07/11/2019    CHOL 186 07/18/2017    TRIG 62 07/18/2017    HDL 50 07/18/2017    ALT 12 07/11/2019    AST 11 07/11/2019     07/11/2019    K 4.0 07/11/2019     07/11/2019    CREATININE 0.8 07/11/2019    BUN 8 07/11/2019    CO2 24 07/11/2019    TSH 0.556 07/18/2017    PSA 0.21 07/18/2017    INR 1.0 12/15/2016    HGBA1C 5.1 03/17/2019       Vitals:    09/16/19 0903  "  BP: 134/86   Pulse: 60   SpO2: 99%   Weight: 74 kg (163 lb 2.3 oz)   Height: 5' 11" (1.803 m)   PainSc: 0-No pain       Body mass index is 22.75 kg/m².    Physical Exam   Constitutional: He appears well-nourished.   HENT:   Head: Normocephalic and atraumatic.   Eyes: Conjunctivae and EOM are normal.   Pulmonary/Chest: Effort normal. No respiratory distress.   Musculoskeletal:   MS  Wheelchair   Psychiatric: His speech is normal. Judgment normal. His mood appears anxious. He is agitated. He exhibits abnormal recent memory (reported when discussing medications).     Assessment:     1. MS (multiple sclerosis)      Plan:     Mao was seen today for follow-up.    Diagnoses and all orders for this visit:    MS (multiple sclerosis)    Mr Levin decided to leave abruptly today before his visit was complete with me. He did return back to the room and was later discharged by the nurse.  Continue current regimen as previously instructed. Followed by Neurology and Pain Management.    Health Maintenance   Topic Date Due    TETANUS VACCINE  08/22/1983    Colonoscopy  08/22/2015    Lipid Panel  07/18/2022    Hepatitis C Screening  Completed       Patient Instructions   Follow up with Dr Alarcon as instructed.     Patient advised to continue taking Xarelto as instructed.          "

## 2019-09-16 NOTE — PATIENT INSTRUCTIONS
Follow up with Dr Alarcon as instructed.     Patient advised to continue taking Xarelto as instructed.

## 2019-09-26 DIAGNOSIS — G35 MULTIPLE SCLEROSIS: Primary | ICD-10-CM

## 2019-09-26 DIAGNOSIS — M62.830 PARASPINAL MUSCLE SPASM: ICD-10-CM

## 2019-10-01 ENCOUNTER — OFFICE VISIT (OUTPATIENT)
Dept: INTERNAL MEDICINE | Facility: CLINIC | Age: 54
End: 2019-10-01
Payer: MEDICARE

## 2019-10-01 ENCOUNTER — LAB VISIT (OUTPATIENT)
Dept: LAB | Facility: HOSPITAL | Age: 54
End: 2019-10-01
Attending: INTERNAL MEDICINE
Payer: MEDICARE

## 2019-10-01 ENCOUNTER — IMMUNIZATION (OUTPATIENT)
Dept: INTERNAL MEDICINE | Facility: CLINIC | Age: 54
End: 2019-10-01
Payer: MEDICARE

## 2019-10-01 VITALS
DIASTOLIC BLOOD PRESSURE: 78 MMHG | SYSTOLIC BLOOD PRESSURE: 124 MMHG | HEART RATE: 78 BPM | BODY MASS INDEX: 22.75 KG/M2 | HEIGHT: 71 IN | OXYGEN SATURATION: 98 %

## 2019-10-01 DIAGNOSIS — R26.81 GAIT INSTABILITY: ICD-10-CM

## 2019-10-01 DIAGNOSIS — E78.5 HYPERLIPIDEMIA, UNSPECIFIED HYPERLIPIDEMIA TYPE: ICD-10-CM

## 2019-10-01 DIAGNOSIS — R25.2 SPASTICITY: ICD-10-CM

## 2019-10-01 DIAGNOSIS — R73.9 HYPERGLYCEMIA: ICD-10-CM

## 2019-10-01 DIAGNOSIS — Z79.891 LONG-TERM CURRENT USE OF OPIATE ANALGESIC: ICD-10-CM

## 2019-10-01 DIAGNOSIS — M62.838 MUSCLE SPASTICITY: ICD-10-CM

## 2019-10-01 DIAGNOSIS — N40.0 BPH WITHOUT URINARY OBSTRUCTION: ICD-10-CM

## 2019-10-01 DIAGNOSIS — E03.9 HYPOTHYROIDISM, UNSPECIFIED TYPE: ICD-10-CM

## 2019-10-01 DIAGNOSIS — G89.29 CHRONIC BILATERAL LOW BACK PAIN WITH BILATERAL SCIATICA: ICD-10-CM

## 2019-10-01 DIAGNOSIS — G35 MS (MULTIPLE SCLEROSIS): Chronic | ICD-10-CM

## 2019-10-01 DIAGNOSIS — Z78.9 IMPAIRED MOBILITY AND ADLS: ICD-10-CM

## 2019-10-01 DIAGNOSIS — Z12.5 ENCOUNTER FOR SCREENING FOR MALIGNANT NEOPLASM OF PROSTATE: ICD-10-CM

## 2019-10-01 DIAGNOSIS — M54.41 CHRONIC BILATERAL LOW BACK PAIN WITH BILATERAL SCIATICA: ICD-10-CM

## 2019-10-01 DIAGNOSIS — Z79.891 OPIOID USE AGREEMENT EXISTS: ICD-10-CM

## 2019-10-01 DIAGNOSIS — N39.0 URINARY TRACT INFECTION WITHOUT HEMATURIA, SITE UNSPECIFIED: Primary | ICD-10-CM

## 2019-10-01 DIAGNOSIS — E55.9 MILD VITAMIN D DEFICIENCY: ICD-10-CM

## 2019-10-01 DIAGNOSIS — G62.9 POLYNEUROPATHY: ICD-10-CM

## 2019-10-01 DIAGNOSIS — G35 MULTIPLE SCLEROSIS: ICD-10-CM

## 2019-10-01 DIAGNOSIS — R27.8 ABNORMAL COORDINATION: ICD-10-CM

## 2019-10-01 DIAGNOSIS — R29.6 FREQUENT FALLS: ICD-10-CM

## 2019-10-01 DIAGNOSIS — R53.1 WEAKNESS GENERALIZED: ICD-10-CM

## 2019-10-01 DIAGNOSIS — G82.20 PARAPARESIS: ICD-10-CM

## 2019-10-01 DIAGNOSIS — G35 ACUTE RELAPSING MULTIPLE SCLEROSIS: ICD-10-CM

## 2019-10-01 DIAGNOSIS — G89.29 CHRONIC PAIN OF MULTIPLE SITES: ICD-10-CM

## 2019-10-01 DIAGNOSIS — M54.42 CHRONIC BILATERAL LOW BACK PAIN WITH BILATERAL SCIATICA: ICD-10-CM

## 2019-10-01 DIAGNOSIS — R52 CHRONIC PAIN OF MULTIPLE SITES: ICD-10-CM

## 2019-10-01 DIAGNOSIS — G89.4 CHRONIC PAIN SYNDROME: ICD-10-CM

## 2019-10-01 DIAGNOSIS — Z74.09 IMPAIRED MOBILITY AND ADLS: ICD-10-CM

## 2019-10-01 LAB
25(OH)D3+25(OH)D2 SERPL-MCNC: 21 NG/ML (ref 30–96)
ALBUMIN SERPL BCP-MCNC: 4 G/DL (ref 3.5–5.2)
ALP SERPL-CCNC: 78 U/L (ref 55–135)
ALT SERPL W/O P-5'-P-CCNC: 14 U/L (ref 10–44)
ANION GAP SERPL CALC-SCNC: 6 MMOL/L (ref 8–16)
AST SERPL-CCNC: 13 U/L (ref 10–40)
BASOPHILS # BLD AUTO: 0.06 K/UL (ref 0–0.2)
BASOPHILS NFR BLD: 0.8 % (ref 0–1.9)
BILIRUB SERPL-MCNC: 0.3 MG/DL (ref 0.1–1)
BUN SERPL-MCNC: 10 MG/DL (ref 6–20)
CALCIUM SERPL-MCNC: 8.6 MG/DL (ref 8.7–10.5)
CHLORIDE SERPL-SCNC: 105 MMOL/L (ref 95–110)
CHOLEST SERPL-MCNC: 156 MG/DL (ref 120–199)
CHOLEST/HDLC SERPL: 3.3 {RATIO} (ref 2–5)
CO2 SERPL-SCNC: 29 MMOL/L (ref 23–29)
COMPLEXED PSA SERPL-MCNC: 0.1 NG/ML (ref 0–4)
CREAT SERPL-MCNC: 0.8 MG/DL (ref 0.5–1.4)
DIFFERENTIAL METHOD: NORMAL
EOSINOPHIL # BLD AUTO: 0.2 K/UL (ref 0–0.5)
EOSINOPHIL NFR BLD: 2.1 % (ref 0–8)
ERYTHROCYTE [DISTWIDTH] IN BLOOD BY AUTOMATED COUNT: 12.9 % (ref 11.5–14.5)
EST. GFR  (AFRICAN AMERICAN): >60 ML/MIN/1.73 M^2
EST. GFR  (NON AFRICAN AMERICAN): >60 ML/MIN/1.73 M^2
ESTIMATED AVG GLUCOSE: 97 MG/DL (ref 68–131)
GLUCOSE SERPL-MCNC: 81 MG/DL (ref 70–110)
HBA1C MFR BLD HPLC: 5 % (ref 4–5.6)
HCT VFR BLD AUTO: 42.8 % (ref 40–54)
HDLC SERPL-MCNC: 47 MG/DL (ref 40–75)
HDLC SERPL: 30.1 % (ref 20–50)
HGB BLD-MCNC: 14.1 G/DL (ref 14–18)
IMM GRANULOCYTES # BLD AUTO: 0.01 K/UL (ref 0–0.04)
IMM GRANULOCYTES NFR BLD AUTO: 0.1 % (ref 0–0.5)
LDLC SERPL CALC-MCNC: 103.8 MG/DL (ref 63–159)
LYMPHOCYTES # BLD AUTO: 2.1 K/UL (ref 1–4.8)
LYMPHOCYTES NFR BLD: 30 % (ref 18–48)
MCH RBC QN AUTO: 30.5 PG (ref 27–31)
MCHC RBC AUTO-ENTMCNC: 32.9 G/DL (ref 32–36)
MCV RBC AUTO: 92 FL (ref 82–98)
MONOCYTES # BLD AUTO: 0.6 K/UL (ref 0.3–1)
MONOCYTES NFR BLD: 8.4 % (ref 4–15)
NEUTROPHILS # BLD AUTO: 4.2 K/UL (ref 1.8–7.7)
NEUTROPHILS NFR BLD: 58.6 % (ref 38–73)
NONHDLC SERPL-MCNC: 109 MG/DL
NRBC BLD-RTO: 0 /100 WBC
PLATELET # BLD AUTO: 198 K/UL (ref 150–350)
PMV BLD AUTO: 11.4 FL (ref 9.2–12.9)
POTASSIUM SERPL-SCNC: 4 MMOL/L (ref 3.5–5.1)
PROT SERPL-MCNC: 7.2 G/DL (ref 6–8.4)
RBC # BLD AUTO: 4.63 M/UL (ref 4.6–6.2)
SODIUM SERPL-SCNC: 140 MMOL/L (ref 136–145)
TRIGL SERPL-MCNC: 26 MG/DL (ref 30–150)
TSH SERPL DL<=0.005 MIU/L-ACNC: 1.25 UIU/ML (ref 0.4–4)
WBC # BLD AUTO: 7.11 K/UL (ref 3.9–12.7)

## 2019-10-01 PROCEDURE — 99999 PR PBB SHADOW E&M-EST. PATIENT-LVL IV: ICD-10-PCS | Mod: PBBFAC,HCWC,, | Performed by: INTERNAL MEDICINE

## 2019-10-01 PROCEDURE — 36415 COLL VENOUS BLD VENIPUNCTURE: CPT | Mod: HCWC

## 2019-10-01 PROCEDURE — 84153 ASSAY OF PSA TOTAL: CPT | Mod: HCWC

## 2019-10-01 PROCEDURE — 3008F PR BODY MASS INDEX (BMI) DOCUMENTED: ICD-10-PCS | Mod: HCWC,CPTII,S$GLB, | Performed by: INTERNAL MEDICINE

## 2019-10-01 PROCEDURE — 99214 PR OFFICE/OUTPT VISIT, EST, LEVL IV, 30-39 MIN: ICD-10-PCS | Mod: 25,HCWC,S$GLB, | Performed by: INTERNAL MEDICINE

## 2019-10-01 PROCEDURE — 80061 LIPID PANEL: CPT | Mod: HCWC

## 2019-10-01 PROCEDURE — 85025 COMPLETE CBC W/AUTO DIFF WBC: CPT | Mod: HCWC

## 2019-10-01 PROCEDURE — 83036 HEMOGLOBIN GLYCOSYLATED A1C: CPT | Mod: HCWC

## 2019-10-01 PROCEDURE — G0008 ADMIN INFLUENZA VIRUS VAC: HCPCS | Mod: HCWC,S$GLB,, | Performed by: INTERNAL MEDICINE

## 2019-10-01 PROCEDURE — 99214 OFFICE O/P EST MOD 30 MIN: CPT | Mod: 25,HCWC,S$GLB, | Performed by: INTERNAL MEDICINE

## 2019-10-01 PROCEDURE — 80053 COMPREHEN METABOLIC PANEL: CPT | Mod: HCWC

## 2019-10-01 PROCEDURE — 90686 IIV4 VACC NO PRSV 0.5 ML IM: CPT | Mod: HCWC,S$GLB,, | Performed by: INTERNAL MEDICINE

## 2019-10-01 PROCEDURE — 82306 VITAMIN D 25 HYDROXY: CPT | Mod: HCWC

## 2019-10-01 PROCEDURE — G0008 FLU VACCINE (QUAD) GREATER THAN OR EQUAL TO 3YO PRESERVATIVE FREE IM: ICD-10-PCS | Mod: HCWC,S$GLB,, | Performed by: INTERNAL MEDICINE

## 2019-10-01 PROCEDURE — 3008F BODY MASS INDEX DOCD: CPT | Mod: HCWC,CPTII,S$GLB, | Performed by: INTERNAL MEDICINE

## 2019-10-01 PROCEDURE — 84443 ASSAY THYROID STIM HORMONE: CPT | Mod: HCWC

## 2019-10-01 PROCEDURE — 90686 FLU VACCINE (QUAD) GREATER THAN OR EQUAL TO 3YO PRESERVATIVE FREE IM: ICD-10-PCS | Mod: HCWC,S$GLB,, | Performed by: INTERNAL MEDICINE

## 2019-10-01 PROCEDURE — 99999 PR PBB SHADOW E&M-EST. PATIENT-LVL IV: CPT | Mod: PBBFAC,HCWC,, | Performed by: INTERNAL MEDICINE

## 2019-10-01 RX ORDER — GABAPENTIN 400 MG/1
400 CAPSULE ORAL 4 TIMES DAILY
Qty: 120 CAPSULE | Refills: 11 | Status: SHIPPED | OUTPATIENT
Start: 2019-10-01

## 2019-10-01 RX ORDER — HYDROCODONE BITARTRATE AND ACETAMINOPHEN 10; 325 MG/1; MG/1
1 TABLET ORAL EVERY 6 HOURS PRN
Qty: 56 TABLET | Refills: 0 | Status: SHIPPED | OUTPATIENT
Start: 2019-10-01 | End: 2019-10-15

## 2019-10-01 RX ORDER — DULOXETIN HYDROCHLORIDE 30 MG/1
CAPSULE, DELAYED RELEASE ORAL
Qty: 60 CAPSULE | Refills: 6 | Status: SHIPPED | OUTPATIENT
Start: 2019-10-01

## 2019-10-01 RX ORDER — BACLOFEN 20 MG/1
20 TABLET ORAL 4 TIMES DAILY
Qty: 120 TABLET | Refills: 6 | Status: SHIPPED | OUTPATIENT
Start: 2019-10-01 | End: 2019-11-26

## 2019-10-01 RX ORDER — DIAZEPAM 5 MG/1
TABLET ORAL
Qty: 30 TABLET | Refills: 0 | Status: SHIPPED | OUTPATIENT
Start: 2019-10-01 | End: 2019-11-11 | Stop reason: SDUPTHER

## 2019-10-01 RX ORDER — TAMSULOSIN HYDROCHLORIDE 0.4 MG/1
0.4 CAPSULE ORAL DAILY
Qty: 30 CAPSULE | Refills: 6 | Status: SHIPPED | OUTPATIENT
Start: 2019-10-01

## 2019-10-01 RX ORDER — ERGOCALCIFEROL 1.25 MG/1
50000 CAPSULE ORAL
Qty: 4 CAPSULE | Refills: 6 | Status: SHIPPED | OUTPATIENT
Start: 2019-10-01 | End: 2019-11-11 | Stop reason: SDUPTHER

## 2019-10-08 ENCOUNTER — DOCUMENTATION ONLY (OUTPATIENT)
Dept: REHABILITATION | Facility: HOSPITAL | Age: 54
End: 2019-10-08

## 2019-10-08 ENCOUNTER — OFFICE VISIT (OUTPATIENT)
Dept: URGENT CARE | Facility: CLINIC | Age: 54
End: 2019-10-08
Payer: MEDICARE

## 2019-10-08 VITALS
SYSTOLIC BLOOD PRESSURE: 132 MMHG | DIASTOLIC BLOOD PRESSURE: 78 MMHG | HEIGHT: 71 IN | RESPIRATION RATE: 20 BRPM | WEIGHT: 163.13 LBS | HEART RATE: 72 BPM | OXYGEN SATURATION: 100 % | TEMPERATURE: 98 F | BODY MASS INDEX: 22.84 KG/M2

## 2019-10-08 DIAGNOSIS — G35 MULTIPLE SCLEROSIS: Primary | ICD-10-CM

## 2019-10-08 DIAGNOSIS — M25.50 ARTHRALGIA, UNSPECIFIED JOINT: ICD-10-CM

## 2019-10-08 PROCEDURE — 3008F PR BODY MASS INDEX (BMI) DOCUMENTED: ICD-10-PCS | Mod: CPTII,S$GLB,, | Performed by: PHYSICIAN ASSISTANT

## 2019-10-08 PROCEDURE — 99214 OFFICE O/P EST MOD 30 MIN: CPT | Mod: S$GLB,,, | Performed by: PHYSICIAN ASSISTANT

## 2019-10-08 PROCEDURE — 99214 PR OFFICE/OUTPT VISIT, EST, LEVL IV, 30-39 MIN: ICD-10-PCS | Mod: S$GLB,,, | Performed by: PHYSICIAN ASSISTANT

## 2019-10-08 PROCEDURE — 3008F BODY MASS INDEX DOCD: CPT | Mod: CPTII,S$GLB,, | Performed by: PHYSICIAN ASSISTANT

## 2019-10-08 RX ORDER — OXYCODONE AND ACETAMINOPHEN 10; 325 MG/1; MG/1
1 TABLET ORAL EVERY 6 HOURS PRN
Qty: 16 TABLET | Refills: 0 | Status: SHIPPED | OUTPATIENT
Start: 2019-10-08 | End: 2019-11-26

## 2019-10-08 NOTE — PROGRESS NOTES
Documentation Only/ No Show    Patient: Mao Levin  Date of Session: 10/08/2019  MRN: [id number]     Mao Levin did not attend his/her scheduled therapy PT EVALUATION appointment today. Mao Levin did not call to cancel nor reschedule. This is the 1st appointment that the patient has not attended. No charges have been posted today.     Pura Solitario, PT  10/08/2019

## 2019-10-08 NOTE — PROGRESS NOTES
"Subjective:       Patient ID: Mao Levin is a 54 y.o. male.    Vitals:  height is 5' 11" (1.803 m) and weight is 74 kg (163 lb 2.3 oz). His oral temperature is 98 °F (36.7 °C). His blood pressure is 132/78 and his pulse is 72. His respiration is 20 and oxygen saturation is 100%.     Chief Complaint: Multiple Sclerosis    Patient here today with c/o diffuse joint pain. Denies trauma or injury. Gradual onset.  Patient has history of MS with paraparesis, in motorized wheelchair. He states that pain has worsened over the past week. Pt states that this happens often, since progression of MS. No other associated symptoms. No symptoms of UTI, no URI symptoms. No chest pain, dyspnea. He is requesting oxycodone as it has helped in the past. He used to take this chronically, then was switched to hydrocodone because of insurance but he states that this does not work as well. Denies fever, headache.     Pain   This is a chronic problem. Associated symptoms include arthralgias, neck pain and numbness. Pertinent negatives include no abdominal pain, anorexia, change in bowel habit, chest pain, chills, congestion, coughing, diaphoresis, fatigue, fever, headaches, joint swelling, myalgias, nausea, rash, sore throat, swollen glands, urinary symptoms, vertigo, visual change, vomiting or weakness. Nothing aggravates the symptoms. He has tried walking and oral narcotics for the symptoms. The treatment provided no relief.       Constitution: Negative for activity change, appetite change, chills, sweating, fatigue, fever, unexpected weight change and generalized weakness.   HENT: Negative for congestion and sore throat.    Neck: Positive for neck pain. Negative for neck stiffness, painful lymph nodes and neck swelling.   Cardiovascular: Negative for chest pain, leg swelling, palpitations, sob on exertion and passing out.   Eyes: Negative for double vision and blurred vision.   Respiratory: Negative for cough and shortness of breath.  "   Gastrointestinal: Negative for abdominal pain, nausea, vomiting and diarrhea.   Genitourinary: Negative for dysuria, frequency, urgency, urine decreased and flank pain.   Musculoskeletal: Positive for pain, joint pain, abnormal ROM of joint and back pain. Negative for trauma, joint swelling, arthritis, gout, pain with walking, muscle cramps, muscle ache and history of spine disorder.   Skin: Negative for color change, pale and rash.   Allergic/Immunologic: Negative for seasonal allergies.   Neurological: Positive for coordination disturbances and numbness. Negative for dizziness, history of vertigo, light-headedness, passing out, facial drooping, speech difficulty, loss of balance, headaches, history of migraines, disorientation, altered mental status, loss of consciousness, tingling and seizures.   Hematologic/Lymphatic: Negative for swollen lymph nodes, easy bruising/bleeding and history of blood clots. Does not bruise/bleed easily.   Psychiatric/Behavioral: Negative for altered mental status, disorientation, confusion, agitation, nervous/anxious, sleep disturbance and depression. The patient is not nervous/anxious.        Objective:      Physical Exam   Constitutional: He is oriented to person, place, and time. He appears well-developed and well-nourished. He is cooperative.  Non-toxic appearance. He does not appear ill. No distress.   Patient is pleasant in NAD, in motorized chair. No skin changes or signs of infection.    HENT:   Head: Normocephalic and atraumatic.   Right Ear: Hearing, tympanic membrane, external ear and ear canal normal.   Left Ear: Hearing, tympanic membrane, external ear and ear canal normal.   Nose: Nose normal. No mucosal edema, rhinorrhea or nasal deformity. No epistaxis. Right sinus exhibits no maxillary sinus tenderness and no frontal sinus tenderness. Left sinus exhibits no maxillary sinus tenderness and no frontal sinus tenderness.   Mouth/Throat: Uvula is midline, oropharynx is  clear and moist and mucous membranes are normal. No trismus in the jaw. Normal dentition. No uvula swelling. No posterior oropharyngeal erythema.   Eyes: Conjunctivae and lids are normal. Right eye exhibits no discharge. Left eye exhibits no discharge. No scleral icterus.   Neck: Trachea normal, normal range of motion, full passive range of motion without pain and phonation normal. Neck supple.   Cardiovascular: Normal rate, regular rhythm, normal heart sounds, intact distal pulses and normal pulses.   Pulmonary/Chest: Effort normal and breath sounds normal. No respiratory distress.   Abdominal: Soft. Normal appearance and bowel sounds are normal. He exhibits no distension, no pulsatile midline mass and no mass. There is no tenderness.   Musculoskeletal: He exhibits no edema or deformity.   Pain with movement of large joints and neck. No swelling, erythema, warmth. No bony abnormality.   Neurological: He is alert and oriented to person, place, and time. He is not disoriented. He displays atrophy and tremor. No cranial nerve deficit. He exhibits abnormal muscle tone. He displays no seizure activity. Gait abnormal. Coordination normal. GCS eye subscore is 4. GCS verbal subscore is 5. GCS motor subscore is 6.   Spasticity and increased tone, consistent with history of late MS   Skin: Skin is warm, dry, intact, not diaphoretic and not pale.   Psychiatric: He has a normal mood and affect. His speech is normal and behavior is normal. Judgment and thought content normal. Cognition and memory are normal.   Nursing note and vitals reviewed.           reviewed.  Had a lengthy discussion with patient about narcotic medication, side effects, and interactions. Instructed to take only as needed for severe pain, other alternatives discussed. Instructed to discontinue hydrocodone-acetaminophen. Can use oxycodone-acetaminophen as alternative but not in combination. Instructed to follow up with PCP or pain management regarding  chronic pain management and ER if this flare up worsens or if he experiences any new or worsening symptoms.   Assessment:       1. Multiple sclerosis    2. Arthralgia, unspecified joint        Plan:         Multiple sclerosis    Arthralgia, unspecified joint  -     oxyCODONE-acetaminophen (PERCOCET)  mg per tablet; Take 1 tablet by mouth every 6 (six) hours as needed for Pain.  Dispense: 16 tablet; Refill: 0          Patient Instructions   - Rest.    - Drink plenty of fluids.      - oxycodone-acetaminophen is a narcotic pain medication.  Do not take this medication with hydrocodone-acetaminophen or with any other narcotic/opioid pain medications.  Only take as needed for severe pain.  - Please be aware as we discussed that narcotics can be addictive.   - I have given you a limited quantity to take as it is needed at this time. However take it sparingly and only when needed.    - Do not operate machinery or drive on this medication.      - Follow up with your PCP or specialty clinic as directed in the next 1-2 weeks if not improved or as needed.  You can call [phone] to schedule an appointment with the appropriate provider.    - Go to the ER or seek medical attention immediately if you develop new or worsening symptoms.    - You must understand that you have received an Urgent Care treatment only and that you may be released before all of your medical problems are known or treated.   - You, the patient, will arrange for follow up care as instructed.   - If your condition worsens or fails to improve we recommend that you receive another evaluation at the ER immediately or contact your PCP to discuss your concerns or return here.

## 2019-10-08 NOTE — PATIENT INSTRUCTIONS
- Rest.    - Drink plenty of fluids.      - oxycodone-acetaminophen is a narcotic pain medication.  Do not take this medication with hydrocodone-acetaminophen or with any other narcotic/opioid pain medications.  Only take as needed for severe pain.  - Please be aware as we discussed that narcotics can be addictive.   - I have given you a limited quantity to take as it is needed at this time. However take it sparingly and only when needed.    - Do not operate machinery or drive on this medication.      - Follow up with your PCP or specialty clinic as directed in the next 1-2 weeks if not improved or as needed.  You can call [phone] to schedule an appointment with the appropriate provider.    - Go to the ER or seek medical attention immediately if you develop new or worsening symptoms.    - You must understand that you have received an Urgent Care treatment only and that you may be released before all of your medical problems are known or treated.   - You, the patient, will arrange for follow up care as instructed.   - If your condition worsens or fails to improve we recommend that you receive another evaluation at the ER immediately or contact your PCP to discuss your concerns or return here.

## 2019-10-09 ENCOUNTER — PATIENT OUTREACH (OUTPATIENT)
Dept: ADMINISTRATIVE | Facility: OTHER | Age: 54
End: 2019-10-09

## 2019-10-14 NOTE — PROGRESS NOTES
Subjective:       Patient ID: Mao Levin is a 54 y.o. male.    Chief Complaint: Follow-up    HPIPt appears to be doing better - he is in his own apartment.  He has a girlfriend.  No CP or SOB.    Review of Systems   Respiratory: Negative for shortness of breath.    Cardiovascular: Negative for chest pain.   Gastrointestinal: Negative for abdominal pain, diarrhea, nausea and vomiting.   Genitourinary: Negative for dysuria.   Neurological: Negative for seizures, syncope and headaches.       Objective:      Physical Exam   Constitutional: He is oriented to person, place, and time. He appears well-developed and well-nourished. No distress.   HENT:   Mouth/Throat: Oropharynx is clear and moist.   Neck: Neck supple. No JVD present. No thyromegaly present.   Cardiovascular: Normal rate, regular rhythm, normal heart sounds and intact distal pulses. Exam reveals no gallop and no friction rub.   No murmur heard.  Pulmonary/Chest: Effort normal and breath sounds normal. He has no wheezes. He has no rales.   Abdominal: Soft. Bowel sounds are normal. He exhibits no distension and no mass. There is no tenderness. There is no rebound and no guarding.   Musculoskeletal: He exhibits no edema.   Lymphadenopathy:     He has no cervical adenopathy.   Neurological: He is alert and oriented to person, place, and time.   Skin: Skin is warm and dry.   Psychiatric: He has a normal mood and affect. His behavior is normal. Judgment and thought content normal.       Assessment:       1. Urinary tract infection without hematuria, site unspecified    2. BPH without urinary obstruction    3. Multiple sclerosis    4. Spasticity    5. MS (multiple sclerosis)    6. Paraparesis    7. Abnormal coordination    8. Impaired mobility and ADLs    9. Acute relapsing multiple sclerosis    10. Muscle spasticity    11. Chronic bilateral low back pain with bilateral sciatica    12. Opioid use agreement exists    13. Weakness generalized    14. Chronic pain of  multiple sites    15. Chronic pain syndrome    16. Gait instability    17. Polyneuropathy    18. Long-term current use of opiate analgesic    19. Frequent falls    20. Hyperlipidemia, unspecified hyperlipidemia type    21. Hypothyroidism, unspecified type    22. Hyperglycemia    23. Mild vitamin D deficiency    24. Encounter for screening for malignant neoplasm of prostate         Plan:   Urinary tract infection without hematuria, site unspecified  -     Urinalysis; Future; Expected date: 10/01/2019  -     Urine culture; Future; Expected date: 10/01/2019  -     Ambulatory consult to Urology    BPH without urinary obstruction  -     tamsulosin (FLOMAX) 0.4 mg Cap; Take 1 capsule (0.4 mg total) by mouth once daily.  Dispense: 30 capsule; Refill: 6  -     PSA, Screening; Future; Expected date: 10/01/2019    Multiple sclerosis  -     Ambulatory consult to Physical Therapy  -     baclofen (LIORESAL) 20 MG tablet; Take 1 tablet (20 mg total) by mouth 4 (four) times daily.  Dispense: 120 tablet; Refill: 6    Spasticity  -     baclofen (LIORESAL) 20 MG tablet; Take 1 tablet (20 mg total) by mouth 4 (four) times daily.  Dispense: 120 tablet; Refill: 6  -     gabapentin (NEURONTIN) 400 MG capsule; Take 1 capsule (400 mg total) by mouth 4 (four) times daily.  Dispense: 120 capsule; Refill: 11  -     HYDROcodone-acetaminophen (NORCO)  mg per tablet; Take 1 tablet by mouth every 6 (six) hours as needed for Pain. More than 7 day supply and Medically Necessary.  Dispense: 56 tablet; Refill: 0    MS (multiple sclerosis)  -     DULoxetine (CYMBALTA) 30 MG capsule; One daily for 2 weeks then two daily (Patient not taking: Reported on 10/8/2019)  Dispense: 60 capsule; Refill: 6  -     gabapentin (NEURONTIN) 400 MG capsule; Take 1 capsule (400 mg total) by mouth 4 (four) times daily.  Dispense: 120 capsule; Refill: 11  -     HYDROcodone-acetaminophen (NORCO)  mg per tablet; Take 1 tablet by mouth every 6 (six) hours as  needed for Pain. More than 7 day supply and Medically Necessary.  Dispense: 56 tablet; Refill: 0  -     diazePAM (VALIUM) 5 MG tablet; Take 1 tablet by mouth 3 times daily for 15 days.  Dispense: 30 tablet; Refill: 0  -     CBC auto differential; Future; Expected date: 10/01/2019  -     Comprehensive metabolic panel; Future; Expected date: 10/01/2019    Paraparesis  -     DULoxetine (CYMBALTA) 30 MG capsule; One daily for 2 weeks then two daily (Patient not taking: Reported on 10/8/2019)  Dispense: 60 capsule; Refill: 6  -     gabapentin (NEURONTIN) 400 MG capsule; Take 1 capsule (400 mg total) by mouth 4 (four) times daily.  Dispense: 120 capsule; Refill: 11  -     HYDROcodone-acetaminophen (NORCO)  mg per tablet; Take 1 tablet by mouth every 6 (six) hours as needed for Pain. More than 7 day supply and Medically Necessary.  Dispense: 56 tablet; Refill: 0  -     diazePAM (VALIUM) 5 MG tablet; Take 1 tablet by mouth 3 times daily for 15 days.  Dispense: 30 tablet; Refill: 0    Abnormal coordination  -     DULoxetine (CYMBALTA) 30 MG capsule; One daily for 2 weeks then two daily (Patient not taking: Reported on 10/8/2019)  Dispense: 60 capsule; Refill: 6  -     gabapentin (NEURONTIN) 400 MG capsule; Take 1 capsule (400 mg total) by mouth 4 (four) times daily.  Dispense: 120 capsule; Refill: 11  -     HYDROcodone-acetaminophen (NORCO)  mg per tablet; Take 1 tablet by mouth every 6 (six) hours as needed for Pain. More than 7 day supply and Medically Necessary.  Dispense: 56 tablet; Refill: 0  -     diazePAM (VALIUM) 5 MG tablet; Take 1 tablet by mouth 3 times daily for 15 days.  Dispense: 30 tablet; Refill: 0    Impaired mobility and ADLs  -     DULoxetine (CYMBALTA) 30 MG capsule; One daily for 2 weeks then two daily (Patient not taking: Reported on 10/8/2019)  Dispense: 60 capsule; Refill: 6  -     gabapentin (NEURONTIN) 400 MG capsule; Take 1 capsule (400 mg total) by mouth 4 (four) times daily.   Dispense: 120 capsule; Refill: 11  -     HYDROcodone-acetaminophen (NORCO)  mg per tablet; Take 1 tablet by mouth every 6 (six) hours as needed for Pain. More than 7 day supply and Medically Necessary.  Dispense: 56 tablet; Refill: 0  -     diazePAM (VALIUM) 5 MG tablet; Take 1 tablet by mouth 3 times daily for 15 days.  Dispense: 30 tablet; Refill: 0    Acute relapsing multiple sclerosis  -     DULoxetine (CYMBALTA) 30 MG capsule; One daily for 2 weeks then two daily (Patient not taking: Reported on 10/8/2019)  Dispense: 60 capsule; Refill: 6  -     gabapentin (NEURONTIN) 400 MG capsule; Take 1 capsule (400 mg total) by mouth 4 (four) times daily.  Dispense: 120 capsule; Refill: 11  -     HYDROcodone-acetaminophen (NORCO)  mg per tablet; Take 1 tablet by mouth every 6 (six) hours as needed for Pain. More than 7 day supply and Medically Necessary.  Dispense: 56 tablet; Refill: 0  -     diazePAM (VALIUM) 5 MG tablet; Take 1 tablet by mouth 3 times daily for 15 days.  Dispense: 30 tablet; Refill: 0    Muscle spasticity  -     DULoxetine (CYMBALTA) 30 MG capsule; One daily for 2 weeks then two daily (Patient not taking: Reported on 10/8/2019)  Dispense: 60 capsule; Refill: 6  -     gabapentin (NEURONTIN) 400 MG capsule; Take 1 capsule (400 mg total) by mouth 4 (four) times daily.  Dispense: 120 capsule; Refill: 11  -     HYDROcodone-acetaminophen (NORCO)  mg per tablet; Take 1 tablet by mouth every 6 (six) hours as needed for Pain. More than 7 day supply and Medically Necessary.  Dispense: 56 tablet; Refill: 0  -     diazePAM (VALIUM) 5 MG tablet; Take 1 tablet by mouth 3 times daily for 15 days.  Dispense: 30 tablet; Refill: 0    Chronic bilateral low back pain with bilateral sciatica  -     DULoxetine (CYMBALTA) 30 MG capsule; One daily for 2 weeks then two daily (Patient not taking: Reported on 10/8/2019)  Dispense: 60 capsule; Refill: 6  -     gabapentin (NEURONTIN) 400 MG capsule; Take 1 capsule  (400 mg total) by mouth 4 (four) times daily.  Dispense: 120 capsule; Refill: 11  -     HYDROcodone-acetaminophen (NORCO)  mg per tablet; Take 1 tablet by mouth every 6 (six) hours as needed for Pain. More than 7 day supply and Medically Necessary.  Dispense: 56 tablet; Refill: 0  -     diazePAM (VALIUM) 5 MG tablet; Take 1 tablet by mouth 3 times daily for 15 days.  Dispense: 30 tablet; Refill: 0    Opioid use agreement exists  -     DULoxetine (CYMBALTA) 30 MG capsule; One daily for 2 weeks then two daily (Patient not taking: Reported on 10/8/2019)  Dispense: 60 capsule; Refill: 6  -     gabapentin (NEURONTIN) 400 MG capsule; Take 1 capsule (400 mg total) by mouth 4 (four) times daily.  Dispense: 120 capsule; Refill: 11  -     HYDROcodone-acetaminophen (NORCO)  mg per tablet; Take 1 tablet by mouth every 6 (six) hours as needed for Pain. More than 7 day supply and Medically Necessary.  Dispense: 56 tablet; Refill: 0  -     diazePAM (VALIUM) 5 MG tablet; Take 1 tablet by mouth 3 times daily for 15 days.  Dispense: 30 tablet; Refill: 0    Weakness generalized  -     DULoxetine (CYMBALTA) 30 MG capsule; One daily for 2 weeks then two daily (Patient not taking: Reported on 10/8/2019)  Dispense: 60 capsule; Refill: 6  -     gabapentin (NEURONTIN) 400 MG capsule; Take 1 capsule (400 mg total) by mouth 4 (four) times daily.  Dispense: 120 capsule; Refill: 11  -     HYDROcodone-acetaminophen (NORCO)  mg per tablet; Take 1 tablet by mouth every 6 (six) hours as needed for Pain. More than 7 day supply and Medically Necessary.  Dispense: 56 tablet; Refill: 0  -     diazePAM (VALIUM) 5 MG tablet; Take 1 tablet by mouth 3 times daily for 15 days.  Dispense: 30 tablet; Refill: 0    Chronic pain of multiple sites  -     gabapentin (NEURONTIN) 400 MG capsule; Take 1 capsule (400 mg total) by mouth 4 (four) times daily.  Dispense: 120 capsule; Refill: 11    Chronic pain syndrome  -     gabapentin (NEURONTIN) 400  MG capsule; Take 1 capsule (400 mg total) by mouth 4 (four) times daily.  Dispense: 120 capsule; Refill: 11    Gait instability  -     gabapentin (NEURONTIN) 400 MG capsule; Take 1 capsule (400 mg total) by mouth 4 (four) times daily.  Dispense: 120 capsule; Refill: 11    Polyneuropathy  -     gabapentin (NEURONTIN) 400 MG capsule; Take 1 capsule (400 mg total) by mouth 4 (four) times daily.  Dispense: 120 capsule; Refill: 11    Long-term current use of opiate analgesic  -     gabapentin (NEURONTIN) 400 MG capsule; Take 1 capsule (400 mg total) by mouth 4 (four) times daily.  Dispense: 120 capsule; Refill: 11    Frequent falls  -     gabapentin (NEURONTIN) 400 MG capsule; Take 1 capsule (400 mg total) by mouth 4 (four) times daily.  Dispense: 120 capsule; Refill: 11    Hyperlipidemia, unspecified hyperlipidemia type  -     Lipid panel; Future; Expected date: 10/01/2019    Hypothyroidism, unspecified type  -     TSH; Future; Expected date: 10/01/2019    Hyperglycemia  -     Hemoglobin A1c; Future; Expected date: 10/01/2019    Mild vitamin D deficiency  -     Vitamin D; Future; Expected date: 10/01/2019    Encounter for screening for malignant neoplasm of prostate   -     PSA, Screening; Future; Expected date: 10/01/2019    Other orders  -     rivaroxaban (XARELTO) 20 mg Tab; Take 1 tablet (20 mg total) by mouth daily with dinner or evening meal.  Dispense: 30 tablet; Refill: 6  -     ergocalciferol (VITAMIN D2) 50,000 unit Cap; Take 1 capsule (50,000 Units total) by mouth every 7 days.  Dispense: 4 capsule; Refill: 6    Pt to restart xarelto.  He is to restart gabapentin and cymbalta so we can decrease his narcotics slowly.    Will see him in 2 weeks.

## 2019-10-22 DIAGNOSIS — Z12.11 COLON CANCER SCREENING: ICD-10-CM

## 2019-11-08 ENCOUNTER — OFFICE VISIT (OUTPATIENT)
Dept: URGENT CARE | Facility: CLINIC | Age: 54
End: 2019-11-08
Payer: MEDICARE

## 2019-11-08 VITALS
TEMPERATURE: 98 F | WEIGHT: 163 LBS | HEART RATE: 77 BPM | RESPIRATION RATE: 16 BRPM | OXYGEN SATURATION: 98 % | SYSTOLIC BLOOD PRESSURE: 151 MMHG | HEIGHT: 71 IN | BODY MASS INDEX: 22.82 KG/M2 | DIASTOLIC BLOOD PRESSURE: 94 MMHG

## 2019-11-08 DIAGNOSIS — R52 GENERALIZED BODY ACHES: Primary | ICD-10-CM

## 2019-11-08 DIAGNOSIS — M25.50 ARTHRALGIA, UNSPECIFIED JOINT: ICD-10-CM

## 2019-11-08 DIAGNOSIS — G35 MULTIPLE SCLEROSIS: ICD-10-CM

## 2019-11-08 PROCEDURE — 3008F BODY MASS INDEX DOCD: CPT | Mod: CPTII,S$GLB,, | Performed by: PHYSICIAN ASSISTANT

## 2019-11-08 PROCEDURE — 99214 OFFICE O/P EST MOD 30 MIN: CPT | Mod: S$GLB,,, | Performed by: PHYSICIAN ASSISTANT

## 2019-11-08 PROCEDURE — 3008F PR BODY MASS INDEX (BMI) DOCUMENTED: ICD-10-PCS | Mod: CPTII,S$GLB,, | Performed by: PHYSICIAN ASSISTANT

## 2019-11-08 PROCEDURE — 99214 PR OFFICE/OUTPT VISIT, EST, LEVL IV, 30-39 MIN: ICD-10-PCS | Mod: S$GLB,,, | Performed by: PHYSICIAN ASSISTANT

## 2019-11-08 RX ORDER — OXYCODONE AND ACETAMINOPHEN 10; 325 MG/1; MG/1
1 TABLET ORAL EVERY 6 HOURS PRN
Qty: 12 TABLET | Refills: 0 | Status: SHIPPED | OUTPATIENT
Start: 2019-11-08 | End: 2019-11-26

## 2019-11-08 NOTE — PROGRESS NOTES
"Subjective:       Patient ID: Mao Levin is a 54 y.o. male.    Vitals:  height is 5' 11" (1.803 m) and weight is 73.9 kg (163 lb). His temperature is 98.3 °F (36.8 °C). His blood pressure is 151/94 (abnormal) and his pulse is 77. His respiration is 16 and oxygen saturation is 98%.     Chief Complaint: Generalized Body Aches    Patient c/o generalized body aches arthralgias for 1 week.  He states that this happens often with flare ups since progression of MS.. Patient states that he needs a PCP appointment.  He states that he lost his phone is not able to get in contact with them to schedule an appointment.  He is requesting an appointment as soon as possible.  Denies any UTI symptoms.  No abdominal pain or URI symptoms.  Complains of pain of joints most notable in neck, back, but also in extremities.      Constitution: Positive for generalized weakness ( chronic). Negative for chills, sweating, fatigue and fever.   HENT: Negative for congestion and sore throat.    Neck: Negative for painful lymph nodes.   Cardiovascular: Negative for chest pain and leg swelling.   Eyes: Negative for double vision and blurred vision.   Respiratory: Negative for cough and shortness of breath.    Gastrointestinal: Negative for nausea, vomiting and diarrhea.   Genitourinary: Negative for dysuria, frequency and urgency.   Musculoskeletal: Positive for pain and joint pain. Negative for trauma, joint swelling, abnormal ROM of joint, arthritis, gout, back pain, muscle cramps and muscle ache.   Skin: Negative for color change, pale and rash.   Allergic/Immunologic: Negative for seasonal allergies.   Neurological: Positive for numbness (Chronic). Negative for dizziness, history of vertigo, light-headedness, passing out and headaches.   Hematologic/Lymphatic: Negative for swollen lymph nodes, easy bruising/bleeding and history of blood clots. Does not bruise/bleed easily.   Psychiatric/Behavioral: Negative for nervous/anxious, sleep " disturbance and depression. The patient is not nervous/anxious.        Objective:      Physical Exam   Constitutional: He is oriented to person, place, and time. Vital signs are normal. He appears well-developed and well-nourished. He is active and cooperative.  Non-toxic appearance. He does not appear ill. No distress.   Patient sitting comfortably in no acute distress in motorized wheelchair.  Patient is not coughing, has no evidence of acute infection.  There is increased spasticity and tone consistent with his MS   HENT:   Head: Normocephalic and atraumatic.   Right Ear: Hearing, tympanic membrane, external ear and ear canal normal.   Left Ear: Hearing, tympanic membrane, external ear and ear canal normal.   Nose: Nose normal. No mucosal edema, rhinorrhea or nasal deformity. No epistaxis. Right sinus exhibits no maxillary sinus tenderness and no frontal sinus tenderness. Left sinus exhibits no maxillary sinus tenderness and no frontal sinus tenderness.   Mouth/Throat: Uvula is midline, oropharynx is clear and moist and mucous membranes are normal. No trismus in the jaw. Normal dentition. No uvula swelling. No posterior oropharyngeal erythema.   Eyes: Conjunctivae and lids are normal. Right eye exhibits no discharge. Left eye exhibits no discharge. No scleral icterus.   Neck: Trachea normal, normal range of motion, full passive range of motion without pain and phonation normal. Neck supple.   Cardiovascular: Normal rate, regular rhythm, normal heart sounds, intact distal pulses and normal pulses.   Pulmonary/Chest: Effort normal and breath sounds normal. No respiratory distress.   Abdominal: Soft. Normal appearance and bowel sounds are normal. He exhibits no distension, no abdominal bruit, no pulsatile midline mass and no mass. There is no tenderness.   Musculoskeletal: Normal range of motion. He exhibits no edema or deformity.   Pain elicited with movement of neck, trunk, and joints.  No swelling, erythema,  warmth appreciated.   Neurological: He is alert and oriented to person, place, and time. He has normal strength and normal reflexes. He displays normal reflexes. A sensory deficit is present. No cranial nerve deficit. He exhibits abnormal muscle tone. Coordination abnormal.   Symptoms consistent with MS.  No apparent change since my previous evaluation.   Skin: Skin is warm, dry, intact, not diaphoretic and not pale.   Psychiatric: He has a normal mood and affect. His speech is normal and behavior is normal. Judgment and thought content normal. Cognition and memory are normal.   Nursing note and vitals reviewed.          I again had a detailed discussion with patient regarding opioid use.   was reviewed. He has not had any narcotics in one month since he was here. See previous note regarding changes to narcotics. He is requesting percocet for this pain flare and apt with his PCP. I discussed that I can provide a very limited supply but cannot keep refilling his pain medication, needs to follow up with pain managemnt.  I called clinic  with patient's request to see PCP asap. No apt for current pcp until February, but there is an appointment with her partner on Monday where patient will follow up.   Assessment:       1. Generalized body aches    2. Multiple sclerosis    3. Arthralgia, unspecified joint        Plan:         Generalized body aches  -     Cancel: POCT Influenza A/B  -     Ambulatory referral to Internal Medicine    Multiple sclerosis  -     Ambulatory referral to Internal Medicine  -     oxyCODONE-acetaminophen (PERCOCET)  mg per tablet; Take 1 tablet by mouth every 6 (six) hours as needed for Pain.  Dispense: 12 tablet; Refill: 0    Arthralgia, unspecified joint  -     Ambulatory referral to Internal Medicine  -     oxyCODONE-acetaminophen (PERCOCET)  mg per tablet; Take 1 tablet by mouth every 6 (six) hours as needed for Pain.  Dispense: 12 tablet; Refill: 0      Patient  Instructions   - Rest.    - Drink plenty of fluids.    - Acetaminophen (tylenol) or Ibuprofen (advil,motrin) as directed as needed for fever/pain. Avoid tylenol if you have a history of liver disease. Do not take ibuprofen if you have a history of GI bleeding, kidney disease, or if you take blood thinners.     - Ice for 15-20 minutes at a time for the next 24-48 hours.    - Elevate when possible.     - Oxycodone-acetaminophen is a narcotic pain medication. Use only as needed for severe pain. Do not take with hydrocodone or diazepam  - Please be aware as we discussed that narcotics can be addictive.   - I have given you a limited quantity to take as it is needed at this time. However take it sparingly and only when needed.    - Do not operate machinery or drive on this medication.      - Follow up with your PCP or specialty clinic as directed in the next 1-2 weeks if not improved or as needed.  You can call [phone] to schedule an appointment with the appropriate provider.    - Go to the ER or seek medical attention immediately if you develop new or worsening symptoms.    - You must understand that you have received an Urgent Care treatment only and that you may be released before all of your medical problems are known or treated.   - You, the patient, will arrange for follow up care as instructed.   - If your condition worsens or fails to improve we recommend that you receive another evaluation at the ER immediately or contact your PCP to discuss your concerns or return here.

## 2019-11-08 NOTE — PATIENT INSTRUCTIONS
- Rest.    - Drink plenty of fluids.    - Acetaminophen (tylenol) or Ibuprofen (advil,motrin) as directed as needed for fever/pain. Avoid tylenol if you have a history of liver disease. Do not take ibuprofen if you have a history of GI bleeding, kidney disease, or if you take blood thinners.     - Ice for 15-20 minutes at a time for the next 24-48 hours.    - Elevate when possible.     - Oxycodone-acetaminophen is a narcotic pain medication. Use only as needed for severe pain. Do not take with hydrocodone or diazepam  - Please be aware as we discussed that narcotics can be addictive.   - I have given you a limited quantity to take as it is needed at this time. However take it sparingly and only when needed.    - Do not operate machinery or drive on this medication.      - Follow up with your PCP or specialty clinic as directed in the next 1-2 weeks if not improved or as needed.  You can call [phone] to schedule an appointment with the appropriate provider.    - Go to the ER or seek medical attention immediately if you develop new or worsening symptoms.    - You must understand that you have received an Urgent Care treatment only and that you may be released before all of your medical problems are known or treated.   - You, the patient, will arrange for follow up care as instructed.   - If your condition worsens or fails to improve we recommend that you receive another evaluation at the ER immediately or contact your PCP to discuss your concerns or return here.

## 2019-11-11 ENCOUNTER — OFFICE VISIT (OUTPATIENT)
Dept: INTERNAL MEDICINE | Facility: CLINIC | Age: 54
End: 2019-11-11
Payer: MEDICARE

## 2019-11-11 ENCOUNTER — TELEPHONE (OUTPATIENT)
Dept: NEUROLOGY | Facility: CLINIC | Age: 54
End: 2019-11-11

## 2019-11-11 VITALS
HEIGHT: 71 IN | HEART RATE: 85 BPM | SYSTOLIC BLOOD PRESSURE: 126 MMHG | OXYGEN SATURATION: 98 % | TEMPERATURE: 99 F | BODY MASS INDEX: 22.73 KG/M2 | DIASTOLIC BLOOD PRESSURE: 62 MMHG

## 2019-11-11 DIAGNOSIS — Z78.9 IMPAIRED MOBILITY AND ADLS: ICD-10-CM

## 2019-11-11 DIAGNOSIS — Z74.09 IMPAIRED MOBILITY AND ADLS: ICD-10-CM

## 2019-11-11 DIAGNOSIS — R27.8 ABNORMAL COORDINATION: ICD-10-CM

## 2019-11-11 DIAGNOSIS — G82.20 PARAPARESIS: ICD-10-CM

## 2019-11-11 DIAGNOSIS — R53.1 WEAKNESS GENERALIZED: ICD-10-CM

## 2019-11-11 DIAGNOSIS — M54.41 CHRONIC BILATERAL LOW BACK PAIN WITH BILATERAL SCIATICA: ICD-10-CM

## 2019-11-11 DIAGNOSIS — M54.42 CHRONIC BILATERAL LOW BACK PAIN WITH BILATERAL SCIATICA: ICD-10-CM

## 2019-11-11 DIAGNOSIS — N39.0 URINARY TRACT INFECTION WITHOUT HEMATURIA, SITE UNSPECIFIED: ICD-10-CM

## 2019-11-11 DIAGNOSIS — G89.29 CHRONIC BILATERAL LOW BACK PAIN WITH BILATERAL SCIATICA: ICD-10-CM

## 2019-11-11 DIAGNOSIS — Z79.891 OPIOID USE AGREEMENT EXISTS: ICD-10-CM

## 2019-11-11 DIAGNOSIS — M62.838 MUSCLE SPASTICITY: ICD-10-CM

## 2019-11-11 DIAGNOSIS — G35 ACUTE RELAPSING MULTIPLE SCLEROSIS: ICD-10-CM

## 2019-11-11 DIAGNOSIS — G35 MS (MULTIPLE SCLEROSIS): Primary | Chronic | ICD-10-CM

## 2019-11-11 PROCEDURE — 99213 PR OFFICE/OUTPT VISIT, EST, LEVL III, 20-29 MIN: ICD-10-PCS | Mod: HCWC,S$GLB,, | Performed by: INTERNAL MEDICINE

## 2019-11-11 PROCEDURE — 99999 PR PBB SHADOW E&M-EST. PATIENT-LVL IV: CPT | Mod: PBBFAC,HCWC,, | Performed by: INTERNAL MEDICINE

## 2019-11-11 PROCEDURE — 99213 OFFICE O/P EST LOW 20 MIN: CPT | Mod: HCWC,S$GLB,, | Performed by: INTERNAL MEDICINE

## 2019-11-11 PROCEDURE — 3008F PR BODY MASS INDEX (BMI) DOCUMENTED: ICD-10-PCS | Mod: HCWC,CPTII,S$GLB, | Performed by: INTERNAL MEDICINE

## 2019-11-11 PROCEDURE — 99999 PR PBB SHADOW E&M-EST. PATIENT-LVL IV: ICD-10-PCS | Mod: PBBFAC,HCWC,, | Performed by: INTERNAL MEDICINE

## 2019-11-11 PROCEDURE — 3008F BODY MASS INDEX DOCD: CPT | Mod: HCWC,CPTII,S$GLB, | Performed by: INTERNAL MEDICINE

## 2019-11-11 RX ORDER — HYDROCODONE BITARTRATE AND ACETAMINOPHEN 10; 325 MG/1; MG/1
1 TABLET ORAL EVERY 12 HOURS PRN
Qty: 30 TABLET | Refills: 0 | Status: SHIPPED | OUTPATIENT
Start: 2019-11-11 | End: 2019-11-26 | Stop reason: SDUPTHER

## 2019-11-11 RX ORDER — DIAZEPAM 5 MG/1
5 TABLET ORAL EVERY 12 HOURS PRN
Qty: 20 TABLET | Refills: 0 | Status: SHIPPED | OUTPATIENT
Start: 2019-11-11 | End: 2019-11-26 | Stop reason: SDUPTHER

## 2019-11-11 RX ORDER — ERGOCALCIFEROL 1.25 MG/1
50000 CAPSULE ORAL
Qty: 4 CAPSULE | Refills: 6 | Status: SHIPPED | OUTPATIENT
Start: 2019-11-11

## 2019-11-11 NOTE — LETTER
November 30, 2019      Parish Austin PA-C  [address] [facility] [address]           [facility] [address] [address]  Phone: [phone]          Patient: Mao Levin   MR Number: [id number]   YOB: 1965   Date of Visit: 11/11/2019       Dear Parish Austin:    Thank you for referring Mao Levin to me for evaluation. Attached you will find relevant portions of my assessment and plan of care.    If you have questions, please do not hesitate to call me. I look forward to following Mao Levin along with you.    Sincerely,    Mendy Alarcon MD    Enclosure  CC:  No Recipients    If you would like to receive this communication electronically, please contact [email or web address] or [phone] to request more information on [facility] access.    For providers and/or their staff who would like to refer a patient to [facility], please contact us through our one-stop-shop provider referral line, [facility], at [phone].    If you feel you have received this communication in error or would no longer like to receive these types of communications, please e-mail [email or web address]

## 2019-11-12 NOTE — TELEPHONE ENCOUNTER
Attempted to contact pt; Violet Craven MA reports pt is having trouble with his phone; will attempt to contact the pt again.

## 2019-11-12 NOTE — TELEPHONE ENCOUNTER
----- Message from William Marcelo sent at 11/11/2019  4:52 PM CST -----  Contact: Violet koenig/ Dr. VIERA @ [phone]  Violet is calling to schedule an appt w/ the doctor. Referral in from [facility]ERA

## 2019-11-26 ENCOUNTER — OFFICE VISIT (OUTPATIENT)
Dept: INTERNAL MEDICINE | Facility: CLINIC | Age: 54
End: 2019-11-26
Payer: MEDICARE

## 2019-11-26 ENCOUNTER — DOCUMENTATION ONLY (OUTPATIENT)
Dept: INTERNAL MEDICINE | Facility: CLINIC | Age: 54
End: 2019-11-26

## 2019-11-26 VITALS
HEART RATE: 78 BPM | SYSTOLIC BLOOD PRESSURE: 118 MMHG | DIASTOLIC BLOOD PRESSURE: 74 MMHG | OXYGEN SATURATION: 99 % | HEIGHT: 71 IN | TEMPERATURE: 98 F | BODY MASS INDEX: 22.73 KG/M2

## 2019-11-26 DIAGNOSIS — M54.42 CHRONIC BILATERAL LOW BACK PAIN WITH BILATERAL SCIATICA: ICD-10-CM

## 2019-11-26 DIAGNOSIS — G35 ACUTE RELAPSING MULTIPLE SCLEROSIS: ICD-10-CM

## 2019-11-26 DIAGNOSIS — Z78.9 IMPAIRED MOBILITY AND ADLS: ICD-10-CM

## 2019-11-26 DIAGNOSIS — G35 MS (MULTIPLE SCLEROSIS): Chronic | ICD-10-CM

## 2019-11-26 DIAGNOSIS — Z79.891 OPIOID USE AGREEMENT EXISTS: ICD-10-CM

## 2019-11-26 DIAGNOSIS — R53.1 WEAKNESS GENERALIZED: ICD-10-CM

## 2019-11-26 DIAGNOSIS — Z74.09 IMPAIRED MOBILITY AND ADLS: ICD-10-CM

## 2019-11-26 DIAGNOSIS — N39.0 URINARY TRACT INFECTION WITHOUT HEMATURIA, SITE UNSPECIFIED: Primary | ICD-10-CM

## 2019-11-26 DIAGNOSIS — R27.8 ABNORMAL COORDINATION: ICD-10-CM

## 2019-11-26 DIAGNOSIS — M62.838 MUSCLE SPASTICITY: ICD-10-CM

## 2019-11-26 DIAGNOSIS — M48.00 SPINAL STENOSIS, UNSPECIFIED SPINAL REGION: ICD-10-CM

## 2019-11-26 DIAGNOSIS — M54.2 NECK PAIN: ICD-10-CM

## 2019-11-26 DIAGNOSIS — M54.41 CHRONIC BILATERAL LOW BACK PAIN WITH BILATERAL SCIATICA: ICD-10-CM

## 2019-11-26 DIAGNOSIS — G89.29 CHRONIC BILATERAL LOW BACK PAIN WITH BILATERAL SCIATICA: ICD-10-CM

## 2019-11-26 DIAGNOSIS — G82.20 PARAPARESIS: ICD-10-CM

## 2019-11-26 DIAGNOSIS — Z12.11 SCREENING FOR COLON CANCER: ICD-10-CM

## 2019-11-26 LAB
BILIRUB UR QL STRIP: NEGATIVE
CLARITY UR REFRACT.AUTO: ABNORMAL
COLOR UR AUTO: YELLOW
GLUCOSE UR QL STRIP: NEGATIVE
HGB UR QL STRIP: NEGATIVE
KETONES UR QL STRIP: NEGATIVE
LEUKOCYTE ESTERASE UR QL STRIP: NEGATIVE
NITRITE UR QL STRIP: NEGATIVE
PH UR STRIP: 6 [PH] (ref 5–8)
PROT UR QL STRIP: NEGATIVE
SP GR UR STRIP: 1.02 (ref 1–1.03)
URN SPEC COLLECT METH UR: ABNORMAL

## 2019-11-26 PROCEDURE — 99214 PR OFFICE/OUTPT VISIT, EST, LEVL IV, 30-39 MIN: ICD-10-PCS | Mod: HCWC,S$GLB,, | Performed by: INTERNAL MEDICINE

## 2019-11-26 PROCEDURE — 99214 OFFICE O/P EST MOD 30 MIN: CPT | Mod: HCWC,S$GLB,, | Performed by: INTERNAL MEDICINE

## 2019-11-26 PROCEDURE — 99999 PR PBB SHADOW E&M-EST. PATIENT-LVL V: ICD-10-PCS | Mod: PBBFAC,HCWC,, | Performed by: INTERNAL MEDICINE

## 2019-11-26 PROCEDURE — 3008F PR BODY MASS INDEX (BMI) DOCUMENTED: ICD-10-PCS | Mod: HCWC,CPTII,S$GLB, | Performed by: INTERNAL MEDICINE

## 2019-11-26 PROCEDURE — 3008F BODY MASS INDEX DOCD: CPT | Mod: HCWC,CPTII,S$GLB, | Performed by: INTERNAL MEDICINE

## 2019-11-26 PROCEDURE — 81003 URINALYSIS AUTO W/O SCOPE: CPT | Mod: HCWC

## 2019-11-26 PROCEDURE — 99999 PR PBB SHADOW E&M-EST. PATIENT-LVL V: CPT | Mod: PBBFAC,HCWC,, | Performed by: INTERNAL MEDICINE

## 2019-11-26 PROCEDURE — 87086 URINE CULTURE/COLONY COUNT: CPT | Mod: HCWC

## 2019-11-26 RX ORDER — HYDROCODONE BITARTRATE AND ACETAMINOPHEN 10; 325 MG/1; MG/1
1 TABLET ORAL EVERY 12 HOURS PRN
Qty: 60 TABLET | Refills: 0 | Status: SHIPPED | OUTPATIENT
Start: 2019-11-26 | End: 2019-12-24 | Stop reason: SDUPTHER

## 2019-11-26 RX ORDER — DIAZEPAM 5 MG/1
5 TABLET ORAL EVERY 12 HOURS PRN
Qty: 40 TABLET | Refills: 0 | Status: SHIPPED | OUTPATIENT
Start: 2019-11-26 | End: 2019-12-24 | Stop reason: SDUPTHER

## 2019-11-28 LAB
BACTERIA UR CULT: NORMAL
BACTERIA UR CULT: NORMAL

## 2019-12-01 NOTE — PROGRESS NOTES
Subjective:       Patient ID: Mao Levin is a 54 y.o. male.    Chief Complaint: Follow-up    HPIMr. Ollie is doing okay - no fever or chills - no CP or SOB.  Has not seen me in a while was getting pain meds from another doc.  Needs to return to see Neurology.  Review of Systems   Respiratory: Negative for shortness of breath.    Cardiovascular: Negative for chest pain.   Gastrointestinal: Negative for abdominal pain, diarrhea, nausea and vomiting.   Genitourinary: Negative for dysuria.   Neurological: Negative for seizures, syncope and headaches.       Objective:      Physical Exam   Constitutional: He is oriented to person, place, and time. He appears well-developed and well-nourished. No distress.   HENT:   Mouth/Throat: Oropharynx is clear and moist.   Neck: Neck supple. No JVD present. No thyromegaly present.   Cardiovascular: Normal rate, regular rhythm, normal heart sounds and intact distal pulses. Exam reveals no gallop and no friction rub.   No murmur heard.  Pulmonary/Chest: Effort normal and breath sounds normal. He has no wheezes. He has no rales.   Abdominal: Soft. Bowel sounds are normal. He exhibits no distension and no mass. There is no tenderness. There is no rebound and no guarding.   Musculoskeletal: He exhibits no edema.   Lymphadenopathy:     He has no cervical adenopathy.   Neurological: He is alert and oriented to person, place, and time.   Skin: Skin is warm and dry.   Psychiatric: He has a normal mood and affect. His behavior is normal. Judgment and thought content normal.       Assessment:       1. MS (multiple sclerosis)    2. Urinary tract infection without hematuria, site unspecified    3. Paraparesis    4. Abnormal coordination    5. Impaired mobility and ADLs    6. Acute relapsing multiple sclerosis    7. Muscle spasticity    8. Chronic bilateral low back pain with bilateral sciatica    9. Opioid use agreement exists    10. Weakness generalized        Plan:   MS (multiple sclerosis)  -      Ambulatory consult to Neurology  -     Ambulatory consult to Ophthalmology  -      diazePAM (VALIUM) 5 MG tablet; Take 1 tablet (5 mg total) by mouth every 12 (twelve) hours as needed (muscle spasm).  Dispense: 20 tablet; Refill: 0    Urinary tract infection without hematuria, site unspecified  -     Urinalysis  -     Urine culture    Paraparesis  -     Discontinue: diazePAM (VALIUM) 5 MG tablet; Take 1 tablet (5 mg total) by mouth every 12 (twelve) hours as needed (muscle spasm).  Dispense: 20 tablet; Refill: 0    Abnormal coordination  -     Discontinue: diazePAM (VALIUM) 5 MG tablet; Take 1 tablet (5 mg total) by mouth every 12 (twelve) hours as needed (muscle spasm).  Dispense: 20 tablet; Refill: 0    Impaired mobility and ADLs  -     Discontinue: diazePAM (VALIUM) 5 MG tablet; Take 1 tablet (5 mg total) by mouth every 12 (twelve) hours as needed (muscle spasm).  Dispense: 20 tablet; Refill: 0    Acute relapsing multiple sclerosis  -     Discontinue: diazePAM (VALIUM) 5 MG tablet; Take 1 tablet (5 mg total) by mouth every 12 (twelve) hours as needed (muscle spasm).  Dispense: 20 tablet; Refill: 0    Muscle spasticity  -     Discontinue: diazePAM (VALIUM) 5 MG tablet; Take 1 tablet (5 mg total) by mouth every 12 (twelve) hours as needed (muscle spasm).  Dispense: 20 tablet; Refill: 0    Chronic bilateral low back pain with bilateral sciatica  -     Discontinue: diazePAM (VALIUM) 5 MG tablet; Take 1 tablet (5 mg total) by mouth every 12 (twelve) hours as needed (muscle spasm).  Dispense: 20 tablet; Refill: 0    Opioid use agreement exists  -     Discontinue: diazePAM (VALIUM) 5 MG tablet; Take 1 tablet (5 mg total) by mouth every 12 (twelve) hours as needed (muscle spasm).  Dispense: 20 tablet; Refill: 0    Weakness generalized  -     Discontinue: diazePAM (VALIUM) 5 MG tablet; Take 1 tablet (5 mg total) by mouth every 12 (twelve) hours as needed (muscle spasm).  Dispense: 20 tablet; Refill: 0    Other orders  -      ergocalciferol (VITAMIN D2) 50,000 unit Cap; Take 1 capsule (50,000 Units total) by mouth every 7 days.  Dispense: 4 capsule; Refill: 6  -     Discontinue: HYDROcodone-acetaminophen (NORCO)  mg per tablet; Take 1 tablet by mouth every 12 (twelve) hours as needed for Pain.  Dispense: 30 tablet; Refill: 0

## 2019-12-02 NOTE — PROGRESS NOTES
Subjective:       Patient ID: Mao Levin is a 54 y.o. male.    Chief Complaint: Follow-up and Medication Refill (oxycodone, valium, discuss trazadone for sleep)    HPIPt is having increased pain in neck and R arm with hx of moderate neuroforaminal narrowing C4-C5 and severe neuroforaminal narrowing at C5- C6 on MRI last year.  Pt is in his power chair today.  Review of Systems   Respiratory: Negative for shortness of breath.    Cardiovascular: Negative for chest pain.   Gastrointestinal: Negative for abdominal pain, diarrhea, nausea and vomiting.   Genitourinary: Negative for dysuria.   Neurological: Negative for seizures, syncope and headaches.       Objective:      Physical Exam   Constitutional: He is oriented to person, place, and time. He appears well-developed and well-nourished. No distress.   HENT:   Mouth/Throat: Oropharynx is clear and moist.   Neck: Neck supple. No JVD present. No thyromegaly present.   Cardiovascular: Normal rate, regular rhythm, normal heart sounds and intact distal pulses. Exam reveals no gallop and no friction rub.   No murmur heard.  Pulmonary/Chest: Effort normal and breath sounds normal. He has no wheezes. He has no rales.   Abdominal: Soft. Bowel sounds are normal. He exhibits no distension and no mass. There is no tenderness. There is no rebound and no guarding.   Musculoskeletal: He exhibits no edema.   Lymphadenopathy:     He has no cervical adenopathy.   Neurological: He is alert and oriented to person, place, and time.   Skin: Skin is warm and dry.   Psychiatric: He has a normal mood and affect. His behavior is normal. Judgment and thought content normal.       Assessment:       1. Urinary tract infection without hematuria, site unspecified    2. Neck pain    3. MS (multiple sclerosis)    4. Spinal stenosis, unspecified spinal region    5. Screening for colon cancer    6. Paraparesis    7. Abnormal coordination    8. Impaired mobility and ADLs    9. Acute relapsing multiple  sclerosis    10. Muscle spasticity    11. Chronic bilateral low back pain with bilateral sciatica    12. Opioid use agreement exists    13. Weakness generalized        Plan:   Urinary tract infection without hematuria, site unspecified  -     Urinalysis  -     Urine culture    Neck pain  -     MRI Cervical Spine W WO Cont; Future; Expected date: 11/26/2019    MS (multiple sclerosis)  -     MRI Brain Demyelinating W W/O Contrast; Future; Expected date: 11/26/2019  -     diazePAM (VALIUM) 5 MG tablet; Take 1 tablet (5 mg total) by mouth every 12 (twelve) hours as needed (muscle spasm).  Dispense: 40 tablet; Refill: 0    Spinal stenosis, unspecified spinal region  -     Ambulatory consult to Pain Clinic  -     Ambulatory consult to Physical Therapy    Screening for colon cancer  -     Fecal Immunochemical Test (iFOBT); Future; Expected date: 11/26/2019    Paraparesis  -     diazePAM (VALIUM) 5 MG tablet; Take 1 tablet (5 mg total) by mouth every 12 (twelve) hours as needed (muscle spasm).  Dispense: 40 tablet; Refill: 0    Abnormal coordination  -     diazePAM (VALIUM) 5 MG tablet; Take 1 tablet (5 mg total) by mouth every 12 (twelve) hours as needed (muscle spasm).  Dispense: 40 tablet; Refill: 0    Impaired mobility and ADLs  -     diazePAM (VALIUM) 5 MG tablet; Take 1 tablet (5 mg total) by mouth every 12 (twelve) hours as needed (muscle spasm).  Dispense: 40 tablet; Refill: 0    Acute relapsing multiple sclerosis  -     diazePAM (VALIUM) 5 MG tablet; Take 1 tablet (5 mg total) by mouth every 12 (twelve) hours as needed (muscle spasm).  Dispense: 40 tablet; Refill: 0    Muscle spasticity  -     diazePAM (VALIUM) 5 MG tablet; Take 1 tablet (5 mg total) by mouth every 12 (twelve) hours as needed (muscle spasm).  Dispense: 40 tablet; Refill: 0    Chronic bilateral low back pain with bilateral sciatica  -     diazePAM (VALIUM) 5 MG tablet; Take 1 tablet (5 mg total) by mouth every 12 (twelve) hours as needed (muscle  spasm).  Dispense: 40 tablet; Refill: 0    Opioid use agreement exists  -     diazePAM (VALIUM) 5 MG tablet; Take 1 tablet (5 mg total) by mouth every 12 (twelve) hours as needed (muscle spasm).  Dispense: 40 tablet; Refill: 0    Weakness generalized  -     diazePAM (VALIUM) 5 MG tablet; Take 1 tablet (5 mg total) by mouth every 12 (twelve) hours as needed (muscle spasm).  Dispense: 40 tablet; Refill: 0    Other orders  -     HYDROcodone-acetaminophen (NORCO)  mg per tablet; Take 1 tablet by mouth every 12 (twelve) hours as needed for Pain.  Dispense: 60 tablet; Refill: 0    Send to PT and pain management  Try to max out gabapentin and increase cymbalta to 60mg daily  Keep Neuro appt  Hydrocodone and valium given for one month

## 2019-12-06 ENCOUNTER — PATIENT OUTREACH (OUTPATIENT)
Dept: ADMINISTRATIVE | Facility: HOSPITAL | Age: 54
End: 2019-12-06

## 2019-12-10 ENCOUNTER — PATIENT OUTREACH (OUTPATIENT)
Dept: ADMINISTRATIVE | Facility: OTHER | Age: 54
End: 2019-12-10

## 2019-12-18 RX ORDER — HYDROCODONE BITARTRATE AND ACETAMINOPHEN 10; 325 MG/1; MG/1
1 TABLET ORAL EVERY 12 HOURS PRN
Qty: 60 TABLET | Refills: 0 | Status: CANCELLED | OUTPATIENT
Start: 2019-12-18

## 2019-12-18 NOTE — TELEPHONE ENCOUNTER
----- Message from Holly Pulido sent at 12/18/2019 12:11 PM CST -----  Contact: patient [phone]  Patient is calling for an RX refill or new RX.  Is this a refill or new RX:  refill  RX name and strength:     HYDROcodone-acetaminophen (NORCO)  mg per tablet  Directions (copy/paste from chart):  Take 1 tablet by mouth every 12 (twelve) hours as needed for Pain  Is this a 30 day or 90 day RX:  30  Local pharmacy or mail order pharmacy:    Pharmacy name and phone :   [id number] - [place], [place] - [address] AT [facility] [address] [phone]   Comments:

## 2019-12-19 NOTE — TELEPHONE ENCOUNTER
----- Message from Partha Ramsey sent at 12/19/2019 12:54 PM CST -----  Contact: [phone]  Patient is calling to check status of medication HYDROcodone-acetaminophen (NORCO)  mg per tablet. Please call and advise, Thanks

## 2019-12-19 NOTE — TELEPHONE ENCOUNTER
----- Message from Bud Betancur sent at 12/19/2019  8:56 AM CST -----  Contact: self/[phone]  Pt is calling in regards to his pain medication refill HYDROcodone-acetaminophen (NORCO)  mg per tablet. Pt states that he really needs this medication due to the amount of pain he's having. Please advise.        Thank You

## 2019-12-20 ENCOUNTER — TELEPHONE (OUTPATIENT)
Dept: NEUROLOGY | Facility: CLINIC | Age: 54
End: 2019-12-20

## 2019-12-20 NOTE — TELEPHONE ENCOUNTER
----- Message from Sharmaine Delarosa sent at 12/20/2019 11:20 AM CST -----  Contact: @[phone]  Pt would like to speak with Cyndi regarding issues he's having. Please call back.

## 2019-12-24 ENCOUNTER — OFFICE VISIT (OUTPATIENT)
Dept: INTERNAL MEDICINE | Facility: CLINIC | Age: 54
End: 2019-12-24
Payer: MEDICARE

## 2019-12-24 VITALS
BODY MASS INDEX: 22.73 KG/M2 | HEART RATE: 76 BPM | DIASTOLIC BLOOD PRESSURE: 74 MMHG | SYSTOLIC BLOOD PRESSURE: 124 MMHG | OXYGEN SATURATION: 97 % | HEIGHT: 71 IN | TEMPERATURE: 98 F

## 2019-12-24 DIAGNOSIS — M62.838 MUSCLE SPASTICITY: ICD-10-CM

## 2019-12-24 DIAGNOSIS — G89.29 CHRONIC BILATERAL LOW BACK PAIN WITH BILATERAL SCIATICA: ICD-10-CM

## 2019-12-24 DIAGNOSIS — G35 MS (MULTIPLE SCLEROSIS): Chronic | ICD-10-CM

## 2019-12-24 DIAGNOSIS — Z74.09 IMPAIRED MOBILITY AND ADLS: ICD-10-CM

## 2019-12-24 DIAGNOSIS — G82.20 PARAPARESIS: ICD-10-CM

## 2019-12-24 DIAGNOSIS — Z79.891 OPIOID USE AGREEMENT EXISTS: ICD-10-CM

## 2019-12-24 DIAGNOSIS — G35 ACUTE RELAPSING MULTIPLE SCLEROSIS: ICD-10-CM

## 2019-12-24 DIAGNOSIS — M54.41 CHRONIC BILATERAL LOW BACK PAIN WITH BILATERAL SCIATICA: ICD-10-CM

## 2019-12-24 DIAGNOSIS — R53.1 WEAKNESS GENERALIZED: ICD-10-CM

## 2019-12-24 DIAGNOSIS — Z78.9 IMPAIRED MOBILITY AND ADLS: ICD-10-CM

## 2019-12-24 DIAGNOSIS — M54.42 CHRONIC BILATERAL LOW BACK PAIN WITH BILATERAL SCIATICA: ICD-10-CM

## 2019-12-24 DIAGNOSIS — R27.8 ABNORMAL COORDINATION: ICD-10-CM

## 2019-12-24 PROCEDURE — 99213 PR OFFICE/OUTPT VISIT, EST, LEVL III, 20-29 MIN: ICD-10-PCS | Mod: HCWC,S$GLB,, | Performed by: INTERNAL MEDICINE

## 2019-12-24 PROCEDURE — 99999 PR PBB SHADOW E&M-EST. PATIENT-LVL III: ICD-10-PCS | Mod: PBBFAC,HCWC,, | Performed by: INTERNAL MEDICINE

## 2019-12-24 PROCEDURE — 99999 PR PBB SHADOW E&M-EST. PATIENT-LVL III: CPT | Mod: PBBFAC,HCWC,, | Performed by: INTERNAL MEDICINE

## 2019-12-24 PROCEDURE — 99213 OFFICE O/P EST LOW 20 MIN: CPT | Mod: HCWC,S$GLB,, | Performed by: INTERNAL MEDICINE

## 2019-12-24 PROCEDURE — 3008F BODY MASS INDEX DOCD: CPT | Mod: HCWC,CPTII,S$GLB, | Performed by: INTERNAL MEDICINE

## 2019-12-24 PROCEDURE — 3008F PR BODY MASS INDEX (BMI) DOCUMENTED: ICD-10-PCS | Mod: HCWC,CPTII,S$GLB, | Performed by: INTERNAL MEDICINE

## 2019-12-24 RX ORDER — DIAZEPAM 5 MG/1
5 TABLET ORAL EVERY 12 HOURS PRN
Qty: 30 TABLET | Refills: 0 | Status: SHIPPED | OUTPATIENT
Start: 2019-12-24 | End: 2020-01-07

## 2019-12-24 RX ORDER — HYDROCODONE BITARTRATE AND ACETAMINOPHEN 10; 325 MG/1; MG/1
1 TABLET ORAL EVERY 12 HOURS PRN
Qty: 60 TABLET | Refills: 0 | Status: SHIPPED | OUTPATIENT
Start: 2019-12-26

## 2019-12-26 ENCOUNTER — TELEPHONE (OUTPATIENT)
Dept: NEUROLOGY | Facility: CLINIC | Age: 54
End: 2019-12-26

## 2019-12-26 NOTE — TELEPHONE ENCOUNTER
----- Message from Carisa Whitlock sent at 12/26/2019  2:06 PM CST -----  Tree Quinonez is pt home care giver at "[employer] [facility]. Nenita called to tell the doctor pt passed away this morning.    Nenita can be reached at  [phone]      Thank you!

## 2019-12-30 NOTE — TELEPHONE ENCOUNTER
"DONNA phoned Tree and she states pt was found dead by "roommates" on [address].  She's unsure of pt's cause of death.  Sister was notified by bryce.    "

## 2019-12-31 ENCOUNTER — TELEPHONE (OUTPATIENT)
Dept: INTERNAL MEDICINE | Facility: CLINIC | Age: 54
End: 2019-12-31

## 2019-12-31 NOTE — PROGRESS NOTES
Subjective:       Patient ID: Mao Levin is a 54 y.o. male.    Chief Complaint: Follow-up    HPIPt doing about the same - missed MRI and Neuro appts.  No CP or SOB.  Review of Systems   Respiratory: Negative for shortness of breath.    Cardiovascular: Negative for chest pain.   Gastrointestinal: Negative for abdominal pain, diarrhea, nausea and vomiting.   Genitourinary: Negative for dysuria.   Neurological: Negative for seizures, syncope and headaches.       Objective:      Physical Exam   Constitutional: He is oriented to person, place, and time. He appears well-developed and well-nourished. No distress.   HENT:   Mouth/Throat: Oropharynx is clear and moist.   Neck: Neck supple. No JVD present. No thyromegaly present.   Cardiovascular: Normal rate, regular rhythm, normal heart sounds and intact distal pulses. Exam reveals no gallop and no friction rub.   No murmur heard.  Pulmonary/Chest: Effort normal and breath sounds normal. He has no wheezes. He has no rales.   Abdominal: Soft. Bowel sounds are normal. He exhibits no distension and no mass. There is no tenderness. There is no rebound and no guarding.   Musculoskeletal: He exhibits no edema.   Lymphadenopathy:     He has no cervical adenopathy.   Neurological: He is alert and oriented to person, place, and time.   Skin: Skin is warm and dry.   Psychiatric: He has a normal mood and affect. His behavior is normal. Judgment and thought content normal.       Assessment:       1. MS (multiple sclerosis)    2. Paraparesis    3. Abnormal coordination    4. Impaired mobility and ADLs    5. Acute relapsing multiple sclerosis    6. Muscle spasticity    7. Chronic bilateral low back pain with bilateral sciatica    8. Opioid use agreement exists    9. Weakness generalized        Plan:   MS (multiple sclerosis)  -     diazePAM (VALIUM) 5 MG tablet; Take 1 tablet (5 mg total) by mouth every 12 (twelve) hours as needed (muscle spasm).  Dispense: 30 tablet; Refill:  0    Paraparesis  -     diazePAM (VALIUM) 5 MG tablet; Take 1 tablet (5 mg total) by mouth every 12 (twelve) hours as needed (muscle spasm).  Dispense: 30 tablet; Refill: 0    Abnormal coordination  -     diazePAM (VALIUM) 5 MG tablet; Take 1 tablet (5 mg total) by mouth every 12 (twelve) hours as needed (muscle spasm).  Dispense: 30 tablet; Refill: 0    Impaired mobility and ADLs  -     diazePAM (VALIUM) 5 MG tablet; Take 1 tablet (5 mg total) by mouth every 12 (twelve) hours as needed (muscle spasm).  Dispense: 30 tablet; Refill: 0    Acute relapsing multiple sclerosis  -     diazePAM (VALIUM) 5 MG tablet; Take 1 tablet (5 mg total) by mouth every 12 (twelve) hours as needed (muscle spasm).  Dispense: 30 tablet; Refill: 0    Muscle spasticity  -     diazePAM (VALIUM) 5 MG tablet; Take 1 tablet (5 mg total) by mouth every 12 (twelve) hours as needed (muscle spasm).  Dispense: 30 tablet; Refill: 0    Chronic bilateral low back pain with bilateral sciatica  -     diazePAM (VALIUM) 5 MG tablet; Take 1 tablet (5 mg total) by mouth every 12 (twelve) hours as needed (muscle spasm).  Dispense: 30 tablet; Refill: 0    Opioid use agreement exists  -     diazePAM (VALIUM) 5 MG tablet; Take 1 tablet (5 mg total) by mouth every 12 (twelve) hours as needed (muscle spasm).  Dispense: 30 tablet; Refill: 0    Weakness generalized  -     diazePAM (VALIUM) 5 MG tablet; Take 1 tablet (5 mg total) by mouth every 12 (twelve) hours as needed (muscle spasm).  Dispense: 30 tablet; Refill: 0    Other orders  -     HYDROcodone-acetaminophen (NORCO)  mg per tablet; Take 1 tablet by mouth every 12 (twelve) hours as needed for Pain.  Dispense: 60 tablet; Refill: 0    Reschedule MRIs and Neuro appt - RTC 4 weeks

## 2020-01-02 ENCOUNTER — TELEPHONE (OUTPATIENT)
Dept: INTERNAL MEDICINE | Facility: CLINIC | Age: 55
End: 2020-01-02

## 2020-01-02 NOTE — TELEPHONE ENCOUNTER
Please reschedule MRIs and Neuro appt thanks.    Called patient x 2 to scheduled MRI's and Neuro. Voicemail came on, L/M FOR A RETURN PHONE CALL.

## 2020-05-10 NOTE — PROGRESS NOTES
Subjective:       Patient ID: Mao Levin is a 52 y.o. male who presents today for a routine clinic visit for MS.      MS HPI:  · DMT: Rituxan(October 2017)    · Side effects from DMT? No  · Taking vitamin D3 as recommended? Yes - 50,000IU/day   · Seeing pain management and PM&R(Dr. Estrella)  · He is walking with walker daily and would like to try Ampyra again  · He is going to gym in therapy dept.to work out some but not in formal PT or OT    SOCIAL HISTORY  Social History   Substance Use Topics    Smoking status: Former Smoker     Packs/day: 0.50     Years: 23.00     Types: Cigarettes     Quit date: 10/1/2017    Smokeless tobacco: Never Used      Comment: Using patch    Alcohol use No     Living arrangements - the patient lives in a nursing home([facility]). Would like to move to another facility but has not found right place yet  Employment disabled    MS ROS:  · Fatigue: Yes - mild  · Sleep Disturbance: No  · Bladder Dysfunction: Yes - Vesicare  · Bowel Dysfunction: Yes - Glycolax PRN  · Spasticity: Yes - Dantrolene and Valium  · Visual Symptoms: No  · Cognitive: Yes - mild memory issues, no change  · Mood Disorder: Yes - Cymbalta  · Gait Disturbance: Yes - as above, wears L AFO-jacky like to restart Ampyra-no seizure history per patient   · Falls: Yes - slid to floor during transfer  · Hand Dysfunction: Yes - coordination  · Pain: Yes - Cymbalta, gabapentin  · Sexual Dysfunction: Not Assessed  · Skin Breakdown: No  · Tremors: No  · Dysphagia:  No  · Dysarthria:  No  · Heat sensitivity:  Yes - weakness  · Any un-met adaptive needs? No  · Copay Assist?  Yes   · Clinical Trial candidate? No          Objective:        1. 25 foot timed walk: 1: 44.19s with U-step  Timed 25 Foot Walk: 3/6/2017   Did patient wear an AFO? Yes   Was assistive device used? Yes   Assistive device used (felipe one): Bilateral Assistance   Bilateral device used Walker/Rollator   Time for 25 Foot Walk (seconds) 34.9       Neurologic  Exam         Mental Status   Oriented to person, place, and time.   Follows 3 step commands.   Speech: speech is normal   Level of consciousness: alert     Cranial Nerves      CN III, IV, VI   Pupils are equal, round, and reactive to light.  Extraocular motions are normal.      CN V   Facial sensation intact.      CN VII   Facial expression full, symmetric.      CN VIII   Hearing: intact (finger rub)     CN IX, X   Palate: symmetric     CN XI   CN XI normal.      CN XII   Tongue deviation: none     Motor Exam   Muscle bulk: normal  Overall muscle tone: increased  Right arm tone: increased  Left arm tone: increased  Right leg tone: spastic  Left leg tone: spastic     Strength   Right deltoid: 5/5  Left deltoid: 5/5  Right triceps: 5/5  Left triceps: 4/5  Right wrist extension: 4/5  Right interossei: 3/5  Right iliopsoas: 2+/5  Left iliopsoas: 1+/5  Right hamstrin/5  Left hamstring: 3/5  Right anterior tibial: 3/5     R UE AROM WNL  L UE AROM limited at shoulder to approx. 90 degrees.   Patient L hand/wrist held in flexor pattern-he is able to partially open hand. He primarily uses L hand as stabilizer. I can fully extend/open hand/wrist      Sensory Exam   Right arm vibration: decreased from fingers  Left arm vibration: decreased from fingers  Right leg vibration: decreased from ankle  Left leg vibration: decreased from ankle     Gait, Coordination, and Reflexes      Gait  Gait: spastic (narrow base, unsteady, L hip circumduction; L AFO in place). While patient refused to perform timed walk, he did ambulate in clinic room and demonstrated unsafe use of walker-vc's required     Coordination   Finger to nose coordination: abnormal (more difficult L UE than R UE)  Tandem walking coordination: abnormal (unable)     Tremor   Resting tremor: absent  Action tremor: absent     Reflexes   Right brachioradialis: 2+  Left brachioradialis: 3+  Right biceps: 2+  Left biceps: 3+  Right triceps: 2+  Left triceps: 3+  Right  patellar: 3+  Left patellar: 3+       Unable to heel/toe talk  Impaired RSM UE and LE's  L preponderance of reflexes        Imaging:     Results for orders placed during the hospital encounter of 03/13/17   MRI Brain W WO Contrast    Impression Findings in the cerebral white matter which are typical for multiple sclerosis.  Compared to the prior examination of 12/16/2016, the overall number and signal intensity of the white matter lesions is stable.  No new enhancing plaques are seen to suggest active disease.      Electronically signed by: VALENTINO BALL MD  Date:     03/17/17  Time:    00:55      Results for orders placed during the hospital encounter of 03/13/17   MRI Cervical Spine W WO Cont    Impression Persistent multifocal areas of cord signal abnormality, unchanged when compared to the MRI dated 12/04/2016 and most likely representing sequela of demyelinating disease given patient's history of multiple sclerosis.  No enhancing lesions to suggest active demyelination.      Electronically signed by: VALENTINO BALL MD  Date:     03/17/17  Time:    03:51      Results for orders placed during the hospital encounter of 12/02/16   MRI Thoracic Spine W WO Cont    Impression  There are patchy foci of T2 signal hyperintensity in the cervical and thoracic spinal cord overall similar to prior exam and likely relating to patient's history of multiple sclerosis.  There is no finding to indicate active demyelination and no significant change compared to prior examination.      Electronically signed by: Kelsie Farah MD  Date:     12/04/16  Time:    08:42          Labs:     Lab Results   Component Value Date    JWPTLOFN70BC 27 (L) 07/18/2017    ADVSYXEP59VC 18 (L) 09/25/2016     Lab Results   Component Value Date    JCVINDEX 3.70 (A) 09/22/2016    JCVANTIBODY Positive (A) 09/22/2016     No results found for: NS8JQHUU, ABSOLUTECD3, KS2JDYIG, ABSOLUTECD8, YS0SQIXG, ABSOLUTECD4, LABCD48  Lab Results   Component Value Date     WBC 7.95 08/16/2017    RBC 4.54 (L) 08/16/2017    HGB 13.6 (L) 08/16/2017    HCT 40.0 08/16/2017    MCV 88 08/16/2017    MCH 30.0 08/16/2017    MCHC 34.0 08/16/2017    RDW 13.5 08/16/2017     08/16/2017    MPV 11.0 08/16/2017    GRAN 4.5 08/16/2017    GRAN 56.0 08/16/2017    LYMPH 2.6 08/16/2017    LYMPH 32.1 08/16/2017    MONO 0.7 08/16/2017    MONO 8.9 08/16/2017    EOS 0.2 08/16/2017    BASO 0.03 08/16/2017    EOSINOPHIL 2.5 08/16/2017    BASOPHIL 0.4 08/16/2017     Sodium   Date Value Ref Range Status   08/16/2017 138 136 - 145 mmol/L Final     Potassium   Date Value Ref Range Status   08/16/2017 4.0 3.5 - 5.1 mmol/L Final     Chloride   Date Value Ref Range Status   08/16/2017 106 95 - 110 mmol/L Final     CO2   Date Value Ref Range Status   08/16/2017 25 23 - 29 mmol/L Final     Glucose   Date Value Ref Range Status   08/16/2017 94 70 - 110 mg/dL Final     BUN, Bld   Date Value Ref Range Status   08/16/2017 10 6 - 20 mg/dL Final     Creatinine   Date Value Ref Range Status   08/16/2017 0.9 0.5 - 1.4 mg/dL Final     Calcium   Date Value Ref Range Status   08/16/2017 8.7 8.7 - 10.5 mg/dL Final     Total Protein   Date Value Ref Range Status   08/14/2017 6.8 6.0 - 8.4 g/dL Final     Albumin   Date Value Ref Range Status   08/14/2017 3.7 3.5 - 5.2 g/dL Final     Total Bilirubin   Date Value Ref Range Status   08/14/2017 0.5 0.1 - 1.0 mg/dL Final     Comment:     For infants and newborns, interpretation of results should be based  on gestational age, weight and in agreement with clinical  observations.  Premature Infant recommended reference ranges:  Up to 24 hours.............<8.0 mg/dL  Up to 48 hours............<12.0 mg/dL  3-5 days..................<15.0 mg/dL  6-29 days.................<15.0 mg/dL       Alkaline Phosphatase   Date Value Ref Range Status   08/14/2017 70 55 - 135 U/L Final     AST   Date Value Ref Range Status   08/14/2017 11 10 - 40 U/L Final     ALT   Date Value Ref Range Status    08/14/2017 8 (L) 10 - 44 U/L Final     Anion Gap   Date Value Ref Range Status   08/16/2017 7 (L) 8 - 16 mmol/L Final     eGFR if    Date Value Ref Range Status   08/16/2017 >60.0 >60 mL/min/1.73 m^2 Final     eGFR if non    Date Value Ref Range Status   08/16/2017 >60.0 >60 mL/min/1.73 m^2 Final     Comment:     Calculation used to obtain the estimated glomerular filtration  rate (eGFR) is the CKD-EPI equation. Since race is unknown   in our information system, the eGFR values for   -American and Non--American patients are given   for each creatinine result.         Diagnosis/Assessment/Plan:    1. Multiple Sclerosis  · Assessment: His timed walk is much slower, but the remainder of his exam is stable. I do think restarting Ampyra again may be beneficial for him.   · Imaging:annual planned for March 2018  · Disease Modifying Therapies: Rituxan and high dose Vit D3. Next infusion in April 2018-will get labs in March.    2. MS Symptom Assessment / Management  · Gait Disturbance: will check CrCl today and if OK will restart Ampyra-patient is aware must be taken q12h(no seizure history). Recommended PT in [place]  · Hand Dysfunction: recommended OT in [place]   · Pain: managed by PM&R and pain management               3. NH-filled out clinic note today regarding above recommendations    Over 50% of this 40 minute visit was spent in direct face to face counseling of the patient about MS, DMT considerations, and MS symptom management.     Return in about 3 months (around 4/3/2018) for follow up with Dr. Finnegan. after MRI  Patient agreed to POC today.    Attending, Dr. Finnegan, was available during today's encounter. Any change to plan along with cosign to appear in the EMR.     Lawanda Boyce PA-C  [facility]      Multiple sclerosis  -     Basic metabolic panel; Future; Expected date: 01/03/2018  -     MRI Brain W WO Contrast; Future; Expected date: 03/05/2018    Counseling regarding  goals of care    Encounter for long-term (current) use of high-risk medication  -     Basic metabolic panel; Future; Expected date: 01/03/2018    Gait disturbance    Neuropathic pain    Spasticity

## 2020-07-17 NOTE — PT/OT/SLP PROGRESS
Physical Therapy  Treatment    Mao Levin   MRN: [id number]   Admitting Diagnosis: MS (multiple sclerosis)    PT Received On: 17  Total Time (min): 75       Billable Minutes:  Gait Training 30 Therapeutic Activity 15 and Therapeutic Exercise 30    Treatment Type: Treatment  PT/PTA: PTA     PTA Visit Number: 2       General Precautions: Standard, fall  Orthopedic Precautions: N/A   Braces: N/A    Do you have any cultural, spiritual, Christian conflicts, given your current situation?: no    Subjective:  Seated in w/c    Pain Ratin/10        Pain Rating Post-Intervention: 0/10    Objective:  Patient found seated in w/c     Functional Status:  MDS G  Transfer Functional Status: mod(A)-Max(A)  Walk in Room Functional Status: mod(A)-Max(A)  Walk in Corridor Functional Status: mod(A)-Max(A)  Locomotion on Unit Functional Status: CGA-Min (A)  Locomotion Off Unit Functional Status: total(A)      Transfers:  Sit <> stand from w/c with mod/max A x multiple trials    Gait:  Distance Walk: pt ambulated x 130 feet with RW and total A (CGA x 2 people) for balance and safety. W/c in tow.  Ace wrap and blue shoe to (L)LE  Assistive Device: rolling walker  Gait Deviation(s): decreased keyshawn;decreased toe-to-floor clearance;decreased weight-shifting ability;decreased velocity of limb motion;decreased step length;decreased stride length;decreased swing-to-stance ratio     Pregait training  Step up/down with RLE x 20 reps.  Required total A for balance and blocking (L)knee  Asc/desc 1 step with (B) rails x 5 trials.  Leading with RLE.  Required total A for advancing LLE    Wheelchair Mobility:  Patient propels w/c x 100 feet with (B)UEs and SBA/min A for steering straight.    Therex: Required (A) to LLE  Ankle pumps 15  Quad sets 15  Glut sets 15  Hip flexion 15  Hip Extension 15  Long arc quads 15  ABduction 15  ADduction 15      Additional Treatment:  LBE x 15 minutes to increase strength, ROM, endurance and coordination.   Required (A) for completion    Standing squat x 15 reps     Patient left up in chair with call button in reach.    Assessment:  Mao eLvin is a 51 y.o. male with a medical diagnosis of MS (multiple sclerosis).  and presents with improving strength for transfers and gait. Pt ambulated x 130 feet with RW and total A (CGA x 2 people).  Pt would continue to benefit from PT services.  Goals remain appropriate.    Rehab identified problem list/impairments: weakness, impaired endurance, impaired sensation, impaired self care skills, impaired functional mobilty, gait instability, impaired balance, decreased upper extremity function, decreased lower extremity function, pain    Rehab potential is fair.    Activity tolerance: Fair    Discharge recommendations: rehabilitation facility     Barriers to discharge: Inaccessible home environment, Decreased caregiver support    Equipment recommendations: bedside commode, walker, rolling, wheelchair     GOALS:   Physical Therapy Goals        Problem: Physical Therapy Goal    Goal Priority Disciplines Outcome Goal Variances Interventions   Physical Therapy Goal     PT/OT, PT Ongoing (interventions implemented as appropriate)     Description:  Goals to be met by: 2 weeks     Patient will increase functional independence with mobility by performin. Supine to sit with Stand-by Assistance  2. Sit to supine with Stand-by Assistance  3. Sit to stand transfer with Minimal Assistance  4. Bed to chair transfer with Minimal Assistance using Rolling Walker  5. Gait  x 20 feet with Moderate Assistance using Rolling Walker. Met (3/26/2017)  6. Wheelchair propulsion x75 feet with Stand-by Assistance using BUE/BLE  7. Ascend/descend 3 stair with left Handrails Moderate Assistance.   8. Stand for 3 minutes with Stand-by Assistance using Rolling Walker  9. Lower extremity exercise program x20 reps per handout, with assistance as needed  10. New Goal (3/26/2017): Gait  x 50 feet with Moderate  Assistance using Rolling Walker.                  PLAN:    Patient to be seen 5 x/week  to address the above listed problems via gait training, therapeutic activities, therapeutic exercises, wheelchair management/training  Plan of Care expires: 04/17/17  Plan of Care reviewed with: patient    Vandana Benitez, PTA  03/28/2017   none

## 2020-08-28 NOTE — TELEPHONE ENCOUNTER
----- Message from William Marcelo sent at 1/2/2019  8:18 AM CST -----  Rx Refill/Request     Is this a Refill or New Rx: Refill    Rx Name and Strength:  oxyCODONE-acetaminophen (PERCOCET)  mg per tablet and diazePAM (VALIUM) 5 MG tablet  Preferred Pharmacy with phone number:  Communication Preference: [phone]  Additional Information: Pt is asking to speak w/ someone on staff regarding the refill request. Pt said his mother will be at [place] this morning and she would like to pickup the prescriptions.     IGNACIA with RW

## 2021-05-20 NOTE — PLAN OF CARE
Called patient.  He states he was getting his BP checked yesterday and his arm turned red and itchy.  Referral placed for allergy, but has not heard back.  Patient will call back later today if he cannot get scheduling phone number (no paper/pen at time of call).   Problem: Occupational Therapy Goal  Goal: Occupational Therapy Goal  Goals to be met by: 12/17/2018     Patient will increase functional independence with ADLs by performing:    Feeding with Modified [place].    Met  UE Dressing with Set-up Assistance.   Met  LE Dressing with Minimal Assistance.  Grooming while seated with Supervision.    Met  Toileting from bedside commode with Stand-by Assistance for hygiene and clothing management.   Bathing from  shower chair/bench with Stand-by Assistance.  Rolling to Right, Left with Supervision.    Met  Supine to sit with Supervision.  Met  Stand pivot transfers with Stand-by Assistance.  Toilet transfer to bedside commode with Stand-by Assistance.  Upper extremity exercise program 3 x 15 reps per handout, with independence.  Patient will perform a functional standing activity for 10 min with S in order to perform household tasks.     Outcome: Ongoing (interventions implemented as appropriate)  MERCY Cason/NILESH      12/14/2018

## 2021-06-01 NOTE — PT/OT/SLP PROGRESS
Occupational Therapy  Treatment    Mao Levin   MRN: [id number]   Admitting Diagnosis: MS (multiple sclerosis)    OT Date of Treatment: 17  Total Time (min): 45 min    Billable Minutes:  Self Care/Home Management 15 and Therapeutic Exercise 30    General Precautions: Standard, fall  Orthopedic Precautions: N/A  Braces: N/A    Do you have any cultural, spiritual, Uatsdin conflicts, given your current situation?: no    Subjective:  Communicated with patient prior to session.    Pain Ratin/10  Pain Rating Post-Intervention: 0/10    Objective:       Functional Status:  MDS G  Eating Functional Status: S-SBA setup  Toilet Use Functional Status: mod(A)-Max(A) Max A   Personal Hygiene Functional Status: S-SBA oral care, washing face, and shaving with supervision      OT Exercises: UE Ergometer 15 min for improving endurance to increase independence with ADLs.   Lat pull downs 25 x 4 40# for improving strength to increase independence with daily skills and L UE function.     Patient left up in chair with maneuvering around unit.    ASSESSMENT:  Mao Levin is a 51 y.o. male with a medical diagnosis of MS (multiple sclerosis) and presents with the deficits listed below. Patient tolerated treatment session and was motivated to complete tasks. Patient continues to benefit from skilled OT services to achieve maximal independence.    Rehab identified problem list/impairments: impaired endurance, impaired self care skills, impaired functional mobilty, decreased upper extremity function, decreased safety awareness    Rehab potential is good    Activity tolerance: Good    Discharge recommendations: rehabilitation facility     Barriers to discharge: Decreased caregiver support     Equipment recommendations: wheelchair (drop arm commode)     GOALS:   Occupational Therapy Goals        Problem: Occupational Therapy Goal    Goal Priority Disciplines Outcome Interventions   Occupational Therapy Goal     OT, PT/OT Ongoing  (interventions implemented as appropriate)    Description:  Goals to be met by: 3 weeks     Patient will increase functional independence with ADLs by performing:    Feeding with Set-up Assistance. Met  UE Dressing with Stand-by Assistance.--met   LE Dressing with Minimal Assistance.--met;however, occasional mod A depending on muscle tone  Grooming while seated with Modified [place].--met  Toileting from bedside commode with Minimal Assistance for hygiene and clothing management.   Bathing from  shower chair/bench with Minimal Assistance.--met  Sitting at edge of bed x20 minutes with Supervision.--met  Rolling to Bilateral with Stand-by Assistance. --met  Supine to sit with Minimal Assistance.--met  Stand pivot transfers with Minimal Assistance.--met  Toilet transfer to bedside commode with Minimal Assistance.  L Upper extremity self ROM exercise program x15 reps per handout, with independence.                   Plan:  Patient to be seen 5 x/week to address the above listed problems via self-care/home management, therapeutic exercises, therapeutic activities  Plan of Care expires: 04/18/17  Plan of Care reviewed with: patient    GISEL Flowers  04/17/2017   98

## 2021-11-04 NOTE — PROGRESS NOTES
03/30/2017  3:59 PM  Discharge Planning-Met with patient in room to inform of new discharge date of 4/13/2017. Discharge date written on dry erase board in room. Patient stated understanding to discharge date.    Kelsie Renee RN, CM Skilled  [id number]       Medical Necessity Information: It is in your best interest to select a reason for this procedure from the list below. All of these items fulfill various CMS LCD requirements except lesion extends to a margin. Include Z78.9 (Other Specified Conditions Influencing Health Status) As An Associated Diagnosis?: Yes Add Nub Associated Diagnoses If Applicable When Selecting Medical Necessity: No Medical Necessity Clause: This procedure was medically necessary because the lesion that was treated was: Lab: [id number] Lab Facility: [id number] Size Of Lesion In Cm: 0.5 Size Of Margin In Cm: 0.2 Excision Method: Slit Repair Type: Intermediate Suturegard Retention Suture: 2-0 Nylon Retention Suture Bite Size: 3 mm Length To Time In Minutes Device Was In Place: 10 Number Of Hemigard Strips Per Side: 1 Intermediate / Complex Repair - Final Wound Length In Cm: 1.3 Undermining Type: Entire Wound Debridement Text: The wound edges were debrided prior to proceeding with the closure to facilitate wound healing. Helical Rim Text: The closure involved the helical rim. Vermilion Border Text: The closure involved the vermilion border. Nostril Rim Text: The closure involved the nostril rim. Retention Suture Text: Retention sutures were placed to support the closure and prevent dehiscence. Suture Removal: 7 days Epidermal Closure Graft Donor Site (Optional): simple interrupted Graft Donor Site Bandage (Optional-Leave Blank If You Don't Want In Note): Steri-strips and a pressure bandage were applied to the donor site. Detail Level: Detailed Excision Depth: adipose tissue Scalpel Size: 15 blade Anesthesia Type: 1% lidocaine with epinephrine Additional Anesthesia Volume In Cc: 6 Hemostasis: Electrocautery Estimated Blood Loss (Cc): minimal Deep Sutures: 5-0 Monocryl Epidermal Sutures: 5-0 Prolene Wound Care: Petrolatum Dressing: dry sterile dressing Suturegard Intro: Intraoperative tissue expansion was performed, utilizing the SUTUREGARD device, in order to reduce wound tension. Suturegard Body: The suture ends were repeatedly re-tightened and re-clamped to achieve the desired tissue expansion. Hemigard Intro: Due to skin fragility and wound tension, it was decided to use HEMIGARD adhesive retention suture devices to permit a linear closure. The skin was cleaned and dried for a 6cm distance away from the wound. Excessive hair, if present, was removed to allow for adhesion. Hemigard Postcare Instructions: The HEMIGARD strips are to remain completely dry for at least 5-7 days. Complex Repair Preamble Text (Leave Blank If You Do Not Want): Extensive wide undermining was performed. Intermediate Repair Preamble Text (Leave Blank If You Do Not Want): Undermining was performed with blunt dissection. Fusiform Excision Additional Text (Leave Blank If You Do Not Want): The margin was drawn around the clinically apparent lesion.  A fusiform shape was then drawn on the skin incorporating the lesion and margins.  Incisions were then made along these lines to the appropriate tissue plane and the lesion was extirpated. Eliptical Excision Additional Text (Leave Blank If You Do Not Want): The margin was drawn around the clinically apparent lesion.  An elliptical shape was then drawn on the skin incorporating the lesion and margins.  Incisions were then made along these lines to the appropriate tissue plane and the lesion was extirpated. Saucerization Excision Additional Text (Leave Blank If You Do Not Want): The margin was drawn around the clinically apparent lesion.  Incisions were then made along these lines, in a tangential fashion, to the appropriate tissue plane and the lesion was extirpated. Slit Excision Additional Text (Leave Blank If You Do Not Want): A linear line was drawn on the skin overlying the lesion. An incision was made slowly until the lesion was visualized.  Once visualized, the lesion was removed with blunt dissection. Excisional Biopsy Additional Text (Leave Blank If You Do Not Want): The margin was drawn around the clinically apparent lesion. An elliptical shape was then drawn on the skin incorporating the lesion and margins.  Incisions were then made along these lines to the appropriate tissue plane and the lesion was extirpated. Perilesional Excision Additional Text (Leave Blank If You Do Not Want): The margin was drawn around the clinically apparent lesion. Incisions were then made along these lines to the appropriate tissue plane and the lesion was extirpated. Repair Performed By Another Provider Text (Leave Blank If You Do Not Want): After the tissue was excised the defect was repaired by another provider. No Repair - Repaired With Adjacent Surgical Defect Text (Leave Blank If You Do Not Want): After the excision the defect was repaired concurrently with another surgical defect which was in close approximation. Advancement Flap (Single) Text: The defect edges were debeveled with a #15 scalpel blade.  Given the location of the defect and the proximity to free margins a single advancement flap was deemed most appropriate.  Using a sterile surgical marker, an appropriate advancement flap was drawn incorporating the defect and placing the expected incisions within the relaxed skin tension lines where possible.    The area thus outlined was incised deep to adipose tissue with a #15 scalpel blade.  The skin margins were undermined to an appropriate distance in all directions utilizing iris scissors. Advancement Flap (Double) Text: The defect edges were debeveled with a #15 scalpel blade.  Given the location of the defect and the proximity to free margins a double advancement flap was deemed most appropriate.  Using a sterile surgical marker, the appropriate advancement flaps were drawn incorporating the defect and placing the expected incisions within the relaxed skin tension lines where possible.    The area thus outlined was incised deep to adipose tissue with a #15 scalpel blade.  The skin margins were undermined to an appropriate distance in all directions utilizing iris scissors. Burow's Advancement Flap Text: The defect edges were debeveled with a #15 scalpel blade.  Given the location of the defect and the proximity to free margins a Burow's advancement flap was deemed most appropriate.  Using a sterile surgical marker, the appropriate advancement flap was drawn incorporating the defect and placing the expected incisions within the relaxed skin tension lines where possible.    The area thus outlined was incised deep to adipose tissue with a #15 scalpel blade.  The skin margins were undermined to an appropriate distance in all directions utilizing iris scissors. Chonodrocutaneous Helical Advancement Flap Text: The defect edges were debeveled with a #15 scalpel blade.  Given the location of the defect and the proximity to free margins a chondrocutaneous helical advancement flap was deemed most appropriate.  Using a sterile surgical marker, the appropriate advancement flap was drawn incorporating the defect and placing the expected incisions within the relaxed skin tension lines where possible.    The area thus outlined was incised deep to adipose tissue with a #15 scalpel blade.  The skin margins were undermined to an appropriate distance in all directions utilizing iris scissors. Crescentic Advancement Flap Text: The defect edges were debeveled with a #15 scalpel blade.  Given the location of the defect and the proximity to free margins a crescentic advancement flap was deemed most appropriate.  Using a sterile surgical marker, the appropriate advancement flap was drawn incorporating the defect and placing the expected incisions within the relaxed skin tension lines where possible.    The area thus outlined was incised deep to adipose tissue with a #15 scalpel blade.  The skin margins were undermined to an appropriate distance in all directions utilizing iris scissors. A-T Advancement Flap Text: The defect edges were debeveled with a #15 scalpel blade.  Given the location of the defect, shape of the defect and the proximity to free margins an A-T advancement flap was deemed most appropriate.  Using a sterile surgical marker, an appropriate advancement flap was drawn incorporating the defect and placing the expected incisions within the relaxed skin tension lines where possible.    The area thus outlined was incised deep to adipose tissue with a #15 scalpel blade.  The skin margins were undermined to an appropriate distance in all directions utilizing iris scissors. O-T Advancement Flap Text: The defect edges were debeveled with a #15 scalpel blade.  Given the location of the defect, shape of the defect and the proximity to free margins an O-T advancement flap was deemed most appropriate.  Using a sterile surgical marker, an appropriate advancement flap was drawn incorporating the defect and placing the expected incisions within the relaxed skin tension lines where possible.    The area thus outlined was incised deep to adipose tissue with a #15 scalpel blade.  The skin margins were undermined to an appropriate distance in all directions utilizing iris scissors. O-L Flap Text: The defect edges were debeveled with a #15 scalpel blade.  Given the location of the defect, shape of the defect and the proximity to free margins an O-L flap was deemed most appropriate.  Using a sterile surgical marker, an appropriate advancement flap was drawn incorporating the defect and placing the expected incisions within the relaxed skin tension lines where possible.    The area thus outlined was incised deep to adipose tissue with a #15 scalpel blade.  The skin margins were undermined to an appropriate distance in all directions utilizing iris scissors. O-Z Flap Text: The defect edges were debeveled with a #15 scalpel blade.  Given the location of the defect, shape of the defect and the proximity to free margins an O-Z flap was deemed most appropriate.  Using a sterile surgical marker, an appropriate transposition flap was drawn incorporating the defect and placing the expected incisions within the relaxed skin tension lines where possible. The area thus outlined was incised deep to adipose tissue with a #15 scalpel blade.  The skin margins were undermined to an appropriate distance in all directions utilizing iris scissors. Double O-Z Flap Text: The defect edges were debeveled with a #15 scalpel blade.  Given the location of the defect, shape of the defect and the proximity to free margins a Double O-Z flap was deemed most appropriate.  Using a sterile surgical marker, an appropriate transposition flap was drawn incorporating the defect and placing the expected incisions within the relaxed skin tension lines where possible. The area thus outlined was incised deep to adipose tissue with a #15 scalpel blade.  The skin margins were undermined to an appropriate distance in all directions utilizing iris scissors. V-Y Flap Text: The defect edges were debeveled with a #15 scalpel blade.  Given the location of the defect, shape of the defect and the proximity to free margins a V-Y flap was deemed most appropriate.  Using a sterile surgical marker, an appropriate advancement flap was drawn incorporating the defect and placing the expected incisions within the relaxed skin tension lines where possible.    The area thus outlined was incised deep to adipose tissue with a #15 scalpel blade.  The skin margins were undermined to an appropriate distance in all directions utilizing iris scissors. Mercedes Flap Text: The defect edges were debeveled with a #15 scalpel blade.  Given the location of the defect, shape of the defect and the proximity to free margins a Mercedes flap was deemed most appropriate.  Using a sterile surgical marker, an appropriate advancement flap was drawn incorporating the defect and placing the expected incisions within the relaxed skin tension lines where possible. The area thus outlined was incised deep to adipose tissue with a #15 scalpel blade.  The skin margins were undermined to an appropriate distance in all directions utilizing iris scissors. Modified Advancement Flap Text: The defect edges were debeveled with a #15 scalpel blade.  Given the location of the defect, shape of the defect and the proximity to free margins a modified advancement flap was deemed most appropriate.  Using a sterile surgical marker, an appropriate advancement flap was drawn incorporating the defect and placing the expected incisions within the relaxed skin tension lines where possible.    The area thus outlined was incised deep to adipose tissue with a #15 scalpel blade.  The skin margins were undermined to an appropriate distance in all directions utilizing iris scissors. Mucosal Advancement Flap Text: Given the location of the defect, shape of the defect and the proximity to free margins a mucosal advancement flap was deemed most appropriate. Incisions were made with a 15 blade scalpel in the appropriate fashion along the cutaneous vermillion border and the mucosal lip. The remaining actinically damaged mucosal tissue was excised.  The mucosal advancement flap was then elevated to the gingival sulcus with care taken to preserve the neurovascular structures and advanced into the primary defect. Care was taken to ensure that precise realignment of the vermilion border was achieved. Hatchet Flap Text: The defect edges were debeveled with a #15 scalpel blade.  Given the location of the defect, shape of the defect and the proximity to free margins a hatchet flap was deemed most appropriate.  Using a sterile surgical marker, an appropriate hatchet flap was drawn incorporating the defect and placing the expected incisions within the relaxed skin tension lines where possible.    The area thus outlined was incised deep to adipose tissue with a #15 scalpel blade.  The skin margins were undermined to an appropriate distance in all directions utilizing iris scissors. Rotation Flap Text: The defect edges were debeveled with a #15 scalpel blade.  Given the location of the defect, shape of the defect and the proximity to free margins a rotation flap was deemed most appropriate.  Using a sterile surgical marker, an appropriate rotation flap was drawn incorporating the defect and placing the expected incisions within the relaxed skin tension lines where possible.    The area thus outlined was incised deep to adipose tissue with a #15 scalpel blade.  The skin margins were undermined to an appropriate distance in all directions utilizing iris scissors. Spiral Flap Text: The defect edges were debeveled with a #15 scalpel blade.  Given the location of the defect, shape of the defect and the proximity to free margins a spiral flap was deemed most appropriate.  Using a sterile surgical marker, an appropriate rotation flap was drawn incorporating the defect and placing the expected incisions within the relaxed skin tension lines where possible. The area thus outlined was incised deep to adipose tissue with a #15 scalpel blade.  The skin margins were undermined to an appropriate distance in all directions utilizing iris scissors. Staged Advancement Flap Text: The defect edges were debeveled with a #15 scalpel blade.  Given the location of the defect, shape of the defect and the proximity to free margins a staged advancement flap was deemed most appropriate.  Using a sterile surgical marker, an appropriate advancement flap was drawn incorporating the defect and placing the expected incisions within the relaxed skin tension lines where possible. The area thus outlined was incised deep to adipose tissue with a #15 scalpel blade.  The skin margins were undermined to an appropriate distance in all directions utilizing iris scissors. Star Wedge Flap Text: The defect edges were debeveled with a #15 scalpel blade.  Given the location of the defect, shape of the defect and the proximity to free margins a star wedge flap was deemed most appropriate.  Using a sterile surgical marker, an appropriate rotation flap was drawn incorporating the defect and placing the expected incisions within the relaxed skin tension lines where possible. The area thus outlined was incised deep to adipose tissue with a #15 scalpel blade.  The skin margins were undermined to an appropriate distance in all directions utilizing iris scissors. Transposition Flap Text: The defect edges were debeveled with a #15 scalpel blade.  Given the location of the defect and the proximity to free margins a transposition flap was deemed most appropriate.  Using a sterile surgical marker, an appropriate transposition flap was drawn incorporating the defect.    The area thus outlined was incised deep to adipose tissue with a #15 scalpel blade.  The skin margins were undermined to an appropriate distance in all directions utilizing iris scissors. Muscle Hinge Flap Text: The defect edges were debeveled with a #15 scalpel blade.  Given the size, depth and location of the defect and the proximity to free margins a muscle hinge flap was deemed most appropriate.  Using a sterile surgical marker, an appropriate hinge flap was drawn incorporating the defect. The area thus outlined was incised with a #15 scalpel blade.  The skin margins were undermined to an appropriate distance in all directions utilizing iris scissors. Mustarde Flap Text: The defect edges were debeveled with a #15 scalpel blade.  Given the size, depth and location of the defect and the proximity to free margins a Mustarde flap was deemed most appropriate.  Using a sterile surgical marker, an appropriate flap was drawn incorporating the defect. The area thus outlined was incised with a #15 scalpel blade.  The skin margins were undermined to an appropriate distance in all directions utilizing iris scissors. Nasal Turnover Hinge Flap Text: The defect edges were debeveled with a #15 scalpel blade.  Given the size, depth, location of the defect and the defect being full thickness a nasal turnover hinge flap was deemed most appropriate.  Using a sterile surgical marker, an appropriate hinge flap was drawn incorporating the defect. The area thus outlined was incised with a #15 scalpel blade. The flap was designed to recreate the nasal mucosal lining and the alar rim. The skin margins were undermined to an appropriate distance in all directions utilizing iris scissors. Nasalis-Muscle-Based Myocutaneous Island Pedicle Flap Text: Using a #15 blade, an incision was made around the donor flap to the level of the nasalis muscle. Wide lateral undermining was then performed in both the subcutaneous plane above the nasalis muscle, and in a submuscular plane just above periosteum. This allowed the formation of a free nasalis muscle axial pedicle (based on the angular artery) which was still attached to the actual cutaneous flap, increasing its mobility and vascular viability. Hemostasis was obtained with pinpoint electrocoagulation. The flap was mobilized into position and the pivotal anchor points positioned and stabilized with buried interrupted sutures. Subcutaneous and dermal tissues were closed in a multilayered fashion with sutures. Tissue redundancies were excised, and the epidermal edges were apposed without significant tension and sutured with sutures. Orbicularis Oris Muscle Flap Text: The defect edges were debeveled with a #15 scalpel blade.  Given that the defect affected the competency of the oral sphincter an orbicularis oris muscle flap was deemed most appropriate to restore this competency and normal muscle function.  Using a sterile surgical marker, an appropriate flap was drawn incorporating the defect. The area thus outlined was incised with a #15 scalpel blade. Melolabial Transposition Flap Text: The defect edges were debeveled with a #15 scalpel blade.  Given the location of the defect and the proximity to free margins a melolabial flap was deemed most appropriate.  Using a sterile surgical marker, an appropriate melolabial transposition flap was drawn incorporating the defect.    The area thus outlined was incised deep to adipose tissue with a #15 scalpel blade.  The skin margins were undermined to an appropriate distance in all directions utilizing iris scissors. Rhombic Flap Text: The defect edges were debeveled with a #15 scalpel blade.  Given the location of the defect and the proximity to free margins a rhombic flap was deemed most appropriate.  Using a sterile surgical marker, an appropriate rhombic flap was drawn incorporating the defect.    The area thus outlined was incised deep to adipose tissue with a #15 scalpel blade.  The skin margins were undermined to an appropriate distance in all directions utilizing iris scissors. Rhomboid Transposition Flap Text: The defect edges were debeveled with a #15 scalpel blade.  Given the location of the defect and the proximity to free margins a rhomboid transposition flap was deemed most appropriate.  Using a sterile surgical marker, an appropriate rhomboid flap was drawn incorporating the defect.    The area thus outlined was incised deep to adipose tissue with a #15 scalpel blade.  The skin margins were undermined to an appropriate distance in all directions utilizing iris scissors. Bi-Rhombic Flap Text: The defect edges were debeveled with a #15 scalpel blade.  Given the location of the defect and the proximity to free margins a bi-rhombic flap was deemed most appropriate.  Using a sterile surgical marker, an appropriate rhombic flap was drawn incorporating the defect. The area thus outlined was incised deep to adipose tissue with a #15 scalpel blade.  The skin margins were undermined to an appropriate distance in all directions utilizing iris scissors. Helical Rim Advancement Flap Text: The defect edges were debeveled with a #15 blade scalpel.  Given the location of the defect and the proximity to free margins (helical rim) a double helical rim advancement flap was deemed most appropriate.  Using a sterile surgical marker, the appropriate advancement flaps were drawn incorporating the defect and placing the expected incisions between the helical rim and antihelix where possible.  The area thus outlined was incised through and through with a #15 scalpel blade.  With a skin hook and iris scissors, the flaps were gently and sharply undermined and freed up. Bilateral Helical Rim Advancement Flap Text: The defect edges were debeveled with a #15 blade scalpel.  Given the location of the defect and the proximity to free margins (helical rim) a bilateral helical rim advancement flap was deemed most appropriate.  Using a sterile surgical marker, the appropriate advancement flaps were drawn incorporating the defect and placing the expected incisions between the helical rim and antihelix where possible.  The area thus outlined was incised through and through with a #15 scalpel blade.  With a skin hook and iris scissors, the flaps were gently and sharply undermined and freed up. Ear Star Wedge Flap Text: The defect edges were debeveled with a #15 blade scalpel.  Given the location of the defect and the proximity to free margins (helical rim) an ear star wedge flap was deemed most appropriate.  Using a sterile surgical marker, the appropriate flap was drawn incorporating the defect and placing the expected incisions between the helical rim and antihelix where possible.  The area thus outlined was incised through and through with a #15 scalpel blade. Banner Transposition Flap Text: The defect edges were debeveled with a #15 scalpel blade.  Given the location of the defect and the proximity to free margins a Banner transposition flap was deemed most appropriate.  Using a sterile surgical marker, an appropriate flap drawn around the defect. The area thus outlined was incised deep to adipose tissue with a #15 scalpel blade.  The skin margins were undermined to an appropriate distance in all directions utilizing iris scissors. Bilobed Flap Text: The defect edges were debeveled with a #15 scalpel blade.  Given the location of the defect and the proximity to free margins a bilobe flap was deemed most appropriate.  Using a sterile surgical marker, an appropriate bilobe flap drawn around the defect.    The area thus outlined was incised deep to adipose tissue with a #15 scalpel blade.  The skin margins were undermined to an appropriate distance in all directions utilizing iris scissors. Bilobed Transposition Flap Text: The defect edges were debeveled with a #15 scalpel blade.  Given the location of the defect and the proximity to free margins a bilobed transposition flap was deemed most appropriate.  Using a sterile surgical marker, an appropriate bilobe flap drawn around the defect.    The area thus outlined was incised deep to adipose tissue with a #15 scalpel blade.  The skin margins were undermined to an appropriate distance in all directions utilizing iris scissors. Trilobed Flap Text: The defect edges were debeveled with a #15 scalpel blade.  Given the location of the defect and the proximity to free margins a trilobed flap was deemed most appropriate.  Using a sterile surgical marker, an appropriate trilobed flap drawn around the defect.    The area thus outlined was incised deep to adipose tissue with a #15 scalpel blade.  The skin margins were undermined to an appropriate distance in all directions utilizing iris scissors. Dorsal Nasal Flap Text: The defect edges were debeveled with a #15 scalpel blade.  Given the location of the defect and the proximity to free margins a dorsal nasal flap was deemed most appropriate.  Using a sterile surgical marker, an appropriate dorsal nasal flap was drawn around the defect.    The area thus outlined was incised deep to adipose tissue with a #15 scalpel blade.  The skin margins were undermined to an appropriate distance in all directions utilizing iris scissors. Island Pedicle Flap Text: The defect edges were debeveled with a #15 scalpel blade.  Given the location of the defect, shape of the defect and the proximity to free margins an island pedicle advancement flap was deemed most appropriate.  Using a sterile surgical marker, an appropriate advancement flap was drawn incorporating the defect, outlining the appropriate donor tissue and placing the expected incisions within the relaxed skin tension lines where possible.    The area thus outlined was incised deep to adipose tissue with a #15 scalpel blade.  The skin margins were undermined to an appropriate distance in all directions around the primary defect and laterally outward around the island pedicle utilizing iris scissors.  There was minimal undermining beneath the pedicle flap. Island Pedicle Flap With Canthal Suspension Text: The defect edges were debeveled with a #15 scalpel blade.  Given the location of the defect, shape of the defect and the proximity to free margins an island pedicle advancement flap was deemed most appropriate.  Using a sterile surgical marker, an appropriate advancement flap was drawn incorporating the defect, outlining the appropriate donor tissue and placing the expected incisions within the relaxed skin tension lines where possible. The area thus outlined was incised deep to adipose tissue with a #15 scalpel blade.  The skin margins were undermined to an appropriate distance in all directions around the primary defect and laterally outward around the island pedicle utilizing iris scissors.  There was minimal undermining beneath the pedicle flap. A suspension suture was placed in the canthal tendon to prevent tension and prevent ectropion. Alar Island Pedicle Flap Text: The defect edges were debeveled with a #15 scalpel blade.  Given the location of the defect, shape of the defect and the proximity to the alar rim an island pedicle advancement flap was deemed most appropriate.  Using a sterile surgical marker, an appropriate advancement flap was drawn incorporating the defect, outlining the appropriate donor tissue and placing the expected incisions within the nasal ala running parallel to the alar rim. The area thus outlined was incised with a #15 scalpel blade.  The skin margins were undermined minimally to an appropriate distance in all directions around the primary defect and laterally outward around the island pedicle utilizing iris scissors.  There was minimal undermining beneath the pedicle flap. Double Island Pedicle Flap Text: The defect edges were debeveled with a #15 scalpel blade.  Given the location of the defect, shape of the defect and the proximity to free margins a double island pedicle advancement flap was deemed most appropriate.  Using a sterile surgical marker, an appropriate advancement flap was drawn incorporating the defect, outlining the appropriate donor tissue and placing the expected incisions within the relaxed skin tension lines where possible.    The area thus outlined was incised deep to adipose tissue with a #15 scalpel blade.  The skin margins were undermined to an appropriate distance in all directions around the primary defect and laterally outward around the island pedicle utilizing iris scissors.  There was minimal undermining beneath the pedicle flap. Island Pedicle Flap-Requiring Vessel Identification Text: The defect edges were debeveled with a #15 scalpel blade.  Given the location of the defect, shape of the defect and the proximity to free margins an island pedicle advancement flap was deemed most appropriate.  Using a sterile surgical marker, an appropriate advancement flap was drawn, based on the axial vessel mentioned above, incorporating the defect, outlining the appropriate donor tissue and placing the expected incisions within the relaxed skin tension lines where possible.    The area thus outlined was incised deep to adipose tissue with a #15 scalpel blade.  The skin margins were undermined to an appropriate distance in all directions around the primary defect and laterally outward around the island pedicle utilizing iris scissors.  There was minimal undermining beneath the pedicle flap. Keystone Flap Text: The defect edges were debeveled with a #15 scalpel blade.  Given the location of the defect, shape of the defect a keystone flap was deemed most appropriate.  Using a sterile surgical marker, an appropriate keystone flap was drawn incorporating the defect, outlining the appropriate donor tissue and placing the expected incisions within the relaxed skin tension lines where possible. The area thus outlined was incised deep to adipose tissue with a #15 scalpel blade.  The skin margins were undermined to an appropriate distance in all directions around the primary defect and laterally outward around the flap utilizing iris scissors. O-T Plasty Text: The defect edges were debeveled with a #15 scalpel blade.  Given the location of the defect, shape of the defect and the proximity to free margins an O-T plasty was deemed most appropriate.  Using a sterile surgical marker, an appropriate O-T plasty was drawn incorporating the defect and placing the expected incisions within the relaxed skin tension lines where possible.    The area thus outlined was incised deep to adipose tissue with a #15 scalpel blade.  The skin margins were undermined to an appropriate distance in all directions utilizing iris scissors. O-Z Plasty Text: The defect edges were debeveled with a #15 scalpel blade.  Given the location of the defect, shape of the defect and the proximity to free margins an O-Z plasty (double transposition flap) was deemed most appropriate.  Using a sterile surgical marker, the appropriate transposition flaps were drawn incorporating the defect and placing the expected incisions within the relaxed skin tension lines where possible.    The area thus outlined was incised deep to adipose tissue with a #15 scalpel blade.  The skin margins were undermined to an appropriate distance in all directions utilizing iris scissors.  Hemostasis was achieved with electrocautery.  The flaps were then transposed into place, one clockwise and the other counterclockwise, and anchored with interrupted buried subcutaneous sutures. Double O-Z Plasty Text: The defect edges were debeveled with a #15 scalpel blade.  Given the location of the defect, shape of the defect and the proximity to free margins a Double O-Z plasty (double transposition flap) was deemed most appropriate.  Using a sterile surgical marker, the appropriate transposition flaps were drawn incorporating the defect and placing the expected incisions within the relaxed skin tension lines where possible. The area thus outlined was incised deep to adipose tissue with a #15 scalpel blade.  The skin margins were undermined to an appropriate distance in all directions utilizing iris scissors.  Hemostasis was achieved with electrocautery.  The flaps were then transposed into place, one clockwise and the other counterclockwise, and anchored with interrupted buried subcutaneous sutures. V-Y Plasty Text: The defect edges were debeveled with a #15 scalpel blade.  Given the location of the defect, shape of the defect and the proximity to free margins an V-Y advancement flap was deemed most appropriate.  Using a sterile surgical marker, an appropriate advancement flap was drawn incorporating the defect and placing the expected incisions within the relaxed skin tension lines where possible.    The area thus outlined was incised deep to adipose tissue with a #15 scalpel blade.  The skin margins were undermined to an appropriate distance in all directions utilizing iris scissors. H Plasty Text: Given the location of the defect, shape of the defect and the proximity to free margins a H-plasty was deemed most appropriate for repair.  Using a sterile surgical marker, the appropriate advancement arms of the H-plasty were drawn incorporating the defect and placing the expected incisions within the relaxed skin tension lines where possible. The area thus outlined was incised deep to adipose tissue with a #15 scalpel blade. The skin margins were undermined to an appropriate distance in all directions utilizing iris scissors.  The opposing advancement arms were then advanced into place in opposite direction and anchored with interrupted buried subcutaneous sutures. W Plasty Text: The lesion was extirpated to the level of the fat with a #15 scalpel blade.  Given the location of the defect, shape of the defect and the proximity to free margins a W-plasty was deemed most appropriate for repair.  Using a sterile surgical marker, the appropriate transposition arms of the W-plasty were drawn incorporating the defect and placing the expected incisions within the relaxed skin tension lines where possible.    The area thus outlined was incised deep to adipose tissue with a #15 scalpel blade.  The skin margins were undermined to an appropriate distance in all directions utilizing iris scissors.  The opposing transposition arms were then transposed into place in opposite direction and anchored with interrupted buried subcutaneous sutures. Z Plasty Text: The lesion was extirpated to the level of the fat with a #15 scalpel blade.  Given the location of the defect, shape of the defect and the proximity to free margins a Z-plasty was deemed most appropriate for repair.  Using a sterile surgical marker, the appropriate transposition arms of the Z-plasty were drawn incorporating the defect and placing the expected incisions within the relaxed skin tension lines where possible.    The area thus outlined was incised deep to adipose tissue with a #15 scalpel blade.  The skin margins were undermined to an appropriate distance in all directions utilizing iris scissors.  The opposing transposition arms were then transposed into place in opposite direction and anchored with interrupted buried subcutaneous sutures. Zygomaticofacial Flap Text: Given the location of the defect, shape of the defect and the proximity to free margins a zygomaticofacial flap was deemed most appropriate for repair.  Using a sterile surgical marker, the appropriate flap was drawn incorporating the defect and placing the expected incisions within the relaxed skin tension lines where possible. The area thus outlined was incised deep to adipose tissue with a #15 scalpel blade with preservation of a vascular pedicle.  The skin margins were undermined to an appropriate distance in all directions utilizing iris scissors.  The flap was then placed into the defect and anchored with interrupted buried subcutaneous sutures. Cheek Interpolation Flap Text: A decision was made to reconstruct the defect utilizing an interpolation axial flap and a staged reconstruction.  A telfa template was made of the defect.  This telfa template was then used to outline the Cheek Interpolation flap.  The donor area for the pedicle flap was then injected with anesthesia.  The flap was excised through the skin and subcutaneous tissue down to the layer of the underlying musculature.  The interpolation flap was carefully excised within this deep plane to maintain its blood supply.  The edges of the donor site were undermined.   The donor site was closed in a primary fashion.  The pedicle was then rotated into position and sutured.  Once the tube was sutured into place, adequate blood supply was confirmed with blanching and refill.  The pedicle was then wrapped with xeroform gauze and dressed appropriately with a telfa and gauze bandage to ensure continued blood supply and protect the attached pedicle. Cheek-To-Nose Interpolation Flap Text: A decision was made to reconstruct the defect utilizing an interpolation axial flap and a staged reconstruction.  A telfa template was made of the defect.  This telfa template was then used to outline the Cheek-To-Nose Interpolation flap.  The donor area for the pedicle flap was then injected with anesthesia.  The flap was excised through the skin and subcutaneous tissue down to the layer of the underlying musculature.  The interpolation flap was carefully excised within this deep plane to maintain its blood supply.  The edges of the donor site were undermined.   The donor site was closed in a primary fashion.  The pedicle was then rotated into position and sutured.  Once the tube was sutured into place, adequate blood supply was confirmed with blanching and refill.  The pedicle was then wrapped with xeroform gauze and dressed appropriately with a telfa and gauze bandage to ensure continued blood supply and protect the attached pedicle. Interpolation Flap Text: A decision was made to reconstruct the defect utilizing an interpolation axial flap and a staged reconstruction.  A telfa template was made of the defect.  This telfa template was then used to outline the interpolation flap.  The donor area for the pedicle flap was then injected with anesthesia.  The flap was excised through the skin and subcutaneous tissue down to the layer of the underlying musculature.  The interpolation flap was carefully excised within this deep plane to maintain its blood supply.  The edges of the donor site were undermined.   The donor site was closed in a primary fashion.  The pedicle was then rotated into position and sutured.  Once the tube was sutured into place, adequate blood supply was confirmed with blanching and refill.  The pedicle was then wrapped with xeroform gauze and dressed appropriately with a telfa and gauze bandage to ensure continued blood supply and protect the attached pedicle. Melolabial Interpolation Flap Text: A decision was made to reconstruct the defect utilizing an interpolation axial flap and a staged reconstruction.  A telfa template was made of the defect.  This telfa template was then used to outline the melolabial interpolation flap.  The donor area for the pedicle flap was then injected with anesthesia.  The flap was excised through the skin and subcutaneous tissue down to the layer of the underlying musculature.  The pedicle flap was carefully excised within this deep plane to maintain its blood supply.  The edges of the donor site were undermined.   The donor site was closed in a primary fashion.  The pedicle was then rotated into position and sutured.  Once the tube was sutured into place, adequate blood supply was confirmed with blanching and refill.  The pedicle was then wrapped with xeroform gauze and dressed appropriately with a telfa and gauze bandage to ensure continued blood supply and protect the attached pedicle. Mastoid Interpolation Flap Text: A decision was made to reconstruct the defect utilizing an interpolation axial flap and a staged reconstruction.  A telfa template was made of the defect.  This telfa template was then used to outline the mastoid interpolation flap.  The donor area for the pedicle flap was then injected with anesthesia.  The flap was excised through the skin and subcutaneous tissue down to the layer of the underlying musculature.  The pedicle flap was carefully excised within this deep plane to maintain its blood supply.  The edges of the donor site were undermined.   The donor site was closed in a primary fashion.  The pedicle was then rotated into position and sutured.  Once the tube was sutured into place, adequate blood supply was confirmed with blanching and refill.  The pedicle was then wrapped with xeroform gauze and dressed appropriately with a telfa and gauze bandage to ensure continued blood supply and protect the attached pedicle. Posterior Auricular Interpolation Flap Text: A decision was made to reconstruct the defect utilizing an interpolation axial flap and a staged reconstruction.  A telfa template was made of the defect.  This telfa template was then used to outline the posterior auricular interpolation flap.  The donor area for the pedicle flap was then injected with anesthesia.  The flap was excised through the skin and subcutaneous tissue down to the layer of the underlying musculature.  The pedicle flap was carefully excised within this deep plane to maintain its blood supply.  The edges of the donor site were undermined.   The donor site was closed in a primary fashion.  The pedicle was then rotated into position and sutured.  Once the tube was sutured into place, adequate blood supply was confirmed with blanching and refill.  The pedicle was then wrapped with xeroform gauze and dressed appropriately with a telfa and gauze bandage to ensure continued blood supply and protect the attached pedicle. Paramedian Forehead Flap Text: A decision was made to reconstruct the defect utilizing an interpolation axial flap and a staged reconstruction.  A telfa template was made of the defect.  This telfa template was then used to outline the paramedian forehead pedicle flap.  The donor area for the pedicle flap was then injected with anesthesia.  The flap was excised through the skin and subcutaneous tissue down to the layer of the underlying musculature.  The pedicle flap was carefully excised within this deep plane to maintain its blood supply.  The edges of the donor site were undermined.   The donor site was closed in a primary fashion.  The pedicle was then rotated into position and sutured.  Once the tube was sutured into place, adequate blood supply was confirmed with blanching and refill.  The pedicle was then wrapped with xeroform gauze and dressed appropriately with a telfa and gauze bandage to ensure continued blood supply and protect the attached pedicle. Lip Wedge Excision Repair Text: Given the location of the defect and the proximity to free margins a full thickness wedge repair was deemed most appropriate.  Using a sterile surgical marker, the appropriate repair was drawn incorporating the defect and placing the expected incisions perpendicular to the vermilion border.  The vermilion border was also meticulously outlined to ensure appropriate reapproximation during the repair.  The area thus outlined was incised through and through with a #15 scalpel blade.  The muscularis and dermis were reaproximated with deep sutures following hemostasis. Care was taken to realign the vermilion border before proceeding with the superficial closure.  Once the vermilion was realigned the superfical and mucosal closure was finished. Ftsg Text: The defect edges were debeveled with a #15 scalpel blade.  Given the location of the defect, shape of the defect and the proximity to free margins a full thickness skin graft was deemed most appropriate.  Using a sterile surgical marker, the primary defect shape was transferred to the donor site. The area thus outlined was incised deep to adipose tissue with a #15 scalpel blade.  The harvested graft was then trimmed of adipose tissue until only dermis and epidermis was left.  The skin margins of the secondary defect were undermined to an appropriate distance in all directions utilizing iris scissors.  The secondary defect was closed with interrupted buried subcutaneous sutures.  The skin edges were then re-apposed with running  sutures.  The skin graft was then placed in the primary defect and oriented appropriately. Split-Thickness Skin Graft Text: The defect edges were debeveled with a #15 scalpel blade.  Given the location of the defect, shape of the defect and the proximity to free margins a split thickness skin graft was deemed most appropriate.  Using a sterile surgical marker, the primary defect shape was transferred to the donor site. The split thickness graft was then harvested.  The skin graft was then placed in the primary defect and oriented appropriately. Burow's Graft Text: The defect edges were debeveled with a #15 scalpel blade.  Given the location of the defect, shape of the defect, the proximity to free margins and the presence of a standing cone deformity a Burow's skin graft was deemed most appropriate. The standing cone was removed and this tissue was then trimmed to the shape of the primary defect. The adipose tissue was also removed until only dermis and epidermis were left.  The skin margins of the secondary defect were undermined to an appropriate distance in all directions utilizing iris scissors.  The secondary defect was closed with interrupted buried subcutaneous sutures.  The skin edges were then re-apposed with running  sutures.  The skin graft was then placed in the primary defect and oriented appropriately. Cartilage Graft Text: The defect edges were debeveled with a #15 scalpel blade.  Given the location of the defect, shape of the defect, the fact the defect involved a full thickness cartilage defect a cartilage graft was deemed most appropriate.  An appropriate donor site was identified, cleansed, and anesthetized. The cartilage graft was then harvested and transferred to the recipient site, oriented appropriately and then sutured into place.  The secondary defect was then repaired using a primary closure. Composite Graft Text: The defect edges were debeveled with a #15 scalpel blade.  Given the location of the defect, shape of the defect, the proximity to free margins and the fact the defect was full thickness a composite graft was deemed most appropriate.  The defect was outline and then transferred to the donor site.  A full thickness graft was then excised from the donor site. The graft was then placed in the primary defect, oriented appropriately and then sutured into place.  The secondary defect was then repaired using a primary closure. Epidermal Autograft Text: The defect edges were debeveled with a #15 scalpel blade.  Given the location of the defect, shape of the defect and the proximity to free margins an epidermal autograft was deemed most appropriate.  Using a sterile surgical marker, the primary defect shape was transferred to the donor site. The epidermal graft was then harvested.  The skin graft was then placed in the primary defect and oriented appropriately. Dermal Autograft Text: The defect edges were debeveled with a #15 scalpel blade.  Given the location of the defect, shape of the defect and the proximity to free margins a dermal autograft was deemed most appropriate.  Using a sterile surgical marker, the primary defect shape was transferred to the donor site. The area thus outlined was incised deep to adipose tissue with a #15 scalpel blade.  The harvested graft was then trimmed of adipose and epidermal tissue until only dermis was left.  The skin graft was then placed in the primary defect and oriented appropriately. Skin Substitute Text: The defect edges were debeveled with a #15 scalpel blade.  Given the location of the defect, shape of the defect and the proximity to free margins a skin substitute graft was deemed most appropriate.  The graft material was trimmed to fit the size of the defect. The graft was then placed in the primary defect and oriented appropriately. Tissue Cultured Epidermal Autograft Text: The defect edges were debeveled with a #15 scalpel blade.  Given the location of the defect, shape of the defect and the proximity to free margins a tissue cultured epidermal autograft was deemed most appropriate.  The graft was then trimmed to fit the size of the defect.  The graft was then placed in the primary defect and oriented appropriately. Xenograft Text: The defect edges were debeveled with a #15 scalpel blade.  Given the location of the defect, shape of the defect and the proximity to free margins a xenograft was deemed most appropriate.  The graft was then trimmed to fit the size of the defect.  The graft was then placed in the primary defect and oriented appropriately. Purse String (Intermediate) Text: Given the location of the defect and the characteristics of the surrounding skin a purse string intermediate closure was deemed most appropriate.  Undermining was performed circumferentially around the surgical defect.  A purse string suture was then placed and tightened. Purse String (Simple) Text: Given the location of the defect and the characteristics of the surrounding skin a purse string simple closure was deemed most appropriate.  Undermining was performed circumferentially around the surgical defect.  A purse string suture was then placed and tightened. Complex Repair And Single Advancement Flap Text: The defect edges were debeveled with a #15 scalpel blade.  The primary defect was closed partially with a complex linear closure.  Given the location of the remaining defect, shape of the defect and the proximity to free margins a single advancement flap was deemed most appropriate for complete closure of the defect.  Using a sterile surgical marker, an appropriate advancement flap was drawn incorporating the defect and placing the expected incisions within the relaxed skin tension lines where possible.    The area thus outlined was incised deep to adipose tissue with a #15 scalpel blade.  The skin margins were undermined to an appropriate distance in all directions utilizing iris scissors. Complex Repair And Double Advancement Flap Text: The defect edges were debeveled with a #15 scalpel blade.  The primary defect was closed partially with a complex linear closure.  Given the location of the remaining defect, shape of the defect and the proximity to free margins a double advancement flap was deemed most appropriate for complete closure of the defect.  Using a sterile surgical marker, an appropriate advancement flap was drawn incorporating the defect and placing the expected incisions within the relaxed skin tension lines where possible.    The area thus outlined was incised deep to adipose tissue with a #15 scalpel blade.  The skin margins were undermined to an appropriate distance in all directions utilizing iris scissors. Complex Repair And Modified Advancement Flap Text: The defect edges were debeveled with a #15 scalpel blade.  The primary defect was closed partially with a complex linear closure.  Given the location of the remaining defect, shape of the defect and the proximity to free margins a modified advancement flap was deemed most appropriate for complete closure of the defect.  Using a sterile surgical marker, an appropriate advancement flap was drawn incorporating the defect and placing the expected incisions within the relaxed skin tension lines where possible.    The area thus outlined was incised deep to adipose tissue with a #15 scalpel blade.  The skin margins were undermined to an appropriate distance in all directions utilizing iris scissors. Complex Repair And A-T Advancement Flap Text: The defect edges were debeveled with a #15 scalpel blade.  The primary defect was closed partially with a complex linear closure.  Given the location of the remaining defect, shape of the defect and the proximity to free margins an A-T advancement flap was deemed most appropriate for complete closure of the defect.  Using a sterile surgical marker, an appropriate advancement flap was drawn incorporating the defect and placing the expected incisions within the relaxed skin tension lines where possible.    The area thus outlined was incised deep to adipose tissue with a #15 scalpel blade.  The skin margins were undermined to an appropriate distance in all directions utilizing iris scissors. Complex Repair And O-T Advancement Flap Text: The defect edges were debeveled with a #15 scalpel blade.  The primary defect was closed partially with a complex linear closure.  Given the location of the remaining defect, shape of the defect and the proximity to free margins an O-T advancement flap was deemed most appropriate for complete closure of the defect.  Using a sterile surgical marker, an appropriate advancement flap was drawn incorporating the defect and placing the expected incisions within the relaxed skin tension lines where possible.    The area thus outlined was incised deep to adipose tissue with a #15 scalpel blade.  The skin margins were undermined to an appropriate distance in all directions utilizing iris scissors. Complex Repair And O-L Flap Text: The defect edges were debeveled with a #15 scalpel blade.  The primary defect was closed partially with a complex linear closure.  Given the location of the remaining defect, shape of the defect and the proximity to free margins an O-L flap was deemed most appropriate for complete closure of the defect.  Using a sterile surgical marker, an appropriate flap was drawn incorporating the defect and placing the expected incisions within the relaxed skin tension lines where possible.    The area thus outlined was incised deep to adipose tissue with a #15 scalpel blade.  The skin margins were undermined to an appropriate distance in all directions utilizing iris scissors. Complex Repair And Bilobe Flap Text: The defect edges were debeveled with a #15 scalpel blade.  The primary defect was closed partially with a complex linear closure.  Given the location of the remaining defect, shape of the defect and the proximity to free margins a bilobe flap was deemed most appropriate for complete closure of the defect.  Using a sterile surgical marker, an appropriate advancement flap was drawn incorporating the defect and placing the expected incisions within the relaxed skin tension lines where possible.    The area thus outlined was incised deep to adipose tissue with a #15 scalpel blade.  The skin margins were undermined to an appropriate distance in all directions utilizing iris scissors. Complex Repair And Melolabial Flap Text: The defect edges were debeveled with a #15 scalpel blade.  The primary defect was closed partially with a complex linear closure.  Given the location of the remaining defect, shape of the defect and the proximity to free margins a melolabial flap was deemed most appropriate for complete closure of the defect.  Using a sterile surgical marker, an appropriate advancement flap was drawn incorporating the defect and placing the expected incisions within the relaxed skin tension lines where possible.    The area thus outlined was incised deep to adipose tissue with a #15 scalpel blade.  The skin margins were undermined to an appropriate distance in all directions utilizing iris scissors. Complex Repair And Rotation Flap Text: The defect edges were debeveled with a #15 scalpel blade.  The primary defect was closed partially with a complex linear closure.  Given the location of the remaining defect, shape of the defect and the proximity to free margins a rotation flap was deemed most appropriate for complete closure of the defect.  Using a sterile surgical marker, an appropriate advancement flap was drawn incorporating the defect and placing the expected incisions within the relaxed skin tension lines where possible.    The area thus outlined was incised deep to adipose tissue with a #15 scalpel blade.  The skin margins were undermined to an appropriate distance in all directions utilizing iris scissors. Complex Repair And Rhombic Flap Text: The defect edges were debeveled with a #15 scalpel blade.  The primary defect was closed partially with a complex linear closure.  Given the location of the remaining defect, shape of the defect and the proximity to free margins a rhombic flap was deemed most appropriate for complete closure of the defect.  Using a sterile surgical marker, an appropriate advancement flap was drawn incorporating the defect and placing the expected incisions within the relaxed skin tension lines where possible.    The area thus outlined was incised deep to adipose tissue with a #15 scalpel blade.  The skin margins were undermined to an appropriate distance in all directions utilizing iris scissors. Complex Repair And Transposition Flap Text: The defect edges were debeveled with a #15 scalpel blade.  The primary defect was closed partially with a complex linear closure.  Given the location of the remaining defect, shape of the defect and the proximity to free margins a transposition flap was deemed most appropriate for complete closure of the defect.  Using a sterile surgical marker, an appropriate advancement flap was drawn incorporating the defect and placing the expected incisions within the relaxed skin tension lines where possible.    The area thus outlined was incised deep to adipose tissue with a #15 scalpel blade.  The skin margins were undermined to an appropriate distance in all directions utilizing iris scissors. Complex Repair And V-Y Plasty Text: The defect edges were debeveled with a #15 scalpel blade.  The primary defect was closed partially with a complex linear closure.  Given the location of the remaining defect, shape of the defect and the proximity to free margins a V-Y plasty was deemed most appropriate for complete closure of the defect.  Using a sterile surgical marker, an appropriate advancement flap was drawn incorporating the defect and placing the expected incisions within the relaxed skin tension lines where possible.    The area thus outlined was incised deep to adipose tissue with a #15 scalpel blade.  The skin margins were undermined to an appropriate distance in all directions utilizing iris scissors. Complex Repair And M Plasty Text: The defect edges were debeveled with a #15 scalpel blade.  The primary defect was closed partially with a complex linear closure.  Given the location of the remaining defect, shape of the defect and the proximity to free margins an M plasty was deemed most appropriate for complete closure of the defect.  Using a sterile surgical marker, an appropriate advancement flap was drawn incorporating the defect and placing the expected incisions within the relaxed skin tension lines where possible.    The area thus outlined was incised deep to adipose tissue with a #15 scalpel blade.  The skin margins were undermined to an appropriate distance in all directions utilizing iris scissors. Complex Repair And Double M Plasty Text: The defect edges were debeveled with a #15 scalpel blade.  The primary defect was closed partially with a complex linear closure.  Given the location of the remaining defect, shape of the defect and the proximity to free margins a double M plasty was deemed most appropriate for complete closure of the defect.  Using a sterile surgical marker, an appropriate advancement flap was drawn incorporating the defect and placing the expected incisions within the relaxed skin tension lines where possible.    The area thus outlined was incised deep to adipose tissue with a #15 scalpel blade.  The skin margins were undermined to an appropriate distance in all directions utilizing iris scissors. Complex Repair And W Plasty Text: The defect edges were debeveled with a #15 scalpel blade.  The primary defect was closed partially with a complex linear closure.  Given the location of the remaining defect, shape of the defect and the proximity to free margins a W plasty was deemed most appropriate for complete closure of the defect.  Using a sterile surgical marker, an appropriate advancement flap was drawn incorporating the defect and placing the expected incisions within the relaxed skin tension lines where possible.    The area thus outlined was incised deep to adipose tissue with a #15 scalpel blade.  The skin margins were undermined to an appropriate distance in all directions utilizing iris scissors. Complex Repair And Z Plasty Text: The defect edges were debeveled with a #15 scalpel blade.  The primary defect was closed partially with a complex linear closure.  Given the location of the remaining defect, shape of the defect and the proximity to free margins a Z plasty was deemed most appropriate for complete closure of the defect.  Using a sterile surgical marker, an appropriate advancement flap was drawn incorporating the defect and placing the expected incisions within the relaxed skin tension lines where possible.    The area thus outlined was incised deep to adipose tissue with a #15 scalpel blade.  The skin margins were undermined to an appropriate distance in all directions utilizing iris scissors. Complex Repair And Dorsal Nasal Flap Text: The defect edges were debeveled with a #15 scalpel blade.  The primary defect was closed partially with a complex linear closure.  Given the location of the remaining defect, shape of the defect and the proximity to free margins a dorsal nasal flap was deemed most appropriate for complete closure of the defect.  Using a sterile surgical marker, an appropriate flap was drawn incorporating the defect and placing the expected incisions within the relaxed skin tension lines where possible.    The area thus outlined was incised deep to adipose tissue with a #15 scalpel blade.  The skin margins were undermined to an appropriate distance in all directions utilizing iris scissors. Complex Repair And Ftsg Text: The defect edges were debeveled with a #15 scalpel blade.  The primary defect was closed partially with a complex linear closure.  Given the location of the defect, shape of the defect and the proximity to free margins a full thickness skin graft was deemed most appropriate to repair the remaining defect.  The graft was trimmed to fit the size of the remaining defect.  The graft was then placed in the primary defect, oriented appropriately, and sutured into place. Complex Repair And Burow's Graft Text: The defect edges were debeveled with a #15 scalpel blade.  The primary defect was closed partially with a complex linear closure.  Given the location of the defect, shape of the defect, the proximity to free margins and the presence of a standing cone deformity a Burow's graft was deemed most appropriate to repair the remaining defect.  The graft was trimmed to fit the size of the remaining defect.  The graft was then placed in the primary defect, oriented appropriately, and sutured into place. Complex Repair And Split-Thickness Skin Graft Text: The defect edges were debeveled with a #15 scalpel blade.  The primary defect was closed partially with a complex linear closure.  Given the location of the defect, shape of the defect and the proximity to free margins a split thickness skin graft was deemed most appropriate to repair the remaining defect.  The graft was trimmed to fit the size of the remaining defect.  The graft was then placed in the primary defect, oriented appropriately, and sutured into place. Complex Repair And Epidermal Autograft Text: The defect edges were debeveled with a #15 scalpel blade.  The primary defect was closed partially with a complex linear closure.  Given the location of the defect, shape of the defect and the proximity to free margins an epidermal autograft was deemed most appropriate to repair the remaining defect.  The graft was trimmed to fit the size of the remaining defect.  The graft was then placed in the primary defect, oriented appropriately, and sutured into place. Complex Repair And Dermal Autograft Text: The defect edges were debeveled with a #15 scalpel blade.  The primary defect was closed partially with a complex linear closure.  Given the location of the defect, shape of the defect and the proximity to free margins an dermal autograft was deemed most appropriate to repair the remaining defect.  The graft was trimmed to fit the size of the remaining defect.  The graft was then placed in the primary defect, oriented appropriately, and sutured into place. Complex Repair And Tissue Cultured Epidermal Autograft Text: The defect edges were debeveled with a #15 scalpel blade.  The primary defect was closed partially with a complex linear closure.  Given the location of the defect, shape of the defect and the proximity to free margins an tissue cultured epidermal autograft was deemed most appropriate to repair the remaining defect.  The graft was trimmed to fit the size of the remaining defect.  The graft was then placed in the primary defect, oriented appropriately, and sutured into place. Complex Repair And Xenograft Text: The defect edges were debeveled with a #15 scalpel blade.  The primary defect was closed partially with a complex linear closure.  Given the location of the defect, shape of the defect and the proximity to free margins a xenograft was deemed most appropriate to repair the remaining defect.  The graft was trimmed to fit the size of the remaining defect.  The graft was then placed in the primary defect, oriented appropriately, and sutured into place. Complex Repair And Skin Substitute Graft Text: The defect edges were debeveled with a #15 scalpel blade.  The primary defect was closed partially with a complex linear closure.  Given the location of the remaining defect, shape of the defect and the proximity to free margins a skin substitute graft was deemed most appropriate to repair the remaining defect.  The graft was trimmed to fit the size of the remaining defect.  The graft was then placed in the primary defect, oriented appropriately, and sutured into place. Path Notes (To The Dermatopathologist): check margins Consent was obtained from the patient. The risks and benefits to therapy were discussed in detail. Specifically, the risks of infection, scarring, bleeding, prolonged wound healing, incomplete removal, allergy to anesthesia, nerve injury and recurrence were addressed. Prior to the procedure, the treatment site was clearly identified and confirmed by the patient. All components of Universal Protocol/PAUSE Rule completed. Post-Care Instructions: I reviewed with the patient in detail post-care instructions. Patient is not to engage in any heavy lifting, exercise, or swimming for the next 14 days. Should the patient develop any fevers, chills, bleeding, severe pain patient will contact the office immediately. Home Suture Removal Text: Patient was provided a home suture removal kit and will remove their sutures at home.  If they have any questions or difficulties they will call the office. Where Do You Want The Question To Include Opioid Counseling Located?: Case Summary Tab Billing Type: Third-Party Bill Information: Selecting Yes will display possible errors in your note based on the variables you have selected. This validation is only offered as a suggestion for you. PLEASE NOTE THAT THE VALIDATION TEXT WILL BE REMOVED WHEN YOU FINALIZE YOUR NOTE. IF YOU WANT TO FAX A PRELIMINARY NOTE YOU WILL NEED TO TOGGLE THIS TO 'NO' IF YOU DO NOT WANT IT IN YOUR FAXED NOTE.

## 2021-11-19 NOTE — PROGRESS NOTES
Pt aao x's 3 even resp no noted problems    Affect and characteristics of appearance, verbalizations, behaviors are appropriate

## 2023-03-07 NOTE — TELEPHONE ENCOUNTER
I spoke with Mao to let him know I was returning his call from yesterday. He said he only asked for me b/c he forgot the social workers name. Informed him that Ariane is currently working on his case and that I would let her know he called.   Rotation Flap Text: The defect edges were debeveled with a #15 scalpel blade.  Given the location of the defect, shape of the defect and the proximity to free margins a rotation flap was deemed most appropriate.  Using a sterile surgical marker, an appropriate rotation flap was drawn incorporating the defect and placing the expected incisions within the relaxed skin tension lines where possible.  The area thus outlined was incised deep to adipose tissue with a #15 scalpel blade and elevated.  The skin margins were undermined to an appropriate distance in all directions and meticulous hemostasis obtained. The flap was rotated in position and small standing cones were taken when needed to enhance the cosmetic outcome.

## 2023-03-23 NOTE — CONSULTS
"  [facility]  Adult Nutrition  Consult Note    SUMMARY     Recommendations  Recommendation/Intervention: Continue Regular diet. RD to follow.     Goals: PO intake maintained at  > 75% EEN and EPN by next RD f/u   Nutrition Goal Status: new    Communication of RD Recs: other (comment)(POC)    Reason for Assessment  Reason for Assessment: consult  Diagnosis: other (see comments)(MS w/ bilateral leg weakness; UTI)  Relevant Medical History: MS, anxiety, pulmonary embolism, gait, polyneuropathy, depression, deep vein thrombrosis, anticoagulant longterm use , neurogenic bladder  Interdisciplinary Rounds: did not attend    General Information Comments: Pt reports good appetite since admission and PTA. He states that he usually eats 100% of his meals unless he does not like the meal. He reports no recent wt changes and a UBW around 180 lb. Wt records indicate a fluctuation in wt over the past 6 months from 190 lb; however,  Pt shows no signs of muscle or fat depletion. Pt denies N/V/D/C at this time.  Nutrition Discharge Planning: d/c on regular diet     Nutrition Risk Screen  Nutrition Risk Screen: no indicators present    Nutrition/Diet History  Patient Reported Diet/Restrictions/Preferences: general  Typical Food/Fluid Intake: good appetite/intake PTA  Do you have any cultural, spiritual, Denominational conflicts, given your current situation?: none  Food Allergies: other (Mushrooms)  Factors Affecting Nutritional Intake: None identified at this time    Anthropometrics  Temp: 97.7 °F (36.5 °C)  Height Method: Stated  Height: 5' 10" (177.8 cm)  Height (inches): 70 in  Weight Method: Standard Scale  Weight: 81.1 kg (178 lb 12.7 oz)  Weight (lb): 178.79 lb  Ideal Body Weight (IBW), Male: 166 lb  % Ideal Body Weight, Male (lb): 107.7 lb  BMI (Calculated): 25.7  BMI Grade: 25 - 29.9 - overweight     Lab/Procedures/Meds  Pertinent Labs Reviewed: reviewed  Pertinent Labs Comments: Ca 8.3   Pertinent Medications " Reviewed: reviewed  Pertinent Medications Comments: duloxetine, gabapentin, polyethylene glycol, senna-docusate     Physical Findings/Assessment  Overall Physical Appearance: nourished  Oral/Mouth Cavity: WDL  Skin: intact    Estimated/Assessed Needs  Weight Used For Calorie Calculations: 81.1 kg (178 lb 12.7 oz)  Energy Calorie Requirements (kcal): 2078 kcal/d  Energy Need Method: Burna-St Jeor(RMR X 1.25 PAL )  Protein Requirements: 65-81 g/d(0.8-1.0 g/kg)  Weight Used For Protein Calculations: 81.1 kg (178 lb 12.7 oz)  Fluid Requirements (mL): 1 ml/kcal or per MD    Fluid Need Method: RDA Method  RDA Method (mL): 2078    Nutrition Prescription Ordered  Current Diet Order: Regular     Evaluation of Received Nutrient/Fluid Intake  Comments: LBM 11/30   % Intake of Estimated Energy Needs: 75 - 100 %  % Meal Intake: 75 - 100 %    Nutrition Risk  Level of Risk/Frequency of Follow-up: low     Assessment and Plan  No nutrition Dx at this time.     Monitor and Evaluation  Food and Nutrient Intake: energy intake, food and beverage intake  Food and Nutrient Adminstration: diet order  Physical Activity and Function: nutrition-related ADLs and IADLs  Anthropometric Measurements: weight, weight change  Biochemical Data, Medical Tests and Procedures: electrolyte and renal panel, gastrointestinal profile, glucose/endocrine profile, inflammatory profile, lipid profile  Nutrition-Focused Physical Findings: overall appearance     Nutrition Follow-Up  RD Follow-up?: Yes         DISPLAY PLAN FREE TEXT

## 2023-03-28 NOTE — PT/OT/SLP PROGRESS
"Occupational Therapy  Treatment    Mao Levin   MRN: [id number]   Admitting Diagnosis: MS (multiple sclerosis)    OT Date of Treatment: 17  Total Time (min): 53 min    Billable Minutes:  Self Care/Home Management 53    General Precautions: Standard, fall  Orthopedic Precautions: N/A  Braces: N/A    Do you have any cultural, spiritual, Buddhism conflicts, given your current situation?: no    Subjective:  "I'd love to shower."    Pain Ratin/10  Pain Rating Post-Intervention: 0/10    Objective:  Pt presented supine in bed    Functional Status:  Bed Mobility Functional Status: Supine->sit: SBA using handrail  Transfer Functional Status: Min (A) for SQPT w/c->TTB using grab bars  Dressing Functional Status: UB- Set-up with front facing gown; LB-pt required max A for LB dressing following bathing task 2* fatigue   Bathing Functional Status: Min (A) from TTB using HH shower head and grab bar for stability in standing    Toileting: pt found in bed with saturated diaper; pt states that he has urgency issues and urinated prior to being able to reach urinal     Patient left up in chair with call button in reach    ASSESSMENT:  Mao Levin is a 51 y.o. male with a medical diagnosis of MS (multiple sclerosis).  Pt tolerated session well.  Required increased assist with LB dressing today 2* fatigue following warm shower.  Pt will require assist with ADLs at d/c.      Rehab identified problem list/impairments: impaired endurance, impaired self care skills, impaired functional mobilty, decreased upper extremity function, decreased safety awareness    Rehab potential is good    Activity tolerance: Fair    Discharge recommendations: rehabilitation facility     Barriers to discharge: Decreased caregiver support     Equipment recommendations: wheelchair (drop arm commode)     GOALS:   Occupational Therapy Goals        Problem: Occupational Therapy Goal    Goal Priority Disciplines Outcome Interventions   Occupational Therapy " Goal     OT, PT/OT Ongoing (interventions implemented as appropriate)    Description:  Goals to be met by: 3 weeks     Patient will increase functional independence with ADLs by performing:    Feeding with Set-up Assistance. Met  UE Dressing with Stand-by Assistance.--met   LE Dressing with Minimal Assistance.--met;however, occasional mod A depending on muscle tone  Grooming while seated with Modified [place].--met  Toileting from bedside commode with Minimal Assistance for hygiene and clothing management.   Bathing from  shower chair/bench with Minimal Assistance.--met  Sitting at edge of bed x20 minutes with Supervision.--met  Rolling to Bilateral with Stand-by Assistance. --met  Supine to sit with Minimal Assistance.--met  Stand pivot transfers with Minimal Assistance.--met  Toilet transfer to bedside commode with Minimal Assistance.  L Upper extremity self ROM exercise program x15 reps per handout, with independence.                   Plan:  Patient to be seen 5 x/week to address the above listed problems via self-care/home management, therapeutic exercises, therapeutic activities  Plan of Care expires: 04/18/17  Plan of Care reviewed with: patient    GISEL Gupta  04/19/2017   [Annual] : an annual visit.

## 2023-05-11 NOTE — PROGRESS NOTES
Subjective:       Patient ID: Mao Levin is a 52 y.o. male.    Chief Complaint: Follow-up    HPIPt is doing better.  His MS appears stable on Rituxan.  He does need some PT/OT.  Denies CP or SOB.  Review of Systems   Respiratory: Negative for shortness of breath.    Cardiovascular: Negative for chest pain.   Gastrointestinal: Negative for abdominal pain, diarrhea, nausea and vomiting.   Genitourinary: Negative for dysuria.   Neurological: Negative for seizures, syncope and headaches.       Objective:      Physical Exam   Constitutional: He is oriented to person, place, and time. He appears well-developed and well-nourished. No distress.   HENT:   Mouth/Throat: Oropharynx is clear and moist.   Neck: Neck supple. No JVD present. No thyromegaly present.   Cardiovascular: Normal rate, regular rhythm, normal heart sounds and intact distal pulses. Exam reveals no gallop and no friction rub.   No murmur heard.  Pulmonary/Chest: Effort normal and breath sounds normal. He has no wheezes. He has no rales.   Abdominal: Soft. Bowel sounds are normal. He exhibits no distension and no mass. There is no tenderness. There is no rebound and no guarding.   Musculoskeletal: He exhibits no edema.   Lymphadenopathy:     He has no cervical adenopathy.   Neurological: He is alert and oriented to person, place, and time.   Skin: Skin is warm and dry.   Psychiatric: He has a normal mood and affect. His behavior is normal. Judgment and thought content normal.       Assessment:       1. MS (multiple sclerosis)    2. Neurogenic bladder    3. Wellness examination    4. Sequelae of protein-calorie malnutrition     5. Abnormal results of thyroid function studies     6. Hyperglycemia     7. Vitamin D deficiency     8. Encounter for screening for malignant neoplasm of prostate         Plan:   MS (multiple sclerosis)  -     Ambulatory consult to Physical Medicine Rehab - Dr. Estrella  Keep Neuro appt  Please start PT/OT and L hand splint at  night  Neurogenic bladder  Per Urology  Wellness examination  -     Lipid panel; Future; Expected date: 08/16/2018  -     TSH; Future; Expected date: 08/16/2018  -     Hemoglobin A1c; Future; Expected date: 08/16/2018  -     Vitamin D; Future; Expected date: 08/16/2018  -     PSA, Screening; Future; Expected date: 08/16/2018  -     Urinalysis; Future; Expected date: 08/16/2018  -     Urine culture; Future; Expected date: 08/16/2018  For 8/20/18  Sequelae of protein-calorie malnutrition   -     Lipid panel; Future; Expected date: 08/16/2018    Abnormal results of thyroid function studies   -     TSH; Future; Expected date: 08/16/2018    Hyperglycemia   -     Hemoglobin A1c; Future; Expected date: 08/16/2018    Vitamin D deficiency   -     Vitamin D; Future; Expected date: 08/16/2018    Encounter for screening for malignant neoplasm of prostate   -     PSA, Screening; Future; Expected date: 08/16/2018  SCREENING FOR COLON CANCER  -     Case request GI: COLONOSCOPY    Other orders  -     (In Office Administered) Pneumococcal Polysaccharide Vaccine (23 Valent) (SQ/IM)       - - -

## 2023-10-24 NOTE — TELEPHONE ENCOUNTER
"I spoke with Mao who said he is interested in an "assisted living facility." He said he is unable to care of himself. I asked when we could schedule his Rituxan as he is overdo. He said he does not have a ride.   " n/a

## 2023-11-07 NOTE — TELEPHONE ENCOUNTER
Called the pharmacy, they state he picked up his Valium on 07/26/2019.  They state he picked up his Norco on 07/11/2019, 120 tablets.  120 tablets q6h PRN should last the pt a month.  He has another print refill available for Norco on 07/26/2019.  Pharmacist states since the pt got a 30d refill on 07/11/2019 of his [place] that they are unable to give the pt more until 30d are up.    ----- Message from Adamaris Devi sent at 7/29/2019  3:44 PM CDT -----  Contact: pt@ [phone]  Calling regarding his medications:diazePAM (VALIUM) 5 MG tablet  and  HYDROcodone-acetaminophen (NORCO)  mg per tablet, he says he was only given a 2 weeks prescription from Dr. Estrella for both on 7/11 and he was to go back to the pharmacy on 7/26 to get his other prescription Dr. Estrella gave him, but the pharmacy is giving him issues with refilling the prescriptions. Caller is asking that someone in Dr. Estrella's office please call the pharmacy and correct the requests. Please call.    [facility] [address] [place] [address]  Phone: [phone] Fax: [phone]         This was a shared visit with the EVON. I reviewed and verified the documentation and independently performed the documented:

## 2023-12-11 NOTE — Clinical Note
Dr. Leung,  I saw Mao in clinic today, he will see you later this week.  I just referred him to outpatient PT on [facility].  Also, do you think a baclofen pump might be beneficial for him?  Lawanda no

## 2024-03-22 NOTE — TELEPHONE ENCOUNTER
[facility] Medicine H&P Note     Admitting Team: [facility] Hospitalist Team B  Attending Physician: Mejia Conley MD  Resident: Zulema  Intern: Skyler    Date of Admit: 3/21/2024    Chief Complaint     Chest Pain (Mid sternal CP with SOB, x 1 week intermittently, + weakness reported  and right shoulder pain )    Subjective:      History of Present Illness:  Autumn Harris is a 78 y.o. female with past medical history of Afib, HTN, CKD3, COPD, HLD, fibromyalgia, depression, anxiety, h/o oral cancer. The patient presented to [facility] on 3/21/2024 with a primary complaint of Chest Pain (Mid sternal CP with SOB, x 1 week intermittently, + weakness reported  and right shoulder pain )    The patient was in their usual state of health until approximately 4 days ago at which point she notes the onset of increased fatigue and weakness as well as chest pain and shortness of breath with exertion. She states that she is normally energetic and able to independently complete her ADLs. She states that for the past few days after getting up and making breakfast she finds herself short of breath and weak. She notes that this will improve after laying down and worsens with activity. She also notes intermittent chest, right shoulder, and neck pain. She describes the chest pain as a pressure. She also notes a headache on the right side of her head. The exact temporal relationship of these symptoms is difficult to clarify on interview. On review of systems she denies any lower extremity swelling, but she does report sore throat, cough, nausea and dysuria     Past Medical History:  Past Medical History:   Diagnosis Date    Anxiety     CKD (chronic kidney disease) stage 3, GFR 30-59 ml/min     COPD (chronic obstructive pulmonary disease)     Depression     Encounter for blood transfusion     Fibromyalgia     Hematuria     High cholesterol     Hypertension     Oral cancer     Proteinuria     Sore throat 7/3/2019  Follow-up and refill consult completed. Ampyra will be sent out on 4/3 for 4/4 delivery.       Urinary tract infection     Vitamin D deficiency        Past Surgical History:  Past Surgical History:   Procedure Laterality Date    ABDOMINAL SURGERY      [place]    HYSTERECTOMY      INSERTION OF IMPLANTABLE LOOP RECORDER N/A 10/15/2021    Procedure: Insertion, Implantable Loop Recorder;  Surgeon: Cristhian Alvares MD;  Location: [facility] CATH LAB/EP;  Service: Cardiology;  Laterality: N/A;    IRRIGATION AND DEBRIDEMENT OF LOWER EXTREMITY Right 11/9/2022    Procedure: IRRIGATION AND DEBRIDEMENT, LOWER EXTREMITY, SECOND TOE;  Surgeon: Alfa Quintero DPM;  Location: [facility] OR;  Service: Podiatry;  Laterality: Right;  micro sagittal saw, vancomycin powder    OOPHORECTOMY      only one removed    Stomach Ulcer Surgery         Allergies:  Review of patient's allergies indicates:   Allergen Reactions    Pcn [penicillins]     Sulfa (sulfonamide antibiotics)        Home Medications:  Prior to Admission medications    Medication Sig Start Date End Date Taking? Authorizing Provider   azelastine (ASTELIN) 137 mcg (0.1 %) nasal spray 2 sprays by Nasal route 2 (two) times daily. 2/5/17  Yes Provider, Historical   busPIRone (BUSPAR) 5 MG Tab Take 5 mg by mouth 2 (two) times daily.   Yes Provider, Historical   clorazepate (TRANXENE) 3.75 MG Tab Take 7.5 mg by mouth 2 (two) times daily. 2 tablets   Yes Provider, Historical   montelukast (SINGULAIR) 10 mg tablet Take 10 mg by mouth once daily. 3/11/24  Yes Provider, Historical   tramadol-acetaminophen 37.5-325 mg (ULTRACET) 37.5-325 mg Tab Take 1 tablet by mouth 3 (three) times daily. 2/28/24  Yes Provider, Historical   acarbose (PRECOSE) 25 MG Tab Take 25 mg by mouth 3 (three) times daily. 11/11/22   Provider, Historical   blood sugar diagnostic Strp 1 strip by Misc.(Non-Drug; Combo Route) route 3 (three) times daily as needed. 11/26/21   Bryan Arroyo MD   blood-glucose meter kit Use as instructed 11/26/21 11/26/22  Bryan Arroyo MD   ergocalciferol (ERGOCALCIFEROL) 50,000 unit  Cap Take 50,000 Units by mouth every Monday.    Provider, Historical   levocetirizine (XYZAL) 5 MG tablet Take 1 tablet (5 mg total) by mouth every evening. 22  Maida Arellano PA-C   pantoprazole (PROTONIX) 40 MG tablet TAKE 1 TABLET BY MOUTH EVERY DAY 3/12/24   Arjun Horn MD   triamcinolone acetonide 0.1% (KENALOG) 0.1 % cream Apply topically 2 (two) times daily. Apply to rash on on hand and ankle as needed 10/6/21   Provider, Historical   albuterol (VENTOLIN HFA) 90 mcg/actuation inhaler Inhale 2 puffs into the lungs every 6 (six) hours as needed for Wheezing. Rescue 7/3/19 3/21/24  Phani Bourgeois MD   doxycycline (VIBRA-TABS) 100 MG tablet Take 1 tablet (100 mg total) by mouth 2 (two) times daily. 11/10/22 3/21/24  Alfa Quintero, DPLINDA   fluticasone (FLONASE) 50 mcg/actuation nasal spray 2 sprays by Each Nostril route Daily. 8/19/16 3/21/24  Provider, Historical   HYDROcodone-acetaminophen (NORCO)  mg per tablet Take 1 tablet by mouth every 4 (four) hours as needed for Pain. 11/10/22 3/21/24  Alfa Quintero DPM   montelukast (SINGULAIR) 10 mg tablet Take 10 mg by mouth once daily. 2/13/17 3/21/24  Provider, Historical       Family History:  Family History   Adopted: Yes   Problem Relation Age of Onset    Heart disease Mother     Pacemaker/defibrilator Brother     Heart disease Brother     Lung cancer Brother     Heart disease Maternal Aunt        Social History:  Social History     Tobacco Use    Smoking status: Former     Current packs/day: 0.00     Average packs/day: 1.5 packs/day for 45.0 years (67.5 ttl pk-yrs)     Types: Cigarettes     Start date: 1950     Quit date: 1995     Years since quittin.6    Smokeless tobacco: Never    Tobacco comments:     Quit when diagnosed with squamous cell carcinoma   Substance Use Topics    Alcohol use: No    Drug use: Never     Review of Systems:  Pertinent items are noted in HPI. All other systems are reviewed and  are negative.    Health Maintaince :   Primary Care Physician: Gary Castaneda MD    Immunizations:   TDap not UTD    Flu not UTD   Pna 2019     Objective:   Last 24 Hour Vital Signs:  BP  Min: 153/88  Max: 195/95  Temp  Av.2 °F (36.8 °C)  Min: 98.2 °F (36.8 °C)  Max: 98.2 °F (36.8 °C)  Pulse  Av.5  Min: 72  Max: 103  Resp  Av.5  Min: 17  Max: 25  SpO2  Av.4 %  Min: 95 %  Max: 100 %  Weight  Av.3 kg (122 lb)  Min: 55.3 kg (122 lb)  Max: 55.3 kg (122 lb)  Body mass index is 21.61 kg/m².  No intake/output data recorded.    Physical Examination:  Examination  General: Patient sitting comfortably in NAD  Head: normocephalic, atraumatic  Eyes: PERRL, EOMI, no conjunctival injections or icterus  Mouth: MMM, posterior oropharynx without erythema  Neck: No thyromegaly or masses   Cardiac: RRR, no murmurs appreciated, no extra heart sounds  Pulmonary/Chest: CTAB, symmetric chest rise, no wheezing or crackles  GI: Soft, non tender, non distended, normoactive bowel sounds  Extremities: no edema, clubbing, or cyanosis  Skin: dry, warm, intact. No bruising or rashes.  Neuro: Alert and oriented, moving all extremities, sensation equal and symmetric     Laboratory:  Most Recent Data:  CBC:   Lab Results   Component Value Date    WBC 7.56 2024    HGB 12.1 2024    HCT 39.2 2024     2024    MCV 77 (L) 2024    RDW 17.4 (H) 2024     WBC Differential: 49.7 % N, 31.1 % L, 12.6 % M, 5 % Eo, 1.5 % Baso, no additional cells seen  BMP:   Lab Results   Component Value Date     2024    K 4.2 2024     2024    CO2 20 (L) 2024    BUN 19 2024    CREATININE 1.4 2024    GLU 77 2024    CALCIUM 9.9 2024    MG 1.7 2021    PHOS 2.3 (L) 2021     LFTs:   Lab Results   Component Value Date    PROT 8.0 2024    ALBUMIN 3.8 2024    BILITOT 0.2 2024    AST 33 2024    ALKPHOS 108 2024    ALT  "20 03/21/2024     Coags:   Lab Results   Component Value Date    INR 1.0 10/13/2017     FLP:   Lab Results   Component Value Date    CHOL 194 10/30/2019    HDL 74 10/30/2019    LDLCALC 100.2 10/30/2019    TRIG 99 10/30/2019    CHOLHDL 38.1 10/30/2019     DM:   Lab Results   Component Value Date    HGBA1C 5.5 11/25/2021    HGBA1C 5.5 10/13/2017    LDLCALC 100.2 10/30/2019    CREATININE 1.4 03/21/2024     Thyroid:   Lab Results   Component Value Date    TSH 1.320 11/25/2021    FREET4 0.89 06/27/2021     Anemia: No results found for: "IRON", "TIBC", "FERRITIN", "[id number]", "FOLATE"  Cardiac:   Lab Results   Component Value Date    TROPONINI <0.006 03/21/2024     (H) 03/21/2024     Urinalysis:   Lab Results   Component Value Date    LABURIN KLEBSIELLA PNEUMONIAE  >100,000 cfu/ml   (A) 11/25/2021    COLORU Yellow 03/21/2024    SPECGRAV 1.010 03/21/2024    NITRITE Positive (A) 03/21/2024    KETONESU Negative 03/21/2024    UROBILINOGEN Negative 03/21/2024    WBCUA 16 (H) 03/21/2024       Trended Lab Data:  Recent Labs   Lab 03/21/24  1215   WBC 7.56   HGB 12.1   HCT 39.2      MCV 77*   RDW 17.4*      K 4.2      CO2 20*   BUN 19   CREATININE 1.4   GLU 77   PROT 8.0   ALBUMIN 3.8   BILITOT 0.2   AST 33   ALKPHOS 108   ALT 20       Trended Cardiac Data:  Recent Labs   Lab 03/21/24  1215 03/21/24  1514   TROPONINI 0.011 <0.006   *  --        Microbiology Data:  Urine culture pending    Other Results:  EKG (my interpretation): unchanged from previous tracings, normal sinus rhythm.    Radiology:  Imaging Results              CT Abdomen Pelvis With IV Contrast NO Oral Contrast (Final result)  Result time 03/21/24 16:06:25      Final result by Kaye Michel MD (03/21/24 16:06:25)                   Impression:      No evidence of small-bowel obstruction.    Prominent intrahepatic and extrahepatic biliary ducts without definite obstructive process seen, the pancreatic duct is also slightly " enlarged.  It is unchanged from the prior study of 11/25/2021 exam.  To correlate with liver enzymes, an MRCP could be done if clinically warranted.    Several hypoattenuating lesions of the left kidney, too small to characterize favored to represent cysts, not significantly changed from the prior study.    Cholecystectomy.    Hysterectomy.      Electronically signed by: Kaye Michel MD  Date:    03/21/2024  Time:    16:06               Narrative:    EXAMINATION:  CT ABDOMEN PELVIS WITH IV CONTRAST    CLINICAL HISTORY:  Abdominal pain, acute, nonlocalized;Right upper abd pain, vomiting, Hx of SBO;    TECHNIQUE:  Low dose axial images, sagittal and coronal reformations were obtained from the lung bases to the pubic symphysis following the IV administration of 75 mL of Omnipaque 350 .  Oral contrast was not given. Axial and coronal images reformatted.    COMPARISON:  11/25/2021 CT abdomen and pelvis with contrast    FINDINGS:  Minimal subpleural lines, unchanged from the prior study suggestive of minimal fibrosis.  No acute focal area of airspace consolidation, no pleural effusion.  No pericardial effusion.    There is mild prominence of the intrahepatic and extrahepatic biliary ducts, unchanged from the prior study, no obstructive process seen, this can be seen after cholecystectomy.  The common bile duct measures 12 mm.  The gallbladder is removed.    Several surgical clips at the gastroesophageal junction.  The stomach is nondistended.    The pancreatic duct is prominent measuring 5 mm, unchanged from the prior study, no pancreatic tissue atrophy, no pancreatic mass seen.  No peripancreatic inflammatory change.    The spleen and bilateral adrenal glands appear normal.    The right kidney enhance normally, no hydronephrosis or definite stone identified.  The left kidney enhance normally there are few small hypoattenuating lesions, cannot be definitely characterized due to their small size, they are similar  to the prior exam and are favored to represent cysts.  The bilateral ureters cannot be followed in their entirety within the pelvis due to paucity of fat, no definite obstructive process seen.  The bladder appears normal, no stone is seen within.  The uterus is removed.  The ovaries are not definitely identified.    Mild stool retention, no inflammatory changes of the bowel seen, no bowel dilation.  The appendix is not identified.  The small bowel is not dilated.  No inflammatory changes of the mesentery.  No free air, no free fluid.    The abdominal aorta tapers normally, there is moderate atherosclerotic plaque.  No para-aortic lymphadenopathy.    The abdominal wall is intact, the inguinal regions appear normal.    The osseous structures demonstrate no osseous lesions.  Grade 1 near 2 anterolisthesis of L4 relative to L5.                                       X-Ray Chest 1 View (Final result)  Result time 03/21/24 13:26:57      Final result by Cliff Lira DO (03/21/24 13:26:57)                   Impression:      Please see above      Electronically signed by: Cliff Lira DO  Date:    03/21/2024  Time:    13:26               Narrative:    EXAMINATION:  XR CHEST 1 VIEW    CLINICAL HISTORY:  Chest pain, unspecified    TECHNIQUE:  Single frontal view of the chest was performed.    COMPARISON:  03/29/2022    FINDINGS:  Slight nonspecific elevation left lung base.  Continued coarse interstitial opacities throughout the lungs which are nonspecific and may represent fibrosis and scarring.  There is no new lung opacity.  No large lung consolidation.  No large pleural effusion or pneumothorax.  Continued atherosclerotic aorta.  Implanted presume cardiac device overlies the left thorax.  Continued surgical clips in the epigastric region.  Further evaluation as warranted clinically.                                       Assessment:     Autumn Harris is a 78 y.o. female with Afib, HTN, CKD3, COPD, HLD, fibromyalgia,  depression, anxiety, h/o oral cancer who presents to [facility] for several days of weakness, fatigue, SOB, and chest pain. EKG with normal sinus rhythm and unchanged from previous tracings, CXR without opacities, consolidation, vascular congestion. Initial two troponin within normal limits, but subsequent results increased to 0.038. UA positive for nitrate and WBCs. Admitted to medicine for further workup and management.      Plan:     NSTEMI  Dyspnea  Patient reports ~ 3d chest pressure with shoulder and neck pain that worsens with activity  - Initial EKG with normal sinus rhythm, LVH, no significant ST segment changes, unchanged from previous tracings; will repeat   - CXR with coarse interstitial opacities, no new opacities or consolidation  -   - Troponin 0.011 -> <0.006 -> 0.038; will continue to trend q4hr  - Last TTE in 2019 with EF 70%, grade I diastolic dysfunction   - Will obtain TTE  - Will load with aspirin 325 mg, continue daily aspirin 81 mg   - Start atorvastatin 40 mg daily     UTI   Patient reporting dysuria   - UA with nitrate, 1+ LE, 16 WBC  - Urine culture pending   - Start ciprofloxacin 500 mg daily x 3 d    HTN  - On presentation /95   - Home medication: spironolactone 12.5 mg daily   - Holding home spironolactone given renal function     COPD   - Home montelukast 10 mg, albuterol PRN  - Continue home montelukast 10 mg     Afib  - Patient has asymptomatic device-detected Afib of a low burden   - Per most recent cardiology appointment not recommended for anticoagulation yet    Depression/Anxiety   - Continue home clorazepate 3.75 mg BID, Buspar 5mg BID    HCM   - Will order lipid panel, A1c, TSH     Code Status: Full  DVT Prophylaxis: Lovenox  Diet: NPO  Disposition: Admit to the floor    Parminder Holland MD, PhD  [facility] PGY1  [employer] Medicine Team B      [facility] Medicine Hospitalist Pager numbers:   [facility] Hospitalist Medicine Team A (Maximo/John): [phone]  [facility]  Hospitalist Medicine Team B (Tamie/Yael):  [phone]

## 2024-03-26 NOTE — PT/OT/SLP PROGRESS
Occupational Therapy  Treatment    Mao Levin   MRN: [id number]   Admitting Diagnosis: MS (multiple sclerosis)    OT Date of Treatment: 17  Total Time (min): 55 min    Billable Minutes:  Therapeutic Activity 40 and Therapeutic Exercise 15    General Precautions: Standard, fall  Orthopedic Precautions: N/A  Braces: N/A    Do you have any cultural, spiritual, [employer]ian conflicts, given your current situation?: no    Subjective:  Communicated with patient prior to session.    Pain Ratin/10  Pain Rating Post-Intervention: 0/10    Objective:     Functional Status:  MDS G  Dressing Functional Status: 3:mod(A)-Max(A) [employer] and donning pants with mod A  Eating Functional Status: S-SBA setup  Toilet Use Functional Status: mod(A)-Max(A) Max A       OT Exercises: Rickshaw 25 x 8 for improving strength to increase independence with functional mobility.  Lat pull downs 25 x 4 30# for improving strength to increase independence with daily skills.     Additional Treatment:  Patient performed FM strengthening activity via yellow theraputty working on grasp and release using L hand.     Patient performed FM coordination activity picking up cones and releasing them using L hand.    Patient left up in chair with PT.     ASSESSMENT:  Mao Levin is a 51 y.o. male with a medical diagnosis of MS (multiple sclerosis) and presents with the deficits listed below. Patient tolerated treatment session and was motivated to participate in therapy to complete tasks. Patient continues to benefit from skilled OT services to achieve maximal independence.    Rehab identified problem list/impairments: weakness, impaired endurance, impaired self care skills, impaired functional mobilty, gait instability, impaired balance, decreased upper extremity function, decreased lower extremity function, abnormal tone, decreased coordination, impaired fine motor    Rehab potential is good    Activity tolerance: Good    Discharge recommendations:  rehabilitation facility     Barriers to discharge: Decreased caregiver support     Equipment recommendations: wheelchair (Drop arm bedside commode)     GOALS:   Occupational Therapy Goals        Problem: Occupational Therapy Goal    Goal Priority Disciplines Outcome Interventions   Occupational Therapy Goal     OT, PT/OT Ongoing (interventions implemented as appropriate)    Description:  Goals to be met by: 3 weeks     Patient will increase functional independence with ADLs by performing:    Feeding with Set-up Assistance.  UE Dressing with Stand-by Assistance.  LE Dressing with Minimal Assistance.  Grooming while seated with Modified [place].  Toileting from bedside commode with Minimal Assistance for hygiene and clothing management.   Bathing from  shower chair/bench with Minimal Assistance.  Sitting at edge of bed x20 minutes with Supervision.  Rolling to Bilateral with Stand-by Assistance.   Supine to sit with Minimal Assistance.  Stand pivot transfers with Minimal Assistance.  Toilet transfer to bedside commode with Minimal Assistance.  L Upper extremity self ROM exercise program x15 reps per handout, with independence.                Plan:  Patient to be seen 5 x/week to address the above listed problems via self-care/home management, therapeutic activities, therapeutic exercises, neuromuscular re-education  Plan of Care expires: 04/18/17  Plan of Care reviewed with: patient    GISEL Flowers  03/31/2017   English

## 2024-05-22 NOTE — TELEPHONE ENCOUNTER
----- Message from Fausto Leung MD sent at 5/26/2017  8:38 AM CDT -----  Contact: Self [phone]  Please tell him the same thing as in the last message.  He is not due for a refill until around 6/9 and is now being managed by another doctor for his pain.  I will not refill the oxycodone until 6/9 and will reduce the dose to 10mg BID at that time.    He needs to taper off of the narcotics.  I will not prescribe for him anymore but will help him taper off as above.    ----- Message -----  From: Shu Corea MA  Sent: 5/26/2017   8:19 AM  To: Fausto Leung MD    What do you want me to tell him.    ----- Message -----  From: Randolph Dowell  Sent: 5/25/2017   4:50 PM  To: [facility]och[facility] HUSAIN Staff    Patient is calling to get an update on the medication request ( oxycodone (ROXICODONE) 15 MG Tab )     [facility] [address] [address] AT [facility] & [address] [address]  Phone: [phone] Fax: [phone]             Patient's caregiver is requesting referral for assessment of sleep apnea.  - Referral provided for sleep medicine

## 2025-07-23 NOTE — DISCHARGE SUMMARY
[facility] Hospital Medicine  Discharge Summary      Patient Name: Mao Levin  MRN: [id number]  Admission Date: 3/13/2017  Hospital Length of Stay: 0 days  Discharge Date and Time:  03/17/2017 9:54 AM  Attending Physician: Wes Looney MD   Discharging Provider: Jim Galarza PA-C  Primary Care Provider: Primary Doctor [facility] Medicine Team: 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  He presents with one week of worsening pain to his BLE.  He is having difficulty ambulating and states he has fallen at home multiple times.  He states that he has chronic pain and weakness, but that this is worse.  He notes that he began a new medications about two weeks ago (has had two doses) and this that this is causing his increase in pain.  He denies any pain and numbness to his face and right eye, which is what usually occurs when he has a flare.  He denies chest pain, SOB, dizziness, palpitations, fever/chills, N/V/D.  He lives at home alone and is having difficulty completing his ADLs.  He uses a walker at baseline.    * No surgery found *      Indwelling Lines/Drains at time of discharge:   Lines/Drains/Airways          No matching active lines, drains, or airways        Hospital Course:   Patient placed into observation for evaluation of lower extremity weakness and pain in setting of MS. Patient found to have UTI on admission . Patient started on rocephin, transitioned to cipro per UCx. PT/OT recommending SNF. Neurology consulted, initially felt symptoms were a MS psuedo flare in setting of UTI, however, patient without much improvement to baseline during stay. MRI ordered and did not reveal any new lesions. Patient discharged to SNF in good condition.     Consults:   Consults         Status Ordering Provider     Case Management/  Once     Provider:   Not yet assigned)    Acknowledged CANDI CARTAGENA     Inpatient consult to neurology  Once     Provider:  (Not yet assigned)    Completed RADHA GOULD     Inpatient consult to SNF [facility]  Once     Provider:  (Not yet assigned)    Acknowledged JOSE DOS SANTOS          Significant Diagnostic Studies: Labs: CMP No results for input(s): NA, K, CL, CO2, GLU, BUN, CREATININE, CALCIUM, PROT, ALBUMIN, BILITOT, ALKPHOS, AST, ALT, ANIONGAP, ESTGFRAFRICA, EGFRNONAA in the last 48 hours., CBC No results for input(s): WBC, HGB, HCT, PLT in the last 48 hours. and All labs within the past 24 hours have been reviewed  Microbiology:   Urine Culture    Lab Results   Component Value Date    LABURIN ESCHERICHIA COLI  >100,000 cfu/ml   03/13/2017     Radiology: MRI: without new lesions to indicate MS exacerbation     Pending Diagnostic Studies:     None        Final Active Diagnoses:    Diagnosis Date Noted POA    PRINCIPAL PROBLEM:  Urinary tract infection without hematuria [N39.0] 03/14/2017 Yes    MS (multiple sclerosis) [G35] 12/19/2016 Yes     Chronic    Chronic pain of multiple sites [R52, G89.29] 09/22/2016 Yes     Chronic    10/25/2016 Saddle PE [I26.92] 10/25/2016 Yes     Chronic    Neurogenic bladder [N31.9] 10/06/2016 Yes     Chronic    Impaired mobility and ADLs [Z74.09] 10/06/2016 Yes     Chronic      Problems Resolved During this Admission:    Diagnosis Date Noted Date Resolved POA      * Urinary tract infection without hematuria  - Started on empiric rocephin x 3, switch to cipro per UCx, end date 03/24/17 for total of 10 days abx therapy    MS (multiple sclerosis)  - Neurology consulted, pseudoflare in setting of UTI. Patient with continued weakness, not much improvement since admission, case discussed with neurology and MRI ordered to r/o MS exacerbation   - MRI without new lesions, patient's likely deconditioned and requires additional PT/OT  - PT/OT recommending SNF    Chronic pain of multiple  sites  - Neurology consulted, psuedoflare in setting of UTI.  - Continue home dose oxycodone 15mg q4h PRN pain, gabapentin 600mg BID and 900mg qHS, dantrolene 75mg TID, Valium 5mg BID PRN muscle spasms, and Celexa 20mg daily.  Supportive care.  Will start bowel regimen.  - Fall precautions, ambulate with assistance, PT/OT rec SNF  - LA  reviewed.  - Family requesting addiction resources for outpatient. Patient refuses    Neurogenic bladder  - Continue Ditropan XL 5mg daily.    Impaired mobility and ADLs  - Will consult PT/OT, SW.  - Fall precautions, ambulate with assistance.    10/25/2016 Saddle PE  -Continue Xarelto 20mg qHS.      Discharged Condition: fair    Disposition: Skilled Nursing Facility    Follow Up:    Patient Instructions:   No discharge procedures on file.  Medications:  Reconciled Home Medications:   Current Discharge Medication List      CONTINUE these medications which have NOT CHANGED    Details   baclofen (LIORESAL) 20 MG tablet Take 1 tablet (20 mg total) by mouth 4 (four) times daily.  Qty: 120 tablet, Refills: 11      carbamazepine (TEGRETOL XR) 400 MG Tb12 Take 1 tablet (400 mg total) by mouth 2 (two) times daily.  Qty: 60 tablet, Refills: 3      citalopram (CELEXA) 20 MG tablet Take 1 tablet (20 mg total) by mouth once daily.  Qty: 30 tablet, Refills: 3      dantrolene (DANTRIUM) 50 MG Cap Take 1 capsule (50 mg total) by mouth 4 (four) times daily.  Qty: 120 capsule, Refills: 11      diazePAM (VALIUM) 5 MG tablet Take 1 tablet (5 mg total) by mouth 2 (two) times daily as needed (muscle spasms).  Qty: 60 tablet, Refills: 1      ergocalciferol (VITAMIN D2) 50,000 unit Cap Take 1 capsule (50,000 Units total) by mouth every 7 days.  Qty: 4 capsule, Refills: 11      gabapentin (NEURONTIN) 600 MG tablet Take 1 Q AM plus 1 Q Day in the early afternoon plus 1.5 (900mg) Q HS  Qty: 105 tablet, Refills: 11      oxybutynin (DITROPAN-XL) 5 MG TR24 Take 5 mg by mouth once daily.      oxycodone  (ROXICODONE) 15 MG Tab Take 1 tablet (15 mg total) by mouth 3 (three) times daily as needed.  Qty: 90 tablet, Refills: 0      polyethylene glycol (GLYCOLAX) 17 gram/dose powder Take 17 g by mouth once daily.  Qty: 510 g, Refills: 6      rivaroxaban (XARELTO) 20 mg Tab Take 1 tablet (20 mg total) by mouth daily with dinner or evening meal.  Qty: 60 tablet, Refills: 4      senna-docusate 8.6-50 mg (PERICOLACE) 8.6-50 mg per tablet Take 1 tablet by mouth 2 (two) times daily.      trazodone (DESYREL) 100 MG tablet Take 100 mg by mouth every evening.           Time spent on the discharge of patient: 35 minutes    Jim Galarza PA-C  Department of Hospital Medicine  [facility]

## 2025-07-27 NOTE — ASSESSMENT & PLAN NOTE
- avoid opiates, need pain management outpatient. Discuss with neurology his long her pain pain until he can be seen by pain medicine.   - recent MD shopping per chart review, drug seeking behavior  - Seen by pain management, Dr. Trevizo : I discussed with patient that I will try to treat his pain utilizing a multimodal approach with non-narocotic pain medications and physical therapy. I explained to the patient it is not my plan to treat his pain with opioid pain medication. At this point, the patient decided to leave before I could perform a ROS or physical exam.   1 Principal Discharge DX:	Right-sided chest pain

## (undated) DEVICE — NDL WILLIAMS CYSTOSCOPIC

## (undated) DEVICE — SOL SOD CHLORIDE 0.9% 10ML

## (undated) DEVICE — PACK CYSTO

## (undated) DEVICE — SET CYSTO IRRIGATION UNIV SPIK

## (undated) DEVICE — TRAY CYSTO BASIN

## (undated) DEVICE — SOL IRR WATER STRL 3000 ML

## (undated) DEVICE — SYR 10CC LUER LOCK

## (undated) DEVICE — NDL SPINAL SPINOCAN 22GX3.5

## (undated) DEVICE — GOWN SMARTGOWN LVL4 X-LONG XL